# Patient Record
Sex: FEMALE | Race: BLACK OR AFRICAN AMERICAN | Employment: UNEMPLOYED | URBAN - METROPOLITAN AREA
[De-identification: names, ages, dates, MRNs, and addresses within clinical notes are randomized per-mention and may not be internally consistent; named-entity substitution may affect disease eponyms.]

---

## 2017-04-06 ENCOUNTER — OFFICE VISIT (OUTPATIENT)
Dept: BEHAVIORAL/MENTAL HEALTH CLINIC | Age: 32
End: 2017-04-06

## 2017-04-06 VITALS
BODY MASS INDEX: 35.36 KG/M2 | HEIGHT: 66 IN | SYSTOLIC BLOOD PRESSURE: 116 MMHG | WEIGHT: 220 LBS | HEART RATE: 73 BPM | DIASTOLIC BLOOD PRESSURE: 75 MMHG

## 2017-04-06 DIAGNOSIS — G47.9 SLEEP DISTURBANCE: ICD-10-CM

## 2017-04-06 DIAGNOSIS — F33.1 MDD (MAJOR DEPRESSIVE DISORDER), RECURRENT EPISODE, MODERATE (HCC): Primary | ICD-10-CM

## 2017-04-06 DIAGNOSIS — F41.9 ANXIETY: ICD-10-CM

## 2017-04-06 DIAGNOSIS — Z91.14 NON COMPLIANCE W MEDICATION REGIMEN: ICD-10-CM

## 2017-04-06 PROBLEM — Z91.148 NON COMPLIANCE W MEDICATION REGIMEN: Status: ACTIVE | Noted: 2017-04-06

## 2017-04-06 RX ORDER — BUPROPION HYDROCHLORIDE 150 MG/1
150 TABLET ORAL
Qty: 30 TAB | Refills: 2 | Status: SHIPPED | OUTPATIENT
Start: 2017-04-06 | End: 2017-05-03 | Stop reason: SDUPTHER

## 2017-04-06 RX ORDER — TRAZODONE HYDROCHLORIDE 50 MG/1
TABLET ORAL
Qty: 30 TAB | Refills: 1 | Status: SHIPPED | OUTPATIENT
Start: 2017-04-06 | End: 2017-05-03 | Stop reason: SDUPTHER

## 2017-04-06 NOTE — MR AVS SNAPSHOT
Visit Information Date & Time Provider Department Dept. Phone Encounter #  
 4/6/2017 10:30 AM Dudley Mckay, [facility] [phone] [id number] Follow-up Instructions Return in about 4 weeks (around 5/4/2017). Upcoming Health Maintenance Date Due  
 PAP AKA CERVICAL CYTOLOGY 5/1/2016 INFLUENZA AGE 9 TO ADULT 8/1/2016 DTaP/Tdap/Td series (2 - Td) 9/14/2022 Allergies as of 4/6/2017  Review Complete On: 4/6/2017 By: Dudley Mckay NP Severity Noted Reaction Type Reactions Labetalol High 04/11/2013   Topical Hives Ceclor [Cefaclor]  10/08/2010    Unknown (comments) Pcn [Penicillins]  10/08/2010    Hives Septra [Sulfamethoprim Ds]  10/08/2010    Unknown (comments) Current Immunizations  Reviewed on 4/23/2015 Name Date Influenza Vaccine PF 10/22/2013 11:20 AM  
 Pneumococcal Vaccine (Unspecified Type) 9/14/2012  3:57 PM  
 TDAP Vaccine 9/14/2012  4:01 PM  
  
 Not reviewed this visit You Were Diagnosed With   
  
 Codes Comments MDD (major depressive disorder), recurrent episode, moderate ([facility]    -  Primary ICD-10-CM: F33.1 ICD-9-CM: 296.32 Anxiety     ICD-10-CM: F41.9 ICD-9-CM: 300.00 Sleep disturbance     ICD-10-CM: G47.9 ICD-9-CM: 780.50 Vitals BP Pulse Height(growth percentile) Weight(growth percentile) LMP BMI  
 116/75 73 5' 6\" (1.676 m) 220 lb (99.8 kg) 03/28/2017 35.51 kg/m2 OB Status Smoking Status Having regular periods Never Smoker Vitals History BMI and BSA Data Body Mass Index Body Surface Area 35.51 kg/m 2 2.16 m 2 Preferred Pharmacy Pharmacy Name Phone [address], [address] [phone] Your Updated Medication List  
  
   
This list is accurate as of: 4/6/17 10:50 AM.  Always use your most recent med list.  
  
  
  
  
 albuterol 90 mcg/actuation inhaler Commonly known as:  PROVENTIL HFA, VENTOLIN HFA, PROAIR HFA Take 2 Puffs by inhalation every six (6) hours as needed for Wheezing. Needs office visit for further refills buPROPion  mg tablet Commonly known as:  Nadara Calle Take 1 Tab by mouth every morning. cetirizine 10 mg tablet Commonly known as:  ZYRTEC  
TAKE 1 TABLET BY MOUTH EVERY DAY  
  
 docusate sodium 50 mg capsule Commonly known as:  Borrero Gails Take 50 mg by mouth two (2) times a day. ferrous sulfate 325 mg (65 mg iron) tablet Commonly known as:  Iron Take 1 Tab by mouth daily. ibuprofen 800 mg tablet Commonly known as:  MOTRIN Take 1 Tab by mouth every eight (8) hours. PRENATAL 19 (WITH DOCUSATE) 29-1 mg Tab Generic drug:  prenatal vit-fe fum-fa-dss Take 1 Tab by mouth daily. Indications: PREGNANCY  
  
 traZODone 50 mg tablet Commonly known as:  Clari Gardner Take 1/2-1 tablet by mouth at bedtime for sleep as needed Prescriptions Sent to Pharmacy Refills buPROPion XL (WELLBUTRIN XL) 150 mg tablet 2 Sig: Take 1 Tab by mouth every morning. Class: Normal  
 Pharmacy: [address] Ph #: [phone] Route: Oral  
 traZODone (DESYREL) 50 mg tablet 1 Sig: Take 1/2-1 tablet by mouth at bedtime for sleep as needed Class: Normal  
 Pharmacy: [address] Ph #: [phone] Follow-up Instructions Return in about 4 weeks (around 5/4/2017). Introducing [facility]! Dear Salvador potts: 
Thank you for requesting a [employer] account. Our records indicate that you already have an active [employer] account. You can access your account anytime at [email or web address] Did you know that you can access your hospital and ER discharge instructions at any time in Wally? You can also review all of your test results from your hospital stay or ER visit. Additional Information If you have questions, please visit the Frequently Asked Questions section of the Wally website at [email or web address] Remember, [employer] is NOT to be used for urgent needs. For medical emergencies, dial 911. Now available from your iPhone and Android! Please provide this summary of care documentation to your next provider. Your primary care clinician is listed as Janiya Henson. If you have any questions after today's visit, please call [phone].
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
## 2017-05-03 ENCOUNTER — OFFICE VISIT (OUTPATIENT)
Dept: BEHAVIORAL/MENTAL HEALTH CLINIC | Age: 32
End: 2017-05-03

## 2017-05-03 VITALS
BODY MASS INDEX: 35.51 KG/M2 | WEIGHT: 220 LBS | DIASTOLIC BLOOD PRESSURE: 83 MMHG | HEART RATE: 80 BPM | SYSTOLIC BLOOD PRESSURE: 110 MMHG

## 2017-05-03 DIAGNOSIS — F33.1 MDD (MAJOR DEPRESSIVE DISORDER), RECURRENT EPISODE, MODERATE (HCC): Primary | ICD-10-CM

## 2017-05-03 DIAGNOSIS — G47.9 SLEEP DISTURBANCE: ICD-10-CM

## 2017-05-03 DIAGNOSIS — F41.9 ANXIETY: ICD-10-CM

## 2017-05-03 RX ORDER — BUPROPION HYDROCHLORIDE 300 MG/1
300 TABLET ORAL
Qty: 30 TAB | Refills: 1 | Status: SHIPPED | OUTPATIENT
Start: 2017-05-03 | End: 2017-05-31 | Stop reason: SDUPTHER

## 2017-05-03 RX ORDER — TRAZODONE HYDROCHLORIDE 100 MG/1
TABLET ORAL
Qty: 30 TAB | Refills: 1 | Status: SHIPPED | OUTPATIENT
Start: 2017-05-03 | End: 2017-05-31 | Stop reason: SDUPTHER

## 2017-05-03 NOTE — PROGRESS NOTES
Psychiatric Outpatient Progress Note    Date: 5/3/2017  Account Number:  [id number]  Name: Crescencio Valenzuela    Length of psychotherapy session: 20 minutes     SUBJECTIVE:   Crescencio Valenzuela  is a 33 y.o.  female  patient presents for a therapy/psychopharmacological management appointment. Pt reports mild improvement since starting Wellbutrin. Has considerable social stressors such as son having problems in school and b/f being emotionally absent and lacking support in the relationship. Better compliance with medication. Continues to struggle with depression, anhedonia, concentration, low mood and anxiety. No evidence of AH/VH or delusions. No SI.  No HI.     ROS:   Appetite:good , Sleep: varies  Response to Treatment:   Not consistent with treatment  Side Effects:   prozac-rash    zoloft-ineffective      Supportive/Cognitive/Reality-Oriented psychotherapy provided in regards to psychosocial stressors:   Current problems   Housing issues   Occupational issues   Academic issues   Medical issues   Interpersonal conflicts  Psychoeducation provided  Treatment plan reviewed with patient-including diagnosis and medications  Worked on issues of denial & effects of substance dependency/use: co-dependency      OBJECTIVE:                 Mental Status exam: WNL except for      Sensorium  oriented to time, place and person   Relations passive    Eye Contact    appropriate   Appearance:  casually dressed, obese   Motor Behavior:  within normal limits   Speech:  normal pitch and normal volume   Thought Process: goal directed   Thought Content free of delusions and free of hallucinations   Suicidal ideations none   Homicidal ideations none   Mood:  Anxious, depressed   Affect:  constricted   Memory recent  adequate   Memory remote:  adequate   Concentration:  adequate   Abstraction:  abstract   Insight:  fair   Reliability fair   Judgment:  limited       Allergies   Allergen Reactions    Labetalol Hives    Ceclor [Cefaclor] Unknown (comments)    Pcn [Penicillins] Hives    Septra [Sulfamethoprim Ds] Unknown (comments)        Current Outpatient Prescriptions   Medication Sig Dispense Refill    buPROPion XL (WELLBUTRIN XL) 300 mg XL tablet Take 1 Tab by mouth every morning. 30 Tab 1    traZODone (DESYREL) 100 mg tablet Take 1/2-1 tablet by mouth at bedtime for sleep as needed 30 Tab 1    ibuprofen (MOTRIN) 800 mg tablet Take 1 Tab by mouth every eight (8) hours. 30 Tab 2    docusate sodium (COLACE) 50 mg capsule Take 50 mg by mouth two (2) times a day.  albuterol (PROVENTIL HFA, VENTOLIN HFA) 90 mcg/actuation inhaler Take 2 Puffs by inhalation every six (6) hours as needed for Wheezing. Needs office visit for further refills 1 Inhaler 0    cetirizine (ZYRTEC) 10 mg tablet TAKE 1 TABLET BY MOUTH EVERY DAY 15 Tab 0    ferrous sulfate (IRON) 325 mg (65 mg iron) tablet Take 1 Tab by mouth daily. 30 Tab 0    prenatal vit-fe fum-fa-dss (PRENATAL 19) 29-1 mg Tab Take 1 Tab by mouth daily. Indications: PREGNANCY          MEDICAL DECISION MAKING  Visit Vitals    /83    Pulse 80    Wt 99.8 kg (220 lb)    LMP 04/22/2017    Breastfeeding Yes    BMI 35.51 kg/m2       Data: pertinent labs, imaging, medical records and diagnostic tests reviewed and incorporated in diagnosis and treatment plan    Diagnoses/ Problems:     ICD-10-CM ICD-9-CM    1. MDD (major depressive disorder), recurrent episode, moderate (HCC) F33.1 296.32 buPROPion XL (WELLBUTRIN XL) 300 mg XL tablet   2. Anxiety F41.9 300.00 buPROPion XL (WELLBUTRIN XL) 300 mg XL tablet   3. Sleep disturbance G47.9 780.50 traZODone (DESYREL) 100 mg tablet   Medical:  hypertension, anemia, asthma, sciatica. Assessment:  Wenceslao Tony  is a 33 y.o.  female patient presents with depression and anxiety. She remains in a relationship where she does not feel supported due to his alcoholism.   She has significant social stressor to include raising 6 children, lack of support and issues with bf. Medication helping minimally. Still with wakefulness and depression. Discussed sleep disturbance and recommendations. Home situation is very stressful. Denies SI, HI or SIB. Risk Scoring- chronic illnesses and prescription drug management    Partner -Esvin  Children -Christopher Albarran (school struggles), Suresh Maciel, Master Daleig    Treatment Plan:  1. Medications:   Increase Wellbutrin   Trazodone  mg mg qhs prn - increased      Orders Placed This Encounter    buPROPion XL (WELLBUTRIN XL) 300 mg XL tablet    traZODone (DESYREL) 100 mg tablet           The following regarding medications was addressed:    (The risks and benefits of the proposed medication; the potential medication side effects ie    dry mouth, weight gain, GI upset, headache; patient given opportunity to ask questions)       Medication Changes/Adjustments:   Medications Discontinued During This Encounter   Medication Reason    buPROPion XL (WELLBUTRIN XL) 150 mg tablet Reorder    traZODone (DESYREL) 50 mg tablet Reorder        2. Provided supportive psychotherapy: addressed safety risk, recent stressors, provided validation and assisted with coping skills and problem solving     3. Follow-up Disposition:  Return in about 4 weeks (around 5/31/2017). PSYCHOTHERAPY:  approx 10 minutes  Type:  Supportive/Cognitive Behavioral therapy provided  Focus:     Current problems - son struggling in school   Interpersonal conflicts -conflict with boyfriend  Psychoeducation provided  Treatment plan reviewed with patient-including diagnosis and medications    Jamia Area is not progressing.     Signed By: Zandra Nance NP     May 3, 2017

## 2017-05-03 NOTE — MR AVS SNAPSHOT
Visit Information Date & Time Provider Department Dept. Phone Encounter #  
 5/3/2017 10:30 AM Argelia Garrison, [address] [facility] [phone] [id number] Upcoming Health Maintenance Date Due  
 PAP AKA CERVICAL CYTOLOGY 5/1/2016 INFLUENZA AGE 9 TO ADULT 8/1/2017 DTaP/Tdap/Td series (2 - Td) 9/14/2022 Allergies as of 5/3/2017  Review Complete On: 5/3/2017 By: Argelia Garrison NP Severity Noted Reaction Type Reactions Labetalol High 04/11/2013   Topical Hives Ceclor [Cefaclor]  10/08/2010    Unknown (comments) Pcn [Penicillins]  10/08/2010    Hives Septra [Sulfamethoprim Ds]  10/08/2010    Unknown (comments) Current Immunizations  Reviewed on 4/23/2015 Name Date Influenza Vaccine PF 10/22/2013 11:20 AM  
 Pneumococcal Vaccine (Unspecified Type) 9/14/2012  3:57 PM  
 TDAP Vaccine 9/14/2012  4:01 PM  
  
 Not reviewed this visit You Were Diagnosed With   
  
 Codes Comments MDD (major depressive disorder), recurrent episode, [facility]    -  Primary ICD-10-CM: F33.1 ICD-9-CM: 296.32 Anxiety     ICD-10-CM: F41.9 ICD-9-CM: 300.00 Sleep disturbance     ICD-10-CM: G47.9 ICD-9-CM: 780.50 Vitals BP Pulse Weight(growth percentile) LMP Breastfeeding? BMI  
 110/83 80 220 lb (99.8 kg) 04/22/2017 Yes 35.51 kg/m2 OB Status Smoking Status Having regular periods Never Smoker Vitals History BMI and BSA Data Body Mass Index Body Surface Area 35.51 kg/m 2 2.16 m 2 Preferred Pharmacy Pharmacy Name Phone [address], [address] [phone] Your Updated Medication List  
  
   
This list is accurate as of: 5/3/17 10:48 AM.  Always use your most recent med list.  
  
  
  
  
 albuterol 90 mcg/actuation inhaler Commonly known as:  PROVENTIL HFA, VENTOLIN HFA, PROAIR HFA  
 Take 2 Puffs by inhalation every six (6) hours as needed for Wheezing. Needs office visit for further refills buPROPion  mg XL tablet Commonly known as:  Nestor Dross Take 1 Tab by mouth every morning. cetirizine 10 mg tablet Commonly known as:  ZYRTEC  
TAKE 1 TABLET BY MOUTH EVERY DAY  
  
 docusate sodium 50 mg capsule Commonly known as:  Columbia Angle Take 50 mg by mouth two (2) times a day. ferrous sulfate 325 mg (65 mg iron) tablet Commonly known as:  Iron Take 1 Tab by mouth daily. ibuprofen 800 mg tablet Commonly known as:  MOTRIN Take 1 Tab by mouth every eight (8) hours. PRENATAL 19 (WITH DOCUSATE) 29-1 mg Tab Generic drug:  prenatal vit-fe fum-fa-dss Take 1 Tab by mouth daily. Indications: PREGNANCY  
  
 traZODone 100 mg tablet Commonly known as:  Ismael Nicole Take 1/2-1 tablet by mouth at bedtime for sleep as needed Prescriptions Sent to Pharmacy Refills buPROPion XL (WELLBUTRIN XL) 300 mg XL tablet 1 Sig: Take 1 Tab by mouth every morning. Class: Normal  
 Pharmacy: [address] Ph #: [phone] Route: Oral  
 traZODone (DESYREL) 100 mg tablet 1 Sig: Take 1/2-1 tablet by mouth at bedtime for sleep as needed Class: Normal  
 Pharmacy: [address] Ph #: [phone] [facility]! Dear Yasmin Jonh: 
Thank you for requesting a [employer] account. Our records indicate that you already have an active [employer] account. You can access your account anytime at [email or web address] Did you know that you can access your hospital and ER discharge instructions at any time in [employer]? You can also review all of your test results from your hospital stay or ER visit. Additional Information If you have questions, please visit the Frequently Asked Questions section of the Meezhart website at [email or web address] Remember, [employer] is NOT to be used for urgent needs. For medical emergencies, dial 911. Now available from your iPhone and Android! Please provide this summary of care documentation to your next provider. Your primary care clinician is listed as Gomez Banda. If you have any questions after today's visit, please call [phone].

## 2017-05-31 ENCOUNTER — OFFICE VISIT (OUTPATIENT)
Dept: BEHAVIORAL/MENTAL HEALTH CLINIC | Age: 32
End: 2017-05-31

## 2017-05-31 VITALS
BODY MASS INDEX: 35.52 KG/M2 | DIASTOLIC BLOOD PRESSURE: 78 MMHG | WEIGHT: 221 LBS | SYSTOLIC BLOOD PRESSURE: 121 MMHG | HEIGHT: 66 IN | HEART RATE: 85 BPM

## 2017-05-31 DIAGNOSIS — G47.9 SLEEP DISTURBANCE: ICD-10-CM

## 2017-05-31 DIAGNOSIS — F33.1 MDD (MAJOR DEPRESSIVE DISORDER), RECURRENT EPISODE, MODERATE (HCC): Primary | ICD-10-CM

## 2017-05-31 DIAGNOSIS — F41.9 ANXIETY: ICD-10-CM

## 2017-05-31 RX ORDER — BUPROPION HYDROCHLORIDE 150 MG/1
150 TABLET ORAL 2 TIMES DAILY
Qty: 60 TAB | Refills: 1 | Status: SHIPPED | OUTPATIENT
Start: 2017-05-31 | End: 2017-07-12 | Stop reason: SDUPTHER

## 2017-05-31 RX ORDER — TRAZODONE HYDROCHLORIDE 150 MG/1
TABLET ORAL
Qty: 30 TAB | Refills: 1 | Status: SHIPPED | OUTPATIENT
Start: 2017-05-31 | End: 2017-07-12 | Stop reason: SDUPTHER
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
## 2017-07-12 ENCOUNTER — OFFICE VISIT (OUTPATIENT)
Dept: BEHAVIORAL/MENTAL HEALTH CLINIC | Age: 32
End: 2017-07-12

## 2017-07-12 VITALS
HEART RATE: 86 BPM | BODY MASS INDEX: 36.16 KG/M2 | SYSTOLIC BLOOD PRESSURE: 111 MMHG | HEIGHT: 66 IN | DIASTOLIC BLOOD PRESSURE: 73 MMHG | WEIGHT: 225 LBS

## 2017-07-12 DIAGNOSIS — F33.1 MDD (MAJOR DEPRESSIVE DISORDER), RECURRENT EPISODE, MODERATE (HCC): ICD-10-CM

## 2017-07-12 DIAGNOSIS — G47.9 SLEEP DISTURBANCE: ICD-10-CM

## 2017-07-12 DIAGNOSIS — F41.9 ANXIETY: ICD-10-CM

## 2017-07-12 RX ORDER — TRAZODONE HYDROCHLORIDE 150 MG/1
TABLET ORAL
Qty: 30 TAB | Refills: 1 | Status: SHIPPED | OUTPATIENT
Start: 2017-07-12 | End: 2018-03-03

## 2017-07-12 RX ORDER — BUPROPION HYDROCHLORIDE 150 MG/1
150 TABLET ORAL 2 TIMES DAILY
Qty: 60 TAB | Refills: 1 | Status: SHIPPED | OUTPATIENT
Start: 2017-07-12 | End: 2017-08-14 | Stop reason: SDUPTHER
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
## 2017-07-13 LAB — HCG UR QL: POSITIVE

## 2017-07-28 ENCOUNTER — TELEPHONE (OUTPATIENT)
Dept: FAMILY MEDICINE CLINIC | Age: 32
End: 2017-07-28

## 2017-07-28 ENCOUNTER — HOSPITAL ENCOUNTER (EMERGENCY)
Age: 32
Discharge: HOME | End: 2017-07-28
Attending: EMERGENCY MEDICINE

## 2017-07-28 ENCOUNTER — HOSPITAL ENCOUNTER (OUTPATIENT)
Dept: LAB | Age: 32
Discharge: HOME | End: 2017-07-28

## 2017-07-28 VITALS
OXYGEN SATURATION: 99 % | WEIGHT: 224 LBS | HEIGHT: 65 IN | TEMPERATURE: 97.9 F | HEART RATE: 67 BPM | DIASTOLIC BLOOD PRESSURE: 68 MMHG | SYSTOLIC BLOOD PRESSURE: 139 MMHG | RESPIRATION RATE: 19 BRPM | BODY MASS INDEX: 37.32 KG/M2

## 2017-07-28 DIAGNOSIS — Z34.90 EARLY STAGE OF PREGNANCY: Primary | ICD-10-CM

## 2017-07-28 LAB
BILIRUB UR QL: NEGATIVE
GLUCOSE UR QL STRIP.AUTO: NEGATIVE MG/DL
HCG UR QL: POSITIVE
KETONES UR-MCNC: NEGATIVE MG/DL
LEUKOCYTE ESTERASE UR QL STRIP: NEGATIVE
NITRITE UR QL: NEGATIVE
PH UR: 6 [PH] (ref 5–8)
PROT UR QL: NEGATIVE MG/DL
RBC # UR STRIP: NEGATIVE /UL
SP GR UR: 1.03 (ref 1–1.03)
UROBILINOGEN UR QL: 1 EU/DL (ref 0.2–1)

## 2017-07-28 PROCEDURE — 87086 URINE CULTURE/COLONY COUNT: CPT | Performed by: EMERGENCY MEDICINE

## 2017-07-28 NOTE — TELEPHONE ENCOUNTER
----- Message from [place] Poll sent at 7/28/2017  7:07 AM EDT -----  Regarding: NP Emely Baird Innocent  Contact: [phone]  Pt is requesting a call back t be seen today. 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 Pt has a poss UTI.  Pt's best contact number is [phone]

## 2017-07-28 NOTE — TELEPHONE ENCOUNTER
Called patient on phone number provided, advised patient since she has not been seen since 2014 she would need to come in as a new patient, advised patient I could get her in Monday at 8:30am with Dr. Adela Farmer, or we do have a [facility] clinic off of [address] that she can go to as a walk in, they are open from 8-8 today. Patient declined the appointment for Monday, patient states she will go to the [facility] clinic.

## 2017-07-28 NOTE — DISCHARGE INSTRUCTIONS
Learning About Pregnancy  Your Care Instructions  Your health in the early weeks of your pregnancy is particularly important for your babys health. Take good care of yourself. Anything you do that harms your body can also harm your baby. Make sure to go to all of your doctor appointments. Regular checkups will help keep you and your baby healthy. Follow-up care is a key part of your treatment and safety. Be sure to make and go to all appointments, and call your doctor if you are having problems. Its also a good idea to know your test results and keep a list of the medicines you take. How can you care for yourself at home? Diet  · Eat a balanced diet. Make sure your diet includes plenty of beans, peas, and leafy green vegetables. · Do not skip meals or go for many hours without eating. If you are nauseated, try to eat a small, healthy snack every 2 to 3 hours. · Do not eat fish that has a high level of mercury, such as shark, swordfish, or mackerel. Do not eat more than one can of tuna each week. · Drink plenty of fluids, enough so that your urine is light yellow or clear like water. If you have kidney, heart, or liver disease and have to limit fluids, talk with your doctor before you increase the amount of fluids you drink. · Cut down on caffeine, such as coffee, tea, and cola. · Do not drink alcohol, such as beer, wine, or hard liquor. · Take a multivitamin that contains at least 400 micrograms (mcg) of folic acid to help prevent birth defects. Fortified cereal and whole wheat bread are good additional sources of folic acid. · Increase the calcium in your diet. Try to drink a quart of skim milk each day. You may also take calcium supplements and choose foods such as cheese and yogurt. Lifestyle  · Make sure you go to your follow-up appointments. · Get plenty of rest. You may be unusually tired while you are pregnant. · Get at least 30 minutes of exercise on most days of the week.  Walking is a good choice. If you have not exercised in the past, start out slowly. Take several short walks each day. · Do not smoke. If you need help quitting, talk to your doctor about stop-smoking programs. These can increase your chances of quitting for good. · Do not touch cat feces or litter boxes. Also, wash your hands after you handle raw meat, and fully cook all meat before you eat it. Wear gloves when you work in the yard or garden, and wash your hands well when you are done. Cat feces, raw or undercooked meat, and contaminated dirt can cause an infection that may harm your baby or lead to a miscarriage. · Do not use saunas or hot tubs. Raising your body temperature may harm your baby. · Avoid chemical fumes, paint fumes, or poisons. · Do not use illegal drugs or alcohol. Medicines  · Review all of your medicines with your doctor. Some of your routine medicines may need to be changed to protect your baby. · Use acetaminophen (Tylenol) to relieve minor problems, such as a mild headache or backache or a mild fever with cold symptoms. Do not use nonsteroidal anti-inflammatory drugs (NSAIDs), such as ibuprofen (Advil, Motrin) or naproxen (Aleve), unless your doctor says it is okay. · Do not take two or more pain medicines at the same time unless the doctor told you to. Many pain medicines have acetaminophen, which is Tylenol. Too much acetaminophen (Tylenol) can be harmful. · Take your medicines exactly as prescribed. Call your doctor if you think you are having a problem with your medicine. To manage morning sickness  · If you feel sick when you first wake up, try eating a small snack (such as crackers) before you get out of bed. Allow some time to digest the snack, and then get out of bed slowly. · Do not skip meals or go for long periods without eating. An empty stomach can make nausea worse. · Eat small, frequent meals instead of three large meals each day. · Drink plenty of fluids.  Sports drinks, such as Gatorade or Powerade, are good choices. · Eat foods that are high in protein but low in fat. · If you are taking iron supplements, ask your doctor if they are necessary. Iron can make nausea worse. · Avoid any smells, such as coffee, that make you feel sick. · Get lots of rest. Morning sickness may be worse when you are tired. Where can you learn more? Go to http://annamaria-lexii.info/. Enter O845 in the search box to learn more about \"Learning About Pregnancy. \"  Current as of: March 16, 2017  Content Version: 11.3  © 2310-3750 [employer]. Care instructions adapted under license by [employer] (which disclaims liability or warranty for this information). If you have questions about a medical condition or this instruction, always ask your healthcare professional. [address] any warranty or liability for your use of this information. Belly Pain in Pregnancy: Care Instructions  Your Care Instructions  When you're pregnant, any belly pain can be a worry. You may not want to call your doctor about every pain you have. But you don't want to miss something that is dangerous for you or your baby. Even if it feels familiar, belly pain can mean something new when you're pregnant. It's important to know when to call your doctor. It will also help to know how to care for yourself at home when your pain is not caused by anything harmful. · When belly pain is more severe or constant, see a doctor right away. · If you're sure your belly pain is a sign of labor, call your doctor. · When belly pain is brief, it's usually a normal part of pregnancy. It might be related to changes in the growing uterus. Or it could be the stretching of ligaments called round ligaments. These ligaments help support the uterus. Round ligament pain can be on either side of your belly. It can also be felt in your hips or groin.   Follow-up care is a key part of your treatment and safety. Be sure to make and go to all appointments, and call your doctor if you are having problems. It's also a good idea to know your test results and keep a list of the medicines you take. How can you tell if belly pain is a sign of labor? When belly pain is caused by labor, it can feel like mild or menstrual-like cramps in your lower belly. These cramps are probably contractions. They can happen in your second or third trimester. You may also have:  · A steady, dull ache in your lower back, pelvis, or thighs. · A feeling of pressure in your pelvis or lower belly. · Changes in your vaginal discharge or a sudden release of fluid from the vagina. If you think you are in labor, call your doctor. How can you care for yourself at home? When belly pain is mild and is not a symptom of labor:  · Rest until you feel better. · Take a warm bath. · Think about what you drink and eat:  ¨ Drink plenty of fluids. Choose water and other caffeine-free clear liquids until you feel better. ¨ Try eating small, frequent meals. If your stomach is upset, try bland, low-fat foods like plain rice, broiled chicken, toast, and yogurt. · Think about how you move if you are having brief pains from stretching of the round ligaments. ¨ Try gentle stretching. ¨ Move a little more slowly when turning in bed or getting up from a chair, so those ligaments don't stretch quickly. ¨ Lean forward a bit if you think you are going to cough or sneeze. When should you call for help? Call 911 anytime you think you may need emergency care. For example, call if:  · You have sudden, severe pain in your belly. · You have severe vaginal bleeding. Call your doctor now or seek immediate medical care if:  · You have new or worse belly pain or cramping. · You have any vaginal bleeding. · You have a fever. · You have symptoms of preeclampsia, such as:  ¨ Sudden swelling of your face, hands, or feet.   ¨ New vision problems (such as dimness or blurring). ¨ A severe headache. · You think that you may be in labor. This means that you've had at least 8 contractions within 1 hour or at least 4 contractions within 20 minutes, even after you change your position and drink fluids. · You have symptoms of a urinary tract infection. These may include:  ¨ Pain or burning when you urinate. ¨ A frequent need to urinate without being able to pass much urine. ¨ Pain in the flank, which is just below the rib cage and above the waist on either side of the back. ¨ Blood in your urine. Watch closely for changes in your health, and be sure to contact your doctor if you are worried about your or your baby's health. Where can you learn more? Go to http://annamaria-lexii.info/. Enter 873 507 442 in the search box to learn more about \"Belly Pain in Pregnancy: Care Instructions. \"  Current as of: March 16, 2017  Content Version: 11.3  © 2590-2313 [employer]. Care instructions adapted under license by [employer] (which disclaims liability or warranty for this information). If you have questions about a medical condition or this instruction, always ask your healthcare professional. [place] [address] any warranty or liability for your use of this information.

## 2017-07-28 NOTE — UC PROVIDER NOTE
Patient is a 33 y.o. female presenting with urinary tract infection. The history is provided by the patient. Bladder Infection    This is a new problem. The current episode started more than 1 week ago. The problem occurs intermittently. The problem has not changed since onset. The quality of the pain is described as aching. The pain is moderate. There has been no fever. She is sexually active. There is no history of pyelonephritis. Associated symptoms include chills and possible pregnancy. Pertinent negatives include no sweats, no nausea, no discharge, no frequency, no hematuria, no hesitancy, no urgency and no flank pain. Associated symptoms comments: Lower back pain. She has tried nothing for the symptoms. Her past medical history does not include kidney stones, single kidney, urological procedure, recurrent UTIs, urinary stasis or catheterization.         Past Medical History:   Diagnosis Date    Anemia 10/8/2010    Anemia NEC     taking iron    Anxiety     Asthma     Asthma     on albuterol prn    Back pain, chronic     sciatica    HX OTHER MEDICAL      , , , ,     HX OTHER MEDICAL     severe hyperemesis    Hypertension     with G12 - none this pregnancy    MDD (major depressive disorder), single episode with postpartum onset 2013    treated with meds - 13    Pregnancy     36 weeks    Psychiatric disorder     depression        Past Surgical History:   Procedure Laterality Date    HX  SECTION  4/22/15    HX GYN      miscarriage x 6    HX GYN      removal of left fallopian tube    HX OTHER SURGICAL      cerlcage x4, ectopic x1  with removal of left fallopian tube    HX OTHER SURGICAL      cerclage w/ current pregnancy         Family History   Problem Relation Age of Onset   [facility] Arthritis-rheumatoid Mother     Asthma Mother     Alcohol abuse Neg Hx     Arthritis-osteo Neg Hx     Bleeding Prob Neg Hx     Cancer Neg Hx     Diabetes Neg Hx     Elevated Lipids Neg Hx     Headache Neg Hx     Heart Disease Neg Hx     Hypertension Neg Hx     Lung Disease Neg Hx     Migraines Neg Hx     Psychiatric Disorder Neg Hx     Stroke Neg Hx     Mental Retardation Neg Hx         Social History     Social History    Marital status: SINGLE     Spouse name: N/A    Number of children: N/A    Years of education: N/A     Occupational History    Not on file. Social History Main Topics    Smoking status: Never Smoker    Smokeless tobacco: Never Used    Alcohol use No    Drug use: No    Sexual activity: Yes     Partners: Male     Other Topics Concern    Not on file     Social History Narrative    Feliz Mina, 25, lives with [place] [address],  reportedly with alcohol dependence and has five children from this union living with her-ages 9, 7, 5, 3, and 1. She was raised by her mother. She has no siblings. She is supported financially by the childrens' father and public support. She has no hobbies outside of her children. She has a 12th grade education and no legal entanglements. ALLERGIES: Labetalol; Ceclor [cefaclor]; Pcn [penicillins]; and Septra [sulfamethoprim ds]    Review of Systems   Constitutional: Positive for chills. Gastrointestinal: Negative. Negative for nausea. Genitourinary: Positive for dysuria. Negative for flank pain, frequency, hematuria, hesitancy, pelvic pain, urgency, vaginal bleeding and vaginal discharge. Musculoskeletal: Positive for back pain. All other systems reviewed and are negative. Vitals:    07/28/17 1530 07/28/17 1532   BP:  139/68   Pulse:  67   Resp:  19   Temp:  97.9 °F (36.6 °C)   SpO2:  99%   Weight: 101.6 kg (224 lb)    Height: 5' 5\" (1.651 m)        Physical Exam   Constitutional: She is oriented to person, place, and time. She appears well-developed and well-nourished. HENT:   Head: Normocephalic and atraumatic.    Mouth/Throat: Oropharynx is clear and moist. No oropharyngeal exudate. Eyes: Conjunctivae and EOM are normal. Pupils are equal, round, and reactive to light. Right eye exhibits no discharge. Left eye exhibits no discharge. No scleral icterus. Neck: Normal range of motion. No tracheal deviation present. No thyromegaly present. Cardiovascular: Normal rate, regular rhythm and normal heart sounds. No murmur heard. Pulmonary/Chest: Effort normal and breath sounds normal. No respiratory distress. She has no wheezes. She has no rales. She exhibits no tenderness. Abdominal: Soft. Bowel sounds are normal. She exhibits no distension. There is no tenderness. There is no rebound and no guarding. Musculoskeletal: Normal range of motion. She exhibits no edema or tenderness. Lymphadenopathy:     She has no cervical adenopathy. Neurological: She is alert and oriented to person, place, and time. No cranial nerve deficit. Coordination normal.   Skin: Skin is warm. No erythema. Psychiatric: She has a normal mood and affect. Her behavior is normal. Judgment and thought content normal.   Nursing note and vitals reviewed.       MDM     Differential Diagnosis; Clinical Impression; Plan:     Urinary burning, back pain,  Possible uti- urine dip negative, urine pregnancy test positive, impression early pregnancy, doubt uti      Procedures

## 2017-07-30 LAB
BACTERIA SPEC CULT: NORMAL
CC UR VC: NORMAL
SERVICE CMNT-IMP: NORMAL

## 2017-08-08 LAB
ANTIBODY SCREEN, EXTERNAL: NEGATIVE
HBSAG, EXTERNAL: NEGATIVE
HBSAG, EXTERNAL: NEGATIVE
HIV, EXTERNAL: NEGATIVE
RPR, EXTERNAL: NON REACTIVE
RUBELLA, EXTERNAL: NORMAL
TYPE, ABO & RH, EXTERNAL: NORMAL

## 2017-08-14 ENCOUNTER — OFFICE VISIT (OUTPATIENT)
Dept: BEHAVIORAL/MENTAL HEALTH CLINIC | Age: 32
End: 2017-08-14

## 2017-08-14 VITALS
OXYGEN SATURATION: 99 % | SYSTOLIC BLOOD PRESSURE: 106 MMHG | WEIGHT: 224.8 LBS | HEIGHT: 65 IN | TEMPERATURE: 98.5 F | DIASTOLIC BLOOD PRESSURE: 82 MMHG | RESPIRATION RATE: 18 BRPM | BODY MASS INDEX: 37.45 KG/M2 | HEART RATE: 90 BPM

## 2017-08-14 DIAGNOSIS — F41.9 ANXIETY: ICD-10-CM

## 2017-08-14 DIAGNOSIS — F33.1 MDD (MAJOR DEPRESSIVE DISORDER), RECURRENT EPISODE, MODERATE (HCC): Primary | ICD-10-CM

## 2017-08-14 RX ORDER — ONDANSETRON 4 MG/1
TABLET, ORALLY DISINTEGRATING ORAL
Refills: 0 | COMMUNITY
Start: 2017-07-27 | End: 2018-03-03

## 2017-08-14 RX ORDER — FAMOTIDINE 20 MG/1
TABLET, FILM COATED ORAL
Refills: 3 | COMMUNITY
Start: 2017-08-08 | End: 2020-01-15

## 2017-08-14 RX ORDER — ONDANSETRON 8 MG/1
TABLET, ORALLY DISINTEGRATING ORAL
Refills: 4 | COMMUNITY
Start: 2017-08-08 | End: 2017-09-29

## 2017-08-14 RX ORDER — SWAB
SWAB, NON-MEDICATED MISCELLANEOUS
Refills: 11 | COMMUNITY
Start: 2017-08-08 | End: 2020-01-15

## 2017-08-14 RX ORDER — BUPROPION HYDROCHLORIDE 150 MG/1
150 TABLET ORAL 2 TIMES DAILY
Qty: 60 TAB | Refills: 1 | Status: SHIPPED | OUTPATIENT
Start: 2017-08-14 | End: 2018-01-12 | Stop reason: SDUPTHER

## 2017-08-14 NOTE — MR AVS SNAPSHOT
Visit Information Date & Time Provider Department Dept. Phone Encounter #  
 8/14/2017 10:00 AM Martha AvilesSandhya5 [facility] [phone] [id number] Follow-up Instructions Return in about 4 weeks (around 9/11/2017). Your Appointments 9/12/2017 10:00 AM  
ESTABLISHED PATIENT with Martha Aviles NP Olivia Avilayajaira 5 ([facility]) Appt Note: 2 mo fu  
 [address] [facility] [address] [place] [address]  
[phone] [address] [facility] Upcoming Health Maintenance Date Due  
 PAP AKA CERVICAL CYTOLOGY 5/1/2016 INFLUENZA AGE 9 TO ADULT 8/1/2017 DTaP/Tdap/Td series (2 - Td) 9/14/2022 Allergies as of 8/14/2017  Review Complete On: 8/14/2017 By: Martha Aviles NP Severity Noted Reaction Type Reactions Labetalol High 04/11/2013   Topical Hives Ceclor [Cefaclor]  10/08/2010    Unknown (comments) Pcn [Penicillins]  10/08/2010    Hives Septra [Sulfamethoprim Ds]  10/08/2010    Unknown (comments) Current Immunizations  Reviewed on 4/23/2015 Name Date Influenza Vaccine PF 10/22/2013 11:20 AM  
 TDAP Vaccine 9/14/2012  4:01 PM  
 ZZZ-RETIRED (DO NOT USE) Pneumococcal Vaccine (Unspecified Type) 9/14/2012  3:57 PM  
  
 Not reviewed this visit You Were Diagnosed With   
  
 Codes Comments MDD (major depressive disorder), recurrent episode, moderate ([facility]    -  Primary ICD-10-CM: F33.1 ICD-9-CM: 296.32 Anxiety     ICD-10-CM: F41.9 ICD-9-CM: 300.00 Vitals BP Pulse Temp Resp Height(growth percentile) Weight(growth percentile) 106/82 (BP 1 Location: Left arm, BP Patient Position: Sitting) 90 98.5 °F (36.9 °C) (Oral) 18 5' 5\" (1.651 m) 224 lb 12.8 oz (102 kg) SpO2 BMI OB Status Smoking Status 99% 37.41 kg/m2 Having regular periods Never Smoker Vitals History BMI and BSA Data Body Mass Index Body Surface Area 37.41 kg/m 2 2.16 m 2 Preferred Pharmacy Pharmacy Name Phone [address], [address]  [phone] Your Updated Medication List  
  
   
This list is accurate as of: 8/14/17 10:39 AM.  Always use your most recent med list.  
  
  
  
  
 albuterol 90 mcg/actuation inhaler Commonly known as:  PROVENTIL HFA, VENTOLIN HFA, PROAIR HFA Take 2 Puffs by inhalation every six (6) hours as needed for Wheezing. Needs office visit for further refills buPROPion  mg tablet Commonly known as:  Jamie Crick Take 1 Tab by mouth two (2) times a day. cetirizine 10 mg tablet Commonly known as:  ZYRTEC  
TAKE 1 TABLET BY MOUTH EVERY DAY  
  
 famotidine 20 mg tablet Commonly known as:  PEPCID  
  
 * ondansetron 4 mg disintegrating tablet Commonly known as:  ZOFRAN ODT  
DIS 1 T UNT Q 8 H PRF NAUSEA OR VOM * ondansetron 8 mg disintegrating tablet Commonly known as:  ZOFRAN ODT  
DIS 1 T ON THE TONGUE Q 8 H PRN  
  
 prenatal vit-iron fumarate-fa 28 mg iron- 800 mcg Tab Commonly known as:  PRENATAL PLUS with IRON TK 1 T PO QD  
  
 traZODone 150 mg tablet Commonly known as:  Osvaldo Pace Take 1/2-1 tablet by mouth at bedtime for sleep as needed * Notice: This list has 2 medication(s) that are the same as other medications prescribed for you. Read the directions carefully, and ask your doctor or other care provider to review them with you. Prescriptions Sent to Pharmacy Refills buPROPion XL (WELLBUTRIN XL) 150 mg tablet 1 Sig: Take 1 Tab by mouth two (2) times a day. Class: Normal  
 Pharmacy: [facility] [address] [place] [address] #: [phone] Route: Oral  
  
Follow-up Instructions Return in about 4 weeks (around 9/11/2017). Introducing [facility]! Dear Juan Jose Parra: Thank you for requesting a Crowdzu account. Our records indicate that you already have an active Crowdzu account. You can access your account anytime at [email or web address] Did you know that you can access your hospital and ER discharge instructions at any time in Crowdzu? You can also review all of your test results from your hospital stay or ER visit. Additional Information If you have questions, please visit the Frequently Asked Questions section of the Crowdzu website at [email or web address] Remember, Crowdzu is NOT to be used for urgent needs. For medical emergencies, dial 911. Now available from your iPhone and Android! Please provide this summary of care documentation to your next provider. Your primary care clinician is listed as Damir Godinez. If you have any questions after today's visit, please call [phone].
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
## 2017-08-14 NOTE — PROGRESS NOTES
Chief Complaint   Patient presents with    Anxiety     2 month f/u    Depression     \"     1. Have you been to the ER, urgent care clinic since your last visit? Hospitalized since your last visit? No    2. Have you seen or consulted any other health care providers outside of the [address] [place] [address] since your last visit? Include any pap smears or colon screening.  No

## 2017-09-22 ENCOUNTER — HOSPITAL ENCOUNTER (OUTPATIENT)
Dept: PERINATAL CARE | Age: 32
Discharge: HOME | End: 2017-09-22
Attending: OBSTETRICS & GYNECOLOGY
Payer: MEDICAID

## 2017-09-22 PROCEDURE — 76815 OB US LIMITED FETUS(S): CPT | Performed by: OBSTETRICS & GYNECOLOGY

## 2017-09-28 ENCOUNTER — ANESTHESIA EVENT (OUTPATIENT)
Dept: LABOR AND DELIVERY | Age: 32
End: 2017-09-28
Payer: MEDICAID

## 2017-09-29 ENCOUNTER — HOSPITAL ENCOUNTER (OUTPATIENT)
Age: 32
Discharge: HOME | End: 2017-09-29
Attending: OBSTETRICS & GYNECOLOGY | Admitting: OBSTETRICS & GYNECOLOGY
Payer: MEDICAID

## 2017-09-29 ENCOUNTER — ANESTHESIA (OUTPATIENT)
Dept: LABOR AND DELIVERY | Age: 32
End: 2017-09-29
Payer: MEDICAID

## 2017-09-29 VITALS
BODY MASS INDEX: 36.16 KG/M2 | HEIGHT: 66 IN | WEIGHT: 225 LBS | HEART RATE: 83 BPM | SYSTOLIC BLOOD PRESSURE: 125 MMHG | OXYGEN SATURATION: 100 % | TEMPERATURE: 98.8 F | DIASTOLIC BLOOD PRESSURE: 62 MMHG | RESPIRATION RATE: 18 BRPM

## 2017-09-29 PROBLEM — N88.3 INCOMPETENT CERVIX: Status: ACTIVE | Noted: 2017-09-29

## 2017-09-29 PROBLEM — N88.3 CERVICAL INCOMPETENCE: Status: ACTIVE | Noted: 2017-09-29

## 2017-09-29 LAB
ERYTHROCYTE [DISTWIDTH] IN BLOOD BY AUTOMATED COUNT: 17.2 % (ref 11.5–14.5)
HCT VFR BLD AUTO: 31.5 % (ref 35–47)
HGB BLD-MCNC: 10.3 G/DL (ref 11.5–16)
MCH RBC QN AUTO: 21.7 PG (ref 26–34)
MCHC RBC AUTO-ENTMCNC: 32.7 G/DL (ref 30–36.5)
MCV RBC AUTO: 66.3 FL (ref 80–99)
PLATELET # BLD AUTO: 288 K/UL (ref 150–400)
RBC # BLD AUTO: 4.75 M/UL (ref 3.8–5.2)
WBC # BLD AUTO: 13.7 K/UL (ref 3.6–11)

## 2017-09-29 PROCEDURE — 74011250636 HC RX REV CODE- 250/636: Performed by: OBSTETRICS & GYNECOLOGY

## 2017-09-29 PROCEDURE — 74011250637 HC RX REV CODE- 250/637: Performed by: OBSTETRICS & GYNECOLOGY

## 2017-09-29 PROCEDURE — 96374 THER/PROPH/DIAG INJ IV PUSH: CPT | Performed by: NURSE ANESTHETIST, CERTIFIED REGISTERED

## 2017-09-29 PROCEDURE — 96375 TX/PRO/DX INJ NEW DRUG ADDON: CPT | Performed by: NURSE ANESTHETIST, CERTIFIED REGISTERED

## 2017-09-29 PROCEDURE — 74011000250 HC RX REV CODE- 250

## 2017-09-29 PROCEDURE — 85027 COMPLETE CBC AUTOMATED: CPT | Performed by: OBSTETRICS & GYNECOLOGY

## 2017-09-29 PROCEDURE — 75410000003 HC RECOV DEL/VAG/CSECN EA 0.5 HR: Performed by: OBSTETRICS & GYNECOLOGY

## 2017-09-29 PROCEDURE — 74011250636 HC RX REV CODE- 250/636

## 2017-09-29 PROCEDURE — 76010000389 HC LDRP PROCEDURE 0.5 TO 1 HR: Performed by: OBSTETRICS & GYNECOLOGY

## 2017-09-29 PROCEDURE — 76060000078 HC EPIDURAL ANESTHESIA: Performed by: OBSTETRICS & GYNECOLOGY

## 2017-09-29 PROCEDURE — 36415 COLL VENOUS BLD VENIPUNCTURE: CPT | Performed by: OBSTETRICS & GYNECOLOGY

## 2017-09-29 PROCEDURE — 77030007866 HC KT SPN ANES BBMI -B: Performed by: ANESTHESIOLOGY

## 2017-09-29 RX ORDER — ONDANSETRON 2 MG/ML
INJECTION INTRAMUSCULAR; INTRAVENOUS
Status: COMPLETED
Start: 2017-09-29 | End: 2017-09-29

## 2017-09-29 RX ORDER — ACETAMINOPHEN 325 MG/1
650 TABLET ORAL
Status: DISCONTINUED | OUTPATIENT
Start: 2017-09-29 | End: 2017-09-29 | Stop reason: HOSPADM

## 2017-09-29 RX ORDER — BUPIVACAINE HYDROCHLORIDE 7.5 MG/ML
INJECTION, SOLUTION EPIDURAL; RETROBULBAR AS NEEDED
Status: DISCONTINUED | OUTPATIENT
Start: 2017-09-29 | End: 2017-09-29 | Stop reason: HOSPADM

## 2017-09-29 RX ORDER — ONDANSETRON 2 MG/ML
4 INJECTION INTRAMUSCULAR; INTRAVENOUS
Status: COMPLETED | OUTPATIENT
Start: 2017-09-29 | End: 2017-09-29

## 2017-09-29 RX ORDER — KETOROLAC TROMETHAMINE 30 MG/ML
30 INJECTION, SOLUTION INTRAMUSCULAR; INTRAVENOUS
Status: COMPLETED | OUTPATIENT
Start: 2017-09-29 | End: 2017-09-29

## 2017-09-29 RX ORDER — OXYCODONE AND ACETAMINOPHEN 5; 325 MG/1; MG/1
1 TABLET ORAL
Qty: 10 TAB | Refills: 0 | Status: SHIPPED | OUTPATIENT
Start: 2017-09-29 | End: 2018-03-03

## 2017-09-29 RX ORDER — SODIUM CHLORIDE, SODIUM LACTATE, POTASSIUM CHLORIDE, CALCIUM CHLORIDE 600; 310; 30; 20 MG/100ML; MG/100ML; MG/100ML; MG/100ML
125 INJECTION, SOLUTION INTRAVENOUS CONTINUOUS
Status: DISCONTINUED | OUTPATIENT
Start: 2017-09-29 | End: 2017-09-29 | Stop reason: SDUPTHER

## 2017-09-29 RX ORDER — SODIUM CHLORIDE, SODIUM LACTATE, POTASSIUM CHLORIDE, CALCIUM CHLORIDE 600; 310; 30; 20 MG/100ML; MG/100ML; MG/100ML; MG/100ML
125 INJECTION, SOLUTION INTRAVENOUS CONTINUOUS
Status: DISCONTINUED | OUTPATIENT
Start: 2017-09-29 | End: 2017-09-29 | Stop reason: HOSPADM

## 2017-09-29 RX ADMIN — SODIUM CHLORIDE, POTASSIUM CHLORIDE, SODIUM LACTATE AND CALCIUM CHLORIDE: 600; 310; 30; 20 INJECTION, SOLUTION INTRAVENOUS at 12:19

## 2017-09-29 RX ADMIN — BUPIVACAINE HYDROCHLORIDE 7.5 MG: 7.5 INJECTION, SOLUTION EPIDURAL; RETROBULBAR at 12:30

## 2017-09-29 RX ADMIN — KETOROLAC TROMETHAMINE 30 MG: 30 INJECTION, SOLUTION INTRAMUSCULAR at 15:15

## 2017-09-29 RX ADMIN — ONDANSETRON 4 MG: 2 INJECTION INTRAMUSCULAR; INTRAVENOUS at 13:48

## 2017-09-29 RX ADMIN — ACETAMINOPHEN 650 MG: 325 TABLET, FILM COATED ORAL at 13:45

## 2017-09-29 NOTE — BRIEF OP NOTE
BRIEF OPERATIVE NOTE    Date of Procedure: 9/29/2017   Preoperative Diagnosis: INCOMPETENT CERVIX  Postoperative Diagnosis: s/p cerclage    Procedure(s):  CERCLAGE  Surgeon(s) and Role:     * Cris Tuttle MD - Primary     * Neela Kovacs MD - Assisting         Assistant Staff:       Surgical Staff:  Circ-1: Moshe Pate RN  Scrub Tech-1: [facility]  Float Staff: Shanell Rick RN  No case tracking events are documented in the log. Anesthesia: Spinal   Estimated Blood Loss: 20 millilters  Specimens: * No specimens in log *   Findings: Cervix 1 cm anteriorly and is deficient and irregular posteriorly.    Complications: None  Implants: * No implants in log *

## 2017-09-29 NOTE — ANESTHESIA PREPROCEDURE EVALUATION
Anesthetic History     PONV          Review of Systems / Medical History  Patient summary reviewed, nursing notes reviewed and pertinent labs reviewed    Pulmonary            Asthma : well controlled       Neuro/Psych         Psychiatric history     Cardiovascular    Hypertension                   GI/Hepatic/Renal  Within defined limits              Endo/Other        Obesity     Other Findings              Physical Exam    Airway  Mallampati: II  TM Distance: > 6 cm  Neck ROM: normal range of motion   Mouth opening: Normal     Cardiovascular  Regular rate and rhythm,  S1 and S2 normal,  no murmur, click, rub, or gallop             Dental  No notable dental hx       Pulmonary  Breath sounds clear to auscultation               Abdominal  GI exam deferred       Other Findings            Anesthetic Plan    ASA: 2  Anesthesia type: spinal            Anesthetic plan and risks discussed with: Patient

## 2017-09-29 NOTE — PROGRESS NOTES
Patient seen s/p history indicated cerclage placement by Dr. Omar Guevara today. Patient had been c/o cramping since procedure which is now better since toradol x1. Has not spontaneously urinated yet. Bleeding scant per bedside nursing staff. +Doptones post procedure. Will rx percocet though counseled that she should not need to take it. If she finds her pain worsens or if there is an increase in bleeding, she is instructed to return for evaluation. Patient's next appointment with regular OB/Gyn is next Tuesday. All questions answered.     Signed By: Nikolay Garcia DO     September 29, 2017

## 2017-09-29 NOTE — H&P
Maternal-Fetal Medicine    Ms. Marlys Isaac P6 at 15w 6d gestation, is admitted for prophylactic cerclage procedure. She had ultrasound and MFM consultation last week. She had several pregnancy losses followed by term deliveries. Patient had cerclage placed in all her successful pregnancies. Obstetric history is signficant for pregnancy losses at 16,18 and 20-weeks gestations. Subsequently, in all her pregnancies, she had term vaginal deliveries except in the most-recent one in , when she had  delivery for breech presentation. She had cerclages in all her successful pregnancies. In her last pregnancy, the patient was also admitted with hyperemesis gravidarum and had PICC line inserted. PMH: Chronic hypertension. Patient is not on any antihypertensives now. She also has mild intermittent asthma. Patient has depression and is taking Wellbutrin 150 mg bid and reports good response to her medication. PSH: Cerclage,  section. Allergies: Ceclor, penicillin, labetalol, septra. P/E: Patient is comfortably lying in bed; not in distress. Vitals stable. Abd: Soft; no tenderness. I counseled the patient on the following:  History of pregnancy losses: I had counseled the patient on the procedure and alternative management of weekly cervical length measurements. Patient had opted for cerclage. I counseled the patient on cerclage procedure again and discussed possible complications including miscarriage, bleeding, infection, and injuries to bladder or bowel (rare). Patient opted to have cerclage. Informed consent was obtained.

## 2017-09-29 NOTE — IP AVS SNAPSHOT
[address] 
[phone] Patient: Su Egan MRN: [id number] :1985 Current Discharge Medication List  
  
START taking these medications Dose & Instructions Dispensing Information Comments Morning Noon Evening Bedtime  
 oxyCODONE-acetaminophen 5-325 mg per tablet Commonly known as:  PERCOCET Your last dose was: Your next dose is:    
   
   
 Dose:  1 Tab Take 1 Tab by mouth every six (6) hours as needed for Pain (PRN Severe pain). Max Daily Amount: 4 Tabs. Quantity:  10 Tab Refills:  0 CONTINUE these medications which have CHANGED Dose & Instructions Dispensing Information Comments Morning Noon Evening Bedtime  
 ondansetron 4 mg disintegrating tablet Commonly known as:  ZOFRAN ODT What changed:  Another medication with the same name was removed. Continue taking this medication, and follow the directions you see here. Your last dose was: Your next dose is: DIS 1 T UNT Q 8 H PRF NAUSEA OR VOM Refills:  0 CONTINUE these medications which have NOT CHANGED Dose & Instructions Dispensing Information Comments Morning Noon Evening Bedtime  
 albuterol 90 mcg/actuation inhaler Commonly known as:  PROVENTIL HFA, VENTOLIN HFA, PROAIR HFA Your last dose was: Your next dose is:    
   
   
 Dose:  2 Puff Take 2 Puffs by inhalation every six (6) hours as needed for Wheezing. Needs office visit for further refills Quantity:  1 Inhaler Refills:  0  
     
   
   
   
  
 buPROPion  mg tablet Commonly known as:  Oscar Manley Your last dose was: Your next dose is:    
   
   
 Dose:  150 mg Take 1 Tab by mouth two (2) times a day. Quantity:  60 Tab Refills:  1  
     
   
   
   
  
 cetirizine 10 mg tablet Commonly known as:  ZYRTEC  
   
 Your last dose was: Your next dose is: TAKE 1 TABLET BY MOUTH EVERY DAY Quantity:  15 Tab Refills:  0  
     
   
   
   
  
 famotidine 20 mg tablet Commonly known as:  PEPCID Your last dose was: Your next dose is:    
   
   
  Refills:  3  
     
   
   
   
  
 prenatal vit-iron fumarate-fa 28 mg iron- 800 mcg Tab Commonly known as:  PRENATAL PLUS with IRON Your last dose was: Your next dose is:    
   
   
 TK 1 T PO QD Refills:  11  
     
   
   
   
  
 traZODone 150 mg tablet Commonly known as:  Rudy Shipley Your last dose was: Your next dose is: Take 1/2-1 tablet by mouth at bedtime for sleep as needed Quantity:  30 Tab Refills:  1 Where to Get Your Medications Information on where to get these meds will be given to you by the nurse or doctor. ! Ask your nurse or doctor about these medications  
  oxyCODONE-acetaminophen 5-325 mg per tablet

## 2017-09-29 NOTE — PROGRESS NOTES
Notified Dr. Umang Nath of pt's report of nausea and pain, orders received for IV Zofran. Order already available for Tylenol.

## 2017-09-29 NOTE — PROGRESS NOTES
1450 pt is experiencing very painful cramping, she is requesting more pain medication. Call placed to [facility], [facility]. and message left for Dr Nanci Kelsey to call the unit when he's not with a patient. 80 Dr Nanci Kelsey at bedside discussing [address] with pt. He feels comfortable with her being discharged home after she is able to void. 1640 Dr Medina  at bedside assessing pt for discharge. States she will send pt home with a small amount of pain medication and gave instructions for followup. 1815 pt has voided, vital signs are stable, pain is under control and all discharge instructions and follow up appointments have been discussed. 1855 pt taken by wheelchair to pt discharge.

## 2017-09-29 NOTE — OP NOTES
OPERATIVE REPORT   PREOPERATIVE DIAGNOSIS: A 15-week pregnancy with cervical incompetence. POSTOPERATIVE DIAGNOSIS: A 15-week pregnancy with cervical incompetence. OPERATIVE PROCEDURE: Cervical cerclage. SURGEON: Glen Kumari MD   ASSISTANT: Bonita Talley MD  ANESTHESIA: Spinal.   FINDINGS: Cervix 1 cm long with external os closed. COMPLICATIONS: None. DESCRIPTION OF PROCEDURE: Before transferring to OR, I explained the   procedures and complications of cervical cerclage that includes hemorrhage,   infection, and injury to bladder or bowel. Informed consent was obtained,   and the patient was transferred to the operating room. Spinal anesthesia was administered by the anesthesiologist. The patient was   then placed in the dorsal lithotomy position and vulva and vagina were   prepped with Betadine and draped. A weighted speculum was inserted into the vagina, and the   anterior lip of the cervix was held by a pair of ring forceps. The cervix   was seen to about 1 cm long anteriorly and a small cystocele was seen. Posteriorly, the cervix was irregular with raised scar tissue and is deficient between 5 and 7 Oclock positions. With the help with Bovie, about 1-inch long incision was made anteriorly at the   cervical-vaginal junction and the bladder was pushed up. A 5 mm Mersilene   tape stitch was taken at the 11-o'clock position and the suture was placed   circumferentially to exit at the 1-o'clock position. Posteriorly, the stitch was passed higher and above the scar tissue. The needle was cut off   and about 5 knots were placed anteriorly. Good hemostasis was achieved. All the instruments and sponges were removed from the vagina and the count   was correct. Rectal examination did not reveal any inadvertent sutures. Bladder was catheterized to rule out hematuria. Clear urine (5 mL) was drained. The patient was then transferred to labor and delivery in   stable condition.    ESTIMATED BLOOD LOSS: 20 milliliters. ESTIMATED BLOOD LOSS: 25 mL. SPECIMENS: None.   Loren Lim MD  9/29/2017

## 2017-09-29 NOTE — DISCHARGE INSTRUCTIONS
Cervical Cerclage to Prevent  Delivery: What to Expect at Home  Your Recovery  Cervical cerclage (say \"SER-vuh-kul ser-KLAZH\") is a procedure that helps keep your cervix from opening too soon. Your doctor has sewn your cervix shut to help prevent premature labor. For the next few days, you may have:  · Cramping. · Spotting. · Pain when you urinate. Your doctor may give you instructions on when you can do your normal activities again, such as driving and going back to work. Your doctor will remove the stitches around your cervix at 37 weeks, or if you go into premature labor or show signs of infection. This care sheet gives you a general idea about how long it will take for you to recover. But each person recovers at a different pace. Follow the steps below to get better as quickly as possible. How can you care for yourself at home? Activity  · Rest when you feel tired. · Ask your doctor when it is okay for you to have sex. Medicines  · Be safe with medicines. Read and follow all instructions on the label. · If you are not taking a prescription pain medicine, ask your doctor if you can take an over-the-counter medicine. · Your doctor will tell you if and when you can restart your medicines. He or she will also give you instructions about taking any new medicines. Follow-up care is a key part of your treatment and safety. Be sure to make and go to all appointments, and call your doctor if you are having problems. It's also a good idea to know your test results and keep a list of the medicines you take. When should you call for help? Call 911 anytime you think you may need emergency care. For example, call if:  · You have severe trouble breathing. · You have sudden, severe pain in your belly. · You have severe vaginal bleeding. Call your doctor now or seek immediate medical care if:  · You have new pelvic pain, or the pain in your pelvis gets worse.   · You have a new discharge from your vagina. · You have a new or higher fever. Watch closely for changes in your health, and be sure to contact your doctor if you have any problems. Where can you learn more? Go to http://annamaria-lexii.info/. Enter Q429 in the search box to learn more about \"Cervical Cerclage to Prevent  Delivery: What to Expect at Home. \"  Current as of: 2017  Content Version: 11.3  © 4018-3133 [employer], [employer]. Care instructions adapted under license by [employer] (which disclaims liability or warranty for this information). If you have questions about a medical condition or this instruction, always ask your healthcare professional. [address] any warranty or liability for your use of this information.

## 2017-09-29 NOTE — ANESTHESIA POSTPROCEDURE EVALUATION
Post-Anesthesia Evaluation and Assessment    Patient: Albaro Cedeño MRN: [id number]  SSN: [id number]    YOB: 1985  Age: 27 y.o. Sex: female       Cardiovascular Function/Vital Signs  Visit Vitals    /59    Pulse 69    Temp 36.7 °C (98.1 °F)    Resp 24    Ht 5' 6\" (1.676 m)    Wt 102.1 kg (225 lb)    SpO2 100%    Breastfeeding No    BMI 36.32 kg/m2       Patient is status post spinal anesthesia for Procedure(s):  CERCLAGE. Nausea/Vomiting: None    Postoperative hydration reviewed and adequate. Pain:  Pain Scale 1: Numeric (0 - 10) (09/29/17 1344)  Pain Intensity 1: 5 (09/29/17 1344)   Managed    Neurological Status: At baseline    Mental Status and Level of Consciousness: Arousable    Pulmonary Status:   O2 Device: Room air (09/29/17 1344)   Adequate oxygenation and airway patent    Complications related to anesthesia: None    Post-anesthesia assessment completed.  No concerns    Signed By: Ankita Squires DO     September 29, 2017

## 2017-09-29 NOTE — IP AVS SNAPSHOT
[address] [facility] [address] [place] [address] 
[phone] Patient: Harry Rose MRN: [id number] :1985 You are allergic to the following Allergen Reactions Labetalol Hives Ceclor (Cefaclor) Unknown (comments) Pcn (Penicillins) Hives Septra (Sulfamethoprim Ds) Unknown (comments) Recent Documentation Height Weight Breastfeeding? BMI OB Status Smoking Status 1.676 m 102.1 kg No 36.32 kg/m2 Pregnant Never Smoker Unresulted Labs Order Current Status SAMPLE TO BLOOD BANK In process About your hospitalization You were admitted on:  2017 You last received care in the:  [facility] 3 LABOR & DELIVERY You were discharged on:  2017 Unit phone number:  [phone] Why you were hospitalized Your primary diagnosis was:  Not on File Your diagnoses also included:  Cervical Incompetence, Incompetent Cervix Providers Seen During Your Hospitalizations Provider Role Specialty Primary office phone Olivia De León DO Attending Provider Obstetrics & Gynecology [phone] Your Primary Care Physician (PCP) Primary Care Physician Office Phone Office Fax Dov Walton [phone] Follow-up Information Follow up With Details Comments Contact Info Olivia De León DO On 10/3/2017  [address] [facility] [address] 
[phone] Meghan Robledo MD On 10/6/2017  [address] 
[facility] [address] [place] [address] 
[phone] Current Discharge Medication List  
  
START taking these medications Dose & Instructions Dispensing Information Comments Morning Noon Evening Bedtime  
 oxyCODONE-acetaminophen 5-325 mg per tablet Commonly known as:  PERCOCET Your last dose was: Your next dose is:    
   
   
 Dose:  1 Tab Take 1 Tab by mouth every six (6) hours as needed for Pain (PRN Severe pain). Max Daily Amount: 4 Tabs. Quantity:  10 Tab Refills:  0 CONTINUE these medications which have CHANGED Dose & Instructions Dispensing Information Comments Morning Noon Evening Bedtime  
 ondansetron 4 mg disintegrating tablet Commonly known as:  ZOFRAN ODT What changed:  Another medication with the same name was removed. Continue taking this medication, and follow the directions you see here. Your last dose was: Your next dose is: DIS 1 T UNT Q 8 H PRF NAUSEA OR VOM Refills:  0 CONTINUE these medications which have NOT CHANGED Dose & Instructions Dispensing Information Comments Morning Noon Evening Bedtime  
 albuterol 90 mcg/actuation inhaler Commonly known as:  PROVENTIL HFA, VENTOLIN HFA, PROAIR HFA Your last dose was: Your next dose is:    
   
   
 Dose:  2 Puff Take 2 Puffs by inhalation every six (6) hours as needed for Wheezing. Needs office visit for further refills Quantity:  1 Inhaler Refills:  0  
     
   
   
   
  
 buPROPion  mg tablet Commonly known as:  Alba Fair Your last dose was: Your next dose is:    
   
   
 Dose:  150 mg Take 1 Tab by mouth two (2) times a day. Quantity:  60 Tab Refills:  1  
     
   
   
   
  
 cetirizine 10 mg tablet Commonly known as:  ZYRTEC Your last dose was: Your next dose is: TAKE 1 TABLET BY MOUTH EVERY DAY Quantity:  15 Tab Refills:  0  
     
   
   
   
  
 famotidine 20 mg tablet Commonly known as:  PEPCID Your last dose was: Your next dose is:    
   
   
  Refills:  3  
     
   
   
   
  
 prenatal vit-iron fumarate-fa 28 mg iron- 800 mcg Tab Commonly known as:  PRENATAL PLUS with IRON Your last dose was:     
   
Your next dose is:    
   
   
 TK 1 T PO QD  
 Refills:  11  
     
   
   
   
  
 traZODone 150 mg tablet Commonly known as:  Cem Corona Your last dose was: Your next dose is: Take 1/2-1 tablet by mouth at bedtime for sleep as needed Quantity:  30 Tab Refills:  1 Where to Get Your Medications Information on where to get these meds will be given to you by the nurse or doctor. ! Ask your nurse or doctor about these medications  
  oxyCODONE-acetaminophen 5-325 mg per tablet Discharge Instructions Cervical Cerclage to Prevent  Delivery: What to Expect at [facility] Your Recovery Cervical cerclage (say \"SER-vuh-kul ser-KLAZH\") is a procedure that helps keep your cervix from opening too soon. Your doctor has sewn your cervix shut to help prevent premature labor. For the next few days, you may have: · Cramping. · Spotting. · Pain when you urinate. Your doctor may give you instructions on when you can do your normal activities again, such as driving and going back to work. Your doctor will remove the stitches around your cervix at 37 weeks, or if you go into premature labor or show signs of infection. This care sheet gives you a general idea about how long it will take for you to recover. But each person recovers at a different pace. Follow the steps below to get better as quickly as possible. How can you care for yourself at home? Activity · Rest when you feel tired. · Ask your doctor when it is okay for you to have sex. Medicines · Be safe with medicines. Read and follow all instructions on the label. · If you are not taking a prescription pain medicine, ask your doctor if you can take an over-the-counter medicine. · Your doctor will tell you if and when you can restart your medicines. He or she will also give you instructions about taking any new medicines. Follow-up care is a key part of your treatment and safety.  Be sure to make and go to all appointments, and call your doctor if you are having problems. It's also a good idea to know your test results and keep a list of the medicines you take. When should you call for help? Call 911 anytime you think you may need emergency care. For example, call if: 
· You have severe trouble breathing. · You have sudden, severe pain in your belly. · You have severe vaginal bleeding. Call your doctor now or seek immediate medical care if: 
· You have new pelvic pain, or the pain in your pelvis gets worse. · You have a new discharge from your vagina. · You have a new or higher fever. Watch closely for changes in your health, and be sure to contact your doctor if you have any problems. Where can you learn more? Go to http://annamaria-lexii.info/. Enter R324 in the search box to learn more about \"Cervical Cerclage to Prevent  Delivery: What to Expect at Home. \" Current as of: 2017 Content Version: 11.3 © 5535-6627 [employer]. Care instructions adapted under license by [email or web address] (which disclaims liability or warranty for this information). If you have questions about a medical condition or this instruction, always ask your healthcare professional. [place] [address] any warranty or liability for your use of this information. Discharge Orders None Introducing [facility]! [facility]: 
Thank you for requesting a FlipKey account. Our records indicate that you already have an active FlipKey account. You can access your account anytime at [email or web address] Did you know that you can access your hospital and ER discharge instructions at any time in FlipKey? You can also review all of your test results from your hospital stay or ER visit. Additional Information If you have questions, please visit the Frequently Asked Questions section of the [facility] website at [email or web address]; to (do). [email or web address] Remember, MyChart is NOT to be used for urgent needs. For medical emergencies, dial 911. Now available from your iPhone and Android! General Information Please provide this summary of care documentation to your next provider. Patient Signature:  ____________________________________________________________ Date:  ____________________________________________________________  
  
Suzon Mems Provider Signature:  ____________________________________________________________ Date:  ____________________________________________________________

## 2017-10-06 ENCOUNTER — HOSPITAL ENCOUNTER (OUTPATIENT)
Dept: PERINATAL CARE | Age: 32
Discharge: HOME | End: 2017-10-06
Attending: OBSTETRICS & GYNECOLOGY
Payer: MEDICAID

## 2017-10-06 PROCEDURE — 76817 TRANSVAGINAL US OBSTETRIC: CPT | Performed by: OBSTETRICS & GYNECOLOGY

## 2017-11-03 ENCOUNTER — HOSPITAL ENCOUNTER (OUTPATIENT)
Dept: PERINATAL CARE | Age: 32
Discharge: HOME | End: 2017-11-03
Attending: OBSTETRICS & GYNECOLOGY
Payer: MEDICAID

## 2017-11-03 PROCEDURE — 76811 OB US DETAILED SNGL FETUS: CPT | Performed by: OBSTETRICS & GYNECOLOGY

## 2017-11-03 PROCEDURE — 76817 TRANSVAGINAL US OBSTETRIC: CPT | Performed by: OBSTETRICS & GYNECOLOGY

## 2017-12-01 ENCOUNTER — HOSPITAL ENCOUNTER (OUTPATIENT)
Dept: PERINATAL CARE | Age: 32
Discharge: HOME | End: 2017-12-01
Attending: OBSTETRICS & GYNECOLOGY
Payer: MEDICAID

## 2017-12-01 PROCEDURE — 76816 OB US FOLLOW-UP PER FETUS: CPT | Performed by: OBSTETRICS & GYNECOLOGY

## 2018-01-12 DIAGNOSIS — F33.1 MDD (MAJOR DEPRESSIVE DISORDER), RECURRENT EPISODE, MODERATE (HCC): ICD-10-CM

## 2018-01-12 DIAGNOSIS — F41.9 ANXIETY: ICD-10-CM

## 2018-01-12 RX ORDER — BUPROPION HYDROCHLORIDE 150 MG/1
150 TABLET ORAL 2 TIMES DAILY
Qty: 60 TAB | Refills: 0 | Status: SHIPPED | OUTPATIENT
Start: 2018-01-12 | End: 2020-02-18

## 2018-01-22 LAB — HBSAG, EXTERNAL: NEGATIVE

## 2018-01-25 ENCOUNTER — HOSPITAL ENCOUNTER (OUTPATIENT)
Dept: PERINATAL CARE | Age: 33
Discharge: HOME | End: 2018-01-25
Attending: OBSTETRICS & GYNECOLOGY
Payer: MEDICAID

## 2018-01-25 PROCEDURE — 76816 OB US FOLLOW-UP PER FETUS: CPT | Performed by: OBSTETRICS & GYNECOLOGY

## 2018-02-20 ENCOUNTER — HOSPITAL ENCOUNTER (EMERGENCY)
Age: 33
Discharge: HOME | End: 2018-02-20
Attending: OBSTETRICS & GYNECOLOGY | Admitting: OBSTETRICS & GYNECOLOGY
Payer: MEDICAID

## 2018-02-20 VITALS
SYSTOLIC BLOOD PRESSURE: 131 MMHG | HEIGHT: 66 IN | RESPIRATION RATE: 17 BRPM | HEART RATE: 77 BPM | WEIGHT: 225 LBS | DIASTOLIC BLOOD PRESSURE: 80 MMHG | TEMPERATURE: 98.2 F | BODY MASS INDEX: 36.16 KG/M2

## 2018-02-20 LAB
BASOPHILS # BLD: 0 K/UL (ref 0–0.1)
BASOPHILS NFR BLD: 0 % (ref 0–1)
DIFFERENTIAL METHOD BLD: ABNORMAL
EOSINOPHIL # BLD: 0.2 K/UL (ref 0–0.4)
EOSINOPHIL NFR BLD: 2 % (ref 0–7)
ERYTHROCYTE [DISTWIDTH] IN BLOOD BY AUTOMATED COUNT: 20.1 % (ref 11.5–14.5)
GRBS, EXTERNAL: NEGATIVE
HCT VFR BLD AUTO: 28.1 % (ref 35–47)
HGB BLD-MCNC: 8.8 G/DL (ref 11.5–16)
IMM GRANULOCYTES # BLD: 0 K/UL (ref 0–0.04)
IMM GRANULOCYTES NFR BLD AUTO: 0 % (ref 0–0.5)
LYMPHOCYTES # BLD: 1.2 K/UL (ref 0.8–3.5)
LYMPHOCYTES NFR BLD: 15 % (ref 12–49)
MCH RBC QN AUTO: 20 PG (ref 26–34)
MCHC RBC AUTO-ENTMCNC: 31.3 G/DL (ref 30–36.5)
MCV RBC AUTO: 63.7 FL (ref 80–99)
MONOCYTES # BLD: 1 K/UL (ref 0–1)
MONOCYTES NFR BLD: 13 % (ref 5–13)
NEUTS SEG # BLD: 5.6 K/UL (ref 1.8–8)
NEUTS SEG NFR BLD: 70 % (ref 32–75)
NRBC # BLD: 0 K/UL (ref 0–0.01)
NRBC BLD-RTO: 0 PER 100 WBC
PLATELET # BLD AUTO: 299 K/UL (ref 150–400)
RBC # BLD AUTO: 4.41 M/UL (ref 3.8–5.2)
RBC MORPH BLD: ABNORMAL
WBC # BLD AUTO: 8 K/UL (ref 3.6–11)

## 2018-02-20 PROCEDURE — 36415 COLL VENOUS BLD VENIPUNCTURE: CPT | Performed by: OBSTETRICS & GYNECOLOGY

## 2018-02-20 PROCEDURE — 85025 COMPLETE CBC W/AUTO DIFF WBC: CPT | Performed by: OBSTETRICS & GYNECOLOGY

## 2018-02-20 PROCEDURE — 99285 EMERGENCY DEPT VISIT HI MDM: CPT

## 2018-02-20 PROCEDURE — 74011250637 HC RX REV CODE- 250/637: Performed by: OBSTETRICS & GYNECOLOGY

## 2018-02-20 RX ORDER — GUAIFENESIN 100 MG/5ML
81 LIQUID (ML) ORAL DAILY
COMMUNITY
End: 2018-03-03

## 2018-02-20 RX ORDER — LANOLIN ALCOHOL/MO/W.PET/CERES
CREAM (GRAM) TOPICAL
COMMUNITY
End: 2020-01-15

## 2018-02-20 RX ORDER — ACETAMINOPHEN 325 MG/1
650 TABLET ORAL ONCE
Status: COMPLETED | OUTPATIENT
Start: 2018-02-20 | End: 2018-02-20

## 2018-02-20 RX ORDER — SODIUM CHLORIDE 0.9 % (FLUSH) 0.9 %
SYRINGE (ML) INJECTION
Status: DISCONTINUED
Start: 2018-02-20 | End: 2018-02-20 | Stop reason: HOSPADM

## 2018-02-20 RX ADMIN — ACETAMINOPHEN 650 MG: 325 TABLET, FILM COATED ORAL at 13:11

## 2018-02-20 NOTE — DISCHARGE INSTRUCTIONS
Learning About Pregnancy  Your Care Instructions    Your health in the early weeks of your pregnancy is particularly important for your baby's health. Take good care of yourself. Anything you do that harms your body can also harm your baby. Make sure to go to all of your doctor appointments. Regular checkups will help keep you and your baby healthy. How can you care for yourself at home? Diet  ? · Eat a balanced diet. Make sure your diet includes plenty of beans, peas, and leafy green vegetables. ? · Do not skip meals or go for many hours without eating. If you are nauseated, try to eat a small, healthy snack every 2 to 3 hours. ? · Do not eat fish that has a high level of mercury, such as shark, swordfish, or mackerel. Do not eat more than one can of tuna each week. ? · Drink plenty of fluids, enough so that your urine is light yellow or clear like water. If you have kidney, heart, or liver disease and have to limit fluids, talk with your doctor before you increase the amount of fluids you drink. ? · Cut down on caffeine, such as coffee, tea, and cola. ? · Do not drink alcohol, such as beer, wine, or hard liquor. ? · Take a multivitamin that contains at least 400 micrograms (mcg) of folic acid to help prevent birth defects. Fortified cereal and whole wheat bread are good additional sources of folic acid. ? · Increase the calcium in your diet. Try to drink a quart of skim milk each day. You may also take calcium supplements and choose foods such as cheese and yogurt. ? Lifestyle  ? · Make sure you go to your follow-up appointments. ? · Get plenty of rest. You may be unusually tired while you are pregnant. ? · Get at least 30 minutes of exercise on most days of the week. Walking is a good choice. If you have not exercised in the past, start out slowly. Take several short walks each day. ? · Do not smoke. If you need help quitting, talk to your doctor about stop-smoking programs.  These can increase your chances of quitting for good. ? · Do not touch cat feces or litter boxes. Also, wash your hands after you handle raw meat, and fully cook all meat before you eat it. Wear gloves when you work in the yard or garden, and wash your hands well when you are done. Cat feces, raw or undercooked meat, and contaminated dirt can cause an infection that may harm your baby or lead to a miscarriage. ? · Do not use saunas or hot tubs. Raising your body temperature may harm your baby. ? · Avoid chemical fumes, paint fumes, or poisons. ? · Do not use illegal drugs or alcohol. Medicines  ? · Review all of your medicines with your doctor. Some of your routine medicines may need to be changed to protect your baby. ? · Use acetaminophen (Tylenol) to relieve minor problems, such as a mild headache or backache or a mild fever with cold symptoms. Do not use nonsteroidal anti-inflammatory drugs (NSAIDs), such as ibuprofen (Advil, Motrin) or naproxen (Aleve), unless your doctor says it is okay. ? · Do not take two or more pain medicines at the same time unless the doctor told you to. Many pain medicines have acetaminophen, which is Tylenol. Too much acetaminophen (Tylenol) can be harmful. ? · Take your medicines exactly as prescribed. Call your doctor if you think you are having a problem with your medicine. ?To manage morning sickness  ? · If you feel sick when you first wake up, try eating a small snack (such as crackers) before you get out of bed. Allow some time to digest the snack, and then get out of bed slowly. ? · Do not skip meals or go for long periods without eating. An empty stomach can make nausea worse. ? · Eat small, frequent meals instead of three large meals each day. ? · Drink plenty of fluids. Sports drinks, such as Gatorade or Powerade, are good choices. ? · Eat foods that are high in protein but low in fat.    ? · If you are taking iron supplements, ask your doctor if they are necessary. Iron can make nausea worse. ? · Avoid any smells, such as coffee, that make you feel sick. ? · Get lots of rest. Morning sickness may be worse when you are tired. Follow-up care is a key part of your treatment and safety. Be sure to make and go to all appointments, and call your doctor if you are having problems. It's also a good idea to know your test results and keep a list of the medicines you take. Where can you learn more? Go to http://annamaria-lexii.info/. Enter Y184 in the search box to learn more about \"Learning About Pregnancy. \"  Current as of: March 16, 2017  Content Version: 11.4  © 9013-7495 [employer]. Care instructions adapted under license by [employer] (which disclaims liability or warranty for this information). If you have questions about a medical condition or this instruction, always ask your healthcare professional. [place] [address] any warranty or liability for your use of this information. Week 37 of Your Pregnancy: Care Instructions  Your Care Instructions    You are near the end of your pregnancy-and you're probably pretty uncomfortable. It may be harder to walk around. Lying down probably isn't comfortable either. You may have trouble getting to sleep or staying asleep. Most women deliver their babies between 40 and 41 weeks. This is a good time to think about packing a bag for the hospital with items you'll need. Then you'll be ready when labor starts. Follow-up care is a key part of your treatment and safety. Be sure to make and go to all appointments, and call your doctor if you are having problems. It's also a good idea to know your test results and keep a list of the medicines you take. How can you care for yourself at home? Learn about breastfeeding  · Breastfeeding is best for your baby and good for you. · Breast milk has antibodies to help your baby fight infections.   · Mothers who breastfeed often lose weight faster, because making milk burns calories. · Learning the best ways to hold your baby will make breastfeeding easier. · Let your partner bathe and diaper the baby to keep your partner from feeling left out. Snuggle together when you breastfeed. · You may want to learn how to use a breast pump and store your milk. · If you choose to bottle feed, make the feeding feel like breastfeeding so you can bond with your baby. Always hold your baby and the bottle. Do not prop bottles or let your baby fall asleep with a bottle. Learn about crying  · It is common for babies to cry for 1 to 3 hours a day. Some cry more, some cry less. · Babies don't cry to make you upset or because you are a bad parent. · Crying is how your baby communicates. Your baby may be hungry; have gas; need a diaper change; or feel cold, warm, tired, lonely, or tense. Sometimes babies cry for unknown reasons. · If you respond to your baby's needs, he or she will learn to trust you. · Try to stay calm when your baby cries. Your baby may get more upset if he or she senses that you are upset. Know how to care for your   · Your baby's umbilical cord stump will drop off on its own, usually between 1 and 2 weeks. To care for your baby's umbilical cord area:  ¨ Clean the area at the bottom of the cord 2 or 3 times a day. ¨ Pay special attention to the area where the cord attaches to the skin. ¨ Keep the diaper folded below the cord. ¨ Use a damp washcloth or cotton ball to sponge bathe your baby until the stump has come off. · Your baby's first dark stool is called meconium. After the meconium is passed, your baby will develop his or her own bowel pattern. ¨ Some babies, especially  babies, have several bowel movements a day. Others have one or two a day, or one every 2 to 3 days. ¨  babies often have loose, yellow stools. Formula-fed babies have more formed stools.   ¨ If your baby's stools look like little pellets, he or she is constipated. After 2 days of constipation, call your baby's doctor. · If your baby will be circumcised, you can care for him at home. ¨ Gently rinse his penis with warm water after every diaper change. Do not try to remove the film that forms on the penis. This film will go away on its own. Pat dry. ¨ Put petroleum ointment, such as Vaseline, on the area of the diaper that will touch your baby's penis. This will keep the diaper from sticking to your baby. ¨ Ask the doctor about giving your baby acetaminophen (Tylenol) for pain. Where can you learn more? Go to http://annamariaMytopialexii.info/. Enter 68 [id number] in the search box to learn more about \"Week 37 of Your Pregnancy: Care Instructions. \"  Current as of: 2017  Content Version: 11.4  © 7733-7814 [employer]. Care instructions adapted under license by [employer] (which disclaims liability or warranty for this information). If you have questions about a medical condition or this instruction, always ask your healthcare professional. [place] [address] any warranty or liability for your use of this information. Pregnancy Precautions: Care Instructions  Your Care Instructions    There is no sure way to prevent labor before your due date ( labor) or to prevent most other pregnancy problems. But there are things you can do to increase your chances of a healthy pregnancy. Go to your appointments, follow your doctor's advice, and take good care of yourself. Eat well, and exercise (if your doctor agrees). And make sure to drink plenty of water. Follow-up care is a key part of your treatment and safety. Be sure to make and go to all appointments, and call your doctor if you are having problems. It's also a good idea to know your test results and keep a list of the medicines you take. How can you care for yourself at home?   · Make sure you go to your prenatal appointments. At each visit, your doctor will check your blood pressure. Your doctor will also check to see if you have protein in your urine. High blood pressure and protein in urine are signs of preeclampsia. This condition can be dangerous for you and your baby. · Drink plenty of fluids, enough so that your urine is light yellow or clear like water. Dehydration can cause contractions. If you have kidney, heart, or liver disease and have to limit fluids, talk with your doctor before you increase the amount of fluids you drink. · Tell your doctor right away if you notice any symptoms of an infection, such as:  ¨ Burning when you urinate. ¨ A foul-smelling discharge from your vagina. ¨ Vaginal itching. ¨ Unexplained fever. ¨ Unusual pain or soreness in your uterus or lower belly. · Eat a balanced diet. Include plenty of foods that are high in calcium and iron. ¨ Foods high in calcium include milk, cheese, yogurt, almonds, and broccoli. ¨ Foods high in iron include red meat, shellfish, poultry, eggs, beans, raisins, whole-grain bread, and leafy green vegetables. · Do not smoke. If you need help quitting, talk to your doctor about stop-smoking programs and medicines. These can increase your chances of quitting for good. · Do not drink alcohol or use illegal drugs. · Follow your doctor's directions about activity. Your doctor will let you know how much, if any, exercise you can do. · Ask your doctor if you can have sex. If you are at risk for early labor, your doctor may ask you to not have sex. · Take care to prevent falls. During pregnancy, your joints are loose, and your balance is off. Sports such as bicycling, skiing, or in-line skating can increase your risk of falling. And don't ride horses or motorcycles, dive, water ski, scuba dive, or parachute jump while you are pregnant. · Avoid getting very hot. Do not use saunas or hot tubs. Avoid staying out in the sun in hot weather for long periods.  Take acetaminophen (Tylenol) to lower a high fever. · Do not take any over-the-counter or herbal medicines or supplements without talking to your doctor or pharmacist first.  When should you call for help? Call 911 anytime you think you may need emergency care. For example, call if:  ? · You passed out (lost consciousness). ? · You have severe vaginal bleeding. ? · You have severe pain in your belly or pelvis. ? · You have had fluid gushing or leaking from your vagina and you know or think the umbilical cord is bulging into your vagina. If this happens, immediately get down on your knees so your rear end (buttocks) is higher than your head. This will decrease the pressure on the cord until help arrives. ?Call your doctor now or seek immediate medical care if:  ? · You have signs of preeclampsia, such as:  ¨ Sudden swelling of your face, hands, or feet. ¨ New vision problems (such as dimness or blurring). ¨ A severe headache. ? · You have any vaginal bleeding. ? · You have belly pain or cramping. ? · You have a fever. ? · You have had regular contractions (with or without pain) for an hour. This means that you have 8 or more within 1 hour or 4 or more in 20 minutes after you change your position and drink fluids. ? · You have a sudden release of fluid from your vagina. ? · You have low back pain or pelvic pressure that does not go away. ? · You notice that your baby has stopped moving or is moving much less than normal.   ? Watch closely for changes in your health, and be sure to contact your doctor if you have any problems. Where can you learn more? Go to http://annamaria-lexii.info/. Enter [id number] in the search box to learn more about \"Pregnancy Precautions: Care Instructions. \"  Current as of: March 16, 2017  Content Version: 11.4  © 7324-6211 [employer].  Care instructions adapted under license by [employer] (which disclaims liability or warranty for this information). If you have questions about a medical condition or this instruction, always ask your healthcare professional. [place] [address] any warranty or liability for your use of this information.

## 2018-02-20 NOTE — H&P
History & Physical    Name: Ju Jordan MRN: [id number]  SSN: [id number]    YOB: 1985  Age: 27 y.o. Sex: female        Subjective:     Estimated Date of Delivery: 3/17/18  OB History    Para Term  AB Living   14 6 3 2 7 6   SAB TAB Ectopic Molar Multiple Live Births   5 1 1  0 10      # Outcome Date GA Lbr Izaiah/2nd Weight Sex Delivery Anes PTL Lv   14 Current            13 Term 04/22/15 39w0d  3.725 kg F  LO SPINAL AN N YAHAIRA   12 Term 13 39w4d  2.748 kg F VAGINAL DELI None  YAHAIRA   11 Para     M VAGINAL DELI   YAHAIRA   10 Ectopic 2009           9 Term 08/10/08 39w0d  2.722 kg M VAGINAL DELI EPI  YAHAIRA   8  06 34w0d   M VAGINAL DELI EPI Y YAHAIRA      Birth Comments: cerclage   7  05 36w0d   F VAGINAL DELI EPI Y YAHAIRA      Birth Comments: cerclage   6 2003 18w0d       DEC   5 SAB  18w0d       DEC   4 SAB  20w0d       DEC   3 SAB  16w0d       DEC   2 TAB            1 SAB                   Ms. Noemi Marte is admitted with pregnancy at 36w3d for removal of cerclage due to inability to tolerate removal in the office. . Prenatal course was complicated by grand multiparity, betal thalassemia minor, incompetent cervix s/p cerclage, h/o prior  for breech at last visit, CHTN on no meds, depression. Please see prenatal records for details.     Past Medical History:   Diagnosis Date    Anemia 10/8/2010    Anemia NEC     taking iron    Anxiety     Asthma     Asthma     on albuterol prn    Back pain, chronic 2010    sciatica    Gestational hypertension     HX OTHER MEDICAL      2005, 2006, 2008, ,     HX OTHER MEDICAL     severe hyperemesis    Hypertension     with G12 - none this pregnancy    MDD (major depressive disorder), single episode with postpartum onset 2013    treated with meds - 13    Pregnancy     36 weeks    Psychiatric disorder     depression     Past Surgical History:   Procedure Laterality Date   DELIVERY ONLY      HX  SECTION  4/22/15    HX GYN      miscarriage x 6    HX GYN      removal of left fallopian tube    HX OTHER SURGICAL      cerlcage x4, ectopic x1  with removal of left fallopian tube    HX OTHER SURGICAL      cerclage w/ current pregnancy     Social History     Occupational History    Not on file. Social History Main Topics    Smoking status: Never Smoker    Smokeless tobacco: Never Used    Alcohol use No    Drug use: No    Sexual activity: Yes     Partners: Male     Family History   Problem Relation Age of Onset   Yoel Evangelistasten Arthritis-rheumatoid Mother     Asthma Mother     Alcohol abuse Neg Hx     Arthritis-osteo Neg Hx     Bleeding Prob Neg Hx     Cancer Neg Hx     Diabetes Neg Hx     Elevated Lipids Neg Hx     Headache Neg Hx     Heart Disease Neg Hx     Hypertension Neg Hx     Lung Disease Neg Hx     Migraines Neg Hx     Psychiatric Disorder Neg Hx     Stroke Neg Hx     Mental Retardation Neg Hx        Allergies   Allergen Reactions    Labetalol Hives    Ceclor [Cefaclor] Unknown (comments)    Pcn [Penicillins] Hives    Septra [Sulfamethoprim Ds] Unknown (comments)     Prior to Admission medications    Medication Sig Start Date End Date Taking? Authorizing Provider   aspirin 81 mg chewable tablet Take 81 mg by mouth daily. Yes Historical Provider   ferrous sulfate (IRON) 325 mg (65 mg iron) tablet Take  by mouth Daily (before breakfast). Yes Historical Provider   buPROPion XL (WELLBUTRIN XL) 150 mg tablet Take 1 Tab by mouth two (2) times a day.  18  Yes Mine Garcia, MENA   famotidine (PEPCID) 20 mg tablet  17  Yes Historical Provider   ondansetron (ZOFRAN ODT) 4 mg disintegrating tablet DIS 1 T UNT Q 8 H PRF NAUSEA OR VOM 17  Yes Historical Provider   prenatal vit-iron fumarate-fa (PRENATAL PLUS WITH IRON) 28 mg iron- 800 mcg tab TK 1 T PO QD 17  Yes Historical Provider   albuterol (PROVENTIL HFA, VENTOLIN HFA) 90 mcg/actuation inhaler Take 2 Puffs by inhalation every six (6) hours as needed for Wheezing. Needs office visit for further refills 7/11/14  Yes Bryan Shoemaker MD   cetirizine (ZYRTEC) 10 mg tablet TAKE 1 TABLET BY MOUTH EVERY DAY 7/6/14  Yes Buffy Segal NP   oxyCODONE-acetaminophen (PERCOCET) 5-325 mg per tablet Take 1 Tab by mouth every six (6) hours as needed for Pain (PRN Severe pain). Max Daily Amount: 4 Tabs. 9/29/17   Alina Pulse, DO   traZODone (DESYREL) 150 mg tablet Take 1/2-1 tablet by mouth at bedtime for sleep as needed 7/12/17   Marni Bassett NP        Review of Systems: Pertinent items are noted in HPI. Objective:     Vitals:  Vitals:    02/20/18 1132 02/20/18 1134   BP:  131/80   Pulse:  77   Resp:  17   Temp:  98.2 °F (36.8 °C)   Weight: 102.1 kg (225 lb)    Height: 5' 6\" (1.676 m)         Physical Exam:  Cervical Exam: 1 cm dilated    40% effaced    -3 station    Presenting Part: cephalic  Membranes:  Intact  Fetal Heart Rate: Reactive  Baseline: 135s per minute  Variability: moderate  Accelerations: yes  Decelerations: none  Uterine contractions: irregular, every 10-11 minutes    Speculum exam-particulate off white d/c c/w yeast. Pt used nitrous during the procedure for pain management. mersilene tape noted at 6 o'clock-knot grasped with ring forcep-right side of loop had already been cut in the office. With careful traction, the suture was removed without difficulty and intact. No significant bleeding noted.      Prenatal Labs:   Lab Results   Component Value Date/Time    ABO/Rh(D) O POSITIVE 04/21/2015 09:40 AM    Rubella, External immune 08/08/2017    GrBStrep, External negative 03/27/2015    HBsAg, External negative 08/08/2017    HIV, External negative 08/08/2017    RPR, External non reactive 08/08/2017    Gonorrhea, External negative 08/03/2012    Chlamydia, External negative 08/03/2012    ABO,Rh O positive 09/19/2014         Assessment/Plan:     Active Problems:    * No active hospital problems. *       Plan: 28 y/o [id number] at 36 3/7 weeks with h/o incompetent cervix s/p cerclage. Successful removeal of cerclage. Category I tracing. Discharge home with follow up with MFM on Friday and with me in 1 week. Labor/ROM/bleeding/fm precautions.      Signed By:  Ledy Gold DO     February 20, 2018

## 2018-02-20 NOTE — IP AVS SNAPSHOT
2700 [facility] [address] [place] [address] 
[phone] Patient: Stephanie Rasmussen MRN: [id number] :1985 About your hospitalization You were admitted on:  N/A You last received care in the:  [facility] 3 LABOR & DELIVERY You were discharged on:  2018 Why you were hospitalized Your primary diagnosis was:  Not on File Follow-up Information Follow up With Details Comments Contact Info Ledy Gold DO   [address] [address] [place] [address] 
[phone] Your Scheduled Appointments 2018  9:30 AM EST FOLLOW-UP OB ULTRASOUND with ULTRASOUND 1 [facility] ([address]) [address]  
[phone] Discharge Orders None A check adán indicates which time of day the medication should be taken. My Medications ASK your doctor about these medications Instructions Each Dose to Equal  
 Morning Noon Evening Bedtime  
 albuterol 90 mcg/actuation inhaler Commonly known as:  PROVENTIL HFA, VENTOLIN HFA, PROAIR HFA Your last dose was: Your next dose is: Take 2 Puffs by inhalation every six (6) hours as needed for Wheezing. Needs office visit for further refills 2 Puff  
    
   
   
   
  
 aspirin 81 mg chewable tablet Your last dose was: Your next dose is: Take 81 mg by mouth daily. 81 mg  
    
   
   
   
  
 buPROPion  mg tablet Commonly known as:  Yelena Rick Your last dose was: Your next dose is: Take 1 Tab by mouth two (2) times a day. 150 mg  
    
   
   
   
  
 cetirizine 10 mg tablet Commonly known as:  ZYRTEC Your last dose was: Your next dose is: TAKE 1 TABLET BY MOUTH EVERY DAY  
     
   
   
   
  
 famotidine 20 mg tablet Commonly known as:  PEPCID  
   
 Your last dose was: Your next dose is:    
   
   
      
   
   
   
  
 Iron 325 mg (65 mg iron) tablet Generic drug:  ferrous sulfate Your last dose was: Your next dose is: Take  by mouth Daily (before breakfast). ondansetron 4 mg disintegrating tablet Commonly known as:  ZOFRAN ODT Your last dose was: Your next dose is: DIS 1 T UNT Q 8 H PRF NAUSEA OR VOM  
     
   
   
   
  
 oxyCODONE-acetaminophen 5-325 mg per tablet Commonly known as:  PERCOCET Your last dose was: Your next dose is: Take 1 Tab by mouth every six (6) hours as needed for Pain (PRN Severe pain). Max Daily Amount: 4 Tabs. 1 Tab  
    
   
   
   
  
 prenatal vit-iron fumarate-fa 28 mg iron- 800 mcg Tab Commonly known as:  PRENATAL PLUS with IRON Your last dose was: Your next dose is:    
   
   
 TK 1 T PO QD  
     
   
   
   
  
 traZODone 150 mg tablet Commonly known as:  Jhonatan Raleigh Your last dose was: Your next dose is: Take 1/2-1 tablet by mouth at bedtime for sleep as needed Discharge Instructions Learning About Pregnancy Your Care Instructions Your health in the early weeks of your pregnancy is particularly important for your baby's health. Take good care of yourself. Anything you do that harms your body can also harm your baby. Make sure to go to all of your doctor appointments. Regular checkups will help keep you and your baby healthy. How can you care for yourself at home? Diet ? · Eat a balanced diet. Make sure your diet includes plenty of beans, peas, and leafy green vegetables. ? · Do not skip meals or go for many hours without eating. If you are nauseated, try to eat a small, healthy snack every 2 to 3 hours.   
? · Do not eat fish that has a high level of mercury, such as shark, swordfish, or mackerel. Do not eat more than one can of tuna each week. ? · Drink plenty of fluids, enough so that your urine is light yellow or clear like water. If you have kidney, heart, or liver disease and have to limit fluids, talk with your doctor before you increase the amount of fluids you drink. ? · Cut down on caffeine, such as coffee, tea, and cola. ? · Do not drink alcohol, such as beer, wine, or hard liquor. ? · Take a multivitamin that contains at least 400 micrograms (mcg) of folic acid to help prevent birth defects. Fortified cereal and whole wheat bread are good additional sources of folic acid. ? · Increase the calcium in your diet. Try to drink a quart of skim milk each day. You may also take calcium supplements and choose foods such as cheese and yogurt. ? Lifestyle ? · Make sure you go to your follow-up appointments. ? · Get plenty of rest. You may be unusually tired while you are pregnant. ? · Get at least 30 minutes of exercise on most days of the week. Walking is a good choice. If you have not exercised in the past, start out slowly. Take several short walks each day. ? · Do not smoke. If you need help quitting, talk to your doctor about stop-smoking programs. These can increase your chances of quitting for good. ? · Do not touch cat feces or litter boxes. Also, wash your hands after you handle raw meat, and fully cook all meat before you eat it. Wear gloves when you work in the yard or garden, and wash your hands well when you are done. Cat feces, raw or undercooked meat, and contaminated dirt can cause an infection that may harm your baby or lead to a miscarriage. ? · Do not use saunas or hot tubs. Raising your body temperature may harm your baby. ? · Avoid chemical fumes, paint fumes, or poisons. ? · Do not use illegal drugs or alcohol. Medicines ? · Review all of your medicines with your doctor.  Some of your routine medicines may need to be changed to protect your baby. ? · Use acetaminophen (Tylenol) to relieve minor problems, such as a mild headache or backache or a mild fever with cold symptoms. Do not use nonsteroidal anti-inflammatory drugs (NSAIDs), such as ibuprofen (Advil, Motrin) or naproxen (Aleve), unless your doctor says it is okay. ? · Do not take two or more pain medicines at the same time unless the doctor told you to. Many pain medicines have acetaminophen, which is Tylenol. Too much acetaminophen (Tylenol) can be harmful. ? · Take your medicines exactly as prescribed. Call your doctor if you think you are having a problem with your medicine. ?To manage morning sickness ? · If you feel sick when you first wake up, try eating a small snack (such as crackers) before you get out of bed. Allow some time to digest the snack, and then get out of bed slowly. ? · Do not skip meals or go for long periods without eating. An empty stomach can make nausea worse. ? · Eat small, frequent meals instead of three large meals each day. ? · Drink plenty of fluids. Sports drinks, such as Gatorade or Powerade, are good choices. ? · Eat foods that are high in protein but low in fat. ? · If you are taking iron supplements, ask your doctor if they are necessary. Iron can make nausea worse. ? · Avoid any smells, such as coffee, that make you feel sick. ? · Get lots of rest. Morning sickness may be worse when you are tired. Follow-up care is a key part of your treatment and safety. Be sure to make and go to all appointments, and call your doctor if you are having problems. It's also a good idea to know your test results and keep a list of the medicines you take. Where can you learn more? Go to http://annamaria-lexii.info/. Enter F691 in the search box to learn more about \"Learning About Pregnancy. \" Current as of: March 16, 2017 Content Version: 11.4 © 0310-1925 Healthwise, Companion Canine. Care instructions adapted under license by [employer]here (which disclaims liability or warranty for this information). If you have questions about a medical condition or this instruction, always ask your healthcare professional. Maury Cabral any warranty or liability for your use of this information. Week 37 of Your Pregnancy: Care Instructions Your Care Instructions You are near the end of your pregnancy-and you're probably pretty uncomfortable. It may be harder to walk around. Lying down probably isn't comfortable either. You may have trouble getting to sleep or staying asleep. Most women deliver their babies between 40 and 41 weeks. This is a good time to think about packing a bag for the hospital with items you'll need. Then you'll be ready when labor starts. Follow-up care is a key part of your treatment and safety. Be sure to make and go to all appointments, and call your doctor if you are having problems. It's also a good idea to know your test results and keep a list of the medicines you take. How can you care for yourself at home? Learn about breastfeeding · Breastfeeding is best for your baby and good for you. · Breast milk has antibodies to help your baby fight infections. · Mothers who breastfeed often lose weight faster, because making milk burns calories. · Learning the best ways to hold your baby will make breastfeeding easier. · Let your partner bathe and diaper the baby to keep your partner from feeling left out. Snuggle together when you breastfeed. · You may want to learn how to use a breast pump and store your milk. · If you choose to bottle feed, make the feeding feel like breastfeeding so you can bond with your baby. Always hold your baby and the bottle. Do not prop bottles or let your baby fall asleep with a bottle. Learn about crying · It is common for babies to cry for 1 to 3 hours a day. Some cry more, some cry less. · Babies don't cry to make you upset or because you are a bad parent. · Crying is how your baby communicates. Your baby may be hungry; have gas; need a diaper change; or feel cold, warm, tired, lonely, or tense. Sometimes babies cry for unknown reasons. · If you respond to your baby's needs, he or she will learn to trust you. · Try to stay calm when your baby cries. Your baby may get more upset if he or she senses that you are upset. Know how to care for your  · Your baby's umbilical cord stump will drop off on its own, usually between 1 and 2 weeks. To care for your baby's umbilical cord area: ¨ Clean the area at the bottom of the cord 2 or 3 times a day. ¨ Pay special attention to the area where the cord attaches to the skin. ¨ Keep the diaper folded below the cord. ¨ Use a damp washcloth or cotton ball to sponge bathe your baby until the stump has come off. · Your baby's first dark stool is called meconium. After the meconium is passed, your baby will develop his or her own bowel pattern. ¨ Some babies, especially  babies, have several bowel movements a day. Others have one or two a day, or one every 2 to 3 days. ¨  babies often have loose, yellow stools. Formula-fed babies have more formed stools. ¨ If your baby's stools look like little pellets, he or she is constipated. After 2 days of constipation, call your baby's doctor. · If your baby will be circumcised, you can care for him at home. ¨ Gently rinse his penis with warm water after every diaper change. Do not try to remove the film that forms on the penis. This film will go away on its own. Pat dry. ¨ Put petroleum ointment, such as Vaseline, on the area of the diaper that will touch your baby's penis. This will keep the diaper from sticking to your baby. ¨ Ask the doctor about giving your baby acetaminophen (Tylenol) for pain. Where can you learn more? Go to http://annamaria-leixi.info/. Enter 68 21 [id number] in the search box to learn more about \"Week 37 of Your Pregnancy: Care Instructions. \" Current as of: 2017 Content Version: 11.4 © 7920-5530 Zipscene. Care instructions adapted under license by Meeting[employer]out (which disclaims liability or warranty for this information). If you have questions about a medical condition or this instruction, always ask your healthcare professional. [place] [address] any warranty or liability for your use of this information. Pregnancy Precautions: Care Instructions Your Care Instructions There is no sure way to prevent labor before your due date ( labor) or to prevent most other pregnancy problems. But there are things you can do to increase your chances of a healthy pregnancy. Go to your appointments, follow your doctor's advice, and take good care of yourself. Eat well, and exercise (if your doctor agrees). And make sure to drink plenty of water. Follow-up care is a key part of your treatment and safety. Be sure to make and go to all appointments, and call your doctor if you are having problems. It's also a good idea to know your test results and keep a list of the medicines you take. How can you care for yourself at home? · Make sure you go to your prenatal appointments. At each visit, your doctor will check your blood pressure. Your doctor will also check to see if you have protein in your urine. High blood pressure and protein in urine are signs of preeclampsia. This condition can be dangerous for you and your baby. · Drink plenty of fluids, enough so that your urine is light yellow or clear like water. Dehydration can cause contractions. If you have kidney, heart, or liver disease and have to limit fluids, talk with your doctor before you increase the amount of fluids you drink. · Tell your doctor right away if you notice any symptoms of an infection, such as: ¨ Burning when you urinate. ¨ A foul-smelling discharge from your vagina. ¨ Vaginal itching. ¨ Unexplained fever. ¨ Unusual pain or soreness in your uterus or lower belly. · Eat a balanced diet. Include plenty of foods that are high in calcium and iron. ¨ Foods high in calcium include milk, cheese, yogurt, almonds, and broccoli. ¨ Foods high in iron include red meat, shellfish, poultry, eggs, beans, raisins, whole-grain bread, and leafy green vegetables. · Do not smoke. If you need help quitting, talk to your doctor about stop-smoking programs and medicines. These can increase your chances of quitting for good. · Do not drink alcohol or use illegal drugs. · Follow your doctor's directions about activity. Your doctor will let you know how much, if any, exercise you can do. · Ask your doctor if you can have sex. If you are at risk for early labor, your doctor may ask you to not have sex. · Take care to prevent falls. During pregnancy, your joints are loose, and your balance is off. Sports such as bicycling, skiing, or in-line skating can increase your risk of falling. And don't ride horses or motorcycles, dive, water ski, scuba dive, or parachute jump while you are pregnant. · Avoid getting very hot. Do not use saunas or hot tubs. Avoid staying out in the sun in hot weather for long periods. Take acetaminophen (Tylenol) to lower a high fever. · Do not take any over-the-counter or herbal medicines or supplements without talking to your doctor or pharmacist first. 
When should you call for help? Call 911 anytime you think you may need emergency care. For example, call if: 
? · You passed out (lost consciousness). ? · You have severe vaginal bleeding. ? · You have severe pain in your belly or pelvis.   
? · You have had fluid gushing or leaking from your vagina and you know or think the umbilical cord is bulging into your vagina. If this happens, immediately get down on your knees so your rear end (buttocks) is higher than your head. This will decrease the pressure on the cord until help arrives. ?Call your doctor now or seek immediate medical care if: 
? · You have signs of preeclampsia, such as: 
¨ Sudden swelling of your face, hands, or feet. ¨ New vision problems (such as dimness or blurring). ¨ A severe headache. ? · You have any vaginal bleeding. ? · You have belly pain or cramping. ? · You have a fever. ? · You have had regular contractions (with or without pain) for an hour. This means that you have 8 or more within 1 hour or 4 or more in 20 minutes after you change your position and drink fluids. ? · You have a sudden release of fluid from your vagina. ? · You have low back pain or pelvic pressure that does not go away. ? · You notice that your baby has stopped moving or is moving much less than normal. ? Watch closely for changes in your health, and be sure to contact your doctor if you have any problems. Where can you learn more? Go to http://annamaria-lexii.info/. Enter [id number] in the search box to learn more about \"Pregnancy Precautions: Care Instructions. \" Current as of: March 16, 2017 Content Version: 11.4 © 9683-5948 Opsona. Care instructions adapted under license by [employer]iet[employer] (which disclaims liability or warranty for this information). If you have questions about a medical condition or this instruction, always ask your healthcare professional. [place] [address] any warranty or liability for your use of this information. Introducing hospitals & HEALTH SERVICES! Dear Emigdio Mckeon: 
Thank you for requesting a Vilynx account. Our records indicate that you already have an active Vilynx account. You can access your account anytime at [email or web address] Did you know that you can access your hospital and ER discharge instructions at any time in WeDeliver? You can also review all of your test results from your hospital stay or ER visit. Additional Information If you have questions, please visit the Frequently Asked Questions section of the WeDeliver website at [email or web address] Remember, MyChart is NOT to be used for urgent needs. For medical emergencies, dial 911. Now available from your iPhone and Android! Providers Seen During Your Hospitalization Provider Specialty Primary office phone Ricky Parham DO Obstetrics & Gynecology [phone] Your Primary Care Physician (PCP) Primary Care Physician Office Phone Office Fax Steffany Gallegos [phone] You are allergic to the following Allergen Reactions Labetalol Hives Ceclor (Cefaclor) Unknown (comments) Pcn (Penicillins) Hives Septra (Sulfamethoprim Ds) Unknown (comments) Recent Documentation Height Weight Breastfeeding? BMI OB Status Smoking Status 1.676 m 102.1 kg No 36.32 kg/m2 Pregnant Never Smoker Emergency Contacts Name Discharge Info Relation Home Work Mobile Alejandro Ventura [17] [phone] Patient Belongings The following personal items are in your possession at time of discharge: 
  Dental Appliances: None  Visual Aid: Glasses      Home Medications: None   Jewelry: None  Clothing: At bedside    Other Valuables: At bedside, Alex Stovall  Personal Items Sent to Safe: none Please provide this summary of care documentation to your next provider. Signatures-by signing, you are acknowledging that this After Visit Summary has been reviewed with you and you have received a copy. Patient Signature:  ____________________________________________________________ Date:  ____________________________________________________________  
  
Tasneem Thea Provider Signature:  ____________________________________________________________ Date:  ____________________________________________________________

## 2018-02-20 NOTE — PROGRESS NOTES
1125- Pt arrived ambulatory to L&D for cerclage removal. Patient states positive fetal movement and denies any leaking or bleeding. 65- Dr. Manjula Bledsoe at bedside, cerclage removed by dr. Manjula Bledsoe, [facility] 96 1/40/-3, Pt to be discharged home    [phone] Pt discharged as ordered. Pt states understanding and no questions or complaints at this time.

## 2018-02-23 ENCOUNTER — HOSPITAL ENCOUNTER (OUTPATIENT)
Dept: PERINATAL CARE | Age: 33
Discharge: HOME | End: 2018-02-23
Attending: OBSTETRICS & GYNECOLOGY
Payer: MEDICAID

## 2018-02-23 PROCEDURE — 76816 OB US FOLLOW-UP PER FETUS: CPT | Performed by: OBSTETRICS & GYNECOLOGY

## 2018-02-23 PROCEDURE — 76818 FETAL BIOPHYS PROFILE W/NST: CPT | Performed by: OBSTETRICS & GYNECOLOGY

## 2018-03-01 ENCOUNTER — ANESTHESIA (OUTPATIENT)
Dept: LABOR AND DELIVERY | Age: 33
DRG: 541 | End: 2018-03-01
Payer: MEDICAID

## 2018-03-01 ENCOUNTER — ANESTHESIA EVENT (OUTPATIENT)
Dept: LABOR AND DELIVERY | Age: 33
DRG: 541 | End: 2018-03-01
Payer: MEDICAID

## 2018-03-01 ENCOUNTER — HOSPITAL ENCOUNTER (INPATIENT)
Age: 33
LOS: 2 days | Discharge: HOME | DRG: 541 | End: 2018-03-03
Attending: OBSTETRICS & GYNECOLOGY | Admitting: OBSTETRICS & GYNECOLOGY
Payer: MEDICAID

## 2018-03-01 PROBLEM — Z34.90 PREGNANCY: Status: ACTIVE | Noted: 2018-03-01

## 2018-03-01 PROBLEM — Z34.90 PREGNANT: Status: ACTIVE | Noted: 2018-03-01

## 2018-03-01 LAB
ALBUMIN SERPL-MCNC: 2.4 G/DL (ref 3.5–5)
ALBUMIN/GLOB SERPL: 0.5 {RATIO} (ref 1.1–2.2)
ALP SERPL-CCNC: 191 U/L (ref 45–117)
ALT SERPL-CCNC: 9 U/L (ref 12–78)
ANION GAP SERPL CALC-SCNC: 11 MMOL/L (ref 5–15)
AST SERPL-CCNC: 14 U/L (ref 15–37)
BILIRUB SERPL-MCNC: 0.4 MG/DL (ref 0.2–1)
BUN SERPL-MCNC: 3 MG/DL (ref 6–20)
BUN/CREAT SERPL: 5 (ref 12–20)
CALCIUM SERPL-MCNC: 8.5 MG/DL (ref 8.5–10.1)
CHLORIDE SERPL-SCNC: 105 MMOL/L (ref 97–108)
CO2 SERPL-SCNC: 23 MMOL/L (ref 21–32)
CREAT SERPL-MCNC: 0.57 MG/DL (ref 0.55–1.02)
ERYTHROCYTE [DISTWIDTH] IN BLOOD BY AUTOMATED COUNT: 20.4 % (ref 11.5–14.5)
GLOBULIN SER CALC-MCNC: 4.5 G/DL (ref 2–4)
GLUCOSE SERPL-MCNC: 67 MG/DL (ref 65–100)
HCT VFR BLD AUTO: 30 % (ref 35–47)
HGB BLD-MCNC: 9.1 G/DL (ref 11.5–16)
LDH SERPL L TO P-CCNC: 189 U/L (ref 81–246)
MCH RBC QN AUTO: 19.6 PG (ref 26–34)
MCHC RBC AUTO-ENTMCNC: 30.3 G/DL (ref 30–36.5)
MCV RBC AUTO: 64.7 FL (ref 80–99)
NRBC # BLD: 0 K/UL (ref 0–0.01)
NRBC BLD-RTO: 0 PER 100 WBC
PLATELET # BLD AUTO: 304 K/UL (ref 150–400)
PMV BLD AUTO: ABNORMAL FL (ref 8.9–12.9)
POTASSIUM SERPL-SCNC: 4.1 MMOL/L (ref 3.5–5.1)
PROT SERPL-MCNC: 6.9 G/DL (ref 6.4–8.2)
RBC # BLD AUTO: 4.64 M/UL (ref 3.8–5.2)
SODIUM SERPL-SCNC: 139 MMOL/L (ref 136–145)
URATE SERPL-MCNC: 2.3 MG/DL (ref 2.6–6)
WBC # BLD AUTO: 14.1 K/UL (ref 3.6–11)

## 2018-03-01 PROCEDURE — 77030007880 HC KT SPN EPDRL BBMI -B

## 2018-03-01 PROCEDURE — 10D17Z9 MANUAL EXTRACTION OF PRODUCTS OF CONCEPTION, RETAINED, VIA NATURAL OR ARTIFICIAL OPENING: ICD-10-PCS | Performed by: OBSTETRICS & GYNECOLOGY

## 2018-03-01 PROCEDURE — 83615 LACTATE (LD) (LDH) ENZYME: CPT | Performed by: OBSTETRICS & GYNECOLOGY

## 2018-03-01 PROCEDURE — 74011250636 HC RX REV CODE- 250/636: Performed by: OBSTETRICS & GYNECOLOGY

## 2018-03-01 PROCEDURE — 74011250637 HC RX REV CODE- 250/637: Performed by: OBSTETRICS & GYNECOLOGY

## 2018-03-01 PROCEDURE — 74011250636 HC RX REV CODE- 250/636: Performed by: ANESTHESIOLOGY

## 2018-03-01 PROCEDURE — 74011000250 HC RX REV CODE- 250

## 2018-03-01 PROCEDURE — 76060000078 HC EPIDURAL ANESTHESIA

## 2018-03-01 PROCEDURE — 85027 COMPLETE CBC AUTOMATED: CPT | Performed by: OBSTETRICS & GYNECOLOGY

## 2018-03-01 PROCEDURE — 75410000002 HC LABOR FEE PER 1 HR

## 2018-03-01 PROCEDURE — 00HU33Z INSERTION OF INFUSION DEVICE INTO SPINAL CANAL, PERCUTANEOUS APPROACH: ICD-10-PCS | Performed by: ANESTHESIOLOGY

## 2018-03-01 PROCEDURE — 74011250636 HC RX REV CODE- 250/636

## 2018-03-01 PROCEDURE — 96360 HYDRATION IV INFUSION INIT: CPT

## 2018-03-01 PROCEDURE — 51701 INSERT BLADDER CATHETER: CPT

## 2018-03-01 PROCEDURE — 75410000000 HC DELIVERY VAGINAL/SINGLE

## 2018-03-01 PROCEDURE — 36415 COLL VENOUS BLD VENIPUNCTURE: CPT | Performed by: OBSTETRICS & GYNECOLOGY

## 2018-03-01 PROCEDURE — 75410000003 HC RECOV DEL/VAG/CSECN EA 0.5 HR

## 2018-03-01 PROCEDURE — 65410000002 HC RM PRIVATE OB

## 2018-03-01 PROCEDURE — 99285 EMERGENCY DEPT VISIT HI MDM: CPT

## 2018-03-01 PROCEDURE — 77030011943

## 2018-03-01 PROCEDURE — 3E0R3BZ INTRODUCTION OF ANESTHETIC AGENT INTO SPINAL CANAL, PERCUTANEOUS APPROACH: ICD-10-PCS | Performed by: ANESTHESIOLOGY

## 2018-03-01 PROCEDURE — 80053 COMPREHEN METABOLIC PANEL: CPT | Performed by: OBSTETRICS & GYNECOLOGY

## 2018-03-01 PROCEDURE — 84550 ASSAY OF BLOOD/URIC ACID: CPT | Performed by: OBSTETRICS & GYNECOLOGY

## 2018-03-01 RX ORDER — DOCUSATE SODIUM 100 MG/1
100 CAPSULE, LIQUID FILLED ORAL
Status: DISCONTINUED | OUTPATIENT
Start: 2018-03-01 | End: 2018-03-03 | Stop reason: HOSPADM

## 2018-03-01 RX ORDER — OXYTOCIN/RINGER'S LACTATE 20/1000 ML
125-1000 PLASTIC BAG, INJECTION (ML) INTRAVENOUS AS NEEDED
Status: DISCONTINUED | OUTPATIENT
Start: 2018-03-01 | End: 2018-03-03 | Stop reason: HOSPADM

## 2018-03-01 RX ORDER — FENTANYL/BUPIVACAINE/NS/PF 2-1250MCG
1-16 PREFILLED PUMP RESERVOIR EPIDURAL CONTINUOUS
Status: DISCONTINUED | OUTPATIENT
Start: 2018-03-01 | End: 2018-03-01

## 2018-03-01 RX ORDER — HYDROCORTISONE ACETATE PRAMOXINE HCL 2.5; 1 G/100G; G/100G
CREAM TOPICAL AS NEEDED
Status: DISCONTINUED | OUTPATIENT
Start: 2018-03-01 | End: 2018-03-03 | Stop reason: HOSPADM

## 2018-03-01 RX ORDER — SODIUM CHLORIDE 0.9 % (FLUSH) 0.9 %
SYRINGE (ML) INJECTION
Status: DISCONTINUED
Start: 2018-03-01 | End: 2018-03-01

## 2018-03-01 RX ORDER — OXYCODONE AND ACETAMINOPHEN 5; 325 MG/1; MG/1
2 TABLET ORAL
Status: DISCONTINUED | OUTPATIENT
Start: 2018-03-01 | End: 2018-03-03 | Stop reason: HOSPADM

## 2018-03-01 RX ORDER — FENTANYL CITRATE 50 UG/ML
INJECTION, SOLUTION INTRAMUSCULAR; INTRAVENOUS
Status: COMPLETED
Start: 2018-03-01 | End: 2018-03-01

## 2018-03-01 RX ORDER — EPHEDRINE SULFATE 50 MG/ML
12.5 INJECTION, SOLUTION INTRAVENOUS
Status: DISCONTINUED | OUTPATIENT
Start: 2018-03-01 | End: 2018-03-01 | Stop reason: HOSPADM

## 2018-03-01 RX ORDER — ACETAMINOPHEN 325 MG/1
650 TABLET ORAL
Status: DISCONTINUED | OUTPATIENT
Start: 2018-03-01 | End: 2018-03-03 | Stop reason: HOSPADM

## 2018-03-01 RX ORDER — SODIUM CHLORIDE 0.9 % (FLUSH) 0.9 %
5-10 SYRINGE (ML) INJECTION AS NEEDED
Status: DISCONTINUED | OUTPATIENT
Start: 2018-03-01 | End: 2018-03-01 | Stop reason: HOSPADM

## 2018-03-01 RX ORDER — IBUPROFEN 400 MG/1
800 TABLET ORAL EVERY 8 HOURS
Status: DISCONTINUED | OUTPATIENT
Start: 2018-03-01 | End: 2018-03-03 | Stop reason: HOSPADM

## 2018-03-01 RX ORDER — BUPIVACAINE HYDROCHLORIDE 5 MG/ML
INJECTION, SOLUTION EPIDURAL; INTRACAUDAL
Status: COMPLETED
Start: 2018-03-01 | End: 2018-03-01

## 2018-03-01 RX ORDER — SODIUM CHLORIDE 0.9 % (FLUSH) 0.9 %
5-10 SYRINGE (ML) INJECTION EVERY 8 HOURS
Status: DISCONTINUED | OUTPATIENT
Start: 2018-03-01 | End: 2018-03-01 | Stop reason: HOSPADM

## 2018-03-01 RX ORDER — EPHEDRINE SULFATE 50 MG/ML
INJECTION, SOLUTION INTRAVENOUS
Status: DISCONTINUED
Start: 2018-03-01 | End: 2018-03-01

## 2018-03-01 RX ORDER — FENTANYL CITRATE 50 UG/ML
INJECTION, SOLUTION INTRAMUSCULAR; INTRAVENOUS AS NEEDED
Status: DISCONTINUED | OUTPATIENT
Start: 2018-03-01 | End: 2018-03-02 | Stop reason: HOSPADM

## 2018-03-01 RX ORDER — FENTANYL CITRATE 50 UG/ML
100 INJECTION, SOLUTION INTRAMUSCULAR; INTRAVENOUS ONCE
Status: DISCONTINUED | OUTPATIENT
Start: 2018-03-01 | End: 2018-03-01 | Stop reason: HOSPADM

## 2018-03-01 RX ORDER — HYDROCORTISONE 1 %
CREAM (GRAM) TOPICAL AS NEEDED
Status: DISCONTINUED | OUTPATIENT
Start: 2018-03-01 | End: 2018-03-03 | Stop reason: HOSPADM

## 2018-03-01 RX ORDER — CLINDAMYCIN PHOSPHATE 600 MG/50ML
600 INJECTION INTRAVENOUS EVERY 8 HOURS
Status: COMPLETED | OUTPATIENT
Start: 2018-03-01 | End: 2018-03-02

## 2018-03-01 RX ORDER — SODIUM CHLORIDE 0.9 % (FLUSH) 0.9 %
5-10 SYRINGE (ML) INJECTION AS NEEDED
Status: DISCONTINUED | OUTPATIENT
Start: 2018-03-01 | End: 2018-03-03 | Stop reason: HOSPADM

## 2018-03-01 RX ORDER — OXYTOCIN IN 5 % DEXTROSE 30/500 ML
PLASTIC BAG, INJECTION (ML) INTRAVENOUS
Status: COMPLETED
Start: 2018-03-01 | End: 2018-03-01

## 2018-03-01 RX ORDER — AMMONIA 15 % (W/V)
1 AMPUL (EA) INHALATION AS NEEDED
Status: DISCONTINUED | OUTPATIENT
Start: 2018-03-01 | End: 2018-03-03 | Stop reason: HOSPADM

## 2018-03-01 RX ORDER — OXYCODONE AND ACETAMINOPHEN 5; 325 MG/1; MG/1
1 TABLET ORAL
Status: DISCONTINUED | OUTPATIENT
Start: 2018-03-01 | End: 2018-03-03 | Stop reason: HOSPADM

## 2018-03-01 RX ORDER — TERBUTALINE SULFATE 1 MG/ML
0.25 INJECTION SUBCUTANEOUS AS NEEDED
Status: DISCONTINUED | OUTPATIENT
Start: 2018-03-01 | End: 2018-03-01 | Stop reason: HOSPADM

## 2018-03-01 RX ORDER — SIMETHICONE 80 MG
80 TABLET,CHEWABLE ORAL
Status: DISCONTINUED | OUTPATIENT
Start: 2018-03-01 | End: 2018-03-03 | Stop reason: HOSPADM

## 2018-03-01 RX ORDER — SODIUM CHLORIDE 0.9 % (FLUSH) 0.9 %
5-10 SYRINGE (ML) INJECTION EVERY 8 HOURS
Status: DISCONTINUED | OUTPATIENT
Start: 2018-03-01 | End: 2018-03-03 | Stop reason: HOSPADM

## 2018-03-01 RX ORDER — SODIUM CHLORIDE, SODIUM LACTATE, POTASSIUM CHLORIDE, CALCIUM CHLORIDE 600; 310; 30; 20 MG/100ML; MG/100ML; MG/100ML; MG/100ML
125 INJECTION, SOLUTION INTRAVENOUS CONTINUOUS
Status: DISCONTINUED | OUTPATIENT
Start: 2018-03-01 | End: 2018-03-01

## 2018-03-01 RX ORDER — ONDANSETRON 4 MG/1
4 TABLET, ORALLY DISINTEGRATING ORAL
Status: DISCONTINUED | OUTPATIENT
Start: 2018-03-01 | End: 2018-03-03 | Stop reason: HOSPADM

## 2018-03-01 RX ORDER — SODIUM CHLORIDE, SODIUM LACTATE, POTASSIUM CHLORIDE, CALCIUM CHLORIDE 600; 310; 30; 20 MG/100ML; MG/100ML; MG/100ML; MG/100ML
125 INJECTION, SOLUTION INTRAVENOUS CONTINUOUS
Status: DISCONTINUED | OUTPATIENT
Start: 2018-03-01 | End: 2018-03-01 | Stop reason: HOSPADM

## 2018-03-01 RX ORDER — BUPIVACAINE HYDROCHLORIDE 5 MG/ML
30 INJECTION, SOLUTION EPIDURAL; INTRACAUDAL AS NEEDED
Status: DISCONTINUED | OUTPATIENT
Start: 2018-03-01 | End: 2018-03-01 | Stop reason: HOSPADM

## 2018-03-01 RX ORDER — BUPIVACAINE HYDROCHLORIDE 5 MG/ML
INJECTION, SOLUTION EPIDURAL; INTRACAUDAL AS NEEDED
Status: DISCONTINUED | OUTPATIENT
Start: 2018-03-01 | End: 2018-03-02 | Stop reason: HOSPADM

## 2018-03-01 RX ORDER — NALOXONE HYDROCHLORIDE 0.4 MG/ML
0.4 INJECTION, SOLUTION INTRAMUSCULAR; INTRAVENOUS; SUBCUTANEOUS AS NEEDED
Status: DISCONTINUED | OUTPATIENT
Start: 2018-03-01 | End: 2018-03-01 | Stop reason: HOSPADM

## 2018-03-01 RX ORDER — DIPHENHYDRAMINE HCL 25 MG
25 CAPSULE ORAL
Status: DISCONTINUED | OUTPATIENT
Start: 2018-03-01 | End: 2018-03-03 | Stop reason: HOSPADM

## 2018-03-01 RX ADMIN — Medication 10 ML/HR: at 13:33

## 2018-03-01 RX ADMIN — SODIUM CHLORIDE, SODIUM LACTATE, POTASSIUM CHLORIDE, AND CALCIUM CHLORIDE 125 ML/HR: 600; 310; 30; 20 INJECTION, SOLUTION INTRAVENOUS at 11:50

## 2018-03-01 RX ADMIN — IBUPROFEN 800 MG: 400 TABLET, FILM COATED ORAL at 20:21

## 2018-03-01 RX ADMIN — FENTANYL CITRATE 100 MCG: 50 INJECTION, SOLUTION INTRAMUSCULAR; INTRAVENOUS at 13:24

## 2018-03-01 RX ADMIN — SODIUM CHLORIDE, SODIUM LACTATE, POTASSIUM CHLORIDE, AND CALCIUM CHLORIDE 125 ML/HR: 600; 310; 30; 20 INJECTION, SOLUTION INTRAVENOUS at 13:35

## 2018-03-01 RX ADMIN — Medication 30000 MILLI-UNITS: at 16:47

## 2018-03-01 RX ADMIN — CLINDAMYCIN PHOSPHATE 600 MG: 12 INJECTION, SOLUTION INTRAMUSCULAR; INTRAVENOUS at 21:45

## 2018-03-01 RX ADMIN — Medication 10 ML: at 18:30

## 2018-03-01 RX ADMIN — BUPIVACAINE HYDROCHLORIDE 6 ML: 5 INJECTION, SOLUTION EPIDURAL; INTRACAUDAL at 13:24

## 2018-03-01 NOTE — PROGRESS NOTES
1220: Lunch coverage for [facility]. Dr. Ellis Claude in to see patient. SVE 4-5cm. Discussing ambulating to encourage contractions. Dr. Ellis Claude plans to re-check cervix in an hour.

## 2018-03-01 NOTE — ANESTHESIA PREPROCEDURE EVALUATION
Anesthetic History     PONV          Review of Systems / Medical History  Patient summary reviewed, nursing notes reviewed and pertinent labs reviewed    Pulmonary            Asthma : well controlled       Neuro/Psych         Psychiatric history     Cardiovascular    Hypertension                   GI/Hepatic/Renal  Within defined limits              Endo/Other        Obesity     Other Findings              Physical Exam    Airway  Mallampati: II  TM Distance: > 6 cm  Neck ROM: normal range of motion   Mouth opening: Normal     Cardiovascular  Regular rate and rhythm,  S1 and S2 normal,  no murmur, click, rub, or gallop             Dental  No notable dental hx       Pulmonary  Breath sounds clear to auscultation               Abdominal  GI exam deferred       Other Findings            Anesthetic Plan    ASA: 2  Anesthesia type: epidural            Anesthetic plan and risks discussed with: Patient

## 2018-03-01 NOTE — PROGRESS NOTES
Labor Progress Note  Patient seen, fetal heart rate and contraction pattern evaluated, patient examined. Pt comfortable with epidural.   No data found. Physical Exam:  Cervical Exam:  7 cm dilated    80% effaced    -1 station    Membranes:  Spontaneous Rupture of Membranes;  Amniotic Fluid: clear fluid  Uterine Activity: Frequency: Every 4-6 minutes  Fetal Heart Rate: Baseline: 130s per minute  Variability: moderate  Accelerations: yes  Decelerations: variable    Assessment/Plan:  Reassuring fetal status, Labor  Progressing normally, Continue plan for vaginal delivery

## 2018-03-01 NOTE — ANESTHESIA PROCEDURE NOTES
Epidural Block    Start time: 3/1/2018 1:07 PM  End time: 3/1/2018 1:27 PM  Performed by: Magui Mukherjee  Authorized by: Chris PHAM     Pre-Procedure  Indication: labor epidural    Preanesthetic Checklist: risks and benefits discussed and timeout performed    Timeout Time: 13:07        Epidural:   Patient position:  Left lateral decubitus  Prep region:  Lumbar  Prep: Chlorhexidine    Location:  L2-3    Needle and Epidural Catheter:   Needle Type:  Tuohy  Needle Gauge:  17 G  Injection Technique:  Loss of resistance using air  Attempts:  1  Catheter Size:  19 G  Events: no blood with aspiration, no cerebrospinal fluid with aspiration, no paresthesia and negative aspiration test    Test Dose:  Bupivacaine 0.25% and negative    Assessment:   Catheter Secured:  Tegaderm and tape  Insertion:  Uncomplicated  Patient tolerance:  Patient tolerated the procedure well with no immediate complications

## 2018-03-01 NOTE — IP AVS SNAPSHOT
[address] [place] [address] 
[phone] Patient: Jovanni Stein MRN: [id number] :1985 About your hospitalization You were admitted on:  2018 You last received care in the:  [facility] You were discharged on:  March 3, 2018 Why you were hospitalized Your primary diagnosis was:  Not on File Your diagnoses also included:  Pregnancy, Pregnant Follow-up Information Follow up With Details Comments Contact Info Janeth Artis MD   [address] [facility] [address] [place] [address] 
[phone] Dio Davenport,  In 6 weeks  [address] [facility] [address] [place] [address] 
[phone] [facility] Call As needed for breastfeeding questions or concerns. 217 [facility] [id number] [place] [address], [address] [place] [address]  
[phone] Discharge Orders None A check adán indicates which time of day the medication should be taken. My Medications START taking these medications Instructions Each Dose to Equal  
 Morning Noon Evening Bedtime  
 ibuprofen 800 mg tablet Commonly known as:  MOTRIN Your last dose was: Your next dose is: Take 1 Tab by mouth every eight (8) hours as needed for Pain. 800 mg CHANGE how you take these medications Instructions Each Dose to Equal  
 Morning Noon Evening Bedtime  
 oxyCODONE-acetaminophen 5-325 mg per tablet Commonly known as:  PERCOCET What changed:  reasons to take this Your last dose was: Your next dose is: Take 1 Tab by mouth every six (6) hours as needed. Max Daily Amount: 4 Tabs. 1 Tab CONTINUE taking these medications Instructions Each Dose to Equal  
 Morning Noon Evening Bedtime  
 albuterol 90 mcg/actuation inhaler Commonly known as:  PROVENTIL HFA, VENTOLIN HFA, PROAIR HFA Your last dose was: Your next dose is: Take 2 Puffs by inhalation every six (6) hours as needed for Wheezing. Needs office visit for further refills 2 Puff  
    
   
   
   
  
 buPROPion  mg tablet Commonly known as:  Olivia Kruse Your last dose was: Your next dose is: Take 1 Tab by mouth two (2) times a day. 150 mg  
    
   
   
   
  
 famotidine 20 mg tablet Commonly known as:  PEPCID Your last dose was: Your next dose is:    
   
   
      
   
   
   
  
 Iron 325 mg (65 mg iron) tablet Generic drug:  ferrous sulfate Your last dose was: Your next dose is: Take  by mouth Daily (before breakfast). prenatal vit-iron fumarate-fa 28 mg iron- 800 mcg Tab Commonly known as:  PRENATAL PLUS with IRON Your last dose was: Your next dose is:    
   
   
 TK 1 T PO QD  
     
   
   
   
  
  
STOP taking these medications   
 aspirin 81 mg chewable tablet  
   
  
 cetirizine 10 mg tablet Commonly known as:  ZYRTEC  
   
  
 ondansetron 4 mg disintegrating tablet Commonly known as:  ZOFRAN ODT  
   
  
 traZODone 150 mg tablet Commonly known as:  Hussein Oneal Where to Get Your Medications Information on where to get these meds will be given to you by the nurse or doctor. ! Ask your nurse or doctor about these medications  
  ibuprofen 800 mg tablet  
 oxyCODONE-acetaminophen 5-325 mg per tablet Discharge Instructions None [facility] Announcement We are excited to announce that we are making your provider's discharge notes available to you in [employer]. You will see these notes when they are completed and signed by the physician that discharged you from your recent hospital stay.   If you have any questions or concerns about any information you see in [employer], please call the Health Information Department where you were seen or reach out to your Primary Care Provider for more information about your plan of care. Introducing [facility] HEALTH SERVICES! Dear Bhavin Flor: 
Thank you for requesting a [employer] account. Our records indicate that you already have an active [employer] account. You can access your account anytime at [email or web address] Did you know that you can access your hospital and ER discharge instructions at any time in [employer]? You can also review all of your test results from your hospital stay or ER visit. Additional Information If you have questions, please visit the Frequently Asked Questions section of the [employer] website at [email or web address] Remember, [employer] is NOT to be used for urgent needs. For medical emergencies, dial 911. Now available from your iPhone and Android! Providers Seen During Your Hospitalization Provider Specialty Primary office phone DO [facility] [phone] Immunizations Administered for This Admission Name Date Tdap 3/2/2018 Your Primary Care Physician (PCP) Primary Care Physician Office Phone Office Fax Gisele Barthel [phone] You are allergic to the following Allergen Reactions Labetalol Hives Ceclor (Cefaclor) Unknown (comments) Pcn (Penicillins) Hives Septra (Sulfamethoprim Ds) Unknown (comments) Recent Documentation Weight Breastfeeding? BMI OB Status Smoking Status 99.7 kg Unknown 35.48 kg/m2 Recent pregnancy Never Smoker Emergency Contacts Name Discharge Info Relation Home Work Mobile Alejandro Ventura DISCHARGE CAREGIVER [3] Boyfriend [17] [phone] Patient Belongings  The following personal items are in your possession at time of discharge: 
  Dental Appliances: None  Visual Aid: Glasses      Home Medications: None Jewelry: Earrings, Necklace, Ring  Clothing: Pants, Jacket/Coat, Footwear, Undergarments, Shirt    Other Valuables: Wallet, Eyeglasses  Personal Items Sent to Safe: None Discharge Instructions Attachments/References POSTPARTUM (ENGLISH) Patient Handouts After Your Delivery (the Postpartum Period): Care Instructions Your Care Instructions Congratulations on the birth of your baby. Like pregnancy, the  period can be a time of excitement, jenae, and exhaustion. You may look at your wondrous little baby and feel happy. You may also be overwhelmed by your new sleep hours and new responsibilities. At first, babies often sleep during the days and are awake at night. They do not have a pattern or routine. They may make sudden gasps, jerk themselves awake, or look like they have crossed eyes. These are all normal, and they may even make you smile. In these first weeks after delivery, try to take good care of yourself. It may take 4 to 6 weeks to feel like yourself again, and possibly longer if you had a  birth. You will likely feel very tired for several weeks. Your days will be full of ups and downs, but lots of jenae as well. Follow-up care is a key part of your treatment and safety. Be sure to make and go to all appointments, and call your doctor if you are having problems. It's also a good idea to know your test results and keep a list of the medicines you take. How can you care for yourself at home? Take care of your body after delivery · Use pads instead of tampons for the bloody flow that may last as long as 2 weeks. · Ease cramps with ibuprofen (Advil, Motrin). · Ease soreness of hemorrhoids and the area between your vagina and rectum with ice compresses or witch hazel pads. · Ease constipation by drinking lots of fluid and eating high-fiber foods. Ask your doctor about over-the-counter stool softeners. · Cleanse yourself with a gentle squeeze of warm water from a bottle instead of wiping with toilet paper. · Take a sitz bath in warm water several times a day. · Wear a good nursing bra. Ease sore and swollen breasts with warm, wet washcloths. · If you are not breastfeeding, use ice rather than heat for breast soreness. · Your period may not start for several months if you are breastfeeding. You may bleed more, and longer at first, than you did before you got pregnant. · Wait until you are healed (about 4 to 6 weeks) before you have sexual intercourse. Your doctor will tell you when it is okay to have sex. · Try not to travel with your baby for 5 or 6 weeks. If you take a long car trip, make frequent stops to walk around and stretch. Avoid exhaustion · Rest every day. Try to nap when your baby naps. · Ask another adult to be with you for a few days after delivery. · Plan for  if you have other children. · Stay flexible so you can eat at odd hours and sleep when you need to. Both you and your baby are making new schedules. · Plan small trips to get out of the house. Change can make you feel less tired. · Ask for help with housework, cooking, and shopping. Remind yourself that your job is to care for your baby. Know about help for postpartum depression · \"Baby blues\" are common for the first 1 to 2 weeks after birth. You may cry or feel sad or irritable for no reason. · Rest whenever you can. Being tired makes it harder to handle your emotions. · Go for walks with your baby. · Talk to your partner, friends, and family about your feelings. · If your symptoms last for more than a few weeks, or if you feel very depressed, ask your doctor for help. · Postpartum depression can be treated. Support groups and counseling can help. Sometimes medicine can also help. Stay healthy · Eat healthy foods so you have more energy, make good breast milk, and lose extra baby pounds. · If you breastfeed, avoid alcohol and drugs. Stay smoke-free. If you quit during pregnancy, congratulations. · Start daily exercise after 4 to 6 weeks, but rest when you feel tired. · Learn exercises to tone your belly. Do Kegel exercises to regain strength in your pelvic muscles. You can do these exercises while you stand or sit. ¨ Squeeze the same muscles you would use to stop your urine. Your belly and thighs should not move. ¨ Hold the squeeze for 3 seconds, and then relax for 3 seconds. ¨ Start with 3 seconds. Then add 1 second each week until you are able to squeeze for 10 seconds. ¨ Repeat the exercise 10 to 15 times for each session. Do three or more sessions each day. · Find a class for new mothers and new babies that has an exercise time. · If you had a  birth, give yourself a bit more time before you exercise, and be careful. When should you call for help? Call 911 anytime you think you may need emergency care. For example, call if: 
? · You passed out (lost consciousness). ?Call your doctor now or seek immediate medical care if: 
? · You have severe vaginal bleeding. This means you are passing blood clots and soaking through a pad each hour for 2 or more hours. ? · You are dizzy or lightheaded, or you feel like you may faint. ? · You have a fever. ? · You have new belly pain, or your pain gets worse. ? Watch closely for changes in your health, and be sure to contact your doctor if: 
? · Your vaginal bleeding seems to be getting heavier. ? · You have new or worse vaginal discharge. ? · You feel sad, anxious, or hopeless for more than a few days. ? · You do not get better as expected. Where can you learn more? Go to http://annamaria-lexii.info/. Enter A461 in the search box to learn more about \"After Your Delivery (the Postpartum Period): Care Instructions. \" Current as of: 2017 Content Version: 11.4 © 0011-6059 [employer]. Care instructions adapted under license by [employer] (which disclaims liability or warranty for this information). If you have questions about a medical condition or this instruction, always ask your healthcare professional. [address] any warranty or liability for your use of this information. Please provide this summary of care documentation to your next provider. Signatures-by signing, you are acknowledging that this After Visit Summary has been reviewed with you and you have received a copy. Patient Signature:  ____________________________________________________________ Date:  ____________________________________________________________  
  
[facility] Provider Signature:  ____________________________________________________________ Date:  ____________________________________________________________

## 2018-03-01 NOTE — IP AVS SNAPSHOT
[address] 
[phone] Patient: Vick Cabrera MRN: [id number] :1985 A check adán indicates which time of day the medication should be taken. My Medications START taking these medications Instructions Each Dose to Equal  
 Morning Noon Evening Bedtime  
 ibuprofen 800 mg tablet Commonly known as:  MOTRIN Your last dose was: Your next dose is: Take 1 Tab by mouth every eight (8) hours as needed for Pain. 800 mg CHANGE how you take these medications Instructions Each Dose to Equal  
 Morning Noon Evening Bedtime  
 oxyCODONE-acetaminophen 5-325 mg per tablet Commonly known as:  PERCOCET What changed:  reasons to take this Your last dose was: Your next dose is: Take 1 Tab by mouth every six (6) hours as needed. Max Daily Amount: 4 Tabs. 1 Tab CONTINUE taking these medications Instructions Each Dose to Equal  
 Morning Noon Evening Bedtime  
 albuterol 90 mcg/actuation inhaler Commonly known as:  PROVENTIL HFA, VENTOLIN HFA, PROAIR HFA Your last dose was: Your next dose is: Take 2 Puffs by inhalation every six (6) hours as needed for Wheezing. Needs office visit for further refills 2 Puff  
    
   
   
   
  
 buPROPion  mg tablet Commonly known as:  Ghanshyam Lopes Your last dose was: Your next dose is: Take 1 Tab by mouth two (2) times a day. 150 mg  
    
   
   
   
  
 famotidine 20 mg tablet Commonly known as:  PEPCID Your last dose was: Your next dose is:    
   
   
      
   
   
   
  
 Iron 325 mg (65 mg iron) tablet Generic drug:  ferrous sulfate Your last dose was: Your next dose is: Take  by mouth Daily (before breakfast). prenatal vit-iron fumarate-fa 28 mg iron- 800 mcg Tab Commonly known as:  PRENATAL PLUS with IRON Your last dose was: Your next dose is:    
   
   
 TK 1 T PO QD  
     
   
   
   
  
  
STOP taking these medications   
 aspirin 81 mg chewable tablet  
   
  
 cetirizine 10 mg tablet Commonly known as:  ZYRTEC  
   
  
 ondansetron 4 mg disintegrating tablet Commonly known as:  ZOFRAN ODT  
   
  
 traZODone 150 mg tablet Commonly known as:  Kary Lie Where to Get Your Medications Information on where to get these meds will be given to you by the nurse or doctor. ! Ask your nurse or doctor about these medications  
  ibuprofen 800 mg tablet  
 oxyCODONE-acetaminophen 5-325 mg per tablet

## 2018-03-01 NOTE — PROGRESS NOTES
Labor Progress Note  Patient seen, fetal heart rate and contraction pattern evaluated, patient examined. Pt hurting with contractions. No data found. Physical Exam:  Cervical Exam:  5 cm dilated    80% effaced    -2 station    Membranes:  Intact  Uterine Activity: Frequency: Every 2-3 minutes  Fetal Heart Rate: Baseline: 130s per minute  Variability: moderate  Accelerations: yes  Decelerations: none    Assessment/Plan:  Reassuring fetal status, Labor  Progressing normally, Continue plan for vaginal delivery. Pt desires . We have reviewed risks including uterine rupture of <1%. Epidural now.

## 2018-03-01 NOTE — PROGRESS NOTES
[address] with Dr Tami Persaud regarding patient. Notified of blood pressures and that patient is getting more uncomfortable with contractions. Continue to monitor. May start IV and draw blood. [address] [facility] labs ordered. Dr Tami Persaud will be over around noon to recheck. 1150 IV started with much difficulty in left hand. Three nurses and five attempts to insert IV  1250 Pt states that since she has been walking she is hurting much more. Request pain medication  1252 Dr Tami Persaud notified of patients increased pain level and request for medication. Will be right over to evaluate  1300 Dr Tami Persaud in to evaluate patient. SVE by her 5-6 cm. Admit and may get epidural.  1318 Epidural by Dr Dale Atkins  1455 Straight cathed for 250 ml clear yellow urine. [address] Dr Tami Persaud notified of exam, variable decels and that patient is feeling rectal pressure. On her way  46 Dr Tami Persaud in to evaluate patient. SVE by her 10/100. Preparing patient for delivery  [phone] Pt up in [facility]. Baby . One small push and baby was delivered. 840 [facility] REPORT:    Verbal report given to JAMAL Valdivia RN(name) on [facility]  being transferred to MIU(unit) for routine progression of care       Report consisted of patients Situation, Background, Assessment and   Recommendations(SBAR). Information from the following report(s) SBAR, Procedure Summary, MAR and Recent Results was reviewed with the receiving nurse. Lines:   Peripheral IV 18 Left Hand (Active)   Site Assessment Clean, dry, & intact 3/1/2018  7:15 PM   Phlebitis Assessment 0 3/1/2018  7:15 PM   Infiltration Assessment 0 3/1/2018  7:15 PM   Dressing Status Clean, dry, & intact 3/1/2018  7:15 PM   Dressing Type Tape;Transparent 3/1/2018  7:15 PM        Opportunity for questions and clarification was provided.       Patient transported with:   Registered Nurse

## 2018-03-01 NOTE — H&P
History & Physical    Name: Shmuel Nova MRN: [id number]  SSN: [id number]    YOB: 1985  Age: 27 y.o. Sex: female        Subjective:     Estimated Date of Delivery: 3/17/18  OB History    Para Term  AB Living   14 6 3 2 7 6   SAB TAB Ectopic Molar Multiple Live Births   5 1 1  0 10      # Outcome Date GA Lbr Izaiah/2nd Weight Sex Delivery Anes PTL Lv   14 Current            13 Term 04/22/15 39w0d  3.725 kg F  LO SPINAL AN N YAHAIRA   12 Term 13 39w4d  2.748 kg F VAGINAL DELI None  YAHAIRA   11 Para     M VAGINAL DELI   YAHAIRA   10 Ectopic 2009           9 Term 08/10/08 39w0d  2.722 kg M VAGINAL DELI EPI  YAHAIRA   8  06 34w0d   M VAGINAL DELI EPI Y YAHAIRA      Birth Comments: cerclage   7  05 36w0d   F VAGINAL DELI EPI Y YAHAIRA      Birth Comments: cerclage   6 SAB  18w0d       DEC   5 SAB  18w0d       DEC   4 SAB  20w0d       DEC   3 SAB  16w0d       DEC   2 TAB            1 SAB                   Ms. Letitia Disla is admitted with pregnancy at 37w5d for contractions-rule out labor. Contractions are getting stronger and closer together. . Prenatal course was complicated by cervical incompetence and beta thal minor, h/o precipitous delivery. h/o prior  with last pregnancy d/t breech. . Please see prenatal records for details.     Past Medical History:   Diagnosis Date    Anemia 10/8/2010    Anemia NEC     taking iron    Anxiety     Asthma     Asthma     on albuterol prn    Back pain, chronic     sciatica    Gestational hypertension     Past pregnancy    HX OTHER MEDICAL      , 2006, 2008, ,     HX OTHER MEDICAL     severe hyperemesis    Hypertension     with G12 - none this pregnancy    MDD (major depressive disorder), single episode with postpartum onset 2013    treated with meds - 13    Pregnancy     36 weeks    Psychiatric disorder     depression     Past Surgical History:   Procedure Laterality Date   DELIVERY ONLY      HX  SECTION  4/22/15    HX GYN      miscarriage x 6    HX GYN      removal of left fallopian tube    HX OTHER SURGICAL      cerlcage x4, ectopic x1  with removal of left fallopian tube    HX OTHER SURGICAL      cerclage w/ current pregnancy. Removed 18     Social History     Occupational History    Not on file. Social History Main Topics    Smoking status: Never Smoker    Smokeless tobacco: Never Used    Alcohol use No    Drug use: No    Sexual activity: Yes     Partners: Male     Family History   Problem Relation Age of Onset   [employer] Arthritis-rheumatoid Mother     Asthma Mother     Alcohol abuse Neg Hx     Arthritis-osteo Neg Hx     Bleeding Prob Neg Hx     Cancer Neg Hx     Diabetes Neg Hx     Elevated Lipids Neg Hx     Headache Neg Hx     Heart Disease Neg Hx     Hypertension Neg Hx     Lung Disease Neg Hx     Migraines Neg Hx     Psychiatric Disorder Neg Hx     Stroke Neg Hx     Mental Retardation Neg Hx        Allergies   Allergen Reactions    Labetalol Hives    Ceclor [Cefaclor] Unknown (comments)    Pcn [Penicillins] Hives    Septra [Sulfamethoprim Ds] Unknown (comments)     Prior to Admission medications    Medication Sig Start Date End Date Taking? Authorizing Provider   aspirin 81 mg chewable tablet Take 81 mg by mouth daily. Yes Historical Provider   ferrous sulfate (IRON) 325 mg (65 mg iron) tablet Take  by mouth Daily (before breakfast). Yes Historical Provider   buPROPion XL (WELLBUTRIN XL) 150 mg tablet Take 1 Tab by mouth two (2) times a day. 18  Yes Mary Garcia NP   famotidine (PEPCID) 20 mg tablet  17  Yes Historical Provider   prenatal vit-iron fumarate-fa (PRENATAL PLUS WITH IRON) 28 mg iron- 800 mcg tab TK 1 T PO QD 17  Yes Historical Provider   albuterol (PROVENTIL HFA, VENTOLIN HFA) 90 mcg/actuation inhaler Take 2 Puffs by inhalation every six (6) hours as needed for Wheezing.  Needs office visit for further refills 7/11/14  Yes Shantel Jimenez MD   cetirizine (ZYRTEC) 10 mg tablet TAKE 1 TABLET BY MOUTH EVERY DAY 7/6/14  Yes Randolph Etienne NP   oxyCODONE-acetaminophen (PERCOCET) 5-325 mg per tablet Take 1 Tab by mouth every six (6) hours as needed for Pain (PRN Severe pain). Max Daily Amount: 4 Tabs. 9/29/17   Roderick Malcolm DO   ondansetron (ZOFRAN ODT) 4 mg disintegrating tablet DIS 1 T UNT Q 8 H PRF NAUSEA OR VOM 7/27/17   Historical Provider   traZODone (DESYREL) 150 mg tablet Take 1/2-1 tablet by mouth at bedtime for sleep as needed 7/12/17   Anton Askew NP        Review of Systems: Pertinent items are noted in HPI. Objective:     Vitals:  Vitals:    03/01/18 1031 03/01/18 1050 03/01/18 1052 03/01/18 1201   BP: 143/80 141/88 (!) 138/93 153/85   Pulse: 86 81 73 77   Resp:    20   Temp:    98.4 °F (36.9 °C)        Physical Exam:  Abdomen: soft, nontender, gravid  Cervical Exam: 4+ cm dilated    80% effaced    -2 station    Presenting Part: cephalic  Cervical Position: mid position  Consistency: Soft  Lower Extremities:  - Edema 1+  Membranes:  intact with BBOW  Fetal Heart Rate: Reactive    Prenatal Labs:   Lab Results   Component Value Date/Time    ABO/Rh(D) O POSITIVE 04/21/2015 09:40 AM    Rubella, External immune 08/08/2017    GrBStrep, External negative 03/27/2015    HBsAg, External negative 08/08/2017    HIV, External negative 08/08/2017    RPR, External non reactive 08/08/2017    Gonorrhea, External negative 08/03/2012    Chlamydia, External negative 08/03/2012    ABO,Rh O positive 09/19/2014         Assessment/Plan:     Active Problems:    Pregnancy (3/1/2018)         Plan: Admit for Category I tracing. CHTN with mildly elevated blood pressure-no meds. Suspect early labor. H/o precipitous birth. Will allow pt to ambulate and recheck in about an hour. .  Group B Strep was negative.     Signed By:  Celina Pardo DO     March 1, 2018

## 2018-03-02 PROCEDURE — 74011250637 HC RX REV CODE- 250/637: Performed by: OBSTETRICS & GYNECOLOGY

## 2018-03-02 PROCEDURE — 90715 TDAP VACCINE 7 YRS/> IM: CPT | Performed by: OBSTETRICS & GYNECOLOGY

## 2018-03-02 PROCEDURE — 74011250636 HC RX REV CODE- 250/636: Performed by: OBSTETRICS & GYNECOLOGY

## 2018-03-02 PROCEDURE — 65410000002 HC RM PRIVATE OB

## 2018-03-02 PROCEDURE — 74011000258 HC RX REV CODE- 258: Performed by: OBSTETRICS & GYNECOLOGY

## 2018-03-02 RX ORDER — BUPROPION HYDROCHLORIDE 150 MG/1
150 TABLET, EXTENDED RELEASE ORAL 2 TIMES DAILY
Status: DISCONTINUED | OUTPATIENT
Start: 2018-03-02 | End: 2018-03-03 | Stop reason: HOSPADM

## 2018-03-02 RX ADMIN — IBUPROFEN 800 MG: 400 TABLET, FILM COATED ORAL at 07:21

## 2018-03-02 RX ADMIN — CLINDAMYCIN PHOSPHATE 600 MG: 12 INJECTION, SOLUTION INTRAMUSCULAR; INTRAVENOUS at 05:25

## 2018-03-02 RX ADMIN — IBUPROFEN 800 MG: 400 TABLET, FILM COATED ORAL at 16:53

## 2018-03-02 RX ADMIN — GENTAMICIN SULFATE 377.6 MG: 40 INJECTION, SOLUTION INTRAMUSCULAR; INTRAVENOUS at 14:40

## 2018-03-02 RX ADMIN — CLINDAMYCIN PHOSPHATE 600 MG: 12 INJECTION, SOLUTION INTRAMUSCULAR; INTRAVENOUS at 13:59

## 2018-03-02 RX ADMIN — BUPROPION HYDROCHLORIDE 150 MG: 150 TABLET, EXTENDED RELEASE ORAL at 20:38

## 2018-03-02 RX ADMIN — Medication 10 ML: at 06:11

## 2018-03-02 RX ADMIN — TETANUS TOXOID, REDUCED DIPHTHERIA TOXOID AND ACELLULAR PERTUSSIS VACCINE, ADSORBED 0.5 ML: 5; 2.5; 8; 8; 2.5 SUSPENSION INTRAMUSCULAR at 19:39

## 2018-03-02 RX ADMIN — Medication 10 ML: at 05:25

## 2018-03-02 NOTE — PROCEDURES
Delivery Note    Obstetrician:  Russ Carlson DO    Assistant: none    Pre-Delivery Diagnosis: Term pregnancy, Spontaneous labor, Single fetus or Pregnancy complicated by: incompetent cervix s/p cerclage, grandmultiparity, beta thalassemia minor, CHTN, h/o prior      Post-Delivery Diagnosis: Living  infant(s), Male or successful     Intrapartum Event:  Avulsion of cord at placenta site when reducing nuchal cord-required manual extraction of placenta.  Intact uterus on exam     Procedure: Successful     Epidural: YES    Monitor:  Fetal Heart Tones - External and Uterine Contractions - External    Indications for instrumental delivery: none    Estimated Blood Loss: 200cc    Episiotomy: n/a    Laceration(s):  1st degree    Laceration(s) repair: NO    Presentation: Cephalic    Fetal Description: moraes    Fetal Position: Occiput Anterior      Birth Weight: pending    Birth Length: pending    Apgar - One Minute: 9    Apgar - Five Minutes: 9    Umbilical Cord: Nuchal Cord x  1 and 3 vessels present    Specimens: placenta (normal appearance and intact)-discarded           Complications:  none           Cord Blood Results:   Information for the patient's newbornBrian Webster [address] [place] [address] [[id number]]     Lab Results   Component Value Date/Time    ROCÍO IgG NEG 2018 07:39 PM    ABO/Rh(D) Cora Hurst POSITIVE 2018 07:39 PM     Prenatal Labs:     Lab Results   Component Value Date/Time    ABO/Rh(D) Cora Hurst POSITIVE 2015 09:40 AM    HBsAg, External Negative 2018    HIV, External negative 2017    Rubella, External immune 2017    RPR, External non reactive 2017    Gonorrhea, External negative 2012    Chlamydia, External negative 2012    GrBStrep, External Negative 2018        Attending Attestation: I performed the procedure    Signed By:  Russ Carlson DO     2018

## 2018-03-02 NOTE — LACTATION NOTE
This note was copied from a baby's chart. Made initial lactation visit to  mother whose infant was born yesterday afternoon. Mother said she has nursed her last 1 babies. This infant was nursing when I visited. Infant was nursing in football hold with good latch. I did suggest mother bring infant a little closer to her to avoid stress on the nipple. Infant was sucking rhythmically with swallowing heard. I did give mother lanolin for her nipples. Mother said infant has been cluster feeding and her nipples are a little sore.

## 2018-03-02 NOTE — ADT AUTH CERT NOTES
atient Name [address] Sex  Address Phone        Rey Dooley [phone] Female 1985 [address] [place] [address] Road [address] [phone] (Home)  [phone] (Mobile) *Preferred*           CSN:       [id number]           Admit Date: Admit Time Room Bed       Mar 1, 2018 10:09  [35308] 01 [07330]           Attending Providers        Provider Pager From To       Dilshad Campbell DO  18       DEL     Delivery Date: 3/1/2018   Delivery Time: 4:44 PM   Delivery Type:   Sex:  male    Gestational Age: 37w6d      Measurements:  Birth Weight: 2.71 kg    Birth Length: 19.5\"          Delivery Clinician:  Dilshad Campbell  Living?:   Delivery Location: L&D

## 2018-03-02 NOTE — PROGRESS NOTES
Post-Partum Day Number 1 Progress Note    Patient doing well post-partum without significant complaint. Voiding withour difficulty, normal lochia. Denies cp/sob/n/v. Ambulating without problem. Vitals:  Patient Vitals for the past 8 hrs:   BP Temp Pulse Resp   18 0230 153/83 98.6 °F (37 °C) 79 16     Temp (24hrs), Av.1 °F (36.7 °C), Min:97.7 °F (36.5 °C), Max:98.9 °F (37.2 °C)      Vital signs stable, afebrile. Exam:  Patient without distress. Heart regular rate and rhythm   Lungs CTA b/l               Abdomen soft, fundus firm at level below umbilicus, nontender               Lower extremities are negative for swelling, cords or tenderness. Lab/Data Review:  no new labs    Assessment and Plan:  Patient appears to be having uncomplicated post-partum course. Continue routine perineal care and maternal education. Plan discharge tomorrow if no problems occur. Boy-desires circ (consented). Benign labs. Manual extraction of placenta (clinda/gent x 24 h for prophylaxis as pt allergic to pcn and cephalosporins).

## 2018-03-02 NOTE — ROUTINE PROCESS
1915- TRANSFER - IN REPORT:    Verbal report received from [facility] RN(name) on Vick Cabrera  being received from L&D(unit) for routine progression of care      Report consisted of patients Situation, Background, Assessment and   Recommendations(SBAR). Information from the following report(s) SBAR was reviewed with the receiving nurse. Opportunity for questions and clarification was provided. Assessment completed upon patients arrival to unit and care assumed.

## 2018-03-02 NOTE — ROUTINE PROCESS
Bedside and Verbal shift change report given to ALBA Thomas RN (oncoming nurse) by Patricia Frederick RN (offgoing nurse). Report included the following information SBAR.

## 2018-03-03 VITALS
HEART RATE: 86 BPM | OXYGEN SATURATION: 99 % | TEMPERATURE: 98.7 F | DIASTOLIC BLOOD PRESSURE: 86 MMHG | WEIGHT: 219.8 LBS | RESPIRATION RATE: 16 BRPM | BODY MASS INDEX: 35.48 KG/M2 | SYSTOLIC BLOOD PRESSURE: 146 MMHG

## 2018-03-03 PROCEDURE — 74011250637 HC RX REV CODE- 250/637: Performed by: OBSTETRICS & GYNECOLOGY

## 2018-03-03 RX ORDER — OXYCODONE AND ACETAMINOPHEN 5; 325 MG/1; MG/1
1 TABLET ORAL
Qty: 10 TAB | Refills: 0 | Status: SHIPPED | OUTPATIENT
Start: 2018-03-03 | End: 2020-01-15

## 2018-03-03 RX ORDER — IBUPROFEN 800 MG/1
800 TABLET ORAL
Qty: 30 TAB | Refills: 1 | Status: SHIPPED | OUTPATIENT
Start: 2018-03-03 | End: 2020-01-15

## 2018-03-03 RX ADMIN — IBUPROFEN 800 MG: 400 TABLET, FILM COATED ORAL at 01:13

## 2018-03-03 RX ADMIN — IBUPROFEN 800 MG: 400 TABLET, FILM COATED ORAL at 10:26

## 2018-03-03 RX ADMIN — BUPROPION HYDROCHLORIDE 150 MG: 150 TABLET, EXTENDED RELEASE ORAL at 10:26

## 2018-03-03 NOTE — PROGRESS NOTES
Report received from SOPHY Be RN using SBAR. I have reviewed discharge instructions with the patient. The patient verbalized understanding.

## 2018-03-03 NOTE — ROUTINE PROCESS
Bedside shift change report given to SOPHY Rich (oncoming nurse) by Artemio Negro RN (offgoing nurse). Report included the following information SBAR, Kardex, Intake/Output, MAR, Accordion and Recent Results.

## 2018-03-03 NOTE — PROGRESS NOTES
Post-Partum Day Number 2 Progress Note    Patient doing well post-partum without significant complaints. Voiding without difficulty, normal lochia. Reports some difficulty with breastfeeding overnight. Denies ha/sob/cp/palpitations/ruqp/change in vision. Vitals:  Patient Vitals for the past 24 hrs:   BP Temp Pulse Resp   18 0119 127/79 97.6 °F (36.4 °C) 71 16   18 2044 129/71 98.6 °F (37 °C) 75 15   18 1737 140/74 98.6 °F (37 °C) 84 16   18 0919 141/75 98.6 °F (37 °C) 71 12     Temp (24hrs), Av.4 °F (36.9 °C), Min:97.6 °F (36.4 °C), Max:98.6 °F (37 °C)      Vital signs stable, afebrile. Exam:  Patient without distress. Abdomen soft, fundus firm, nontender               Lower extremities- nontender without edema; no cords      Assessment and Plan:  Patient appears to be having uncomplicated post-partum course. CHTN, no medications, no s/s superimposed preeclampsia. Male infant s/p circumcision. Benign labs. Discharge today-instructions reviewed.     Signed By: Salome Raymundo DO     March 3, 2018

## 2018-03-03 NOTE — LACTATION NOTE
This note was copied from a baby's chart. Mom and baby scheduled for discharge today. Mom states baby has been latching and nursing on the right breast but she has been having difficulty getting him latched on the left breast.     I went in to check on mom and she had the baby at the breast. He nursed for 1 hour on the right breast. He looked like he had a deep latch. He would suck about 10 times and then swallow. We would then stimulate him and he would start sucking again. I helped mom get the baby latched on the left breast in the football hold. He began sucking vigorously but was not swallowing very often. I encouraged mom to nurse for about 30 minutes and then give the baby a breast.     Mom has nursed other children and she always felt like she had plenty of milk. Mom will not be 48 hours post delivery until 1630 this afternoon. She does not feel like her breasts are getting frederick or firmer yet. I will help mom feed one more time this afternoon and then we will weigh the baby.

## 2018-03-03 NOTE — DISCHARGE SUMMARY
Obstetrical Discharge Summary     Name: Chapo Storey MRN: [id number]  SSN: [id number]    YOB: 1985  Age: 27 y.o. Sex: female      Allergies: Labetalol; Ceclor [cefaclor]; Pcn [penicillins]; and Septra [sulfamethoprim ds]    Admit Date: 3/1/2018    Discharge Date: 3/3/2018     Admitting Physician: Patricia Ron DO     Attending Physician:  Patricia Ron DO     * Admission Diagnoses: Pregnancy  Pregnant    * Discharge Diagnoses:   Information for the patient's :  Gif[facility] Blood [facility] [[id number]]   Delivery of a 2.71 kg male infant via  on 3/1/2018 at 4:44 PM  by . Apgars were 9 and 9.        Additional Diagnoses:   Hospital Problems as of 3/3/2018  Date Reviewed: 2017          Codes Class Noted - Resolved POA    Pregnancy ICD-10-CM: Z34.90  ICD-9-CM: V22.2  3/1/2018 - Present Unknown        Pregnant ICD-10-CM: Z34.90  ICD-9-CM: V22.2  3/1/2018 - Present Unknown             Lab Results   Component Value Date/Time    ABO/Rh(D) O POSITIVE 2015 09:40 AM    Rubella, External immune 2017    GrBStrep, External Negative 2018    ABO,Rh O Positive 2017      Immunization History   Administered Date(s) Administered    Influenza Vaccine PF 10/22/2013    TDAP Vaccine 2012    Tdap 2018    ZZZ-RETIRED (DO NOT USE) Pneumococcal Vaccine (Unspecified Type) 2012       * Procedures:     Jerry Anger  Depression Scale  I have been able to laugh and see the funny side of things: As much as I always could  I have looked forward with enjoyment to things: Rather less than I used to  I have blamed myself unnecessarily when things went wrong: Not very often  I have been anxious or worried for no good reason: Yes, sometimes  I have felt scared or panicky for no very good reason: Yes, sometimes  Things have been getting on top of me: Yes, sometimes I haven't been coping as well as usual  I have been so unhappy that I have had difficulty sleeping: Yes, sometimes  I have felt sad or miserable: Not very often  I have been so unhappy that I have been crying: Only occasionally  The thought of harming myself has occurred to me: Never  Total Score: 12    * Discharge Condition: good    [facility] Course: Normal hospital course following the delivery. * Disposition: Home    Discharge Medications:   Current Discharge Medication List      START taking these medications    Details   ibuprofen (MOTRIN) 800 mg tablet Take 1 Tab by mouth every eight (8) hours as needed for Pain. Qty: 30 Tab, Refills: 1    Associated Diagnoses: Postpartum care following vaginal delivery         CONTINUE these medications which have CHANGED    Details   oxyCODONE-acetaminophen (PERCOCET) 5-325 mg per tablet Take 1 Tab by mouth every six (6) hours as needed. Max Daily Amount: 4 Tabs. Qty: 10 Tab, Refills: 0    Associated Diagnoses: Postpartum care following vaginal delivery         CONTINUE these medications which have NOT CHANGED    Details   ferrous sulfate (IRON) 325 mg (65 mg iron) tablet Take  by mouth Daily (before breakfast). buPROPion XL (WELLBUTRIN XL) 150 mg tablet Take 1 Tab by mouth two (2) times a day. Qty: 60 Tab, Refills: 0    Comments: Last refill NP Carlton Alex is leaving the practice pt was made aware of this. Associated Diagnoses: Anxiety; MDD (major depressive disorder), recurrent episode, moderate (HCC)      famotidine (PEPCID) 20 mg tablet Refills: 3      prenatal vit-iron fumarate-fa (PRENATAL PLUS WITH IRON) 28 mg iron- 800 mcg tab TK 1 T PO QD  Refills: 11      albuterol (PROVENTIL HFA, VENTOLIN HFA) 90 mcg/actuation inhaler Take 2 Puffs by inhalation every six (6) hours as needed for Wheezing.  Needs office visit for further refills  Qty: 1 Inhaler, Refills: 0         STOP taking these medications       aspirin 81 mg chewable tablet Comments:   Reason for Stopping:         cetirizine (ZYRTEC) 10 mg tablet Comments:   Reason for Stopping:         ondansetron (ZOFRAN ODT) 4 mg disintegrating tablet Comments:   Reason for Stopping:         traZODone (DESYREL) 150 mg tablet Comments:   Reason for Stopping:               * Follow-up Care/Patient Instructions:   Activity: Activity as tolerated and No sex for 6 weeks  Diet: Regular Diet      Follow-up Information     Follow up With Details Comments Contact Info    Abraham Mckeon MD   [facility] [address] [facility]      Elizabeth Jimenez DO In 6 weeks  [address] [facility] [address]  [phone]             Signed By:  Alice Baker DO     March 3, 2018

## 2018-04-11 ENCOUNTER — HOSPITAL ENCOUNTER (OUTPATIENT)
Dept: VASCULAR SURGERY | Age: 33
Discharge: HOME | End: 2018-04-11
Attending: OBSTETRICS & GYNECOLOGY
Payer: MEDICAID

## 2018-04-11 DIAGNOSIS — M79.661 PAIN IN BOTH LOWER LEGS: ICD-10-CM

## 2018-04-11 DIAGNOSIS — R60.0 LOCAL EDEMA: ICD-10-CM

## 2018-04-11 DIAGNOSIS — M79.662 PAIN IN BOTH LOWER LEGS: ICD-10-CM

## 2018-04-11 PROCEDURE — 93970 EXTREMITY STUDY: CPT

## 2018-04-11 NOTE — PROCEDURES
[facility]  *** FINAL REPORT ***    Name: Rachael Glaser  MRN: [id number]    Outpatient  : 28 Aug 1985  HIS Order #: [id number]  [id number] [facility] Visit #: [id number]  Date: 2018    TYPE OF TEST: Peripheral Venous Testing    REASON FOR TEST  Pain in limb    Right Leg:-  Deep venous thrombosis:           No  Superficial venous thrombosis:    No  Deep venous insufficiency:        Not examined  Superficial venous insufficiency: Not examined    Left Leg:-  Deep venous thrombosis:           No  Superficial venous thrombosis:    No  Deep venous insufficiency:        Not examined  Superficial venous insufficiency: Not examined      INTERPRETATION/FINDINGS  PROCEDURE:  Color duplex ultrasound imaging of lower extremity veins. FINDINGS:       Right: The common femoral, deep femoral, femoral, popliteal,  posterior tibial, peroneal, and great saphenous are patent and without   evidence of thrombus;  each is fully compressible and there is no  narrowing of the flow channel on color Doppler imaging. Phasic flow  is observed in the common femoral vein. Left:   The common femoral, deep femoral, femoral, popliteal,  posterior tibial, peroneal, and great saphenous are patent and without   evidence of thrombus;  each is fully compressible and there is no  narrowing of the flow channel on color Doppler imaging. Phasic flow  is observed in the common femoral vein. IMPRESSION:  No evidence of right or left lower extremity vein  thrombosis. ADDITIONAL COMMENTS    I have personally reviewed the data relevant to the interpretation of  this  study. TECHNOLOGIST: Analilia Ca Aas  Signed: 2018 11:22 AM    PHYSICIAN: Randy Del Castillo MD  Signed: 2018 09:44 AM

## 2020-01-14 ENCOUNTER — ANESTHESIA EVENT (OUTPATIENT)
Dept: SURGERY | Age: 35
End: 2020-01-14
Payer: MEDICAID

## 2020-01-15 ENCOUNTER — OFFICE VISIT (OUTPATIENT)
Dept: FAMILY MEDICINE CLINIC | Age: 35
End: 2020-01-15

## 2020-01-15 VITALS
TEMPERATURE: 97.9 F | HEIGHT: 66 IN | SYSTOLIC BLOOD PRESSURE: 135 MMHG | WEIGHT: 225 LBS | HEART RATE: 69 BPM | OXYGEN SATURATION: 98 % | DIASTOLIC BLOOD PRESSURE: 73 MMHG | RESPIRATION RATE: 16 BRPM | BODY MASS INDEX: 36.16 KG/M2

## 2020-01-15 DIAGNOSIS — F33.9 RECURRENT MAJOR DEPRESSIVE DISORDER, REMISSION STATUS UNSPECIFIED (HCC): ICD-10-CM

## 2020-01-15 DIAGNOSIS — J45.20 MILD INTERMITTENT ASTHMA WITHOUT COMPLICATION: ICD-10-CM

## 2020-01-15 DIAGNOSIS — Z76.89 ENCOUNTER TO ESTABLISH CARE: Primary | ICD-10-CM

## 2020-01-15 DIAGNOSIS — O03.4 INCOMPLETE MISCARRIAGE: ICD-10-CM

## 2020-01-15 DIAGNOSIS — J30.89 NON-SEASONAL ALLERGIC RHINITIS, UNSPECIFIED TRIGGER: ICD-10-CM

## 2020-01-15 RX ORDER — ALBUTEROL SULFATE 90 UG/1
2 AEROSOL, METERED RESPIRATORY (INHALATION)
Qty: 1 INHALER | Refills: 0 | Status: ON HOLD | OUTPATIENT
Start: 2020-01-15 | End: 2020-05-05 | Stop reason: SDUPTHER

## 2020-01-15 RX ORDER — FLUTICASONE PROPIONATE 50 MCG
2 SPRAY, SUSPENSION (ML) NASAL DAILY
Qty: 1 BOTTLE | Refills: 2 | Status: SHIPPED | OUTPATIENT
Start: 2020-01-15 | End: 2020-04-22 | Stop reason: SDUPTHER

## 2020-01-15 RX ORDER — CETIRIZINE HCL 10 MG
10 TABLET ORAL DAILY
Qty: 90 TAB | Refills: 0 | Status: ON HOLD | OUTPATIENT
Start: 2020-01-15 | End: 2020-09-03

## 2020-01-15 RX ORDER — AZELASTINE 1 MG/ML
1 SPRAY, METERED NASAL 2 TIMES DAILY
Qty: 1 BOTTLE | Refills: 1 | Status: ON HOLD | OUTPATIENT
Start: 2020-01-15 | End: 2020-09-03

## 2020-01-15 NOTE — PROGRESS NOTES
[facility] Note  HPI:   Emerita Long is a 37 y.o. female who presents to establish care. Chief Complaint   Patient presents with    New Patient     previous PCP was Dr. Natalie Fortune Physical     pt is fasting     She complains of stuffiness and nasal drainage for the past 2-3 months. Occasional headache. No coughing wheezing or SOB. Used albuterol once in the past 5-6 months. Using Flonase daily with no relief. Diagnosed with incomplete miscarriage last week, reports about 8 weeks gestation. Managed by OBGYN, plan is for [facility]  1/17/19. Still having light vaginal bleeding. She has history of 6 second trimester miscarriages that were related to incompetent cervix. She then had 7 healthy pregnancies and live births. This is her first early trimester miscarriage. Very sad and tearful. This was a planned pregnancy. Current Outpatient Medications   Medication Sig Dispense Refill    albuterol (PROVENTIL HFA, VENTOLIN HFA, PROAIR HFA) 90 mcg/actuation inhaler Take 2 Puffs by inhalation every six (6) hours as needed for Wheezing. Needs office visit for further refills 1 Inhaler 0    cetirizine (ZYRTEC) 10 mg tablet Take 1 Tab by mouth daily. 90 Tab 0    azelastine (ASTELIN) 137 mcg (0.1 %) nasal spray 1 [place] by Both Nostrils route two (2) times a day. Use in each nostril as directed 1 Bottle 1    fluticasone propionate (FLONASE) 50 mcg/actuation nasal spray 2 Sprays by Both Nostrils route daily. 1 Bottle 2    buPROPion XL (WELLBUTRIN XL) 150 mg tablet Take 1 Tab by mouth two (2) times a day.  60 Tab 0      Allergies   Allergen Reactions    Labetalol Hives    Ceclor [Cefaclor] Unknown (comments)    Pcn [Penicillins] Hives    Septra [Sulfamethoprim Ds] Unknown (comments)      Patient Active Problem List   Diagnosis Code    Asthma J45.909    Anemia D64.9    Anxiety F41.9    Back pain, chronic M54.9, G89.29    Unspecified essential hypertension I10    MDD (major depressive disorder), recurrent episode, moderate (HCC) F33.1    Sleep disturbance G47.9    Non compliance w medication regimen Z91.14    Cervical incompetence N88.3    Incompetent cervix N88.3    Pregnancy Z34.90    Pregnant Z34.90     Past Medical History:   Diagnosis Date    Anemia 10/8/2010    Asthma     on albuterol prn. Triggers cold and allergies    Back pain, chronic     sciatica    Gestational hypertension     Past pregnancy    HX OTHER MEDICAL      , , , ,     Hyperemesis     severe hyperemesis    Hypertension     with G12 - none this pregnancy    MDD (major depressive disorder), single episode with postpartum onset 2013    treated with meds - 13    Pregnancy     36 weeks      Past Surgical History:   Procedure Laterality Date     DELIVERY ONLY      HX  SECTION  4/22/15    HX GYN      miscarriage x 6    HX GYN      removal of left fallopian tube    HX OTHER SURGICAL      cerlcage x4, ectopic x1  with removal of left fallopian tube    HX OTHER SURGICAL      cerclage w/ current pregnancy. Removed 18      No LMP recorded.    Family History   Problem Relation Age of Onset   Christiano Tracy Arthritis-rheumatoid Mother     Asthma Mother     No Known Problems Father     No Known Problems Maternal Grandmother     No Known Problems Maternal Grandfather     No Known Problems Paternal Grandmother     No Known Problems Paternal Grandfather     Asthma Son     Alcohol abuse Neg Hx     Arthritis-osteo Neg Hx     Bleeding Prob Neg Hx     Cancer Neg Hx     Diabetes Neg Hx     Elevated Lipids Neg Hx     Headache Neg Hx     Heart Disease Neg Hx     Hypertension Neg Hx     Lung Disease Neg Hx     Migraines Neg Hx     Psychiatric Disorder Neg Hx     Stroke Neg Hx     Mental Retardation Neg Hx       Social History     Socioeconomic History    Marital status: SINGLE     Spouse name: Not on file    Number of children: Not on file    Years of education: Not on file    Highest education level: Not on file   Occupational History    Not on file   Social Needs    Financial resource strain: Not on file    Food insecurity:     Worry: Not on file     Inability: Not on file    Transportation needs:     Medical: Not on file     Non-medical: Not on file   Tobacco Use    Smoking status: Never Smoker    Smokeless tobacco: Never Used   Substance and Sexual Activity    Alcohol use: No    Drug use: No    Sexual activity: Yes     Partners: Male   Lifestyle    Physical activity:     Days per week: Not on file     Minutes per session: Not on file    Stress: Not on file   Relationships    Social connections:     Talks on phone: Not on file     Gets together: Not on file     Attends Shinto service: Not on file     Active member of club or organization: Not on file     Attends meetings of clubs or organizations: Not on file     Relationship status: Not on file    Intimate partner violence:     Fear of current or ex partner: Not on file     Emotionally abused: Not on file     Physically abused: Not on file     Forced sexual activity: Not on file   Other Topics Concern    Not on file   Social History Narrative    [place] lives with boyfriend, Lorraine Balbuena,  reportedly with alcohol dependence. She was raised by her mother. She has no siblings. Not working. She is supported financially by the childrens' father and public support. She has no hobbies outside of her children. She has a 12th grade education and no legal entanglements. ROS:   Review of Systems   Constitutional: Negative for chills, diaphoresis, fever, malaise/fatigue and weight loss. HENT: Negative for congestion, ear pain, hearing loss, sinus pain, sore throat and tinnitus. Eyes: Negative for blurred vision. Respiratory: Negative for shortness of breath. Cardiovascular: Negative for chest pain, palpitations and leg swelling.    Gastrointestinal: Negative for abdominal pain, blood in stool, constipation, diarrhea, heartburn, melena, nausea and vomiting. Genitourinary: Negative for dysuria, frequency, hematuria and urgency. Musculoskeletal: Negative for joint pain and myalgias. Skin: Negative for itching and rash. Neurological: Negative for dizziness, tingling and headaches. Endo/Heme/Allergies: Negative for polydipsia. Psychiatric/Behavioral: Negative for hallucinations, memory loss, substance abuse and suicidal ideas. The patient is not nervous/anxious and does not have insomnia. See HPI       Physical Exam:     Visit Vitals  /73 (BP 1 Location: Left arm, BP Patient Position: Sitting)   Pulse 69   Temp 97.9 °F (36.6 °C) (Oral)   Resp 16   Ht 5' 6\" (1.676 m)   Wt 225 lb (102.1 kg)   SpO2 98%   Breastfeeding No   BMI 36.32 kg/m²        Vitals and Nurse Documentation reviewed. Physical Exam  Vitals signs reviewed. Constitutional:       General: She is not in acute distress. Appearance: She is obese. She is not ill-appearing, toxic-appearing or diaphoretic. HENT:      Head: Normocephalic and atraumatic. Cardiovascular:      Rate and Rhythm: Normal rate. Pulmonary:      Effort: Pulmonary effort is normal.   Skin:     Capillary Refill: Capillary refill takes less than 2 seconds. Neurological:      Mental Status: She is alert. Psychiatric:         Attention and Perception: Attention normal.         Mood and Affect: Mood is not anxious or depressed. Affect is tearful. Speech: Speech normal.         Behavior: Behavior normal. Behavior is cooperative. Thought Content: Thought content normal.         Cognition and Memory: Cognition normal.         Judgment: Judgment normal.       Assessment/ Plan:     Diagnoses and all orders for this visit:    1. Encounter to establish care: Return for CPE. Office policies were discussed including hours of operation, on-call providers, and MyChart.     2. Incomplete miscarriage: Managed by [facility]. Planned pregnancy. Diagnosed with incomplete miscarriage last week at 8 weeks gestation. Plans for D&C next week. 3. Mild intermittent asthma without complication: Asymptomatic, continue PRN albuterol - use is rare  -     albuterol (PROVENTIL HFA, VENTOLIN HFA, PROAIR HFA) 90 mcg/actuation inhaler; Take 2 Puffs by inhalation every six (6) hours as needed for Wheezing. Needs office visit for further refills    4. Non-seasonal allergic rhinitis, unspecified trigger: Continue Flonase, add zyrtec and intranasal antihistamine. 4 week follow-up. -     cetirizine (ZYRTEC) 10 mg tablet; Take 1 Tab by mouth daily. -     azelastine (ASTELIN) 137 mcg (0.1 %) nasal spray; 1 [place] by Both Nostrils route two (2) times a day. Use in each nostril as directed  -     fluticasone propionate (FLONASE) 50 mcg/actuation nasal spray; 2 Sprays by Both Nostrils route daily. 5. Recurrent major depressive disorder, [facility] status unspecified [facility] Previously controlled, now grieving current pregnancy loss. Advised CBT and she is open to this.  Follow-up with psychiatry.   -     REFERRAL TO BEHAVIORAL HEALTH      Follow-up and Dispositions    · Return in about 4 weeks (around 2/12/2020) for Routine Physical Exam.          Discussed expected course/resolution/complications of diagnosis in detail with patient.    Medication risks/benefits/costs/interactions/alternatives discussed with patient.    Pt was given an after visit summary which includes diagnoses, current medications & vitals.  Pt expressed understanding with the diagnosis and plan

## 2020-01-15 NOTE — PROGRESS NOTES
Chief Complaint   Patient presents with    New Patient     previous PCP was Dr. Duane Elam Physical     pt is fasting     \"REVIEWED RECORD IN PREPARATION FOR VISIT AND HAVE OBTAINED THE NECESSARY DOCUMENTATION\"  1. Have you been to the ER, urgent care clinic since your last visit? Hospitalized since your last visit? No    2. Have you seen or consulted any other health care providers outside of the [address] [place] [address] since your last visit? Include any pap smears or colon screening.  No      Pt reports she recently had a miscarriage. (2 days ago)

## 2020-01-16 RX ORDER — IBUPROFEN 800 MG/1
800 TABLET ORAL AS NEEDED
Status: ON HOLD | COMMUNITY
End: 2020-01-17 | Stop reason: SDUPTHER

## 2020-01-16 RX ORDER — ACETAMINOPHEN 500 MG
1000 TABLET ORAL
COMMUNITY
End: 2020-01-17

## 2020-01-17 ENCOUNTER — ANESTHESIA (OUTPATIENT)
Dept: SURGERY | Age: 35
End: 2020-01-17
Payer: MEDICAID

## 2020-01-17 ENCOUNTER — HOSPITAL ENCOUNTER (OUTPATIENT)
Age: 35
Setting detail: OUTPATIENT SURGERY
Discharge: HOME | End: 2020-01-17
Attending: OBSTETRICS & GYNECOLOGY | Admitting: OBSTETRICS & GYNECOLOGY
Payer: MEDICAID

## 2020-01-17 VITALS
HEIGHT: 65 IN | OXYGEN SATURATION: 99 % | TEMPERATURE: 97.8 F | HEART RATE: 61 BPM | RESPIRATION RATE: 18 BRPM | DIASTOLIC BLOOD PRESSURE: 67 MMHG | SYSTOLIC BLOOD PRESSURE: 128 MMHG | WEIGHT: 226 LBS | BODY MASS INDEX: 37.65 KG/M2

## 2020-01-17 DIAGNOSIS — G89.18 POSTOPERATIVE PAIN: Primary | ICD-10-CM

## 2020-01-17 LAB
ABO + RH BLD: NORMAL
BLOOD GROUP ANTIBODIES SERPL: NORMAL
HGB BLD-MCNC: 10.4 G/DL (ref 11.5–16)
SPECIMEN EXP DATE BLD: NORMAL

## 2020-01-17 PROCEDURE — 86900 BLOOD TYPING SEROLOGIC ABO: CPT

## 2020-01-17 PROCEDURE — 74011000250 HC RX REV CODE- 250: Performed by: NURSE ANESTHETIST, CERTIFIED REGISTERED

## 2020-01-17 PROCEDURE — 77030003666 HC NDL SPINAL BD -A: Performed by: OBSTETRICS & GYNECOLOGY

## 2020-01-17 PROCEDURE — 74011250636 HC RX REV CODE- 250/636: Performed by: ANESTHESIOLOGY

## 2020-01-17 PROCEDURE — 77030008684 HC TU ET CUF COVD -B: Performed by: NURSE ANESTHETIST, CERTIFIED REGISTERED

## 2020-01-17 PROCEDURE — 77030008578 HC TBNG UTER SUC BUSS -A: Performed by: OBSTETRICS & GYNECOLOGY

## 2020-01-17 PROCEDURE — 77030026438 HC STYL ET INTUB CARD -A: Performed by: NURSE ANESTHETIST, CERTIFIED REGISTERED

## 2020-01-17 PROCEDURE — 74011250637 HC RX REV CODE- 250/637: Performed by: OBSTETRICS & GYNECOLOGY

## 2020-01-17 PROCEDURE — P9045 ALBUMIN (HUMAN), 5%, 250 ML: HCPCS | Performed by: NURSE ANESTHETIST, CERTIFIED REGISTERED

## 2020-01-17 PROCEDURE — 85018 HEMOGLOBIN: CPT

## 2020-01-17 PROCEDURE — 76210000017 HC OR PH I REC 1.5 TO 2 HR: Performed by: OBSTETRICS & GYNECOLOGY

## 2020-01-17 PROCEDURE — 88305 TISSUE EXAM BY PATHOLOGIST: CPT

## 2020-01-17 PROCEDURE — 77030011210: Performed by: OBSTETRICS & GYNECOLOGY

## 2020-01-17 PROCEDURE — 36415 COLL VENOUS BLD VENIPUNCTURE: CPT

## 2020-01-17 PROCEDURE — 76060000032 HC ANESTHESIA 0.5 TO 1 HR: Performed by: OBSTETRICS & GYNECOLOGY

## 2020-01-17 PROCEDURE — 77030018836 HC SOL IRR NACL ICUM -A: Performed by: OBSTETRICS & GYNECOLOGY

## 2020-01-17 PROCEDURE — 77030019905 HC CATH URETH INTMIT MDII -A: Performed by: OBSTETRICS & GYNECOLOGY

## 2020-01-17 PROCEDURE — 77030040922 HC BLNKT HYPOTHRM STRY -A

## 2020-01-17 PROCEDURE — 77030013079 HC BLNKT BAIR HGGR 3M -A: Performed by: NURSE ANESTHETIST, CERTIFIED REGISTERED

## 2020-01-17 PROCEDURE — 76210000020 HC REC RM PH II FIRST 0.5 HR: Performed by: OBSTETRICS & GYNECOLOGY

## 2020-01-17 PROCEDURE — 76010000138 HC OR TIME 0.5 TO 1 HR: Performed by: OBSTETRICS & GYNECOLOGY

## 2020-01-17 PROCEDURE — 74011000250 HC RX REV CODE- 250: Performed by: OBSTETRICS & GYNECOLOGY

## 2020-01-17 PROCEDURE — 74011250636 HC RX REV CODE- 250/636: Performed by: NURSE ANESTHETIST, CERTIFIED REGISTERED

## 2020-01-17 RX ORDER — METHYLERGONOVINE MALEATE 0.2 MG/ML
INJECTION INTRAVENOUS AS NEEDED
Status: DISCONTINUED | OUTPATIENT
Start: 2020-01-17 | End: 2020-01-17 | Stop reason: HOSPADM

## 2020-01-17 RX ORDER — PROPOFOL 10 MG/ML
INJECTION, EMULSION INTRAVENOUS AS NEEDED
Status: DISCONTINUED | OUTPATIENT
Start: 2020-01-17 | End: 2020-01-17 | Stop reason: HOSPADM

## 2020-01-17 RX ORDER — DIPHENHYDRAMINE HYDROCHLORIDE 50 MG/ML
12.5 INJECTION, SOLUTION INTRAMUSCULAR; INTRAVENOUS AS NEEDED
Status: DISCONTINUED | OUTPATIENT
Start: 2020-01-17 | End: 2020-01-17 | Stop reason: HOSPADM

## 2020-01-17 RX ORDER — SODIUM CHLORIDE 0.9 % (FLUSH) 0.9 %
5-40 SYRINGE (ML) INJECTION EVERY 8 HOURS
Status: DISCONTINUED | OUTPATIENT
Start: 2020-01-17 | End: 2020-01-17 | Stop reason: HOSPADM

## 2020-01-17 RX ORDER — METHYLERGONOVINE MALEATE 0.2 MG/1
0.2 TABLET ORAL EVERY 8 HOURS
Qty: 6 TAB | Refills: 0 | Status: SHIPPED | OUTPATIENT
Start: 2020-01-17 | End: 2020-01-19

## 2020-01-17 RX ORDER — IBUPROFEN 800 MG/1
800 TABLET ORAL
Qty: 30 TAB | Refills: 0 | Status: ON HOLD | OUTPATIENT
Start: 2020-01-17 | End: 2020-09-03

## 2020-01-17 RX ORDER — SODIUM CHLORIDE 0.9 % (FLUSH) 0.9 %
5-40 SYRINGE (ML) INJECTION AS NEEDED
Status: DISCONTINUED | OUTPATIENT
Start: 2020-01-17 | End: 2020-01-17 | Stop reason: HOSPADM

## 2020-01-17 RX ORDER — SODIUM CHLORIDE, SODIUM LACTATE, POTASSIUM CHLORIDE, CALCIUM CHLORIDE 600; 310; 30; 20 MG/100ML; MG/100ML; MG/100ML; MG/100ML
25 INJECTION, SOLUTION INTRAVENOUS CONTINUOUS
Status: DISCONTINUED | OUTPATIENT
Start: 2020-01-17 | End: 2020-01-17 | Stop reason: HOSPADM

## 2020-01-17 RX ORDER — ONDANSETRON 2 MG/ML
INJECTION INTRAMUSCULAR; INTRAVENOUS AS NEEDED
Status: DISCONTINUED | OUTPATIENT
Start: 2020-01-17 | End: 2020-01-17 | Stop reason: HOSPADM

## 2020-01-17 RX ORDER — LIDOCAINE HYDROCHLORIDE 10 MG/ML
0.1 INJECTION, SOLUTION EPIDURAL; INFILTRATION; INTRACAUDAL; PERINEURAL AS NEEDED
Status: DISCONTINUED | OUTPATIENT
Start: 2020-01-17 | End: 2020-01-17 | Stop reason: HOSPADM

## 2020-01-17 RX ORDER — LIDOCAINE HYDROCHLORIDE 20 MG/ML
INJECTION, SOLUTION EPIDURAL; INFILTRATION; INTRACAUDAL; PERINEURAL AS NEEDED
Status: DISCONTINUED | OUTPATIENT
Start: 2020-01-17 | End: 2020-01-17 | Stop reason: HOSPADM

## 2020-01-17 RX ORDER — ALBUMIN HUMAN 50 G/1000ML
SOLUTION INTRAVENOUS AS NEEDED
Status: DISCONTINUED | OUTPATIENT
Start: 2020-01-17 | End: 2020-01-17 | Stop reason: HOSPADM

## 2020-01-17 RX ORDER — HYDROMORPHONE HYDROCHLORIDE 1 MG/ML
0.2 INJECTION, SOLUTION INTRAMUSCULAR; INTRAVENOUS; SUBCUTANEOUS
Status: DISCONTINUED | OUTPATIENT
Start: 2020-01-17 | End: 2020-01-17 | Stop reason: HOSPADM

## 2020-01-17 RX ORDER — ROCURONIUM BROMIDE 10 MG/ML
INJECTION, SOLUTION INTRAVENOUS AS NEEDED
Status: DISCONTINUED | OUTPATIENT
Start: 2020-01-17 | End: 2020-01-17 | Stop reason: HOSPADM

## 2020-01-17 RX ORDER — SUCCINYLCHOLINE CHLORIDE 20 MG/ML
INJECTION INTRAMUSCULAR; INTRAVENOUS AS NEEDED
Status: DISCONTINUED | OUTPATIENT
Start: 2020-01-17 | End: 2020-01-17 | Stop reason: HOSPADM

## 2020-01-17 RX ORDER — FENTANYL CITRATE 50 UG/ML
INJECTION, SOLUTION INTRAMUSCULAR; INTRAVENOUS AS NEEDED
Status: DISCONTINUED | OUTPATIENT
Start: 2020-01-17 | End: 2020-01-17 | Stop reason: HOSPADM

## 2020-01-17 RX ORDER — PHENYLEPHRINE HCL IN 0.9% NACL 0.4MG/10ML
SYRINGE (ML) INTRAVENOUS AS NEEDED
Status: DISCONTINUED | OUTPATIENT
Start: 2020-01-17 | End: 2020-01-17 | Stop reason: HOSPADM

## 2020-01-17 RX ORDER — ONDANSETRON 2 MG/ML
4 INJECTION INTRAMUSCULAR; INTRAVENOUS AS NEEDED
Status: DISCONTINUED | OUTPATIENT
Start: 2020-01-17 | End: 2020-01-17 | Stop reason: HOSPADM

## 2020-01-17 RX ORDER — BUPIVACAINE HYDROCHLORIDE 5 MG/ML
INJECTION, SOLUTION EPIDURAL; INTRACAUDAL AS NEEDED
Status: DISCONTINUED | OUTPATIENT
Start: 2020-01-17 | End: 2020-01-17 | Stop reason: HOSPADM

## 2020-01-17 RX ORDER — FENTANYL CITRATE 50 UG/ML
25 INJECTION, SOLUTION INTRAMUSCULAR; INTRAVENOUS
Status: DISCONTINUED | OUTPATIENT
Start: 2020-01-17 | End: 2020-01-17 | Stop reason: HOSPADM

## 2020-01-17 RX ORDER — SODIUM CHLORIDE 9 MG/ML
25 INJECTION, SOLUTION INTRAVENOUS CONTINUOUS
Status: DISCONTINUED | OUTPATIENT
Start: 2020-01-17 | End: 2020-01-17 | Stop reason: HOSPADM

## 2020-01-17 RX ORDER — MIDAZOLAM HYDROCHLORIDE 1 MG/ML
1 INJECTION, SOLUTION INTRAMUSCULAR; INTRAVENOUS AS NEEDED
Status: DISCONTINUED | OUTPATIENT
Start: 2020-01-17 | End: 2020-01-17 | Stop reason: HOSPADM

## 2020-01-17 RX ORDER — HYDROCODONE BITARTRATE AND ACETAMINOPHEN 5; 325 MG/1; MG/1
1 TABLET ORAL
Qty: 5 TAB | Refills: 0 | Status: SHIPPED | OUTPATIENT
Start: 2020-01-17 | End: 2020-01-20

## 2020-01-17 RX ORDER — DOXYCYCLINE HYCLATE 100 MG
100 TABLET ORAL ONCE
Status: COMPLETED | OUTPATIENT
Start: 2020-01-17 | End: 2020-01-17

## 2020-01-17 RX ORDER — DEXMEDETOMIDINE HYDROCHLORIDE 100 UG/ML
INJECTION, SOLUTION INTRAVENOUS AS NEEDED
Status: DISCONTINUED | OUTPATIENT
Start: 2020-01-17 | End: 2020-01-17 | Stop reason: HOSPADM

## 2020-01-17 RX ORDER — MIDAZOLAM HYDROCHLORIDE 1 MG/ML
INJECTION, SOLUTION INTRAMUSCULAR; INTRAVENOUS AS NEEDED
Status: DISCONTINUED | OUTPATIENT
Start: 2020-01-17 | End: 2020-01-17 | Stop reason: HOSPADM

## 2020-01-17 RX ORDER — ACETAMINOPHEN 325 MG/1
650 TABLET ORAL ONCE
Status: DISCONTINUED | OUTPATIENT
Start: 2020-01-17 | End: 2020-01-17 | Stop reason: HOSPADM

## 2020-01-17 RX ORDER — FENTANYL CITRATE 50 UG/ML
50 INJECTION, SOLUTION INTRAMUSCULAR; INTRAVENOUS AS NEEDED
Status: DISCONTINUED | OUTPATIENT
Start: 2020-01-17 | End: 2020-01-17 | Stop reason: HOSPADM

## 2020-01-17 RX ORDER — ROPIVACAINE HYDROCHLORIDE 5 MG/ML
30 INJECTION, SOLUTION EPIDURAL; INFILTRATION; PERINEURAL AS NEEDED
Status: DISCONTINUED | OUTPATIENT
Start: 2020-01-17 | End: 2020-01-17 | Stop reason: HOSPADM

## 2020-01-17 RX ORDER — SODIUM CHLORIDE 9 MG/ML
250 INJECTION, SOLUTION INTRAVENOUS AS NEEDED
Status: DISCONTINUED | OUTPATIENT
Start: 2020-01-17 | End: 2020-01-17 | Stop reason: HOSPADM

## 2020-01-17 RX ORDER — SODIUM CHLORIDE, SODIUM LACTATE, POTASSIUM CHLORIDE, CALCIUM CHLORIDE 600; 310; 30; 20 MG/100ML; MG/100ML; MG/100ML; MG/100ML
100 INJECTION, SOLUTION INTRAVENOUS CONTINUOUS
Status: DISCONTINUED | OUTPATIENT
Start: 2020-01-17 | End: 2020-01-17 | Stop reason: HOSPADM

## 2020-01-17 RX ORDER — OXYCODONE HYDROCHLORIDE 5 MG/1
5 TABLET ORAL AS NEEDED
Status: DISCONTINUED | OUTPATIENT
Start: 2020-01-17 | End: 2020-01-17 | Stop reason: HOSPADM

## 2020-01-17 RX ORDER — MIDAZOLAM HYDROCHLORIDE 1 MG/ML
0.5 INJECTION, SOLUTION INTRAMUSCULAR; INTRAVENOUS
Status: DISCONTINUED | OUTPATIENT
Start: 2020-01-17 | End: 2020-01-17 | Stop reason: HOSPADM

## 2020-01-17 RX ORDER — DOXYCYCLINE 100 MG/1
200 CAPSULE ORAL ONCE
Qty: 2 CAP | Refills: 0 | Status: SHIPPED | OUTPATIENT
Start: 2020-01-17 | End: 2020-01-17

## 2020-01-17 RX ORDER — MORPHINE SULFATE 10 MG/ML
2 INJECTION, SOLUTION INTRAMUSCULAR; INTRAVENOUS
Status: DISCONTINUED | OUTPATIENT
Start: 2020-01-17 | End: 2020-01-17 | Stop reason: HOSPADM

## 2020-01-17 RX ORDER — SODIUM CHLORIDE 9 MG/ML
INJECTION, SOLUTION INTRAVENOUS
Status: DISCONTINUED | OUTPATIENT
Start: 2020-01-17 | End: 2020-01-17 | Stop reason: HOSPADM

## 2020-01-17 RX ORDER — DEXAMETHASONE SODIUM PHOSPHATE 4 MG/ML
INJECTION, SOLUTION INTRA-ARTICULAR; INTRALESIONAL; INTRAMUSCULAR; INTRAVENOUS; SOFT TISSUE AS NEEDED
Status: DISCONTINUED | OUTPATIENT
Start: 2020-01-17 | End: 2020-01-17 | Stop reason: HOSPADM

## 2020-01-17 RX ADMIN — FENTANYL CITRATE 100 MCG: 50 INJECTION, SOLUTION INTRAMUSCULAR; INTRAVENOUS at 07:26

## 2020-01-17 RX ADMIN — PROPOFOL 200 MG: 10 INJECTION, EMULSION INTRAVENOUS at 07:27

## 2020-01-17 RX ADMIN — MIDAZOLAM HYDROCHLORIDE 2 MG: 1 INJECTION, SOLUTION INTRAMUSCULAR; INTRAVENOUS at 07:20

## 2020-01-17 RX ADMIN — LIDOCAINE HYDROCHLORIDE 80 MG: 20 INJECTION, SOLUTION EPIDURAL; INFILTRATION; INTRACAUDAL; PERINEURAL at 07:26

## 2020-01-17 RX ADMIN — ONDANSETRON 4 MG: 2 INJECTION INTRAMUSCULAR; INTRAVENOUS at 08:28

## 2020-01-17 RX ADMIN — MIDAZOLAM 0.5 MG: 1 INJECTION INTRAMUSCULAR; INTRAVENOUS at 08:40

## 2020-01-17 RX ADMIN — ROCURONIUM BROMIDE 5 MG: 10 SOLUTION INTRAVENOUS at 07:26

## 2020-01-17 RX ADMIN — DOXYCYCLINE HYCLATE 100 MG: 100 TABLET, COATED ORAL at 06:45

## 2020-01-17 RX ADMIN — DEXAMETHASONE SODIUM PHOSPHATE 4 MG: 4 INJECTION, SOLUTION INTRAMUSCULAR; INTRAVENOUS at 07:30

## 2020-01-17 RX ADMIN — SUCCINYLCHOLINE CHLORIDE 140 MG: 20 INJECTION, SOLUTION INTRAMUSCULAR; INTRAVENOUS at 07:27

## 2020-01-17 RX ADMIN — SODIUM CHLORIDE, POTASSIUM CHLORIDE, SODIUM LACTATE AND CALCIUM CHLORIDE: 600; 310; 30; 20 INJECTION, SOLUTION INTRAVENOUS at 07:00

## 2020-01-17 RX ADMIN — ALBUMIN (HUMAN) 250 ML: 12.5 INJECTION, SOLUTION INTRAVENOUS at 07:35

## 2020-01-17 RX ADMIN — DEXMEDETOMIDINE HYDROCHLORIDE 5 MCG: 100 INJECTION, SOLUTION, CONCENTRATE INTRAVENOUS at 07:47

## 2020-01-17 RX ADMIN — MIDAZOLAM 0.5 MG: 1 INJECTION INTRAMUSCULAR; INTRAVENOUS at 08:28

## 2020-01-17 RX ADMIN — DEXMEDETOMIDINE HYDROCHLORIDE 5 MCG: 100 INJECTION, SOLUTION, CONCENTRATE INTRAVENOUS at 07:45

## 2020-01-17 RX ADMIN — MIDAZOLAM HYDROCHLORIDE 1 MG: 1 INJECTION, SOLUTION INTRAMUSCULAR; INTRAVENOUS at 07:26

## 2020-01-17 RX ADMIN — MIDAZOLAM HYDROCHLORIDE 2 MG: 1 INJECTION, SOLUTION INTRAMUSCULAR; INTRAVENOUS at 07:24

## 2020-01-17 RX ADMIN — ONDANSETRON HYDROCHLORIDE 4 MG: 2 INJECTION, SOLUTION INTRAMUSCULAR; INTRAVENOUS at 07:47

## 2020-01-17 RX ADMIN — Medication 80 MCG: at 07:34

## 2020-01-17 RX ADMIN — Medication 80 MCG: at 07:53

## 2020-01-17 RX ADMIN — METHYLERGONOVINE MALEATE 0.2 MG: 0.2 INJECTION INTRAVENOUS at 07:44

## 2020-01-17 RX ADMIN — SODIUM CHLORIDE: 900 INJECTION, SOLUTION INTRAVENOUS at 07:49

## 2020-01-17 NOTE — PERIOP NOTES
Patient: Fab Henderson MRN: [id number]  SSN: [id number]   YOB: 1985  Age: 37 y.o. Sex: female     Patient is status post Procedure(s):  SUCTION DILATATION AND CURETTAGE.     Surgeon(s) and Role:     Jarocho Cho DO - Primary    Local/Dose/Irrigation:  See [facility]                  Peripheral IV 01/17/20 Right Hand (Active)       Peripheral IV 01/17/20 Left Hand (Active)                           Dressing/Packing:  Wound Vagina-Dressing Type: Cherry-pad (01/17/20 0745)    Splint/Cast:  ]    Other:  N/A

## 2020-01-17 NOTE — DISCHARGE INSTRUCTIONS
Patient Discharge Instructions    Jessica Lai / [id number] : 1985    Admitted 2020 Discharged: 2020       Personal Items    Please collect from your nurse all clothing which belongs to you. Please collect from the  any valuables you stored during your stay, and please remember all of your personal items, such as dentures, canes, and eyeglasses. Activity Instructions     You should avoid sexual activity, tub baths and douching, bath/pool x 2 weeks. You should not drive for 24 hours and until you are no longer taking norco. You may resume sexual activity and tub baths in 2 weeks. I understand that if any problems occur once I am at home I am to contact my physician. I understand and acknowledge receipt of the instructions indicated above. Patient Education        Vacuum Aspiration: Care Instructions  Your Care Instructions  Vacuum aspiration can be used to empty the uterus after an incomplete miscarriage or other fetal loss. It's also called dilation and curettage or dilation and evacuation. Many miscarriages pass on their own, but some do not. These are called incomplete miscarriages. This means that not all of the tissue is shed from the uterus. Before the procedure, a device may be placed in the cervix to slowly open (dilate) it. You may have had manual or machine vacuum aspiration. With manual vacuum, the doctor uses a specially designed syringe to apply suction. With machine vacuum, a thin tube is attached to a bottle and a pump. The tube is inserted into the uterus. The pump provides gentle suction to remove the tissue. After the procedure, you may have bleeding and spotting. You also may have cramps that feel like menstrual cramps. Guilt, anxiety, and sadness are common reactions after a miscarriage. It is also common to want to know why a miscarriage has happened.  Hormonal changes during pregnancy can make emotions stronger than usual. These feelings can last a while. Follow-up care is a key part of your treatment and safety. Be sure to make and go to all appointments, and call your doctor if you are having problems. It's also a good idea to know your test results and keep a list of the medicines you take. How can you care for yourself at home? · If your doctor prescribed antibiotics, take them as directed. Do not stop taking them just because you feel better. You need to take the full course of antibiotics. · Rest quietly for the day. You can do normal activities the next day, based on how you feel. · Ask your doctor if you can take an over-the-counter pain medicine, such as acetaminophen (Tylenol), ibuprofen (Advil, Motrin), or naproxen (Aleve). Be safe with medicines. Read and follow all instructions on the label. · Use pads instead of tampons. It is normal to have mild or moderate vaginal bleeding for 1 to 2 weeks. It may be similar to or slightly heavier than a normal period. The bleeding should get lighter after a week. · Ask your doctor when it is okay to have sex. If you don't want to get pregnant, ask your doctor about birth control. You can get pregnant again before your next period starts if you aren't using birth control. If you plan to get pregnant again, check with your doctor. · To help you and your family cope with your loss, consider meeting with a support group. You also may want to read about the experiences of other mothers. Or you can talk to friends, a counselor, or member of the clergy. If you feel very sad or depressed for longer than a couple of weeks, talk to a counselor or your doctor. When should you call for help? Call your doctor now or seek immediate medical care if:    · You have severe vaginal bleeding.  This means that you are soaking through your usual pads each hour for 2 or more hours.     · You are dizzy or lightheaded, or you feel like you may faint.     · You have new or increased pain in your belly or pelvis.     · You have a fever.     · You feel depressed or are not able to function.    Watch closely for changes in your health, and be sure to contact your doctor if:    · Your vaginal bleeding is getting worse.     · You have any new symptoms.     · You have vaginal discharge that smells bad.     · You do not get better as expected. Where can you learn more? Go to http://annamaria-lexii.info/. Dell Nicolas in the search box to learn more about \"Vacuum Aspiration: Care Instructions. \"  Current as of: May 29, 2019  Content Version: 12.2  © 7397-1723 [employer]. Care instructions adapted under license by [employer] (which disclaims liability or warranty for this information). If you have questions about a medical condition or this instruction, always ask your healthcare professional. [address] any warranty or liability for your use of this information. ______________________________________________________________________    Anesthesia Discharge Instructions    After general anesthesia or intervenous sedation, for 24 hours or while taking prescription Narcotics:  · Limit your activities  · Do not drive or operate hazardous machinery  · If you have not urinated within 8 hours after discharge, please contact your surgeon on call. · Do not make important personal or business decisions  · Do not drink alcoholic beverages    Report the following to your surgeon:  · Excessive pain, swelling, redness or odor of or around the surgical area  · Temperature over 100.5 degrees  · Nausea and vomiting lasting longer than 4 hours or if unable to take medication  · Any signs of decreased circulation or nerve impairment to extremity:  Change in color, persistent numbness, tingling, coldness or increased pain.   · Any questions

## 2020-01-17 NOTE — BRIEF OP NOTE
BRIEF OPERATIVE NOTE    Date of Procedure: 2020   Preoperative Diagnosis: MISSED   Postoperative Diagnosis: MISSED     Procedure(s):  SUCTION DILATATION AND CURETTAGE  Surgeon(s) and Role:     Courtney Velasquez DO - Primary         Surgical Assistant: None    Surgical Staff:  Circ-1: Joel De Leon, AKIL  Scrub Tech-1: Dania Haddad, [address] [place] [address] Staff: Kodi Fernandez RN  Event Time In Time Out   Incision Start 3329    Incision Close 6420      Anesthesia: General   Estimated Blood Loss: 75 cc   Specimens:   ID Type Source Tests Collected by Time Destination   1 : Products of conception Fresh Uterus  Sondra Guy DO 2020 0740 Pathology      Findings: EUA-large amount of blood and clot on perineum and underpad (ebl 750cc) prior to surgery. Intraop-cervix already dilated 1cm with clot at os. Uterus sounded to 8cm. Moderate tissue obtained. Minimal bleeding with procedure.    Complications: None  Implants: * No implants in log *

## 2020-01-17 NOTE — OP NOTES
[address]   OPERATIVE REPORT    Name:  Stpehanie Hussein  MR#:  [id number]  :  1985  ACCOUNT #:  [de-identified]  DATE OF SERVICE:  2020      PREOPERATIVE DIAGNOSIS:  6-week incomplete . POSTOPERATIVE DIAGNOSIS:  6-week incomplete . PROCEDURE PERFORMED:  Suction, dilation and curettage. SURGEON:  Aden Johnson DO    ASSISTANT:  None. ANESTHESIA:  General endotracheal with 0.5% Marcaine plain for paracervical block. COMPLICATIONS:  None. SPECIMENS REMOVED:  Products of conception. IMPLANTS:  None. ESTIMATED BLOOD LOSS:  75 mL for the procedure itself; however, the patient had at least 750 mL of blood and clot at the perineum prior to the procedure. FINDINGS:  Exam under anesthesia revealed several large clots and blood at the perineum, estimated to be approximately 750 mL. The uterus was approximately 8-week size and mobile and anteverted. The cervix was dilated 1 cm. Intraoperatively, the uterus sounded to 8 cm. There was a moderate amount of tissue obtained, but minimal bleeding during the procedure. PROCEDURE:  The patient was consented including risks and benefits of the procedure and agreed to proceed. She was taken to the operating room where her anesthesia was found to be adequate. She was placed in the dorsal lithotomy position and prepped and draped in the usual sterile fashion. A speculum was placed in the vagina to visualize the cervix and the patient was placed in slight Trendelenburg. The anterior lip of the cervix was grasped with a single-tooth tenaculum, and 10 mL of 0.5% Marcaine plain were injected in a paracervical block. The uterus was sounded to 8 cm. An 8-Pashto curved suction curette was passed through the cervical canal and advanced gently to the uterine fundus. Several passes of suction curettage were performed until no further tissue was obtained.   The suction curette was removed and a sharp curette was passed through the cervical canal and advanced gently to the uterine fundus. Sharp curettage was performed in a gentle fashion along all walls of the uterus and a gritty texture was noted throughout. The sharp curette was removed. The suction curette was again passed through the cervical canal and advanced gently into the uterine cavity. One further pass of suction curettage was performed with no further tissue obtained. The suction curette was removed as well as the single-tooth tenaculum. Hemostasis was provided with pressure at the tenaculum sites. The speculum was removed, and the patient was awakened from anesthesia and taken to the recovery room in stable condition. All sponge, lap and instrument counts were correct.         DO GLORY De La [id number]  D:  01/17/2020 8:48  T:  01/17/2020 16:22  JOB #:  [id number]

## 2020-01-17 NOTE — PERIOP NOTES
0450 called Dr. Quinteros Braggadocio of patient heavy bleeding, and lightheadedness soaked through underwear, bed.  On way to hospital. Placed patient cardiac monitor and layed head back.

## 2020-01-17 NOTE — ANESTHESIA PREPROCEDURE EVALUATION
Relevant Problems   No relevant active problems       Anesthetic History     PONV          Review of Systems / Medical History  Patient summary reviewed, nursing notes reviewed and pertinent labs reviewed    Pulmonary            Asthma        Neuro/Psych         Psychiatric history     Cardiovascular  Within defined limits                Exercise tolerance: >4 METS     GI/Hepatic/Renal  Within defined limits              Endo/Other        Obesity     Other Findings              Physical Exam    Airway  Mallampati: II  TM Distance: 4 - 6 cm  Neck ROM: normal range of motion   Mouth opening: Normal     Cardiovascular  Regular rate and rhythm,  S1 and S2 normal,  no murmur, click, rub, or gallop             Dental  No notable dental hx       Pulmonary  Breath sounds clear to auscultation               Abdominal  GI exam deferred       Other Findings            Anesthetic Plan    ASA: 2  Anesthesia type: general          Induction: Intravenous  Anesthetic plan and risks discussed with: Patient

## 2020-01-17 NOTE — DISCHARGE SUMMARY
Discharge Summary     Name: Aris Godfrey MRN: [id number]  SSN: [id number]    YOB: 1985  Age: 37 y.o. Sex: female      Allergies: Labetalol; Ceclor [cefaclor]; Pcn [penicillins]; and Septra [sulfamethoprim ds]    Admit Date: 2020    Discharge Date: 2020      Admitting Physician: Ab Freire DO     * Admission Diagnoses: Incomplete     * Discharge Diagnoses:   Hospital Problems as of 2020 Date Reviewed: 2020    None           * Procedures: suction dilation and curettage    * Discharge Condition: [facility] Hospital Course: Normal hospital course for this procedure. Significant Diagnostic Studies:   Recent Results (from the past 24 hour(s))   POC HEMOGLOBIN    Collection Time: 20  7:12 AM   Result Value Ref Range    Hemoglobin (POC) 10.4 (L) 11.5 - 16.0 g/dL       * Disposition: Home    Discharge Medications:   Current Discharge Medication List      START taking these medications    Details   HYDROcodone-acetaminophen (NORCO) 5-325 mg per tablet Take 1 Tab by mouth every six (6) hours as needed for Pain for up to 3 days. Max Daily Amount: 4 Tabs. Qty: 5 Tab, Refills: 0    Associated Diagnoses: Postoperative pain      methylergonovine (METHERGINE) 0.2 mg tablet Take 1 Tab by mouth every eight (8) hours for 2 days. Qty: 6 Tab, Refills: 0      doxycycline (MONODOX) 100 mg capsule Take 2 Caps by mouth once for 1 dose. Qty: 2 Cap, Refills: 0         CONTINUE these medications which have CHANGED    Details   ibuprofen (MOTRIN) 800 mg tablet Take 1 Tab by mouth every eight (8) hours as needed for Pain. Qty: 30 Tab, Refills: 0         CONTINUE these medications which have NOT CHANGED    Details   albuterol (PROVENTIL HFA, VENTOLIN HFA, PROAIR HFA) 90 mcg/actuation inhaler Take 2 Puffs by inhalation every six (6) hours as needed for Wheezing.  Needs office visit for further refills  Qty: 1 Inhaler, Refills: 0    Associated Diagnoses: Mild intermittent asthma without complication      cetirizine (ZYRTEC) 10 mg tablet Take 1 Tab by mouth daily. Qty: 90 Tab, Refills: 0    Associated Diagnoses: Non-seasonal allergic rhinitis, unspecified trigger      azelastine (ASTELIN) 137 mcg (0.1 %) nasal spray 1 Wallace by Both Nostrils route two (2) times a day. Use in each nostril as directed  Qty: 1 Bottle, Refills: 1    Associated Diagnoses: Non-seasonal allergic rhinitis, unspecified trigger      fluticasone propionate (FLONASE) 50 mcg/actuation nasal spray 2 Sprays by Both Nostrils route daily. Qty: 1 Bottle, Refills: 2    Associated Diagnoses: Non-seasonal allergic rhinitis, unspecified trigger      buPROPion XL (WELLBUTRIN XL) 150 mg tablet Take 1 Tab by mouth two (2) times a day. Qty: 60 Tab, Refills: 0    Comments: Last refill NP Chelsey Izaguirre is leaving the practice pt was made aware of this. Associated Diagnoses: Anxiety; MDD (major depressive disorder), recurrent episode, moderate (HCC)         STOP taking these medications       acetaminophen (TYLENOL EXTRA STRENGTH) 500 mg tablet Comments:   Reason for Stopping:                * Follow-up Care/Patient Instructions: Activity: No sex, douching, or tampons for 6 weeks or as directed by your physician. No heavy lifting for 6 weeks. No driving while taking pain medication.   Diet: Resume pre-hospital diet  Wound Care: None needed    Follow-up Information     Follow up With Specialties Details Why Contact Info    Luann Pickett MD [place] [id number] [phone]      Tyesha Manley, [address] [facility], Gynecology, Obstetrics In 2 weeks  [address]

## 2020-01-17 NOTE — ANESTHESIA POSTPROCEDURE EVALUATION
Procedure(s):  SUCTION DILATATION AND CURETTAGE. general    Anesthesia Post Evaluation        Patient location during evaluation: PACU  Patient participation: complete - patient participated  Level of consciousness: awake  Pain management: adequate  Airway patency: patent  Anesthetic complications: no  Cardiovascular status: hemodynamically stable  Respiratory status: acceptable  Hydration status: acceptable  Comments: I have seen and evaluated the patient. The patient is ready for PACU discharge. [address], DO                         Vitals Value Taken Time   /75 1/17/2020  8:15 AM   Temp 36.7 °C (98.1 °F) 1/17/2020  8:13 AM   Pulse 75 1/17/2020  8:21 AM   Resp 20 1/17/2020  8:21 AM   SpO2 99 % 1/17/2020  8:21 AM   Vitals shown include unvalidated device data.

## 2020-01-17 NOTE — ADDENDUM NOTE
Addendum  created 01/17/20 0927 by Carlie Manning CRNA    Intraprocedure Flowsheets edited, Intraprocedure LDAs edited, Intraprocedure Meds edited, LDA properties accepted

## 2020-02-02 NOTE — PROGRESS NOTES
.Bedside shift change report given to Aiden Soriano RN (oncoming nurse) by LILIA Faye RN (offgoing nurse). Report included the following information SBAR. [id number]

## 2020-02-18 ENCOUNTER — OFFICE VISIT (OUTPATIENT)
Dept: FAMILY MEDICINE CLINIC | Age: 35
End: 2020-02-18

## 2020-02-18 VITALS
OXYGEN SATURATION: 99 % | SYSTOLIC BLOOD PRESSURE: 120 MMHG | DIASTOLIC BLOOD PRESSURE: 82 MMHG | WEIGHT: 229 LBS | RESPIRATION RATE: 16 BRPM | HEART RATE: 64 BPM | TEMPERATURE: 98.3 F | HEIGHT: 65 IN | BODY MASS INDEX: 38.15 KG/M2

## 2020-02-18 DIAGNOSIS — E66.01 SEVERE OBESITY (HCC): ICD-10-CM

## 2020-02-18 DIAGNOSIS — Z00.00 ROUTINE ADULT HEALTH MAINTENANCE: Primary | ICD-10-CM

## 2020-02-18 DIAGNOSIS — F33.9 RECURRENT MAJOR DEPRESSIVE DISORDER, REMISSION STATUS UNSPECIFIED (HCC): ICD-10-CM

## 2020-02-18 DIAGNOSIS — J30.89 NON-SEASONAL ALLERGIC RHINITIS, UNSPECIFIED TRIGGER: ICD-10-CM

## 2020-02-18 DIAGNOSIS — Z13.220 ENCOUNTER FOR SCREENING FOR LIPID DISORDER: ICD-10-CM

## 2020-02-18 RX ORDER — SERTRALINE HYDROCHLORIDE 100 MG/1
150 TABLET, FILM COATED ORAL DAILY
Status: ON HOLD | COMMUNITY
End: 2020-05-05 | Stop reason: SDUPTHER

## 2020-02-18 RX ORDER — BISMUTH SUBSALICYLATE 262 MG
1 TABLET,CHEWABLE ORAL DAILY
COMMUNITY
End: 2020-07-15 | Stop reason: SDUPTHER

## 2020-02-18 NOTE — PROGRESS NOTES
Chief Complaint   Patient presents with    Physical     pt is fasting      \"REVIEWED RECORD IN PREPARATION FOR VISIT AND HAVE OBTAINED THE NECESSARY DOCUMENTATION\"  1. Have you been to the ER, urgent care clinic since your last visit? Hospitalized since your last visit? No    2. Have you seen or consulted any other health care providers outside of the [address] [place] [address] since your last visit? Include any pap smears or colon screening.  No

## 2020-02-18 NOTE — PROGRESS NOTES
[facility] Note  HPI:   Soraida Castelan is a 37 y.o. female who presents for annual exam.  Chief Complaint   Patient presents with    Physical     pt is fasting      She complains of continued stuffiness and nasal drainage for the past 3-4 months. Occasional headache. No coughing wheezing or SOB. Used albuterol once in the past 6 months. Using Flonase, Astelin nasal spray, zyretc daily with no relief.      Recent D&C 1/17/19 for incomplete miscarriage last week, reports about 8 weeks gestation. This loss has triggered daily sadness, hopelessness, tearfulness, anxiety. Associated racing thoughts at night, difficulty turning thoughts off at night, occasionally waking-up in panic. She denies current suicidal and homicidal ideation. Family history significant for nothing. Possible organic causes contributing are: none, reports normal TSH check with GYN. Risk factors: previous episode of depression, reports initial diagnosis in 2013 with 2-3 episodes since this time. Previous treatment includes Wellbutrin with little relief. Current treatment: Zoloft 50 mg daily was started about 4 weeks ago after D&C and was increased from 50 to 100 mg about 2 weeks ago by GYN. She notes mild improvement but still has continued symptoms. She complains of the following side effects from the treatment: none. No current CBT but is open to this. Exercise: not active  Diet: generally healthy. nonsmoker  Health Maintenance   Topic Date Due    Pneumococcal 0-64 years (1 of 1 - PPSV23) 11/09/2012    PAP AKA CERVICAL CYTOLOGY  05/01/2016    Influenza Age 9 to Adult  03/31/2020 (Originally 8/1/2019)    DTaP/Tdap/Td series (3 - Td) 03/02/2028     Health Maintenance reviewed  Pap Smear: Reports UTD with Dr. Jim Cervantes with [facility]. Current Outpatient Medications   Medication Sig Dispense Refill    sertraline (ZOLOFT) 100 mg tablet Take  by mouth daily.       multivitamin (ONE A DAY) tablet Take 1 Tab by mouth daily.  ibuprofen (MOTRIN) 800 mg tablet Take 1 Tab by mouth every eight (8) hours as needed for Pain. 30 Tab 0    albuterol (PROVENTIL HFA, VENTOLIN HFA, PROAIR HFA) 90 mcg/actuation inhaler Take 2 Puffs by inhalation every six (6) hours as needed for Wheezing. Needs office visit for further refills 1 Inhaler 0    cetirizine (ZYRTEC) 10 mg tablet Take 1 Tab by mouth daily. 90 Tab 0    azelastine (ASTELIN) 137 mcg (0.1 %) nasal spray 1 [place] by Both Nostrils route two (2) times a day. Use in each nostril as directed 1 Bottle 1    fluticasone propionate (FLONASE) 50 mcg/actuation nasal spray 2 Sprays by Both Nostrils route daily. 1 Bottle 2      Allergies   Allergen Reactions    Labetalol Hives    Ceclor [Cefaclor] Unknown (comments)    Pcn [Penicillins] Hives    Septra [Sulfamethoprim Ds] Unknown (comments)      Immunization History   Administered Date(s) Administered    (RETIRED) Pneumococcal Vaccine (Unspecified Type) 2012    Influenza Vaccine PF 10/22/2013    TDAP Vaccine 2012    Tdap 2018     Patient Active Problem List   Diagnosis Code    Asthma J45.909    Anemia D64.9    Anxiety F41.9    Back pain, chronic M54.9, G89.29    Unspecified essential hypertension I10    MDD (major depressive disorder), recurrent episode, moderate (HCC) F33.1    Sleep disturbance G47.9    Non compliance w medication regimen Z91.14    Cervical incompetence N88.3    Incompetent cervix N88.3    Pregnancy Z34.90    Pregnant Z34.90    Severe obesity (HCC) E66.01     Past Medical History:   Diagnosis Date    Asthma     on albuterol prn.  Triggers cold and allergies    Back pain, chronic 2010    sciatica    Gestational hypertension     Past pregnancy    HX OTHER MEDICAL      2005, 2006, , ,     Hyperemesis     severe hyperemesis    Hypertension     with G12 - none this pregnancy    Iron deficiency anemia 10/08/2010    MDD (major depressive disorder), single episode with postpartum onset 2013    treated with meds - 13    Plantar fasciitis     Pregnancy     36 weeks      Past Surgical History:   Procedure Laterality Date     DELIVERY ONLY      HX  SECTION  4/22/15    HX DILATION AND CURETTAGE      HX GYN      miscarriage x 6    HX GYN      removal of left fallopian tube    HX OTHER SURGICAL      cerlcage x4, ectopic x1 1999 with removal of left fallopian tube    HX OTHER SURGICAL      cerclage w/ current pregnancy. Removed 18      Patient's last menstrual period was 2020.    Family History   Problem Relation Age of Onset   [employer] Arthritis-rheumatoid Mother     Asthma Mother     No Known Problems Father     No Known Problems Maternal Grandmother     No Known Problems Maternal Grandfather     No Known Problems Paternal Grandmother     No Known Problems Paternal Grandfather     Asthma Son     Alcohol abuse Neg Hx     Arthritis-osteo Neg Hx     Bleeding Prob Neg Hx     Cancer Neg Hx     Diabetes Neg Hx     Elevated Lipids Neg Hx     Headache Neg Hx     Heart Disease Neg Hx     Hypertension Neg Hx     Lung Disease Neg Hx     Migraines Neg Hx     Psychiatric Disorder Neg Hx     Stroke Neg Hx     Mental Retardation Neg Hx     Anesth Problems Neg Hx       Social History     Socioeconomic History    Marital status: SINGLE     Spouse name: Not on file    Number of children: Not on file    Years of education: Not on file    Highest education level: Not on file   Occupational History    Not on file   Social Needs    Financial resource strain: Not on file    Food insecurity:     Worry: Not on file     Inability: Not on file    Transportation needs:     Medical: Not on file     Non-medical: Not on file   Tobacco Use    Smoking status: Never Smoker    Smokeless tobacco: Never Used   Substance and Sexual Activity    Alcohol use: Yes     Frequency: Monthly or less     Drinks per session: 1 or 2     Binge frequency: Never    Drug use: No    Sexual activity: Yes     Partners: Male   Lifestyle    Physical activity:     Days per week: Not on file     Minutes per session: Not on file    Stress: Not on file   Relationships    Social connections:     Talks on phone: Not on file     Gets together: Not on file     Attends Shinto service: Not on file     Active member of club or organization: Not on file     Attends meetings of clubs or organizations: Not on file     Relationship status: Not on file    Intimate partner violence:     Fear of current or ex partner: Not on file     Emotionally abused: Not on file     Physically abused: Not on file     Forced sexual activity: Not on file   Other Topics Concern    Not on file   Social History Narrative    Petros Beaulieu lives with boyfriend, Ezzie Face,  reportedly with alcohol dependence. She was raised by her mother. She has no siblings. Not working. She is supported financially by the childrens' father and public support. She has no hobbies outside of her children. She has a 12th grade education and no legal entanglements. ROS:     Review of Systems   Constitutional: Negative for chills, diaphoresis, fever, malaise/fatigue and weight loss. HENT: Positive for congestion. Negative for ear discharge, ear pain, hearing loss, nosebleeds, sinus pain, sore throat and tinnitus. Eyes: Negative for blurred vision and double vision. Respiratory: Negative for cough, shortness of breath, wheezing and stridor. Cardiovascular: Negative for chest pain, palpitations and leg swelling. Gastrointestinal: Negative for abdominal pain, blood in stool, constipation, diarrhea, heartburn, melena, nausea and vomiting. Genitourinary: Negative for dysuria, frequency, hematuria and urgency. Musculoskeletal: Negative for joint pain and myalgias. Skin: Negative for itching and rash.    Neurological: Negative for dizziness, tingling, weakness and headaches. Endo/Heme/Allergies: Positive for environmental allergies. Negative for polydipsia. Psychiatric/Behavioral: Positive for depression. Negative for hallucinations, memory loss, substance abuse and suicidal ideas. The patient is nervous/anxious and has insomnia. Physical Exam:     Visit Vitals  /82   Pulse 64   Temp 98.3 °F (36.8 °C) (Oral)   Resp 16   Ht 5' 5\" (1.651 m)   Wt 229 lb (103.9 kg)   LMP 2020   SpO2 99%   Breastfeeding No   BMI 38.11 kg/m²        Physical Exam  Vitals signs and nursing note reviewed. Constitutional:       General: She is not in acute distress. Appearance: Normal appearance. She is well-developed. She is obese. She is not ill-appearing, toxic-appearing or diaphoretic. HENT:      Head: Normocephalic and atraumatic. Right Ear: Hearing, tympanic membrane, ear canal and external ear normal. No middle ear effusion. Tympanic membrane is not injected or scarred. Left Ear: Hearing, tympanic membrane, ear canal and external ear normal.  No middle ear effusion. Tympanic membrane is not injected or scarred. Nose: Congestion present. No septal deviation, mucosal edema or rhinorrhea. Right Turbinates: Swollen and pale. Left Turbinates: Swollen and pale. Mouth/Throat:      Lips: Pink. No lesions. Mouth: Mucous membranes are not pale, dry and not cyanotic. Dentition: Normal dentition. Pharynx: Oropharynx is clear. Uvula midline. No pharyngeal swelling, oropharyngeal exudate, posterior oropharyngeal erythema or uvula swelling. Tonsils: Swellin on the right. 0 on the left. Eyes:      General: Lids are normal.      Conjunctiva/sclera: Conjunctivae normal.      Pupils: Pupils are equal, round, and reactive to light. Neck:      Musculoskeletal: Full passive range of motion without pain. Thyroid: No thyromegaly. Trachea: No tracheal deviation.    Cardiovascular:      Rate and Rhythm: Normal rate and regular rhythm. Pulses:           Radial pulses are 2+ on the right side and 2+ on the left side. Dorsalis pedis pulses are 2+ on the right side and 2+ on the left side. Heart sounds: Normal heart sounds, S1 normal and S2 normal. No murmur. No friction rub. No gallop. Pulmonary:      Effort: Pulmonary effort is normal. No respiratory distress. Breath sounds: Normal breath sounds. No decreased breath sounds, wheezing, rhonchi or rales. Chest:      Chest wall: No tenderness. Abdominal:      General: Bowel sounds are normal. There is no distension. Palpations: Abdomen is soft. There is no mass. Tenderness: There is no abdominal tenderness. There is no guarding or rebound. Musculoskeletal: Normal range of motion. General: No tenderness or deformity. Right lower leg: No edema. Left lower leg: No edema. Lymphadenopathy:      Cervical: No cervical adenopathy. Skin:     General: Skin is warm and dry. Nails: There is no clubbing. Neurological:      Mental Status: She is alert and oriented to person, place, and time. Cranial Nerves: No cranial nerve deficit. Sensory: No sensory deficit. Gait: Gait normal.      Deep Tendon Reflexes: Reflexes are normal and symmetric. Reflex Scores:       Brachioradialis reflexes are 2+ on the right side and 2+ on the left side. Patellar reflexes are 2+ on the right side and 2+ on the left side. Psychiatric:         Attention and Perception: Attention normal.         Mood and Affect: Mood is depressed. Mood is not anxious. Affect is tearful. Affect is not flat. Speech: Speech normal.         Behavior: Behavior normal. Behavior is cooperative. Cognition and Memory: Cognition normal.            Assessment/ Plan:   Full age appropriate History and Physical exam as well as health care maintenance  performed and discussed today.   Risk factor modification discussed today includes safe sex practices, healthy diet and exercise, and seat belt use. Continue current medications. Diagnoses and all orders for this visit:    1. Routine adult health maintenance: Will obtain  records. Routine labs today. Hgb 10.4 last month prior to [facility]  surgery. Advised Multivitamin with iron. Encouraged well balanced, iron rich diet. -     METABOLIC PANEL, COMPREHENSIVE  -     CBC WITH AUTOMATED DIFF  -     HEMOGLOBIN A1C WITH EAG    2. Encounter for screening for lipid disorder: Fasting labs today. -     LIPID PANEL    3. Severe obesity ([address]): I have reviewed/discussed the above normal BMI with the patient. I have recommended the following interventions: dietary management education, guidance, and counseling, encourage exercise, monitor weight and prescribed dietary intake. Given written instructions as well. 4. Recurrent major depressive disorder, remission status unspecified ([address]): Recurrent issue, triggered by recent pregnancy loss. Moderate to severe symptoms. No SI/HI. Zoloft started about 4 weeks ago, with recent dose increase from 50 to 100 mg 2 weeks ago. Continue for now. 8 week follow-up. Advised CBT - referral provided. -     REFERRAL TO BEHAVIORAL HEALTH    5. Non-seasonal allergic rhinitis, unspecified trigger: Persistent issue despite Flonase, Astelin, zyrtec. Advised nasal saline rinses. Follow-up with ENT. -     REFERRAL TO ENT-OTOLARYNGOLOGY      Follow-up and Dispositions    · Return in about 2 months (around 4/18/2020) for Follow-up, depression. Discussed expected course/resolution/complications of diagnosis in detail with patient.    Medication risks/benefits/costs/interactions/alternatives discussed with patient.    Pt was given an after visit summary which includes diagnoses, current medications & vitals.  Pt expressed understanding with the diagnosis and plan.

## 2020-02-18 NOTE — PATIENT INSTRUCTIONS
Allergies: Care Instructions  Your Care Instructions    Allergies occur when your body's defense system (immune system) overreacts to certain substances. The immune system treats a harmless substance as if it were a harmful germ or virus. Many things can cause this overreaction, including pollens, medicine, food, dust, animal dander, and mold. Allergies can be mild or severe. Mild allergies can be managed with home treatment. But medicine may be needed to prevent problems. Managing your allergies is an important part of staying healthy. Your doctor may suggest that you have allergy testing to help find out what is causing your allergies. When you know what things trigger your symptoms, you can avoid them. This can prevent allergy symptoms and other health problems. For severe allergies that cause reactions that affect your whole body (anaphylactic reactions), your doctor may prescribe a shot of epinephrine to carry with you in case you have a severe reaction. Learn how to give yourself the shot and keep it with you at all times. Make sure it is not . Follow-up care is a key part of your treatment and safety. Be sure to make and go to all appointments, and call your doctor if you are having problems. It's also a good idea to know your test results and keep a list of the medicines you take. How can you care for yourself at home? · If you have been told by your doctor that dust or dust mites are causing your allergy, decrease the dust around your bed:  ? Wash sheets, pillowcases, and other bedding in hot water every week. ? Use dust-proof covers for pillows, duvets, and mattresses. Avoid plastic covers because they tear easily and do not \"breathe. \" Wash as instructed on the label. ? Do not use any blankets and pillows that you do not need. ? Use blankets that you can wash in your washing machine. ? Consider removing drapes and carpets, which attract and hold dust, from your bedroom.   · If you are allergic to house dust and mites, do not use home humidifiers. Your doctor can suggest ways you can control dust and mites. · Look for signs of cockroaches. Cockroaches cause allergic reactions. Use cockroach baits to get rid of them. Then, clean your home well. Cockroaches like areas where grocery bags, newspapers, empty bottles, or cardboard boxes are stored. Do not keep these inside your home, and keep trash and food containers sealed. Seal off any spots where cockroaches might enter your home. · If you are allergic to mold, get rid of furniture, rugs, and drapes that smell musty. Check for mold in the bathroom. · If you are allergic to outdoor pollen or mold spores, use air-conditioning. Change or clean all filters every month. Keep windows closed. · If you are allergic to pollen, stay inside when pollen counts are high. Use a vacuum  with a HEPA filter or a double-thickness filter at least two times each week. · Stay inside when air pollution is bad. Avoid paint fumes, perfumes, and other strong odors. · Avoid conditions that make your allergies worse. Stay away from smoke. Do not smoke or let anyone else smoke in your house. Do not use fireplaces or wood-burning stoves. · If you are allergic to your pets, change the air filter in your furnace every month. Use high-efficiency filters. · If you are allergic to pet dander, keep pets outside or out of your bedroom. Old carpet and cloth furniture can hold a lot of animal dander. You may need to replace them. When should you call for help? Give an epinephrine shot if:    · You think you are having a severe allergic reaction.     · You have symptoms in more than one body area, such as mild nausea and an itchy mouth.    After giving an epinephrine shot call 911, even if you feel better.   Call 911 if:    · You have symptoms of a severe allergic reaction. These may include:  ? Sudden raised, red areas (hives) all over your body. ?  Swelling of the throat, mouth, lips, or tongue. ? Trouble breathing. ? Passing out (losing consciousness). Or you may feel very lightheaded or suddenly feel weak, confused, or restless.     · You have been given an epinephrine shot, even if you feel better.    Call your doctor now or seek immediate medical care if:    · You have symptoms of an allergic reaction, such as:  ? A rash or hives (raised, red areas on the skin). ? Itching. ? Swelling. ? Belly pain, nausea, or vomiting.    Watch closely for changes in your health, and be sure to contact your doctor if:    · You do not get better as expected. Where can you learn more? Go to http://annamaria-lexii.info/. Enter L895 in the search box to learn more about \"Allergies: Care Instructions. \"  Current as of: April 7, 2019  Content Version: 12.2  © 8299-2730 [employer]. Care instructions adapted under license by "[employer] (which disclaims liability or warranty for this information). If you have questions about a medical condition or this instruction, always ask your healthcare professional. [place] [address] any warranty or liability for your use of this information. Saline Nasal Washes: Care Instructions  Your Care Instructions  Saline nasal washes help keep the nasal passages open by washing out thick or dried mucus. This simple remedy can help relieve symptoms of allergies, sinusitis, and colds. It also can make the nose feel more comfortable by keeping the mucous membranes moist. You may notice a little burning sensation in your nose the first few times you use the solution, but this usually gets better in a few days. Follow-up care is a key part of your treatment and safety. Be sure to make and go to all appointments, and call your doctor if you are having problems. It's also a good idea to know your test results and keep a list of the medicines you take. How can you care for yourself at home?   · You can buy premixed saline solution in a squeeze bottle or other sinus rinse products at a drugstore. Read and follow the instructions on the label. · You also can make your own saline solution by adding 1 teaspoon of salt and 1 teaspoon of baking soda to 2 cups of distilled water. · If you use a homemade solution, pour a small amount into a clean bowl. Using a rubber bulb syringe, squeeze the syringe and place the tip in the salt water. Pull a small amount of the salt water into the syringe by relaxing your hand. · Sit down with your head tilted slightly back. Do not lie down. Put the tip of the bulb syringe or the squeeze bottle a little way into one of your nostrils. Gently drip or squirt a few drops into the nostril. Repeat with the other nostril. Some sneezing and gagging are normal at first.  · Gently blow your nose. · Wipe the syringe or bottle tip clean after each use. · Repeat this 2 or 3 times a day. · Use nasal washes gently if you have nosebleeds often. When should you call for help? Watch closely for changes in your health, and be sure to contact your doctor if:    · You often get nosebleeds.     · You have problems doing the nasal washes. Where can you learn more? Go to http://annamaria-lexii.info/. Enter [id number] [id number] [phone] in the search box to learn more about \"Saline Nasal Washes: Care Instructions. \"  Current as of: October 21, 2018  Content Version: 12.2  © 7386-6797 [employer]. Care instructions adapted under license by [employer] (which disclaims liability or warranty for this information). If you have questions about a medical condition or this instruction, always ask your healthcare professional. [place] [address] any warranty or liability for your use of this information.

## 2020-02-19 ENCOUNTER — DOCUMENTATION ONLY (OUTPATIENT)
Dept: FAMILY MEDICINE CLINIC | Age: 35
End: 2020-02-19

## 2020-02-19 LAB
ALBUMIN SERPL-MCNC: 4 G/DL (ref 3.8–4.8)
ALBUMIN/GLOB SERPL: 1.1 {RATIO} (ref 1.2–2.2)
ALP SERPL-CCNC: 67 IU/L (ref 39–117)
ALT SERPL-CCNC: 9 IU/L (ref 0–32)
AST SERPL-CCNC: 15 IU/L (ref 0–40)
BASOPHILS # BLD AUTO: 0.1 X10E3/UL (ref 0–0.2)
BASOPHILS NFR BLD AUTO: 1 %
BILIRUB SERPL-MCNC: 0.4 MG/DL (ref 0–1.2)
BUN SERPL-MCNC: 5 MG/DL (ref 6–20)
BUN/CREAT SERPL: 6 (ref 9–23)
CALCIUM SERPL-MCNC: 9.3 MG/DL (ref 8.7–10.2)
CHLORIDE SERPL-SCNC: 103 MMOL/L (ref 96–106)
CHOLEST SERPL-MCNC: 172 MG/DL (ref 100–199)
CO2 SERPL-SCNC: 23 MMOL/L (ref 20–29)
CREAT SERPL-MCNC: 0.77 MG/DL (ref 0.57–1)
EOSINOPHIL # BLD AUTO: 0.1 X10E3/UL (ref 0–0.4)
EOSINOPHIL NFR BLD AUTO: 2 %
ERYTHROCYTE [DISTWIDTH] IN BLOOD BY AUTOMATED COUNT: 16.6 % (ref 11.7–15.4)
EST. AVERAGE GLUCOSE BLD GHB EST-MCNC: 103 MG/DL
GLOBULIN SER CALC-MCNC: 3.5 G/DL (ref 1.5–4.5)
GLUCOSE SERPL-MCNC: 84 MG/DL (ref 65–99)
HBA1C MFR BLD: 5.2 % (ref 4.8–5.6)
HCT VFR BLD AUTO: 35 % (ref 34–46.6)
HDLC SERPL-MCNC: 52 MG/DL
HGB BLD-MCNC: 10.3 G/DL (ref 11.1–15.9)
IMM GRANULOCYTES # BLD AUTO: 0 X10E3/UL (ref 0–0.1)
IMM GRANULOCYTES NFR BLD AUTO: 0 %
INTERPRETATION, 910389: NORMAL
LDLC SERPL CALC-MCNC: 97 MG/DL (ref 0–99)
LYMPHOCYTES # BLD AUTO: 1.8 X10E3/UL (ref 0.7–3.1)
LYMPHOCYTES NFR BLD AUTO: 21 %
MCH RBC QN AUTO: 21.1 PG (ref 26.6–33)
MCHC RBC AUTO-ENTMCNC: 29.4 G/DL (ref 31.5–35.7)
MCV RBC AUTO: 72 FL (ref 79–97)
MONOCYTES # BLD AUTO: 0.5 X10E3/UL (ref 0.1–0.9)
MONOCYTES NFR BLD AUTO: 5 %
NEUTROPHILS # BLD AUTO: 6.4 X10E3/UL (ref 1.4–7)
NEUTROPHILS NFR BLD AUTO: 71 %
PLATELET # BLD AUTO: 400 X10E3/UL (ref 150–450)
POTASSIUM SERPL-SCNC: 4.4 MMOL/L (ref 3.5–5.2)
PROT SERPL-MCNC: 7.5 G/DL (ref 6–8.5)
RBC # BLD AUTO: 4.89 X10E6/UL (ref 3.77–5.28)
SODIUM SERPL-SCNC: 140 MMOL/L (ref 134–144)
TRIGL SERPL-MCNC: 113 MG/DL (ref 0–149)
VLDLC SERPL CALC-MCNC: 23 MG/DL (ref 5–40)
WBC # BLD AUTO: 8.9 X10E3/UL (ref 3.4–10.8)

## 2020-04-09 ENCOUNTER — HOSPITAL ENCOUNTER (EMERGENCY)
Age: 35
Discharge: HOME | End: 2020-04-09
Attending: EMERGENCY MEDICINE
Payer: MEDICAID

## 2020-04-09 VITALS
RESPIRATION RATE: 16 BRPM | HEART RATE: 97 BPM | OXYGEN SATURATION: 99 % | SYSTOLIC BLOOD PRESSURE: 149 MMHG | DIASTOLIC BLOOD PRESSURE: 104 MMHG | TEMPERATURE: 98.9 F

## 2020-04-09 DIAGNOSIS — F32.A DEPRESSION, UNSPECIFIED DEPRESSION TYPE: Primary | ICD-10-CM

## 2020-04-09 PROCEDURE — 99281 EMR DPT VST MAYX REQ PHY/QHP: CPT
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
## 2020-04-09 NOTE — BSMART NOTE
Comprehensive Assessment Form Part 1 Section I - Disposition Axis I - Major Depressive Disorder Axis II - deferred Axis III - Past Medical History:  
Diagnosis Date  Asthma   
 on albuterol prn. Triggers cold and allergies  Back pain, chronic 2010  
 sciatica  Gestational hypertension Past pregnancy [address]   
  2005, 2006, , ,   Hyperemesis   
 severe hyperemesis  Hypertension   
 with G12 - none this pregnancy  Iron deficiency anemia 10/08/2010  MDD (major depressive disorder), single episode with postpartum onset 2013  
 treated with meds - 11  Plantar fasciitis  Pregnancy 36 weeks Axis IV - custody issues, relational issues, financial, lack of support The Medical Doctor to Psychiatrist conference was not completed. The Medical Doctor is in agreement with Psychiatrist disposition because of patient not meeting admission criteria. The plan is discharge with [facility] crisis referral, psychiatry referrals x3, crisis contact for [facility]. The on-call Psychiatrist consulted was Azam Null NP. The admitting Psychiatrist will be Dr. Val Oakley. The admitting Diagnosis is N/A. The Payor source is [facility]. Section II - Integrated Summary Summary:  Patient presented to writer as sad and depressed stating her OB physician referred her to ER earlier today. Patient shared she is under a lot of stress trying to get custody of her x7 children. Patient denied current suicidal ideation but did share 2020 that she was experiencing suicidal ideation with thoughts of overdosing on medication and etoh. Patient shared on 2020 she again reached out to her OB physican who advised patient to come to ER.  Patient reported she has experienced depression for several years and has tried many medications that have not worked (Celexa, Klonopin, Wellbutrin, etc.) but she has no previous suicide attempts. Patient denied homicidal ideation and auditory/visual hallucinations. Patient reported she is now on Zoloft and it is not working. Patient started Zoloft 50 mg in 1/2020 and has increased to 150 mg which was just ordered recently. Patient reported having a miscarriage in 1/2020 which has exacerbated her depression. Patient shared her boyfriend is unaware of her miscarriage. Patient reported she has begun to receive counseling from her children's old therapist, Ann Fisher, [phone] and has seen her about x3 times with 2 telephone sessions. Patient revealed she has not told her therapist about her boyfriend moving out of the home and she requested to writer to not reveal this information. Patient reported boyfriend moved out because of his etoh consumption and anger. Patient reported she is not eating well and has lost 7 lbs nor is she sleeping well. Patient reported consuming etoh as a means of trying to control her anxiety and to sleep. Patient revealed she consumes liquor drinks daily at x4 drinks per day. Patient reported having no support system. Patient reported she called her OB Physician every day this week and she was advised to come to ER. Writer contacted patient's therapist who shared nothing of use regarding patient being in ER but she felt satisfied and felt safety plan of patient following up with [facility] Crisis Stabilization, [facility] crisis telephone number, her being available to patient and referral names for psychiatry services. Writer contacted Olivia Sultana who shared she felt safety plan for patient to follow-up with [facility] in home services, outpatient psychiatry referrals, crisis [facility] telephone number and continued counseling with established therapist was sufficient. Writer advised patient to follow-up to any ER or call [facility] [phone] if needed.  Psychiatry referrals:  [facility] [phone]; [facility] [place], [address] [facility] and [address] [phone]. It should be noted Bertin Carvajal/[facility] planned to call patient at discharge to set up time to implement crisis services for 4/10/2020. The patient has demonstrated mental capacity to provide informed consent. The information is given by the patient and therapist. 
The Chief Complaint is oer triage: \"She is here for depression and issues she is having trouble with. She is taking her Zoloft which was recently increased. She denies being suicidal but says sometimes she wishes she would go to sleep and not wake up. \" The Precipitant Factors are anxiety. Previous Hospitalizations: denied The patient has not previously been in restraints. Current Psychiatrist and/or  is [facility]. Lethality Assessment: 
 
The potential for suicide noted by the following: defined plan and ideation experienced on 4/6/2020 . The potential for homicide is not noted. The patient has not been a perpetrator of sexual or physical abuse. There are not pending charges. The patient is not felt to be at risk for self harm or harm to others. The attending nurse was advised that security has not been notified. Section III - Psychosocial 
The patient's overall mood and attitude is depressed. Feelings of helplessness and hopelessness are observed by verbal comments. Generalized anxiety is not observed. Panic is not observed. Phobias are not observed. Obsessive compulsive tendencies are not observed. Section IV - Mental Status Exam 
The patient's appearance shows no evidence of impairment. The patient's behavior shows no evidence of impairment. The patient is oriented to time, place, person and situation. The patient's speech is soft. The patient's mood is depressed and is sad. The range of affect shows no evidence of impairment.   The patient's thought content demonstrates no evidence of impairment. The thought process shows no evidence of impairment. The patient's perception shows no evidence of impairment. The patient's memory shows no evidence of impairment. The patient's appetite is decreased and shows signs of weight loss. The patient's sleep has evidence of insomnia. The patient's insight shows no evidence of impairment. The patient's judgement shows no evidence of impairment. Section V - Substance Abuse The patient is using substances. The patient is using alcohol for greater than 10 years with last use on 4/8/202. Patient reported drinking liquor 7 days a week at 4 drinks per day in order to decrease her anxiety and help with sleep. The patient has experienced the following withdrawal symptoms: N/A. Section VI - Living Arrangements The patient is single. The patient lives with her x7 children. The patient has x7 children ages: 13, 15, 6, 5, 9, 4 & 2). The patient does plan to return home upon discharge. The patient does not have legal issues pending. The patient's source of income comes from [employer] [employer], [employer] & [employer]. Sabianism and cultural practices have not been voiced at this time. The patient's greatest support comes from no one and this person will not be involved with the treatment. The patient has not been in an event described as horrible or outside the realm of ordinary life experience either currently or in the past. 
The patient has not been a victim of sexual/physical abuse. Section VII - Other Areas of Clinical Concern The highest grade achieved is 12th grade with the overall quality of school experience being described as N/A. The patient is currently unemployed and speaks [place] as a primary language. The patient has no communication impairments affecting communication. The patient's preference for learning can be described as: can read and write adequately.   The patient's hearing is normal.  The patient's vision is normal. 
 
 
 Ilsa Goldstein M.S., Resident In Counseling

## 2020-04-09 NOTE — DISCHARGE INSTRUCTIONS
Patient Education        Depression Treatment: Care Instructions  Your Care Instructions    Depression is a condition that affects the way you feel, think, and act. It causes symptoms such as low energy, loss of interest in daily activities, and sadness or grouchiness that goes on for a long time. Depression is very common and affects men and women of all ages. Depression is a medical illness caused by changes in the natural chemicals in your brain. It is not a character flaw, and it does not mean that you are a bad or weak person. It does not mean that you are going crazy. It is important to know that depression can be treated. Medicines, counseling, and self-care can all help. Many people do not get help because they are embarrassed or think that they will get over the depression on their own. But some people do not get better without treatment. Follow-up care is a key part of your treatment and safety. Be sure to make and go to all appointments, and call your doctor if you are having problems. It's also a good idea to know your test results and keep a list of the medicines you take. How can you care for yourself at home? Learn about antidepressant medicines  Antidepressant medicines can improve or end the symptoms of depression. You may need to take the medicine for at least 6 months, and often longer. Keep taking your medicine even if you feel better. If you stop taking it too soon, your symptoms may come back or get worse. You may start to feel better within 1 to 3 weeks of taking antidepressant medicine. But it can take as many as 6 to 8 weeks to see more improvement. Talk to your doctor if you have problems with your medicine or if you do not notice any improvement after 3 weeks. Antidepressants can make you feel tired, dizzy, or nervous. Some people have dry mouth, constipation, headaches, sexual problems, an upset stomach, or diarrhea.  Many of these side effects are mild and go away on their own after you take the medicine for a few weeks. Some may last longer. Talk to your doctor if side effects bother you too much. You might be able to try a different medicine. If you are pregnant or breastfeeding, talk to your doctor about what medicines you can take. Learn about counseling  In many cases, counseling can work as well as medicines to treat mild to moderate depression. Counseling is done by licensed mental health providers, such as psychologists, social workers, and some types of nurses. It can be done in one-on-one sessions or in a group setting. Many people find group sessions helpful. Cognitive-behavioral therapy is a type of counseling. In this treatment therapy, you learn how to see and change unhelpful thinking styles that may be adding to your depression. Counseling and medicines often work well when used together. To manage depression  · Be physically active. Getting 30 minutes of exercise each day is good for your body and your mind. Begin slowly if it is hard for you to get started. If you already exercise, keep it up. · Plan something pleasant for yourself every day. Include activities that you have enjoyed in the past.  · Get enough sleep. Talk to your doctor if you have problems sleeping. · Eat a balanced diet. If you do not feel hungry, eat small snacks rather than large meals. · Do not drink alcohol, use illegal drugs, or take medicines that your doctor has not prescribed for you. They may interfere with your treatment. · Spend time with family and friends. It may help to speak openly about your depression with people you trust.  · Take your medicines exactly as prescribed. Call your doctor if you think you are having a problem with your medicine. · Do not make major life decisions while you are depressed. Depression may change the way you think. You will be able to make better decisions after you feel better. · Think positively.  Challenge negative thoughts with statements such as \"I am hopeful\"; \"Things will get better\"; and \"I can ask for the help I need. \" Write down these statements and read them often, even if you don't believe them yet. · Be patient with yourself. It took time for your depression to develop, and it will take time for your symptoms to improve. Do not take on too much or be too hard on yourself. · Learn all you can about depression from written and online materials. · Check out behavioral health classes to learn more about dealing with depression. · Keep the numbers for these national suicide hotlines: 4-357-630-TALK (6-757.543.6352) and 1-343-TKOKFEJ (2-767.291.4025). If you or someone you know talks about suicide or feeling hopeless, get help right away. When should you call for help? Call 911 anytime you think you may need emergency care. For example, call if:    · You feel you cannot stop from hurting yourself or someone else.   [facility] your doctor now or seek immediate medical care if:    · You hear voices.     · You feel much more depressed.    Watch closely for changes in your health, and be sure to contact your doctor if:    · You are having problems with your depression medicine.     · You are not getting better as expected. Where can you learn more? Go to http://annamaria-lexii.info/  Enter G693 in the search box to learn more about \"Depression Treatment: Care Instructions. \"  Current as of: May 28, 2019Content Version: 12.4  © 0913-8446 Healthwise, Incorporated. Care instructions adapted under license by [employer] (which disclaims liability or warranty for this information). If you have questions about a medical condition or this instruction, always ask your healthcare professional. [address] any warranty or liability for your use of this information.          Patient Education        Recovering From Depression: Care Instructions  Your Care Instructions    Taking good care of yourself is important as you recover from depression. In time, your symptoms will fade as your treatment takes hold. Do not give up. Instead, focus your energy on getting better. Your mood will improve. It just takes some time. Focus on things that can help you feel better, such as being with friends and family, eating well, and getting enough rest. But take things slowly. Do not do too much too soon. You will begin to feel better gradually. Follow-up care is a key part of your treatment and safety. Be sure to make and go to all appointments, and call your doctor if you are having problems. It's also a good idea to know your test results and keep a list of the medicines you take. How can you care for yourself at home? Be realistic  · If you have a large task to do, break it up into smaller steps you can handle, and just do what you can. · You may want to put off important decisions until your depression has lifted. If you have plans that will have a major impact on your life, such as marriage, divorce, or a job change, try to wait a bit. Talk it over with friends and loved ones who can help you look at the overall picture first.  · Reaching out to people for help is important. Do not isolate yourself. Let your family and friends help you. Find someone you can trust and confide in, and talk to that person. · Be patient, and be kind to yourself. Remember that depression is not your fault and is not something you can overcome with willpower alone. Treatment is necessary for depression, just like for any other illness. Feeling better takes time, and your mood will improve little by little. Stay active  · Stay busy and get outside. Take a walk, or try some other light exercise. · Talk with your doctor about an exercise program. Exercise can help with mild depression. · Go to a movie or concert. Take part in a [employer] activity or other social gathering. Go to a ball game. · Ask a friend to have dinner with you.   Take care of yourself  · Eat a balanced diet with plenty of fresh fruits and vegetables, whole grains, and lean protein. If you have lost your appetite, eat small snacks rather than large meals. · Avoid drinking alcohol or using illegal drugs. Do not take medicines that have not been prescribed for you. They may interfere with medicines you may be taking for depression, or they may make your depression worse. · Take your medicines exactly as they are prescribed. You may start to feel better within 1 to 3 weeks of taking antidepressant medicine. But it can take as many as 6 to 8 weeks to see more improvement. If you have questions or concerns about your medicines, or if you do not notice any improvement by 3 weeks, talk to your doctor. · If you have any side effects from your medicine, tell your doctor. Antidepressants can make you feel tired, dizzy, or nervous. Some people have dry mouth, constipation, headaches, sexual problems, or diarrhea. Many of these side effects are mild and will go away on their own after you have been taking the medicine for a few weeks. Some may last longer. Talk to your doctor if side effects are bothering you too much. You might be able to try a different medicine. · Get enough sleep. If you have problems sleeping:  ? Go to bed at the same time every night, and get up at the same time every morning. ? Keep your bedroom dark and quiet. ? Do not exercise after 5:00 p.m.  ? Avoid drinks with caffeine after 5:00 p.m. · Avoid sleeping pills unless they are prescribed by the doctor treating your depression. Sleeping pills may make you groggy during the day, and they may interact with other medicine you are taking. · If you have any other illnesses, such as diabetes, heart disease, or high blood pressure, make sure to continue with your treatment. Tell your doctor about all of the medicines you take, including those with or without a prescription.   · Keep the numbers for these national suicide hotlines: 2-966-806-TALK (7-126.226.3539) and 1-128-NKMBAPL (7-696.754.6203). If you or someone you know talks about suicide or feeling hopeless, get help right away. When should you call for help? Call 911 anytime you think you may need emergency care. For example, call if:    · You feel like hurting yourself or someone else.     · Someone you know has depression and is about to attempt or is attempting suicide.   [facility] your doctor now or seek immediate medical care if:    · You hear voices.     · Someone you know has depression and:  ? Starts to give away his or her possessions. ? Uses illegal drugs or drinks alcohol heavily. ? Talks or writes about death, including writing suicide notes or talking about guns, knives, or pills. ? Starts to spend a lot of time alone. ? Acts very aggressively or suddenly appears calm.    Watch closely for changes in your health, and be sure to contact your doctor if:    · You do not get better as expected. Where can you learn more? Go to http://annamaria-lexii.info/  Enter N529 in the search box to learn more about \"Recovering From Depression: Care Instructions. \"  Current as of: May 28, 2019Content Version: 12.4  © 3528-3239 Healthwise, Incorporated. Care instructions adapted under license by [employer] (which disclaims liability or warranty for this information). If you have questions about a medical condition or this instruction, always ask your healthcare professional. [address] any warranty or liability for your use of this information.

## 2020-04-09 NOTE — ED TRIAGE NOTES
She is here for depression and issues she is having trouble with. She is taking her Zoloft which was recently increased. She denies being suicidal but says sometimes she wishes she would go to sleep and not wake up.

## 2020-04-10 ENCOUNTER — TELEPHONE (OUTPATIENT)
Dept: CASE MANAGEMENT | Age: 35
End: 2020-04-10

## 2020-04-10 NOTE — TELEPHONE ENCOUNTER
4/10/20 2:12 PM--attempted to reach patient at all available numbers but she is not answering and does not have a personal greeting in voicemail. Will send COVID-19 information and our resource phone #s to her via [address] Will attempt to reach again on Monday if she does not contact me today.

## 2020-04-13 ENCOUNTER — TELEPHONE (OUTPATIENT)
Dept: CASE MANAGEMENT | Age: 35
End: 2020-04-13

## 2020-04-13 NOTE — TELEPHONE ENCOUNTER
4/13/20 9:58 AM--attempted to reach patient once more but she is not answering and her VM is not set up with personal greeting. This will conclude this episode of care.

## 2020-04-14 ENCOUNTER — TELEPHONE (OUTPATIENT)
Dept: FAMILY MEDICINE CLINIC | Age: 35
End: 2020-04-14

## 2020-04-14 NOTE — TELEPHONE ENCOUNTER
Please set up virtual visit at her convenience. She is also due for depression follow-up, and I see recent ER visit for this issue as well.  Thank you

## 2020-04-14 NOTE — TELEPHONE ENCOUNTER
----- Message from Avril Davis sent at 4/14/2020 10:56 AM EDT -----  Regarding: MENA Oliver/Telephone  General Message/Vendor Calls    Caller's first and last name:      Reason for call:  Eczema cream     Callback required yes/no and why:  Yes, to let her know if she can have some called in.      Best contact number(s):  [phone]    Details to clarify the request:      Avril Davis

## 2020-04-15 ENCOUNTER — VIRTUAL VISIT (OUTPATIENT)
Dept: FAMILY MEDICINE CLINIC | Age: 35
End: 2020-04-15

## 2020-04-15 DIAGNOSIS — R21 RASH: ICD-10-CM

## 2020-04-15 DIAGNOSIS — F51.05 INSOMNIA DUE TO OTHER MENTAL DISORDER: ICD-10-CM

## 2020-04-15 DIAGNOSIS — F33.2 SEVERE EPISODE OF RECURRENT MAJOR DEPRESSIVE DISORDER, WITHOUT PSYCHOTIC FEATURES (HCC): Primary | ICD-10-CM

## 2020-04-15 DIAGNOSIS — R03.0 ELEVATED BLOOD PRESSURE READING: ICD-10-CM

## 2020-04-15 DIAGNOSIS — F99 INSOMNIA DUE TO OTHER MENTAL DISORDER: ICD-10-CM

## 2020-04-15 DIAGNOSIS — F41.9 ANXIETY: ICD-10-CM

## 2020-04-15 RX ORDER — BLOOD PRESSURE TEST KIT-MEDIUM
KIT MISCELLANEOUS
Qty: 1 KIT | Refills: 0 | Status: ON HOLD | OUTPATIENT
Start: 2020-04-15 | End: 2020-05-04

## 2020-04-15 RX ORDER — TRAZODONE HYDROCHLORIDE 50 MG/1
50 TABLET ORAL
Qty: 60 TAB | Refills: 0 | Status: SHIPPED | OUTPATIENT
Start: 2020-04-15 | End: 2020-07-15 | Stop reason: SDUPTHER

## 2020-04-15 RX ORDER — TRIAMCINOLONE ACETONIDE 1 MG/G
CREAM TOPICAL 2 TIMES DAILY
Qty: 30 G | Refills: 0 | Status: ON HOLD | OUTPATIENT
Start: 2020-04-15 | End: 2020-05-05 | Stop reason: SDUPTHER

## 2020-04-15 NOTE — PROGRESS NOTES
Chief Complaint   Patient presents with    Other     would like to be prescribed medication. a previous cream         1. Have you been to the ER, urgent care clinic since your last visit? Hospitalized since your last visit? Yes,  [facility] for depression on 4/9/2020    2. Have you seen or consulted any other health care providers outside of the [address] [place] [address] since your last visit? Include any pap smears or colon screening.  No

## 2020-04-15 NOTE — PROGRESS NOTES
[facility] Note  Subjective:      Elijah Grimaldo is a 37 y.o. female who presents for follow-up. Chief Complaint   Patient presents with    Other     would like to be prescribed a cream/ ointment that the patient has had before. I was in the office while conducting this encounter. Consent:  She and/or her healthcare decision maker is aware that this patient-initiated Telehealth encounter is a billable service, with coverage as determined by her insurance carrier. She is aware that she may receive a bill and has provided verbal consent to proceed: Yes    This virtual visit was conducted via Doxy. me and telephone as audio stopped working half-way through the visit. Pursuant to the emergency declaration under the [facility], 1135 waiver authority and the [employer] Act, this Virtual  Visit was conducted to reduce the patient's risk of exposure to COVID-19 and provide continuity of care for an established patient. Services were provided through a video synchronous discussion virtually to substitute for in-person clinic visit. Due to this being a TeleHealth evaluation, many elements of the physical examination are unable to be assessed. Total Time: minutes: 21-30 minutes. Hospital Follow Up  Elijah Grimaldo is seen for follow up from recent ED visit to [facility] on 4/9/2020. We reviewed the the notes, the records. She presented with depression and suicidal ideations. She was encouraged to seek ER treatment by her GYN after she mentioned at an office visit that she had thoughts of self-harm which included taking her zoloft with alcohol and wanted to sleep. She was told at ER that she did not meet criteria for admission as she did not attempt self-harm.  She was discharged home with no changes to therapy and plans to start therapy and contact info for psychiatry. Depression  She had a miscarriage at 8 weeks gestation in 3 months ago. This loss triggered daily sadness, hopelessness, tearfulness, anxiety. Associated racing thoughts at night, difficulty turning thoughts off at night, occasionally waking-up in panic. Since her ER visit, she reports stable symptoms. She continues to occasional have thoughts of what it would be like if she were not alive. She states motivation to not harm self includes her children. She does not actively have a plan to harm self today. Family history significant for nothing. Possible organic causes contributing are: none, reports normal TSH check with GYN. Risk factors: previous episode of depression, reports initial diagnosis in 2013 with 2-3 episodes since this time. Lots of psychosocial stressors. Current treatment: Zoloft 50 mg daily was started 4 weeks after D&C and has been increased by GYN periodically and has been taking Zoloft 150 mg daily for the about 6 weeks with only mild improvement in symptoms. Previous treatment includes: Wellbutrin with little relief. Celexa with little relief. Trazodone was helpful for sleep. She complains of the following side effects from the treatment: none. She has started CBT and has transitioned to virtual visits, see therapist weekly. She is scheduled to see psychiatrist at [address] 4 weeks. Rash   She complain of a rash. Onset was a few weeks ago with the change in weather. Rash is of the posterior upper arms that start at the bilateral elbow and extents up the back of arm to the back of shoulders. Rash is described as little tiny bumps. Associated redness. Rash is pruritic. If she scratches the rash, it will get worse. Warm weather seems to trigger rash. Recurrent issue. Current treatment: OTC hydrocortisone cream with little relief. Moisturizing lotion with little relief. BP was migdalia elevated at ER visit. We discussed home BP monitoring and follow-up in 3 weeks. Current Outpatient Medications   Medication Sig Dispense Refill    prenatal vit-iron fumarate-fa 27 mg iron- 0.8 mg tab tablet       traZODone (DESYREL) 50 mg tablet Take 1 Tab by mouth nightly. Indications: insomnia associated with depression 60 Tab 0    triamcinolone acetonide (KENALOG) 0.1 % topical cream Apply  to affected area two (2) times a day. use thin layer to affected area on arms 30 g 0    Blood Pressure Kit-Extra Large kit Check BP 3-4 times weekly. 1 Kit 0    sertraline (ZOLOFT) 100 mg tablet Take 150 mg by mouth daily.  multivitamin (ONE A DAY) tablet Take 1 Tab by mouth daily.  ibuprofen (MOTRIN) 800 mg tablet Take 1 Tab by mouth every eight (8) hours as needed for Pain. 30 Tab 0    albuterol (PROVENTIL HFA, VENTOLIN HFA, PROAIR HFA) 90 mcg/actuation inhaler Take 2 Puffs by inhalation every six (6) hours as needed for Wheezing. Needs office visit for further refills 1 Inhaler 0    cetirizine (ZYRTEC) 10 mg tablet Take 1 Tab by mouth daily. 90 Tab 0    azelastine (ASTELIN) 137 mcg (0.1 %) nasal spray 1 [place] by Both Nostrils route two (2) times a day. Use in each nostril as directed 1 Bottle 1    fluticasone propionate (FLONASE) 50 mcg/actuation nasal spray 2 Sprays by Both Nostrils route daily. 1 Bottle 2     Allergies   Allergen Reactions    Labetalol Hives    Ceclor [Cefaclor] Unknown (comments)    Pcn [Penicillins] Hives    Septra [Sulfamethoprim Ds] Unknown (comments)       ROS:   Complete review of systems was reviewed with pertinent information listed in HPI. Review of Systems   Constitutional: Negative for chills, diaphoresis, fever, malaise/fatigue and weight loss. HENT: Negative for congestion. Respiratory: Negative for cough and shortness of breath. Cardiovascular: Negative for chest pain. Gastrointestinal: Negative for abdominal pain, nausea and vomiting. Skin: Positive for itching and rash. Neurological: Negative for dizziness.         Occasional headaches   Psychiatric/Behavioral: Positive for depression. Negative for hallucinations, memory loss and substance abuse. The patient is nervous/anxious and has insomnia. Objective:     Visit Vitals  LMP 03/27/2020 (LMP Unknown)       Vitals and Nurse Documentation reviewed. Physical Exam  Constitutional:       General: She is awake. She is not in acute distress. Appearance: Normal appearance. She is well-developed, well-groomed and normal weight. She is not ill-appearing, toxic-appearing or diaphoretic. HENT:      Head: Normocephalic and atraumatic. Right Ear: Hearing normal.      Left Ear: Hearing normal.      Mouth/Throat:      Lips: Pink. No lesions. Mouth: Mucous membranes are moist.   Eyes:      General: Lids are normal. Vision grossly intact. Conjunctiva/sclera:      Right eye: Right conjunctiva is not injected. Left eye: Left conjunctiva is not injected. Neck:      Musculoskeletal: Normal range of motion. Pulmonary:      Effort: Pulmonary effort is normal.   Skin:     Comments: Unable to visual rash of arms as video capabilities stopped working half-way through visit. Neurological:      Mental Status: She is alert and oriented to person, place, and time. Psychiatric:         Attention and Perception: Attention normal.         Mood and Affect: Mood is depressed. Speech: Speech normal.         Behavior: Behavior normal. Behavior is cooperative. Thought Content: Thought content normal. Thought content is not paranoid. Thought content does not include homicidal ideation. Thought content does not include suicidal plan. Cognition and Memory: Cognition normal.         3 most recent PHQ Screens 4/15/2020   Little interest or pleasure in doing things Nearly every day   Feeling down, depressed, irritable, or hopeless Nearly every day   Total Score PHQ 2 6         Assessment/Plan:     Diagnoses and all orders for this visit:    1.  Severe episode of recurrent major depressive disorder, without psychotic features ([facility] [address]): Longstanding, recurrent issue, triggered by recent pregnancy loss. Moderate to severe symptoms. Recent thoughts of self-harm that prompted ER visit, however today she denies a plan to harm herself. Verbal safety contract today. [employer] provided. Continue Zoloft 150 mg daily. Restart Trazodone 50 mg daily to help with sleep. Close follow-up in 2 weeks. Continue CBT weekly. She has appointment scheduled with psychiatry ([facility]) in 4 weeks. -     traZODone (DESYREL) 50 mg tablet; Take 1 Tab by mouth nightly. Indications: insomnia associated with depression    2. Insomnia due to other mental disorder: see above  -     traZODone (DESYREL) 50 mg tablet; Take 1 Tab by mouth nightly. Indications: insomnia associated with depression    3. Anxiety: See above    4. Rash: Acute recurrent rash of bilaterally upper arms. Reported history of eczema. Exam was limited virtual. Advised to start topical steroid cream, not exceeding 2 weeks. Call if no improvement by 1 week or if symptoms acutely worsen. Encouraged daily emmollient therapy and sensitive skin care products. -     triamcinolone acetonide (KENALOG) 0.1 % topical cream; Apply  to affected area two (2) times a day. use thin layer to affected area on arms    5. Elevated blood pressure reading: Historically at goal BP readings with me in office, however ER visit BP was very elevated. Advise home BP monitoring and 2 week follow-up. -     Blood Pressure Kit-Extra Large kit; Check BP 3-4 times weekly. Follow-up and Dispositions    · Return in about 2 weeks (around 4/29/2020) for Follow-up, elevated BP, depression.          Discussed expected course/resolution/complications of diagnosis in detail with patient.    Medication risks/benefits/costs/interactions/alternatives discussed with patient.    Pt was given an after visit summary which includes diagnoses, current medications & vitals.  Pt expressed understanding with the diagnosis and plan

## 2020-04-22 DIAGNOSIS — J30.89 NON-SEASONAL ALLERGIC RHINITIS, UNSPECIFIED TRIGGER: ICD-10-CM

## 2020-04-24 RX ORDER — FLUTICASONE PROPIONATE 50 MCG
2 SPRAY, SUSPENSION (ML) NASAL DAILY
Qty: 1 BOTTLE | Refills: 2 | Status: ON HOLD | OUTPATIENT
Start: 2020-04-24 | End: 2020-09-03

## 2020-05-02 ENCOUNTER — HOSPITAL ENCOUNTER (EMERGENCY)
Age: 35
Discharge: ANOTHER HOSPITAL | End: 2020-05-02
Attending: EMERGENCY MEDICINE
Payer: MEDICAID

## 2020-05-02 ENCOUNTER — HOSPITAL ENCOUNTER (INPATIENT)
Age: 35
LOS: 3 days | Discharge: HOME | DRG: 751 | End: 2020-05-05
Attending: PSYCHIATRY & NEUROLOGY | Admitting: PSYCHIATRY & NEUROLOGY
Payer: MEDICAID

## 2020-05-02 VITALS
TEMPERATURE: 98 F | BODY MASS INDEX: 37.49 KG/M2 | HEIGHT: 65 IN | OXYGEN SATURATION: 99 % | HEART RATE: 67 BPM | SYSTOLIC BLOOD PRESSURE: 154 MMHG | DIASTOLIC BLOOD PRESSURE: 98 MMHG | WEIGHT: 225 LBS | RESPIRATION RATE: 16 BRPM

## 2020-05-02 DIAGNOSIS — R21 RASH: ICD-10-CM

## 2020-05-02 DIAGNOSIS — J45.20 MILD INTERMITTENT ASTHMA WITHOUT COMPLICATION: ICD-10-CM

## 2020-05-02 DIAGNOSIS — F33.9 EPISODE OF RECURRENT MAJOR DEPRESSIVE DISORDER, UNSPECIFIED DEPRESSION EPISODE SEVERITY (HCC): ICD-10-CM

## 2020-05-02 DIAGNOSIS — R45.851 SUICIDAL IDEATIONS: Primary | ICD-10-CM

## 2020-05-02 DIAGNOSIS — R03.0 ELEVATED BLOOD PRESSURE READING: ICD-10-CM

## 2020-05-02 PROBLEM — F32.9 MAJOR DEPRESSION: Status: ACTIVE | Noted: 2020-05-02

## 2020-05-02 LAB
ALBUMIN SERPL-MCNC: 3.8 G/DL (ref 3.5–5)
ALBUMIN/GLOB SERPL: 0.9 {RATIO} (ref 1.1–2.2)
ALP SERPL-CCNC: 59 U/L (ref 45–117)
ALT SERPL-CCNC: 18 U/L (ref 12–78)
AMPHET UR QL SCN: NEGATIVE
ANION GAP SERPL CALC-SCNC: 5 MMOL/L (ref 5–15)
APAP SERPL-MCNC: <2 UG/ML (ref 10–30)
APPEARANCE UR: ABNORMAL
AST SERPL-CCNC: 14 U/L (ref 15–37)
BACTERIA URNS QL MICRO: ABNORMAL /HPF
BARBITURATES UR QL SCN: NEGATIVE
BASOPHILS # BLD: 0.1 K/UL (ref 0–0.1)
BASOPHILS NFR BLD: 1 % (ref 0–1)
BENZODIAZ UR QL: NEGATIVE
BILIRUB SERPL-MCNC: 0.5 MG/DL (ref 0.2–1)
BILIRUB UR QL CFM: NEGATIVE
BUN SERPL-MCNC: 3 MG/DL (ref 6–20)
BUN/CREAT SERPL: 4 (ref 12–20)
CALCIUM SERPL-MCNC: 9.1 MG/DL (ref 8.5–10.1)
CANNABINOIDS UR QL SCN: NEGATIVE
CHLORIDE SERPL-SCNC: 108 MMOL/L (ref 97–108)
CO2 SERPL-SCNC: 25 MMOL/L (ref 21–32)
COCAINE UR QL SCN: NEGATIVE
COLOR UR: ABNORMAL
CREAT SERPL-MCNC: 0.81 MG/DL (ref 0.55–1.02)
DIFFERENTIAL METHOD BLD: ABNORMAL
DRUG SCRN COMMENT,DRGCM: NORMAL
EOSINOPHIL # BLD: 0.1 K/UL (ref 0–0.4)
EOSINOPHIL NFR BLD: 1 % (ref 0–7)
EPITH CASTS URNS QL MICRO: ABNORMAL /LPF
ERYTHROCYTE [DISTWIDTH] IN BLOOD BY AUTOMATED COUNT: 17.6 % (ref 11.5–14.5)
ETHANOL SERPL-MCNC: <10 MG/DL
GLOBULIN SER CALC-MCNC: 4.3 G/DL (ref 2–4)
GLUCOSE SERPL-MCNC: 94 MG/DL (ref 65–100)
GLUCOSE UR STRIP.AUTO-MCNC: NEGATIVE MG/DL
HCG UR QL: NEGATIVE
HCT VFR BLD AUTO: 34.1 % (ref 35–47)
HGB BLD-MCNC: 10.3 G/DL (ref 11.5–16)
HGB UR QL STRIP: NEGATIVE
IMM GRANULOCYTES # BLD AUTO: 0 K/UL (ref 0–0.04)
IMM GRANULOCYTES NFR BLD AUTO: 0 % (ref 0–0.5)
KETONES UR QL STRIP.AUTO: 80 MG/DL
LEUKOCYTE ESTERASE UR QL STRIP.AUTO: NEGATIVE
LYMPHOCYTES # BLD: 1.2 K/UL (ref 0.8–3.5)
LYMPHOCYTES NFR BLD: 15 % (ref 12–49)
MCH RBC QN AUTO: 20.6 PG (ref 26–34)
MCHC RBC AUTO-ENTMCNC: 30.2 G/DL (ref 30–36.5)
MCV RBC AUTO: 68.3 FL (ref 80–99)
METHADONE UR QL: NEGATIVE
MONOCYTES # BLD: 0.6 K/UL (ref 0–1)
MONOCYTES NFR BLD: 7 % (ref 5–13)
MUCOUS THREADS URNS QL MICRO: ABNORMAL /LPF
NEUTS SEG # BLD: 6.2 K/UL (ref 1.8–8)
NEUTS SEG NFR BLD: 76 % (ref 32–75)
NITRITE UR QL STRIP.AUTO: NEGATIVE
NRBC # BLD: 0 K/UL (ref 0–0.01)
NRBC BLD-RTO: 0 PER 100 WBC
OPIATES UR QL: NEGATIVE
PCP UR QL: NEGATIVE
PH UR STRIP: 5 [PH] (ref 5–8)
PLATELET # BLD AUTO: 327 K/UL (ref 150–400)
PLATELET COMMENTS,PCOM: ABNORMAL
PMV BLD AUTO: 11.4 FL (ref 8.9–12.9)
POTASSIUM SERPL-SCNC: 3.8 MMOL/L (ref 3.5–5.1)
PROT SERPL-MCNC: 8.1 G/DL (ref 6.4–8.2)
PROT UR STRIP-MCNC: 100 MG/DL
RBC # BLD AUTO: 4.99 M/UL (ref 3.8–5.2)
RBC #/AREA URNS HPF: ABNORMAL /HPF (ref 0–5)
RBC MORPH BLD: ABNORMAL
SALICYLATES SERPL-MCNC: <1.7 MG/DL (ref 2.8–20)
SODIUM SERPL-SCNC: 138 MMOL/L (ref 136–145)
SP GR UR REFRACTOMETRY: >1.03
UR CULT HOLD, URHOLD: NORMAL
UROBILINOGEN UR QL STRIP.AUTO: 1 EU/DL (ref 0.2–1)
WBC # BLD AUTO: 8.2 K/UL (ref 3.6–11)
WBC URNS QL MICRO: ABNORMAL /HPF (ref 0–4)

## 2020-05-02 PROCEDURE — 74011250637 HC RX REV CODE- 250/637: Performed by: PSYCHIATRY & NEUROLOGY

## 2020-05-02 PROCEDURE — 81025 URINE PREGNANCY TEST: CPT

## 2020-05-02 PROCEDURE — 65220000003 HC RM SEMIPRIVATE PSYCH

## 2020-05-02 PROCEDURE — 81001 URINALYSIS AUTO W/SCOPE: CPT

## 2020-05-02 PROCEDURE — 90791 PSYCH DIAGNOSTIC EVALUATION: CPT

## 2020-05-02 PROCEDURE — 80053 COMPREHEN METABOLIC PANEL: CPT

## 2020-05-02 PROCEDURE — 85025 COMPLETE CBC W/AUTO DIFF WBC: CPT

## 2020-05-02 PROCEDURE — 36415 COLL VENOUS BLD VENIPUNCTURE: CPT

## 2020-05-02 PROCEDURE — 80307 DRUG TEST PRSMV CHEM ANLYZR: CPT

## 2020-05-02 PROCEDURE — 99285 EMERGENCY DEPT VISIT HI MDM: CPT

## 2020-05-02 RX ORDER — HALOPERIDOL 5 MG/ML
5 INJECTION INTRAMUSCULAR
Status: DISCONTINUED | OUTPATIENT
Start: 2020-05-02 | End: 2020-05-05 | Stop reason: HOSPADM

## 2020-05-02 RX ORDER — ACETAMINOPHEN 325 MG/1
650 TABLET ORAL
Status: DISCONTINUED | OUTPATIENT
Start: 2020-05-02 | End: 2020-05-05 | Stop reason: HOSPADM

## 2020-05-02 RX ORDER — LORAZEPAM 2 MG/ML
1 INJECTION INTRAMUSCULAR
Status: DISCONTINUED | OUTPATIENT
Start: 2020-05-02 | End: 2020-05-05 | Stop reason: HOSPADM

## 2020-05-02 RX ORDER — BENZTROPINE MESYLATE 1 MG/1
1 TABLET ORAL
Status: DISCONTINUED | OUTPATIENT
Start: 2020-05-02 | End: 2020-05-05 | Stop reason: HOSPADM

## 2020-05-02 RX ORDER — TRAZODONE HYDROCHLORIDE 50 MG/1
50 TABLET ORAL
Status: DISCONTINUED | OUTPATIENT
Start: 2020-05-02 | End: 2020-05-05 | Stop reason: HOSPADM

## 2020-05-02 RX ORDER — HYDROXYZINE 25 MG/1
50 TABLET, FILM COATED ORAL
Status: DISCONTINUED | OUTPATIENT
Start: 2020-05-02 | End: 2020-05-05 | Stop reason: HOSPADM

## 2020-05-02 RX ORDER — ADHESIVE BANDAGE
30 BANDAGE TOPICAL DAILY PRN
Status: DISCONTINUED | OUTPATIENT
Start: 2020-05-02 | End: 2020-05-05 | Stop reason: HOSPADM

## 2020-05-02 RX ORDER — DIPHENHYDRAMINE HYDROCHLORIDE 50 MG/ML
50 INJECTION, SOLUTION INTRAMUSCULAR; INTRAVENOUS
Status: DISCONTINUED | OUTPATIENT
Start: 2020-05-02 | End: 2020-05-05 | Stop reason: HOSPADM

## 2020-05-02 RX ORDER — OLANZAPINE 5 MG/1
5 TABLET ORAL
Status: DISCONTINUED | OUTPATIENT
Start: 2020-05-02 | End: 2020-05-05 | Stop reason: HOSPADM

## 2020-05-02 RX ADMIN — HYDROXYZINE HYDROCHLORIDE 50 MG: 25 TABLET, FILM COATED ORAL at 22:24

## 2020-05-02 NOTE — ED NOTES
Pt talking on phone on stretcher. Denies needs at this time. Call bell in reach. Pt states she is able to contract for safety while in ED.

## 2020-05-02 NOTE — BH NOTES
Admission complete, resident is alert and oriented x4 there are no noted issues with pain or discomfort. Resident currently denies all SI/HI, AVH and anxiety. She does confirm feelings of depression. Resident states that she has been experiencing sleep disturbances. Waking and not being able to return to sleep because she keeps replaying memories in her mind. She was given a copy of the unit rules packet. Belongings and body search completed. Will continue to monitor for changes in condition.

## 2020-05-02 NOTE — ED PROVIDER NOTES
HPI patient is a 41-year-old black female who presents from home with history of severe depression and suicidal ideations. Her therapist called to have her brought in for evaluation. The patient states that the therapist was supposed to meet with her yesterday and canceled; she was supposed to be at her house again today and called her on the phone. On exam patient states that she is still having thoughts of hurting herself and is angry. Denies fever, cold symptoms,cough, headache,  neck pain, visual changes, focal weakness or rash. Denies any difficulty breathing, difficulty swallowing, SOB or chest pain. Denies any nausea, vomiting or diarrhea. Pt. Reports taking motrin and \"left over\" percocet last evening. Reports HX of  previous suicidal attempt. Past Medical History:   Diagnosis Date    Asthma     on albuterol prn. Triggers cold and allergies    Back pain, chronic 2010    sciatica    Gestational hypertension     Past pregnancy    HX OTHER MEDICAL      , , , ,     Hyperemesis     severe hyperemesis    Hypertension     with G12 - none this pregnancy    Iron deficiency anemia 10/08/2010    MDD (major depressive disorder), single episode with postpartum onset 2013    treated with meds - 11    Plantar fasciitis     Pregnancy     36 weeks       Past Surgical History:   Procedure Laterality Date     DELIVERY ONLY      HX  SECTION  4/22/15    HX DILATION AND CURETTAGE      HX GYN      miscarriage x 6    HX GYN      removal of left fallopian tube    HX OTHER SURGICAL      cerlcage x4, ectopic x1  with removal of left fallopian tube    HX OTHER SURGICAL      cerclage w/ current pregnancy.  Removed 18         Family History:   Problem Relation Age of Onset   Connye Sole Arthritis-rheumatoid Mother     Asthma Mother     No Known Problems Father     No Known Problems Maternal Grandmother     No Known Problems Maternal Grandfather     No Known Problems Paternal Grandmother     No Known Problems Paternal Grandfather     Asthma Son     Alcohol abuse Neg Hx     Arthritis-osteo Neg Hx     Bleeding Prob Neg Hx     Cancer Neg Hx     Diabetes Neg Hx     Elevated Lipids Neg Hx     Headache Neg Hx     Heart Disease Neg Hx     Hypertension Neg Hx     Lung Disease Neg Hx     Migraines Neg Hx     Psychiatric Disorder Neg Hx     Stroke Neg Hx     Mental Retardation Neg Hx     Anesth Problems Neg Hx        Social History     Socioeconomic History    Marital status: SINGLE     Spouse name: Not on file    Number of children: Not on file    Years of education: Not on file    Highest education level: Not on file   Occupational History    Not on file   Social Needs    Financial resource strain: Not on file    Food insecurity     Worry: Not on file     Inability: Not on file    Transportation needs     Medical: Not on file     Non-medical: Not on file   Tobacco Use    Smoking status: Never Smoker    Smokeless tobacco: Never Used   Substance and Sexual Activity    Alcohol use: Yes     Frequency: Monthly or less     Drinks per session: 1 or 2     Binge frequency: Never    Drug use: No    Sexual activity: Yes     Partners: Male   Lifestyle    Physical activity     Days per week: Not on file     Minutes per session: Not on file    Stress: Not on file   Relationships    Social connections     Talks on phone: Not on file     Gets together: Not on file     Attends Latter-day service: Not on file     Active member of club or organization: Not on file     Attends meetings of clubs or organizations: Not on file     Relationship status: Not on file    Intimate partner violence     Fear of current or ex partner: Not on file     Emotionally abused: Not on file     Physically abused: Not on file     Forced sexual activity: Not on file   Other Topics Concern    Not on file   Social History Narrative    Linda Dwyer lives with boyfriend, Farrukh Seats,  reportedly with alcohol dependence. She was raised by her mother. She has no siblings. Not working. She is supported financially by the childrens' father and public support. She has no hobbies outside of her children. She has a 12th grade education and no legal entanglements. ALLERGIES: Labetalol; Ceclor [cefaclor]; Pcn [penicillins]; and Septra [sulfamethoprim ds]    Review of Systems   Constitutional: Negative for activity change, appetite change, fever and unexpected weight change. HENT: Negative for congestion and trouble swallowing. Respiratory: Negative for cough and shortness of breath. Cardiovascular: Negative for chest pain, palpitations and leg swelling. Gastrointestinal: Negative for abdominal pain, diarrhea and nausea. Genitourinary: Negative for difficulty urinating and dysuria. Musculoskeletal: Negative for arthralgias and myalgias. Skin: Negative for rash. Psychiatric/Behavioral: Positive for suicidal ideas. The patient is nervous/anxious. All other systems reviewed and are negative. Vitals:    20 1113   BP: 148/88   Pulse: 89   Resp: 16   Temp: 98.7 °F (37.1 °C)   SpO2: 97%   Weight: 102.1 kg (225 lb)   Height: 5' 5\" (1.651 m)            Physical Exam  Vitals signs reviewed. Constitutional:       Appearance: She is obese. She is not ill-appearing or diaphoretic. Comments: Black female; non smoker; [id number]   HENT:      Head: Normocephalic. Right Ear: Tympanic membrane normal.      Left Ear: Tympanic membrane normal.      Mouth/Throat:      Mouth: Mucous membranes are moist.      Pharynx: No posterior oropharyngeal erythema. Neck:      Musculoskeletal: Normal range of motion and neck supple. Cardiovascular:      Rate and Rhythm: Normal rate and regular rhythm. Pulmonary:      Effort: Pulmonary effort is normal.      Breath sounds: Normal breath sounds.    Abdominal:      General: Bowel sounds are normal.      Palpations: Abdomen is soft.      Tenderness: There is no abdominal tenderness. There is no guarding or rebound. Musculoskeletal: Normal range of motion. Skin:     General: Skin is warm and dry. Findings: No rash. Neurological:      Mental Status: She is alert and oriented to person, place, and time. MDM       Procedures    Bsmart consult and evaluation. Dr. Michael Vigil NP-C will accept the patient for admission at [facility] psychiatric unit. 1:37 PM  Patient's results and plan of care have been reviewed with the patient. Patient has verbally conveyed her understanding and agreement of her signs, symptoms, diagnosis, treatment and prognosis and additionally agrees to be transferred to [facility] for admission to the psychiatric unit for inpatient treatment. Brady Rousseau NP   Discussed plan of care with Dr. Nelli Islas.  Brady Rousseau NP

## 2020-05-02 NOTE — ED NOTES
TRANSFER - OUT REPORT:    Verbal report given to Elroy De Paz RN (name) on Verona Brunner  being transferred to [facility](unit) for routine progression of care       Report consisted of patients Situation, Background, Assessment and   Recommendations(SBAR). Information from the following report(s) SBAR, ED Summary, Recent Results and Cardiac Rhythm NSR was reviewed with the receiving nurse. Lines:       Opportunity for questions and clarification was provided. Patient transported with:   [facility] transport           .

## 2020-05-02 NOTE — BSMART NOTE
Comprehensive Assessment Form Part 1 Section I - Disposition Axis I - Major Depressive Disorder Axis II - Deferred Axis III - Past Medical History:  
Diagnosis Date  Asthma   
 on albuterol prn. Triggers cold and allergies  Back pain, chronic 2010  
 sciatica  Gestational hypertension Past pregnancy [address]   
  2005, 2006, , ,   Hyperemesis   
 severe hyperemesis  Hypertension   
 with G12 - none this pregnancy  Iron deficiency anemia 10/08/2010  MDD (major depressive disorder), single episode with postpartum onset 2013  
 treated with meds - 11  Plantar fasciitis  Pregnancy 36 weeks Axis IV - Relationship issues (SO) [address] The Medical Doctor to Psychiatrist conference was not completed. The Medical Doctor is in agreement with Psychiatrist disposition because of (reason) admission. The plan is admission to [facility] Room 321-A. The on-call Psychiatrist consulted was MENA Millan/Dr. Lovette Schwab. The admitting Psychiatrist will be Dr. Lovette Schwab. The admitting Diagnosis is MDD, SI. The Payor source is Medicaid. Section II - Integrated Summary Summary:  Pt presents to ER after her counselor Antony Smith, [facility], directed her to the ER due to [facility] with a plan to overdose on medication. Pt states her depression has been getting worse since January when she had a miscarriage. Pt, significant other(SO), Tobias Balderas, does not know about the miscarriage. Pt has 7 children ranging in age 13, 15, 6, 6, 10, 11, and 2. Pt does not want SO to know why she is being admitted. PT is ok with him knowing she has anxiety but does not want him to know about depression and SI as she says he does not understand. Pt OBYGN wrote script for Zoloft but she does not think it is working. Pt was seen here at [address] [facility] for 5 years.   PT states she been seeing/talking with an old boyfriend, Yoel Faye, as he could understand how she feels because she had 6 miscarriages when she was with him about 18 years ago. Pt is open to voluntary admission. The patienthas demonstrated mental capacity to provide informed consent. The information is given by the patient. The Chief Complaint is SI. The Precipitant Factors are relationship issues and depression after miscarriage. Previous Hospitalizations: N/A The patient has not previously been in restraints. Current Psychiatrist and/or  is seeking a psychiatrist with [facility]. Lethality Assessment: 
 
The potential for suicide noted by the following: intent . The potential for homicide is not noted. The patient has not been a perpetrator of sexual or physical abuse. There are not pending charges. The patient is felt to be at risk for self harm. The attending nurse was advised to remove potentially harmful or dangerous items from the patient's room . Section III - Psychosocial 
The patient's overall mood and attitude is depressed. Feelings of helplessness and hopelessness are observed by conversations with [facility] and this writer. Generalized anxiety is not observed. Panic is not observed. Phobias are not observed. Obsessive compulsive tendencies are not observed. Section IV - Mental Status Exam 
The patient's appearance shows no evidence of impairment. The patient's behavior is guarded. The patient is oriented to time, place, person and situation. The patient's speech is soft. The patient's mood is depressed. The range of affect shows no evidence of impairment. The patient's thought content demonstrates no evidence of impairment. The thought process shows loose associations. The patient's perception shows no evidence of impairment. The patient's memory shows no evidence of impairment. The patient's appetite shows no evidence of impairment.   The patient's sleep shows no evidence of impairment. The patient's insight shows no evidence of impairment and The patient's insight is blaming. The patient's judgement shows no evidence of impairment. Section V - Substance Abuse The patient is not using substances. The patient is using alcohol for greater than 10 years with last use on unknown. The patient has experienced the following withdrawal symptoms: N/A. Section VI - Living Arrangements The patient has a significant other. This person's approximate age is 39 children ages 13,14,7,8,7,7,3. The patient does plan to return home upon discharge. The patient does not have legal issues pending. The patient's source of income comes from unknown - SO receives social security. Buddhism and cultural practices have not been voiced at this time. The patient's greatest support comes from unknown (hasn't spoken to mother since she moved out in [facility]) and this person will not be involved with the treatment. The patient has not been in an event described as horrible or outside the realm of ordinary life experience either currently or in the past. 
The patient has not been a victim of sexual/physical abuse. Section VII - Other Areas of Clinical Concern The highest grade achieved is high school and 1 year college with the overall quality of school experience being described as good. The patient is currently unemployed and speaks [place] as a primary language. The patient has no communication impairments affecting communication. The patient's preference for learning can be described as: can read and write adequately. The patient's hearing is normal.  The patient's vision is normal. 
 
 
DEANGELO Gregory Supervisee in Social Work

## 2020-05-02 NOTE — ED NOTES
Per BSMART pt to be transferred to [facility] room 321. Kelly Montalvo NP- Dr. Trang Tillman.

## 2020-05-02 NOTE — ED NOTES
Pt out of ED via stretcher with [facility] to [facility]. No s/sx of distress. EMTALA completed and given to KASSI PEARCE.

## 2020-05-02 NOTE — ED NOTES
Bedside shift change report given to Navjot Lambert RN (oncoming nurse) by AKIL Benson (offgoing nurse). Report included the following information SBAR and ED Summary.

## 2020-05-02 NOTE — ED TRIAGE NOTES
Triage note: Pt arrives via EMS from home cc SI since January. Pt called her therapist today and told her she was developing a plan to harm herself so therapist called 911. Pt admits to plan of medication or alcohol OD. Admits to past attempts to hurt herself. No prior psych admissions. Pt had miscarriage in January. Her OB put her on Zoloft but this is not helping.

## 2020-05-03 PROBLEM — F32.9 MAJOR DEPRESSION: Status: RESOLVED | Noted: 2020-05-02 | Resolved: 2020-05-03

## 2020-05-03 PROBLEM — F43.11 POST-TRAUMATIC STRESS DISORDER, ACUTE: Status: ACTIVE | Noted: 2020-05-03

## 2020-05-03 PROCEDURE — 74011250637 HC RX REV CODE- 250/637: Performed by: PSYCHIATRY & NEUROLOGY

## 2020-05-03 PROCEDURE — 65220000003 HC RM SEMIPRIVATE PSYCH

## 2020-05-03 RX ORDER — ALBUTEROL SULFATE 90 UG/1
2 AEROSOL, METERED RESPIRATORY (INHALATION)
Status: DISCONTINUED | OUTPATIENT
Start: 2020-05-03 | End: 2020-05-05 | Stop reason: HOSPADM

## 2020-05-03 RX ORDER — CETIRIZINE HCL 10 MG
10 TABLET ORAL DAILY
Status: DISCONTINUED | OUTPATIENT
Start: 2020-05-04 | End: 2020-05-03 | Stop reason: CLARIF

## 2020-05-03 RX ORDER — LORATADINE 10 MG/1
10 TABLET ORAL DAILY
Status: DISCONTINUED | OUTPATIENT
Start: 2020-05-04 | End: 2020-05-05 | Stop reason: HOSPADM

## 2020-05-03 RX ORDER — FLUTICASONE PROPIONATE 50 MCG
2 SPRAY, SUSPENSION (ML) NASAL DAILY
Status: DISCONTINUED | OUTPATIENT
Start: 2020-05-04 | End: 2020-05-05 | Stop reason: HOSPADM

## 2020-05-03 RX ORDER — QUETIAPINE FUMARATE 25 MG/1
50 TABLET, FILM COATED ORAL
Status: DISCONTINUED | OUTPATIENT
Start: 2020-05-03 | End: 2020-05-05 | Stop reason: HOSPADM

## 2020-05-03 RX ORDER — SERTRALINE HYDROCHLORIDE 50 MG/1
150 TABLET, FILM COATED ORAL DAILY
Status: DISCONTINUED | OUTPATIENT
Start: 2020-05-03 | End: 2020-05-05 | Stop reason: HOSPADM

## 2020-05-03 RX ORDER — THERA TABS 400 MCG
1 TAB ORAL DAILY
Status: DISCONTINUED | OUTPATIENT
Start: 2020-05-04 | End: 2020-05-05 | Stop reason: HOSPADM

## 2020-05-03 RX ADMIN — TRAZODONE HYDROCHLORIDE 50 MG: 50 TABLET ORAL at 21:20

## 2020-05-03 RX ADMIN — HYDROXYZINE HYDROCHLORIDE 50 MG: 25 TABLET, FILM COATED ORAL at 21:20

## 2020-05-03 RX ADMIN — HYDROXYZINE HYDROCHLORIDE 50 MG: 25 TABLET, FILM COATED ORAL at 14:58

## 2020-05-03 RX ADMIN — OLANZAPINE 5 MG: 5 TABLET, FILM COATED ORAL at 14:58

## 2020-05-03 RX ADMIN — SERTRALINE HYDROCHLORIDE 150 MG: 50 TABLET ORAL at 14:18

## 2020-05-03 RX ADMIN — QUETIAPINE FUMARATE 50 MG: 25 TABLET ORAL at 21:20
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
## 2020-05-03 NOTE — BH NOTES
Hourly rounds completed. Resident has been med and meal compliant. Seen on the milieu ambulating in the hallway. Resident confirmed that she has feelings of depression and anxiety. She has not had any feelings of SI/HI, AVH.

## 2020-05-03 NOTE — PROGRESS NOTES
Problem: Suicide  Goal: *STG: Remains safe in hospital  Outcome: Progressing Towards Goal     Problem: Depressed Mood (Adult/Pediatric)  Goal: *STG: Verbalizes anger, guilt, and other feelings in a constructive manor  Outcome: Progressing Towards Goal
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
## 2020-05-03 NOTE — H&P
INITIAL PSYCHIATRIC EVALUATION            IDENTIFICATION:    Patient Name  Ophelia Goyal   Date of Birth 1985   [facility] [id number]   Medical Record Number  [id number]      Age  37 y.o. PCP Trace Winchester NP   Admit date:  5/2/2020    Room Number  321/01  @ [facility]   Date of Service  5/3/2020            HISTORY         REASON FOR HOSPITALIZATION:  CC: \"suicidal ideation\". Pt admitted under a voluntary basis for suicidal ideations proving to be an imminent danger to self and an inability to care for self. HISTORY OF PRESENT ILLNESS:    The patient, Ophelia Goyal, is a 37 y.o. BLACK OR  female with a past psychiatric history significant for major depressive disorder, who presents at this time with complaints of (and/or evidence of) the following emotional symptoms: suicidal thoughts/threats and anxiety. Additional symptomatology include worsening self care, weight loss of 25 lbs < 4 months. The above symptoms have been present for 2+ weeks. These symptoms are of moderate to high severity. These symptoms are constant in nature. The patient's condition has been precipitated by psychosocial stressors. UDS: negative; BAL=0. The patient is a fair historian. The patient corroborates the above narrative. The patient contracts for safety on the unit and gives consent for the team to contact collateral. The patient is amenable to initiating treatment while on the unit. Following a discussion of risk and benefit, the patient is agreeable to starting an agent to augment her antidepressant. Seroquel discussed, patient will start tonight. She states that her emotional lability started when she suffered a miscarriage in January but she had been treated previously for depression. Patient's therapist has been involved in her care, and was concerned for the patient's safety due to suicidal statements made to her in the setting of relationship stressors.      ALLERGIES: Allergies   Allergen Reactions    Labetalol Hives    Ceclor [Cefaclor] Unknown (comments)    Pcn [Penicillins] Hives    Septra [Sulfamethoprim Ds] Unknown (comments)      MEDICATIONS PRIOR TO ADMISSION:   Medications Prior to Admission   Medication Sig    fluticasone propionate (FLONASE) 50 mcg/actuation nasal spray 2 Sprays by Both Nostrils route daily.  prenatal vit-iron fumarate-fa 27 mg iron- 0.8 mg tab tablet     traZODone (DESYREL) 50 mg tablet Take 1 Tab by mouth nightly. Indications: insomnia associated with depression    triamcinolone acetonide (KENALOG) 0.1 % topical cream Apply  to affected area two (2) times a day. use thin layer to affected area on arms    Blood Pressure Kit-Extra Large kit Check BP 3-4 times weekly.  sertraline (ZOLOFT) 100 mg tablet Take 150 mg by mouth daily.  multivitamin (ONE A DAY) tablet Take 1 Tab by mouth daily.  ibuprofen (MOTRIN) 800 mg tablet Take 1 Tab by mouth every eight (8) hours as needed for Pain.  albuterol (PROVENTIL HFA, VENTOLIN HFA, PROAIR HFA) 90 mcg/actuation inhaler Take 2 Puffs by inhalation every six (6) hours as needed for Wheezing. Needs office visit for further refills    cetirizine (ZYRTEC) 10 mg tablet Take 1 Tab by mouth daily.  azelastine (ASTELIN) 137 mcg (0.1 %) nasal spray 1 [place] by Both Nostrils route two (2) times a day. Use in each nostril as directed      PAST MEDICAL HISTORY:   Past Medical History:   Diagnosis Date    Asthma     on albuterol prn.  Triggers cold and allergies    Back pain, chronic     sciatica    Gestational hypertension     Past pregnancy    HX OTHER MEDICAL      , 2006, , ,     Hyperemesis     severe hyperemesis    Hypertension     with G12 - none this pregnancy    Iron deficiency anemia 10/08/2010    MDD (major depressive disorder), single episode with postpartum onset 2013    treated with meds - 13    Plantar fasciitis     Pregnancy     36 weeks     Past Surgical History:   Procedure Laterality Date     DELIVERY ONLY      HX  SECTION  4/22/15    HX DILATION AND CURETTAGE      HX GYN      miscarriage x 6    HX GYN      removal of left fallopian tube    HX OTHER SURGICAL      cerlcage x4, ectopic x1 1999 with removal of left fallopian tube    HX OTHER SURGICAL      cerclage w/ current pregnancy. Removed 18      SOCIAL HISTORY:   Social History     Socioeconomic History    Marital status: SINGLE     Spouse name: Not on file    Number of children: Not on file    Years of education: Not on file    Highest education level: Not on file   Occupational History    Not on file   Social Needs    Financial resource strain: Not on file    Food insecurity     Worry: Not on file     Inability: Not on file    Transportation needs     Medical: Not on file     Non-medical: Not on file   Tobacco Use    Smoking status: Never Smoker    Smokeless tobacco: Never Used   Substance and Sexual Activity    Alcohol use: Yes     Frequency: Monthly or less     Drinks per session: 1 or 2     Binge frequency: Never    Drug use: No    Sexual activity: Yes     Partners: Male   Lifestyle    Physical activity     Days per week: Not on file     Minutes per session: Not on file    Stress: Not on file   Relationships    Social connections     Talks on phone: Not on file     Gets together: Not on file     Attends Religion service: Not on file     Active member of club or organization: Not on file     Attends meetings of clubs or organizations: Not on file     Relationship status: Not on file    Intimate partner violence     Fear of current or ex partner: Not on file     Emotionally abused: Not on file     Physically abused: Not on file     Forced sexual activity: Not on file   Other Topics Concern    Not on file   Social History Narrative    Chris Burgos lives with boyfriend, Miguelina austin Robinster reportedly with alcohol dependence. She was raised by her mother.   She has no siblings. Not working. She is supported financially by the childrens' father and public support. She has no hobbies outside of her children. She has a 12th grade education and no legal entanglements. FAMILY HISTORY: History reviewed, pertinent family history as below:   Family History   Problem Relation Age of Onset   [facility] Arthritis-rheumatoid Mother     Asthma Mother     No Known Problems Father     No Known Problems Maternal Grandmother     No Known Problems Maternal Grandfather     No Known Problems Paternal Grandmother     No Known Problems Paternal Grandfather     Asthma Son     Alcohol abuse Neg Hx     Arthritis-osteo Neg Hx     Bleeding Prob Neg Hx     Cancer Neg Hx     Diabetes Neg Hx     Elevated Lipids Neg Hx     Headache Neg Hx     Heart Disease Neg Hx     Hypertension Neg Hx     Lung Disease Neg Hx     Migraines Neg Hx     Psychiatric Disorder Neg Hx     Stroke Neg Hx     Mental Retardation Neg Hx     Anesth Problems Neg Hx        REVIEW OF SYSTEMS:   Pertinent items are noted in the History of Present Illness. All other Systems reviewed and are considered negative. MENTAL STATUS EXAM & VITALS     MENTAL STATUS EXAM (MSE):    MSE FINDINGS ARE WITHIN NORMAL LIMITS (WNL) UNLESS OTHERWISE STATED BELOW. ( ALL OF THE BELOW CATEGORIES OF THE MSE HAVE BEEN REVIEWED (reviewed 5/3/2020) AND UPDATED AS DEEMED APPROPRIATE )  General Presentation age appropriate and overweight, cooperative   Orientation Alert and Oriented x 3   Vital Signs  See below (reviewed 5/3/2020); Vital Signs (BP, Pulse, & Temp) are within normal limits if not listed below.    Gait and Station Stable/steady, no ataxia   Musculoskeletal System No extrapyramidal symptoms (EPS); no abnormal muscular movements or Tardive Dyskinesia (TD); muscle strength and tone are within normal limits   Language No aphasia or dysarthria   Speech:  normal volume and non-pressured   Thought Processes logical; fast rate of thoughts; fair abstract reasoning/computation   Thought Associations tangential   Thought Content preoccupations   Suicidal Ideations contracts for safety   Homicidal Ideations none   Mood:  anxious  and depressed   Affect:  constricted and mood-congruent   Memory recent  intact   Memory remote:  intact   Concentration/Attention:  fair   Fund of Knowledge average   Insight:  limited   Reliability fair   Judgment:  poor          VITALS:     Patient Vitals for the past 24 hrs:   Temp Pulse Resp BP SpO2   05/03/20 0748 97.9 °F (36.6 °C) 76 18 126/75 99 %   05/02/20 1938 98 °F (36.7 °C) 77 18 (!) 148/93 100 %   05/02/20 1516 98.2 °F (36.8 °C) 84 18 (!) 145/97 100 %     Wt Readings from Last 3 Encounters:   05/02/20 102.1 kg (225 lb)   02/18/20 103.9 kg (229 lb)   01/17/20 102.5 kg (226 lb)     Temp Readings from Last 3 Encounters:   05/03/20 97.9 °F (36.6 °C)   05/02/20 98 °F (36.7 °C)   04/09/20 98.9 °F (37.2 °C)     BP Readings from Last 3 Encounters:   05/03/20 126/75   05/02/20 (!) 154/98   04/09/20 (!) 149/104     Pulse Readings from Last 3 Encounters:   05/03/20 76   05/02/20 67   04/09/20 97            DATA     LABORATORY DATA:  Labs Reviewed - No data to display  Admission on 05/02/2020, Discharged on 05/02/2020   Component Date Value Ref Range Status    WBC 05/02/2020 8.2  3.6 - 11.0 K/uL Final    RBC 05/02/2020 4.99  3.80 - 5.20 M/uL Final    HGB 05/02/2020 10.3* 11.5 - 16.0 g/dL Final    HCT 05/02/2020 34.1* 35.0 - 47.0 % Final    MCV 05/02/2020 68.3* 80.0 - 99.0 FL Final    MCH 05/02/2020 20.6* 26.0 - 34.0 PG Final    MCHC 05/02/2020 30.2  30.0 - 36.5 g/dL Final    RDW 05/02/2020 17.6* 11.5 - 14.5 % Final    PLATELET 15/25/3886 204  150 - 400 K/uL Final    MPV 05/02/2020 11.4  8.9 - 12.9 FL Final    NRBC 05/02/2020 0.0  0  WBC Final    ABSOLUTE NRBC 05/02/2020 0.00  0.00 - 0.01 K/uL Final    NEUTROPHILS 05/02/2020 76* 32 - 75 % Final    LYMPHOCYTES 05/02/2020 15  12 - 49 % Final  MONOCYTES 05/02/2020 7  5 - 13 % Final    EOSINOPHILS 05/02/2020 1  0 - 7 % Final    BASOPHILS 05/02/2020 1  0 - 1 % Final    IMMATURE GRANULOCYTES 05/02/2020 0  0.0 - 0.5 % Final    ABS. NEUTROPHILS 05/02/2020 6.2  1.8 - 8.0 K/UL Final    ABS. LYMPHOCYTES 05/02/2020 1.2  0.8 - 3.5 K/UL Final    ABS. MONOCYTES 05/02/2020 0.6  0.0 - 1.0 K/UL Final    ABS. EOSINOPHILS 05/02/2020 0.1  0.0 - 0.4 K/UL Final    ABS. BASOPHILS 05/02/2020 0.1  0.0 - 0.1 K/UL Final    ABS. IMM. GRANS. 05/02/2020 0.0  0.00 - 0.04 K/UL Final    DF 05/02/2020 SMEAR SCANNED    Final    PLATELET COMMENTS 83/90/9822 Large Platelets    Final    RBC COMMENTS 05/02/2020     Final                    Value:ANISOCYTOSIS  1+      RBC COMMENTS 05/02/2020     Final                    Value:MICROCYTOSIS  2+      RBC COMMENTS 05/02/2020     Final                    Value:HYPOCHROMIA  2+      RBC COMMENTS 05/02/2020     Final                    Value:OVALOCYTES  PRESENT      Sodium 05/02/2020 138  136 - 145 mmol/L Final    Potassium 05/02/2020 3.8  3.5 - 5.1 mmol/L Final    Chloride 05/02/2020 108  97 - 108 mmol/L Final    CO2 05/02/2020 25  21 - 32 mmol/L Final    Anion gap 05/02/2020 5  5 - 15 mmol/L Final    Glucose 05/02/2020 94  65 - 100 mg/dL Final    BUN 05/02/2020 3* 6 - 20 MG/DL Final    Creatinine 05/02/2020 0.81  0.55 - 1.02 MG/DL Final    BUN/Creatinine ratio 05/02/2020 4* 12 - 20   Final    GFR est AA 05/02/2020 >60  >60 ml/min/1.73m2 Final    GFR est non-AA 05/02/2020 >60  >60 ml/min/1.73m2 Final    Calcium 05/02/2020 9.1  8.5 - 10.1 MG/DL Final    Bilirubin, total 05/02/2020 0.5  0.2 - 1.0 MG/DL Final    ALT (SGPT) 05/02/2020 18  12 - 78 U/L Final    AST (SGOT) 05/02/2020 14* 15 - 37 U/L Final    Alk.  phosphatase 05/02/2020 59  45 - 117 U/L Final    Protein, total 05/02/2020 8.1  6.4 - 8.2 g/dL Final    Albumin 05/02/2020 3.8  3.5 - 5.0 g/dL Final    Globulin 05/02/2020 4.3* 2.0 - 4.0 g/dL Final    A-G Ratio 05/02/2020 0.9* 1.1 - 2.2   Final    Color 05/02/2020 DARK YELLOW    Final    Appearance 05/02/2020 CLOUDY* CLEAR   Final    Specific gravity 05/02/2020 >1.030   Final    pH (UA) 05/02/2020 5.0  5.0 - 8.0   Final    Protein 05/02/2020 100* NEG mg/dL Final    Glucose 05/02/2020 Negative  NEG mg/dL Final    Ketone 05/02/2020 80* NEG mg/dL Final    Blood 05/02/2020 Negative  NEG   Final    Urobilinogen 05/02/2020 1.0  0.2 - 1.0 EU/dL Final    Nitrites 05/02/2020 Negative  NEG   Final    Leukocyte Esterase 05/02/2020 Negative  NEG   Final    WBC 05/02/2020 0-4  0 - 4 /hpf Final    RBC 05/02/2020 0-5  0 - 5 /hpf Final    Epithelial cells 05/02/2020 MODERATE* FEW /lpf Final    Bacteria 05/02/2020 1+* NEG /hpf Final    Mucus 05/02/2020 TRACE* NEG /lpf Final    AMPHETAMINES 05/02/2020 Negative  NEG   Final    BARBITURATES 05/02/2020 Negative  NEG   Final    BENZODIAZEPINES 05/02/2020 Negative  NEG   Final    COCAINE 05/02/2020 Negative  NEG   Final    METHADONE 05/02/2020 Negative  NEG   Final    OPIATES 05/02/2020 Negative  NEG   Final    PCP(PHENCYCLIDINE) 05/02/2020 Negative  NEG   Final    THC (TH-CANNABINOL) 05/02/2020 Negative  NEG   Final    Drug screen comment 05/02/2020 (NOTE)   Final    ALCOHOL(ETHYL),SERUM 05/02/2020 <10  <10 MG/DL Final    Acetaminophen level 05/02/2020 <2* 10 - 30 ug/mL Final    Salicylate level 80/94/1843 <1.7* 2.8 - 20.0 MG/DL Final    Pregnancy test,urine (POC) 05/02/2020 Negative  NEG   Final    Urine culture hold 05/02/2020 Urine on hold in Microbiology dept for 2 days. If unpreserved urine is submitted, it cannot be used for addtional testing after 24 hours, recollection will be required. Final    Bilirubin UA, confirm 05/02/2020 Negative    Final        RADIOLOGY REPORTS:  Results from Hospital Encounter encounter on 03/05/14   XR FOOT LT MIN 3 V    Narrative **Final Report**       ICD Codes / Adm. Diagnosis: 729.5  708.9 / Pain in limb Examination:  CR FOOT MIN 3 VWS LT  - [id number] - Mar  5 2014 12:08PM  Accession No:  [id number]  Reason:  left heel pain for two months      REPORT:  EXAM:  CR FOOT MIN 3 VWS LT    INDICATION:   left heel pain for two months    COMPARISON:  None. FINDINGS:  Three views of the left foot demonstrate there is subtle spurring   on the lateral margin of the first MTP joint. Remaining joint spaces are   preserved. .  There are small plantar and retrocalcaneal spurs. No acute   fracture        IMPRESSION:  1. Small plantar and retrocalcaneal spurs  2. Subtle degeneration of the first MTP joint                    Signing/Reading Doctor: Leah Barrera ([id number])    Approved: Ilsa Gricel IVONE ([id number])  Mar  5 2014 12:19PM                               No results found. MEDICATIONS       ALL MEDICATIONS  Current Facility-Administered Medications   Medication Dose Route Frequency    QUEtiapine (SEROquel) tablet 50 mg  50 mg Oral QHS    albuterol (PROVENTIL HFA, VENTOLIN HFA, PROAIR HFA) inhaler 2 Puff  2 Puff Inhalation Q6H PRN    [START ON 5/4/2020] cetirizine (ZYRTEC) tablet 10 mg  10 mg Oral DAILY    . PHARMACY TO SUBSTITUTE PER PROTOCOL (Reordered from: azelastine (ASTELIN) 137 mcg (0.1 %) nasal spray)    Per Protocol    sertraline (ZOLOFT) tablet 150 mg  150 mg Oral DAILY    [START ON 5/4/2020] fluticasone propionate (FLONASE) 50 mcg/actuation nasal spray 2 Spray  2 Spray Both Nostrils DAILY    . PHARMACY TO SUBSTITUTE PER PROTOCOL (Reordered from: multivitamin (ONE A DAY) tablet)    Per Protocol    OLANZapine (ZyPREXA) tablet 5 mg  5 mg Oral Q6H PRN    haloperidol lactate (HALDOL) injection 5 mg  5 mg IntraMUSCular Q6H PRN    benztropine (COGENTIN) tablet 1 mg  1 mg Oral BID PRN    diphenhydrAMINE (BENADRYL) injection 50 mg  50 mg IntraMUSCular BID PRN    hydrOXYzine HCL (ATARAX) tablet 50 mg  50 mg Oral TID PRN    LORazepam (ATIVAN) injection 1 mg  1 mg IntraMUSCular Q4H PRN    traZODone (DESYREL) tablet 50 mg  50 mg Oral QHS PRN    acetaminophen (TYLENOL) tablet 650 mg  650 mg Oral Q4H PRN    magnesium hydroxide (MILK OF MAGNESIA) 400 mg/5 mL oral suspension 30 mL  30 mL Oral DAILY PRN      SCHEDULED MEDICATIONS  Current Facility-Administered Medications   Medication Dose Route Frequency    QUEtiapine (SEROquel) tablet 50 mg  50 mg Oral QHS    [START ON 5/4/2020] cetirizine (ZYRTEC) tablet 10 mg  10 mg Oral DAILY    sertraline (ZOLOFT) tablet 150 mg  150 mg Oral DAILY    [START ON 5/4/2020] fluticasone propionate (FLONASE) 50 mcg/actuation nasal spray 2 Spray  2 Spray Both Nostrils DAILY                ASSESSMENT & PLAN        The patient, Maureen Garces, is a 37 y.o.  female who presents at this time for treatment of the following diagnoses:  Patient Active Hospital Problem List:   Major depression (5/2/2020)    Assessment: patient with worsening dysphoria, mood lability, and now suicidal ideation in the setting of a recent trauma (miscarriage) followed by ongoing relationship stressors. Patient with fair insight into the issue, but no plan in place for secure her safety given tenuous home life, SI.    Plan:   - START Seroquel 50 mg QHS for depression adjunct  - RESTART Zoloft 150 mg QDAY for MDD  - IM recs re: HTN and microcytic anemia  - IGM therapy as tolerated  - Expand database / obtain collateral  - Dispo planning           A coordinated, multidisplinary treatment team (includes the nurse, unit pharmacist,  and writer) round was conducted for this initial evaluation with the patient present. The following regarding medications was addressed during rounds with patient: the risks and benefits of the proposed medication. The patient was given the opportunity to ask questions. Informed consent given to the use of the above medications.     I will continue to adjust psychiatric and non-psychiatric medications (see above \"medication\" section and orders section for details) as deemed appropriate & based upon diagnoses and response to treatment. I have reviewed admission (and previous/old) labs and medical tests in the EHR and or transferring hospital documents. I will continue to order blood tests/labs and diagnostic tests as deemed appropriate and review results as they become available (see orders for details). I have reviewed old psychiatric and medical records available in the EHR. I Will order additional psychiatric records from other institutions to further elucidate the nature of patient's psychopathology and review once available. I will gather additional collateral information from friends, family and o/p treatment team to further elucidate the nature of patient's psychopathology and baselline level of psychiatric functioning.       ESTIMATED LENGTH OF STAY:  3-5 days       STRENGTHS:  Exercising self-direction/Resourceful and Access to housing/residential stability                                        SIGNED:    Hershal Frankel, MD  5/3/2020

## 2020-05-03 NOTE — BH NOTES
Behavioral Health Interdisciplinary Rounds     Patient Name: Maureen Garces  Age: 37 y.o. Room/Bed:  321/01  Primary Diagnosis: <principal problem not specified>   Admission Status: Voluntary     Readmission within 30 days: No  Power of  in place: Unknown  Patient requires a blocked bed: Yes            Reason for blocked bed: COVID  Order for blocked bed obtained: n/a      Sleep hours: 5.75   Morning Labs completed per orders:  None ordered       Participation in Care/Groups:  No, just arrived. Medication Compliant?: No scheduled meds yet  PRNS (last 24 hours): Antianxiety    Restraints (last 24 hours):  No  Substance Abuse:  No    24 hour chart check complete:  Yes

## 2020-05-04 PROCEDURE — 74011250637 HC RX REV CODE- 250/637: Performed by: PSYCHIATRY & NEUROLOGY

## 2020-05-04 PROCEDURE — 65220000003 HC RM SEMIPRIVATE PSYCH

## 2020-05-04 RX ORDER — PRAZOSIN HYDROCHLORIDE 1 MG/1
1 CAPSULE ORAL
Status: DISCONTINUED | OUTPATIENT
Start: 2020-05-04 | End: 2020-05-05 | Stop reason: HOSPADM

## 2020-05-04 RX ORDER — DOCUSATE SODIUM 100 MG/1
100 CAPSULE, LIQUID FILLED ORAL DAILY
Status: DISCONTINUED | OUTPATIENT
Start: 2020-05-04 | End: 2020-05-05 | Stop reason: HOSPADM

## 2020-05-04 RX ADMIN — SERTRALINE HYDROCHLORIDE 150 MG: 50 TABLET ORAL at 08:49

## 2020-05-04 RX ADMIN — OLANZAPINE 5 MG: 5 TABLET, FILM COATED ORAL at 13:53

## 2020-05-04 RX ADMIN — PRAZOSIN HYDROCHLORIDE 1 MG: 1 CAPSULE ORAL at 21:30

## 2020-05-04 RX ADMIN — LORATADINE 10 MG: 10 TABLET ORAL at 08:49

## 2020-05-04 RX ADMIN — HYDROXYZINE HYDROCHLORIDE 50 MG: 25 TABLET, FILM COATED ORAL at 04:41

## 2020-05-04 RX ADMIN — THERA TABS 1 TABLET: TAB at 08:49

## 2020-05-04 RX ADMIN — HYDROXYZINE HYDROCHLORIDE 50 MG: 25 TABLET, FILM COATED ORAL at 11:05

## 2020-05-04 RX ADMIN — QUETIAPINE FUMARATE 50 MG: 25 TABLET ORAL at 21:30

## 2020-05-04 RX ADMIN — DOCUSATE SODIUM 100 MG: 100 CAPSULE ORAL at 13:36

## 2020-05-04 RX ADMIN — FLUTICASONE PROPIONATE 2 SPRAY: 50 SPRAY, METERED NASAL at 08:47

## 2020-05-04 NOTE — PROGRESS NOTES
Problem: Patient Education: Go to Patient Education Activity  Goal: Patient/Family Education  Outcome: Progressing Towards Goal     Problem: Suicide  Goal: *STG: Remains safe in hospital  Outcome: Progressing Towards Goal  Goal: *STG: Seeks staff when feelings of self harm or harm towards others arise  Outcome: Progressing Towards Goal  Goal: *STG:  Verbalizes alternative ways of dealing with maladaptive feelings/behaviors  Outcome: Progressing Towards Goal  Goal: *STG/LTG: Complies with medication therapy  Outcome: Progressing Towards Goal  Goal: *STG/LTG: No longer expresses self destructive or suicidal thoughts  Outcome: Progressing Towards Goal

## 2020-05-04 NOTE — PROGRESS NOTES
Laboratory monitoring for mood stabilizer and antipsychotics:    Recommended baseline monitoring has been completed based on this patient's current medication regimen. The patient is currently taking the following medication(s):   Current Facility-Administered Medications   Medication Dose Route Frequency    QUEtiapine (SEROquel) tablet 50 mg  50 mg Oral QHS    sertraline (ZOLOFT) tablet 150 mg  150 mg Oral DAILY    fluticasone propionate (FLONASE) 50 mcg/actuation nasal spray 2 Spray  2 Spray Both Nostrils DAILY    therapeutic multivitamin (THERAGRAN) tablet 1 Tab  1 Tab Oral DAILY    loratadine (CLARITIN) tablet 10 mg  10 mg Oral DAILY     Height, Weight, BMI Estimation  Estimated body mass index is 37.44 kg/m² as calculated from the following:    Height as of an earlier encounter on 5/2/20: 165.1 cm (65\"). Weight as of an earlier encounter on 5/2/20: 102.1 kg (225 lb). Renal Function, Hepatic Function and Chemistry  Estimated Creatinine Clearance: 113.7 mL/min (based on SCr of 0.81 mg/dL). Lab Results   Component Value Date/Time    Sodium 138 05/02/2020 11:31 AM    Potassium 3.8 05/02/2020 11:31 AM    Chloride 108 05/02/2020 11:31 AM    CO2 25 05/02/2020 11:31 AM    Anion gap 5 05/02/2020 11:31 AM    Glucose 94 05/02/2020 11:31 AM    BUN 3 (L) 05/02/2020 11:31 AM    Creatinine 0.81 05/02/2020 11:31 AM    BUN/Creatinine ratio 4 (L) 05/02/2020 11:31 AM    GFR est AA >60 05/02/2020 11:31 AM    GFR est non-AA >60 05/02/2020 11:31 AM    Calcium 9.1 05/02/2020 11:31 AM    ALT (SGPT) 18 05/02/2020 11:31 AM    AST (SGOT) 14 (L) 05/02/2020 11:31 AM    Alk.  phosphatase 59 05/02/2020 11:31 AM    Protein, total 8.1 05/02/2020 11:31 AM    Albumin 3.8 05/02/2020 11:31 AM    Globulin 4.3 (H) 05/02/2020 11:31 AM    A-G Ratio 0.9 (L) 05/02/2020 11:31 AM    Bilirubin, total 0.5 05/02/2020 11:31 AM       Lab Results   Component Value Date/Time    Glucose 94 05/02/2020 11:31 AM       Lab Results   Component Value Date/Time    Hemoglobin A1c 5.2 02/18/2020 11:08 AM       Hematology  Lab Results   Component Value Date/Time    WBC 8.2 05/02/2020 11:31 AM    Hemoglobin (POC) 10.4 (L) 01/17/2020 07:12 AM    HGB 10.3 (L) 05/02/2020 11:31 AM    HCT 34.1 (L) 05/02/2020 11:31 AM    PLATELET 758 55/61/9674 11:31 AM    MCV 68.3 (L) 05/02/2020 11:31 AM    Hgb, External 33.7 08/03/2012    Hct, External 69 08/03/2012    Platelet cnt., External 307 08/03/2012       Lipids  Lab Results   Component Value Date/Time    Cholesterol, total 172 02/18/2020 11:08 AM    HDL Cholesterol 52 02/18/2020 11:08 AM    LDL, calculated 97 02/18/2020 11:08 AM    Triglyceride 113 02/18/2020 11:08 AM       Thyroid Function    Lab Results   Component Value Date/Time    TSH 0.89 11/25/2014 05:05 AM    T4, Free 1.1 11/25/2014 05:05 AM     Vitals  Visit Vitals  /67 (BP Patient Position: Sitting)   Pulse 70   Temp 97.6 °F (36.4 °C)   Resp 16   SpO2 100%       Pregnancy Test  Lab Results   Component Value Date/Time    Pregnancy test,urine (POC) Negative 05/02/2020 11:32 AM       Yue Knott, PharmD, BCPS  [phone] (pharmacy)
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
## 2020-05-04 NOTE — PROGRESS NOTES
Problem: Discharge Planning  Goal: *Discharge to safe environment  Outcome: Progressing Towards Goal  Note: Patient identifies home as a safe environment. Patient will return home upon discharge. Goal: *Knowledge of medication management  Outcome: Progressing Towards Goal  Note: Patient verbalizes understanding of medication regimen. Patient agrees to take medications as prescribed. Goal: *Knowledge of discharge instructions  Outcome: Progressing Towards Goal  Note: Patient verbalizes understanding of goals for treatment and safe discharge.
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
## 2020-05-04 NOTE — BH NOTES
Assumed care of the patient. Patient was mostly isolative to her room during the shift. Patient was cooperative with the assessments. Patient was alert and oriented X 4. Patient stated that she was anxious and was having some chest pain and head ache because of that. Patient denied S.I/H. I/A/V/H and said that she was little sad and depressed. Patient was observed writing journals to manage her anxiety and depressed mood. Patient received PRN PO Atarax for anxiety and Trazodone for insomnia at 2120. Patient was medicine and meal compliant. DBP was elevated. Patient was restless and wide awake past mid night even after PRN medications. Will continue to monitor. Patient reported of feeling anxious early this morning, patient received PRN Atarax at 0441 for anxiety. Patient slept for about 5 hours.

## 2020-05-04 NOTE — PROGRESS NOTES
Problem: Suicide  Goal: *STG: Remains safe in hospital  Outcome: Progressing Towards Goal  Goal: *STG:  Verbalizes alternative ways of dealing with maladaptive feelings/behaviors  Outcome: Progressing Towards Goal  Goal: *STG/LTG: Complies with medication therapy  Outcome: Progressing Towards Goal

## 2020-05-04 NOTE — BH NOTES
0700 Received report from night shift nurse. [id number] Pt took morning meds. Pt denies SI/HI/AVH. Pt admits to feeling \"sad and anxious\". Pt ate breakfast in the day room. 3501-1375 Pt stays in her room most of the time. Pt reported feeling anxious and was given PRN Atarax. [phone] Pt ate lunch in the day room. 6430-1071 Pt was resting in her room. Joseph Salomon was talking to her. She informed this writer that pt was anxious and complaining of chest pain. Checked vitals and they were normal. No sweating or other signs of distress. Pt has already received Atarax so gave her PRN  Zyprexa. Will monitor for effectiveness. 26 Pt is using the phone in the day room. [id number] Pt was in the day room for group. She appeared calm and did not appear anxious at all. Pt ate dinner in the day room. No acute distress. Continue to monitor and provide support when needed.

## 2020-05-04 NOTE — INTERDISCIPLINARY ROUNDS
Behavioral Health Interdisciplinary Rounds  
  
Patient Name: Randee Branham  Age: 37 y.o. Room/Bed:  321/01 Primary Diagnosis: <principal problem not specified> Admission Status: Voluntary Readmission within 30 days: No 
Power of  in place: Unknown Patient requires a blocked bed: Yes Reason for blocked bed: As per COVID 19 safety guidelines Order for blocked bed obtained: No  
  
Sleep hours: About 5 hours. Morning Labs completed per orders:  None ordered Participation in Care/Groups:  
Medication Compliant?: Yes PRNS (last 24 hours): Atarax, Zyprexa, Trazodone Restraints (last 24 hours):  No 
Substance Abuse: No                         
24 hour chart check complete: Yes  
  
Patient goal(s) for today: Take medications as prescribed; meet with counselor Treatment team focus/goals: MD to adjust nighttime medications Progress note: Patient reports depression due to multiple difficult interpersonal relationships. Reports a difficult time standing up for herself against her partner regarding having more children. LOS:  2  Expected LOS: TBD Financial concerns/prescription coverage: [facility]  Medicaid Family contact: None Family requesting physician contact today: No 
Discharge plan: Return home Access to weapons: No 
Outpatient provider(s): Jacki Porter (therapist) Patient's preferred phone number for follow up call: [phone] Participating treatment team members: Tramaine Allen MSW; Dr. Claudette Rivas MD

## 2020-05-05 VITALS
DIASTOLIC BLOOD PRESSURE: 56 MMHG | HEART RATE: 67 BPM | SYSTOLIC BLOOD PRESSURE: 118 MMHG | OXYGEN SATURATION: 100 % | RESPIRATION RATE: 18 BRPM | TEMPERATURE: 98.2 F

## 2020-05-05 LAB
ALBUMIN SERPL-MCNC: 3.6 G/DL (ref 3.5–5)
ALBUMIN/GLOB SERPL: 0.9 {RATIO} (ref 1.1–2.2)
ALP SERPL-CCNC: 53 U/L (ref 45–117)
ALT SERPL-CCNC: 15 U/L (ref 12–78)
ANION GAP SERPL CALC-SCNC: 8 MMOL/L (ref 5–15)
AST SERPL-CCNC: 13 U/L (ref 15–37)
BASOPHILS # BLD: 0.1 K/UL (ref 0–0.1)
BASOPHILS NFR BLD: 1 % (ref 0–1)
BILIRUB SERPL-MCNC: 0.4 MG/DL (ref 0.2–1)
BUN SERPL-MCNC: 5 MG/DL (ref 6–20)
BUN/CREAT SERPL: 6 (ref 12–20)
CALCIUM SERPL-MCNC: 8.9 MG/DL (ref 8.5–10.1)
CHLORIDE SERPL-SCNC: 101 MMOL/L (ref 97–108)
CO2 SERPL-SCNC: 28 MMOL/L (ref 21–32)
CREAT SERPL-MCNC: 0.82 MG/DL (ref 0.55–1.02)
DIFFERENTIAL METHOD BLD: ABNORMAL
EOSINOPHIL # BLD: 0.3 K/UL (ref 0–0.4)
EOSINOPHIL NFR BLD: 3 % (ref 0–7)
ERYTHROCYTE [DISTWIDTH] IN BLOOD BY AUTOMATED COUNT: 17.4 % (ref 11.5–14.5)
GLOBULIN SER CALC-MCNC: 3.8 G/DL (ref 2–4)
GLUCOSE SERPL-MCNC: 78 MG/DL (ref 65–100)
HCT VFR BLD AUTO: 35.1 % (ref 35–47)
HGB BLD-MCNC: 10.7 G/DL (ref 11.5–16)
IMM GRANULOCYTES # BLD AUTO: 0 K/UL (ref 0–0.04)
IMM GRANULOCYTES NFR BLD AUTO: 0 % (ref 0–0.5)
LYMPHOCYTES # BLD: 2.7 K/UL (ref 0.8–3.5)
LYMPHOCYTES NFR BLD: 32 % (ref 12–49)
MCH RBC QN AUTO: 20.5 PG (ref 26–34)
MCHC RBC AUTO-ENTMCNC: 30.5 G/DL (ref 30–36.5)
MCV RBC AUTO: 67.4 FL (ref 80–99)
MONOCYTES # BLD: 0.7 K/UL (ref 0–1)
MONOCYTES NFR BLD: 8 % (ref 5–13)
NEUTS SEG # BLD: 4.5 K/UL (ref 1.8–8)
NEUTS SEG NFR BLD: 56 % (ref 32–75)
NRBC # BLD: 0 K/UL (ref 0–0.01)
NRBC BLD-RTO: 0 PER 100 WBC
PLATELET # BLD AUTO: 335 K/UL (ref 150–400)
POTASSIUM SERPL-SCNC: 3.8 MMOL/L (ref 3.5–5.1)
PROT SERPL-MCNC: 7.4 G/DL (ref 6.4–8.2)
RBC # BLD AUTO: 5.21 M/UL (ref 3.8–5.2)
RBC MORPH BLD: ABNORMAL
SODIUM SERPL-SCNC: 137 MMOL/L (ref 136–145)
WBC # BLD AUTO: 8.3 K/UL (ref 3.6–11)

## 2020-05-05 PROCEDURE — 80053 COMPREHEN METABOLIC PANEL: CPT

## 2020-05-05 PROCEDURE — 85025 COMPLETE CBC W/AUTO DIFF WBC: CPT

## 2020-05-05 PROCEDURE — 74011250637 HC RX REV CODE- 250/637: Performed by: PSYCHIATRY & NEUROLOGY

## 2020-05-05 PROCEDURE — 36415 COLL VENOUS BLD VENIPUNCTURE: CPT

## 2020-05-05 RX ORDER — BLOOD PRESSURE TEST KIT-MEDIUM
KIT MISCELLANEOUS
Qty: 1 KIT | Refills: 0 | Status: ON HOLD | OUTPATIENT
Start: 2020-05-05 | End: 2020-09-03

## 2020-05-05 RX ORDER — TRIAMCINOLONE ACETONIDE 1 MG/G
CREAM TOPICAL 2 TIMES DAILY
Qty: 30 G | Refills: 0 | Status: ON HOLD | OUTPATIENT
Start: 2020-05-05 | End: 2020-09-03

## 2020-05-05 RX ORDER — PRAZOSIN HYDROCHLORIDE 1 MG/1
1 CAPSULE ORAL
Qty: 30 CAP | Refills: 0 | Status: SHIPPED | OUTPATIENT
Start: 2020-05-05 | End: 2020-09-28

## 2020-05-05 RX ORDER — OLANZAPINE 5 MG/1
5 TABLET ORAL
Qty: 120 TAB | Refills: 0 | Status: SHIPPED | OUTPATIENT
Start: 2020-05-05 | End: 2020-09-28

## 2020-05-05 RX ORDER — QUETIAPINE FUMARATE 50 MG/1
50 TABLET, FILM COATED ORAL
Qty: 30 TAB | Refills: 0 | Status: SHIPPED | OUTPATIENT
Start: 2020-05-05 | End: 2020-07-15

## 2020-05-05 RX ORDER — SERTRALINE HYDROCHLORIDE 100 MG/1
150 TABLET, FILM COATED ORAL DAILY
Qty: 30 TAB | Refills: 0 | Status: ON HOLD | OUTPATIENT
Start: 2020-05-05 | End: 2020-09-03

## 2020-05-05 RX ORDER — ALBUTEROL SULFATE 90 UG/1
2 AEROSOL, METERED RESPIRATORY (INHALATION)
Qty: 1 INHALER | Refills: 0 | Status: ON HOLD | OUTPATIENT
Start: 2020-05-05 | End: 2020-09-03

## 2020-05-05 RX ADMIN — HYDROXYZINE HYDROCHLORIDE 50 MG: 25 TABLET, FILM COATED ORAL at 02:51

## 2020-05-05 RX ADMIN — LORATADINE 10 MG: 10 TABLET ORAL at 08:40

## 2020-05-05 RX ADMIN — SERTRALINE HYDROCHLORIDE 150 MG: 50 TABLET ORAL at 08:40

## 2020-05-05 RX ADMIN — THERA TABS 1 TABLET: TAB at 08:40

## 2020-05-05 RX ADMIN — DOCUSATE SODIUM 100 MG: 100 CAPSULE ORAL at 08:39

## 2020-05-05 RX ADMIN — FLUTICASONE PROPIONATE 2 SPRAY: 50 SPRAY, METERED NASAL at 08:41
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
## 2020-05-05 NOTE — DISCHARGE INSTRUCTIONS
Patient Education        Recovering From Depression: Care Instructions  Your Care Instructions    Taking good care of yourself is important as you recover from depression. In time, your symptoms will fade as your treatment takes hold. Do not give up. Instead, focus your energy on getting better. Your mood will improve. It just takes some time. Focus on things that can help you feel better, such as being with friends and family, eating well, and getting enough rest. But take things slowly. Do not do too much too soon. You will begin to feel better gradually. Follow-up care is a key part of your treatment and safety. Be sure to make and go to all appointments, and call your doctor if you are having problems. It's also a good idea to know your test results and keep a list of the medicines you take. How can you care for yourself at home? Be realistic  · If you have a large task to do, break it up into smaller steps you can handle, and just do what you can. · You may want to put off important decisions until your depression has lifted. If you have plans that will have a major impact on your life, such as marriage, divorce, or a job change, try to wait a bit. Talk it over with friends and loved ones who can help you look at the overall picture first.  · Reaching out to people for help is important. Do not isolate yourself. Let your family and friends help you. Find someone you can trust and confide in, and talk to that person. · Be patient, and be kind to yourself. Remember that depression is not your fault and is not something you can overcome with willpower alone. Treatment is necessary for depression, just like for any other illness. Feeling better takes time, and your mood will improve little by little. Stay active  · Stay busy and get outside. Take a walk, or try some other light exercise. · Talk with your doctor about an exercise program. Exercise can help with mild depression. · Go to a movie or concert. Take part in a Worship activity or other social gathering. Go to a [email or web address] game. · Ask a friend to have dinner with you. Take care of yourself  · Eat a balanced diet with plenty of fresh fruits and vegetables, whole grains, and lean protein. If you have lost your appetite, eat small snacks rather than large meals. · Avoid drinking alcohol or using illegal drugs. Do not take medicines that have not been prescribed for you. They may interfere with medicines you may be taking for depression, or they may make your depression worse. · Take your medicines exactly as they are prescribed. You may start to feel better within 1 to 3 weeks of taking antidepressant medicine. But it can take as many as 6 to 8 weeks to see more improvement. If you have questions or concerns about your medicines, or if you do not notice any improvement by 3 weeks, talk to your doctor. · If you have any side effects from your medicine, tell your doctor. Antidepressants can make you feel tired, dizzy, or nervous. Some people have dry mouth, constipation, headaches, sexual problems, or diarrhea. Many of these side effects are mild and will go away on their own after you have been taking the medicine for a few weeks. Some may last longer. Talk to your doctor if side effects are bothering you too much. You might be able to try a different medicine. · Get enough sleep. If you have problems sleeping:  ? Go to bed at the same time every night, and get up at the same time every morning. ? Keep your bedroom dark and quiet. ? Do not exercise after 5:00 p.m.  ? Avoid drinks with caffeine after 5:00 p.m. · Avoid sleeping pills unless they are prescribed by the doctor treating your depression. Sleeping pills may make you groggy during the day, and they may interact with other medicine you are taking. · If you have any other illnesses, such as diabetes, heart disease, or high blood pressure, make sure to continue with your treatment.  Tell your doctor about all of the medicines you take, including those with or without a prescription. · Keep the numbers for these national suicide hotlines: 9-898-399-TALK (4-409.423.7006) and 8-301-LLOGKLK (4-582.299.3388). If you or someone you know talks about suicide or feeling hopeless, get help right away. When should you call for help? Call 911 anytime you think you may need emergency care. For example, call if:    · You feel like hurting yourself or someone else.     · Someone you know has depression and is about to attempt or is attempting suicide.   [facility] your doctor now or seek immediate medical care if:    · You hear voices.     · Someone you know has depression and:  ? Starts to give away his or her possessions. ? Uses illegal drugs or drinks alcohol heavily. ? Talks or writes about death, including writing suicide notes or talking about guns, knives, or pills. ? Starts to spend a lot of time alone. ? Acts very aggressively or suddenly appears calm.    Watch closely for changes in your health, and be sure to contact your doctor if:    · You do not get better as expected. Where can you learn more? Go to http://annamariaScanSafelexii.info/  Enter N529 in the search box to learn more about \"Recovering From Depression: Care Instructions. \"  Current as of: May 28, 2019Content Version: 12.4  © 7926-1257 Healthwise, Incorporated. Care instructions adapted under license by [employer] (which disclaims liability or warranty for this information). If you have questions about a medical condition or this instruction, always ask your healthcare professional. [place] [address] any warranty or liability for your use of this information. Patient Education        Deciding About Stopping Your Antidepressant  How can you decide about stopping your antidepressant? How do antidepressants work? There are many different types of antidepressants that work in slightly different ways.  In general, antidepressants increase the level of certain chemicals in the brain. These chemicals are called neurotransmitters. This helps improve communication between brain cells over time, which can help you feel better. Most people need to take this medicine for 6 to 8 weeks to get the full benefit and feel much better. Taking your medicine for at least 6 months after you feel better can help keep you from getting depressed again. If this is not the first time you have been depressed, your doctor may want you to take it for an even longer time. If you have questions or concerns about your medicines, talk to your doctor. What are key points about this decision? · The best reason to stop taking your antidepressant is because you feel better and you and your doctor believe that you will stay well after you stop taking it. · An antidepressant needs time to work. You may need to take it for 1 to 3 weeks before you start to feel better. And it may take 6 to 8 weeks before you feel much better. · Most side effects are more bothersome than serious. They can often be managed. Or your doctor may be able to prescribe a different medicine. · Depression may return for some people. But if you keep taking your medicine for at least 6 months after you feel better, you may lower the chance of symptoms returning. · If you plan to stop taking your medicine, talk with your doctor first about how to do it safely. You may need to stop slowly over time. Suddenly stopping some medicines may cause side effects. These include flu-like symptoms and dizziness. · Seeing a counselor works well to help people with depression feel better. · Depression is nothing to be embarrassed about. It is a health problem, not a character flaw. Why might you choose to stop taking your medicine? · You are feeling better, and you and the doctor agree that it is time to stop.   · You have been taking the medicine for at least 6 months after you feel better. · You are having counseling to help you cope with problems and help change how you think and feel. · You are not worried about the depression coming back. Why might you choose not to stop your medicine? · You are not yet feeling better. · You have not taken the medicine for at least 6 months after you feel better. · You need to make a plan to stop taking the medicine when you and your doctor think you are ready. · You are worried that the depression may come back. Your decision  Thinking about the facts and your feelings can help you make a decision that is right for you. Be sure you understand the benefits and risks of your options, and think about what else you need to do before you make the decision. Where can you learn more? Go to http://annamaria-lexii.info/  Enter T544 in the search box to learn more about \"Deciding About Stopping Your Antidepressant. \"  Current as of: May 28, 2019Content Version: 12.4  © 6374-9273 Healthwise, Incorporated. Care instructions adapted under license by [employer] (which disclaims liability or warranty for this information). If you have questions about a medical condition or this instruction, always ask your healthcare professional. [address] any warranty or liability for your use of this information. DISCHARGE SUMMARY    NAME:Halina Stock  : 1985  MRN: [id number]    The patient Emy Morrow exhibits the ability to control behavior in a less restrictive environment. Patient's level of functioning is improving. No assaultive/destructive behavior has been observed for the past 24 hours. No suicidal/homicidal threat or behavior has been observed for the past 24 hours. There is no evidence of serious medication side effects. Patient has not been in physical or protective restraints for at least the past 24 hours. If weapons involved, how are they secured?   No weapons involved. Is patient aware of and in agreement with discharge plan? Yes    Arrangements for medication:  Prescriptions sent to [facility]. Copy of discharge instructions to provider?:  [facility]    Arrangements for transportation home:  [place]    Keep all follow up appointments as scheduled, continue to take prescribed medications per physician instructions. Mental health crisis number:  107 or your local mental health crisis line number at [phone].

## 2020-05-05 NOTE — PROGRESS NOTES
0800: Assumed care of patient after receiving shift report from outgoing nurse. 1200: Beryl Burrows has been out and visible on unit, interacts upon approach. She states she ate some pineapple for breakfast and some veggies and noodles for lunch. Pt makes good eye contact. A&O x 4. Affect is blunted, mood is generally pleasant. Hygiene is adequate, independent in ADLs. Gait is steady. Sleep and appetite patterns WNL. Attending some groups with participation. No evidence of anxiety. Denies SI/HI but does endorse depression that she is willing to accept follow . Denies hallucinations at present. Verbally contracts for safety with good eye contact. Pt encouraged to continue to participate in care. Will continue to monitor pt with q 15 min checks. 1420: Patient has discharge order. She denies SI and verbally contracts for safety. Reviewed all discharge instructions and follow up appointments with Beryl Burrows and she verbalizes understanding. Awaiting lyft ride. Will walk to transportation when they arrive.        Problem: Suicide  Goal: *STG/LTG: Complies with medication therapy  Outcome: Progressing Towards Goal     Problem: Suicide  Goal: *STG/LTG: No longer expresses self destructive or suicidal thoughts  Outcome: Progressing Towards Goal

## 2020-05-05 NOTE — PROGRESS NOTES
Problem: Depressed Mood (Adult/Pediatric)  Goal: *STG: Complies with medication therapy  Outcome: Progressing Towards Goal     Problem: Suicide  Goal: *STG: Remains safe in hospital  5/5/2020 0044 by Tc Greco  Outcome: Progressing Towards Goal  Goal: *STG: Seeks staff when feelings of self harm or harm towards others arise  Outcome: Progressing Towards Goal     Problem: Falls - Risk of  Goal: *Absence of Falls  Description: Document Michele Villarreal Fall Risk and appropriate interventions in the flowsheet. Outcome: Progressing Towards Goal  Note: Fall Risk Interventions: Pt reminded of side effects of medications that could make her lightheaded; importance of wearing non slip socks; keeping room free of clutter. Pt states understanding.    Medication Interventions: Teach patient to arise slowly

## 2020-05-05 NOTE — DISCHARGE SUMMARY
PSYCHIATRIC DISCHARGE SUMMARY         IDENTIFICATION:    Patient Name  Ayaka Conti   Date of Birth 1985   [facility] [id number]   Medical Record Number  [id number]      Age  37 y.o. PCP Jesse Rojas NP   Admit date:  5/2/2020    Discharge date: 5/5/2020   Room Number  65/5  @ [address]   Date of Service  5/5/2020            TYPE OF DISCHARGE: REGULAR               CONDITION AT DISCHARGE: improved       PROVISIONAL & DISCHARGE DIAGNOSES:    Problem List  Date Reviewed: 1/16/2020          Codes Class    Post-traumatic stress disorder, acute ICD-10-CM: F43.11  ICD-9-CM: 309.81         Severe obesity ([address] ICD-10-CM: E66.01  ICD-9-CM: 278.01         Pregnancy ICD-10-CM: Z34.90  ICD-9-CM: V22.2         Pregnant ICD-10-CM: Z34.90  ICD-9-CM: V22.2         Cervical incompetence ICD-10-CM: N88.3  ICD-9-CM: 622.5         Incompetent cervix ICD-10-CM: N88.3  ICD-9-CM: 622.5         Sleep disturbance ICD-10-CM: G47.9  ICD-9-CM: 780.50         Non compliance w medication regimen ICD-10-CM: Z91.14  ICD-9-CM: V15.81         * (Principal) MDD (major depressive disorder), recurrent episode, moderate (HCC) (Chronic) ICD-10-CM: F33.1  ICD-9-CM: 296.32         Unspecified essential hypertension ICD-10-CM: I10  ICD-9-CM: 401.9         Back pain, chronic ICD-10-CM: M54.9, G89.29  ICD-9-CM: 724.5, 338.29         Asthma ICD-10-CM: J45.909  ICD-9-CM: 493.90         Microcytic anemia ICD-10-CM: D50.9  ICD-9-CM: 280.9         Anxiety ICD-10-CM: F41.9  ICD-9-CM: 300.00               Active Hospital Problems    Post-traumatic stress disorder, acute      *MDD (major depressive disorder), recurrent episode, moderate (HCC)      Microcytic anemia        DISCHARGE DIAGNOSIS:   Axis I:  SEE ABOVE  Axis II: SEE ABOVE  Axis III: SEE ABOVE  Axis IV:  lack of structure  Axis V:  <50 on admission, 55+ on discharge     CC & HISTORY OF PRESENT ILLNESS:  37 y.o.   [address] female with a past psychiatric history significant for major depressive disorder, who presents at this time with complaints of (and/or evidence of) the following emotional symptoms: suicidal thoughts/threats and anxiety. Additional symptomatology include worsening self care, weight loss of 25 lbs < 4 months. The above symptoms have been present for 2+ weeks. These symptoms are of moderate to high severity. These symptoms are constant in nature. The patient's condition has been precipitated by psychosocial stressors.  UDS: negative; BAL=0.     SOCIAL HISTORY:    Social History     Socioeconomic History    Marital status: SINGLE     Spouse name: Not on file    Number of children: Not on file    Years of education: Not on file    Highest education level: Not on file   Occupational History    Not on file   Social Needs    Financial resource strain: Not on file    Food insecurity     Worry: Not on file     Inability: Not on file    Transportation needs     Medical: Not on file     Non-medical: Not on file   Tobacco Use    Smoking status: Never Smoker    Smokeless tobacco: Never Used   Substance and Sexual Activity    Alcohol use: Yes     Frequency: Monthly or less     Drinks per session: 1 or 2     Binge frequency: Never    Drug use: No    Sexual activity: Yes     Partners: Male   Lifestyle    Physical activity     Days per week: Not on file     Minutes per session: Not on file    Stress: Not on file   Relationships    Social connections     Talks on phone: Not on file     Gets together: Not on file     Attends Alevism service: Not on file     Active member of club or organization: Not on file     Attends meetings of clubs or organizations: Not on file     Relationship status: Not on file    Intimate partner violence     Fear of current or ex partner: Not on file     Emotionally abused: Not on file     Physically abused: Not on file     Forced sexual activity: Not on file   Other Topics Concern    Not on file   Social History Narrative    Sujata Weinberg lives with boyfriend, Francisco Crain,  reportedly with alcohol dependence. She was raised by her mother. She has no siblings. Not working. She is supported financially by the childrens' father and public support. She has no hobbies outside of her children. She has a 12th grade education and no legal entanglements. FAMILY HISTORY:   Family History   Problem Relation Age of Onset   [address] [facility] Arthritis-rheumatoid Mother     Asthma Mother     No Known Problems Father     No Known Problems Maternal Grandmother     No Known Problems Maternal Grandfather     No Known Problems Paternal Grandmother     No Known Problems Paternal Grandfather     Asthma Son     Alcohol abuse Neg Hx     Arthritis-osteo Neg Hx     Bleeding Prob Neg Hx     Cancer Neg Hx     Diabetes Neg Hx     Elevated Lipids Neg Hx     Headache Neg Hx     Heart Disease Neg Hx     Hypertension Neg Hx     Lung Disease Neg Hx     Migraines Neg Hx     Psychiatric Disorder Neg Hx     Stroke Neg Hx     Mental Retardation Neg Hx     Anesth Problems Neg Hx              HOSPITALIZATION COURSE:    Ivy Roth was admitted to the inpatient psychiatric unit [facility] for acute psychiatric stabilization in regards to symptomatology as described in the HPI above. The differential diagnosis at time of admission included: schizophrenia vs substance induced psychotic disorder schizoaffective vs bipolar MDD vs adjustment disorder. While on the unit Ivy Roth was involved in individual, group, occupational and milieu therapy. Psychiatric medications were adjusted during this hospitalization. Ivy Roth demonstrated a progressive improvement in overall condition. Much of patient's initial presentation appeared to be related to situational stressors, effects of medication non-compliance and psychological factors.   Please see individual progress notes for more specific details regarding patient's hospitalization course. Albertina Parsons' reports feeling well and moods are good. Denies SI/HI/AH/VH. No aggression or violence. Appropriately interactive and aware. Tolerating medications well. Eating and sleeping fairly. Patient with request for discharge today. There are no grounds to seek a TDO. At time of discharge, Albertina Parsons is without significant problems of depression, psychosis, or riley. Patient free of suicidal and homicidal ideations (appears to be at very low risk of suicide or homicide) and reports many positive predictive factors in terms of not attempting suicide or homicide. Overall presentation at time of discharge is most consistent with the diagnosis of Major Depressive Disorder Recurrent moderate, PTSD. Patient has maximized benefit to be derived from acute inpatient psychiatric treatment. All members of the treatment team concur with each other in regards to plans for discharge today. Patient and family are aware and in agreement with discharge and discharge plan.          LABS AND IMAGAING:    Labs Reviewed   METABOLIC PANEL, COMPREHENSIVE - Abnormal; Notable for the following components:       Result Value    BUN 5 (*)     BUN/Creatinine ratio 6 (*)     AST (SGOT) 13 (*)     A-G Ratio 0.9 (*)     All other components within normal limits   CBC WITH AUTOMATED DIFF - Abnormal; Notable for the following components:    RBC 5.21 (*)     HGB 10.7 (*)     MCV 67.4 (*)     MCH 20.5 (*)     RDW 17.4 (*)     All other components within normal limits     No results found for: DS35, PHEN, PHENO, PHENT, DILF, DS39, PHENY, PTN, VALF2, VALAC, VALP, VALPR, DS6, CRBAM, CRBAMP, CARB2, XCRBAM  Admission on 05/02/2020   Component Date Value Ref Range Status    Sodium 05/05/2020 137  136 - 145 mmol/L Final    Potassium 05/05/2020 3.8  3.5 - 5.1 mmol/L Final    Chloride 05/05/2020 101  97 - 108 mmol/L Final    CO2 05/05/2020 28  21 - 32 mmol/L Final    Anion gap 05/05/2020 8  5 - 15 mmol/L Final    Glucose 05/05/2020 78  65 - 100 mg/dL Final    BUN 05/05/2020 5* 6 - 20 MG/DL Final    Creatinine 05/05/2020 0.82  0.55 - 1.02 MG/DL Final    BUN/Creatinine ratio 05/05/2020 6* 12 - 20   Final    GFR est AA 05/05/2020 >60  >60 ml/min/1.73m2 Final    GFR est non-AA 05/05/2020 >60  >60 ml/min/1.73m2 Final    Calcium 05/05/2020 8.9  8.5 - 10.1 MG/DL Final    Bilirubin, total 05/05/2020 0.4  0.2 - 1.0 MG/DL Final    ALT (SGPT) 05/05/2020 15  12 - 78 U/L Final    AST (SGOT) 05/05/2020 13* 15 - 37 U/L Final    Alk. phosphatase 05/05/2020 53  45 - 117 U/L Final    Protein, total 05/05/2020 7.4  6.4 - 8.2 g/dL Final    Albumin 05/05/2020 3.6  3.5 - 5.0 g/dL Final    Globulin 05/05/2020 3.8  2.0 - 4.0 g/dL Final    A-G Ratio 05/05/2020 0.9* 1.1 - 2.2   Final    WBC 05/05/2020 8.3  3.6 - 11.0 K/uL Final    RBC 05/05/2020 5.21* 3.80 - 5.20 M/uL Final    HGB 05/05/2020 10.7* 11.5 - 16.0 g/dL Final    HCT 05/05/2020 35.1  35.0 - 47.0 % Final    MCV 05/05/2020 67.4* 80.0 - 99.0 FL Final    MCH 05/05/2020 20.5* 26.0 - 34.0 PG Final    MCHC 05/05/2020 30.5  30.0 - 36.5 g/dL Final    RDW 05/05/2020 17.4* 11.5 - 14.5 % Final    PLATELET 58/28/1695 442  150 - 400 K/uL Final    NRBC 05/05/2020 0.0  0  WBC Final    ABSOLUTE NRBC 05/05/2020 0.00  0.00 - 0.01 K/uL Final    NEUTROPHILS 05/05/2020 56  32 - 75 % Final    LYMPHOCYTES 05/05/2020 32  12 - 49 % Final    MONOCYTES 05/05/2020 8  5 - 13 % Final    EOSINOPHILS 05/05/2020 3  0 - 7 % Final    BASOPHILS 05/05/2020 1  0 - 1 % Final    IMMATURE GRANULOCYTES 05/05/2020 0  0.0 - 0.5 % Final    ABS. NEUTROPHILS 05/05/2020 4.5  1.8 - 8.0 K/UL Final    ABS. LYMPHOCYTES 05/05/2020 2.7  0.8 - 3.5 K/UL Final    ABS. MONOCYTES 05/05/2020 0.7  0.0 - 1.0 K/UL Final    ABS. EOSINOPHILS 05/05/2020 0.3  0.0 - 0.4 K/UL Final    ABS. BASOPHILS 05/05/2020 0.1  0.0 - 0.1 K/UL Final    ABS. IMM.  GRANS. 05/05/2020 0.0 0.00 - 0.04 K/UL Final    DF 05/05/2020 SMEAR SCANNED    Final    RBC COMMENTS 05/05/2020     Final                    Value:ANISOCYTOSIS  1+     Admission on 05/02/2020, Discharged on 05/02/2020   Component Date Value Ref Range Status    WBC 05/02/2020 8.2  3.6 - 11.0 K/uL Final    RBC 05/02/2020 4.99  3.80 - 5.20 M/uL Final    HGB 05/02/2020 10.3* 11.5 - 16.0 g/dL Final    HCT 05/02/2020 34.1* 35.0 - 47.0 % Final    MCV 05/02/2020 68.3* 80.0 - 99.0 FL Final    MCH 05/02/2020 20.6* 26.0 - 34.0 PG Final    MCHC 05/02/2020 30.2  30.0 - 36.5 g/dL Final    RDW 05/02/2020 17.6* 11.5 - 14.5 % Final    PLATELET 08/15/1442 419  150 - 400 K/uL Final    MPV 05/02/2020 11.4  8.9 - 12.9 FL Final    NRBC 05/02/2020 0.0  0  WBC Final    ABSOLUTE NRBC 05/02/2020 0.00  0.00 - 0.01 K/uL Final    NEUTROPHILS 05/02/2020 76* 32 - 75 % Final    LYMPHOCYTES 05/02/2020 15  12 - 49 % Final    MONOCYTES 05/02/2020 7  5 - 13 % Final    EOSINOPHILS 05/02/2020 1  0 - 7 % Final    BASOPHILS 05/02/2020 1  0 - 1 % Final    IMMATURE GRANULOCYTES 05/02/2020 0  0.0 - 0.5 % Final    ABS. NEUTROPHILS 05/02/2020 6.2  1.8 - 8.0 K/UL Final    ABS. LYMPHOCYTES 05/02/2020 1.2  0.8 - 3.5 K/UL Final    ABS. MONOCYTES 05/02/2020 0.6  0.0 - 1.0 K/UL Final    ABS. EOSINOPHILS 05/02/2020 0.1  0.0 - 0.4 K/UL Final    ABS. BASOPHILS 05/02/2020 0.1  0.0 - 0.1 K/UL Final    ABS. IMM.  GRANS. 05/02/2020 0.0  0.00 - 0.04 K/UL Final    DF 05/02/2020 SMEAR SCANNED    Final    PLATELET COMMENTS 55/61/6427 Large Platelets    Final    RBC COMMENTS 05/02/2020     Final                    Value:ANISOCYTOSIS  1+      RBC COMMENTS 05/02/2020     Final                    Value:MICROCYTOSIS  2+      RBC COMMENTS 05/02/2020     Final                    Value:HYPOCHROMIA  2+      RBC COMMENTS 05/02/2020     Final                    Value:OVALOCYTES  PRESENT      Sodium 05/02/2020 138  136 - 145 mmol/L Final    Potassium 05/02/2020 3.8  3.5 - 5.1 mmol/L Final    Chloride 05/02/2020 108  97 - 108 mmol/L Final    CO2 05/02/2020 25  21 - 32 mmol/L Final    Anion gap 05/02/2020 5  5 - 15 mmol/L Final    Glucose 05/02/2020 94  65 - 100 mg/dL Final    BUN 05/02/2020 3* 6 - 20 MG/DL Final    Creatinine 05/02/2020 0.81  0.55 - 1.02 MG/DL Final    BUN/Creatinine ratio 05/02/2020 4* 12 - 20   Final    GFR est AA 05/02/2020 >60  >60 ml/min/1.73m2 Final    GFR est non-AA 05/02/2020 >60  >60 ml/min/1.73m2 Final    Calcium 05/02/2020 9.1  8.5 - 10.1 MG/DL Final    Bilirubin, total 05/02/2020 0.5  0.2 - 1.0 MG/DL Final    ALT (SGPT) 05/02/2020 18  12 - 78 U/L Final    AST (SGOT) 05/02/2020 14* 15 - 37 U/L Final    Alk.  phosphatase 05/02/2020 59  45 - 117 U/L Final    Protein, total 05/02/2020 8.1  6.4 - 8.2 g/dL Final    Albumin 05/02/2020 3.8  3.5 - 5.0 g/dL Final    Globulin 05/02/2020 4.3* 2.0 - 4.0 g/dL Final    A-G Ratio 05/02/2020 0.9* 1.1 - 2.2   Final    Color 05/02/2020 DARK YELLOW    Final    Appearance 05/02/2020 CLOUDY* CLEAR   Final    Specific gravity 05/02/2020 >1.030   Final    pH (UA) 05/02/2020 5.0  5.0 - 8.0   Final    Protein 05/02/2020 100* NEG mg/dL Final    Glucose 05/02/2020 Negative  NEG mg/dL Final    Ketone 05/02/2020 80* NEG mg/dL Final    Blood 05/02/2020 Negative  NEG   Final    Urobilinogen 05/02/2020 1.0  0.2 - 1.0 EU/dL Final    Nitrites 05/02/2020 Negative  NEG   Final    Leukocyte Esterase 05/02/2020 Negative  NEG   Final    WBC 05/02/2020 0-4  0 - 4 /hpf Final    RBC 05/02/2020 0-5  0 - 5 /hpf Final    Epithelial cells 05/02/2020 MODERATE* FEW /lpf Final    Bacteria 05/02/2020 1+* NEG /hpf Final    Mucus 05/02/2020 TRACE* NEG /lpf Final    AMPHETAMINES 05/02/2020 Negative  NEG   Final    BARBITURATES 05/02/2020 Negative  NEG   Final    BENZODIAZEPINES 05/02/2020 Negative  NEG   Final    COCAINE 05/02/2020 Negative  NEG   Final    METHADONE 05/02/2020 Negative  NEG   Final    OPIATES 05/02/2020 Negative  NEG   Final    PCP(PHENCYCLIDINE) 05/02/2020 Negative  NEG   Final    THC (TH-CANNABINOL) 05/02/2020 Negative  NEG   Final    Drug screen comment 05/02/2020 (NOTE)   Final    ALCOHOL(ETHYL),SERUM 05/02/2020 <10  <10 MG/DL Final    Acetaminophen level 05/02/2020 <2* 10 - 30 ug/mL Final    Salicylate level 54/39/3796 <1.7* 2.8 - 20.0 MG/DL Final    Pregnancy test,urine (POC) 05/02/2020 Negative  NEG   Final    Urine culture hold 05/02/2020 Urine on hold in Microbiology dept for 2 days. If unpreserved urine is submitted, it cannot be used for addtional testing after 24 hours, recollection will be required. Final    Bilirubin UA, confirm 05/02/2020 Negative    Final     No results found. DISPOSITION:    Home. Patient to f/u with  psychiatric, and psychotherapy appointments. Patient is to f/u with internist as directed. FOLLOW-UP CARE:    Activity as tolerated  Regular diet  Wound Care: none needed. Follow-up Information     Follow up With Specialties Details Why [address], [address] Lives and [address] [facility] [address]  [place], [address] [place] [address]  [phone]    Janeth Bills NP Nurse Practitioner   [address]  [address]  [phone]                   PROGNOSIS:   Jenny Kim ---- based on nature of patient's pathology/ies and treatment compliance issues. Prognosis is greatly dependent upon patient's ability to remain sober and to follow up with scheduled appointments as well as to comply with psychiatric medications as prescribed. DISCHARGE MEDICATIONS:     Informed consent given for the use of following psychotropic medications:  Current Discharge Medication List      START taking these medications    Details   QUEtiapine (SEROquel) 50 mg tablet Take 1 Tab by mouth nightly.  Indications: additional medications to treat depression  Qty: 30 Tab, Refills: 0      prazosin (MINIPRESS) 1 mg capsule Take 1 Cap by mouth nightly. Indications: posttraumatic stress syndrome  Qty: 30 Cap, Refills: 0      OLANZapine (ZyPREXA) 5 mg tablet Take 1 Tab by mouth every six (6) hours as needed for Other (For agitation or psychosis). Indications: additional medications to treat depression  Qty: 120 Tab, Refills: 0         CONTINUE these medications which have CHANGED    Details   Blood Pressure Kit-Extra Large kit Check BP 3-4 times weekly. Indications: Blood pressure evaluation  Qty: 1 Kit, Refills: 0    Associated Diagnoses: Elevated blood pressure reading      triamcinolone acetonide (KENALOG) 0.1 % topical cream Apply  to affected area two (2) times a day. use thin layer to affected area on arms  Indications: a type of allergy that causes red and itchy skin called atopic dermatitis  Qty: 30 g, Refills: 0    Associated Diagnoses: Rash      sertraline (Zoloft) 100 mg tablet Take 1.5 Tabs by mouth daily. Indications: major depressive disorder  Qty: 30 Tab, Refills: 0      albuterol (PROVENTIL HFA, VENTOLIN HFA, PROAIR HFA) 90 mcg/actuation inhaler Take 2 Puffs by inhalation every six (6) hours as needed for Wheezing. Needs office visit for further refills  Indications: asthma attack  Qty: 1 Inhaler, Refills: 0    Associated Diagnoses: Mild intermittent asthma without complication         CONTINUE these medications which have NOT CHANGED    Details   fluticasone propionate (FLONASE) 50 mcg/actuation nasal spray 2 Sprays by Both Nostrils route daily. Qty: 1 Bottle, Refills: 2    Associated Diagnoses: Non-seasonal allergic rhinitis, unspecified trigger      prenatal vit-iron fumarate-fa 27 mg iron- 0.8 mg tab tablet       traZODone (DESYREL) 50 mg tablet Take 1 Tab by mouth nightly. Indications: insomnia associated with depression  Qty: 60 Tab, Refills: 0    Associated Diagnoses: Severe episode of recurrent major depressive disorder, without psychotic features ([address]  Insomnia due to other mental disorder      multivitamin (ONE A DAY) tablet Take 1 Tab by mouth daily. ibuprofen (MOTRIN) 800 mg tablet Take 1 Tab by mouth every eight (8) hours as needed for Pain. Qty: 30 Tab, Refills: 0      cetirizine (ZYRTEC) 10 mg tablet Take 1 Tab by mouth daily. Qty: 90 Tab, Refills: 0    Associated Diagnoses: Non-seasonal allergic rhinitis, unspecified trigger      azelastine (ASTELIN) 137 mcg (0.1 %) nasal spray 1 [place] by Both Nostrils route two (2) times a day. Use in each nostril as directed  Qty: 1 Bottle, Refills: 1    Associated Diagnoses: Non-seasonal allergic rhinitis, unspecified trigger                    A coordinated, multidisplinary treatment team round was conducted with Andrew Partidaet is done daily here at [facility]. This team consists of the nurse, psychiatric unit pharmacist,  and Jmamie Chaffee. I have spent greater than 35 minutes on discharge work.     Signed:  Lucho Perez MD  5/5/2020

## 2020-05-05 NOTE — BH NOTES
Behavioral Health Transition Record to Provider    Patient Name: Alix Ricci  YOB: 1985  Medical Record Number: [id number]  Date of Admission: 5/2/2020  Date of Discharge: 5/5/2020    Attending Provider: Lobo Palm MD  Discharging Provider: Lobo Palm MD  To contact this individual call [phone] and ask the  to page. If unavailable, ask to be transferred to [facility] Provider on call. [facility] Provider will be available on call 24/7 and during holidays. Primary Care Provider: Travis Ramirez NP    Allergies   Allergen Reactions    Labetalol Hives    Ceclor [Cefaclor] Unknown (comments)    Pcn [Penicillins] Hives    Septra [Sulfamethoprim Ds] Unknown (comments)       Reason for Admission: Pt admitted under a voluntary basis for suicidal ideations proving to be an imminent danger to self and an inability to care for self. Admission Diagnosis: Major depression [F32.9]    * No surgery found *    Results for orders placed or performed during the hospital encounter of 87/08/54   METABOLIC PANEL, COMPREHENSIVE   Result Value Ref Range    Sodium 137 136 - 145 mmol/L    Potassium 3.8 3.5 - 5.1 mmol/L    Chloride 101 97 - 108 mmol/L    CO2 28 21 - 32 mmol/L    Anion gap 8 5 - 15 mmol/L    Glucose 78 65 - 100 mg/dL    BUN 5 (L) 6 - 20 MG/DL    Creatinine 0.82 0.55 - 1.02 MG/DL    BUN/Creatinine ratio 6 (L) 12 - 20      GFR est AA >60 >60 ml/min/1.73m2    GFR est non-AA >60 >60 ml/min/1.73m2    Calcium 8.9 8.5 - 10.1 MG/DL    Bilirubin, total 0.4 0.2 - 1.0 MG/DL    ALT (SGPT) 15 12 - 78 U/L    AST (SGOT) 13 (L) 15 - 37 U/L    Alk.  phosphatase 53 45 - 117 U/L    Protein, total 7.4 6.4 - 8.2 g/dL    Albumin 3.6 3.5 - 5.0 g/dL    Globulin 3.8 2.0 - 4.0 g/dL    A-G Ratio 0.9 (L) 1.1 - 2.2     CBC WITH AUTOMATED DIFF   Result Value Ref Range    WBC 8.3 3.6 - 11.0 K/uL    RBC 5.21 (H) 3.80 - 5.20 M/uL    HGB 10.7 (L) 11.5 - 16.0 g/dL    HCT 35.1 35.0 - 47.0 %    MCV 67.4 (L) 80.0 - 99.0 FL    MCH 20.5 (L) 26.0 - 34.0 PG    MCHC 30.5 30.0 - 36.5 g/dL    RDW 17.4 (H) 11.5 - 14.5 %    PLATELET 699 436 - 131 K/uL    NRBC 0.0 0  WBC    ABSOLUTE NRBC 0.00 0.00 - 0.01 K/uL    NEUTROPHILS 56 32 - 75 %    LYMPHOCYTES 32 12 - 49 %    MONOCYTES 8 5 - 13 %    EOSINOPHILS 3 0 - 7 %    BASOPHILS 1 0 - 1 %    IMMATURE GRANULOCYTES 0 0.0 - 0.5 %    ABS. NEUTROPHILS 4.5 1.8 - 8.0 K/UL    ABS. LYMPHOCYTES 2.7 0.8 - 3.5 K/UL    ABS. MONOCYTES 0.7 0.0 - 1.0 K/UL    ABS. EOSINOPHILS 0.3 0.0 - 0.4 K/UL    ABS. BASOPHILS 0.1 0.0 - 0.1 K/UL    ABS. IMM. GRANS. 0.0 0.00 - 0.04 K/UL    DF SMEAR SCANNED      RBC COMMENTS ANISOCYTOSIS  1+           Immunizations administered during this encounter:   Immunization History   Administered Date(s) Administered    (RETIRED) Pneumococcal Vaccine (Unspecified Type) 09/14/2012    Influenza Vaccine PF 10/22/2013    TDAP Vaccine 09/14/2012    Tdap 03/02/2018       Screening for Metabolic Disorders for Patients on Antipsychotic Medications  (Data obtained from the EMR)    Estimated Body Mass Index  Estimated body mass index is 37.44 kg/m² as calculated from the following:    Height as of an earlier encounter on 5/2/20: 5' 5\" (1.651 m). Weight as of an earlier encounter on 5/2/20: 102.1 kg (225 lb).      Vital Signs/Blood Pressure  Visit Vitals  /56 (BP Patient Position: Sitting)   Pulse 67   Temp 98.2 °F (36.8 °C)   Resp 18   LMP 03/27/2020 (LMP Unknown)   SpO2 100%       Blood Glucose/Hemoglobin A1c  Lab Results   Component Value Date/Time    Glucose 78 05/05/2020 05:40 AM       Lab Results   Component Value Date/Time    Hemoglobin A1c 5.2 02/18/2020 11:08 AM        Lipid Panel  Lab Results   Component Value Date/Time    Cholesterol, total 172 02/18/2020 11:08 AM    HDL Cholesterol 52 02/18/2020 11:08 AM    LDL, calculated 97 02/18/2020 11:08 AM    Triglyceride 113 02/18/2020 11:08 AM        Discharge Diagnosis: Major depressive disorder, recurrent episode, moderate, chronic (ICD-10-CM: F33.1); PTSD (ICD-10-CM: F43.11)    Discharge Plan: Patient discharged home via [facility]. DISCHARGE SUMMARY    NAME:Halina Shrestha  : 1985  MRN: [id number]    The patient Simon Buddle exhibits the ability to control behavior in a less restrictive environment. Patient's level of functioning is improving. No assaultive/destructive behavior has been observed for the past 24 hours. No suicidal/homicidal threat or behavior has been observed for the past 24 hours. There is no evidence of serious medication side effects. Patient has not been in physical or protective restraints for at least the past 24 hours. If weapons involved, how are they secured? No weapons involved. Is patient aware of and in agreement with discharge plan? Yes    Arrangements for medication:  Prescriptions sent to [address] [place] [address]. Copy of discharge instructions to provider?:  [facility]    Arrangements for transportation home:  [place]    Keep all follow up appointments as scheduled, continue to take prescribed medications per physician instructions. Mental health crisis number:  076 or your local mental health crisis line number at [phone]. Discharge Medication List and Instructions:   Discharge Medication List as of 2020  2:06 PM      START taking these medications    Details   QUEtiapine (SEROquel) 50 mg tablet Take 1 Tab by mouth nightly. Indications: additional medications to treat depression, Normal, Disp-30 Tab, R-0      prazosin (MINIPRESS) 1 mg capsule Take 1 Cap by mouth nightly. Indications: posttraumatic stress syndrome, Normal, Disp-30 Cap, R-0      OLANZapine (ZyPREXA) 5 mg tablet Take 1 Tab by mouth every six (6) hours as needed for Other (For agitation or psychosis).  Indications: additional medications to treat depression, Normal, Disp-120 Tab, R-0         CONTINUE these medications which have CHANGED    Details Blood Pressure Kit-Extra Large kit Check BP 3-4 times weekly. Indications: Blood pressure evaluation, Normal, Disp-1 Kit, R-0      triamcinolone acetonide (KENALOG) 0.1 % topical cream Apply  to affected area two (2) times a day. use thin layer to affected area on arms  Indications: a type of allergy that causes red and itchy skin called atopic dermatitis, Normal, Disp-30 g, R-0      sertraline (Zoloft) 100 mg tablet Take 1.5 Tabs by mouth daily. Indications: major depressive disorder, Normal, Disp-30 Tab, R-0      albuterol (PROVENTIL HFA, VENTOLIN HFA, PROAIR HFA) 90 mcg/actuation inhaler Take 2 Puffs by inhalation every six (6) hours as needed for Wheezing. Needs office visit for further refills  Indications: asthma attack, Normal, Disp-1 Inhaler, R-0         CONTINUE these medications which have NOT CHANGED    Details   fluticasone propionate (FLONASE) 50 mcg/actuation nasal spray 2 Sprays by Both Nostrils route daily. , Normal, Disp-1 Bottle, R-2      prenatal vit-iron fumarate-fa 27 mg iron- 0.8 mg tab tablet Historical Med      traZODone (DESYREL) 50 mg tablet Take 1 Tab by mouth nightly. Indications: insomnia associated with depression, Normal, Disp-60 Tab, R-0      multivitamin (ONE A DAY) tablet Take 1 Tab by mouth daily. , Historical Med      ibuprofen (MOTRIN) 800 mg tablet Take 1 Tab by mouth every eight (8) hours as needed for Pain., Print, Disp-30 Tab, R-0      cetirizine (ZYRTEC) 10 mg tablet Take 1 Tab by mouth daily. , Normal, Disp-90 Tab, R-0      azelastine (ASTELIN) 137 mcg (0.1 %) nasal spray 1 [place] by Both Nostrils route two (2) times a day.  Use in each nostril as directed, Normal, Disp-1 Bottle, R-1             Unresulted Labs (24h ago, onward)    None        To obtain results of studies pending at discharge, please contact [phone]    Follow-up Information     Follow up With Specialties Details Why Contact Lori Purdy  On 5/7/2020 You have a 10:00am appointment for individual therapy. [facility], [address], [address]  [phone]    Rolando Bragg NP Nurse Practitioner   [address] [facility] [address]  [phone]      Faith Gay  On 5/12/2020 You have a 10:00am tele-psychiatry meeting for medication management. [facility]  [address], [address]  [phone]          Advanced Directive:   Does the patient have an appointed surrogate decision maker? No  Does the patient have a Medical Advance Directive? No  Does the patient have a Psychiatric Advance Directive? No  If the patient does not have a surrogate or Medical Advance Directive AND Psychiatric Advance Directive, the patient was offered information on these advance directives Yes and Patient declined to complete    Patient Instructions: Please continue all medications until otherwise directed by physician. Tobacco Cessation Discharge Plan:   Is the patient a smoker and needs referral for smoking cessation? No  Patient referred to the following for smoking cessation with an appointment? Not applicable     Patient was offered medication to assist with smoking cessation at discharge? Not applicable  Was education for smoking cessation added to the discharge instructions? Yes    Alcohol/Substance Abuse Discharge Plan:   Does the patient have a history of substance/alcohol abuse and requires a referral for treatment? No  Patient referred to the following for substance/alcohol abuse treatment with an appointment? Not applicable  Patient was offered medication to assist with alcohol cessation at discharge? Not applicable  Was education for substance/alcohol abuse added to discharge instructions? No    Patient discharged to Home; discussed with patient/caregiver and provided to the patient/caregiver either in hard copy or electronically.

## 2020-05-05 NOTE — PROGRESS NOTES
Received pt at 1 on 5/4/20. A&Ox4. VSS. Voices no medical c/o. Pt in room lying down on bed at time of assessment. Answered questions politey with brief answers. Affect very flat, mood depressed, thought process mostly clear and logical. Denies SI/HI or A/V Hallucinations or Delusions. Pt very withdrawn - stayed in room all evening. Had an extremely difficult time sleeping. States she only slept a couple of hours maybe early in the evening and then got up OOB around [phone] stating she's unable to sleep and hasn't really slept all night. Has some anxiety at present, although mild. Offered Atarax and was given 50mg PO at 0251. At 0400, pt still awake in room and states she just can't sleep. Reports overall anxiety has decreased some but is still significant enough to cause physical Sx such as heart palpitations, substernal chest pain, slight tremors and insomnia. Pt had minimal sleep last night and was often found sitting up in her bed during rounds. Educated patient on relaxation techniques she can use when feeling anxious. Stated understanding and said she's used almost all of them before and they don't seem to help. Pt understands she has lab draw of CBC and CMP this AM - labs drawn at 0545 - CMP gold top and CBC lavender top - both taken down to lab. Slept 5 hrs of broken sleep, although pt states a lot of the time she was lying down in bed and her eyes were closed, she was still awake.

## 2020-05-06 ENCOUNTER — PATIENT OUTREACH (OUTPATIENT)
Dept: FAMILY MEDICINE CLINIC | Age: 35
End: 2020-05-06

## 2020-05-06 NOTE — PROGRESS NOTES
Patient contacted regarding recent discharge and COVID-19 risk   Care Transition Nurse/ Ambulatory Care Manager contacted the patient by telephone to perform post discharge assessment. Verified name and  with patient as identifiers. Patient has following risk factors of: asthma. CTN/ACM reviewed discharge instructions, medical action plan and red flags related to discharge diagnosis. Reviewed and educated them on any new and changed medications related to discharge diagnosis. Advised obtaining a 90-day supply of all daily and as-needed medications. Education provided regarding infection prevention, and signs and symptoms of COVID-19 and when to seek medical attention with patient who verbalized understanding. Discussed exposure protocols and quarantine from [address] you at higher risk for severe illness  and given an opportunity for questions and concerns. The patient agrees to contact the COVID-19 hotline [phone] or PCP office for questions related to their healthcare. CTN/ACM provided contact information for future reference. From CDC: Are you at higher risk for severe illness?  Wash your hands often.  Avoid close contact (6 feet, which is about two arm lengths) with people who are sick.  Put distance between yourself and other people if COVID-19 is spreading in your community.  Clean and disinfect frequently touched surfaces.  Avoid all cruise travel and non-essential air travel.  Call your healthcare professional if you have concerns about COVID-19 and your underlying condition or if you are sick.     For more information on steps you can take to protect yourself, see CDC's How to Protect Yourself      Patient/family/caregiver given information for Ruth Ann Colon and agrees to enroll no  Patient's preferred e-mail:  n/a  Patient's preferred phone number: n/a  Based on Loop alert triggers, patient will be contacted by nurse care manager for worsening symptoms. Plan for follow-up call in 7-14 days based on severity of symptoms and risk factors. Patient reports therapy appointment on 5/7/2020 and [facility] on 5/12/2020. Denies SI/HI, SOB, fever, N/V/D.

## 2020-05-14 ENCOUNTER — PATIENT OUTREACH (OUTPATIENT)
Dept: FAMILY MEDICINE CLINIC | Age: 35
End: 2020-05-14

## 2020-05-21 ENCOUNTER — PATIENT OUTREACH (OUTPATIENT)
Dept: FAMILY MEDICINE CLINIC | Age: 35
End: 2020-05-21

## 2020-05-21 NOTE — PROGRESS NOTES
Patient resolved from Transition of Care episode on 5/21/2020. ACM/CTN was unsuccessful at contacting this patient today. Patient/family was provided the following resources and education related to COVID-19 during the initial call:                         Signs, symptoms and red flags related to COVID-19            CDC exposure and quarantine guidelines            [facility] exposure contact - [phone]            Contact for their local Department of Health                 Patient has not had any additional ED or hospital visits. No further outreach scheduled with this CTN/ACM. Episode of Care resolved. Patient has this CTN/ACM contact information if future needs arise.

## 2020-07-01 ENCOUNTER — TELEPHONE (OUTPATIENT)
Dept: FAMILY MEDICINE CLINIC | Age: 35
End: 2020-07-01

## 2020-07-01 DIAGNOSIS — E16.2 HYPOGLYCEMIA: Primary | ICD-10-CM

## 2020-07-01 RX ORDER — BLOOD-GLUCOSE METER
EACH MISCELLANEOUS
Qty: 1 EACH | Refills: 0 | Status: CANCELLED | OUTPATIENT
Start: 2020-07-01

## 2020-07-01 NOTE — TELEPHONE ENCOUNTER
----- Message from Vernell Alvarez sent at 7/1/2020  2:31 PM EDT -----  Regarding: Dr. Emma Sidhu / refill  Medication Refill    Caller (if not patient):       Relationship of caller (if not patient):   request for Glucose Meter      Best contact number(s):      [phone]        Name of medication and dosage if known:   request for Glucose Meter        Is patient out of this medication (yes/no):       Pharmacy name:    [address] [place] [address] listed in chart? (yes/no): unknown  Pharmacy phone number:      Details to clarify the request:  patient stated need to have Rx sent to insurance company to get meter        Brie Walker

## 2020-07-01 NOTE — TELEPHONE ENCOUNTER
NP Walter Dee,    Patient is calling for rx to be sent to insurance for glucose meter. (may really mean BP meter?)  See request below. No mention of glucose issues from [facility] on 4/15. Attached rx for the glucose meter if appropriate. Please advise. Thanks, Brynn Ledezma    Last Visit: [facility] 4/15/20 MENA Dee  Next Appointment: Not scheduled  Previous Refill Encounter(s): None    Requested Prescriptions     Pending Prescriptions Disp Refills    Blood-Glucose Meter misc 1 Each 0     Sig: Use daily to check glucose. Pharmacist may choose meter covered by insurance.

## 2020-07-02 RX ORDER — INSULIN PUMP SYRINGE, 3 ML
EACH MISCELLANEOUS
Qty: 1 KIT | Refills: 0 | Status: ON HOLD | OUTPATIENT
Start: 2020-07-02 | End: 2020-09-03

## 2020-07-02 RX ORDER — LANCETS
EACH MISCELLANEOUS
Qty: 1 EACH | Refills: 11 | Status: ON HOLD | OUTPATIENT
Start: 2020-07-02 | End: 2020-09-03

## 2020-07-02 NOTE — TELEPHONE ENCOUNTER
Called, spoke to pt. Two pt identifiers confirmed. Patient need blood glucose machine order to monitor Hypoglycemia. She was  at [facility] Yi Landon 6/25/2020-7/1/2020 DX: Low Blood Sugar. This was one of the order from Hospital Visit. APPT: with you is 7/15/2020. [facility]    PCP: Aline Grady NP    Last appt: 4/15/2020  Future Appointments   Date Time Provider [facility]   7/15/2020 11:45 AM MENA Hazel SCHED   12/8/2020 10:00 AM Hesham Ortiz  [facility]       Requested Prescriptions     Pending Prescriptions Disp Refills    Blood-Glucose Meter misc 1 Each 0     Sig: Use daily to check glucose. Pharmacist may choose meter covered by insurance.

## 2020-07-02 NOTE — TELEPHONE ENCOUNTER
Called, left  for pt  Left message that order for glucose machine was faxed to pharmacy. Viral Puente

## 2020-07-06 ENCOUNTER — TELEPHONE (OUTPATIENT)
Dept: FAMILY MEDICINE CLINIC | Age: 35
End: 2020-07-06

## 2020-07-06 NOTE — TELEPHONE ENCOUNTER
Prescriptions for One Touch Verio and Lancets was filled at [facility] on 07/02/2020. Have patient contact pharmacy. Thank you.

## 2020-07-06 NOTE — TELEPHONE ENCOUNTER
----- Message from [employer] sent at 7/6/2020  9:03 AM EDT -----  Regarding: NP [facility] AND [facility] / Joann Hernández Message/Vendor Calls    Pt is requesting an order for :     1.  ONE TOUCH VERIO testing trips     2. LANCETS     To be called into the [facility] on file.      Callback required   Best contact number(s):  [phone]          [employer]

## 2020-07-07 RX ORDER — LANCETS
EACH MISCELLANEOUS
Qty: 1 EACH | Refills: 11 | Status: ON HOLD | OUTPATIENT
Start: 2020-07-07 | End: 2020-09-03

## 2020-07-07 RX ORDER — BLOOD SUGAR DIAGNOSTIC
STRIP MISCELLANEOUS
Qty: 100 STRIP | Refills: 1 | Status: ON HOLD | OUTPATIENT
Start: 2020-07-07 | End: 2020-09-03

## 2020-07-07 NOTE — TELEPHONE ENCOUNTER
PCP: John Vigil NP    Last appt: 4/15/2020  Future Appointments   Date Time Provider Maria Elena Reed   7/15/2020 11:45 AM John Vigil NP [facility] BLANQUITA ZAVALA   12/8/2020 10:00 AM Marleny Ortiz  [facility]       Requested Prescriptions     Pending Prescriptions Disp Refills    glucose blood VI test strips (OneTouch Verio test strips) strip 100 Strip 1     Sig: Check BS daily and PRN    lancets misc 1 Each 11     Sig: Check BG daily and PRN         Need these for new BG machine.

## 2020-07-15 ENCOUNTER — OFFICE VISIT (OUTPATIENT)
Dept: FAMILY MEDICINE CLINIC | Age: 35
End: 2020-07-15

## 2020-07-15 VITALS
TEMPERATURE: 97.5 F | WEIGHT: 179.2 LBS | BODY MASS INDEX: 29.85 KG/M2 | HEIGHT: 65 IN | SYSTOLIC BLOOD PRESSURE: 118 MMHG | DIASTOLIC BLOOD PRESSURE: 69 MMHG | RESPIRATION RATE: 18 BRPM | HEART RATE: 72 BPM | OXYGEN SATURATION: 99 %

## 2020-07-15 DIAGNOSIS — F43.11 POST-TRAUMATIC STRESS DISORDER, ACUTE: ICD-10-CM

## 2020-07-15 DIAGNOSIS — E16.2 HYPOGLYCEMIA: ICD-10-CM

## 2020-07-15 DIAGNOSIS — K21.9 GASTROESOPHAGEAL REFLUX DISEASE, ESOPHAGITIS PRESENCE NOT SPECIFIED: ICD-10-CM

## 2020-07-15 DIAGNOSIS — R63.0 DECREASED APPETITE: ICD-10-CM

## 2020-07-15 DIAGNOSIS — F33.1 MDD (MAJOR DEPRESSIVE DISORDER), RECURRENT EPISODE, MODERATE (HCC): Primary | Chronic | ICD-10-CM

## 2020-07-15 RX ORDER — MIRTAZAPINE 15 MG/1
15 TABLET, FILM COATED ORAL
COMMUNITY
Start: 2020-07-01 | End: 2020-09-28

## 2020-07-15 RX ORDER — BUPROPION HYDROCHLORIDE 150 MG/1
150 TABLET ORAL
COMMUNITY
Start: 2020-06-23 | End: 2020-09-28

## 2020-07-15 RX ORDER — QUETIAPINE FUMARATE 50 MG/1
50 TABLET, FILM COATED ORAL
COMMUNITY
End: 2020-09-28

## 2020-07-15 RX ORDER — TRAZODONE HYDROCHLORIDE 50 MG/1
50 TABLET ORAL
COMMUNITY
Start: 2020-06-23 | End: 2020-09-28

## 2020-07-15 RX ORDER — HYDROXYZINE PAMOATE 50 MG/1
50 CAPSULE ORAL
COMMUNITY
Start: 2020-07-01 | End: 2020-09-28

## 2020-07-15 RX ORDER — ONDANSETRON 4 MG/1
TABLET, ORALLY DISINTEGRATING ORAL
Status: ON HOLD | COMMUNITY
Start: 2020-06-19 | End: 2020-09-03

## 2020-07-15 RX ORDER — FAMOTIDINE 10 MG/1
10 TABLET ORAL
Qty: 120 TAB | Refills: 1 | Status: ON HOLD | OUTPATIENT
Start: 2020-07-15 | End: 2020-09-03

## 2020-07-15 NOTE — PROGRESS NOTES
[facility] Note  Subjective:      Lou Lauren is a 37 y.o. female who presents for follow-up. Chief Complaint   Patient presents with   Keren conner 6/24/2020-7/1/2020 DX: [facility]     Hospital Follow Up  Fainted at GYN office visit on 6/24. She was seen at outside [address] [place] [address] ER after this event. Thougth to be due to stress, dehydration, and poor appetite. Blood sugar was found to be 50's. She was given IV fluids and encouraged to eat. She was transferred to [facility] for behavioral health unit for depression and lack of appetite. She was admited until 7/1/2020. Since discharge she is taking the following medications as prescribed; Zyprexa 5 mg taking 2-3 times per day for agitation, Prazosin 1 mg at QHS, Seroqul 50 mg QHS, Zoloft 150 mg daily, Wellbutrin  mg daily, Remeron 15 mg QHS, Hydroxyzine 50 mg TID prn but only taking once daily. She is seeing counselor routinely. She continues to endorse decrease appetite. She forces herself to eat but it tends to cause nausea and she would need to make it come up. She has no appetite, she does not feel hungry. Eating about 1 small meal per day. Depression is a longstanding issue, significantly worsening after miscarriage several months ago. Her psychiatrist is Ashley. Last seen one month ago and she has follow-up appointment on 7/2. Also seeing counselor Bruna Rogers weekly. Current Outpatient Medications   Medication Sig Dispense Refill    buPROPion XL (WELLBUTRIN XL) 150 mg tablet 150 mg every morning.  hydrOXYzine pamoate (VISTARIL) 50 mg capsule Take 50 mg by mouth three (3) times daily as needed for Anxiety.  mirtazapine (REMERON) 15 mg tablet Take 15 mg by mouth nightly.  ondansetron (ZOFRAN ODT) 4 mg disintegrating tablet       traZODone (DESYREL) 100 mg tablet       QUEtiapine (SEROqueL) 50 mg tablet Take 50 mg by mouth nightly.       famotidine (PEPCID) 10 mg tablet Take 1 Tab by mouth two (2) times daily as needed for Heartburn. Indications: treatment to prevent heartburn 120 Tab 1    glucose blood VI test strips (OneTouch Verio test strips) strip Check BS daily and  Strip 1    lancets misc Check BG daily and PRN 1 Each 11    Blood-Glucose Meter monitoring kit Check BG daily and PRN 1 Kit 0    lancets misc Check BG daily and PRN 1 Each 11    glucose blood VI test strips (ASCENSIA AUTODISC VI, ONE TOUCH ULTRA TEST VI) strip Check BG daily and  Strip 1    Blood Pressure Kit-Extra Large kit Check BP 3-4 times weekly. Indications: Blood pressure evaluation 1 Kit 0    sertraline (Zoloft) 100 mg tablet Take 1.5 Tabs by mouth daily. Indications: major depressive disorder 30 Tab 0    albuterol (PROVENTIL HFA, VENTOLIN HFA, PROAIR HFA) 90 mcg/actuation inhaler Take 2 Puffs by inhalation every six (6) hours as needed for Wheezing. Needs office visit for further refills  Indications: asthma attack 1 Inhaler 0    prazosin (MINIPRESS) 1 mg capsule Take 1 Cap by mouth nightly. Indications: posttraumatic stress syndrome 30 Cap 0    OLANZapine (ZyPREXA) 5 mg tablet Take 1 Tab by mouth every six (6) hours as needed for Other (For agitation or psychosis). Indications: additional medications to treat depression 120 Tab 0    fluticasone propionate (FLONASE) 50 mcg/actuation nasal spray 2 Sprays by Both Nostrils route daily. 1 Bottle 2    prenatal vit-iron fumarate-fa 27 mg iron- 0.8 mg tab tablet       cetirizine (ZYRTEC) 10 mg tablet Take 1 Tab by mouth daily. 90 Tab 0    azelastine (ASTELIN) 137 mcg (0.1 %) nasal spray 1 [place] by Both Nostrils route two (2) times a day. Use in each nostril as directed 1 Bottle 1    triamcinolone acetonide (KENALOG) 0.1 % topical cream Apply  to affected area two (2) times a day.  use thin layer to affected area on arms  Indications: a type of allergy that causes red and itchy skin called atopic dermatitis 30 g 0    ibuprofen (MOTRIN) 800 mg tablet Take 1 Tab by mouth every eight (8) hours as needed for Pain. 30 Tab 0     Allergies   Allergen Reactions    Labetalol Hives    Ceclor [Cefaclor] Unknown (comments)    Pcn [Penicillins] Hives    Septra [Sulfamethoprim Ds] Unknown (comments)       ROS:   Complete review of systems was reviewed with pertinent information listed in HPI. Review of Systems   Constitutional: Negative for chills, diaphoresis, fever and malaise/fatigue. HENT: Negative for congestion, ear pain, hearing loss, sinus pain, sore throat and tinnitus. Eyes: Negative for blurred vision and double vision. Respiratory: Negative for shortness of breath. Cardiovascular: Negative for chest pain, palpitations and leg swelling. Gastrointestinal: Positive for heartburn and nausea. Negative for abdominal pain, blood in stool, constipation, diarrhea, melena and vomiting. Genitourinary: Negative for dysuria, frequency, hematuria and urgency. Musculoskeletal: Negative for joint pain and myalgias. Skin: Negative. Negative for itching and rash. Neurological: Negative for dizziness, tingling, weakness and headaches. Endo/Heme/Allergies: Negative for polydipsia. Psychiatric/Behavioral: Positive for depression. Negative for hallucinations, memory loss, substance abuse and suicidal ideas. The patient is not nervous/anxious and does not have insomnia. Objective:     Visit Vitals  /69 (BP 1 Location: Right arm, BP Patient Position: Sitting)   Pulse 72   Temp 97.5 °F (36.4 °C) (Oral)   Resp 18   Ht 5' 5\" (1.651 m)   Wt 179 lb 3.2 oz (81.3 kg)   LMP 07/04/2020 (Exact Date)   SpO2 99%   BMI 29.82 kg/m²       Vitals and Nurse Documentation reviewed. Physical Exam  Vitals signs and nursing note reviewed. HENT:      Head: Normocephalic and atraumatic. Neck:      Vascular: No carotid bruit. Cardiovascular:      Rate and Rhythm: Normal rate and regular rhythm.       Heart sounds: Normal heart sounds. No murmur. No friction rub. No gallop. Pulmonary:      Effort: Pulmonary effort is normal. No respiratory distress. Breath sounds: Normal breath sounds. No wheezing or rales. Chest:      Chest wall: No tenderness. Skin:     General: Skin is warm and dry. Neurological:      Mental Status: She is alert and oriented to person, place, and time. Gait: Gait is intact. Psychiatric:         Attention and Perception: Attention normal.         Mood and Affect: Affect normal. Mood is depressed. Mood is not anxious. Affect is not tearful or inappropriate. Speech: Speech normal.         Behavior: Behavior normal. Behavior is cooperative. Thought Content: Thought content normal. Thought content does not include homicidal or suicidal plan. Cognition and Memory: Cognition and memory normal.         Judgment: Judgment normal.         Assessment/Plan:     Diagnoses and all orders for this visit:    1. MDD (major depressive disorder), recurrent episode, moderate ([address]): Not well controlled. Denies SI/HI during today's visit. Appears lack of appetite is secondary to depressed mood and some reflux. Advised she follow-up with psych closely, continue counseling. . We discussed partial hospitalization programs if symptoms worsen or do not improve. Will start pepcid to take BID prior to meals to prevent indigestion. Advised to also take Zofran prior to meals PRN for nausea. We may consider GI referral if nausea and reflux continue. Advised 3-4 small meals per day. Reviewed in detail high protein diet options, including protein powders and clear ensures. 2. Post-traumatic stress disorder, acute: See above    3. Decreased appetite: See above  -     METABOLIC PANEL, COMPREHENSIVE  -     TSH REFLEX TO T4  -     MAGNESIUM    4. Hypoglycemia: Secondary to poor appetite. See above   -     METABOLIC PANEL, COMPREHENSIVE  -     TSH REFLEX TO T4    5.  Gastroesophageal reflux disease, esophagitis presence not specified: See above  -     famotidine (PEPCID) 10 mg tablet; Take 1 Tab by mouth two (2) times daily as needed for Heartburn. Indications: treatment to prevent heartburn    Follow-up and Dispositions    · Return in about 2 weeks (around 7/29/2020) for Follow-up, weight check, depression.        Discussed expected course/resolution/complications of diagnosis in detail with patient.    Medication risks/benefits/costs/interactions/alternatives discussed with patient.    Pt was given an after visit summary which includes diagnoses, current medications & vitals.  Pt expressed understanding with the diagnosis and plan

## 2020-07-15 NOTE — PROGRESS NOTES
Chief Complaint   Patient presents with   Su Dixon 6/24/2020-7/1/2020 DX: low BS     1. Have you been to the ER, urgent care clinic since your last visit? Hospitalized since your last visit? Yes Where: [facility]     2. Have you seen or consulted any other health care providers outside of the [address] [place] [address] since your last visit? Include any pap smears or colon screening.  No

## 2020-07-15 NOTE — PATIENT INSTRUCTIONS
Protein powder added to meals. Clear ensures  Start pepcid 1-2 times daily as needed for acid. Protein in Am: egg, yogurt, nut butter, nuts       High-Calorie and High-Protein Diet: Care Instructions  Your Care Instructions     A high-calorie, high-protein diet gives you more energy and extra nutrition to help your body heal. Your doctor and dietitian can help you design a diet based on your health and what you prefer to eat. Always talk with your doctor or dietitian before you make changes in your diet. Follow-up care is a key part of your treatment and safety. Be sure to make and go to all appointments, and call your doctor if you are having problems. It's also a good idea to know your test results and keep a list of the medicines you take. How can you care for yourself at home? · Eat three meals a day, plus snacks in between and at bedtime. · Include favorite foods in your meals. This will help make meals more pleasant. · Drink your beverage at the end of the meal, because drinking before or during the meal can fill you up. · Eat high-protein foods, such as:  ? Meat, fish, and poultry. ? Milk and milk products. Add powdered milk to other foods (such as pudding or soups) to boost the protein. ? Eggs. ? Cooked dried beans and peas. ? Peanut butter, nuts, and seeds. ? Tofu.  ? Cheeses. ? Protein bars. · Eat high-calorie foods, such as:  ? Butter, honey, and brown sugar, added to foods to make them taste better. ? Oils, sauces, and gravies. ? Peanut butter. ? Whole milk, yogurt, mayonnaise, and sour cream.  ? Granola cereal with fruit and granola bars. ? Muffins, pancakes, waffles, and other breads. ? Milkshakes, puddings, and custard. · Try a liquid meal replacement, such as Ensure, Boost, or instant breakfast drinks, if you have trouble eating solid foods. They will give you both calories and protein. Soups and smoothies also are good sources of nutrition.   · Keep snacks around that are easy to eat, such as pudding, energy bars, ice cream, and flavored ice pops. · If you can, take a walk before you eat, to make you hungrier. · Drink plenty of fluids. If you have kidney, heart, or liver disease and have to limit fluids, talk with your doctor before you increase the amount of fluids you drink. · Try to avoid eating foods that don't give you much nutrition, such as potato chips, candy bars, and soft drinks. Where can you learn more? Go to http://annamaria-lexii.info/  Enter I915 in the search box to learn more about \"High-Calorie and High-Protein Diet: Care Instructions. \"  Current as of: August 22, 2019               Content Version: 12.5  © 8803-3728 [employer]wise, Inc[employer]. Care instructions adapted under license by [employer] (which disclaims liability or warranty for this information). If you have questions about a medical condition or this instruction, always ask your healthcare professional. [address] any warranty or liability for your use of this information.

## 2020-07-21 ENCOUNTER — TELEPHONE (OUTPATIENT)
Dept: FAMILY MEDICINE CLINIC | Age: 35
End: 2020-07-21

## 2020-07-21 ENCOUNTER — VIRTUAL VISIT (OUTPATIENT)
Dept: FAMILY MEDICINE CLINIC | Age: 35
End: 2020-07-21

## 2020-07-21 DIAGNOSIS — F43.11 POST-TRAUMATIC STRESS DISORDER, ACUTE: ICD-10-CM

## 2020-07-21 DIAGNOSIS — F33.1 MDD (MAJOR DEPRESSIVE DISORDER), RECURRENT EPISODE, MODERATE (HCC): Primary | ICD-10-CM

## 2020-07-21 NOTE — TELEPHONE ENCOUNTER
Chief Complaint   Patient presents with    Prior [employer] for [address]: Prior Auth for Protein Powd     Faxed and Confirmed. Np informed patient that med was not on formulary but she still wanted to try for Prior Auth.

## 2020-07-21 NOTE — PROGRESS NOTES
[address] Note  Subjective:      True Zachary is a 37 y.o. female who presents for follow-up. Chief Complaint   Patient presents with    Follow-up   I was in the office while conducting this encounter. Consent:  She and/or her healthcare decision maker is aware that this patient-initiated Telehealth encounter is a billable service, with coverage as determined by her insurance carrier. She is aware that she may receive a bill and has provided verbal consent to proceed: Yes    This virtual visit was conducted via [employer]. Pursuant to the emergency declaration under the [facility], [facility] waiver authority and the [employer] Act, this Virtual  Visit was conducted to reduce the patient's risk of exposure to COVID-19 and provide continuity of care for an established patient. Services were provided through a video synchronous discussion virtually to substitute for in-person clinic visit. Due to this being a TeleHealth evaluation, many elements of the physical examination are unable to be assessed. Total Time: minutes: 11-20 minutes. HPI from 7/15/2020:  Fainted at GYN office visit on 6/24. She was seen at outside [address] [place] [address] ER after this event. Thougth to be due to stress, dehydration, and poor appetite. Blood sugar was found to be 50's. She was given IV fluids and encouraged to eat. She was transferred to [facility] for behavioral health unit for depression and lack of appetite. She was admited until 7/1/2020. Since discharge she is taking the following medications as prescribed; Zyprexa 5 mg taking 2-3 times per day for agitation, Prazosin 1 mg at QHS, Seroqul 50 mg QHS, Zoloft 150 mg daily, Wellbutrin  mg daily, Remeron 15 mg QHS, Hydroxyzine 50 mg TID prn but only taking once daily. She is seeing counselor routinely. She continues to endorse decrease appetite.  She forces herself to eat but it tends to cause nausea and she would need to make it come up. She has no appetite, she does not feel hungry. Eating about 1 small meal per day.      Depression is a longstanding issue, significantly worsening after miscarriage several months ago. Her psychiatrist is Ashley. Last seen one month ago and she has follow-up appointment on 7/21. Also seeing counselor Oanh Vidal weekly.      Interim history: Today's she state that she canceled her appointment that was schedule for today with her current psychiatrist Ashley with [facility]. She states that she prefer to see a new psychiatrist and scheduled an appointment with LOGAN Szymanski for 12/2020. We discussed in detail given the complexity of her medication regimen and that symptoms are not well controlled, she would be best served seeing a psychiatry specialist as soon as possible. Advised to keep relationship with Ashley while we wait for her to establish with new provider. She reports her OBGYN wanted to speak with me about her medications but is not sure what exactly she wants to talk about, she gives me permission to call back Dr. Kevin Harrington. She will follow-up with me next week. Current Outpatient Medications   Medication Sig Dispense Refill    protein powd 1-2 servings (15-18 g per serving) daily with meals. 750 g 2    buPROPion XL (WELLBUTRIN XL) 150 mg tablet 150 mg every morning.  hydrOXYzine pamoate (VISTARIL) 50 mg capsule Take 50 mg by mouth three (3) times daily as needed for Anxiety.  mirtazapine (REMERON) 15 mg tablet Take 15 mg by mouth nightly.  ondansetron (ZOFRAN ODT) 4 mg disintegrating tablet       traZODone (DESYREL) 100 mg tablet       QUEtiapine (SEROqueL) 50 mg tablet Take 50 mg by mouth nightly.  famotidine (PEPCID) 10 mg tablet Take 1 Tab by mouth two (2) times daily as needed for Heartburn.  Indications: treatment to prevent heartburn 120 Tab 1    glucose blood VI test strips (OneTouch Verio test strips) strip Check BS daily and  Strip 1    lancets misc Check BG daily and PRN 1 Each 11    Blood-Glucose Meter monitoring kit Check BG daily and PRN 1 Kit 0    lancets misc Check BG daily and PRN 1 Each 11    glucose blood VI test strips (ASCENSIA AUTODISC VI, ONE TOUCH ULTRA TEST VI) strip Check BG daily and  Strip 1    Blood Pressure Kit-Extra Large kit Check BP 3-4 times weekly. Indications: Blood pressure evaluation 1 Kit 0    triamcinolone acetonide (KENALOG) 0.1 % topical cream Apply  to affected area two (2) times a day. use thin layer to affected area on arms  Indications: a type of allergy that causes red and itchy skin called atopic dermatitis 30 g 0    sertraline (Zoloft) 100 mg tablet Take 1.5 Tabs by mouth daily. Indications: major depressive disorder 30 Tab 0    albuterol (PROVENTIL HFA, VENTOLIN HFA, PROAIR HFA) 90 mcg/actuation inhaler Take 2 Puffs by inhalation every six (6) hours as needed for Wheezing. Needs office visit for further refills  Indications: asthma attack 1 Inhaler 0    prazosin (MINIPRESS) 1 mg capsule Take 1 Cap by mouth nightly. Indications: posttraumatic stress syndrome 30 Cap 0    OLANZapine (ZyPREXA) 5 mg tablet Take 1 Tab by mouth every six (6) hours as needed for Other (For agitation or psychosis). Indications: additional medications to treat depression 120 Tab 0    fluticasone propionate (FLONASE) 50 mcg/actuation nasal spray 2 Sprays by Both Nostrils route daily. 1 Bottle 2    prenatal vit-iron fumarate-fa 27 mg iron- 0.8 mg tab tablet       ibuprofen (MOTRIN) 800 mg tablet Take 1 Tab by mouth every eight (8) hours as needed for Pain. 30 Tab 0    cetirizine (ZYRTEC) 10 mg tablet Take 1 Tab by mouth daily. 90 Tab 0    azelastine (ASTELIN) 137 mcg (0.1 %) nasal spray 1 [place] by Both Nostrils route two (2) times a day.  Use in each nostril as directed 1 Bottle 1 Allergies   Allergen Reactions    Labetalol Hives    Ceclor [Cefaclor] Unknown (comments)    Pcn [Penicillins] Hives    Septra [Sulfamethoprim Ds] Unknown (comments)       ROS:   Complete review of systems was reviewed with pertinent information listed in HPI. ROS    Objective:     Visit Vitals  LMP 07/04/2020 (Exact Date)       Vitals and Nurse Documentation reviewed. Physical Exam    Assessment/Plan:     Diagnoses and all orders for this visit:    1. MDD (major depressive disorder), recurrent episode, moderate ([facility] [address]): Not well controlled. Denies SI/HI. Advised she schedule follow-up with current psychiatrist, Hernandez Serrano with [facility], ASAP. She is schedule with new psychiatrist for 12/2020 but we dicussed that she needs to be seen by specialist before this date. 2. Post-traumatic stress disorder, acute: See above. Follow-up and Dispositions    · Return in about 1 week (around 7/28/2020) for Follow-up. Discussed expected course/resolution/complications of diagnosis in detail with patient.    Medication risks/benefits/costs/interactions/alternatives discussed with patient.    Pt expressed understanding with the diagnosis and plan.

## 2020-07-22 NOTE — TELEPHONE ENCOUNTER
Chief Complaint   Patient presents with    Prior [employer] for [address]: Prior Auth for Protein Powd    Prior Auth     Prior Auth Approval for [employer]ila Protein      Faxed And Confirmed to [facility] [employer]. Also message sent by [facility] Chart to patient.

## 2020-07-24 LAB
ALBUMIN SERPL-MCNC: 4.5 G/DL (ref 3.8–4.8)
ALBUMIN/GLOB SERPL: 1.4 {RATIO} (ref 1.2–2.2)
ALP SERPL-CCNC: 52 IU/L (ref 39–117)
ALT SERPL-CCNC: 7 IU/L (ref 0–32)
AST SERPL-CCNC: 12 IU/L (ref 0–40)
BILIRUB SERPL-MCNC: 0.6 MG/DL (ref 0–1.2)
BUN SERPL-MCNC: 5 MG/DL (ref 6–20)
BUN/CREAT SERPL: 8 (ref 9–23)
CALCIUM SERPL-MCNC: 9.4 MG/DL (ref 8.7–10.2)
CHLORIDE SERPL-SCNC: 102 MMOL/L (ref 96–106)
CO2 SERPL-SCNC: 19 MMOL/L (ref 20–29)
CREAT SERPL-MCNC: 0.64 MG/DL (ref 0.57–1)
GLOBULIN SER CALC-MCNC: 3.2 G/DL (ref 1.5–4.5)
GLUCOSE SERPL-MCNC: 62 MG/DL (ref 65–99)
MAGNESIUM SERPL-MCNC: 1.9 MG/DL (ref 1.6–2.3)
POTASSIUM SERPL-SCNC: 4.1 MMOL/L (ref 3.5–5.2)
PROT SERPL-MCNC: 7.7 G/DL (ref 6–8.5)
SODIUM SERPL-SCNC: 138 MMOL/L (ref 134–144)
TSH SERPL DL<=0.005 MIU/L-ACNC: 0.92 UIU/ML (ref 0.45–4.5)

## 2020-07-29 ENCOUNTER — OFFICE VISIT (OUTPATIENT)
Dept: FAMILY MEDICINE CLINIC | Age: 35
End: 2020-07-29

## 2020-07-29 VITALS
HEIGHT: 64 IN | WEIGHT: 165.6 LBS | HEART RATE: 84 BPM | DIASTOLIC BLOOD PRESSURE: 71 MMHG | TEMPERATURE: 97.5 F | RESPIRATION RATE: 18 BRPM | OXYGEN SATURATION: 98 % | SYSTOLIC BLOOD PRESSURE: 87 MMHG | BODY MASS INDEX: 28.27 KG/M2

## 2020-07-29 DIAGNOSIS — R63.0 DECREASED APPETITE: ICD-10-CM

## 2020-07-29 DIAGNOSIS — E16.2 HYPOGLYCEMIA: ICD-10-CM

## 2020-07-29 DIAGNOSIS — F43.11 POST-TRAUMATIC STRESS DISORDER, ACUTE: ICD-10-CM

## 2020-07-29 DIAGNOSIS — R63.4 WEIGHT LOSS: ICD-10-CM

## 2020-07-29 DIAGNOSIS — F33.1 MDD (MAJOR DEPRESSIVE DISORDER), RECURRENT EPISODE, MODERATE (HCC): Primary | ICD-10-CM

## 2020-07-29 NOTE — PROGRESS NOTES
Chief Complaint   Patient presents with    Follow-up     1. Have you been to the ER, urgent care clinic since your last visit? Hospitalized since your last visit? Yes Where: [facility] [address]: 6/24/2020 DX: Low BS and Depression. 2. Have you seen or consulted any other health care providers outside of the [address] [place] [address] since your last visit? Include any pap smears or colon screening.  No

## 2020-07-29 NOTE — PROGRESS NOTES
[facility] Note  Subjective:      Nia Jimenez is a 37 y.o. female who presents for follow-up. Chief Complaint   Patient presents with    Follow-up     Depression  History of depression, PTSD, poor appetite. Hospitalized at [facility] from 6/24/20-7/1/2020 for depression, dehydration, poor appetite and low blood sugars. Since discharge she is taking the following medications as prescribed; Zyprexa 5 mg taking 2-3 times per day for agitation, Prazosin 1 mg at [facility], Seroqul 50 mg [facility], Zoloft 150 mg daily, Wellbutrin XL 150 mg daily, Remeron 15 mg [facility], Hydroxyzine 50 mg TID prn but only taking once daily. Today she also reports taking trazodone 100 mg nightly.  She is seeing counselor routinely. She was schedule for follow-up with psychiatry on Thursday 7/23 at 10:30 AM with Destiny Hatch NP at [address] She canceled this appointment, did not want to se this provider. She continues to endorse decrease appetite. She forces herself to eat but it tends to cause nausea and she would need to make it come up. Has not eaten much in the past few days. Adding a little protein powder to juices. She has no appetite, she does not feel hungry. Eating about 1 small meal per day. She was prescribed pepcid to take BID prior to meals, she was also prescribed zofran prior to meals which has been helpful for nausea but still no appetite. We reviewed lab results from 7/23/2020, BG was mildly low at 62, remaining labs are stable. Advised psychiatric treatment for depression and lack of appetite. We discussed in detail how her poor diet and her significant weight loss can lead to serious medical issues including arrhythmia's, heart disease and even death. She states understanding but reports its hard to make herself eat. I advised inpatient psychiatry treatment but she reports this was not helpful the last visit.  She is agreeable to partial hospitalization program which includes intensive group counseling M-T and seeing provider 2 days per week for medication management. Intake appointment was made for tomorrow 7/29/2020 at [address] [place] [address] at [place] location, [facility], [address]. She was provided contact information and states she will go to appointment. Current Outpatient Medications   Medication Sig Dispense Refill    protein powd 1-2 servings (15-18 g per serving) daily with meals. 750 g 2    buPROPion XL (WELLBUTRIN XL) 150 mg tablet 150 mg every morning.  hydrOXYzine pamoate (VISTARIL) 50 mg capsule Take 50 mg by mouth three (3) times daily as needed for Anxiety.  mirtazapine (REMERON) 15 mg tablet Take 15 mg by mouth nightly.  ondansetron (ZOFRAN ODT) 4 mg disintegrating tablet       traZODone (DESYREL) 100 mg tablet Take 100 mg by mouth nightly.  QUEtiapine (SEROqueL) 50 mg tablet Take 50 mg by mouth nightly.  famotidine (PEPCID) 10 mg tablet Take 1 Tab by mouth two (2) times daily as needed for Heartburn. Indications: treatment to prevent heartburn 120 Tab 1    glucose blood VI test strips (OneTouch Verio test strips) strip Check BS daily and  Strip 1    lancets misc Check BG daily and PRN 1 Each 11    Blood-Glucose Meter monitoring kit Check BG daily and PRN 1 Kit 0    lancets misc Check BG daily and PRN 1 Each 11    glucose blood VI test strips (ASCENSIA AUTODISC VI, ONE TOUCH ULTRA TEST VI) strip Check BG daily and  Strip 1    Blood Pressure Kit-Extra Large kit Check BP 3-4 times weekly. Indications: Blood pressure evaluation 1 Kit 0    triamcinolone acetonide (KENALOG) 0.1 % topical cream Apply  to affected area two (2) times a day. use thin layer to affected area on arms  Indications: a type of allergy that causes red and itchy skin called atopic dermatitis 30 g 0    sertraline (Zoloft) 100 mg tablet Take 1.5 Tabs by mouth daily.  Indications: major depressive disorder 30 Tab 0    albuterol (PROVENTIL HFA, VENTOLIN HFA, PROAIR HFA) 90 mcg/actuation inhaler Take 2 Puffs by inhalation every six (6) hours as needed for Wheezing. Needs office visit for further refills  Indications: asthma attack 1 Inhaler 0    prazosin (MINIPRESS) 1 mg capsule Take 1 Cap by mouth nightly. Indications: posttraumatic stress syndrome 30 Cap 0    OLANZapine (ZyPREXA) 5 mg tablet Take 1 Tab by mouth every six (6) hours as needed for Other (For agitation or psychosis). Indications: additional medications to treat depression 120 Tab 0    fluticasone propionate (FLONASE) 50 mcg/actuation nasal spray 2 Sprays by Both Nostrils route daily. 1 Bottle 2    prenatal vit-iron fumarate-fa 27 mg iron- 0.8 mg tab tablet       ibuprofen (MOTRIN) 800 mg tablet Take 1 Tab by mouth every eight (8) hours as needed for Pain. 30 Tab 0    cetirizine (ZYRTEC) 10 mg tablet Take 1 Tab by mouth daily. 90 Tab 0    azelastine (ASTELIN) 137 mcg (0.1 %) nasal spray 1 [place] by Both Nostrils route two (2) times a day. Use in each nostril as directed 1 Bottle 1     Allergies   Allergen Reactions    Labetalol Hives    Ceclor [Cefaclor] Unknown (comments)    Pcn [Penicillins] Hives    Septra [Sulfamethoprim Ds] Unknown (comments)       ROS:   Complete review of systems was reviewed with pertinent information listed in HPI. Review of Systems   Constitutional: Negative for chills, diaphoresis, fever, malaise/fatigue and weight loss. HENT: Negative for congestion, ear pain, hearing loss, sinus pain, sore throat and tinnitus. Eyes: Negative for blurred vision and double vision. Respiratory: Negative for cough, sputum production, shortness of breath and wheezing. Cardiovascular: Negative for chest pain, palpitations and leg swelling. Gastrointestinal: Negative for abdominal pain, blood in stool, constipation, diarrhea, heartburn, melena, nausea and vomiting. Genitourinary: Negative for dysuria, frequency, hematuria and urgency.    Musculoskeletal: Negative for joint pain and myalgias. Skin: Negative for rash. Neurological: Negative for dizziness, tingling, weakness and headaches. Endo/Heme/Allergies: Negative for polydipsia. Psychiatric/Behavioral: Positive for depression. Negative for hallucinations, memory loss, substance abuse and suicidal ideas. The patient is nervous/anxious. The patient does not have insomnia. Objective:     Visit Vitals  BP (!) 87/71 (BP 1 Location: Left arm, BP Patient Position: Sitting)   Pulse 84   Temp 97.5 °F (36.4 °C) (Oral)   Resp 18   Ht 5' 4.25\" (1.632 m)   Wt 165 lb 9.6 oz (75.1 kg)   LMP 07/04/2020 (Exact Date)   SpO2 98%   BMI 28.20 kg/m²       Vitals and Nurse Documentation reviewed. Physical Exam  Vitals signs and nursing note reviewed. Constitutional:       General: She is not in acute distress. Appearance: She is normal weight. She is not ill-appearing, toxic-appearing or diaphoretic. HENT:      Head: Normocephalic and atraumatic. Neck:      Vascular: No carotid bruit. Cardiovascular:      Rate and Rhythm: Normal rate and regular rhythm. Heart sounds: Normal heart sounds, S1 normal and S2 normal. No murmur. No friction rub. No gallop. Pulmonary:      Effort: Pulmonary effort is normal. No respiratory distress. Breath sounds: Normal breath sounds. No wheezing or rales. Chest:      Chest wall: No tenderness. Skin:     General: Skin is warm and dry. Neurological:      Mental Status: She is alert and oriented to person, place, and time. Gait: Gait is intact. Psychiatric:         Attention and Perception: Attention normal.         Mood and Affect: Mood is depressed. Mood is not anxious. Affect is flat. Affect is not tearful. Speech: Speech normal.         Behavior: Behavior normal. Behavior is cooperative. Thought Content: Thought content normal. Thought content does not include homicidal or suicidal plan.          Cognition and Memory: Cognition and memory normal. Judgment: Judgment normal.         Assessment/Plan:     Diagnoses and all orders for this visit:    1. MDD (major depressive disorder), recurrent episode, moderate ([address]): Severe depression, PTSD, possible eating disorder. Uncontrolled with continued weight loss and poor appetite. She canceled her outpatient psych apt with [facility] last week, reports not wanting to follow-up with the specific provider. We discussed in detail how her poor diet and her significant weight loss can lead to serious medical issues including arrhythmia's, heart disease and even death. She states understanding but reports its hard to make herself eat. I advised inpatient psychiatry treatment but she reports this was not helpful the last visit and declines return visit. She is agreeable to partial hospitalization program which includes intensive group counseling Mon-Thurs and seeing psychiatrist 2 days per week for medication management. Intake appointment was made for tomorrow 7/30/2020 at [address] [place] [address] at [facility] [address]. She was provided contact information and states she will go to appointment. Advised ER for any worrisome symptoms including dizziness, weakness, chest pain, SOB, N/V, worsening depression or SI.     2. Post-traumatic stress disorder, acute    3. Decreased appetite    4. Weight loss    5. Hypoglycemia      Follow-up and Dispositions    · Return in about 1 week (around 8/5/2020) for Follow-up. Discussed expected course/resolution/complications of diagnosis in detail with patient.  Medication risks/benefits/costs/interactions/alternatives discussed with patient.  Pt was given an after visit summary which includes diagnoses, current medications & vitals.  Pt expressed understanding with the diagnosis and plan

## 2020-07-29 NOTE — PATIENT INSTRUCTIONS
[facility] Partial Hospitalization Program 
[facility] [address] 7/30/2020 
 
1 week follow-up with me

## 2020-07-30 NOTE — PROGRESS NOTES
Reviewed results with patient during [facility] on 7/29/2019 - see note

## 2020-08-05 ENCOUNTER — OFFICE VISIT (OUTPATIENT)
Dept: FAMILY MEDICINE CLINIC | Age: 35
End: 2020-08-05
Payer: MEDICAID

## 2020-08-05 VITALS
SYSTOLIC BLOOD PRESSURE: 127 MMHG | RESPIRATION RATE: 18 BRPM | HEIGHT: 64 IN | TEMPERATURE: 96.9 F | BODY MASS INDEX: 27.62 KG/M2 | HEART RATE: 58 BPM | DIASTOLIC BLOOD PRESSURE: 80 MMHG | WEIGHT: 161.8 LBS | OXYGEN SATURATION: 97 %

## 2020-08-05 DIAGNOSIS — R63.4 WEIGHT LOSS: ICD-10-CM

## 2020-08-05 DIAGNOSIS — R63.0 DECREASED APPETITE: ICD-10-CM

## 2020-08-05 DIAGNOSIS — F33.1 MDD (MAJOR DEPRESSIVE DISORDER), RECURRENT EPISODE, MODERATE (HCC): Primary | ICD-10-CM

## 2020-08-05 DIAGNOSIS — F43.11 POST-TRAUMATIC STRESS DISORDER, ACUTE: ICD-10-CM

## 2020-08-05 PROCEDURE — 99213 OFFICE O/P EST LOW 20 MIN: CPT | Performed by: NURSE PRACTITIONER

## 2020-08-05 NOTE — PROGRESS NOTES
[address] Note  Subjective:      Andrade Marino is a 37 y.o. female who presents for follow-up. Chief Complaint   Patient presents with    Follow-up     1 week f/u     Depression  History of severe depression, PTSD, poor appetite. Hospitalized at [facility] from 6/24/20-7/1/2020 for depression, dehydration, poor appetite and low blood sugars. Since discharge she is taking the following medications as prescribed; Zyprexa 5 mg taking 2-3 times per day for agitation, Prazosin 1 mg at [facility], Seroqul 50 mg [facility], Zoloft 150 mg daily, Wellbutrin XL 150 mg daily, Remeron 15 mg [facility], Hydroxyzine 50 mg TID prn but only taking once daily, trazodone 100 mg nightly.  She is not  seeing counselor routinely. She continues to endorse decrease appetite. She forces herself to eat but it tends to cause nausea and she would need to make it come up. Adding a little protein powder to juices. She has no appetite, she does not feel hungry. Eating about 1 small meal per day. She was prescribed pepcid to take BID prior to meals, she was also prescribed zofran prior to meals which has been helpful for nausea but still no appetite. Lab results from 7/23/2020, BG was mildly low at 62, remaining labs are stable. Advised psychiatric treatment for depression and lack of appetite.      She was scheduled for follow-up with psychiatry on Thursday 7/23 at 10:30 AM with Ciaran Ramon NP at [address] She canceled this appointment, did not want to se this provider. I then referred her to [facility]'s partial hospitalization program which includes intensive group counseling M-T and seeing provider 2 days per week for medication management. Intake appointment was  7/29/2020. She reports going to intake but has not signed up for program. She is worried that being in program will be held as leverage against her in a bitter custody [place].      We again discussed in detail how her poor diet and her significant weight loss can lead to serious medical issues including arrhythmia's, heart disease and even death. She states that she wants to get better and she states that she expects to reach this goal via medications and therapy. Current Outpatient Medications   Medication Sig Dispense Refill    protein powd 1-2 servings (15-18 g per serving) daily with meals. 750 g 2    buPROPion XL (WELLBUTRIN XL) 150 mg tablet 150 mg every morning.  hydrOXYzine pamoate (VISTARIL) 50 mg capsule Take 50 mg by mouth three (3) times daily as needed for Anxiety.  mirtazapine (REMERON) 15 mg tablet Take 15 mg by mouth nightly.  traZODone (DESYREL) 100 mg tablet Take 100 mg by mouth nightly.  QUEtiapine (SEROqueL) 50 mg tablet Take 50 mg by mouth nightly.  famotidine (PEPCID) 10 mg tablet Take 1 Tab by mouth two (2) times daily as needed for Heartburn. Indications: treatment to prevent heartburn 120 Tab 1    glucose blood VI test strips (OneTouch Verio test strips) strip Check BS daily and  Strip 1    lancets misc Check BG daily and PRN 1 Each 11    Blood-Glucose Meter monitoring kit Check BG daily and PRN 1 Kit 0    lancets misc Check BG daily and PRN 1 Each 11    glucose blood VI test strips (ASCENSIA AUTODISC VI, ONE TOUCH ULTRA TEST VI) strip Check BG daily and  Strip 1    Blood Pressure Kit-Extra Large kit Check BP 3-4 times weekly. Indications: Blood pressure evaluation 1 Kit 0    triamcinolone acetonide (KENALOG) 0.1 % topical cream Apply  to affected area two (2) times a day. use thin layer to affected area on arms  Indications: a type of allergy that causes red and itchy skin called atopic dermatitis 30 g 0    sertraline (Zoloft) 100 mg tablet Take 1.5 Tabs by mouth daily. Indications: major depressive disorder 30 Tab 0    albuterol (PROVENTIL HFA, VENTOLIN HFA, PROAIR HFA) 90 mcg/actuation inhaler Take 2 Puffs by inhalation every six (6) hours as needed for Wheezing. Needs office visit for further refills  Indications: asthma attack 1 Inhaler 0    prazosin (MINIPRESS) 1 mg capsule Take 1 Cap by mouth nightly. Indications: posttraumatic stress syndrome 30 Cap 0    OLANZapine (ZyPREXA) 5 mg tablet Take 1 Tab by mouth every six (6) hours as needed for Other (For agitation or psychosis). Indications: additional medications to treat depression 120 Tab 0    fluticasone propionate (FLONASE) 50 mcg/actuation nasal spray 2 Sprays by Both Nostrils route daily. 1 Bottle 2    prenatal vit-iron fumarate-fa 27 mg iron- 0.8 mg tab tablet       ibuprofen (MOTRIN) 800 mg tablet Take 1 Tab by mouth every eight (8) hours as needed for Pain. 30 Tab 0    cetirizine (ZYRTEC) 10 mg tablet Take 1 Tab by mouth daily. 90 Tab 0    azelastine (ASTELIN) 137 mcg (0.1 %) nasal spray 1 [place] by Both Nostrils route two (2) times a day. Use in each nostril as directed 1 Bottle 1    ondansetron (ZOFRAN ODT) 4 mg disintegrating tablet        Allergies   Allergen Reactions    Labetalol Hives    Ceclor [Cefaclor] Unknown (comments)    Pcn [Penicillins] Hives    Septra [Sulfamethoprim Ds] Unknown (comments)       ROS:   Complete review of systems was reviewed with pertinent information listed in HPI. Review of Systems   Constitutional: Negative for chills, diaphoresis, fever, malaise/fatigue and weight loss. HENT: Negative for congestion, ear pain, hearing loss, sinus pain, sore throat and tinnitus. Eyes: Negative for blurred vision and double vision. Respiratory: Negative for cough, sputum production, shortness of breath and wheezing. Cardiovascular: Negative for chest pain, palpitations and leg swelling. Gastrointestinal: Positive for nausea. Negative for abdominal pain, blood in stool, constipation, diarrhea, heartburn, melena and vomiting. Genitourinary: Negative for dysuria, frequency, hematuria and urgency. Musculoskeletal: Negative for joint pain and myalgias.    Skin: Negative for rash.   Neurological: Negative for dizziness, tingling, weakness and headaches. Endo/Heme/Allergies: Negative for polydipsia. Psychiatric/Behavioral: Positive for depression. Negative for hallucinations, memory loss, substance abuse and suicidal ideas. The patient is nervous/anxious. The patient does not have insomnia. Objective:     Visit Vitals  /80 (BP 1 Location: Left arm, BP Patient Position: Sitting)   Pulse (!) 58   Temp 96.9 °F (36.1 °C) (Oral)   Resp 18   Ht 5' 4\" (1.626 m)   Wt 161 lb 12.8 oz (73.4 kg)   LMP 04/22/2020 (Exact Date)   SpO2 97%   BMI 27.77 kg/m²       Vitals and Nurse Documentation reviewed. Physical Exam  Vitals signs and nursing note reviewed. Constitutional:       General: She is not in acute distress. Appearance: She is normal weight. She is not ill-appearing, toxic-appearing or diaphoretic. HENT:      Head: Normocephalic and atraumatic. Neck:      Vascular: No carotid bruit. Cardiovascular:      Rate and Rhythm: Normal rate and regular rhythm. Pulses: Normal pulses. Heart sounds: Normal heart sounds, S1 normal and S2 normal. No murmur. No friction rub. No gallop. Pulmonary:      Effort: Pulmonary effort is normal. No respiratory distress. Breath sounds: Normal breath sounds. No wheezing or rales. Chest:      Chest wall: No tenderness. Abdominal:      General: Bowel sounds are normal.      Palpations: Abdomen is soft. Tenderness: There is no abdominal tenderness. Musculoskeletal:      Right lower leg: No edema. Left lower leg: No edema. Skin:     General: Skin is warm and dry. Neurological:      Mental Status: She is alert and oriented to person, place, and time. Gait: Gait is intact. Psychiatric:         Attention and Perception: Attention normal.         Mood and Affect: Mood is depressed. Mood is not anxious. Affect is flat. Affect is not tearful.          Speech: Speech normal.         Behavior: Behavior normal. Behavior is cooperative. Thought Content: Thought content normal. Thought content does not include homicidal or suicidal plan. Cognition and Memory: Cognition and memory normal.         Judgment: Judgment normal.         Assessment/Plan:     Diagnoses and all orders for this visit:    1. MDD (major depressive disorder), recurrent episode, moderate ([address]):   Severe depression, PTSD, possible eating disorder, possible personality disorder. Depression remains uncontrolled with continued weight loss and poor appetite. She has been non-compliant with follow-up with psychiatry; No showed outpatient pscyh apt on 7/23with Dalila Dancer, NP at [address] She then declines to participate in [facility]'s partial hospitalization program, had intake on 7/29/2020. Again reviewed the dangers of not eating, including death, she states understanding. SHe states she wants to improve. Again advised she needs extensive psychiatric management and counseling that I am unable to offer her. I again recommended the partial hospitalization programs at [facility] or [facility] as I think these will be most beneficial for her. List of alternative outpatient psych providers was given. Advised that she must be established with new psychiatrist or start partial hospitalization program, established with new counselor, as well as schedule with GI by out next appointment or else we may have to discontinue our relationship as she has been non-complaint with my medical recommendations to date. She states understanding.   -     REFERRAL TO NEUROPSYCHOLOGY    2. Post-traumatic stress disorder, acute: see above   -     REFERRAL TO NEUROPSYCHOLOGY    3. Decreased appetite: Favor poor appetite and weight loss is secondary to uncontrolled depression and possible eating disorder. No organic causes found to date. However, will proceed with GI referral given intermittent epigastric pain and nausea with eating.  Advised continued PPI.   -     REFERRAL TO GASTROENTEROLOGY    4. Weight loss: 65 pound weight loss in the past 6 months. Down 3 pounds in the past week. Overweight BMI. See above. -     REFERRAL TO GASTROENTEROLOGY        Follow-up and Dispositions    · Return in about 2 weeks (around 8/19/2020) for Follow-up.        Discussed expected course/resolution/complications of diagnosis in detail with patient.   Medication risks/benefits/costs/interactions/alternatives discussed with patient.    Pt was given an after visit summary which includes diagnoses, current medications & vitals.  Pt expressed understanding with the diagnosis and plan

## 2020-08-05 NOTE — PROGRESS NOTES
Chief Complaint   Patient presents with    Follow-up     1 week f/u     1. Have you been to the ER, urgent care clinic since your last visit? Hospitalized since your last visit? No    2. Have you seen or consulted any other health care providers outside of the [address] [place] [address] since your last visit? Include any pap smears or colon screening.  No

## 2020-08-05 NOTE — PATIENT INSTRUCTIONS
I will see you back in 2 weeks. At this point you need to have had established with a new psychiatry at [facility] or with partial inpatient program. Schedule with therapist. And you also need to schedule with GI doctor. This needs to be completed for us to continue our relationship.

## 2020-08-18 ENCOUNTER — TELEPHONE (OUTPATIENT)
Dept: NEUROLOGY | Age: 35
End: 2020-08-18

## 2020-08-18 NOTE — TELEPHONE ENCOUNTER
----- Message from Roberto Ochoa sent at 8/18/2020  9:05 AM EDT -----  Env Patient return call    Caller's first and last name and relationship (if not the patient):      Best contact number(s): [phone]      Whose call is being returned: Returning a missed call from practice.       Details to clarify the request:      Roberto Ochoa

## 2020-09-02 ENCOUNTER — HOSPITAL ENCOUNTER (INPATIENT)
Age: 35
LOS: 26 days | Discharge: HOME | DRG: 740 | End: 2020-09-28
Attending: EMERGENCY MEDICINE | Admitting: PSYCHIATRY & NEUROLOGY
Payer: MEDICAID

## 2020-09-02 DIAGNOSIS — F32.A DEPRESSION WITH SUICIDAL IDEATION: Primary | ICD-10-CM

## 2020-09-02 DIAGNOSIS — O03.9 MISCARRIAGE: ICD-10-CM

## 2020-09-02 DIAGNOSIS — R45.851 DEPRESSION WITH SUICIDAL IDEATION: Primary | ICD-10-CM

## 2020-09-02 PROBLEM — F32.9 MDD (MAJOR DEPRESSIVE DISORDER): Status: ACTIVE | Noted: 2020-09-02

## 2020-09-02 LAB
ALBUMIN SERPL-MCNC: 3.9 G/DL (ref 3.5–5)
ALBUMIN/GLOB SERPL: 0.8 {RATIO} (ref 1.1–2.2)
ALP SERPL-CCNC: 43 U/L (ref 45–117)
ALT SERPL-CCNC: 14 U/L (ref 12–78)
AMPHET UR QL SCN: NEGATIVE
ANION GAP SERPL CALC-SCNC: 7 MMOL/L (ref 5–15)
APAP SERPL-MCNC: <2 UG/ML (ref 10–30)
APPEARANCE UR: ABNORMAL
AST SERPL-CCNC: 14 U/L (ref 15–37)
BACTERIA URNS QL MICRO: NEGATIVE /HPF
BARBITURATES UR QL SCN: NEGATIVE
BASOPHILS # BLD: 0 K/UL (ref 0–0.1)
BASOPHILS NFR BLD: 0 % (ref 0–1)
BENZODIAZ UR QL: NEGATIVE
BILIRUB SERPL-MCNC: 0.5 MG/DL (ref 0.2–1)
BILIRUB UR QL: NEGATIVE
BUN SERPL-MCNC: 4 MG/DL (ref 6–20)
BUN/CREAT SERPL: 7 (ref 12–20)
CALCIUM SERPL-MCNC: 9.8 MG/DL (ref 8.5–10.1)
CANNABINOIDS UR QL SCN: NEGATIVE
CHLORIDE SERPL-SCNC: 106 MMOL/L (ref 97–108)
CO2 SERPL-SCNC: 23 MMOL/L (ref 21–32)
COCAINE UR QL SCN: NEGATIVE
COLOR UR: ABNORMAL
COMMENT, HOLDF: NORMAL
COVID-19 RAPID TEST, COVR: NOT DETECTED
CREAT SERPL-MCNC: 0.59 MG/DL (ref 0.55–1.02)
DIFFERENTIAL METHOD BLD: ABNORMAL
DRUG SCRN COMMENT,DRGCM: NORMAL
EOSINOPHIL # BLD: 0.1 K/UL (ref 0–0.4)
EOSINOPHIL NFR BLD: 1 % (ref 0–7)
EPITH CASTS URNS QL MICRO: ABNORMAL /LPF
ERYTHROCYTE [DISTWIDTH] IN BLOOD BY AUTOMATED COUNT: 17.9 % (ref 11.5–14.5)
ETHANOL SERPL-MCNC: 10 MG/DL
GLOBULIN SER CALC-MCNC: 4.8 G/DL (ref 2–4)
GLUCOSE SERPL-MCNC: 79 MG/DL (ref 65–100)
GLUCOSE UR STRIP.AUTO-MCNC: NEGATIVE MG/DL
HCT VFR BLD AUTO: 36 % (ref 35–47)
HEALTH STATUS, XMCV2T: NORMAL
HGB BLD-MCNC: 11.3 G/DL (ref 11.5–16)
HGB UR QL STRIP: NEGATIVE
IMM GRANULOCYTES # BLD AUTO: 0 K/UL (ref 0–0.04)
IMM GRANULOCYTES NFR BLD AUTO: 0 % (ref 0–0.5)
KETONES UR QL STRIP.AUTO: >80 MG/DL
LEUKOCYTE ESTERASE UR QL STRIP.AUTO: NEGATIVE
LYMPHOCYTES # BLD: 1.7 K/UL (ref 0.8–3.5)
LYMPHOCYTES NFR BLD: 21 % (ref 12–49)
MCH RBC QN AUTO: 22.4 PG (ref 26–34)
MCHC RBC AUTO-ENTMCNC: 31.4 G/DL (ref 30–36.5)
MCV RBC AUTO: 71.4 FL (ref 80–99)
METHADONE UR QL: NEGATIVE
MONOCYTES # BLD: 0.4 K/UL (ref 0–1)
MONOCYTES NFR BLD: 5 % (ref 5–13)
MUCOUS THREADS URNS QL MICRO: ABNORMAL /LPF
NEUTS SEG # BLD: 5.8 K/UL (ref 1.8–8)
NEUTS SEG NFR BLD: 73 % (ref 32–75)
NITRITE UR QL STRIP.AUTO: NEGATIVE
NRBC # BLD: 0 K/UL (ref 0–0.01)
NRBC BLD-RTO: 0 PER 100 WBC
OPIATES UR QL: NEGATIVE
PCP UR QL: NEGATIVE
PH UR STRIP: 5.5 [PH] (ref 5–8)
PLATELET # BLD AUTO: 353 K/UL (ref 150–400)
POTASSIUM SERPL-SCNC: 3.9 MMOL/L (ref 3.5–5.1)
PROT SERPL-MCNC: 8.7 G/DL (ref 6.4–8.2)
PROT UR STRIP-MCNC: ABNORMAL MG/DL
RBC # BLD AUTO: 5.04 M/UL (ref 3.8–5.2)
RBC #/AREA URNS HPF: ABNORMAL /HPF (ref 0–5)
RBC MORPH BLD: ABNORMAL
SALICYLATES SERPL-MCNC: <1.7 MG/DL (ref 2.8–20)
SAMPLES BEING HELD,HOLD: NORMAL
SODIUM SERPL-SCNC: 136 MMOL/L (ref 136–145)
SOURCE, COVRS: NORMAL
SP GR UR REFRACTOMETRY: 1.03 (ref 1–1.03)
SPECIMEN SOURCE, FCOV2M: NORMAL
SPECIMEN TYPE, XMCV1T: NORMAL
TSH SERPL DL<=0.05 MIU/L-ACNC: 0.66 UIU/ML (ref 0.36–3.74)
UR CULT HOLD, URHOLD: NORMAL
UROBILINOGEN UR QL STRIP.AUTO: 1 EU/DL (ref 0.2–1)
WBC # BLD AUTO: 8 K/UL (ref 3.6–11)
WBC URNS QL MICRO: ABNORMAL /HPF (ref 0–4)

## 2020-09-02 PROCEDURE — 36415 COLL VENOUS BLD VENIPUNCTURE: CPT

## 2020-09-02 PROCEDURE — 80053 COMPREHEN METABOLIC PANEL: CPT

## 2020-09-02 PROCEDURE — 87635 SARS-COV-2 COVID-19 AMP PRB: CPT

## 2020-09-02 PROCEDURE — 74011250637 HC RX REV CODE- 250/637: Performed by: EMERGENCY MEDICINE

## 2020-09-02 PROCEDURE — 99284 EMERGENCY DEPT VISIT MOD MDM: CPT

## 2020-09-02 PROCEDURE — 84443 ASSAY THYROID STIM HORMONE: CPT

## 2020-09-02 PROCEDURE — 65220000003 HC RM SEMIPRIVATE PSYCH

## 2020-09-02 PROCEDURE — 90791 PSYCH DIAGNOSTIC EVALUATION: CPT

## 2020-09-02 PROCEDURE — 80307 DRUG TEST PRSMV CHEM ANLYZR: CPT

## 2020-09-02 PROCEDURE — 81001 URINALYSIS AUTO W/SCOPE: CPT

## 2020-09-02 PROCEDURE — 85025 COMPLETE CBC W/AUTO DIFF WBC: CPT

## 2020-09-02 RX ORDER — ONDANSETRON 4 MG/1
4 TABLET, ORALLY DISINTEGRATING ORAL
Status: COMPLETED | OUTPATIENT
Start: 2020-09-02 | End: 2020-09-02

## 2020-09-02 RX ORDER — ONDANSETRON 2 MG/ML
4 INJECTION INTRAMUSCULAR; INTRAVENOUS
Status: DISCONTINUED | OUTPATIENT
Start: 2020-09-02 | End: 2020-09-02

## 2020-09-02 RX ADMIN — ONDANSETRON 4 MG: 4 TABLET, ORALLY DISINTEGRATING ORAL at 23:01

## 2020-09-02 NOTE — ED TRIAGE NOTES
Pt reports she was at her OBGYN today and found out that she is having a miscarriage and stated to her OBGYN that she could not go home. Pt's plan includes overdosing in alcohol or drugs. Pt reports she has been out of her depression medications for a couple weeks. 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 Pt's OBGYN told her she will need a D&C and to come here.
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
## 2020-09-03 LAB
COVID-19, XGCOVT: NOT DETECTED
HEALTH STATUS, XMCV2T: NORMAL
SPECIMEN TYPE, XMCV1T: NORMAL

## 2020-09-03 PROCEDURE — 74011250637 HC RX REV CODE- 250/637: Performed by: PSYCHIATRY & NEUROLOGY

## 2020-09-03 PROCEDURE — 74011250637 HC RX REV CODE- 250/637: Performed by: NURSE PRACTITIONER

## 2020-09-03 PROCEDURE — 65220000003 HC RM SEMIPRIVATE PSYCH

## 2020-09-03 RX ORDER — HALOPERIDOL 5 MG/ML
5 INJECTION INTRAMUSCULAR
Status: DISCONTINUED | OUTPATIENT
Start: 2020-09-03 | End: 2020-09-28 | Stop reason: HOSPADM

## 2020-09-03 RX ORDER — HYDROXYZINE 50 MG/1
50 TABLET, FILM COATED ORAL
Status: DISCONTINUED | OUTPATIENT
Start: 2020-09-03 | End: 2020-09-28 | Stop reason: HOSPADM

## 2020-09-03 RX ORDER — QUETIAPINE FUMARATE 100 MG/1
100 TABLET, FILM COATED ORAL
Status: DISCONTINUED | OUTPATIENT
Start: 2020-09-03 | End: 2020-09-04

## 2020-09-03 RX ORDER — BENZTROPINE MESYLATE 1 MG/1
1 TABLET ORAL
Status: DISCONTINUED | OUTPATIENT
Start: 2020-09-03 | End: 2020-09-28 | Stop reason: HOSPADM

## 2020-09-03 RX ORDER — ADHESIVE BANDAGE
30 BANDAGE TOPICAL DAILY PRN
Status: DISCONTINUED | OUTPATIENT
Start: 2020-09-03 | End: 2020-09-28 | Stop reason: HOSPADM

## 2020-09-03 RX ORDER — TRAZODONE HYDROCHLORIDE 50 MG/1
50 TABLET ORAL
Status: DISCONTINUED | OUTPATIENT
Start: 2020-09-03 | End: 2020-09-08

## 2020-09-03 RX ORDER — ONDANSETRON 4 MG/1
4 TABLET, ORALLY DISINTEGRATING ORAL
Status: DISCONTINUED | OUTPATIENT
Start: 2020-09-03 | End: 2020-09-28 | Stop reason: HOSPADM

## 2020-09-03 RX ORDER — SERTRALINE HYDROCHLORIDE 100 MG/1
200 TABLET, FILM COATED ORAL DAILY
COMMUNITY
End: 2020-09-28

## 2020-09-03 RX ORDER — ACETAMINOPHEN 325 MG/1
650 TABLET ORAL
Status: DISCONTINUED | OUTPATIENT
Start: 2020-09-03 | End: 2020-09-28 | Stop reason: HOSPADM

## 2020-09-03 RX ORDER — OLANZAPINE 5 MG/1
5 TABLET ORAL
Status: DISCONTINUED | OUTPATIENT
Start: 2020-09-03 | End: 2020-09-28 | Stop reason: HOSPADM

## 2020-09-03 RX ORDER — DIPHENHYDRAMINE HYDROCHLORIDE 50 MG/ML
50 INJECTION, SOLUTION INTRAMUSCULAR; INTRAVENOUS
Status: DISCONTINUED | OUTPATIENT
Start: 2020-09-03 | End: 2020-09-28 | Stop reason: HOSPADM

## 2020-09-03 RX ORDER — DULOXETIN HYDROCHLORIDE 60 MG/1
60 CAPSULE, DELAYED RELEASE ORAL DAILY
Status: DISCONTINUED | OUTPATIENT
Start: 2020-09-03 | End: 2020-09-07

## 2020-09-03 RX ORDER — LORAZEPAM 2 MG/ML
1 INJECTION INTRAMUSCULAR
Status: DISCONTINUED | OUTPATIENT
Start: 2020-09-03 | End: 2020-09-28 | Stop reason: HOSPADM

## 2020-09-03 RX ORDER — PRAZOSIN HYDROCHLORIDE 1 MG/1
1 CAPSULE ORAL
Status: DISCONTINUED | OUTPATIENT
Start: 2020-09-03 | End: 2020-09-07

## 2020-09-03 RX ADMIN — PRAZOSIN HYDROCHLORIDE 1 MG: 1 CAPSULE ORAL at 21:45

## 2020-09-03 RX ADMIN — QUETIAPINE FUMARATE 100 MG: 100 TABLET ORAL at 21:45

## 2020-09-03 RX ADMIN — DULOXETINE HYDROCHLORIDE 60 MG: 60 CAPSULE, DELAYED RELEASE ORAL at 12:03

## 2020-09-03 RX ADMIN — ONDANSETRON 4 MG: 4 TABLET, ORALLY DISINTEGRATING ORAL at 18:13

## 2020-09-03 NOTE — BH NOTES
Admission note    Pt. Received from [facility] ED  Bal and Uds Negative  Calm and Cooperative  Denies SI/HI AH/VH     Currently going through miscarriage, D&C scheduled for Friday, patient currently not bleeding. Long med hx which includes lupus, sickle cell, HIV, and several STDs. Patient has 7 living children and a approximately 3-4 miscarriages. NP notified and agreed to patient placed here due to [place] 1.      Belongings secured    Primary Nurse Jaun Holloway and Beverly Novak performed a dual skin assessment on this patient No impairment noted  Tor score is 23
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
## 2020-09-03 NOTE — PROGRESS NOTES
Gynecology Progress Note    Stopped by to check on patient after admission to psych unit. Pt was sent by me from the office yesterday by EMS for concern for severe depression with suicidal ideation after diagnosis of recurrent 6 week missed . Pt states she is not doing well from a mental/emotional standpoint. States she does not plan to return to her boyfriend's home where she and her children having been living because they are better off without her there, but not sure where she will go (doesn't feel safe at her house). Patient denies any bleeding or cramping. Desires to proceed with suction D&C when able to schedule. Per nursing, pt was unable to keep down her fluoxetine that was just given. Vitals:  Blood pressure 112/75, pulse 63, temperature 98.2 °F (36.8 °C), resp. rate 16, height 5' 6\" (1.676 m), weight 70.5 kg (155 lb 6.4 oz), SpO2 100 %, not currently breastfeeding. Temp (24hrs), Av.3 °F (36.8 °C), Min:98 °F (36.7 °C), Max:98.5 °F (36.9 °C)        Exam:  Patient without distress. Resting in bed with flat affect as has been usual for her for several months. Assessment and Plan:    39 y/o  with 6 week missed . Currently asymptomatic. Desires suction D&C. This is scheduled for  at noon. Severe depression with suicidal ideation-appreciate psych management  Patient has been noncompliant with outpatient psych appts and meds -any help with arranging sooner follow up than December (which is current appt) would be greatly appreciated on discharge. Anorexia-large weight loss; patient admits to not eating and drinking for long period of time (and now has emesis when she tries to eat/drink per her report)  Alcohol abuse-pt reports not using alcohol during pregnancy, but was using this to self medicate her depression prior to positive UPT.

## 2020-09-03 NOTE — INTERDISCIPLINARY ROUNDS
Behavioral Health Interdisciplinary Rounds Patient Name: Graeme Gill  Age: 38 y.o. Room/Bed:  727/ Primary Diagnosis: <principal problem not specified> Admission Status: Voluntary Readmission within 30 days: no 
Power of  in place: no 
Patient requires a blocked bed: no          Reason for blocked bed: VTE Prophylaxis: No 
 
Mobility needs/Fall risk: no 
Flu Vaccine : no  
Nutritional Plan: no 
Consults:         
Labs/Testing due today?: no 
 
Sleep hours: 3.5 Participation in Care/Groups:   
Medication Compliant?: new admit PRNS (last 24 hours): None Restraints (last 24 hours):  no 
  
CIWA (range last 24 hours): COWS (range last 24 hours): Alcohol screening (AUDIT) completed - If applicable, date SBIRT discussed in treatment team AND documented:  
AUDIT Screen Score: Document Brief Intervention (corresponds directly with the 5 A's, Ask, Advise, Assess, Assist, and Arrange): At- Risk Patients (Score 7-15 for women; 8-15 for men) Discuss concern patient is drinking at unhealthy levels known to increase risk of alcohol-related health problems. Is Patient ready to commit to change? If No: 
? Encourage reflection ? Discuss short term and long term health risks of consuming alcohol ? Barriers to change ? Reaffirm willingness to help / Educational materials provided If Yes: 
? Set goal 
? Plan 
? Educational materials provided Harmful use or Dependence (Score 16 or greater) ? Discuss short term and long term health risks of consuming alcohol ? Recommendations ? Negotiate drinking goal 
? Recommend addiction specialist/center ? Arrange follow-up appointments. Tobacco - patient is a smoker: Have You Used Tobacco in the Past 30 Days: No 
Illegal Drugs use: Have You Used Any Illegal Substances Over the Past 12 Months: No 
 
24 hour chart check complete: yes Patient goal(s) for today: meet treatment team, acclimate to unit Treatment team focus/goals: assess needs for treatment and safe discharge Progress note: LOS:  1  Expected LOS: TBD Financial concerns/prescription coverage:  [id number] [employer] [place] Family contact: significant other Family requesting physician contact today:  no 
Discharge plan: return home Access to weapons :  NA Outpatient provider(s): Kay May NP new patient appointment Patient's preferred phone number for follow up call : [phone] Participating treatment team members: Keyshawn Shah, * (assigned SW), Dr. Bhavani Young; Mariel Clemons RN; Di Dhillon, PharmD; ALFREDO Baldwin

## 2020-09-03 NOTE — ED PROVIDER NOTES
Ms. Valeri Choudhary is a 47-year-old female who presents to the ER with complaints of depression. She was seen by her OB/GYN today. She is found to have a miscarriage. No signs of ectopic. Her OB states that she plans on doing a D&C sometime over the next handful of days. However, the patient was endorsing suicidal ideation. Therefore, she do not think she was safe to go home. Patient reports that she is thought about killing herself. She said about overdosing on medications, pills, alcohol, or drugs. She reports previous thoughts of suicidal ideation. She says that she has not actually done anything this time to harm her self. She states that she is lost about 60 pounds in last 5 to 6 months. Has not been sleeping well. She has been cutting herself on her wrist this week. Most recently was 2 days ago. She said that the cutting was very superficial.  She denies any other complaints. The patient's OB/GYN doctor called the ER to let us know Mrs. Valeri Choudhary was coming to the ER. She states that they will need to follow her but she does not need to be admitted for her miscarriage. If she did not have any psychiatric issues, they will do the D&C within several days as an outpatient. She states that she needs to be admitted to psychiatry as that is her acute problem. They will follow along and likely do a D&C after she is admitted at some point. Past Medical History:   Diagnosis Date    Anxiety     Asthma     on albuterol prn.  Triggers cold and allergies    Back pain, chronic     sciatica    Gestational hypertension     Past pregnancy    HX OTHER MEDICAL      , , , ,     Hyperemesis     severe hyperemesis    Hypertension     with G12 - none this pregnancy    Iron deficiency anemia 10/08/2010    MDD (major depressive disorder), single episode with postpartum onset 2013    treated with meds - 13    Plantar fasciitis     Pregnancy     36 weeks    PTSD (post-traumatic stress disorder)        Past Surgical History:   Procedure Laterality Date     DELIVERY ONLY      HX  SECTION  4/22/15    HX DILATION AND CURETTAGE      HX GYN      miscarriage x 6    HX GYN      removal of left fallopian tube    HX OTHER SURGICAL      cerlcage x4, ectopic x1 1999 with removal of left fallopian tube    HX OTHER SURGICAL      cerclage w/ current pregnancy.  Removed 18         Family History:   Problem Relation Age of Onset   [employer] Arthritis-rheumatoid Mother     Asthma Mother     No Known Problems Father     No Known Problems Maternal Grandmother     No Known Problems Maternal Grandfather     No Known Problems Paternal Grandmother     No Known Problems Paternal Grandfather     Asthma Son     Alcohol abuse Neg Hx     Arthritis-osteo Neg Hx     Bleeding Prob Neg Hx     Cancer Neg Hx     Diabetes Neg Hx     Elevated Lipids Neg Hx     Headache Neg Hx     Heart Disease Neg Hx     Hypertension Neg Hx     Lung Disease Neg Hx     Migraines Neg Hx     Psychiatric Disorder Neg Hx     Stroke Neg Hx     Mental Retardation Neg Hx     Anesth Problems Neg Hx        Social History     Socioeconomic History    Marital status: SINGLE     Spouse name: Not on file    Number of children: Not on file    Years of education: Not on file    Highest education level: Not on file   Occupational History    Not on file   Social Needs    Financial resource strain: Not on file    Food insecurity     Worry: Not on file     Inability: Not on file   [employer] needs     Medical: Not on file     Non-medical: Not on file   Tobacco Use    Smoking status: Never Smoker    Smokeless tobacco: Never Used   Substance and Sexual Activity    Alcohol use: Yes     Frequency: Monthly or less     Drinks per session: 1 or 2     Binge frequency: Never    Drug use: No    Sexual activity: Yes     Partners: Male   Lifestyle    Physical activity     Days per week: Not on file Minutes per session: Not on file    Stress: Not on file   Relationships    Social connections     Talks on phone: Not on file     Gets together: Not on file     Attends Spiritism service: Not on file     Active member of club or organization: Not on file     Attends meetings of clubs or organizations: Not on file     Relationship status: Not on file    Intimate partner violence     Fear of current or ex partner: Not on file     Emotionally abused: Not on file     Physically abused: Not on file     Forced sexual activity: Not on file   Other Topics Concern    Not on file   Social History Narrative    Julianna Gonzalez lives with boyfriend, Mindy Monroy,  reportedly with alcohol dependence. She was raised by her mother. She has no siblings. Not working. She is supported financially by the childrens' father and public support. She has no hobbies outside of her children. She has a 12th grade education and no legal entanglements. ALLERGIES: Labetalol; Ceclor [cefaclor]; Pcn [penicillins]; and Septra [sulfamethoprim ds]    Review of Systems   Constitutional: Negative for chills and fever. HENT: Negative for rhinorrhea and sore throat. Respiratory: Negative for cough and shortness of breath. Cardiovascular: Negative for chest pain. Gastrointestinal: Negative for abdominal pain, diarrhea, nausea and vomiting. Genitourinary: Negative for dysuria and urgency. Musculoskeletal: Negative for arthralgias and back pain. Skin: Negative for rash. Neurological: Negative for dizziness, weakness and light-headedness. Psychiatric/Behavioral: Positive for sleep disturbance and suicidal ideas. Vitals:    09/02/20 1735   BP: 129/79   Pulse: 76   Resp: 18   Temp: 98 °F (36.7 °C)   SpO2: 97%   Height: 5' 6\" (1.676 m)            Physical Exam     Vital signs reviewed. Nursing notes reviewed.   Const:  No acute distress, well developed, well nourished  Head:  Atraumatic, normocephalic  Eyes: PERRL, conjunctiva normal, no scleral icterus  Neck:  Supple, trachea midline  Cardiovascular:  Regular rate  Resp:  No resp distress, no increased work of breathing  Abd:  non-distended  :  Deferred  MSK:  No pedal edema, normal ROM  Neuro:  Alert and oriented x3, no cranial nerve defect  Skin:  Warm, dry, intact  Psych: Depressed mood, suicidal ideation            MDM  Number of Diagnoses or Management Options     Amount and/or Complexity of Data Reviewed  Clinical lab tests: ordered and reviewed             Ms. Renetta Joya is a 44yo female who presents to the ER with complaints of SI.  Pt. Has also had a miscarriage. Pt. To be admitted to inpatient psychiatry. She will likely need to be followed by GYN, and if she is in the hospital for several days, she may need a D&C. Pt. Is medically cleared.     Procedures

## 2020-09-03 NOTE — PROGRESS NOTES
Problem: Depressed Mood (Adult/Pediatric)  Goal: *STG: Verbalizes anger, guilt, and other feelings in a constructive manor  Outcome: Progressing Towards Goal  Note: Verbalizes feelings constructively. Interacts with staff and peers. Looking forward to discharge tomorrow. Med and meal compliant. Continue to monitor. Goal: *STG: Attends activities and groups  Outcome: Progressing Towards Goal  Goal: Interventions  Outcome: Progressing Towards Goal     Problem: Falls - Risk of  Goal: *Absence of Falls  Description: Document Kaelyn Dear Fall Risk and appropriate interventions in the flowsheet.   Outcome: Progressing Towards Goal  Note: Fall Risk Interventions:            Medication Interventions: Teach patient to arise slowly
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
## 2020-09-03 NOTE — BSMART NOTE
Patient accepted for admission by Osorio Maldonado NP pending negative COVID. She was made aware by Access of need for D and C. Patient will go to Room 727-01 when cleared. Report can be called to .

## 2020-09-03 NOTE — PROGRESS NOTES
Problem: Falls - Risk of  Goal: *Absence of Falls  Description: Document Ty Russ Fall Risk and appropriate interventions in the flowsheet. Outcome: Progressing Towards Goal  Note: Fall Risk Intervention               Pt. Awake in bed, NAD, respirations even and unlabored. Will continue q15 safety checks.

## 2020-09-03 NOTE — PROGRESS NOTES
[facility] 60  Master Treatment Plan for Ellen Nguyen    Date Treatment Plan Initiated: 9/3/20    Treatment Plan Modalities:  Type of Modality Amount  (x minutes) Frequency (x/week) Duration (x days) Name of Responsible Staff   [address] [facility] meetings to encourage peer interactions 15 7 1 Edna ODONNELL     Group psychotherapy to assist in building coping skills and internal controls 60 7 1 Momo Massey   Therapeutic activity groups to build coping skills 60 7 1 Momo Massey   Psychoeducation in group setting to address:   Medication education   15 7 Chuckie Martell. skills         Relaxation techniques         Symptom management         Discharge planning   60 2 [facility]    60 2 1 Chaplain NEWTON   61 1 1 volunteer   Recovery/AA/NA      volunteer   Physician medication management   13 7 1 Dr. Terra Groves meeting/discharge planning   15 2 [address] [facility] and Philomena Reyes                                    Problem: Depressed Mood (Adult/Pediatric)  Goal: *STG: Participates in treatment plan  Outcome: Progressing Towards Goal  Note: Resting in bed. Quiet and compliant. Depressed mood and sad affect. Reports feeling hopeless and overwhelmed with social stressors and grief related to recent miscarriage. Discussed medications and adjustments to address chronic depression. Denies SI and states yesterday just hearing about miscarriage she made hopeless statements. Daily goal is to discuss length of stay.  Staff focus is on coordination with OBGYN for procedure appointment and medication education  Goal: *STG: Verbalizes anger, guilt, and other feelings in a constructive manor  Outcome: Progressing Towards Goal  Goal: *STG: Attends activities and groups  Outcome: Progressing Towards Goal  Goal: *STG: Demonstrates reduction in symptoms and increase in insight into coping skills/future focused  Outcome: Progressing Towards Goal  Goal: Interventions  Outcome: Progressing Towards Goal     1215: message left with Dr. Ranjit Carias office.  Awaiting call back

## 2020-09-03 NOTE — BH NOTES
TRANSFER - IN REPORT:    Verbal report received from Kyrie Godinez, [address] on [place]  being received from [facility] ER for routine progression of care      Report consisted of patients Situation, Background, Assessment and   Recommendations(SBAR). Information from the following report(s) SBAR was reviewed with the receiving nurse. Opportunity for questions and clarification was provided. Assessment completed upon patients arrival to unit and care assumed. Currently going through miscarriage, D&C scheduled for Friday, patient currently not bleeding. Long med hx which includes lupus, sickle cell, HIV, and several STDs. Patient has 7 living children and a approximately 3-4 miscarriages.   NP notified and agreed to patient placed here due to [place] [address].

## 2020-09-03 NOTE — PROGRESS NOTES
Laboratory Monitoring for Antipsychotics: This patient is currently prescribed the following medication(s):   Current Facility-Administered Medications   Medication Dose Route Frequency    DULoxetine (CYMBALTA) capsule 60 mg  60 mg Oral DAILY    prazosin (MINIPRESS) capsule 1 mg  1 mg Oral QHS    QUEtiapine (SEROquel) tablet 100 mg  100 mg Oral QHS     The following labs have been completed for monitoring of antipsychotics and/or mood stabilizers:    Height, Weight, BMI Estimation  Estimated body mass index is 25.08 kg/m² as calculated from the following:    Height as of this encounter: 167.6 cm (66\"). Weight as of this encounter: 70.5 kg (155 lb 6.4 oz). Vital Signs/Blood Pressure  Visit Vitals  /74   Pulse (!) 59   Temp 98.3 °F (36.8 °C)   Resp 14   Ht 167.6 cm (66\")   Wt 70.5 kg (155 lb 6.4 oz)   SpO2 99%   BMI 25.08 kg/m²     Renal Function, Hepatic Function and Chemistry  Estimated Creatinine Clearance: 103 mL/min (by C-G formula based on SCr of 0.59 mg/dL). Lab Results   Component Value Date/Time    Sodium 136 09/02/2020 05:49 PM    Potassium 3.9 09/02/2020 05:49 PM    Chloride 106 09/02/2020 05:49 PM    CO2 23 09/02/2020 05:49 PM    Anion gap 7 09/02/2020 05:49 PM    BUN 4 (L) 09/02/2020 05:49 PM    Creatinine 0.59 09/02/2020 05:49 PM    BUN/Creatinine ratio 7 (L) 09/02/2020 05:49 PM    Bilirubin, total 0.5 09/02/2020 05:49 PM    Protein, total 8.7 (H) 09/02/2020 05:49 PM    Albumin 3.9 09/02/2020 05:49 PM    Globulin 4.8 (H) 09/02/2020 05:49 PM    A-G Ratio 0.8 (L) 09/02/2020 05:49 PM    ALT (SGPT) 14 09/02/2020 05:49 PM    AST (SGOT) 14 (L) 09/02/2020 05:49 PM    Alk.  phosphatase 43 (L) 09/02/2020 05:49 PM     Lab Results   Component Value Date/Time    Glucose 79 09/02/2020 05:49 PM     Lab Results   Component Value Date/Time    Hemoglobin A1c 5.2 02/18/2020 11:08 AM     Hematology  Lab Results   Component Value Date/Time    WBC 8.0 09/02/2020 05:49 PM    RBC 5.04 09/02/2020 05:49 PM HGB 11.3 (L) 09/02/2020 05:49 PM    HCT 36.0 09/02/2020 05:49 PM    MCV 71.4 (L) 09/02/2020 05:49 PM    MCH 22.4 (L) 09/02/2020 05:49 PM    MCHC 31.4 09/02/2020 05:49 PM    RDW 17.9 (H) 09/02/2020 05:49 PM    PLATELET 873 51/86/9506 05:49 PM    Hgb, External 33.7 08/03/2012    Hct, External 69 08/03/2012    Platelet cnt., External 307 08/03/2012     Lipids  Lab Results   Component Value Date/Time    Cholesterol, total 172 02/18/2020 11:08 AM    HDL Cholesterol 52 02/18/2020 11:08 AM    LDL, calculated 97 02/18/2020 11:08 AM    Triglyceride 113 02/18/2020 11:08 AM     Thyroid Function  Lab Results   Component Value Date/Time    TSH 0.66 09/02/2020 05:49 PM    T4, Free 1.1 11/25/2014 05:05 AM     Pregnancy Status  Lab Results   Component Value Date/Time    Pregnancy test,urine (POC) Negative 05/02/2020 11:32 AM     Assessment/Plan:  Recommended baseline laboratory monitoring has been completed based on this patient's current medication regimen. No further interventions are needed at this time. Follow-up metabolic monitoring labs should be completed in 3 months (quarterly for the first year of antipsychotic therapy).      uAre Webster, PHARMD

## 2020-09-04 PROCEDURE — 65220000003 HC RM SEMIPRIVATE PSYCH

## 2020-09-04 PROCEDURE — 74011250637 HC RX REV CODE- 250/637: Performed by: PSYCHIATRY & NEUROLOGY

## 2020-09-04 PROCEDURE — 74011250637 HC RX REV CODE- 250/637: Performed by: NURSE PRACTITIONER

## 2020-09-04 RX ORDER — IBUPROFEN 600 MG/1
600 TABLET ORAL
Status: DISCONTINUED | OUTPATIENT
Start: 2020-09-04 | End: 2020-09-28 | Stop reason: HOSPADM

## 2020-09-04 RX ORDER — QUETIAPINE FUMARATE 100 MG/1
200 TABLET, FILM COATED ORAL
Status: DISCONTINUED | OUTPATIENT
Start: 2020-09-04 | End: 2020-09-08

## 2020-09-04 RX ORDER — IBUPROFEN 600 MG/1
600 TABLET ORAL
Status: DISCONTINUED | OUTPATIENT
Start: 2020-09-04 | End: 2020-09-04

## 2020-09-04 RX ADMIN — ONDANSETRON 4 MG: 4 TABLET, ORALLY DISINTEGRATING ORAL at 15:42

## 2020-09-04 RX ADMIN — QUETIAPINE FUMARATE 200 MG: 100 TABLET ORAL at 22:15

## 2020-09-04 RX ADMIN — DULOXETINE HYDROCHLORIDE 60 MG: 60 CAPSULE, DELAYED RELEASE ORAL at 10:47

## 2020-09-04 RX ADMIN — ACETAMINOPHEN 650 MG: 325 TABLET ORAL at 21:23

## 2020-09-04 RX ADMIN — IBUPROFEN 600 MG: 600 TABLET, FILM COATED ORAL at 16:17

## 2020-09-04 RX ADMIN — ONDANSETRON 4 MG: 4 TABLET, ORALLY DISINTEGRATING ORAL at 21:37

## 2020-09-04 RX ADMIN — ONDANSETRON 4 MG: 4 TABLET, ORALLY DISINTEGRATING ORAL at 09:13

## 2020-09-04 NOTE — PROGRESS NOTES
Nutrition Assessment     Type and Reason for Visit: Initial, MST score 5    Nutrition Recommendations/Plan:   1.  RD adjust ONS: Trial Magic Cup and Boost pudding van. Change Ensure Enlive to [employer]; Staff offer snacks  2. MD consider adding MV with Fe++ and try appetite stimulant again    Nutrition Assessment:  Admit depression, suicidal ideation, miscarriage. Although BMI 25 overweight, pt had significant and unplanned wt loss ~74# (32%) x past ~6 months comparing wt 229# (2/18/2020) to wt today ~155#. Claims wt on Wed was 157 and 155# on Thurs. C/O no appetite, and has been given an \"appetite stimulant in past\", ? Remeron, but \"didn't continue taking it and didn't work\". C/O no BM since not eating. Not sure last BM, but believes Sunday. Usual BM pattern is every other day. C/O nausea and said had such bad N/V recently her throat \"was raw\" and also couldn't eat then. Recently pregnant and ? Morning sickness however, s/p miscarriage now. Claims +nausea today and was given Zofran at 9:15 am this morning. MD ordered Ensure Enlive TID, but not able to \"get it down with nausea\". Willing to try 4 Oz. Ensure Compact vs. 8 Oz. Larger volume Ensure Enlive. Also willing to try Magic Cup and vanilla Boost pudding. Vague on any food preferences; Encouraged snacks, bites/sips between meals. No hx DM, BG 79. Last A1C 5.2 (2/18/2020) prior to significant wt loss. ? Vit D status, appetite poor and does not usually consume dairy foods. HGB 11.3. Consider adding TheraM-Plus therapeutic MV with minerals. Malnutrition Assessment:  Malnutrition Status: Moderate malnutrition     Estimated Daily Nutrient Needs:  Energy (kcal):  1825  Protein (g):  70 gm (~1 gm/kg)       Fluid (ml/day):  1900 mL/day (~1 mL/kcal)    Nutrition Related Findings:  Poor appetite, c/o constipation and nausea; significant and unplanned wt loss; couple upper front teeth missing.       Current Nutrition Therapies:  DIET REGULAR  DIET NUTRITIONAL SUPPLEMENTS All Meals;  Ensure Enlive    Anthropometric Measures:  · Height:  5' 6\" (167.6 cm)  · Current Body Wt:  70.3 kg (154 lb 15.7 oz)  · BMI: 25    Nutrition Diagnosis:   · Unintended weight loss related to psychological cause or life stress as evidenced by weight loss greater than or equal to 10% in 6 months, intake 0-25%      Nutrition Intervention:  Food and/or Nutrient Delivery: Continue current diet, Continue oral nutrition supplement  Nutrition Education and Counseling: Education initiated    Goals:  Consume 100% ONS BID and minimum 50% all meals starting in next 24 hr. .       Nutrition Monitoring and Evaluation:   Behavioral-Environmental Outcomes:    Food/Nutrient Intake Outcomes:  ONS intake, Meal intake  Physical Signs/Symptoms Outcomes: Constipation, Nausea/vomiting, Meal time behavior    Discharge Planning:    Continue oral nutrition supplement     Electronically signed by Bjorn Rodgers RD on 9/4/2020 at 11:48 AM    Contact Number: [facility]

## 2020-09-04 NOTE — BH NOTES
0920 Zofran given for nausea. Pt refusing scheduled Cymbalta at this time because she feels she will throw the medication up. Will reassess. 1045 Pt took Cymbalta, nausea improved.
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
## 2020-09-04 NOTE — INTERDISCIPLINARY ROUNDS
Behavioral Health Interdisciplinary Rounds Patient Name: Alfredito Shepherd  Age: 38 y.o. Room/Bed:  727/ Primary Diagnosis: <principal problem not specified> Admission Status: Voluntary Readmission within 30 days: no 
Power of  in place: no 
Patient requires a blocked bed: no          Reason for blocked bed: VTE Prophylaxis: No 
 
Mobility needs/Fall risk: no 
Flu Vaccine :  
Nutritional Plan: no 
Consults:         
Labs/Testing due today?: no 
 
Sleep hours:  7.5 Participation in Care/Groups:  yes Medication Compliant?: Yes PRNS (last 24 hours): None Restraints (last 24 hours):  no 
  
CIWA (range last 24 hours): COWS (range last 24 hours): Alcohol screening (AUDIT) completed - If applicable, date SBIRT discussed in treatment team AND documented:  
AUDIT Screen Score: Tobacco - patient is a smoker: Have You Used Tobacco in the Past 30 Days: No 
Illegal Drugs use: Have You Used Any Illegal Substances Over the Past 12 Months: No 
 
24 hour chart check complete: yes Patient goal(s) for today: attend groups, take medications Treatment team focus/goals: titrate medication, coordinate follow up. Progress note: Pt is alert, oriented, depressed and expressed SI to her OBGYN yesterday. She describes feeling overwhelmed by the idea of not being able to care for her children and is worried if she was discharged and \"something happened like hemoraging\" I would let myself die. She has been nauseous and unable to keep down her Cymbalta. She has a D&C scheduled for Tuesday. LOS:  2  Expected LOS: TBD 
  
Financial concerns/prescription coverage:  [id number] [employer] [place] Family contact: significant other Family requesting physician contact today:  no 
Discharge plan: return home with fiance and children Access to weapons :  NA Outpatient provider(s): Kelin Pulido NP new patient appointment Patient's preferred phone number for follow up call : [phone]  
  
Participating treatment team members: Dr. Verito Mccord; Devon Hanson RN; Kei Watts, PharmD; Zohreh Arnold MSW

## 2020-09-04 NOTE — BH NOTES
Chief Complaint:  \"I don't feel so good\". Length of Stay: 2 Days    Interval History:  Jessica Vanegas continues to express significant anxiety and depression. Says she does not \"feel so good\". Her OBGyn visited twice since yesterday and she told Dr. Jean Carlos Lockhart that she still wanted to \"give up\". Reports having made legal arrangements if she was to \"die for any reason\". Remains depressed and passive in the milieu. However has been compliant with taking her medications. Slept poorly last night due to her nausea and thinks she vomited any medications that she took at bedtime. Feels safe in the hospital. Isolative to her room and was encouraged to participate in the milieu. Past Medical History:  Past Medical History:   Diagnosis Date    Anxiety     Asthma     on albuterol prn.  Triggers cold and allergies    Back pain, chronic 2010    sciatica    Gestational hypertension     Past pregnancy    HX OTHER MEDICAL      , , , ,     Hyperemesis     severe hyperemesis    Hypertension     with G12 - none this pregnancy    Iron deficiency anemia 10/08/2010    MDD (major depressive disorder), single episode with postpartum onset 2013    treated with meds - 13    Plantar fasciitis     Pregnancy     36 weeks    PTSD (post-traumatic stress disorder)            Labs:  Lab Results   Component Value Date/Time    WBC 8.0 2020 05:49 PM    WBC 12.2 (H) 2012 04:27 PM    Hemoglobin (POC) 10.4 (L) 2020 07:12 AM    HGB 11.3 (L) 2020 05:49 PM    HCT 36.0 2020 05:49 PM    PLATELET 288  05:49 PM    MCV 71.4 (L) 2020 05:49 PM    Hgb, External 33.7 2012    Hct, External 69 2012    Platelet cnt., External 307 2012      Lab Results   Component Value Date/Time    Sodium 136 2020 05:49 PM    Potassium 3.9 2020 05:49 PM    Chloride 106 2020 05:49 PM    CO2 23 2020 05:49 PM    Anion gap 7 2020 05:49 PM    Glucose 79 09/02/2020 05:49 PM    BUN 4 (L) 09/02/2020 05:49 PM    Creatinine 0.59 09/02/2020 05:49 PM    BUN/Creatinine ratio 7 (L) 09/02/2020 05:49 PM    GFR est AA >60 09/02/2020 05:49 PM    GFR est non-AA >60 09/02/2020 05:49 PM    Calcium 9.8 09/02/2020 05:49 PM    Bilirubin, total 0.5 09/02/2020 05:49 PM    Alk.  phosphatase 43 (L) 09/02/2020 05:49 PM    Protein, total 8.7 (H) 09/02/2020 05:49 PM    Albumin 3.9 09/02/2020 05:49 PM    Globulin 4.8 (H) 09/02/2020 05:49 PM    A-G Ratio 0.8 (L) 09/02/2020 05:49 PM    ALT (SGPT) 14 09/02/2020 05:49 PM      Vitals:    09/03/20 1953 09/03/20 2145 09/04/20 0812 09/04/20 0858   BP: 98/72 117/74 107/71    Pulse: 71 72 67    Resp: 14  16    Temp: 98.5 °F (36.9 °C)  98.6 °F (37 °C)    SpO2:   99%    Weight:    70.3 kg (154 lb 14.4 oz)   Height:             Current Facility-Administered Medications   Medication Dose Route Frequency Provider Last Rate Last Dose    OLANZapine (ZyPREXA) tablet 5 mg  5 mg Oral Q6H PRN Jung Sloan NP        haloperidol lactate (HALDOL) injection 5 mg  5 mg IntraMUSCular Q6H PRN Halina Sloan NP        benztropine (COGENTIN) tablet 1 mg  1 mg Oral BID PRN Jung Sloan NP        diphenhydrAMINE (BENADRYL) injection 50 mg  50 mg IntraMUSCular BID PRN Jung Sloan NP        hydrOXYzine HCL (ATARAX) tablet 50 mg  50 mg Oral TID PRN Jung Sloan NP        LORazepam (ATIVAN) injection 1 mg  1 mg IntraMUSCular Q4H PRN Jung Sloan NP        traZODone (DESYREL) tablet 50 mg  50 mg Oral QHS PRN Jung Sloan NP        acetaminophen (TYLENOL) tablet 650 mg  650 mg Oral Q4H PRN Jung Sloan NP        magnesium hydroxide (MILK OF MAGNESIA) 400 mg/5 mL oral suspension 30 mL  30 mL Oral DAILY PRN Halina Sloan NP        ondansetron (ZOFRAN ODT) tablet 4 mg  4 mg Oral Q6H PRN Nathalia, Cristhian Bergman, MENA   4 mg at 09/04/20 0913    DULoxetine (CYMBALTA) capsule 60 mg  60 mg Oral DAILY Melissa Lu MD   60 mg at 09/04/20 1047    prazosin (MINIPRESS) capsule 1 mg  1 mg Oral QHS Melissa Lu MD   1 mg at 09/03/20 2145    QUEtiapine (SEROquel) tablet 100 mg  100 mg Oral QHS Melissa Lu MD   100 mg at 09/03/20 2145         Mental Status Exam:  Eye contact: limited  Grooming: fair  Psychomotor activity: decreased. Speech is spontaneous, soft  Mood is \"hopeless\"  Affect: depressed. Perception: Denies any AH or VH  Suicidal ideation: SI +   Cognition is grossly intact       Physical Exam:  Body habitus: Body mass index is 25 kg/m². Musculoskeletal system: normal gait  Tremor - neg  Cog wheeling - neg      Assessment and Plan:  Thanh Lo meets criteria for a diagnosis of Recurrent major depression, severe without psychotic symptoms; posttraumatic stress disorder, chronic. Increased Seroquel to 200mg at bedtime. Continue the medication regimen as prescribed  Disposition planning to continue. I certify that this patients inpatient psychiatric hospital services furnished since the previous certification were, and continue to be, required for treatment that could reasonably be expected to improve the patient's condition, or for diagnostic study, and that the patient continues to need, on a daily basis, active treatment furnished directly by or requiring the supervision of inpatient psychiatric facility personnel. In addition, the hospital records show that services furnished were intensive treatment services, admission or related services, or equivalent services.

## 2020-09-04 NOTE — PROGRESS NOTES
Problem: Depressed Mood (Adult/Pediatric)  Goal: *STG: Participates in treatment plan  Outcome: Progressing Towards Goal  Note: Out on unit passively engaged. Increase time out of room. Affect and mood depressed and hopeless. Reports passive thoughts of suicide such as if she hemorrhaged at home she would not call 911. States her children would be better off without her. Denies SI now and states feels safe here. Deflective when discussing feelings of grief and anxiety related to being with children. Daily goal is to attend a group. Staff focus is on offering support and coping skills education  Goal: *STG: Verbalizes anger, guilt, and other feelings in a constructive manor  Outcome: Progressing Towards Goal  Goal: *STG: Attends activities and groups  Outcome: Progressing Towards Goal  Goal: *STG: Demonstrates reduction in symptoms and increase in insight into coping skills/future focused  Outcome: Progressing Towards Goal  Goal: Interventions  Outcome: Progressing Towards Goal     Problem: Depressed Mood (Adult/Pediatric)  Goal: *STG: Participates in treatment plan  Outcome: Progressing Towards Goal  Note: Out on unit passively engaged. Increase time out of room. Affect and mood depressed and hopeless. Reports passive thoughts of suicide such as if she hemorrhaged at home she would not call 911. States her children would be better off without her. Denies SI now and states feels safe here. Deflective when discussing feelings of grief and anxiety related to being with children. Daily goal is to attend a group.  Staff focus is on offering support and coping skills education  Goal: *STG: Verbalizes anger, guilt, and other feelings in a constructive manor  Outcome: Progressing Towards Goal  Goal: *STG: Attends activities and groups  Outcome: Progressing Towards Goal  Goal: *STG: Demonstrates reduction in symptoms and increase in insight into coping skills/future focused  Outcome: Progressing Towards Goal  Goal: Interventions  Outcome: Progressing Towards Goal

## 2020-09-04 NOTE — PROGRESS NOTES
Problem: Falls - Risk of  Goal: *Absence of Falls  Description: Document Mercy Smith Fall Risk and appropriate interventions in the flowsheet. Outcome: Progressing Towards Goal  Note: Fall Risk Interventions:            Medication Interventions: Teach patient to arise slowly            Resting in bed with eyes closed, no complaints, no distress noted. Safety measures in place, will continue to monitor.

## 2020-09-05 ENCOUNTER — ANESTHESIA EVENT (OUTPATIENT)
Dept: SURGERY | Age: 35
DRG: 740 | End: 2020-09-05
Payer: MEDICAID

## 2020-09-05 ENCOUNTER — ANESTHESIA (OUTPATIENT)
Dept: SURGERY | Age: 35
DRG: 740 | End: 2020-09-05
Payer: MEDICAID

## 2020-09-05 PROCEDURE — 74011000250 HC RX REV CODE- 250: Performed by: OBSTETRICS & GYNECOLOGY

## 2020-09-05 PROCEDURE — 77030019905 HC CATH URETH INTMIT MDII -A: Performed by: OBSTETRICS & GYNECOLOGY

## 2020-09-05 PROCEDURE — 74011250637 HC RX REV CODE- 250/637: Performed by: NURSE PRACTITIONER

## 2020-09-05 PROCEDURE — 76060000032 HC ANESTHESIA 0.5 TO 1 HR: Performed by: OBSTETRICS & GYNECOLOGY

## 2020-09-05 PROCEDURE — 36415 COLL VENOUS BLD VENIPUNCTURE: CPT

## 2020-09-05 PROCEDURE — 77030003666 HC NDL SPINAL BD -A: Performed by: OBSTETRICS & GYNECOLOGY

## 2020-09-05 PROCEDURE — 77030008578 HC TBNG UTER SUC BUSS -A: Performed by: OBSTETRICS & GYNECOLOGY

## 2020-09-05 PROCEDURE — 74011250637 HC RX REV CODE- 250/637

## 2020-09-05 PROCEDURE — 74011250637 HC RX REV CODE- 250/637: Performed by: PSYCHIATRY & NEUROLOGY

## 2020-09-05 PROCEDURE — 74011250636 HC RX REV CODE- 250/636: Performed by: NURSE ANESTHETIST, CERTIFIED REGISTERED

## 2020-09-05 PROCEDURE — 88305 TISSUE EXAM BY PATHOLOGIST: CPT

## 2020-09-05 PROCEDURE — 74011250636 HC RX REV CODE- 250/636: Performed by: NURSE PRACTITIONER

## 2020-09-05 PROCEDURE — 74011250637 HC RX REV CODE- 250/637: Performed by: OBSTETRICS & GYNECOLOGY

## 2020-09-05 PROCEDURE — 76210000000 HC OR PH I REC 2 TO 2.5 HR: Performed by: OBSTETRICS & GYNECOLOGY

## 2020-09-05 PROCEDURE — 77030013079 HC BLNKT BAIR HGGR 3M -A: Performed by: ANESTHESIOLOGY

## 2020-09-05 PROCEDURE — 74011000258 HC RX REV CODE- 258: Performed by: OBSTETRICS & GYNECOLOGY

## 2020-09-05 PROCEDURE — 74011000250 HC RX REV CODE- 250: Performed by: NURSE ANESTHETIST, CERTIFIED REGISTERED

## 2020-09-05 PROCEDURE — 74011000250 HC RX REV CODE- 250: Performed by: ANESTHESIOLOGY

## 2020-09-05 PROCEDURE — 10D17ZZ EXTRACTION OF PRODUCTS OF CONCEPTION, RETAINED, VIA NATURAL OR ARTIFICIAL OPENING: ICD-10-PCS | Performed by: OBSTETRICS & GYNECOLOGY

## 2020-09-05 PROCEDURE — 65220000003 HC RM SEMIPRIVATE PSYCH

## 2020-09-05 PROCEDURE — 76010000138 HC OR TIME 0.5 TO 1 HR: Performed by: OBSTETRICS & GYNECOLOGY

## 2020-09-05 PROCEDURE — 74011250636 HC RX REV CODE- 250/636: Performed by: OBSTETRICS & GYNECOLOGY

## 2020-09-05 PROCEDURE — 74011250636 HC RX REV CODE- 250/636: Performed by: ANESTHESIOLOGY

## 2020-09-05 PROCEDURE — 77030010509 HC AIRWY LMA MSK TELE -A: Performed by: ANESTHESIOLOGY

## 2020-09-05 RX ORDER — MIDAZOLAM HYDROCHLORIDE 1 MG/ML
1 INJECTION, SOLUTION INTRAMUSCULAR; INTRAVENOUS AS NEEDED
Status: DISCONTINUED | OUTPATIENT
Start: 2020-09-05 | End: 2020-09-05 | Stop reason: HOSPADM

## 2020-09-05 RX ORDER — DOXYCYCLINE HYCLATE 100 MG
200 TABLET ORAL ONCE
Status: COMPLETED | OUTPATIENT
Start: 2020-09-05 | End: 2020-09-05

## 2020-09-05 RX ORDER — ONDANSETRON 2 MG/ML
4 INJECTION INTRAMUSCULAR; INTRAVENOUS AS NEEDED
Status: DISCONTINUED | OUTPATIENT
Start: 2020-09-05 | End: 2020-09-05

## 2020-09-05 RX ORDER — DEXAMETHASONE SODIUM PHOSPHATE 4 MG/ML
INJECTION, SOLUTION INTRA-ARTICULAR; INTRALESIONAL; INTRAMUSCULAR; INTRAVENOUS; SOFT TISSUE AS NEEDED
Status: DISCONTINUED | OUTPATIENT
Start: 2020-09-05 | End: 2020-09-05 | Stop reason: HOSPADM

## 2020-09-05 RX ORDER — MIDAZOLAM HYDROCHLORIDE 1 MG/ML
1 INJECTION, SOLUTION INTRAMUSCULAR; INTRAVENOUS
Status: DISCONTINUED | OUTPATIENT
Start: 2020-09-05 | End: 2020-09-05

## 2020-09-05 RX ORDER — ONDANSETRON 2 MG/ML
INJECTION INTRAMUSCULAR; INTRAVENOUS AS NEEDED
Status: DISCONTINUED | OUTPATIENT
Start: 2020-09-05 | End: 2020-09-05 | Stop reason: HOSPADM

## 2020-09-05 RX ORDER — OXYCODONE HYDROCHLORIDE 5 MG/1
5 TABLET ORAL AS NEEDED
Status: DISCONTINUED | OUTPATIENT
Start: 2020-09-05 | End: 2020-09-05

## 2020-09-05 RX ORDER — FENTANYL CITRATE 50 UG/ML
INJECTION, SOLUTION INTRAMUSCULAR; INTRAVENOUS
Status: COMPLETED
Start: 2020-09-05 | End: 2020-09-05

## 2020-09-05 RX ORDER — SODIUM CHLORIDE 0.9 % (FLUSH) 0.9 %
5-40 SYRINGE (ML) INJECTION AS NEEDED
Status: DISCONTINUED | OUTPATIENT
Start: 2020-09-05 | End: 2020-09-05 | Stop reason: HOSPADM

## 2020-09-05 RX ORDER — SCOLOPAMINE TRANSDERMAL SYSTEM 1 MG/1
PATCH, EXTENDED RELEASE TRANSDERMAL
Status: COMPLETED
Start: 2020-09-05 | End: 2020-09-05

## 2020-09-05 RX ORDER — BUPIVACAINE HYDROCHLORIDE 5 MG/ML
INJECTION, SOLUTION EPIDURAL; INTRACAUDAL AS NEEDED
Status: DISCONTINUED | OUTPATIENT
Start: 2020-09-05 | End: 2020-09-05 | Stop reason: HOSPADM

## 2020-09-05 RX ORDER — HYDROCODONE BITARTRATE AND ACETAMINOPHEN 5; 325 MG/1; MG/1
1 TABLET ORAL
Status: DISCONTINUED | OUTPATIENT
Start: 2020-09-05 | End: 2020-09-28 | Stop reason: HOSPADM

## 2020-09-05 RX ORDER — LIDOCAINE HYDROCHLORIDE 20 MG/ML
INJECTION, SOLUTION EPIDURAL; INFILTRATION; INTRACAUDAL; PERINEURAL AS NEEDED
Status: DISCONTINUED | OUTPATIENT
Start: 2020-09-05 | End: 2020-09-05 | Stop reason: HOSPADM

## 2020-09-05 RX ORDER — PROPOFOL 10 MG/ML
INJECTION, EMULSION INTRAVENOUS AS NEEDED
Status: DISCONTINUED | OUTPATIENT
Start: 2020-09-05 | End: 2020-09-05 | Stop reason: HOSPADM

## 2020-09-05 RX ORDER — MIDAZOLAM HYDROCHLORIDE 1 MG/ML
INJECTION, SOLUTION INTRAMUSCULAR; INTRAVENOUS
Status: COMPLETED
Start: 2020-09-05 | End: 2020-09-05

## 2020-09-05 RX ORDER — GLYCOPYRROLATE 0.2 MG/ML
0.2 INJECTION INTRAMUSCULAR; INTRAVENOUS ONCE
Status: COMPLETED | OUTPATIENT
Start: 2020-09-05 | End: 2020-09-05

## 2020-09-05 RX ORDER — SODIUM CHLORIDE 0.9 % (FLUSH) 0.9 %
5-40 SYRINGE (ML) INJECTION EVERY 8 HOURS
Status: DISCONTINUED | OUTPATIENT
Start: 2020-09-05 | End: 2020-09-05 | Stop reason: HOSPADM

## 2020-09-05 RX ORDER — FENTANYL CITRATE 50 UG/ML
25 INJECTION, SOLUTION INTRAMUSCULAR; INTRAVENOUS
Status: DISCONTINUED | OUTPATIENT
Start: 2020-09-05 | End: 2020-09-05

## 2020-09-05 RX ORDER — KETOROLAC TROMETHAMINE 30 MG/ML
INJECTION, SOLUTION INTRAMUSCULAR; INTRAVENOUS AS NEEDED
Status: DISCONTINUED | OUTPATIENT
Start: 2020-09-05 | End: 2020-09-05 | Stop reason: HOSPADM

## 2020-09-05 RX ORDER — SODIUM CHLORIDE 0.9 % (FLUSH) 0.9 %
5-40 SYRINGE (ML) INJECTION EVERY 8 HOURS
Status: DISCONTINUED | OUTPATIENT
Start: 2020-09-05 | End: 2020-09-05

## 2020-09-05 RX ORDER — DIPHENHYDRAMINE HYDROCHLORIDE 50 MG/ML
12.5 INJECTION, SOLUTION INTRAMUSCULAR; INTRAVENOUS AS NEEDED
Status: DISCONTINUED | OUTPATIENT
Start: 2020-09-05 | End: 2020-09-05

## 2020-09-05 RX ORDER — FENTANYL CITRATE 50 UG/ML
INJECTION, SOLUTION INTRAMUSCULAR; INTRAVENOUS AS NEEDED
Status: DISCONTINUED | OUTPATIENT
Start: 2020-09-05 | End: 2020-09-05 | Stop reason: HOSPADM

## 2020-09-05 RX ORDER — SODIUM CHLORIDE, SODIUM LACTATE, POTASSIUM CHLORIDE, CALCIUM CHLORIDE 600; 310; 30; 20 MG/100ML; MG/100ML; MG/100ML; MG/100ML
INJECTION, SOLUTION INTRAVENOUS
Status: DISCONTINUED | OUTPATIENT
Start: 2020-09-05 | End: 2020-09-05 | Stop reason: HOSPADM

## 2020-09-05 RX ORDER — SODIUM CHLORIDE, SODIUM LACTATE, POTASSIUM CHLORIDE, CALCIUM CHLORIDE 600; 310; 30; 20 MG/100ML; MG/100ML; MG/100ML; MG/100ML
125 INJECTION, SOLUTION INTRAVENOUS CONTINUOUS
Status: DISCONTINUED | OUTPATIENT
Start: 2020-09-05 | End: 2020-09-05

## 2020-09-05 RX ORDER — GLYCOPYRROLATE 0.2 MG/ML
INJECTION INTRAMUSCULAR; INTRAVENOUS
Status: DISPENSED
Start: 2020-09-05 | End: 2020-09-05

## 2020-09-05 RX ORDER — LIDOCAINE HYDROCHLORIDE 10 MG/ML
0.5 INJECTION, SOLUTION EPIDURAL; INFILTRATION; INTRACAUDAL; PERINEURAL AS NEEDED
Status: DISCONTINUED | OUTPATIENT
Start: 2020-09-05 | End: 2020-09-05 | Stop reason: HOSPADM

## 2020-09-05 RX ORDER — ACETAMINOPHEN 325 MG/1
650 TABLET ORAL ONCE
Status: DISCONTINUED | OUTPATIENT
Start: 2020-09-05 | End: 2020-09-05 | Stop reason: HOSPADM

## 2020-09-05 RX ORDER — MIDAZOLAM HYDROCHLORIDE 1 MG/ML
INJECTION, SOLUTION INTRAMUSCULAR; INTRAVENOUS AS NEEDED
Status: DISCONTINUED | OUTPATIENT
Start: 2020-09-05 | End: 2020-09-05 | Stop reason: HOSPADM

## 2020-09-05 RX ORDER — METHYLERGONOVINE MALEATE 0.2 MG/1
200 TABLET ORAL EVERY 8 HOURS
Status: COMPLETED | OUTPATIENT
Start: 2020-09-05 | End: 2020-09-07

## 2020-09-05 RX ORDER — SODIUM CHLORIDE, SODIUM LACTATE, POTASSIUM CHLORIDE, CALCIUM CHLORIDE 600; 310; 30; 20 MG/100ML; MG/100ML; MG/100ML; MG/100ML
125 INJECTION, SOLUTION INTRAVENOUS CONTINUOUS
Status: DISCONTINUED | OUTPATIENT
Start: 2020-09-05 | End: 2020-09-05 | Stop reason: HOSPADM

## 2020-09-05 RX ORDER — SODIUM CHLORIDE 0.9 % (FLUSH) 0.9 %
5-40 SYRINGE (ML) INJECTION AS NEEDED
Status: DISCONTINUED | OUTPATIENT
Start: 2020-09-05 | End: 2020-09-05

## 2020-09-05 RX ORDER — SCOLOPAMINE TRANSDERMAL SYSTEM 1 MG/1
1 PATCH, EXTENDED RELEASE TRANSDERMAL ONCE
Status: COMPLETED | OUTPATIENT
Start: 2020-09-05 | End: 2020-09-05

## 2020-09-05 RX ORDER — DOXYCYCLINE HYCLATE 100 MG
200 TABLET ORAL ONCE
Status: DISCONTINUED | OUTPATIENT
Start: 2020-09-05 | End: 2020-09-05

## 2020-09-05 RX ORDER — FENTANYL CITRATE 50 UG/ML
50 INJECTION, SOLUTION INTRAMUSCULAR; INTRAVENOUS AS NEEDED
Status: DISCONTINUED | OUTPATIENT
Start: 2020-09-05 | End: 2020-09-05 | Stop reason: HOSPADM

## 2020-09-05 RX ORDER — MORPHINE SULFATE 10 MG/ML
2 INJECTION, SOLUTION INTRAMUSCULAR; INTRAVENOUS
Status: DISCONTINUED | OUTPATIENT
Start: 2020-09-05 | End: 2020-09-05

## 2020-09-05 RX ORDER — DEXTROSE, SODIUM CHLORIDE, SODIUM LACTATE, POTASSIUM CHLORIDE, AND CALCIUM CHLORIDE 5; .6; .31; .03; .02 G/100ML; G/100ML; G/100ML; G/100ML; G/100ML
125 INJECTION, SOLUTION INTRAVENOUS CONTINUOUS
Status: DISCONTINUED | OUTPATIENT
Start: 2020-09-05 | End: 2020-09-05 | Stop reason: HOSPADM

## 2020-09-05 RX ORDER — PROMETHAZINE HYDROCHLORIDE 25 MG/ML
25 INJECTION, SOLUTION INTRAMUSCULAR; INTRAVENOUS
Status: DISCONTINUED | OUTPATIENT
Start: 2020-09-05 | End: 2020-09-06

## 2020-09-05 RX ADMIN — METHYLERGONOVINE 200 MCG: 0.2 TABLET ORAL at 22:06

## 2020-09-05 RX ADMIN — DOXYCYCLINE HYCLATE 200 MG: 100 TABLET, COATED ORAL at 22:02

## 2020-09-05 RX ADMIN — LIDOCAINE HYDROCHLORIDE 100 MG: 20 INJECTION, SOLUTION EPIDURAL; INFILTRATION; INTRACAUDAL; PERINEURAL at 09:24

## 2020-09-05 RX ADMIN — DEXAMETHASONE SODIUM PHOSPHATE 4 MG: 4 INJECTION, SOLUTION INTRAMUSCULAR; INTRAVENOUS at 09:42

## 2020-09-05 RX ADMIN — ONDANSETRON HYDROCHLORIDE 4 MG: 2 INJECTION, SOLUTION INTRAMUSCULAR; INTRAVENOUS at 09:42

## 2020-09-05 RX ADMIN — PROMETHAZINE HYDROCHLORIDE 25 MG: 25 INJECTION INTRAMUSCULAR; INTRAVENOUS at 22:03

## 2020-09-05 RX ADMIN — FENTANYL CITRATE 25 MCG: 50 INJECTION, SOLUTION INTRAMUSCULAR; INTRAVENOUS at 10:34

## 2020-09-05 RX ADMIN — GLYCOPYRROLATE 0.2 MG: 0.2 INJECTION INTRAMUSCULAR; INTRAVENOUS at 11:12

## 2020-09-05 RX ADMIN — FENTANYL CITRATE 25 MCG: 50 INJECTION, SOLUTION INTRAMUSCULAR; INTRAVENOUS at 10:25

## 2020-09-05 RX ADMIN — FENTANYL CITRATE 25 MCG: 50 INJECTION, SOLUTION INTRAMUSCULAR; INTRAVENOUS at 11:40

## 2020-09-05 RX ADMIN — MIDAZOLAM HYDROCHLORIDE 5 MG: 1 INJECTION, SOLUTION INTRAMUSCULAR; INTRAVENOUS at 09:24

## 2020-09-05 RX ADMIN — PROPOFOL 170 MG: 10 INJECTION, EMULSION INTRAVENOUS at 09:24

## 2020-09-05 RX ADMIN — FENTANYL CITRATE 25 MCG: 50 INJECTION, SOLUTION INTRAMUSCULAR; INTRAVENOUS at 09:24

## 2020-09-05 RX ADMIN — DULOXETINE HYDROCHLORIDE 60 MG: 60 CAPSULE, DELAYED RELEASE ORAL at 14:12

## 2020-09-05 RX ADMIN — HYDROCODONE BITARTRATE AND ACETAMINOPHEN 1 TABLET: 5; 325 TABLET ORAL at 14:13

## 2020-09-05 RX ADMIN — FENTANYL CITRATE 25 MCG: 50 INJECTION, SOLUTION INTRAMUSCULAR; INTRAVENOUS at 10:54

## 2020-09-05 RX ADMIN — KETOROLAC TROMETHAMINE 30 MG: 30 INJECTION, SOLUTION INTRAMUSCULAR; INTRAVENOUS at 09:54

## 2020-09-05 RX ADMIN — FENTANYL CITRATE 50 MCG: 50 INJECTION, SOLUTION INTRAMUSCULAR; INTRAVENOUS at 09:45

## 2020-09-05 RX ADMIN — DOXYCYCLINE 100 MG: 100 INJECTION, POWDER, LYOPHILIZED, FOR SOLUTION INTRAVENOUS at 09:28

## 2020-09-05 RX ADMIN — METHYLERGONOVINE 200 MCG: 0.2 TABLET ORAL at 14:12

## 2020-09-05 RX ADMIN — QUETIAPINE FUMARATE 200 MG: 100 TABLET ORAL at 22:01

## 2020-09-05 RX ADMIN — SODIUM CHLORIDE, POTASSIUM CHLORIDE, SODIUM LACTATE AND CALCIUM CHLORIDE: 600; 310; 30; 20 INJECTION, SOLUTION INTRAVENOUS at 09:24

## 2020-09-05 RX ADMIN — ONDANSETRON 4 MG: 4 TABLET, ORALLY DISINTEGRATING ORAL at 14:13

## 2020-09-05 RX ADMIN — PRAZOSIN HYDROCHLORIDE 1 MG: 1 CAPSULE ORAL at 22:02

## 2020-09-05 NOTE — PROGRESS NOTES
Called to see patient because she is having vaginal bleeding. She has a known 6 week missed  and is scheduled for D&C on Tuesday. She only noticed the bleeding when she went to the bathroom and wiped. She's concerned that the bleeding is going to progress and get heavy. She had a miscarriage at 6 weeks in 2020 and on the way to the hospital for [facility]  the bleeding got very heavy and she had to be rushed back fairly quickly for [facility] . She really wants to speak with Dr. Jean Carlos Lockhart ([facility] staff is actually concerned that  has an unhealthy attachment to her). I was able to get in contact with Dr. Jean Carlos Lockhart and we established a plan of Dr. Jean Carlos Lockhart doing D&C in the morning. Posted for 8 AM. Patient needs to be NPO after midnight. If bleeding picks up to dangerous level during the night I will do it sooner. Ok to continue ibuprofen for cramping. Updated nurse and patient on plan. Patient is relieved by this; she's very anxious.

## 2020-09-05 NOTE — PROGRESS NOTES
Problem: Depressed Mood (Adult/Pediatric)  Goal: *STG: Participates in treatment plan  Outcome: Progressing Towards Goal  Note: Pt participated in treatment. Pt out on the unit and interacting with peers and staff. Plan for pt to go to OR. Continues to be depressed and anxious. Encouraged pt to attend groups and express feelings and concerns. Goal: Interventions  Outcome: Progressing Towards Goal     Problem: Falls - Risk of  Goal: *Absence of Falls  Description: Document Tiffanie Fall Risk and appropriate interventions in the flowsheet.   Outcome: Progressing Towards Goal  Note: Fall Risk Interventions:            Medication Interventions: Teach patient to arise slowly

## 2020-09-05 NOTE — BH NOTES
1450 Pt vomited in basin - small amount of clear/yellowish fluid with capsul (cymbalta) and partially dissolved white oval pill in emesis.

## 2020-09-05 NOTE — ANESTHESIA POSTPROCEDURE EVALUATION
Post-Anesthesia Evaluation and Assessment    Patient: Toby Stevenson MRN: [id number]  SSN: [id number]    YOB: 1985  Age: 38 y.o. Sex: female      I have evaluated the patient and they are stable and ready for discharge from the PACU. Cardiovascular Function/Vital Signs  Visit Vitals  /66   Pulse (!) 46   Temp 36.9 °C (98.4 °F)   Resp 14   Ht 5' 6\" (1.676 m)   Wt 70.3 kg (154 lb 14.4 oz)   SpO2 100%   Breastfeeding No   BMI 25.00 kg/m²       Patient is status post General anesthesia for Procedure(s):  DILATATION AND CURETTAGE WITH SUCTION. Nausea/Vomiting: None    Postoperative hydration reviewed and adequate. Pain:  Pain Scale 1: Numeric (0 - 10) (09/05/20 1009)  Pain Intensity 1: 6 (09/05/20 1009)   Managed    Neurological Status:   Neuro (WDL): Exceptions to WDL (09/05/20 1009)  Neuro  Neurologic State: Alert;Drowsy; Anesthetized (09/05/20 1009)  Orientation Level: Oriented X4 (09/05/20 1009)  Cognition: Follows commands (09/05/20 1009)  Speech: Clear (09/05/20 1009)  LUE Motor Response: Purposeful (09/05/20 1009)  LLE Motor Response: Purposeful (09/05/20 1009)  RUE Motor Response: Purposeful (09/05/20 1009)  RLE Motor Response: Purposeful (09/05/20 1009)   At baseline    Mental Status, Level of Consciousness: Alert and  oriented to person, place, and time    Pulmonary Status:   O2 Device: Room air (09/05/20 1043)   Adequate oxygenation and airway patent    Complications related to anesthesia: None    Post-anesthesia assessment completed. No concerns    Signed By: Nilam Crespo MD     September 5, 2020              Procedure(s):  DILATATION AND CURETTAGE WITH SUCTION. general    <BSHSIANPOST>    INITIAL Post-op Vital signs:   Vitals Value Taken Time   /69 9/5/2020 11:00 AM   Temp 36.9 °C (98.4 °F) 9/5/2020 10:09 AM   Pulse 49 9/5/2020 11:01 AM   Resp 11 9/5/2020 11:01 AM   SpO2 99 % 9/5/2020 11:01 AM   Vitals shown include unvalidated device data.

## 2020-09-05 NOTE — BH NOTES
TRANSFER - IN REPORT:    Verbal report received from Leticia on Mabel Virgil  being received from [facility]) for routine progression of care      Report consisted of patients Situation, Background, Assessment and   Recommendations(SBAR). Information from the following report(s) SBAR was reviewed with the receiving nurse. Opportunity for questions and clarification was provided. Assessment completed upon patients arrival to unit and care assumed.

## 2020-09-05 NOTE — PROGRESS NOTES
Problem: Falls - Risk of  Goal: *Absence of Falls  Description: Document Mark Raymon Fall Risk and appropriate interventions in the flowsheet. Outcome: Progressing Towards Goal  Note: Fall Risk Interventions:     Medication Interventions: Teach patient to arise slowly    Received pt resting quietly in bed. Greets oncoming staff. No complaints of any current pain, nausea/vomiting presently. Staff will continue to monitor and make more frequent checks.

## 2020-09-05 NOTE — PERIOP NOTES
TRANSFER - OUT REPORT:    Verbal report given to Linda BARNARD(name) on [facility] [employer]  being transferred to 727(unit) for routine post - op       Report consisted of patients Situation, Background, Assessment and   Recommendations(SBAR). Time Pre op antibiotic given:0928  Anesthesia Stop time: 3015  Ramirez Present on Transfer to floor:no  Order for Ramirez on Chart:no  Discharge Prescriptions with Chart:no    Information from the following report(s) SBAR, OR Summary, Procedure Summary, Intake/Output, MAR, Recent Results, Med Rec Status and Cardiac Rhythm nsr was reviewed with the receiving nurse. Opportunity for questions and clarification was provided. Is the patient on 02? NO       L/Min        Other ra    Is the patient on a monitor? NO    Is the nurse transporting with the patient? YES    Surgical Waiting Area notified of patient's transfer from PACU? YES      The following personal items collected during your admission accompanied patient upon transfer:   Dental Appliance: Dental Appliances: None  Vision: Visual Aid: None  Hearing Aid:    Jewelry: Jewelry: Necklace, Earrings, Ring(with security)  Clothing: Clothing: Shirt, Undergarments, Pants(in PACU)  Other Valuables:  Other Valuables: Contact Lenses(in PACU)  Valuables sent to safe: Personal Items Sent to Safe: ($121; idx1; cardx2)

## 2020-09-05 NOTE — BRIEF OP NOTE
Brief Postoperative Note    Patient: Antonio Lynch  YOB: 1985  MRN: [id number]    Date of Procedure: 9/5/2020     Pre-Op Diagnosis: 6 week MISSED AB    Post-Op Diagnosis: Same as preoperative diagnosis. Procedure(s):  DILATATION AND CURETTAGE WITH SUCTION    Surgeon(s):  Mo Camilo DO    Surgical Assistant: None    Anesthesia: General     Estimated Blood Loss (mL): Minimal    Complications: None    Specimens:   ID Type Source Tests Collected by Time Destination   1 : [facility] [place] 9/5/2020 5998 Pathology        Implants: * No implants in log *    Drains: * No LDAs found *    Findings: EUA-8-10 week size anteverted mobile uterus with no adnexal masses. No bleeding. Intraop-uterus sounded to 8cm. Moderate tissue obtained.     Electronically Signed by Janett Warren DO on 9/5/2020 at 9:57 AM

## 2020-09-05 NOTE — OP NOTES
[address] [place]   OPERATIVE REPORT    Name:  Tayler Kovacs  MR#:  [id number]  :  1985  ACCOUNT #:  [de-identified]  DATE OF SERVICE:  2020      PREOPERATIVE DIAGNOSIS:  6-week missed . POSTOPERATIVE DIAGNOSIS:  6-week missed . PROCEDURE PERFORMED:  Dilatation and curettage with suction. SURGEON:  Jasvir Esquivel DO    ASSISTANT:  None. ANESTHESIA:  General with 0.5% Marcaine plain paracervical block. COMPLICATIONS:  None. SPECIMENS REMOVED:  Products of conception    IMPLANTS:  None. ESTIMATED BLOOD LOSS:  Less than 10 mL. URINE OUTPUT:  Minimal prior to procedure. FINDINGS:  Exam under anesthesia revealed an 8-10-week size mobile anteverted uterus with no adnexal masses and no active bleeding noted. The cervix was thick and closed. Intraoperatively, the uterus sounded to 8 cm. There was moderate tissue obtained. PROCEDURE:  The patient was consented including risks and benefits of the procedure and agreed to proceed. She was taken to the operating room where her anesthesia was found to be adequate. She was placed in the dorsal lithotomy position and prepped and draped in the usual sterile fashion. A speculum was placed in the vagina to visualize the cervix and the patient was placed in slight Trendelenburg. A single-tooth tenaculum was used to grasp the anterior lip of the cervix and 10 mL of 0.5% Marcaine plain was injected in a paracervical block. The uterus was then sounded to 8 cm. The cervical canal was gently and serially dilated to a size 24 Conrad. The 8-Palauan curved suction curette was then passed through the cervical canal and advanced gently to the uterine fundus. Suction curettage was performed with several passes until no further tissue was obtained. The suction curette was removed and a sharp curette was passed through the cervical canal and advanced gently to the uterine fundus.   Sharp curettage was performed along all walls of the uterus in a gentle fashion and a gritty texture was noted. The sharp curette was removed and the suction curette was again passed through the cervical canal and advanced gently to the uterine fundus. Suction curettage was again performed and no further tissue was obtained. The suction curette was removed as well as the single-tooth tenaculum. Hemostasis was confirmed and the speculum was removed. The patient was awakened from anesthesia and taken to the recovery room in stable condition. All sponge, lap, needle, and instrument counts were correct.         Deysi Parra DO      [id number]  D:  09/05/2020 10:05  T:  09/05/2020 12:39  JOB #:  [id number]

## 2020-09-05 NOTE — PROGRESS NOTES
Gynecology Progress Note    Patient reports that the bleeding has stopped but still having a lot of cramping. Reports some chest pain last night but better now. Denies sob. Patient scheduled for D&C today for 6 week missed  diagnosed in my office last week by ultrasound and dropping hcg. Vitals:  Blood pressure 107/70, pulse 63, temperature 98.3 °F (36.8 °C), resp. rate 16, height 5' 6\" (1.676 m), weight 70.3 kg (154 lb 14.4 oz), SpO2 99 %, not currently breastfeeding. Temp (24hrs), Av.5 °F (36.9 °C), Min:98.3 °F (36.8 °C), Max:98.7 °F (37.1 °C)        Exam:  Patient without distress   Heart regular rate and rhythm   Lungs CTA b/l               Abdomen soft,  Tender to palpation of lower abdomen, positive bowel sounds                 Lower extremities are negative for swelling, cords, or tenderness. Lab/Data Review:  CBC: No results found for: WBC, HGB, HGBEXT, HCT, HCTEXT, PLT, PLTEXT, HGBEXT, HCTEXT, PLTEXT    Assessment and Plan:    37 y/o  with 6 week missed . Proceed with D&C per pt request. Discussed risk of procedure including anesthesia risk, bleeding, infection, uterine perforation, injury to internal organs which may require additional surgery for repair as well as scarring of the uterus which could affect future fertility and pt verbalized understanding and desires to proceed. Postop expectations were also discussed and pt verbalized understanding. Will plan to transfer pt back to 7th floor psych unit after procedure for continued inpatient treatment of severe depression. Doxycycline iv preop as pt with a lot of nausea/vomiting.

## 2020-09-05 NOTE — BSMART NOTE
TRANSFER - OUT REPORT: 
 
Verbal report given to Ayo Garza RN on Marilee Handler  being transferred to OR(unit) for routine progression of care Report consisted of patients Situation, Background, Assessment and  
Recommendations(SBAR). Information from the following report(s) SBAR was reviewed with the receiving nurse. Lines:    
 
Opportunity for questions and clarification was provided. Patient transported with: 
 Registered Nurse

## 2020-09-05 NOTE — BH NOTES
Patient offered tylenol for discomfort. Patient stated \"I can't keep it down, can I have something else? \"  Patient educated on taking tylenol first and that she has a patch on which helps with N/V. Patient declined.

## 2020-09-06 PROCEDURE — 74011250637 HC RX REV CODE- 250/637: Performed by: OBSTETRICS & GYNECOLOGY

## 2020-09-06 PROCEDURE — 74011250637 HC RX REV CODE- 250/637: Performed by: PSYCHIATRY & NEUROLOGY

## 2020-09-06 PROCEDURE — 74011250637 HC RX REV CODE- 250/637: Performed by: NURSE PRACTITIONER

## 2020-09-06 PROCEDURE — 51798 US URINE CAPACITY MEASURE: CPT

## 2020-09-06 PROCEDURE — 74011250636 HC RX REV CODE- 250/636: Performed by: OBSTETRICS & GYNECOLOGY

## 2020-09-06 PROCEDURE — 65220000003 HC RM SEMIPRIVATE PSYCH

## 2020-09-06 RX ORDER — PROMETHAZINE HYDROCHLORIDE 12.5 MG/1
25 SUPPOSITORY RECTAL
Status: DISCONTINUED | OUTPATIENT
Start: 2020-09-06 | End: 2020-09-28 | Stop reason: HOSPADM

## 2020-09-06 RX ORDER — SODIUM CHLORIDE, SODIUM LACTATE, POTASSIUM CHLORIDE, CALCIUM CHLORIDE 600; 310; 30; 20 MG/100ML; MG/100ML; MG/100ML; MG/100ML
125 INJECTION, SOLUTION INTRAVENOUS CONTINUOUS
Status: DISCONTINUED | OUTPATIENT
Start: 2020-09-06 | End: 2020-09-09

## 2020-09-06 RX ORDER — SODIUM CHLORIDE, SODIUM LACTATE, POTASSIUM CHLORIDE, CALCIUM CHLORIDE 600; 310; 30; 20 MG/100ML; MG/100ML; MG/100ML; MG/100ML
1000 INJECTION, SOLUTION INTRAVENOUS ONCE
Status: COMPLETED | OUTPATIENT
Start: 2020-09-06 | End: 2020-09-06

## 2020-09-06 RX ADMIN — PROMETHAZINE HYDROCHLORIDE 25 MG: 12.5 SUPPOSITORY RECTAL at 11:11

## 2020-09-06 RX ADMIN — METHYLERGONOVINE 200 MCG: 0.2 TABLET ORAL at 06:54

## 2020-09-06 RX ADMIN — SODIUM CHLORIDE, SODIUM LACTATE, POTASSIUM CHLORIDE, AND CALCIUM CHLORIDE 125 ML/HR: 600; 310; 30; 20 INJECTION, SOLUTION INTRAVENOUS at 14:07

## 2020-09-06 RX ADMIN — METHYLERGONOVINE 200 MCG: 0.2 TABLET ORAL at 21:54

## 2020-09-06 RX ADMIN — ONDANSETRON 4 MG: 4 TABLET, ORALLY DISINTEGRATING ORAL at 18:27

## 2020-09-06 RX ADMIN — DULOXETINE HYDROCHLORIDE 60 MG: 60 CAPSULE, DELAYED RELEASE ORAL at 10:11

## 2020-09-06 RX ADMIN — IBUPROFEN 600 MG: 600 TABLET, FILM COATED ORAL at 10:11

## 2020-09-06 RX ADMIN — SODIUM CHLORIDE, SODIUM LACTATE, POTASSIUM CHLORIDE, AND CALCIUM CHLORIDE 1000 ML: 600; 310; 30; 20 INJECTION, SOLUTION INTRAVENOUS at 14:05

## 2020-09-06 RX ADMIN — METHYLERGONOVINE 200 MCG: 0.2 TABLET ORAL at 14:14

## 2020-09-06 RX ADMIN — HYDROCODONE BITARTRATE AND ACETAMINOPHEN 1 TABLET: 5; 325 TABLET ORAL at 14:15

## 2020-09-06 RX ADMIN — QUETIAPINE FUMARATE 200 MG: 100 TABLET ORAL at 21:54

## 2020-09-06 NOTE — PROGRESS NOTES
Problem: Depressed Mood (Adult/Pediatric)  Goal: *STG: Participates in treatment plan  Outcome: Progressing Towards Goal  Note: Pt participated in treatment team. Encouraged pt to drink and eat breakfast. Ate small amount of crackers and drank ginger ale. Continues to complain of \"vomiting\" when she takes her medication. Did not attend groups. Resting in her room currently. Encouraged pt to participate on the unit. Goal: Interventions  Outcome: Progressing Towards Goal     Problem: Falls - Risk of  Goal: *Absence of Falls  Description: Document Tiffanie Fall Risk and appropriate interventions in the flowsheet.   Outcome: Progressing Towards Goal  Note: Fall Risk Interventions:            Medication Interventions: Teach patient to arise slowly

## 2020-09-06 NOTE — PROGRESS NOTES
Patient educated on fall risk due to sedative medications and tethered IV line. Patient educated on nursing to obtain new IV site due to kinked IV line. Fluids stopped at 1600 due to IV issues. Call placed to IV team due to patient being hard stick. Will continue to educate and monitor. Problem: Falls - Risk of  Goal: *Absence of Falls  Description: Document Ty Russ Fall Risk and appropriate interventions in the flowsheet.   Outcome: Progressing Towards Goal  Note: Fall Risk Interventions:            Medication Interventions: Teach patient to arise slowly, Evaluate medications/consider consulting pharmacy

## 2020-09-06 NOTE — BH NOTES
PRN Medication Documentation    Specific patient behavior that led to need for PRN medication: Pt vomited  Staff interventions attempted prior to PRN being given: resting in bed, pt offered choice of phenergan sup or PO zofran  PRN medication given: zofran PO  Patient response/effectiveness of PRN medication: will monitor

## 2020-09-06 NOTE — BH NOTES
Pt has not voided during the shift. Poor intake of fluids. [facility],  at [phone]. Stated she will order a bolus of fluids.

## 2020-09-06 NOTE — BH NOTES
Chief Complaint:  \"I don't feel so good\". Length of Stay: 4 Days    Interval History:  Murphy Madison endorses she has not been feeling that well today and has vomited a few times last night and this morning. Phenergan suppositories ordered for nausea. Patient endorses she has not had an appetite and is a little down regarding her D&C. No aggression noted at this time. Denies SI/HI/AVH at this time and denies any adverse reactions to her medications at this time. Past Medical History:  Past Medical History:   Diagnosis Date    Anxiety     Asthma     on albuterol prn.  Triggers cold and allergies    Back pain, chronic     sciatica    Gestational hypertension     Past pregnancy    HX OTHER MEDICAL      , , , ,     Hyperemesis     severe hyperemesis    Hypertension     with G12 - none this pregnancy    Iron deficiency anemia 10/08/2010    MDD (major depressive disorder), single episode with postpartum onset 2013    treated with meds - 13    Plantar fasciitis     Pregnancy     36 weeks    PTSD (post-traumatic stress disorder)            Labs:  Lab Results   Component Value Date/Time    WBC 8.0 2020 05:49 PM    WBC 12.2 (H) 2012 04:27 PM    Hemoglobin (POC) 10.4 (L) 2020 07:12 AM    HGB 11.3 (L) 2020 05:49 PM    HCT 36.0 2020 05:49 PM    PLATELET 180  05:49 PM    MCV 71.4 (L) 2020 05:49 PM    Hgb, External 33.7 2012    Hct, External 69 2012    Platelet cnt., External 307 2012      Lab Results   Component Value Date/Time    Sodium 136 2020 05:49 PM    Potassium 3.9 2020 05:49 PM    Chloride 106 2020 05:49 PM    CO2 23 2020 05:49 PM    Anion gap 7 2020 05:49 PM    Glucose 79 2020 05:49 PM    BUN 4 (L) 2020 05:49 PM    Creatinine 0.59 2020 05:49 PM    BUN/Creatinine ratio 7 (L) 2020 05:49 PM    GFR est AA >60 2020 05:49 PM    GFR est non-AA >60 2020 05:49 PM    Calcium 9.8 09/02/2020 05:49 PM    Bilirubin, total 0.5 09/02/2020 05:49 PM    Alk.  phosphatase 43 (L) 09/02/2020 05:49 PM    Protein, total 8.7 (H) 09/02/2020 05:49 PM    Albumin 3.9 09/02/2020 05:49 PM    Globulin 4.8 (H) 09/02/2020 05:49 PM    A-G Ratio 0.8 (L) 09/02/2020 05:49 PM    ALT (SGPT) 14 09/02/2020 05:49 PM      Vitals:    09/05/20 1240 09/05/20 1544 09/05/20 1941 09/06/20 0846   BP: 122/77 114/76 113/74 98/67   Pulse: 72 79 (!) 57 70   Resp: 18 15 15 16   Temp: 98.2 °F (36.8 °C) 98.7 °F (37.1 °C) 98.6 °F (37 °C) 99.2 °F (37.3 °C)   SpO2: 98% 100% 99% 100%   Weight:       Height:             Current Facility-Administered Medications   Medication Dose Route Frequency Provider Last Rate Last Dose    promethazine (PHENERGAN) suppository 25 mg  25 mg Rectal Q6H PRN Raul Medina NP        methylergonovine (METHERGINE) tablet 200 mcg  200 mcg Oral Q8H Arturo Mejía DO   200 mcg at 09/06/20 0654    HYDROcodone-acetaminophen (NORCO) 5-325 mg per tablet 1 Tab  1 Tab Oral Q6H PRN Nader Whatley DO   1 Tab at 09/05/20 1413    QUEtiapine (SEROquel) tablet 200 mg  200 mg Oral QHS Lissett Carlin MD   200 mg at 09/05/20 2201    ibuprofen (MOTRIN) tablet 600 mg  600 mg Oral Q6H PRN Lissett Carlin MD   600 mg at 09/06/20 1011    OLANZapine (ZyPREXA) tablet 5 mg  5 mg Oral Q6H PRN Marion Sloan, MENA        haloperidol lactate (HALDOL) injection 5 mg  5 mg IntraMUSCular Q6H PRN Marion Sloan NP        benztropine (COGENTIN) tablet 1 mg  1 mg Oral BID PRN Marion Sloan NP        diphenhydrAMINE (BENADRYL) injection 50 mg  50 mg IntraMUSCular BID PRN Marion Sloan NP        hydrOXYzine HCL (ATARAX) tablet 50 mg  50 mg Oral TID PRN Marion Sloan NP        LORazepam (ATIVAN) injection 1 mg  1 mg IntraMUSCular Q4H PRN Halina Sloan NP        traZODone (DESYREL) tablet 50 mg  50 mg Oral QHS PRN Michael Vines, MENA        acetaminophen (TYLENOL) tablet 650 mg  650 mg Oral Q4H PRN Halina Sloan NP   650 mg at 09/04/20 2123    magnesium hydroxide (MILK OF MAGNESIA) 400 mg/5 mL oral suspension 30 mL  30 mL Oral DAILY PRN Halina Sloan NP        ondansetron (ZOFRAN ODT) tablet 4 mg  4 mg Oral Q6H PRN Halina Sloan NP   4 mg at 09/05/20 1413    DULoxetine (CYMBALTA) capsule 60 mg  60 mg Oral DAILY Shorty Fierro MD   60 mg at 09/06/20 1011    prazosin (MINIPRESS) capsule 1 mg  1 mg Oral QHS Shorty Fierro MD   1 mg at 09/05/20 2202         Mental Status Exam:  Eye contact: limited  Grooming: fair  Psychomotor activity: decreased. Speech is spontaneous, soft  Mood is \"hopeless\"  Affect: depressed. Perception: Denies any AH or VH  Suicidal ideation: denies si or plan  Cognition is grossly intact       Physical Exam:  Body habitus: Body mass index is 25 kg/m². Musculoskeletal system: normal gait  Tremor - neg  Cog wheeling - neg      Assessment and Plan:  Darwin Alan meets criteria for a diagnosis of Recurrent major depression, severe without psychotic symptoms; posttraumatic stress disorder, chronic. Continue the medication regimen as prescribed  Disposition planning to continue. I certify that this patients inpatient psychiatric hospital services furnished since the previous certification were, and continue to be, required for treatment that could reasonably be expected to improve the patient's condition, or for diagnostic study, and that the patient continues to need, on a daily basis, active treatment furnished directly by or requiring the supervision of inpatient psychiatric facility personnel. In addition, the hospital records show that services furnished were intensive treatment services, admission or related services, or equivalent services.

## 2020-09-06 NOTE — BH NOTES
1011 Pt given motrin and scheduled cymbalta. Medications crushed and put in jello to help with nausea. (will give phenergan suppository when available.)   1040 Pt vomited moderate amount of clear/yellowish liquid. 1100 Pt bladder scan is 0 ml (several scans performed). Encouraging pt to drink and/or eat ice chips. 1111 Phenergan suppository given, will monitor. 1200 Pt eating lunch. 1330 Pt vomited moderate amount of fluid. 1340 Peripheral IV started by CVICU RN in right wrist. Flushes without difficulty, site intact. Infusing without difficulty.

## 2020-09-06 NOTE — BH NOTES
At 1600:  Patient's IV occluded and IV equipment not working. Stopped IV fluids to have new IV placed and new IV pole replaced. Called trimedics and IV team. Will reconnect when patient's IV is unoccluded and new IV pole in place.

## 2020-09-07 PROCEDURE — 74011250637 HC RX REV CODE- 250/637: Performed by: PSYCHIATRY & NEUROLOGY

## 2020-09-07 PROCEDURE — 74011250636 HC RX REV CODE- 250/636: Performed by: OBSTETRICS & GYNECOLOGY

## 2020-09-07 PROCEDURE — 74011250637 HC RX REV CODE- 250/637: Performed by: NURSE PRACTITIONER

## 2020-09-07 PROCEDURE — 65220000003 HC RM SEMIPRIVATE PSYCH

## 2020-09-07 PROCEDURE — 74011250637 HC RX REV CODE- 250/637: Performed by: OBSTETRICS & GYNECOLOGY

## 2020-09-07 RX ORDER — PRAZOSIN HYDROCHLORIDE 1 MG/1
2 CAPSULE ORAL
Status: DISCONTINUED | OUTPATIENT
Start: 2020-09-07 | End: 2020-09-28 | Stop reason: HOSPADM

## 2020-09-07 RX ORDER — LANOLIN ALCOHOL/MO/W.PET/CERES
400 CREAM (GRAM) TOPICAL DAILY
Status: DISCONTINUED | OUTPATIENT
Start: 2020-09-07 | End: 2020-09-28 | Stop reason: HOSPADM

## 2020-09-07 RX ADMIN — ONDANSETRON 4 MG: 4 TABLET, ORALLY DISINTEGRATING ORAL at 09:14

## 2020-09-07 RX ADMIN — DULOXETINE HYDROCHLORIDE 60 MG: 60 CAPSULE, DELAYED RELEASE ORAL at 10:13

## 2020-09-07 RX ADMIN — SODIUM CHLORIDE, SODIUM LACTATE, POTASSIUM CHLORIDE, AND CALCIUM CHLORIDE 125 ML/HR: 600; 310; 30; 20 INJECTION, SOLUTION INTRAVENOUS at 00:30

## 2020-09-07 RX ADMIN — QUETIAPINE FUMARATE 200 MG: 100 TABLET ORAL at 21:27

## 2020-09-07 RX ADMIN — METHYLERGONOVINE 200 MCG: 0.2 TABLET ORAL at 06:17

## 2020-09-07 RX ADMIN — ONDANSETRON 4 MG: 4 TABLET, ORALLY DISINTEGRATING ORAL at 21:28

## 2020-09-07 RX ADMIN — MAGNESIUM GLUCONATE 500 MG ORAL TABLET 400 MG: 500 TABLET ORAL at 13:37

## 2020-09-07 RX ADMIN — PRAZOSIN HYDROCHLORIDE 2 MG: 1 CAPSULE ORAL at 21:27

## 2020-09-07 NOTE — INTERDISCIPLINARY ROUNDS
Behavioral Health Interdisciplinary Rounds Patient Name: Antonio Lynch  Age: 38 y.o. Room/Bed:  727/ Primary Diagnosis: <principal problem not specified> Admission Status: Voluntary Readmission within 30 days: no 
Power of  in place: no 
Patient requires a blocked bed: no          Reason for blocked bed: VTE Prophylaxis: No 
 
Mobility needs/Fall risk: no 
Flu Vaccine :   
Nutritional Plan:  
Consults:         
Labs/Testing due today?: no 
 
Sleep hours: 6 Participation in Care/Groups:  yes Medication Compliant?: Yes PRNS (last 24 hours): Pain Restraints (last 24 hours):  no 
  
CIWA (range last 24 hours): COWS (range last 24 hours): Alcohol screening (AUDIT) completed -   AUDIT Score: 0 If applicable, date SBIRT discussed in treatment team AND documented:  
AUDIT Screen Score: AUDIT Score: 0 Tobacco - patient is a smoker: Have You Used Tobacco in the Past 30 Days: No 
Illegal Drugs use: Have You Used Any Illegal Substances Over the Past 12 Months: No 
 
24 hour chart check complete: yes Patient goal(s) for today: attend groups, take medications Treatment team focus/goals: titrate medication, increase Cymbalta, add prazosin, coordinate follow up Progress note: Pt is lethargic, flat affect and very depressed. She endorses SI. She reports nausea and vomiting interfere with keeping medication in her system. LOS:  5  Expected LOS: TBD 
  
Financial concerns/prescription coverage:  [employer] MEDICA[employer] Family contact: significant other Family requesting physician contact today:  no 
Discharge plan: return home with fiance and children Access to weapons :Joel Oas provider(s): Loren Ortiz NP new patient appointment - would benefit from DBT therapy groups Patient's preferred phone number for follow up call : [phone]  
 
 Participating treatment team members: Raul River NP; Mary Fuentes RN; Donnella Osler, MSW

## 2020-09-07 NOTE — BH NOTES
Chief Complaint:  Not too good. Length of Stay: 5 Days    Interval History:  Navid Schaffer states she is not feeling well, very depressed , rates it as a 9/10. Affect is blunt, hopeless and helpless themes. States she does not have thoughts of suicide in the hospital, \"but I will hurt myself if I leave, nobody will stop me\". Says she is having a hard time swallowing meds, recommended to take it with apple sauce/ yogurt. Eating poorly, she is asking for appetite enhancer medication, says remeron \"makes me sick in my stomach\". Persistent nightmares, flashbacks. She agreed to increase cymbalta and prazosin. Past Medical History:  Past Medical History:   Diagnosis Date    Anxiety     Asthma     on albuterol prn.  Triggers cold and allergies    Back pain, chronic 2010    sciatica    Gestational hypertension     Past pregnancy    HX OTHER MEDICAL      , , , ,     Hyperemesis     severe hyperemesis    Hypertension     with G12 - none this pregnancy    Iron deficiency anemia 10/08/2010    MDD (major depressive disorder), single episode with postpartum onset 2013    treated with meds - 13    Plantar fasciitis     Pregnancy     36 weeks    PTSD (post-traumatic stress disorder)            Labs:  Lab Results   Component Value Date/Time    WBC 8.0 2020 05:49 PM    WBC 12.2 (H) 2012 04:27 PM    Hemoglobin (POC) 10.4 (L) 2020 07:12 AM    HGB 11.3 (L) 2020 05:49 PM    HCT 36.0 2020 05:49 PM    PLATELET 215  05:49 PM    MCV 71.4 (L) 2020 05:49 PM    Hgb, External 33.7 2012    Hct, External 69 2012    Platelet cnt., External 307 2012      Lab Results   Component Value Date/Time    Sodium 136 2020 05:49 PM    Potassium 3.9 2020 05:49 PM    Chloride 106 2020 05:49 PM    CO2 23 2020 05:49 PM    Anion gap 7 2020 05:49 PM    Glucose 79 2020 05:49 PM    BUN 4 (L) 2020 05:49 PM Creatinine 0.59 09/02/2020 05:49 PM    BUN/Creatinine ratio 7 (L) 09/02/2020 05:49 PM    GFR est AA >60 09/02/2020 05:49 PM    GFR est non-AA >60 09/02/2020 05:49 PM    Calcium 9.8 09/02/2020 05:49 PM    Bilirubin, total 0.5 09/02/2020 05:49 PM    Alk.  phosphatase 43 (L) 09/02/2020 05:49 PM    Protein, total 8.7 (H) 09/02/2020 05:49 PM    Albumin 3.9 09/02/2020 05:49 PM    Globulin 4.8 (H) 09/02/2020 05:49 PM    A-G Ratio 0.8 (L) 09/02/2020 05:49 PM    ALT (SGPT) 14 09/02/2020 05:49 PM      Vitals:    09/06/20 1127 09/06/20 1159 09/06/20 1521 09/07/20 0758   BP:  105/69 102/69 133/87   Pulse:  61 (!) 59 61   Resp:  16 16 18   Temp:  97.4 °F (36.3 °C) 98.3 °F (36.8 °C) 98.5 °F (36.9 °C)   SpO2:  99% 98% 100%   Weight: 70.9 kg (156 lb 6.4 oz)      Height:             Current Facility-Administered Medications   Medication Dose Route Frequency Provider Last Rate Last Dose    promethazine (PHENERGAN) suppository 25 mg  25 mg Rectal Q6H PRN Yan Haysura, NP   25 mg at 09/06/20 1111    lactated Ringers infusion  125 mL/hr IntraVENous CONTINUOUS Tony Morris,  mL/hr at 09/07/20 0030 125 mL/hr at 09/07/20 0030    HYDROcodone-acetaminophen (NORCO) 5-325 mg per tablet 1 Tab  1 Tab Oral Q6H PRN Tony Morris DO   1 Tab at 09/06/20 1415    QUEtiapine (SEROquel) tablet 200 mg  200 mg Oral QHS Keily Locke MD   200 mg at 09/06/20 2154    ibuprofen (MOTRIN) tablet 600 mg  600 mg Oral Q6H PRN Keily Locke MD   600 mg at 09/06/20 1011    OLANZapine (ZyPREXA) tablet 5 mg  5 mg Oral Q6H PRN [facility], NP        haloperidol lactate (HALDOL) injection 5 mg  5 mg IntraMUSCular Q6H PRN [facility], NP        benztropine (COGENTIN) tablet 1 mg  1 mg Oral BID PRN [facility], NP        diphenhydrAMINE (BENADRYL) injection 50 mg  50 mg IntraMUSCular BID PRN [facility], NP        hydrOXYzine HCL (ATARAX) tablet 50 mg  50 mg Oral TID PRN Nas PHAM NP        LORazepam (ATIVAN) injection 1 mg  1 mg IntraMUSCular Q4H PRN Jerrod Sloan NP        traZODone (DESYREL) tablet 50 mg  50 mg Oral QHS PRN Jerrod Sloan NP        acetaminophen (TYLENOL) tablet 650 mg  650 mg Oral Q4H PRN Halina Sloan NP   650 mg at 09/04/20 2123    magnesium hydroxide (MILK OF MAGNESIA) 400 mg/5 mL oral suspension 30 mL  30 mL Oral DAILY PRN Halina Sloan NP        ondansetron (ZOFRAN ODT) tablet 4 mg  4 mg Oral Q6H PRN Halina Sloan NP   4 mg at 09/07/20 0914    DULoxetine (CYMBALTA) capsule 60 mg  60 mg Oral DAILY Vaishnavi Calix MD   60 mg at 09/07/20 1013    prazosin (MINIPRESS) capsule 1 mg  1 mg Oral QHS Vaishnavi Calix MD   Stopped at 09/06/20 2200         Mental Status Exam:  Eye contact: limited  Grooming: fair  Psychomotor activity: decreased. Speech is spontaneous, soft  Mood is \"hopeless\"  Affect: depressed. Perception: Denies any AH or VH  Suicidal ideation: denies si or plan  Cognition is grossly intact       Physical Exam:  Body habitus: Body mass index is 25.24 kg/m². Musculoskeletal system: normal gait  Tremor - neg  Cog wheeling - neg      Assessment and Plan:  Everett Geller meets criteria for a diagnosis of Recurrent major depression, severe without psychotic symptoms; posttraumatic stress disorder, chronic. Increase cymbalta to 90mg. Increase prazosin to 2mg. Mag-ox 400mg daily. Continue the medication regimen as prescribed  Disposition planning to continue.      I certify that this patients inpatient psychiatric hospital services furnished since the previous certification were, and continue to be, required for treatment that could reasonably be expected to improve the patient's condition, or for diagnostic study, and that the patient continues to need, on a daily basis, active treatment furnished directly by or requiring the supervision of inpatient psychiatric facility personnel. In addition, the hospital records show that services furnished were intensive treatment services, admission or related services, or equivalent services.

## 2020-09-07 NOTE — BH NOTES
PRN Medication Documentation    Specific patient behavior that led to need for PRN medication: nausea  Staff interventions attempted prior to PRN being given: ginger ale  PRN medication given: Zofran 4 mg po prn  Patient response/effectiveness of PRN medication:

## 2020-09-07 NOTE — PROGRESS NOTES
Problem: Depressed Mood (Adult/Pediatric)  Goal: *STG: Participates in treatment plan  Outcome: Progressing Towards Goal  Note: Out on unit passively engaged. Mood and affect depressed describes fleeting SI, hopelessness and constant recall of past traumas and rumination. SI consist of being alone at a home and overdosing. Reports not wanting to get better, eat or take medications and struggles to be compliant with her tx plan while here. States insight that therapy would help but has trust issues that if she engaged with a therapist they would leave her before she was ready. States her biggest fear was to be discharged home and nobody would stop her from committing suicide because they didn't want to help her. Daily goal is to stay out of bed and talk with staff.  Staff focus is on offering support and reassurance  Goal: *STG: Verbalizes anger, guilt, and other feelings in a constructive manor  Outcome: Progressing Towards Goal  Goal: *STG: Attends activities and groups  Outcome: Progressing Towards Goal  Goal: *STG: Demonstrates reduction in symptoms and increase in insight into coping skills/future focused  Outcome: Progressing Towards Goal  Goal: Interventions  Outcome: Progressing Towards Goal

## 2020-09-07 NOTE — PROGRESS NOTES
2300: New IV in pt right hand 22 gauge started, 1 attempt patent with blood return and saline lock. New dressing applied clean dry and intact. LR 125ml/hr connected to IV and restarted. 0030: New 1000 mL LR startedat 125 mL/hr    0615: Gave AM med.    0700: Per pt AM pill not digested, found in emesis basin. Pt receiving continuous q15m safety checks, pt currently asleep resting comfortably in bed. No distress noted, respiratory WNL. Supplemental lighting utilized. Problem: Depressed Mood (Adult/Pediatric)  Goal: Interventions  Outcome: Progressing Towards Goal     Problem: Falls - Risk of  Goal: *Absence of Falls  Description: Document Brit Ricci Fall Risk and appropriate interventions in the flowsheet.   Outcome: Progressing Towards Goal  Note: Fall Risk Interventions:            Medication Interventions: Teach patient to arise slowly

## 2020-09-07 NOTE — PROGRESS NOTES
Problem: Falls - Risk of  Goal: *Absence of Falls  Description: Document Howard Cole Fall Risk and appropriate interventions in the flowsheet. Outcome: Progressing Towards Goal  Note: Fall Risk Interventions:       Medication Interventions: Teach patient to arise slowly    Received pt resting in bed quietly. Later out in milieu using phone. Refuses to eat dinner at this time. Staff will continue to monitor pt q 15 min checks.

## 2020-09-08 PROCEDURE — 74011250637 HC RX REV CODE- 250/637: Performed by: NURSE PRACTITIONER

## 2020-09-08 PROCEDURE — 74011250637 HC RX REV CODE- 250/637: Performed by: PSYCHIATRY & NEUROLOGY

## 2020-09-08 PROCEDURE — 65220000003 HC RM SEMIPRIVATE PSYCH

## 2020-09-08 RX ORDER — TRAZODONE HYDROCHLORIDE 50 MG/1
50 TABLET ORAL
Status: DISCONTINUED | OUTPATIENT
Start: 2020-09-08 | End: 2020-09-24

## 2020-09-08 RX ORDER — QUETIAPINE FUMARATE 100 MG/1
300 TABLET, FILM COATED ORAL
Status: DISCONTINUED | OUTPATIENT
Start: 2020-09-08 | End: 2020-09-28 | Stop reason: HOSPADM

## 2020-09-08 RX ADMIN — QUETIAPINE FUMARATE 300 MG: 100 TABLET ORAL at 21:19

## 2020-09-08 RX ADMIN — PRAZOSIN HYDROCHLORIDE 2 MG: 1 CAPSULE ORAL at 21:19

## 2020-09-08 RX ADMIN — ONDANSETRON 4 MG: 4 TABLET, ORALLY DISINTEGRATING ORAL at 08:42

## 2020-09-08 RX ADMIN — TRAZODONE HYDROCHLORIDE 50 MG: 50 TABLET ORAL at 21:19

## 2020-09-08 RX ADMIN — ONDANSETRON 4 MG: 4 TABLET, ORALLY DISINTEGRATING ORAL at 21:18

## 2020-09-08 RX ADMIN — MAGNESIUM GLUCONATE 500 MG ORAL TABLET 400 MG: 500 TABLET ORAL at 08:42

## 2020-09-08 RX ADMIN — DULOXETINE HYDROCHLORIDE 90 MG: 60 CAPSULE, DELAYED RELEASE ORAL at 08:42

## 2020-09-08 NOTE — BH NOTES
PRN Medication Documentation    Specific patient behavior that led to need for PRN medication: pt reporting nausea  Staff interventions attempted prior to PRN being given: reassurance  PRN medication given: zofran PO  Patient response/effectiveness of PRN medication: will monitor

## 2020-09-08 NOTE — PROGRESS NOTES
Problem: Depressed Mood (Adult/Pediatric)  Goal: *STG: Participates in treatment plan  Outcome: Progressing Towards Goal  Note: Out on unit passively engaged. Appears sad and hopeless. Helpless and hopeless. Reports majority of thoughts is rumination and flashbacks. Appears on unit to be relaxed and calm while on dining room and living room w peers. Denies SI with plan describes fleeting suicidal thoughts about starving self to death. Daily goal is to arrange for BF to  her foodstamp card.  Staff focus is on offering support   Goal: *STG: Verbalizes anger, guilt, and other feelings in a constructive manor  Outcome: Progressing Towards Goal  Goal: *STG: Attends activities and groups  Outcome: Progressing Towards Goal  Goal: *STG: Demonstrates reduction in symptoms and increase in insight into coping skills/future focused  Outcome: Progressing Towards Goal  Goal: Interventions  Outcome: Progressing Towards Goal

## 2020-09-08 NOTE — INTERDISCIPLINARY ROUNDS
Behavioral Health Interdisciplinary Rounds Patient Name: Ana Junior  Age: 38 y.o. Room/Bed:  727/02 Primary Diagnosis: <principal problem not specified> Admission Status: Voluntary Readmission within 30 days: no 
Power of  in place: no 
Patient requires a blocked bed:           Reason for blocked bed: VTE Prophylaxis:  
 
Mobility needs/Fall risk:  
Flu Vaccine :   
Nutritional Plan:  
Consults:         
Labs/Testing due today?: no 
 
Sleep hours:  7 Participation in Care/Groups:  yes Medication Compliant?: Yes PRNS (last 24 hours): None Restraints (last 24 hours):  no 
  
CIWA (range last 24 hours): COWS (range last 24 hours): Alcohol screening (AUDIT) completed -   AUDIT Score: 0 If applicable, date SBIRT discussed in treatment team AND documented:  
AUDIT Screen Score: AUDIT Score: 0 Tobacco - patient is a smoker: Have You Used Tobacco in the Past 30 Days: No 
Illegal Drugs use: Have You Used Any Illegal Substances Over the Past 12 Months: No 
 
24 hour chart check complete: yes Patient goal(s) for today: attend groups, take medications Treatment team focus/goals: titrate medication, coordinate follow up Progress note : Pt is alert, oriented, flat affect, depressed. She reported feeling only 5% better and said the only improved symptoms are nightmares decreasing. She endorses SI with plan to OD on pills and EtOH or cut herself. LOS:  6  Expected LOS: TBD 
  
Financial concerns/prescription coverage:  [employer] Family contact: significant other,  
Family requesting physician contact today:  no 
Discharge plan: return home with fiance and children Access to weapons :Deng Adams provider(s): Loren Ortiz NP new patient appointment - would benefit from DBT therapy groups Patient's preferred phone number for follow up call : [phone]  
 
 Participating treatment team members: Dr. Jonas Guo; Chloé Mccoy, RN; Sandra Alcala, PharmD; ALFREDO Ramon

## 2020-09-08 NOTE — BH NOTES
Chief Complaint:  I slept poorly    Length of Stay: 6 Days    Interval History:  Khang Howell reports feeling \"5 %\" better today. Says she slept poorly last night and estimates that she slept only 2 hours last night and woke up multiple times. Her nightmares have decreased in frequency but still dreams \"about things in the past\". Passive SI remains and thinks about taking an OD or starving herself to death. She continues to eat poorly and was noted over the weekend to be inducing vomiting. Has not done it since yesterday. Feels depressed and hopeless. Her pelvic cramping has improved and is in less pain now. Denies any adverse events from her medications. Past Medical History:  Past Medical History:   Diagnosis Date    Anxiety     Asthma     on albuterol prn.  Triggers cold and allergies    Back pain, chronic 2010    sciatica    Gestational hypertension     Past pregnancy    HX OTHER MEDICAL      , , , ,     Hyperemesis     severe hyperemesis    Hypertension     with G12 - none this pregnancy    Iron deficiency anemia 10/08/2010    MDD (major depressive disorder), single episode with postpartum onset 2013    treated with meds - 13    Plantar fasciitis     Pregnancy     36 weeks    PTSD (post-traumatic stress disorder)            Labs:  Lab Results   Component Value Date/Time    WBC 8.0 2020 05:49 PM    WBC 12.2 (H) 2012 04:27 PM    Hemoglobin (POC) 10.4 (L) 2020 07:12 AM    HGB 11.3 (L) 2020 05:49 PM    HCT 36.0 2020 05:49 PM    PLATELET 157  05:49 PM    MCV 71.4 (L) 2020 05:49 PM    Hgb, External 33.7 2012    Hct, External 69 2012    Platelet cnt., External 307 2012      Lab Results   Component Value Date/Time    Sodium 136 2020 05:49 PM    Potassium 3.9 2020 05:49 PM    Chloride 106 2020 05:49 PM    CO2 23 2020 05:49 PM    Anion gap 7 2020 05:49 PM    Glucose 79 2020 05:49 PM    BUN 4 (L) 09/02/2020 05:49 PM    Creatinine 0.59 09/02/2020 05:49 PM    BUN/Creatinine ratio 7 (L) 09/02/2020 05:49 PM    GFR est AA >60 09/02/2020 05:49 PM    GFR est non-AA >60 09/02/2020 05:49 PM    Calcium 9.8 09/02/2020 05:49 PM    Bilirubin, total 0.5 09/02/2020 05:49 PM    Alk.  phosphatase 43 (L) 09/02/2020 05:49 PM    Protein, total 8.7 (H) 09/02/2020 05:49 PM    Albumin 3.9 09/02/2020 05:49 PM    Globulin 4.8 (H) 09/02/2020 05:49 PM    A-G Ratio 0.8 (L) 09/02/2020 05:49 PM    ALT (SGPT) 14 09/02/2020 05:49 PM      Vitals:    09/07/20 1600 09/07/20 1658 09/07/20 2050 09/08/20 0748   BP: 110/69 110/69 116/79 142/81   Pulse: 61 61 (!) 55 64   Resp: 16 16 16 16   Temp: 98.2 °F (36.8 °C) 98.2 °F (36.8 °C) 98.4 °F (36.9 °C) 98.5 °F (36.9 °C)   SpO2: 99% 99% 98% 97%   Weight:       Height:             Current Facility-Administered Medications   Medication Dose Route Frequency Provider Last Rate Last Dose    DULoxetine (CYMBALTA) capsule 90 mg  90 mg Oral DAILY Rosy Gutierrez NP   90 mg at 09/08/20 0842    prazosin (MINIPRESS) capsule 2 mg  2 mg Oral QHS Rosy Gutierrez NP   2 mg at 09/07/20 2127    magnesium oxide (MAG-OX) tablet 400 mg  400 mg Oral DAILY Rosy Gutierrez NP   400 mg at 09/08/20 0842    promethazine (PHENERGAN) suppository 25 mg  25 mg Rectal Q6H PRN Warnell Osler, NP   25 mg at 09/06/20 1111    lactated Ringers infusion  125 mL/hr IntraVENous CONTINUOUS Gissel Daniels DO   Stopped at 09/07/20 0801    HYDROcodone-acetaminophen (NORCO) 5-325 mg per tablet 1 Tab  1 Tab Oral Q6H PRN Cookie Feather, DO   1 Tab at 09/06/20 1415    QUEtiapine (SEROquel) tablet 200 mg  200 mg Oral QHS Suzanna Butts, MD   200 mg at 09/07/20 2127    ibuprofen (MOTRIN) tablet 600 mg  600 mg Oral Q6H PRN Suzanna Butts, MD   600 mg at 09/06/20 1011    OLANZapine (ZyPREXA) tablet 5 mg  5 mg Oral Q6H PRN Brianna Sloan, MENA        haloperidol lactate (HALDOL) injection 5 mg  5 mg IntraMUSCular Q6H PRN Jung Sloan, MENA        benztropine (COGENTIN) tablet 1 mg  1 mg Oral BID PRN Jung Sloan NP        diphenhydrAMINE (BENADRYL) injection 50 mg  50 mg IntraMUSCular BID PRN Jung Sloan NP        hydrOXYzine HCL (ATARAX) tablet 50 mg  50 mg Oral TID PRN Jung Sloan, NP        LORazepam (ATIVAN) injection 1 mg  1 mg IntraMUSCular Q4H PRN Jung Sloan NP        traZODone (DESYREL) tablet 50 mg  50 mg Oral QHS PRN Jung Sloan NP        acetaminophen (TYLENOL) tablet 650 mg  650 mg Oral Q4H PRN Halina Sloan NP   650 mg at 09/04/20 2123    magnesium hydroxide (MILK OF MAGNESIA) 400 mg/5 mL oral suspension 30 mL  30 mL Oral DAILY PRN Halina Sloan NP        ondansetron (ZOFRAN ODT) tablet 4 mg  4 mg Oral Q6H PRN Halina Sloan NP   4 mg at 09/08/20 0842         Mental Status Exam:  Eye contact: limited  Grooming: fair  Psychomotor activity: decreased. Speech is spontaneous, soft  Mood is \"hopeless\"  Affect: depressed. Perception: Denies any AH or VH  Suicidal ideation: denies si or plan  Cognition is grossly intact       Physical Exam:  Body habitus: Body mass index is 25.24 kg/m². Musculoskeletal system: normal gait  Tremor - neg  Cog wheeling - neg      Assessment and Plan:  Chilo Morales meets criteria for a diagnosis of Recurrent major depression, severe without psychotic symptoms; posttraumatic stress disorder, chronic. Increased Seroquel to 300mg at bedtime. Trazodone 50mg at bedtime. Continue the medication regimen as prescribed  Disposition planning to continue.      I certify that this patients inpatient psychiatric hospital services furnished since the previous certification were, and continue to be, required for treatment that could reasonably be expected to improve the patient's condition, or for diagnostic study, and that the patient continues to need, on a daily basis, active treatment furnished directly by or requiring the supervision of inpatient psychiatric facility personnel. In addition, the hospital records show that services furnished were intensive treatment services, admission or related services, or equivalent services.

## 2020-09-08 NOTE — PROGRESS NOTES
Pt receiving continuous q15m safety checks, pt currently asleep resting comfortably in bed. No distress noted, respiratory WNL. Supplemental lighting utilized. Problem: Depressed Mood (Adult/Pediatric)  Goal: Interventions  Outcome: Progressing Towards Goal     Problem: Falls - Risk of  Goal: *Absence of Falls  Description: Document Argentina Young Fall Risk and appropriate interventions in the flowsheet.   Outcome: Progressing Towards Goal  Note: Fall Risk Interventions:            Medication Interventions: Teach patient to arise slowly

## 2020-09-08 NOTE — PROGRESS NOTES
Gynecology Progress Note    In to see patient. Discussed surgical findings and when to expect results. Pt reports minimal bleeding. Having some pelvic cramping mostly in her hips. Still with n/v.    Vitals:  Blood pressure 142/81, pulse 64, temperature 98.5 °F (36.9 °C), resp. rate 16, height 5' 6\" (1.676 m), weight 70.9 kg (156 lb 6.4 oz), SpO2 97 %, not currently breastfeeding. Temp (24hrs), Av.3 °F (36.8 °C), Min:98.2 °F (36.8 °C), Max:98.5 °F (36.9 °C)        Exam:  Patient without distress. Sitting in day room                   Assessment and Plan:    POD#3 s/p suction D&C for missed sab  No postop concerns at this time  Continued n/v that I suspect is due to underlying anorexia type disorder  Depression with suicidal ideation-appreciate psychiatry care of this patient. I have encouraged her to continue to work on compliance with her care and to be open with her thoughts.

## 2020-09-08 NOTE — BH NOTES
PRN Medication Documentation    Specific patient behavior that led to need for PRN medication: nausea  Staff interventions attempted prior to PRN being given: offered gingerale but refused  PRN medication given: Zofran 4mg PO given @ 8599  Patient response/effectiveness of PRN medication: 0930 medication not effective, observed moderate amount of brownish  liquid along with some medications. 1030: patient resting quietly in bed, still complains of feeling nauseous but no further vomiting at this time. Offered gingerale and crackers but she refused, states if she vomits the gingerale burns. Will continue to monitor and offer support as needed.

## 2020-09-08 NOTE — PROGRESS NOTES
Problem: Falls - Risk of  Goal: *Absence of Falls  Description: Document Zachary Melendrez Fall Risk and appropriate interventions in the flowsheet. Outcome: Progressing Towards Goal  Note: Fall Risk Interventions:       Medication Interventions: Teach patient to arise slowly    Received pt sitting out in dayroom interacting with select peers. Consumes small amounts of dinner tray. Staff will continue to monitor for any signs of nausea/vomiting.

## 2020-09-09 PROCEDURE — 74011250637 HC RX REV CODE- 250/637: Performed by: NURSE PRACTITIONER

## 2020-09-09 PROCEDURE — 74011250637 HC RX REV CODE- 250/637: Performed by: PSYCHIATRY & NEUROLOGY

## 2020-09-09 PROCEDURE — 65220000003 HC RM SEMIPRIVATE PSYCH

## 2020-09-09 RX ADMIN — DULOXETINE HYDROCHLORIDE 90 MG: 60 CAPSULE, DELAYED RELEASE ORAL at 09:02

## 2020-09-09 RX ADMIN — ONDANSETRON 4 MG: 4 TABLET, ORALLY DISINTEGRATING ORAL at 10:21

## 2020-09-09 RX ADMIN — QUETIAPINE FUMARATE 300 MG: 100 TABLET ORAL at 21:11

## 2020-09-09 RX ADMIN — PRAZOSIN HYDROCHLORIDE 2 MG: 1 CAPSULE ORAL at 21:11

## 2020-09-09 RX ADMIN — MAGNESIUM GLUCONATE 500 MG ORAL TABLET 400 MG: 500 TABLET ORAL at 09:02

## 2020-09-09 RX ADMIN — TRAZODONE HYDROCHLORIDE 50 MG: 50 TABLET ORAL at 21:12

## 2020-09-09 NOTE — PROGRESS NOTES
Problem: Depressed Mood (Adult/Pediatric)  Goal: *STG: Verbalizes anger, guilt, and other feelings in a constructive manor  Outcome: Progressing Towards Goal  Note: Verbalizes feelings constructively. Encouraged to stay out on the unit during and 2 hours after mealtimes to discourage her bulimic behaviors, however, around dinnertime she refused to leave her room and eat. She was directed to come out of her room and eat 25% of her tray. She complied but through observation, she did not touch her tray and then told Ori Lord RN that she is not hungry and that it makes her sick. I provided education on her eating disorder and that her feelings of nausea are more related to bulimia than the actual meal. Through much education on her compliance with treatment here, she refused to eat any of her food and stated, \"Ya'll can't make me eat\". This issue needs to be further addressed in treatment team tomorrow. Besides her noncompliance with meals, patient is pleasant and interacts appropriately with staff and peers.   Goal: *STG: Attends activities and groups  Outcome: Progressing Towards Goal  Goal: Interventions  Outcome: Progressing Towards Goal

## 2020-09-09 NOTE — PROGRESS NOTES
Problem: Depressed Mood (Adult/Pediatric)  Goal: *STG: Participates in treatment plan  Outcome: Progressing Towards Goal  Note: Out on unit passively engaged. Mood deena affect depressed. Isolating. Insight into her self inducing her vomiting after meal and medication times. Able to recognize her depression and fear of d/c is primary stressor. Hopeless and describes fleeing SI. Minimizes and deflects when discussing her d/c plan. Continues to refuse to sign release of information. Goal: *STG: Verbalizes anger, guilt, and other feelings in a constructive manor  Outcome: Progressing Towards Goal  Goal: *STG: Attends activities and groups  Outcome: Progressing Towards Goal  Goal: *STG: Demonstrates reduction in symptoms and increase in insight into coping skills/future focused  Outcome: Progressing Towards Goal  Goal: Interventions  Outcome: Progressing Towards Goal     1350: review d/c plan and obtained consent to call her BF. Discussed barriers such as stressors in relationship with BF and ongoing struggle with past traumas. Coping skills discussed and homework assigned. Pt verbalized understanding.

## 2020-09-09 NOTE — INTERDISCIPLINARY ROUNDS
Behavioral Health Interdisciplinary Rounds Patient Name: Everett Geller  Age: 38 y.o. Room/Bed:  727/02 Primary Diagnosis: <principal problem not specified> Admission Status: Voluntary Readmission within 30 days: no 
Power of  in place: no 
Patient requires a blocked bed: no          Reason for blocked bed: VTE Prophylaxis:  
 
Mobility needs/Fall risk:  
Flu Vaccine :   
Nutritional Plan:  
Consults:         
Labs/Testing due today?: no 
 
Sleep hours:  7.5 Participation in Care/Groups:  yes Medication Compliant?: Yes PRNS (last 24 hours):    
Restraints (last 24 hours): CIWA (range last 24 hours): COWS (range last 24 hours): Alcohol screening (AUDIT) completed -   AUDIT Score: 0 If applicable, date SBIRT discussed in treatment team AND documented:  
AUDIT Screen Score: AUDIT Score: 0 Tobacco - patient is a smoker: Have You Used Tobacco in the Past 30 Days: No 
Illegal Drugs use: Have You Used Any Illegal Substances Over the Past 12 Months: No 
 
24 hour chart check complete: yes Patient goal(s) for today: attend groups, take medications Treatment team focus/goals: titrate medication, coordinate follow up, have patient sit in mileu after medications to monitor and insure she is absorbing medications Progress note : Pt is alert, oriented, flat affect, depressed. She reports not feeling well and vomiting after she took her medications. Continues to endorse SI, presents has helpless and hopeless. She is talking to her fiance on the phone.  
  
LOS:  7  Expected LOS: TBD 
  
Financial concerns/prescription coverage:  [place] [employer] Family contact: significant other, Belinda Shahid ([phone]) Family requesting physician contact today:  no 
Discharge plan: return home to live alone and  from fiance.   
Access to weapons :Ann Marie Burns 
 Outpatient provider(s): Loren Ortiz NP new patient appointment - would benefit from DBT therapy groups and [facility] eating disorder treatment programs Patient's preferred phone number for follow up call : [phone]  
  
Participating treatment team members: Dr. Alvin Dunn; Randi George, RN; Hunter Lam, PharmD; Jessica García MSW

## 2020-09-09 NOTE — BH NOTES
Chief Complaint:  I don't feel so good. Length of Stay: 7 Days    Interval History:  Ladkaylee Spivey reports feeling little change. Says her mood remains depressed and hopeless. Nursing staff report that she vomited right after taking her medications. Her passive SI remains and says she occasionally has \"wishes to take an OD and end it all\". Slept \"somewhat better\" last night although nursing staff note that she slept 7 hours last night. She remains isolative to her room and is minimally interactive with her peers in the milieu. Talked briefly about difficulties with her managing her children and wants her fiancee to manage them entirely. Currently she does not have an income which has also been stresfull. Past Medical History:  Past Medical History:   Diagnosis Date    Anxiety     Asthma     on albuterol prn.  Triggers cold and allergies    Back pain, chronic 2010    sciatica    Gestational hypertension     Past pregnancy    HX OTHER MEDICAL      , , , ,     Hyperemesis     severe hyperemesis    Hypertension     with G12 - none this pregnancy    Iron deficiency anemia 10/08/2010    MDD (major depressive disorder), single episode with postpartum onset 2013    treated with meds - 13    Plantar fasciitis     Pregnancy     36 weeks    PTSD (post-traumatic stress disorder)            Labs:  Lab Results   Component Value Date/Time    WBC 8.0 2020 05:49 PM    WBC 12.2 (H) 2012 04:27 PM    Hemoglobin (POC) 10.4 (L) 2020 07:12 AM    HGB 11.3 (L) 2020 05:49 PM    HCT 36.0 2020 05:49 PM    PLATELET 994  05:49 PM    MCV 71.4 (L) 2020 05:49 PM    Hgb, External 33.7 2012    Hct, External 69 2012    Platelet cnt., External 307 2012      Lab Results   Component Value Date/Time    Sodium 136 2020 05:49 PM    Potassium 3.9 2020 05:49 PM    Chloride 106 2020 05:49 PM    CO2 23 2020 05:49 PM    Anion gap 7 09/02/2020 05:49 PM    Glucose 79 09/02/2020 05:49 PM    BUN 4 (L) 09/02/2020 05:49 PM    Creatinine 0.59 09/02/2020 05:49 PM    BUN/Creatinine ratio 7 (L) 09/02/2020 05:49 PM    GFR est AA >60 09/02/2020 05:49 PM    GFR est non-AA >60 09/02/2020 05:49 PM    Calcium 9.8 09/02/2020 05:49 PM    Bilirubin, total 0.5 09/02/2020 05:49 PM    Alk.  phosphatase 43 (L) 09/02/2020 05:49 PM    Protein, total 8.7 (H) 09/02/2020 05:49 PM    Albumin 3.9 09/02/2020 05:49 PM    Globulin 4.8 (H) 09/02/2020 05:49 PM    A-G Ratio 0.8 (L) 09/02/2020 05:49 PM    ALT (SGPT) 14 09/02/2020 05:49 PM      Vitals:    09/07/20 2050 09/08/20 0748 09/08/20 1702 09/09/20 0856   BP: 116/79 142/81 110/79 106/77   Pulse: (!) 55 64 64 67   Resp: 16 16 16 16   Temp: 98.4 °F (36.9 °C) 98.5 °F (36.9 °C) 98.2 °F (36.8 °C) 98.2 °F (36.8 °C)   SpO2: 98% 97% 98% 99%   Weight:       Height:             Current Facility-Administered Medications   Medication Dose Route Frequency Provider Last Rate Last Dose    traZODone (DESYREL) tablet 50 mg  50 mg Oral QHS Britt Barraza MD   50 mg at 09/08/20 2119    QUEtiapine (SEROquel) tablet 300 mg  300 mg Oral QHS Britt Barraza MD   300 mg at 09/08/20 2119    DULoxetine (CYMBALTA) capsule 90 mg  90 mg Oral DAILY Rosy Gutierrez NP   90 mg at 09/09/20 0902    prazosin (MINIPRESS) capsule 2 mg  2 mg Oral QHS Rosy Gutierrez NP   2 mg at 09/08/20 2119    magnesium oxide (MAG-OX) tablet 400 mg  400 mg Oral DAILY Rosy Gutierrez NP   400 mg at 09/09/20 0902    promethazine (PHENERGAN) suppository 25 mg  25 mg Rectal Q6H PRN Claude Montgomery NP   25 mg at 09/06/20 1111    lactated Ringers infusion  125 mL/hr IntraVENous CONTINUOUS Sourav Ra, DO   Stopped at 09/07/20 0801    HYDROcodone-acetaminophen (NORCO) 5-325 mg per tablet 1 Tab  1 Tab Oral Q6H PRN Sourav Ra, DO   1 Tab at 09/06/20 1415    ibuprofen (MOTRIN) tablet 600 mg  600 mg Oral Q6H PRN Britt Barraza MD   600 mg at 09/06/20 1011    OLANZapine (ZyPREXA) tablet 5 mg  5 mg Oral Q6H PRN Carlie Sloan NP        haloperidol lactate (HALDOL) injection 5 mg  5 mg IntraMUSCular Q6H PRN Halina Sloan NP        benztropine (COGENTIN) tablet 1 mg  1 mg Oral BID PRN Carlie Sloan NP        diphenhydrAMINE (BENADRYL) injection 50 mg  50 mg IntraMUSCular BID PRN Carlie Sloan NP        hydrOXYzine HCL (ATARAX) tablet 50 mg  50 mg Oral TID PRN Carlie Sloan NP        LORazepam (ATIVAN) injection 1 mg  1 mg IntraMUSCular Q4H PRN Carlie Sloan NP        acetaminophen (TYLENOL) tablet 650 mg  650 mg Oral Q4H PRN Halina Sloan NP   650 mg at 09/04/20 2123    magnesium hydroxide (MILK OF MAGNESIA) 400 mg/5 mL oral suspension 30 mL  30 mL Oral DAILY PRN Halina Sloan NP        ondansetron (ZOFRAN ODT) tablet 4 mg  4 mg Oral Q6H PRN Halina Sloan NP   4 mg at 09/08/20 2118         Mental Status Exam:  Eye contact: limited  Grooming: fair  Psychomotor activity: decreased. Speech is spontaneous, soft  Mood is \"hopeless\"  Affect: depressed. Perception: Denies any AH or VH  Suicidal ideation: Passive SI +  Cognition is grossly intact       Physical Exam:  Body habitus: Body mass index is 25.24 kg/m². Musculoskeletal system: normal gait  Tremor - neg  Cog wheeling - neg      Assessment and Plan:  Maksim Mitchell meets criteria for a diagnosis of Recurrent major depression, severe without psychotic symptoms; posttraumatic stress disorder, chronic. Continue the medication regimen as prescribed  Disposition planning to continue.      I certify that this patients inpatient psychiatric hospital services furnished since the previous certification were, and continue to be, required for treatment that could reasonably be expected to improve the patient's condition, or for diagnostic study, and that the patient continues to need, on a daily basis, active treatment furnished directly by or requiring the supervision of inpatient psychiatric facility personnel. In addition, the hospital records show that services furnished were intensive treatment services, admission or related services, or equivalent services.

## 2020-09-09 NOTE — GROUP NOTE
SAND[facility]  GROUP DOCUMENTATION INDIVIDUAL Group Therapy Note Date: 9/8/2020 Group Start Time: 2015 Group End Time: 2045 Group Topic: Reflection/Relaxation [address] [place] LYNDON ARTHUR  GROUP DOCUMENTATION GROUP Group Therapy Note Attendees:  
 
  
 
Attendance: Attended Patient's Goal:  Unit orientation and daily progress Interventions/techniques: Supported Follows Directions: Followed directions Interactions: Interacted appropriately Mental Status: Calm and Depressed Behavior/appearance: Attentive and Cooperative Goals Achieved: Able to listen to others Additional Notes:  Quiet in group. Earlier had told staff that she did not eat breakfast and lunch. Encouraged to eat and dinner time and did eat some and also at snack time. Appears depressed with sad affect. [facility] [address]

## 2020-09-09 NOTE — PROGRESS NOTES
Pt receiving continuous q15m safety checks, pt currently asleep resting comfortably in bed. No distress noted, respiratory WNL. Supplemental lighting utilized. Problem: Depressed Mood (Adult/Pediatric)  Goal: Interventions  Outcome: Progressing Towards Goal     Problem: Falls - Risk of  Goal: *Absence of Falls  Description: Document Jack Adrian Fall Risk and appropriate interventions in the flowsheet.   Outcome: Progressing Towards Goal  Note: Fall Risk Interventions:            Medication Interventions: Teach patient to arise slowly

## 2020-09-09 NOTE — BH NOTES
PRN Medication Documentation    Specific patient behavior that led to need for PRN medication: nausea/vomiting   Staff interventions attempted prior to PRN being given: offered ginger ale and crackers   PRN medication given: Zofran 4mg PO given @ 1021  Patient response/effectiveness of PRN medication: no further complaints, will monitor and provide support as needed

## 2020-09-10 PROCEDURE — 74011250637 HC RX REV CODE- 250/637: Performed by: NURSE PRACTITIONER

## 2020-09-10 PROCEDURE — 74011250637 HC RX REV CODE- 250/637: Performed by: PSYCHIATRY & NEUROLOGY

## 2020-09-10 PROCEDURE — 65220000003 HC RM SEMIPRIVATE PSYCH

## 2020-09-10 RX ORDER — PANTOPRAZOLE SODIUM 40 MG/1
40 TABLET, DELAYED RELEASE ORAL
Status: DISCONTINUED | OUTPATIENT
Start: 2020-09-10 | End: 2020-09-28 | Stop reason: HOSPADM

## 2020-09-10 RX ADMIN — PRAZOSIN HYDROCHLORIDE 2 MG: 1 CAPSULE ORAL at 21:08

## 2020-09-10 RX ADMIN — PANTOPRAZOLE SODIUM 40 MG: 40 TABLET, DELAYED RELEASE ORAL at 11:59

## 2020-09-10 RX ADMIN — QUETIAPINE FUMARATE 300 MG: 100 TABLET ORAL at 21:08

## 2020-09-10 RX ADMIN — TRAZODONE HYDROCHLORIDE 50 MG: 50 TABLET ORAL at 21:45

## 2020-09-10 RX ADMIN — ONDANSETRON 4 MG: 4 TABLET, ORALLY DISINTEGRATING ORAL at 16:06

## 2020-09-10 RX ADMIN — ONDANSETRON 4 MG: 4 TABLET, ORALLY DISINTEGRATING ORAL at 21:45

## 2020-09-10 RX ADMIN — ONDANSETRON 4 MG: 4 TABLET, ORALLY DISINTEGRATING ORAL at 09:46

## 2020-09-10 RX ADMIN — MAGNESIUM GLUCONATE 500 MG ORAL TABLET 400 MG: 500 TABLET ORAL at 08:25

## 2020-09-10 RX ADMIN — DULOXETINE HYDROCHLORIDE 90 MG: 60 CAPSULE, DELAYED RELEASE ORAL at 08:25

## 2020-09-10 NOTE — INTERDISCIPLINARY ROUNDS
Behavioral Health Interdisciplinary Rounds Patient Name: Nia Jimenez  Age: 38 y.o. Room/Bed:  [facility]/ Primary Diagnosis: <principal problem not specified> Admission Status: Voluntary Readmission within 30 days: no 
Power of  in place: no 
Patient requires a blocked bed: no          Reason for blocked bed:  
Sleep hours: 8 Participation in Care/Groups:  yes Medication Compliant?: Yes PRNS (last 24 hours): antinausea Restraints (last 24 hours):  no 
  
Alcohol screening (AUDIT) completed -  AUDIT Score: 0  If applicable, date SBIRT discussed in treatment team AND documented:   
Tobacco - patient is a smoker: Have You Used Tobacco in the Past 30 Days: No 
Illegal Drugs use: Have You Used Any Illegal Substances Over the Past 12 Months: No 
 
24 hour chart check complete: yes Patient goal(s) for today:  
Treatment team focus/goals:  
Progress note Family Contact information:  
Patient's preferred phone number for follow up call :  
 
LOS:  8  Expected LOS:  
 
Participating treatment team members: Nia Jimenez, * (assigned SW),

## 2020-09-10 NOTE — PROGRESS NOTES
Problem: Depressed Mood (Adult/Pediatric)  Goal: *STG: Participates in treatment plan  Outcome: Progressing Towards Goal  Patient seen in treatment team, unsure of wants regarding next step in treatment. Patient not interested outpatient treatment, eating disorder. Goal: *STG: Attends activities and groups  Outcome: Progressing Towards Goal  Patient isolative to room, came out on occasion to watch TV in dayroom. Goal: *STG: Demonstrates reduction in symptoms and increase in insight into coping skills/future focused   Outcome: Progressing Towards Goal   Patient does not display insight regarding diagnosis or coping skills.      Problem: Nutrition Deficit  Goal: *Optimize nutritional status  Outcome: Progressing Towards Goal    [facility]  Master Treatment Plan for Mabel Young Treatment Plan Initiated: 09/10/2020    Treatment Plan Modalities:  Type of Modality Amount  (x minutes) Frequency (x/week) Duration (x days) Name of Responsible Staff   Community & wrap-up meetings to encourage peer interactions [phone] [facility] psychotherapy to assist in building coping skills and internal controls 60 7 1 Momo Massey   Therapeutic activity groups to build coping skills 60 7 1 Momo Massey   Psychoeducation in group setting to address:   Medication education   15 [phone] Garcia Mendoza RN   Coping skills         Relaxation techniques         Symptom management         Discharge planning   [address]    60 [phone] 1 [phone] [facility]   60 1 1 volunteer   Recovery/AA/NA      volunteer   Physician medication management   13 7 1 Dr. Merary Ledezma   15 [phone] [facility] and [employer]

## 2020-09-10 NOTE — PROGRESS NOTES
Problem: Depressed Mood (Adult/Pediatric)  Goal: *STG: Participates in treatment plan  Outcome: Progressing Towards Goal  Note: Out on unit quietly present and observing peers conversation. Noted increase in eye contact and occasionally social w peers. Minimal work on her coping skills work assignment. Denies SI with plan describes hopeless thoughts such as \"I wish it would all just go away\". Poor insight into her resistance to practice healthy coping skills. Much praise offered to her for sharing her thoughts and emotions and also for increasing her time out of room and w peers. Evening goal is to complete her coping skills assignement prior to bedtime.    Goal: *STG: Verbalizes anger, guilt, and other feelings in a constructive manor  Outcome: Progressing Towards Goal  Goal: *STG: Attends activities and groups  Outcome: Progressing Towards Goal  Goal: *STG: Demonstrates reduction in symptoms and increase in insight into coping skills/future focused  Outcome: Progressing Towards Goal  Goal: Interventions  Outcome: Progressing Towards Goal

## 2020-09-10 NOTE — PROGRESS NOTES
Problem: Falls - Risk of  Goal: *Absence of Falls  Description: Document Hunter Pederson Fall Risk and appropriate interventions in the flowsheet.   Outcome: Progressing Towards Goal  Note: Fall Risk Interventions:            Medication Interventions: Teach patient to arise slowly

## 2020-09-10 NOTE — BH NOTES
Chief Complaint:  I am no different. Length of Stay: 8 Days    Interval History:  Ruthine Severe reports feeling the same. She did not eat dinner or breakfast and says she \"just don't feel like it\". Her nausea persists and she vomited after taking her morning medications. She says she vomited about an hour after taking her evening medications last night. Says she feels depressed and hopeless and continues to have SI which can be intrusive at times. Discussed going to an eating disorder program but she refuses to consider this. Minimally interactive during the interview and spoke very little. Past Medical History:  Past Medical History:   Diagnosis Date    Anxiety     Asthma     on albuterol prn.  Triggers cold and allergies    Back pain, chronic 2010    sciatica    Gestational hypertension     Past pregnancy    HX OTHER MEDICAL      , , , ,     Hyperemesis     severe hyperemesis    Hypertension     with G12 - none this pregnancy    Iron deficiency anemia 10/08/2010    MDD (major depressive disorder), single episode with postpartum onset 2013    treated with meds - 13    Plantar fasciitis     Pregnancy     36 weeks    PTSD (post-traumatic stress disorder)            Labs:  Lab Results   Component Value Date/Time    WBC 8.0 2020 05:49 PM    WBC 12.2 (H) 2012 04:27 PM    Hemoglobin (POC) 10.4 (L) 2020 07:12 AM    HGB 11.3 (L) 2020 05:49 PM    HCT 36.0 2020 05:49 PM    PLATELET 475  05:49 PM    MCV 71.4 (L) 2020 05:49 PM    Hgb, External 33.7 2012    Hct, External 69 2012    Platelet cnt., External 307 2012      Lab Results   Component Value Date/Time    Sodium 136 2020 05:49 PM    Potassium 3.9 2020 05:49 PM    Chloride 106 2020 05:49 PM    CO2 23 2020 05:49 PM    Anion gap 7 2020 05:49 PM    Glucose 79 2020 05:49 PM    BUN 4 (L) 2020 05:49 PM    Creatinine 0.59 09/02/2020 05:49 PM    BUN/Creatinine ratio 7 (L) 09/02/2020 05:49 PM    GFR est AA >60 09/02/2020 05:49 PM    GFR est non-AA >60 09/02/2020 05:49 PM    Calcium 9.8 09/02/2020 05:49 PM    Bilirubin, total 0.5 09/02/2020 05:49 PM    Alk.  phosphatase 43 (L) 09/02/2020 05:49 PM    Protein, total 8.7 (H) 09/02/2020 05:49 PM    Albumin 3.9 09/02/2020 05:49 PM    Globulin 4.8 (H) 09/02/2020 05:49 PM    A-G Ratio 0.8 (L) 09/02/2020 05:49 PM    ALT (SGPT) 14 09/02/2020 05:49 PM      Vitals:    09/09/20 0856 09/09/20 1635 09/09/20 1957 09/10/20 0805   BP: 106/77 125/84 112/77 126/81   Pulse: 67 68 66 80   Resp: 16 16 16 16   Temp: 98.2 °F (36.8 °C) 98.3 °F (36.8 °C) 98.7 °F (37.1 °C) 98.1 °F (36.7 °C)   SpO2: 99% 99% 99% 99%   Weight:       Height:             Current Facility-Administered Medications   Medication Dose Route Frequency Provider Last Rate Last Dose    [START ON 9/11/2020] DULoxetine (CYMBALTA) capsule 90 mg  90 mg Oral QHS Tiffany Trevino MD        traZODone (DESYREL) tablet 50 mg  50 mg Oral QHS Tiffany Trevino MD   50 mg at 09/09/20 2112    QUEtiapine (SEROquel) tablet 300 mg  300 mg Oral QHS Tiffany Trevino MD   300 mg at 09/09/20 2111    prazosin (MINIPRESS) capsule 2 mg  2 mg Oral QHS Rosy Gutierrez NP   2 mg at 09/09/20 2111    magnesium oxide (MAG-OX) tablet 400 mg  400 mg Oral DAILY Rosy Gutierrez NP   400 mg at 09/10/20 0825    promethazine (PHENERGAN) suppository 25 mg  25 mg Rectal Q6H PRN Carlos Willingham, NP   25 mg at 09/06/20 1111    HYDROcodone-acetaminophen (NORCO) 5-325 mg per tablet 1 Tab  1 Tab Oral Q6H PRN Stephany Conway DO   1 Tab at 09/06/20 1415    ibuprofen (MOTRIN) tablet 600 mg  600 mg Oral Q6H PRN Tiffany Trevino MD   600 mg at 09/06/20 1011    OLANZapine (ZyPREXA) tablet 5 mg  5 mg Oral Q6H PRN Halina Sloan NP        haloperidol lactate (HALDOL) injection 5 mg  5 mg IntraMUSCular Q6H PRN Carlita Sloan NP        benztropine (COGENTIN) tablet 1 mg  1 mg Oral BID PRN Cuca Sloan NP        diphenhydrAMINE (BENADRYL) injection 50 mg  50 mg IntraMUSCular BID PRN Cuca Sloan NP        hydrOXYzine HCL (ATARAX) tablet 50 mg  50 mg Oral TID PRN Cuca Sloan NP        LORazepam (ATIVAN) injection 1 mg  1 mg IntraMUSCular Q4H PRN Cuca Sloan NP        acetaminophen (TYLENOL) tablet 650 mg  650 mg Oral Q4H PRN Halina Sloan NP   650 mg at 09/04/20 2123    magnesium hydroxide (MILK OF MAGNESIA) 400 mg/5 mL oral suspension 30 mL  30 mL Oral DAILY PRN Halina Sloan NP        ondansetron (ZOFRAN ODT) tablet 4 mg  4 mg Oral Q6H PRN Halina Sloan NP   4 mg at 09/10/20 0946         Mental Status Exam:  Eye contact: limited  Grooming: fair  Psychomotor activity: decreased. Speech is spontaneous, soft  Mood is \"hopeless\"  Affect: depressed. Perception: Denies any AH or VH  Suicidal ideation: Passive SI +  Cognition is grossly intact       Physical Exam:  Body habitus: Body mass index is 25.24 kg/m². Musculoskeletal system: normal gait  Tremor - neg  Cog wheeling - neg      Assessment and Plan:  Antonio Lynch meets criteria for a diagnosis of Recurrent major depression, severe without psychotic symptoms; posttraumatic stress disorder, chronic. Switch Cymbalta to bedtime dosing tomorrow. Protonix started for GERD. Continue the medication regimen as prescribed  Disposition planning to continue.      I certify that this patients inpatient psychiatric hospital services furnished since the previous certification were, and continue to be, required for treatment that could reasonably be expected to improve the patient's condition, or for diagnostic study, and that the patient continues to need, on a daily basis, active treatment furnished directly by or requiring the supervision of inpatient psychiatric facility personnel. In addition, the hospital records show that services furnished were intensive treatment services, admission or related services, or equivalent services.

## 2020-09-11 PROCEDURE — 74011250637 HC RX REV CODE- 250/637: Performed by: PSYCHIATRY & NEUROLOGY

## 2020-09-11 PROCEDURE — 65220000003 HC RM SEMIPRIVATE PSYCH

## 2020-09-11 PROCEDURE — 74011250637 HC RX REV CODE- 250/637: Performed by: NURSE PRACTITIONER

## 2020-09-11 RX ADMIN — PRAZOSIN HYDROCHLORIDE 2 MG: 1 CAPSULE ORAL at 21:13

## 2020-09-11 RX ADMIN — DULOXETINE 90 MG: 60 CAPSULE, DELAYED RELEASE ORAL at 21:16

## 2020-09-11 RX ADMIN — ONDANSETRON 4 MG: 4 TABLET, ORALLY DISINTEGRATING ORAL at 09:04

## 2020-09-11 RX ADMIN — PANTOPRAZOLE SODIUM 40 MG: 40 TABLET, DELAYED RELEASE ORAL at 07:27

## 2020-09-11 RX ADMIN — ONDANSETRON 4 MG: 4 TABLET, ORALLY DISINTEGRATING ORAL at 21:16

## 2020-09-11 RX ADMIN — QUETIAPINE FUMARATE 300 MG: 100 TABLET ORAL at 21:13

## 2020-09-11 RX ADMIN — TRAZODONE HYDROCHLORIDE 50 MG: 50 TABLET ORAL at 21:13

## 2020-09-11 RX ADMIN — ONDANSETRON 4 MG: 4 TABLET, ORALLY DISINTEGRATING ORAL at 15:28

## 2020-09-11 RX ADMIN — MAGNESIUM GLUCONATE 500 MG ORAL TABLET 400 MG: 500 TABLET ORAL at 09:05

## 2020-09-11 NOTE — BH NOTES
PRN Medication Documentation    Specific patient behavior that led to need for PRN medication: patient c/o nausea  Staff interventions attempted prior to PRN being given: encouraged patient to eat bland food  PRN medication given: Zofran  Patient response/effectiveness of PRN medication: will continue to monitor

## 2020-09-11 NOTE — INTERDISCIPLINARY ROUNDS
Behavioral Health Interdisciplinary Rounds Patient Name: Eli Gracia  Age: 38 y.o. Room/Bed:  727/02 Primary Diagnosis: <principal problem not specified> Admission Status: Voluntary Readmission within 30 days: no 
Power of  in place: no 
Patient requires a blocked bed: no          Reason for blocked bed:  
Sleep hours:  6 Participation in Care/Groups:  yes Medication Compliant?: Yes PRNS (last 24 hours): antinausea Restraints (last 24 hours):  no 
  
Alcohol screening (AUDIT) completed -  AUDIT Score: 0  If applicable, date SBIRT discussed in treatment team AND documented:   
Tobacco - patient is a smoker: Have You Used Tobacco in the Past 30 Days: No 
Illegal Drugs use: Have You Used Any Illegal Substances Over the Past 12 Months: No 
 
24 hour chart check complete:  
 
Patient goal(s) for today:  
Treatment team focus/goals:  
Progress note Family Contact information:  
Patient's preferred phone number for follow up call :  
 
LOS:  9  Expected LOS:  
 
Participating treatment team members: Eli Gracia, * (assigned SW),

## 2020-09-11 NOTE — PROGRESS NOTES
Problem: Depressed Mood (Adult/Pediatric)  Goal: *STG: Participates in treatment plan  Outcome: Progressing Towards Goal  Note: Out on unit passively engaged. Mood and affect depressed/sad. Poor po intake. States no appetite and chronic nausea. Reports emesis x 2 this shift however no evidence of emesis. Pt has been out on unit appears more relaxed and sitting on couch w peers. Sleep improved with no reports of flashbacks/nightmares. Denies SI hopeless thinking continues.    Goal: *STG: Verbalizes anger, guilt, and other feelings in a constructive manor  Outcome: Progressing Towards Goal  Goal: *STG: Attends activities and groups  Outcome: Progressing Towards Goal  Goal: *STG: Demonstrates reduction in symptoms and increase in insight into coping skills/future focused  Outcome: Progressing Towards Goal  Goal: Interventions  Outcome: Progressing Towards Goal     1437: attempt to call BF no answer mailbox full will attempt later today

## 2020-09-11 NOTE — BH NOTES
Chief Complaint:  I am the same. Length of Stay: 9 Days    Interval History:  Vinny Camara is slightly better as per the report of the nursing staff. She has been more visible in the milieu, appears less anxious and took all of her medications. She reported to nursing staff that she vomited again last evening but nobody actually saw it. Says she vomited twice since yesterday. Says she slept slightly better last night. Has not eaten anything in the last 24 hours but drank some water yesterday. Says she feels depressed and hopeless with passive thoughts of wanting to \"go to sleep and not wake up or just starve to death\". Remains minimally communicative. Past Medical History:  Past Medical History:   Diagnosis Date    Anxiety     Asthma     on albuterol prn.  Triggers cold and allergies    Back pain, chronic 2010    sciatica    Gestational hypertension     Past pregnancy    HX OTHER MEDICAL      , , , ,     Hyperemesis     severe hyperemesis    Hypertension     with G12 - none this pregnancy    Iron deficiency anemia 10/08/2010    MDD (major depressive disorder), single episode with postpartum onset 2013    treated with meds - 13    Plantar fasciitis     Pregnancy     36 weeks    PTSD (post-traumatic stress disorder)            Labs:  Lab Results   Component Value Date/Time    WBC 8.0 2020 05:49 PM    WBC 12.2 (H) 2012 04:27 PM    Hemoglobin (POC) 10.4 (L) 2020 07:12 AM    HGB 11.3 (L) 2020 05:49 PM    HCT 36.0 2020 05:49 PM    PLATELET 317  05:49 PM    MCV 71.4 (L) 2020 05:49 PM    Hgb, External 33.7 2012    Hct, External 69 2012    Platelet cnt., External 307 2012      Lab Results   Component Value Date/Time    Sodium 136 2020 05:49 PM    Potassium 3.9 2020 05:49 PM    Chloride 106 2020 05:49 PM    CO2 23 2020 05:49 PM    Anion gap 7 2020 05:49 PM    Glucose 79 2020 05:49 PM    BUN 4 (L) 09/02/2020 05:49 PM    Creatinine 0.59 09/02/2020 05:49 PM    BUN/Creatinine ratio 7 (L) 09/02/2020 05:49 PM    GFR est AA >60 09/02/2020 05:49 PM    GFR est non-AA >60 09/02/2020 05:49 PM    Calcium 9.8 09/02/2020 05:49 PM    Bilirubin, total 0.5 09/02/2020 05:49 PM    Alk.  phosphatase 43 (L) 09/02/2020 05:49 PM    Protein, total 8.7 (H) 09/02/2020 05:49 PM    Albumin 3.9 09/02/2020 05:49 PM    Globulin 4.8 (H) 09/02/2020 05:49 PM    A-G Ratio 0.8 (L) 09/02/2020 05:49 PM    ALT (SGPT) 14 09/02/2020 05:49 PM      Vitals:    09/09/20 1957 09/10/20 0805 09/10/20 1702 09/11/20 0815   BP: 112/77 126/81 113/78 105/72   Pulse: 66 80 72 78   Resp: 16 16 16 16   Temp: 98.7 °F (37.1 °C) 98.1 °F (36.7 °C) 98.3 °F (36.8 °C) 97.7 °F (36.5 °C)   SpO2: 99% 99%  98%   Weight:       Height:             Current Facility-Administered Medications   Medication Dose Route Frequency Provider Last Rate Last Dose    DULoxetine (CYMBALTA) capsule 90 mg  90 mg Oral QHS Ioana Mcguire MD        pantoprazole (PROTONIX) tablet 40 mg  40 mg Oral ACB Ioana Mcguire MD   40 mg at 09/11/20 0727    traZODone (DESYREL) tablet 50 mg  50 mg Oral QHS Ioana Mcguire MD   50 mg at 09/10/20 2145    QUEtiapine (SEROquel) tablet 300 mg  300 mg Oral QHS Ioana Mcguire MD   300 mg at 09/10/20 2108    prazosin (MINIPRESS) capsule 2 mg  2 mg Oral QHS Rosy Gutierrez NP   2 mg at 09/10/20 2108    magnesium oxide (MAG-OX) tablet 400 mg  400 mg Oral DAILY Rosy Gutierrez NP   400 mg at 09/11/20 0905    promethazine (PHENERGAN) suppository 25 mg  25 mg Rectal Q6H PRN Christiano Mathur NP   25 mg at 09/06/20 1111    HYDROcodone-acetaminophen (NORCO) 5-325 mg per tablet 1 Tab  1 Tab Oral Q6H PRN Anay Rad, DO   1 Tab at 09/06/20 1415    ibuprofen (MOTRIN) tablet 600 mg  600 mg Oral Q6H PRN Ioana Mcguire MD   600 mg at 09/06/20 1011    OLANZapine (ZyPREXA) tablet 5 mg  5 mg Oral Q6H PRN Michael Sloan, NP  haloperidol lactate (HALDOL) injection 5 mg  5 mg IntraMUSCular Q6H PRN Halina Sloan NP        benztropine (COGENTIN) tablet 1 mg  1 mg Oral BID PRN Dahiana Sloan NP        diphenhydrAMINE (BENADRYL) injection 50 mg  50 mg IntraMUSCular BID PRN Dahiana Sloan NP        hydrOXYzine HCL (ATARAX) tablet 50 mg  50 mg Oral TID PRN Dahiana Sloan NP        LORazepam (ATIVAN) injection 1 mg  1 mg IntraMUSCular Q4H PRN Dahiana Sloan NP        acetaminophen (TYLENOL) tablet 650 mg  650 mg Oral Q4H PRN Halina Sloan NP   650 mg at 09/04/20 2123    magnesium hydroxide (MILK OF MAGNESIA) 400 mg/5 mL oral suspension 30 mL  30 mL Oral DAILY PRN Halina Sloan NP        ondansetron (ZOFRAN ODT) tablet 4 mg  4 mg Oral Q6H PRN Halina Sloan, NP   4 mg at 09/11/20 0904         Mental Status Exam:  Eye contact: limited  Grooming: fair  Psychomotor activity: decreased. Speech is spontaneous, soft  Mood is \"hopeless\"  Affect: depressed. Perception: Denies any AH or VH  Suicidal ideation: Passive SI +  Cognition is grossly intact       Physical Exam:  Body habitus: Body mass index is 25.24 kg/m². Musculoskeletal system: normal gait  Tremor - neg  Cog wheeling - neg      Assessment and Plan:  Jessica Lopez meets criteria for a diagnosis of Recurrent major depression, severe without psychotic symptoms; posttraumatic stress disorder, chronic. Check CMP. Continue the medication regimen as prescribed  Disposition planning to continue.      I certify that this patients inpatient psychiatric hospital services furnished since the previous certification were, and continue to be, required for treatment that could reasonably be expected to improve the patient's condition, or for diagnostic study, and that the patient continues to need, on a daily basis, active treatment furnished directly by or requiring the supervision of inpatient psychiatric facility personnel. In addition, the hospital records show that services furnished were intensive treatment services, admission or related services, or equivalent services.
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
## 2020-09-11 NOTE — PROGRESS NOTES
Pt received resting quietly in bed with eyes closed and no acute distress noted. Respirations equal and unlabored. Will continue to monitor q15 minute rounds. .    Problem: Falls - Risk of  Goal: *Absence of Falls  Description: Document Rebbecca Persons Fall Risk and appropriate interventions in the flowsheet.   Outcome: Progressing Towards Goal  Note: Fall Risk Interventions:            Medication Interventions: Teach patient to arise slowly

## 2020-09-12 LAB
ALBUMIN SERPL-MCNC: 3.3 G/DL (ref 3.5–5)
ALBUMIN/GLOB SERPL: 0.8 {RATIO} (ref 1.1–2.2)
ALP SERPL-CCNC: 37 U/L (ref 45–117)
ALT SERPL-CCNC: 14 U/L (ref 12–78)
ANION GAP SERPL CALC-SCNC: 10 MMOL/L (ref 5–15)
AST SERPL-CCNC: 10 U/L (ref 15–37)
BILIRUB SERPL-MCNC: 0.6 MG/DL (ref 0.2–1)
BUN SERPL-MCNC: 9 MG/DL (ref 6–20)
BUN/CREAT SERPL: 13 (ref 12–20)
CALCIUM SERPL-MCNC: 8.8 MG/DL (ref 8.5–10.1)
CHLORIDE SERPL-SCNC: 109 MMOL/L (ref 97–108)
CO2 SERPL-SCNC: 22 MMOL/L (ref 21–32)
CREAT SERPL-MCNC: 0.7 MG/DL (ref 0.55–1.02)
GLOBULIN SER CALC-MCNC: 3.9 G/DL (ref 2–4)
GLUCOSE BLD STRIP.AUTO-MCNC: 53 MG/DL (ref 65–100)
GLUCOSE BLD STRIP.AUTO-MCNC: 61 MG/DL (ref 65–100)
GLUCOSE BLD STRIP.AUTO-MCNC: 63 MG/DL (ref 65–100)
GLUCOSE BLD STRIP.AUTO-MCNC: 92 MG/DL (ref 65–100)
GLUCOSE SERPL-MCNC: 55 MG/DL (ref 65–100)
MAGNESIUM SERPL-MCNC: 2.2 MG/DL (ref 1.6–2.4)
PHOSPHATE SERPL-MCNC: 3.1 MG/DL (ref 2.6–4.7)
POTASSIUM SERPL-SCNC: 3.7 MMOL/L (ref 3.5–5.1)
PROT SERPL-MCNC: 7.2 G/DL (ref 6.4–8.2)
SERVICE CMNT-IMP: ABNORMAL
SERVICE CMNT-IMP: NORMAL
SODIUM SERPL-SCNC: 141 MMOL/L (ref 136–145)

## 2020-09-12 PROCEDURE — 80053 COMPREHEN METABOLIC PANEL: CPT

## 2020-09-12 PROCEDURE — 74011250637 HC RX REV CODE- 250/637: Performed by: NURSE PRACTITIONER

## 2020-09-12 PROCEDURE — 65220000003 HC RM SEMIPRIVATE PSYCH

## 2020-09-12 PROCEDURE — 36415 COLL VENOUS BLD VENIPUNCTURE: CPT

## 2020-09-12 PROCEDURE — 74011250637 HC RX REV CODE- 250/637: Performed by: PSYCHIATRY & NEUROLOGY

## 2020-09-12 PROCEDURE — 83735 ASSAY OF MAGNESIUM: CPT

## 2020-09-12 PROCEDURE — 82962 GLUCOSE BLOOD TEST: CPT

## 2020-09-12 PROCEDURE — 84100 ASSAY OF PHOSPHORUS: CPT

## 2020-09-12 RX ORDER — MAGNESIUM SULFATE 100 %
4 CRYSTALS MISCELLANEOUS AS NEEDED
Status: DISCONTINUED | OUTPATIENT
Start: 2020-09-12 | End: 2020-09-28 | Stop reason: HOSPADM

## 2020-09-12 RX ADMIN — Medication 4 TABLET: at 17:45

## 2020-09-12 RX ADMIN — ONDANSETRON 4 MG: 4 TABLET, ORALLY DISINTEGRATING ORAL at 20:05

## 2020-09-12 RX ADMIN — ONDANSETRON 4 MG: 4 TABLET, ORALLY DISINTEGRATING ORAL at 10:22

## 2020-09-12 RX ADMIN — TRAZODONE HYDROCHLORIDE 50 MG: 50 TABLET ORAL at 23:00

## 2020-09-12 RX ADMIN — MAGNESIUM GLUCONATE 500 MG ORAL TABLET 400 MG: 500 TABLET ORAL at 09:17

## 2020-09-12 RX ADMIN — DULOXETINE 90 MG: 60 CAPSULE, DELAYED RELEASE ORAL at 21:00

## 2020-09-12 RX ADMIN — Medication 4 TABLET: at 16:47

## 2020-09-12 RX ADMIN — PRAZOSIN HYDROCHLORIDE 2 MG: 1 CAPSULE ORAL at 21:00

## 2020-09-12 RX ADMIN — QUETIAPINE FUMARATE 300 MG: 100 TABLET ORAL at 21:45

## 2020-09-12 RX ADMIN — PANTOPRAZOLE SODIUM 40 MG: 40 TABLET, DELAYED RELEASE ORAL at 06:41

## 2020-09-12 NOTE — PROGRESS NOTES
Problem: Depressed Mood (Adult/Pediatric)  Goal: *STG: Participates in treatment plan  Outcome: Progressing Towards Goal  Note: Pt participated in treatment team. Out on the unit for small periods of time with little interaction with peers. Refusing to drink fluids or eat breakfast. Staff encouraged pt to eat and drink fluids. Pt tearful and depressed. Encouraged pt to express feelings and concerns. Goal: Interventions  Outcome: Progressing Towards Goal     Problem: Falls - Risk of  Goal: *Absence of Falls  Description: Document Tiffanie Fall Risk and appropriate interventions in the flowsheet.   Outcome: Progressing Towards Goal  Note: Fall Risk Interventions:            Medication Interventions: Teach patient to arise slowly                   Problem: Patient Education: Go to Patient Education Activity  Goal: Patient/Family Education  Outcome: Progressing Towards Goal
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
## 2020-09-12 NOTE — BH NOTES
1631- Pt glucose is 53. Pt refusing to eat or drink. Education provided on effects of low blood sugar. Pt agreeable to chewble glucose tablets. Pt ate them very slowly, took pt about 20 minutes to eat the 4 tabs. Pt is alert and oriented. 1655- Blood sugar recheck - 61. Pt encouraged to eat/drink. Dinner tray provided. Pt observed to be picking at pasta with fork. 1712- Blood sugar recheck - 61. Pt just finished glucose tabs about 10 minutes ago, will recheck in 15 minutes. 1730- Blood sugar recheck 61. Will offer an additional dose of glucose tabs, paged on call provider. [facility] with Kwame Cullen, explained situation. Per Reagan Maya re-educate pt on low glucose and possible consequences, offer additional dose of glucose tablets or IM Glucagon if patient will accept. Recheck blood sugar after. If recheck is not above 70 consult hospitalist.     [phone]- Educated patient again on low blood sugar and that per NP if we can not maintain her blood sugar she could possibly have to move to a medical unit. pt still refuses any food or drink. Refuses IM glucagon. Agreeable to another dose of glucose tabs, eating them slowly over about 10 minutes. Pt remains asymptomatic. 1818- Blood sugar 91. Pt out in day room talking on phone. Will continue to encourage pt to eat and drink. 2000- Pt eating saltine crackers. Observed eating 1 cracker. 2120 - Pt watching TV in dayroom. Refusing food/drink offers.

## 2020-09-12 NOTE — PROGRESS NOTES
Pt appears to be sleeping. No distress noted. Respirations WNL. Will continue to monitor q 15 for safety. Problem: Falls - Risk of  Goal: *Absence of Falls  Description: Document Alyssia [place] Fall Risk and appropriate interventions in the flowsheet.   Outcome: Progressing Towards Goal  Note: Fall Risk Interventions:            Medication Interventions: Teach patient to arise slowly

## 2020-09-12 NOTE — PROGRESS NOTES
Pt isolative to room. Refuses to eat or drink. Depressed mood. Problem: Depressed Mood (Adult/Pediatric)  Goal: *STG: Participates in treatment plan  Outcome: Progressing Towards Goal     Problem: Falls - Risk of  Goal: *Absence of Falls  Description: Document Tiffanie Fall Risk and appropriate interventions in the flowsheet.   Outcome: Progressing Towards Goal  Note: Fall Risk Interventions:            Medication Interventions: Teach patient to arise slowly

## 2020-09-12 NOTE — PROGRESS NOTES
Problem: Depressed Mood (Adult/Pediatric)  Goal: *STG: Participates in treatment plan  Outcome: Progressing Towards Goal  Variance Patient slowly responding  Affect is flat. She is observed talking on the phone and watching TV, but otherwise offered no self disclosures. Requested and received Zofran x2.

## 2020-09-12 NOTE — BH NOTES
Chief Complaint:  I am the same. Length of Stay: 10 Days    Interval History:  Radha Philippe is slightly better as per the report of the nursing staff. She has been more visible in the milieu, appears less anxious and took all of her medications. She reported to nursing staff that she vomited again last evening but nobody actually saw it. Says she vomited twice since yesterday. Says she slept slightly better last night. Has not eaten anything in the last 24 hours but drank some water yesterday. Says she feels depressed and hopeless with passive thoughts of wanting to \"go to sleep and not wake up or just starve to death\". Remains minimally communicative.  - Toby Stevenson reports not feeling well and moods are sad and depressed. Denies SI/HI/AH/VH. No aggression or violence. Appropriately interactive and aware. Tolerating medications well. Eating is poor due to feeling sick when she does. Gets nausea and vomits with drinking and eating. Doesn't want to get sick anymore. Sleeping fairly. Discussed the loss of another child with her fiance as her distress. Has 7 children with 2 recent miscarriages. Past Medical History:  Past Medical History:   Diagnosis Date    Anxiety     Asthma     on albuterol prn.  Triggers cold and allergies    Back pain, chronic 2010    sciatica    Gestational hypertension     Past pregnancy    HX OTHER MEDICAL      , , , ,     Hyperemesis     severe hyperemesis    Hypertension     with G12 - none this pregnancy    Iron deficiency anemia 10/08/2010    MDD (major depressive disorder), single episode with postpartum onset 2013    treated with meds - 13    Plantar fasciitis     Pregnancy     36 weeks    PTSD (post-traumatic stress disorder)            Labs:  Lab Results   Component Value Date/Time    WBC 8.0 2020 05:49 PM    WBC 12.2 (H) 2012 04:27 PM    Hemoglobin (POC) 10.4 (L) 2020 07:12 AM    HGB 11.3 (L) 09/02/2020 05:49 PM    HCT 36.0 09/02/2020 05:49 PM    PLATELET 167 18/52/4595 05:49 PM    MCV 71.4 (L) 09/02/2020 05:49 PM    Hgb, External 33.7 08/03/2012    Hct, External 69 08/03/2012    Platelet cnt., External 307 08/03/2012      Lab Results   Component Value Date/Time    Sodium 141 09/12/2020 04:42 AM    Potassium 3.7 09/12/2020 04:42 AM    Chloride 109 (H) 09/12/2020 04:42 AM    CO2 22 09/12/2020 04:42 AM    Anion gap 10 09/12/2020 04:42 AM    Glucose 55 (L) 09/12/2020 04:42 AM    BUN 9 09/12/2020 04:42 AM    Creatinine 0.70 09/12/2020 04:42 AM    BUN/Creatinine ratio 13 09/12/2020 04:42 AM    GFR est AA >60 09/12/2020 04:42 AM    GFR est non-AA >60 09/12/2020 04:42 AM    Calcium 8.8 09/12/2020 04:42 AM    Bilirubin, total 0.6 09/12/2020 04:42 AM    Alk.  phosphatase 37 (L) 09/12/2020 04:42 AM    Protein, total 7.2 09/12/2020 04:42 AM    Albumin 3.3 (L) 09/12/2020 04:42 AM    Globulin 3.9 09/12/2020 04:42 AM    A-G Ratio 0.8 (L) 09/12/2020 04:42 AM    ALT (SGPT) 14 09/12/2020 04:42 AM      Vitals:    09/11/20 0815 09/11/20 1644 09/11/20 1933 09/12/20 0854   BP: 105/72 105/68 106/69 130/84   Pulse: 78 71 68 83   Resp: 16 16 16 16   Temp: 97.7 °F (36.5 °C) 98.2 °F (36.8 °C) 98.3 °F (36.8 °C) 98.7 °F (37.1 °C)   SpO2: 98% 98% 98% 98%   Weight:       Height:             Current Facility-Administered Medications   Medication Dose Route Frequency Provider Last Rate Last Dose    DULoxetine (CYMBALTA) capsule 90 mg  90 mg Oral QHS Ioana Mcguire MD   90 mg at 09/11/20 2116    pantoprazole (PROTONIX) tablet 40 mg  40 mg Oral [facility] Ioana Mcguire MD   40 mg at 09/12/20 0641    traZODone (DESYREL) tablet 50 mg  50 mg Oral Q Ioana Mcguire MD   50 mg at 09/11/20 2113    QUEtiapine (SEROquel) tablet 300 mg  300 mg Oral Q Ioana Mcguire MD   300 mg at 09/11/20 2113    prazosin (MINIPRESS) capsule 2 mg  2 mg Oral QHS Rosy Gutierrez NP   2 mg at 09/11/20 2113    magnesium oxide (MAG-OX) tablet 400 mg  400 mg Oral DAILY Nida ODONNELL NP   400 mg at 09/12/20 0917    promethazine (PHENERGAN) suppository 25 mg  25 mg Rectal Q6H PRN Pamela Saravia NP   25 mg at 09/06/20 1111    HYDROcodone-acetaminophen (NORCO) 5-325 mg per tablet 1 Tab  1 Tab Oral Q6H PRN Jeffrey Rash, DO   1 Tab at 09/06/20 1415    ibuprofen (MOTRIN) tablet 600 mg  600 mg Oral Q6H PRN Yojana Mccord MD   600 mg at 09/06/20 1011    OLANZapine (ZyPREXA) tablet 5 mg  5 mg Oral Q6H PRN Jian Sloan NP        haloperidol lactate (HALDOL) injection 5 mg  5 mg IntraMUSCular Q6H PRN Jian Sloan NP        benztropine (COGENTIN) tablet 1 mg  1 mg Oral BID PRN Jian Sloan NP        diphenhydrAMINE (BENADRYL) injection 50 mg  50 mg IntraMUSCular BID PRN Jian Sloan NP        hydrOXYzine HCL (ATARAX) tablet 50 mg  50 mg Oral TID PRN Jian Sloan NP        LORazepam (ATIVAN) injection 1 mg  1 mg IntraMUSCular Q4H PRN Jian Sloan NP        acetaminophen (TYLENOL) tablet 650 mg  650 mg Oral Q4H PRN Halina Sloan NP   650 mg at 09/04/20 2123    magnesium hydroxide (MILK OF MAGNESIA) 400 mg/5 mL oral suspension 30 mL  30 mL Oral DAILY PRN Halina Sloan NP        ondansetron (ZOFRAN ODT) tablet 4 mg  4 mg Oral Q6H PRN Halina Sloan NP   4 mg at 09/12/20 1022         Mental Status Exam:  Eye contact: limited  Grooming: fair  Psychomotor activity: decreased. Speech is spontaneous, soft  Mood is \"hopeless\"  Affect: depressed.    Perception: Denies any AH or VH  Suicidal ideation: Passive SI +  Cognition is grossly intact     Lab:  Recent Results (from the past 24 hour(s))   METABOLIC PANEL, COMPREHENSIVE    Collection Time: 09/12/20  4:42 AM   Result Value Ref Range    Sodium 141 136 - 145 mmol/L    Potassium 3.7 3.5 - 5.1 mmol/L    Chloride 109 (H) 97 - 108 mmol/L    CO2 22 21 - 32 mmol/L    Anion gap 10 5 - 15 mmol/L    Glucose 55 (L) 65 - 100 mg/dL    BUN 9 6 - 20 MG/DL    Creatinine 0.70 0.55 - 1.02 MG/DL    BUN/Creatinine ratio 13 12 - 20      GFR est AA >60 >60 ml/min/1.73m2    GFR est non-AA >60 >60 ml/min/1.73m2    Calcium 8.8 8.5 - 10.1 MG/DL    Bilirubin, total 0.6 0.2 - 1.0 MG/DL    ALT (SGPT) 14 12 - 78 U/L    AST (SGOT) 10 (L) 15 - 37 U/L    Alk. phosphatase 37 (L) 45 - 117 U/L    Protein, total 7.2 6.4 - 8.2 g/dL    Albumin 3.3 (L) 3.5 - 5.0 g/dL    Globulin 3.9 2.0 - 4.0 g/dL    A-G Ratio 0.8 (L) 1.1 - 2.2     MAGNESIUM    Collection Time: 09/12/20  4:42 AM   Result Value Ref Range    Magnesium 2.2 1.6 - 2.4 mg/dL   PHOSPHORUS    Collection Time: 09/12/20  4:42 AM   Result Value Ref Range    Phosphorus 3.1 2.6 - 4.7 MG/DL         Physical Exam:  Body habitus: Body mass index is 25.24 kg/m². Musculoskeletal system: normal gait  Tremor - neg  Cog wheeling - neg      Assessment and Plan:  Thanh Lo meets criteria for a diagnosis of Recurrent major depression, severe without psychotic symptoms; posttraumatic stress disorder, chronic. Continue the medication regimen as prescribed  Fingersticks bid  Disposition planning to continue. I certify that this patients inpatient psychiatric hospital services furnished since the previous certification were, and continue to be, required for treatment that could reasonably be expected to improve the patient's condition, or for diagnostic study, and that the patient continues to need, on a daily basis, active treatment furnished directly by or requiring the supervision of inpatient psychiatric facility personnel. In addition, the hospital records show that services furnished were intensive treatment services, admission or related services, or equivalent services.

## 2020-09-13 LAB
GLUCOSE BLD STRIP.AUTO-MCNC: 65 MG/DL (ref 65–100)
GLUCOSE BLD STRIP.AUTO-MCNC: 73 MG/DL (ref 65–100)
SERVICE CMNT-IMP: NORMAL
SERVICE CMNT-IMP: NORMAL

## 2020-09-13 PROCEDURE — 82962 GLUCOSE BLOOD TEST: CPT

## 2020-09-13 PROCEDURE — 74011250637 HC RX REV CODE- 250/637: Performed by: PSYCHIATRY & NEUROLOGY

## 2020-09-13 PROCEDURE — 74011250637 HC RX REV CODE- 250/637: Performed by: NURSE PRACTITIONER

## 2020-09-13 PROCEDURE — 65220000003 HC RM SEMIPRIVATE PSYCH

## 2020-09-13 RX ADMIN — ONDANSETRON 4 MG: 4 TABLET, ORALLY DISINTEGRATING ORAL at 08:14

## 2020-09-13 RX ADMIN — PANTOPRAZOLE SODIUM 40 MG: 40 TABLET, DELAYED RELEASE ORAL at 06:34

## 2020-09-13 RX ADMIN — MAGNESIUM GLUCONATE 500 MG ORAL TABLET 400 MG: 500 TABLET ORAL at 08:14

## 2020-09-13 RX ADMIN — ONDANSETRON 4 MG: 4 TABLET, ORALLY DISINTEGRATING ORAL at 22:51

## 2020-09-13 RX ADMIN — ONDANSETRON 4 MG: 4 TABLET, ORALLY DISINTEGRATING ORAL at 14:03

## 2020-09-13 RX ADMIN — DULOXETINE 90 MG: 60 CAPSULE, DELAYED RELEASE ORAL at 21:38

## 2020-09-13 RX ADMIN — PRAZOSIN HYDROCHLORIDE 2 MG: 1 CAPSULE ORAL at 21:39

## 2020-09-13 NOTE — PROGRESS NOTES
Problem: Falls - Risk of  Goal: *Absence of Falls  Description: Document Corey Bender Fall Risk and appropriate interventions in the flowsheet. Outcome: Progressing Towards Goal  Note: Fall Risk Interventions:     Medication Interventions: Teach patient to arise slowly    1705 pt glucose is 65. Staff offers dinner tray and /or blood sugar pills. Pt is refusing to eat or drink anything. Pt refusing sugar pills along with IM glucagen. Education is provided to pt multiple times about low blood sugar. Pt continues to refuse to  Eat, drink  or take any medication to treat low blood sugar at this time. [id number] staff attempts again to offer medications to help with low BS. Pt continues to refuse. Writer consulted MD on call about pt status. 1745 received telephone c/b from [facility]. Advised to continue to monitor pt closely and to continue to encourage pt to eat/drink at this point pt remains self admission and is not requesting to leave. If pt condition seems to get worse to contact medical hospitalist.    2000 staff attempts to offer snacks and beverages pt continues to refuse. BP is slightly low to give some scheduled night medications. Staff educates importance of eating/drinking again to pt. Pt just stares away from staff. Writer informs pt that certain medications would have to be withheld if BP doesn't increase more. 2130 staff rechecks BP and is slightly higher but staff advises pt to eat/drink more so she can get additional scheduled QHS meds. 2139 pt is only administered cymbalta and minipress at this time. Staff will continue to monitor q 15 min checks.
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
## 2020-09-13 NOTE — PROGRESS NOTES
11:16 am-Dr. Aayush Longoria discussed with pt ECT. Pt stated \"I want to go to sleep and not wake up\". Problem: Depressed Mood (Adult/Pediatric)  Goal: *STG: Participates in treatment plan  Outcome: Progressing Towards Goal  Note: Pt participated in treatment team. Continues to have a poor appetite. Refusing to eat and drink fluids. Sipping on a small amount of ginger ale. Continues to be hopeless and helpless. Encouraged pt to express feelings and participate on the unit. Goal: Interventions  Outcome: Progressing Towards Goal     Problem: Falls - Risk of  Goal: *Absence of Falls  Description: Document Tiffanie Fall Risk and appropriate interventions in the flowsheet.   Outcome: Progressing Towards Goal  Note: Fall Risk Interventions:            Medication Interventions: Teach patient to arise slowly                   Problem: Patient Education: Go to Patient Education Activity  Goal: Patient/Family Education  Outcome: Progressing Towards Goal

## 2020-09-14 LAB
GLUCOSE BLD STRIP.AUTO-MCNC: 59 MG/DL (ref 65–100)
GLUCOSE BLD STRIP.AUTO-MCNC: 65 MG/DL (ref 65–100)
GLUCOSE BLD STRIP.AUTO-MCNC: 74 MG/DL (ref 65–100)
GLUCOSE BLD STRIP.AUTO-MCNC: 74 MG/DL (ref 65–100)
SERVICE CMNT-IMP: ABNORMAL
SERVICE CMNT-IMP: NORMAL

## 2020-09-14 PROCEDURE — 74011250637 HC RX REV CODE- 250/637: Performed by: NURSE PRACTITIONER

## 2020-09-14 PROCEDURE — 65220000003 HC RM SEMIPRIVATE PSYCH

## 2020-09-14 PROCEDURE — 82962 GLUCOSE BLOOD TEST: CPT

## 2020-09-14 PROCEDURE — 74011250637 HC RX REV CODE- 250/637: Performed by: PSYCHIATRY & NEUROLOGY

## 2020-09-14 RX ADMIN — MAGNESIUM GLUCONATE 500 MG ORAL TABLET 400 MG: 500 TABLET ORAL at 09:44

## 2020-09-14 RX ADMIN — PRAZOSIN HYDROCHLORIDE 2 MG: 1 CAPSULE ORAL at 21:44

## 2020-09-14 RX ADMIN — DULOXETINE 90 MG: 60 CAPSULE, DELAYED RELEASE ORAL at 21:43

## 2020-09-14 RX ADMIN — TRAZODONE HYDROCHLORIDE 50 MG: 50 TABLET ORAL at 21:44

## 2020-09-14 RX ADMIN — ONDANSETRON 4 MG: 4 TABLET, ORALLY DISINTEGRATING ORAL at 05:19

## 2020-09-14 RX ADMIN — PANTOPRAZOLE SODIUM 40 MG: 40 TABLET, DELAYED RELEASE ORAL at 09:46

## 2020-09-14 RX ADMIN — QUETIAPINE FUMARATE 300 MG: 100 TABLET ORAL at 21:44

## 2020-09-14 RX ADMIN — ONDANSETRON 4 MG: 4 TABLET, ORALLY DISINTEGRATING ORAL at 18:15

## 2020-09-14 NOTE — PROGRESS NOTES
HYPOGLYCEMIC EPISODE DOCUMENTATION    Patient with hypoglycemic episode(s) at 0745(time) on 9/14/20(date). BG value(s) pre-treatment 72    Was patient symptomatic? [] yes, [x] no  Patient was treated with the following rescue medications/treatments: [] D50                [] Glucose tablets                [] Glucagon                [x] 4oz juice                [] 6oz reg soda                [] 8oz low fat milk    Pt refused to drink juice. Only took two sips of juice    BG value post-treatment: 74 at 0807.  Pt initially refused to be retested  Once BG treated and value greater than 80mg/dl, pt was provided with the following:  [] snack  [x] meal  Pt refused meal  Name of MD notified:  The following orders were received: none

## 2020-09-14 NOTE — BH NOTES
Chief Complaint:  I am the same. Length of Stay: 12 Days    Interval History:  Jess Seay reports feeling the \"same\". She continues to refuse food and says she just wants to \"go to sleep\" and \"not wake up\". Expresses thoughts of \"end my life like this\" to nursing staff. Her blood sugars have dropped to 65 and she refuses to eat or drink even after this. Refuses to attend groups or participate in other forms of therapy. She vomits up her medications within a few minutes after taking them. Minimally responsive during the interview. Escorted out by nursing staff due to her being unsteady in her ambulation. Past Medical History:  Past Medical History:   Diagnosis Date    Anxiety     Asthma     on albuterol prn.  Triggers cold and allergies    Back pain, chronic 2010    sciatica    Gestational hypertension     Past pregnancy    HX OTHER MEDICAL      , , , ,     Hyperemesis     severe hyperemesis    Hypertension     with G12 - none this pregnancy    Iron deficiency anemia 10/08/2010    MDD (major depressive disorder), single episode with postpartum onset 2013    treated with meds - 13    Plantar fasciitis     Pregnancy     36 weeks    PTSD (post-traumatic stress disorder)            Labs:  Lab Results   Component Value Date/Time    WBC 8.0 2020 05:49 PM    WBC 12.2 (H) 2012 04:27 PM    Hemoglobin (POC) 10.4 (L) 2020 07:12 AM    HGB 11.3 (L) 2020 05:49 PM    HCT 36.0 2020 05:49 PM    PLATELET 683  05:49 PM    MCV 71.4 (L) 2020 05:49 PM    Hgb, External 33.7 2012    Hct, External 69 2012    Platelet cnt., External 307 2012      Lab Results   Component Value Date/Time    Sodium 141 2020 04:42 AM    Potassium 3.7 2020 04:42 AM    Chloride 109 (H) 2020 04:42 AM    CO2 22 2020 04:42 AM    Anion gap 10 2020 04:42 AM    Glucose 55 (L) 2020 04:42 AM    BUN 9 2020 04:42 AM Creatinine 0.70 09/12/2020 04:42 AM    BUN/Creatinine ratio 13 09/12/2020 04:42 AM    GFR est AA >60 09/12/2020 04:42 AM    GFR est non-AA >60 09/12/2020 04:42 AM    Calcium 8.8 09/12/2020 04:42 AM    Bilirubin, total 0.6 09/12/2020 04:42 AM    Alk.  phosphatase 37 (L) 09/12/2020 04:42 AM    Protein, total 7.2 09/12/2020 04:42 AM    Albumin 3.3 (L) 09/12/2020 04:42 AM    Globulin 3.9 09/12/2020 04:42 AM    A-G Ratio 0.8 (L) 09/12/2020 04:42 AM    ALT (SGPT) 14 09/12/2020 04:42 AM      Vitals:    09/13/20 1722 09/13/20 2021 09/13/20 2131 09/14/20 0840   BP: 111/80 104/77 115/77 131/84   Pulse: 84 75 69 94   Resp: 16 16  16   Temp: 97.8 °F (36.6 °C) 97.8 °F (36.6 °C)  98 °F (36.7 °C)   SpO2: 99%   99%   Weight:       Height:             Current Facility-Administered Medications   Medication Dose Route Frequency Provider Last Rate Last Dose    glucose chewable tablet 16 g  4 Tab Oral PRN Michael Jean MD   4 Tab at 09/12/20 1745    glucagon (GLUCAGEN) injection 1 mg  1 mg IntraMUSCular PRN Michael Jean MD        DULoxetine (CYMBALTA) capsule 90 mg  90 mg Oral QHS Keily Locke MD   90 mg at 09/13/20 2138    pantoprazole (PROTONIX) tablet 40 mg  40 mg Oral ACB Keily Locke MD   40 mg at 09/14/20 0946    traZODone (DESYREL) tablet 50 mg  50 mg Oral QHS Keily Locke MD   Stopped at 09/13/20 2200    QUEtiapine (SEROquel) tablet 300 mg  300 mg Oral QHS Keily Locke MD   Stopped at 09/13/20 2200    prazosin (MINIPRESS) capsule 2 mg  2 mg Oral QHS Rosy Gutierrez NP   2 mg at 09/13/20 2139    magnesium oxide (MAG-OX) tablet 400 mg  400 mg Oral DAILY Rosy Gutierrez NP   400 mg at 09/14/20 0944    promethazine (PHENERGAN) suppository 25 mg  25 mg Rectal Q6H PRN Yan Che NP   25 mg at 09/06/20 1111    HYDROcodone-acetaminophen (NORCO) 5-325 mg per tablet 1 Tab  1 Tab Oral Q6H PRN Tony Morris DO   1 Tab at 09/06/20 1415    ibuprofen (MOTRIN) tablet 600 mg  600 mg Oral Q6H PRN Suzanna Walker MD   600 mg at 09/06/20 1011    OLANZapine (ZyPREXA) tablet 5 mg  5 mg Oral Q6H PRN Brianna Sloan NP        haloperidol lactate (HALDOL) injection 5 mg  5 mg IntraMUSCular Q6H PRN Brianna Sloan, NP        benztropine (COGENTIN) tablet 1 mg  1 mg Oral BID PRN Brianna Sloan NP        diphenhydrAMINE (BENADRYL) injection 50 mg  50 mg IntraMUSCular BID PRN Brianna Sloan, MENA        hydrOXYzine HCL (ATARAX) tablet 50 mg  50 mg Oral TID PRN Brianna Sloan, NP        LORazepam (ATIVAN) injection 1 mg  1 mg IntraMUSCular Q4H PRN Brianna Sloan, MENA        acetaminophen (TYLENOL) tablet 650 mg  650 mg Oral Q4H PRN Halina Sloan NP   650 mg at 09/04/20 2123    magnesium hydroxide (MILK OF MAGNESIA) 400 mg/5 mL oral suspension 30 mL  30 mL Oral DAILY PRN Halina Sloan NP        ondansetron (ZOFRAN ODT) tablet 4 mg  4 mg Oral Q6H PRN Halina Sloan NP   4 mg at 09/14/20 0519         Mental Status Exam:  Eye contact: limited  Grooming: fair  Psychomotor activity: decreased. Speech is spontaneous, soft  Mood is \"hopeless\"  Affect: depressed. Perception: Denies any AH or VH  Suicidal ideation: Passive SI +  Cognition is grossly intact     Lab:  Recent Results (from the past 24 hour(s))   GLUCOSE, POC    Collection Time: 09/13/20  5:04 PM   Result Value Ref Range    Glucose (POC) 65 65 - 100 mg/dL    Performed by Kim 74, POC    Collection Time: 09/14/20  7:43 AM   Result Value Ref Range    Glucose (POC) 65 65 - 100 mg/dL    Performed by Leonardo Jack    GLUCOSE, POC    Collection Time: 09/14/20  8:06 AM   Result Value Ref Range    Glucose (POC) 74 65 - 100 mg/dL    Performed by Leonardo Jack          Physical Exam:  Body habitus: Body mass index is 23.94 kg/m².   Musculoskeletal system: normal gait  Tremor - neg  Cog wheeling - neg      Assessment and Plan:  Myra Tao meets criteria for a diagnosis of Recurrent major depression, severe without psychotic symptoms; posttraumatic stress disorder, chronic. Request [facility] to evaluate her for a TDO and then court ordered medications, labs, and tube feedings. Continue the medication regimen as prescribed  Requires significant therapy and possibly ECT/TMS. Life interventions. Disposition planning to continue. I certify that this patients inpatient psychiatric hospital services furnished since the previous certification were, and continue to be, required for treatment that could reasonably be expected to improve the patient's condition, or for diagnostic study, and that the patient continues to need, on a daily basis, active treatment furnished directly by or requiring the supervision of inpatient psychiatric facility personnel. In addition, the hospital records show that services furnished were intensive treatment services, admission or related services, or equivalent services.

## 2020-09-14 NOTE — PROGRESS NOTES
Pt received resting quietly in bed. Respirations even and unlabored, NAD noted. Staff to continue q15 minute checks for safety. This RN was notified by medication nurse from previous shift that pt was seen sitting on her bed with emesis basin. Upon assessment, there was no vomit in the basin, but some spit and the casing from the Cymbalta capsule given this evening. Pt had c/o nausea, and medication nurse gave pt ordered Zofran. Will pass information on to day shift. Problem: Falls - Risk of  Goal: *Absence of Falls  Description: [facility] and appropriate interventions in the flowsheet.   Outcome: Progressing Towards Goal  Note: Fall Risk Interventions:      Medication Interventions: Teach patient to arise slowly

## 2020-09-14 NOTE — BH NOTES
Note pt sitting up in bed with basin in front of her,  C/o nausea and vomiting. Tl aware. Pt medicated with zofran as ordered.

## 2020-09-14 NOTE — INTERDISCIPLINARY ROUNDS
Behavioral Health Interdisciplinary Rounds Patient Name: Alfredito Shepherd  Age: 38 y.o. Room/Bed:  727/02 Primary Diagnosis: <principal problem not specified> Admission Status: Voluntary Readmission within 30 days: no 
Power of  in place: no 
Patient requires a blocked bed: no          Reason for blocked bed:  
Sleep hours:  4 Participation in Care/Groups:  yes Medication Compliant?: Yes PRNS (last 24 hours): Zofran Restraints (last 24 hours):  no 
  
Alcohol screening (AUDIT) completed -  AUDIT Score: 0  If applicable, date SBIRT discussed in treatment team AND documented:   
Tobacco - patient is a smoker: Have You Used Tobacco in the Past 30 Days: No 
Illegal Drugs use: Have You Used Any Illegal Substances Over the Past 12 Months: No 
 
24 hour chart check complete: yes Patient goal(s) for today:  attend groups, take medications Treatment team focus/goals: titrate medication, coordinate follow up Progress note  : Pt is alert, oriented, a bit lethargic, communicating 90% nonverbally with headshakes and shruggs. She whispers softly and his hard to hear. She endoreses SI to fall alseep and never waker up and is restricting food, water and medications. She is not attending groups and is minimally engaged on the unit. Plan to consult Malka Forte for TDO and FTO for Pt. Family contact: significant other, Alejandro ([phone]) MSW Patient's preferred phone number for follow up call : [phone]  
 
LOS:  12  Expected LOS: TBD Participating treatment team members: Dr. Aleksandra Herring; Mike Nath RN; ALFREDO Toney

## 2020-09-14 NOTE — BH NOTES
GROUP THERAPY PROGRESS NOTE    Patient did not participate in Coping Skills group. Although patient did not participate in group, MSW and pt had a one-on-one discussion with her about goal setting and appetite. Patient expressed concern about her food stamps and WIC to feed her children, and that she has no one in her family to contact other than her fiance. She said she is not hungry and has no appetite here.  Garret Cardenas set a goal to eat her meal.     ALFREDO Arreola

## 2020-09-14 NOTE — BH NOTES
PRN Medication Documentation    Specific patient behavior that led to need for PRN medication: C/o of nausea    Staff interventions attempted prior to PRN being given: Ginger ale     PRN medication given: Zofran 4mg PO     Patient response/effectiveness of PRN medication: Will continue to monitor.

## 2020-09-14 NOTE — PROGRESS NOTES
Problem: Depressed Mood (Adult/Pediatric)  Goal: *STG: Participates in treatment plan  Outcome: Progressing Towards Goal  Note: Out on unit passively engaged. Mood and affect depressed. Hopeless and reports passive SI of starving self to death and not getting better by refusing accepting medications or spitting them out. Discussed TDO assessment and process. Pt verbalized understand   Goal: *STG: Verbalizes anger, guilt, and other feelings in a constructive manor  Outcome: Progressing Towards Goal  Goal: Interventions  Outcome: Progressing Towards Goal       0900:  only PO intake 50ml of orange juice      1158: [facility] made aware of TDO assessment order Valentina Monsalve, crisis worker, states she will have to speak with her staff and call unit back with update. 1215: spoke with Taina Romero from [facility] they will assess her. Documents requested printed and will be emailed to them. Pt made aware    1230: pt up to staff states she does not want BF to know her tx plan. Reassured pt that information would not be shared with him. [phone]: BF called voice frustration that he is not aware of her tx plan. Education provided BF verbalized understanding.  Pt made aware

## 2020-09-14 NOTE — BH NOTES
GROUP THERAPY PROGRESS NOTE    Patient did not participate in Discharge/Goals Group.      ALFREDO Wilson

## 2020-09-15 LAB
GLUCOSE BLD STRIP.AUTO-MCNC: 120 MG/DL (ref 65–100)
GLUCOSE BLD STRIP.AUTO-MCNC: 61 MG/DL (ref 65–100)
GLUCOSE BLD STRIP.AUTO-MCNC: 75 MG/DL (ref 65–100)
SERVICE CMNT-IMP: ABNORMAL
SERVICE CMNT-IMP: ABNORMAL
SERVICE CMNT-IMP: NORMAL

## 2020-09-15 PROCEDURE — 74011250637 HC RX REV CODE- 250/637: Performed by: NURSE PRACTITIONER

## 2020-09-15 PROCEDURE — 74011250637 HC RX REV CODE- 250/637: Performed by: PSYCHIATRY & NEUROLOGY

## 2020-09-15 PROCEDURE — 82962 GLUCOSE BLOOD TEST: CPT

## 2020-09-15 PROCEDURE — 65220000003 HC RM SEMIPRIVATE PSYCH

## 2020-09-15 RX ADMIN — ONDANSETRON 4 MG: 4 TABLET, ORALLY DISINTEGRATING ORAL at 04:46

## 2020-09-15 RX ADMIN — ONDANSETRON 4 MG: 4 TABLET, ORALLY DISINTEGRATING ORAL at 09:44

## 2020-09-15 RX ADMIN — TRAZODONE HYDROCHLORIDE 50 MG: 50 TABLET ORAL at 23:00

## 2020-09-15 RX ADMIN — DULOXETINE 90 MG: 60 CAPSULE, DELAYED RELEASE ORAL at 21:01

## 2020-09-15 RX ADMIN — PRAZOSIN HYDROCHLORIDE 2 MG: 1 CAPSULE ORAL at 21:00

## 2020-09-15 RX ADMIN — QUETIAPINE FUMARATE 300 MG: 100 TABLET ORAL at 22:00

## 2020-09-15 RX ADMIN — PANTOPRAZOLE SODIUM 40 MG: 40 TABLET, DELAYED RELEASE ORAL at 06:30

## 2020-09-15 NOTE — PROGRESS NOTES
Pt received resting quietly in bed. Respirations even and unlabored, NAD noted. Staff to continue q15 minute checks for safety. Problem: Falls - Risk of  Goal: *Absence of Falls  Description: Document Donata Carrel Fall Risk and appropriate interventions in the flowsheet.   Outcome: Progressing Towards Goal  Note: Fall Risk Interventions:            Medication Interventions: Teach patient to arise slowly

## 2020-09-15 NOTE — INTERDISCIPLINARY ROUNDS
Behavioral Health Interdisciplinary Rounds Patient Name: Nadine Nageotte  Age: 38 y.o. Room/Bed:  727/02 Primary Diagnosis: <principal problem not specified> Admission Status: Voluntary Readmission within 30 days: no 
Power of  in place: no 
Patient requires a blocked bed: no          Reason for blocked bed:  
Sleep hours:  5 Participation in Care/Groups:  no 
Medication Compliant?: no- regurgitates meds after taking them PRNS (last 24 hours): Zofran Restraints (last 24 hours):  no 
  
Alcohol screening (AUDIT) completed -  AUDIT Score: 0  If applicable, date SBIRT discussed in treatment team AND documented:   
Tobacco - patient is a smoker: Have You Used Tobacco in the Past 30 Days: No 
Illegal Drugs use: Have You Used Any Illegal Substances Over the Past 12 Months: No 
 
24 hour chart check complete: yes Patient goal(s) for today:  Attend groups, take medication, eat small amounts Treatment team focus/goals: facilitate Crisis Assessment, titrate medication, encouraged eating and drinking, coordinate follow up. Progress note  : Pt is alert, oriented, depressed, withdrawn with flat affect, eye contact is poor, speech is quiet and difficult to hear. Pt was accessed by [facility], placed under TDO and will having a hearing tomorrow 9/16/2020. Plan to seek FTO if Pt is involuntarily committed at [facility] [place]. She has eaten some food today and is complaining of stomach discomfort and nausea. Family contact: significant other, Allen ([phone]) MSW Patient's preferred phone number for follow up call [phone] 
 
LOS:  13  Expected LOS: TBD Participating treatment team members: Nadine Nageotte, Dr. Marykay Merritts; Mariluz Matamoros RN; Jazmin Landon, MSW

## 2020-09-15 NOTE — PROGRESS NOTES
Patient attended spirituality group that focused on the topic of \"Hope\" and actively participated. : Rev. Rose Etienne.  Milagros Marte; [facility], to contact [phone] [address] call: [phone]

## 2020-09-15 NOTE — BH NOTES
GROUP THERAPY PROGRESS NOTE    Patient is participating in Substance Abuse group. Group time: 50 minutes    Personal goal for participation: To identify addiction stereotypes and understand the danger of them. Goal orientation: Personal    Group therapy participation: minimally    Therapeutic interventions reviewed and discussed: Group discussion of substance use, abuse, and addiction stereotype. Patients were able to explore different labels people have called them and understand the reality most addicts do not fit under the category of alcoholic or drug addict stereotypes. Group discussed how they feel and can disguise their problems from family members because they are high functioning addicts.  Patients explored what high functioning addicts look like and the potential dangers of these stereotypes. Group discussed ways to educate family, friends, and loved ones about the truth of addiction. Impression of participation: Salvador tabor participated in group. This was the first group she participated in. She appeared to be doing better today, and was engaged. She remains quiet and to herself.      ALFREDO Arreola

## 2020-09-15 NOTE — PROGRESS NOTES
Problem: Depressed Mood (Adult/Pediatric)  Goal: *STG: Participates in treatment plan  Outcome: Progressing Towards Goal  Note: Out on unit appears flat and depressed. Continues to reports fleeting thoughts of \"I would rather not wake up\". Hopeless and helpless. Increase po intakes to   480ml water, 1 ensure, 1 banana and 1 orange juice. Did not vomit. hyperfocus on \"I'm feeling nauseous now that I ate\". She continues to demonstrate manipulative behaviors. Daily goal is to coordinate with  to arrange for bills to be paid.  Staff focus is on limit setting, offering support and reassurance   Goal: *STG: Verbalizes anger, guilt, and other feelings in a constructive manor  Outcome: Progressing Towards Goal  Goal: *STG: Attends activities and groups  Outcome: Progressing Towards Goal  Goal: *STG: Demonstrates reduction in symptoms and increase in insight into coping skills/future focused  Outcome: Progressing Towards Goal  Goal: Interventions  Outcome: Progressing Towards Goal

## 2020-09-15 NOTE — BH NOTES
PRN Medication Documentation    Specific patient behavior that led to need for PRN medication: c/o of nausea  Staff interventions attempted prior to PRN being given: n/a  PRN medication given: Zofran  Patient response/effectiveness of PRN medication: Will continue to monitor

## 2020-09-15 NOTE — BH NOTES
PRN Medication Documentation    Specific patient behavior that led to need for PRN medication: c/o nausea  Staff interventions attempted prior to PRN being given: medication education  PRN medication given: PO Zofran 4mg  Patient response/effectiveness of PRN medication: Will monitor
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
## 2020-09-15 NOTE — BH NOTES
Chief Complaint:  I don't fell so good. Length of Stay: 13 Days    Interval History:  Stevie Valdez reports feeling nauseated and says her stomach hurts. Her blood sugars were 61 today and she did agree to drink orange juice. She was observed to eat to energy bars and drank an ensure. Says eating makes her feel \"sick\" and even worse than usual. She ate some of her dinner last evening also and drank some water and orange juice. Reports feeling depressed and hopeless with thoughts of suicide on a persistent basis. Past Medical History:  Past Medical History:   Diagnosis Date    Anxiety     Asthma     on albuterol prn.  Triggers cold and allergies    Back pain, chronic     sciatica    Gestational hypertension     Past pregnancy    HX OTHER MEDICAL      , , , ,     Hyperemesis     severe hyperemesis    Hypertension     with G12 - none this pregnancy    Iron deficiency anemia 10/08/2010    MDD (major depressive disorder), single episode with postpartum onset 2013    treated with meds - 13    Plantar fasciitis     Pregnancy     36 weeks    PTSD (post-traumatic stress disorder)            Labs:  Lab Results   Component Value Date/Time    WBC 8.0 2020 05:49 PM    WBC 12.2 (H) 2012 04:27 PM    Hemoglobin (POC) 10.4 (L) 2020 07:12 AM    HGB 11.3 (L) 2020 05:49 PM    HCT 36.0 2020 05:49 PM    PLATELET 690  05:49 PM    MCV 71.4 (L) 2020 05:49 PM    Hgb, External 33.7 2012    Hct, External 69 2012    Platelet cnt., External 307 2012      Lab Results   Component Value Date/Time    Sodium 141 2020 04:42 AM    Potassium 3.7 2020 04:42 AM    Chloride 109 (H) 2020 04:42 AM    CO2 22 2020 04:42 AM    Anion gap 10 2020 04:42 AM    Glucose 55 (L) 2020 04:42 AM    BUN 9 2020 04:42 AM    Creatinine 0.70 2020 04:42 AM    BUN/Creatinine ratio 13 2020 04:42 AM    GFR est AA >60 09/12/2020 04:42 AM    GFR est non-AA >60 09/12/2020 04:42 AM    Calcium 8.8 09/12/2020 04:42 AM    Bilirubin, total 0.6 09/12/2020 04:42 AM    Alk.  phosphatase 37 (L) 09/12/2020 04:42 AM    Protein, total 7.2 09/12/2020 04:42 AM    Albumin 3.3 (L) 09/12/2020 04:42 AM    Globulin 3.9 09/12/2020 04:42 AM    A-G Ratio 0.8 (L) 09/12/2020 04:42 AM    ALT (SGPT) 14 09/12/2020 04:42 AM      Vitals:    09/14/20 0840 09/14/20 1536 09/14/20 2000 09/15/20 0812   BP: 131/84 113/71 101/72 103/65   Pulse: 94 73 70 68   Resp: 16 16 16 16   Temp: 98 °F (36.7 °C) 98.6 °F (37 °C) 98 °F (36.7 °C) 98.5 °F (36.9 °C)   SpO2: 99% 99% 98% 100%   Weight:       Height:             Current Facility-Administered Medications   Medication Dose Route Frequency Provider Last Rate Last Dose    glucose chewable tablet 16 g  4 Tab Oral PRN Stacey Mejia MD   4 Tab at 09/12/20 1745    glucagon (GLUCAGEN) injection 1 mg  1 mg IntraMUSCular PRN Stacey Mejia MD        DULoxetine (CYMBALTA) capsule 90 mg  90 mg Oral QHS Ludivina Robertson MD   90 mg at 09/14/20 2143    pantoprazole (PROTONIX) tablet 40 mg  40 mg Oral ACB Ludivina Robertson MD   40 mg at 09/15/20 0630    traZODone (DESYREL) tablet 50 mg  50 mg Oral QHS Ludivina Robertson MD   50 mg at 09/14/20 2144    QUEtiapine (SEROquel) tablet 300 mg  300 mg Oral QHS Ludivina Robertson MD   300 mg at 09/14/20 2144    prazosin (MINIPRESS) capsule 2 mg  2 mg Oral QHS Rosy Gutierrez NP   2 mg at 09/14/20 2144    magnesium oxide (MAG-OX) tablet 400 mg  400 mg Oral DAILY Rosy Gutierrez NP   400 mg at 09/14/20 0944    promethazine (PHENERGAN) suppository 25 mg  25 mg Rectal Q6H PRN Ewelina Robertson NP   25 mg at 09/06/20 1111    HYDROcodone-acetaminophen (NORCO) 5-325 mg per tablet 1 Tab  1 Tab Oral Q6H PRN Larisa Wilkinson DO   1 Tab at 09/06/20 1415    ibuprofen (MOTRIN) tablet 600 mg  600 mg Oral Q6H PRN Ludivina Robertson MD   600 mg at 09/06/20 1011    OLANZapine (ZyPREXA) tablet 5 mg  5 mg Oral Q6H PRN Bertha Sloan, MENA        haloperidol lactate (HALDOL) injection 5 mg  5 mg IntraMUSCular Q6H PRN Jessica Sloan, MENA        benztropine (COGENTIN) tablet 1 mg  1 mg Oral BID PRN Bertha Sloan, MENA        diphenhydrAMINE (BENADRYL) injection 50 mg  50 mg IntraMUSCular BID PRN Bertha Sloan, NP        hydrOXYzine HCL (ATARAX) tablet 50 mg  50 mg Oral TID PRN Bertha Sloan, NP        LORazepam (ATIVAN) injection 1 mg  1 mg IntraMUSCular Q4H PRN Bertha Sloan, MENA        acetaminophen (TYLENOL) tablet 650 mg  650 mg Oral Q4H PRN Halina Sloan, NP   650 mg at 09/04/20 2123    magnesium hydroxide (MILK OF MAGNESIA) 400 mg/5 mL oral suspension 30 mL  30 mL Oral DAILY PRN Halina Sloan, NP        ondansetron (ZOFRAN ODT) tablet 4 mg  4 mg Oral Q6H PRN Halina Sloan, NP   4 mg at 09/15/20 0944         Mental Status Exam:  Eye contact: limited  Grooming: fair  Psychomotor activity: decreased. Speech is spontaneous, soft  Mood is \"hopeless\"  Affect: depressed.    Perception: Denies any AH or VH  Suicidal ideation: Passive SI +  Cognition is grossly intact     Lab:  Recent Results (from the past 24 hour(s))   GLUCOSE, POC    Collection Time: 09/14/20  5:12 PM   Result Value Ref Range    Glucose (POC) 59 (L) 65 - 100 mg/dL    Performed by Taina Nelson, POC    Collection Time: 09/14/20  6:09 PM   Result Value Ref Range    Glucose (POC) 74 65 - 100 mg/dL    Performed by Mai, POC    Collection Time: 09/15/20  7:44 AM   Result Value Ref Range    Glucose (POC) 61 (L) 65 - 100 mg/dL    Performed by Roddy LUDWIG (CON)    GLUCOSE, POC    Collection Time: 09/15/20  8:25 AM   Result Value Ref Range    Glucose (POC) 75 65 - 100 mg/dL    Performed by Christiano Weber (GLADIS)          Physical Exam:  Body habitus: Body mass index is 23.94 kg/m². Musculoskeletal system: normal gait  Tremor - neg  Cog wheeling - neg      Assessment and Plan:  Jorge Zamarripa meets criteria for a diagnosis of Recurrent major depression, severe without psychotic symptoms; posttraumatic stress disorder, chronic. Her TDO hearing is scheduled for tomorrow. Continue the medication regimen as prescribed  Requires significant therapy and possibly ECT/TMS. Life interventions. Disposition planning to continue. I certify that this patients inpatient psychiatric hospital services furnished since the previous certification were, and continue to be, required for treatment that could reasonably be expected to improve the patient's condition, or for diagnostic study, and that the patient continues to need, on a daily basis, active treatment furnished directly by or requiring the supervision of inpatient psychiatric facility personnel. In addition, the hospital records show that services furnished were intensive treatment services, admission or related services, or equivalent services.

## 2020-09-15 NOTE — BH NOTES
HYPOGLYCEMIC EPISODE DOCUMENTATION    Patient with hypoglycemic episode(s) at 1712(time) on 9/14/20(date). BG value(s) pre-treatment 61  Was patient symptomatic?  [] yes, [x] no  Patient was treated with the following rescue medications/treatments: [] D50                [] Glucose tablets                [] Glucagon                [] 4oz juice                [] 6oz reg soda                [] 8oz low fat milk  BG value post-treatment: 74  Once BG treated and value greater than 80mg/dl, pt was provided with the following:  [] snack pt refused  [] meal pt later took several small bites of dinner tray  Name of MD notified:n/a  The following orders were received:

## 2020-09-15 NOTE — PROGRESS NOTES
Patient only ate 15% of dinner tray and 180 ml of fluid. Will continue to educate, encourage, and monitor. Patient educated on fall precautions while taking sedative medications. Patient educated on nutrition and eating all meals. Will continue to educate and monitor. Problem: Falls - Risk of  Goal: *Absence of Falls  Description: Document Brit Ricci Fall Risk and appropriate interventions in the flowsheet.   Outcome: Progressing Towards Goal  Note: Fall Risk Interventions:            Medication Interventions: Teach patient to arise slowly, Evaluate medications/consider consulting pharmacy

## 2020-09-15 NOTE — BH NOTES
HYPOGLYCEMIC EPISODE DOCUMENTATION    Patient with hypoglycemic episode(s) at 1704(time) on 9/13/20(date). BG value(s) pre-treatment refused   Was patient symptomatic?  [] yes, [x] no  Patient was treated with the following rescue medications/treatments: refused treatment [] D50                [] Glucose tablets                [] Glucagon                [] 4oz juice                [] 6oz reg soda                [] 8oz low fat milk  BG value post-treatment:Pt refused  Once BG treated and value greater than 80mg/dl, pt was provided with the following:  [] snack refused  [] meal refused  Name of MD notified:MENA Solis  The following orders were received: n/a

## 2020-09-16 LAB
GLUCOSE BLD STRIP.AUTO-MCNC: 104 MG/DL (ref 65–100)
GLUCOSE BLD STRIP.AUTO-MCNC: 88 MG/DL (ref 65–100)
SERVICE CMNT-IMP: ABNORMAL
SERVICE CMNT-IMP: NORMAL

## 2020-09-16 PROCEDURE — 65220000003 HC RM SEMIPRIVATE PSYCH

## 2020-09-16 PROCEDURE — 74011250636 HC RX REV CODE- 250/636: Performed by: PSYCHIATRY & NEUROLOGY

## 2020-09-16 PROCEDURE — 82962 GLUCOSE BLOOD TEST: CPT

## 2020-09-16 PROCEDURE — 74011250637 HC RX REV CODE- 250/637: Performed by: PSYCHIATRY & NEUROLOGY

## 2020-09-16 PROCEDURE — 90471 IMMUNIZATION ADMIN: CPT

## 2020-09-16 PROCEDURE — 74011250637 HC RX REV CODE- 250/637: Performed by: NURSE PRACTITIONER

## 2020-09-16 PROCEDURE — 90686 IIV4 VACC NO PRSV 0.5 ML IM: CPT | Performed by: PSYCHIATRY & NEUROLOGY

## 2020-09-16 RX ORDER — DULOXETIN HYDROCHLORIDE 60 MG/1
120 CAPSULE, DELAYED RELEASE ORAL
Status: DISCONTINUED | OUTPATIENT
Start: 2020-09-16 | End: 2020-09-28 | Stop reason: HOSPADM

## 2020-09-16 RX ADMIN — TRAZODONE HYDROCHLORIDE 50 MG: 50 TABLET ORAL at 21:39

## 2020-09-16 RX ADMIN — INFLUENZA VIRUS VACCINE 0.5 ML: 15; 15; 15; 15 SUSPENSION INTRAMUSCULAR at 12:01

## 2020-09-16 RX ADMIN — PANTOPRAZOLE SODIUM 40 MG: 40 TABLET, DELAYED RELEASE ORAL at 06:17

## 2020-09-16 RX ADMIN — DULOXETINE 120 MG: 60 CAPSULE, DELAYED RELEASE ORAL at 21:38

## 2020-09-16 RX ADMIN — PRAZOSIN HYDROCHLORIDE 2 MG: 1 CAPSULE ORAL at 21:39

## 2020-09-16 RX ADMIN — MAGNESIUM GLUCONATE 500 MG ORAL TABLET 400 MG: 500 TABLET ORAL at 08:48

## 2020-09-16 RX ADMIN — QUETIAPINE FUMARATE 300 MG: 100 TABLET ORAL at 21:39

## 2020-09-16 NOTE — INTERDISCIPLINARY ROUNDS
Behavioral Health Interdisciplinary Rounds Patient Name: Chilo Morales  Age: 38 y.o. Room/Bed:  727/02 Primary Diagnosis: <principal problem not specified> Admission Status: Voluntary Readmission within 30 days: no 
Power of  in place: no 
Patient requires a blocked bed: no          Reason for blocked bed:  
Sleep hours: 6 Participation in Care/Groups:  yes Medication Compliant?: Yes PRNS (last 24 hours): None Restraints (last 24 hours):  no 
  
Alcohol screening (AUDIT) completed -  AUDIT Score: 0  If applicable, date SBIRT discussed in treatment team AND documented:   
Tobacco - patient is a smoker: Have You Used Tobacco in the Past 30 Days: No 
Illegal Drugs use: Have You Used Any Illegal Substances Over the Past 12 Months: No 
 
24 hour chart check complete: yes Patient goal(s) for today: attend TDO hearing, attend groups, take medications, eat 25% of meal 
Treatment team focus/goals: facilitate TDO hearing, titrate medication, increase cymbalta, encourage Pt to eat and drink, coordinate follow up. Progress note:  Pt is alert, oriented and irritable. She reports feeling \"all over the place\" but appears calm. She is eating minimally, reports not feeling hungry. She is minimally engaged with treatment team, avoids eye contact, is apathetic, reports feeling helpless and hopeless. She feels others are trying to control her. She opened up more about emotions and showed a wider range of emotions today. She open sup about loosing 2 pregnancy in 6 mnnths and her grief Family contact: significant other, Alejandro ([phone]) Patient's preferred phone number for follow up call [phone] 
 
LOS:  14  Expected LOS: TBD Participating treatment team members: Dr. Fredo Mclean; Micah Islas RN; Gonzales Ashraf MSW

## 2020-09-16 NOTE — PROGRESS NOTES
Patient consumed 120 mL of juice for breakfast. Did not consume food or Ensure. Patient consumed 240 mL of juice, 118 mL of Ensure compact, small  salad and peaches. Problem: Depressed Mood (Adult/Pediatric)  Goal: *STG: Participates in treatment plan  Outcome: Progressing Towards Goal  Goal: *STG: Verbalizes anger, guilt, and other feelings in a constructive manor  Outcome: Progressing Towards Goal  Goal: *STG: Demonstrates reduction in symptoms and increase in insight into coping skills/future focused  Outcome: Progressing Towards Goal    Patient attends treatment team. Expresses thoughts of grief due to her miscarriages. Not eating is a form of her needed control over her situation. She will work toward returning to counseling after discharge. Denies si/hi, denies a/v hallucination.

## 2020-09-16 NOTE — BH NOTES
GROUP THERAPY PROGRESS NOTE    Patient is participating in Process Group. Group time: 55 minutes    Personal goal for participation: Increase self-awareness of internal and external triggers. Goal orientation: Personal    Group therapy participation: minimally     Therapeutic interventions reviewed and discussed: Group discussion was focused on internal and external triggers, and the feelings/behaviors elicited as a result. Group members analyzed their emotional triggers and how it affects their anxiety. Each person was asked to color in or write where they felt the most anxiety on the stick figure given. Then, the group discussed ways to de-escalate situational anxiety, and use the Emotional Freedom Technique, known as the tapping technique. Impression of participation: Nikos Granados minimally participated in group. She was present and completed the worksheets, but refused to speak or share insight. Appeared to be slowly trying to interact with others.      ALFREDO Arreola

## 2020-09-16 NOTE — GROUP NOTE
[facility] GROUP DOCUMENTATION INDIVIDUAL Group Therapy Note Date: 9/15/2020 Group Start Time: 2015 Group End Time: 2045 Group Topic: Reflection/Relaxation [address] [facility] Lisbet Will 
 
[facility] GROUP DOCUMENTATION GROUP Group Therapy Note Attendees: 7/7 Attendance: Attended Patient's Goal: Interventions/techniques: Supported Follows Directions: Followed directions Interactions: Unable to interact Mental Status: Calm Behavior/appearance: Poor eye contact and Withdrawn/quiet Goals Achieved: Able to listen to others Additional Notes:  Patient did not engage in group interactions. Throughout shift, when in staff presence she presents w/poor eye contact depressed affect- hopeless/helpless. In turn, when she is unaware that staff is around pt is smiling, laughing, interacting with peers and enthusiastic. Rosemarie Mina

## 2020-09-16 NOTE — PROGRESS NOTES
Problem: Falls - Risk of  Goal: *Absence of Falls  Description: Document [facility] Fall Risk and appropriate interventions in the flowsheet. Outcome: Progressing Towards Goal  Note: Fall Risk Interventions:            Medication Interventions: Teach patient to arise slowly              Pt. Received appears to be sleeping, NAD, respirations even and unlabored. Will continue q15 safety checks.

## 2020-09-16 NOTE — BH NOTES
Chief Complaint:  \"I am all over\". Length of Stay: 14 Days    Interval History:  Salvador potts is little changed. She only drank 4 oz of juice today and has not had any other food today. Her TDO hearing was completed today and the outcome is still pending. Remains isolative, minmally interactive with peers, and says she feels more disorganized and less focussed today. She ate some veggies, apple sauce and ensure for dinner last evening. She was much more verbal during the interview today and expressed feelings of \"losing control\" and still being in grief about the loss of her two children. She talked more than she has at any of our prior meetings. Has been more compliant with taking medications. Her SI persists and stated that the thoughts get very intense at times. Past Medical History:  Past Medical History:   Diagnosis Date    Anxiety     Asthma     on albuterol prn.  Triggers cold and allergies    Back pain, chronic     sciatica    Gestational hypertension     Past pregnancy    HX OTHER MEDICAL      , , , ,     Hyperemesis     severe hyperemesis    Hypertension     with G12 - none this pregnancy    Iron deficiency anemia 10/08/2010    MDD (major depressive disorder), single episode with postpartum onset 2013    treated with meds - 13    Plantar fasciitis     Pregnancy     36 weeks    PTSD (post-traumatic stress disorder)            Labs:  Lab Results   Component Value Date/Time    WBC 8.0 2020 05:49 PM    WBC 12.2 (H) 2012 04:27 PM    Hemoglobin (POC) 10.4 (L) 2020 07:12 AM    HGB 11.3 (L) 2020 05:49 PM    HCT 36.0 2020 05:49 PM    PLATELET 484  05:49 PM    MCV 71.4 (L) 2020 05:49 PM    Hgb, External 33.7 2012    Hct, External 69 2012    Platelet cnt., External 307 2012      Lab Results   Component Value Date/Time    Sodium 141 2020 04:42 AM    Potassium 3.7 2020 04:42 AM    Chloride 109 (H) 09/12/2020 04:42 AM    CO2 22 09/12/2020 04:42 AM    Anion gap 10 09/12/2020 04:42 AM    Glucose 55 (L) 09/12/2020 04:42 AM    BUN 9 09/12/2020 04:42 AM    Creatinine 0.70 09/12/2020 04:42 AM    BUN/Creatinine ratio 13 09/12/2020 04:42 AM    GFR est AA >60 09/12/2020 04:42 AM    GFR est non-AA >60 09/12/2020 04:42 AM    Calcium 8.8 09/12/2020 04:42 AM    Bilirubin, total 0.6 09/12/2020 04:42 AM    Alk.  phosphatase 37 (L) 09/12/2020 04:42 AM    Protein, total 7.2 09/12/2020 04:42 AM    Albumin 3.3 (L) 09/12/2020 04:42 AM    Globulin 3.9 09/12/2020 04:42 AM    A-G Ratio 0.8 (L) 09/12/2020 04:42 AM    ALT (SGPT) 14 09/12/2020 04:42 AM      Vitals:    09/15/20 1541 09/15/20 1941 09/15/20 2032 09/16/20 0758   BP: 101/70 100/69 119/83 105/79   Pulse: 90 87 87 91   Resp: 16 16 16 14   Temp: 98.2 °F (36.8 °C) 98.4 °F (36.9 °C) 98.4 °F (36.9 °C) 97.7 °F (36.5 °C)   SpO2: 97% 95% 95% 100%   Weight:       Height:             Current Facility-Administered Medications   Medication Dose Route Frequency Provider Last Rate Last Dose    influenza vaccine 2020-21 (6 mos+)(PF) (FLUARIX/FLULAVAL/FLUZONE QUAD) injection 0.5 mL  0.5 mL IntraMUSCular PRIOR TO DISCHARGE Vaishnavi Calix MD        glucose chewable tablet 16 g  4 Tab Oral PRN Anusha Bellamy MD   4 Tab at 09/12/20 1745    glucagon (GLUCAGEN) injection 1 mg  1 mg IntraMUSCular PRN Anusha Bellamy MD        DULoxetine (CYMBALTA) capsule 90 mg  90 mg Oral QHS Vaishnavi Calix MD   90 mg at 09/15/20 2101    pantoprazole (PROTONIX) tablet 40 mg  40 mg Oral ACB Vaishnavi Calix MD   40 mg at 09/16/20 0617    traZODone (DESYREL) tablet 50 mg  50 mg Oral QHS Vaishnavi Calix MD   50 mg at 09/15/20 2300    QUEtiapine (SEROquel) tablet 300 mg  300 mg Oral QHS Vaishnavi Calix MD   300 mg at 09/15/20 2200    prazosin (MINIPRESS) capsule 2 mg  2 mg Oral QHS Rosy Gutierrez NP   2 mg at 09/15/20 2100    magnesium oxide (MAG-OX) tablet 400 mg  400 mg Oral DAILY Brenda Rosy ODONNELL NP   400 mg at 09/16/20 0848    promethazine (PHENERGAN) suppository 25 mg  25 mg Rectal Q6H PRN Patti Bautista NP   25 mg at 09/06/20 1111    HYDROcodone-acetaminophen (NORCO) 5-325 mg per tablet 1 Tab  1 Tab Oral Q6H PRN Ana Booth DO   1 Tab at 09/06/20 1415    ibuprofen (MOTRIN) tablet 600 mg  600 mg Oral Q6H PRN Vaishnavi Calix MD   600 mg at 09/06/20 1011    OLANZapine (ZyPREXA) tablet 5 mg  5 mg Oral Q6H PRN Jerrod Sloan Reaper, NP        haloperidol lactate (HALDOL) injection 5 mg  5 mg IntraMUSCular Q6H PRN Mileedi, Jerrod Reaper, NP        benztropine (COGENTIN) tablet 1 mg  1 mg Oral BID PRN Praneethi, Jerrod Charleser, NP        diphenhydrAMINE (BENADRYL) injection 50 mg  50 mg IntraMUSCular BID PRN Mileedi, Timoteone Reaper, NP        hydrOXYzine HCL (ATARAX) tablet 50 mg  50 mg Oral TID PRN Praneethi, Jerrod Reaper, NP        LORazepam (ATIVAN) injection 1 mg  1 mg IntraMUSCular Q4H PRN Shamar-Summer, Jerrod Reaper, NP        acetaminophen (TYLENOL) tablet 650 mg  650 mg Oral Q4H PRN Halina Sloan NP   650 mg at 09/04/20 2123    magnesium hydroxide (MILK OF MAGNESIA) 400 mg/5 mL oral suspension 30 mL  30 mL Oral DAILY PRN Halina Sloan, NP        ondansetron (ZOFRAN ODT) tablet 4 mg  4 mg Oral Q6H PRN Halina Sloan, NP   4 mg at 09/15/20 0944         Mental Status Exam:  Eye contact: limited  Grooming: fair  Psychomotor activity: decreased. Speech is spontaneous, soft  Mood is \"hopeless\"  Affect: depressed.    Perception: Denies any AH or VH  Suicidal ideation: Passive SI +  Cognition is grossly intact     Lab:  Recent Results (from the past 24 hour(s))   GLUCOSE, POC    Collection Time: 09/15/20  4:32 PM   Result Value Ref Range    Glucose (POC) 120 (H) 65 - 100 mg/dL    Performed by Gracie ARMSTRONG)    GLUCOSE, POC    Collection Time: 09/16/20  7:50 AM   Result Value Ref Range Glucose (POC) 88 65 - 100 mg/dL    Performed by Ger Coon          Physical Exam:  Body habitus: Body mass index is 23.94 kg/m². Musculoskeletal system: normal gait  Tremor - neg  Cog wheeling - neg      Assessment and Plan:  Ana Junior meets criteria for a diagnosis of Recurrent major depression, severe without psychotic symptoms; posttraumatic stress disorder, chronic. Increase Cymbalta to 120mg daily. Continue the medication regimen as prescribed  Consider getting a court order for medications/feedings/labs as needed. Disposition planning to continue. I certify that this patients inpatient psychiatric hospital services furnished since the previous certification were, and continue to be, required for treatment that could reasonably be expected to improve the patient's condition, or for diagnostic study, and that the patient continues to need, on a daily basis, active treatment furnished directly by or requiring the supervision of inpatient psychiatric facility personnel. In addition, the hospital records show that services furnished were intensive treatment services, admission or related services, or equivalent services.

## 2020-09-16 NOTE — PROGRESS NOTES
Goals will be met by 09/23/20  Problem: Falls - Risk of  Goal: *Absence of Falls  Description: Document Ty Russ Fall Risk and appropriate interventions in the flowsheet. Outcome: Progressing Towards Goal  Note: Fall Risk Interventions:   Patient is absent of falls. Medication Interventions: Teach patient to arise slowly      Problem: Depressed Mood (Adult/Pediatric)  Goal: *STG: Participates in treatment plan  Outcome: Progressing Towards Goal  Note: Patient participates in treatment plan. Goal: *STG: Attends activities and groups  Outcome: Progressing Towards Goal  Note: Patient attends activities and groups.    [address] [facility] 60  Master Treatment Plan for Minor Longest    Date Treatment Plan Initiated: 09/16/20    Treatment Plan Modalities:  Type of Modality Amount  (x minutes) Frequency (x/week) Duration (x days) Name of Responsible Staff   710 [facility] meetings to encourage peer interactions 15 7 1 GUI Santacruz     Group psychotherapy to assist in building coping skills and internal controls 60 7 1 Momo Massey   Therapeutic activity groups to build coping skills 60 7 1 Momo Massey   Psychoeducation in group setting to address:   Medication education   15 7 1 Dartha Bamberger, RN   Coping skills         Relaxation techniques         Symptom management         Discharge planning   60 2 17[facility]   60 1 1 volunteer   Recovery/AA/NA   61 3 1 volunteer   Physician medication management   15 7 1 Dr. Finch Dillan   15 2 1400 [facility] [employer]

## 2020-09-16 NOTE — BH NOTES
2000 Pt sitting in day room. Staff alerted pt of reflection group and snacks are soon. Pt said \"I don't want any that goes against me right? \" Staff informed pt that since she is minimally consuming any liquids or foods, as she already knows, we are charting intake. Pt is still focused on \"it goes against me, why do I even try. It goes against me I know. \" Staff informed pt that unit does not facilitate manipulative behaviors and when she feels appropriate unit she can join us for group. Shortly after pt came to group. During group pt had poor eye contact and refused snack/ensure. After staff left group, pt was observed laughing, and talking to peers appropriately. Throughout shift, when in staff presence she presents w/poor eye contact depressed affect- hopeless/helpless. In turn, when she is unaware that staff is around pt is smiling, laughing, interacting with peers and enthusiastic.

## 2020-09-16 NOTE — BH NOTES
GROUP THERAPY PROGRESS NOTE    Patient is participating in Substance Abuse group. Group time: 50 minutes    Personal goal for participation: To identify and understand relapse triggers. Goal orientation: Personal    Group therapy participation: passive    Therapeutic interventions reviewed and discussed: Group discussion on ways relapse triggers can affect individuals in recovery that encounter situations that they associate with past drug abuse. Each patient identified their own triggers, and the group discussed most common ones. Group members read aloud mistaken beliefs and myths about relapse and were given the truths behind it. Patients were encouraged to keep a checklist of behaviors and attitudes that are identified as indicators of an approaching relapse in order to interrupt the relapse dynamic and avoid it. Impression of participation: Unique Carvalho was passively engaged in this group as she was coloring the whole time. She had some conversation with another group member, and was smiling. Appears to be in good mood this afternoon.      ALFREDO Arreola

## 2020-09-17 LAB
BASOPHILS # BLD: 0 K/UL (ref 0–0.1)
BASOPHILS NFR BLD: 1 % (ref 0–1)
DIFFERENTIAL METHOD BLD: ABNORMAL
EOSINOPHIL # BLD: 0.2 K/UL (ref 0–0.4)
EOSINOPHIL NFR BLD: 4 % (ref 0–7)
ERYTHROCYTE [DISTWIDTH] IN BLOOD BY AUTOMATED COUNT: 17.1 % (ref 11.5–14.5)
GLUCOSE BLD STRIP.AUTO-MCNC: 92 MG/DL (ref 65–100)
GLUCOSE BLD STRIP.AUTO-MCNC: 93 MG/DL (ref 65–100)
HCT VFR BLD AUTO: 32.2 % (ref 35–47)
HGB BLD-MCNC: 10.2 G/DL (ref 11.5–16)
IMM GRANULOCYTES # BLD AUTO: 0 K/UL (ref 0–0.04)
IMM GRANULOCYTES NFR BLD AUTO: 0 % (ref 0–0.5)
LYMPHOCYTES # BLD: 1.4 K/UL (ref 0.8–3.5)
LYMPHOCYTES NFR BLD: 29 % (ref 12–49)
MCH RBC QN AUTO: 23 PG (ref 26–34)
MCHC RBC AUTO-ENTMCNC: 31.7 G/DL (ref 30–36.5)
MCV RBC AUTO: 72.5 FL (ref 80–99)
MONOCYTES # BLD: 0.5 K/UL (ref 0–1)
MONOCYTES NFR BLD: 9 % (ref 5–13)
NEUTS SEG # BLD: 2.8 K/UL (ref 1.8–8)
NEUTS SEG NFR BLD: 57 % (ref 32–75)
NRBC # BLD: 0 K/UL (ref 0–0.01)
NRBC BLD-RTO: 0 PER 100 WBC
PLATELET # BLD AUTO: 198 K/UL (ref 150–400)
RBC # BLD AUTO: 4.44 M/UL (ref 3.8–5.2)
SERVICE CMNT-IMP: NORMAL
SERVICE CMNT-IMP: NORMAL
WBC # BLD AUTO: 4.9 K/UL (ref 3.6–11)

## 2020-09-17 PROCEDURE — 74011250637 HC RX REV CODE- 250/637: Performed by: NURSE PRACTITIONER

## 2020-09-17 PROCEDURE — 36415 COLL VENOUS BLD VENIPUNCTURE: CPT

## 2020-09-17 PROCEDURE — 65220000003 HC RM SEMIPRIVATE PSYCH

## 2020-09-17 PROCEDURE — 85025 COMPLETE CBC W/AUTO DIFF WBC: CPT

## 2020-09-17 PROCEDURE — 74011250637 HC RX REV CODE- 250/637: Performed by: PSYCHIATRY & NEUROLOGY

## 2020-09-17 PROCEDURE — 82962 GLUCOSE BLOOD TEST: CPT

## 2020-09-17 RX ADMIN — QUETIAPINE FUMARATE 300 MG: 100 TABLET ORAL at 21:24

## 2020-09-17 RX ADMIN — TRAZODONE HYDROCHLORIDE 50 MG: 50 TABLET ORAL at 21:24

## 2020-09-17 RX ADMIN — PANTOPRAZOLE SODIUM 40 MG: 40 TABLET, DELAYED RELEASE ORAL at 06:32

## 2020-09-17 RX ADMIN — PRAZOSIN HYDROCHLORIDE 2 MG: 1 CAPSULE ORAL at 21:24

## 2020-09-17 RX ADMIN — DULOXETINE 120 MG: 60 CAPSULE, DELAYED RELEASE ORAL at 21:24

## 2020-09-17 RX ADMIN — MAGNESIUM GLUCONATE 500 MG ORAL TABLET 400 MG: 500 TABLET ORAL at 09:19

## 2020-09-17 NOTE — GROUP NOTE
SANDHYA  GROUP DOCUMENTATION INDIVIDUAL Group Therapy Note Date: 9/16/2020 Group Start Time: 2030 Group End Time: 2105 Group Topic: Reflection/Relaxation [address] [place] Isma ARTHUR  GROUP DOCUMENTATION GROUP Group Therapy Note Attendees:  
 
  
 
Attendance: Attended Patient's Goal:  Unit orientation and daily progress Interventions/techniques: Supported Follows Directions: Followed directions Interactions: Interacted appropriately Mental Status: Calm Behavior/appearance: Attentive and Cooperative Goals Achieved: Able to engage in interactions and Able to listen to others Additional Notes:  Seems in fair spirits. Voiced no complaints. Has been on unit much of shift, socializing with peers. Ate approximately 25% of dinner and some snacks at snack time. [facility] [id number]

## 2020-09-17 NOTE — BH NOTES
Chief Complaint:      Length of Stay: 15 Days    Interval History:  Aimee Ritchie was slightly better yesterday and has been today. She fainted and very slowly fell to the ground earlier today and her vital signs were normal with a blood sugar of 93. She participated in groups and ate 80% of her lunch yesterday. Nursing staff report that she has been smiling at peers and slightly more interactive. She has not had any nightmares in the past few days. Frequency of SI has decreased slightly but they can still be intrusive at times. Has not needed any pain medications at all in the last 10 days. Past Medical History:  Past Medical History:   Diagnosis Date    Anxiety     Asthma     on albuterol prn.  Triggers cold and allergies    Back pain, chronic 2010    sciatica    Gestational hypertension     Past pregnancy    HX OTHER MEDICAL      , , , ,     Hyperemesis     severe hyperemesis    Hypertension     with G12 - none this pregnancy    Iron deficiency anemia 10/08/2010    MDD (major depressive disorder), single episode with postpartum onset 2013    treated with meds - 13    Plantar fasciitis     Pregnancy     36 weeks    PTSD (post-traumatic stress disorder)            Labs:  Lab Results   Component Value Date/Time    WBC 8.0 2020 05:49 PM    WBC 12.2 (H) 2012 04:27 PM    Hemoglobin (POC) 10.4 (L) 2020 07:12 AM    HGB 11.3 (L) 2020 05:49 PM    HCT 36.0 2020 05:49 PM    PLATELET 636  05:49 PM    MCV 71.4 (L) 2020 05:49 PM    Hgb, External 33.7 2012    Hct, External 69 2012    Platelet cnt., External 307 2012      Lab Results   Component Value Date/Time    Sodium 141 2020 04:42 AM    Potassium 3.7 2020 04:42 AM    Chloride 109 (H) 2020 04:42 AM    CO2 22 2020 04:42 AM    Anion gap 10 2020 04:42 AM    Glucose 55 (L) 2020 04:42 AM    BUN 9 2020 04:42 AM    Creatinine 0.70 09/12/2020 04:42 AM    BUN/Creatinine ratio 13 09/12/2020 04:42 AM    GFR est AA >60 09/12/2020 04:42 AM    GFR est non-AA >60 09/12/2020 04:42 AM    Calcium 8.8 09/12/2020 04:42 AM    Bilirubin, total 0.6 09/12/2020 04:42 AM    Alk.  phosphatase 37 (L) 09/12/2020 04:42 AM    Protein, total 7.2 09/12/2020 04:42 AM    Albumin 3.3 (L) 09/12/2020 04:42 AM    Globulin 3.9 09/12/2020 04:42 AM    A-G Ratio 0.8 (L) 09/12/2020 04:42 AM    ALT (SGPT) 14 09/12/2020 04:42 AM      Vitals:    09/16/20 1600 09/16/20 1938 09/17/20 0821 09/17/20 1115   BP: 101/76 106/72 (!) 148/113 99/73   Pulse: 89 84 (!) 135 83   Resp: 16 18 18 14   Temp: 98.3 °F (36.8 °C) 98.6 °F (37 °C) 97.4 °F (36.3 °C) 97.7 °F (36.5 °C)   SpO2: 99% 99% 95% 97%   Weight:       Height:             Current Facility-Administered Medications   Medication Dose Route Frequency Provider Last Rate Last Dose    DULoxetine (CYMBALTA) capsule 120 mg  120 mg Oral QHS Marla Sen MD   120 mg at 09/16/20 2138    glucose chewable tablet 16 g  4 Tab Oral PRN Lane Castellanos MD   4 Tab at 09/12/20 1745    glucagon (GLUCAGEN) injection 1 mg  1 mg IntraMUSCular PRN Lane Castellanos MD        pantoprazole (PROTONIX) tablet 40 mg  40 mg Oral ACB Marla Sen MD   40 mg at 09/17/20 9672    traZODone (DESYREL) tablet 50 mg  50 mg Oral QHS Marla Sen MD   50 mg at 09/16/20 2139    QUEtiapine (SEROquel) tablet 300 mg  300 mg Oral QHS Marla Sen MD   300 mg at 09/16/20 2139    prazosin (MINIPRESS) capsule 2 mg  2 mg Oral QHS Rosy Gutierrez NP   2 mg at 09/16/20 2139    magnesium oxide (MAG-OX) tablet 400 mg  400 mg Oral DAILY Rosy Gutierrez NP   400 mg at 09/17/20 0919    promethazine (PHENERGAN) suppository 25 mg  25 mg Rectal Q6H PRN Greg Palmer NP   25 mg at 09/06/20 1111    HYDROcodone-acetaminophen (NORCO) 5-325 mg per tablet 1 Tab  1 Tab Oral Q6H PRN Pauly Hodges, DO   1 Tab at 09/06/20 1415    ibuprofen (MOTRIN) tablet 600 mg 600 mg Oral Q6H PRN Aure Rivera MD   600 mg at 09/06/20 1011    OLANZapine (ZyPREXA) tablet 5 mg  5 mg Oral Q6H PRN Natacha Sloan NP        haloperidol lactate (HALDOL) injection 5 mg  5 mg IntraMUSCular Q6H PRN Natacha Sloan, MENA        benztropine (COGENTIN) tablet 1 mg  1 mg Oral BID PRN Natacha Sloan NP        diphenhydrAMINE (BENADRYL) injection 50 mg  50 mg IntraMUSCular BID PRN Natacha Sloan NP        hydrOXYzine HCL (ATARAX) tablet 50 mg  50 mg Oral TID PRN Natacha Sloan NP        LORazepam (ATIVAN) injection 1 mg  1 mg IntraMUSCular Q4H PRN Natacha Sloan NP        acetaminophen (TYLENOL) tablet 650 mg  650 mg Oral Q4H PRN Halina Sloan NP   650 mg at 09/04/20 2123    magnesium hydroxide (MILK OF MAGNESIA) 400 mg/5 mL oral suspension 30 mL  30 mL Oral DAILY PRN Halina Sloan NP        ondansetron (ZOFRAN ODT) tablet 4 mg  4 mg Oral Q6H PRN Halina Sloan NP   4 mg at 09/15/20 0944         Mental Status Exam:  Eye contact: limited  Grooming: fair  Psychomotor activity: decreased. Speech is spontaneous, soft  Mood is \"okay\"  Affect: depressed. Perception: Denies any AH or VH  Suicidal ideation: Passive SI +  Cognition is grossly intact     Lab:  Recent Results (from the past 24 hour(s))   GLUCOSE, POC    Collection Time: 09/16/20  4:23 PM   Result Value Ref Range    Glucose (POC) 104 (H) 65 - 100 mg/dL    Performed by Mai, POC    Collection Time: 09/17/20  8:20 AM   Result Value Ref Range    Glucose (POC) 93 65 - 100 mg/dL    Performed by [place]          Physical Exam:  Body habitus: Body mass index is 23.94 kg/m².   Musculoskeletal system: normal gait  Tremor - neg  Cog wheeling - neg      Assessment and Plan:  Azalia Anand meets criteria for a diagnosis of Recurrent major depression, severe without psychotic symptoms; posttraumatic stress disorder, chronic. Continue the medication regimen as prescribed  Consider getting a court order for medications/feedings/labs as needed. Disposition planning to continue. I certify that this patients inpatient psychiatric hospital services furnished since the previous certification were, and continue to be, required for treatment that could reasonably be expected to improve the patient's condition, or for diagnostic study, and that the patient continues to need, on a daily basis, active treatment furnished directly by or requiring the supervision of inpatient psychiatric facility personnel. In addition, the hospital records show that services furnished were intensive treatment services, admission or related services, or equivalent services.

## 2020-09-17 NOTE — PROGRESS NOTES
3130 Patient waiting in hallway for vital signs. Observed knee buckle, caught self. Knees buckled again, patient lowered self to the floor. Witnessed by two nursing students. Patient was helped up to chair. Vital signs taken  Bp/148/103  Hr 134  O2 95 %  T 97.4  BS 93    Patient has refused drink or food this morning. Patient assisted to room after fall, which was not an actual fall but putting herself on the floor when knees feel week. Lying in bed. Fluids provided bedside. @ 9385 Fluids encouraged. Patient refuses breakfast at this time. @1000 Patient indicated she feels weak. Fluids given. @ 1115 vital signs retaken; 99/73; T 97.7; HR 83; RR 14; O2% 97    @ 1233 patient ambulated to treatment team with standby assist. Mood is depressed, affect flat. Patient feels dizzy. Encourage fluids and meal. Patient consumed 240 ml gingerale. Refused lunch. @ 1430 patient resting in bed. Stated she feels depressed and weak. Educated patient on need to eat and drink. States she does not have appetite but would drink ginger ale. Patient consumed 80 mL of ginger ale.

## 2020-09-17 NOTE — PROGRESS NOTES
Problem: Falls - Risk of  Goal: *Absence of Falls  Description: Document Yenny Callahan Fall Risk and appropriate interventions in the flowsheet. Outcome: Progressing Towards Goal  Note: Fall Risk Interventions:            Medication Interventions: Teach patient to arise slowly                 Pt. Received resting in bed, NAD, respirations even and unlabored. Will continue q15 safety checks.

## 2020-09-17 NOTE — INTERDISCIPLINARY ROUNDS
Behavioral Health Interdisciplinary Rounds Patient Name: Juan Meza  Age: 38 y.o. Room/Bed:  727/02 Primary Diagnosis: <principal problem not specified> Admission Status: Voluntary Readmission within 30 days: no 
Power of  in place: no 
Patient requires a blocked bed: no          Reason for blocked bed:  
Sleep hours:  6 Participation in Care/Groups:  yes Medication Compliant?: Yes PRNS (last 24 hours): None Restraints (last 24 hours):  no 
  
Alcohol screening (AUDIT) completed -  AUDIT Score: 0  If applicable, date SBIRT discussed in treatment team AND documented:   
Tobacco - patient is a smoker: Have You Used Tobacco in the Past 30 Days: No 
Illegal Drugs use: Have You Used Any Illegal Substances Over the Past 12 Months: No 
 
24 hour chart check complete: yes Patient goal(s) for today: attend groups, take medications, eat 25-50% of meals Treatment team focus/goals: titrate medication, coordinate follow up, encourage patient to eat and drink 50% of meal. 
Progress note: Pt is oriented, lethargic and reported feeling dizzy and faint today. She reports physical weakness but mood feels \"okay. \" She has been compliant with medications and denies any nightmares last night and denies SI. Possible discharged early next week. Family contact: significant other, Alejandro ([phone]) Patient's preferred phone number for follow up call : [phone]  
 
LOS:  15  Expected LOS: TBD 
  
Participating treatment team members: Dr. Kali Arreguin; Cindy Cabrera RN; Noman Bunn MSW

## 2020-09-17 NOTE — PROGRESS NOTES
Problem: Falls - Risk of  Goal: *Absence of Falls  Description: Document Devere Brainard Fall Risk and appropriate interventions in the flowsheet. Outcome: Progressing Towards Goal  Note: Fall Risk Interventions:     Medication Interventions: Teach patient to arise slowly    Pt affect flat, sad and depressed this evening. Pt isolative to room. Eating and drinking minimal. Staff will continue to monitor q 15 min checks.

## 2020-09-17 NOTE — BH NOTES
GROUP THERAPY PROGRESS NOTE    Patient did not participate in Substance Abuse/ Process Group.      ALFREDO Arreola

## 2020-09-17 NOTE — PROGRESS NOTES
Problem: Discharge Planning  Goal: *Discharge to safe environment  Outcome: Progressing Towards Goal  Note: Patient identifies home as a safe environment and plans to return upon discharge. Patient is connected to outpatient services through Anni Hickman NP. Patient has supportive partner. Goal: *Knowledge of medication management  Outcome: Progressing Towards Goal  Note: Patient agrees to take medications as prescribed. Goal: *Knowledge of discharge instructions  Outcome: Progressing Towards Goal  Note: Patient verbalizes understanding of goals for treatment and safe discharge.

## 2020-09-18 LAB
GLUCOSE BLD STRIP.AUTO-MCNC: 81 MG/DL (ref 65–100)
GLUCOSE BLD STRIP.AUTO-MCNC: 84 MG/DL (ref 65–100)
GLUCOSE BLD STRIP.AUTO-MCNC: 92 MG/DL (ref 65–100)
SERVICE CMNT-IMP: NORMAL

## 2020-09-18 PROCEDURE — 74011250637 HC RX REV CODE- 250/637: Performed by: NURSE PRACTITIONER

## 2020-09-18 PROCEDURE — 82962 GLUCOSE BLOOD TEST: CPT

## 2020-09-18 PROCEDURE — 74011250637 HC RX REV CODE- 250/637: Performed by: PSYCHIATRY & NEUROLOGY

## 2020-09-18 PROCEDURE — 65220000003 HC RM SEMIPRIVATE PSYCH

## 2020-09-18 RX ADMIN — TRAZODONE HYDROCHLORIDE 50 MG: 50 TABLET ORAL at 21:12

## 2020-09-18 RX ADMIN — QUETIAPINE FUMARATE 300 MG: 100 TABLET ORAL at 21:12

## 2020-09-18 RX ADMIN — ONDANSETRON 4 MG: 4 TABLET, ORALLY DISINTEGRATING ORAL at 20:00

## 2020-09-18 RX ADMIN — PRAZOSIN HYDROCHLORIDE 2 MG: 1 CAPSULE ORAL at 21:12

## 2020-09-18 RX ADMIN — PANTOPRAZOLE SODIUM 40 MG: 40 TABLET, DELAYED RELEASE ORAL at 06:43

## 2020-09-18 RX ADMIN — DULOXETINE 120 MG: 60 CAPSULE, DELAYED RELEASE ORAL at 21:12

## 2020-09-18 RX ADMIN — MAGNESIUM GLUCONATE 500 MG ORAL TABLET 400 MG: 500 TABLET ORAL at 08:52

## 2020-09-18 NOTE — BH NOTES
Chief Complaint:  I am not so good. Length of Stay: 16 Days    Interval History:  Geno Daugherty reports feeling \"okay\". She was again found in her room on the floor and told staff that she had fainted. No sequelae of falling were noted. Nursing staff feel that these episodes might be conversion symptoms rather than syncope. Geno Daugherty walked in to the interview saying she felt \"very dizzy\". She slept better during the night. Remains socially isolated to her room and sometimes attends groups. Says she felt dizzy last evening also. Describes her mood as \"down\" and passive SI is still present. She did not eat dinner or breakfast and has not eaten lunch today. When asked for a reason she would only say \"I am not sure\". Past Medical History:  Past Medical History:   Diagnosis Date    Anxiety     Asthma     on albuterol prn.  Triggers cold and allergies    Back pain, chronic     sciatica    Gestational hypertension     Past pregnancy    HX OTHER MEDICAL      , , , ,     Hyperemesis     severe hyperemesis    Hypertension     with G12 - none this pregnancy    Iron deficiency anemia 10/08/2010    MDD (major depressive disorder), single episode with postpartum onset 2013    treated with meds - 13    Plantar fasciitis     Pregnancy     36 weeks    PTSD (post-traumatic stress disorder)            Labs:  Lab Results   Component Value Date/Time    WBC 4.9 2020 05:00 PM    WBC 12.2 (H) 2012 04:27 PM    Hemoglobin (POC) 10.4 (L) 2020 07:12 AM    HGB 10.2 (L) 2020 05:00 PM    HCT 32.2 (L) 2020 05:00 PM    PLATELET 743  05:00 PM    MCV 72.5 (L) 2020 05:00 PM    Hgb, External 33.7 2012    Hct, External 69 2012    Platelet cnt., External 307 2012      Lab Results   Component Value Date/Time    Sodium 141 2020 04:42 AM    Potassium 3.7 2020 04:42 AM    Chloride 109 (H) 2020 04:42 AM    CO2 22 2020 04:42 AM    Anion gap 10 09/12/2020 04:42 AM    Glucose 55 (L) 09/12/2020 04:42 AM    BUN 9 09/12/2020 04:42 AM    Creatinine 0.70 09/12/2020 04:42 AM    BUN/Creatinine ratio 13 09/12/2020 04:42 AM    GFR est AA >60 09/12/2020 04:42 AM    GFR est non-AA >60 09/12/2020 04:42 AM    Calcium 8.8 09/12/2020 04:42 AM    Bilirubin, total 0.6 09/12/2020 04:42 AM    Alk.  phosphatase 37 (L) 09/12/2020 04:42 AM    Protein, total 7.2 09/12/2020 04:42 AM    Albumin 3.3 (L) 09/12/2020 04:42 AM    Globulin 3.9 09/12/2020 04:42 AM    A-G Ratio 0.8 (L) 09/12/2020 04:42 AM    ALT (SGPT) 14 09/12/2020 04:42 AM      Vitals:    09/17/20 1616 09/17/20 2118 09/18/20 0912 09/18/20 1035   BP: 95/66 100/69 115/75 109/71   Pulse: 99 87 85 (!) 110   Resp: 16 16 16 16   Temp: 97.9 °F (36.6 °C) 97.8 °F (36.6 °C) 97.7 °F (36.5 °C) 98.4 °F (36.9 °C)   SpO2: 99% 98% 96% 100%   Weight:       Height:             Current Facility-Administered Medications   Medication Dose Route Frequency Provider Last Rate Last Dose    DULoxetine (CYMBALTA) capsule 120 mg  120 mg Oral QHS Lissett Carlin MD   120 mg at 09/17/20 2124    glucose chewable tablet 16 g  4 Tab Oral PRN Radha Serrano MD   4 Tab at 09/12/20 1745    glucagon (GLUCAGEN) injection 1 mg  1 mg IntraMUSCular PRN Radha Serrano MD        pantoprazole (PROTONIX) tablet 40 mg  40 mg Oral ACB Lissett Carlin MD   40 mg at 09/18/20 6079    traZODone (DESYREL) tablet 50 mg  50 mg Oral QHS Lissett Carlin MD   50 mg at 09/17/20 2124    QUEtiapine (SEROquel) tablet 300 mg  300 mg Oral QHS Lissett Carlin MD   300 mg at 09/17/20 2124    prazosin (MINIPRESS) capsule 2 mg  2 mg Oral QHS Rosy Gutierrez NP   2 mg at 09/17/20 2124    magnesium oxide (MAG-OX) tablet 400 mg  400 mg Oral DAILY Rosy Gutierrez NP   400 mg at 09/18/20 0852    promethazine (PHENERGAN) suppository 25 mg  25 mg Rectal Q6H PRN Raul Medina NP   25 mg at 09/06/20 1111    HYDROcodone-acetaminophen (Carmie Karine) 5325 mg per tablet 1 Tab  1 Tab Oral Q6H PRN Juanita Holloway    1 Tab at 09/06/20 1415    ibuprofen (MOTRIN) tablet 600 mg  600 mg Oral Q6H PRN Charlie Bales MD   600 mg at 09/06/20 1011    OLANZapine (ZyPREXA) tablet 5 mg  5 mg Oral Q6H PRN Mansi Sloan, MENA        haloperidol lactate (HALDOL) injection 5 mg  5 mg IntraMUSCular Q6H PRN Mansi Sloan, MENA        benztropine (COGENTIN) tablet 1 mg  1 mg Oral BID PRN Mansi Sloan, MENA        diphenhydrAMINE (BENADRYL) injection 50 mg  50 mg IntraMUSCular BID PRN Mansi Sloan, MENA        hydrOXYzine HCL (ATARAX) tablet 50 mg  50 mg Oral TID PRN Mansi Sloan, NP        LORazepam (ATIVAN) injection 1 mg  1 mg IntraMUSCular Q4H PRN Mansi Sloan, MENA        acetaminophen (TYLENOL) tablet 650 mg  650 mg Oral Q4H PRN Halina Sloan NP   650 mg at 09/04/20 2123    magnesium hydroxide (MILK OF MAGNESIA) 400 mg/5 mL oral suspension 30 mL  30 mL Oral DAILY PRN Halina Sloan NP        ondansetron (ZOFRAN ODT) tablet 4 mg  4 mg Oral Q6H PRN Halina Sloan NP   4 mg at 09/15/20 0944         Mental Status Exam:  Eye contact: limited  Grooming: fair  Psychomotor activity: decreased. Speech is spontaneous, soft  Mood is \"okay\"  Affect: depressed.    Perception: Denies any AH or VH  Suicidal ideation: Passive SI +  Cognition is grossly intact     Lab:  Recent Results (from the past 24 hour(s))   GLUCOSE, POC    Collection Time: 09/17/20  4:26 PM   Result Value Ref Range    Glucose (POC) 92 65 - 100 mg/dL    Performed by Odessa Mullins    CBC WITH AUTOMATED DIFF    Collection Time: 09/17/20  5:00 PM   Result Value Ref Range    WBC 4.9 3.6 - 11.0 K/uL    RBC 4.44 3.80 - 5.20 M/uL    HGB 10.2 (L) 11.5 - 16.0 g/dL    HCT 32.2 (L) 35.0 - 47.0 %    MCV 72.5 (L) 80.0 - 99.0 FL    MCH 23.0 (L) 26.0 - 34.0 PG    MCHC 31.7 30.0 - 36.5 g/dL    RDW 17.1 (H) 11.5 - 14.5 %    PLATELET 237 784 - 606 K/uL    NRBC 0.0 0  WBC    ABSOLUTE NRBC 0.00 0.00 - 0.01 K/uL    NEUTROPHILS 57 32 - 75 %    LYMPHOCYTES 29 12 - 49 %    MONOCYTES 9 5 - 13 %    EOSINOPHILS 4 0 - 7 %    BASOPHILS 1 0 - 1 %    IMMATURE GRANULOCYTES 0 0.0 - 0.5 %    ABS. NEUTROPHILS 2.8 1.8 - 8.0 K/UL    ABS. LYMPHOCYTES 1.4 0.8 - 3.5 K/UL    ABS. MONOCYTES 0.5 0.0 - 1.0 K/UL    ABS. EOSINOPHILS 0.2 0.0 - 0.4 K/UL    ABS. BASOPHILS 0.0 0.0 - 0.1 K/UL    ABS. IMM. GRANS. 0.0 0.00 - 0.04 K/UL    DF AUTOMATED     GLUCOSE, POC    Collection Time: 09/18/20  9:19 AM   Result Value Ref Range    Glucose (POC) 84 65 - 100 mg/dL    Performed by Rody Lewis    GLUCOSE, POC    Collection Time: 09/18/20 10:33 AM   Result Value Ref Range    Glucose (POC) 92 65 - 100 mg/dL    Performed by Ana María Pastrana          Physical Exam:  Body habitus: Body mass index is 23.94 kg/m². Musculoskeletal system: normal gait  Tremor - neg  Cog wheeling - neg      Assessment and Plan:  Tiara Thornton meets criteria for a diagnosis of Recurrent major depression, severe without psychotic symptoms; posttraumatic stress disorder, chronic. Continue the medication regimen as prescribed  Consider getting a court order for medications/feedings/labs as needed. Disposition planning to continue. I certify that this patients inpatient psychiatric hospital services furnished since the previous certification were, and continue to be, required for treatment that could reasonably be expected to improve the patient's condition, or for diagnostic study, and that the patient continues to need, on a daily basis, active treatment furnished directly by or requiring the supervision of inpatient psychiatric facility personnel. In addition, the hospital records show that services furnished were intensive treatment services, admission or related services, or equivalent services.

## 2020-09-18 NOTE — PROGRESS NOTES
Problem: Falls - Risk of  Goal: *Absence of Falls  Description: Document Ty Russ Fall Risk and appropriate interventions in the flowsheet. Outcome: Progressing Towards Goal  Note: Fall Risk Interventions:            Medication Interventions: Teach patient to arise slowly          Pt. Received resting in bed, NAD, respirations even and unlabored. Will continue q15 safety rounds.

## 2020-09-18 NOTE — INTERDISCIPLINARY ROUNDS
Behavioral Health Interdisciplinary Rounds Patient Name: Tiara Thornton  Age: 38 y.o. Room/Bed:  727/02 Primary Diagnosis: <principal problem not specified> Admission Status: Involuntary Commitment Readmission within 30 days: no 
Power of  in place: no 
Patient requires a blocked bed: no          Reason for blocked bed:  
Sleep hours:  6.5 Participation in Care/Groups:  yes Medication Compliant?: Yes PRNS (last 24 hours): None Restraints (last 24 hours):  no 
  
Alcohol screening (AUDIT) completed -  AUDIT Score: 0  If applicable, date SBIRT discussed in treatment team AND documented:   
Tobacco - patient is a smoker: Have You Used Tobacco in the Past 30 Days: No 
Illegal Drugs use: Have You Used Any Illegal Substances Over the Past 12 Months: No 
 
24 hour chart check complete: yes Patient goal(s) for today: attend groups, take medications, eat 25-50% of meals Treatment team focus/goals: titrate medication, coordinate follow up, encourage patient to eat and drink 50% of meal. 
Progress note: Pt is lethargic, dizzy and shaky. She was found on the floor and cannot recall how she got down there if she fell or chose to lower herself to the ground. She denies any pain and is free of any bruising. Pt reported sleeping better. Pt has not eaten anything since Wednesday at dinner and says she is not hungry or is nauseous at meal time. Family Contact information: significant other, Alejandro ([phone]) Patient's preferred phone number for follow up call : [phone]  
 
LOS:  16  Expected LOS: TBD 
  
Participating treatment team members: Dr. Lara Frank; NADIR Mendoza RN; Sarita Aragon MSW

## 2020-09-18 NOTE — PROGRESS NOTES
Problem: Depressed Mood (Adult/Pediatric)  Goal: *STG: Demonstrates reduction in symptoms and increase in insight into coping skills/future focused  Outcome: Not Progressing Towards Goal     Problem: Depressed Mood (Adult/Pediatric)  Goal: *STG: Verbalizes anger, guilt, and other feelings in a constructive manor  Outcome: Progressing Towards Goal     Problem: Depressed Mood (Adult/Pediatric)  Goal: *STG: Participates in treatment plan  Outcome: Progressing Towards Goal  Note: 0800 Pt resting quietly in bed. No distress noted. Will monitor with Q 15 safety checks. Pt refused breakfast. Drank one sip of orange juice. Med compliant. 1030 Pt lowered self to floor, dizzy. HR elevated at 110, vitals stable. BS 93. Pt assisted back to bed and encouraged to drink fluids.

## 2020-09-18 NOTE — BH NOTES
GROUP THERAPY PROGRESS NOTE    Patient did not participate in Relaxation Group.      ALFREDO Arreola

## 2020-09-18 NOTE — PROGRESS NOTES
Please note that patient has a telehealth visit with for her initial evaluation/consult with Dr Julee Camilo (gastroenterology) on 9/22/2020 at 10am. If possible, it would be very helpful for her to be able to follow through with this appt while she is inpatient since it is telehealth, as she will then have followup with GI after discharge.     I will try to reschedule her inperson appt with Dr Lissette Vaughan (neuropsych) which she currently has scheduled for 9/24/2020 at 3pm.

## 2020-09-19 LAB
GLUCOSE BLD STRIP.AUTO-MCNC: 80 MG/DL (ref 65–100)
GLUCOSE BLD STRIP.AUTO-MCNC: 82 MG/DL (ref 65–100)
SERVICE CMNT-IMP: NORMAL
SERVICE CMNT-IMP: NORMAL

## 2020-09-19 PROCEDURE — 74011250637 HC RX REV CODE- 250/637: Performed by: PSYCHIATRY & NEUROLOGY

## 2020-09-19 PROCEDURE — 74011250637 HC RX REV CODE- 250/637: Performed by: NURSE PRACTITIONER

## 2020-09-19 PROCEDURE — 65220000003 HC RM SEMIPRIVATE PSYCH

## 2020-09-19 PROCEDURE — 82962 GLUCOSE BLOOD TEST: CPT

## 2020-09-19 RX ADMIN — DULOXETINE 120 MG: 60 CAPSULE, DELAYED RELEASE ORAL at 21:14

## 2020-09-19 RX ADMIN — TRAZODONE HYDROCHLORIDE 50 MG: 50 TABLET ORAL at 21:15

## 2020-09-19 RX ADMIN — MAGNESIUM GLUCONATE 500 MG ORAL TABLET 400 MG: 500 TABLET ORAL at 09:53

## 2020-09-19 RX ADMIN — ONDANSETRON 4 MG: 4 TABLET, ORALLY DISINTEGRATING ORAL at 21:16

## 2020-09-19 RX ADMIN — PRAZOSIN HYDROCHLORIDE 2 MG: 1 CAPSULE ORAL at 21:14

## 2020-09-19 RX ADMIN — ONDANSETRON 4 MG: 4 TABLET, ORALLY DISINTEGRATING ORAL at 16:45

## 2020-09-19 RX ADMIN — QUETIAPINE FUMARATE 300 MG: 100 TABLET ORAL at 21:14

## 2020-09-19 RX ADMIN — ONDANSETRON 4 MG: 4 TABLET, ORALLY DISINTEGRATING ORAL at 09:52

## 2020-09-19 RX ADMIN — PANTOPRAZOLE SODIUM 40 MG: 40 TABLET, DELAYED RELEASE ORAL at 06:33

## 2020-09-19 NOTE — PROGRESS NOTES
Problem: Falls - Risk of  Goal: *Absence of Falls  Description: Document Nicole Soria Fall Risk and appropriate interventions in the flowsheet. Outcome: Progressing Towards Goal  Note: Fall Risk Interventions:            Medication Interventions: Teach patient to arise slowly          Pt. Received resting in bed, NAD, respirations even and unlabored. Will continue q15 safety checks.

## 2020-09-19 NOTE — BH NOTES
Chief Complaint:  I am not so good. Length of Stay: 17 Days    Interval History:  Tiffanie Padgett endorses she is not feeling well today and states, \"I just feel a little down and sad and don't want to get up today. \" Patient denies any current SI/HI/AVH at this time and denies any adverse reactions to her medications at this time. Patient encouraged to eat and drink today and she states, \"I will, so I can feel better. \" Patient in bed during treatment team. No signs of falling out or behaviors noted at this time. No aggression noted at this time. Past Medical History:  Past Medical History:   Diagnosis Date    Anxiety     Asthma     on albuterol prn.  Triggers cold and allergies    Back pain, chronic     sciatica    Gestational hypertension     Past pregnancy    HX OTHER MEDICAL      , , , ,     Hyperemesis     severe hyperemesis    Hypertension     with G12 - none this pregnancy    Iron deficiency anemia 10/08/2010    MDD (major depressive disorder), single episode with postpartum onset 2013    treated with meds - 13    Plantar fasciitis     Pregnancy     36 weeks    PTSD (post-traumatic stress disorder)            Labs:  Lab Results   Component Value Date/Time    WBC 4.9 2020 05:00 PM    WBC 12.2 (H) 2012 04:27 PM    Hemoglobin (POC) 10.4 (L) 2020 07:12 AM    HGB 10.2 (L) 2020 05:00 PM    HCT 32.2 (L) 2020 05:00 PM    PLATELET 680  05:00 PM    MCV 72.5 (L) 2020 05:00 PM    Hgb, External 33.7 2012    Hct, External 69 2012    Platelet cnt., External 307 2012      Lab Results   Component Value Date/Time    Sodium 141 2020 04:42 AM    Potassium 3.7 2020 04:42 AM    Chloride 109 (H) 2020 04:42 AM    CO2 22 2020 04:42 AM    Anion gap 10 2020 04:42 AM    Glucose 55 (L) 2020 04:42 AM    BUN 9 2020 04:42 AM    Creatinine 0.70 2020 04:42 AM    BUN/Creatinine ratio 13 09/12/2020 04:42 AM    GFR est AA >60 09/12/2020 04:42 AM    GFR est non-AA >60 09/12/2020 04:42 AM    Calcium 8.8 09/12/2020 04:42 AM    Bilirubin, total 0.6 09/12/2020 04:42 AM    Alk.  phosphatase 37 (L) 09/12/2020 04:42 AM    Protein, total 7.2 09/12/2020 04:42 AM    Albumin 3.3 (L) 09/12/2020 04:42 AM    Globulin 3.9 09/12/2020 04:42 AM    A-G Ratio 0.8 (L) 09/12/2020 04:42 AM    ALT (SGPT) 14 09/12/2020 04:42 AM      Vitals:    09/18/20 1035 09/18/20 1526 09/18/20 1951 09/19/20 0838   BP: 109/71 108/74 103/73 102/72   Pulse: (!) 110 88 (!) 102 83   Resp: 16 16 16 16   Temp: 98.4 °F (36.9 °C) 97.9 °F (36.6 °C) 98.4 °F (36.9 °C) 98 °F (36.7 °C)   SpO2: 100% 99% 98% 98%   Weight:       Height:             Current Facility-Administered Medications   Medication Dose Route Frequency Provider Last Rate Last Dose    DULoxetine (CYMBALTA) capsule 120 mg  120 mg Oral QHS Lissett Carlin MD   120 mg at 09/18/20 2112    glucose chewable tablet 16 g  4 Tab Oral PRN Radha Serrano MD   4 Tab at 09/12/20 1745    glucagon (GLUCAGEN) injection 1 mg  1 mg IntraMUSCular PRN Radha Serrano MD        pantoprazole (PROTONIX) tablet 40 mg  40 mg Oral ACB Lissett Carlin MD   40 mg at 09/19/20 5220    traZODone (DESYREL) tablet 50 mg  50 mg Oral QHS Lissett Carlin MD   50 mg at 09/18/20 2112    QUEtiapine (SEROquel) tablet 300 mg  300 mg Oral QHS Lissett Carlin MD   300 mg at 09/18/20 2112    prazosin (MINIPRESS) capsule 2 mg  2 mg Oral QHS Rosy Gutierrez NP   2 mg at 09/18/20 2112    magnesium oxide (MAG-OX) tablet 400 mg  400 mg Oral DAILY Rosy Gutierrez NP   400 mg at 09/19/20 0953    promethazine (PHENERGAN) suppository 25 mg  25 mg Rectal Q6H PRN Raul Medina NP   25 mg at 09/06/20 1111    HYDROcodone-acetaminophen (NORCO) 5-325 mg per tablet 1 Tab  1 Tab Oral Q6H PRN Nader Whatley DO   1 Tab at 09/06/20 1415    ibuprofen (MOTRIN) tablet 600 mg  600 mg Oral Q6H PRN Lissett Carlin MD   600 mg at 09/06/20 1011    OLANZapine (ZyPREXA) tablet 5 mg  5 mg Oral Q6H PRN Jasmin Sloan, MENA        haloperidol lactate (HALDOL) injection 5 mg  5 mg IntraMUSCular Q6H PRN Stevie Sloan, NP        benztropine (COGENTIN) tablet 1 mg  1 mg Oral BID PRN Jasmin Sloan, NP        diphenhydrAMINE (BENADRYL) injection 50 mg  50 mg IntraMUSCular BID PRN Jasmin Sloan, NP        hydrOXYzine HCL (ATARAX) tablet 50 mg  50 mg Oral TID PRN Jasmin Sloan, NP        LORazepam (ATIVAN) injection 1 mg  1 mg IntraMUSCular Q4H PRN Jasmin Sloan, NP        acetaminophen (TYLENOL) tablet 650 mg  650 mg Oral Q4H PRN Halina Sloan, NP   650 mg at 09/04/20 2123    magnesium hydroxide (MILK OF MAGNESIA) 400 mg/5 mL oral suspension 30 mL  30 mL Oral DAILY PRN Halina Sloan, NP        ondansetron (ZOFRAN ODT) tablet 4 mg  4 mg Oral Q6H PRN Halina Sloan, NP   4 mg at 09/19/20 3341         Mental Status Exam:  Eye contact: limited  Grooming: fair  Psychomotor activity: decreased. Speech is spontaneous, soft  Mood is \"okay\"  Affect: depressed. Perception: Denies any AH or VH  Suicidal ideation: Denies SI or plan  Cognition is grossly intact     Lab:  Recent Results (from the past 24 hour(s))   GLUCOSE, POC    Collection Time: 09/18/20 10:33 AM   Result Value Ref Range    Glucose (POC) 92 65 - 100 mg/dL    Performed by [address] [place] [address] [address], POC    Collection Time: 09/18/20  5:18 PM   Result Value Ref Range    Glucose (POC) 81 65 - 100 mg/dL    Performed by Walter Mahajan    GLUCOSE, POC    Collection Time: 09/19/20  7:19 AM   Result Value Ref Range    Glucose (POC) 82 65 - 100 mg/dL    Performed by [employer]          Physical Exam:  Body habitus: Body mass index is 23.94 kg/m².   Musculoskeletal system: normal gait  Tremor - neg  Cog wheeling - neg      Assessment and Plan:  Thanh Lo meets criteria for a diagnosis of Recurrent major depression, severe without psychotic symptoms; posttraumatic stress disorder, chronic. Continue the medication regimen as prescribed  Consider getting a court order for medications/feedings/labs as needed. Disposition planning to continue. I certify that this patients inpatient psychiatric hospital services furnished since the previous certification were, and continue to be, required for treatment that could reasonably be expected to improve the patient's condition, or for diagnostic study, and that the patient continues to need, on a daily basis, active treatment furnished directly by or requiring the supervision of inpatient psychiatric facility personnel. In addition, the hospital records show that services furnished were intensive treatment services, admission or related services, or equivalent services.

## 2020-09-19 NOTE — INTERDISCIPLINARY ROUNDS
Behavioral Health Interdisciplinary Rounds Patient Name: Chilo Morales  Age: 38 y.o. Room/Bed:  727/ Primary Diagnosis: <principal problem not specified> Admission Status: Involuntary Commitment Readmission within 30 days: no 
Power of  in place: no 
Patient requires a blocked bed: no          Reason for blocked bed:  
Sleep hours: 5.5 Participation in Care/Groups:  no 
Medication Compliant?: Yes PRNS (last 24 hours): None Restraints (last 24 hours):  no 
  
Alcohol screening (AUDIT) completed -  AUDIT Score: 0  If applicable, date SBIRT discussed in treatment team AND documented:   
Tobacco - patient is a smoker: Have You Used Tobacco in the Past 30 Days: No 
Illegal Drugs use: Have You Used Any Illegal Substances Over the Past 12 Months: No 
 
24 hour chart check complete: yes Patient goal(s) for today:  
Treatment team focus/goals:  
Progress note Family Contact information:  
Patient's preferred phone number for follow up call :  
 
LOS:  17  Expected LOS:  
 
Participating treatment team members: Chilo Morales, * (assigned SW),

## 2020-09-19 NOTE — PROGRESS NOTES
Problem: Falls - Risk of  Goal: *Absence of Falls  Description: Document Cliff Moreno Fall Risk and appropriate interventions in the flowsheet.   Outcome: Progressing Towards Goal  Note: Fall Risk Interventions:            Medication Interventions: Teach patient to arise slowly                   Problem: Depressed Mood (Adult/Pediatric)  Goal: *STG: Verbalizes anger, guilt, and other feelings in a constructive manor  Outcome: Not Progressing Towards Goal  Goal: *STG: Attends activities and groups  Outcome: Not Progressing Towards Goal  Goal: *STG: Demonstrates reduction in symptoms and increase in insight into coping skills/future focused  Outcome: Not Progressing Towards Goal     Problem: Nutrition Deficit  Goal: *Optimize nutritional status  Outcome: Not Progressing Towards Goal

## 2020-09-19 NOTE — PROGRESS NOTES
Problem: Depressed Mood (Adult/Pediatric)  Goal: *STG: Participates in treatment plan  Outcome: Not Progressing Towards Goal     Problem: Depressed Mood (Adult/Pediatric)  Goal: *STG: Attends activities and groups  Outcome: Not Progressing Towards Goal     Problem: Depressed Mood (Adult/Pediatric)  Goal: *STG: Demonstrates reduction in symptoms and increase in insight into coping skills/future focused  Outcome: Not Progressing Towards Goal     Problem: Depressed Mood (Adult/Pediatric)  Goal: Interventions  Outcome: Progressing Towards Goal  Note: Pt is alert and oriented. Calm. Depressed. VS stable. Zofran given for nausea; good results. Poor oral intake. 5 sips of water, one ensure. Improved hygiene. Uses phone during morning. Sitting in dayroom for 2 hrs. Returns to her room to rest. Education provided. Coping skills encouraged. Pt refused breakfast and lunch. Pt is tolerating oral fluids: ice water, ensure. 1400- pt using the phone after receiving care personal items and mail delivered by .

## 2020-09-20 LAB
GLUCOSE BLD STRIP.AUTO-MCNC: 73 MG/DL (ref 65–100)
GLUCOSE BLD STRIP.AUTO-MCNC: 86 MG/DL (ref 65–100)
SERVICE CMNT-IMP: NORMAL
SERVICE CMNT-IMP: NORMAL

## 2020-09-20 PROCEDURE — 74011250637 HC RX REV CODE- 250/637: Performed by: NURSE PRACTITIONER

## 2020-09-20 PROCEDURE — 82962 GLUCOSE BLOOD TEST: CPT

## 2020-09-20 PROCEDURE — 74011250637 HC RX REV CODE- 250/637: Performed by: PSYCHIATRY & NEUROLOGY

## 2020-09-20 PROCEDURE — 65220000003 HC RM SEMIPRIVATE PSYCH

## 2020-09-20 RX ADMIN — QUETIAPINE FUMARATE 300 MG: 100 TABLET ORAL at 21:14

## 2020-09-20 RX ADMIN — ONDANSETRON 4 MG: 4 TABLET, ORALLY DISINTEGRATING ORAL at 08:58

## 2020-09-20 RX ADMIN — DULOXETINE 120 MG: 60 CAPSULE, DELAYED RELEASE ORAL at 21:14

## 2020-09-20 RX ADMIN — TRAZODONE HYDROCHLORIDE 50 MG: 50 TABLET ORAL at 21:15

## 2020-09-20 RX ADMIN — MAGNESIUM GLUCONATE 500 MG ORAL TABLET 400 MG: 500 TABLET ORAL at 08:58

## 2020-09-20 RX ADMIN — ONDANSETRON 4 MG: 4 TABLET, ORALLY DISINTEGRATING ORAL at 17:12

## 2020-09-20 RX ADMIN — PRAZOSIN HYDROCHLORIDE 2 MG: 1 CAPSULE ORAL at 21:15

## 2020-09-20 RX ADMIN — ONDANSETRON 4 MG: 4 TABLET, ORALLY DISINTEGRATING ORAL at 22:54

## 2020-09-20 RX ADMIN — PANTOPRAZOLE SODIUM 40 MG: 40 TABLET, DELAYED RELEASE ORAL at 06:32

## 2020-09-20 NOTE — INTERDISCIPLINARY ROUNDS
Behavioral Health Interdisciplinary Rounds Patient Name: Thanh Lo  Age: 38 y.o. Room/Bed:  727/02 Primary Diagnosis: <principal problem not specified> Admission Status: Involuntary Commitment Readmission within 30 days: no 
Power of  in place: no 
Patient requires a blocked bed: no          Reason for blocked bed:  
 
Sleep hours: 7 Participation in Care/Groups:  yes Medication Compliant?: Yes PRNS (last 24 hours): None Restraints (last 24 hours):  no 
  
Alcohol screening (AUDIT) completed -  AUDIT Score: 0  If applicable, date SBIRT discussed in treatment team AND documented:   
Tobacco - patient is a smoker: Have You Used Tobacco in the Past 30 Days: No 
Illegal Drugs use: Have You Used Any Illegal Substances Over the Past 12 Months: No 
 
24 hour chart check complete: yes Patient goal(s) for today:  
Treatment team focus/goals:  
Progress note Family Contact information:  
Patient's preferred phone number for follow up call :  
 
LOS:  17  Expected LOS:  
 
Participating treatment team members: Thanh Lo, * (assigned SW),

## 2020-09-20 NOTE — BH NOTES
Chief Complaint:  I am not so good. Length of Stay: 18 Days    Interval History:  Yessenia Sharma endorses she is not feeling well today and states,\"I feel about the same as I did yesterday. \" Denies SI/HI/AVH at this time and denies any adverse reactions to her medications. Patient endorses she has been eating a little better yesterday and will try to eat more today. Slept 7 hours last night. Past Medical History:   Diagnosis Date    Anxiety     Asthma     on albuterol prn.  Triggers cold and allergies    Back pain, chronic 2010    sciatica    Gestational hypertension     Past pregnancy    HX OTHER MEDICAL      , , , ,     Hyperemesis     severe hyperemesis    Hypertension     with G12 - none this pregnancy    Iron deficiency anemia 10/08/2010    MDD (major depressive disorder), single episode with postpartum onset 2013    treated with meds - 13    Plantar fasciitis     Pregnancy     36 weeks    PTSD (post-traumatic stress disorder)            Labs:  Lab Results   Component Value Date/Time    WBC 4.9 2020 05:00 PM    WBC 12.2 (H) 2012 04:27 PM    Hemoglobin (POC) 10.4 (L) 2020 07:12 AM    HGB 10.2 (L) 2020 05:00 PM    HCT 32.2 (L) 2020 05:00 PM    PLATELET 421  05:00 PM    MCV 72.5 (L) 2020 05:00 PM    Hgb, External 33.7 2012    Hct, External 69 2012    Platelet cnt., External 307 2012      Lab Results   Component Value Date/Time    Sodium 141 2020 04:42 AM    Potassium 3.7 2020 04:42 AM    Chloride 109 (H) 2020 04:42 AM    CO2 22 2020 04:42 AM    Anion gap 10 2020 04:42 AM    Glucose 55 (L) 2020 04:42 AM    BUN 9 2020 04:42 AM    Creatinine 0.70 2020 04:42 AM    BUN/Creatinine ratio 13 2020 04:42 AM    GFR est AA >60 2020 04:42 AM    GFR est non-AA >60 2020 04:42 AM    Calcium 8.8 2020 04:42 AM    Bilirubin, total 0.6 2020 04:42 AM Alk. phosphatase 37 (L) 09/12/2020 04:42 AM    Protein, total 7.2 09/12/2020 04:42 AM    Albumin 3.3 (L) 09/12/2020 04:42 AM    Globulin 3.9 09/12/2020 04:42 AM    A-G Ratio 0.8 (L) 09/12/2020 04:42 AM    ALT (SGPT) 14 09/12/2020 04:42 AM      Vitals:    09/19/20 1131 09/19/20 1555 09/19/20 1950 09/20/20 0821   BP: 97/69 110/74 103/72 110/74   Pulse: 78 73 82 82   Resp: 16 16 16 16   Temp: 98.2 °F (36.8 °C) 97.8 °F (36.6 °C) 97.9 °F (36.6 °C) 98.3 °F (36.8 °C)   SpO2: 96% 97% 100% 98%   Weight:       Height:             Current Facility-Administered Medications   Medication Dose Route Frequency Provider Last Rate Last Dose    DULoxetine (CYMBALTA) capsule 120 mg  120 mg Oral QHS Lyric Lin MD   120 mg at 09/19/20 2114    glucose chewable tablet 16 g  4 Tab Oral PRN Rigo Grider MD   4 Tab at 09/12/20 1745    glucagon (GLUCAGEN) injection 1 mg  1 mg IntraMUSCular PRN Rigo Grider MD        pantoprazole (PROTONIX) tablet 40 mg  40 mg Oral ACB Lyric Lin MD   40 mg at 09/20/20 6506    traZODone (DESYREL) tablet 50 mg  50 mg Oral QHS Lyric Lin MD   50 mg at 09/19/20 2115    QUEtiapine (SEROquel) tablet 300 mg  300 mg Oral QHS Lyric Lin MD   300 mg at 09/19/20 2114    prazosin (MINIPRESS) capsule 2 mg  2 mg Oral QHS Rosy Gutierrez NP   2 mg at 09/19/20 2114    magnesium oxide (MAG-OX) tablet 400 mg  400 mg Oral DAILY Rosy Gutierrez NP   400 mg at 09/19/20 0953    promethazine (PHENERGAN) suppository 25 mg  25 mg Rectal Q6H PRN Wanda Aparicio NP   25 mg at 09/06/20 1111    HYDROcodone-acetaminophen (NORCO) 5-325 mg per tablet 1 Tab  1 Tab Oral Q6H PRN Ebb Cheri, DO   1 Tab at 09/06/20 1415    ibuprofen (MOTRIN) tablet 600 mg  600 mg Oral Q6H PRN Lyric Lin MD   600 mg at 09/06/20 1011    OLANZapine (ZyPREXA) tablet 5 mg  5 mg Oral Q6H PRN Halina Sloan, NP        haloperidol lactate (HALDOL) injection 5 mg  5 mg IntraMUSCular Q6H PRN Bowen Reese, MENA        benztropine (COGENTIN) tablet 1 mg  1 mg Oral BID PRN Maureen Sloan NP        diphenhydrAMINE (BENADRYL) injection 50 mg  50 mg IntraMUSCular BID PRN Maureen Sloan, MENA        hydrOXYzine HCL (ATARAX) tablet 50 mg  50 mg Oral TID PRN Maureen Sloan NP        LORazepam (ATIVAN) injection 1 mg  1 mg IntraMUSCular Q4H PRN Maureen Sloan NP        acetaminophen (TYLENOL) tablet 650 mg  650 mg Oral Q4H PRN Halina Sloan NP   650 mg at 09/04/20 2123    magnesium hydroxide (MILK OF MAGNESIA) 400 mg/5 mL oral suspension 30 mL  30 mL Oral DAILY PRN Halina Sloan NP        ondansetron (ZOFRAN ODT) tablet 4 mg  4 mg Oral Q6H PRN Halina Sloan NP   4 mg at 09/19/20 2116         Mental Status Exam:  Eye contact: limited  Grooming: fair  Psychomotor activity: decreased. Speech is spontaneous, soft  Mood is \"okay\"  Affect: depressed. Perception: Denies any AH or VH  Suicidal ideation: Denies SI or plan  Cognition is grossly intact     Lab:  Recent Results (from the past 24 hour(s))   GLUCOSE, POC    Collection Time: 09/19/20  4:23 PM   Result Value Ref Range    Glucose (POC) 80 65 - 100 mg/dL    Performed by Lesia Montiel-A [id number], POC    Collection Time: 09/20/20  8:17 AM   Result Value Ref Range    Glucose (POC) 86 65 - 100 mg/dL    Performed by Beena Lord          Physical Exam:  Body habitus: Body mass index is 23.94 kg/m². Musculoskeletal system: normal gait  Tremor - neg  Cog wheeling - neg      Assessment and Plan:  Jorge Zamarripa meets criteria for a diagnosis of Recurrent major depression, severe without psychotic symptoms; posttraumatic stress disorder, chronic. Continue the medication regimen as prescribed  Consider getting a court order for medications/feedings/labs as needed. Disposition planning to continue.      I certify that this patients inpatient psychiatric hospital services furnished since the previous certification were, and continue to be, required for treatment that could reasonably be expected to improve the patient's condition, or for diagnostic study, and that the patient continues to need, on a daily basis, active treatment furnished directly by or requiring the supervision of inpatient psychiatric facility personnel. In addition, the hospital records show that services furnished were intensive treatment services, admission or related services, or equivalent services.

## 2020-09-20 NOTE — PROGRESS NOTES
2300: Resting quietly in bed with eyes closed. No apparent distress. Respirations are even and unlabored. Staff will continue to monitor q15 throughout the shift. Problem: Falls - Risk of  Goal: *Absence of Falls  Description: Document Bernadine Brewster Fall Risk and appropriate interventions in the flowsheet.   Outcome: Progressing Towards Goal  Note: Fall Risk Interventions:            Medication Interventions: Teach patient to arise slowly

## 2020-09-20 NOTE — GROUP NOTE
[facility] GROUP DOCUMENTATION INDIVIDUAL Group Therapy Note Date: 9/19/2020 Group Start Time: 2015 Group End Time: 2100 Group Topic: Reflection/Relaxation [address] [place] Yumiko Gilliam [facility] GROUP DOCUMENTATION GROUP Group Therapy Note Attendees: 10/11 Attendance: Attended Patient's Goal: Discuss tomorrow's schedule, discuss roles of staff, reflect on goal for treatment Interventions/techniques: Informed, Provide feedback and Supported Follows Directions: Followed directions Interactions: Interacted appropriately Mental Status: Calm Behavior/appearance: Attentive and Cooperative Goals Achieved: Able to engage in interactions and Able to listen to others Additional Notes: Staff met with group to discuss treatment team. Staff encouraged honesty and transparency with doctors and nurses in order to get an individualized treatment plan that will be appropriate for them once discharged. Staff discussed importance of medication management once discharged. Lastly, staff reviewed unit rules. Staff asked group to dispose of food in their rooms and asked if anyone would like fresh linen to keep their room clean. Theresa Taveras

## 2020-09-20 NOTE — PROGRESS NOTES
Patient continues to refuse to eat meals. Drinks very little fluids. She ate 1/2 [employer] bar and drank 120 ml juice. Patient requested a visit from [facility] and wants grief counseling. Problem: Falls - Risk of  Goal: *Absence of Falls  Description: Document Sandie Mcfadden Fall Risk and appropriate interventions in the flowsheet.   Outcome: Progressing Towards Goal  Note: Fall Risk Interventions:            Medication Interventions: Teach patient to arise slowly                   Problem: Depressed Mood (Adult/Pediatric)  Goal: *STG: Verbalizes anger, guilt, and other feelings in a constructive manor  Outcome: Not Progressing Towards Goal  Goal: *STG: Attends activities and groups  Outcome: Not Progressing Towards Goal  Goal: *STG: Demonstrates reduction in symptoms and increase in insight into coping skills/future focused  Outcome: Not Progressing Towards Goal     Problem: Nutrition Deficit  Goal: *Optimize nutritional status  Outcome: Not Progressing Towards Goal

## 2020-09-21 LAB
ALBUMIN SERPL-MCNC: 3.7 G/DL (ref 3.5–5)
ALBUMIN/GLOB SERPL: 0.8 {RATIO} (ref 1.1–2.2)
ALP SERPL-CCNC: 56 U/L (ref 45–117)
ALT SERPL-CCNC: 13 U/L (ref 12–78)
ANION GAP SERPL CALC-SCNC: 9 MMOL/L (ref 5–15)
APPEARANCE UR: CLEAR
AST SERPL-CCNC: 12 U/L (ref 15–37)
BACTERIA URNS QL MICRO: ABNORMAL /HPF
BILIRUB SERPL-MCNC: 0.6 MG/DL (ref 0.2–1)
BILIRUB UR QL: NEGATIVE
BUN SERPL-MCNC: 12 MG/DL (ref 6–20)
BUN/CREAT SERPL: 16 (ref 12–20)
CALCIUM SERPL-MCNC: 9.1 MG/DL (ref 8.5–10.1)
CHLORIDE SERPL-SCNC: 106 MMOL/L (ref 97–108)
CO2 SERPL-SCNC: 23 MMOL/L (ref 21–32)
COLOR UR: ABNORMAL
CREAT SERPL-MCNC: 0.76 MG/DL (ref 0.55–1.02)
EPITH CASTS URNS QL MICRO: ABNORMAL /LPF
GLOBULIN SER CALC-MCNC: 4.4 G/DL (ref 2–4)
GLUCOSE BLD STRIP.AUTO-MCNC: 63 MG/DL (ref 65–100)
GLUCOSE BLD STRIP.AUTO-MCNC: 71 MG/DL (ref 65–100)
GLUCOSE SERPL-MCNC: 65 MG/DL (ref 65–100)
GLUCOSE UR STRIP.AUTO-MCNC: NEGATIVE MG/DL
HGB UR QL STRIP: NEGATIVE
KETONES UR QL STRIP.AUTO: >80 MG/DL
LEUKOCYTE ESTERASE UR QL STRIP.AUTO: ABNORMAL
MUCOUS THREADS URNS QL MICRO: ABNORMAL /LPF
NITRITE UR QL STRIP.AUTO: NEGATIVE
PH UR STRIP: 5.5 [PH] (ref 5–8)
POTASSIUM SERPL-SCNC: 3.6 MMOL/L (ref 3.5–5.1)
PROT SERPL-MCNC: 8.1 G/DL (ref 6.4–8.2)
PROT UR STRIP-MCNC: 30 MG/DL
RBC #/AREA URNS HPF: ABNORMAL /HPF (ref 0–5)
SERVICE CMNT-IMP: ABNORMAL
SERVICE CMNT-IMP: NORMAL
SODIUM SERPL-SCNC: 138 MMOL/L (ref 136–145)
SP GR UR REFRACTOMETRY: >1.03
UR CULT HOLD, URHOLD: NORMAL
UROBILINOGEN UR QL STRIP.AUTO: 1 EU/DL (ref 0.2–1)
WBC URNS QL MICRO: ABNORMAL /HPF (ref 0–4)

## 2020-09-21 PROCEDURE — 74011250637 HC RX REV CODE- 250/637: Performed by: NURSE PRACTITIONER

## 2020-09-21 PROCEDURE — 80053 COMPREHEN METABOLIC PANEL: CPT

## 2020-09-21 PROCEDURE — 81001 URINALYSIS AUTO W/SCOPE: CPT

## 2020-09-21 PROCEDURE — 36415 COLL VENOUS BLD VENIPUNCTURE: CPT

## 2020-09-21 PROCEDURE — 65220000003 HC RM SEMIPRIVATE PSYCH

## 2020-09-21 PROCEDURE — 74011250637 HC RX REV CODE- 250/637: Performed by: PSYCHIATRY & NEUROLOGY

## 2020-09-21 PROCEDURE — 82962 GLUCOSE BLOOD TEST: CPT

## 2020-09-21 RX ADMIN — ONDANSETRON 4 MG: 4 TABLET, ORALLY DISINTEGRATING ORAL at 17:05

## 2020-09-21 RX ADMIN — DULOXETINE 120 MG: 60 CAPSULE, DELAYED RELEASE ORAL at 21:23

## 2020-09-21 RX ADMIN — MAGNESIUM HYDROXIDE 30 ML: 400 SUSPENSION ORAL at 21:24

## 2020-09-21 RX ADMIN — MAGNESIUM GLUCONATE 500 MG ORAL TABLET 400 MG: 500 TABLET ORAL at 08:43

## 2020-09-21 RX ADMIN — QUETIAPINE FUMARATE 300 MG: 100 TABLET ORAL at 21:23

## 2020-09-21 RX ADMIN — TRAZODONE HYDROCHLORIDE 50 MG: 50 TABLET ORAL at 21:23

## 2020-09-21 RX ADMIN — ONDANSETRON 4 MG: 4 TABLET, ORALLY DISINTEGRATING ORAL at 06:59

## 2020-09-21 RX ADMIN — PRAZOSIN HYDROCHLORIDE 2 MG: 1 CAPSULE ORAL at 21:23

## 2020-09-21 RX ADMIN — PANTOPRAZOLE SODIUM 40 MG: 40 TABLET, DELAYED RELEASE ORAL at 06:40

## 2020-09-21 NOTE — INTERDISCIPLINARY ROUNDS
Behavioral Health Interdisciplinary Rounds Patient Name: Marlon Almodovar  Age: 38 y.o. Room/Bed:  727/02 Primary Diagnosis: <principal problem not specified> Admission Status: Involuntary Commitment Readmission within 30 days: no 
Power of  in place: no 
Patient requires a blocked bed:           Reason for blocked bed:  
Sleep hours:6 Participation in Care/Groups:  yes Medication Compliant?: Yes PRNS (last 24 hours):    
Restraints (last 24 hours): Alcohol screening (AUDIT) completed -  AUDIT Score: 0  If applicable, date SBIRT discussed in treatment team AND documented:   
Tobacco - patient is a smoker: Have You Used Tobacco in the Past 30 Days: No 
Illegal Drugs use: Have You Used Any Illegal Substances Over the Past 12 Months: No 
 
24 hour chart check complete: yes Patient goal(s) for today: attend groups, take medications, eat 25-50% of meals Treatment team focus/goals: titrate medication, coordinate follow up, encourage patient to eat and drink 50% of meal, educate on ECT. Progress note: Pt is alert, oriented and guarded. She does not feel comfortable being transparent with treatment team regarding SI and emotional state. She described not trusting team and when ECT was brought up she immediately declined but then agreed to read more information about it. Family Contact information: significant other, Allen ([phone]) Patient's preferred phone number for follow up call : [phone]  
 
LOS:  19  Expected LOS:  
 
Participating treatment team members: Marlon Nishi, ALFREDO Watkins; Dr Devante Bunn;  Jonny Souza RN
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
## 2020-09-21 NOTE — BH NOTES
Chief Complaint:  I don't feel so good. Length of Stay: 19 Days    Interval History:  Shira Mckinney reports persistent nausea and inability to eat. She continues to report vomiting and says she vomited twice yesterday but not today. Says she still feels depressed and hopeless and has passive thougths of suicide all the time. Discussed ECT and she initially declined to consider it but agrees to read about it and be provided with more information. Still eating poorly and says she did not have breakfast at all and has not had a meal in several days. Minimally responsive during the interview. Past Medical History:  Past Medical History:   Diagnosis Date    Anxiety     Asthma     on albuterol prn.  Triggers cold and allergies    Back pain, chronic     sciatica    Gestational hypertension     Past pregnancy    HX OTHER MEDICAL      , , , ,     Hyperemesis     severe hyperemesis    Hypertension     with G12 - none this pregnancy    Iron deficiency anemia 10/08/2010    MDD (major depressive disorder), single episode with postpartum onset 2013    treated with meds - 13    Plantar fasciitis     Pregnancy     36 weeks    PTSD (post-traumatic stress disorder)            Labs:  Lab Results   Component Value Date/Time    WBC 4.9 2020 05:00 PM    WBC 12.2 (H) 2012 04:27 PM    Hemoglobin (POC) 10.4 (L) 2020 07:12 AM    HGB 10.2 (L) 2020 05:00 PM    HCT 32.2 (L) 2020 05:00 PM    PLATELET 849  05:00 PM    MCV 72.5 (L) 2020 05:00 PM    Hgb, External 33.7 2012    Hct, External 69 2012    Platelet cnt., External 307 2012      Lab Results   Component Value Date/Time    Sodium 141 2020 04:42 AM    Potassium 3.7 2020 04:42 AM    Chloride 109 (H) 2020 04:42 AM    CO2 22 2020 04:42 AM    Anion gap 10 2020 04:42 AM    Glucose 55 (L) 2020 04:42 AM    BUN 9 2020 04:42 AM    Creatinine 0.70 09/12/2020 04:42 AM    BUN/Creatinine ratio 13 09/12/2020 04:42 AM    GFR est AA >60 09/12/2020 04:42 AM    GFR est non-AA >60 09/12/2020 04:42 AM    Calcium 8.8 09/12/2020 04:42 AM    Bilirubin, total 0.6 09/12/2020 04:42 AM    Alk.  phosphatase 37 (L) 09/12/2020 04:42 AM    Protein, total 7.2 09/12/2020 04:42 AM    Albumin 3.3 (L) 09/12/2020 04:42 AM    Globulin 3.9 09/12/2020 04:42 AM    A-G Ratio 0.8 (L) 09/12/2020 04:42 AM    ALT (SGPT) 14 09/12/2020 04:42 AM      Vitals:    09/20/20 1608 09/20/20 1620 09/20/20 2135 09/21/20 0814   BP: 101/64 100/64 116/77 105/73   Pulse: 63 83 72 92   Resp: 16 16 16 16   Temp: 97.4 °F (36.3 °C) 98.3 °F (36.8 °C) 98.2 °F (36.8 °C) 97.9 °F (36.6 °C)   SpO2: 94% 100% 100% 98%   Weight:       Height:             Current Facility-Administered Medications   Medication Dose Route Frequency Provider Last Rate Last Dose    DULoxetine (CYMBALTA) capsule 120 mg  120 mg Oral QHS Keily Locke MD   120 mg at 09/20/20 2114    glucose chewable tablet 16 g  4 Tab Oral PRN Michael Jean MD   4 Tab at 09/12/20 1745    glucagon (GLUCAGEN) injection 1 mg  1 mg IntraMUSCular PRN Michael Jean MD        pantoprazole (PROTONIX) tablet 40 mg  40 mg Oral ACB Keily Locke MD   40 mg at 09/21/20 0640    traZODone (DESYREL) tablet 50 mg  50 mg Oral QHS Keily Locke MD   50 mg at 09/20/20 2115    QUEtiapine (SEROquel) tablet 300 mg  300 mg Oral QHS Keily Locke MD   300 mg at 09/20/20 2114    prazosin (MINIPRESS) capsule 2 mg  2 mg Oral QHS Rosy Gutierrez NP   2 mg at 09/20/20 2115    magnesium oxide (MAG-OX) tablet 400 mg  400 mg Oral DAILY Rosy Gutierrez NP   400 mg at 09/21/20 0843    promethazine (PHENERGAN) suppository 25 mg  25 mg Rectal Q6H PRN Yan Che NP   25 mg at 09/06/20 1111    HYDROcodone-acetaminophen (NORCO) 5-325 mg per tablet 1 Tab  1 Tab Oral Q6H PRN Tony Morris DO   1 Tab at 09/06/20 1415    ibuprofen (MOTRIN) tablet 600 mg  600 mg Oral Q6H PRN Stepan Holt MD   600 mg at 09/06/20 1011    OLANZapine (ZyPREXA) tablet 5 mg  5 mg Oral Q6H PRN Venice Sloan NP        haloperidol lactate (HALDOL) injection 5 mg  5 mg IntraMUSCular Q6H PRN Venice Sloan NP        benztropine (COGENTIN) tablet 1 mg  1 mg Oral BID PRN Vencie Sloan NP        diphenhydrAMINE (BENADRYL) injection 50 mg  50 mg IntraMUSCular BID PRN Venice Sloan NP        hydrOXYzine HCL (ATARAX) tablet 50 mg  50 mg Oral TID PRN Venice Sloan NP        LORazepam (ATIVAN) injection 1 mg  1 mg IntraMUSCular Q4H PRN Venice Sloan NP        acetaminophen (TYLENOL) tablet 650 mg  650 mg Oral Q4H PRN Halina Sloan NP   650 mg at 09/04/20 2123    magnesium hydroxide (MILK OF MAGNESIA) 400 mg/5 mL oral suspension 30 mL  30 mL Oral DAILY PRN Halina Sloan NP        ondansetron (ZOFRAN ODT) tablet 4 mg  4 mg Oral Q6H PRN Halina Sloan NP   4 mg at 09/21/20 0659         Mental Status Exam:  Eye contact: limited  Grooming: fair  Psychomotor activity: decreased. Speech is spontaneous, soft  Mood is \"okay\"  Affect: depressed. Perception: Denies any AH or VH  Suicidal ideation: Denies SI or plan  Cognition is grossly intact     Lab:  Recent Results (from the past 24 hour(s))   GLUCOSE, POC    Collection Time: 09/20/20  5:09 PM   Result Value Ref Range    Glucose (POC) 73 65 - 100 mg/dL    Performed by Karolyn Barton    GLUCOSE, POC    Collection Time: 09/21/20  8:04 AM   Result Value Ref Range    Glucose (POC) 71 65 - 100 mg/dL    Performed by Ahmet Grissom (GLADIS)          Physical Exam:  Body habitus: Body mass index is 23.89 kg/m².   Musculoskeletal system: normal gait  Tremor - neg  Cog wheeling - neg      Assessment and Plan:  Tabitha Lorenz meets criteria for a diagnosis of Recurrent major depression, severe without psychotic symptoms; posttraumatic stress disorder, chronic. Continue the medication regimen as prescribed  Consider getting a court order for medications/feedings/labs as needed. Disposition planning to continue. I certify that this patients inpatient psychiatric hospital services furnished since the previous certification were, and continue to be, required for treatment that could reasonably be expected to improve the patient's condition, or for diagnostic study, and that the patient continues to need, on a daily basis, active treatment furnished directly by or requiring the supervision of inpatient psychiatric facility personnel. In addition, the hospital records show that services furnished were intensive treatment services, admission or related services, or equivalent services.

## 2020-09-21 NOTE — PROGRESS NOTES
Pt receiving continuous q15m safety checks, pt currently asleep resting comfortably in bed. No distress noted, respiratory WNL. Supplemental lighting utilized. Problem: Falls - Risk of  Goal: *Absence of Falls  Description: Document Cliff Moreno Fall Risk and appropriate interventions in the flowsheet.   Outcome: Progressing Towards Goal  Note: Fall Risk Interventions:            Medication Interventions: Teach patient to arise slowly

## 2020-09-21 NOTE — PROGRESS NOTES
Patient has telehealth appointment with gastroenterology tomorrow at 56. Due to inpatient admittance request inpatient consult. Notified Ezio Goyal, [facility] office at [phone] of this information. Order placed for inpatient consult, spoke with Stepan Munson in the office, she will inform.     Problem: Depressed Mood (Adult/Pediatric)  Goal: *STG: Participates in treatment plan  Outcome: Progressing Towards Goal     Problem: Depressed Mood (Adult/Pediatric)  Goal: *STG: Verbalizes anger, guilt, and other feelings in a constructive manor  Outcome: Not Progressing Towards Goal  Goal: *STG: Demonstrates reduction in symptoms and increase in insight into coping skills/future focused  Outcome: Not Progressing Towards Goal     Problem: Nutrition Deficit  Goal: *Optimize nutritional status  Outcome: Not Progressing Towards Goal

## 2020-09-21 NOTE — GROUP NOTE
SANDHYA  GROUP DOCUMENTATION INDIVIDUAL Group Therapy Note Date: 9/21/2020 Group Start Time: 0900 Group End Time: 5627 Group Topic: Recreational/Music Therapy [address] [facility] Pat Valdez 
 
[facility] GROUP DOCUMENTATION GROUP Group Therapy Note Attendees: 7/12 Attendance: Attended Patient's Goal:  Relaxation Interventions/techniques: Challenged and Supported Follows Directions: Followed directions Interactions: Interacted appropriately Mental Status: Calm and Flat Behavior/appearance: Attentive Goals Achieved: Able to engage in interactions, Able to listen to others and Able to reflect/comment on own behavior Additional Notes:  Writer facilitated group stretching and relaxation music to meditate at the end. Patient stood but did not complete any stretches. Silver Burdock

## 2020-09-22 LAB
GLUCOSE BLD STRIP.AUTO-MCNC: 135 MG/DL (ref 65–100)
GLUCOSE BLD STRIP.AUTO-MCNC: 79 MG/DL (ref 65–100)
SERVICE CMNT-IMP: ABNORMAL
SERVICE CMNT-IMP: NORMAL

## 2020-09-22 PROCEDURE — 65220000003 HC RM SEMIPRIVATE PSYCH

## 2020-09-22 PROCEDURE — 74011250637 HC RX REV CODE- 250/637: Performed by: NURSE PRACTITIONER

## 2020-09-22 PROCEDURE — 82962 GLUCOSE BLOOD TEST: CPT

## 2020-09-22 PROCEDURE — 74011250637 HC RX REV CODE- 250/637: Performed by: PSYCHIATRY & NEUROLOGY

## 2020-09-22 RX ADMIN — MAGNESIUM GLUCONATE 500 MG ORAL TABLET 400 MG: 500 TABLET ORAL at 10:00

## 2020-09-22 RX ADMIN — ONDANSETRON 4 MG: 4 TABLET, ORALLY DISINTEGRATING ORAL at 10:00

## 2020-09-22 RX ADMIN — PANTOPRAZOLE SODIUM 40 MG: 40 TABLET, DELAYED RELEASE ORAL at 06:35

## 2020-09-22 RX ADMIN — DULOXETINE 120 MG: 60 CAPSULE, DELAYED RELEASE ORAL at 21:55

## 2020-09-22 RX ADMIN — TRAZODONE HYDROCHLORIDE 50 MG: 50 TABLET ORAL at 21:55

## 2020-09-22 RX ADMIN — PRAZOSIN HYDROCHLORIDE 2 MG: 1 CAPSULE ORAL at 21:54

## 2020-09-22 NOTE — PROGRESS NOTES
Problem: Depressed Mood (Adult/Pediatric)  Goal: *STG: Participates in treatment plan  Outcome: Progressing Towards Goal  Note: Up in room talking with Dr. Adeline Felder this am and lowered herself to floor during this time. No pain or discomfort, vitals stable, POC glucose 79. When asked why she lowered herself to floor she said \" I know you think I'm faking it but I just can't do it\". Declined to further explain when asked. Isolating to self and room declines to engage in groups. Deflective and guarded when discussing her behaviors. Declined to share any daily goals. Staff focus is on safety  Goal: *STG: Verbalizes anger, guilt, and other feelings in a constructive manor  Outcome: Progressing Towards Goal  Goal: *STG: Demonstrates reduction in symptoms and increase in insight into coping skills/future focused  Outcome: Progressing Towards Goal  Goal: Interventions  Outcome: Progressing Towards Goal     1146: during tx team informed pt that tx team would be calling BF to discuss safe d/c plans. Encouraged pt to consent to ECT pt declined. Education provided that tomorrow tx team will seek forced meds/tx order from court.

## 2020-09-22 NOTE — BH NOTES
Chief Complaint:  I am feeling really weak and dizzy    Length of Stay: 20 Days    Interval History:  Navid Schaffer reports feeling \"weak and dizzy\" today. Says she feels really \"tired\". For diner she had a \"few bites of squash\". Her CMP was WNL yesterday and her vital signs remain stable. She had to be walked in to the room with the help of two people. She continues to refuse the treatment team to make contact with her fiance who wants to have a family meeting. This might be of great therapeutic importance in devising a safety and post discharge follow up plan. Remains depressed and hopeless. Remains ambivlaent about starting ECT. I will consider requesting a court order for this. Past Medical History:  Past Medical History:   Diagnosis Date    Anxiety     Asthma     on albuterol prn.  Triggers cold and allergies    Back pain, chronic 2010    sciatica    Gestational hypertension     Past pregnancy    HX OTHER MEDICAL      , , , ,     Hyperemesis     severe hyperemesis    Hypertension     with G12 - none this pregnancy    Iron deficiency anemia 10/08/2010    MDD (major depressive disorder), single episode with postpartum onset 2013    treated with meds - 13    Plantar fasciitis     Pregnancy     36 weeks    PTSD (post-traumatic stress disorder)            Labs:  Lab Results   Component Value Date/Time    WBC 4.9 2020 05:00 PM    WBC 12.2 (H) 2012 04:27 PM    Hemoglobin (POC) 10.4 (L) 2020 07:12 AM    HGB 10.2 (L) 2020 05:00 PM    HCT 32.2 (L) 2020 05:00 PM    PLATELET 834  05:00 PM    MCV 72.5 (L) 2020 05:00 PM    Hgb, External 33.7 2012    Hct, External 69 2012    Platelet cnt., External 307 2012      Lab Results   Component Value Date/Time    Sodium 138 2020 01:21 PM    Potassium 3.6 2020 01:21 PM    Chloride 106 2020 01:21 PM    CO2 23 2020 01:21 PM    Anion gap 9 2020 01:21 PM Glucose 65 09/21/2020 01:21 PM    BUN 12 09/21/2020 01:21 PM    Creatinine 0.76 09/21/2020 01:21 PM    BUN/Creatinine ratio 16 09/21/2020 01:21 PM    GFR est AA >60 09/21/2020 01:21 PM    GFR est non-AA >60 09/21/2020 01:21 PM    Calcium 9.1 09/21/2020 01:21 PM    Bilirubin, total 0.6 09/21/2020 01:21 PM    Alk.  phosphatase 56 09/21/2020 01:21 PM    Protein, total 8.1 09/21/2020 01:21 PM    Albumin 3.7 09/21/2020 01:21 PM    Globulin 4.4 (H) 09/21/2020 01:21 PM    A-G Ratio 0.8 (L) 09/21/2020 01:21 PM    ALT (SGPT) 13 09/21/2020 01:21 PM      Vitals:    09/21/20 0814 09/21/20 1618 09/21/20 1942 09/22/20 0845   BP: 105/73 101/70 102/70 123/83   Pulse: 92 77 74 99   Resp: 16 14 14 16   Temp: 97.9 °F (36.6 °C) 97.9 °F (36.6 °C) 97.7 °F (36.5 °C) 98.8 °F (37.1 °C)   SpO2: 98% 99% 100% 96%   Weight:       Height:             Current Facility-Administered Medications   Medication Dose Route Frequency Provider Last Rate Last Dose    DULoxetine (CYMBALTA) capsule 120 mg  120 mg Oral QHS Brittny Barajas MD   120 mg at 09/21/20 2123    glucose chewable tablet 16 g  4 Tab Oral PRN Leonor Shukla MD   4 Tab at 09/12/20 1745    glucagon (GLUCAGEN) injection 1 mg  1 mg IntraMUSCular PRN Leonor Shukla MD        pantoprazole (PROTONIX) tablet 40 mg  40 mg Oral ACB Brittny Barajas MD   40 mg at 09/22/20 3950    traZODone (DESYREL) tablet 50 mg  50 mg Oral QHS Brittny Barajas MD   50 mg at 09/21/20 2123    QUEtiapine (SEROquel) tablet 300 mg  300 mg Oral QHS Brittny Barajas MD   300 mg at 09/21/20 2123    prazosin (MINIPRESS) capsule 2 mg  2 mg Oral QHS Rosy Gutierrez NP   2 mg at 09/21/20 2123    magnesium oxide (MAG-OX) tablet 400 mg  400 mg Oral DAILY Rosy Gutierrez NP   400 mg at 09/22/20 1000    promethazine (PHENERGAN) suppository 25 mg  25 mg Rectal Q6H PRN Dennie Gift, NP   25 mg at 09/06/20 1111    HYDROcodone-acetaminophen (NORCO) 5-325 mg per tablet 1 Tab  1 Tab Oral Q6H PRN Chris Barton, [place], DO   1 Tab at 09/06/20 1415    ibuprofen (MOTRIN) tablet 600 mg  600 mg Oral Q6H PRN Salas Yanes MD   600 mg at 09/06/20 1011    OLANZapine (ZyPREXA) tablet 5 mg  5 mg Oral Q6H PRN Jordan Sloan, NP        haloperidol lactate (HALDOL) injection 5 mg  5 mg IntraMUSCular Q6H PRN Ibanez-Summer, Jordan Hetawny, NP        benztropine (COGENTIN) tablet 1 mg  1 mg Oral BID PRN Jordan Sloan, NP        diphenhydrAMINE (BENADRYL) injection 50 mg  50 mg IntraMUSCular BID PRN PraneethiJordan, NP        hydrOXYzine HCL (ATARAX) tablet 50 mg  50 mg Oral TID PRN Nathalia, Jordan Augustine, NP        LORazepam (ATIVAN) injection 1 mg  1 mg IntraMUSCular Q4H PRN Jordan Sloan, NP        acetaminophen (TYLENOL) tablet 650 mg  650 mg Oral Q4H PRN Halina Sloan NP   650 mg at 09/04/20 2123    magnesium hydroxide (MILK OF MAGNESIA) 400 mg/5 mL oral suspension 30 mL  30 mL Oral DAILY PRN Halina Sloan, NP   30 mL at 09/21/20 2124    ondansetron (ZOFRAN ODT) tablet 4 mg  4 mg Oral Q6H PRN Halina Sloan, NP   4 mg at 09/22/20 1000         Mental Status Exam:  Eye contact: limited  Grooming: fair  Psychomotor activity: decreased. Speech is spontaneous, soft  Mood is \"okay\"  Affect: depressed. Perception: Denies any AH or VH  Suicidal ideation: Passive death wishes are present.    Cognition is grossly intact     Lab:  Recent Results (from the past 24 hour(s))   METABOLIC PANEL, COMPREHENSIVE    Collection Time: 09/21/20  1:21 PM   Result Value Ref Range    Sodium 138 136 - 145 mmol/L    Potassium 3.6 3.5 - 5.1 mmol/L    Chloride 106 97 - 108 mmol/L    CO2 23 21 - 32 mmol/L    Anion gap 9 5 - 15 mmol/L    Glucose 65 65 - 100 mg/dL    BUN 12 6 - 20 MG/DL    Creatinine 0.76 0.55 - 1.02 MG/DL    BUN/Creatinine ratio 16 12 - 20      GFR est AA >60 >60 ml/min/1.73m2    GFR est non-AA >60 >60 ml/min/1.73m2    Calcium 9.1 8.5 - 10.1 MG/DL    Bilirubin, total 0.6 0.2 - 1.0 MG/DL    ALT (SGPT) 13 12 - 78 U/L    AST (SGOT) 12 (L) 15 - 37 U/L    Alk. phosphatase 56 45 - 117 U/L    Protein, total 8.1 6.4 - 8.2 g/dL    Albumin 3.7 3.5 - 5.0 g/dL    Globulin 4.4 (H) 2.0 - 4.0 g/dL    A-G Ratio 0.8 (L) 1.1 - 2.2     URINALYSIS W/MICROSCOPIC    Collection Time: 09/21/20  1:29 PM   Result Value Ref Range    Color DARK YELLOW      Appearance CLEAR CLEAR      Specific gravity >1.030     pH (UA) 5.5 5.0 - 8.0      Protein 30 (A) NEG mg/dL    Glucose Negative NEG mg/dL    Ketone >80 (A) NEG mg/dL    Bilirubin Negative NEG      Blood Negative NEG      Urobilinogen 1.0 0.2 - 1.0 EU/dL    Nitrites Negative NEG      Leukocyte Esterase TRACE (A) NEG      WBC 0-4 0 - 4 /hpf    RBC 0-5 0 - 5 /hpf    Epithelial cells MODERATE (A) FEW /lpf    Bacteria 1+ (A) NEG /hpf    Mucus 1+ (A) NEG /lpf   URINE CULTURE HOLD SAMPLE    Collection Time: 09/21/20  1:29 PM    Specimen: Serum   Result Value Ref Range    Urine culture hold        Urine on hold in Microbiology dept for 2 days. If unpreserved urine is submitted, it cannot be used for addtional testing after 24 hours, recollection will be required. GLUCOSE, POC    Collection Time: 09/21/20  5:03 PM   Result Value Ref Range    Glucose (POC) 63 (L) 65 - 100 mg/dL    Performed by [address], POC    Collection Time: 09/22/20  8:14 AM   Result Value Ref Range    Glucose (POC) 79 65 - 100 mg/dL    Performed by Tyler Mayers (GLADIS)          Physical Exam:  Body habitus: Body mass index is 23.89 kg/m². Musculoskeletal system: normal gait  Tremor - neg  Cog wheeling - neg      Assessment and Plan:  Adiel Sams meets criteria for a diagnosis of Recurrent major depression, severe without psychotic symptoms; posttraumatic stress disorder, chronic.      Continue the medication regimen as prescribed  Consider getting a court order for medications/feedings/labs as needed. Disposition planning to continue. I certify that this patients inpatient psychiatric hospital services furnished since the previous certification were, and continue to be, required for treatment that could reasonably be expected to improve the patient's condition, or for diagnostic study, and that the patient continues to need, on a daily basis, active treatment furnished directly by or requiring the supervision of inpatient psychiatric facility personnel. In addition, the hospital records show that services furnished were intensive treatment services, admission or related services, or equivalent services.

## 2020-09-22 NOTE — PROGRESS NOTES
PRN Medication Documentation  @1000  Specific patient behavior that led to need for PRN medication: Patient is staring at food for extended period of time, declines to eat or drink. When questioned, states that she needs zofran for nausea.   Staff interventions attempted prior to PRN being given: fluid, communication  PRN medication given: Zofran  odt 4mg  Patient response/effectiveness of PRN medication: pending

## 2020-09-22 NOTE — INTERDISCIPLINARY ROUNDS
Behavioral Health Interdisciplinary Rounds Patient Name: Myra Tao  Age: 38 y.o. Room/Bed:  727/02 Primary Diagnosis: <principal problem not specified> Admission Status: Involuntary Commitment Readmission within 30 days: no 
Power of  in place: no 
Patient requires a blocked bed: no          Reason for blocked bed:  
Sleep hours: 7.5 Participation in Care/Groups:  yes Medication Compliant?: Yes PRNS (last 24 hours): zofran Restraints (last 24 hours): Alcohol screening (AUDIT) completed -  AUDIT Score: 0  If applicable, date SBIRT discussed in treatment team AND documented:   
Tobacco - patient is a smoker: Have You Used Tobacco in the Past 30 Days: No 
Illegal Drugs use: Have You Used Any Illegal Substances Over the Past 12 Months: No 
 
24 hour chart check complete: yes Patient goal(s) for today:  attend groups, take medications Treatment team focus/goals: titrate medication, coordinate follow up Progress note: Pt requesting 1 more day to consider ECT option and if she declines treatment team will seek FTO. Family Contact information: significant other, Allen ([phone]) Patient's preferred phone number for follow up call : [phone]  
 
LOS:  20  Expected LOS: TBD Participating treatment team members: Myra Tao, ALFREDO Armstrong; Dr Nehemias Ramsey; Josette Urbina RN

## 2020-09-22 NOTE — PROGRESS NOTES
Pt received resting quietly in bed with eyes closed. Respirations even and unlabored, NAD noted. Staff to continue q15 minute checks for safety. Problem: Falls - Risk of  Goal: *Absence of Falls  Description: Document Yenny Callhaan Fall Risk and appropriate interventions in the flowsheet.   Outcome: Progressing Towards Goal  Note: Fall Risk Interventions:            Medication Interventions: Teach patient to arise slowly

## 2020-09-22 NOTE — BH NOTES
GROUP THERAPY PROGRESS NOTE    Patient did not participate in Substance Abuse group.      Steve Dennison, Supervisee in Social Work

## 2020-09-22 NOTE — BH NOTES
HYPOGLYCEMIC EPISODE DOCUMENTATION    Patient with hypoglycemic episode(s) at 1703 on 9/21/20(date). BG value(s) pre-treatment 61  Was patient symptomatic?  [] yes, [x] no  Patient was treated with the following rescue medications/treatments: pt refused any [] D50                [] Glucose tablets                [] Glucagon                [] 4oz juice                [] 6oz reg soda                [] 8oz low fat milk  BG value post-treatment: pt refused Once BG treated and value greater than 80mg/dl, pt was provided with the following:  [] snack pt refused  [] meal pt refused  Name of MD notified:n/a  The following orders were received:

## 2020-09-22 NOTE — BH NOTES
GROUP THERAPY PROGRESS NOTE    Patient did not participate in Coping-Skills Group.      Steve Dennison, Supervisee in Social Work

## 2020-09-23 LAB
GLUCOSE BLD STRIP.AUTO-MCNC: 109 MG/DL (ref 65–100)
GLUCOSE BLD STRIP.AUTO-MCNC: 83 MG/DL (ref 65–100)
SERVICE CMNT-IMP: ABNORMAL
SERVICE CMNT-IMP: NORMAL

## 2020-09-23 PROCEDURE — 74011250637 HC RX REV CODE- 250/637: Performed by: PSYCHIATRY & NEUROLOGY

## 2020-09-23 PROCEDURE — 74011250637 HC RX REV CODE- 250/637: Performed by: NURSE PRACTITIONER

## 2020-09-23 PROCEDURE — 65220000003 HC RM SEMIPRIVATE PSYCH

## 2020-09-23 PROCEDURE — 82962 GLUCOSE BLOOD TEST: CPT

## 2020-09-23 RX ADMIN — PANTOPRAZOLE SODIUM 40 MG: 40 TABLET, DELAYED RELEASE ORAL at 06:31

## 2020-09-23 RX ADMIN — DULOXETINE 120 MG: 60 CAPSULE, DELAYED RELEASE ORAL at 21:58

## 2020-09-23 RX ADMIN — MAGNESIUM GLUCONATE 500 MG ORAL TABLET 400 MG: 500 TABLET ORAL at 09:02

## 2020-09-23 RX ADMIN — MAGNESIUM HYDROXIDE 30 ML: 400 SUSPENSION ORAL at 22:35

## 2020-09-23 RX ADMIN — ONDANSETRON 4 MG: 4 TABLET, ORALLY DISINTEGRATING ORAL at 09:03

## 2020-09-23 RX ADMIN — PRAZOSIN HYDROCHLORIDE 2 MG: 1 CAPSULE ORAL at 21:58

## 2020-09-23 RX ADMIN — ONDANSETRON 4 MG: 4 TABLET, ORALLY DISINTEGRATING ORAL at 18:17

## 2020-09-23 RX ADMIN — QUETIAPINE FUMARATE 300 MG: 100 TABLET ORAL at 21:57

## 2020-09-23 RX ADMIN — TRAZODONE HYDROCHLORIDE 50 MG: 50 TABLET ORAL at 21:58

## 2020-09-23 NOTE — PROGRESS NOTES
MUSIC THERAPY GROUP PROGRESS NOTE        9/23/2020    The patient Ori ruiz 38 y.o. female participated in [address] [place] [address] group in the [address] [place] [address] 264 unit dayroom. Group time: 11:20-12:00    Personal goal for participation: Pt will actively participate in two-thirds of the group session time. Goal orientation: Positive social interaction    Group therapy participation: Active    Therapeutic interventions: Sing along, instrument playing, musical game. Observation of participation: The patient (pt) participated throughout the Music Therapy Group, and for at least two-thirds of the group session time. Pt's affect was flat. She consistently responded when this music therapist (MT) directly addressed her. Pt also sang along at times and played an egg shaker to the beat of a song. She shared that one of her favorite genres of music is R&B and one of her favorite R&B singers is Vermillion.     LISA VillalpandoBC (Music Therapist-Board Certified)  Spiritual Care Department  Referral-based service

## 2020-09-23 NOTE — BH NOTES
GROUP THERAPY PROGRESS NOTE    Patient did not participate in Process Group     Steve Dennison, Supervisee in Social Work

## 2020-09-23 NOTE — PROGRESS NOTES
Problem: Falls - Risk of  Goal: *Absence of Falls  Description: Document Gricel Friedman Fall Risk and appropriate interventions in the flowsheet. Outcome: Progressing Towards Goal  Note: Fall Risk Interventions:            Medication Interventions: Teach patient to arise slowly     Pt. Received resting in bed, NAD, respirations even and unlabored. Will continue q15 safety checks.

## 2020-09-23 NOTE — BH NOTES
GROUP THERAPY PROGRESS NOTE    Patient did not participate in Coping/Process group. But, Stevie Valdez participated in [place] group-Muscle Relaxation exercises.      Steve Dennison, Supervisee in Social Work

## 2020-09-23 NOTE — PROGRESS NOTES
Problem: Depressed Mood (Adult/Pediatric)  Goal: *STG: Participates in treatment plan  9/23/2020 1840 by Renetta Colon RN  Outcome: Progressing Towards Goal  Variance Patient slowly responding  [phone] Pt states that she does not want to be legally forced to comply with any treatments. With encouragement, she drank 16 oz Ensure, 16 oz soda, and 95% of her dinner tray. Pt reports feeling nauseas after consuming her meal.  Zofran 4 mg ODT administered. 2054  Pt ate a container of yogurt an a Nutrigrain bar.

## 2020-09-23 NOTE — BH NOTES
Chief Complaint:  I am a little bit better. Length of Stay: 21 Days    Interval History:  Ladkaylee Spivey reports feeling \"a little better\" today. Says she ate small amounts for dinner and had several snacks including peaches and a yogurt as well as a fruit bar. She was able to walk in to the interview without help and smiled briefly with eye contact. Refuses to consider ECT and stated \"I will stick to medicines\" although she has been vomiting right after taking them and probably is not absorbing them at all. Her refusal is without any clear reason and says \"I just don't want it\". Her weight has dropped 8 lbs and she remains at risk for serious harm if she persists in refusing treatment. She was informed that the treatment team will apply for a treatment order over her objections. Past Medical History:  Past Medical History:   Diagnosis Date    Anxiety     Asthma     on albuterol prn.  Triggers cold and allergies    Back pain, chronic     sciatica    Gestational hypertension     Past pregnancy    HX OTHER MEDICAL      , , , ,     Hyperemesis     severe hyperemesis    Hypertension     with G12 - none this pregnancy    Iron deficiency anemia 10/08/2010    MDD (major depressive disorder), single episode with postpartum onset 2013    treated with meds - 13    Plantar fasciitis     Pregnancy     36 weeks    PTSD (post-traumatic stress disorder)            Labs:  Lab Results   Component Value Date/Time    WBC 4.9 2020 05:00 PM    WBC 12.2 (H) 2012 04:27 PM    Hemoglobin (POC) 10.4 (L) 2020 07:12 AM    HGB 10.2 (L) 2020 05:00 PM    HCT 32.2 (L) 2020 05:00 PM    PLATELET 911  05:00 PM    MCV 72.5 (L) 2020 05:00 PM    Hgb, External 33.7 2012    Hct, External 69 2012    Platelet cnt., External 307 2012      Lab Results   Component Value Date/Time    Sodium 138 2020 01:21 PM    Potassium 3.6 2020 01:21 PM Chloride 106 09/21/2020 01:21 PM    CO2 23 09/21/2020 01:21 PM    Anion gap 9 09/21/2020 01:21 PM    Glucose 65 09/21/2020 01:21 PM    BUN 12 09/21/2020 01:21 PM    Creatinine 0.76 09/21/2020 01:21 PM    BUN/Creatinine ratio 16 09/21/2020 01:21 PM    GFR est AA >60 09/21/2020 01:21 PM    GFR est non-AA >60 09/21/2020 01:21 PM    Calcium 9.1 09/21/2020 01:21 PM    Bilirubin, total 0.6 09/21/2020 01:21 PM    Alk.  phosphatase 56 09/21/2020 01:21 PM    Protein, total 8.1 09/21/2020 01:21 PM    Albumin 3.7 09/21/2020 01:21 PM    Globulin 4.4 (H) 09/21/2020 01:21 PM    A-G Ratio 0.8 (L) 09/21/2020 01:21 PM    ALT (SGPT) 13 09/21/2020 01:21 PM      Vitals:    09/22/20 1627 09/22/20 1942 09/22/20 2155 09/23/20 0836   BP: (!) 89/61 95/62 97/72 99/67   Pulse: 63 81 97 84   Resp: 16 16  16   Temp: 98.4 °F (36.9 °C) 98.1 °F (36.7 °C)  98.5 °F (36.9 °C)   SpO2: 100% 98%  98%   Weight:       Height:             Current Facility-Administered Medications   Medication Dose Route Frequency Provider Last Rate Last Dose    DULoxetine (CYMBALTA) capsule 120 mg  120 mg Oral QHS Britt Barraza MD   120 mg at 09/22/20 2155    glucose chewable tablet 16 g  4 Tab Oral PRN Nithya Kirby MD   4 Tab at 09/12/20 1745    glucagon (GLUCAGEN) injection 1 mg  1 mg IntraMUSCular PRN Nithya Kirby MD        pantoprazole (PROTONIX) tablet 40 mg  40 mg Oral ACB Britt Barraza MD   40 mg at 09/23/20 0631    traZODone (DESYREL) tablet 50 mg  50 mg Oral QHS Britt Barraza MD   50 mg at 09/22/20 2155    QUEtiapine (SEROquel) tablet 300 mg  300 mg Oral QHS Britt Barraza MD   Stopped at 09/22/20 2155    prazosin (MINIPRESS) capsule 2 mg  2 mg Oral QHS Rosy Gutierrez NP   2 mg at 09/22/20 2154    magnesium oxide (MAG-OX) tablet 400 mg  400 mg Oral DAILY Rosy Gutierrez NP   400 mg at 09/23/20 0902    promethazine (PHENERGAN) suppository 25 mg  25 mg Rectal Q6H PRN Claude Montgomery NP   25 mg at 09/06/20 1111    HYDROcodone-acetaminophen (NORCO) 5-325 mg per tablet 1 Tab  1 Tab Oral Q6H PRN Faizan Burgos, DO   1 Tab at 09/06/20 1415    ibuprofen (MOTRIN) tablet 600 mg  600 mg Oral Q6H PRN Lizet Stubbs MD   600 mg at 09/06/20 1011    OLANZapine (ZyPREXA) tablet 5 mg  5 mg Oral Q6H PRN Tad Sloan NP        haloperidol lactate (HALDOL) injection 5 mg  5 mg IntraMUSCular Q6H PRN Tad Sloan NP        benztropine (COGENTIN) tablet 1 mg  1 mg Oral BID PRN Tad Sloan NP        diphenhydrAMINE (BENADRYL) injection 50 mg  50 mg IntraMUSCular BID PRN Tad Sloan NP        hydrOXYzine HCL (ATARAX) tablet 50 mg  50 mg Oral TID PRN Tad Sloan NP        LORazepam (ATIVAN) injection 1 mg  1 mg IntraMUSCular Q4H PRN Halina Sloan NP        acetaminophen (TYLENOL) tablet 650 mg  650 mg Oral Q4H PRN Halina Sloan NP   650 mg at 09/04/20 2123    magnesium hydroxide (MILK OF MAGNESIA) 400 mg/5 mL oral suspension 30 mL  30 mL Oral DAILY PRN Halina Sloan NP   30 mL at 09/21/20 2124    ondansetron (ZOFRAN ODT) tablet 4 mg  4 mg Oral Q6H PRN Halina Sloan NP   4 mg at 09/23/20 7380         Mental Status Exam:  Eye contact: limited  Grooming: fair  Psychomotor activity: decreased. Speech is spontaneous, soft  Mood is \"okay\"  Affect: depressed. Perception: Denies any AH or VH  Suicidal ideation: Passive death wishes are present.    Cognition is grossly intact     Lab:  Recent Results (from the past 24 hour(s))   GLUCOSE, POC    Collection Time: 09/22/20  4:40 PM   Result Value Ref Range    Glucose (POC) 135 (H) 65 - 100 mg/dL    Performed by Shelby ARMSTRONG)    GLUCOSE, POC    Collection Time: 09/23/20  8:14 AM   Result Value Ref Range    Glucose (POC) 83 65 - 100 mg/dL    Performed by Mikey Emmanuel          Physical Exam:  Body habitus: Body mass index is 23.89 kg/m². Musculoskeletal system: normal gait  Tremor - neg  Cog wheeling - neg      Assessment and Plan:  Primitivo Blandon meets criteria for a diagnosis of Recurrent major depression, severe without psychotic symptoms; posttraumatic stress disorder, chronic. Continue the medication regimen as prescribed  Consider getting a court order for medications/feedings/labs as needed. Disposition planning to continue. I certify that this patients inpatient psychiatric hospital services furnished since the previous certification were, and continue to be, required for treatment that could reasonably be expected to improve the patient's condition, or for diagnostic study, and that the patient continues to need, on a daily basis, active treatment furnished directly by or requiring the supervision of inpatient psychiatric facility personnel. In addition, the hospital records show that services furnished were intensive treatment services, admission or related services, or equivalent services.

## 2020-09-23 NOTE — PROGRESS NOTES
Problem: Depressed Mood (Adult/Pediatric)  Goal: *STG: Participates in treatment plan  Outcome: Progressing Towards Goal  Note: Out on unit quiet and declined to attend groups. Continues to report poor appetite and does not eat adequate food or fluids. Tx team discuss ECT. Pt deflects and vague when asked if she will consent to ECT. Denies SI, reports hopeless and remains helpless. Does not offer sanders personal goal. Staff focus is on offering support and reassurance.    Goal: *STG: Verbalizes anger, guilt, and other feelings in a constructive manor  Outcome: Progressing Towards Goal  Goal: *STG: Attends activities and groups  Outcome: Progressing Towards Goal  Goal: *STG: Demonstrates reduction in symptoms and increase in insight into coping skills/future focused  Outcome: Progressing Towards Goal  Goal: Interventions  Outcome: Progressing Towards Goal

## 2020-09-23 NOTE — INTERDISCIPLINARY ROUNDS
Behavioral Health Interdisciplinary Rounds Patient Name: Darwin Alan  Age: 38 y.o. Room/Bed:  727/02 Primary Diagnosis: <principal problem not specified> Admission Status: Involuntary Commitment Readmission within 30 days: no 
Power of  in place: no 
Patient requires a blocked bed: no          Reason for blocked bed:  
Sleep hours:  6 Participation in Care/Groups:  no 
Medication Compliant?: Yes PRNS (last 24 hours): None Restraints (last 24 hours):  no 
  
Alcohol screening (AUDIT) completed -  AUDIT Score: 0  If applicable, date SBIRT discussed in treatment team AND documented:   
Tobacco - patient is a smoker: Have You Used Tobacco in the Past 30 Days: No 
Illegal Drugs use: Have You Used Any Illegal Substances Over the Past 12 Months: No 
 
24 hour chart check complete: yes Patient goal(s) for today: attend groups, take medications Treatment team focus/goals: seek FTO for ECT. Progress note: Pt is alert, oriented, and calm. She reported feeling a little better today than yesterday and said she ate a small amount yesterday. She is declining voluntary ECT and treatment team will seek FTO for ECT. Family Contact information: significant other, Alejandro ([phone]) Patient's preferred phone number for follow up call : [phone]  
 
LOS:  21  Expected LOS: TBD 
  
Participating treatment team members: Ingrid Gillette MSW; Dr Bernard Angry; Kingston Terrell, AKIL

## 2020-09-24 LAB
GLUCOSE BLD STRIP.AUTO-MCNC: 101 MG/DL (ref 65–100)
GLUCOSE BLD STRIP.AUTO-MCNC: 107 MG/DL (ref 65–100)
SERVICE CMNT-IMP: ABNORMAL
SERVICE CMNT-IMP: ABNORMAL

## 2020-09-24 PROCEDURE — 65220000003 HC RM SEMIPRIVATE PSYCH

## 2020-09-24 PROCEDURE — 74011250637 HC RX REV CODE- 250/637: Performed by: NURSE PRACTITIONER

## 2020-09-24 PROCEDURE — 74011250637 HC RX REV CODE- 250/637: Performed by: PSYCHIATRY & NEUROLOGY

## 2020-09-24 PROCEDURE — 82962 GLUCOSE BLOOD TEST: CPT

## 2020-09-24 RX ORDER — TRAZODONE HYDROCHLORIDE 100 MG/1
100 TABLET ORAL
Status: DISCONTINUED | OUTPATIENT
Start: 2020-09-24 | End: 2020-09-25

## 2020-09-24 RX ADMIN — TRAZODONE HYDROCHLORIDE 100 MG: 100 TABLET ORAL at 21:06

## 2020-09-24 RX ADMIN — DULOXETINE 120 MG: 60 CAPSULE, DELAYED RELEASE ORAL at 21:05

## 2020-09-24 RX ADMIN — PANTOPRAZOLE SODIUM 40 MG: 40 TABLET, DELAYED RELEASE ORAL at 06:06

## 2020-09-24 RX ADMIN — QUETIAPINE FUMARATE 300 MG: 100 TABLET ORAL at 21:06

## 2020-09-24 RX ADMIN — PRAZOSIN HYDROCHLORIDE 2 MG: 1 CAPSULE ORAL at 21:05

## 2020-09-24 NOTE — BH NOTES
Chief Complaint:  I am a little bit better. Length of Stay: 22 Days    Interval History:  Nikos Granados is better today. Says she ate 100% of her dinner and 80% of her breakfast. Says she felt sick after that but did not vomit. Says she feels \"stronger\" today and has not had any SI or plan. She was able to walk in the interview without assistance and made better eye contact. Expresses hope about the future. Calm and pleasant during the interview. Past Medical History:  Past Medical History:   Diagnosis Date    Anxiety     Asthma     on albuterol prn.  Triggers cold and allergies    Back pain, chronic     sciatica    Gestational hypertension     Past pregnancy    HX OTHER MEDICAL      , , , ,     Hyperemesis     severe hyperemesis    Hypertension     with G12 - none this pregnancy    Iron deficiency anemia 10/08/2010    MDD (major depressive disorder), single episode with postpartum onset 2013    treated with meds - 13    Plantar fasciitis     Pregnancy     36 weeks    PTSD (post-traumatic stress disorder)            Labs:  Lab Results   Component Value Date/Time    WBC 4.9 2020 05:00 PM    WBC 12.2 (H) 2012 04:27 PM    Hemoglobin (POC) 10.4 (L) 2020 07:12 AM    HGB 10.2 (L) 2020 05:00 PM    HCT 32.2 (L) 2020 05:00 PM    PLATELET 635  05:00 PM    MCV 72.5 (L) 2020 05:00 PM    Hgb, External 33.7 2012    Hct, External 69 2012    Platelet cnt., External 307 2012      Lab Results   Component Value Date/Time    Sodium 138 2020 01:21 PM    Potassium 3.6 2020 01:21 PM    Chloride 106 2020 01:21 PM    CO2 23 2020 01:21 PM    Anion gap 9 2020 01:21 PM    Glucose 65 2020 01:21 PM    BUN 12 2020 01:21 PM    Creatinine 0.76 2020 01:21 PM    BUN/Creatinine ratio 16 2020 01:21 PM    GFR est AA >60 2020 01:21 PM    GFR est non-AA >60 2020 01:21 PM Calcium 9.1 09/21/2020 01:21 PM    Bilirubin, total 0.6 09/21/2020 01:21 PM    Alk.  phosphatase 56 09/21/2020 01:21 PM    Protein, total 8.1 09/21/2020 01:21 PM    Albumin 3.7 09/21/2020 01:21 PM    Globulin 4.4 (H) 09/21/2020 01:21 PM    A-G Ratio 0.8 (L) 09/21/2020 01:21 PM    ALT (SGPT) 13 09/21/2020 01:21 PM      Vitals:    09/23/20 0836 09/23/20 1530 09/23/20 2000 09/24/20 0913   BP: 99/67 95/67 95/66 111/75   Pulse: 84 81 83 98   Resp: 16 16 16 18   Temp: 98.5 °F (36.9 °C) 98.4 °F (36.9 °C) 98.2 °F (36.8 °C) 98.6 °F (37 °C)   SpO2: 98% 100% 98% 98%   Weight:       Height:             Current Facility-Administered Medications   Medication Dose Route Frequency Provider Last Rate Last Dose    DULoxetine (CYMBALTA) capsule 120 mg  120 mg Oral QHS Son Vines MD   120 mg at 09/23/20 2158    glucose chewable tablet 16 g  4 Tab Oral PRN Del Meier MD   4 Tab at 09/12/20 1745    glucagon (GLUCAGEN) injection 1 mg  1 mg IntraMUSCular PRN Del Meier MD        pantoprazole (PROTONIX) tablet 40 mg  40 mg Oral ACB Son Viens MD   40 mg at 09/24/20 0606    traZODone (DESYREL) tablet 50 mg  50 mg Oral QHS Son Vines MD   50 mg at 09/23/20 2158    QUEtiapine (SEROquel) tablet 300 mg  300 mg Oral QHS Son Vines MD   300 mg at 09/23/20 2157    prazosin (MINIPRESS) capsule 2 mg  2 mg Oral QHS Rosy Gutierrez NP   2 mg at 09/23/20 2158    magnesium oxide (MAG-OX) tablet 400 mg  400 mg Oral DAILY Rosy Gutierrez NP   Stopped at 09/24/20 0900    promethazine (PHENERGAN) suppository 25 mg  25 mg Rectal Q6H PRN Ladonna Jones NP   25 mg at 09/06/20 1111    HYDROcodone-acetaminophen (NORCO) 5-325 mg per tablet 1 Tab  1 Tab Oral Q6H PRN Andrew Wise DO   1 Tab at 09/06/20 1415    ibuprofen (MOTRIN) tablet 600 mg  600 mg Oral Q6H PRN Son Vines MD   600 mg at 09/06/20 1011    OLANZapine (ZyPREXA) tablet 5 mg  5 mg Oral Q6H PRN Halina Sloan NP   5 mg at 09/24/20 0816    haloperidol lactate (HALDOL) injection 5 mg  5 mg IntraMUSCular Q6H PRN Ibanez-Summer, [facility], NP        benztropine (COGENTIN) tablet 1 mg  1 mg Oral BID PRN Ibanez-Summer, [facility], NP        diphenhydrAMINE (BENADRYL) injection 50 mg  50 mg IntraMUSCular BID PRN Ibanez-Summer, [facility] Alice, NP        hydrOXYzine HCL (ATARAX) tablet 50 mg  50 mg Oral TID PRN Ibanez-Summer, [facility] Premier, NP        LORazepam (ATIVAN) injection 1 mg  1 mg IntraMUSCular Q4H PRN [facility], [facility] Alice, NP        acetaminophen (TYLENOL) tablet 650 mg  650 mg Oral Q4H PRN ShamarHalina Wheeler, NP   650 mg at 09/04/20 2123    magnesium hydroxide (MILK OF MAGNESIA) 400 mg/5 mL oral suspension 30 mL  30 mL Oral DAILY PRN Halina Sloan, NP   30 mL at 09/23/20 2235    ondansetron (ZOFRAN ODT) tablet 4 mg  4 mg Oral Q6H PRN Halina Sloan, NP   4 mg at 09/23/20 1817         Mental Status Exam:  Eye contact: limited  Grooming: fair  Psychomotor activity: decreased. Speech is spontaneous, soft  Mood is \"okay\"  Affect: depressed. Perception: Denies any AH or VH  Suicidal ideation: Passive death wishes are present. Cognition is grossly intact     Lab:  Recent Results (from the past 24 hour(s))   GLUCOSE, POC    Collection Time: 09/23/20  4:47 PM   Result Value Ref Range    Glucose (POC) 109 (H) 65 - 100 mg/dL    Performed by Yuan Joe (RN)    GLUCOSE, POC    Collection Time: 09/24/20  7:30 AM   Result Value Ref Range    Glucose (POC) 101 (H) 65 - 100 mg/dL    Performed by Lin Desai          Physical Exam:  Body habitus: Body mass index is 23.89 kg/m². Musculoskeletal system: normal gait  Tremor - neg  Cog wheeling - neg      Assessment and Plan:  Johnny Fenton meets criteria for a diagnosis of Recurrent major depression, severe without psychotic symptoms; posttraumatic stress disorder, chronic.    Increased Trazodone to 100mg at bedtime. Continue the medication regimen as prescribed  Consider getting a court order for medications/feedings/labs as needed. Disposition planning to continue. I certify that this patients inpatient psychiatric hospital services furnished since the previous certification were, and continue to be, required for treatment that could reasonably be expected to improve the patient's condition, or for diagnostic study, and that the patient continues to need, on a daily basis, active treatment furnished directly by or requiring the supervision of inpatient psychiatric facility personnel. In addition, the hospital records show that services furnished were intensive treatment services, admission or related services, or equivalent services.

## 2020-09-24 NOTE — PROGRESS NOTES
Problem: Falls - Risk of  Goal: *Absence of Falls  Description: Document Dexter Isidro Fall Risk and appropriate interventions in the flowsheet. Outcome: Progressing Towards Goal  Note: Fall Risk Interventions:            Medication Interventions: Teach patient to arise slowly            Pt. Received resting in bed, NAD, respirations even and unlabored. Will continue q15 safety rounds.

## 2020-09-24 NOTE — BH NOTES
Behavioral Health Interdisciplinary Rounds     Patient Name: Adiel Sams  Age: 38 y.o. Room/Bed:  727/02  Primary Diagnosis: <principal problem not specified>   Admission Status:      Readmission within 30 days:   Power of  in place:   Patient requires a blocked bed:           Reason for blocked bed:     VTE Prophylaxis:     Mobility needs/Fall risk:   Flu Vaccine :    Nutritional Plan:   Consults:          Labs/Testing due today?:     Sleep hours:        Participation in Care/Groups:    Medication Compliant?:   PRNS (last 24 hours):     Restraints (last 24 hours):       CIWA (range last 24 hours):     COWS (range last 24 hours):      Alcohol screening (AUDIT) completed -   AUDIT Score: 0     If applicable, date SBIRT discussed in treatment team AND documented:   AUDIT Screen Score: AUDIT Score: 0      Document Brief Intervention (corresponds directly with the 5 A's, Ask, Advise, Assess, Assist, and Arrange): At- Risk Patients (Score 7-15 for women; 8-15 for men)  Discuss concern patient is drinking at unhealthy levels known to increase risk of alcohol-related health problems. Is Patient ready to commit to change? If No:   Encourage reflection   Discuss short term and long term health risks of consuming alcohol   Barriers to change   Reaffirm willingness to help / Educational materials provided  If Yes:   Set goal  [employer] provided    Harmful use or Dependence (Score 16 or greater)   Discuss short term and long term health risks of consuming alcohol   Recommendations   Negotiate drinking goal   Recommend addiction specialist/center   Arrange follow-up appointments.     Tobacco - patient is a smoker: Have You Used Tobacco in the Past 30 Days: No  Illegal Drugs use: Have You Used Any Illegal Substances Over the Past 12 Months: No    24 hour chart check complete:      Patient goal(s) for today:   Treatment team focus/goals:   Progress note: She ate 100% of her dinner yesterday, but felt horrible after. Informed if she keeps eating like this, there will not be a forced order. LOS:  22  Expected LOS:     Patient goal(s) for today: attend groups, take medications  Treatment team focus/goals: seek FTO for ECT; Dr Charly Solis to call Carito Emerald and update on treatment plan and care options. Progress note:  Family Contact information: significant other, Alejandro ([phone])   Patient's preferred phone number for follow up call : [phone]        Participating treatment team members: Mabel Herman, Angella Ryan, MSW; Dr. Charly Solis; Viet Berg, AKIL; Racheal Chisholm, SabiD.

## 2020-09-24 NOTE — BH NOTES
GROUP THERAPY PROGRESS NOTE    Dominique Bledsoe is participating in [employer] / Wellness Group    Group time: 1 hour    Personal goal for participation: Prep for Today / Set Goals     Goal orientation: [address] [facility]     Group therapy participation: passive    Therapeutic interventions reviewed and discussed: Yes     Impression of participation: Pt sat quietly throughout the entire group. Pt's only comment was to expressed being in a  depressed mood and not feeling well. This was this writer's first time hearing pt declare publicly how she was feeling emotionally  and physically.  She was applauded by US for sharing in group

## 2020-09-24 NOTE — PROGRESS NOTES
Problem: Discharge Planning  Goal: *Discharge to safe environment  Outcome: Progressing Towards Goal  Note: Patient identifies home as a safe environment and plans to return upon discharge. Patient is connected to outpatient services. Patient has supportive family. Goal: *Knowledge of discharge instructions  Outcome: Progressing Towards Goal  Note: Patient verbalizes understanding of goals for treatment and safe discharge. Problem: Discharge Planning  Goal: *Discharge to safe environment  Outcome: Progressing Towards Goal  Note: Patient identifies home as a safe environment and plans to return upon discharge. Patient is connected to outpatient services. Patient has supportive family. Goal: *Knowledge of discharge instructions  Outcome: Progressing Towards Goal  Note: Patient verbalizes understanding of goals for treatment and safe discharge. Problem: Discharge Planning  Goal: *Knowledge of medication management  Outcome: Not Progressing Towards Goal  Note: Patient selectively taking medications and not eating well. Treatment team seeking FTO for ECT.

## 2020-09-24 NOTE — PROGRESS NOTES
Problem: Depressed Mood (Adult/Pediatric)  Goal: *STG: Participates in treatment plan  Outcome: Progressing Towards Goal  Note: Patient participates in treatment plan. Patient has flat affect, guarded, denies si. Medications and discharge discussed. Patient's thoughts are organized. Patient attended group, stays out on the unit.

## 2020-09-24 NOTE — INTERDISCIPLINARY ROUNDS
Behavioral Health Interdisciplinary Rounds Patient Name: Ellen Nguyen  Age: 38 y.o. Room/Bed:  727/02 Primary Diagnosis: <principal problem not specified> Admission Status: Involuntary Commitment Readmission within 30 days: no 
Power of  in place: no 
Patient requires a blocked bed: no          Reason for blocked bed:  
Sleep hours:4 Participation in Care/Groups:  no 
Medication Compliant?: Yes PRNS (last 24 hours) Zofran Restraints (last 24 hours):  no 
  
Alcohol screening (AUDIT) completed -  AUDIT Score: 0  If applicable, date SBIRT discussed in treatment team AND documented:   
Tobacco - patient is a smoker: Have You Used Tobacco in the Past 30 Days: No 
Illegal Drugs use: Have You Used Any Illegal Substances Over the Past 12 Months: No 
 
24 hour chart check complete: yes Patient goal(s) for today: attend groups, take medications Treatment team focus/goals: seek FTO for ECT; Dr Ori Lopes to call Erum Young and update on treatment plan and care options. Progress note: 
Family Contact information: significant other, Allen ([phone]) Patient's preferred phone number for follow up call : [phone]  
 
LOS:  21  Expected LOS:  TBD 
  
Participating treatment team members: Halina Ortiz, Gypsy Brand, MSW; Dr Yael Quinteros, RN

## 2020-09-25 LAB
GLUCOSE BLD STRIP.AUTO-MCNC: 97 MG/DL (ref 65–100)
GLUCOSE BLD STRIP.AUTO-MCNC: 97 MG/DL (ref 65–100)
SERVICE CMNT-IMP: NORMAL
SERVICE CMNT-IMP: NORMAL

## 2020-09-25 PROCEDURE — 82962 GLUCOSE BLOOD TEST: CPT

## 2020-09-25 PROCEDURE — 65220000003 HC RM SEMIPRIVATE PSYCH

## 2020-09-25 PROCEDURE — 74011250637 HC RX REV CODE- 250/637: Performed by: NURSE PRACTITIONER

## 2020-09-25 PROCEDURE — 74011250637 HC RX REV CODE- 250/637: Performed by: PSYCHIATRY & NEUROLOGY

## 2020-09-25 PROCEDURE — 93005 ELECTROCARDIOGRAM TRACING: CPT

## 2020-09-25 RX ADMIN — QUETIAPINE FUMARATE 300 MG: 100 TABLET ORAL at 21:44

## 2020-09-25 RX ADMIN — DULOXETINE 120 MG: 60 CAPSULE, DELAYED RELEASE ORAL at 21:01

## 2020-09-25 RX ADMIN — TRAZODONE HYDROCHLORIDE 150 MG: 100 TABLET ORAL at 23:00

## 2020-09-25 RX ADMIN — PANTOPRAZOLE SODIUM 40 MG: 40 TABLET, DELAYED RELEASE ORAL at 06:37

## 2020-09-25 RX ADMIN — PRAZOSIN HYDROCHLORIDE 2 MG: 1 CAPSULE ORAL at 21:00

## 2020-09-25 RX ADMIN — ONDANSETRON 4 MG: 4 TABLET, ORALLY DISINTEGRATING ORAL at 08:34

## 2020-09-25 RX ADMIN — ONDANSETRON 4 MG: 4 TABLET, ORALLY DISINTEGRATING ORAL at 14:07

## 2020-09-25 RX ADMIN — MAGNESIUM GLUCONATE 500 MG ORAL TABLET 400 MG: 500 TABLET ORAL at 08:31

## 2020-09-25 NOTE — BH NOTES
Chief Complaint:  I am feeling better. Length of Stay: 23 Days    Interval History:  Navid Schaffer is significantly better today. She ate 85% of her dinner yesterday and most of her breakfast. She smiled with eye contact when she came in to the room. Says her mood is better and denies any SI or plan. Stated that she felt \"a little sick\" after breakfast but did not throw up try to induce vomiting. Pleasant and calm during the interview. She woke up several times at night and her sleep remained disturbed. Past Medical History:  Past Medical History:   Diagnosis Date    Anxiety     Asthma     on albuterol prn.  Triggers cold and allergies    Back pain, chronic 2010    sciatica    Gestational hypertension     Past pregnancy    HX OTHER MEDICAL      , , , ,     Hyperemesis     severe hyperemesis    Hypertension     with G12 - none this pregnancy    Iron deficiency anemia 10/08/2010    MDD (major depressive disorder), single episode with postpartum onset 2013    treated with meds - 13    Plantar fasciitis     Pregnancy     36 weeks    PTSD (post-traumatic stress disorder)            Labs:  Lab Results   Component Value Date/Time    WBC 4.9 2020 05:00 PM    WBC 12.2 (H) 2012 04:27 PM    Hemoglobin (POC) 10.4 (L) 2020 07:12 AM    HGB 10.2 (L) 2020 05:00 PM    HCT 32.2 (L) 2020 05:00 PM    PLATELET 443  05:00 PM    MCV 72.5 (L) 2020 05:00 PM    Hgb, External 33.7 2012    Hct, External 69 2012    Platelet cnt., External 307 2012      Lab Results   Component Value Date/Time    Sodium 138 2020 01:21 PM    Potassium 3.6 2020 01:21 PM    Chloride 106 2020 01:21 PM    CO2 23 2020 01:21 PM    Anion gap 9 2020 01:21 PM    Glucose 65 2020 01:21 PM    BUN 12 2020 01:21 PM    Creatinine 0.76 2020 01:21 PM    BUN/Creatinine ratio 16 2020 01:21 PM    GFR est AA >60 2020 01:21 PM    GFR est non-AA >60 09/21/2020 01:21 PM    Calcium 9.1 09/21/2020 01:21 PM    Bilirubin, total 0.6 09/21/2020 01:21 PM    Alk.  phosphatase 56 09/21/2020 01:21 PM    Protein, total 8.1 09/21/2020 01:21 PM    Albumin 3.7 09/21/2020 01:21 PM    Globulin 4.4 (H) 09/21/2020 01:21 PM    A-G Ratio 0.8 (L) 09/21/2020 01:21 PM    ALT (SGPT) 13 09/21/2020 01:21 PM      Vitals:    09/24/20 0913 09/24/20 1614 09/24/20 2100 09/25/20 0829   BP: 111/75 109/73 103/79 101/70   Pulse: 98 97 86 91   Resp: 18 16 16 12   Temp: 98.6 °F (37 °C) 98.4 °F (36.9 °C) 98.3 °F (36.8 °C) 98.2 °F (36.8 °C)   SpO2: 98% 100% 98% 98%   Weight:       Height:             Current Facility-Administered Medications   Medication Dose Route Frequency Provider Last Rate Last Dose    traZODone (DESYREL) tablet 100 mg  100 mg Oral QHS Lorraine Silva MD   100 mg at 09/24/20 2106    DULoxetine (CYMBALTA) capsule 120 mg  120 mg Oral QHS Lorraine Silva MD   120 mg at 09/24/20 2105    glucose chewable tablet 16 g  4 Tab Oral PRN Conner Fischer MD   4 Tab at 09/12/20 1745    glucagon (GLUCAGEN) injection 1 mg  1 mg IntraMUSCular PRN Conner Fischer MD        pantoprazole (PROTONIX) tablet 40 mg  40 mg Oral ACB Lorraine Silva MD   40 mg at 09/25/20 0875    QUEtiapine (SEROquel) tablet 300 mg  300 mg Oral QHS Lorraine Silva MD   300 mg at 09/24/20 2106    prazosin (MINIPRESS) capsule 2 mg  2 mg Oral QHS Rosy Gutierrez, NP   2 mg at 09/24/20 2105    magnesium oxide (MAG-OX) tablet 400 mg  400 mg Oral DAILY Rosy Gutierrez, NP   400 mg at 09/25/20 0831    promethazine (PHENERGAN) suppository 25 mg  25 mg Rectal Q6H PRN Zachery Faye NP   25 mg at 09/06/20 1111    HYDROcodone-acetaminophen (NORCO) 5-325 mg per tablet 1 Tab  1 Tab Oral Q6H PRN Margarita Christie DO   1 Tab at 09/06/20 1415    ibuprofen (MOTRIN) tablet 600 mg  600 mg Oral Q6H PRN Lorraine Silva MD   600 mg at 09/06/20 1011    OLANZapine (ZyPREXA) tablet 5 mg  5 mg Oral Q6H PRN Kris Sloan, NP        haloperidol lactate (HALDOL) injection 5 mg  5 mg IntraMUSCular Q6H PRN Mae Sloan, NP        benztropine (COGENTIN) tablet 1 mg  1 mg Oral BID PRN Kris Sloan, NP        diphenhydrAMINE (BENADRYL) injection 50 mg  50 mg IntraMUSCular BID PRN Kris Sloan, NP        hydrOXYzine HCL (ATARAX) tablet 50 mg  50 mg Oral TID PRN Kris Sloan, NP        LORazepam (ATIVAN) injection 1 mg  1 mg IntraMUSCular Q4H PRN Kris Sloan, NP        acetaminophen (TYLENOL) tablet 650 mg  650 mg Oral Q4H PRN Halina Sloan, NP   650 mg at 09/04/20 2123    magnesium hydroxide (MILK OF MAGNESIA) 400 mg/5 mL oral suspension 30 mL  30 mL Oral DAILY PRN Halina Sloan, NP   30 mL at 09/23/20 2235    ondansetron (ZOFRAN ODT) tablet 4 mg  4 mg Oral Q6H PRN Halina Sloan, NP   4 mg at 09/25/20 0834         Mental Status Exam:  Eye contact: limited  Grooming: fair  Psychomotor activity: decreased. Speech is spontaneous, soft  Mood is \"okay\"  Affect: depressed. Perception: Denies any AH or VH  Suicidal ideation: Passive death wishes are present.    Cognition is grossly intact     Lab:  Recent Results (from the past 24 hour(s))   GLUCOSE, POC    Collection Time: 09/24/20  4:16 PM   Result Value Ref Range    Glucose (POC) 107 (H) 65 - 100 mg/dL    Performed by [facility], POC    Collection Time: 09/25/20  8:16 AM   Result Value Ref Range    Glucose (POC) 97 65 - 100 mg/dL    Performed by Jian Martin    EKG, 12 LEAD, INITIAL    Collection Time: 09/25/20  8:50 AM   Result Value Ref Range    Ventricular Rate 70 BPM    Atrial Rate 70 BPM    P-R Interval 146 ms    QRS Duration 90 ms    Q-T Interval 396 ms    QTC Calculation (Bezet) 427 ms    Calculated P Axis 39 degrees    Calculated R Axis 17 degrees    Calculated T Axis 12 degrees    Diagnosis       Normal sinus rhythm  When compared with ECG of 24-NOV-2014 15:21,  No significant change was found           Physical Exam:  Body habitus: Body mass index is 23.89 kg/m². Musculoskeletal system: normal gait  Tremor - neg  Cog wheeling - neg      Assessment and Plan:  True Ort meets criteria for a diagnosis of Recurrent major depression, severe without psychotic symptoms; posttraumatic stress disorder, chronic. Increased Trazodone to 150mg at bedtime. Continue the medication regimen as prescribed  Consider getting a court order for medications/feedings/labs as needed. Disposition planning to continue. I certify that this patients inpatient psychiatric hospital services furnished since the previous certification were, and continue to be, required for treatment that could reasonably be expected to improve the patient's condition, or for diagnostic study, and that the patient continues to need, on a daily basis, active treatment furnished directly by or requiring the supervision of inpatient psychiatric facility personnel. In addition, the hospital records show that services furnished were intensive treatment services, admission or related services, or equivalent services.

## 2020-09-25 NOTE — PROGRESS NOTES
Problem: Depressed Mood (Adult/Pediatric)  Goal: *STG: Participates in treatment plan  Outcome: Progressing Towards Goal  Goal: *STG: Attends activities and groups  Outcome: Progressing Towards Goal     Problem: Depressed Mood (Adult/Pediatric)  Goal: *STG: Verbalizes anger, guilt, and other feelings in a constructive manor  Outcome: Not Progressing Towards Goal  Goal: *STG: Demonstrates reduction in symptoms and increase in insight into coping skills/future focused  Outcome: Not Progressing Towards Goal       Passively engaged. Sitting on the geriatric unit. Eating more and smiling today. Will continue to monitor.

## 2020-09-25 NOTE — BH NOTES
GROUP THERAPY PROGRESS NOTE    Nia Jimenez is participation : Community/ Wellness Group    Group time: 1 hour    Personal goal for participation: Prep for Today , Review Unit Rules and Expectations     Goal orientation: Eval. Your Wellness , Coping On The Unti and Setting (s)     Group therapy participation: minimal    Therapeutic interventions reviewed and discussed: Yes: Making The Most Of Your Tx Team Meeting     Impression of participation: Pt was very attentive and listening to everyone sharing their stories. Pt told Team that she was feeling somewhat better in  terms of her moods. Pt sat quietly throughout the entire duration of group. Pt later told US ( F-F on the unit) that she feels trapped and unable to change her perspective about her current life.

## 2020-09-25 NOTE — INTERDISCIPLINARY ROUNDS
Behavioral Health Interdisciplinary Rounds Patient Name: Lou Lauren  Age: 38 y.o. Room/Bed:  727/02 Primary Diagnosis: <principal problem not specified> Admission Status: Involuntary Commitment Readmission within 30 days: no 
Power of  in place: no 
Patient requires a blocked bed: no          Reason for blocked bed:    
Sleep hours:  2 Participation in Care/Groups:  yes Medication Compliant?: Yes PRNS (last 24 hours): None Restraints (last 24 hours):  no 
  
Alcohol screening (AUDIT) completed -  AUDIT Score: 0  If applicable, date SBIRT discussed in treatment team AND documented:   
Tobacco - patient is a smoker: Have You Used Tobacco in the Past 30 Days: No 
Illegal Drugs use: Have You Used Any Illegal Substances Over the Past 12 Months: No 
 
24 hour chart check complete: yes Patient goal(s) for today: attend groups, take medications, eat 25% of meal  
Treatment team focus/goals: titrate medication, coordinate follow up, talk to  for FTO Progress note: Pt reported she woke up nauseous and ate a fruit cup and some yogurt. She is brighter and more engaged today. Plan for discharge Tuesday Family Contact information: significant other, Alejandro ([phone]) Patient's preferred phone number for follow up call : [phone]  
 
LOS:  23  Expected LOS: 27 
 
Participating treatment team members: Milton Browne MSW; Dr Dmitry Zhang

## 2020-09-25 NOTE — PROGRESS NOTES
Problem: Falls - Risk of  Goal: *Absence of Falls  Description: Document Vitor Hua Fall Risk and appropriate interventions in the flowsheet. Outcome: Progressing Towards Goal  Note: Fall Risk Interventions:            Medication Interventions: Teach patient to arise slowly              Pt. In bed resting, NAD, respirations even and unlabored. Will continue q15 safety rounds.

## 2020-09-25 NOTE — PROGRESS NOTES
"      Internal Medicine Admitting History and Physical    Name William Vidal     1939   Age/Sex 78 y.o. female   MRN [id number]   Code Status Partial Code     After 5PM or if no immediate response to page, please call for cross-coverage  Attending/Team: Abiola Gurrola MD See Patient List for primary contact information  Call [phone] to page    1st Call - Day Intern (R1):   Gio Stevenson MD 2nd Call - Day Sr. Resident (R2/R3):   Gaurang Borja MD       Chief Complaint:  Chest Pain  Diarrhea    HPI:  The patient is a 78 year old Female with a past medical history of Retention, GERD, autoimmune hepatitis, recurrent UTIs presented to the emergency department with chest pain and diarrhea.  She complains of chest pain started at around 5 pm in the evening, sudden onset of central chest pain , Pressure like, radiating to her upper back, lasted about 1 hour and is not associated with shortness of breath, diaphoresis, abdominal pain, nausea, vomiting. She went to the urgent care and was given an aspirin which resolved her chest pain an hor after onset. Currently, she feels fine but describes a tightness to her chest which is minimal pain when compared to her chest pain before taking aspirin. She also had three episodes of loose watery stools earlier the same day before the onset of chest pain. Her first episode was \"explosive diarrhea\" but denies blood in stools . She said she was in stress due to her husbands recent MI three weeks ago. She has a history of hypertension but denies heart diesease or diabetes. No past history of similar chest pain.she has been feeling very fatigued over the last few weeks. She has history of GERD and so has lost weight intentionly of about 20 lbs to help improve the reflux.  She has a past history of autoimmune hepatitis diagnosed 5 years ago after having elevated liver enzymes and jaundice.       Review of Systems   Constitutional: Positive for malaise/fatigue and weight " Problem: Depressed Mood (Adult/Pediatric)  Goal: *STG: Participates in treatment plan  Outcome: Progressing Towards Goal  Note: Out on unit passively engaged. Good eye contact and louder voice when talking with peers. Denies SI w plan at this time. Fleeing SI yesterday and decreased negative thoughts. States she is able to tolerate food and fluids and has not vomited in 48 hours. Daily goal is to attend a group.  Staff focus is on offering support and coping skills educaiton  Goal: *STG: Verbalizes anger, guilt, and other feelings in a constructive manor  Outcome: Progressing Towards Goal  Goal: *STG: Attends activities and groups  Outcome: Progressing Towards Goal  Goal: *STG: Demonstrates reduction in symptoms and increase in insight into coping skills/future focused  Outcome: Progressing Towards Goal  Goal: Interventions  Outcome: Progressing Towards Goal loss. Negative for chills, diaphoresis and fever.   HENT: Negative for nosebleeds and sore throat.    Eyes: Negative for blurred vision.   Respiratory: Negative for cough, shortness of breath and wheezing.    Cardiovascular: Positive for chest pain. Negative for palpitations, orthopnea, leg swelling and PND.   Gastrointestinal: Negative for abdominal pain, blood in stool, constipation, diarrhea, heartburn, nausea and vomiting.   Genitourinary: Negative for dysuria, frequency and urgency.   Musculoskeletal: Positive for back pain and neck pain.   Neurological: Negative for dizziness, tingling, tremors, focal weakness, weakness and headaches.   Endo/Heme/Allergies: Does not bruise/bleed easily.   Psychiatric/Behavioral: The patient is not nervous/anxious.              Past Medical History:   Past Medical History:   Diagnosis Date   • Arrhythmia     occ skipped beat   • Autoimmune hepatitis (CMS-HCC)     Luis Miguel   • CATARACT     surgical IOL   • Chronic UTI (urinary tract infection)     Raciel on abx suppression   • DJD (degenerative joint disease), lumbosacral     Hood   • Gallbladder problem    • GERD (gastroesophageal reflux disease)    • GOUT    • Heart valve disease    • Hypertension    • Incontinence of urine     [place]   • Irritable bowel    • Other specified disorder of intestines     diarrhea   • Psychiatric problem     anxiety       Past Surgical History:  Past Surgical History:   Procedure Laterality Date   • DILATION AND CURETTAGE  5/7/2013    Performed by Chris Schrader M.D. at SURGERY SAME DAY [facility]   • BLADDER SUSPENSION  2008    x 3 june, august, december   • ABDOMINAL HYSTERECTOMY TOTAL  7/81    partial   • APPENDECTOMY  1942   • CHOLECYSTECTOMY     • FINGER AMPUTATION      Finger tip   • SHOULDER DECOMPRESSION ARTHROSCOPIC      left       Current Outpatient Medications:  Home Medications     Reviewed by Sabra Nathan (Pharmacy Tech) on 08/30/17 at 2336  Med List Status:  "Complete   Medication Last Dose Status   atenolol (TENORMIN) 50 MG Tab 8/29/2017 Active   cephALEXin (KEFLEX) 500 MG Cap 8/30/2017 Active   CycloSPORINE (RESTASIS OP) 8/30/2017 Active   esomeprazole magnesium (NEXIUM) 40 MG PACK 8/29/2017 Active   multivitamin (THERAGRAN) Tab 8/30/2017 Active   Probiotic Product (PROBIOTIC ADVANCED PO) 8/30/2017 Active                Medication Allergy/Sensitivities:  Allergies   Allergen Reactions   • Augmentin    • Cipro Xr    • Imuran [Azathioprine]    • Macrobid [Nitrofurantoin Monohydrate Macrocrystals]    • Pyridium Plus [Phenazopyridine-Butabarb-Hyosc]    • Sulfa Drugs    • Trimethoprim    • Zetia [Ezetimibe]          Family History:  Family History   Problem Relation Age of Onset   • Heart Attack     • Stroke     • Asthma     • Clotting Disorder     • Cancer         Social History:  Social History     Social History   • Marital status:      Spouse name: N/A   • Number of children: N/A   • Years of education: N/A     Occupational History   • Not on file.     Social History Main Topics   • Smoking status: Former Smoker     Quit date: 3/14/1983   • Smokeless tobacco: Never Used   • Alcohol use 0.0 oz/week      Comment: 1 or 2 a month   • Drug use: No   • Sexual activity: Not on file     Other Topics Concern   • Not on file     Social History Narrative   • No narrative on file     Living situation: Lives with Her   PCP : Kb Patrick MD    Physical Exam     Vitals:    08/30/17 2059 08/30/17 2100 08/30/17 2130 08/30/17 2200   BP:       Pulse: 61 68 (!) 59 (!) 57   Resp: (!) 23 (!) 25 (!) 29 (!) 24   Temp:       SpO2: 98% 98% 98% 97%   Weight:       Height:         Body mass index is 24.98 kg/m².  /68   Pulse (!) 57   Temp 36.4 °C (97.5 °F)   Resp (!) 24   Ht 1.626 m (5' 4\")   Wt 66 kg (145 lb 8.1 oz)   LMP 10/10/1995   SpO2 97%   BMI 24.98 kg/m²   O2 therapy: Pulse Oximetry: 97 %    Physical Exam   Constitutional: She is oriented to person, place, " and time and well-developed, well-nourished, and in no distress.   HENT:   Head: Normocephalic and atraumatic.   Mouth/Throat: Oropharynx is clear and moist.   Eyes: Conjunctivae and EOM are normal. Pupils are equal, round, and reactive to light. No scleral icterus.   Neck: Neck supple. No JVD present. Tracheal deviation present. No thyromegaly present.   Cardiovascular: Normal rate, regular rhythm and normal heart sounds.  Exam reveals no gallop and no friction rub.    No murmur heard.  Pulmonary/Chest: Breath sounds normal. No respiratory distress. She has no wheezes. She exhibits no tenderness.   Abdominal: Soft. Bowel sounds are normal. She exhibits no distension.   Musculoskeletal: She exhibits no edema.   Lymphadenopathy:     She has no cervical adenopathy.   Neurological: She is alert and oriented to person, place, and time. No cranial nerve deficit. GCS score is 15.     Data Review       Old Records Request:   Completed  Current Records review and summary: Completed    Lab Data Review:  Recent Results (from the past 24 hour(s))   EKG (ER)    Collection Time: 17  6:15 PM   Result Value Ref Range    Report       [facility] Emergency Dept.    Test Date:  2017  Pt Name:    IAV MCCORMICK                Department: ER  MRN:        [id number]                      Room:  Gender:     F                            Technician: [id number]  :        [id number]                   Requested By:ER TRIAGE PROTOCOL  Order #:    [id number]                    Reading MD:    Measurements  Intervals                                Axis  Rate:       61                           P:          50  KS:         204                          QRS:        -46  QRSD:       100                          T:          75  QT:         428  QTc:        431    Interpretive Statements  SINUS RHYTHM  LEFT ANTERIOR FASCICULAR BLOCK  No previous ECG available for comparison     Troponin    Collection Time: 17  8:05 PM    Result Value Ref Range    Troponin I <0.01 0.00 - 0.04 ng/mL   Btype Natriuretic Peptide    Collection Time: 08/30/17  8:05 PM   Result Value Ref Range    B Natriuretic Peptide 101 (H) 0 - 100 pg/mL   CBC with Differential    Collection Time: 08/30/17  8:05 PM   Result Value Ref Range    WBC 11.2 (H) 4.8 - 10.8 K/uL    RBC 5.07 4.20 - 5.40 M/uL    Hemoglobin 15.8 12.0 - 16.0 g/dL    Hematocrit 46.3 37.0 - 47.0 %    MCV 91.3 81.4 - 97.8 fL    MCH 31.2 27.0 - 33.0 pg    MCHC 34.1 33.6 - 35.0 g/dL    RDW 43.7 35.9 - 50.0 fL    Platelet Count 196 164 - 446 K/uL    MPV 9.2 9.0 - 12.9 fL    Neutrophils-Polys 63.30 44.00 - 72.00 %    Lymphocytes 25.80 22.00 - 41.00 %    Monocytes 8.10 0.00 - 13.40 %    Eosinophils 2.00 0.00 - 6.90 %    Basophils 0.40 0.00 - 1.80 %    Immature Granulocytes 0.40 0.00 - 0.90 %    Nucleated RBC 0.00 /100 WBC    Neutrophils (Absolute) 7.08 2.00 - 7.15 K/uL    Lymphs (Absolute) 2.89 1.00 - 4.80 K/uL    Monos (Absolute) 0.90 (H) 0.00 - 0.85 K/uL    Eos (Absolute) 0.22 0.00 - 0.51 K/uL    Baso (Absolute) 0.05 0.00 - 0.12 K/uL    Immature Granulocytes (abs) 0.04 0.00 - 0.11 K/uL    NRBC (Absolute) 0.00 K/uL   Complete Metabolic Panel (CMP)    Collection Time: 08/30/17  8:05 PM   Result Value Ref Range    Sodium 142 135 - 145 mmol/L    Potassium 4.1 3.6 - 5.5 mmol/L    Chloride 109 96 - 112 mmol/L    Co2 24 20 - 33 mmol/L    Anion Gap 9.0 0.0 - 11.9    Glucose 104 (H) 65 - 99 mg/dL    Bun 20 8 - 22 mg/dL    Creatinine 0.95 0.50 - 1.40 mg/dL    Calcium 9.6 8.5 - 10.5 mg/dL    AST(SGOT) 33 12 - 45 U/L    ALT(SGPT) 17 2 - 50 U/L    Alkaline Phosphatase 64 30 - 99 U/L    Total Bilirubin 0.4 0.1 - 1.5 mg/dL    Albumin 4.3 3.2 - 4.9 g/dL    Total Protein 7.2 6.0 - 8.2 g/dL    Globulin 2.9 1.9 - 3.5 g/dL    A-G Ratio 1.5 g/dL   Prothrombin Time    Collection Time: 08/30/17  8:05 PM   Result Value Ref Range    PT 13.5 12.0 - 14.6 sec    INR 1.00 0.87 - 1.13   APTT    Collection Time: 08/30/17  8:05 PM    Result Value Ref Range    APTT 30.5 24.7 - 36.0 sec   Lipase    Collection Time: 08/30/17  8:05 PM   Result Value Ref Range    Lipase 365 (H) 11 - 82 U/L   ESTIMATED GFR    Collection Time: 08/30/17  8:05 PM   Result Value Ref Range    GFR If African American >60 >60 mL/min/1.73 m 2    GFR If Non  57 (A) >60 mL/min/1.73 m 2       Imaging/Procedures Review:    ndependant Imaging Review: Completed  DX-CHEST-LIMITED (1 VIEW)   Final Result      No acute cardiopulmonary disease.      NM-CARDIAC STRESS TEST    (Results Pending)     EKG:   EKG Independant Review: Completed  QTc:431, HR: 61, Normal Sinus Rhythm, no ST/T changes. 1st degree AV block with VA interval of 204.  Records reviewed and summarized in current documentation       Assessment/Plan     1)Chest pain:  Patient had central Non reproduciblechest pain lasted one hour and radiated to her upper back.   Heart score is 3.  Low suspicion of PE.  Risk factors for Cardiac chest Pain, Post menopausal, Hypertensive,Hyperlipidemia.  On admission Normal troponin 0.01  EKG shows non specific ST T wave changes.  Plan:  Observation admit to telemetry.  NPO at Midnight.  Trend troponins and EKG  TSH level ordered  Lipid panel and hemoglobin A1c, TSH ordered.  Aspirin 81mg  Continue Atenolol 10mg   Cardiac Stress test ordered.  Tylenol as needed.  Primary team to consider echo and d-dimer are V/Q scan if needed.    2)Diarrhea:  Three episodes of loose watery stools.  Likely secondary to chronic antibiotic use.  Magnesium Level Ordered.  NS fluids for maintenance.    3) GERD:  Resume home on omeprazole 40 mg.    4) chronic kidney disease:  On admission GFR is 57. BUN/creatinine is 20/0.95.  IV fluid therapy to improve kidney function.  Lipase is elevated 365.  Repeat lipase.        Anticipated Hospital stay: Observation admit    Quality Measures    Reviewed items::  EKG reviewed, Radiology images reviewed, Labs reviewed and Medications reviewed  Philomena  catheter::  No Quach  DVT prophylaxis pharmacological::  Heparin

## 2020-09-25 NOTE — BH NOTES
PRN Medication Documentation    Specific patient behavior that led to need for PRN medication: Patient c/o nausea  Staff interventions attempted prior to PRN being given: ginger ale and crackers  PRN medication given: Zofran 4mg  Patient response/effectiveness of PRN medication: Will monitor for effectiveness    PRN Medication Documentation    Specific patient behavior that led to need for PRN medication: Patient c/o nausea  Staff interventions attempted prior to PRN being given: crackers  PRN medication given: Zofran 4mg  Patient response/effectiveness of PRN medication: will monitor for effectiveness

## 2020-09-26 LAB
ATRIAL RATE: 70 BPM
CALCULATED P AXIS, ECG09: 39 DEGREES
CALCULATED R AXIS, ECG10: 17 DEGREES
CALCULATED T AXIS, ECG11: 12 DEGREES
DIAGNOSIS, 93000: NORMAL
GLUCOSE BLD STRIP.AUTO-MCNC: 102 MG/DL (ref 65–100)
GLUCOSE BLD STRIP.AUTO-MCNC: 125 MG/DL (ref 65–100)
P-R INTERVAL, ECG05: 146 MS
Q-T INTERVAL, ECG07: 396 MS
QRS DURATION, ECG06: 90 MS
QTC CALCULATION (BEZET), ECG08: 427 MS
SERVICE CMNT-IMP: ABNORMAL
SERVICE CMNT-IMP: ABNORMAL
VENTRICULAR RATE, ECG03: 70 BPM

## 2020-09-26 PROCEDURE — 74011250637 HC RX REV CODE- 250/637: Performed by: PSYCHIATRY & NEUROLOGY

## 2020-09-26 PROCEDURE — 82962 GLUCOSE BLOOD TEST: CPT

## 2020-09-26 PROCEDURE — 65220000003 HC RM SEMIPRIVATE PSYCH

## 2020-09-26 PROCEDURE — 74011250637 HC RX REV CODE- 250/637: Performed by: NURSE PRACTITIONER

## 2020-09-26 RX ADMIN — MAGNESIUM GLUCONATE 500 MG ORAL TABLET 400 MG: 500 TABLET ORAL at 09:30

## 2020-09-26 RX ADMIN — TRAZODONE HYDROCHLORIDE 150 MG: 100 TABLET ORAL at 23:00

## 2020-09-26 RX ADMIN — PANTOPRAZOLE SODIUM 40 MG: 40 TABLET, DELAYED RELEASE ORAL at 07:03

## 2020-09-26 RX ADMIN — PRAZOSIN HYDROCHLORIDE 2 MG: 1 CAPSULE ORAL at 20:53

## 2020-09-26 RX ADMIN — QUETIAPINE FUMARATE 300 MG: 100 TABLET ORAL at 22:00

## 2020-09-26 RX ADMIN — DULOXETINE 120 MG: 60 CAPSULE, DELAYED RELEASE ORAL at 20:51

## 2020-09-26 NOTE — PROGRESS NOTES
Problem: Depressed Mood (Adult/Pediatric)  Goal: *STG: Participates in treatment plan  Outcome: Progressing Towards Goal  Note: Pt participated in treatment team. Sitting on the Geriatric Unit and ate breakfast. Did not vomit after eating her meal. Taking her medications. Less depressed at this time. Complained of feeling dizzy when she first got up this morning. Goal: Interventions  Outcome: Progressing Towards Goal     Problem: Falls - Risk of  Goal: *Absence of Falls  Description: Document Tiffaine Fall Risk and appropriate interventions in the flowsheet.   Outcome: Progressing Towards Goal  Note: Fall Risk Interventions:            Medication Interventions: Teach patient to arise slowly                   Problem: Patient Education: Go to Patient Education Activity  Goal: Patient/Family Education  Outcome: Progressing Towards Goal

## 2020-09-26 NOTE — PROGRESS NOTES
Problem: Falls - Risk of  Goal: *Absence of Falls  Description: Document Mariah Gómez Fall Risk and appropriate interventions in the flowsheet. Outcome: Progressing Towards Goal  Note: Fall Risk Interventions:       Medication Interventions: Teach patient to arise slowly    Pt out in milieu on ashlyn majority  of shift. Affect brighter. Pt has consumed 100 % of dinner tray. No vomiting or complaints of dizziness. Staff will continue to monitor q 15 min checks.

## 2020-09-26 NOTE — INTERDISCIPLINARY ROUNDS
Behavioral Health Interdisciplinary Rounds Patient Name: Mabel Herman  Age: 38 y.o. Room/Bed:  727/ Primary Diagnosis: <principal problem not specified> Admission Status: Involuntary Readmission within 30 days: no 
Power of  in place: no 
Patient requires a blocked bed: no          Reason for blocked bed:  
Sleep hours: 5 Participation in Care/Groups:  yes Medication Compliant?: Yes PRNS (last 24 hours): Zofran Restraints (last 24 hours):  no 
  
Alcohol screening (AUDIT) completed -  AUDIT Score: 0  If applicable, date SBIRT discussed in treatment team AND documented:   
Tobacco - patient is a smoker: Have You Used Tobacco in the Past 30 Days: No 
Illegal Drugs use: Have You Used Any Illegal Substances Over the Past 12 Months: No 
 
24 hour chart check complete: yes Patient goal(s) for today:  
Treatment team focus/goals:  
Progress note Family Contact information:  
Patient's preferred phone number for follow up call :  
 
LOS:  24  Expected LOS:  
 
Participating treatment team members: Mabel Herman, * (assigned SW),

## 2020-09-27 LAB
GLUCOSE BLD STRIP.AUTO-MCNC: 90 MG/DL (ref 65–100)
GLUCOSE BLD STRIP.AUTO-MCNC: 94 MG/DL (ref 65–100)
SERVICE CMNT-IMP: NORMAL
SERVICE CMNT-IMP: NORMAL

## 2020-09-27 PROCEDURE — 74011250637 HC RX REV CODE- 250/637: Performed by: PSYCHIATRY & NEUROLOGY

## 2020-09-27 PROCEDURE — 74011250637 HC RX REV CODE- 250/637: Performed by: NURSE PRACTITIONER

## 2020-09-27 PROCEDURE — 65220000003 HC RM SEMIPRIVATE PSYCH

## 2020-09-27 PROCEDURE — 82962 GLUCOSE BLOOD TEST: CPT

## 2020-09-27 RX ADMIN — DULOXETINE 120 MG: 60 CAPSULE, DELAYED RELEASE ORAL at 21:20

## 2020-09-27 RX ADMIN — MAGNESIUM HYDROXIDE 30 ML: 400 SUSPENSION ORAL at 21:22

## 2020-09-27 RX ADMIN — QUETIAPINE FUMARATE 300 MG: 100 TABLET ORAL at 21:20

## 2020-09-27 RX ADMIN — PRAZOSIN HYDROCHLORIDE 2 MG: 1 CAPSULE ORAL at 21:19

## 2020-09-27 RX ADMIN — TRAZODONE HYDROCHLORIDE 150 MG: 100 TABLET ORAL at 23:00

## 2020-09-27 RX ADMIN — ONDANSETRON 4 MG: 4 TABLET, ORALLY DISINTEGRATING ORAL at 17:05

## 2020-09-27 RX ADMIN — PANTOPRAZOLE SODIUM 40 MG: 40 TABLET, DELAYED RELEASE ORAL at 06:40

## 2020-09-27 RX ADMIN — MAGNESIUM GLUCONATE 500 MG ORAL TABLET 400 MG: 500 TABLET ORAL at 09:37

## 2020-09-27 NOTE — PROGRESS NOTES
Problem: Falls - Risk of  Goal: *Absence of Falls  Description: Document Zachary Melendrez Fall Risk and appropriate interventions in the flowsheet. Outcome: Progressing Towards Goal  Note: Fall Risk Interventions:       Medication Interventions: Teach patient to arise slowly    Out in milieu with peers on older adult unit. Cooperative. Appetite continues to be good. Meal and medication staff will continue to monitor q 15 min checks.

## 2020-09-27 NOTE — PROGRESS NOTES
Problem: Depressed Mood (Adult/Pediatric)  Goal: *STG: Participates in treatment plan  Outcome: Progressing Towards Goal  Note: Pt participated in treatment team. Sitting on the Geriatric Unit during the shift. Ate 95% of her meal. Less depressed and anxious at this time. Encouraged pt to attend groups and participate. Goal: Interventions  Outcome: Progressing Towards Goal     Problem: Falls - Risk of  Goal: *Absence of Falls  Description: Document Tiffanie Fall Risk and appropriate interventions in the flowsheet.   Outcome: Progressing Towards Goal  Note: Fall Risk Interventions:            Medication Interventions: Teach patient to arise slowly                   Problem: Patient Education: Go to Patient Education Activity  Goal: Patient/Family Education  Outcome: Progressing Towards Goal

## 2020-09-27 NOTE — BH NOTES
Chief Complaint:  I feel ok today. Length of Stay: 25 Days    Interval History:  Casey Choi reports feeling ok overall, other than feeling achy this morning. She is visible in the unit, calm and pleasant. She ate 95% of her breakfast, no vomiting, and stayed in milieu the whole time. She denies si hi or avh. She remains compliant with her medications and denies adverse effects. Nursing staff report she is better. Past Medical History:  Past Medical History:   Diagnosis Date    Anxiety     Asthma     on albuterol prn.  Triggers cold and allergies    Back pain, chronic 2010    sciatica    Gestational hypertension     Past pregnancy    HX OTHER MEDICAL      , , , ,     Hyperemesis     severe hyperemesis    Hypertension     with G12 - none this pregnancy    Iron deficiency anemia 10/08/2010    MDD (major depressive disorder), single episode with postpartum onset 2013    treated with meds - 13    Plantar fasciitis     Pregnancy     36 weeks    PTSD (post-traumatic stress disorder)            Labs:  Lab Results   Component Value Date/Time    WBC 4.9 2020 05:00 PM    WBC 12.2 (H) 2012 04:27 PM    Hemoglobin (POC) 10.4 (L) 2020 07:12 AM    HGB 10.2 (L) 2020 05:00 PM    HCT 32.2 (L) 2020 05:00 PM    PLATELET 345  05:00 PM    MCV 72.5 (L) 2020 05:00 PM    Hgb, External 33.7 2012    Hct, External 69 2012    Platelet cnt., External 307 2012      Lab Results   Component Value Date/Time    Sodium 138 2020 01:21 PM    Potassium 3.6 2020 01:21 PM    Chloride 106 2020 01:21 PM    CO2 23 2020 01:21 PM    Anion gap 9 2020 01:21 PM    Glucose 65 2020 01:21 PM    BUN 12 2020 01:21 PM    Creatinine 0.76 2020 01:21 PM    BUN/Creatinine ratio 16 2020 01:21 PM    GFR est AA >60 2020 01:21 PM    GFR est non-AA >60 2020 01:21 PM    Calcium 9.1 2020 01:21 PM Bilirubin, total 0.6 09/21/2020 01:21 PM    Alk.  phosphatase 56 09/21/2020 01:21 PM    Protein, total 8.1 09/21/2020 01:21 PM    Albumin 3.7 09/21/2020 01:21 PM    Globulin 4.4 (H) 09/21/2020 01:21 PM    A-G Ratio 0.8 (L) 09/21/2020 01:21 PM    ALT (SGPT) 13 09/21/2020 01:21 PM      Vitals:    09/26/20 1910 09/26/20 2051 09/27/20 0822 09/27/20 1302   BP: 91/60 116/88 92/65 103/67   Pulse: 86 90 92 87   Resp: 16  16 16   Temp: 98.7 °F (37.1 °C)  98.5 °F (36.9 °C)    SpO2: 99%      Weight:   73.9 kg (163 lb)    Height:             Current Facility-Administered Medications   Medication Dose Route Frequency Provider Last Rate Last Dose    traZODone (DESYREL) tablet 150 mg  150 mg Oral QHS Vaishnavi Calix MD   150 mg at 09/26/20 2300    DULoxetine (CYMBALTA) capsule 120 mg  120 mg Oral QHS Vaishnavi Calix MD   120 mg at 09/26/20 2051    glucose chewable tablet 16 g  4 Tab Oral PRN Anusha Bellamy MD   4 Tab at 09/12/20 1745    glucagon (GLUCAGEN) injection 1 mg  1 mg IntraMUSCular PRN Anusha Bellamy MD        pantoprazole (PROTONIX) tablet 40 mg  40 mg Oral ACB Vaishnavi Calix MD   40 mg at 09/27/20 0640    QUEtiapine (SEROquel) tablet 300 mg  300 mg Oral QHS Vaishnavi Calix MD   300 mg at 09/26/20 2200    prazosin (MINIPRESS) capsule 2 mg  2 mg Oral QHS Rosy Gutierrez, NP   2 mg at 09/26/20 2053    magnesium oxide (MAG-OX) tablet 400 mg  400 mg Oral DAILY Rosy Gutierrez NP   400 mg at 09/27/20 0937    promethazine (PHENERGAN) suppository 25 mg  25 mg Rectal Q6H PRN Burnie Blocker, NP   25 mg at 09/06/20 1111    HYDROcodone-acetaminophen (NORCO) 5-325 mg per tablet 1 Tab  1 Tab Oral Q6H PRN Ana Booth DO   1 Tab at 09/06/20 1415    ibuprofen (MOTRIN) tablet 600 mg  600 mg Oral Q6H PRN Vaishnavi Calix MD   600 mg at 09/06/20 1011    OLANZapine (ZyPREXA) tablet 5 mg  5 mg Oral Q6H PRN Halina Sloan, NP        haloperidol lactate (HALDOL) injection 5 mg  5 mg IntraMUSCular Q6H PRN Padmini Sloan NP        benztropine (COGENTIN) tablet 1 mg  1 mg Oral BID PRN Padmini Sloan NP        diphenhydrAMINE (BENADRYL) injection 50 mg  50 mg IntraMUSCular BID PRN Padmini Sloan NP        hydrOXYzine HCL (ATARAX) tablet 50 mg  50 mg Oral TID PRN Padmini Sloan NP        LORazepam (ATIVAN) injection 1 mg  1 mg IntraMUSCular Q4H PRN Padmini Sloan NP        acetaminophen (TYLENOL) tablet 650 mg  650 mg Oral Q4H PRN Halina Sloan NP   650 mg at 09/04/20 2123    magnesium hydroxide (MILK OF MAGNESIA) 400 mg/5 mL oral suspension 30 mL  30 mL Oral DAILY PRN Halina Sloan, NP   30 mL at 09/23/20 2235    ondansetron (ZOFRAN ODT) tablet 4 mg  4 mg Oral Q6H PRN Halina Sloan, NP   4 mg at 09/25/20 1407         Mental Status Exam:  Eye contact: limited  Grooming: fair  Psychomotor activity: decreased. Speech is spontaneous, soft  Mood is \"okay\"  Affect: blunt  Perception: Denies any AH or VH  Suicidal ideation: denies si  Cognition is grossly intact     Lab:  Recent Results (from the past 24 hour(s))   GLUCOSE, POC    Collection Time: 09/26/20  4:07 PM   Result Value Ref Range    Glucose (POC) 125 (H) 65 - 100 mg/dL    Performed by Elli Ricci, POC    Collection Time: 09/27/20  7:31 AM   Result Value Ref Range    Glucose (POC) 90 65 - 100 mg/dL    Performed by Mario Chacko          Physical Exam:  Body habitus: Body mass index is 26.31 kg/m². Musculoskeletal system: normal gait  Tremor - neg  Cog wheeling - neg      Assessment and Plan:  Juan Meza meets criteria for a diagnosis of Recurrent major depression, severe without psychotic symptoms; posttraumatic stress disorder, chronic. Continue the medication regimen as prescribed  Consider getting a court order for medications/feedings/labs as needed.    Disposition planning to continue. I certify that this patients inpatient psychiatric hospital services furnished since the previous certification were, and continue to be, required for treatment that could reasonably be expected to improve the patient's condition, or for diagnostic study, and that the patient continues to need, on a daily basis, active treatment furnished directly by or requiring the supervision of inpatient psychiatric facility personnel. In addition, the hospital records show that services furnished were intensive treatment services, admission or related services, or equivalent services.

## 2020-09-27 NOTE — INTERDISCIPLINARY ROUNDS
Behavioral Health Interdisciplinary Rounds Patient Name: Dandre Munson  Age: 38 y.o. Room/Bed:  727/02 Primary Diagnosis: <principal problem not specified> Admission Status: Involuntary Commitment Readmission within 30 days: no 
Power of  in place: no 
Patient requires a blocked bed: no          Reason for blocked bed:  
Sleep hours: 4 Participation in Care/Groups:  yes Medication Compliant?: Yes PRNS (last 24 hours): None Restraints (last 24 hours):  no 
  
Alcohol screening (AUDIT) completed -  AUDIT Score: 0  If applicable, date SBIRT discussed in treatment team AND documented:   
Tobacco - patient is a smoker: Have You Used Tobacco in the Past 30 Days: No 
Illegal Drugs use: Have You Used Any Illegal Substances Over the Past 12 Months: No 
 
24 hour chart check complete: yes Patient goal(s) for today:  
Treatment team focus/goals:  
Progress note Family Contact information:  
Patient's preferred phone number for follow up call :  
 
LOS:  25  Expected LOS:  
 
Participating treatment team members: Dandre Munson, * (assigned SW),

## 2020-09-27 NOTE — PROGRESS NOTES
Pt lying in bed, quiet, appears to be asleep. No distress noted. Respirations even and unlabored. Will continue to monitor q15 for safety. Problem: Falls - Risk of  Goal: *Absence of Falls  Description: Document Nicole Soria Fall Risk and appropriate interventions in the flowsheet.   Outcome: Progressing Towards Goal  Note: Fall Risk Interventions:            Medication Interventions: Teach patient to arise slowly

## 2020-09-28 VITALS
HEART RATE: 86 BPM | TEMPERATURE: 97.6 F | OXYGEN SATURATION: 100 % | SYSTOLIC BLOOD PRESSURE: 93 MMHG | WEIGHT: 163 LBS | RESPIRATION RATE: 14 BRPM | BODY MASS INDEX: 26.2 KG/M2 | DIASTOLIC BLOOD PRESSURE: 51 MMHG | HEIGHT: 66 IN

## 2020-09-28 LAB
GLUCOSE BLD STRIP.AUTO-MCNC: 93 MG/DL (ref 65–100)
SERVICE CMNT-IMP: NORMAL

## 2020-09-28 PROCEDURE — 74011250637 HC RX REV CODE- 250/637: Performed by: NURSE PRACTITIONER

## 2020-09-28 PROCEDURE — 74011250637 HC RX REV CODE- 250/637: Performed by: PSYCHIATRY & NEUROLOGY

## 2020-09-28 PROCEDURE — 82962 GLUCOSE BLOOD TEST: CPT

## 2020-09-28 RX ORDER — DULOXETIN HYDROCHLORIDE 60 MG/1
120 CAPSULE, DELAYED RELEASE ORAL
Qty: 60 CAP | Refills: 0 | Status: SHIPPED | OUTPATIENT
Start: 2020-09-28 | End: 2020-11-18

## 2020-09-28 RX ORDER — QUETIAPINE FUMARATE 300 MG/1
300 TABLET, FILM COATED ORAL
Qty: 30 TAB | Refills: 0 | Status: SHIPPED | OUTPATIENT
Start: 2020-09-28 | End: 2020-11-18

## 2020-09-28 RX ORDER — HYDROXYZINE 50 MG/1
50 TABLET, FILM COATED ORAL
Qty: 90 TAB | Refills: 0 | Status: SHIPPED | OUTPATIENT
Start: 2020-09-28 | End: 2020-10-08

## 2020-09-28 RX ORDER — PANTOPRAZOLE SODIUM 40 MG/1
40 TABLET, DELAYED RELEASE ORAL
Qty: 30 TAB | Refills: 0 | Status: ON HOLD | OUTPATIENT
Start: 2020-09-29 | End: 2021-01-15 | Stop reason: SDUPTHER

## 2020-09-28 RX ORDER — TRAZODONE HYDROCHLORIDE 150 MG/1
150 TABLET ORAL
Qty: 30 TAB | Refills: 0 | Status: SHIPPED | OUTPATIENT
Start: 2020-09-28 | End: 2020-11-18

## 2020-09-28 RX ORDER — ONDANSETRON 4 MG/1
4 TABLET, ORALLY DISINTEGRATING ORAL
Qty: 30 TAB | Refills: 0 | Status: SHIPPED | OUTPATIENT
Start: 2020-09-28 | End: 2020-11-18

## 2020-09-28 RX ORDER — PRAZOSIN HYDROCHLORIDE 2 MG/1
2 CAPSULE ORAL
Qty: 30 CAP | Refills: 0 | Status: SHIPPED | OUTPATIENT
Start: 2020-09-28 | End: 2021-03-01 | Stop reason: SDUPTHER

## 2020-09-28 RX ADMIN — MAGNESIUM GLUCONATE 500 MG ORAL TABLET 400 MG: 500 TABLET ORAL at 09:24

## 2020-09-28 RX ADMIN — ONDANSETRON 4 MG: 4 TABLET, ORALLY DISINTEGRATING ORAL at 09:24

## 2020-09-28 RX ADMIN — PANTOPRAZOLE SODIUM 40 MG: 40 TABLET, DELAYED RELEASE ORAL at 06:34

## 2020-09-28 RX ADMIN — MAGNESIUM HYDROXIDE 30 ML: 400 SUSPENSION ORAL at 13:01

## 2020-09-28 NOTE — SUICIDE SAFETY PLAN
SAFETY PLAN    A suicide Safety Plan is a document that supports someone when they are having thoughts of suicide. Warning Signs that indicate a suicidal crisis may be developing: What (situations, thoughts, feelings, body sensations, behaviors, etc.) do you experience that lets you know you are beginning to think about suicide? 1. Isolating to self and room  2. Decrease fluid and food intake that leads to weight loss  3. Suicidal thoughts    Internal Coping Strategies:  What things can I do (relaxation techniques, hobbies, physical activities, etc.) to take my mind off my problems without contacting another person? 1. Talking to your therapist  2. Completing daily activities around the house that can be completed in 10-15 minutes  3. Writing in your journal a gratitude list    People and social settings that provide distraction: Who can I call or where can I go to distract me? 1. Name: Ta Perez - significant other Phone: programmed into phone  2. Name: Mary Llamas - family friend  Phone: programmed into phone  3 Place: [facility]  4. 201 [facility] whom I can ask for help: Who can I call when I need help - for example, friends, family, clergy, someone else? 1. Name: Ta Perez - significant other Phone: programmed into phone  2. Name: Mary Llamas - family friend  Phone: programmed into phone    Professionals or [address] [place] [address] agencies I can contact during a crisis: Who can I call for help - for example, my doctor, my psychiatrist, my psychologist, a mental health provider, a suicide hotline? 1. Clinician Name: Yazan Nath NP   Phone: [phone]        2. Clinician Name: Cathy Nicolas [facility]   Phone: [phone]        3. Suicide Prevention Lifeline: 0-971-997-TALK (7819)    4.  [address] [place] [address] Emergency Services -  for example, 174 [facility] suicide hotline,       Emergency Services Address: [address], [place], [address] [place] [address]        Emergency Services Phone: [phone]  Making the environment safe: How can I make my environment (house/apartment/living space) safer? For example, can I remove guns, medications, and other items? 1. Prescriptions and over the counter medications locked in locked box. 2. Suicide hotline and local crisis number programmed into your phone and your support systems phone.

## 2020-09-28 NOTE — INTERDISCIPLINARY ROUNDS
Behavioral Health Interdisciplinary Rounds Patient Name: Andrade Marino  Age: 38 y.o. Room/Bed:  727/02 Primary Diagnosis: <principal problem not specified> Admission Status: Involuntary Commitment Readmission within 30 days: no 
Power of  in place: no 
Patient requires a blocked bed: no          Reason for blocked bed:  
Sleep hours:  6 Participation in Care/Groups:  yes Medication Compliant?: Yes PRNS (last 24 hours): Zofran Restraints (last 24 hours):  no 
  
Alcohol screening (AUDIT) completed -  AUDIT Score: 0  If applicable, date SBIRT discussed in treatment team AND documented:   
Tobacco - patient is a smoker: Have You Used Tobacco in the Past 30 Days: No 
Illegal Drugs use: Have You Used Any Illegal Substances Over the Past 12 Months: No 
 
24 hour chart check complete: yes Patient goal(s) for today: prepare for discharge Treatment team focus/goals: reconcile medications and facilitate discharge Progress note: Pt is alert, oriented, clam, cooperative and psychiatrically stable for discharge. Family Contact information: significant other, Allen ([phone]) Patient's preferred phone number for follow up call : [phone]  
 
LOS:  26  Expected LOS: 26 
 
Participating treatment team members: Dr. Shayna Briceño; Brian Michaud, AKIL; Godwin Geiger, PharmD;  Mary Asif MSW 
 149.9

## 2020-09-28 NOTE — DISCHARGE INSTRUCTIONS
DISCHARGE SUMMARY    NAME:Halina Perry Deal  : 1985  MRN: [id number]    The patient Dominique Bledsoe exhibits the ability to control behavior in a less restrictive environment. Patient's level of functioning is improving. No assaultive/destructive behavior has been observed for the past 24 hours. No suicidal/homicidal threat or behavior has been observed for the past 24 hours. There is no evidence of serious medication side effects. Patient has not been in physical or protective restraints for at least the past 24 hours. If weapons involved, how are they secured? NA    Is patient aware of and in agreement with discharge plan? Yes    Arrangements for medication:  Prescriptions given to patient, sent to [facility]. Copy of discharge instructions to provider?:  Mitul Pillai NP ([phone]) & Nory Durbin ([phone])    Arrangements for transportation home:  [facility]    Keep all follow up appointments as scheduled, continue to take prescribed medications per physician instructions. Mental health crisis number:  819 or your local mental health crisis line number at [facility]) [phone]. SAFETY PLAN    A suicide Safety Plan is a document that supports someone when they are having thoughts of suicide. Warning Signs that indicate a suicidal crisis may be developing: What (situations, thoughts, feelings, body sensations, behaviors, etc.) do you experience that lets you know you are beginning to think about suicide? 1. Isolating to self and room  2. Decrease fluid and food intake that leads to weight loss  3. Suicidal thoughts    Internal Coping Strategies:  What things can I do (relaxation techniques, hobbies, physical activities, etc.) to take my mind off my problems without contacting another person? 1. Talking to your therapist  2. Completing daily activities around the house that can be completed in 10-15 minutes  3.  Writing in your journal a gratitude list    People and social settings that provide distraction: Who can I call or where can I go to distract me? 1. Name: Osiel Liang - significant other Phone: programmed into phone  2. Name: Aden Mercado - family friend  Phone: programmed into phone  3 Place: [facility]  4. 201 [facility] whom I can ask for help: Who can I call when I need help - for example, friends, family, clergy, someone else? 1. Name: Osiel Liang - significant other Phone: programmed into phone  2. Name: Aden Mercado - family friend  Phone: programmed into phone    Professionals or [facility] agencies I can contact during a crisis: Who can I call for help - for example, my doctor, my psychiatrist, my psychologist, a mental health provider, a suicide hotline? 1. Clinician Name: Norm Rivers NP   Phone: [phone]        2. Clinician Name: Angela Dubose [facility]   Phone: [phone]        3. Suicide Prevention Lifeline: 5-053-717-TALK (1264)    4. [address] [place] [address] Emergency Services -  for example, 174 [facility] suicide hotline,       Emergency Services Address: [address], [address]        Emergency Services Phone: [phone]  Making the environment safe: How can I make my environment (house/apartment/living space) safer? For example, can I remove guns, medications, and other items? 1. Prescriptions and over the counter medications locked in locked box. 2. Suicide hotline and local crisis number programmed into your phone and your support systems phone. DISCHARGE SUMMARY from Nurse    *  Please give a list of your current medications to your Primary Care Provider. *  Please update this list whenever your medications are discontinued, doses are      changed, or new medications (including over-the-counter products) are added. *  Please carry medication information at all times in case of emergency situations.     These are general instructions for a healthy lifestyle:    No smoking/ No tobacco products/ Avoid exposure to second hand smoke  Surgeon General's Warning:  Quitting smoking now greatly reduces serious risk to your health. Obesity, smoking, and sedentary lifestyle greatly increases your risk for illness    A healthy diet, regular physical exercise & weight monitoring are important for maintaining a healthy lifestyle    You may be retaining fluid if you have a history of heart failure or if you experience any of the following symptoms:  Weight gain of 3 pounds or more overnight or 5 pounds in a week, increased swelling in our hands or feet or shortness of breath while lying flat in bed. Please call your doctor as soon as you notice any of these symptoms; do not wait until your next office visit. The discharge information has been reviewed with the patient. The patient verbalized understanding. Discharge medications reviewed with the patient and appropriate educational materials and side effects teaching were provided.   ___________________________________________________________________________________________________________________________________

## 2020-09-28 NOTE — BH NOTES
The documentation for this period is being entered following the guidelines as defined in the [facility] policy by [facility].

## 2020-09-28 NOTE — PROGRESS NOTES
Problem: Depressed Mood (Adult/Pediatric)  Goal: *STG: Participates in treatment plan  Outcome: Progressing Towards Goal  Goal: *STG: Verbalizes anger, guilt, and other feelings in a constructive manor  Outcome: Progressing Towards Goal  Goal: *STG: Demonstrates reduction in symptoms and increase in insight into coping skills/future focused  Outcome: Progressing Towards Goal     Problem: Falls - Risk of  Goal: *Absence of Falls  Description: Document Tiffanie Fall Risk and appropriate interventions in the flowsheet. Outcome: Progressing Towards Goal  Note: Fall Risk Interventions:    Medication Interventions: Teach patient to arise slowly    Patient participates treatment team. Expresses feeling \"a bit better, okay\". Denies si/hi, denies a/v hallucinations. Patient is consuming approximately 90% of meals. Asks for snacks as desired. Patient will discharge today.
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
## 2020-09-28 NOTE — BH NOTES
GROUP THERAPY PROGRESS NOTE    Alfredito Shepherd is participating in [employer] [facility]    Group time: 1 hour    Personal goal for participation:  Prep for Today and Setting Goal (s)     Goal orientation: Eval Your Wellness nd Coping In The Commuinty    Group therapy participation: Passive     Therapeutic interventions reviewed and discussed: Yes     Impression of participation: Minimal participation - flat and admitted having depressed mood but acknowledged she is getting better
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
## 2020-09-28 NOTE — SUICIDE SAFETY PLAN
SAFETY PLAN    A suicide Safety Plan is a document that supports someone when they are having thoughts of suicide. Warning Signs that indicate a suicidal crisis may be developing: What (situations, thoughts, feelings, body sensations, behaviors, etc.) do you experience that lets you know you are beginning to think about suicide? 1. Isolating to self and room  2. Decrease fluid and food intake that leads to weight loss  3. Suicidal thoughts    Internal Coping Strategies:  What things can I do (relaxation techniques, hobbies, physical activities, etc.) to take my mind off my problems without contacting another person? 1. Talking to your therapist  2. Completing daily activities around the house that can be completed in 10-15 minutes  3. Writing in your journal a gratitude list    People and social settings that provide distraction: Who can I call or where can I go to distract me? 1. Name: Christiano Fuchs - significant other Phone: programmed into phone  2. Name: Vinod Watters - family friend  Phone: programmed into phone  3 Place: [facility]  4. [facility] whom I can ask for help: Who can I call when I need help - for example, friends, family, clergy, someone else? 1. Name: Christiano Fuchs - significant other Phone: programmed into phone  2. Name: Vinod Watters - family friend  Phone: programmed into phone    Professionals or [facility] agencies I can contact during a crisis: Who can I call for help - for example, my doctor, my psychiatrist, my psychologist, a mental health provider, a suicide hotline? 1. Clinician Name: Sunny Mcneal NP   Phone: [phone]        2. Clinician Name: Basia Valdes [facility]   Phone: [phone]        3. Suicide Prevention Lifeline: 4-905-106-TALK (8514)    4.  [address] [place] [address] Emergency Services -  for example, [facility] suicide hotline,       Emergency Services Address: [address], [address]        Emergency Services Phone: [phone]  Making the environment safe: How can I make my environment (house/apartment/living space) safer? For example, can I remove guns, medications, and other items? 1. Prescriptions and over the counter medications locked in locked box. 2. Suicide hotline and local crisis number programmed into your phone and your support systems phone.

## 2020-09-28 NOTE — BH NOTES
PRN Medication Documentation    Specific patient behavior that led to need for PRN medication: C/o of nausea     Staff interventions attempted prior to PRN being given: Ginger ale and crackers     PRN medication given: Zofran 4mg Po      Patient response/effectiveness of PRN medication: Will continue to monitor.

## 2020-09-28 NOTE — PROGRESS NOTES
Received pt lying awake in bed. No distress noted. Respirations WNL. Will continue to monitor q15 for safety. Problem: Falls - Risk of  Goal: *Absence of Falls  Description: Document Corey Bender Fall Risk and appropriate interventions in the flowsheet.   Outcome: Progressing Towards Goal  Note: Fall Risk Interventions:            Medication Interventions: Teach patient to arise slowly

## 2020-09-29 ENCOUNTER — TELEPHONE (OUTPATIENT)
Dept: NEUROLOGY | Age: 35
End: 2020-09-29

## 2020-09-29 NOTE — TELEPHONE ENCOUNTER
----- Message from Geraldo Silva sent at 9/29/2020  9:43 AM EDT -----  Regarding: Dr. Teena Ramirez  Pt would like a call back to schedule an appt.  Contact is [phone]

## 2020-09-30 NOTE — PROGRESS NOTES
Reached out to patient at [phone] for follow up. Patient is aware of her follow up recommendations.

## 2020-10-01 ENCOUNTER — TELEPHONE (OUTPATIENT)
Dept: NEUROLOGY | Age: 35
End: 2020-10-01

## 2020-10-01 NOTE — TELEPHONE ENCOUNTER
----- Message from [place] sent at 10/1/2020  9:38 AM EDT -----  Regarding: Dr. Thea Howard first and last name:Halina Person      Reason for call: r/s new pt appt      Callback required yes/no and why:yes      Best contact number(s):[phone]      Details to clarify the request:Pt stated she had to cancel her appt for 09/24/20 due to being in the hosp, and left a message on Tuesday for a call back to r/s, but have not received a call.       Tin Loera

## 2020-10-05 ENCOUNTER — TELEPHONE (OUTPATIENT)
Dept: NEUROLOGY | Age: 35
End: 2020-10-05

## 2020-10-05 NOTE — TELEPHONE ENCOUNTER
----- Message from Sengkhanh Adhikariton sent at 10/5/2020  1:39 PM EDT -----  Regarding: Dr. General Ramirez  Pt would like a call back to schedule a NP appt.  Contact is [phone]

## 2020-10-14 ENCOUNTER — OFFICE VISIT (OUTPATIENT)
Dept: FAMILY MEDICINE CLINIC | Age: 35
End: 2020-10-14
Payer: MEDICAID

## 2020-10-14 VITALS
HEART RATE: 80 BPM | WEIGHT: 151.2 LBS | TEMPERATURE: 98 F | SYSTOLIC BLOOD PRESSURE: 125 MMHG | HEIGHT: 64 IN | DIASTOLIC BLOOD PRESSURE: 87 MMHG | BODY MASS INDEX: 25.81 KG/M2 | RESPIRATION RATE: 18 BRPM | OXYGEN SATURATION: 99 %

## 2020-10-14 DIAGNOSIS — F33.2 SEVERE EPISODE OF RECURRENT MAJOR DEPRESSIVE DISORDER, WITHOUT PSYCHOTIC FEATURES (HCC): Primary | ICD-10-CM

## 2020-10-14 DIAGNOSIS — F43.10 PTSD (POST-TRAUMATIC STRESS DISORDER): ICD-10-CM

## 2020-10-14 DIAGNOSIS — F50.9 EATING DISORDER, UNSPECIFIED TYPE: ICD-10-CM

## 2020-10-14 PROCEDURE — 99214 OFFICE O/P EST MOD 30 MIN: CPT | Performed by: NURSE PRACTITIONER

## 2020-10-14 RX ORDER — GASTROSTOMY TUBE 20 FR
EACH MISCELLANEOUS
Qty: 30 CUP | Refills: 2 | Status: SHIPPED | OUTPATIENT
Start: 2020-10-14 | End: 2020-10-20 | Stop reason: SDUPTHER

## 2020-10-14 RX ORDER — SWAB
SWAB, NON-MEDICATED MISCELLANEOUS
COMMUNITY
Start: 2020-09-28 | End: 2022-06-09

## 2020-10-14 RX ORDER — FEEDING PUMP
1 EACH MISCELLANEOUS 3 TIMES DAILY
Qty: 90 CAN | Refills: 2 | Status: SHIPPED | OUTPATIENT
Start: 2020-10-14 | End: 2022-03-22 | Stop reason: SDUPTHER

## 2020-10-14 RX ORDER — PROMETHAZINE HYDROCHLORIDE 12.5 MG/1
SUPPOSITORY RECTAL
COMMUNITY
Start: 2020-08-21 | End: 2020-11-18

## 2020-10-14 RX ORDER — HYDROXYZINE 50 MG/1
50 TABLET, FILM COATED ORAL
COMMUNITY
End: 2020-11-09 | Stop reason: SDUPTHER
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
## 2020-10-14 NOTE — PROGRESS NOTES
Chief Complaint   Patient presents with   [facility] Follow Up     Depression/Eating Disorder      1. Have you been to the ER, urgent care clinic since your last visit? Hospitalized since your last visit? [facility] Sept 2-28th. 2. Have you seen or consulted any other health care providers outside of the [address] [place] [address] since your last visit? Include any pap smears or colon screening.  NO

## 2020-10-16 ENCOUNTER — TRANSCRIBE ORDER (OUTPATIENT)
Dept: SCHEDULING | Age: 35
End: 2020-10-16

## 2020-10-16 DIAGNOSIS — K21.9 GERD (GASTROESOPHAGEAL REFLUX DISEASE): ICD-10-CM

## 2020-10-16 DIAGNOSIS — R11.2 NAUSEA AND VOMITING: Primary | ICD-10-CM

## 2020-10-19 ENCOUNTER — TELEPHONE (OUTPATIENT)
Dept: FAMILY MEDICINE CLINIC | Age: 35
End: 2020-10-19

## 2020-10-19 DIAGNOSIS — F50.9 EATING DISORDER, UNSPECIFIED TYPE: ICD-10-CM

## 2020-10-19 NOTE — TELEPHONE ENCOUNTER
----- Message from [place] sent at 10/19/2020  8:21 AM EDT -----  Regarding: MENA Oliver/Telephone  General Message/Vendor Calls    Caller's first and last name: Shelly Granados      Reason for call: Provider called in Ensure and Ensure pudding to [address] does not supply these items. Pt's insurance company provided new supply company contact info. Please refax order to [facility] @ [phone].        Callback required yes/no and why: yes      Best contact number(s):[phone]      Details to clarify the request: [address]

## 2020-10-20 NOTE — TELEPHONE ENCOUNTER
Pt's insurance company provided new supply company contact info. Please refax order to [facility] @ [phone].

## 2020-10-27 ENCOUNTER — HOSPITAL ENCOUNTER (OUTPATIENT)
Dept: GENERAL RADIOLOGY | Age: 35
Discharge: HOME | End: 2020-10-27
Attending: INTERNAL MEDICINE
Payer: MEDICAID

## 2020-10-27 DIAGNOSIS — R11.2 NAUSEA AND VOMITING: ICD-10-CM

## 2020-10-27 DIAGNOSIS — K21.9 GERD (GASTROESOPHAGEAL REFLUX DISEASE): ICD-10-CM

## 2020-10-27 PROCEDURE — 74246 X-RAY XM UPR GI TRC 2CNTRST: CPT

## 2020-11-03 ENCOUNTER — TELEPHONE (OUTPATIENT)
Dept: FAMILY MEDICINE CLINIC | Age: 35
End: 2020-11-03

## 2020-11-03 DIAGNOSIS — R63.4 WEIGHT LOSS: ICD-10-CM

## 2020-11-03 DIAGNOSIS — F50.9 EATING DISORDER, UNSPECIFIED TYPE: Primary | ICD-10-CM

## 2020-11-03 NOTE — TELEPHONE ENCOUNTER
Assumed care of pt, waiting results of PO challenge will cont monitoring Writer spoke to TERRI HERNANDEZ from [employer] concerning [employer]. Dietician is requesting Ensure Enlive instead of Ensure High protein . Patient may continue to get Ensure pudding.

## 2020-11-03 NOTE — TELEPHONE ENCOUNTER
Nikki Maria Teresa from [employer] needs a nurse to call her back with regards to pt's High Protein Rx. Pt's insurance does not cover that and needs to discuss options.     BC[facility]: [phone]

## 2020-11-04 ENCOUNTER — HOSPITAL ENCOUNTER (OUTPATIENT)
Dept: NUTRITION | Age: 35
Discharge: HOME | End: 2020-11-04
Payer: MEDICAID

## 2020-11-04 ENCOUNTER — DOCUMENTATION ONLY (OUTPATIENT)
Dept: BEHAVIORAL/MENTAL HEALTH CLINIC | Age: 35
End: 2020-11-04

## 2020-11-04 DIAGNOSIS — F50.9 EATING DISORDER, UNSPECIFIED TYPE: ICD-10-CM

## 2020-11-04 PROCEDURE — 97802 MEDICAL NUTRITION INDIV IN: CPT | Performed by: DIETITIAN, REGISTERED

## 2020-11-04 NOTE — TELEPHONE ENCOUNTER
Chief Complaint   Patient presents with    Other     Josefina/Dietician RX for [employer] faxed and confirmed

## 2020-11-04 NOTE — PROGRESS NOTES
[id number] [facility]     Nutrition Assessment  Medical Nutrition Therapy   Outpatient Initial Evaluation         Patient Name: Perez Soria : 1985   Treatment Diagnosis: Eating Disorder unspecified   Referral Source: Celestine Lu NP Start of Care [facility]): 2020     In time:   11:00am             Out time:   12:00pm   Total Treatment Time (min):   60     Gender: female Age: 38 y.o. Ht: 64 in Wt: 145. 4  lb  kg   BMI: 25 AF 1.73 BMR Female 1330     Past Medical History:  Patient Active Problem List   Diagnosis Code    Asthma J45.909    Microcytic anemia D50.9    Anxiety F41.9    Back pain, chronic M54.9, G89.29    Unspecified essential hypertension I10    MDD (major depressive disorder), recurrent episode, moderate (HCC) F33.1    Sleep disturbance G47.9    Non compliance w medication regimen Z91.14    Cervical incompetence N88.3    Incompetent cervix N88.3    Pregnancy Z34.90    Pregnant Z34.90    Severe obesity (HCC) E66.01    Post-traumatic stress disorder, acute F43.11    MDD (major depressive disorder) F32.9        Pertinent Medications:     Current Outpatient Medications:     OTHER, 1 Ensure Enlive food supplement drink PO TID, Disp: 270 Each, Rfl: 2    calcium carbonate (Tums) 200 mg calcium (500 mg) chew, Take 1 Tab by mouth two (2) times daily as needed for Other (reflux). , Disp: 60 Tab, Rfl: 1    polyethylene glycol (MIRALAX) 17 gram packet, Take 1 Packet by mouth daily as needed for Constipation. , Disp: 90 Each, Rfl: 1    OTHER, 1 ensure pudding PO daily for lunch., Disp: 90 Each, Rfl: 3    prenatal vit-iron fumarate-fa (PRENATAL PLUS with IRON) 28 mg iron- 800 mcg tab, , Disp: , Rfl:     Promethegan 12.5 mg suppository, , Disp: , Rfl:     hydrOXYzine HCL (ATARAX) 50 mg tablet, Take 50 mg by mouth three (3) times daily as needed. , Disp: , Rfl:     food supplemt, lactose-reduced (Ensure High Protein) liqd, Take 237 mL by mouth three (3) times daily. , Disp: 90 Can, Rfl: 2    prazosin (MINIPRESS) 2 mg capsule, Take 1 Cap by mouth nightly. Indications: posttraumatic stress syndrome, Disp: 30 Cap, Rfl: 0    traZODone (DESYREL) 150 mg tablet, Take 1 Tab by mouth nightly. Indications: insomnia associated with depression, Disp: 30 Tab, Rfl: 0    QUEtiapine (SEROquel) 300 mg tablet, Take 1 Tab by mouth nightly. Indications: additional medications to treat depression, Disp: 30 Tab, Rfl: 0    DULoxetine (CYMBALTA) 60 mg capsule, Take 2 Caps by mouth nightly. Indications: major depressive disorder, Disp: 60 Cap, Rfl: 0    pantoprazole (PROTONIX) 40 mg tablet, Take 1 Tab by mouth Daily (before breakfast). Indications: gastroesophageal reflux disease, Disp: 30 Tab, Rfl: 0    ondansetron (ZOFRAN ODT) 4 mg disintegrating tablet, Take 1 Tab by mouth every six (6) hours as needed for Nausea or Nausea or Vomiting. Indications: nausea, Disp: 30 Tab, Rfl: 0     Biochemical Data:   Lab Results   Component Value Date/Time    Hemoglobin A1c 5.2 02/18/2020 11:08 AM     Lab Results   Component Value Date/Time    Sodium 138 09/21/2020 01:21 PM    Potassium 3.6 09/21/2020 01:21 PM    Chloride 106 09/21/2020 01:21 PM    CO2 23 09/21/2020 01:21 PM    Anion gap 9 09/21/2020 01:21 PM    Glucose 65 09/21/2020 01:21 PM    BUN 12 09/21/2020 01:21 PM    Creatinine 0.76 09/21/2020 01:21 PM    BUN/Creatinine ratio 16 09/21/2020 01:21 PM    GFR est AA >60 09/21/2020 01:21 PM    GFR est non-AA >60 09/21/2020 01:21 PM    Calcium 9.1 09/21/2020 01:21 PM    Bilirubin, total 0.6 09/21/2020 01:21 PM    Alk.  phosphatase 56 09/21/2020 01:21 PM    Protein, total 8.1 09/21/2020 01:21 PM    Albumin 3.7 09/21/2020 01:21 PM    Globulin 4.4 (H) 09/21/2020 01:21 PM    A-G Ratio 0.8 (L) 09/21/2020 01:21 PM    ALT (SGPT) 13 09/21/2020 01:21 PM    AST (SGOT) 12 (L) 09/21/2020 01:21 PM     Lab Results   Component Value Date/Time    Cholesterol, total 172 02/18/2020 11:08 AM    HDL Cholesterol 52 02/18/2020 11:08 AM    LDL, calculated 97 02/18/2020 11:08 AM    VLDL, calculated 23 02/18/2020 11:08 AM    Triglyceride 113 02/18/2020 11:08 AM   No results found for: B12LT, FOL, RBCF  Lab Results   Component Value Date/Time    Iron 16 (L) 04/03/2015 11:59 AM    TIBC 524 (H) 04/03/2015 11:59 AM    Iron % saturation 3 (LL) 04/03/2015 11:59 AM    Ferritin 39 05/28/2015 01:59 PM     No results found for: FOL, RBCF  BP Readings from Last 3 Encounters:   10/14/20 125/87   09/28/20 (!) 93/51   08/05/20 127/80     Pulse Readings from Last 3 Encounters:   10/14/20 80   09/28/20 86   08/05/20 (!) 58        Assessment:   Pt is a 42yo female here today recently released from hospitalization for ED unspecified. Pt notes she started loosing weight through excessive exercise and restrictive eating after a miscarriage in January. She has struggled with depression prior to miscarriage. Pt is a mother of 7 children at home. She lives with her  who is not aware of the miscariage or her ED diagnosis. Pt is the only  at home. After hospitalization pt was recommended to ED treatment facility but local center was not able to take her insurance [facility]). Pt has been set up with a therapist and psychiatrist.     Prior to hospitalization was using alcohol for self-medicating 3-4 drinks per day. UBW prior to January = 220#   Pt notes Weight loss prior to last hostitalization. Related to depression. 70-80# lost. 31% weight loss in about 6 months. Recent further weight loss of 5# in 3 weeks  Wt Readings from Last 10 Encounters:   10/14/20 68.6 kg (151 lb 3.2 oz)   08/05/20 73.4 kg (161 lb 12.8 oz)   07/29/20 75.1 kg (165 lb 9.6 oz)   07/15/20 81.3 kg (179 lb 3.2 oz)   05/02/20 102.1 kg (225 lb)   02/18/20 103.9 kg (229 lb)   01/17/20 102.5 kg (226 lb)   01/15/20 102.1 kg (225 lb)   03/02/18 99.7 kg (219 lb 12.8 oz)   02/20/18 102.1 kg (225 lb)     Activity: running 3 times per day. Unsure how far OR going to gym (bike, treadmill, stair climber)  Using for stress relief and purging. Notes it helps quiet her mind. Level of Care:  · Pt notes suicidal thoughts. No current specific plans. · >85% of healthy body weight; continuous weight loss of 5# in 3 weeks since hospitalization. Moderate malnutrition - noted by RD while hospitalized  · Motivation to recovery: (4/5 out of 10) partial motivation. Patient preoccupied with intrusive, repetitive thoughts >3 hrs per day. · Co-occurring disorders: severe depression  · Structure needed for eating/gaining weight: needs some structure to gain weight or supervision with meals due to restricting. · Ability to control compulsive exercising: currently exercising 3 times daily. · Purging behavior: inducing vomiting 1-2 times per week. [facility] & [facility]: [facility] to consuming <10% of usual intake. 1 meal per day  · Paying attention to calories, fat, and sugar on labels - related to desire for control  · Oral supplements were ordered but have not been received due to insurance issues as noted by pt. · Avoids being with others when they are eating  · Notes restricting was triggered by need for control after miscarriage. Now further ED behaviors have developed. · Overwhelmed by large portions  · ritualistic eating. Cutting up small bites and pushing to side  · Avoiding foods she does not know how they are made  · Guilt after eating and Avoiding foods deemed \"bad\" (high fat, calorie, sugar)  · Notes foods >100 kcal are high in calories  · Feeling nauseated and bloating after eating. History of severe nausea with pregnancy. Lack of appetite.   · Scared to try new foods     Estimate Needs:    Equation( [x] MSJ ; []  HBE; [] [place]; [] other)  * Activity Factor (1.73)    Calories:  2300 Protein: 86 Carbs: 316 Fat: 77   Kcal/day  g/day  g/day  g/day        percent: 15  55  30               Nutrition Diagnosis Disordered eating patterns R/T recent diagnosis of ED unspecified AEB pt report of restricting food for control, exercising to excess for stress and purging, purging after eating secondary to nausea/bloating, weight loss of 30% in 5 months, and ritualistic eating      Nutrition Intervention &  Education: Identified current eating patterns and restrictive behaviors. Encouraged pt to discuss her health and needs with her  for support at home. Used balanced plate to set goals for eating every 2-3 hours (small bites) and increase towards multiple food groups at a sitting. recommended removing labels from house to discourage limiting calories by counting  Recommended 3 meals with snacks between towards 2300kcal goal. Pt did not agree. Pt willing to try small bites of food multiple times per day to start. Discussed need for higher level of care and support at home.     Handouts Provided: []  Carbohydrates  []  Protein  []  Non-starchy Vegatbles  []  Food Label  []  Meal and Snack Ideas  []  Food Journals []  Diabetes  []  Cholesterol  []  Sodium  []  Gen Nutr Guidelines  []  SBGM Guidelines  [x]  Others: anorexia   Information Reviewed with: pt   Readiness to Change Stage: []  Pre-contemplative    [x]  Contemplative  []  Preparation               []  Action                  []  Maintenance   Potential Barriers to Learning: []  Decline in memory    []  Language barrier   []  Other:  [x]  Emotional                  []  Limited mobility  []  Lack of motivation     [] Vision, hearing or cognitive impairment   Expected Compliance: Poor due to low support at home     Nutritional Goal - To promote lifestyle changes to result in:    []  Weight loss  []  Improved diabetic control  []  Decreased cholesterol levels  []  Decreased blood pressure  [x]  Weight maintenance []  Preventing any interactions associated with food allergies  []  Adequate weight gain toward goal weight  []  Other:        Patient Goals:   -have a bite of food every 2-3 hours  -remove labels from house  -increase to having 1 of each food group at 3 meals per day.     -pt needs to d/c intense exercise. Dietitian Signature: Ada Kirby MS, RD, CSSD Date: 11/4/2020   Follow-up: 1 week  Time: 11:08 AM     This note will not be viewable in [employer] for the following reason(s).  Likely risk of substantial harm from self harm (Eating Disorder related)

## 2020-11-05 ENCOUNTER — VIRTUAL VISIT (OUTPATIENT)
Dept: BEHAVIORAL/MENTAL HEALTH CLINIC | Age: 35
End: 2020-11-05
Payer: MEDICAID

## 2020-11-05 DIAGNOSIS — F32.A DEPRESSION WITH SUICIDAL IDEATION: Primary | ICD-10-CM

## 2020-11-05 DIAGNOSIS — R45.851 DEPRESSION WITH SUICIDAL IDEATION: Primary | ICD-10-CM

## 2020-11-05 DIAGNOSIS — F41.1 GAD (GENERALIZED ANXIETY DISORDER): ICD-10-CM

## 2020-11-05 DIAGNOSIS — F43.11 POST-TRAUMATIC STRESS DISORDER, ACUTE: ICD-10-CM

## 2020-11-05 PROCEDURE — 90792 PSYCH DIAG EVAL W/MED SRVCS: CPT | Performed by: NURSE PRACTITIONER

## 2020-11-05 RX ORDER — FLUOXETINE HYDROCHLORIDE 20 MG/1
20 CAPSULE ORAL DAILY
Qty: 30 CAP | Refills: 2 | Status: SHIPPED | OUTPATIENT
Start: 2020-11-05 | End: 2020-11-09 | Stop reason: SDUPTHER
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
## 2020-11-09 ENCOUNTER — VIRTUAL VISIT (OUTPATIENT)
Dept: BEHAVIORAL/MENTAL HEALTH CLINIC | Age: 35
End: 2020-11-09
Payer: MEDICAID

## 2020-11-09 DIAGNOSIS — F43.11 POST-TRAUMATIC STRESS DISORDER, ACUTE: ICD-10-CM

## 2020-11-09 DIAGNOSIS — F41.1 GAD (GENERALIZED ANXIETY DISORDER): ICD-10-CM

## 2020-11-09 DIAGNOSIS — F32.2 CURRENT SEVERE EPISODE OF MAJOR DEPRESSIVE DISORDER WITHOUT PSYCHOTIC FEATURES WITHOUT PRIOR EPISODE (HCC): Primary | ICD-10-CM

## 2020-11-09 PROCEDURE — 99214 OFFICE O/P EST MOD 30 MIN: CPT | Performed by: NURSE PRACTITIONER

## 2020-11-09 RX ORDER — HYDROXYZINE 50 MG/1
50 TABLET, FILM COATED ORAL 4 TIMES DAILY
Qty: 90 TAB | Refills: 0 | Status: SHIPPED | OUTPATIENT
Start: 2020-11-09 | End: 2022-01-27 | Stop reason: SDUPTHER

## 2020-11-09 RX ORDER — FLUOXETINE HYDROCHLORIDE 40 MG/1
40 CAPSULE ORAL DAILY
Qty: 30 CAP | Refills: 2 | Status: SHIPPED | OUTPATIENT
Start: 2020-11-09 | End: 2021-01-26 | Stop reason: SDUPTHER

## 2020-11-09 NOTE — PROGRESS NOTES
CHIEF COMPLAINT:  Frankey Reedy is a 38 y.o. female and was seen today for follow-up of psychiatric condition and psychotropic medication management. HPI:    Nubia Hassan reports the following psychiatric symptoms:  depression and anxiety. The symptoms have been present for months and are of moderate-high to high severity. The symptoms occur daily and pt reports she has been experiencing severe anxiety r/t fears of losing this pregnancy. Met with pt who reports she has started prozac and is tolerating at this time. She reports experiencing panic attacks. Met with pt via video telehealth for appt today. FAMILY/SOCIAL HX: no changes to current stressors    REVIEW OF SYSTEMS:  Psychiatric:  depression, anxiety  Appetite:decreased, difficulty eating   Sleep: fitful   Neuro: no updates    There were no vitals taken for this visit. Side Effects:  none    MENTAL STATUS EXAM:   Sensorium  oriented to time, place and person   Relations cooperative   Appearance:  age appropriate and casually dressed   Motor Behavior:  gait stable and within normal limits   Speech:  monotone and soft   Thought Process: goal directed   Thought Content free of delusions and free of hallucinations   Suicidal ideations no intention   Homicidal ideations no intention   Mood:  anxious and depressed   Affect:  anxious and depressed   Memory recent  adequate   Memory remote:  adequate   Concentration:  adequate   Abstraction:  abstract   Insight:  fair and good   Reliability Fair and good   Judgment:  fair and good     MEDICAL DECISION MAKING:  Problems addressed today:    ICD-10-CM ICD-9-CM    1. Current severe episode of major depressive disorder without psychotic features without [facility] episode [facility]  F32.2 296.23    2. Post-traumatic stress disorder, acute  F43.11 309.81    3. DESTINI (generalized anxiety disorder)  F41.1 300.02        Assessment:   Nubia Hassan is not responding to treatment at this time.  Too early for medications to provide relief. Reviewed current symptoms and med options. Will increase prozac and hold abilify. Will consider use of olanzapine prn for anxiety if needed. Reviewed options and will continue with hydroxyzine. Discussed healthy cognitions r/t safety and self care. Discussed importance of deep slow breathing. Pt has been working with her therapist on these. Will f/u next week and consider add olanzapine. Plan:   1. Current Outpatient Medications   Medication Sig Dispense Refill    FLUoxetine (PROzac) 20 mg capsule Take 1 Cap by mouth daily. 30 Cap 2    doxylamine succinate (UNISOM) 25 mg tablet Take 1 Tab by mouth nightly as needed for Insomnia. 30 Tab 2    OTHER 1 Ensure Enlive food supplement drink PO  Each 2    calcium carbonate (Tums) 200 mg calcium (500 mg) chew Take 1 Tab by mouth two (2) times daily as needed for Other (reflux). 60 Tab 1    polyethylene glycol (MIRALAX) 17 gram packet Take 1 Packet by mouth daily as needed for Constipation. 90 Each 1    OTHER 1 ensure pudding PO daily for lunch. 90 Each 3    prenatal vit-iron fumarate-fa (PRENATAL PLUS with IRON) 28 mg iron- 800 mcg tab       Promethegan 12.5 mg suppository       hydrOXYzine HCL (ATARAX) 50 mg tablet Take 50 mg by mouth three (3) times daily as needed.  food supplemt, lactose-reduced (Ensure High Protein) liqd Take 237 mL by mouth three (3) times daily. 90 Can 2    prazosin (MINIPRESS) 2 mg capsule Take 1 Cap by mouth nightly. Indications: posttraumatic stress syndrome 30 Cap 0    traZODone (DESYREL) 150 mg tablet Take 1 Tab by mouth nightly. Indications: insomnia associated with depression 30 Tab 0    QUEtiapine (SEROquel) 300 mg tablet Take 1 Tab by mouth nightly. Indications: additional medications to treat depression 30 Tab 0    DULoxetine (CYMBALTA) 60 mg capsule Take 2 Caps by mouth nightly.  Indications: major depressive disorder 60 Cap 0    pantoprazole (PROTONIX) 40 mg tablet Take 1 Tab by mouth Daily (before breakfast). Indications: gastroesophageal reflux disease 30 Tab 0    ondansetron (ZOFRAN ODT) 4 mg disintegrating tablet Take 1 Tab by mouth every six (6) hours as needed for Nausea or Nausea or Vomiting. Indications: nausea 30 Tab 0          medication changes made today: inc prozac 40 mg, cont hydroxyzine 50 mg QID, consider olanzapine    2. Counseling and coordination of care including instructions for treatment, risks/benefits, risk factor reduction and patient/family education. She agrees with the plan. Patient instructed to call with any side effects, questions or issues. 3.    Follow-up and Dispositions    · Return in about 3 months (around 2/9/2021). Maylin Walter, who was evaluated through a synchronous (real-time) audio-video encounter, and/or her healthcare decision maker, is aware that it is a billable service, with coverage as determined by her insurance carrier. She provided verbal consent to proceed: No - not billable, and patient identification was verified. It was conducted pursuant to the emergency declaration under the [address] [place] [address], [address] [place] [address] authority and the [employer] Act. A caregiver was present when appropriate. Ability to conduct physical exam was limited. I was at home. The patient was at home.             11/9/2020  Uriel Castellanos NP

## 2020-11-12 ENCOUNTER — HOSPITAL ENCOUNTER (OUTPATIENT)
Dept: NUTRITION | Age: 35
Discharge: HOME | End: 2020-11-12
Payer: MEDICAID

## 2020-11-12 PROCEDURE — 97803 MED NUTRITION INDIV SUBSEQ: CPT | Performed by: DIETITIAN, REGISTERED

## 2020-11-12 NOTE — PROGRESS NOTES
This note will not be viewable in [employer] for the following reason(s). Likely risk of substantial harm from self harm - eating disorder    NUTRITION  FOLLOW-UP TREATMENT NOTE  Patient Name: Milagro Woodward         Date: 2020  : 1985    YES Patient  Verified  Diagnosis: Eating Disorder unspecified   In time:   2:30pm           Out time:   3:15pm   Total Treatment Time (min):  45     SUBJECTIVE/ASSESSMENT  Current Wt: 143.2 Previous Wt: 145. 4 Wt Change: -2.2     Initial Wt: 145.4 Total Wt change: -2.2 Height: 64     Changes in medication or medical history? Any new allergies, surgeries or procedures? YES/    If yes, update Summary List   Medications being adjusted by psychiatric NP  Switching prenatal vitamins. Was taking one previously inconsistent. Nutrition Diagnosis        Diagnosis Status:   Disordered eating patterns R/T recent diagnosis of ED unspecified AEB pt report of restricting food for control, exercising to excess for stress and purging, purging after eating secondary to nausea/bloating, weight loss of 30% in 5 months, and ritualistic eating   []  Improved [x]  No Change    []  Declined   []  Discontinued        Nutrition Monitoring and Evaluation: 2# weight loss in 1 week. Pt seeing psychiatric NP who is working on medication changes. No progress thus far. Notes feeling knee and hip pain when laying on side due to decrease in fat pads. She also notes more bone and joint pain when getting up in the mornings. No vitamin D levels available in EMR. Pt notes trying to eat a few crackers and broth. Not able to keep anything down since Saturday when having soup and crackers and ensure. Notes nausea constantly. Triggered by smell and thought of food. Feeling overwhelmed with larger portions (small estimated by others). Family at home has not been informed of pt's ED diagnosis or pregnancy. Pt' states her OB Dr. Neel Stein suggested TPN. Pt is amenable to this option. Continues seeing OB weekly and having vitals checked. Activity Level: Running 20min 2 times per day instead of 3. In addition using treadmill and bike 25-30min at night. Slow paced walk on treadmill. Estimates this is 5%-10% less than previous. Pt sent info to [facility] to check insurance coverage. Nutrition Prescription and  Intervention Pt requires higher level of care    Education for decreasing smell of food to improve nausea. Reviewed need for eating consistently over the day and pushing through nausea. Small amounts of food every 1-2 hours. RMR (MSJ) = 1319 * 1.6 = 2111   stabilize weight for first trimester of pregnancy. Patient Education:  [x]  Review current plan with patient   []  Other:    Handouts/  Information Provided: []  Carbohydrates  []  Protein  []  Fiber  []  Serving Sizes  []  Fluids  []  General guidelines []  Diabetes  []  Cholesterol  []  Sodium  []  SBGM  []  Food Journals  []  Others:      Patient Goals -Decrease smell of foods by eating them cold instead of hot, closing nose or have other appeasing smell near when eating.   - keep food down as long as possible to increase absorption  -continue trying for small bites continuously over the day  continue reducing exercise to walking slowly     PLAN  [x]  Continue on current plan []  Follow-up PRN   []  Discharge due to :  Working to find pt higher level of care   [x]  Next appt: 1 week     Dietitian: Juan Mcconnell MS, RD, CSSD    Date: 11/12/2020 Time: 2:29 PM

## 2020-11-18 ENCOUNTER — OFFICE VISIT (OUTPATIENT)
Dept: FAMILY MEDICINE CLINIC | Age: 35
End: 2020-11-18
Payer: MEDICAID

## 2020-11-18 VITALS
OXYGEN SATURATION: 99 % | HEIGHT: 64 IN | TEMPERATURE: 97.3 F | SYSTOLIC BLOOD PRESSURE: 100 MMHG | BODY MASS INDEX: 24.45 KG/M2 | HEART RATE: 65 BPM | DIASTOLIC BLOOD PRESSURE: 60 MMHG | WEIGHT: 143.2 LBS | RESPIRATION RATE: 18 BRPM

## 2020-11-18 DIAGNOSIS — F43.10 PTSD (POST-TRAUMATIC STRESS DISORDER): ICD-10-CM

## 2020-11-18 DIAGNOSIS — K21.9 GASTROESOPHAGEAL REFLUX DISEASE, UNSPECIFIED WHETHER ESOPHAGITIS PRESENT: ICD-10-CM

## 2020-11-18 DIAGNOSIS — K59.00 CONSTIPATION, UNSPECIFIED CONSTIPATION TYPE: ICD-10-CM

## 2020-11-18 DIAGNOSIS — F33.2 SEVERE EPISODE OF RECURRENT MAJOR DEPRESSIVE DISORDER, WITHOUT PSYCHOTIC FEATURES (HCC): Primary | ICD-10-CM

## 2020-11-18 DIAGNOSIS — Z3A.01 LESS THAN 8 WEEKS GESTATION OF PREGNANCY: ICD-10-CM

## 2020-11-18 DIAGNOSIS — F50.9 EATING DISORDER, UNSPECIFIED TYPE: ICD-10-CM

## 2020-11-18 PROCEDURE — 99214 OFFICE O/P EST MOD 30 MIN: CPT | Performed by: NURSE PRACTITIONER

## 2020-11-18 RX ORDER — GLUCOSAMINE SULFATE 1500 MG
POWDER IN PACKET (EA) ORAL DAILY
COMMUNITY
End: 2021-08-17

## 2020-11-18 RX ORDER — PYRIDOXINE HCL (VITAMIN B6) 100 MG
100 TABLET ORAL DAILY
COMMUNITY
End: 2021-06-21

## 2020-11-18 RX ORDER — PROGESTERONE 200 MG/1
200 CAPSULE ORAL DAILY
COMMUNITY
End: 2021-01-15

## 2020-11-18 NOTE — PROGRESS NOTES
Identified pt with two pt identifiers(name and ). Reviewed record in preparation for visit and have obtained necessary documentation. Chief Complaint   Patient presents with    Follow-up        Vitals:    20 1100   Weight: 143 lb 3.2 oz (65 kg)   Height: 5' 4\" (1.626 m)   PainSc:   5   PainLoc: Generalized       Health Maintenance Due   Topic    Pneumococcal 0-64 years (1 of 1 - PPSV23)    PAP AKA CERVICAL CYTOLOGY        Coordination of Care Questionnaire:  :   1) Have you been to an emergency room, urgent care, or hospitalized since your last visit? If yes, where when, and reason for visit? no       2. Have seen or consulted any other health care provider since your last visit? If yes, where when, and reason for visit? NO      Patient is accompanied by self I have received verbal consent from Jamir Parker to discuss any/all medical information while they are present in the room.

## 2020-11-18 NOTE — PROGRESS NOTES
[facility] Note  Subjective:      Jerald Armenta is a 38 y.o. female who presents for follow-up. Chief Complaint   Patient presents with    Follow-up     History of severe depression, DESTINI, history of trauma, PTSD, unspecified eating disorder. Symptoms have been uncontrolled for several months, recent hospitalizations in 9/2020 for these conditions. She is now following with outpatient psychiatry, NP Natividad Cramer, every 2 weeks. She is in individual DBT with counselor Nita Ceron, seeing her weekly. Christiano Donahue had a prolonged hospital stay due to her persistent refusal to eat regularly requiring a court order for forced feedings. Weight at discharge was 155 lbs, weight today was 143 lbs. She had endorsed nausea with eating. She was evaluated by GI, Dr. Samira Lyons and had Vladimir Balderas on 10/27/2020 - recommendations for GERD therapy, now on PPI therapy and tums PRN. She has been seeing Claude Dixon RD closely since discharge. Patient complained to [facility] of bone pain, provider recommended evaluation of vitamin D, potassium, calcium, phosphate, magnesium. We will obtain these today. At our last visit we discussed importance of formal eating disorder programs and resources were provided. She contacted [facility] and they only offered her the self pay option which she felt was expensive. She is waiting to hear back from [facility]. Intake in the past 24 hours:   Bite of a cracker this morning, a little ginger ale. Last even: nothing. Yesterday Afternoon: nothing. Yesterday morning: apple sauce, gaterade. Supplementations:   She is taking prenatal vitamin, Vit D3, vit B supplement however, she finds it difficult to keep these down at times. Not drinking ensures, reports this has not been received from her medical supply company. She reports she is recently pregnant. Had testing with GYN in the past week.  Reports she is 5 weeks 5 days gestation based on ultrasound at GYN office this week. She has follow-up visit with GYN next week, GYN is consulting with high risk OB and discussing TPN therapy for nutrition. Current Outpatient Medications   Medication Sig Dispense Refill    progesterone (PROMETRIUM) 200 mg capsule Take 200 mg by mouth daily.  cholecalciferol (Vitamin D3) 25 mcg (1,000 unit) cap Take  by mouth daily.  pyridoxine, vitamin B6, (Vitamin B-6) 100 mg tablet Take 100 mg by mouth daily.  hydrOXYzine HCL (ATARAX) 50 mg tablet Take 1 Tab by mouth four (4) times daily. 90 Tab 0    FLUoxetine (PROzac) 40 mg capsule Take 1 Cap by mouth daily. 30 Cap 2    doxylamine succinate (UNISOM) 25 mg tablet Take 1 Tab by mouth nightly as needed for Insomnia. 30 Tab 2    calcium carbonate (Tums) 200 mg calcium (500 mg) chew Take 1 Tab by mouth two (2) times daily as needed for Other (reflux). 60 Tab 1    polyethylene glycol (MIRALAX) 17 gram packet Take 1 Packet by mouth daily as needed for Constipation. 90 Each 1    prenatal vit-iron fumarate-fa (PRENATAL PLUS with IRON) 28 mg iron- 800 mcg tab       prazosin (MINIPRESS) 2 mg capsule Take 1 Cap by mouth nightly. Indications: posttraumatic stress syndrome 30 Cap 0    pantoprazole (PROTONIX) 40 mg tablet Take 1 Tab by mouth Daily (before breakfast). Indications: gastroesophageal reflux disease 30 Tab 0    OTHER 1 Ensure Enlive food supplement drink PO  Each 2    OTHER 1 ensure pudding PO daily for lunch. 90 Each 3    food supplemt, lactose-reduced (Ensure High Protein) liqd Take 237 mL by mouth three (3) times daily. 90 Can 2     Allergies   Allergen Reactions    Labetalol Hives    Ceclor [Cefaclor] Unknown (comments)    Pcn [Penicillins] Hives    Septra [Sulfamethoprim Ds] Unknown (comments)       ROS:   Complete review of systems was reviewed with pertinent information listed in HPI.   Review of Systems   Constitutional: Negative for chills, diaphoresis, fever, malaise/fatigue and weight loss. HENT: Negative for congestion, ear pain, hearing loss, sinus pain, sore throat and tinnitus. Eyes: Negative for blurred vision and double vision. Respiratory: Negative for shortness of breath. Cardiovascular: Negative for chest pain, palpitations and leg swelling. Gastrointestinal: Positive for nausea. Negative for abdominal pain, blood in stool, constipation, diarrhea, heartburn, melena and vomiting. Genitourinary: Negative for dysuria, frequency, hematuria and urgency. Musculoskeletal: Negative for joint pain and myalgias. Skin: Negative for itching and rash. Neurological: Negative for dizziness, tingling, weakness and headaches. Endo/Heme/Allergies: Negative for polydipsia. Psychiatric/Behavioral: Positive for depression. Negative for hallucinations, memory loss, substance abuse and suicidal ideas. The patient is nervous/anxious. The patient does not have insomnia. Objective:     Visit Vitals  /60 (BP 1 Location: Left arm, BP Patient Position: Sitting)   Pulse 65   Temp 97.3 °F (36.3 °C) (Temporal)   Resp 18   Ht 5' 4\" (1.626 m)   Wt 143 lb 3.2 oz (65 kg)   SpO2 99%   BMI 24.58 kg/m²       Vitals and Nurse Documentation reviewed. Physical Exam  Vitals signs and nursing note reviewed. Constitutional:       General: She is not in acute distress. Appearance: She is normal weight. She is not ill-appearing, toxic-appearing or diaphoretic. HENT:      Head: Normocephalic and atraumatic. Cardiovascular:      Rate and Rhythm: Normal rate and regular rhythm. Pulses: Normal pulses. Heart sounds: Normal heart sounds, S1 normal and S2 normal. No murmur. No friction rub. No gallop. Pulmonary:      Effort: Pulmonary effort is normal. No respiratory distress. Breath sounds: Normal breath sounds. No decreased breath sounds, wheezing, rhonchi or rales. Chest:      Chest wall: No tenderness.    Abdominal:      General: Bowel sounds are normal. Palpations: Abdomen is soft. Tenderness: There is no abdominal tenderness. Musculoskeletal:      Right lower leg: No edema. Left lower leg: No edema. Skin:     General: Skin is warm and dry. Neurological:      Mental Status: She is alert and oriented to person, place, and time. Gait: Gait is intact. Psychiatric:         Attention and Perception: Attention normal.         Mood and Affect: Mood is not anxious or depressed. Affect is flat. Affect is not tearful. Speech: Speech normal.         Behavior: Behavior normal. Behavior is cooperative. Thought Content: Thought content normal. Thought content does not include homicidal or suicidal plan. Cognition and Memory: Cognition and memory normal.         Judgment: Judgment normal.         Assessment/Plan:     Diagnoses and all orders for this visit:    1. Severe episode of recurrent major depressive disorder, without psychotic features ([facility]):     Longstanding issue of depression, anxiety, PTSD. Not well controlled. Now under the close evaluation of psych NP Isma Jenkins who is seeing her every 2 weeks. She is also compliant with weekly individual DBT with  TRISTEN Silva. Eating disorder is not well controlled, weight is down 8 lbs in 5 weeks, down 20 lbs in 3 months. Recent GI evaluation for chronic nausea with eating, S/p EGD  which revealed GERD, now on PPI. She has been referred to eating disorder treatment center, she is waiting to hear back from [facility] in [address] [place] [address] if they accept her insurance. She is also seeing registered dietician closely until higher level of care can be started. Meal replacements have been approved by insurance. Situation is now complicated by recent pregnancy, reports being about 5 weeks, 5 days gestation. Under the close evaluation with her GYN Dr. Laurel Stevens.  Reports Dr. Jacinda Olson is consulting with high risk maternal-fetal medicine specialist and they are considering TPN parental nutrition. Will obtain labs today. Advised patient to follow-up with [facility] [place]. Advised patient to discuss with her GYN how they can coordinate routine prenatal care with eating disorder treatment center verse inpatient hospitalization- she has an appointment in a few days. Advised patient trial meal replacement shakes TID now that they are apporved. Follow-up here in 3 weeks or as needed. 2. Eating disorder, unspecified type  -     METABOLIC PANEL, COMPREHENSIVE; Future  -     VITAMIN D, 25 HYDROXY; Future  -     PHOSPHORUS; Future  -     MAGNESIUM; Future    3. PTSD (post-traumatic stress disorder)    4. Gastroesophageal reflux disease, unspecified whether esophagitis present    5. Constipation, unspecified constipation type    6. Less than 8 weeks gestation of pregnancy        Follow-up and Dispositions    · Return in about 3 weeks (around 12/9/2020).      Discussed expected course/resolution/complications of diagnosis in detail with patient.    Medication risks/benefits/costs/interactions/alternatives discussed with patient.    Pt was given an after visit summary which includes diagnoses, current medications & vitals.    Pt expressed understanding with the diagnosis and plan

## 2020-11-18 NOTE — PATIENT INSTRUCTIONS
-Follow-up with [facility] eating disorder treatment center to see if they accept your insurance. - Discuss with Dr. Irene Bowman about how you can attend eating disorder treatment center and have good routine prenatal care. Do we need to consider inpatient psychiatry treatment locally for eating disorder with pregnacy?     - Follow-up with your medical supply company regarding ensures as we were notified that these supplements were approved!

## 2020-11-19 ENCOUNTER — VIRTUAL VISIT (OUTPATIENT)
Dept: BEHAVIORAL/MENTAL HEALTH CLINIC | Age: 35
End: 2020-11-19
Payer: MEDICAID

## 2020-11-19 DIAGNOSIS — F32.2 CURRENT SEVERE EPISODE OF MAJOR DEPRESSIVE DISORDER WITHOUT PSYCHOTIC FEATURES WITHOUT PRIOR EPISODE (HCC): Primary | ICD-10-CM

## 2020-11-19 DIAGNOSIS — F43.11 POST-TRAUMATIC STRESS DISORDER, ACUTE: ICD-10-CM

## 2020-11-19 DIAGNOSIS — F41.1 GAD (GENERALIZED ANXIETY DISORDER): ICD-10-CM

## 2020-11-19 PROCEDURE — 99214 OFFICE O/P EST MOD 30 MIN: CPT | Performed by: NURSE PRACTITIONER

## 2020-11-19 RX ORDER — OLANZAPINE 5 MG/1
5 TABLET ORAL
Qty: 30 TAB | Refills: 1 | Status: SHIPPED | OUTPATIENT
Start: 2020-11-19 | End: 2021-01-26 | Stop reason: SDUPTHER

## 2020-11-19 NOTE — PROGRESS NOTES
CHIEF COMPLAINT:  Odessa Serrano is a 38 y.o. female and was seen today for follow-up of psychiatric condition and psychotropic medication management. HPI:    Kumar Mancini reports the following psychiatric symptoms by hx:  depression and anxiety. Overall symptoms have been present for months. Currently depression is of moderate severity and anxiety is of moderate/high severity. The symptoms occur daily. Pt continues with compulsions to exercise and she has continued to lose weight during her pregnancy. Pt reports medications are of limited benefit at this time. Met with pt via video telehelath for appt today. FAMILY/SOCIAL HX: parenting stressors, psychosocial stressors    REVIEW OF SYSTEMS:  Psychiatric:  depression, anxiety  Appetite:decreased   Sleep: poor with DIMS (difficulty initiating & maintaining sleep), limited benefit from use of unisom  Neuro: no changes      Side Effects:  none    MENTAL STATUS EXAM:   Sensorium  oriented to time, place and person   Relations cooperative   Appearance:  age appropriate and casually dressed   Motor Behavior:  within normal limits, excessive exercise due to anxiety   Speech:  normal volume   Thought Process: goal directed   Thought Content free of delusions and free of hallucinations   Suicidal ideations no intention   Homicidal ideations no intention   Mood:  anxious and depressed   Affect:  anxious and depressed   Memory recent  adequate   Memory remote:  adequate   Concentration:  adequate   Abstraction:  abstract   Insight:  fair   Reliability fair   Judgment:  fair     MEDICAL DECISION MAKING:  Problems addressed today:    ICD-10-CM ICD-9-CM    1. Current severe episode of major depressive disorder without psychotic features without [facility]  F32.2 296.23    2. Post-traumatic stress disorder, acute  F43.11 309.81    3. DESTINI (generalized anxiety disorder)  F41.1 300.02        Assessment:   Kumar Mancini is not responding to treatment.  Symptoms are exacerbated and moderate/high severity. Reviewed ongoing anxiety symptoms and OCD quality to exercise despite being concerned about her pregnancy and safety for the baby's nutrition. [address] Coordinator was able to review tx goals with Dr Gemma Reyes and she is aware we plan to add olanzapine to prozac today. Will continue to work with pt's PCP, nutritionist and OB/GYN to maximize treatment plan. Discussed current medications and dosages with pt. Reviewed treatment goals and target symptoms to monitor for. Plan:   1. Current Outpatient Medications   Medication Sig Dispense Refill    progesterone (PROMETRIUM) 200 mg capsule Take 200 mg by mouth daily.  cholecalciferol (Vitamin D3) 25 mcg (1,000 unit) cap Take  by mouth daily.  pyridoxine, vitamin B6, (Vitamin B-6) 100 mg tablet Take 100 mg by mouth daily.  hydrOXYzine HCL (ATARAX) 50 mg tablet Take 1 Tab by mouth four (4) times daily. 90 Tab 0    FLUoxetine (PROzac) 40 mg capsule Take 1 Cap by mouth daily. 30 Cap 2    doxylamine succinate (UNISOM) 25 mg tablet Take 1 Tab by mouth nightly as needed for Insomnia. 30 Tab 2    OTHER 1 Ensure Enlive food supplement drink PO  Each 2    calcium carbonate (Tums) 200 mg calcium (500 mg) chew Take 1 Tab by mouth two (2) times daily as needed for Other (reflux). 60 Tab 1    polyethylene glycol (MIRALAX) 17 gram packet Take 1 Packet by mouth daily as needed for Constipation. 90 Each 1    OTHER 1 ensure pudding PO daily for lunch. 90 Each 3    prenatal vit-iron fumarate-fa (PRENATAL PLUS with IRON) 28 mg iron- 800 mcg tab       food supplemt, lactose-reduced (Ensure High Protein) liqd Take 237 mL by mouth three (3) times daily. 90 Can 2    prazosin (MINIPRESS) 2 mg capsule Take 1 Cap by mouth nightly. Indications: posttraumatic stress syndrome 30 Cap 0    pantoprazole (PROTONIX) 40 mg tablet Take 1 Tab by mouth Daily (before breakfast).  Indications: gastroesophageal reflux disease 30 Tab 0          medication changes made today: cont prozac, unisom and add olanzapine    2. Counseling and coordination of care including instructions for treatment, risks/benefits, risk factor reduction and patient/family education. She agrees with the plan. Patient instructed to call with any side effects, questions or issues. 3.    Follow-up and Dispositions    · Return in about 2 weeks (around 12/3/2020). Dyana Kamara, who was evaluated through a synchronous (real-time) audio-video encounter, and/or her healthcare decision maker, is aware that it is a billable service, with coverage as determined by her insurance carrier. She provided verbal consent to proceed: Yes, and patient identification was verified. It was conducted pursuant to the emergency declaration under the [address] [place] [address], [address] [place] [address] authority and the [employer] Act. A caregiver was present when appropriate. Ability to conduct physical exam was limited. I was at home. The patient was at home.       11/19/2020  Renato Mejia NP

## 2020-11-20 ENCOUNTER — TELEPHONE (OUTPATIENT)
Dept: BEHAVIORAL/MENTAL HEALTH CLINIC | Age: 35
End: 2020-11-20

## 2020-11-20 NOTE — TELEPHONE ENCOUNTER
Patient had an appointment yesterday (12/19) and was prescribed zyprexa for bedtime. Patient is asking whether there is something else she can take in-between time. She asks whether she should take hydroxyzine during the day time or something else. Please advise.

## 2020-11-23 NOTE — TELEPHONE ENCOUNTER
Initiated call. No answer, left VM. Left VM she can use hydroxyzine prn. Asked pt to call clinic back to review olanzapine dose.

## 2020-11-24 LAB
CHLAMYDIA, EXTERNAL: NEGATIVE
HBSAG, EXTERNAL: NEGATIVE
HIV, EXTERNAL: NEGATIVE
N. GONORRHEA, EXTERNAL: NEGATIVE
RPR, EXTERNAL: NON REACTIVE
RUBELLA, EXTERNAL: NORMAL

## 2020-11-25 LAB
ANTIBODY SCREEN, EXTERNAL: NEGATIVE
TYPE, ABO & RH, EXTERNAL: NORMAL

## 2020-11-27 ENCOUNTER — LAB ONLY (OUTPATIENT)
Dept: FAMILY MEDICINE CLINIC | Age: 35
End: 2020-11-27

## 2020-11-27 DIAGNOSIS — F50.9 EATING DISORDER, UNSPECIFIED TYPE: ICD-10-CM

## 2020-11-27 LAB
25(OH)D3 SERPL-MCNC: 21.6 NG/ML (ref 30–100)
ALBUMIN SERPL-MCNC: 3.4 G/DL (ref 3.5–5)
ALBUMIN/GLOB SERPL: 0.9 {RATIO} (ref 1.1–2.2)
ALP SERPL-CCNC: 46 U/L (ref 45–117)
ALT SERPL-CCNC: 15 U/L (ref 12–78)
ANION GAP SERPL CALC-SCNC: 7 MMOL/L (ref 5–15)
AST SERPL-CCNC: ABNORMAL U/L (ref 15–37)
BILIRUB SERPL-MCNC: 0.4 MG/DL (ref 0.2–1)
BUN SERPL-MCNC: 9 MG/DL (ref 6–20)
BUN/CREAT SERPL: 13 (ref 12–20)
CALCIUM SERPL-MCNC: 8.6 MG/DL (ref 8.5–10.1)
CHLORIDE SERPL-SCNC: 106 MMOL/L (ref 97–108)
CO2 SERPL-SCNC: 22 MMOL/L (ref 21–32)
CREAT SERPL-MCNC: 0.67 MG/DL (ref 0.55–1.02)
GLOBULIN SER CALC-MCNC: 3.7 G/DL (ref 2–4)
GLUCOSE SERPL-MCNC: 75 MG/DL (ref 65–100)
MAGNESIUM SERPL-MCNC: NORMAL MG/DL (ref 1.6–2.4)
PHOSPHATE SERPL-MCNC: 5.1 MG/DL (ref 2.6–4.7)
POTASSIUM SERPL-SCNC: ABNORMAL MMOL/L (ref 3.5–5.1)
PROT SERPL-MCNC: 7.1 G/DL (ref 6.4–8.2)
SODIUM SERPL-SCNC: 135 MMOL/L (ref 136–145)

## 2020-12-01 NOTE — PROGRESS NOTES
Halina,     Overall, your labs look okay. A blood sample clotted and we were unable to get a few results because this happened. Your kidney function looks good. Your liver function is stable. Your electrolytes and blood sugar are stable. Your vitamin D level is mildly low. If you are taking Vitamin D3 1,000 units daily, then you can take 2,000 units daily or on most days of the week. I will see you in a few days and we can address any additional questions you may have.      Jace Laws NP

## 2020-12-02 ENCOUNTER — HOSPITAL ENCOUNTER (INPATIENT)
Age: 35
LOS: 15 days | Discharge: HOME HEALTH | DRG: 566 | End: 2020-12-17
Attending: EMERGENCY MEDICINE | Admitting: OBSTETRICS & GYNECOLOGY
Payer: MEDICAID

## 2020-12-02 DIAGNOSIS — F41.9 ANXIETY: ICD-10-CM

## 2020-12-02 DIAGNOSIS — K80.20 CALCULUS OF GALLBLADDER WITHOUT CHOLECYSTITIS WITHOUT OBSTRUCTION: ICD-10-CM

## 2020-12-02 DIAGNOSIS — F32.A DEPRESSION AFFECTING PREGNANCY: ICD-10-CM

## 2020-12-02 DIAGNOSIS — O99.340 DEPRESSION AFFECTING PREGNANCY: ICD-10-CM

## 2020-12-02 DIAGNOSIS — O21.9 NAUSEA AND VOMITING IN PREGNANCY: Primary | ICD-10-CM

## 2020-12-02 DIAGNOSIS — D50.9 MICROCYTIC ANEMIA: ICD-10-CM

## 2020-12-02 DIAGNOSIS — E86.0 DEHYDRATION: ICD-10-CM

## 2020-12-02 PROBLEM — F50.9 EATING DISORDER AFFECTING PREGNANCY, ANTEPARTUM: Status: ACTIVE | Noted: 2020-12-02

## 2020-12-02 LAB
ALBUMIN SERPL-MCNC: 3.8 G/DL (ref 3.5–5)
ALBUMIN/GLOB SERPL: 0.9 {RATIO} (ref 1.1–2.2)
ALP SERPL-CCNC: 47 U/L (ref 45–117)
ALT SERPL-CCNC: 15 U/L (ref 12–78)
ANION GAP SERPL CALC-SCNC: 9 MMOL/L (ref 5–15)
APPEARANCE UR: CLEAR
AST SERPL-CCNC: 17 U/L (ref 15–37)
BACTERIA URNS QL MICRO: NEGATIVE /HPF
BASOPHILS # BLD: 0 K/UL (ref 0–0.1)
BASOPHILS NFR BLD: 0 % (ref 0–1)
BILIRUB SERPL-MCNC: 0.6 MG/DL (ref 0.2–1)
BILIRUB UR QL: NEGATIVE
BUN SERPL-MCNC: 11 MG/DL (ref 6–20)
BUN/CREAT SERPL: 15 (ref 12–20)
CALCIUM SERPL-MCNC: 9.4 MG/DL (ref 8.5–10.1)
CHLORIDE SERPL-SCNC: 108 MMOL/L (ref 97–108)
CO2 SERPL-SCNC: 22 MMOL/L (ref 21–32)
COLOR UR: ABNORMAL
CREAT SERPL-MCNC: 0.72 MG/DL (ref 0.55–1.02)
DIFFERENTIAL METHOD BLD: ABNORMAL
EOSINOPHIL # BLD: 0 K/UL (ref 0–0.4)
EOSINOPHIL NFR BLD: 0 % (ref 0–7)
EPITH CASTS URNS QL MICRO: ABNORMAL /LPF
ERYTHROCYTE [DISTWIDTH] IN BLOOD BY AUTOMATED COUNT: 15.3 % (ref 11.5–14.5)
GLOBULIN SER CALC-MCNC: 4.4 G/DL (ref 2–4)
GLUCOSE SERPL-MCNC: 68 MG/DL (ref 65–100)
GLUCOSE UR STRIP.AUTO-MCNC: NEGATIVE MG/DL
HCT VFR BLD AUTO: 33.1 % (ref 35–47)
HGB BLD-MCNC: 10.6 G/DL (ref 11.5–16)
HGB UR QL STRIP: NEGATIVE
IMM GRANULOCYTES # BLD AUTO: 0 K/UL (ref 0–0.04)
IMM GRANULOCYTES NFR BLD AUTO: 0 % (ref 0–0.5)
KETONES UR QL STRIP.AUTO: >80 MG/DL
LEUKOCYTE ESTERASE UR QL STRIP.AUTO: NEGATIVE
LYMPHOCYTES # BLD: 2 K/UL (ref 0.8–3.5)
LYMPHOCYTES NFR BLD: 21 % (ref 12–49)
MAGNESIUM SERPL-MCNC: 2.2 MG/DL (ref 1.6–2.4)
MCH RBC QN AUTO: 23.1 PG (ref 26–34)
MCHC RBC AUTO-ENTMCNC: 32 G/DL (ref 30–36.5)
MCV RBC AUTO: 72.3 FL (ref 80–99)
MONOCYTES # BLD: 0.6 K/UL (ref 0–1)
MONOCYTES NFR BLD: 6 % (ref 5–13)
MUCOUS THREADS URNS QL MICRO: ABNORMAL /LPF
NEUTS SEG # BLD: 6.9 K/UL (ref 1.8–8)
NEUTS SEG NFR BLD: 73 % (ref 32–75)
NITRITE UR QL STRIP.AUTO: NEGATIVE
NRBC # BLD: 0 K/UL (ref 0–0.01)
NRBC BLD-RTO: 0 PER 100 WBC
PH UR STRIP: 8 [PH] (ref 5–8)
PHOSPHATE SERPL-MCNC: 3.6 MG/DL (ref 2.6–4.7)
PLATELET # BLD AUTO: 348 K/UL (ref 150–400)
PMV BLD AUTO: 12.4 FL (ref 8.9–12.9)
POTASSIUM SERPL-SCNC: 4.1 MMOL/L (ref 3.5–5.1)
PROT SERPL-MCNC: 8.2 G/DL (ref 6.4–8.2)
PROT UR STRIP-MCNC: 30 MG/DL
RBC # BLD AUTO: 4.58 M/UL (ref 3.8–5.2)
RBC #/AREA URNS HPF: ABNORMAL /HPF (ref 0–5)
SODIUM SERPL-SCNC: 139 MMOL/L (ref 136–145)
SP GR UR REFRACTOMETRY: 1.02 (ref 1–1.03)
UR CULT HOLD, URHOLD: NORMAL
UROBILINOGEN UR QL STRIP.AUTO: 0.2 EU/DL (ref 0.2–1)
WBC # BLD AUTO: 9.6 K/UL (ref 3.6–11)
WBC URNS QL MICRO: ABNORMAL /HPF (ref 0–4)

## 2020-12-02 PROCEDURE — 74011000250 HC RX REV CODE- 250: Performed by: OBSTETRICS & GYNECOLOGY

## 2020-12-02 PROCEDURE — 74011250636 HC RX REV CODE- 250/636: Performed by: OBSTETRICS & GYNECOLOGY

## 2020-12-02 PROCEDURE — 93005 ELECTROCARDIOGRAM TRACING: CPT

## 2020-12-02 PROCEDURE — 83735 ASSAY OF MAGNESIUM: CPT

## 2020-12-02 PROCEDURE — 85025 COMPLETE CBC W/AUTO DIFF WBC: CPT

## 2020-12-02 PROCEDURE — 84100 ASSAY OF PHOSPHORUS: CPT

## 2020-12-02 PROCEDURE — 80053 COMPREHEN METABOLIC PANEL: CPT

## 2020-12-02 PROCEDURE — 81001 URINALYSIS AUTO W/SCOPE: CPT

## 2020-12-02 PROCEDURE — 65270000029 HC RM PRIVATE

## 2020-12-02 PROCEDURE — 74011250637 HC RX REV CODE- 250/637: Performed by: OBSTETRICS & GYNECOLOGY

## 2020-12-02 PROCEDURE — 74011250636 HC RX REV CODE- 250/636: Performed by: EMERGENCY MEDICINE

## 2020-12-02 PROCEDURE — 96360 HYDRATION IV INFUSION INIT: CPT

## 2020-12-02 PROCEDURE — 99284 EMERGENCY DEPT VISIT MOD MDM: CPT

## 2020-12-02 RX ORDER — ALBUTEROL SULFATE 90 UG/1
2 AEROSOL, METERED RESPIRATORY (INHALATION)
Status: DISCONTINUED | OUTPATIENT
Start: 2020-12-02 | End: 2020-12-02 | Stop reason: CLARIF

## 2020-12-02 RX ORDER — FLUOXETINE HYDROCHLORIDE 20 MG/1
40 CAPSULE ORAL DAILY
Status: DISCONTINUED | OUTPATIENT
Start: 2020-12-03 | End: 2020-12-17 | Stop reason: HOSPADM

## 2020-12-02 RX ORDER — DIPHENHYDRAMINE HYDROCHLORIDE 50 MG/ML
12.5 INJECTION, SOLUTION INTRAMUSCULAR; INTRAVENOUS
Status: DISCONTINUED | OUTPATIENT
Start: 2020-12-02 | End: 2020-12-17 | Stop reason: HOSPADM

## 2020-12-02 RX ORDER — ALBUTEROL SULFATE 0.83 MG/ML
2.5 SOLUTION RESPIRATORY (INHALATION)
Status: DISCONTINUED | OUTPATIENT
Start: 2020-12-02 | End: 2020-12-17 | Stop reason: HOSPADM

## 2020-12-02 RX ORDER — PROCHLORPERAZINE EDISYLATE 5 MG/ML
10 INJECTION INTRAMUSCULAR; INTRAVENOUS
Status: DISCONTINUED | OUTPATIENT
Start: 2020-12-02 | End: 2020-12-15

## 2020-12-02 RX ORDER — SODIUM CHLORIDE, SODIUM LACTATE, POTASSIUM CHLORIDE, CALCIUM CHLORIDE 600; 310; 30; 20 MG/100ML; MG/100ML; MG/100ML; MG/100ML
125 INJECTION, SOLUTION INTRAVENOUS CONTINUOUS
Status: DISCONTINUED | OUTPATIENT
Start: 2020-12-02 | End: 2020-12-04

## 2020-12-02 RX ORDER — PROGESTERONE 100 MG/1
200 CAPSULE ORAL
Status: DISCONTINUED | OUTPATIENT
Start: 2020-12-02 | End: 2020-12-17 | Stop reason: HOSPADM

## 2020-12-02 RX ORDER — PRAZOSIN HYDROCHLORIDE 1 MG/1
1 CAPSULE ORAL
Status: DISCONTINUED | OUTPATIENT
Start: 2020-12-02 | End: 2020-12-03

## 2020-12-02 RX ORDER — PANTOPRAZOLE SODIUM 40 MG/1
40 TABLET, DELAYED RELEASE ORAL
Status: DISCONTINUED | OUTPATIENT
Start: 2020-12-03 | End: 2020-12-17 | Stop reason: HOSPADM

## 2020-12-02 RX ORDER — OLANZAPINE 5 MG/1
5 TABLET ORAL
Status: DISCONTINUED | OUTPATIENT
Start: 2020-12-02 | End: 2020-12-17 | Stop reason: HOSPADM

## 2020-12-02 RX ORDER — FLUOXETINE HYDROCHLORIDE 20 MG/1
20 CAPSULE ORAL DAILY
Status: DISCONTINUED | OUTPATIENT
Start: 2020-12-03 | End: 2020-12-02

## 2020-12-02 RX ORDER — ACETAMINOPHEN 650 MG/1
650 SUPPOSITORY RECTAL
Status: DISCONTINUED | OUTPATIENT
Start: 2020-12-02 | End: 2020-12-17 | Stop reason: HOSPADM

## 2020-12-02 RX ORDER — ONDANSETRON 2 MG/ML
4 INJECTION INTRAMUSCULAR; INTRAVENOUS EVERY 6 HOURS
Status: DISCONTINUED | OUTPATIENT
Start: 2020-12-02 | End: 2020-12-17 | Stop reason: HOSPADM

## 2020-12-02 RX ADMIN — ONDANSETRON HYDROCHLORIDE 4 MG: 2 INJECTION, SOLUTION INTRAMUSCULAR; INTRAVENOUS at 23:06

## 2020-12-02 RX ADMIN — FOLIC ACID: 5 INJECTION, SOLUTION INTRAMUSCULAR; INTRAVENOUS; SUBCUTANEOUS at 23:07

## 2020-12-02 RX ADMIN — ACETAMINOPHEN 650 MG: 650 SUPPOSITORY RECTAL at 23:14

## 2020-12-02 RX ADMIN — PROGESTERONE 200 MG: 100 CAPSULE, LIQUID FILLED ORAL at 23:10

## 2020-12-02 RX ADMIN — SODIUM CHLORIDE 1000 ML: 9 INJECTION, SOLUTION INTRAVENOUS at 18:24

## 2020-12-02 NOTE — ED TRIAGE NOTES
Pt to ED via EMF from [facility] a syncopal episode. Pt has had LLQ abdominal pain and dizziness since episode. Pt is 9 weeks pregnant, PCP sent her to ED for likely dehydration. Denies vaginal bleeding.

## 2020-12-02 NOTE — ED PROVIDER NOTES
HPI     Please note that this dictation was completed with [employer], the computer voice recognition software. Quite often unanticipated grammatical, syntax, homophones, and other interpretive errors are inadvertently transcribed by the computer software. Please disregard these errors. Please excuse any errors that have escaped final proofreading. 52-year-old female with a history of anorexia, anxiety, asthma, hyperemesis, hypertension, G 14 P7 a 6 now 9 weeks pregnant referred from [facility] office via EMS for syncope. Patient states that she has had a lot of vomiting with this pregnancy and has not been able to tolerate much food or drinking in the last 1 and half weeks. She saw her OB last week and the patient reports that she was tried to be convinced to be admitted. Patient did not want to be admitted at that time according to the patient. Patient states that she has continued not feeling well over the last week since that appointment. She states that she is occasionally induced her own vomiting. Today while at the [facility] office, patient stood up and was over the trash can then felt lightheaded and passed out. She did have some dysuria and reports urine being obtained at the [facility] office. Positive decreased urination. Denies fevers, cough, congestion or other complaints. Pt states she had US last week which showed IUP. OB:  Dr. Rahel Harden, [facility]. Social history: Non-smoker, no alcohol currently. Past Medical History:   Diagnosis Date    Anorexia     Anxiety     Asthma     on albuterol prn.  Triggers cold and allergies    Avoidant-restrictive food intake disorder (ARFID)     Back pain, chronic     sciatica    Gestational hypertension     Past pregnancy    HX OTHER MEDICAL      , , , ,     Hyperemesis     severe hyperemesis    Hypertension     with G12 - none this pregnancy    Iron deficiency anemia 10/08/2010    MDD (major depressive disorder), single episode with postpartum onset 2013    treated with meds - 13    Orthostatic hypotension 2020    Plantar fasciitis     Pregnancy     36 weeks    PTSD (post-traumatic stress disorder)        Past Surgical History:   Procedure Laterality Date     DELIVERY ONLY      HX  SECTION  4/22/15    HX DILATION AND CURETTAGE      HX DILATION AND CURETTAGE  2020    HX GYN      miscarriage x 6    HX GYN      removal of left fallopian tube    HX OTHER SURGICAL      cerlcage x4, ectopic x1 1999 with removal of left fallopian tube    HX OTHER SURGICAL      cerclage w/ current pregnancy.  Removed 18         Family History:   Problem Relation Age of Onset   [facility] Arthritis-rheumatoid Mother     Asthma Mother     No Known Problems Father     No Known Problems Maternal Grandmother     No Known Problems Maternal Grandfather     No Known Problems Paternal Grandmother     No Known Problems Paternal Grandfather     Asthma Son     Alcohol abuse Neg Hx     Arthritis-osteo Neg Hx     Bleeding Prob Neg Hx     Cancer Neg Hx     Diabetes Neg Hx     Elevated Lipids Neg Hx     Headache Neg Hx     Heart Disease Neg Hx     Hypertension Neg Hx     Lung Disease Neg Hx     Migraines Neg Hx     Psychiatric Disorder Neg Hx     Stroke Neg Hx     Mental Retardation Neg Hx     Anesth Problems Neg Hx        Social History     Socioeconomic History    Marital status: SINGLE     Spouse name: Not on file    Number of children: 7    Years of education: Not on file    Highest education level: 12th grade   Occupational History    Not on file   Social Needs    Financial resource strain: Not very hard    Food insecurity     Worry: Never true     Inability: Never true    Transportation needs     Medical: No     Non-medical: No   Tobacco Use    Smoking status: Never Smoker    Smokeless tobacco: Never Used   Substance and Sexual Activity    Alcohol use: Not Currently     Frequency: Monthly or less Drinks per session: 1 or 2     Binge frequency: Never    Drug use: No    Sexual activity: Yes     Partners: Male   Lifestyle    Physical activity     Days per week: Not on file     Minutes per session: Not on file    Stress: Not on file   Relationships    Social connections     Talks on phone: Not on file     Gets together: Not on file     Attends Denominational service: Not on file     Active member of club or organization: Not on file     Attends meetings of clubs or organizations: Not on file     Relationship status: Not on file    Intimate partner violence     Fear of current or ex partner: Not on file     Emotionally abused: Not on file     Physically abused: Not on file     Forced sexual activity: Not on file   Other Topics Concern    Not on file   Social History Narrative    Elina Larsen lives with boyfriend, Jerrod Barone,  reportedly with alcohol dependence. She was raised by her mother. She has no siblings. Not working. She is supported financially by the childrens' father and public support. She has no hobbies outside of her children. She has a 12th grade education and no legal entanglements. ALLERGIES: Labetalol; Ceclor [cefaclor]; Pcn [penicillins]; and Septra [sulfamethoprim ds]    Review of Systems   Gastrointestinal: Positive for abdominal pain (had rlq after fall but ok now.  ), nausea and vomiting. Neurological: Positive for light-headedness. All other systems reviewed and are negative. Vitals:    12/02/20 1735 12/02/20 1742   BP: 118/75    Pulse: 68    Resp: 18    Temp: 98.1 °F (36.7 °C)    SpO2: 99% 99%            Physical Exam  Vitals signs and nursing note reviewed. Constitutional:       General: She is not in acute distress. Appearance: She is well-developed. She is not diaphoretic. Comments: Thin appearing   HENT:      Head: Normocephalic and atraumatic. Nose: Nose normal. No congestion or rhinorrhea.       Mouth/Throat:      Mouth: Mucous membranes are dry. Pharynx: No oropharyngeal exudate. Comments: Dry MM  Eyes:      General: No scleral icterus. Right eye: No discharge. Left eye: No discharge. Conjunctiva/sclera: Conjunctivae normal.   Neck:      Musculoskeletal: Normal range of motion and neck supple. Cardiovascular:      Rate and Rhythm: Normal rate and regular rhythm. Heart sounds: Normal heart sounds. No murmur. No friction rub. No gallop. Pulmonary:      Effort: Pulmonary effort is normal. No respiratory distress. Breath sounds: Normal breath sounds. No wheezing or rales. Abdominal:      General: There is no distension. Palpations: Abdomen is soft. Tenderness: There is no abdominal tenderness. There is no guarding. Musculoskeletal: Normal range of motion. General: No tenderness. Lymphadenopathy:      Cervical: No cervical adenopathy. Skin:     General: Skin is warm and dry. Coloration: Skin is not pale. Findings: No rash. Neurological:      Mental Status: She is alert and oriented to person, place, and time. Cranial Nerves: No cranial nerve deficit. Coordination: Coordination normal.              [facility]  ED Course as of Dec 02 1908   Wed Dec 02, 2020   1908 D/w Dr. Frank Valencia, [facility] hospitalist.  She will contact Dr. Irma Smith to admit. Yousif Tan MD      [RG]      ED Course User Index  [RG] Yandy aCtes MD     58-year-old female with a history of anorexia,  15, para 7, a 6 at 9 weeks pregnant here with syncope and dehydration in setting of hyperemesis. Will check labs, give IV fluids, check EKG. OB office had called the emergency room requesting that patient be admitted. Procedures             ED EKG interpretation:  Rhythm: normal sinus rhythm; and regular . Rate (approx.): 64; Axis: normal; P wave: normal; QRS interval: normal ; ST/T wave: normal;;  This EKG was interpreted by Yousif Tan MD,ED Provider.           7:09 PM  Patient is being admitted to the hospital.  The results of their tests and reasons for their admission have been discussed with them and/or available family. They convey agreement and understanding for the need to be admitted and for their admission diagnosis. Consultation will be made now with the inpatient physician for hospitalization. Recent Results (from the past 24 hour(s))   CBC WITH AUTOMATED DIFF    Collection Time: 12/02/20  5:47 PM   Result Value Ref Range    WBC 9.6 3.6 - 11.0 K/uL    RBC 4.58 3.80 - 5.20 M/uL    HGB 10.6 (L) 11.5 - 16.0 g/dL    HCT 33.1 (L) 35.0 - 47.0 %    MCV 72.3 (L) 80.0 - 99.0 FL    MCH 23.1 (L) 26.0 - 34.0 PG    MCHC 32.0 30.0 - 36.5 g/dL    RDW 15.3 (H) 11.5 - 14.5 %    PLATELET 761 974 - 859 K/uL    MPV 12.4 8.9 - 12.9 FL    NRBC 0.0 0  WBC    ABSOLUTE NRBC 0.00 0.00 - 0.01 K/uL    NEUTROPHILS 73 32 - 75 %    LYMPHOCYTES 21 12 - 49 %    MONOCYTES 6 5 - 13 %    EOSINOPHILS 0 0 - 7 %    BASOPHILS 0 0 - 1 %    IMMATURE GRANULOCYTES 0 0.0 - 0.5 %    ABS. NEUTROPHILS 6.9 1.8 - 8.0 K/UL    ABS. LYMPHOCYTES 2.0 0.8 - 3.5 K/UL    ABS. MONOCYTES 0.6 0.0 - 1.0 K/UL    ABS. EOSINOPHILS 0.0 0.0 - 0.4 K/UL    ABS. BASOPHILS 0.0 0.0 - 0.1 K/UL    ABS. IMM. GRANS. 0.0 0.00 - 0.04 K/UL    DF AUTOMATED     METABOLIC PANEL, COMPREHENSIVE    Collection Time: 12/02/20  5:47 PM   Result Value Ref Range    Sodium 139 136 - 145 mmol/L    Potassium 4.1 3.5 - 5.1 mmol/L    Chloride 108 97 - 108 mmol/L    CO2 22 21 - 32 mmol/L    Anion gap 9 5 - 15 mmol/L    Glucose 68 65 - 100 mg/dL    BUN 11 6 - 20 MG/DL    Creatinine 0.72 0.55 - 1.02 MG/DL    BUN/Creatinine ratio 15 12 - 20      GFR est AA >60 >60 ml/min/1.73m2    GFR est non-AA >60 >60 ml/min/1.73m2    Calcium 9.4 8.5 - 10.1 MG/DL    Bilirubin, total 0.6 0.2 - 1.0 MG/DL    ALT (SGPT) 15 12 - 78 U/L    AST (SGOT) 17 15 - 37 U/L    Alk.  phosphatase 47 45 - 117 U/L    Protein, total 8.2 6.4 - 8.2 g/dL    Albumin 3.8 3.5 - 5.0 g/dL Globulin 4.4 (H) 2.0 - 4.0 g/dL    A-G Ratio 0.9 (L) 1.1 - 2.2     EKG, 12 LEAD, INITIAL    Collection Time: 12/02/20  5:50 PM   Result Value Ref Range    Ventricular Rate 64 BPM    Atrial Rate 64 BPM    P-R Interval 140 ms    QRS Duration 88 ms    Q-T Interval 406 ms    QTC Calculation (Bezet) 418 ms    Calculated P Axis 63 degrees    Calculated R Axis 28 degrees    Calculated T Axis 41 degrees    Diagnosis       Normal sinus rhythm  When compared with ECG of 25-SEP-2020 08:50,  No significant change was found         No results found.

## 2020-12-03 ENCOUNTER — HOSPITAL ENCOUNTER (OUTPATIENT)
Dept: NUTRITION | Age: 35
End: 2020-12-03
Payer: MEDICAID

## 2020-12-03 LAB
ATRIAL RATE: 64 BPM
CALCULATED P AXIS, ECG09: 63 DEGREES
CALCULATED R AXIS, ECG10: 28 DEGREES
CALCULATED T AXIS, ECG11: 41 DEGREES
DIAGNOSIS, 93000: NORMAL
P-R INTERVAL, ECG05: 140 MS
Q-T INTERVAL, ECG07: 406 MS
QRS DURATION, ECG06: 88 MS
QTC CALCULATION (BEZET), ECG08: 418 MS
VENTRICULAR RATE, ECG03: 64 BPM

## 2020-12-03 PROCEDURE — 74011000250 HC RX REV CODE- 250: Performed by: OBSTETRICS & GYNECOLOGY

## 2020-12-03 PROCEDURE — 65410000002 HC RM PRIVATE OB

## 2020-12-03 PROCEDURE — 76801 OB US < 14 WKS SINGLE FETUS: CPT | Performed by: OBSTETRICS & GYNECOLOGY

## 2020-12-03 PROCEDURE — 74011250636 HC RX REV CODE- 250/636: Performed by: OBSTETRICS & GYNECOLOGY

## 2020-12-03 PROCEDURE — 74011250637 HC RX REV CODE- 250/637: Performed by: OBSTETRICS & GYNECOLOGY

## 2020-12-03 RX ORDER — PRAZOSIN HYDROCHLORIDE 1 MG/1
2 CAPSULE ORAL
Status: DISCONTINUED | OUTPATIENT
Start: 2020-12-03 | End: 2020-12-17 | Stop reason: HOSPADM

## 2020-12-03 RX ORDER — HYDROXYZINE HYDROCHLORIDE 10 MG/1
10 TABLET, FILM COATED ORAL
Status: DISCONTINUED | OUTPATIENT
Start: 2020-12-03 | End: 2020-12-17 | Stop reason: HOSPADM

## 2020-12-03 RX ADMIN — ONDANSETRON HYDROCHLORIDE 4 MG: 2 INJECTION, SOLUTION INTRAMUSCULAR; INTRAVENOUS at 13:26

## 2020-12-03 RX ADMIN — SODIUM CHLORIDE, SODIUM LACTATE, POTASSIUM CHLORIDE, AND CALCIUM CHLORIDE 125 ML/HR: 600; 310; 30; 20 INJECTION, SOLUTION INTRAVENOUS at 10:31

## 2020-12-03 RX ADMIN — PROGESTERONE 200 MG: 100 CAPSULE, LIQUID FILLED ORAL at 22:21

## 2020-12-03 RX ADMIN — ONDANSETRON HYDROCHLORIDE 4 MG: 2 INJECTION, SOLUTION INTRAMUSCULAR; INTRAVENOUS at 07:27

## 2020-12-03 RX ADMIN — OLANZAPINE 5 MG: 5 TABLET, FILM COATED ORAL at 22:20

## 2020-12-03 RX ADMIN — PROCHLORPERAZINE EDISYLATE 10 MG: 5 INJECTION INTRAMUSCULAR; INTRAVENOUS at 05:40

## 2020-12-03 RX ADMIN — FOLIC ACID: 5 INJECTION, SOLUTION INTRAMUSCULAR; INTRAVENOUS; SUBCUTANEOUS at 20:54

## 2020-12-03 RX ADMIN — FLUOXETINE 40 MG: 20 CAPSULE ORAL at 10:31

## 2020-12-03 RX ADMIN — PROCHLORPERAZINE EDISYLATE 10 MG: 5 INJECTION INTRAMUSCULAR; INTRAVENOUS at 15:59

## 2020-12-03 RX ADMIN — PANTOPRAZOLE SODIUM 40 MG: 40 TABLET, DELAYED RELEASE ORAL at 07:27

## 2020-12-03 RX ADMIN — ONDANSETRON HYDROCHLORIDE 4 MG: 2 INJECTION, SOLUTION INTRAMUSCULAR; INTRAVENOUS at 20:53

## 2020-12-03 NOTE — ED NOTES
Zofran administered, started banana bag as ordered. Pt administered progesterone suppository. Pt c/o pain, administered rectal tylenol. Pt refused zyprexa at this time.

## 2020-12-03 NOTE — PROGRESS NOTES
T.O.C:   Pt expected to d/c to home   Family to provide transport at d/c   Emergency Contact: NONE, pt refused      CM met with pt at bedside; verified demographics, insurance, and PCP. Pt has no emergency contact listed, refused to enter one. Pt lives with her fimagdaleno and her 7 children. Pt has hx of depression/anxiety, alcohol abuse and a current eating disorder. Prior to admission pt ambulates independently; has questionable adequate support. Pt expected to d/c to home with family to provide transport. Pt does not have AMD, does not want to complete today. No other CM issues at present. CM will continue to follow. Kirsten Kuhn RN       Care Management Interventions  PCP Verified by CM: Yes  Last Visit to PCP: 11/18/20  Palliative Care Criteria Met (RRAT>21 & CHF Dx)?: No  MyChart Signup: Yes  Discharge Durable Medical Equipment: No  Physical Therapy Consult: No  Occupational Therapy Consult: No  Speech Therapy Consult: No  Current Support Network:  Other(lives with xenia and 7 children)  Confirm Follow Up Transport: Family  The Plan for Transition of Care is Related to the Following Treatment Goals : medically stable  The Patient and/or Patient Representative was Provided with a Choice of Provider and Agrees with the Discharge Plan?: (n/a)  Freedom of Choice List was Provided with Basic Dialogue that Supports the Patient's Individualized Plan of Care/Goals, Treatment Preferences and Shares the Quality Data Associated with the Providers?: (n/a)  Discharge Location  Discharge Placement: Unable to determine at this time    Kirsten Kuhn RN

## 2020-12-03 NOTE — CONSULTS
[address] [place] [id number]        GASTROENTEROLOGY CONSULTATION NOTE  Will Riccardo Knox  [phone] office  [phone] NP/PA in-hospital cell phone M-F until 4:30PM  After 5PM or on weekends, please call  for physician on call        NAME:  García Santamaria   :   1985   MRN:   [id number]       Referring Physician: Dr. Chris Hussein Date: 12/3/2020 11:15 AM    Chief Complaint: nausea and vomiting     History of Present Illness:  Patient is a 38 y.o. who is seen in consultation at the request of Dr. Keo Russell for eating disorder and pregnancy. Patient has a history of hypertension, asthma, anxiety, anorexia, and  now 9 weeks pregnant. She was referred to the ED from her OB office for syncope. Patient was admitted to the hospital on 20. Patient complains of nausea and vomiting for the last approximately 10-12 months. She reports no change in her symptoms. No hematemesis. She reports vomiting with most oral intake. No particular food triggers. She reports intermittent reflux. No dysphagia or odynophagia. There is some associated intermittent abdominal discomfort with eating. No change in bowel habits, melena, or hematochezia. No fevers. Patient reports having seen a dietician as an outpatient. She has been taking pantoprazole 40 mg daily, Zofran as needed, and using Phenergan suppositories. No NSAID use. No anticoagulation. No alcohol use since 10/2020 (previously, daily). No tobacco or drug use. No history of EGD. Patient was previously seen in our office in 10/2020 for nausea/vomiting. Patient reported that she had a miscarriage prior to that appointment. UGI series was completed. Patient reports finding out that she was pregnant at the beginning of 2020.     I have reviewed the emergency room note, hospital admission note, notes by all other clinicians who have seen the patient during this hospitalization to date. I have reviewed the problem list and the reason for this hospitalization. I have reviewed the allergies and the medications the patient was taking at home prior to this hospitalization. PMH:  Past Medical History:   Diagnosis Date    Anorexia     Anxiety     Asthma     on albuterol prn. Triggers cold and allergies    Avoidant-restrictive food intake disorder (ARFID)     Back pain, chronic     sciatica    Gestational hypertension     Past pregnancy    HX OTHER MEDICAL      , , , ,     Hyperemesis     severe hyperemesis    Hypertension     with G12 - none this pregnancy    Iron deficiency anemia 10/08/2010    MDD (major depressive disorder), single episode with postpartum onset 2013    treated with meds - 13    Orthostatic hypotension 2020    Plantar fasciitis     Pregnancy     36 weeks    PTSD (post-traumatic stress disorder)        PSH:  Past Surgical History:   Procedure Laterality Date     DELIVERY ONLY      HX  SECTION  4/22/15    HX DILATION AND CURETTAGE      HX DILATION AND CURETTAGE  2020    HX GYN      miscarriage x 6    HX GYN      removal of left fallopian tube    HX OTHER SURGICAL      cerlcage x4, ectopic x1 1999 with removal of left fallopian tube    HX OTHER SURGICAL      cerclage w/ current pregnancy. Removed 18       Allergies: Allergies   Allergen Reactions    Labetalol Hives    Ceclor [Cefaclor] Unknown (comments)    Pcn [Penicillins] Hives    Septra [Sulfamethoprim Ds] Unknown (comments)       Home Medications:  Prior to Admission Medications   Prescriptions Last Dose Informant Patient Reported? Taking? FLUoxetine (PROzac) 40 mg capsule   No No   Sig: Take 1 Cap by mouth daily. OLANZapine (ZyPREXA) 5 mg tablet   No No   Sig: Take 1 Tab by mouth nightly. OTHER   No No   Si ensure pudding PO daily for lunch.    OTHER   No No   Si Ensure Enlive food supplement drink PO TID   calcium carbonate (Tums) 200 mg calcium (500 mg) chew   No No   Sig: Take 1 Tab by mouth two (2) times daily as needed for Other (reflux). cholecalciferol (Vitamin D3) 25 mcg (1,000 unit) cap   Yes No   Sig: Take  by mouth daily. doxylamine succinate (UNISOM) 25 mg tablet   No No   Sig: Take 1 Tab by mouth nightly as needed for Insomnia. food supplemt, lactose-reduced (Ensure High Protein) liqd   No No   Sig: Take 237 mL by mouth three (3) times daily. hydrOXYzine HCL (ATARAX) 50 mg tablet   No No   Sig: Take 1 Tab by mouth four (4) times daily. pantoprazole (PROTONIX) 40 mg tablet   No No   Sig: Take 1 Tab by mouth Daily (before breakfast). Indications: gastroesophageal reflux disease   polyethylene glycol (MIRALAX) 17 gram packet   No No   Sig: Take 1 Packet by mouth daily as needed for Constipation. prazosin (MINIPRESS) 2 mg capsule   No No   Sig: Take 1 Cap by mouth nightly. Indications: posttraumatic stress syndrome   prenatal vit-iron fumarate-fa (PRENATAL PLUS with IRON) 28 mg iron- 800 mcg tab   Yes No   progesterone (PROMETRIUM) 200 mg capsule   Yes No   Sig: Take 200 mg by mouth daily. pyridoxine, vitamin B6, (Vitamin B-6) 100 mg tablet   Yes No   Sig: Take 100 mg by mouth daily. Facility-Administered Medications: None       Hospital Medications:  Current Facility-Administered Medications   Medication Dose Route Frequency    prazosin (MINIPRESS) capsule 2 mg  2 mg Oral QHS PRN    hydrOXYzine HCL (ATARAX) tablet 10 mg  10 mg Oral TID PRN    lactated Ringers infusion  125 mL/hr IntraVENous CONTINUOUS    ondansetron (ZOFRAN) injection 4 mg  4 mg IntraVENous Q6H    pantoprazole (PROTONIX) tablet 40 mg  40 mg Oral ACB    acetaminophen (TYLENOL) suppository 650 mg  650 mg Rectal Q6H PRN    diphenhydrAMINE (BENADRYL) injection 12.5 mg  12.5 mg IntraVENous Q6H PRN    0.9% sodium chloride 1,000 mL with mvi, adult no. 4 with vit K 10 mL, thiamine 736 mg, folic acid 1 mg infusion   IntraVENous Q24H    progesterone (PROMETRIUM) capsule 200 mg  200 mg Vaginal QHS    FLUoxetine (PROzac) capsule 40 mg  40 mg Oral DAILY    prochlorperazine (COMPAZINE) injection 10 mg  10 mg IntraVENous Q6H PRN    OLANZapine (ZyPREXA) tablet 5 mg  5 mg Oral QHS    albuterol (PROVENTIL VENTOLIN) nebulizer solution 2.5 mg  2.5 mg Nebulization Q4H PRN       Social History:  Social History     Tobacco Use    Smoking status: Never Smoker    Smokeless tobacco: Never Used   Substance Use Topics    Alcohol use: Not Currently     Frequency: Monthly or less     Drinks per session: 1 or 2     Binge frequency: Never       Family History:  Family History   Problem Relation Age of Onset   Mike Saldana Arthritis-rheumatoid Mother     Asthma Mother     No Known Problems Father     No Known Problems Maternal Grandmother     No Known Problems Maternal Grandfather     No Known Problems Paternal Grandmother     No Known Problems Paternal Grandfather     Asthma Son     Alcohol abuse Neg Hx     Arthritis-osteo Neg Hx     Bleeding Prob Neg Hx     Cancer Neg Hx     Diabetes Neg Hx     Elevated Lipids Neg Hx     Headache Neg Hx     Heart Disease Neg Hx     Hypertension Neg Hx     Lung Disease Neg Hx     Migraines Neg Hx     Psychiatric Disorder Neg Hx     Stroke Neg Hx     Mental Retardation Neg Hx     Anesth Problems Neg Hx        Review of Systems:  Constitutional: negative fever, negative chills, negative weight loss  Eyes:   negative visual changes  ENT:   negative sore throat, tongue or lip swelling  Respiratory:  negative cough, negative dyspnea  Cards:  negative for chest pain, palpitations, lower extremity edema  GI:   See HPI  :  negative for frequency, dysuria  Integument:  negative for rash and pruritus  Heme:  negative for easy bruising and gum/nose bleeding  Musculoskeletal:negative for myalgias, back pain and muscle weakness  Neuro:    negative for headaches, dizziness  Psych: + for feelings of anxiety, depression     Objective:     Patient Vitals for the past 8 hrs:   BP Temp Pulse Resp SpO2 Weight   12/03/20 0812 (!) 99/56 98.2 °F (36.8 °C) (!) 58 16 99 %    12/03/20 0724      62.7 kg (138 lb 3.2 oz)     12/03 0701 - 12/03 1900  In: -   Out: 175 [Urine:175]  12/01 1901 - 12/03 0700  In: 1000 [I.V.:1000]  Out: -     EXAM:     CONST:  Pleasant female lying in bed, no acute distress   NEURO:  Alert and oriented x 3   HEENT: EOMI, no scleral icterus   LUNGS: No respiratory distress   CARD:  S1 S2   ABD:  Soft, no tenderness, no rebound, no guarding. + Bowel sounds. EXT:  Warm   PSYCH: Not anxious or agitated     Data Review     Recent Labs     12/02/20  1747   WBC 9.6   HGB 10.6*   HCT 33.1*        Recent Labs     12/02/20  1747      K 4.1      CO2 22   BUN 11   CREA 0.72   GLU 68   PHOS 3.6   CA 9.4     Recent Labs     12/02/20  1747   AP 47   TP 8.2   ALB 3.8   GLOB 4.4*     No results for input(s): INR, PTP, APTT, INREXT in the last 72 hours. UGI series (10/27/20): mild spontaneous intermittent gastroesophageal reflux to the lower thoracic esophagus; no other acute abnormality. Assessment:   · Nausea/vomiting: WBC 9.6, Hgb 10.6, normal LFTs.    · Pregnancy  · Depression  · PTSD     Patient Active Problem List   Diagnosis Code    Asthma J45.909    Microcytic anemia D50.9    Anxiety F41.9    Back pain, chronic M54.9, G89.29    Unspecified essential hypertension I10    MDD (major depressive disorder), recurrent episode, moderate (HCC) F33.1    Sleep disturbance G47.9    Non compliance w medication regimen Z91.14    Cervical incompetence N88.3    Incompetent cervix N88.3    Pregnancy Z34.90    Pregnant Z34.90    Severe obesity (HCC) E66.01    Post-traumatic stress disorder, acute F43.11    MDD (major depressive disorder) F32.9    Nausea and vomiting in pregnancy O21.9    Eating disorder affecting pregnancy, antepartum O99.340, F50.9    Depression affecting pregnancy O99.340, F32.9     Plan:     · IVF  · Continue supportive measures: antiemetics PRN  · Consult dietician  · Patient was discussed with and will be seen by Dr. Elvin Mcdaniels  · Thank you for allowing me to participate in care of Ezequiel Lugo     Signed By: Demetrio Roberson     12/3/2020  11:15 AM       Gastroenterology Attending Physician attestation statement and comments. This patient was seen and examined by me in a face-to-face visit today. I reviewed the medical record including lab work, imaging and other provider notes. I confirmed the history as described above. I spoke to the patient, reviewed the medical record including lab work, imaging and other provider notes. I discussed this case in detail with Natasha Mcardle, [place]. I formulated an  assessment of this patient and developed a treatment plan. I agree with the above consultation note. I agree with the history, exam and assessment and plan as outlined in the note. I would like to add the following: Patient seen and examined. Recent UGI series (prior to current pregnancy) revealed acid reflux. We discussed acid reflux precautions, role of acid suppression, and lifestyle modifications. Given her first trimester pregnancy, we would defer endoscopic evaluation at this time and focus on symptom management. Agree with nutrition consultation. Her symptoms of nausea and vomiting during pregnancy do not appear to be at the hyperemesis end of the spectrum at this time. Normal liver enzymes, BUN, electrolytes. She did present with syncopal episode and this could be due to hypovolemia. TSH in 7/20 and 9/20 was 0.916 and 0.66, respectively. We will repeat TSH at this time. US in 2014 showed gallbladder sludge, no cholecystitis. She has a history of hyperemesis in the past.    Her current presentation is also confounded by a history of an eating disorder.  Would encourage maintenance therapy with Psychiatry and her psychologist.    Yohana Armijo with supportive measures, monitoring of LFT's, BUN, electrolytes. Consider abdominal US next. Would advise attempt at oral intake every 1-2 hours. Would advise anti-emetics prior to larger meals. Continue PPI. Please call with any questions. Terrance Baird MD

## 2020-12-03 NOTE — ED NOTES
18G PIV started via US by this nurse. No blood return but it was flushed approximately 50 cc NS by this nurse and checked by charge RN and Dr. Hunter Rasheed for placement. No signs of infiltration at this time. Will start fluids and continue to watch.

## 2020-12-03 NOTE — PROGRESS NOTES
High Risk Obstetrics Progress Note    Name: Natasha Burgos MRN: [id number]  SSN: [id number]    YOB: 1985  Age: 38 y.o. Sex: female      Subjective:      LOS: 1 day    Estimated Date of Delivery: None noted. Gestational Age Today: Unknown     Patient admitted for nausea, vomiting, dehydration in light of severe unstable eating disorder in early pregnancy. States she does have rlq pain, cramping, nausea and does not have chest pain, shortness of breath and vaginal bleeding . Objective:     Vitals:  Blood pressure 117/79, pulse (!) 58, temperature 97.7 °F (36.5 °C), resp. rate 16, weight 62.7 kg (138 lb 3.2 oz), SpO2 100 %, not currently breastfeeding. Temp (24hrs), Av.9 °F (36.6 °C), Min:97.7 °F (36.5 °C), Max:98.1 °F (84.8 °C)    Systolic (84PSK), RJV:610 , Min:95 , YFT:169      Diastolic (10QVI), MNB:76, Min:59, Max:79       Intake and Output:     Date 20 0700 - 20 0659   Shift 1008-6418 3677-5929 0658-3345 24 Hour Total   INTAKE   Shift Total(mL/kg)       OUTPUT   Urine(mL/kg/hr) 175   175   Shift Total(mL/kg) 175(2.8)   175(2.8)   Weight (kg) 62.7 62.7 62.7 62.7       Physical Exam:  Patient without distress. Heart: Regular rate and rhythm  Lung: clear to auscultation throughout lung fields, no wheezes, no rales, no rhonchi and normal respiratory effort  Abdomen: soft, mildly tender over rlq, gravid.  No significant right hip pain to palpation  Lower Extremities:  - Edema No       Membranes:  Intact        Labs:   Recent Results (from the past 36 hour(s))   CBC WITH AUTOMATED DIFF    Collection Time: 20  5:47 PM   Result Value Ref Range    WBC 9.6 3.6 - 11.0 K/uL    RBC 4.58 3.80 - 5.20 M/uL    HGB 10.6 (L) 11.5 - 16.0 g/dL    HCT 33.1 (L) 35.0 - 47.0 %    MCV 72.3 (L) 80.0 - 99.0 FL    MCH 23.1 (L) 26.0 - 34.0 PG    MCHC 32.0 30.0 - 36.5 g/dL    RDW 15.3 (H) 11.5 - 14.5 %    PLATELET 992 749 - 558 K/uL    MPV 12.4 8.9 - 12.9 FL    NRBC 0.0 0  WBC ABSOLUTE NRBC 0.00 0.00 - 0.01 K/uL    NEUTROPHILS 73 32 - 75 %    LYMPHOCYTES 21 12 - 49 %    MONOCYTES 6 5 - 13 %    EOSINOPHILS 0 0 - 7 %    BASOPHILS 0 0 - 1 %    IMMATURE GRANULOCYTES 0 0.0 - 0.5 %    ABS. NEUTROPHILS 6.9 1.8 - 8.0 K/UL    ABS. LYMPHOCYTES 2.0 0.8 - 3.5 K/UL    ABS. MONOCYTES 0.6 0.0 - 1.0 K/UL    ABS. EOSINOPHILS 0.0 0.0 - 0.4 K/UL    ABS. BASOPHILS 0.0 0.0 - 0.1 K/UL    ABS. IMM. GRANS. 0.0 0.00 - 0.04 K/UL    DF AUTOMATED     METABOLIC PANEL, COMPREHENSIVE    Collection Time: 12/02/20  5:47 PM   Result Value Ref Range    Sodium 139 136 - 145 mmol/L    Potassium 4.1 3.5 - 5.1 mmol/L    Chloride 108 97 - 108 mmol/L    CO2 22 21 - 32 mmol/L    Anion gap 9 5 - 15 mmol/L    Glucose 68 65 - 100 mg/dL    BUN 11 6 - 20 MG/DL    Creatinine 0.72 0.55 - 1.02 MG/DL    BUN/Creatinine ratio 15 12 - 20      GFR est AA >60 >60 ml/min/1.73m2    GFR est non-AA >60 >60 ml/min/1.73m2    Calcium 9.4 8.5 - 10.1 MG/DL    Bilirubin, total 0.6 0.2 - 1.0 MG/DL    ALT (SGPT) 15 12 - 78 U/L    AST (SGOT) 17 15 - 37 U/L    Alk.  phosphatase 47 45 - 117 U/L    Protein, total 8.2 6.4 - 8.2 g/dL    Albumin 3.8 3.5 - 5.0 g/dL    Globulin 4.4 (H) 2.0 - 4.0 g/dL    A-G Ratio 0.9 (L) 1.1 - 2.2     MAGNESIUM    Collection Time: 12/02/20  5:47 PM   Result Value Ref Range    Magnesium 2.2 1.6 - 2.4 mg/dL   PHOSPHORUS    Collection Time: 12/02/20  5:47 PM   Result Value Ref Range    Phosphorus 3.6 2.6 - 4.7 MG/DL   URINALYSIS W/MICROSCOPIC    Collection Time: 12/02/20  5:48 PM   Result Value Ref Range    Color YELLOW/STRAW      Appearance CLEAR CLEAR      Specific gravity 1.020 1.003 - 1.030      pH (UA) 8.0 5.0 - 8.0      Protein 30 (A) NEG mg/dL    Glucose Negative NEG mg/dL    Ketone >80 (A) NEG mg/dL    Bilirubin Negative NEG      Blood Negative NEG      Urobilinogen 0.2 0.2 - 1.0 EU/dL    Nitrites Negative NEG      Leukocyte Esterase Negative NEG      WBC 0-4 0 - 4 /hpf    RBC 0-5 0 - 5 /hpf    Epithelial cells FEW FEW /lpf Bacteria Negative NEG /hpf    Mucus TRACE (A) NEG /lpf   URINE CULTURE HOLD SAMPLE    Collection Time: 12/02/20  5:48 PM    Specimen: Serum; Urine   Result Value Ref Range    Urine culture hold        Urine on hold in Microbiology dept for 2 days. If unpreserved urine is submitted, it cannot be used for addtional testing after 24 hours, recollection will be required. EKG, 12 LEAD, INITIAL    Collection Time: 12/02/20  5:50 PM   Result Value Ref Range    Ventricular Rate 64 BPM    Atrial Rate 64 BPM    P-R Interval 140 ms    QRS Duration 88 ms    Q-T Interval 406 ms    QTC Calculation (Bezet) 418 ms    Calculated P Axis 63 degrees    Calculated R Axis 28 degrees    Calculated T Axis 41 degrees    Diagnosis       Normal sinus rhythm  When compared with ECG of 25-SEP-2020 08:50,  No significant change was found         Assessment and Plan: Active Problems:    Nausea and vomiting in pregnancy (12/2/2020)      Eating disorder affecting pregnancy, antepartum (12/2/2020)      Depression affecting pregnancy (12/2/2020)       39 y/o [id number] at 9 4/7 weeks admitted for nausea/vomiting/dehydration and eating disorder in pregnancy s/p syncopal episode. IVF's and daily goodie bag  Antiemetics  MFM and GI consult for help with nutrition in light of severe uncontrolled eating disorder  Depression/anxiety with recent admission for suicidal ideation  -continue zyprexa, prozac, prazosin  -pt has been counseled that she must comply with psych meds  -hopefully pt will be able to continue with weekly telehealth visits with her psychiatrist Torrey Kumar) and counselor Andrae Barber)  CHTN-stable with no meds  H/o alcohol abuse  H/o incompetent cervix-will need cerclage around 12-14 weeks.

## 2020-12-03 NOTE — ROUTINE PROCESS
TRANSFER - OUT REPORT:    Verbal report given to Soraida RN (name) on Italia Cruz  being transferred to [facility] (unit) for routine progression of care       Report consisted of patients Situation, Background, Assessment and   Recommendations(SBAR). Information from the following report(s) SBAR, Kardex, ED Summary, [facility] and Recent Results was reviewed with the receiving nurse. Lines:   Peripheral IV 12/02/20 Left Hand (Active)        Opportunity for questions and clarification was provided.       Patient transported with:   [employer]

## 2020-12-03 NOTE — ED NOTES
Spoke with Dr. Master Osman, on call for Dr. Hugo Martinez, to clarify fluid orders. She states to start the banana bag first, and once that is completed to started the LR.

## 2020-12-03 NOTE — H&P
High Risk Obstetrics Admission Note    Name: Mariam Fair MRN: [id number]  SSN: [id number]    YOB: 1985  Age: 38 y.o. Sex: female      Subjective:      LOS: 0 days    Estimated Date of Delivery: 2021  Gestational Age Today: 9 3/7 weeks    Patient admitted for nausea, vomiting and eating disorder in pregnancy. States she does have severe nausea/vomiting and dizziness and does not have fever and shortness of breath or vaginal bleeding. Objective:     Vitals:  Blood pressure 118/75, pulse 68, temperature 98.1 °F (36.7 °C), resp. rate 18, SpO2 99 %, not currently breastfeeding. Temp (24hrs), Av.1 °F (36.7 °C), Min:98.1 °F (36.7 °C), Max:98.1 °F (48.9 °C)    Systolic (64MJE), ZPA:632 , Min:118 , RJQ:937      Diastolic (27FFR), NZU:51, Min:75, Max:75       Intake and Output:     Date 20 0700 - 20 0659   Shift 9658-4405 1516-0774 1534-2411 24 Hour Total   INTAKE   I.V.  1000  1000   Shift Total  1000  1000   OUTPUT   Shift Total       Weight (kg)           Physical Exam:  Patient s/p syncopal episode in my office today  Abdomen: soft, tender rlq, gravid  Lower Extremities:  - Edema No       Membranes:  Intact        Labs:   Recent Results (from the past 36 hour(s))   CBC WITH AUTOMATED DIFF    Collection Time: 20  5:47 PM   Result Value Ref Range    WBC 9.6 3.6 - 11.0 K/uL    RBC 4.58 3.80 - 5.20 M/uL    HGB 10.6 (L) 11.5 - 16.0 g/dL    HCT 33.1 (L) 35.0 - 47.0 %    MCV 72.3 (L) 80.0 - 99.0 FL    MCH 23.1 (L) 26.0 - 34.0 PG    MCHC 32.0 30.0 - 36.5 g/dL    RDW 15.3 (H) 11.5 - 14.5 %    PLATELET 888 859 - 775 K/uL    MPV 12.4 8.9 - 12.9 FL    NRBC 0.0 0  WBC    ABSOLUTE NRBC 0.00 0.00 - 0.01 K/uL    NEUTROPHILS 73 32 - 75 %    LYMPHOCYTES 21 12 - 49 %    MONOCYTES 6 5 - 13 %    EOSINOPHILS 0 0 - 7 %    BASOPHILS 0 0 - 1 %    IMMATURE GRANULOCYTES 0 0.0 - 0.5 %    ABS. NEUTROPHILS 6.9 1.8 - 8.0 K/UL    ABS. LYMPHOCYTES 2.0 0.8 - 3.5 K/UL    ABS.  MONOCYTES 0.6 0.0 - 1.0 K/UL    ABS. EOSINOPHILS 0.0 0.0 - 0.4 K/UL    ABS. BASOPHILS 0.0 0.0 - 0.1 K/UL    ABS. IMM. GRANS. 0.0 0.00 - 0.04 K/UL    DF AUTOMATED     METABOLIC PANEL, COMPREHENSIVE    Collection Time: 12/02/20  5:47 PM   Result Value Ref Range    Sodium 139 136 - 145 mmol/L    Potassium 4.1 3.5 - 5.1 mmol/L    Chloride 108 97 - 108 mmol/L    CO2 22 21 - 32 mmol/L    Anion gap 9 5 - 15 mmol/L    Glucose 68 65 - 100 mg/dL    BUN 11 6 - 20 MG/DL    Creatinine 0.72 0.55 - 1.02 MG/DL    BUN/Creatinine ratio 15 12 - 20      GFR est AA >60 >60 ml/min/1.73m2    GFR est non-AA >60 >60 ml/min/1.73m2    Calcium 9.4 8.5 - 10.1 MG/DL    Bilirubin, total 0.6 0.2 - 1.0 MG/DL    ALT (SGPT) 15 12 - 78 U/L    AST (SGOT) 17 15 - 37 U/L    Alk. phosphatase 47 45 - 117 U/L    Protein, total 8.2 6.4 - 8.2 g/dL    Albumin 3.8 3.5 - 5.0 g/dL    Globulin 4.4 (H) 2.0 - 4.0 g/dL    A-G Ratio 0.9 (L) 1.1 - 2.2     MAGNESIUM    Collection Time: 12/02/20  5:47 PM   Result Value Ref Range    Magnesium 2.2 1.6 - 2.4 mg/dL   PHOSPHORUS    Collection Time: 12/02/20  5:47 PM   Result Value Ref Range    Phosphorus 3.6 2.6 - 4.7 MG/DL   URINE CULTURE HOLD SAMPLE    Collection Time: 12/02/20  5:48 PM    Specimen: Serum; Urine   Result Value Ref Range    Urine culture hold        Urine on hold in Microbiology dept for 2 days. If unpreserved urine is submitted, it cannot be used for addtional testing after 24 hours, recollection will be required. EKG, 12 LEAD, INITIAL    Collection Time: 12/02/20  5:50 PM   Result Value Ref Range    Ventricular Rate 64 BPM    Atrial Rate 64 BPM    P-R Interval 140 ms    QRS Duration 88 ms    Q-T Interval 406 ms    QTC Calculation (Bezet) 418 ms    Calculated P Axis 63 degrees    Calculated R Axis 28 degrees    Calculated T Axis 41 degrees    Diagnosis       Normal sinus rhythm  When compared with ECG of 25-SEP-2020 08:50,  No significant change was found         Assessment and Plan:       Active Problems:    Nausea and vomiting in pregnancy (12/2/2020)      Eating disorder affecting pregnancy, antepartum (12/2/2020)      Depression affecting pregnancy (12/2/2020)       37 y/o [id number] at 9 3/7 weeks admitted for nausea/vomiting and eating disorder in pregnancy s/p syncopal episode. IVF's and daily goodie bag  Antiemetics  MFM and GI consult for help with nutrition in light of severe uncontrolled eating disorder  Depression/anxiety with recent admission for suicidal ideation  -continue zyprexa, prozac, prazosin  -hopefully pt will be able to continue with weekly telehealth visits with her psychiatrist Jemima Chan) and counselor Sandrine Hodges)  CHTN-stable with no meds  H/o alcohol abuse  H/o incompetent cervix-will need cerclage around 12-14 weeks.

## 2020-12-04 ENCOUNTER — TELEPHONE (OUTPATIENT)
Dept: BEHAVIORAL/MENTAL HEALTH CLINIC | Age: 35
End: 2020-12-04

## 2020-12-04 ENCOUNTER — APPOINTMENT (OUTPATIENT)
Dept: ULTRASOUND IMAGING | Age: 35
DRG: 566 | End: 2020-12-04
Attending: NURSE PRACTITIONER
Payer: MEDICAID

## 2020-12-04 LAB
ALBUMIN SERPL-MCNC: 2.5 G/DL (ref 3.5–5)
ALBUMIN/GLOB SERPL: 0.8 {RATIO} (ref 1.1–2.2)
ALP SERPL-CCNC: 38 U/L (ref 45–117)
ALT SERPL-CCNC: 13 U/L (ref 12–78)
AMYLASE SERPL-CCNC: 44 U/L (ref 25–115)
ANION GAP SERPL CALC-SCNC: 8 MMOL/L (ref 5–15)
AST SERPL-CCNC: 12 U/L (ref 15–37)
ATRIAL RATE: 59 BPM
BILIRUB SERPL-MCNC: 0.4 MG/DL (ref 0.2–1)
BUN SERPL-MCNC: 2 MG/DL (ref 6–20)
BUN/CREAT SERPL: 4 (ref 12–20)
CALCIUM SERPL-MCNC: 8 MG/DL (ref 8.5–10.1)
CALCULATED P AXIS, ECG09: 27 DEGREES
CALCULATED R AXIS, ECG10: 18 DEGREES
CALCULATED T AXIS, ECG11: 26 DEGREES
CHLORIDE SERPL-SCNC: 110 MMOL/L (ref 97–108)
CO2 SERPL-SCNC: 22 MMOL/L (ref 21–32)
CREAT SERPL-MCNC: 0.48 MG/DL (ref 0.55–1.02)
DIAGNOSIS, 93000: NORMAL
ERYTHROCYTE [DISTWIDTH] IN BLOOD BY AUTOMATED COUNT: 15 % (ref 11.5–14.5)
GLOBULIN SER CALC-MCNC: 3.2 G/DL (ref 2–4)
GLUCOSE SERPL-MCNC: 79 MG/DL (ref 65–100)
HCT VFR BLD AUTO: 26.6 % (ref 35–47)
HGB BLD-MCNC: 8.4 G/DL (ref 11.5–16)
IRON SATN MFR SERPL: 16 % (ref 20–50)
IRON SERPL-MCNC: 40 UG/DL (ref 35–150)
LIPASE SERPL-CCNC: 54 U/L (ref 73–393)
MCH RBC QN AUTO: 22.9 PG (ref 26–34)
MCHC RBC AUTO-ENTMCNC: 31.6 G/DL (ref 30–36.5)
MCV RBC AUTO: 72.5 FL (ref 80–99)
NRBC # BLD: 0 K/UL (ref 0–0.01)
NRBC BLD-RTO: 0 PER 100 WBC
P-R INTERVAL, ECG05: 142 MS
PLATELET # BLD AUTO: 212 K/UL (ref 150–400)
PMV BLD AUTO: 12.4 FL (ref 8.9–12.9)
POTASSIUM SERPL-SCNC: 3.4 MMOL/L (ref 3.5–5.1)
PROT SERPL-MCNC: 5.7 G/DL (ref 6.4–8.2)
Q-T INTERVAL, ECG07: 420 MS
QRS DURATION, ECG06: 88 MS
QTC CALCULATION (BEZET), ECG08: 415 MS
RBC # BLD AUTO: 3.67 M/UL (ref 3.8–5.2)
SODIUM SERPL-SCNC: 140 MMOL/L (ref 136–145)
TIBC SERPL-MCNC: 252 UG/DL (ref 250–450)
TSH SERPL DL<=0.05 MIU/L-ACNC: 0.25 UIU/ML (ref 0.36–3.74)
VENTRICULAR RATE, ECG03: 59 BPM
WBC # BLD AUTO: 6.7 K/UL (ref 3.6–11)

## 2020-12-04 PROCEDURE — 76700 US EXAM ABDOM COMPLETE: CPT

## 2020-12-04 PROCEDURE — 82150 ASSAY OF AMYLASE: CPT

## 2020-12-04 PROCEDURE — 84443 ASSAY THYROID STIM HORMONE: CPT

## 2020-12-04 PROCEDURE — 83540 ASSAY OF IRON: CPT

## 2020-12-04 PROCEDURE — 74011000250 HC RX REV CODE- 250: Performed by: OBSTETRICS & GYNECOLOGY

## 2020-12-04 PROCEDURE — 85027 COMPLETE CBC AUTOMATED: CPT

## 2020-12-04 PROCEDURE — 36415 COLL VENOUS BLD VENIPUNCTURE: CPT

## 2020-12-04 PROCEDURE — 74011250636 HC RX REV CODE- 250/636: Performed by: OBSTETRICS & GYNECOLOGY

## 2020-12-04 PROCEDURE — 74011250637 HC RX REV CODE- 250/637: Performed by: OBSTETRICS & GYNECOLOGY

## 2020-12-04 PROCEDURE — 93005 ELECTROCARDIOGRAM TRACING: CPT

## 2020-12-04 PROCEDURE — 65410000002 HC RM PRIVATE OB

## 2020-12-04 PROCEDURE — 83690 ASSAY OF LIPASE: CPT

## 2020-12-04 PROCEDURE — 80053 COMPREHEN METABOLIC PANEL: CPT

## 2020-12-04 RX ORDER — DEXTROSE MONOHYDRATE, SODIUM CHLORIDE, SODIUM LACTATE, POTASSIUM CHLORIDE, CALCIUM CHLORIDE 5; 600; 310; 179; 20 G/100ML; MG/100ML; MG/100ML; MG/100ML; MG/100ML
INJECTION, SOLUTION INTRAVENOUS CONTINUOUS
Status: DISCONTINUED | OUTPATIENT
Start: 2020-12-04 | End: 2020-12-14

## 2020-12-04 RX ADMIN — PROCHLORPERAZINE EDISYLATE 10 MG: 5 INJECTION INTRAMUSCULAR; INTRAVENOUS at 13:32

## 2020-12-04 RX ADMIN — ONDANSETRON HYDROCHLORIDE 4 MG: 2 INJECTION, SOLUTION INTRAMUSCULAR; INTRAVENOUS at 18:26

## 2020-12-04 RX ADMIN — PROGESTERONE 200 MG: 100 CAPSULE, LIQUID FILLED ORAL at 21:33

## 2020-12-04 RX ADMIN — FLUOXETINE 40 MG: 20 CAPSULE ORAL at 10:20

## 2020-12-04 RX ADMIN — SODIUM CHLORIDE, SODIUM LACTATE, POTASSIUM CHLORIDE, AND CALCIUM CHLORIDE 125 ML/HR: 600; 310; 30; 20 INJECTION, SOLUTION INTRAVENOUS at 06:53

## 2020-12-04 RX ADMIN — ONDANSETRON HYDROCHLORIDE 4 MG: 2 INJECTION, SOLUTION INTRAMUSCULAR; INTRAVENOUS at 03:37

## 2020-12-04 RX ADMIN — FOLIC ACID: 5 INJECTION, SOLUTION INTRAMUSCULAR; INTRAVENOUS; SUBCUTANEOUS at 21:29

## 2020-12-04 RX ADMIN — PANTOPRAZOLE SODIUM 40 MG: 40 TABLET, DELAYED RELEASE ORAL at 06:53

## 2020-12-04 RX ADMIN — OLANZAPINE 5 MG: 5 TABLET, FILM COATED ORAL at 21:29

## 2020-12-04 RX ADMIN — DEXTROSE MONOHYDRATE, SODIUM CHLORIDE, SODIUM LACTATE, POTASSIUM CHLORIDE, CALCIUM CHLORIDE: 5; 600; 310; 179; 20 INJECTION, SOLUTION INTRAVENOUS at 11:02

## 2020-12-04 RX ADMIN — ONDANSETRON HYDROCHLORIDE 4 MG: 2 INJECTION, SOLUTION INTRAMUSCULAR; INTRAVENOUS at 08:28

## 2020-12-04 NOTE — PROGRESS NOTES
..Bedside and Verbal shift change report given to Alex Grayson RN (oncoming nurse) by Cindy Herbert (offgoing nurse). Report included the following information SBAR, Kardex, ED Summary, Intake/Output, MAR, Accordion, Recent Results and Med Rec Status.

## 2020-12-04 NOTE — PROGRESS NOTES
Comprehensive Nutrition Assessment    Type and Reason for Visit: Consult    Nutrition Recommendations/Plan:   1. Ensure Clear TID while on Cl Liq diet. (no flavor pref); start at dinner  2. Consider Ensure Enlive: 1 Oz. Med cup every 30 min (4 hr to complete 8 Oz.)       Work up to 8 Oz. BID (8 hr.). 3. Trial Peptamen 1.5 PO (peptide-based, high MCT) if doesn't tolerate Ensure Enlive  4. Continue Goody bag to include B6 for \"?hyperemesis\"   5. Consider re-evaluate for outpt eating disorder care: [facility]  6. Continue antiemetic to assure coverage ac meals  7. If increase D5 to D10 and add 4.25% AA or consider TPN, pt at high risk re-feeding. Check Mg2+, PO4+ and continue monitor K+    Nutrition Assessment: Admit syncopal episode; dehydration, N/V. IUP 9 wks 5/7. Hx PTSD, depression, ETOH abuse (?status); Rx Prozac. Hx anorexia eating disorder. Suggested wt gain 2# at ~10 wks IUP. Pt with loss 6# since 10/14/2020. Wt gain goal 15-25# gain considering current BMI. Significant and unplanned net loss 81# (36%) x past 11 months comparing wt 226# (1/17/2020) to admit 145# (65.8 kg). Claims #. Attributes wt loss to \"loss of appetite, uncomfortable feeling with solid foods and N/V\". Claims, \"N/V with all other pregnancies, but not this bad\", and if she has to vomit, \"and not coming up fast enough\", will purge. Rx Zofran & Protonix; PRN Compazine. Hx \"severe unstable eating disorder\". Claims attempted to get help at \"[facility]\", but wouldn't accept her insurance. She did see out pt RD at [facility] (twice in11/2020). RD commented, \"smell/thought of food triggers N/V\". ? Other disciplines for comprehensive eating disorder tx besides RD. Male partner and his mom, \"grocery shop/prepare meals, they try to get me to eat. \"   Drinks diet soda, \"don't like all the sugar in regular soda\".   Used to take milk with cereal, but don't eat it anymore so no milk, dislikes yogurt. Not meeting Vit D/Ca++ needs with pregnancy among all other nutrients. HGB 8.4. Unable to take pre- MV/minerals with N/V. Unsure last BM, believes Saturday (?). Previously told RD usual BM pattern is every other day. GI noted with plan to check gallbladder with remote hx sludge , otherwise manage symptoms. No food triggers, intolerances or allergies. Dental, few missing teeth. No difficulty chew/swallow. No dysphagia however, last admit 2020 she told this RD, \"throat was raw from N/V\" with similar symptoms. Pt asking for TPN. High risk re-feeding if considered. Currently receiving IV KCL with N/V for repletion. D5 current rate provides 408 kcal/0 Pro. Noted B1 & folic acid in IV + Could add 4.25% AA, increase to D10 until able to control emesis and increase PO. Not a long term solution to eating disorder. Hx appetite stimulant, unknown brand. Claimed \"didn't work\", ? If worth a try once emesis controlled. Pt reports \"loss of appetite\". Malnutrition Assessment:  Malnutrition Status:  Severe malnutrition    Context:  Chronic illness     Findings of the 6 clinical characteristics of malnutrition:   Energy Intake:  7 - 75% or less est energy requirements for 1 month or longer  Weight Loss:  1.000 - 20% over 1 year     Body Fat Loss:  7 - Severe body fat loss,     Muscle Mass Loss:  7 - Severe muscle mass loss,    Fluid Accumulation:   ,     Strength:  Not performed     Estimated Daily Nutrient Needs:  Energy (kcal): 2100(First Trimester weeek 9 ); Weight Used for Energy Requirements: Current  Protein (g): 85 gm(74 kg x 0.8 + 25 gm); Weight Used for Protein Requirements: Current(Pregnancy)  Fluid (ml/day): 2800 mL (5553-8050 mL ~35-40 mL/kg);  Method Used for Fluid Requirements: (Pregnancy 35-40 mL/kg current wt)      Nutrition Related Findings:  +N/V, poor appetite; significant and unplanned wt loss 81# (36%) x past 11 months      Wounds:    None Current Nutrition Therapies:  DIET CLEAR LIQUID  DIET NUTRITIONAL SUPPLEMENTS All Meals; Ensure Clear    Anthropometric Measures:  · Height:  5' 4\" (162.6 cm)(Pt claims ht 64-65\". One prior admit ht listed 66\")  · Current Body Wt:  65.8 kg (145 lb 1 oz)   · Admission Body Wt:  145 lb 1 oz    · Usual Body Wt:  103.4 kg (228 lb)(>/= year ago)     · Ideal Body Wt:  120 lbs:  120.9 %   · Adjusted Body Weight: No adjustment   · BMI Category:  Normal weight (BMI 18.5-24. 9)       Nutrition Diagnosis:   · Inadequate oral intake, Increased nutrient needs, Altered GI function, Unintended weight loss related to psychological cause or life stress, early satiety as evidenced by NPO or clear liquid status due to medical condition, intake 0-25%, poor intake prior to admission, weight loss greater than or equal to 20% in 1 year, lab values, nausea, vomiting, constipation      Nutrition Interventions:   Food and/or Nutrient Delivery: Continue current diet, Mineral supplement, Vitamin supplement, Start oral nutrition supplement, IV fluid delivery  Nutrition Education and Counseling: Education initiated, Counseling needed(Eating Disorder)  Coordination of Nutrition Care: Coordination of community care(? [facility] or other Eating Disorder's specialty)    Goals:  tolerate Cl Liq diet + minimum 100% one ONS without N/V next 24 hr.        Nutrition Monitoring and Evaluation:   Behavioral-Environmental Outcomes: Readiness for change  Food/Nutrient Intake Outcomes: Diet advancement/tolerance, Food and nutrient intake, Supplement intake, IVF intake  Physical Signs/Symptoms Outcomes: Biochemical data, Constipation, Nausea/vomiting, Meal time behavior, Weight    Discharge Planning:    Continue oral nutrition supplement, Recommend pursue outpatient nutrition counseling     Electronically signed by Olimpia Hand RD on 12/4/2020 at 3:33 PM    Contact: Alexy

## 2020-12-04 NOTE — PROGRESS NOTES
High Risk Obstetrics Progress Note    Name: Adriana Estevez MRN: [id number]  SSN: [id number]    YOB: 1985  Age: 38 y.o. Sex: female      Subjective:      LOS: 2 days    Estimated Date of Delivery: None noted. Gestational Age Today: Unknown     Patient admitted for nausea, vomiting and dehydration in light of eating disorder. States she does have cramping and tenderness in right side/hip region, nausea and some vomiting and does not have chest pain, shortness of breath and vaginal bleeding . Pt did try to eat some broth and jello for dinner. Did have emesis. Objective:     Vitals:  Blood pressure 99/64, pulse (!) 58, temperature 98.4 °F (36.9 °C), resp. rate 16, weight 62.7 kg (138 lb 3.2 oz), SpO2 99 %, not currently breastfeeding. Temp (24hrs), Av.1 °F (36.7 °C), Min:97.8 °F (36.6 °C), Max:98.4 °F (72.4 °C)    Systolic (29EDB), CBE:458 , Min:99 , STN:992      Diastolic (64NXS), MKU:78, Min:63, Max:64       Intake and Output:     Date 20 0700 - 20 0659   Shift 0603-7725 8614-5353 3780-5844 24 Hour Total   INTAKE   Shift Total(mL/kg)       OUTPUT   Urine(mL/kg/hr) 700   700   Shift Total(mL/kg) 700(11.2)   700(11.2)   Weight (kg) 62.7 62.7 62.7 62.7       Physical Exam:  Patient without distress.   Heart: Regular rate and rhythm  Lung: clear to auscultation throughout lung fields, no wheezes, no rales, no rhonchi and normal respiratory effort  Abdomen: soft, mildly tender to palpation of rlq, gravid  Lower Extremities:  - Edema No       Membranes:  Intact        Labs:   Recent Results (from the past 36 hour(s))   TSH 3RD GENERATION    Collection Time: 20  3:37 AM   Result Value Ref Range    TSH 0.25 (L) 0.36 - 8.50 uIU/mL   METABOLIC PANEL, COMPREHENSIVE    Collection Time: 20  3:37 AM   Result Value Ref Range    Sodium 140 136 - 145 mmol/L    Potassium 3.4 (L) 3.5 - 5.1 mmol/L    Chloride 110 (H) 97 - 108 mmol/L    CO2 22 21 - 32 mmol/L    Anion gap 8 5 - 15 mmol/L    Glucose 79 65 - 100 mg/dL    BUN 2 (L) 6 - 20 MG/DL    Creatinine 0.48 (L) 0.55 - 1.02 MG/DL    BUN/Creatinine ratio 4 (L) 12 - 20      GFR est AA >60 >60 ml/min/1.73m2    GFR est non-AA >60 >60 ml/min/1.73m2    Calcium 8.0 (L) 8.5 - 10.1 MG/DL    Bilirubin, total 0.4 0.2 - 1.0 MG/DL    ALT (SGPT) 13 12 - 78 U/L    AST (SGOT) 12 (L) 15 - 37 U/L    Alk. phosphatase 38 (L) 45 - 117 U/L    Protein, total 5.7 (L) 6.4 - 8.2 g/dL    Albumin 2.5 (L) 3.5 - 5.0 g/dL    Globulin 3.2 2.0 - 4.0 g/dL    A-G Ratio 0.8 (L) 1.1 - 2.2     AMYLASE    Collection Time: 12/04/20  3:37 AM   Result Value Ref Range    Amylase 44 25 - 115 U/L   LIPASE    Collection Time: 12/04/20  3:37 AM   Result Value Ref Range    Lipase 54 (L) 73 - 393 U/L   CBC W/O DIFF    Collection Time: 12/04/20  3:37 AM   Result Value Ref Range    WBC 6.7 3.6 - 11.0 K/uL    RBC 3.67 (L) 3.80 - 5.20 M/uL    HGB 8.4 (L) 11.5 - 16.0 g/dL    HCT 26.6 (L) 35.0 - 47.0 %    MCV 72.5 (L) 80.0 - 99.0 FL    MCH 22.9 (L) 26.0 - 34.0 PG    MCHC 31.6 30.0 - 36.5 g/dL    RDW 15.0 (H) 11.5 - 14.5 %    PLATELET 166 710 - 997 K/uL    MPV 12.4 8.9 - 12.9 FL    NRBC 0.0 0  WBC    ABSOLUTE NRBC 0.00 0.00 - 0.01 K/uL   EKG, 12 LEAD, INITIAL    Collection Time: 12/04/20  8:47 AM   Result Value Ref Range    Ventricular Rate 59 BPM    Atrial Rate 59 BPM    P-R Interval 142 ms    QRS Duration 88 ms    Q-T Interval 420 ms    QTC Calculation (Bezet) 415 ms    Calculated P Axis 27 degrees    Calculated R Axis 18 degrees    Calculated T Axis 26 degrees    Diagnosis       Sinus bradycardia  Otherwise normal ECG  When compared with ECG of 02-DEC-2020 17:50,  No significant change was found       ekg-sinus bradycardia    Assessment and Plan:       Active Problems:    Nausea and vomiting in pregnancy (12/2/2020)      Eating disorder affecting pregnancy, antepartum (12/2/2020)      Depression affecting pregnancy (12/2/2020)       39 y/o [id number] at 9 5/7 weeks admitted for nausea/vomiting/dehydration and eating disorder in pregnancy s/p syncopal episode.    IVF's and daily goodie bag-switch ivf to d5lr with 20 kcl for hypokalemia  Antiemetics  S/p MFM and GI consult for help with nutrition in light of severe uncontrolled eating disorder-?do we need enteral feeds?  -continue to encourage small frequent meals  Depression/anxiety with recent admission for suicidal ideation  -continue zyprexa, prozac, prazosin  -pt has been counseled that she must comply with psych meds  -hopefully pt will be able to continue with weekly telehealth visits with her psychiatrist Winter Cooper) and counselor Kari Lobo) as well as nutritionist (Galdino Leach)  CHTN-stable with no meds  H/o alcohol abuse  H/o incompetent cervix-will need cerclage around 14-16 weeks.

## 2020-12-04 NOTE — PROGRESS NOTES
Janki Farmer [address]  [address] [facility], [address]       GI PROGRESS NOTE  Carlo Lam, [facility] office  [phone] NP in-hospital cell phone M-F until 4:30  After 5pm or on weekends, please call  for physician on call      NAME: Willian Fothergill   :  1985   MRN:  [id number]       Subjective:   She is doing about the same, tolerated some clear liquids but also had some vomiting. Objective:     VITALS:   Last 24hrs VS reviewed since prior progress note. Most recent are:  Visit Vitals  BP (!) 95/58 (BP 1 Location: Left arm, BP Patient Position: At rest)   Pulse 60   Temp 99 °F (37.2 °C)   Resp 16   Wt 65.8 kg (145 lb)   SpO2 99%   BMI 24.89 kg/m²       PHYSICAL EXAM:  General: Cooperative, no acute distress    Neurologic:  Alert and oriented X 3. HEENT: EOMI, no scleral icterus   Lungs:  CTA bilaterally. No wheezing  Heart:  S1 S2   Abdomen: Soft, non-distended, no tenderness. +Bowel sounds  Extremities: No edema  Psych:   Good insight. Not anxious or agitated. Lab Data Reviewed:     Recent Results (from the past 24 hour(s))   TSH 3RD GENERATION    Collection Time: 20  3:37 AM   Result Value Ref Range    TSH 0.25 (L) 0.36 - 9.89 uIU/mL   METABOLIC PANEL, COMPREHENSIVE    Collection Time: 20  3:37 AM   Result Value Ref Range    Sodium 140 136 - 145 mmol/L    Potassium 3.4 (L) 3.5 - 5.1 mmol/L    Chloride 110 (H) 97 - 108 mmol/L    CO2 22 21 - 32 mmol/L    Anion gap 8 5 - 15 mmol/L    Glucose 79 65 - 100 mg/dL    BUN 2 (L) 6 - 20 MG/DL    Creatinine 0.48 (L) 0.55 - 1.02 MG/DL    BUN/Creatinine ratio 4 (L) 12 - 20      GFR est AA >60 >60 ml/min/1.73m2    GFR est non-AA >60 >60 ml/min/1.73m2    Calcium 8.0 (L) 8.5 - 10.1 MG/DL    Bilirubin, total 0.4 0.2 - 1.0 MG/DL    ALT (SGPT) 13 12 - 78 U/L    AST (SGOT) 12 (L) 15 - 37 U/L    Alk.  phosphatase 38 (L) 45 - 117 U/L    Protein, total 5.7 (L) 6.4 - 8.2 g/dL    Albumin 2.5 (L) 3.5 - 5.0 g/dL Globulin 3.2 2.0 - 4.0 g/dL    A-G Ratio 0.8 (L) 1.1 - 2.2     AMYLASE    Collection Time: 12/04/20  3:37 AM   Result Value Ref Range    Amylase 44 25 - 115 U/L   LIPASE    Collection Time: 12/04/20  3:37 AM   Result Value Ref Range    Lipase 54 (L) 73 - 393 U/L   CBC W/O DIFF    Collection Time: 12/04/20  3:37 AM   Result Value Ref Range    WBC 6.7 3.6 - 11.0 K/uL    RBC 3.67 (L) 3.80 - 5.20 M/uL    HGB 8.4 (L) 11.5 - 16.0 g/dL    HCT 26.6 (L) 35.0 - 47.0 %    MCV 72.5 (L) 80.0 - 99.0 FL    MCH 22.9 (L) 26.0 - 34.0 PG    MCHC 31.6 30.0 - 36.5 g/dL    RDW 15.0 (H) 11.5 - 14.5 %    PLATELET 387 583 - 972 K/uL    MPV 12.4 8.9 - 12.9 FL    NRBC 0.0 0  WBC    ABSOLUTE NRBC 0.00 0.00 - 0.01 K/uL   EKG, 12 LEAD, INITIAL    Collection Time: 12/04/20  8:47 AM   Result Value Ref Range    Ventricular Rate 59 BPM    Atrial Rate 59 BPM    P-R Interval 142 ms    QRS Duration 88 ms    Q-T Interval 420 ms    QTC Calculation (Bezet) 415 ms    Calculated P Axis 27 degrees    Calculated R Axis 18 degrees    Calculated T Axis 26 degrees    Diagnosis       Sinus bradycardia  Otherwise normal ECG  When compared with ECG of 02-DEC-2020 17:50,  No significant change was found              Assessment:   · Chronic nausea/vomiting    · Anemia: chronic, dilutional   · Pregnancy  · Depression  · PTSD     Patient Active Problem List   Diagnosis Code    Asthma J45.909    Microcytic anemia D50.9    Anxiety F41.9    Back pain, chronic M54.9, G89.29    Unspecified essential hypertension I10    MDD (major depressive disorder), recurrent episode, moderate (HCC) F33.1    Sleep disturbance G47.9    Non compliance w medication regimen Z91.14    Cervical incompetence N88.3    Incompetent cervix N88.3    Pregnancy Z34.90    Pregnant Z34.90    Severe obesity (HCC) E66.01    Post-traumatic stress disorder, acute F43.11    MDD (major depressive disorder) F32.9    Nausea and vomiting in pregnancy O21.9    Eating disorder affecting pregnancy, antepartum O99.340, F50.9    Depression affecting pregnancy O99.340, F32.9     Plan:   · Iron studies pending, consider IV replacement while inpatient   · Check abdominal ultrasound - gallbladder sludge in 2014  · I do not think enteral nutrition is a good solution for this chronic issue, will add Ensure clears, can consider RD consult as well  · Symptomatic management  · Will be available to see again on request this weekend     Signed By: Adriane Zavala NP     12/4/2020  10:03 AM       GI Attending: Agree with above plan. Her TSH is also low, which may be a contributing factor. We would advise contacting her primary care doctor for management of this. Abdominal US has been completed with read pending. We will also await recommendations from nutrition. Weekend team to see on request. Please call with any questions. Terrance Rogers MD

## 2020-12-04 NOTE — TELEPHONE ENCOUNTER
Patient called to schedule follow up appointment. Two identifiers confirmed. Patient called to schedule a follow up appointment and is returning Franc Capps RN call. Patient follow up scheduled for  1/12/21. Informed patient Bushra Mukherjee is out of the office until Monday 12.7.20, will send staff message. Patient states she is in-patient due to pregnancy issues. Will advise RN. Patient has no further questions.

## 2020-12-04 NOTE — PROGRESS NOTES
T.O.C:              Pt expected to d/c to home              Family to provide transport at d/c              Emergency Contact: NONE, pt refused     CM received voice mail message from Madhuri Ying dietician regarding pt. Korina Mccoy stated she will call back later. Korina Mccoy referenced  [facility] for eating disorder.     Faiza Colon RN

## 2020-12-05 LAB
ALBUMIN SERPL-MCNC: 2.9 G/DL (ref 3.5–5)
ALBUMIN SERPL-MCNC: 3 G/DL (ref 3.5–5)
ALBUMIN/GLOB SERPL: 0.7 {RATIO} (ref 1.1–2.2)
ALBUMIN/GLOB SERPL: 0.9 {RATIO} (ref 1.1–2.2)
ALP SERPL-CCNC: 43 U/L (ref 45–117)
ALP SERPL-CCNC: 48 U/L (ref 45–117)
ALT SERPL-CCNC: 20 U/L (ref 12–78)
ALT SERPL-CCNC: 25 U/L (ref 12–78)
ANION GAP SERPL CALC-SCNC: 7 MMOL/L (ref 5–15)
ANION GAP SERPL CALC-SCNC: 8 MMOL/L (ref 5–15)
AST SERPL-CCNC: 12 U/L (ref 15–37)
AST SERPL-CCNC: 32 U/L (ref 15–37)
BILIRUB SERPL-MCNC: 0.4 MG/DL (ref 0.2–1)
BILIRUB SERPL-MCNC: 0.4 MG/DL (ref 0.2–1)
BUN SERPL-MCNC: 3 MG/DL (ref 6–20)
BUN SERPL-MCNC: 3 MG/DL (ref 6–20)
BUN/CREAT SERPL: 5 (ref 12–20)
BUN/CREAT SERPL: 6 (ref 12–20)
CALCIUM SERPL-MCNC: 8.1 MG/DL (ref 8.5–10.1)
CALCIUM SERPL-MCNC: 8.3 MG/DL (ref 8.5–10.1)
CHLORIDE SERPL-SCNC: 111 MMOL/L (ref 97–108)
CHLORIDE SERPL-SCNC: 111 MMOL/L (ref 97–108)
CO2 SERPL-SCNC: 20 MMOL/L (ref 21–32)
CO2 SERPL-SCNC: 21 MMOL/L (ref 21–32)
CREAT SERPL-MCNC: 0.52 MG/DL (ref 0.55–1.02)
CREAT SERPL-MCNC: 0.62 MG/DL (ref 0.55–1.02)
ERYTHROCYTE [DISTWIDTH] IN BLOOD BY AUTOMATED COUNT: 15.1 % (ref 11.5–14.5)
ERYTHROCYTE [DISTWIDTH] IN BLOOD BY AUTOMATED COUNT: 15.1 % (ref 11.5–14.5)
GLOBULIN SER CALC-MCNC: 3.4 G/DL (ref 2–4)
GLOBULIN SER CALC-MCNC: 4.2 G/DL (ref 2–4)
GLUCOSE SERPL-MCNC: 61 MG/DL (ref 65–100)
GLUCOSE SERPL-MCNC: 89 MG/DL (ref 65–100)
HCT VFR BLD AUTO: 28.9 % (ref 35–47)
HCT VFR BLD AUTO: 31.5 % (ref 35–47)
HGB BLD-MCNC: 10 G/DL (ref 11.5–16)
HGB BLD-MCNC: 9.4 G/DL (ref 11.5–16)
MCH RBC QN AUTO: 23.1 PG (ref 26–34)
MCH RBC QN AUTO: 23.5 PG (ref 26–34)
MCHC RBC AUTO-ENTMCNC: 31.7 G/DL (ref 30–36.5)
MCHC RBC AUTO-ENTMCNC: 32.5 G/DL (ref 30–36.5)
MCV RBC AUTO: 72.3 FL (ref 80–99)
MCV RBC AUTO: 72.9 FL (ref 80–99)
NRBC # BLD: 0 K/UL (ref 0–0.01)
NRBC # BLD: 0 K/UL (ref 0–0.01)
NRBC BLD-RTO: 0 PER 100 WBC
NRBC BLD-RTO: 0 PER 100 WBC
PLATELET # BLD AUTO: 210 K/UL (ref 150–400)
PLATELET # BLD AUTO: 239 K/UL (ref 150–400)
PMV BLD AUTO: 11.9 FL (ref 8.9–12.9)
PMV BLD AUTO: 13 FL (ref 8.9–12.9)
POTASSIUM SERPL-SCNC: 3.3 MMOL/L (ref 3.5–5.1)
POTASSIUM SERPL-SCNC: 3.7 MMOL/L (ref 3.5–5.1)
PROT SERPL-MCNC: 6.3 G/DL (ref 6.4–8.2)
PROT SERPL-MCNC: 7.2 G/DL (ref 6.4–8.2)
RBC # BLD AUTO: 4 M/UL (ref 3.8–5.2)
RBC # BLD AUTO: 4.32 M/UL (ref 3.8–5.2)
SODIUM SERPL-SCNC: 139 MMOL/L (ref 136–145)
SODIUM SERPL-SCNC: 139 MMOL/L (ref 136–145)
WBC # BLD AUTO: 7 K/UL (ref 3.6–11)
WBC # BLD AUTO: 7.5 K/UL (ref 3.6–11)

## 2020-12-05 PROCEDURE — 80053 COMPREHEN METABOLIC PANEL: CPT

## 2020-12-05 PROCEDURE — 74011250637 HC RX REV CODE- 250/637: Performed by: OBSTETRICS & GYNECOLOGY

## 2020-12-05 PROCEDURE — 85027 COMPLETE CBC AUTOMATED: CPT

## 2020-12-05 PROCEDURE — 36415 COLL VENOUS BLD VENIPUNCTURE: CPT

## 2020-12-05 PROCEDURE — 74011000250 HC RX REV CODE- 250: Performed by: OBSTETRICS & GYNECOLOGY

## 2020-12-05 PROCEDURE — 74011250636 HC RX REV CODE- 250/636: Performed by: OBSTETRICS & GYNECOLOGY

## 2020-12-05 PROCEDURE — 65410000002 HC RM PRIVATE OB

## 2020-12-05 RX ADMIN — DEXTROSE MONOHYDRATE, SODIUM CHLORIDE, SODIUM LACTATE, POTASSIUM CHLORIDE, CALCIUM CHLORIDE: 5; 600; 310; 179; 20 INJECTION, SOLUTION INTRAVENOUS at 17:29

## 2020-12-05 RX ADMIN — OLANZAPINE 5 MG: 5 TABLET, FILM COATED ORAL at 22:12

## 2020-12-05 RX ADMIN — ONDANSETRON HYDROCHLORIDE 4 MG: 2 INJECTION, SOLUTION INTRAMUSCULAR; INTRAVENOUS at 07:07

## 2020-12-05 RX ADMIN — DEXTROSE MONOHYDRATE, SODIUM CHLORIDE, SODIUM LACTATE, POTASSIUM CHLORIDE, CALCIUM CHLORIDE: 5; 600; 310; 179; 20 INJECTION, SOLUTION INTRAVENOUS at 07:05

## 2020-12-05 RX ADMIN — PROGESTERONE 200 MG: 100 CAPSULE, LIQUID FILLED ORAL at 22:12

## 2020-12-05 RX ADMIN — ONDANSETRON HYDROCHLORIDE 4 MG: 2 INJECTION, SOLUTION INTRAMUSCULAR; INTRAVENOUS at 00:36

## 2020-12-05 RX ADMIN — FLUOXETINE 40 MG: 20 CAPSULE ORAL at 08:59

## 2020-12-05 RX ADMIN — PROCHLORPERAZINE EDISYLATE 10 MG: 5 INJECTION INTRAMUSCULAR; INTRAVENOUS at 08:57

## 2020-12-05 RX ADMIN — FOLIC ACID: 5 INJECTION, SOLUTION INTRAMUSCULAR; INTRAVENOUS; SUBCUTANEOUS at 22:12

## 2020-12-05 RX ADMIN — ONDANSETRON HYDROCHLORIDE 4 MG: 2 INJECTION, SOLUTION INTRAMUSCULAR; INTRAVENOUS at 13:18

## 2020-12-05 RX ADMIN — PANTOPRAZOLE SODIUM 40 MG: 40 TABLET, DELAYED RELEASE ORAL at 07:07

## 2020-12-05 RX ADMIN — ONDANSETRON HYDROCHLORIDE 4 MG: 2 INJECTION, SOLUTION INTRAMUSCULAR; INTRAVENOUS at 20:32

## 2020-12-05 RX ADMIN — PROCHLORPERAZINE EDISYLATE 10 MG: 5 INJECTION INTRAMUSCULAR; INTRAVENOUS at 22:18

## 2020-12-05 NOTE — PROGRESS NOTES
[facility]tePart[facility] Progress Note    Dyana Kamara  Unknown    Patient admitted for N/V and dehydration in light of an eating disorder in early pregnancy Unknown Estimated Date of Delivery: None noted. states she does have persistent N/V. Pt reports she has tried some jello and broth again and has continued to have some emesis immediately after. Notes she has been taking her psych meds and has a f/u with her counselor via telehealth on Monday. Vitals:    Patient Vitals for the past 24 hrs:   BP Temp Pulse Resp SpO2 Height Weight   20 0859       66.2 kg (146 lb)   20 0805 97/63 97.8 °F (36.6 °C) 63 16 100 %     20 0004 (!) 89/53 98.1 °F (36.7 °C) 72 16 98 %     20 1943 (!) 89/65 98.6 °F (37 °C) 60 16 100 %     20 1659 101/68 98.9 °F (37.2 °C) 61 16 99 %     20 1517      5' 4\" (1.626 m)      Temp (24hrs), Av.4 °F (36.9 °C), Min:97.8 °F (36.6 °C), Max:98.9 °F (37.2 °C)    I&O:   No intake/output data recorded.               1901 -  0700  In: 120 [P.O.:120]  Out: 2200 [Urine:2200]     Exam:   General: pt sitting up at side of bed in no distress  Abd: Soft, nontender  LE: no edema                        Labs:   Recent Results (from the past 24 hour(s))   IRON PROFILE    Collection Time: 20 10:45 AM   Result Value Ref Range    Iron 40 35 - 150 ug/dL    TIBC 252 250 - 450 ug/dL    Iron % saturation 16 (L) 20 - 50 %       Assessment and Plan:   IUP @ Unknown   Patient Active Problem List    Diagnosis Date Noted    Nausea and vomiting in pregnancy 2020    Eating disorder affecting pregnancy, antepartum 2020    Depression affecting pregnancy 2020    MDD (major depressive disorder) 2020    Post-traumatic stress disorder, acute 2020    Severe obesity ([address] 2020    Pregnancy 2018    Pregnant 2018    Cervical incompetence 2017    Incompetent cervix 2017    Sleep disturbance 04/06/2017    Non compliance w medication regimen 04/06/2017    MDD (major depressive disorder), recurrent episode, moderate ([facility] [address] 09/09/2013    Unspecified essential hypertension 05/09/2013    Back pain, chronic     Asthma 10/08/2010    Microcytic anemia 10/08/2010    Anxiety 10/08/2010       37 y/o [id number] at 9 6/7 weeks admitted for nausea/vomiting/dehydration and eating disorder in pregnancy s/p syncopal episode   IVF's and daily goodie bag-recently switched ivf to d5lr with 20 kcl for hypokalemia  Antiemetics  S/p MFM and GI consult for help with nutrition in light of severe uncontrolled eating disorder- pt continues to inquire about enteral feeds and long term plan. Had PICC line with her pregnancy 5 yrs ago.   Abdominal ultrasound did show gallstones, but no surrounding wall thickening or inflammation  -continue to encourage small frequent meals  Depression/anxiety with recent admission for suicidal ideation  -continue zyprexa, prozac, prazosin  -pt has been counseled that she must comply with psych meds  -hopefully pt will be able to continue with weekly telehealth visits with her psychiatrist Torrey Kumar) and counselor Andrae Barber) as well as nutritionist (Lisa Dave)  CHTN-stable with no meds  H/o alcohol abuse  H/o incompetent cervix-will need cerclage around 14-16 weeks.

## 2020-12-05 NOTE — PROGRESS NOTES
8331 Bedside and Verbal shift change report given to Aliyah Kendall RN (oncoming nurse) by Johnny Vazquez RN (offgoing nurse). Report included the following information SBAR, Kardex, Intake/Output, MAR, Accordion, Recent Results and Med Rec Status.

## 2020-12-05 NOTE — PROGRESS NOTES
1736 Pt reports IV is swollen and painful. IV site is edematous. Continuous fluids stopped. Will evaluate for new IV site. 1744 Reached out to on call MD with GI to ensure abdominal US results have been received by consulting group. 2633 Dr. Allie Neal, with [facility] returned call. He will notify am rounding MD of the patient's updated US results to address. 1850 IV attempts x2, both unsuccessful. Assistance requested.

## 2020-12-06 ENCOUNTER — APPOINTMENT (OUTPATIENT)
Dept: MRI IMAGING | Age: 35
DRG: 566 | End: 2020-12-06
Attending: INTERNAL MEDICINE
Payer: MEDICAID

## 2020-12-06 PROCEDURE — 97161 PT EVAL LOW COMPLEX 20 MIN: CPT

## 2020-12-06 PROCEDURE — 65410000002 HC RM PRIVATE OB

## 2020-12-06 PROCEDURE — 99221 1ST HOSP IP/OBS SF/LOW 40: CPT | Performed by: SURGERY

## 2020-12-06 PROCEDURE — 74011250636 HC RX REV CODE- 250/636: Performed by: OBSTETRICS & GYNECOLOGY

## 2020-12-06 PROCEDURE — 74011000250 HC RX REV CODE- 250: Performed by: OBSTETRICS & GYNECOLOGY

## 2020-12-06 PROCEDURE — 74181 MRI ABDOMEN W/O CONTRAST: CPT

## 2020-12-06 PROCEDURE — 74011250637 HC RX REV CODE- 250/637: Performed by: OBSTETRICS & GYNECOLOGY

## 2020-12-06 RX ADMIN — PROCHLORPERAZINE EDISYLATE 10 MG: 5 INJECTION INTRAMUSCULAR; INTRAVENOUS at 18:11

## 2020-12-06 RX ADMIN — ONDANSETRON HYDROCHLORIDE 4 MG: 2 INJECTION, SOLUTION INTRAMUSCULAR; INTRAVENOUS at 20:45

## 2020-12-06 RX ADMIN — PANTOPRAZOLE SODIUM 40 MG: 40 TABLET, DELAYED RELEASE ORAL at 08:14

## 2020-12-06 RX ADMIN — ONDANSETRON HYDROCHLORIDE 4 MG: 2 INJECTION, SOLUTION INTRAMUSCULAR; INTRAVENOUS at 08:14

## 2020-12-06 RX ADMIN — DEXTROSE MONOHYDRATE, SODIUM CHLORIDE, SODIUM LACTATE, POTASSIUM CHLORIDE, CALCIUM CHLORIDE: 5; 600; 310; 179; 20 INJECTION, SOLUTION INTRAVENOUS at 15:34

## 2020-12-06 RX ADMIN — PROCHLORPERAZINE EDISYLATE 10 MG: 5 INJECTION INTRAMUSCULAR; INTRAVENOUS at 10:41

## 2020-12-06 RX ADMIN — ONDANSETRON HYDROCHLORIDE 4 MG: 2 INJECTION, SOLUTION INTRAMUSCULAR; INTRAVENOUS at 14:53

## 2020-12-06 RX ADMIN — FOLIC ACID: 5 INJECTION, SOLUTION INTRAMUSCULAR; INTRAVENOUS; SUBCUTANEOUS at 20:53

## 2020-12-06 RX ADMIN — ONDANSETRON HYDROCHLORIDE 4 MG: 2 INJECTION, SOLUTION INTRAMUSCULAR; INTRAVENOUS at 02:12

## 2020-12-06 RX ADMIN — FLUOXETINE 40 MG: 20 CAPSULE ORAL at 08:14

## 2020-12-06 RX ADMIN — PROGESTERONE 200 MG: 100 CAPSULE, LIQUID FILLED ORAL at 22:14

## 2020-12-06 RX ADMIN — OLANZAPINE 5 MG: 5 TABLET, FILM COATED ORAL at 22:14

## 2020-12-06 NOTE — PROGRESS NOTES
[facility] Progress Note    Milagro Rojoanalilia  Unknown    Patient admitted for N/V, dehydration in the setting of early pregnancy and h/o eating disorder. Pt states she does have persistent nausea and vomitting. .Pt reports that today is \"not a good day. \" Didn't rest well last night and hasn't really tried to eat anything this morning. Was seen by GI this morning with plan for an MRCP later today so is NPO as of 9:30am. Pt does note some hip pain from falling the other day with syncopal episode. Vitals:    Patient Vitals for the past 24 hrs:   BP Temp Pulse Resp SpO2 Weight   20 0830      67.9 kg (149 lb 9.6 oz)   20 0811 (!) 100/59 98 °F (36.7 °C) 65 16 100 %    20 0218 101/64 98.1 °F (36.7 °C) 75 16 100 %    20 2018 104/66 98.3 °F (36.8 °C) 71 16 100 %    20 1453 107/68 98.6 °F (37 °C) 62 16 100 %      Temp (24hrs), Av.3 °F (36.8 °C), Min:98 °F (36.7 °C), Max:98.6 °F (37 °C)    I&O:   701 - 1900  In: -   Out: 50              1901 - 700  In: -   Out: 700 [Urine:700]      Exam:  Patient without distress.                Abdomen soft, non-tender               Fundus soft and non tender               Lower extremities edema No                                           Labs:   Recent Results (from the past 24 hour(s))   CBC W/O DIFF    Collection Time: 20  7:07 PM   Result Value Ref Range    WBC 7.5 3.6 - 11.0 K/uL    RBC 4.32 3.80 - 5.20 M/uL    HGB 10.0 (L) 11.5 - 16.0 g/dL    HCT 31.5 (L) 35.0 - 47.0 %    MCV 72.9 (L) 80.0 - 99.0 FL    MCH 23.1 (L) 26.0 - 34.0 PG    MCHC 31.7 30.0 - 36.5 g/dL    RDW 15.1 (H) 11.5 - 14.5 %    PLATELET 660 325 - 701 K/uL    MPV 13.0 (H) 8.9 - 12.9 FL    NRBC 0.0 0  WBC    ABSOLUTE NRBC 0.00 0.00 - 8.63 K/uL   METABOLIC PANEL, COMPREHENSIVE    Collection Time: 20  7:07 PM   Result Value Ref Range    Sodium 139 136 - 145 mmol/L    Potassium 3.7 3.5 - 5.1 mmol/L    Chloride 111 (H) 97 - 108 mmol/L    CO2 20 (L) 21 - 32 mmol/L    Anion gap 8 5 - 15 mmol/L    Glucose 61 (L) 65 - 100 mg/dL    BUN 3 (L) 6 - 20 MG/DL    Creatinine 0.62 0.55 - 1.02 MG/DL    BUN/Creatinine ratio 5 (L) 12 - 20      GFR est AA >60 >60 ml/min/1.73m2    GFR est non-AA >60 >60 ml/min/1.73m2    Calcium 8.3 (L) 8.5 - 10.1 MG/DL    Bilirubin, total 0.4 0.2 - 1.0 MG/DL    ALT (SGPT) 25 12 - 78 U/L    AST (SGOT) 32 15 - 37 U/L    Alk. phosphatase 48 45 - 117 U/L    Protein, total 7.2 6.4 - 8.2 g/dL    Albumin 3.0 (L) 3.5 - 5.0 g/dL    Globulin 4.2 (H) 2.0 - 4.0 g/dL    A-G Ratio 0.7 (L) 1.1 - 2.2     CBC W/O DIFF    Collection Time: 12/05/20  8:30 PM   Result Value Ref Range    WBC 7.0 3.6 - 11.0 K/uL    RBC 4.00 3.80 - 5.20 M/uL    HGB 9.4 (L) 11.5 - 16.0 g/dL    HCT 28.9 (L) 35.0 - 47.0 %    MCV 72.3 (L) 80.0 - 99.0 FL    MCH 23.5 (L) 26.0 - 34.0 PG    MCHC 32.5 30.0 - 36.5 g/dL    RDW 15.1 (H) 11.5 - 14.5 %    PLATELET 978 892 - 896 K/uL    MPV 11.9 8.9 - 12.9 FL    NRBC 0.0 0  WBC    ABSOLUTE NRBC 0.00 0.00 - 0.41 K/uL   METABOLIC PANEL, COMPREHENSIVE    Collection Time: 12/05/20  8:30 PM   Result Value Ref Range    Sodium 139 136 - 145 mmol/L    Potassium 3.3 (L) 3.5 - 5.1 mmol/L    Chloride 111 (H) 97 - 108 mmol/L    CO2 21 21 - 32 mmol/L    Anion gap 7 5 - 15 mmol/L    Glucose 89 65 - 100 mg/dL    BUN 3 (L) 6 - 20 MG/DL    Creatinine 0.52 (L) 0.55 - 1.02 MG/DL    BUN/Creatinine ratio 6 (L) 12 - 20      GFR est AA >60 >60 ml/min/1.73m2    GFR est non-AA >60 >60 ml/min/1.73m2    Calcium 8.1 (L) 8.5 - 10.1 MG/DL    Bilirubin, total 0.4 0.2 - 1.0 MG/DL    ALT (SGPT) 20 12 - 78 U/L    AST (SGOT) 12 (L) 15 - 37 U/L    Alk.  phosphatase 43 (L) 45 - 117 U/L    Protein, total 6.3 (L) 6.4 - 8.2 g/dL    Albumin 2.9 (L) 3.5 - 5.0 g/dL    Globulin 3.4 2.0 - 4.0 g/dL    A-G Ratio 0.9 (L) 1.1 - 2.2         Assessment and Plan:   IUP @ Unknown   Patient Active Problem List    Diagnosis Date Noted    Nausea and vomiting in pregnancy 12/02/2020    Eating disorder affecting pregnancy, antepartum 12/02/2020    Depression affecting pregnancy 12/02/2020    MDD (major depressive disorder) 09/02/2020    Post-traumatic stress disorder, acute 05/03/2020    Severe obesity ([facility] [address] 02/18/2020    Pregnancy 03/01/2018    Pregnant 03/01/2018    Cervical incompetence 09/29/2017    Incompetent cervix 09/29/2017    Sleep disturbance 04/06/2017    Non compliance w medication regimen 04/06/2017    MDD (major depressive disorder), recurrent episode, moderate ([address] 09/09/2013    Unspecified essential hypertension 05/09/2013    Back pain, chronic     Asthma 10/08/2010    Microcytic anemia 10/08/2010    Anxiety 10/08/2010     39 y/o [id number] at 10 weeks admitted for nausea/vomiting/dehydration and eating disorder in pregnancy s/p syncopal episode   IVF's and daily goodie bag-recently switched ivf to d5lr with 20 kcl for hypokalemia  Antiemetics  S/p MFM and GI consult for help with nutrition in light of severe uncontrolled eating disorder- pt continues to inquire about enteral feeds and long term plan. Had PICC line with her pregnancy 5 yrs ago. Possible plan for zofran pump to be initiated for outpatient mgmt. - Abdominal ultrasound did show gallstones, but no surrounding wall thickening or inflammation.  GI saw pt this morning and plans to proceed with an MRCP today.  -continue to encourage small frequent meals  Depression/anxiety with recent admission for suicidal ideation  -continue zyprexa, prozac, prazosin  -pt has been complying with her psych meds  -hopefully pt will be able to continue with weekly telehealth visits with her psychiatrist Edgardo Soares) and counselor (Erasmo Forrester well as nutritionist (Star Bundy)  CHTN-stable with no meds  H/o alcohol abuse  H/o incompetent cervix-will need cerclage around 14-16 weeks  PT consult for mobility and hip pain

## 2020-12-06 NOTE — PROGRESS NOTES
Spiritual Care Assessment/Progress Note  [facility]      NAME: Km Lechuga      MRN: [id number]  AGE: 38 y.o.  SEX: female  Amish Affiliation: No Sikh   Language: English     12/6/2020     Total Time (in minutes): 20     Spiritual Assessment begun in [address] through conversation with:         [x]Patient        [] Family    [] Friend(s)        Reason for Consult: Initial/Spiritual assessment, patient floor, Advance medical directive consult     Spiritual beliefs: (Please include comment if needed)     [] Identifies with a rosy tradition:         [] Supported by a rosy community:            [x] Claims no spiritual orientation:   No Preference        [] Seeking spiritual identity:                [] Adheres to an individual form of spirituality:           [] Not able to assess:                           Identified resources for coping:      [] Prayer                               [] Music                  [] Guided Imagery     [x] Family/friends                 [] Pet visits     [] Devotional reading                         [] Unknown     [] Other:                                              Interventions offered during this visit: (See comments for more details)    Patient Interventions: Advance medical directive consult, Affirmation of emotions/emotional suffering, Catharsis/review of pertinent events in supportive environment, Coping skills reviewed/reinforced, Iconic (affirming the presence of God/Higher Power)           Plan of Care:     [] Support spiritual and/or cultural needs    [x] Support AMD and/or advance care planning process      [] Support grieving process   [] Coordinate Rites and/or Rituals    [] Coordination with community clergy   [] No spiritual needs identified at this time   [] Detailed Plan of Care below (See Comments)  [] Make referral to Music Therapy  [] Make referral to Pet Therapy     [] Make referral to Addiction services  [] Make referral to [facility]  [] Make referral to Spiritual Care Partner  [] No future visits requested        [x] Follow up upon further referrals     Comments:  visit for initial spiritual assessment and Advance Directive (AMD) consult. Patient sitting up in bed, good eye contact. Says she is feeling about the same. Provided spiritual presence and listening as she spoke of her present thoughts, feelings, and concerns.  requested for Advance Directive (AMD) consult. Patient states she would like information concerning AMD.  Provided a blank copy of AMD, a copy of the booklet, \"Your Right to Decide,\" and information form describing availability of . Answered patient's questions concerning AMD.  Patient states she would like to review this material prior to making a decision. Please contact the  for further referral or consult.     Visited by Rev. Aleida Banks MDiv, [facility], [facility]   paging service: [phone])

## 2020-12-06 NOTE — PROGRESS NOTES
Bedside and Verbal shift change report given to ALBA Aden (oncoming nurse) by Cyndi Nance RN (offgoing nurse). Report included the following information SBAR, Kardex, ED Summary, Intake/Output, MAR, Accordion and Recent Results.

## 2020-12-06 NOTE — PROGRESS NOTES
9782 Bedside and Verbal shift change report given to Mayte Quintana RN (oncoming nurse) by Romero Bernard RN (offgoing nurse). Report included the following information SBAR.     2208 Reviewed pt's home meds. Pt takes Miralax daily PRN for constipation and would like it reordered. Will notify the MD during rounds. 0910 Pt ate small amount of breakfast, but reports some vomiting. 50mls emesis noted, red in color from the Jello.

## 2020-12-06 NOTE — ACP (ADVANCE CARE PLANNING)
requested for Advance Directive (AMD) consult. Patient states she would like information concerning AMD.  Provided a blank copy of AMD, a copy of the booklet, \"Your Right to Decide,\" and information form describing availability of . Answered patient's questions concerning AMD.  Patient states she would like to review this material prior to making a decision. Please contact the  for further referral or consult. Visited by Rev. Can Adams MDiv, [facility], [facility]   paging service: [phone])

## 2020-12-06 NOTE — PROGRESS NOTES
Problem: Mobility Impaired (Adult and Pediatric)  Goal: *Acute Goals and Plan of Care (Insert Text)  Description: FUNCTIONAL STATUS PRIOR TO ADMISSION: Patient was I with ADLs, no AD to ambulate, was still working. HOME SUPPORT PRIOR TO ADMISSION: The patient lived with someone but did not require assist.    Physical Therapy Goals  Initiated 12/6/2020  1. Patient will move from supine to sit and sit to supine  in bed with modified independence within 7 day(s). 2.  Patient will transfer from bed to chair and chair to bed with modified independence using the least restrictive device within 7 day(s). 3.  Patient will perform sit to stand with modified independence within 7 day(s). 4.  Patient will ambulate with modified independence for 300 feet with the least restrictive device within 7 day(s). 5.  Patient will ascend/descend 8 stairs with B handrail(s) with modified independence within 7 day(s). Outcome: Progressing Towards Goal     PHYSICAL THERAPY EVALUATION  Patient: Mariam Fair (89 y.o. female)  Date: 12/6/2020  Primary Diagnosis: Nausea and vomiting in pregnancy [O21.9]  Eating disorder affecting pregnancy, antepartum [O99.340, F50.9]  Depression affecting pregnancy [O99.340, F32.9]        Precautions: syncope episodes,  Fall    ASSESSMENT  Based on the objective data described below, the patient presents with general weakness, decreased activity tolerance, impaired standing balance, and gait deficits secondary to syncopal episode with fall causing R sided hip pain and nausea/vomiting. Patient reports she has an old injury to her R knee, unsure how she injured it, and never got formal treatment for it. Patient reports \"popping\" sensation in R hip since fall, however therapist could not reproduce today. Patient reported soreness to the touch when PT palpated along R groin and hip over greater trochanter.  Patient also reported soreness at end range when PT passively moved the hip into flexion and IR/ER. AROM was WNL, strength in decreased, however no pain when PT provided manual resistance for MMT. Patient reports mild pain/discomfort in R hip during ambulation, however still was able to walk 75'. Patient did hold onto the IV pole and rail on the wall during walking due to her feeling unsteady, therefore she may require AD on discharge depending on her progress. No dizziness reported during ambulation. PT spoke with nurse regarding trial of ice over hip to help decrease pain/soreness. Patient may benefit from OP PT services upon discharge depending on her progression. Current Level of Function Impacting Discharge (mobility/balance): CGA for ambulation with AD or HH support    Functional Outcome Measure: The patient scored 19 on the Tinetti outcome measure which is indicative of high fall risk. Other factors to consider for discharge: none at this time     Patient will benefit from skilled therapy intervention to address the above noted impairments. PLAN :  Recommendations and Planned Interventions: bed mobility training, transfer training, gait training, therapeutic exercises, neuromuscular re-education, patient and family training/education, and therapeutic activities      Frequency/Duration: Patient will be followed by physical therapy:  3 times a week to address goals. Recommendation for discharge: (in order for the patient to meet his/her long term goals)  Outpatient physical therapy follow up recommended for R hip    This discharge recommendation:  Has not yet been discussed the attending provider and/or case management    IF patient discharges home will need the following DME: to be determined (TBD)         SUBJECTIVE:   Patient stated  Sometimes it pops when I move it.     OBJECTIVE DATA SUMMARY:   HISTORY:    Past Medical History:   Diagnosis Date    Anorexia     Anxiety     Asthma     on albuterol prn.  Triggers cold and allergies    Avoidant-restrictive food intake disorder (ARFID)     Back pain, chronic     sciatica    Gestational hypertension     Past pregnancy    HX OTHER MEDICAL      , , , ,     Hyperemesis     severe hyperemesis    Hypertension     with G12 - none this pregnancy    Iron deficiency anemia 10/08/2010    MDD (major depressive disorder), single episode with postpartum onset 2013    treated with meds - 13    Orthostatic hypotension 2020    Plantar fasciitis     Pregnancy     36 weeks    PTSD (post-traumatic stress disorder)      Past Surgical History:   Procedure Laterality Date     DELIVERY ONLY      HX  SECTION  4/22/15    HX DILATION AND CURETTAGE      HX DILATION AND CURETTAGE  2020    HX GYN      miscarriage x 6    HX GYN      removal of left fallopian tube    HX OTHER SURGICAL      cerlcage x4, ectopic x1 1999 with removal of left fallopian tube    HX OTHER SURGICAL      cerclage w/ current pregnancy. Removed 18       Personal factors and/or comorbidities impacting plan of care: none         EXAMINATION/PRESENTATION/DECISION MAKING:   Critical Behavior:  Neurologic State: Alert  Orientation Level: Oriented X4  Cognition: Appropriate decision making     Hearing:   Auditory  Auditory Impairment: None  Skin:  intact  Edema: none  Range Of Motion:  AROM: Within functional limits           PROM: Within functional limits           Strength:    Strength: Generally decreased, functional                    Tone & Sensation:   Tone: Normal              Sensation: Intact               Coordination:  Coordination: Within functional limits  Vision:      Functional Mobility:  Bed Mobility:     Supine to Sit: Supervision  Sit to Supine: Supervision     Transfers:  Sit to Stand: Supervision  Stand to Sit: Supervision                       Balance:   Sitting: Intact  Standing: Impaired  Standing - Static: Fair;Constant support  Standing - Dynamic : Fair;Constant support  Ambulation/Gait Training: Stairs: Therapeutic Exercises:   none    Functional Measure:  Tinetti        Physical Therapy Evaluation Charge Determination   History Examination Presentation Decision-Making   MEDIUM  Complexity : 1-2 comorbidities / personal factors will impact the outcome/ POC  LOW Complexity : 1-2 Standardized tests and measures addressing body structure, function, activity limitation and / or participation in recreation  LOW Complexity : Stable, uncomplicated  LOW Complexity : FOTO score of       Based on the above components, the patient evaluation is determined to be of the following complexity level: LOW     Pain Ratin-2/10    Activity Tolerance:   Fair    After treatment patient left in no apparent distress:   Supine in bed and Call bell within reach    COMMUNICATION/EDUCATION:   The patients plan of care was discussed with: Registered nurse. Fall prevention education was provided and the patient/caregiver indicated understanding., Patient/family have participated as able in goal setting and plan of care. , and Patient/family agree to work toward stated goals and plan of care.     Thank you for this referral.  Yolanda Seth, PT   Time Calculation: 20 mins

## 2020-12-06 NOTE — PROGRESS NOTES
0 Pt called out stating IV is bothering her. She has complained intermittently over the last 24 hours of pain and discomfort at various IV sites. Lowering fluid rates has helped. Continuous fluid rate changed from 125mls/hr to 100mls/hr. 1540 Consult attempted to Gen. Surgery. No response at office. Will call back. 1815 100cc emesis noted.

## 2020-12-06 NOTE — PROGRESS NOTES
[address] [facility] [address]  [address] [facility], [address]       GI PROGRESS NOTE        NAME: Madelin Lesches   :  1985   MRN:  [id number]       Subjective:   She is doing about the same, tolerated some clear liquids but also had some vomiting. Objective:     VITALS:   Last 24hrs VS reviewed since prior progress note. Most recent are:  Visit Vitals  BP (!) 100/59 (BP 1 Location: Right arm, BP Patient Position: At rest;Supine)   Pulse 65   Temp 98 °F (36.7 °C)   Resp 16   Ht 5' 4\" (1.626 m)   Wt 67.9 kg (149 lb 9.6 oz)   SpO2 100%   BMI 25.68 kg/m²       PHYSICAL EXAM:  General: Cooperative, no acute distress    Neurologic:  Alert and oriented X 3. HEENT: EOMI, no scleral icterus   Lungs:  CTA bilaterally. No wheezing  Heart:  S1 S2   Abdomen: Soft, non-distended, upper abdominal tenderness. +Bowel sounds  Extremities: No edema  Psych:   Good insight. Not anxious or agitated.     Lab Data Reviewed:     Recent Results (from the past 24 hour(s))   CBC W/O DIFF    Collection Time: 20  7:07 PM   Result Value Ref Range    WBC 7.5 3.6 - 11.0 K/uL    RBC 4.32 3.80 - 5.20 M/uL    HGB 10.0 (L) 11.5 - 16.0 g/dL    HCT 31.5 (L) 35.0 - 47.0 %    MCV 72.9 (L) 80.0 - 99.0 FL    MCH 23.1 (L) 26.0 - 34.0 PG    MCHC 31.7 30.0 - 36.5 g/dL    RDW 15.1 (H) 11.5 - 14.5 %    PLATELET 577 156 - 115 K/uL    MPV 13.0 (H) 8.9 - 12.9 FL    NRBC 0.0 0  WBC    ABSOLUTE NRBC 0.00 0.00 - 3.82 K/uL   METABOLIC PANEL, COMPREHENSIVE    Collection Time: 20  7:07 PM   Result Value Ref Range    Sodium 139 136 - 145 mmol/L    Potassium 3.7 3.5 - 5.1 mmol/L    Chloride 111 (H) 97 - 108 mmol/L    CO2 20 (L) 21 - 32 mmol/L    Anion gap 8 5 - 15 mmol/L    Glucose 61 (L) 65 - 100 mg/dL    BUN 3 (L) 6 - 20 MG/DL    Creatinine 0.62 0.55 - 1.02 MG/DL    BUN/Creatinine ratio 5 (L) 12 - 20      GFR est AA >60 >60 ml/min/1.73m2    GFR est non-AA >60 >60 ml/min/1.73m2    Calcium 8.3 (L) 8.5 - 10.1 MG/DL    Bilirubin, total 0.4 0.2 - 1.0 MG/DL    ALT (SGPT) 25 12 - 78 U/L    AST (SGOT) 32 15 - 37 U/L    Alk. phosphatase 48 45 - 117 U/L    Protein, total 7.2 6.4 - 8.2 g/dL    Albumin 3.0 (L) 3.5 - 5.0 g/dL    Globulin 4.2 (H) 2.0 - 4.0 g/dL    A-G Ratio 0.7 (L) 1.1 - 2.2     CBC W/O DIFF    Collection Time: 12/05/20  8:30 PM   Result Value Ref Range    WBC 7.0 3.6 - 11.0 K/uL    RBC 4.00 3.80 - 5.20 M/uL    HGB 9.4 (L) 11.5 - 16.0 g/dL    HCT 28.9 (L) 35.0 - 47.0 %    MCV 72.3 (L) 80.0 - 99.0 FL    MCH 23.5 (L) 26.0 - 34.0 PG    MCHC 32.5 30.0 - 36.5 g/dL    RDW 15.1 (H) 11.5 - 14.5 %    PLATELET 750 395 - 102 K/uL    MPV 11.9 8.9 - 12.9 FL    NRBC 0.0 0  WBC    ABSOLUTE NRBC 0.00 0.00 - 4.01 K/uL   METABOLIC PANEL, COMPREHENSIVE    Collection Time: 12/05/20  8:30 PM   Result Value Ref Range    Sodium 139 136 - 145 mmol/L    Potassium 3.3 (L) 3.5 - 5.1 mmol/L    Chloride 111 (H) 97 - 108 mmol/L    CO2 21 21 - 32 mmol/L    Anion gap 7 5 - 15 mmol/L    Glucose 89 65 - 100 mg/dL    BUN 3 (L) 6 - 20 MG/DL    Creatinine 0.52 (L) 0.55 - 1.02 MG/DL    BUN/Creatinine ratio 6 (L) 12 - 20      GFR est AA >60 >60 ml/min/1.73m2    GFR est non-AA >60 >60 ml/min/1.73m2    Calcium 8.1 (L) 8.5 - 10.1 MG/DL    Bilirubin, total 0.4 0.2 - 1.0 MG/DL    ALT (SGPT) 20 12 - 78 U/L    AST (SGOT) 12 (L) 15 - 37 U/L    Alk. phosphatase 43 (L) 45 - 117 U/L    Protein, total 6.3 (L) 6.4 - 8.2 g/dL    Albumin 2.9 (L) 3.5 - 5.0 g/dL    Globulin 3.4 2.0 - 4.0 g/dL    A-G Ratio 0.9 (L) 1.1 - 2.2       U/S: IMPRESSION:   Comment bile duct dilation (1.0 cm). Multiple gallstones. Assessment:   · Chronic nausea/vomiting. Ultrasound as above. Normal liver tests.   · Anemia: chronic, dilutional   · Pregnancy  · Depression  · PTSD     Patient Active Problem List   Diagnosis Code    Asthma J45.909    Microcytic anemia D50.9    Anxiety F41.9    Back pain, chronic M54.9, G89.29    Unspecified essential hypertension I10    MDD (major depressive disorder), recurrent episode, moderate (HCC) F33.1    Sleep disturbance G47.9    Non compliance w medication regimen Z91.14    Cervical incompetence N88.3    Incompetent cervix N88.3    Pregnancy Z34.90    Pregnant Z34.90    Severe obesity (HCC) E66.01    Post-traumatic stress disorder, acute F43.11    MDD (major depressive disorder) F32.9    Nausea and vomiting in pregnancy O21.9    Eating disorder affecting pregnancy, antepartum O99.340, F50.9    Depression affecting pregnancy O99.340, F32.9     Plan:   · MRCP to evaluate for choledocholithiasis.      Signed By: Naheed Lord MD     12/6/2020  10:03 AM

## 2020-12-07 PROBLEM — K80.20 CALCULUS OF GALLBLADDER WITHOUT CHOLECYSTITIS: Status: ACTIVE | Noted: 2020-12-07

## 2020-12-07 LAB
ALBUMIN SERPL-MCNC: 2.6 G/DL (ref 3.5–5)
ALBUMIN/GLOB SERPL: 0.8 {RATIO} (ref 1.1–2.2)
ALP SERPL-CCNC: 46 U/L (ref 45–117)
ALT SERPL-CCNC: 17 U/L (ref 12–78)
ANION GAP SERPL CALC-SCNC: 6 MMOL/L (ref 5–15)
AST SERPL-CCNC: 10 U/L (ref 15–37)
BILIRUB SERPL-MCNC: 0.3 MG/DL (ref 0.2–1)
BUN SERPL-MCNC: 2 MG/DL (ref 6–20)
BUN/CREAT SERPL: 4 (ref 12–20)
CALCIUM SERPL-MCNC: 8.1 MG/DL (ref 8.5–10.1)
CHLORIDE SERPL-SCNC: 110 MMOL/L (ref 97–108)
CO2 SERPL-SCNC: 22 MMOL/L (ref 21–32)
CREAT SERPL-MCNC: 0.53 MG/DL (ref 0.55–1.02)
ERYTHROCYTE [DISTWIDTH] IN BLOOD BY AUTOMATED COUNT: 15.1 % (ref 11.5–14.5)
GLOBULIN SER CALC-MCNC: 3.2 G/DL (ref 2–4)
GLUCOSE SERPL-MCNC: 79 MG/DL (ref 65–100)
HCT VFR BLD AUTO: 28.3 % (ref 35–47)
HGB BLD-MCNC: 9 G/DL (ref 11.5–16)
MCH RBC QN AUTO: 23.3 PG (ref 26–34)
MCHC RBC AUTO-ENTMCNC: 31.8 G/DL (ref 30–36.5)
MCV RBC AUTO: 73.3 FL (ref 80–99)
NRBC # BLD: 0 K/UL (ref 0–0.01)
NRBC BLD-RTO: 0 PER 100 WBC
PLATELET # BLD AUTO: 233 K/UL (ref 150–400)
PMV BLD AUTO: 12.7 FL (ref 8.9–12.9)
POTASSIUM SERPL-SCNC: 3.8 MMOL/L (ref 3.5–5.1)
PROT SERPL-MCNC: 5.8 G/DL (ref 6.4–8.2)
RBC # BLD AUTO: 3.86 M/UL (ref 3.8–5.2)
SODIUM SERPL-SCNC: 138 MMOL/L (ref 136–145)
WBC # BLD AUTO: 6.2 K/UL (ref 3.6–11)

## 2020-12-07 PROCEDURE — 85027 COMPLETE CBC AUTOMATED: CPT

## 2020-12-07 PROCEDURE — 65410000002 HC RM PRIVATE OB

## 2020-12-07 PROCEDURE — 80053 COMPREHEN METABOLIC PANEL: CPT

## 2020-12-07 PROCEDURE — 36415 COLL VENOUS BLD VENIPUNCTURE: CPT

## 2020-12-07 PROCEDURE — 74011250636 HC RX REV CODE- 250/636: Performed by: OBSTETRICS & GYNECOLOGY

## 2020-12-07 PROCEDURE — 74011250637 HC RX REV CODE- 250/637: Performed by: OBSTETRICS & GYNECOLOGY

## 2020-12-07 PROCEDURE — 97116 GAIT TRAINING THERAPY: CPT

## 2020-12-07 PROCEDURE — 74011000250 HC RX REV CODE- 250: Performed by: OBSTETRICS & GYNECOLOGY

## 2020-12-07 PROCEDURE — 99232 SBSQ HOSP IP/OBS MODERATE 35: CPT | Performed by: SURGERY

## 2020-12-07 RX ORDER — ACETAMINOPHEN AND CODEINE PHOSPHATE 300; 30 MG/1; MG/1
2 TABLET ORAL ONCE
Status: COMPLETED | OUTPATIENT
Start: 2020-12-07 | End: 2020-12-07

## 2020-12-07 RX ADMIN — PROCHLORPERAZINE EDISYLATE 10 MG: 5 INJECTION INTRAMUSCULAR; INTRAVENOUS at 10:24

## 2020-12-07 RX ADMIN — ONDANSETRON HYDROCHLORIDE 4 MG: 2 INJECTION, SOLUTION INTRAMUSCULAR; INTRAVENOUS at 14:06

## 2020-12-07 RX ADMIN — PANTOPRAZOLE SODIUM 40 MG: 40 TABLET, DELAYED RELEASE ORAL at 08:20

## 2020-12-07 RX ADMIN — PROGESTERONE 200 MG: 100 CAPSULE, LIQUID FILLED ORAL at 21:58

## 2020-12-07 RX ADMIN — ONDANSETRON HYDROCHLORIDE 4 MG: 2 INJECTION, SOLUTION INTRAMUSCULAR; INTRAVENOUS at 08:20

## 2020-12-07 RX ADMIN — ONDANSETRON HYDROCHLORIDE 4 MG: 2 INJECTION, SOLUTION INTRAMUSCULAR; INTRAVENOUS at 02:33

## 2020-12-07 RX ADMIN — OLANZAPINE 5 MG: 5 TABLET, FILM COATED ORAL at 21:24

## 2020-12-07 RX ADMIN — ACETAMINOPHEN AND CODEINE PHOSPHATE 2 TABLET: 300; 30 TABLET ORAL at 18:43

## 2020-12-07 RX ADMIN — ONDANSETRON HYDROCHLORIDE 4 MG: 2 INJECTION, SOLUTION INTRAMUSCULAR; INTRAVENOUS at 20:26

## 2020-12-07 RX ADMIN — FLUOXETINE 40 MG: 20 CAPSULE ORAL at 08:20

## 2020-12-07 RX ADMIN — FOLIC ACID: 5 INJECTION, SOLUTION INTRAMUSCULAR; INTRAVENOUS; SUBCUTANEOUS at 21:24

## 2020-12-07 NOTE — PROGRESS NOTES
T.O.C:              Pt expected to d/c to home              Family to provide transport at d/c              Emergency Contact: NONE, pt refused      CM received consult for [facility] for Zofran pump. Referral sent to [address] [place] [address] / [facility]. CM awaiting response. 0945[facility]; checking insurance coverage. 1245: CM received request from Dr Gildardo Foster to attempt getting pt into [facility] eating disorder program. CM sent referral to Rachael Hush (F# [phone])    [phone]: CM received update from [facility], unable to take pt due to insurance. CM referred to [facility], neither can accept pt due to insurance. CM contacted [facility] and [facility] Eating disorder program, neither accept pt due to insurance. Vasile Foster through [address] [place] [address] to update, awaiting response.     Heather Sullivan, RN

## 2020-12-07 NOTE — PROGRESS NOTES
Problem: Mobility Impaired (Adult and Pediatric)  Goal: *Acute Goals and Plan of Care (Insert Text)  Description: FUNCTIONAL STATUS PRIOR TO ADMISSION: Patient was I with ADLs, no AD to ambulate, was still working. HOME SUPPORT PRIOR TO ADMISSION: The patient lived with her fiance and children, but did not require any assist.    Physical Therapy Goals  Initiated 12/6/2020  1. Patient will move from supine to sit and sit to supine  in bed with modified independence within 7 day(s). 2.  Patient will transfer from bed to chair and chair to bed with modified independence using the least restrictive device within 7 day(s). 3.  Patient will perform sit to stand with modified independence within 7 day(s). 4.  Patient will ambulate with modified independence for 300 feet with the least restrictive device within 7 day(s). 5.  Patient will ascend/descend 15 stairs with 1 handrail(s) with modified independence within 7 day(s). Outcome: Progressing Towards Goal   PHYSICAL THERAPY TREATMENT/DISCHARGE  Patient: Frederic Bullion (3 y.o. female)  Date: 12/7/2020  Diagnosis: Nausea and vomiting in pregnancy [O21.9]  Eating disorder affecting pregnancy, antepartum [O99.340, F50.9]  Depression affecting pregnancy [O99.340, F32.9]   <principal problem not specified>       Precautions: Fall  Chart, physical therapy assessment, plan of care and goals were reviewed. ASSESSMENT  Patient continues with skilled PT services and has progressed towards goals. Pt was received up amb in the hallway, pushing the iv pole. Had her amb a short distance without it for support and gait stable. Took her to the stairs and she negotiated 4 (limited by the length of the iv line). I offered that she could do more (has a flight worth at home) but she politely declined. She was stable on the stairs using one rail and step over step method.  We discussed how she could use step to step method if her right hip was bothering her on the stairs. At this point can clear her for discharge to home when deemed medically stable to do so. Other factors to consider for discharge: first trimester pregnant          PLAN :  Patient will be discharged from acute skilled physical therapy at this time. Rationale for discharge: Other: no assist required and no equipment needs    Recommendation for discharge: (in order for the patient to meet his/her long term goals)  No skilled physical therapy/ follow up rehabilitation needs identified at this time. This discharge recommendation:  A follow-up discussion with the attending provider and/or case management is planned    IF patient discharges home will need the following DME: none       SUBJECTIVE:   Patient stated that she had been dizzy but was feeling better. OBJECTIVE DATA SUMMARY:   Chart checked, pt cleared by nursing  Critical Behavior:  Neurologic State: Alert  Orientation Level: Oriented X4  Cognition: Appropriate decision making     Functional Mobility Training:  Bed Mobility:         Not assessed           Transfers:     Stand to Sit: Independent                             Balance:  Sitting: Intact; Without support  Standing: Intact  Ambulation/Gait Training:  Distance (ft): 100 Feet (ft)  Assistive Device: Gait belt(both with and without iv pole support)  Ambulation - Level of Assistance: Independent; Modified independent        Gait Abnormalities: Antalgic        Base of Support: Narrowed     Speed/Deepti: Slow                       Stairs:  Number of Stairs Trained: 4  Stairs - Level of Assistance: Modified independent   Rail Use: Left     Therapeutic Exercises:     Pain Rating:  Right hip 5/10, discussed with her nurse    Activity Tolerance:   Good    After treatment patient left in no apparent distress:   Sitting at the EOB call bell in reach    COMMUNICATION/COLLABORATION:   The patients plan of care was discussed with: Registered nurse.      Tiffani Moreno   Time Calculation: 12 mins

## 2020-12-07 NOTE — PROGRESS NOTES
Bedside and Verbal shift change report given to Holden Arboleda (oncoming nurse) by Noah Montoya RN (offgoing nurse). Report included the following information SBAR, Kardex, Intake/Output, MAR, Accordion and Recent Results.

## 2020-12-07 NOTE — CONSULTS
Surgery Consult    Subjective:      Colby Stallings is a 38 y.o. female who we are being asked to see for gallstones. The patient is currently 10 weeks pregnant and complaining of chronic nausea and vomiting. She does not related to any particular foods. The patient states that she notes when eating fried or fatty foods it hurts her in a particular way and has bloating. She therefore avoids those foods this is different than that. The patient states that she has pain in the bilateral upper quadrants. This radiates down her right side down into her right lower quadrant and left lower quadrant. Patient has a history of eating disorder with anorexia and sometimes she makes herself vomit. She states that this happens even when she is not pregnant. He has been seen by GI for this. She does complain of some reflux and underwent an upper GI did show a small amount of reflux. Did have an ultrasound about 6 years ago that showed gallbladder sludge. Patient Active Problem List    Diagnosis Date Noted    Nausea and vomiting in pregnancy 12/02/2020    Eating disorder affecting pregnancy, antepartum 12/02/2020    Depression affecting pregnancy 12/02/2020    MDD (major depressive disorder) 09/02/2020    Post-traumatic stress disorder, acute 05/03/2020    Severe obesity ([address] 02/18/2020    Pregnancy 03/01/2018    Pregnant 03/01/2018    Cervical incompetence 09/29/2017    Incompetent cervix 09/29/2017    Sleep disturbance 04/06/2017    Non compliance w medication regimen 04/06/2017    MDD (major depressive disorder), recurrent episode, moderate ([address] 09/09/2013    Unspecified essential hypertension 05/09/2013    Back pain, chronic     Asthma 10/08/2010    Microcytic anemia 10/08/2010    Anxiety 10/08/2010     Past Medical History:   Diagnosis Date    Anorexia     Anxiety     Asthma     on albuterol prn.  Triggers cold and allergies    Avoidant-restrictive food intake disorder (ARFID)     Back pain, chronic 2010    sciatica    Gestational hypertension     Past pregnancy    HX OTHER MEDICAL      , 2006, , ,     Hyperemesis     severe hyperemesis    Hypertension     with G12 - none this pregnancy    Iron deficiency anemia 10/08/2010    MDD (major depressive disorder), single episode with postpartum onset 2013    treated with meds - 13    Orthostatic hypotension 2020    Plantar fasciitis     Pregnancy     36 weeks    PTSD (post-traumatic stress disorder)       Past Surgical History:   Procedure Laterality Date     DELIVERY ONLY      HX  SECTION  4/22/15    HX DILATION AND CURETTAGE      HX DILATION AND CURETTAGE  2020    HX GYN      miscarriage x 6    HX GYN      removal of left fallopian tube    HX OTHER SURGICAL      cerlcage x4, ectopic x1  with removal of left fallopian tube    HX OTHER SURGICAL      cerclage w/ current pregnancy.  Removed 18      Social History     Tobacco Use    Smoking status: Never Smoker    Smokeless tobacco: Never Used   Substance Use Topics    Alcohol use: Not Currently     Frequency: Monthly or less     Drinks per session: 1 or 2     Binge frequency: Never      Family History   Problem Relation Age of Onset   [place] Arthritis-rheumatoid Mother     Asthma Mother     No Known Problems Father     No Known Problems Maternal Grandmother     No Known Problems Maternal Grandfather     No Known Problems Paternal Grandmother     No Known Problems Paternal Grandfather     Asthma Son     Alcohol abuse Neg Hx     Arthritis-osteo Neg Hx     Bleeding Prob Neg Hx     Cancer Neg Hx     Diabetes Neg Hx     Elevated Lipids Neg Hx     Headache Neg Hx     Heart Disease Neg Hx     Hypertension Neg Hx     Lung Disease Neg Hx     Migraines Neg Hx     Psychiatric Disorder Neg Hx     Stroke Neg Hx     Mental Retardation Neg Hx     Anesth Problems Neg Hx       Current Facility-Administered Medications Medication Dose Route Frequency    dextrose 5% - LR with KCl 20 mEq/L infusion   IntraVENous CONTINUOUS    prazosin (MINIPRESS) capsule 2 mg  2 mg Oral QHS PRN    hydrOXYzine HCL (ATARAX) tablet 10 mg  10 mg Oral TID PRN    ondansetron (ZOFRAN) injection 4 mg  4 mg IntraVENous Q6H    pantoprazole (PROTONIX) tablet 40 mg  40 mg Oral ACB    acetaminophen (TYLENOL) suppository 650 mg  650 mg Rectal Q6H PRN    diphenhydrAMINE (BENADRYL) injection 12.5 mg  12.5 mg IntraVENous Q6H PRN    0.9% sodium chloride 1,000 mL with mvi, adult no. 4 with vit K 10 mL, thiamine 073 mg, folic acid 1 mg infusion   IntraVENous Q24H    progesterone (PROMETRIUM) capsule 200 mg  200 mg Vaginal QHS    FLUoxetine (PROzac) capsule 40 mg  40 mg Oral DAILY    prochlorperazine (COMPAZINE) injection 10 mg  10 mg IntraVENous Q6H PRN    OLANZapine (ZyPREXA) tablet 5 mg  5 mg Oral QHS    albuterol (PROVENTIL VENTOLIN) nebulizer solution 2.5 mg  2.5 mg Nebulization Q4H PRN      Allergies   Allergen Reactions    Labetalol Hives    Ceclor [Cefaclor] Unknown (comments)    Pcn [Penicillins] Hives    Septra [Sulfamethoprim Ds] Unknown (comments)       Review of Systems:    Pertinent items are noted in the History of Present Illness. Objective:        Visit Vitals  /64 (BP 1 Location: Right arm, BP Patient Position: At rest;Supine)   Pulse 61   Temp 98.1 °F (36.7 °C)   Resp 14   Ht 5' 4\" (1.626 m) Comment: Pt claims ht 64-65\".   One prior admit ht listed 66\"   Wt 67.9 kg (149 lb 9.6 oz)   LMP  (LMP Unknown)   SpO2 100%   BMI 25.68 kg/m²       Physical Exam:  GENERAL: alert, cooperative, no distress, appears stated age, EYE: negative, THROAT & NECK: normal, LUNG: clear to auscultation bilaterally, HEART: regular rate and rhythm, ABDOMEN: Soft with mild tenderness in the right upper quadrant right lower quadrant left lower quadrant no rebound or guarding, EXTREMITIES:  no edema, SKIN: Normal., NEUROLOGIC: negative, PSYCH: non focal    Imaging:  images and reports reviewed  US-Comment bile duct dilation (1.0 cm). Multiple gallstones  MRCP- Partially imaged uterine or bladder abnormality; see description above. Transabdominal and transvaginal vaginal pelvic ultrasound is recommended for  further evaluation. CT abdomen pelvis with IV contrast may ultimately be  necessary. 2. Trace pericholecystic fluid and gallstones. No gallbladder wall thickening or  luminal dilation. No obstructing cystic duct calculus. 3. No biliary duct dilation or choledocholithiasis. 4. Trace bilateral pleural effusions. Lab/Data Review: All lab results for the last 24 hours reviewed. Recent Results (from the past 24 hour(s))   CBC W/O DIFF    Collection Time: 12/05/20  8:30 PM   Result Value Ref Range    WBC 7.0 3.6 - 11.0 K/uL    RBC 4.00 3.80 - 5.20 M/uL    HGB 9.4 (L) 11.5 - 16.0 g/dL    HCT 28.9 (L) 35.0 - 47.0 %    MCV 72.3 (L) 80.0 - 99.0 FL    MCH 23.5 (L) 26.0 - 34.0 PG    MCHC 32.5 30.0 - 36.5 g/dL    RDW 15.1 (H) 11.5 - 14.5 %    PLATELET 817 460 - 675 K/uL    MPV 11.9 8.9 - 12.9 FL    NRBC 0.0 0  WBC    ABSOLUTE NRBC 0.00 0.00 - 9.58 K/uL   METABOLIC PANEL, COMPREHENSIVE    Collection Time: 12/05/20  8:30 PM   Result Value Ref Range    Sodium 139 136 - 145 mmol/L    Potassium 3.3 (L) 3.5 - 5.1 mmol/L    Chloride 111 (H) 97 - 108 mmol/L    CO2 21 21 - 32 mmol/L    Anion gap 7 5 - 15 mmol/L    Glucose 89 65 - 100 mg/dL    BUN 3 (L) 6 - 20 MG/DL    Creatinine 0.52 (L) 0.55 - 1.02 MG/DL    BUN/Creatinine ratio 6 (L) 12 - 20      GFR est AA >60 >60 ml/min/1.73m2    GFR est non-AA >60 >60 ml/min/1.73m2    Calcium 8.1 (L) 8.5 - 10.1 MG/DL    Bilirubin, total 0.4 0.2 - 1.0 MG/DL    ALT (SGPT) 20 12 - 78 U/L    AST (SGOT) 12 (L) 15 - 37 U/L    Alk.  phosphatase 43 (L) 45 - 117 U/L    Protein, total 6.3 (L) 6.4 - 8.2 g/dL    Albumin 2.9 (L) 3.5 - 5.0 g/dL    Globulin 3.4 2.0 - 4.0 g/dL    A-G Ratio 0.9 (L) 1.1 - 2.2         Assessment:Plan Gallstones  I do not believe she has acute cholecystitis  I do not believe that these are the source of her chronic nausea and vomiting. I do that her gallbladder is somewhat symptomatic given her fatty food intolerance but I do not believe it is the overall underlying source of her problems. Unfortunately much of the diagnostics that she may need for chronic nausea and vomiting are unavailable due to her pregnancy. At this point would not recommend laparoscopic cholecystectomy. If she should need this would ideally wait at least until the second trimester.    We will continue to have her avoid fatty foods

## 2020-12-07 NOTE — PROGRESS NOTES
[address]  [address] [facility], [address]       GI PROGRESS NOTE  Sharita Rojas, [facility] office  [phone] NP in-hospital cell phone M-F until 4:30  After 5pm or on weekends, please call  for physician on call      NAME: Maylin Walter   :  1985   MRN:  [id number]       Subjective:    She's doing about the same, still with nausea/vomiting. Objective:     VITALS:   Last 24hrs VS reviewed since prior progress note. Most recent are:  Visit Vitals  /66 (BP 1 Location: Right arm, BP Patient Position: At rest)   Pulse 60   Temp 98.5 °F (36.9 °C)   Resp 16   Ht 5' 4\" (1.626 m)   Wt 68 kg (150 lb)   SpO2 100%   BMI 25.75 kg/m²       PHYSICAL EXAM:  General: Cooperative, no acute distress    Neurologic:  Alert and oriented X 3. HEENT: EOMI, no scleral icterus   Lungs:  CTA bilaterally. No wheezing  Heart:  S1 S2   Abdomen: Soft, non-distended, no tenderness. +Bowel sounds  Extremities: No edema  Psych:   Good insight. Not anxious or agitated. Lab Data Reviewed:     Recent Results (from the past 24 hour(s))   METABOLIC PANEL, COMPREHENSIVE    Collection Time: 20  8:29 AM   Result Value Ref Range    Sodium 138 136 - 145 mmol/L    Potassium 3.8 3.5 - 5.1 mmol/L    Chloride 110 (H) 97 - 108 mmol/L    CO2 22 21 - 32 mmol/L    Anion gap 6 5 - 15 mmol/L    Glucose 79 65 - 100 mg/dL    BUN 2 (L) 6 - 20 MG/DL    Creatinine 0.53 (L) 0.55 - 1.02 MG/DL    BUN/Creatinine ratio 4 (L) 12 - 20      GFR est AA >60 >60 ml/min/1.73m2    GFR est non-AA >60 >60 ml/min/1.73m2    Calcium 8.1 (L) 8.5 - 10.1 MG/DL    Bilirubin, total 0.3 0.2 - 1.0 MG/DL    ALT (SGPT) 17 12 - 78 U/L    AST (SGOT) 10 (L) 15 - 37 U/L    Alk.  phosphatase 46 45 - 117 U/L    Protein, total 5.8 (L) 6.4 - 8.2 g/dL    Albumin 2.6 (L) 3.5 - 5.0 g/dL    Globulin 3.2 2.0 - 4.0 g/dL    A-G Ratio 0.8 (L) 1.1 - 2.2     CBC W/O DIFF    Collection Time: 20 10:34 AM   Result Value Ref Range WBC 6.2 3.6 - 11.0 K/uL    RBC 3.86 3.80 - 5.20 M/uL    HGB 9.0 (L) 11.5 - 16.0 g/dL    HCT 28.3 (L) 35.0 - 47.0 %    MCV 73.3 (L) 80.0 - 99.0 FL    MCH 23.3 (L) 26.0 - 34.0 PG    MCHC 31.8 30.0 - 36.5 g/dL    RDW 15.1 (H) 11.5 - 14.5 %    PLATELET 584 557 - 301 K/uL    MPV 12.7 8.9 - 12.9 FL    NRBC 0.0 0  WBC    ABSOLUTE NRBC 0.00 0.00 - 0.01 K/uL       IMPRESSION:  1. Partially imaged uterine or bladder abnormality; see description above. Transabdominal and transvaginal vaginal pelvic ultrasound is recommended for  further evaluation. CT abdomen pelvis with IV contrast may ultimately be  necessary. 2. Trace pericholecystic fluid and gallstones. No gallbladder wall thickening or  luminal dilation. No obstructing cystic duct calculus. 3. No biliary duct dilation or choledocholithiasis. 4. Trace bilateral pleural effusions.      Assessment:   · Chronic nausea/vomiting: US with multiple gallstones, MRCP negative for CBD stones   · Anemia: chronic, dilutional   · Pregnancy  · Depression  · PTSD     Patient Active Problem List   Diagnosis Code    Asthma J45.909    Microcytic anemia D50.9    Anxiety F41.9    Back pain, chronic M54.9, G89.29    Unspecified essential hypertension I10    MDD (major depressive disorder), recurrent episode, moderate (HCC) F33.1    Sleep disturbance G47.9    Non compliance w medication regimen Z91.14    Cervical incompetence N88.3    Incompetent cervix N88.3    Pregnancy Z34.90    Pregnant Z34.90    Severe obesity (HCC) E66.01    Post-traumatic stress disorder, acute F43.11    MDD (major depressive disorder) F32.9    Nausea and vomiting in pregnancy O21.9    Eating disorder affecting pregnancy, antepartum O99.340, F50.9    Depression affecting pregnancy O99.340, F32.9    Calculus of gallbladder without cholecystitis K80.20     Plan:   · Appreciate surgery consult, plans for outpatient follow-up  · Symptomatic management  · Further plans per Dr. Geni Joseph By: Jessica Ryan NP     12/7/2020  10:03 AM

## 2020-12-07 NOTE — PROGRESS NOTES
Bedside and Verbal shift change report given to ALBA Watters (oncoming nurse) by Miranda Jimenez RN (offgoing nurse). Report included the following information SBAR, Kardex, Procedure Summary, Intake/Output, MAR, Accordion and Recent Results.

## 2020-12-07 NOTE — PROGRESS NOTES
High Risk Obstetrics Progress Note    Name: Nate Mejias MRN: [id number]  SSN: [id number]    YOB: 1985  Age: 38 y.o. Sex: female      Subjective:      LOS: 5 days    Estimated Date of Delivery: 21   Gestational Age Today: 10w3d     Patient admitted for nausea, vomiting, dehydration . States she does have mild abdominal pain and moderate nausea/vomiting and does not have vaginal bleeding  and vaginal leaking of fluid . Objective:     Vitals:  Blood pressure 104/66, pulse 60, temperature 98.5 °F (36.9 °C), resp. rate 16, height 5' 4\" (1.626 m), weight 67.9 kg (149 lb 9.6 oz), SpO2 100 %, not currently breastfeeding. Temp (24hrs), Av.4 °F (36.9 °C), Min:98.1 °F (36.7 °C), Max:98.5 °F (40.1 °C)    Systolic (10BNK), CUP:229 , Min:93 , JUV:941      Diastolic (35FCA), ADO:62, Min:54, Max:66       Intake and Output:         Physical Exam:  Patient without distress.  Appears sad and flat affect today  Heart: Regular rate and rhythm  Lung: clear to auscultation throughout lung fields, no wheezes, no rales, no rhonchi and normal respiratory effort  Abdomen: soft, nontender to palpation, gravid  Lower Extremities:  - Edema No       Membranes:  Intact        Labs:   Recent Results (from the past 36 hour(s))   CBC W/O DIFF    Collection Time: 20  8:30 PM   Result Value Ref Range    WBC 7.0 3.6 - 11.0 K/uL    RBC 4.00 3.80 - 5.20 M/uL    HGB 9.4 (L) 11.5 - 16.0 g/dL    HCT 28.9 (L) 35.0 - 47.0 %    MCV 72.3 (L) 80.0 - 99.0 FL    MCH 23.5 (L) 26.0 - 34.0 PG    MCHC 32.5 30.0 - 36.5 g/dL    RDW 15.1 (H) 11.5 - 14.5 %    PLATELET 933 373 - 365 K/uL    MPV 11.9 8.9 - 12.9 FL    NRBC 0.0 0  WBC    ABSOLUTE NRBC 0.00 0.00 - 4.21 K/uL   METABOLIC PANEL, COMPREHENSIVE    Collection Time: 20  8:30 PM   Result Value Ref Range    Sodium 139 136 - 145 mmol/L    Potassium 3.3 (L) 3.5 - 5.1 mmol/L    Chloride 111 (H) 97 - 108 mmol/L    CO2 21 21 - 32 mmol/L    Anion gap 7 5 - 15 mmol/L Glucose 89 65 - 100 mg/dL    BUN 3 (L) 6 - 20 MG/DL    Creatinine 0.52 (L) 0.55 - 1.02 MG/DL    BUN/Creatinine ratio 6 (L) 12 - 20      GFR est AA >60 >60 ml/min/1.73m2    GFR est non-AA >60 >60 ml/min/1.73m2    Calcium 8.1 (L) 8.5 - 10.1 MG/DL    Bilirubin, total 0.4 0.2 - 1.0 MG/DL    ALT (SGPT) 20 12 - 78 U/L    AST (SGOT) 12 (L) 15 - 37 U/L    Alk. phosphatase 43 (L) 45 - 117 U/L    Protein, total 6.3 (L) 6.4 - 8.2 g/dL    Albumin 2.9 (L) 3.5 - 5.0 g/dL    Globulin 3.4 2.0 - 4.0 g/dL    A-G Ratio 0.9 (L) 1.1 - 2.2         Assessment and Plan: Active Problems:    Nausea and vomiting in pregnancy (12/2/2020)      Eating disorder affecting pregnancy, antepartum (12/2/2020)      Depression affecting pregnancy (12/2/2020)       37 y/o [id number] at 10 1/7 weeks admitted for nausea/vomiting/dehydration and eating disorder in pregnancy s/p syncopal episode   IVF's and daily goodie bag-recently switched ivf to d5lr with 20 kcl for hypokalemia-recheck labs today and supplement as needed  Antiemetics-will consult case management for help with possible zofran pump   S/p MFM and GI consult for help with nutrition in light of severe uncontrolled eating disorder- pt continues to inquire about enteral feeds and long term plan. Had PICC line with her pregnancy 5 yrs ago. Possible plan for zofran pump to be initiated for outpatient mgmt.   - s/p MRCP and surgery consult-no need for surgery at this time for gallstones  -continue to encourage small frequent meals  Depression/anxiety with recent admission for suicidal ideation  -continue zyprexa, prozac, prazosin  -pt has been complying with her psych meds  -hopefully pt will be able to continue with weekly telehealth visits with her psychiatrist Carina Donnelly) and counselor (Yasemin Garza well as nutritionist (Zbigniew Broussard)  CHTN-stable with no meds  H/o alcohol abuse  H/o incompetent cervix-will need cerclage around 14-16 weeks  PT for mobility and hip pain-appreciate help  Chronic anemia-asymptomatic.  Known beta thal minor

## 2020-12-07 NOTE — PROGRESS NOTES
Admit Date: 2020    POD * No surgery found *    Procedure:  * No surgery found *    HOSPITAL DAY:     ANTIBIOTICS:     Subjective:     Patient still with some abdominal pain right upper abdomen nausea and vomiting is somewhat improved. I reviewed the consult note from Dr. Abby Mendez, and I concur that we cannot entirely rule out the gallstones as possible chronic biliary colic but these symptoms have preexisted her pregnancy. Do not know whether the gallstones are symptomatic or not, could have other GI motility issues. I reviewed with the patient that at some point if her GI symptoms persist that she should probably have a cholecystectomy, certainly would be better if this could wait until after her pregnancy is delivered or terminated, but if she needs to consider cholecystectomy during pregnancy would be best to wait till the second trimester after organogenesis is been completed in a couple of weeks, although recent studies would suggest that cholecystectomy at any point is acceptably safe if patient understands there is still some risk of organogenesis and  labor and loss of the fetus. Patient concurred and preferred to wait for now and hold off on cholecystectomy she can follow-up with surgery office in the next few weeks if she wants cholecystectomy during second trimester. Benefits risks alternatives reviewed she understands this may not resolve her nausea and vomiting symptoms. Certainly nothing acutely surgical          Review of Systems   Respiratory: Negative. Gastrointestinal:        Abdominal pain nausea and vomiting much improved although still mildly present   Genitourinary: Negative. Neurological: Negative. All other systems reviewed and are negative. Objective:     Blood pressure 104/66, pulse 60, temperature 98.5 °F (36.9 °C), resp. rate 16, height 5' 4\" (1.626 m), weight 149 lb 9.6 oz (67.9 kg), SpO2 100 %, not currently breastfeeding.     Temp (24hrs), Av.4 °F (36.9 °C), Min:98.1 °F (36.7 °C), Max:98.5 °F (36.9 °C)          Physical Exam  Vitals signs and nursing note reviewed. Constitutional:       General: She is not in acute distress. Appearance: Normal appearance. She is not ill-appearing. Cardiovascular:      Rate and Rhythm: Normal rate and regular rhythm. Pulmonary:      Effort: Pulmonary effort is normal.      Breath sounds: Normal breath sounds. Abdominal:      General: Abdomen is flat. Palpations: Abdomen is soft. Comments: Minimally tender right upper abdomen no peritoneal signs or guarding   Musculoskeletal: Normal range of motion. Skin:     General: Skin is warm and dry. Coloration: Skin is not jaundiced. Neurological:      Mental Status: She is alert. Psychiatric:         Mood and Affect: Mood normal.         Thought Content: Thought content normal.         Judgment: Judgment normal.            Labs:   Recent Results (from the past 24 hour(s))   METABOLIC PANEL, COMPREHENSIVE    Collection Time: 12/07/20  8:29 AM   Result Value Ref Range    Sodium 138 136 - 145 mmol/L    Potassium 3.8 3.5 - 5.1 mmol/L    Chloride 110 (H) 97 - 108 mmol/L    CO2 22 21 - 32 mmol/L    Anion gap 6 5 - 15 mmol/L    Glucose 79 65 - 100 mg/dL    BUN 2 (L) 6 - 20 MG/DL    Creatinine 0.53 (L) 0.55 - 1.02 MG/DL    BUN/Creatinine ratio 4 (L) 12 - 20      GFR est AA >60 >60 ml/min/1.73m2    GFR est non-AA >60 >60 ml/min/1.73m2    Calcium 8.1 (L) 8.5 - 10.1 MG/DL    Bilirubin, total 0.3 0.2 - 1.0 MG/DL    ALT (SGPT) 17 12 - 78 U/L    AST (SGOT) 10 (L) 15 - 37 U/L    Alk.  phosphatase 46 45 - 117 U/L    Protein, total 5.8 (L) 6.4 - 8.2 g/dL    Albumin 2.6 (L) 3.5 - 5.0 g/dL    Globulin 3.2 2.0 - 4.0 g/dL    A-G Ratio 0.8 (L) 1.1 - 2.2         Data Review images and reports reviewed    Assessment:     Active Problems:    Nausea and vomiting in pregnancy (12/2/2020)      Eating disorder affecting pregnancy, antepartum (12/2/2020)      Depression affecting pregnancy (2020)      Calculus of gallbladder without cholecystitis (2020)        Plan/Recommendations/Medical Decision Making:     Continue present treatment   Patient still with some abdominal pain right upper abdomen nausea and vomiting is somewhat improved. I reviewed the consult note from Dr. Ekta Mejia, and I concur that we cannot entirely rule out the gallstones as possible chronic biliary colic but these symptoms have preexisted her pregnancy. Do not know whether the gallstones are symptomatic or not, could have other GI motility issues. I reviewed with the patient that at some point if her GI symptoms persist that she should probably have a cholecystectomy, certainly would be better if this could wait until after her pregnancy is delivered or terminated, but if she needs to consider cholecystectomy during pregnancy would be best to wait till the second trimester after organogenesis is been completed in a couple of weeks, although recent studies would suggest that cholecystectomy at any point is acceptably safe if patient understands there is still some risk of organogenesis and  labor and loss of the fetus. Patient concurred and preferred to wait for now and hold off on cholecystectomy she can follow-up with surgery office in the next few weeks if she wants cholecystectomy during second trimester. Benefits risks alternatives reviewed she understands this may not resolve her nausea and vomiting symptoms.   Certainly nothing acutely surgical    Benefits risks alternatives morbidity mortality risk of injury to biliary structures reoperation and open surgery with gallbladder surgery have been reviewed with patient    We will sign off and be available as needed, it appears that primary physician is working towards Zofran pump at home for nausea  Kristin Jain MD , [facility]

## 2020-12-08 LAB
CORTIS SERPL-MCNC: 4.7 UG/DL
T4 FREE SERPL-MCNC: 0.9 NG/DL (ref 0.8–1.5)

## 2020-12-08 PROCEDURE — 82175 ASSAY OF ARSENIC: CPT

## 2020-12-08 PROCEDURE — 74011250636 HC RX REV CODE- 250/636: Performed by: OBSTETRICS & GYNECOLOGY

## 2020-12-08 PROCEDURE — 74011250637 HC RX REV CODE- 250/637: Performed by: OBSTETRICS & GYNECOLOGY

## 2020-12-08 PROCEDURE — 82533 TOTAL CORTISOL: CPT

## 2020-12-08 PROCEDURE — 65410000002 HC RM PRIVATE OB

## 2020-12-08 PROCEDURE — 36415 COLL VENOUS BLD VENIPUNCTURE: CPT

## 2020-12-08 PROCEDURE — 74011000250 HC RX REV CODE- 250: Performed by: OBSTETRICS & GYNECOLOGY

## 2020-12-08 PROCEDURE — 84439 ASSAY OF FREE THYROXINE: CPT

## 2020-12-08 RX ADMIN — OLANZAPINE 5 MG: 5 TABLET, FILM COATED ORAL at 21:03

## 2020-12-08 RX ADMIN — FLUOXETINE 40 MG: 20 CAPSULE ORAL at 13:28

## 2020-12-08 RX ADMIN — PROCHLORPERAZINE EDISYLATE 10 MG: 5 INJECTION INTRAMUSCULAR; INTRAVENOUS at 09:06

## 2020-12-08 RX ADMIN — DEXTROSE MONOHYDRATE, SODIUM CHLORIDE, SODIUM LACTATE, POTASSIUM CHLORIDE, CALCIUM CHLORIDE: 5; 600; 310; 179; 20 INJECTION, SOLUTION INTRAVENOUS at 06:11

## 2020-12-08 RX ADMIN — ONDANSETRON HYDROCHLORIDE 4 MG: 2 INJECTION, SOLUTION INTRAMUSCULAR; INTRAVENOUS at 13:28

## 2020-12-08 RX ADMIN — ONDANSETRON HYDROCHLORIDE 4 MG: 2 INJECTION, SOLUTION INTRAMUSCULAR; INTRAVENOUS at 08:04

## 2020-12-08 RX ADMIN — DEXTROSE MONOHYDRATE, SODIUM CHLORIDE, SODIUM LACTATE, POTASSIUM CHLORIDE, CALCIUM CHLORIDE: 5; 600; 310; 179; 20 INJECTION, SOLUTION INTRAVENOUS at 17:48

## 2020-12-08 RX ADMIN — ONDANSETRON HYDROCHLORIDE 4 MG: 2 INJECTION, SOLUTION INTRAMUSCULAR; INTRAVENOUS at 01:56

## 2020-12-08 RX ADMIN — PROCHLORPERAZINE EDISYLATE 10 MG: 5 INJECTION INTRAMUSCULAR; INTRAVENOUS at 17:48

## 2020-12-08 RX ADMIN — PANTOPRAZOLE SODIUM 40 MG: 40 TABLET, DELAYED RELEASE ORAL at 08:04

## 2020-12-08 RX ADMIN — FOLIC ACID: 5 INJECTION, SOLUTION INTRAMUSCULAR; INTRAVENOUS; SUBCUTANEOUS at 20:49

## 2020-12-08 RX ADMIN — PROGESTERONE 200 MG: 100 CAPSULE, LIQUID FILLED ORAL at 22:32

## 2020-12-08 RX ADMIN — ONDANSETRON HYDROCHLORIDE 4 MG: 2 INJECTION, SOLUTION INTRAMUSCULAR; INTRAVENOUS at 20:49

## 2020-12-08 NOTE — ROUTINE PROCESS
Bedside shift change report given to [address] [place] [address] (oncoming nurse) by LILIA Meza RN (offgoing nurse). Report included the following information SBAR.

## 2020-12-08 NOTE — PROGRESS NOTES
Bedside and Verbal shift change report given to LILIA Weinberg (oncoming nurse) by LILIA Scott RN (offgoing nurse). Report included the following information SBAR and MAR.

## 2020-12-08 NOTE — PROGRESS NOTES
High Risk Obstetrics Progress Note    Name: Krunal Haley MRN: [id number]  SSN: [id number]    YOB: 1985  Age: 38 y.o. Sex: female      Subjective:      LOS: 6 days    Estimated Date of Delivery: 21   Gestational Age Today: 10w1d     Patient admitted for nausea/vomiting/dehydration. States she does have cramping on sides, nausea and vomiting and does not have chest pain, shortness of breath and vaginal bleeding . Objective:     Vitals:  Blood pressure 95/60, pulse 79, temperature 98.4 °F (36.9 °C), resp. rate 14, height 5' 4\" (1.626 m), weight 68.5 kg (151 lb), SpO2 100 %, not currently breastfeeding. Temp (24hrs), Av.3 °F (36.8 °C), Min:98.1 °F (36.7 °C), Max:98.5 °F (97.2 °C)    Systolic (87JTO), SLT:850 , Min:95 , SBN:980      Diastolic (55KNU), AY, Min:60, Max:66       Intake and Output:         Physical Exam:  Patient without distress. Heart: Regular rate and rhythm  Lung: clear to auscultation throughout lung fields, no wheezes, no rales, no rhonchi and normal respiratory effort  Abdomen: soft, nontender, gravid  Lower Extremities:  - Edema No       Membranes:  Intact        Labs:   Recent Results (from the past 36 hour(s))   METABOLIC PANEL, COMPREHENSIVE    Collection Time: 20  8:29 AM   Result Value Ref Range    Sodium 138 136 - 145 mmol/L    Potassium 3.8 3.5 - 5.1 mmol/L    Chloride 110 (H) 97 - 108 mmol/L    CO2 22 21 - 32 mmol/L    Anion gap 6 5 - 15 mmol/L    Glucose 79 65 - 100 mg/dL    BUN 2 (L) 6 - 20 MG/DL    Creatinine 0.53 (L) 0.55 - 1.02 MG/DL    BUN/Creatinine ratio 4 (L) 12 - 20      GFR est AA >60 >60 ml/min/1.73m2    GFR est non-AA >60 >60 ml/min/1.73m2    Calcium 8.1 (L) 8.5 - 10.1 MG/DL    Bilirubin, total 0.3 0.2 - 1.0 MG/DL    ALT (SGPT) 17 12 - 78 U/L    AST (SGOT) 10 (L) 15 - 37 U/L    Alk.  phosphatase 46 45 - 117 U/L    Protein, total 5.8 (L) 6.4 - 8.2 g/dL    Albumin 2.6 (L) 3.5 - 5.0 g/dL    Globulin 3.2 2.0 - 4.0 g/dL    A-G Ratio 0.8 (L) 1.1 - 2.2     CBC W/O DIFF    Collection Time: 12/07/20 10:34 AM   Result Value Ref Range    WBC 6.2 3.6 - 11.0 K/uL    RBC 3.86 3.80 - 5.20 M/uL    HGB 9.0 (L) 11.5 - 16.0 g/dL    HCT 28.3 (L) 35.0 - 47.0 %    MCV 73.3 (L) 80.0 - 99.0 FL    MCH 23.3 (L) 26.0 - 34.0 PG    MCHC 31.8 30.0 - 36.5 g/dL    RDW 15.1 (H) 11.5 - 14.5 %    PLATELET 909 788 - 983 K/uL    MPV 12.7 8.9 - 12.9 FL    NRBC 0.0 0  WBC    ABSOLUTE NRBC 0.00 0.00 - 0.01 K/uL   T4, FREE    Collection Time: 12/08/20  4:56 AM   Result Value Ref Range    T4, Free 0.9 0.8 - 1.5 NG/DL       Assessment and Plan: Active Problems:    Nausea and vomiting in pregnancy (12/2/2020)      Eating disorder affecting pregnancy, antepartum (12/2/2020)      Depression affecting pregnancy (12/2/2020)      Calculus of gallbladder without cholecystitis (12/7/2020)       39 y/o [id number] at 10 2/7 weeks admitted for nausea/vomiting/dehydration and eating disorder in pregnancy s/p syncopal episode   IVF's and daily goodie bag-recently switched ivf to d5lr with 20 kcl for hypokalemia  Antiemetics-insurance will not cover zofran pump or veritas (out pt eating disorder clinic)  S/p MFM and GI consult for help with nutrition in light of severe uncontrolled eating disorder- pt continues to inquire about enteral feeds and long term plan. Had PICC line with her pregnancy 5 yrs ago.  MFM does not recommend TPN but enteral feeds may be an option if this is recommended/needed  - s/p MRCP and surgery consult-no need for surgery at this time for gallstones  -continue to encourage small frequent meals  Depression/anxiety with recent admission for suicidal ideation  -continue zyprexa, prozac, prazosin  -pt has been complying with her psych meds  -hopefully pt will be able to continue with weekly telehealth visits with her psychiatrist Shaista Rasheed) and counselor (Ernest Bumpers well as nutritionist (Lenn[facility] Ehrlich visit 12/29)  CHTN-stable with no meds  H/o alcohol abuse  H/o incompetent cervix-will need cerclage around 14-16 weeks  PT for mobility and hip pain-appreciate help  Chronic anemia-asymptomatic.  Known beta thal minor

## 2020-12-09 LAB
ARSENIC BLD-MCNC: 7 UG/L (ref 2–23)
HISPANIC, LDP2T: NO
LEAD BLD-MCNC: <1 UG/DL (ref 0–4)
MERCURY BLD-MCNC: 1.2 UG/L (ref 0–14.9)
RACE, 017371: NORMAL
SPECIMEN SOURCE: NORMAL
TEST PURPOSE, LDP4T: NORMAL

## 2020-12-09 PROCEDURE — 77030020365 HC SOL INJ SOD CL 0.9% 50ML

## 2020-12-09 PROCEDURE — C1751 CATH, INF, PER/CENT/MIDLINE: HCPCS

## 2020-12-09 PROCEDURE — 74011250637 HC RX REV CODE- 250/637: Performed by: OBSTETRICS & GYNECOLOGY

## 2020-12-09 PROCEDURE — 02HV33Z INSERTION OF INFUSION DEVICE INTO SUPERIOR VENA CAVA, PERCUTANEOUS APPROACH: ICD-10-PCS | Performed by: OBSTETRICS & GYNECOLOGY

## 2020-12-09 PROCEDURE — 74011250636 HC RX REV CODE- 250/636: Performed by: OBSTETRICS & GYNECOLOGY

## 2020-12-09 PROCEDURE — 76937 US GUIDE VASCULAR ACCESS: CPT

## 2020-12-09 PROCEDURE — 74011000250 HC RX REV CODE- 250: Performed by: OBSTETRICS & GYNECOLOGY

## 2020-12-09 PROCEDURE — 36573 INSJ PICC RS&I 5 YR+: CPT | Performed by: OBSTETRICS & GYNECOLOGY

## 2020-12-09 PROCEDURE — 65410000002 HC RM PRIVATE OB

## 2020-12-09 RX ORDER — POLYETHYLENE GLYCOL 3350 17 G/17G
17 POWDER, FOR SOLUTION ORAL DAILY
Status: DISCONTINUED | OUTPATIENT
Start: 2020-12-10 | End: 2020-12-17 | Stop reason: HOSPADM

## 2020-12-09 RX ORDER — ONDANSETRON 4 MG/1
4 TABLET, ORALLY DISINTEGRATING ORAL
Status: DISCONTINUED | OUTPATIENT
Start: 2020-12-09 | End: 2020-12-17 | Stop reason: HOSPADM

## 2020-12-09 RX ADMIN — PROCHLORPERAZINE EDISYLATE 10 MG: 5 INJECTION INTRAMUSCULAR; INTRAVENOUS at 06:11

## 2020-12-09 RX ADMIN — ONDANSETRON 4 MG: 4 TABLET, ORALLY DISINTEGRATING ORAL at 14:12

## 2020-12-09 RX ADMIN — FOLIC ACID: 5 INJECTION, SOLUTION INTRAMUSCULAR; INTRAVENOUS; SUBCUTANEOUS at 21:42

## 2020-12-09 RX ADMIN — ONDANSETRON 4 MG: 4 TABLET, ORALLY DISINTEGRATING ORAL at 08:27

## 2020-12-09 RX ADMIN — PROGESTERONE 200 MG: 100 CAPSULE, LIQUID FILLED ORAL at 21:37

## 2020-12-09 RX ADMIN — OLANZAPINE 5 MG: 5 TABLET, FILM COATED ORAL at 21:37

## 2020-12-09 RX ADMIN — ONDANSETRON HYDROCHLORIDE 4 MG: 2 INJECTION, SOLUTION INTRAMUSCULAR; INTRAVENOUS at 20:03

## 2020-12-09 RX ADMIN — PANTOPRAZOLE SODIUM 40 MG: 40 TABLET, DELAYED RELEASE ORAL at 08:27

## 2020-12-09 RX ADMIN — DEXTROSE MONOHYDRATE, SODIUM CHLORIDE, SODIUM LACTATE, POTASSIUM CHLORIDE, CALCIUM CHLORIDE: 5; 600; 310; 179; 20 INJECTION, SOLUTION INTRAVENOUS at 18:31

## 2020-12-09 RX ADMIN — ONDANSETRON HYDROCHLORIDE 4 MG: 2 INJECTION, SOLUTION INTRAMUSCULAR; INTRAVENOUS at 02:02

## 2020-12-09 RX ADMIN — FLUOXETINE 40 MG: 20 CAPSULE ORAL at 08:27

## 2020-12-09 NOTE — PROGRESS NOTES
Janki Farmer [address]  [address] [facility], [address]       GI PROGRESS NOTE  Christie Ring, [facility] office  [phone] NP in-hospital cell phone M-F until 4:30  After 5pm or on weekends, please call  for physician on call      NAME: Odessa Serrano   :  1985   MRN:  [id number]       Subjective:    Feeling more nausea, with vomiting ~15 minutes ago. Objective:     VITALS:   Last 24hrs VS reviewed since prior progress note. Most recent are:  Visit Vitals  BP 98/64 (BP 1 Location: Left arm, BP Patient Position: At rest;Sitting)   Pulse 75   Temp 97.7 °F (36.5 °C)   Resp 16   Ht 5' 4\" (1.626 m)   Wt 68.5 kg (151 lb)   SpO2 99%   BMI 25.92 kg/m²       PHYSICAL EXAM:  General: Cooperative, no acute distress    Neurologic:  Alert and oriented X 3. HEENT: EOMI, no scleral icterus   Lungs:  CTA bilaterally. No wheezing  Heart:  S1 S2   Abdomen: Soft, non-distended, no tenderness. +Bowel sounds  Extremities: No edema  Psych:   Good insight. Not anxious or agitated. Lab Data Reviewed:     No results found for this or any previous visit (from the past 24 hour(s)).           Assessment:   · Chronic nausea/vomiting: US with multiple gallstones, MRCP negative for CBD stones   · Anemia: chronic, dilutional   · Pregnancy  · Depression  · PTSD     Patient Active Problem List   Diagnosis Code    Asthma J45.909    Microcytic anemia D50.9    Anxiety F41.9    Back pain, chronic M54.9, G89.29    Unspecified essential hypertension I10    MDD (major depressive disorder), recurrent episode, moderate (HCC) F33.1    Sleep disturbance G47.9    Non compliance w medication regimen Z91.14    Cervical incompetence N88.3    Incompetent cervix N88.3    Pregnancy Z34.90    Pregnant Z34.90    Severe obesity (HCC) E66.01    Post-traumatic stress disorder, acute F43.11    MDD (major depressive disorder) F32.9    Nausea and vomiting in pregnancy O21.9    Eating disorder affecting pregnancy, antepartum O99.340, F50.9    Depression affecting pregnancy O99.340, F32.9    Calculus of gallbladder without cholecystitis K80.20     Plan:   · Trial enteral feeding, will need to consider TPN if not tolerated  · Symptomatic management   · GI follow-up after delivery, will be available to see again on request     Signed By: Pelon Oviedo NP     12/9/2020  10:03 AM

## 2020-12-09 NOTE — PROGRESS NOTES
High Risk Obstetrics Progress Note    Name: Frederic Salazar MRN: [id number]  SSN: [id number]    YOB: 1985  Age: 38 y.o. Sex: female      Subjective:      LOS: 7 days    Estimated Date of Delivery: 21   Gestational Age Today: 7w10d     Patient admitted for nausea and vomiting and dehydration and s/p syncopal episode in light of early pregnancy and chronic severe eating disorder. States she does have nausea, but was able to keep things down overnight and does not have chest pain, headache , shortness of breath and vaginal bleeding . Objective:     Vitals:  Blood pressure 99/61, pulse 74, temperature 97.5 °F (36.4 °C), resp. rate 16, height 5' 4\" (1.626 m), weight 68.5 kg (151 lb), SpO2 99 %, not currently breastfeeding. Temp (24hrs), Av.3 °F (36.8 °C), Min:97.5 °F (36.4 °C), Max:99.1 °F (29.0 °C)    Systolic (13RPR), DKU:20 , Min:91 , QPF:877      Diastolic (85KCK), VIH:61, Min:58, Max:61       Intake and Output:         Physical Exam:  Patient without distress. Heart: Regular rate and rhythm  Lung: clear to auscultation throughout lung fields, no wheezes, no rales, no rhonchi and normal respiratory effort  Abdomen: soft, nontender, gravid  Lower Extremities:  - Edema No       Membranes:  Intact      Labs:   Recent Results (from the past 36 hour(s))   CORTISOL    Collection Time: 20  4:56 AM   Result Value Ref Range    Cortisol, random 4.7 ug/dL   T4, FREE    Collection Time: 20  4:56 AM   Result Value Ref Range    T4, Free 0.9 0.8 - 1.5 NG/DL       Assessment and Plan:       Active Problems:    Nausea and vomiting in pregnancy (2020)      Eating disorder affecting pregnancy, antepartum (2020)      Depression affecting pregnancy (2020)      Calculus of gallbladder without cholecystitis (2020)       37 y/o [id number] at 10 3/7 weeks admitted for nausea/vomiting/dehydration and eating disorder in pregnancy s/p syncopal episode   IVF's and daily goodie bag-on d5lr with 20 kcl for hypokalemia  Antiemetics-insurance will not cover zofran pump or veritas (out pt eating disorder clinic)  S/p MFM and GI consult for help with nutrition in light of severe uncontrolled eating disorder- pt continues to inquire about enteral feeds and long term plan. Had PICC line with her pregnancy 5 yrs ago.  MFM does not recommend TPN but enteral feeds may be an option if this is recommended/needed-will re-discuss with MFM today.  - s/p MRCP and surgery consult-no need for surgery at this time for gallstones  -continue to encourage small frequent meals-pt had some improvement with this yesterday! Depression/anxiety with recent admission for suicidal ideation  -continue zyprexa, prozac, prazosin  -I spoke with Gabriela Koehler ([address] assistant) yesterday-he is trying to get pt a sooner follow up appt than currently scheduled 1/12.  -pt has been complying with her psych meds  -hopefully pt will be able to continue with weekly telehealth visits with her counselor (Che Armendariz well as nutritionist ([facility] visit 12/29)  CHTN-stable with no meds-  -BP now actually low (suspect due to pregnancy and significant weight loss from eating disorder) but pt also with dizziness-will consult cards to r/o other cardiac etiology. H/o alcohol abuse  H/o incompetent cervix-will need cerclage around 14-16 weeks  PT for mobility and hip pain-appreciate help  Chronic anemia-asymptomatic. Known beta thal minor with normal iron panel.

## 2020-12-09 NOTE — CONSULTS
S Cardiology Consult Note    Date of consult:  20  Date of admission: 2020  Primary Cardiologist: none  Physician Requesting consult: Dr Dana Romero / Reason for consult: Dizziness, low blood pressure    History of the presenting illness:   García Santamaria is a 38 y.o. F who is 10 weeks pregnant, admitted with nausea and vomiting. She has been having dizziness as well. Found to be dehydrated ans has been receiving IV fluids. She has a h/o asthma and an eating disorder. Denies chest pain or shortness of breath at this time. Past Medical History:   Diagnosis Date    Anorexia     Anxiety     Asthma     on albuterol prn. Triggers cold and allergies    Avoidant-restrictive food intake disorder (ARFID)     Back pain, chronic     sciatica    Gestational hypertension     Past pregnancy    HX OTHER MEDICAL      , , , ,     Hyperemesis     severe hyperemesis    Hypertension     with G12 - none this pregnancy    Iron deficiency anemia 10/08/2010    MDD (major depressive disorder), single episode with postpartum onset 2013    treated with meds - 13    Orthostatic hypotension 2020    Plantar fasciitis     Pregnancy     36 weeks    PTSD (post-traumatic stress disorder)        Prior to Admission medications    Medication Sig Start Date End Date Taking? Authorizing Provider   progesterone (PROMETRIUM) 200 mg capsule Take 200 mg by mouth daily. Yes Provider, Historical   cholecalciferol (Vitamin D3) 25 mcg (1,000 unit) cap Take  by mouth daily. Yes Provider, Historical   pyridoxine, vitamin B6, (Vitamin B-6) 100 mg tablet Take 100 mg by mouth daily. Yes Provider, Historical   hydrOXYzine HCL (ATARAX) 50 mg tablet Take 1 Tab by mouth four (4) times daily. 20  Yes Janessa Ortiz NP   FLUoxetine (PROzac) 40 mg capsule Take 1 Cap by mouth daily.  20  Yes Janessa Ortiz NP   doxylamine succinate (UNISOM) 25 mg tablet Take 1 Tab by mouth nightly as needed for Insomnia. 11/5/20  Yes Trevor Ortiz NP   calcium carbonate (Tums) 200 mg calcium (500 mg) chew Take 1 Tab by mouth two (2) times daily as needed for Other (reflux). 11/4/20  Yes Yohana Arrington NP   polyethylene glycol (MIRALAX) 17 gram packet Take 1 Packet by mouth daily as needed for Constipation. 11/4/20  Yes Yohana Arrington NP   prenatal vit-iron fumarate-fa (PRENATAL PLUS with IRON) 28 mg iron- 800 mcg tab  9/28/20  Yes Provider, Brandee   prazosin (MINIPRESS) 2 mg capsule Take 1 Cap by mouth nightly. Indications: posttraumatic stress syndrome 9/28/20  Yes Irving Rabago MD   pantoprazole (PROTONIX) 40 mg tablet Take 1 Tab by mouth Daily (before breakfast). Indications: gastroesophageal reflux disease 9/29/20  Yes Irving Rabago MD   OLANZapine (ZyPREXA) 5 mg tablet Take 1 Tab by mouth nightly. 11/19/20   Trevor Ortiz NP   OTHER 1 Ensure Enlive food supplement drink PO TID 11/4/20   Yohana Arrington NP   OTHER 1 ensure pudding PO daily for lunch. 10/20/20   Yohana Arrington NP   food supplemt, lactose-reduced (Ensure High Protein) liqd Take 237 mL by mouth three (3) times daily.  10/14/20   Yohana Arrington NP       Allergies   Allergen Reactions    Labetalol Hives    Ceclor [Cefaclor] Unknown (comments)    Pcn [Penicillins] Hives    Septra [Sulfamethoprim Ds] Unknown (comments)        Family History   Problem Relation Age of Onset   24 [facility] Arthritis-rheumatoid Mother     Asthma Mother     No Known Problems Father     No Known Problems Maternal Grandmother     No Known Problems Maternal Grandfather     No Known Problems Paternal Grandmother     No Known Problems Paternal Grandfather     Asthma Son     Alcohol abuse Neg Hx     Arthritis-osteo Neg Hx     Bleeding Prob Neg Hx     Cancer Neg Hx     Diabetes Neg Hx     Elevated Lipids Neg Hx     Headache Neg Hx     Heart Disease Neg Hx     Hypertension Neg Hx     Lung Disease Neg Hx     Migraines Neg Hx     Psychiatric Disorder Neg Hx     Stroke Neg Hx     Mental Retardation Neg Hx     Anesth Problems Neg Hx        Social History     Socioeconomic History    Marital status: SINGLE     Spouse name: Not on file    Number of children: 9    Years of education: Not on file    Highest education level: 12th grade   Occupational History    Not on file   Social Needs    Financial resource strain: Not very hard    Food insecurity     Worry: Never true     Inability: Never true    Transportation needs     Medical: No     Non-medical: No   Tobacco Use    Smoking status: Never Smoker    Smokeless tobacco: Never Used   Substance and Sexual Activity    Alcohol use: Not Currently     Frequency: Monthly or less     Drinks per session: 1 or 2     Binge frequency: Never    Drug use: No    Sexual activity: Yes     Partners: Male   Lifestyle    Physical activity     Days per week: Not on file     Minutes per session: Not on file    Stress: Not on file   Relationships    Social connections     Talks on phone: Not on file     Gets together: Not on file     Attends [employer] service: Not on file     Active member of club or organization: Not on file     Attends meetings of clubs or organizations: Not on file     Relationship status: Not on file    Intimate partner violence     Fear of current or ex partner: Not on file     Emotionally abused: Not on file     Physically abused: Not on file     Forced sexual activity: Not on file   Other Topics Concern    Not on file   Social History Narrative    Nubia Hassan lives with boyfriend, Jeane Jacobs,  reportedly with alcohol dependence. She was raised by her mother. She has no siblings. Not working. She is supported financially by the childrens' father and public support. She has no hobbies outside of her children. She has a 12th grade education and no legal entanglements. Review of Systems   Constitutional: Negative for chills and fever.    HENT: Negative for hearing loss and nosebleeds. Eyes: Negative for blurred vision and double vision. Respiratory: Negative for cough and shortness of breath. Cardiovascular: Negative for chest pain. Gastrointestinal: Positive for nausea and vomiting. Negative for abdominal pain. Genitourinary: Negative for frequency and urgency. Musculoskeletal: Negative for back pain and joint pain. Skin: Negative for itching and rash. Neurological: Positive for dizziness. Negative for headaches. Endo/Heme/Allergies: Negative for environmental allergies. Does not bruise/bleed easily. Visit Vitals  BP 98/64 (BP 1 Location: Left arm, BP Patient Position: At rest;Sitting)   Pulse 75   Temp 97.7 °F (36.5 °C)   Resp 16   Ht 5' 4\" (1.626 m) Comment: Pt claims ht 64-65\". One prior admit ht listed 66\"   Wt 68.8 kg (151 lb 9.6 oz)   LMP  (LMP Unknown)   SpO2 99%   BMI 26.02 kg/m²     Physical Exam  HENT:      Head: Normocephalic and atraumatic. Eyes:      General: No scleral icterus. Conjunctiva/sclera: Conjunctivae normal.   Neck:      Vascular: No JVD. Cardiovascular:      Rate and Rhythm: Normal rate and regular rhythm. Heart sounds: Normal heart sounds. No murmur. No gallop. Pulmonary:      Effort: Pulmonary effort is normal. No respiratory distress. Breath sounds: Normal breath sounds. No stridor. No wheezing. Abdominal:      General: There is no distension. Palpations: Abdomen is soft. Musculoskeletal: Normal range of motion. General: No deformity. Skin:     General: Skin is warm and dry. Neurological:      Mental Status: She is alert and oriented to person, place, and time.    Psychiatric:         Mood and Affect: Mood and affect normal.         Lab review:  BMP:   No results found for: NA, K, CL, CO2, AGAP, GLU, BUN, CREA, GFRAA, GFRNA     CBC:  Lab Results   Component Value Date/Time    WBC 6.2 12/07/2020 10:34 AM    WBC 12.2 (H) 05/09/2012 04:27 PM    Hemoglobin (POC) 10.4 (L) 01/17/2020 07:12 AM    HGB 9.0 (L) 12/07/2020 10:34 AM    HCT 28.3 (L) 12/07/2020 10:34 AM    PLATELET 664 41/77/1988 10:34 AM    MCV 73.3 (L) 12/07/2020 10:34 AM    Hgb, External 33.7 08/03/2012    Hct, External 69 08/03/2012    Platelet cnt., External 307 08/03/2012       All Cardiac Markers in the last 24 hours:  No results found for: CPK, CK, CKMMB, CKMB, RCK3, CKMBT, CKMBPOC, CKNDX, CKND1, ANDERSON, TROPT, TROIQ, RUBIA, TROPT, TNIPOC, BNP, BNPP, BNPNT    Lab Results   Component Value Date/Time    Cholesterol, total 172 02/18/2020 11:08 AM    HDL Cholesterol 52 02/18/2020 11:08 AM    LDL, calculated 97 02/18/2020 11:08 AM    VLDL, calculated 23 02/18/2020 11:08 AM    Triglyceride 113 02/18/2020 11:08 AM        Data review:  EKG tracing personally reviewed: normal    Assessment:    1. Dizziness  2. Hypotension: likely from volume depletion. MAP ok. No clinical exam findings or EKG changes to suggest underlying cardiac pathology. 3. Nausea and vomiting  4. Pregnancy    Recommendations / Plan:    No need for further cardiac work-up. Supportive cares as per primary team.    Signed:  Yamileth ARMSTRONG  [facility]  Interventional Cardiology  12/09/20

## 2020-12-09 NOTE — PROGRESS NOTES
Bedside and Verbal shift change report given to LILIA Agosto RN (oncoming nurse) by [facility]. Roby Melendrez RN (offgoing nurse). Report included the following information SBAR, Kardex, Intake/Output, MAR and Accordion. 9021- Pt complaining of pain in R forearm above IV site. No redness, or swelling. RN placed ice pack over arm. Pt's L arm is still red and tender from previous IV infiltration.

## 2020-12-09 NOTE — PROGRESS NOTES
Spoke with pt today (by phone) after speaking with Dr Momo Pope ([facility]) re: TPN/PPN vs enteral feeds. We discussed that nutrition for the pregnancy is not as much of a concern until 2nd trimester, but very important at that time. We discussed risk of enteral feeds (nausea, vomiting, re-feeding syndrome) vs PPN/TPN-infection/sepsis. We also discussed the importance of continuing to try to eat/hydrate despite other nutritional management for longterm improvement of her eating disorder. Pt unsure how she would like to proceed at this time. Pt's 2nd iv infiltrated this morning and she has had n/v today. Will have picc line to help with lab draws as well as hydration and re-address nutrition management options in the morning.

## 2020-12-09 NOTE — PROGRESS NOTES
Bedside and Verbal shift change report given to [employer] (oncoming nurse) by Zeeshan Ozuna RN (offgoing nurse). Report included the following information SBAR, Kardex, Intake/Output, MAR and Accordion. [id number] Cardiology consult called. [id number] [facility] calls unit and states pt okay for PICC line. Order placed. Okay to draw labs once PICC line placed.

## 2020-12-09 NOTE — PROCEDURES
Order for PICC received and verified. Consent obtained from pt after risks, benefits, procedure explained and questions answered. PICC Placement Note    PRE-PROCEDURE VERIFICATION  Correct Procedure: yes  Correct Site:  yes  Temperature: Temp: 97.7 °F (36.5 °C), Temperature Source: Temp Source: Oral  Recent Labs     12/07/20  1034 12/07/20  0829   BUN  --  2*   CREA  --  0.53*     --    WBC 6.2  --      Allergies: Labetalol; Ceclor [cefaclor]; Pcn [penicillins]; and Septra [sulfamethoprim ds]  Education materials, including PICC Booklet, for PICC Care given to patient: yes. See Patient Education activity for further details. PROCEDURE DETAIL  A double lumen PICC line was started for vascular access. The following documentation is in addition to the PICC properties in the lines/airways flowsheet :  Lot #: [id number]  Was xylocaine 1% used intradermally:  yes  Catheter Length: 42 (cm)  Vein Selection for PICC:left basilic  Central Line Bundle followed yes  Complication Related to Insertion: none    The placement was verified by ECG/Sapiens technology: The  tip location is in the superior vena cava. See ECG results for PICC tip placement. Report given to nurse Sole Pierce is okay to use.     Tsering Giron RN

## 2020-12-10 LAB
ALBUMIN SERPL-MCNC: 2.6 G/DL (ref 3.5–5)
ALBUMIN/GLOB SERPL: 0.8 {RATIO} (ref 1.1–2.2)
ALP SERPL-CCNC: 51 U/L (ref 45–117)
ALT SERPL-CCNC: 10 U/L (ref 12–78)
ANION GAP SERPL CALC-SCNC: 6 MMOL/L (ref 5–15)
AST SERPL-CCNC: 6 U/L (ref 15–37)
BASOPHILS # BLD: 0 K/UL (ref 0–0.1)
BASOPHILS NFR BLD: 0 % (ref 0–1)
BILIRUB DIRECT SERPL-MCNC: <0.1 MG/DL (ref 0–0.2)
BILIRUB SERPL-MCNC: 0.2 MG/DL (ref 0.2–1)
BUN SERPL-MCNC: 5 MG/DL (ref 6–20)
BUN/CREAT SERPL: 9 (ref 12–20)
CALCIUM SERPL-MCNC: 8.1 MG/DL (ref 8.5–10.1)
CHLORIDE SERPL-SCNC: 107 MMOL/L (ref 97–108)
CO2 SERPL-SCNC: 25 MMOL/L (ref 21–32)
CREAT SERPL-MCNC: 0.53 MG/DL (ref 0.55–1.02)
DAT POLY-SP REAG RBC QL: NORMAL
DIFFERENTIAL METHOD BLD: ABNORMAL
EOSINOPHIL # BLD: 0.1 K/UL (ref 0–0.4)
EOSINOPHIL NFR BLD: 2 % (ref 0–7)
ERYTHROCYTE [DISTWIDTH] IN BLOOD BY AUTOMATED COUNT: 14.8 % (ref 11.5–14.5)
ERYTHROCYTE [DISTWIDTH] IN BLOOD BY AUTOMATED COUNT: 14.8 % (ref 11.5–14.5)
FERRITIN SERPL-MCNC: 11 NG/ML (ref 26–388)
GLOBULIN SER CALC-MCNC: 3.1 G/DL (ref 2–4)
GLUCOSE SERPL-MCNC: 76 MG/DL (ref 65–100)
HAPTOGLOB SERPL-MCNC: 74 MG/DL (ref 30–200)
HCT VFR BLD AUTO: 18.1 % (ref 35–47)
HCT VFR BLD AUTO: 24.8 % (ref 35–47)
HGB BLD-MCNC: 5.9 G/DL (ref 11.5–16)
HGB BLD-MCNC: 8 G/DL (ref 11.5–16)
IMM GRANULOCYTES # BLD AUTO: 0 K/UL (ref 0–0.04)
IMM GRANULOCYTES NFR BLD AUTO: 0 % (ref 0–0.5)
IRON SATN MFR SERPL: 6 % (ref 20–50)
IRON SERPL-MCNC: 15 UG/DL (ref 35–150)
LDH SERPL L TO P-CCNC: 140 U/L (ref 81–246)
LYMPHOCYTES # BLD: 1.6 K/UL (ref 0.8–3.5)
LYMPHOCYTES NFR BLD: 22 % (ref 12–49)
MAGNESIUM SERPL-MCNC: 1.7 MG/DL (ref 1.6–2.4)
MCH RBC QN AUTO: 23.5 PG (ref 26–34)
MCH RBC QN AUTO: 23.9 PG (ref 26–34)
MCHC RBC AUTO-ENTMCNC: 32.3 G/DL (ref 30–36.5)
MCHC RBC AUTO-ENTMCNC: 32.6 G/DL (ref 30–36.5)
MCV RBC AUTO: 72.9 FL (ref 80–99)
MCV RBC AUTO: 73.3 FL (ref 80–99)
MONOCYTES # BLD: 0.5 K/UL (ref 0–1)
MONOCYTES NFR BLD: 7 % (ref 5–13)
NEUTS SEG # BLD: 5 K/UL (ref 1.8–8)
NEUTS SEG NFR BLD: 69 % (ref 32–75)
NRBC # BLD: 0 K/UL (ref 0–0.01)
NRBC # BLD: 0 K/UL (ref 0–0.01)
NRBC BLD-RTO: 0 PER 100 WBC
NRBC BLD-RTO: 0 PER 100 WBC
PHOSPHATE SERPL-MCNC: 4.1 MG/DL (ref 2.6–4.7)
PLATELET # BLD AUTO: 145 K/UL (ref 150–400)
PLATELET # BLD AUTO: 177 K/UL (ref 150–400)
POTASSIUM SERPL-SCNC: 3.7 MMOL/L (ref 3.5–5.1)
PROT SERPL-MCNC: 5.7 G/DL (ref 6.4–8.2)
RBC # BLD AUTO: 2.47 M/UL (ref 3.8–5.2)
RBC # BLD AUTO: 3.4 M/UL (ref 3.8–5.2)
RETICS # AUTO: 0.03 M/UL (ref 0.02–0.08)
RETICS/RBC NFR AUTO: 1 % (ref 0.7–2.1)
SODIUM SERPL-SCNC: 138 MMOL/L (ref 136–145)
TIBC SERPL-MCNC: 239 UG/DL (ref 250–450)
VIT B12 SERPL-MCNC: 306 PG/ML (ref 193–986)
WBC # BLD AUTO: 5.3 K/UL (ref 3.6–11)
WBC # BLD AUTO: 7.3 K/UL (ref 3.6–11)

## 2020-12-10 PROCEDURE — 83615 LACTATE (LD) (LDH) ENZYME: CPT

## 2020-12-10 PROCEDURE — 74011250636 HC RX REV CODE- 250/636: Performed by: OBSTETRICS & GYNECOLOGY

## 2020-12-10 PROCEDURE — 74011000250 HC RX REV CODE- 250: Performed by: OBSTETRICS & GYNECOLOGY

## 2020-12-10 PROCEDURE — 82607 VITAMIN B-12: CPT

## 2020-12-10 PROCEDURE — 83010 ASSAY OF HAPTOGLOBIN QUANT: CPT

## 2020-12-10 PROCEDURE — 85027 COMPLETE CBC AUTOMATED: CPT

## 2020-12-10 PROCEDURE — 83540 ASSAY OF IRON: CPT

## 2020-12-10 PROCEDURE — 85025 COMPLETE CBC W/AUTO DIFF WBC: CPT

## 2020-12-10 PROCEDURE — 82728 ASSAY OF FERRITIN: CPT

## 2020-12-10 PROCEDURE — 74011250637 HC RX REV CODE- 250/637: Performed by: OBSTETRICS & GYNECOLOGY

## 2020-12-10 PROCEDURE — 80048 BASIC METABOLIC PNL TOTAL CA: CPT

## 2020-12-10 PROCEDURE — 74011250636 HC RX REV CODE- 250/636: Performed by: INTERNAL MEDICINE

## 2020-12-10 PROCEDURE — 84100 ASSAY OF PHOSPHORUS: CPT

## 2020-12-10 PROCEDURE — 86880 COOMBS TEST DIRECT: CPT

## 2020-12-10 PROCEDURE — 74011000258 HC RX REV CODE- 258: Performed by: INTERNAL MEDICINE

## 2020-12-10 PROCEDURE — 80076 HEPATIC FUNCTION PANEL: CPT

## 2020-12-10 PROCEDURE — 36415 COLL VENOUS BLD VENIPUNCTURE: CPT

## 2020-12-10 PROCEDURE — 85045 AUTOMATED RETICULOCYTE COUNT: CPT

## 2020-12-10 PROCEDURE — 99222 1ST HOSP IP/OBS MODERATE 55: CPT | Performed by: INTERNAL MEDICINE

## 2020-12-10 PROCEDURE — 65410000002 HC RM PRIVATE OB

## 2020-12-10 PROCEDURE — 83735 ASSAY OF MAGNESIUM: CPT

## 2020-12-10 RX ADMIN — FOLIC ACID: 5 INJECTION, SOLUTION INTRAMUSCULAR; INTRAVENOUS; SUBCUTANEOUS at 22:00

## 2020-12-10 RX ADMIN — FLUOXETINE 40 MG: 20 CAPSULE ORAL at 09:00

## 2020-12-10 RX ADMIN — POLYETHYLENE GLYCOL 3350 17 G: 17 POWDER, FOR SOLUTION ORAL at 09:01

## 2020-12-10 RX ADMIN — ONDANSETRON HYDROCHLORIDE 4 MG: 2 INJECTION, SOLUTION INTRAMUSCULAR; INTRAVENOUS at 19:34

## 2020-12-10 RX ADMIN — PROCHLORPERAZINE EDISYLATE 10 MG: 5 INJECTION INTRAMUSCULAR; INTRAVENOUS at 12:08

## 2020-12-10 RX ADMIN — IRON SUCROSE 200 MG: 20 INJECTION, SOLUTION INTRAVENOUS at 18:41

## 2020-12-10 RX ADMIN — PROCHLORPERAZINE EDISYLATE 10 MG: 5 INJECTION INTRAMUSCULAR; INTRAVENOUS at 20:48

## 2020-12-10 RX ADMIN — ONDANSETRON HYDROCHLORIDE 4 MG: 2 INJECTION, SOLUTION INTRAMUSCULAR; INTRAVENOUS at 15:32

## 2020-12-10 RX ADMIN — ONDANSETRON HYDROCHLORIDE 4 MG: 2 INJECTION, SOLUTION INTRAMUSCULAR; INTRAVENOUS at 08:56

## 2020-12-10 RX ADMIN — PROGESTERONE 200 MG: 100 CAPSULE, LIQUID FILLED ORAL at 21:59

## 2020-12-10 RX ADMIN — ONDANSETRON HYDROCHLORIDE 4 MG: 2 INJECTION, SOLUTION INTRAMUSCULAR; INTRAVENOUS at 02:03

## 2020-12-10 RX ADMIN — OLANZAPINE 5 MG: 5 TABLET, FILM COATED ORAL at 21:59

## 2020-12-10 RX ADMIN — PANTOPRAZOLE SODIUM 40 MG: 40 TABLET, DELAYED RELEASE ORAL at 08:59

## 2020-12-10 NOTE — PROGRESS NOTES
High Risk Obstetrics Progress Note    Name: Julius Huerta MRN: [id number]  SSN: [id number]    YOB: 1985  Age: 38 y.o. Sex: female      Subjective:      LOS: 8 days    Estimated Date of Delivery: 21   Gestational Age Today: 6w6d     Patient admitted for nausea and vomiting and dehydration in early pregnancy with chronic eating disorder. States she does have nausea but kept down some turkey and mashed potatoes last night. Also having cramping in lower abdomen but no vaginal bleeding. and does not have chest pain, shortness of breath and vaginal bleeding . Objective:     Vitals:  Blood pressure (!) 103/58, pulse 71, temperature 97.5 °F (36.4 °C), resp. rate 16, height 5' 4\" (1.626 m), weight 68.8 kg (151 lb 9.6 oz), SpO2 100 %, not currently breastfeeding. Temp (24hrs), Av.7 °F (36.5 °C), Min:97.5 °F (36.4 °C), Max:98 °F (32.3 °C)    Systolic (43FGD), HFZ:845 , Min:98 , PHZ:786      Diastolic (37ILU), BTU:01, Min:58, Max:67       Intake and Output:         Physical Exam:  Patient without distress.   Heart: Regular rate and rhythm  Lung: clear to auscultation throughout lung fields, no wheezes, no rales, no rhonchi and normal respiratory effort  Abdomen: soft, nontender, gravid  Lower Extremities:  - Edema No       Membranes:  Intact        Labs:   Recent Results (from the past 36 hour(s))   METABOLIC PANEL, BASIC    Collection Time: 12/10/20  4:05 AM   Result Value Ref Range    Sodium 138 136 - 145 mmol/L    Potassium 3.7 3.5 - 5.1 mmol/L    Chloride 107 97 - 108 mmol/L    CO2 25 21 - 32 mmol/L    Anion gap 6 5 - 15 mmol/L    Glucose 76 65 - 100 mg/dL    BUN 5 (L) 6 - 20 MG/DL    Creatinine 0.53 (L) 0.55 - 1.02 MG/DL    BUN/Creatinine ratio 9 (L) 12 - 20      GFR est AA >60 >60 ml/min/1.73m2    GFR est non-AA >60 >60 ml/min/1.73m2    Calcium 8.1 (L) 8.5 - 10.1 MG/DL   CBC W/O DIFF    Collection Time: 12/10/20  4:05 AM   Result Value Ref Range    WBC 5.3 3.6 - 11.0 K/uL    RBC 2.47 (L) 3.80 - 5.20 M/uL    HGB 5.9 (LL) 11.5 - 16.0 g/dL    HCT 18.1 (L) 35.0 - 47.0 %    MCV 73.3 (L) 80.0 - 99.0 FL    MCH 23.9 (L) 26.0 - 34.0 PG    MCHC 32.6 30.0 - 36.5 g/dL    RDW 14.8 (H) 11.5 - 14.5 %    PLATELET 995 (L) 950 - 400 K/uL    NRBC 0.0 0  WBC    ABSOLUTE NRBC 0.00 0.00 - 0.01 K/uL   MAGNESIUM    Collection Time: 12/10/20  4:05 AM   Result Value Ref Range    Magnesium 1.7 1.6 - 2.4 mg/dL   PHOSPHORUS    Collection Time: 12/10/20  4:05 AM   Result Value Ref Range    Phosphorus 4.1 2.6 - 4.7 MG/DL   CBC WITH AUTOMATED DIFF    Collection Time: 12/10/20  6:15 AM   Result Value Ref Range    WBC 7.3 3.6 - 11.0 K/uL    RBC 3.40 (L) 3.80 - 5.20 M/uL    HGB 8.0 (L) 11.5 - 16.0 g/dL    HCT 24.8 (L) 35.0 - 47.0 %    MCV 72.9 (L) 80.0 - 99.0 FL    MCH 23.5 (L) 26.0 - 34.0 PG    MCHC 32.3 30.0 - 36.5 g/dL    RDW 14.8 (H) 11.5 - 14.5 %    PLATELET 419 981 - 557 K/uL    NRBC 0.0 0  WBC    ABSOLUTE NRBC 0.00 0.00 - 0.01 K/uL    NEUTROPHILS 69 32 - 75 %    LYMPHOCYTES 22 12 - 49 %    MONOCYTES 7 5 - 13 %    EOSINOPHILS 2 0 - 7 %    BASOPHILS 0 0 - 1 %    IMMATURE GRANULOCYTES 0 0.0 - 0.5 %    ABS. NEUTROPHILS 5.0 1.8 - 8.0 K/UL    ABS. LYMPHOCYTES 1.6 0.8 - 3.5 K/UL    ABS. MONOCYTES 0.5 0.0 - 1.0 K/UL    ABS. EOSINOPHILS 0.1 0.0 - 0.4 K/UL    ABS. BASOPHILS 0.0 0.0 - 0.1 K/UL    ABS. IMM. GRANS. 0.0 0.00 - 0.04 K/UL    DF AUTOMATED         Assessment and Plan:       Active Problems:    Nausea and vomiting in pregnancy (12/2/2020)      Eating disorder affecting pregnancy, antepartum (12/2/2020)      Depression affecting pregnancy (12/2/2020)      Calculus of gallbladder without cholecystitis (12/7/2020)       37 y/o [id number] at 10 4/7 weeks admitted for nausea/vomiting/dehydration and eating disorder in pregnancy s/p syncopal episode   IVF's and daily goodie bag-on d5lr with 20 kcl for hypokalemia  Antiemetics-insurance will not cover zofran pump or [facility] (out pt eating disorder clinic)  S/p MFM and GI consult for help with nutrition in light of severe uncontrolled eating disorder  -s/p PICC placement yesterday for improved iv access  -pt has starting eating a small amount and is keeping some of this down with a 1.5 pound increase in weight over the last 4 days -will d/w GI today re: enteral vs TPN-my concern is hurting the progress she is making with eating on her own. - s/p MRCP and surgery consult-no need for surgery at this time for gallstones  Depression/anxiety with recent admission for suicidal ideation  -continue zyprexa, prozac, prazosin  -I spoke with Joey Butler ([address] assistant) 12/8-he is trying to get pt a sooner follow up appt than currently scheduled 1/12.  -pt has been complying with her psych meds  -hopefully pt will be able to continue with weekly telehealth visits with her counselor (Jose Sic well as nutritionist ([facility] Marquis visit 12/29)  CHTN-stable with no meds-  -BP now actually low (suspect due to pregnancy and significant weight loss from eating disorder) but pt also with dizziness-s/p cardiology consult. H/o alcohol abuse  H/o incompetent cervix-will need cerclage around 14-16 weeks  PT for mobility and hip pain-appreciate help  Chronic anemia-asymptomatic. Known beta thal minor with normal iron panel.  Consult hematology to optimize hb level.

## 2020-12-10 NOTE — PROGRESS NOTES
Re-Assessment Nutrition Assessment    Type and Reason for Visit: Re-assess    Nutrition Recommendations/Plan:   1. RD change Ensure Clear to Peptamen 1.5 BID (750 kcal, 34 gm pro); increase to TID as accepted/tolerated  2. Consider ONS in 1 Oz. Med cup every 30 min (4 hr to complete 8 Oz.)   3. Continue Goody bag; Consider trial B6 for \"?hyperemesis\"   4. Noted insurance will not cover [employer]; Continue Outpt nutrition counseling [facility]  5. Continue Zofran per IV Rx    Nutrition Assessment: Admit syncopal episode; dehydration, N/V. IUP 10 wks 4/7. Hx PTSD, depression, ETOH abuse (?status); Rx Prozac. Hx anorexia eating disorder. Suggested wt gain remains 2# at ~10 wks IUP. Pt initially lost 6# since 10/14/2020-admit 12/2/20. Last wt standing scale 12/9/20 ~152# (68.765 kg ), prior wt ~145# also standing scale (12/4/20). Net increase 13.4# since 12/4/20 which includes fluid repletion with initial dehydration on admit. Significant and unplanned net loss 81# (36%) x past 11 months comparing wt 226# (1/17/2020) to admit 145# (65.8 kg). Claims #. Attributes wt loss to \"loss of appetite, uncomfortable feeling with solid foods and N/V\". Claims, \"N/V with all other pregnancies, but not this bad\", and if she has to vomit, \"and not coming up fast enough\", will purge. Rx IV Zofran every 6 hr. & Protonix ac breakfast; PRN Compazine given today ~noon. Hx \"severe unstable eating disorder\". Claims attempted to get help at \"[facility]\", but wouldn't accept her insurance. MD tried again and insurance will not cover [employer]. She will continue to see out pt RD at [facility] which she has seen twice in Nov 2020). Appears also is getting Telehealth counseling. Rx Prozac and Zyprexa. Today claims, \"eating a little better\". Attempted to find foods she would tolerate and prefer.   Asked for PB crackers, yogurt (van, peach or strawberry), and willing to try a Peptamen 1.5 (peptide-based, 70% MCT flavored ONS). Claims previously had Boost pudding and Ensure last admit. Pt appeared hungry and opened the yogurt and started drinking the Peptamen while RD was in room. Pt used straw with big sips, ? Thirsty or hungry . May do better with smaller more frequent servings using medicine cup 1 Oz. With nausea. RD trial Peptamen since peptide-based/high MCT with continued GI. Noted surgeons have not R/O gallstones for chronic biliary colic, and had discussion w/pt for consideration cholecystectomy, f/u at discharge. Peptamen is 1.5 kcal/mL and higher protein if tolerated. Lives with male partner and his mom who, \"grocery shop/prepare meals, they try to get me to eat. \"   Used to take milk with cereal, but don't eat it anymore so no milk. Although initially told RD dislikes yogurt, today asked for yogurt. Need to consider Vit D/Ca++ needs with pregnancy among all other nutrients. Noted HGB dropped to 5.9 , now 8. Unable to take pre- MV/minerals with N/V. MV via IV now. Reports No BM today or yesterday, believed last BM Saturday (?) when admitted. No BM documented since admit. Previously told RD usual BM pattern is every other day. Rx Miralax. Dental, few missing teeth. No difficulty chew/swallow. No dysphagia however, last admit 2020 she told this RD, \"throat was raw from N/V\" with similar symptoms. No problems now. Currently receiving D5 LR w/KCL. D5 provides 408 kcal/0 Pro. Noted MVI-4, Vit K, B1 & folic acid in IV. Suggest continue with PO and ONS however, if PO poor and no wt gain or loss, could add 4.25% AA, increase to D10 however TPN/PPN, not a long term solution to eating disorder. Also noted no insurance approval for Zofran via pump at home. Hx appetite stimulant, unknown brand. Claimed \"didn't work\", ? If worth a try once emesis controlled. Pt reports \"loss of appetite\".     Malnutrition Assessment:  Malnutrition Status:  Severe malnutrition    Context:  Chronic illness     Findings of the 6 clinical characteristics of malnutrition:   Energy Intake:  7 - 75% or less est energy requirements for 1 month or longer  Weight Loss:  1.000 - 20% over 1 year     Body Fat Loss:  7 - Severe body fat loss,     Muscle Mass Loss:  7 - Severe muscle mass loss,    Fluid Accumulation: No edema   Strength:  Not performed     Estimated Daily Nutrient Needs:  Energy (kcal): 2100(First Trimester weeek 10 4/7); Weight Used for Energy Requirements: Current  Protein (g): 85 gm(74 kg x 0.8 + 25 gm); Weight Used for Protein Requirements: Current(Pregnancy)  Fluid (ml/day): 2800 mL (2690-8451 mL ~35-40 mL/kg); Method Used for Fluid Requirements: (Pregnancy 35-40 mL/kg current wt)      Nutrition Related Findings:  +N/V, poor appetite; significant and unplanned wt loss 81# (36%) x past 11 months      Wounds:    None       Current Nutrition Therapies:  DIET NUTRITIONAL SUPPLEMENTS: RD change to Peptamen 1.5  DIET GI LITE (POST SURGICAL)    Anthropometric Measures:  · Height:  5' 4\" (162.6 cm)(Pt claims ht 64-65\". One prior admit ht listed 66\")  · Current Body Wt:  65.8 kg (145 lb 1 oz)   · Admission Body Wt:  145 lb 1 oz    · Usual Body Wt:  103.4 kg (228 lb)(>/= year ago)     · Ideal Body Wt:  120 lbs:  120.9 %   · Adjusted Body Weight: No adjustment   · BMI Category:  Normal weight (BMI 18.5-24. 9)       Nutrition Diagnosis:   · Inadequate oral intake, Increased nutrient needs, Altered GI function, Unintended weight loss related to psychological cause or life stress, early satiety as evidenced by NPO or clear liquid status due to medical condition, intake 0-25%, poor intake prior to admission, weight loss greater than or equal to 20% in 1 year, lab values, nausea, vomiting, constipation      Nutrition Interventions:   Food and/or Nutrient Delivery: Continue current diet, Mineral supplement, Vitamin supplement, Start oral nutrition supplement, IV fluid delivery  Nutrition Education and Counseling: Education initiated, Counseling needed(Eating Disorder)  Coordination of Nutrition Care: Coordination of community care(? [facility] or other Eating Disorder's specialty)    Goals:  tolerate Cl Liq diet + minimum 100% one ONS without N/V next 24 hr.        Nutrition Monitoring and Evaluation:   Behavioral-Environmental Outcomes: Readiness for change  Food/Nutrient Intake Outcomes: Diet advancement/tolerance, Food and nutrient intake, Supplement intake, IVF intake  Physical Signs/Symptoms Outcomes: Biochemical data, Constipation, Nausea/vomiting, Meal time behavior, Weight    Discharge Planning:    Continue oral nutrition supplement, Recommend pursue outpatient nutrition counseling     Electronically signed by Rolan Goodman RD on 12/10/2020 at 3:33 PM    Contact: Alexy

## 2020-12-10 NOTE — CONSULTS
[facility] [place] [address] [address], [address]: [phone]  F: [phone]    Reason for consult   Italia Cruz is a 38 y.o. female who is seen in consultation at the request of Dr. Melanie Pires for evaluation of anemia in pregnancy    History of Present Illness:   Patient is a 38 y.o. female in her first trimester of pregnancy admitted with N/V and dehydration  We are consulted for anemia. She has known beta thal minor. At baseline her Hb is around 10 g/dl but dropped to 5.9 g/dl on 12/10/2020. However improved to 8 g/dl without transfusion    She states that she has had 14 pregnancies and 7 miscarriages  With her last pregnancy she needed Iron infusions  She has anorexia and bulemia  Has lost 80 lb in 1 year  Denies any bleeding, very tired, still nauseous and craves ice. Is occasionally SOB with activity, has no leg swelling, has no rashes, no sensory changes      Past Medical History:   Diagnosis Date    Anorexia     Anxiety     Asthma     on albuterol prn.  Triggers cold and allergies    Avoidant-restrictive food intake disorder (ARFID)     Back pain, chronic     sciatica    Gestational hypertension     Past pregnancy    HX OTHER MEDICAL      , 2006, , ,     Hyperemesis     severe hyperemesis    Hypertension     with G12 - none this pregnancy    Iron deficiency anemia 10/08/2010    MDD (major depressive disorder), single episode with postpartum onset 2013    treated with meds - 13    Orthostatic hypotension 2020    Plantar fasciitis     Pregnancy     36 weeks    PTSD (post-traumatic stress disorder)       Past Surgical History:   Procedure Laterality Date     DELIVERY ONLY      HX  SECTION  4/22/15    HX DILATION AND CURETTAGE      HX DILATION AND CURETTAGE  2020    HX GYN      miscarriage x 6    HX GYN      removal of left fallopian tube    HX OTHER SURGICAL      cerlcage x4, ectopic x1 1999 with removal of left fallopian tube    HX OTHER SURGICAL      cerclage w/ current pregnancy. Removed 02/20/18      Social History     Tobacco Use    Smoking status: Never Smoker    Smokeless tobacco: Never Used   Substance Use Topics    Alcohol use: Not Currently     Frequency: Monthly or less     Drinks per session: 1 or 2     Binge frequency: Never      Family History   Problem Relation Age of Onset   Kimani Heart Arthritis-rheumatoid Mother     Asthma Mother     No Known Problems Father     No Known Problems Maternal Grandmother     No Known Problems Maternal Grandfather     No Known Problems Paternal Grandmother     No Known Problems Paternal Grandfather     Asthma Son     Alcohol abuse Neg Hx     Arthritis-osteo Neg Hx     Bleeding Prob Neg Hx     Cancer Neg Hx     Diabetes Neg Hx     Elevated Lipids Neg Hx     Headache Neg Hx     Heart Disease Neg Hx     Hypertension Neg Hx     Lung Disease Neg Hx     Migraines Neg Hx     Psychiatric Disorder Neg Hx     Stroke Neg Hx     Mental Retardation Neg Hx     Anesth Problems Neg Hx      Current Facility-Administered Medications   Medication Dose Route Frequency    ondansetron (ZOFRAN ODT) tablet 4 mg  4 mg Oral Q6H PRN    polyethylene glycol (MIRALAX) packet 17 g  17 g Oral DAILY    dextrose 5% - LR with KCl 20 mEq/L infusion   IntraVENous CONTINUOUS    prazosin (MINIPRESS) capsule 2 mg  2 mg Oral QHS PRN    hydrOXYzine HCL (ATARAX) tablet 10 mg  10 mg Oral TID PRN    ondansetron (ZOFRAN) injection 4 mg  4 mg IntraVENous Q6H    pantoprazole (PROTONIX) tablet 40 mg  40 mg Oral ACB    acetaminophen (TYLENOL) suppository 650 mg  650 mg Rectal Q6H PRN    diphenhydrAMINE (BENADRYL) injection 12.5 mg  12.5 mg IntraVENous Q6H PRN    0.9% sodium chloride 1,000 mL with mvi, adult no. 4 with vit K 10 mL, thiamine 164 mg, folic acid 1 mg infusion   IntraVENous Q24H    progesterone (PROMETRIUM) capsule 200 mg  200 mg Vaginal QHS    FLUoxetine (PROzac) capsule 40 mg  40 mg Oral DAILY    prochlorperazine (COMPAZINE) injection 10 mg  10 mg IntraVENous Q6H PRN    OLANZapine (ZyPREXA) tablet 5 mg  5 mg Oral QHS    albuterol (PROVENTIL VENTOLIN) nebulizer solution 2.5 mg  2.5 mg Nebulization Q4H PRN      Allergies   Allergen Reactions    Labetalol Hives    Ceclor [Cefaclor] Unknown (comments)    Pcn [Penicillins] Hives    Septra [Sulfamethoprim Ds] Unknown (comments)        Review of Systems: A complete review of systems was obtained, negative except as described above. Physical Exam:     Visit Vitals  BP (!) 93/52 (BP 1 Location: Right arm, BP Patient Position: At rest)   Pulse 75   Temp 98 °F (36.7 °C)   Resp 16   Ht 5' 4\" (1.626 m) Comment: Pt claims ht 64-65\". One prior admit ht listed 66\"   Wt 151 lb 9.6 oz (68.8 kg)   LMP  (LMP Unknown)   SpO2 99%   BMI 26.02 kg/m²     ECOG PS: 1  General: No distress  Eyes: PERRLA, anicteric sclerae, pallor present  HENT: Atraumatic, OP clear  Neck: Supple  Lymphatic: No cervical, supraclavicular, or inguinal adenopathy  Respiratory:normal respiratory effort  CV: Normal rate, rno peripheral edema  GI: Soft, nontender  MS:  Digits without clubbing or cyanosis. Skin: No rashes, ecchymoses, or petechiae. Normal temperature, turgor, and texture. Psych: Alert, oriented, appropriate affect, normal judgment/insight    Results:     Lab Results   Component Value Date/Time    WBC 7.3 12/10/2020 06:15 AM    HGB 8.0 (L) 12/10/2020 06:15 AM    HCT 24.8 (L) 12/10/2020 06:15 AM    PLATELET 291 80/34/6756 06:15 AM    MCV 72.9 (L) 12/10/2020 06:15 AM    ABS.  NEUTROPHILS 5.0 12/10/2020 06:15 AM    Hemoglobin (POC) 10.4 (L) 01/17/2020 07:12 AM    Hgb, External 33.7 08/03/2012    Hct, External 69 08/03/2012    Platelet cnt., External 307 08/03/2012     Lab Results   Component Value Date/Time    Sodium 138 12/10/2020 04:05 AM    Potassium 3.7 12/10/2020 04:05 AM    Chloride 107 12/10/2020 04:05 AM    CO2 25 12/10/2020 04:05 AM    Glucose 76 12/10/2020 04:05 AM    BUN 5 (L) 12/10/2020 04:05 AM    Creatinine 0.53 (L) 12/10/2020 04:05 AM    GFR est AA >60 12/10/2020 04:05 AM    GFR est non-AA >60 12/10/2020 04:05 AM    Calcium 8.1 (L) 12/10/2020 04:05 AM    Glucose (POC) 93 09/28/2020 08:17 AM     Lab Results   Component Value Date/Time    Bilirubin, total 0.3 12/07/2020 08:29 AM    ALT (SGPT) 17 12/07/2020 08:29 AM    Alk. phosphatase 46 12/07/2020 08:29 AM    Protein, total 5.8 (L) 12/07/2020 08:29 AM    Albumin 2.6 (L) 12/07/2020 08:29 AM    Globulin 3.2 12/07/2020 08:29 AM     Hemoglobin pattern and concentrations are consistent with beta-   Thalassemia minor. Suggest hematologic and clinical correlation  LABS ORDERED AND REVIEWED    Lab Results   Component Value Date/Time    Reticulocyte count 1.0 12/10/2020 06:15 AM    Iron % saturation 6 (L) 12/10/2020 12:48 PM    TIBC 239 (L) 12/10/2020 12:48 PM    Ferritin 11 (L) 12/10/2020 12:48 PM    Vitamin B12 306 12/10/2020 12:48 PM    Haptoglobin 74 12/10/2020 12:48 PM    ROCÍO Poly NEG 12/10/2020 12:48 PM     12/10/2020 12:48 PM    TSH 0.25 (L) 12/04/2020 03:37 AM    Lipase 54 (L) 12/04/2020 03:37 AM     No results found for: INR, APTT, DDIMSQ, DDIME, 942014, Stacy Kelvin, FDPLT, Kamran Primes, [id number], [id number], Taylor Mura, [address] [facility] [address], [address], [id number], [id number], [id number], [id number], [id number], [id number], INREXT    Records reviewed and summarized above. Pathology report(s) reviewed above. Radiology report(s) reviewed above.       Assessment:   1) Acute on Chronic anemia    Known Beta thal minor with a Hb of 10 g/dl  Now in her third trimester of pregnancy with a 2 g drop in Hb  Work up ordered and reviewed  There is no hemolysis, but she has severe iron deficiency and needs replacement  R/O other concurrent causes   The hemoglobin level should be maintained at or near 10 g/dL , If needed with transfusions as the effects of iron replenishment may take several weeks    2) Anorexia    3) Pregnancy    4) Nausea and vomiting    Plan:     · Venofer 200 mg daily for 5 days  · B12 1000 mcg po daily  · Folic acid 1 mg po daily  · Transfuse to keep Hemoglobin close to 10 g/dl in the interim    I appreciate the opportunity to participate in Ms. Michael [address] [place] [address].     Signed By: Julius Lowery MD

## 2020-12-10 NOTE — PROGRESS NOTES
.. {[facility] BEDSIDE_VERBAL_RECORDED_WRITTEN:[id number]::\"Bedside\"} shift change report given to *** (oncoming nurse) by *** (offgoing nurse). Report included the following information {SBAR REPORTS HQYU:[id number]}.

## 2020-12-11 ENCOUNTER — DOCUMENTATION ONLY (OUTPATIENT)
Dept: BEHAVIORAL/MENTAL HEALTH CLINIC | Age: 35
End: 2020-12-11

## 2020-12-11 LAB
ALBUMIN SERPL-MCNC: 2.5 G/DL (ref 3.5–5)
ALBUMIN/GLOB SERPL: 0.7 {RATIO} (ref 1.1–2.2)
ALP SERPL-CCNC: 54 U/L (ref 45–117)
ALT SERPL-CCNC: 9 U/L (ref 12–78)
ANION GAP SERPL CALC-SCNC: 7 MMOL/L (ref 5–15)
AST SERPL-CCNC: 7 U/L (ref 15–37)
BILIRUB SERPL-MCNC: 0.1 MG/DL (ref 0.2–1)
BUN SERPL-MCNC: 6 MG/DL (ref 6–20)
BUN/CREAT SERPL: 11 (ref 12–20)
CALCIUM SERPL-MCNC: 8.3 MG/DL (ref 8.5–10.1)
CHLORIDE SERPL-SCNC: 108 MMOL/L (ref 97–108)
CO2 SERPL-SCNC: 24 MMOL/L (ref 21–32)
CREAT SERPL-MCNC: 0.55 MG/DL (ref 0.55–1.02)
GLOBULIN SER CALC-MCNC: 3.8 G/DL (ref 2–4)
GLUCOSE SERPL-MCNC: 99 MG/DL (ref 65–100)
MAGNESIUM SERPL-MCNC: 1.7 MG/DL (ref 1.6–2.4)
PHOSPHATE SERPL-MCNC: 3 MG/DL (ref 2.6–4.7)
POTASSIUM SERPL-SCNC: 3.8 MMOL/L (ref 3.5–5.1)
PROT SERPL-MCNC: 6.3 G/DL (ref 6.4–8.2)
SODIUM SERPL-SCNC: 139 MMOL/L (ref 136–145)

## 2020-12-11 PROCEDURE — 74011250636 HC RX REV CODE- 250/636: Performed by: INTERNAL MEDICINE

## 2020-12-11 PROCEDURE — 83735 ASSAY OF MAGNESIUM: CPT

## 2020-12-11 PROCEDURE — 65410000002 HC RM PRIVATE OB

## 2020-12-11 PROCEDURE — 80053 COMPREHEN METABOLIC PANEL: CPT

## 2020-12-11 PROCEDURE — 74011250637 HC RX REV CODE- 250/637: Performed by: OBSTETRICS & GYNECOLOGY

## 2020-12-11 PROCEDURE — 74011000250 HC RX REV CODE- 250: Performed by: OBSTETRICS & GYNECOLOGY

## 2020-12-11 PROCEDURE — 74011000258 HC RX REV CODE- 258: Performed by: INTERNAL MEDICINE

## 2020-12-11 PROCEDURE — 36415 COLL VENOUS BLD VENIPUNCTURE: CPT

## 2020-12-11 PROCEDURE — 84100 ASSAY OF PHOSPHORUS: CPT

## 2020-12-11 PROCEDURE — 74011250636 HC RX REV CODE- 250/636: Performed by: OBSTETRICS & GYNECOLOGY

## 2020-12-11 RX ORDER — SODIUM CHLORIDE 9 MG/ML
INJECTION INTRAMUSCULAR; INTRAVENOUS; SUBCUTANEOUS
Status: COMPLETED
Start: 2020-12-11 | End: 2020-12-11

## 2020-12-11 RX ADMIN — PROCHLORPERAZINE EDISYLATE 10 MG: 5 INJECTION INTRAMUSCULAR; INTRAVENOUS at 18:41

## 2020-12-11 RX ADMIN — PROGESTERONE 200 MG: 100 CAPSULE, LIQUID FILLED ORAL at 21:52

## 2020-12-11 RX ADMIN — DEXTROSE MONOHYDRATE, SODIUM CHLORIDE, SODIUM LACTATE, POTASSIUM CHLORIDE, CALCIUM CHLORIDE: 5; 600; 310; 179; 20 INJECTION, SOLUTION INTRAVENOUS at 14:40

## 2020-12-11 RX ADMIN — OLANZAPINE 5 MG: 5 TABLET, FILM COATED ORAL at 21:52

## 2020-12-11 RX ADMIN — ONDANSETRON HYDROCHLORIDE 4 MG: 2 INJECTION, SOLUTION INTRAMUSCULAR; INTRAVENOUS at 20:24

## 2020-12-11 RX ADMIN — PROCHLORPERAZINE EDISYLATE 10 MG: 5 INJECTION INTRAMUSCULAR; INTRAVENOUS at 11:18

## 2020-12-11 RX ADMIN — FLUOXETINE 40 MG: 20 CAPSULE ORAL at 11:25

## 2020-12-11 RX ADMIN — ONDANSETRON HYDROCHLORIDE 4 MG: 2 INJECTION, SOLUTION INTRAMUSCULAR; INTRAVENOUS at 02:21

## 2020-12-11 RX ADMIN — FOLIC ACID: 5 INJECTION, SOLUTION INTRAMUSCULAR; INTRAVENOUS; SUBCUTANEOUS at 21:52

## 2020-12-11 RX ADMIN — POLYETHYLENE GLYCOL 3350 17 G: 17 POWDER, FOR SOLUTION ORAL at 11:25

## 2020-12-11 RX ADMIN — PANTOPRAZOLE SODIUM 40 MG: 40 TABLET, DELAYED RELEASE ORAL at 08:36

## 2020-12-11 RX ADMIN — ONDANSETRON HYDROCHLORIDE 4 MG: 2 INJECTION, SOLUTION INTRAMUSCULAR; INTRAVENOUS at 15:13

## 2020-12-11 RX ADMIN — ONDANSETRON HYDROCHLORIDE 4 MG: 2 INJECTION, SOLUTION INTRAMUSCULAR; INTRAVENOUS at 08:37

## 2020-12-11 RX ADMIN — IRON SUCROSE 200 MG: 20 INJECTION, SOLUTION INTRAVENOUS at 15:19

## 2020-12-11 RX ADMIN — SODIUM CHLORIDE 10 ML: 9 INJECTION INTRAMUSCULAR; INTRAVENOUS; SUBCUTANEOUS at 20:24

## 2020-12-11 NOTE — PROGRESS NOTES
High Risk Obstetrics Progress Note    Name: Milagro Woodward MRN: [id number]  SSN: [id number]    YOB: 1985  Age: 38 y.o. Sex: female      Subjective:      LOS: 9 days    Estimated Date of Delivery: 21   Gestational Age Today: 11w0d     Patient admitted for nausea, vomiting and dehydration in early pregnancy with chronic eating disorder. States she does have nausea and off and on cramping and does not have chest pain, shortness of breath and vaginal bleeding . Tolerated a small amount of chicken and sweet potato as well as apple sauce yesterday. Emesis after breakfast but kept above down later in the day. Objective:     Vitals:  Blood pressure 107/62, pulse 67, temperature 98.1 °F (36.7 °C), resp. rate 16, height 5' 4\" (1.626 m), weight 69.9 kg (154 lb), SpO2 98 %, not currently breastfeeding. Temp (24hrs), Av.1 °F (36.7 °C), Min:97.4 °F (36.3 °C), Max:98.7 °F (68.0 °C)    Systolic (04HWO), TTW:359 , Min:93 , YGF:808      Diastolic (89GPT), XAT:72, Min:52, Max:67       Intake and Output:         Physical Exam:  Patient without distress.   Heart: Regular rate and rhythm  Lung: clear to auscultation throughout lung fields, no wheezes, no rales, no rhonchi and normal respiratory effort  Abdomen: soft, nontender, gravid  Lower Extremities:  - Edema No       Membranes:  Intact    Labs:   Recent Results (from the past 36 hour(s))   METABOLIC PANEL, BASIC    Collection Time: 12/10/20  4:05 AM   Result Value Ref Range    Sodium 138 136 - 145 mmol/L    Potassium 3.7 3.5 - 5.1 mmol/L    Chloride 107 97 - 108 mmol/L    CO2 25 21 - 32 mmol/L    Anion gap 6 5 - 15 mmol/L    Glucose 76 65 - 100 mg/dL    BUN 5 (L) 6 - 20 MG/DL    Creatinine 0.53 (L) 0.55 - 1.02 MG/DL    BUN/Creatinine ratio 9 (L) 12 - 20      GFR est AA >60 >60 ml/min/1.73m2    GFR est non-AA >60 >60 ml/min/1.73m2    Calcium 8.1 (L) 8.5 - 10.1 MG/DL   CBC W/O DIFF    Collection Time: 12/10/20  4:05 AM   Result Value Ref Range    WBC 5.3 3.6 - 11.0 K/uL    RBC 2.47 (L) 3.80 - 5.20 M/uL    HGB 5.9 (LL) 11.5 - 16.0 g/dL    HCT 18.1 (L) 35.0 - 47.0 %    MCV 73.3 (L) 80.0 - 99.0 FL    MCH 23.9 (L) 26.0 - 34.0 PG    MCHC 32.6 30.0 - 36.5 g/dL    RDW 14.8 (H) 11.5 - 14.5 %    PLATELET 446 (L) 316 - 400 K/uL    NRBC 0.0 0  WBC    ABSOLUTE NRBC 0.00 0.00 - 0.01 K/uL   MAGNESIUM    Collection Time: 12/10/20  4:05 AM   Result Value Ref Range    Magnesium 1.7 1.6 - 2.4 mg/dL   PHOSPHORUS    Collection Time: 12/10/20  4:05 AM   Result Value Ref Range    Phosphorus 4.1 2.6 - 4.7 MG/DL   CBC WITH AUTOMATED DIFF    Collection Time: 12/10/20  6:15 AM   Result Value Ref Range    WBC 7.3 3.6 - 11.0 K/uL    RBC 3.40 (L) 3.80 - 5.20 M/uL    HGB 8.0 (L) 11.5 - 16.0 g/dL    HCT 24.8 (L) 35.0 - 47.0 %    MCV 72.9 (L) 80.0 - 99.0 FL    MCH 23.5 (L) 26.0 - 34.0 PG    MCHC 32.3 30.0 - 36.5 g/dL    RDW 14.8 (H) 11.5 - 14.5 %    PLATELET 584 687 - 685 K/uL    NRBC 0.0 0  WBC    ABSOLUTE NRBC 0.00 0.00 - 0.01 K/uL    NEUTROPHILS 69 32 - 75 %    LYMPHOCYTES 22 12 - 49 %    MONOCYTES 7 5 - 13 %    EOSINOPHILS 2 0 - 7 %    BASOPHILS 0 0 - 1 %    IMMATURE GRANULOCYTES 0 0.0 - 0.5 %    ABS. NEUTROPHILS 5.0 1.8 - 8.0 K/UL    ABS. LYMPHOCYTES 1.6 0.8 - 3.5 K/UL    ABS. MONOCYTES 0.5 0.0 - 1.0 K/UL    ABS. EOSINOPHILS 0.1 0.0 - 0.4 K/UL    ABS. BASOPHILS 0.0 0.0 - 0.1 K/UL    ABS. IMM.  GRANS. 0.0 0.00 - 0.04 K/UL    DF AUTOMATED     RETICULOCYTE COUNT    Collection Time: 12/10/20  6:15 AM   Result Value Ref Range    Reticulocyte count 1.0 0.7 - 2.1 %    Absolute Retic Cnt. 0.0339 0.0164 - 0.0776 M/ul   IRON PROFILE    Collection Time: 12/10/20 12:48 PM   Result Value Ref Range    Iron 15 (L) 35 - 150 ug/dL    TIBC 239 (L) 250 - 450 ug/dL    Iron % saturation 6 (L) 20 - 50 %   FERRITIN    Collection Time: 12/10/20 12:48 PM   Result Value Ref Range    Ferritin 11 (L) 26 - 388 NG/ML   VITAMIN B12    Collection Time: 12/10/20 12:48 PM   Result Value Ref Range    Vitamin B12 306 193 - 986 pg/mL   HAPTOGLOBIN    Collection Time: 12/10/20 12:48 PM   Result Value Ref Range    Haptoglobin 74 30 - 200 mg/dL   LD    Collection Time: 12/10/20 12:48 PM   Result Value Ref Range     81 - 246 U/L   HEPATIC FUNCTION PANEL    Collection Time: 12/10/20 12:48 PM   Result Value Ref Range    Protein, total 5.7 (L) 6.4 - 8.2 g/dL    Albumin 2.6 (L) 3.5 - 5.0 g/dL    Globulin 3.1 2.0 - 4.0 g/dL    A-G Ratio 0.8 (L) 1.1 - 2.2      Bilirubin, total 0.2 0.2 - 1.0 MG/DL    Bilirubin, direct <0.1 0.0 - 0.2 MG/DL    Alk. phosphatase 51 45 - 117 U/L    AST (SGOT) 6 (L) 15 - 37 U/L    ALT (SGPT) 10 (L) 12 - 78 U/L   DIRECT ANA    Collection Time: 12/10/20 12:48 PM   Result Value Ref Range    ROCÍO Poly NEG        Assessment and Plan:       Active Problems:    Nausea and vomiting in pregnancy (12/2/2020)      Eating disorder affecting pregnancy, antepartum (12/2/2020)      Depression affecting pregnancy (12/2/2020)      Calculus of gallbladder without cholecystitis (12/7/2020)       39 y/o [id number] at 10 5/7 weeks admitted for nausea/vomiting/dehydration and eating disorder in pregnancy s/p syncopal episode   IVF's and daily goodie bag-on d5lr with 20 kcl for hypokalemia  Antiemetics-insurance will not cover zofran pump or veritas (out pt eating disorder clinic)  S/p MFM and GI consult for help with nutrition in light of severe uncontrolled eating disorder  -s/p PICC placement yesterday for improved iv access  -pt has starting eating a small amount and is keeping some of this down with a 1.5 pound increase in weight over the last 4 days -  -plan for feeding tube to be placed by radiology today (Jania)  -Nutrition to provide management of enteral feeds  - s/p MRCP and surgery consult-no need for surgery at this time for gallstones  Depression/anxiety with recent admission for suicidal ideation  -continue zyprexa, prozac, prazosin  -I spoke with [facility] assistant) 12/8-he is trying to get pt a sooner follow up appt than currently scheduled 1/12.  -pt has been complying with her psych meds  -hopefully pt will be able to continue with weekly telehealth visits with her counselor (Annie Reeves well as nutritionist (Miryam Padilla visit 12/29)  CHTN-stable with no meds-  -BP now actually low (suspect due to pregnancy and significant weight loss from eating disorder) but pt also with dizziness-s/p cardiology consult. H/o alcohol abuse  H/o incompetent cervix-will need cerclage around 14-16 weeks  PT for mobility and hip pain-appreciate help  Chronic anemia-asymptomatic.  Known beta thal minor with normal iron panel. -iv iron per hematology recs

## 2020-12-11 NOTE — PROGRESS NOTES
Discussed via phone call with Dr. Jared Rizo today, the results of conversation with pt's psychiatric provider, Dr. Gaviota Puente. Pt continues to remain inpatient at this time as a medical admission. Dr. Esvin Hamlin has recommended that changes to her current medications could continue to be beneficial and from her perspective are okay to adjust at this time, providing there are no objections from inpatient treatment team providers. Dr. Esvin Hamlin has suggested an increase of fluoxetine to 60mg daily, and an increase in olanzapine to 10mg nightly. Discussed with Dr. Jared Rizo that inpatient treatment team can make the changes to her [facility] during admission, and then Dr. Esvin Hamlin will continue the adjusted doses once pt is discharged. Plan will also include placing pt on cancellation list once pt is discharged, so that a follow-up can be scheduled as soon as possible with Dr. Esvin Hamlin.

## 2020-12-12 LAB — PERIPHERAL SMEAR,PSM: NORMAL

## 2020-12-12 PROCEDURE — 74011000250 HC RX REV CODE- 250: Performed by: OBSTETRICS & GYNECOLOGY

## 2020-12-12 PROCEDURE — 65410000002 HC RM PRIVATE OB

## 2020-12-12 PROCEDURE — 74011250636 HC RX REV CODE- 250/636: Performed by: OBSTETRICS & GYNECOLOGY

## 2020-12-12 PROCEDURE — 74011250637 HC RX REV CODE- 250/637: Performed by: OBSTETRICS & GYNECOLOGY

## 2020-12-12 PROCEDURE — 74011000250 HC RX REV CODE- 250

## 2020-12-12 PROCEDURE — 74011000258 HC RX REV CODE- 258: Performed by: INTERNAL MEDICINE

## 2020-12-12 PROCEDURE — 74011250636 HC RX REV CODE- 250/636: Performed by: INTERNAL MEDICINE

## 2020-12-12 RX ORDER — ACETAMINOPHEN 325 MG/1
650 TABLET ORAL
Status: DISCONTINUED | OUTPATIENT
Start: 2020-12-12 | End: 2020-12-17 | Stop reason: HOSPADM

## 2020-12-12 RX ORDER — SODIUM CHLORIDE 9 MG/ML
INJECTION INTRAMUSCULAR; INTRAVENOUS; SUBCUTANEOUS
Status: COMPLETED
Start: 2020-12-12 | End: 2020-12-12

## 2020-12-12 RX ADMIN — ONDANSETRON 4 MG: 4 TABLET, ORALLY DISINTEGRATING ORAL at 17:04

## 2020-12-12 RX ADMIN — ACETAMINOPHEN 650 MG: 325 TABLET ORAL at 20:12

## 2020-12-12 RX ADMIN — IRON SUCROSE 200 MG: 20 INJECTION, SOLUTION INTRAVENOUS at 09:16

## 2020-12-12 RX ADMIN — OLANZAPINE 5 MG: 5 TABLET, FILM COATED ORAL at 21:36

## 2020-12-12 RX ADMIN — ONDANSETRON HYDROCHLORIDE 4 MG: 2 INJECTION, SOLUTION INTRAMUSCULAR; INTRAVENOUS at 20:07

## 2020-12-12 RX ADMIN — PROCHLORPERAZINE EDISYLATE 10 MG: 5 INJECTION INTRAMUSCULAR; INTRAVENOUS at 12:43

## 2020-12-12 RX ADMIN — DEXTROSE MONOHYDRATE, SODIUM CHLORIDE, SODIUM LACTATE, POTASSIUM CHLORIDE, CALCIUM CHLORIDE: 5; 600; 310; 179; 20 INJECTION, SOLUTION INTRAVENOUS at 16:56

## 2020-12-12 RX ADMIN — FOLIC ACID: 5 INJECTION, SOLUTION INTRAMUSCULAR; INTRAVENOUS; SUBCUTANEOUS at 21:36

## 2020-12-12 RX ADMIN — ONDANSETRON HYDROCHLORIDE 4 MG: 2 INJECTION, SOLUTION INTRAMUSCULAR; INTRAVENOUS at 03:04

## 2020-12-12 RX ADMIN — PROGESTERONE 200 MG: 100 CAPSULE, LIQUID FILLED ORAL at 21:37

## 2020-12-12 RX ADMIN — POLYETHYLENE GLYCOL 3350 17 G: 17 POWDER, FOR SOLUTION ORAL at 09:07

## 2020-12-12 RX ADMIN — MULTIPLE VITAMINS W/ MINERALS TAB 1 TABLET: TAB at 21:36

## 2020-12-12 RX ADMIN — SODIUM CHLORIDE 10 ML: 9 INJECTION, SOLUTION INTRAMUSCULAR; INTRAVENOUS; SUBCUTANEOUS at 12:43

## 2020-12-12 RX ADMIN — ONDANSETRON HYDROCHLORIDE 4 MG: 2 INJECTION, SOLUTION INTRAMUSCULAR; INTRAVENOUS at 09:07

## 2020-12-12 RX ADMIN — FLUOXETINE 40 MG: 20 CAPSULE ORAL at 09:07

## 2020-12-12 RX ADMIN — PANTOPRAZOLE SODIUM 40 MG: 40 TABLET, DELAYED RELEASE ORAL at 09:07

## 2020-12-12 NOTE — PROGRESS NOTES
Dr. Laurel Stevens called to say she spoke with rad and they would hold off for today and that there maybe another way putting down the tube by ETN Doctor. She wanted to talk with pt transfer call into pt room. Pt talking with Dr. Laurel Stevens about options .

## 2020-12-12 NOTE — PROGRESS NOTES
Pt has orders a weighted feeding tube to be placed today. Per rad we needed to make an attempt to insert tube if unsuccessful they would take her to rad and insert tube. Called and talked with Dr. Zachary Alonzo she was ok with plan. Tried and was unsuccessful tried different positions and kept hitting a blockage in the nasal area. Called rad and Zachary Ortega.

## 2020-12-12 NOTE — PROGRESS NOTES
High Risk Obstetrics Progress Note    Name: Maria Eugenia Bond MRN: [id number]  SSN: [id number]    YOB: 1985  Age: 38 y.o. Sex: female      Subjective:      LOS: 10 days    Estimated Date of Delivery: 21   Gestational Age Today: 6w6d     Patient admitted for nausea, vomiting and dehydration in early pregnancy with chronic eating disorder. Pt states she has been eating small amounts of food, although she has been \"getting sick\" after breakfast the last couple of days. She was able to eat this morning but reports some nausea and 1 episode of emesis. She ordered lunch and plans to try to eat again. She admits to mild pelvic cramping that comes and goes, she also reports constipation. She denies HA, CP, SOB, or leg pain bilaterally, and reports that her dizziness is \"about the same\". She denies vaginal bleeding. No new issues or concerns overnight or this morning. Objective:     Vitals:  Blood pressure 103/62, pulse 75, temperature 98.4 °F (36.9 °C), resp. rate 16, height 5' 4\" (1.626 m), weight 71.1 kg (156 lb 12.8 oz), SpO2 98 %, not currently breastfeeding. Temp (24hrs), Av.4 °F (36.9 °C), Min:98.3 °F (36.8 °C), Max:98.5 °F (51.5 °C)    Systolic (93PLN), UJW:716 , Min:103 , LSH:459      Diastolic (74ORB), FJF:35, Min:61, Max:67       Intake and Output:         Physical Exam:  Patient without distress.   Heart: Regular rate and rhythm  Lung: clear to auscultation throughout lung fields, no wheezes, no rales, no rhonchi and normal respiratory effort  Abdomen: soft, nontender  Lower Extremities: no edema bilaterally       Membranes:  Intact       Labs:   Recent Results (from the past 36 hour(s))   METABOLIC PANEL, COMPREHENSIVE    Collection Time: 20  1:23 PM   Result Value Ref Range    Sodium 139 136 - 145 mmol/L    Potassium 3.8 3.5 - 5.1 mmol/L    Chloride 108 97 - 108 mmol/L    CO2 24 21 - 32 mmol/L    Anion gap 7 5 - 15 mmol/L    Glucose 99 65 - 100 mg/dL    BUN 6 6 - 20 MG/DL Creatinine 0.55 0.55 - 1.02 MG/DL    BUN/Creatinine ratio 11 (L) 12 - 20      GFR est AA >60 >60 ml/min/1.73m2    GFR est non-AA >60 >60 ml/min/1.73m2    Calcium 8.3 (L) 8.5 - 10.1 MG/DL    Bilirubin, total 0.1 (L) 0.2 - 1.0 MG/DL    ALT (SGPT) 9 (L) 12 - 78 U/L    AST (SGOT) 7 (L) 15 - 37 U/L    Alk. phosphatase 54 45 - 117 U/L    Protein, total 6.3 (L) 6.4 - 8.2 g/dL    Albumin 2.5 (L) 3.5 - 5.0 g/dL    Globulin 3.8 2.0 - 4.0 g/dL    A-G Ratio 0.7 (L) 1.1 - 2.2     MAGNESIUM    Collection Time: 12/11/20  1:23 PM   Result Value Ref Range    Magnesium 1.7 1.6 - 2.4 mg/dL   PHOSPHORUS    Collection Time: 12/11/20  1:23 PM   Result Value Ref Range    Phosphorus 3.0 2.6 - 4.7 MG/DL       Assessment and Plan: Active Problems:    Nausea and vomiting in pregnancy (12/2/2020)      Eating disorder affecting pregnancy, antepartum (12/2/2020)      Depression affecting pregnancy (12/2/2020)      Calculus of gallbladder without cholecystitis (12/7/2020)       37 y/o [id number] at 10 6/7 weeks admitted for nausea/vomiting/dehydration and eating disorder in pregnancy s/p syncopal episode     1. Eating disorder, N/V:  -Pt still getting IVF's  -Antiemetics-insurance will not cover zofran pump or veritas (out pt eating disorder clinic)  -S/p MFM and GI consult for help with nutrition in light of severe uncontrolled eating disorder   --PICC in place for improved iv access   --pt continues to eat a small amount multiple times a day and has had a small weight increase (1.5 pound increaseover a 4 day timeframe)   --will consider a feeding tube if needed - ENT will need to place    2. Gall stones:  - s/p MRCP and surgery consult-no need for surgery at this time for gallstones    3.  Depression/anxiety with recent admission for suicidal ideation  -continue zyprexa, prozac, prazosin  -Dr. Keo Russell spoke with [facility]'s assistant) 12/8-he is trying to get pt a sooner follow up appt than currently scheduled 1/12.  -pt has been complying with her psych meds  -hopefully pt will be able to continue with weekly telehealth visits with her counselor (Gloria Barry well as nutritionist (Ahmet Martins visit 12/29)    4. CHTN  -stable with no meds-  -BP now actually low (suspect due to pregnancy and significant weight loss from eating disorder)     5. Dizziness  -s/p cardiology consult. 6. H/o alcohol abuse  -stable    7. H/o incompetent cervix  -will need cerclage around 14-16 weeks    8.   Beta thal minor with chronic anemia  -asymptomatic with normal iron panel  -IV iron per hematology recs    Himanshu Lam, DO  12/12/20

## 2020-12-13 LAB
ALBUMIN SERPL-MCNC: 2.2 G/DL (ref 3.5–5)
ALBUMIN/GLOB SERPL: 0.6 {RATIO} (ref 1.1–2.2)
ALP SERPL-CCNC: 44 U/L (ref 45–117)
ALT SERPL-CCNC: 8 U/L (ref 12–78)
ANION GAP SERPL CALC-SCNC: 7 MMOL/L (ref 5–15)
AST SERPL-CCNC: 5 U/L (ref 15–37)
BILIRUB SERPL-MCNC: 0.2 MG/DL (ref 0.2–1)
BUN SERPL-MCNC: 5 MG/DL (ref 6–20)
BUN/CREAT SERPL: 12 (ref 12–20)
CALCIUM SERPL-MCNC: 8.1 MG/DL (ref 8.5–10.1)
CHLORIDE SERPL-SCNC: 108 MMOL/L (ref 97–108)
CO2 SERPL-SCNC: 25 MMOL/L (ref 21–32)
CREAT SERPL-MCNC: 0.42 MG/DL (ref 0.55–1.02)
GLOBULIN SER CALC-MCNC: 3.5 G/DL (ref 2–4)
GLUCOSE SERPL-MCNC: 78 MG/DL (ref 65–100)
MAGNESIUM SERPL-MCNC: 1.7 MG/DL (ref 1.6–2.4)
PHOSPHATE SERPL-MCNC: 4.1 MG/DL (ref 2.6–4.7)
POTASSIUM SERPL-SCNC: 3.7 MMOL/L (ref 3.5–5.1)
PROT SERPL-MCNC: 5.7 G/DL (ref 6.4–8.2)
SODIUM SERPL-SCNC: 140 MMOL/L (ref 136–145)

## 2020-12-13 PROCEDURE — 74011000250 HC RX REV CODE- 250: Performed by: OBSTETRICS & GYNECOLOGY

## 2020-12-13 PROCEDURE — 74011250636 HC RX REV CODE- 250/636: Performed by: INTERNAL MEDICINE

## 2020-12-13 PROCEDURE — 74011250637 HC RX REV CODE- 250/637: Performed by: OBSTETRICS & GYNECOLOGY

## 2020-12-13 PROCEDURE — 74011000250 HC RX REV CODE- 250

## 2020-12-13 PROCEDURE — 65410000002 HC RM PRIVATE OB

## 2020-12-13 PROCEDURE — 83735 ASSAY OF MAGNESIUM: CPT

## 2020-12-13 PROCEDURE — 74011250636 HC RX REV CODE- 250/636: Performed by: OBSTETRICS & GYNECOLOGY

## 2020-12-13 PROCEDURE — 84100 ASSAY OF PHOSPHORUS: CPT

## 2020-12-13 PROCEDURE — 74011000258 HC RX REV CODE- 258: Performed by: INTERNAL MEDICINE

## 2020-12-13 PROCEDURE — 80053 COMPREHEN METABOLIC PANEL: CPT

## 2020-12-13 PROCEDURE — 36415 COLL VENOUS BLD VENIPUNCTURE: CPT

## 2020-12-13 RX ORDER — SODIUM CHLORIDE 9 MG/ML
INJECTION INTRAMUSCULAR; INTRAVENOUS; SUBCUTANEOUS
Status: COMPLETED
Start: 2020-12-13 | End: 2020-12-13

## 2020-12-13 RX ADMIN — SODIUM CHLORIDE 10 ML: 9 INJECTION, SOLUTION INTRAMUSCULAR; INTRAVENOUS; SUBCUTANEOUS at 12:35

## 2020-12-13 RX ADMIN — POLYETHYLENE GLYCOL 3350 17 G: 17 POWDER, FOR SOLUTION ORAL at 09:12

## 2020-12-13 RX ADMIN — PANTOPRAZOLE SODIUM 40 MG: 40 TABLET, DELAYED RELEASE ORAL at 09:13

## 2020-12-13 RX ADMIN — ONDANSETRON HYDROCHLORIDE 4 MG: 2 INJECTION, SOLUTION INTRAMUSCULAR; INTRAVENOUS at 18:17

## 2020-12-13 RX ADMIN — OLANZAPINE 5 MG: 5 TABLET, FILM COATED ORAL at 21:33

## 2020-12-13 RX ADMIN — ACETAMINOPHEN 650 MG: 325 TABLET ORAL at 21:41

## 2020-12-13 RX ADMIN — ONDANSETRON HYDROCHLORIDE 4 MG: 2 INJECTION, SOLUTION INTRAMUSCULAR; INTRAVENOUS at 21:33

## 2020-12-13 RX ADMIN — ONDANSETRON HYDROCHLORIDE 4 MG: 2 INJECTION, SOLUTION INTRAMUSCULAR; INTRAVENOUS at 02:02

## 2020-12-13 RX ADMIN — PROCHLORPERAZINE EDISYLATE 10 MG: 5 INJECTION INTRAMUSCULAR; INTRAVENOUS at 12:34

## 2020-12-13 RX ADMIN — PROGESTERONE 200 MG: 100 CAPSULE, LIQUID FILLED ORAL at 21:33

## 2020-12-13 RX ADMIN — FLUOXETINE 40 MG: 20 CAPSULE ORAL at 09:13

## 2020-12-13 RX ADMIN — ONDANSETRON HYDROCHLORIDE 4 MG: 2 INJECTION, SOLUTION INTRAMUSCULAR; INTRAVENOUS at 09:13

## 2020-12-13 RX ADMIN — FOLIC ACID: 5 INJECTION, SOLUTION INTRAMUSCULAR; INTRAVENOUS; SUBCUTANEOUS at 21:34

## 2020-12-13 RX ADMIN — MULTIPLE VITAMINS W/ MINERALS TAB 1 TABLET: TAB at 21:33

## 2020-12-13 RX ADMIN — IRON SUCROSE 200 MG: 20 INJECTION, SOLUTION INTRAVENOUS at 09:13

## 2020-12-13 RX ADMIN — DEXTROSE MONOHYDRATE, SODIUM CHLORIDE, SODIUM LACTATE, POTASSIUM CHLORIDE, CALCIUM CHLORIDE: 5; 600; 310; 179; 20 INJECTION, SOLUTION INTRAVENOUS at 12:34

## 2020-12-13 NOTE — PROGRESS NOTES
Bedside and Verbal shift change report given to Mark Belcher RN (oncoming nurse) by [place]. Hanh Hathaway RN (offgoing nurse). Report included the following information SBAR, Kardex, Intake/Output, MAR and Recent Results.

## 2020-12-13 NOTE — PROGRESS NOTES
Bedside and Verbal shift change report given to ALBA Zazueta (oncoming nurse) by Donovan Owusu RN (offgoing nurse). Report included the following information SBAR, Kardex, ED Summary, Intake/Output, MAR, Accordion and Recent Results.

## 2020-12-13 NOTE — PROGRESS NOTES
High Risk Obstetrics Progress Note    Name: Mariam Fair MRN: [id number]  SSN: [id number]    YOB: 1985  Age: 38 y.o. Sex: female      Subjective:      LOS: 11 days    Estimated Date of Delivery: 21   Gestational Age Today: 11w2d     Patient admitted for nausea, vomiting and dehydration in early pregnancy with chronic eating disorder. Pt reports she ate pancakes, peaches and yogurt this morning for breakfast and didn't get sick. She does report nausea, but states that it is improved with zofran and compazine. Pt admits to a headache last night for which she received Tylenol. She also still has cramping in her pelvis. +Voiding, +Flatus, +small BM 2 nights ago. No new issues or complaints this morning. Objective:     Vitals:  Blood pressure 107/69, pulse 79, temperature 98.7 °F (37.1 °C), resp. rate 16, height 5' 4\" (1.626 m), weight 71.7 kg (158 lb), SpO2 100 %, not currently breastfeeding. Temp (24hrs), Av.2 °F (36.8 °C), Min:97.4 °F (36.3 °C), Max:98.7 °F (46.4 °C)    Systolic (80VUB), KXW:366 , Min:100 , PZS:157      Diastolic (36MVK), CIR:92, Min:62, Max:69       Intake and Output:     Date 20 0700 - 20 0659   Shift 9028-4509 5662-2148 2281-9339 24 Hour Total   INTAKE   P.O. 500   500   Shift Total(mL/kg) 500(7)   500(7)   OUTPUT   Shift Total(mL/kg)       Weight (kg) 71.7 71.7 71.7 71.7       Physical Exam:  Patient without distress.   Heart: Regular rate and rhythm  Lung: clear to auscultation throughout lung fields, no wheezes, no rales, no rhonchi and normal respiratory effort  Abdomen: soft, nontender  Lower Extremities: no edema bilaterally          Membranes:  Intact    Labs:   Recent Results (from the past 36 hour(s))   METABOLIC PANEL, COMPREHENSIVE    Collection Time: 20  6:40 AM   Result Value Ref Range    Sodium 140 136 - 145 mmol/L    Potassium 3.7 3.5 - 5.1 mmol/L    Chloride 108 97 - 108 mmol/L    CO2 25 21 - 32 mmol/L    Anion gap 7 5 - 15 mmol/L    Glucose 78 65 - 100 mg/dL    BUN 5 (L) 6 - 20 MG/DL    Creatinine 0.42 (L) 0.55 - 1.02 MG/DL    BUN/Creatinine ratio 12 12 - 20      GFR est AA >60 >60 ml/min/1.73m2    GFR est non-AA >60 >60 ml/min/1.73m2    Calcium 8.1 (L) 8.5 - 10.1 MG/DL    Bilirubin, total 0.2 0.2 - 1.0 MG/DL    ALT (SGPT) 8 (L) 12 - 78 U/L    AST (SGOT) 5 (L) 15 - 37 U/L    Alk. phosphatase 44 (L) 45 - 117 U/L    Protein, total 5.7 (L) 6.4 - 8.2 g/dL    Albumin 2.2 (L) 3.5 - 5.0 g/dL    Globulin 3.5 2.0 - 4.0 g/dL    A-G Ratio 0.6 (L) 1.1 - 2.2     MAGNESIUM    Collection Time: 12/13/20  6:40 AM   Result Value Ref Range    Magnesium 1.7 1.6 - 2.4 mg/dL   PHOSPHORUS    Collection Time: 12/13/20  6:40 AM   Result Value Ref Range    Phosphorus 4.1 2.6 - 4.7 MG/DL       Assessment and Plan: Active Problems:    Nausea and vomiting in pregnancy (12/2/2020)      Eating disorder affecting pregnancy, antepartum (12/2/2020)      Depression affecting pregnancy (12/2/2020)      Calculus of gallbladder without cholecystitis (12/7/2020)       37 y/o [id number] at 11 0/7 weeks admitted for nausea/vomiting/dehydration and eating disorder in pregnancy s/p syncopal episode      1. Eating disorder, N/V:  -Pt still getting IVF's  -Antiemetics-insurance will not cover zofran pump or veritas (out pt eating disorder clinic)  -S/p MFM and GI consult for help with nutrition in light of severe uncontrolled eating disorder             --PICC in place for improved iv access             --pt continues to eat a small amount multiple times a day and has had a small weight increase (1.5 pound increaseover a 4 day timeframe)             --will consider a feeding tube if needed - ENT will need to place     2. Gall stones:  - s/p MRCP and surgery consult-no need for surgery at this time for gallstones     3.  Depression/anxiety with recent admission for suicidal ideation  -continue zyprexa, prozac, prazosin  -Dr. Johnny Weller spoke with [facility]'s assistant) 12/8-he is trying to get pt a sooner follow up appt than currently scheduled 1/12.  -pt has been complying with her psych meds  -hopefully pt will be able to continue with weekly telehealth visits with her counselor (Isabela Negro well as nutritionist (Vik Benavides visit 12/29)     4. CHTN  -stable with no meds-  -BP now actually low (suspect due to pregnancy and significant weight loss from eating disorder)      5. Dizziness  -s/p cardiology consult.     6. H/o alcohol abuse  -stable     7. H/o incompetent cervix  -will need cerclage around 14-16 weeks     8.   Beta thal minor with chronic anemia  -asymptomatic with normal iron panel  -IV iron per hematology recs     Kerline Lopez DO  12/13/20

## 2020-12-14 LAB
MAGNESIUM SERPL-MCNC: 1.6 MG/DL (ref 1.6–2.4)
PHOSPHATE SERPL-MCNC: 3.6 MG/DL (ref 2.6–4.7)

## 2020-12-14 PROCEDURE — 83735 ASSAY OF MAGNESIUM: CPT

## 2020-12-14 PROCEDURE — 74011000250 HC RX REV CODE- 250: Performed by: OBSTETRICS & GYNECOLOGY

## 2020-12-14 PROCEDURE — 74011250636 HC RX REV CODE- 250/636: Performed by: INTERNAL MEDICINE

## 2020-12-14 PROCEDURE — 74011250637 HC RX REV CODE- 250/637: Performed by: OBSTETRICS & GYNECOLOGY

## 2020-12-14 PROCEDURE — 74011000258 HC RX REV CODE- 258: Performed by: INTERNAL MEDICINE

## 2020-12-14 PROCEDURE — 74011250636 HC RX REV CODE- 250/636: Performed by: OBSTETRICS & GYNECOLOGY

## 2020-12-14 PROCEDURE — 65410000002 HC RM PRIVATE OB

## 2020-12-14 PROCEDURE — 84100 ASSAY OF PHOSPHORUS: CPT

## 2020-12-14 PROCEDURE — 36415 COLL VENOUS BLD VENIPUNCTURE: CPT

## 2020-12-14 RX ORDER — SODIUM CHLORIDE 9 MG/ML
INJECTION INTRAMUSCULAR; INTRAVENOUS; SUBCUTANEOUS
Status: DISPENSED
Start: 2020-12-14 | End: 2020-12-15

## 2020-12-14 RX ORDER — SODIUM CHLORIDE 0.9 % (FLUSH) 0.9 %
5-40 SYRINGE (ML) INJECTION EVERY 8 HOURS
Status: DISCONTINUED | OUTPATIENT
Start: 2020-12-14 | End: 2020-12-17 | Stop reason: HOSPADM

## 2020-12-14 RX ORDER — SODIUM CHLORIDE 0.9 % (FLUSH) 0.9 %
5-40 SYRINGE (ML) INJECTION AS NEEDED
Status: DISCONTINUED | OUTPATIENT
Start: 2020-12-14 | End: 2020-12-17 | Stop reason: HOSPADM

## 2020-12-14 RX ADMIN — Medication 10 ML: at 16:16

## 2020-12-14 RX ADMIN — MULTIPLE VITAMINS W/ MINERALS TAB 1 TABLET: TAB at 21:48

## 2020-12-14 RX ADMIN — ONDANSETRON HYDROCHLORIDE 4 MG: 2 INJECTION, SOLUTION INTRAMUSCULAR; INTRAVENOUS at 21:47

## 2020-12-14 RX ADMIN — DEXTROSE MONOHYDRATE, SODIUM CHLORIDE, SODIUM LACTATE, POTASSIUM CHLORIDE, CALCIUM CHLORIDE: 5; 600; 310; 179; 20 INJECTION, SOLUTION INTRAVENOUS at 06:00

## 2020-12-14 RX ADMIN — Medication 10 ML: at 09:00

## 2020-12-14 RX ADMIN — ONDANSETRON 4 MG: 4 TABLET, ORALLY DISINTEGRATING ORAL at 06:00

## 2020-12-14 RX ADMIN — PROGESTERONE 200 MG: 100 CAPSULE, LIQUID FILLED ORAL at 21:47

## 2020-12-14 RX ADMIN — ONDANSETRON HYDROCHLORIDE 4 MG: 2 INJECTION, SOLUTION INTRAMUSCULAR; INTRAVENOUS at 16:15

## 2020-12-14 RX ADMIN — PANTOPRAZOLE SODIUM 40 MG: 40 TABLET, DELAYED RELEASE ORAL at 08:29

## 2020-12-14 RX ADMIN — PROCHLORPERAZINE EDISYLATE 10 MG: 5 INJECTION INTRAMUSCULAR; INTRAVENOUS at 19:25

## 2020-12-14 RX ADMIN — FLUOXETINE 40 MG: 20 CAPSULE ORAL at 08:29

## 2020-12-14 RX ADMIN — ONDANSETRON HYDROCHLORIDE 4 MG: 2 INJECTION, SOLUTION INTRAMUSCULAR; INTRAVENOUS at 08:25

## 2020-12-14 RX ADMIN — ACETAMINOPHEN 650 MG: 325 TABLET ORAL at 07:22

## 2020-12-14 RX ADMIN — ONDANSETRON HYDROCHLORIDE 4 MG: 2 INJECTION, SOLUTION INTRAMUSCULAR; INTRAVENOUS at 02:50

## 2020-12-14 RX ADMIN — PROCHLORPERAZINE EDISYLATE 10 MG: 5 INJECTION INTRAMUSCULAR; INTRAVENOUS at 11:53

## 2020-12-14 RX ADMIN — IRON SUCROSE 200 MG: 20 INJECTION, SOLUTION INTRAVENOUS at 10:18

## 2020-12-14 RX ADMIN — POLYETHYLENE GLYCOL 3350 17 G: 17 POWDER, FOR SOLUTION ORAL at 08:30

## 2020-12-14 RX ADMIN — OLANZAPINE 5 MG: 5 TABLET, FILM COATED ORAL at 21:47

## 2020-12-14 RX ADMIN — FOLIC ACID: 5 INJECTION, SOLUTION INTRAMUSCULAR; INTRAVENOUS; SUBCUTANEOUS at 21:46

## 2020-12-14 RX ADMIN — Medication 10 ML: at 21:47

## 2020-12-14 NOTE — PROGRESS NOTES
Bedside and Verbal shift change report given to ALBA Khan (oncoming nurse) by Wesly Leigh RN (offgoing nurse). Report included the following information SBAR, Kardex, Procedure Summary, Intake/Output, MAR, Accordion and Recent Results.

## 2020-12-14 NOTE — PROGRESS NOTES
for routine follow up support. Pt was sitting in chair and having lunch. Let her know of  support and availability. Please contact [phone] [facility] for further support.      [address] ALBA PartidaDiv, MACE   [phone])

## 2020-12-14 NOTE — PROGRESS NOTES
High Risk Obstetrics Progress Note    Name: Marc Cyr MRN: [id number]  SSN: [id number]    YOB: 1985  Age: 38 y.o. Sex: female      Subjective:      LOS: 12 days    Estimated Date of Delivery: 21   Gestational Age Today: 11w3d     Patient admitted for nausea, vomiting, dehydration in light of severe eating disorder. States she does have cramping, nausea and some emesis. and does not have chest pain, shortness of breath and vaginal bleeding . Objective:     Vitals:  Blood pressure (!) 97/55, pulse 90, temperature 97.9 °F (36.6 °C), resp. rate 16, height 5' 4\" (1.626 m), weight 71.7 kg (158 lb), SpO2 100 %, not currently breastfeeding. Temp (24hrs), Av °F (36.7 °C), Min:97.7 °F (36.5 °C), Max:98.3 °F (00.0 °C)    Systolic (47WUF), KT , Min:97 , ZQF:086      Diastolic (64DGA), KZA:60, Min:55, Max:61       Intake and Output:         Physical Exam:  Patient without distress. Heart: Regular rate and rhythm  Lung: clear to auscultation throughout lung fields, no wheezes, no rales, no rhonchi and normal respiratory effort  Abdomen: soft, nontender, gravid  Lower Extremities:  - Edema No       Membranes:  Intact        Labs:   Recent Results (from the past 36 hour(s))   METABOLIC PANEL, COMPREHENSIVE    Collection Time: 20  6:40 AM   Result Value Ref Range    Sodium 140 136 - 145 mmol/L    Potassium 3.7 3.5 - 5.1 mmol/L    Chloride 108 97 - 108 mmol/L    CO2 25 21 - 32 mmol/L    Anion gap 7 5 - 15 mmol/L    Glucose 78 65 - 100 mg/dL    BUN 5 (L) 6 - 20 MG/DL    Creatinine 0.42 (L) 0.55 - 1.02 MG/DL    BUN/Creatinine ratio 12       GFR est AA >60 >60 ml/min/1.73m2    GFR est non-AA >60 >60 ml/min/1.73m2    Calcium 8.1 (L) 8.5 - 10.1 MG/DL    Bilirubin, total 0.2 0.2 - 1.0 MG/DL    ALT (SGPT) 8 (L) 12 - 78 U/L    AST (SGOT) 5 (L) 15 - 37 U/L    Alk.  phosphatase 44 (L) 45 - 117 U/L    Protein, total 5.7 (L) 6.4 - 8.2 g/dL    Albumin 2.2 (L) 3.5 - 5.0 g/dL    Globulin 3.5 2.0 - 4.0 g/dL    A-G Ratio 0.6 (L) 1.1 - 2.2     MAGNESIUM    Collection Time: 12/13/20  6:40 AM   Result Value Ref Range    Magnesium 1.7 1.6 - 2.4 mg/dL   PHOSPHORUS    Collection Time: 12/13/20  6:40 AM   Result Value Ref Range    Phosphorus 4.1 2.6 - 4.7 MG/DL   MAGNESIUM    Collection Time: 12/14/20  6:07 AM   Result Value Ref Range    Magnesium 1.6 1.6 - 2.4 mg/dL   PHOSPHORUS    Collection Time: 12/14/20  6:07 AM   Result Value Ref Range    Phosphorus 3.6 2.6 - 4.7 MG/DL       Assessment and Plan: Active Problems:    Nausea and vomiting in pregnancy (12/2/2020)      Eating disorder affecting pregnancy, antepartum (12/2/2020)      Depression affecting pregnancy (12/2/2020)      Calculus of gallbladder without cholecystitis (12/7/2020)       37 y/o [id number] at 11 1/7 weeks admitted for nausea/vomiting/dehydration and eating disorder in pregnancy s/p syncopal episode      1. Eating disorder, N/V:  -Pt still getting IVF's-with saline lock today  -Antiemetics-insurance will not cover zofran pump or veritas (out pt eating disorder clinic)  -S/p MFM and GI consult for help with nutrition in light of severe uncontrolled eating disorder             --PICC in place for improved iv access             --pt continues to eat a small amount multiple times a day and has had a small weight increase (1.5 pound increase over a 4 day timeframe)             --will consider a feeding tube if needed - ENT will need to place         --consult nutrition today to see if enteral feeds still suggested given what pt is eating now.    --consult case management to check on coverage for home health with enteral feeds if needed vs picc line management with goodie bag daily/ivf prn and or tpn     2. Gall stones:  - s/p MRCP and surgery consult-no need for surgery at this time for gallstones     3. Depression/anxiety with recent admission for suicidal ideation  -continue zyprexa, prozac, prazosin-spoke with Jordy Tesfaye 12/11 who recommended increasing prozac to 60mg daily and zyprexa to 10mg daily but pt declines currently and desires to discuss with Silvia Conner at next visit.   -Dr. Yady Bragg with [facility]'s assistant) 12/8-he is trying to get pt a sooner follow up appt than currently scheduled 1/12.  -pt has been complying with her psych meds  -hopefully pt will be able to continue with weekly telehealth visits with her counselor ([employer] well as nutritionist (Gopal Archuleta visit 12/29)     4. CHTN  -stable with no meds-  -BP now actually low (suspect due to pregnancy and significant weight loss from eating disorder)      5.  Dizziness  -s/p cardiology consult.     6. H/o alcohol abuse  -stable     7. H/o incompetent cervix  -will need cerclage around 14-16 weeks     8.  Beta thal minor with chronic anemia  -asymptomatic with normal iron panel  -IV iron per hematology recs

## 2020-12-14 NOTE — PROGRESS NOTES
Comprehensive Nutrition Assessment    Type and Reason for Visit: Consult    Nutrition Recommendations/Plan:     Continue with current diet and adjusted oral supplements. Follow with calorie counts and will continue to evaluate intake x3 days      Nutrition Assessment:      GA: 11w3d  Pt tolerated breakfast (consumed ~ 40%) and all of lunch today (sandwich and yogurt) without emesis. Does have early satiety and seems to tolerate meals better with breaks in between. Pt agreeable to alter oral supplements to Ensure Enlive BID and Boost Pudding once daily. Pt would like to avoid NGT placement if able. Will begin Calorie Count to evaluate all oral intake. Overall:   PICC in place for improved iv access  Gall stones:  s/p MRCP and surgery consult-no need for surgery at this time for gallstones  Depression/anxiety with recent admission for suicidal ideation  H/o alcohol abuse stable  Eating disorder affecting pregnancy  H/o incompetent cervix - will need cerclage around 14-16 weeks     Malnutrition Assessment:  Malnutrition Status:  Severe malnutrition    Context:  Chronic illness     Findings of the 6 clinical characteristics of malnutrition:   Energy Intake:  7 - 75% or less est energy requirements for 1 month or longer  Weight Loss:  1.000 - 20% over 1 year     Body Fat Loss:  7 - Severe body fat loss,     Muscle Mass Loss:  7 - Severe muscle mass loss,  ,     Strength:  Not performed       Estimated Daily Nutrient Needs:  Energy (kcal): 2100(First Trimester) Weight Used for Energy Requirements: Current  Protein (g): 85 gm(74 kg x 0.8 + 25 gm); Weight Used for Protein Requirements: Current(Pregnancy)  Fluid (ml/day): 2800 mL (8751-4123 mL ~35-40 mL/kg);  Method Used for Fluid Requirements: (Pregnancy 35-40 mL/kg current wt)      Nutrition Related Findings:  +N/V, poor appetite; significant and unplanned wt loss 81# (36%) x past 11 months      Wounds:    None       Current Nutrition Therapies:  DIET GI LITE (POST SURGICAL)  DIET NUTRITIONAL SUPPLEMENTS Breakfast, Dinner; Ensure [employer]  DIET NUTRITIONAL SUPPLEMENTS Lunch; Pudding, Fortified  DIET SNACKS HS Snack    Anthropometric Measures:  · Height:  5' 4\" (162.6 cm)(Pt claims ht 64-65\". One prior admit ht listed 66\")  · Current Body Wt:  65.8 kg (145 lb 1 oz)   · Admission Body Wt:  145 lb 1 oz    · Usual Body Wt:  103.4 kg (228 lb)(>/= year ago)     · Ideal Body Wt:  120 lbs:  120.9 %   · Adjusted Body Weight:   ; Weight Adjustment for: No adjustment   · BMI Category:  Normal weight (BMI 18.5-24. 9)       Nutrition Diagnosis:   · Inadequate oral intake, Increased nutrient needs, Altered GI function, Unintended weight loss related to psychological cause or life stress, early satiety as evidenced by NPO or clear liquid status due to medical condition, intake 0-25%, poor intake prior to admission, weight loss greater than or equal to 20% in 1 year, lab values, nausea, vomiting, constipation      Nutrition Interventions:   Food and/or Nutrient Delivery: Continue current diet, Mineral supplement, Vitamin supplement, Start oral nutrition supplement, IV fluid delivery  Nutrition Education and Counseling: Education initiated, Counseling needed(Eating Disorder)  Coordination of Nutrition Care: Coordination of community care(? [facility] or other Eating Disorder's specialty)    Goals: Tolerate 50% meals and 1 supplement daily x 5-7 days.        Nutrition Monitoring and Evaluation:   Behavioral-Environmental Outcomes: Readiness for change  Food/Nutrient Intake Outcomes: Diet advancement/tolerance, Food and nutrient intake, Supplement intake, IVF intake  Physical Signs/Symptoms Outcomes: Biochemical data, Constipation, Nausea/vomiting, Meal time behavior, Weight    Discharge Planning:    Continue oral nutrition supplement, Recommend pursue outpatient nutrition counseling     Electronically signed by Birgit Wells RD on 12/14/2020 at 3:52 PM    Contact:Alexy

## 2020-12-15 LAB
MAGNESIUM SERPL-MCNC: 1.8 MG/DL (ref 1.6–2.4)
PHOSPHATE SERPL-MCNC: 4.6 MG/DL (ref 2.6–4.7)

## 2020-12-15 PROCEDURE — 83735 ASSAY OF MAGNESIUM: CPT

## 2020-12-15 PROCEDURE — 74011000250 HC RX REV CODE- 250: Performed by: OBSTETRICS & GYNECOLOGY

## 2020-12-15 PROCEDURE — 74011250637 HC RX REV CODE- 250/637: Performed by: OBSTETRICS & GYNECOLOGY

## 2020-12-15 PROCEDURE — 74011250636 HC RX REV CODE- 250/636: Performed by: OBSTETRICS & GYNECOLOGY

## 2020-12-15 PROCEDURE — 36415 COLL VENOUS BLD VENIPUNCTURE: CPT

## 2020-12-15 PROCEDURE — 84100 ASSAY OF PHOSPHORUS: CPT

## 2020-12-15 PROCEDURE — 65410000002 HC RM PRIVATE OB

## 2020-12-15 RX ORDER — PROCHLORPERAZINE MALEATE 5 MG
10 TABLET ORAL
Status: DISCONTINUED | OUTPATIENT
Start: 2020-12-15 | End: 2020-12-17 | Stop reason: HOSPADM

## 2020-12-15 RX ADMIN — Medication 10 ML: at 22:00

## 2020-12-15 RX ADMIN — ONDANSETRON HYDROCHLORIDE 4 MG: 2 INJECTION, SOLUTION INTRAMUSCULAR; INTRAVENOUS at 04:44

## 2020-12-15 RX ADMIN — Medication 10 ML: at 06:00

## 2020-12-15 RX ADMIN — Medication 10 ML: at 14:40

## 2020-12-15 RX ADMIN — POLYETHYLENE GLYCOL 3350 17 G: 17 POWDER, FOR SOLUTION ORAL at 08:14

## 2020-12-15 RX ADMIN — ONDANSETRON HYDROCHLORIDE 4 MG: 2 INJECTION, SOLUTION INTRAMUSCULAR; INTRAVENOUS at 10:10

## 2020-12-15 RX ADMIN — FOLIC ACID: 5 INJECTION, SOLUTION INTRAMUSCULAR; INTRAVENOUS; SUBCUTANEOUS at 21:40

## 2020-12-15 RX ADMIN — MULTIPLE VITAMINS W/ MINERALS TAB 1 TABLET: TAB at 21:03

## 2020-12-15 RX ADMIN — PANTOPRAZOLE SODIUM 40 MG: 40 TABLET, DELAYED RELEASE ORAL at 08:14

## 2020-12-15 RX ADMIN — PROCHLORPERAZINE EDISYLATE 10 MG: 5 INJECTION INTRAMUSCULAR; INTRAVENOUS at 08:14

## 2020-12-15 RX ADMIN — FLUOXETINE 40 MG: 20 CAPSULE ORAL at 08:14

## 2020-12-15 RX ADMIN — PROCHLORPERAZINE MALEATE 10 MG: 5 TABLET ORAL at 21:03

## 2020-12-15 RX ADMIN — OLANZAPINE 5 MG: 5 TABLET, FILM COATED ORAL at 21:03

## 2020-12-15 RX ADMIN — ONDANSETRON HYDROCHLORIDE 4 MG: 2 INJECTION, SOLUTION INTRAMUSCULAR; INTRAVENOUS at 15:49

## 2020-12-15 RX ADMIN — PROCHLORPERAZINE EDISYLATE 10 MG: 5 INJECTION INTRAMUSCULAR; INTRAVENOUS at 02:00

## 2020-12-15 RX ADMIN — ACETAMINOPHEN 650 MG: 325 TABLET ORAL at 05:01

## 2020-12-15 RX ADMIN — ACETAMINOPHEN 650 MG: 325 TABLET ORAL at 19:12

## 2020-12-15 RX ADMIN — ONDANSETRON HYDROCHLORIDE 4 MG: 2 INJECTION, SOLUTION INTRAMUSCULAR; INTRAVENOUS at 21:41

## 2020-12-15 RX ADMIN — PROCHLORPERAZINE MALEATE 10 MG: 5 TABLET ORAL at 14:40

## 2020-12-15 RX ADMIN — PROGESTERONE 200 MG: 100 CAPSULE, LIQUID FILLED ORAL at 21:03

## 2020-12-15 NOTE — PROGRESS NOTES
T.O.C:              Pt expected to d/c to home              Family to provide transport at d/c              Emergency Contact: NONE, pt refused    CM received consult for pt for [facility] for enteral feeds or TPN, PICC line management / care. CM contacted [employer], [facility] and [facility] / infusion comapnies. None are in network with pt's [place] plan. CM called [place] [employer] for assistance; left message with CM handling [facility] ([phone]) for assistance. CM awaiting return call. 1610: Referral placed in [employer] with [facility] in [place] ([phone]) Information also faxed ([phone]). CM awaiting response. Bedside RN and pt updated on plan.       Arelis Adams RN

## 2020-12-15 NOTE — PROGRESS NOTES
Bedside and Verbal shift change report given to [employer] (oncoming nurse) by Sindy Serrano (offgoing nurse). Report included the following information SBAR, Kardex, Intake/Output, MAR and Accordion.

## 2020-12-15 NOTE — PROGRESS NOTES
High Risk Obstetrics Progress Note    Name: Haylee Hidalgo MRN: [id number]  SSN: [id number]    YOB: 1985  Age: 38 y.o. Sex: female      Subjective:      LOS: 13 days    Estimated Date of Delivery: 21   Gestational Age Today: 11w4d     Patient admitted for nausea, vomiting, dehydration and s/p syncopal episode with eating disorder. States she does have mild cramping, nausea and vomiting. States yesterday had trouble with more emesis. Only nausea this morning. and does not have chest pain, shortness of breath and vaginal bleeding . Objective:     Vitals:  Blood pressure 109/69, pulse 87, temperature 98.1 °F (36.7 °C), resp. rate 16, height 5' 4\" (1.626 m), weight 74.1 kg (163 lb 6.4 oz), SpO2 98 %, not currently breastfeeding. Temp (24hrs), Av.9 °F (36.6 °C), Min:97.6 °F (36.4 °C), Max:98.1 °F (29.7 °C)    Systolic (52DPV), WRC:154 , Min:109 , MWE:843      Diastolic (67AJV), EXB:32, Min:67, Max:69       Intake and Output:         Physical Exam:  Patient without distress. Heart: Regular rate and rhythm  Lung: clear to auscultation throughout lung fields, no wheezes, no rales, no rhonchi and normal respiratory effort  Abdomen: soft, nontender, gravid  Lower Extremities:  - Edema No       Membranes:  Intact        Labs:   Recent Results (from the past 36 hour(s))   MAGNESIUM    Collection Time: 20  6:07 AM   Result Value Ref Range    Magnesium 1.6 1.6 - 2.4 mg/dL   PHOSPHORUS    Collection Time: 20  6:07 AM   Result Value Ref Range    Phosphorus 3.6 2.6 - 4.7 MG/DL   MAGNESIUM    Collection Time: 12/15/20  4:44 AM   Result Value Ref Range    Magnesium 1.8 1.6 - 2.4 mg/dL   PHOSPHORUS    Collection Time: 12/15/20  4:44 AM   Result Value Ref Range    Phosphorus 4.6 2.6 - 4.7 MG/DL       Assessment and Plan:       Active Problems:    Nausea and vomiting in pregnancy (2020)      Eating disorder affecting pregnancy, antepartum (2020)      Depression affecting pregnancy (12/2/2020)      Calculus of gallbladder without cholecystitis (12/7/2020)       37 y/o [id number] at 11 2/7 weeks admitted for nausea/vomiting/dehydration and eating disorder in pregnancy s/p syncopal episode      1. Eating disorder, N/V:  -continue saline lock   -Antiemetics-insurance will not cover zofran pump or veritas (out pt eating disorder clinic   -will try to change to po compazine today and odt zofran tomorrow if today goes well. -S/p MFM and GI consult for help with nutrition in light of severe uncontrolled eating disorder             --PICC in place for improved iv access             --pt continues to eat a small amount multiple times a day and has had 9 pound weight gain in 4 days.            --will consider a feeding tube if needed - ENT will need to place- but hoping to hold off on this if pt able to get enough nutrition in orally. --appreciate nutrition help!             --consult case management to check on coverage for home health with enteral feeds if needed vs picc line management with goodie bag daily/ivf prn and or tpn     2. Gall stones:  - s/p MRCP and surgery consult-no need for surgery at this time for gallstones     3. Depression/anxiety with recent admission for suicidal ideation  -continue zyprexa, prozac, prazosin-spoke with Mariam Mccann 12/11 who recommended increasing prozac to 60mg daily and zyprexa to 10mg daily but pt declines currently and desires to discuss with Cameron Zhang at next visit.   -Dr. Gold Cazares with [facility]am Mccann [facility]'s assistant) 12/8-he is trying to get pt a sooner follow up appt than currently scheduled 1/12.  -pt has been complying with her psych meds  -hopefully pt will be able to continue with weekly telehealth visits with her counselor (Ralene Essex well as nutritionist (Luis March visit 12/29)     4. CHTN  -stable with no meds-  -BP now actually low (suspect due to pregnancy and significant weight loss from eating disorder)      5. Dizziness  -s/p cardiology consult.     6. H/o alcohol abuse  -stable     7. H/o incompetent cervix  -will need cerclage around 14-16 weeks     8.  Beta thal minor with chronic anemia  -asymptomatic with normal iron panel  -IV iron per hematology recs

## 2020-12-15 NOTE — PROGRESS NOTES
Bedside and Verbal shift change report given to LILIA Dent RN (oncoming nurse) by Tod Carrel. Linda Hooker RN (offgoing nurse). Report included the following information SBAR, Kardex, Intake/Output, MAR, Accordion, Recent Results and Med Rec Status.

## 2020-12-16 LAB
ALBUMIN SERPL-MCNC: 2.5 G/DL (ref 3.5–5)
ALBUMIN/GLOB SERPL: 0.7 {RATIO} (ref 1.1–2.2)
ALP SERPL-CCNC: 44 U/L (ref 45–117)
ALT SERPL-CCNC: 11 U/L (ref 12–78)
ANION GAP SERPL CALC-SCNC: 5 MMOL/L (ref 5–15)
AST SERPL-CCNC: 10 U/L (ref 15–37)
BASOPHILS # BLD: 0 K/UL (ref 0–0.1)
BASOPHILS NFR BLD: 0 % (ref 0–1)
BILIRUB SERPL-MCNC: 0.2 MG/DL (ref 0.2–1)
BUN SERPL-MCNC: 8 MG/DL (ref 6–20)
BUN/CREAT SERPL: 19 (ref 12–20)
CALCIUM SERPL-MCNC: 8.4 MG/DL (ref 8.5–10.1)
CHLORIDE SERPL-SCNC: 104 MMOL/L (ref 97–108)
CO2 SERPL-SCNC: 27 MMOL/L (ref 21–32)
CREAT SERPL-MCNC: 0.43 MG/DL (ref 0.55–1.02)
DIFFERENTIAL METHOD BLD: ABNORMAL
EOSINOPHIL # BLD: 0.1 K/UL (ref 0–0.4)
EOSINOPHIL NFR BLD: 1 % (ref 0–7)
ERYTHROCYTE [DISTWIDTH] IN BLOOD BY AUTOMATED COUNT: 17.3 % (ref 11.5–14.5)
GLOBULIN SER CALC-MCNC: 3.5 G/DL (ref 2–4)
GLUCOSE SERPL-MCNC: 70 MG/DL (ref 65–100)
HCT VFR BLD AUTO: 28.8 % (ref 35–47)
HGB BLD-MCNC: 9 G/DL (ref 11.5–16)
IMM GRANULOCYTES # BLD AUTO: 0.1 K/UL (ref 0–0.04)
IMM GRANULOCYTES NFR BLD AUTO: 1 % (ref 0–0.5)
LYMPHOCYTES # BLD: 1.7 K/UL (ref 0.8–3.5)
LYMPHOCYTES NFR BLD: 16 % (ref 12–49)
MAGNESIUM SERPL-MCNC: 1.9 MG/DL (ref 1.6–2.4)
MCH RBC QN AUTO: 23.3 PG (ref 26–34)
MCHC RBC AUTO-ENTMCNC: 31.3 G/DL (ref 30–36.5)
MCV RBC AUTO: 74.4 FL (ref 80–99)
MONOCYTES # BLD: 0.7 K/UL (ref 0–1)
MONOCYTES NFR BLD: 7 % (ref 5–13)
NEUTS SEG # BLD: 7.9 K/UL (ref 1.8–8)
NEUTS SEG NFR BLD: 75 % (ref 32–75)
NRBC # BLD: 0 K/UL (ref 0–0.01)
NRBC BLD-RTO: 0 PER 100 WBC
PHOSPHATE SERPL-MCNC: 4.4 MG/DL (ref 2.6–4.7)
PLATELET # BLD AUTO: ABNORMAL K/UL (ref 150–400)
POTASSIUM SERPL-SCNC: 3.9 MMOL/L (ref 3.5–5.1)
PROT SERPL-MCNC: 6 G/DL (ref 6.4–8.2)
RBC # BLD AUTO: 3.87 M/UL (ref 3.8–5.2)
RBC MORPH BLD: ABNORMAL
RBC MORPH BLD: ABNORMAL
SODIUM SERPL-SCNC: 136 MMOL/L (ref 136–145)
WBC # BLD AUTO: 10.5 K/UL (ref 3.6–11)

## 2020-12-16 PROCEDURE — 36415 COLL VENOUS BLD VENIPUNCTURE: CPT

## 2020-12-16 PROCEDURE — 84100 ASSAY OF PHOSPHORUS: CPT

## 2020-12-16 PROCEDURE — 74011250637 HC RX REV CODE- 250/637: Performed by: OBSTETRICS & GYNECOLOGY

## 2020-12-16 PROCEDURE — 83735 ASSAY OF MAGNESIUM: CPT

## 2020-12-16 PROCEDURE — 74011250636 HC RX REV CODE- 250/636: Performed by: OBSTETRICS & GYNECOLOGY

## 2020-12-16 PROCEDURE — 80053 COMPREHEN METABOLIC PANEL: CPT

## 2020-12-16 PROCEDURE — 65410000002 HC RM PRIVATE OB

## 2020-12-16 PROCEDURE — 85025 COMPLETE CBC W/AUTO DIFF WBC: CPT

## 2020-12-16 PROCEDURE — 74011000250 HC RX REV CODE- 250: Performed by: OBSTETRICS & GYNECOLOGY

## 2020-12-16 RX ORDER — PROCHLORPERAZINE MALEATE 10 MG
10 TABLET ORAL
Qty: 30 TAB | Refills: 2 | Status: SHIPPED | OUTPATIENT
Start: 2020-12-16 | End: 2020-12-23

## 2020-12-16 RX ORDER — ONDANSETRON 4 MG/1
4 TABLET, ORALLY DISINTEGRATING ORAL
Qty: 30 TAB | Refills: 3 | Status: SHIPPED | OUTPATIENT
Start: 2020-12-16 | End: 2021-08-26

## 2020-12-16 RX ADMIN — ALTEPLASE 2 MG: 2.2 INJECTION, POWDER, LYOPHILIZED, FOR SOLUTION INTRAVENOUS at 12:39

## 2020-12-16 RX ADMIN — ONDANSETRON HYDROCHLORIDE 4 MG: 2 INJECTION, SOLUTION INTRAMUSCULAR; INTRAVENOUS at 10:32

## 2020-12-16 RX ADMIN — PROCHLORPERAZINE MALEATE 10 MG: 5 TABLET ORAL at 18:43

## 2020-12-16 RX ADMIN — ACETAMINOPHEN 650 MG: 325 TABLET ORAL at 20:23

## 2020-12-16 RX ADMIN — FOLIC ACID: 5 INJECTION, SOLUTION INTRAMUSCULAR; INTRAVENOUS; SUBCUTANEOUS at 21:25

## 2020-12-16 RX ADMIN — PROGESTERONE 200 MG: 100 CAPSULE, LIQUID FILLED ORAL at 21:25

## 2020-12-16 RX ADMIN — MULTIPLE VITAMINS W/ MINERALS TAB 1 TABLET: TAB at 20:20

## 2020-12-16 RX ADMIN — Medication 10 ML: at 10:30

## 2020-12-16 RX ADMIN — ONDANSETRON HYDROCHLORIDE 4 MG: 2 INJECTION, SOLUTION INTRAMUSCULAR; INTRAVENOUS at 21:25

## 2020-12-16 RX ADMIN — ONDANSETRON 4 MG: 4 TABLET, ORALLY DISINTEGRATING ORAL at 16:05

## 2020-12-16 RX ADMIN — FLUOXETINE 40 MG: 20 CAPSULE ORAL at 10:31

## 2020-12-16 RX ADMIN — Medication 10 ML: at 21:31

## 2020-12-16 RX ADMIN — POLYETHYLENE GLYCOL 3350 17 G: 17 POWDER, FOR SOLUTION ORAL at 10:32

## 2020-12-16 RX ADMIN — PANTOPRAZOLE SODIUM 40 MG: 40 TABLET, DELAYED RELEASE ORAL at 10:32

## 2020-12-16 RX ADMIN — OLANZAPINE 5 MG: 5 TABLET, FILM COATED ORAL at 21:25

## 2020-12-16 RX ADMIN — PROCHLORPERAZINE MALEATE 10 MG: 5 TABLET ORAL at 12:46

## 2020-12-16 RX ADMIN — ONDANSETRON HYDROCHLORIDE 4 MG: 2 INJECTION, SOLUTION INTRAMUSCULAR; INTRAVENOUS at 04:20

## 2020-12-16 NOTE — PROGRESS NOTES
3:09 PM:  Bedside shift change report given to [address] (oncoming nurse) by Andrey Higuera RN (offgoing nurse). Report included SBAR, Kardex, Intake/Output, MAR, Recent Results and Progress of Care. I accept this patient to my care at this time. Any subsequent documentation in this Progress Note is intended to be information adjunct to other components of the patient's electronic medical record. Refer to accompanying timestamp(s) for chronology.

## 2020-12-16 NOTE — PROGRESS NOTES
T.O.C:              Pt expected to d/c to home              Family to provide transport at d/c              Emergency Contact: NONE, pt refused    CM spoke with Magaly Jin of [facility], several [facility] agency numbers provided. Pt will need insurance authorization to receive [facility] care or outpatient infusion care. Auth paperwork faxed to [facility]. CM received call from PORFIRIO Ng of [facility]. They are interested in pt, needed additional information. Care management consult for [facility] for PT to right hip, nursing for PICC line care, weekly labs, LR fluid bolus as needed, daily goodie bag and nutrition of Ensure Enlive twice daily and Boost pudding once per day faxed to [address] [place] [address] awaiting insurance authorizations. and HH acceptance. 1530: Allscripts Alert: Referral Received response from [facility] re: Referral [id number] for patient in [id number]: Referral Received   In order for the insurance to cover we would need to have documentation that the TFs are meeting the majority of her needs and that any PO intake is for pleasure. It would need to be clear that the TFs are required to maintain her weight/strength and written out that she cannot due this by PO intake alone.       Heather Sullivan RN

## 2020-12-16 NOTE — PROGRESS NOTES
High Risk Obstetrics Progress Note    Name: Jamir Parker MRN: [id number]  SSN: [id number]    YOB: 1985  Age: 38 y.o. Sex: female      Subjective:      LOS: 14 days    Estimated Date of Delivery: 21   Gestational Age Today: 11w5d     Patient admitted for nausea, vomiting, dehydration . States she does have mild cramping and mild right hip pain as well as nausea. Had no emesis yesterday. Able to tolerate sandwich and some sweet potatoes yesterday. and does not have chest pain, shortness of breath and vaginal bleeding . Objective:     Vitals:  Blood pressure (!) 98/58, pulse 79, temperature 98.3 °F (36.8 °C), resp. rate 16, height 5' 4\" (1.626 m), weight 73.3 kg (161 lb 9.6 oz), SpO2 99 %, not currently breastfeeding. Temp (24hrs), Av °F (36.7 °C), Min:97.3 °F (36.3 °C), Max:98.3 °F (70.9 °C)    Systolic (20FBW), FRO:404 , Min:98 , RXL:149      Diastolic (20REM), EWB:48, Min:58, Max:69       Intake and Output:         Physical Exam:  Patient without distress. Heart: Regular rate and rhythm  Lung: clear to auscultation throughout lung fields, no wheezes, no rales, no rhonchi and normal respiratory effort  Abdomen: soft, nontender, gravid  Lower Extremities:  - Edema No       Membranes:  Intact        Labs:   Recent Results (from the past 36 hour(s))   MAGNESIUM    Collection Time: 12/15/20  4:44 AM   Result Value Ref Range    Magnesium 1.8 1.6 - 2.4 mg/dL   PHOSPHORUS    Collection Time: 12/15/20  4:44 AM   Result Value Ref Range    Phosphorus 4.6 2.6 - 4.7 MG/DL       Assessment and Plan: Active Problems:    Nausea and vomiting in pregnancy (2020)      Eating disorder affecting pregnancy, antepartum (2020)      Depression affecting pregnancy (2020)      Calculus of gallbladder without cholecystitis (2020)       39 y/o [id number] at 11 3/7 weeks admitted for nausea/vomiting/dehydration and eating disorder in pregnancy s/p syncopal episode      1.  Eating disorder, N/V:  -continue saline lock   -Antiemetics-insurance will not cover zofran pump or veritas (out pt eating disorder clinic             -tolerating po compazine today. Switch to odt zofran today.   -continue daily goodie bag   -S/p MFM and GI consult for help with nutrition in light of severe uncontrolled eating disorder             --PICC in place for improved iv access             --pt continues to eat a small amount multiple times a day and is gaining weight.              --no need for feeding tube now             --appreciate nutrition help!   --next outpatient nutrition appt with Ivonne is 12/29.      2. Gall stones:  - s/p MRCP and surgery consult-no need for surgery at this time for gallstones     3. Depression/anxiety with recent admission for suicidal ideation  -continue zyprexa, prozac, prazosin-spoke with Luz Elena Mi 12/11 who recommended increasing prozac to 60mg daily and zyprexa to 10mg daily but pt declines currently and desires to discuss with Carlie Rausch at next visit.   -Dr. Jaime Hunt with [facility]'s assistant) 12/8-he is trying to get pt a sooner follow up appt than currently scheduled 1/12.  -pt has been complying with her psych meds     4. CHTN  -stable with no meds-  -BP now actually low (suspect due to pregnancy and significant weight loss from eating disorder)      5. Dizziness  -s/p cardiology consult.     6. H/o alcohol abuse  -stable     7. H/o incompetent cervix  -will need cerclage around 14-16 weeks     8.  Beta thal minor with chronic anemia  -asymptomatic with normal iron panel  -IV iron per hematology recs    9.  Plan for discharge either today or tomorrow pending home health set up for picc line care, daily goodie bag, ivf bolus prn and weekly labs (cmp, mag, phos, cbc) and physical therapy for right hip.

## 2020-12-16 NOTE — ROUTINE PROCESS
0800:Bedside and Verbal shift change report given to SOPHY Higuera (oncoming nurse) by LILIA Cannon (offgoing nurse). Report included the following information SBAR. 1430: Unable to do lunch calorie count r/t tray being taken away before count could be done. 1523: Lab unable to report accurate platelet count due to clotting. Notified Dr. Hamilton Found, per Dr. Hamilton Found, do not need to redo platelet count at this time.

## 2020-12-16 NOTE — PROGRESS NOTES
Bedside and Verbal shift change report given to LILIA Naranjo RN (oncoming nurse) by LILIA Phillip RN (offgoing nurse). Report included the following information SBAR, Kardex, Intake/Output, MAR, Accordion and Recent Results.

## 2020-12-17 VITALS
HEIGHT: 64 IN | SYSTOLIC BLOOD PRESSURE: 101 MMHG | OXYGEN SATURATION: 100 % | WEIGHT: 160.8 LBS | TEMPERATURE: 97.7 F | HEART RATE: 77 BPM | BODY MASS INDEX: 27.45 KG/M2 | RESPIRATION RATE: 16 BRPM | DIASTOLIC BLOOD PRESSURE: 56 MMHG

## 2020-12-17 LAB
MAGNESIUM SERPL-MCNC: 2 MG/DL (ref 1.6–2.4)
PHOSPHATE SERPL-MCNC: 4.7 MG/DL (ref 2.6–4.7)

## 2020-12-17 PROCEDURE — 83735 ASSAY OF MAGNESIUM: CPT

## 2020-12-17 PROCEDURE — 36415 COLL VENOUS BLD VENIPUNCTURE: CPT

## 2020-12-17 PROCEDURE — 84100 ASSAY OF PHOSPHORUS: CPT

## 2020-12-17 PROCEDURE — 74011250637 HC RX REV CODE- 250/637: Performed by: OBSTETRICS & GYNECOLOGY

## 2020-12-17 PROCEDURE — 74011250636 HC RX REV CODE- 250/636: Performed by: OBSTETRICS & GYNECOLOGY

## 2020-12-17 RX ADMIN — ONDANSETRON HYDROCHLORIDE 4 MG: 2 INJECTION, SOLUTION INTRAMUSCULAR; INTRAVENOUS at 10:25

## 2020-12-17 RX ADMIN — PROCHLORPERAZINE MALEATE 10 MG: 5 TABLET ORAL at 14:03

## 2020-12-17 RX ADMIN — POLYETHYLENE GLYCOL 3350 17 G: 17 POWDER, FOR SOLUTION ORAL at 08:15

## 2020-12-17 RX ADMIN — PANTOPRAZOLE SODIUM 40 MG: 40 TABLET, DELAYED RELEASE ORAL at 08:14

## 2020-12-17 RX ADMIN — FLUOXETINE 40 MG: 20 CAPSULE ORAL at 08:15

## 2020-12-17 RX ADMIN — Medication 10 ML: at 05:00

## 2020-12-17 RX ADMIN — ONDANSETRON HYDROCHLORIDE 4 MG: 2 INJECTION, SOLUTION INTRAMUSCULAR; INTRAVENOUS at 05:00

## 2020-12-17 RX ADMIN — PROCHLORPERAZINE MALEATE 10 MG: 5 TABLET ORAL at 08:14

## 2020-12-17 NOTE — PROGRESS NOTES
Bedside and Verbal shift change report given to LILIA Mares RN (oncoming nurse) by NADIR Rashid RN (offgoing nurse). Report included the following information SBAR, Kardex, Intake/Output, MAR, Accordion and Recent Results.

## 2020-12-17 NOTE — PROGRESS NOTES
High Risk Obstetrics Progress Note    Name: Odessa Serrano MRN: [id number]  SSN: [id number]    YOB: 1985  Age: 38 y.o. Sex: female      Subjective:      LOS: 15 days    Estimated Date of Delivery: 21   Gestational Age Today: 11w6d     Patient admitted for nausea, vomiting and dehydration. States she does have cramping but no vaginal bleeding. Pt states she had about an hour of chest discomfort/pounding last night but it has resolved and she is feeling fine again. Does have nausea but no emesis x 2 days. and does not have vaginal bleeding or shortness of breath. Objective:     Vitals:  Blood pressure (!) 101/56, pulse 77, temperature 97.7 °F (36.5 °C), resp. rate 16, height 5' 4\" (1.626 m), weight 72.9 kg (160 lb 12.8 oz), SpO2 100 %, not currently breastfeeding. Temp (24hrs), Av °F (36.7 °C), Min:97.4 °F (36.3 °C), Max:98.6 °F (37 °C)    Systolic (53BDU), OXA:694 , Min:100 , DUU:594      Diastolic (88SUL), PVJ:97, Min:56, Max:71       Intake and Output:         Physical Exam:  Patient without distress.   Heart: Regular rate and rhythm  Lung: clear to auscultation throughout lung fields, no wheezes, no rales, no rhonchi and normal respiratory effort  Abdomen: soft, nontender, gravid  Lower Extremities:  - Edema No       Membranes:  Intact        Labs:   Recent Results (from the past 36 hour(s))   MAGNESIUM    Collection Time: 20  6:54 AM   Result Value Ref Range    Magnesium 1.9 1.6 - 2.4 mg/dL   PHOSPHORUS    Collection Time: 20  6:54 AM   Result Value Ref Range    Phosphorus 4.4 2.6 - 4.7 MG/DL   METABOLIC PANEL, COMPREHENSIVE    Collection Time: 20  6:54 AM   Result Value Ref Range    Sodium 136 136 - 145 mmol/L    Potassium 3.9 3.5 - 5.1 mmol/L    Chloride 104 97 - 108 mmol/L    CO2 27 21 - 32 mmol/L    Anion gap 5 5 - 15 mmol/L    Glucose 70 65 - 100 mg/dL    BUN 8 6 - 20 MG/DL    Creatinine 0.43 (L) 0.55 - 1.02 MG/DL    BUN/Creatinine ratio 19 12 - 20      GFR est AA >60 >60 ml/min/1.73m2    GFR est non-AA >60 >60 ml/min/1.73m2    Calcium 8.4 (L) 8.5 - 10.1 MG/DL    Bilirubin, total 0.2 0.2 - 1.0 MG/DL    ALT (SGPT) 11 (L) 12 - 78 U/L    AST (SGOT) 10 (L) 15 - 37 U/L    Alk. phosphatase 44 (L) 45 - 117 U/L    Protein, total 6.0 (L) 6.4 - 8.2 g/dL    Albumin 2.5 (L) 3.5 - 5.0 g/dL    Globulin 3.5 2.0 - 4.0 g/dL    A-G Ratio 0.7 (L) 1.1 - 2.2     CBC WITH AUTOMATED DIFF    Collection Time: 12/16/20 11:32 AM   Result Value Ref Range    WBC 10.5 3.6 - 11.0 K/uL    RBC 3.87 3.80 - 5.20 M/uL    HGB 9.0 (L) 11.5 - 16.0 g/dL    HCT 28.8 (L) 35.0 - 47.0 %    MCV 74.4 (L) 80.0 - 99.0 FL    MCH 23.3 (L) 26.0 - 34.0 PG    MCHC 31.3 30.0 - 36.5 g/dL    RDW 17.3 (H) 11.5 - 14.5 %    PLATELET  704 - 435 K/uL     UNABLE TO REPORT ACCURATE COUNT DUE TO PLATELET AGGREGATION, HOWEVER, PLATELETS APPEAR NORMAL IN NUMBER ON SMEAR. PLEASE RESUBMIT SODIUM CITRATE (BLUE) AND EDTA (LAVENDER) TUBES FOR HEMATOLOGICAL TESTING. NRBC 0.0 0  WBC    ABSOLUTE NRBC 0.00 0.00 - 0.01 K/uL    NEUTROPHILS 75 32 - 75 %    LYMPHOCYTES 16 12 - 49 %    MONOCYTES 7 5 - 13 %    EOSINOPHILS 1 0 - 7 %    BASOPHILS 0 0 - 1 %    IMMATURE GRANULOCYTES 1 (H) 0.0 - 0.5 %    ABS. NEUTROPHILS 7.9 1.8 - 8.0 K/UL    ABS. LYMPHOCYTES 1.7 0.8 - 3.5 K/UL    ABS. MONOCYTES 0.7 0.0 - 1.0 K/UL    ABS. EOSINOPHILS 0.1 0.0 - 0.4 K/UL    ABS. BASOPHILS 0.0 0.0 - 0.1 K/UL    ABS. IMM. GRANS. 0.1 (H) 0.00 - 0.04 K/UL    DF SMEAR SCANNED      RBC COMMENTS ANISOCYTOSIS  2+        RBC COMMENTS OVALOCYTES  1+       MAGNESIUM    Collection Time: 12/17/20  5:32 AM   Result Value Ref Range    Magnesium 2.0 1.6 - 2.4 mg/dL   PHOSPHORUS    Collection Time: 12/17/20  5:32 AM   Result Value Ref Range    Phosphorus 4.7 2.6 - 4.7 MG/DL       Assessment and Plan:       Active Problems:    Nausea and vomiting in pregnancy (12/2/2020)      Eating disorder affecting pregnancy, antepartum (12/2/2020)      Depression affecting pregnancy (12/2/2020)      Calculus of gallbladder without cholecystitis (12/7/2020)       37 y/o [id number] at 11 3/7 weeks admitted for nausea/vomiting/dehydration and eating disorder in pregnancy s/p syncopal episode      1. Eating disorder, N/V:  -continue saline lock   -Antiemetics-insurance will not cover zofran pump or veritas (out pt eating disorder clinic             -tolerating po compazine today. Switch to odt zofran today.             -continue daily goodie bag   -S/p MFM and GI consult for help with nutrition in light of severe uncontrolled eating disorder             --PICC in place for improved iv access as pt has very poor iv access otherwise and will require intermittent ivf boluses for dehydration             --pt continues to eat a small amount multiple times a day and is gaining weight, however she still requires at least daily goodie bag to support her nutrition in this pregnancy until her oral intake is adequate             --no need for feeding tube currently, but if pt regresses, may need to readdress             --appreciate nutrition help!             --next outpatient nutrition appt with Ivonne is 12/29.      2. Gall stones:  - s/p MRCP and surgery consult-no need for surgery at this time for gallstones     3. Depression/anxiety with recent admission for suicidal ideation  -continue zyprexa, prozac, prazosin-spoke with [facility] 12/11 who recommended increasing prozac to 60mg daily and zyprexa to 10mg daily but pt declines currently and desires to discuss with Kortney Parker at next visit.   -Dr. Leticia Warren with [facility] assistant) 12/8-he is trying to get pt a sooner follow up appt than currently scheduled 1/12.  -pt has been complying with her psych meds     4. CHTN  -stable with no meds-  -BP now actually low (suspect due to pregnancy and significant weight loss from eating disorder)      5.  Dizziness  -s/p cardiology consult.     6. H/o alcohol abuse  -stable     7. H/o incompetent cervix  -will need cerclage around 14-16 weeks     8.  Beta thal minor with chronic anemia  -asymptomatic with normal iron panel  -IV iron per hematology recs     9. Plan for discharge either today pending home health set up for picc line care, daily goodie bag, ivf bolus prn and weekly labs (cmp, mag, phos, cbc)  and physical therapy for right hip.      10. Pt has follow up with me in the office on Monday, 12/21.

## 2020-12-17 NOTE — PROGRESS NOTES
T.O.C:              Pt expected to d/c to home              Family to provide transport at d/c              Emergency Contact: NONE, pt refused    CM spoke with Tk Mena of [facility] regarding [facility]. Request for Bryon Nolan is pending, they will call CM back later today. Brendon Martinez that pt expected to d/c today. 1330:CM spoke with Venice Katz of [facility] [place] will provide 1L fluid bag; [facility] to provide skilled nursing.  Update given to bedside RN      Brea Lassiter, RN

## 2020-12-17 NOTE — PROGRESS NOTES
Bedside and Verbal shift change report given to [address] (oncoming nurse) by Vicente Disla (offgoing nurse). Report included the following information SBAR, Kardex, OR Summary, Procedure Summary, Intake/Output and MAR. I have reviewed discharge instructions with the patient. The patient verbalized understanding. Discharge medications reviewed with patient and appropriate educational materials and side effects teaching were provided.

## 2020-12-21 NOTE — DISCHARGE SUMMARY
Obstetrical Discharge Summary     Name: Valentina Charles MRN: [id number]  SSN: [id number]    YOB: 1985  Age: 38 y.o. Sex: female      Allergies: Labetalol, Ceclor [cefaclor], Pcn [penicillins], and Septra [sulfamethoprim ds]    Admit Date: 12/2/2020    Discharge Date: 12/17/2020    Admitting Physician: iGno Mcdowell DO     Attending Physician:  No att. providers found     * Admission Diagnoses: Nausea and vomiting in pregnancy [O21.9]  Eating disorder affecting pregnancy, antepartum [O99.340, F50.9]  Depression affecting pregnancy [O99.340, F32.9]    * Discharge Diagnoses: This patient has no babies on file. Additional Diagnoses:   Hospital Problems as of 12/17/2020 Date Reviewed: 11/19/2020          Codes Class Noted - Resolved POA    Calculus of gallbladder without cholecystitis ICD-10-CM: K80.20  ICD-9-CM: 574.20  12/7/2020 - Present Unknown        Nausea and vomiting in pregnancy ICD-10-CM: O21.9  ICD-9-CM: 643.90  12/2/2020 - Present Unknown        Eating disorder affecting pregnancy, antepartum ICD-10-CM: O99.340, F50.9  ICD-9-CM: 646.83, 307.50  12/2/2020 - Present Unknown        Depression affecting pregnancy ICD-10-CM: O99.340, F32.9  ICD-9-CM: 648.40, 311  12/2/2020 - Present Unknown             Lab Results   Component Value Date/Time    ABO/Rh(D) O POSITIVE 01/17/2020 07:09 AM    Rubella, External immune 08/08/2017    GrBStrep, External Negative 02/20/2018    ABO,Rh O Positive 08/08/2017      Immunization History   Administered Date(s) Administered    (RETIRED) Pneumococcal Vaccine (Unspecified Type) 09/14/2012    Influenza Vaccine (Quad) PF (>6 Mo Flulaval, Fluarix, and >3 Yrs Afluria, Fluzone 97072) 09/16/2020    Influenza Vaccine PF 10/22/2013    TDAP Vaccine 09/14/2012    Tdap 03/02/2018       * Procedures: PICC placement  * No surgery found *           * Discharge Condition: improved    * Hospital Course: Patient receive PICC line, ivf's and antiemetics.  Nutrition, cardiology, GI, MFM consults. Unsuccesful attempt at placing dobhoff. Pt's intake improved some, therefore discharged home with ivf's prn and picc. If intake/nutrtion worsens, will need placement of dobhoff (bridle) by ENT. * Disposition: Home    Discharge Medications:   Discharge Medication List as of 12/17/2020  1:43 PM      START taking these medications    Details   ondansetron (ZOFRAN ODT) 4 mg disintegrating tablet Take 1 Tab by mouth every six (6) hours as needed for Nausea or Vomiting., Normal, Disp-30 Tab, R-3      prochlorperazine (COMPAZINE) 10 mg tablet Take 1 Tab by mouth every six (6) hours as needed for Nausea or Vomiting for up to 7 days. , Normal, Disp-30 Tab, R-2      0.9% sodium chloride solp 1,000 mL with thiamine 100 mg/mL soln 092 mg, folic acid 5 mg/mL soln 1 mg 1 Bag by IntraVENous route every twenty-four (24) hours. , Print, Disp-30 Bag, R-2         CONTINUE these medications which have NOT CHANGED    Details   OLANZapine (ZyPREXA) 5 mg tablet Take 1 Tab by mouth nightly., Normal, Disp-30 Tab,R-1      progesterone (PROMETRIUM) 200 mg capsule Take 200 mg by mouth daily. , Historical Med      cholecalciferol (Vitamin D3) 25 mcg (1,000 unit) cap Take  by mouth daily. , Historical Med      pyridoxine, vitamin B6, (Vitamin B-6) 100 mg tablet Take 100 mg by mouth daily. , Historical Med      hydrOXYzine HCL (ATARAX) 50 mg tablet Take 1 Tab by mouth four (4) times daily. , Normal, Disp-90 Tab,R-0      FLUoxetine (PROzac) 40 mg capsule Take 1 Cap by mouth daily. , Normal, Disp-30 Cap,R-2      doxylamine succinate (UNISOM) 25 mg tablet Take 1 Tab by mouth nightly as needed for Insomnia., Normal, Disp-30 Tab,R-2      !! OTHER 1 Ensure Enlive food supplement drink PO TID, Print, Disp-270 Each,R-2      calcium carbonate (Tums) 200 mg calcium (500 mg) chew Take 1 Tab by mouth two (2) times daily as needed for Other (reflux). , Normal, Disp-60 Tab,R-1      polyethylene glycol (MIRALAX) 17 gram packet Take 1 Packet by mouth daily as needed for Constipation. , Normal, Disp-90 Each,R-1      !! OTHER 1 ensure pudding PO daily for lunch., Print, Disp-90 Each,R-3      prenatal vit-iron fumarate-fa (PRENATAL PLUS with IRON) 28 mg iron- 800 mcg tab Historical Med      food supplemt, lactose-reduced (Ensure High Protein) liqd Take 237 mL by mouth three (3) times daily. , Normal, Disp-90 Can,R-2      prazosin (MINIPRESS) 2 mg capsule Take 1 Cap by mouth nightly. Indications: posttraumatic stress syndrome, Normal, Disp-30 Cap,R-0      pantoprazole (PROTONIX) 40 mg tablet Take 1 Tab by mouth Daily (before breakfast). Indications: gastroesophageal reflux disease, Normal, Disp-30 Tab,R-0       !! - Potential duplicate medications found. Please discuss with provider. * Follow-up Care/Patient Instructions: Activity: Activity as tolerated  Diet: Regular Diet  Wound Care: not applicable    Follow-up Information     Follow up With Specialties Details Why Contact Info    Mihir Clark NP Nurse Practitioner   [address] [place] [address] [address] [address] [phone]      [address] [place] [address]  [phone]    [facility]    [phone]  IV Fluid supplier    Irwin Desir, DO Obstetrics & Gynecology, Gynecology, Obstetrics Go on 12/21/2020  [address] [place] [address] [address] [phone]                        .

## 2020-12-28 ENCOUNTER — VIRTUAL VISIT (OUTPATIENT)
Dept: BEHAVIORAL/MENTAL HEALTH CLINIC | Age: 35
End: 2020-12-28
Payer: MEDICAID

## 2020-12-28 ENCOUNTER — OFFICE VISIT (OUTPATIENT)
Dept: FAMILY MEDICINE CLINIC | Age: 35
End: 2020-12-28
Payer: MEDICAID

## 2020-12-28 VITALS
TEMPERATURE: 98.2 F | HEIGHT: 64 IN | OXYGEN SATURATION: 100 % | HEART RATE: 68 BPM | DIASTOLIC BLOOD PRESSURE: 60 MMHG | SYSTOLIC BLOOD PRESSURE: 115 MMHG | BODY MASS INDEX: 26.43 KG/M2 | RESPIRATION RATE: 16 BRPM | WEIGHT: 154.8 LBS

## 2020-12-28 DIAGNOSIS — F41.1 GAD (GENERALIZED ANXIETY DISORDER): ICD-10-CM

## 2020-12-28 DIAGNOSIS — Z02.89 ENCOUNTER FOR COMPLETION OF FORM WITH PATIENT: ICD-10-CM

## 2020-12-28 DIAGNOSIS — O21.9 NAUSEA AND VOMITING IN PREGNANCY: ICD-10-CM

## 2020-12-28 DIAGNOSIS — M54.41 CHRONIC BILATERAL LOW BACK PAIN WITH RIGHT-SIDED SCIATICA: ICD-10-CM

## 2020-12-28 DIAGNOSIS — O99.281: ICD-10-CM

## 2020-12-28 DIAGNOSIS — O99.340 DEPRESSION AFFECTING PREGNANCY: ICD-10-CM

## 2020-12-28 DIAGNOSIS — F32.2 CURRENT SEVERE EPISODE OF MAJOR DEPRESSIVE DISORDER WITHOUT PSYCHOTIC FEATURES, UNSPECIFIED WHETHER RECURRENT (HCC): ICD-10-CM

## 2020-12-28 DIAGNOSIS — G89.29 CHRONIC BILATERAL LOW BACK PAIN WITH RIGHT-SIDED SCIATICA: ICD-10-CM

## 2020-12-28 DIAGNOSIS — F50.9 EATING DISORDER AFFECTING PREGNANCY, ANTEPARTUM: ICD-10-CM

## 2020-12-28 DIAGNOSIS — E63.9: ICD-10-CM

## 2020-12-28 DIAGNOSIS — F32.A DEPRESSION AFFECTING PREGNANCY: ICD-10-CM

## 2020-12-28 DIAGNOSIS — Z09 HOSPITAL DISCHARGE FOLLOW-UP: Primary | ICD-10-CM

## 2020-12-28 DIAGNOSIS — K21.9 GASTROESOPHAGEAL REFLUX DISEASE, UNSPECIFIED WHETHER ESOPHAGITIS PRESENT: ICD-10-CM

## 2020-12-28 DIAGNOSIS — O99.340 EATING DISORDER AFFECTING PREGNANCY, ANTEPARTUM: ICD-10-CM

## 2020-12-28 DIAGNOSIS — F43.11 POST-TRAUMATIC STRESS DISORDER, ACUTE: ICD-10-CM

## 2020-12-28 DIAGNOSIS — K80.20 GALLSTONES: ICD-10-CM

## 2020-12-28 DIAGNOSIS — F32.2 CURRENT SEVERE EPISODE OF MAJOR DEPRESSIVE DISORDER WITHOUT PSYCHOTIC FEATURES WITHOUT PRIOR EPISODE (HCC): Primary | ICD-10-CM

## 2020-12-28 PROCEDURE — 99214 OFFICE O/P EST MOD 30 MIN: CPT | Performed by: NURSE PRACTITIONER

## 2020-12-28 PROCEDURE — 99215 OFFICE O/P EST HI 40 MIN: CPT | Performed by: FAMILY MEDICINE

## 2020-12-28 RX ORDER — ZINC OXIDE 13 %
1 CREAM (GRAM) TOPICAL DAILY
Qty: 30 CAP | Refills: 1 | Status: SHIPPED | OUTPATIENT
Start: 2020-12-28 | End: 2021-08-12

## 2020-12-28 RX ORDER — PROMETHAZINE HYDROCHLORIDE 12.5 MG/1
SUPPOSITORY RECTAL
COMMUNITY
End: 2021-06-21

## 2020-12-28 RX ORDER — FLUOXETINE HYDROCHLORIDE 20 MG/1
20 CAPSULE ORAL DAILY
Qty: 30 CAP | Refills: 2 | Status: SHIPPED | OUTPATIENT
Start: 2020-12-28 | End: 2021-03-01 | Stop reason: SDUPTHER

## 2020-12-28 RX ORDER — PROCHLORPERAZINE MALEATE 10 MG
TABLET ORAL
COMMUNITY
Start: 2020-12-27 | End: 2021-06-21

## 2020-12-28 NOTE — PROGRESS NOTES
Chief Complaint   Patient presents with   [facility] Follow Up    Form Completion       HISTORY OF PRESENT ILLNESS   HPI  My first time seeing this usual patient of Yareli Coleyestephanie, being seen by me today in absence of her PCP for hospital follow up from [facility] 12/2-12/17 for Hyperemesis & Dehydration affecting pregnancy. She is a 39 y/o G14,P7,A6 at ~13 weeks gestation who was admitted to [facility] 12/2 from her OB office for recurrent, episodic N/V (related to pregnancy as well as self induced due to h/o eating disorder) with the most recent occurrence leading to severe dehydration. She had been having progressively worsening N/V and decreased po intake for weeks which lead to dehydration and a syncopal episode in her OB office on the day of admission. She was transported via EMS to [facility]. During hospital course she was followed by her OB DrMc Shahid as well as M, Nutrition in light malnutrition due to severe uncontrolled eating disorder. She was administered IVF's, IV antiemetics, and daily goodie bag. In light of her h/o severe depression, eating disorder, and h/o PTSD, she was continued on her usual medication regimen per her psychiatrist. She has been maintained on zyprexa, prozac, prazosin, and atarax per her psychiatrist Janet Sands) and continues under the care of her counselor Alberta Sandhu)    She states that her eating disorder began around the spring of this year. Her weight was up around 229 lbs  She had been restrictive eating as well as purging. She reports an approximately 80 lb weight loss over the course. But her wt went up during hospital course and she is up from 143 lbs to 154 lbs over the past 6 weeks. She has had 2 miscarriages this year, the most recent of which was in September. Then she got pregnant again shortly thereafter. Her N/V worsened since the pregnancy. Miky Haney had referred her to GI. She underwent an UGI 10-27-20 c/w GERD.   The plan was for her to have an upper endoscopy but this was deferred because of her pregancy. During her recent hospitalization she had an ABD [address] 12/4 which showed gallstones and possible 1 cm common bile duct dilation. She has an ABD MRI and MRCP done 12/6 w/o findings of biliary duct dilation or choledocholithiasis. General Surgery was consulted. Because the RUQ pain decreased, the plan is for this to be followed expectantly for now as she decides on pursuing this later in her 2nd trimester. She states that her RUQ is mild and stable at present. She has a degree of constipation but a/t her poor intake. She has no hematemesis, bloody stool or melena. GI currently has her on Protonix. She is taking Zofran, Compazine and using Phenergan Supp prn but even w/ all this her nausea is not always well controlled. She had a PICC line placed a few weeks ago. Home Health visits weekly for picc line care  OB has placed orders for pt to get prn IVF's which she doses at home a few x a week but not every day  She consumes Ensure orally, sometimes keeps it down, other times not  OB placed an order earlier today for pt to receive IV antiemetics through [facility]    OB has also placed an order for PT for her chronic low back pain and episodic R sciatica and right hip pain. She had fallen a few weeks ago and landed on her right side. An xray was deferred because of her pregnancy. She is able to ambulate and weight bear unassisted. She had the initial PT in home assessment last week and they will be starting therapy in the upcoming week. The chronic low back pain and right sciatica have been episodic over the years. 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 She lives at home w/ her fiance and children  She was functioning pretty independently but now because she has been so nauseated, weak, and in pain, she relies on her family more now.   She takes Tylenol when needed for pain but has not been taking that much lately  She is requesting a handicap placard for their car and brought in a form for this today          REVIEW OF SYMPTOMS   Review of Systems   Constitutional: Negative for chills and fever. Respiratory: Negative. Cardiovascular: Negative. Gastrointestinal: Negative for blood in stool and melena. Genitourinary: Negative. Endo/Heme/Allergies: Does not bruise/bleed easily.            PROBLEM LIST/MEDICAL HISTORY     Problem List  Date Reviewed: 12/28/2020          Codes Class Noted    Chronic bilateral low back pain with right-sided sciatica ICD-10-CM: M54.41, G89.29  ICD-9-CM: 724.2, 724.3, 338.29  12/28/2020    Overview Signed 12/28/2020  2:34 PM by Manny Melendrez MD     ~2010             Calculus of gallbladder without cholecystitis ICD-10-CM: K80.20  ICD-9-CM: 574.20  12/7/2020        Nausea and vomiting in pregnancy ICD-10-CM: O21.9  ICD-9-CM: 643.90  12/2/2020        Eating disorder affecting pregnancy, antepartum ICD-10-CM: O99.340, F50.9  ICD-9-CM: 646.83, 307.50  12/2/2020        Depression affecting pregnancy ICD-10-CM: O99.340, F32.9  ICD-9-CM: 648.40, 311  12/2/2020        MDD (major depressive disorder) ICD-10-CM: F32.9  ICD-9-CM: 296.20  9/2/2020        Post-traumatic stress disorder, acute ICD-10-CM: F43.11  ICD-9-CM: 309.81  5/3/2020        Severe obesity ([address] ICD-10-CM: E66.01  ICD-9-CM: 278.01  2/18/2020        Pregnancy ICD-10-CM: Z34.90  ICD-9-CM: V22.2  3/1/2018        Pregnant ICD-10-CM: Z34.90  ICD-9-CM: V22.2  3/1/2018        Cervical incompetence ICD-10-CM: N88.3  ICD-9-CM: 622.5  9/29/2017        Incompetent cervix ICD-10-CM: N88.3  ICD-9-CM: 622.5  9/29/2017        Sleep disturbance ICD-10-CM: G47.9  ICD-9-CM: 780.50  4/6/2017        Non compliance w medication regimen ICD-10-CM: Z91.14  ICD-9-CM: V15.81  4/6/2017        MDD (major depressive disorder), recurrent episode, moderate ([facility] [address] (Chronic) ICD-10-CM: F33.1  ICD-9-CM: 296.32  9/9/2013 Unspecified essential hypertension ICD-10-CM: I10  ICD-9-CM: 401.9  2013        Back pain, chronic ICD-10-CM: M54.9, G89.29  ICD-9-CM: 724.5, 338.29  Unknown        Asthma ICD-10-CM: J45.909  ICD-9-CM: 493.90  10/8/2010        Microcytic anemia ICD-10-CM: D50.9  ICD-9-CM: 280.9  10/8/2010        Anxiety ICD-10-CM: F41.9  ICD-9-CM: 300.00  10/8/2010                  PAST SURGICAL HISTORY     Past Surgical History:   Procedure Laterality Date     DELIVERY ONLY      HX  SECTION  4/22/15    HX DILATION AND CURETTAGE      HX DILATION AND CURETTAGE  2020    HX GYN      miscarriage x 6    HX GYN      removal of left fallopian tube    HX OTHER SURGICAL      cerlcage x4, ectopic x1 1999 with removal of left fallopian tube    HX OTHER SURGICAL      cerclage w/ current pregnancy. Removed 18         MEDICATIONS     Current Outpatient Medications   Medication Sig    Lacto. acidophilus-Bif. animalis (Probiotic) 5 billion cell cpSP Take 1 Cap by mouth daily.  FLUoxetine (PROzac) 20 mg capsule Take 1 Cap by mouth daily. Take with 40 mg cap for total daily dose of 60 mg.  prochlorperazine (COMPAZINE) 10 mg tablet     promethazine (Phenergan) 12.5 mg suppository     ondansetron (ZOFRAN ODT) 4 mg disintegrating tablet Take 1 Tab by mouth every six (6) hours as needed for Nausea or Vomiting.  OLANZapine (ZyPREXA) 5 mg tablet Take 1 Tab by mouth nightly.  progesterone (PROMETRIUM) 200 mg capsule Take 200 mg by mouth daily.  cholecalciferol (Vitamin D3) 25 mcg (1,000 unit) cap Take  by mouth daily.  pyridoxine, vitamin B6, (Vitamin B-6) 100 mg tablet Take 100 mg by mouth daily.  hydrOXYzine HCL (ATARAX) 50 mg tablet Take 1 Tab by mouth four (4) times daily.  FLUoxetine (PROzac) 40 mg capsule Take 1 Cap by mouth daily.  doxylamine succinate (UNISOM) 25 mg tablet Take 1 Tab by mouth nightly as needed for Insomnia.     OTHER 1 Ensure Enlive food supplement drink PO TID    calcium carbonate (Tums) 200 mg calcium (500 mg) chew Take 1 Tab by mouth two (2) times daily as needed for Other (reflux).  polyethylene glycol (MIRALAX) 17 gram packet Take 1 Packet by mouth daily as needed for Constipation.  OTHER 1 ensure pudding PO daily for lunch.  prenatal vit-iron fumarate-fa (PRENATAL PLUS with IRON) 28 mg iron- 800 mcg tab     food supplemt, lactose-reduced (Ensure High Protein) liqd Take 237 mL by mouth three (3) times daily.  prazosin (MINIPRESS) 2 mg capsule Take 1 Cap by mouth nightly. Indications: posttraumatic stress syndrome    pantoprazole (PROTONIX) 40 mg tablet Take 1 Tab by mouth Daily (before breakfast). Indications: gastroesophageal reflux disease    0.9% sodium chloride solp 1,000 mL with thiamine 100 mg/mL soln 406 mg, folic acid 5 mg/mL soln 1 mg 1 Bag by IntraVENous route every twenty-four (24) hours. No current facility-administered medications for this visit.            ALLERGIES     Allergies   Allergen Reactions    Labetalol Hives    Ceclor [Cefaclor] Unknown (comments)    Pcn [Penicillins] Hives    Septra [Sulfamethoprim Ds] Unknown (comments)          SOCIAL HISTORY     Social History     Socioeconomic History    Marital status: SINGLE     Spouse name: Not on file    Number of children: 9    Years of education: Not on file    Highest education level: 12th grade   Social Needs    Financial resource strain: Not very hard    Food insecurity     Worry: Never true     Inability: Never true    Transportation needs     Medical: No     Non-medical: No   Tobacco Use    Smoking status: Never Smoker    Smokeless tobacco: Never Used   Substance and Sexual Activity    Alcohol use: Not Currently     Frequency: Monthly or less     Drinks per session: 1 or 2     Binge frequency: Never    Drug use: No    Sexual activity: Yes     Partners: Male   Social History Narrative    Carolyn Zuniga lives with boyfriend (fiance), Kortney Party,  reportedly with alcohol dependence. She was raised by her mother. She has no siblings. Not working. She is supported financially by the childrens' father and public support. She has no hobbies outside of her children. She has a 12th grade education and no legal entanglements. 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 IMMUNIZATIONS  Immunization History   Administered Date(s) Administered    (RETIRED) Pneumococcal Vaccine (Unspecified Type) 09/14/2012    Influenza Vaccine (Quad) PF (>6 Mo Flulaval, Fluarix, and >3 Yrs Afluria, Fluzone 33534) 09/16/2020    Influenza Vaccine PF 10/22/2013    TDAP Vaccine 09/14/2012    Tdap 03/02/2018         FAMILY HISTORY     Family History   Problem Relation Age of Onset   [facility] Arthritis-rheumatoid Mother     Asthma Mother     No Known Problems Father     No Known Problems Maternal Grandmother     No Known Problems Maternal Grandfather     No Known Problems Paternal Grandmother     No Known Problems Paternal Grandfather     Asthma Son     Alcohol abuse Neg Hx     Arthritis-osteo Neg Hx     Bleeding Prob Neg Hx     Cancer Neg Hx     Diabetes Neg Hx     Elevated Lipids Neg Hx     Headache Neg Hx     Heart Disease Neg Hx     Hypertension Neg Hx     Lung Disease Neg Hx     Migraines Neg Hx     Psychiatric Disorder Neg Hx     Stroke Neg Hx     Mental Retardation Neg Hx     Anesth Problems Neg Hx          VITALS     Visit Vitals  /60 (BP 1 Location: Right arm, BP Patient Position: Sitting)   Pulse 68   Temp 98.2 °F (36.8 °C) (Temporal)   Resp 16   Ht 5' 4\" (1.626 m)   Wt 154 lb 12.8 oz (70.2 kg)   LMP  (LMP Unknown)   SpO2 100%   BMI 26.57 kg/m²          PHYSICAL EXAMINATION   Physical Exam  Vitals signs reviewed. Constitutional:       General: She is not in acute distress. Appearance: She is not ill-appearing.    Cardiovascular:      Rate and Rhythm: Normal rate and regular rhythm. Heart sounds: Normal heart sounds. Pulmonary:      Effort: Pulmonary effort is normal.      Breath sounds: Normal breath sounds. Abdominal:      Tenderness: There is no abdominal tenderness. Skin:     General: Skin is warm and dry. Neurological:      General: No focal deficit present. Mental Status: She is alert. Motor: Motor function is intact. Coordination: Coordination is intact. Gait: Gait is intact. Psychiatric:         Attention and Perception: Attention normal.         Mood and Affect: Affect is flat. Behavior: Behavior normal. Behavior is cooperative. DIAGNOSTIC DATA         LABORATORY DATA     Results for orders placed or performed during the hospital encounter of 12/02/20   URINE CULTURE HOLD SAMPLE    Specimen: Serum; Urine   Result Value Ref Range    Urine culture hold        Urine on hold in Microbiology dept for 2 days. If unpreserved urine is submitted, it cannot be used for addtional testing after 24 hours, recollection will be required. CBC WITH AUTOMATED DIFF   Result Value Ref Range    WBC 9.6 3.6 - 11.0 K/uL    RBC 4.58 3.80 - 5.20 M/uL    HGB 10.6 (L) 11.5 - 16.0 g/dL    HCT 33.1 (L) 35.0 - 47.0 %    MCV 72.3 (L) 80.0 - 99.0 FL    MCH 23.1 (L) 26.0 - 34.0 PG    MCHC 32.0 30.0 - 36.5 g/dL    RDW 15.3 (H) 11.5 - 14.5 %    PLATELET 753 560 - 864 K/uL    MPV 12.4 8.9 - 12.9 FL    NRBC 0.0 0  WBC    ABSOLUTE NRBC 0.00 0.00 - 0.01 K/uL    NEUTROPHILS 73 32 - 75 %    LYMPHOCYTES 21 12 - 49 %    MONOCYTES 6 5 - 13 %    EOSINOPHILS 0 0 - 7 %    BASOPHILS 0 0 - 1 %    IMMATURE GRANULOCYTES 0 0.0 - 0.5 %    ABS. NEUTROPHILS 6.9 1.8 - 8.0 K/UL    ABS. LYMPHOCYTES 2.0 0.8 - 3.5 K/UL    ABS. MONOCYTES 0.6 0.0 - 1.0 K/UL    ABS. EOSINOPHILS 0.0 0.0 - 0.4 K/UL    ABS. BASOPHILS 0.0 0.0 - 0.1 K/UL    ABS. IMM.  GRANS. 0.0 0.00 - 0.04 K/UL    DF AUTOMATED     METABOLIC PANEL, COMPREHENSIVE   Result Value Ref Range    Sodium 139 136 - 145 mmol/L    Potassium 4.1 3.5 - 5.1 mmol/L    Chloride 108 97 - 108 mmol/L    CO2 22 21 - 32 mmol/L    Anion gap 9 5 - 15 mmol/L    Glucose 68 65 - 100 mg/dL    BUN 11 6 - 20 MG/DL    Creatinine 0.72 0.55 - 1.02 MG/DL    BUN/Creatinine ratio 15 12 - 20      GFR est AA >60 >60 ml/min/1.73m2    GFR est non-AA >60 >60 ml/min/1.73m2    Calcium 9.4 8.5 - 10.1 MG/DL    Bilirubin, total 0.6 0.2 - 1.0 MG/DL    ALT (SGPT) 15 12 - 78 U/L    AST (SGOT) 17 15 - 37 U/L    Alk.  phosphatase 47 45 - 117 U/L    Protein, total 8.2 6.4 - 8.2 g/dL    Albumin 3.8 3.5 - 5.0 g/dL    Globulin 4.4 (H) 2.0 - 4.0 g/dL    A-G Ratio 0.9 (L) 1.1 - 2.2     URINALYSIS W/MICROSCOPIC   Result Value Ref Range    Color YELLOW/STRAW      Appearance CLEAR CLEAR      Specific gravity 1.020 1.003 - 1.030      pH (UA) 8.0 5.0 - 8.0      Protein 30 (A) NEG mg/dL    Glucose Negative NEG mg/dL    Ketone >80 (A) NEG mg/dL    Bilirubin Negative NEG      Blood Negative NEG      Urobilinogen 0.2 0.2 - 1.0 EU/dL    Nitrites Negative NEG      Leukocyte Esterase Negative NEG      WBC 0-4 0 - 4 /hpf    RBC 0-5 0 - 5 /hpf    Epithelial cells FEW FEW /lpf    Bacteria Negative NEG /hpf    Mucus TRACE (A) NEG /lpf   MAGNESIUM   Result Value Ref Range    Magnesium 2.2 1.6 - 2.4 mg/dL   PHOSPHORUS   Result Value Ref Range    Phosphorus 3.6 2.6 - 4.7 MG/DL   TSH 3RD GENERATION   Result Value Ref Range    TSH 0.25 (L) 0.36 - 5.57 uIU/mL   METABOLIC PANEL, COMPREHENSIVE   Result Value Ref Range    Sodium 140 136 - 145 mmol/L    Potassium 3.4 (L) 3.5 - 5.1 mmol/L    Chloride 110 (H) 97 - 108 mmol/L    CO2 22 21 - 32 mmol/L    Anion gap 8 5 - 15 mmol/L    Glucose 79 65 - 100 mg/dL    BUN 2 (L) 6 - 20 MG/DL    Creatinine 0.48 (L) 0.55 - 1.02 MG/DL    BUN/Creatinine ratio 4 (L) 12 - 20      GFR est AA >60 >60 ml/min/1.73m2    GFR est non-AA >60 >60 ml/min/1.73m2    Calcium 8.0 (L) 8.5 - 10.1 MG/DL    Bilirubin, total 0.4 0.2 - 1.0 MG/DL    ALT (SGPT) 13 12 - 78 U/L    AST (SGOT) 12 (L) 15 - 37 U/L    Alk. phosphatase 38 (L) 45 - 117 U/L    Protein, total 5.7 (L) 6.4 - 8.2 g/dL    Albumin 2.5 (L) 3.5 - 5.0 g/dL    Globulin 3.2 2.0 - 4.0 g/dL    A-G Ratio 0.8 (L) 1.1 - 2.2     AMYLASE   Result Value Ref Range    Amylase 44 25 - 115 U/L   LIPASE   Result Value Ref Range    Lipase 54 (L) 73 - 393 U/L   CBC W/O DIFF   Result Value Ref Range    WBC 6.7 3.6 - 11.0 K/uL    RBC 3.67 (L) 3.80 - 5.20 M/uL    HGB 8.4 (L) 11.5 - 16.0 g/dL    HCT 26.6 (L) 35.0 - 47.0 %    MCV 72.5 (L) 80.0 - 99.0 FL    MCH 22.9 (L) 26.0 - 34.0 PG    MCHC 31.6 30.0 - 36.5 g/dL    RDW 15.0 (H) 11.5 - 14.5 %    PLATELET 946 944 - 103 K/uL    MPV 12.4 8.9 - 12.9 FL    NRBC 0.0 0  WBC    ABSOLUTE NRBC 0.00 0.00 - 0.01 K/uL   IRON PROFILE   Result Value Ref Range    Iron 40 35 - 150 ug/dL    TIBC 252 250 - 450 ug/dL    Iron % saturation 16 (L) 20 - 50 %   CBC W/O DIFF   Result Value Ref Range    WBC 7.5 3.6 - 11.0 K/uL    RBC 4.32 3.80 - 5.20 M/uL    HGB 10.0 (L) 11.5 - 16.0 g/dL    HCT 31.5 (L) 35.0 - 47.0 %    MCV 72.9 (L) 80.0 - 99.0 FL    MCH 23.1 (L) 26.0 - 34.0 PG    MCHC 31.7 30.0 - 36.5 g/dL    RDW 15.1 (H) 11.5 - 14.5 %    PLATELET 626 503 - 354 K/uL    MPV 13.0 (H) 8.9 - 12.9 FL    NRBC 0.0 0  WBC    ABSOLUTE NRBC 0.00 0.00 - 6.97 K/uL   METABOLIC PANEL, COMPREHENSIVE   Result Value Ref Range    Sodium 139 136 - 145 mmol/L    Potassium 3.7 3.5 - 5.1 mmol/L    Chloride 111 (H) 97 - 108 mmol/L    CO2 20 (L) 21 - 32 mmol/L    Anion gap 8 5 - 15 mmol/L    Glucose 61 (L) 65 - 100 mg/dL    BUN 3 (L) 6 - 20 MG/DL    Creatinine 0.62 0.55 - 1.02 MG/DL    BUN/Creatinine ratio 5 (L) 12 - 20      GFR est AA >60 >60 ml/min/1.73m2    GFR est non-AA >60 >60 ml/min/1.73m2    Calcium 8.3 (L) 8.5 - 10.1 MG/DL    Bilirubin, total 0.4 0.2 - 1.0 MG/DL    ALT (SGPT) 25 12 - 78 U/L    AST (SGOT) 32 15 - 37 U/L    Alk.  phosphatase 48 45 - 117 U/L    Protein, total 7.2 6.4 - 8.2 g/dL Albumin 3.0 (L) 3.5 - 5.0 g/dL    Globulin 4.2 (H) 2.0 - 4.0 g/dL    A-G Ratio 0.7 (L) 1.1 - 2.2     CBC W/O DIFF   Result Value Ref Range    WBC 7.0 3.6 - 11.0 K/uL    RBC 4.00 3.80 - 5.20 M/uL    HGB 9.4 (L) 11.5 - 16.0 g/dL    HCT 28.9 (L) 35.0 - 47.0 %    MCV 72.3 (L) 80.0 - 99.0 FL    MCH 23.5 (L) 26.0 - 34.0 PG    MCHC 32.5 30.0 - 36.5 g/dL    RDW 15.1 (H) 11.5 - 14.5 %    PLATELET 962 260 - 387 K/uL    MPV 11.9 8.9 - 12.9 FL    NRBC 0.0 0  WBC    ABSOLUTE NRBC 0.00 0.00 - 8.93 K/uL   METABOLIC PANEL, COMPREHENSIVE   Result Value Ref Range    Sodium 139 136 - 145 mmol/L    Potassium 3.3 (L) 3.5 - 5.1 mmol/L    Chloride 111 (H) 97 - 108 mmol/L    CO2 21 21 - 32 mmol/L    Anion gap 7 5 - 15 mmol/L    Glucose 89 65 - 100 mg/dL    BUN 3 (L) 6 - 20 MG/DL    Creatinine 0.52 (L) 0.55 - 1.02 MG/DL    BUN/Creatinine ratio 6 (L) 12 - 20      GFR est AA >60 >60 ml/min/1.73m2    GFR est non-AA >60 >60 ml/min/1.73m2    Calcium 8.1 (L) 8.5 - 10.1 MG/DL    Bilirubin, total 0.4 0.2 - 1.0 MG/DL    ALT (SGPT) 20 12 - 78 U/L    AST (SGOT) 12 (L) 15 - 37 U/L    Alk. phosphatase 43 (L) 45 - 117 U/L    Protein, total 6.3 (L) 6.4 - 8.2 g/dL    Albumin 2.9 (L) 3.5 - 5.0 g/dL    Globulin 3.4 2.0 - 4.0 g/dL    A-G Ratio 0.9 (L) 1.1 - 2.2     METABOLIC PANEL, COMPREHENSIVE   Result Value Ref Range    Sodium 138 136 - 145 mmol/L    Potassium 3.8 3.5 - 5.1 mmol/L    Chloride 110 (H) 97 - 108 mmol/L    CO2 22 21 - 32 mmol/L    Anion gap 6 5 - 15 mmol/L    Glucose 79 65 - 100 mg/dL    BUN 2 (L) 6 - 20 MG/DL    Creatinine 0.53 (L) 0.55 - 1.02 MG/DL    BUN/Creatinine ratio 4 (L) 12 - 20      GFR est AA >60 >60 ml/min/1.73m2    GFR est non-AA >60 >60 ml/min/1.73m2    Calcium 8.1 (L) 8.5 - 10.1 MG/DL    Bilirubin, total 0.3 0.2 - 1.0 MG/DL    ALT (SGPT) 17 12 - 78 U/L    AST (SGOT) 10 (L) 15 - 37 U/L    Alk.  phosphatase 46 45 - 117 U/L    Protein, total 5.8 (L) 6.4 - 8.2 g/dL    Albumin 2.6 (L) 3.5 - 5.0 g/dL    Globulin 3.2 2.0 - 4.0 g/dL    A-G Ratio 0.8 (L) 1.1 - 2.2     CBC W/O DIFF   Result Value Ref Range    WBC 6.2 3.6 - 11.0 K/uL    RBC 3.86 3.80 - 5.20 M/uL    HGB 9.0 (L) 11.5 - 16.0 g/dL    HCT 28.3 (L) 35.0 - 47.0 %    MCV 73.3 (L) 80.0 - 99.0 FL    MCH 23.3 (L) 26.0 - 34.0 PG    MCHC 31.8 30.0 - 36.5 g/dL    RDW 15.1 (H) 11.5 - 14.5 %    PLATELET 324 346 - 351 K/uL    MPV 12.7 8.9 - 12.9 FL    NRBC 0.0 0  WBC    ABSOLUTE NRBC 0.00 0.00 - 0.01 K/uL   CORTISOL   Result Value Ref Range    Cortisol, random 4.7 ug/dL   T4, FREE   Result Value Ref Range    T4, Free 0.9 0.8 - 1.5 NG/DL   HEAVY METALS PROFILE I, BLOOD   Result Value Ref Range    Race BLACK      NO      Sample source BLOOD      Test purpose INITIAL     Lead, blood <1 0 - 4 ug/dL    Arsenic 7 2 - 23 ug/L    Mercury, blood 1.2 0.0 - 82.8 ug/L   METABOLIC PANEL, BASIC   Result Value Ref Range    Sodium 138 136 - 145 mmol/L    Potassium 3.7 3.5 - 5.1 mmol/L    Chloride 107 97 - 108 mmol/L    CO2 25 21 - 32 mmol/L    Anion gap 6 5 - 15 mmol/L    Glucose 76 65 - 100 mg/dL    BUN 5 (L) 6 - 20 MG/DL    Creatinine 0.53 (L) 0.55 - 1.02 MG/DL    BUN/Creatinine ratio 9 (L) 12 - 20      GFR est AA >60 >60 ml/min/1.73m2    GFR est non-AA >60 >60 ml/min/1.73m2    Calcium 8.1 (L) 8.5 - 10.1 MG/DL   CBC W/O DIFF   Result Value Ref Range    WBC 5.3 3.6 - 11.0 K/uL    RBC 2.47 (L) 3.80 - 5.20 M/uL    HGB 5.9 (LL) 11.5 - 16.0 g/dL    HCT 18.1 (L) 35.0 - 47.0 %    MCV 73.3 (L) 80.0 - 99.0 FL    MCH 23.9 (L) 26.0 - 34.0 PG    MCHC 32.6 30.0 - 36.5 g/dL    RDW 14.8 (H) 11.5 - 14.5 %    PLATELET 023 (L) 543 - 400 K/uL    NRBC 0.0 0  WBC    ABSOLUTE NRBC 0.00 0.00 - 0.01 K/uL   MAGNESIUM   Result Value Ref Range    Magnesium 1.7 1.6 - 2.4 mg/dL   PHOSPHORUS   Result Value Ref Range    Phosphorus 4.1 2.6 - 4.7 MG/DL   CBC WITH AUTOMATED DIFF   Result Value Ref Range    WBC 7.3 3.6 - 11.0 K/uL    RBC 3.40 (L) 3.80 - 5.20 M/uL    HGB 8.0 (L) 11.5 - 16.0 g/dL    HCT 24.8 (L) 35.0 - 47.0 %    MCV 72.9 (L) 80.0 - 99.0 FL    MCH 23.5 (L) 26.0 - 34.0 PG    MCHC 32.3 30.0 - 36.5 g/dL    RDW 14.8 (H) 11.5 - 14.5 %    PLATELET 015 209 - 513 K/uL    NRBC 0.0 0  WBC    ABSOLUTE NRBC 0.00 0.00 - 0.01 K/uL    NEUTROPHILS 69 32 - 75 %    LYMPHOCYTES 22 12 - 49 %    MONOCYTES 7 5 - 13 %    EOSINOPHILS 2 0 - 7 %    BASOPHILS 0 0 - 1 %    IMMATURE GRANULOCYTES 0 0.0 - 0.5 %    ABS. NEUTROPHILS 5.0 1.8 - 8.0 K/UL    ABS. LYMPHOCYTES 1.6 0.8 - 3.5 K/UL    ABS. MONOCYTES 0.5 0.0 - 1.0 K/UL    ABS. EOSINOPHILS 0.1 0.0 - 0.4 K/UL    ABS. BASOPHILS 0.0 0.0 - 0.1 K/UL    ABS. IMM. GRANS. 0.0 0.00 - 0.04 K/UL    DF AUTOMATED     IRON PROFILE   Result Value Ref Range    Iron 15 (L) 35 - 150 ug/dL    TIBC 239 (L) 250 - 450 ug/dL    Iron % saturation 6 (L) 20 - 50 %   FERRITIN   Result Value Ref Range    Ferritin 11 (L) 26 - 388 NG/ML   VITAMIN B12   Result Value Ref Range    Vitamin B12 306 193 - 986 pg/mL   HAPTOGLOBIN   Result Value Ref Range    Haptoglobin 74 30 - 200 mg/dL   LD   Result Value Ref Range     81 - 246 U/L   HEPATIC FUNCTION PANEL   Result Value Ref Range    Protein, total 5.7 (L) 6.4 - 8.2 g/dL    Albumin 2.6 (L) 3.5 - 5.0 g/dL    Globulin 3.1 2.0 - 4.0 g/dL    A-G Ratio 0.8 (L) 1.1 - 2.2      Bilirubin, total 0.2 0.2 - 1.0 MG/DL    Bilirubin, direct <0.1 0.0 - 0.2 MG/DL    Alk.  phosphatase 51 45 - 117 U/L    AST (SGOT) 6 (L) 15 - 37 U/L    ALT (SGPT) 10 (L) 12 - 78 U/L   RETICULOCYTE COUNT   Result Value Ref Range    Reticulocyte count 1.0 0.7 - 2.1 %    Absolute Retic Cnt. 0.0339 0.0164 - 0.0776 M/ul   PERIPHERAL SMEAR   Result Value Ref Range    PERIPHERAL SMEAR (NOTE)    METABOLIC PANEL, COMPREHENSIVE   Result Value Ref Range    Sodium 139 136 - 145 mmol/L    Potassium 3.8 3.5 - 5.1 mmol/L    Chloride 108 97 - 108 mmol/L    CO2 24 21 - 32 mmol/L    Anion gap 7 5 - 15 mmol/L    Glucose 99 65 - 100 mg/dL    BUN 6 6 - 20 MG/DL    Creatinine 0.55 0.55 - 1.02 MG/DL    BUN/Creatinine ratio 11 (L) 12 - 20      GFR est AA >60 >60 ml/min/1.73m2    GFR est non-AA >60 >60 ml/min/1.73m2    Calcium 8.3 (L) 8.5 - 10.1 MG/DL    Bilirubin, total 0.1 (L) 0.2 - 1.0 MG/DL    ALT (SGPT) 9 (L) 12 - 78 U/L    AST (SGOT) 7 (L) 15 - 37 U/L    Alk. phosphatase 54 45 - 117 U/L    Protein, total 6.3 (L) 6.4 - 8.2 g/dL    Albumin 2.5 (L) 3.5 - 5.0 g/dL    Globulin 3.8 2.0 - 4.0 g/dL    A-G Ratio 0.7 (L) 1.1 - 2.2     MAGNESIUM   Result Value Ref Range    Magnesium 1.7 1.6 - 2.4 mg/dL   PHOSPHORUS   Result Value Ref Range    Phosphorus 3.0 2.6 - 4.7 MG/DL   METABOLIC PANEL, COMPREHENSIVE   Result Value Ref Range    Sodium 140 136 - 145 mmol/L    Potassium 3.7 3.5 - 5.1 mmol/L    Chloride 108 97 - 108 mmol/L    CO2 25 21 - 32 mmol/L    Anion gap 7 5 - 15 mmol/L    Glucose 78 65 - 100 mg/dL    BUN 5 (L) 6 - 20 MG/DL    Creatinine 0.42 (L) 0.55 - 1.02 MG/DL    BUN/Creatinine ratio 12 12 - 20      GFR est AA >60 >60 ml/min/1.73m2    GFR est non-AA >60 >60 ml/min/1.73m2    Calcium 8.1 (L) 8.5 - 10.1 MG/DL    Bilirubin, total 0.2 0.2 - 1.0 MG/DL    ALT (SGPT) 8 (L) 12 - 78 U/L    AST (SGOT) 5 (L) 15 - 37 U/L    Alk.  phosphatase 44 (L) 45 - 117 U/L    Protein, total 5.7 (L) 6.4 - 8.2 g/dL    Albumin 2.2 (L) 3.5 - 5.0 g/dL    Globulin 3.5 2.0 - 4.0 g/dL    A-G Ratio 0.6 (L) 1.1 - 2.2     MAGNESIUM   Result Value Ref Range    Magnesium 1.7 1.6 - 2.4 mg/dL   PHOSPHORUS   Result Value Ref Range    Phosphorus 4.1 2.6 - 4.7 MG/DL   MAGNESIUM   Result Value Ref Range    Magnesium 1.6 1.6 - 2.4 mg/dL   PHOSPHORUS   Result Value Ref Range    Phosphorus 3.6 2.6 - 4.7 MG/DL   MAGNESIUM   Result Value Ref Range    Magnesium 1.8 1.6 - 2.4 mg/dL   PHOSPHORUS   Result Value Ref Range    Phosphorus 4.6 2.6 - 4.7 MG/DL   MAGNESIUM   Result Value Ref Range    Magnesium 1.9 1.6 - 2.4 mg/dL   PHOSPHORUS   Result Value Ref Range    Phosphorus 4.4 2.6 - 4.7 MG/DL   METABOLIC PANEL, COMPREHENSIVE   Result Value Ref Range    Sodium 136 136 - 145 mmol/L Potassium 3.9 3.5 - 5.1 mmol/L    Chloride 104 97 - 108 mmol/L    CO2 27 21 - 32 mmol/L    Anion gap 5 5 - 15 mmol/L    Glucose 70 65 - 100 mg/dL    BUN 8 6 - 20 MG/DL    Creatinine 0.43 (L) 0.55 - 1.02 MG/DL    BUN/Creatinine ratio 19 12 - 20      GFR est AA >60 >60 ml/min/1.73m2    GFR est non-AA >60 >60 ml/min/1.73m2    Calcium 8.4 (L) 8.5 - 10.1 MG/DL    Bilirubin, total 0.2 0.2 - 1.0 MG/DL    ALT (SGPT) 11 (L) 12 - 78 U/L    AST (SGOT) 10 (L) 15 - 37 U/L    Alk. phosphatase 44 (L) 45 - 117 U/L    Protein, total 6.0 (L) 6.4 - 8.2 g/dL    Albumin 2.5 (L) 3.5 - 5.0 g/dL    Globulin 3.5 2.0 - 4.0 g/dL    A-G Ratio 0.7 (L) 1.1 - 2.2     CBC WITH AUTOMATED DIFF   Result Value Ref Range    WBC 10.5 3.6 - 11.0 K/uL    RBC 3.87 3.80 - 5.20 M/uL    HGB 9.0 (L) 11.5 - 16.0 g/dL    HCT 28.8 (L) 35.0 - 47.0 %    MCV 74.4 (L) 80.0 - 99.0 FL    MCH 23.3 (L) 26.0 - 34.0 PG    MCHC 31.3 30.0 - 36.5 g/dL    RDW 17.3 (H) 11.5 - 14.5 %    PLATELET  897 - 096 K/uL     UNABLE TO REPORT ACCURATE COUNT DUE TO PLATELET AGGREGATION, HOWEVER, PLATELETS APPEAR NORMAL IN NUMBER ON SMEAR. PLEASE RESUBMIT SODIUM CITRATE (BLUE) AND EDTA (LAVENDER) TUBES FOR HEMATOLOGICAL TESTING. NRBC 0.0 0  WBC    ABSOLUTE NRBC 0.00 0.00 - 0.01 K/uL    NEUTROPHILS 75 32 - 75 %    LYMPHOCYTES 16 12 - 49 %    MONOCYTES 7 5 - 13 %    EOSINOPHILS 1 0 - 7 %    BASOPHILS 0 0 - 1 %    IMMATURE GRANULOCYTES 1 (H) 0.0 - 0.5 %    ABS. NEUTROPHILS 7.9 1.8 - 8.0 K/UL    ABS. LYMPHOCYTES 1.7 0.8 - 3.5 K/UL    ABS. MONOCYTES 0.7 0.0 - 1.0 K/UL    ABS. EOSINOPHILS 0.1 0.0 - 0.4 K/UL    ABS. BASOPHILS 0.0 0.0 - 0.1 K/UL    ABS. IMM.  GRANS. 0.1 (H) 0.00 - 0.04 K/UL    DF SMEAR SCANNED      RBC COMMENTS ANISOCYTOSIS  2+        RBC COMMENTS OVALOCYTES  1+       MAGNESIUM   Result Value Ref Range    Magnesium 2.0 1.6 - 2.4 mg/dL   PHOSPHORUS   Result Value Ref Range    Phosphorus 4.7 2.6 - 4.7 MG/DL   EKG, 12 LEAD, INITIAL   Result Value Ref Range    Ventricular Rate 64 BPM    Atrial Rate 64 BPM    P-R Interval 140 ms    QRS Duration 88 ms    Q-T Interval 406 ms    QTC Calculation (Bezet) 418 ms    Calculated P Axis 63 degrees    Calculated R Axis 28 degrees    Calculated T Axis 41 degrees    Diagnosis       Normal sinus rhythm  When compared with ECG of 25-SEP-2020 08:50,  No significant change was found  Confirmed by Catrachita Adorno ([id number]) on 12/3/2020 12:41:39 PM     EKG, 12 LEAD, INITIAL   Result Value Ref Range    Ventricular Rate 59 BPM    Atrial Rate 59 BPM    P-R Interval 142 ms    QRS Duration 88 ms    Q-T Interval 420 ms    QTC Calculation (Bezet) 415 ms    Calculated P Axis 27 degrees    Calculated R Axis 18 degrees    Calculated T Axis 26 degrees    Diagnosis       Sinus bradycardia  Otherwise normal ECG  When compared with ECG of 02-DEC-2020 17:50,  No significant change was found  Confirmed by Shi Rogel ([id number]) on 12/4/2020 12:09:59 PM     DIRECT ANA   Result Value Ref Range    ROCÍO Poly NEG           ASSESSMENT & PLAN       ICD-10-CM ICD-9-CM    1. Hospital discharge follow-up [address] [place] [address] 12/2-12/17/20 Z09 V67.59    2. Nausea and vomiting in pregnancy  O21.9 643.90    3. Poor nutritional intake affecting pregnancy in first trimester  O99.281 648.93     E63.9 269.9    4. Eating disorder affecting pregnancy, antepartum  O99.340 646.83     F50.9 307.50    5. Current severe episode of major depressive disorder without psychotic features, unspecified whether recurrent ([address]  F32.2 296.23    6. Depression affecting pregnancy  O99.340 648.40     F32.9 311    7. Gastroesophageal reflux disease, unspecified whether esophagitis present  K21.9 530.81    8. Gallstones  K80.20 574.20    9. Chronic bilateral low back pain with right-sided sciatica  M54.41 724.2     G89.29 724.3      338.29    10.  Encounter for completion of form with patient  Z02.Lexus V68.89        Currently on regimen of oral Protonix, oral Zofran, oral Compazine and rectal Phenergan Supp prn   She had a PICC line placed a few weeks ago and getting Home Health visits weekly for picc line care  OB has placed orders for patient to get prn IVF's which she doses at home a few x a week but not every day  She consumes Ensure and a bland diet orally but small amounts and w/ variable tolerability  OB placed an order earlier today for patient to receive IV antiemetics [facility]  Continue input from Nutrition. She is also being followed by GI, Dr. Kehinde Wagoner al  She underwent an UGI 10-27-20 c/w GERD and is being prescribed Protonix per GI. The plan was for her to have an upper endoscopy but this was deferred because of her pregancy. During her recent hospitalization she had an ABD [address] 12/4 which showed gallstones and possible 1 cm common bile duct dilation. She had an ABD MRI and MRCP done 12/6 w/o findings of biliary duct dilation or choledocholithiasis. General Surgery was consulted. Because the RUQ pain decreased, the plan is for this to be followed expectantly for now as she decides on pursuing cholecystectomy later in her 2nd trimester. She states that her RUQ is mild and stable at present. She is maintained on a regimen of Zyprexa, Prozac, Prazosin, and Atarax per her psychiatrist Darline Fisher) and continues under the care of her counselor Martinez Rutherford) whom she will be following w/ closely. OB has also placed an order for PT for her chronic low back pain and episodic R sciatica and right hip pain. She had the initial PT in home assessment last week and they will be starting therapy in the upcoming week. She can take plain Tylenol bid-tid prn pain. Avoid nsaids. I have completed the forms for DMV for a handicap placard    Follow up in 3 months, sooner prn.  Will follow along w/ OB, GI, Psychiatry, Nutrition, Surgery Team.

## 2020-12-28 NOTE — PROGRESS NOTES
CHIEF COMPLAINT:  Km Lechuga is a 38 y.o. female and was seen today for follow-up of psychiatric condition and psychotropic medication management. HPI:    Melyssa Moeller reports the following psychiatric symptoms by hx:  depression and anxiety. Overall symptoms have been present for months. Currently symptoms are of moderate/high severity. The symptoms occur daily. Pt reports medications are somewhat beneficial but she has not noticed significant benefit as of yet. Pt continues with severe N/V. Met with pt via video telehelath for appt today. FAMILY/SOCIAL HX: psychosocial stressors    REVIEW OF SYSTEMS:  Psychiatric:  depression, anxiety  Appetite:decreased, impacted by severe N/V, treated for dehydration while hospitalized  Sleep: fitful   Neuro: no changes reported    Visit Vitals  LMP  (LMP Unknown)       Side Effects:  none    MENTAL STATUS EXAM:   Sensorium  oriented to time, place and person   Relations cooperative   Appearance:  age appropriate and casually dressed   Motor Behavior:  hypoactive and within normal limits   Speech:  monotone and soft   Thought Process: goal directed   Thought Content free of delusions and free of hallucinations   Suicidal ideations no intention   Homicidal ideations no intention   Mood:  anxious and depressed   Affect:  anxious and depressed   Memory recent  adequate   Memory remote:  adequate   Concentration:  adequate   Abstraction:  abstract   Insight:  fair and good   Reliability good and fair   Judgment:  fair and good     MEDICAL DECISION MAKING:  Problems addressed today:    ICD-10-CM ICD-9-CM    1. Current severe episode of major depressive disorder without psychotic features without prior episode [facility]  F32.2 296.23    2. Post-traumatic stress disorder, acute  F43.11 309.81    3.  DESTINI (generalized anxiety disorder)  F41.1 300.02        Assessment:   Melyssa Moeller is tolerating current medications fairly except when nausea is more severe and she can't keep meds down. responding to treatment. Symptoms are still severe at this time. Discussed current medications and dosages. Still too early for response to meds but will increase prozac to 60 mg today due to severity of symptoms. Reviewed treatment goals and target symptoms to monitor for. Discussed possible benefit of a probiotic for gut health (brain/gut axis). Reviewed importance of self care. Pt to call with any updates after visit with Dr Heather Tamayo. Plan:   1. Current Outpatient Medications   Medication Sig Dispense Refill    Lacto. acidophilus-Bif. animalis (Probiotic) 5 billion cell cpSP Take 1 Cap by mouth daily. 30 Cap 1    FLUoxetine (PROzac) 20 mg capsule Take 1 Cap by mouth daily. Take with 40 mg cap for total daily dose of 60 mg. 30 Cap 2    ondansetron (ZOFRAN ODT) 4 mg disintegrating tablet Take 1 Tab by mouth every six (6) hours as needed for Nausea or Vomiting. 30 Tab 3    0.9% sodium chloride solp 1,000 mL with thiamine 100 mg/mL soln 767 mg, folic acid 5 mg/mL soln 1 mg 1 Bag by IntraVENous route every twenty-four (24) hours. 30 Bag 2    OLANZapine (ZyPREXA) 5 mg tablet Take 1 Tab by mouth nightly. 30 Tab 1    progesterone (PROMETRIUM) 200 mg capsule Take 200 mg by mouth daily.  cholecalciferol (Vitamin D3) 25 mcg (1,000 unit) cap Take  by mouth daily.  pyridoxine, vitamin B6, (Vitamin B-6) 100 mg tablet Take 100 mg by mouth daily.  hydrOXYzine HCL (ATARAX) 50 mg tablet Take 1 Tab by mouth four (4) times daily. 90 Tab 0    FLUoxetine (PROzac) 40 mg capsule Take 1 Cap by mouth daily. 30 Cap 2    doxylamine succinate (UNISOM) 25 mg tablet Take 1 Tab by mouth nightly as needed for Insomnia. 30 Tab 2    OTHER 1 Ensure Enlive food supplement drink PO  Each 2    calcium carbonate (Tums) 200 mg calcium (500 mg) chew Take 1 Tab by mouth two (2) times daily as needed for Other (reflux).  60 Tab 1    polyethylene glycol (MIRALAX) 17 gram packet Take 1 Packet by mouth daily as needed for Constipation. 90 Each 1    OTHER 1 ensure pudding PO daily for lunch. 90 Each 3    prenatal vit-iron fumarate-fa (PRENATAL PLUS with IRON) 28 mg iron- 800 mcg tab       food supplemt, lactose-reduced (Ensure High Protein) liqd Take 237 mL by mouth three (3) times daily. 90 Can 2    prazosin (MINIPRESS) 2 mg capsule Take 1 Cap by mouth nightly. Indications: posttraumatic stress syndrome 30 Cap 0    pantoprazole (PROTONIX) 40 mg tablet Take 1 Tab by mouth Daily (before breakfast). Indications: gastroesophageal reflux disease 30 Tab 0          medication changes made today: cont olanzapine, inc prozac 60 mg, cont hydroxyzine, add probiotic    2. Counseling and coordination of care including instructions for treatment, risks/benefits, risk factor reduction and patient/family education. She agrees with the plan. Patient instructed to call with any side effects, questions or issues. 3.    Follow-up and Dispositions    · Return in about 2 weeks (around 1/11/2021). Milagro Woodward, who was evaluated through a synchronous (real-time) audio-video encounter, and/or her healthcare decision maker, is aware that it is a billable service, with coverage as determined by her insurance carrier. She provided verbal consent to proceed: Yes, and patient identification was verified. It was conducted pursuant to the emergency declaration under the [address] [place] [address], [address] [place] [address] authority and the [employer] Act. A caregiver was present when appropriate. Ability to conduct physical exam was limited. I was in the office. The patient was at home.         12/28/2020  Dwight Enrique NP

## 2020-12-28 NOTE — PROGRESS NOTES
Chief Complaint   Patient presents with   [facility] Follow Up     1. Have you been to the ER, urgent care clinic since your last visit? Hospitalized since your last visit? Yes Where: [address] [place] [address] 12/2020    2. Have you seen or consulted any other health care providers outside of the [address] [place] [address] since your last visit? Include any pap smears or colon screening.  No

## 2020-12-29 ENCOUNTER — HOSPITAL ENCOUNTER (OUTPATIENT)
Dept: NUTRITION | Age: 35
Discharge: HOME | End: 2020-12-29
Payer: MEDICAID

## 2020-12-29 DIAGNOSIS — O99.340 EATING DISORDER AFFECTING PREGNANCY, ANTEPARTUM: ICD-10-CM

## 2020-12-29 DIAGNOSIS — F50.9 EATING DISORDER AFFECTING PREGNANCY, ANTEPARTUM: ICD-10-CM

## 2020-12-29 PROCEDURE — 97803 MED NUTRITION INDIV SUBSEQ: CPT | Performed by: DIETITIAN, REGISTERED

## 2020-12-29 NOTE — PROGRESS NOTES
This note will not be viewable in [employer] for the following reason(s). Likely risk of substantial harm from self harm - eating disorder    NUTRITION  FOLLOW-UP TREATMENT NOTE  Patient Name: Ashish Seals         Date: 2020  : 1985    YES Patient  Verified  Diagnosis: Eating Disorder unspecified   In time:   9:00am          Out time:  9:30am   Total Treatment Time (min):  30     SUBJECTIVE/ASSESSMENT  Current Wt: 152.0 Previous Wt: 143. 2 Wt Change: +8.8     Initial Wt: 145.4 Total Wt change: +6.6 Height: 64     Changes in medication or medical history? Any new allergies, surgeries or procedures? YES/    If yes, update Summary List   Pt admitted to ED 20 after passing out at [facility] office. Positive for dehydration, abdominal pains, nausea and vomiting. Pt seen by Nutrition in hospital. Provided IV fluids, antiemetics, ensure supplements and daily goodie bag. Weight gain while in hospital from 143# to 154#. Increased oral intake noted by RDs. Noted at 40% of breakfast, 100% of lunch. During hospitalization, findings of gallstones. No surgery planned at this time. May pursue treatment later in 2nd trimester. Home health continues to visit weekly for picc line care. Started PT for lower back pain. Nutrition Diagnosis        Diagnosis Status: New: inadequate oral intake R/T early satiety, Nausea, vomiting secondary to diagnosis of  hyperemesis gravitas and eating disorder AEB intake of 0-10%, weight loss of 2# since hospital discharge, and pt report of nausea and vomiting.      New: inadequate fluid intake R/T fear of nausea and vomiting AEB  Pt report of <16oz water daily, history of hopstialization for dehydration, pt report of n/v and avoidance of fluids    Disordered eating patterns R/T recent diagnosis of ED unspecified AEB pt report of restricting food for control, exercising to excess for stress and purging, purging after eating secondary to nausea/bloating, weight loss of 30% in 5 months, and ritualistic eating   []  Improved [x]  No Change    []  Declined   []  Discontinued        Nutrition Monitoring and Evaluation: Wt Readings from Last 5 Encounters:   12/28/20 70.2 kg (154 lb 12.8 oz)   12/16/20 72.9 kg (160 lb 12.8 oz)   12/06/20 67.6 kg (149 lb)   11/18/20 65 kg (143 lb 3.2 oz)   10/14/20 68.6 kg (151 lb 3.2 oz)   {  Normal BMI  UBW = 228#    Pt is 13 weeks pregnant. Pt has not told  about the pregnancy yet. Pt has not told  about eating disorder yet. Pt continues to see counselor virtual weekly. Pt notes in hospital a feeding tube was attempted but without success 3 times pt deferred to trying to eat orally. Able to progress from liquid diet to low GI to solid foods with supplements. Nausea was less during hospitalization and able to keep more foods down. Provided 3 meals per day with supplements and goody bag between. Pt notes since leaving hospital nausea and vomiting has returned. Yesterday drank 1 ensure. Notes she was unable to keep it down. Weight loss of 2# since released from hospital.     Pt denies inducing vomiting since coming home. Pt is monitored by home health. Notes new order placed for IV Zofran. Pt notes in hospital this worked better for nausea than pill form. Liquids do better than solids. Notes solids do not stay down well. Not able to keep all liquids down either. Low intake of water during the day. Continues fear of throwing up. Activity Level: did a little running once home. Low activity over past few days. Feeling too weak to exercise. No further findings of eating disorder clinic able to take pt's insurance yet. Nutrition Prescription and  Intervention Continue to recommend higher level of care for pt due to lack of structure and support at home. Reviewed increased energy needs with 2nd trimester.  Created favored/preffered foods list based on pt preferences and perceived ability to tolerate these foods.   Small amounts of food every 1-2 hours. (5-6 small feedings. startign with 3 feedings + 2 ensure between)   Fluid needs = 35mL/kg pregnancy current wt = 2450mL per day  Calorie needs = 2100 + (additional 340 kcal for Second Trimester) = 2440 kcal  Protein needs = 71g minimum (pregnancy)  Activity restriction: limit physical activity to PT exercises, <2 miles of light walking daily, stretching/yoga type low intensity exercise. Patient Education:  [x]  Review current plan with patient   []  Other:    Handouts/  Information Provided: []  Carbohydrates  []  Protein  []  Fiber  []  Serving Sizes  []  Fluids  []  General guidelines []  Diabetes  []  Cholesterol  []  Sodium  []  SBGM  []  Food Journals  []  Others:      Patient Goals -try kid's sour candy to see if aids with nausea  - limit activity to PT exercises, stretching, or <2 miles of light walking daily  -3 feedings (use preferred foods list created - based on pt preferences) + 2 ensure per day  -sip water throughout the day aiming towards 83oz recommendation     PLAN  [x]  Continue on current plan []  Follow-up PRN   []  Discharge due to :  Working to find pt higher level of care   [x]  Next appt: 2 week     Dietitian: Sky Toledo MS, RD, CSSD    Date: 12/29/2020 Time: 2:29 PM

## 2020-12-30 ENCOUNTER — HOSPITAL ENCOUNTER (INPATIENT)
Age: 35
LOS: 13 days | Discharge: HOME | DRG: 546 | End: 2021-01-15
Attending: OBSTETRICS & GYNECOLOGY | Admitting: OBSTETRICS & GYNECOLOGY
Payer: MEDICAID

## 2020-12-30 PROCEDURE — 96374 THER/PROPH/DIAG INJ IV PUSH: CPT

## 2020-12-30 PROCEDURE — 96376 TX/PRO/DX INJ SAME DRUG ADON: CPT

## 2020-12-30 PROCEDURE — 74011250636 HC RX REV CODE- 250/636: Performed by: OBSTETRICS & GYNECOLOGY

## 2020-12-30 PROCEDURE — 99218 HC RM OBSERVATION: CPT

## 2020-12-30 PROCEDURE — 74011000250 HC RX REV CODE- 250: Performed by: OBSTETRICS & GYNECOLOGY

## 2020-12-30 PROCEDURE — 96375 TX/PRO/DX INJ NEW DRUG ADDON: CPT

## 2020-12-30 PROCEDURE — 74011250637 HC RX REV CODE- 250/637: Performed by: OBSTETRICS & GYNECOLOGY

## 2020-12-30 RX ORDER — DOCUSATE SODIUM 100 MG/1
100 CAPSULE, LIQUID FILLED ORAL DAILY
Status: DISCONTINUED | OUTPATIENT
Start: 2020-12-31 | End: 2021-01-15 | Stop reason: HOSPADM

## 2020-12-30 RX ORDER — SODIUM CHLORIDE 0.9 % (FLUSH) 0.9 %
5-40 SYRINGE (ML) INJECTION EVERY 8 HOURS
Status: DISCONTINUED | OUTPATIENT
Start: 2020-12-30 | End: 2021-01-15 | Stop reason: HOSPADM

## 2020-12-30 RX ORDER — HYDROXYZINE HYDROCHLORIDE 10 MG/1
10 TABLET, FILM COATED ORAL
Status: DISCONTINUED | OUTPATIENT
Start: 2020-12-30 | End: 2021-01-15 | Stop reason: HOSPADM

## 2020-12-30 RX ORDER — SODIUM CHLORIDE 0.9 % (FLUSH) 0.9 %
5-40 SYRINGE (ML) INJECTION AS NEEDED
Status: DISCONTINUED | OUTPATIENT
Start: 2020-12-30 | End: 2021-01-15 | Stop reason: HOSPADM

## 2020-12-30 RX ORDER — PROMETHAZINE HYDROCHLORIDE 25 MG/1
25 SUPPOSITORY RECTAL
Status: DISCONTINUED | OUTPATIENT
Start: 2020-12-30 | End: 2021-01-15 | Stop reason: HOSPADM

## 2020-12-30 RX ORDER — ONDANSETRON 2 MG/ML
4 INJECTION INTRAMUSCULAR; INTRAVENOUS
Status: DISCONTINUED | OUTPATIENT
Start: 2020-12-30 | End: 2020-12-31

## 2020-12-30 RX ORDER — SODIUM CHLORIDE, SODIUM LACTATE, POTASSIUM CHLORIDE, CALCIUM CHLORIDE 600; 310; 30; 20 MG/100ML; MG/100ML; MG/100ML; MG/100ML
75 INJECTION, SOLUTION INTRAVENOUS CONTINUOUS
Status: DISCONTINUED | OUTPATIENT
Start: 2020-12-30 | End: 2021-01-15 | Stop reason: HOSPADM

## 2020-12-30 RX ORDER — OLANZAPINE 5 MG/1
5 TABLET ORAL
Status: DISCONTINUED | OUTPATIENT
Start: 2020-12-30 | End: 2021-01-15 | Stop reason: HOSPADM

## 2020-12-30 RX ORDER — LANOLIN ALCOHOL/MO/W.PET/CERES
1 CREAM (GRAM) TOPICAL 2 TIMES DAILY WITH MEALS
Status: DISCONTINUED | OUTPATIENT
Start: 2020-12-30 | End: 2021-01-15 | Stop reason: HOSPADM

## 2020-12-30 RX ORDER — ONDANSETRON 2 MG/ML
4 INJECTION INTRAMUSCULAR; INTRAVENOUS ONCE
Status: COMPLETED | OUTPATIENT
Start: 2020-12-30 | End: 2020-12-30

## 2020-12-30 RX ORDER — FLUOXETINE HYDROCHLORIDE 20 MG/1
60 CAPSULE ORAL DAILY
Status: DISCONTINUED | OUTPATIENT
Start: 2020-12-31 | End: 2021-01-15 | Stop reason: HOSPADM

## 2020-12-30 RX ORDER — SODIUM CHLORIDE, SODIUM LACTATE, POTASSIUM CHLORIDE, CALCIUM CHLORIDE 600; 310; 30; 20 MG/100ML; MG/100ML; MG/100ML; MG/100ML
999 INJECTION, SOLUTION INTRAVENOUS ONCE
Status: COMPLETED | OUTPATIENT
Start: 2020-12-30 | End: 2020-12-30

## 2020-12-30 RX ADMIN — OLANZAPINE 5 MG: 5 TABLET, FILM COATED ORAL at 22:08

## 2020-12-30 RX ADMIN — PROMETHAZINE HYDROCHLORIDE 25 MG: 25 SUPPOSITORY RECTAL at 19:31

## 2020-12-30 RX ADMIN — FOLIC ACID: 5 INJECTION, SOLUTION INTRAMUSCULAR; INTRAVENOUS; SUBCUTANEOUS at 18:45

## 2020-12-30 RX ADMIN — FERROUS SULFATE TAB 325 MG (65 MG ELEMENTAL FE) 325 MG: 325 (65 FE) TAB at 18:45

## 2020-12-30 RX ADMIN — SODIUM CHLORIDE, POTASSIUM CHLORIDE, SODIUM LACTATE AND CALCIUM CHLORIDE 999 ML: 600; 310; 30; 20 INJECTION, SOLUTION INTRAVENOUS at 15:57

## 2020-12-30 RX ADMIN — SODIUM CHLORIDE, POTASSIUM CHLORIDE, SODIUM LACTATE AND CALCIUM CHLORIDE 150 ML/HR: 600; 310; 30; 20 INJECTION, SOLUTION INTRAVENOUS at 17:15

## 2020-12-30 RX ADMIN — ONDANSETRON 4 MG: 2 INJECTION INTRAMUSCULAR; INTRAVENOUS at 15:53

## 2020-12-30 RX ADMIN — ONDANSETRON 4 MG: 2 INJECTION INTRAMUSCULAR; INTRAVENOUS at 22:09

## 2020-12-30 NOTE — PROGRESS NOTES
1534 Pt arrives to unit. Minh Dominguez MD notified. 4 mg IV Zofran given via PICC line. 1 L bolus LR started.

## 2020-12-30 NOTE — H&P
History & Physical    Name: Emy Morrow MRN: [id number]  SSN: [id number]    YOB: 1985  Age: 38 y.o. Sex: female      Subjective:     Reason for Admission:  Pregnancy and intractable nausea/vomiting and weight loss    History of Present Illness: Ms. Ellyn Knapp is a 38 y.o.  female with an estimated gestational age of 12w10d with Estimated Date of Delivery: 21. Patient complains of severe nausea/vomiting for 6 days. Pregnancy has been complicated by previous admission for nausea/vomiting/dehydration, chronic eating disorder, severe depression/anxiety with recent admission for suicidal ideation in the fall, h/o incompetent cervix requiring cerclage for all successful pregnancies, h/o  for breech presentation x 1 with succesful  since, h/o alcohol abuse, anemia with known beta thalassemia-s/p iv iron infusions with last admission a few weeks ago, h/o recurrent pregnancy loss. and CHTN with normal bp's off meds since weight loss. Patient denies chest pain, shortness of breath and vaginal bleeding .     OB History    Para Term  AB Living   16 7 4 2 7 7   SAB TAB Ectopic Molar Multiple Live Births   5 1 1   0 7      # Outcome Date GA Lbr Izaiah/2nd Weight Sex Delivery Anes PTL Lv   16 Current            15 Term 18 37w5d / 10:03 2.71 kg M  EPIDURAL AN N YAHAIRA   14 Term 04/22/15 39w0d  3.725 kg F  LO SPINAL AN N YAHAIRA   13 Term 13 39w4d  2.748 kg F VAGINAL DELI None  YAHAIRA   12 Para     M VAGINAL DELI   YAHAIRA   11 Ectopic 2009           10 Term 08/10/08 39w0d  2.722 kg M VAGINAL DELI EPI  YAHAIRA   9  06 34w0d   M VAGINAL DELI EPI Y YAHAIRA      Birth Comments: cerclage   8  05 36w0d   F VAGINAL DELI EPI Y YAHAIRA      Birth Comments: cerclage   7 2003 18w0d       DEC   6 2002 18w0d       DEC   5 SAB  20w0d       DEC   4 2001 16w0d       DEC   3             2 TAB            1 SAB               Obstetric Comments   Gyn Dr. Angel Santana     Past Medical History:   Diagnosis Date    Anorexia     Anxiety     Asthma     on albuterol prn. Triggers cold and allergies    Avoidant-restrictive food intake disorder (ARFID)     Back pain, chronic     sciatica    Chronic bilateral low back pain with right-sided sciatica 2020    ~    GERD (gastroesophageal reflux disease) 2020    [facility] , GI Dr. Rico Left Gestational hypertension     Past pregnancy    HX OTHER MEDICAL      , , , ,     Hyperemesis     severe hyperemesis    Hypertension     with G12 - none this pregnancy    Iron deficiency anemia 10/08/2010    MDD (major depressive disorder), single episode with postpartum onset 2013    treated with meds - 13    Orthostatic hypotension 2020    Plantar fasciitis     Pregnancy     36 weeks    PTSD (post-traumatic stress disorder)      Past Surgical History:   Procedure Laterality Date     DELIVERY ONLY      HX  SECTION  4/22/15    HX DILATION AND CURETTAGE      HX DILATION AND CURETTAGE  2020    HX GYN      miscarriage x 6    HX GYN      removal of left fallopian tube    HX OTHER SURGICAL      cerlcage x4, ectopic x1 1999 with removal of left fallopian tube    HX OTHER SURGICAL      cerclage w/ current pregnancy.  Removed 18     Social History     Occupational History    Not on file   Tobacco Use    Smoking status: Never Smoker    Smokeless tobacco: Never Used   Substance and Sexual Activity    Alcohol use: Not Currently     Frequency: Monthly or less     Drinks per session: 1 or 2     Binge frequency: Never    Drug use: No    Sexual activity: Yes     Partners: Male      Family History   Problem Relation Age of Onset   22 [facility] Arthritis-rheumatoid Mother     Asthma Mother     No Known Problems Father     No Known Problems Maternal Grandmother     No Known Problems Maternal Grandfather     No Known Problems Paternal Grandmother     No Known Problems Paternal Grandfather     Asthma Son     Alcohol abuse Neg Hx     Arthritis-osteo Neg Hx     Bleeding Prob Neg Hx     Cancer Neg Hx     Diabetes Neg Hx     Elevated Lipids Neg Hx     Headache Neg Hx     Heart Disease Neg Hx     Hypertension Neg Hx     Lung Disease Neg Hx     Migraines Neg Hx     Psychiatric Disorder Neg Hx     Stroke Neg Hx     Mental Retardation Neg Hx     Anesth Problems Neg Hx        Allergies   Allergen Reactions    Labetalol Hives    Ceclor [Cefaclor] Unknown (comments)    Pcn [Penicillins] Hives    Septra [Sulfamethoprim Ds] Unknown (comments)     Prior to Admission medications    Medication Sig Start Date End Date Taking? Authorizing Provider   Lacto. acidophilus-Bif. animalis (Probiotic) 5 billion cell cpSP Take 1 Cap by mouth daily. 12/28/20   Christen Ortiz NP   FLUoxetine (PROzac) 20 mg capsule Take 1 Cap by mouth daily. Take with 40 mg cap for total daily dose of 60 mg. 12/28/20   Christen Ortiz NP   prochlorperazine (COMPAZINE) 10 mg tablet  12/27/20   Provider, Historical   promethazine (Phenergan) 12.5 mg suppository     Provider, Historical   ondansetron (ZOFRAN ODT) 4 mg disintegrating tablet Take 1 Tab by mouth every six (6) hours as needed for Nausea or Vomiting. 12/16/20   Brittni Pride, DO   0.9% sodium chloride solp 1,000 mL with thiamine 100 mg/mL soln 260 mg, folic acid 5 mg/mL soln 1 mg 1 Bag by IntraVENous route every twenty-four (24) hours. 12/16/20   Brittni Pride, DO   OLANZapine (ZyPREXA) 5 mg tablet Take 1 Tab by mouth nightly. 11/19/20   Christen Ortiz NP   progesterone (PROMETRIUM) 200 mg capsule Take 200 mg by mouth daily. Provider, Historical   cholecalciferol (Vitamin D3) 25 mcg (1,000 unit) cap Take  by mouth daily. Provider, Historical   pyridoxine, vitamin B6, (Vitamin B-6) 100 mg tablet Take 100 mg by mouth daily.     Provider, Historical   hydrOXYzine HCL (ATARAX) 50 mg tablet Take 1 Tab by mouth four (4) times daily. 20   Zenia Ortiz NP   FLUoxetine (PROzac) 40 mg capsule Take 1 Cap by mouth daily. 20   Zenia Ortzi NP   doxylamine succinate (UNISOM) 25 mg tablet Take 1 Tab by mouth nightly as needed for Insomnia. 20   Zenia Ortiz, NP   OTHER 1 Ensure Enlive food supplement drink PO TID 20   Shandra Naik NP   calcium carbonate (Tums) 200 mg calcium (500 mg) chew Take 1 Tab by mouth two (2) times daily as needed for Other (reflux). 20   Shandra Naik NP   polyethylene glycol (MIRALAX) 17 gram packet Take 1 Packet by mouth daily as needed for Constipation. 20   Shandra Naik NP   OTHER 1 ensure pudding PO daily for lunch. 10/20/20   Shandra Naik NP   prenatal vit-iron fumarate-fa (PRENATAL PLUS with IRON) 28 mg iron- 800 mcg tab  20   Provider, Historical   food supplemt, lactose-reduced (Ensure High Protein) liqd Take 237 mL by mouth three (3) times daily. 10/14/20   Shandra Naik NP   prazosin (MINIPRESS) 2 mg capsule Take 1 Cap by mouth nightly. Indications: posttraumatic stress syndrome 20   Kiera Iverson MD   pantoprazole (PROTONIX) 40 mg tablet Take 1 Tab by mouth Daily (before breakfast). Indications: gastroesophageal reflux disease 20   Kiera Iverson MD        Review of Systems:  Pertinent items are noted in the History of Present Illness.      Objective:     Vitals:    Vitals:    20 1534   BP: 115/74   Pulse: 70   Resp: 16   Temp: 98.1 °F (36.7 °C)   SpO2: 97%      Temp (24hrs), Av.1 °F (36.7 °C), Min:98.1 °F (36.7 °C), Max:98.1 °F (36.7 °C)    BP  Min: 115/74  Max: 115/74     Physical Exam:  From office visit 2020  General appearance: flat affect and thin appearing  Heart: regular rate and rhythm  Lungs: CTA b/l  Abdomen: soft gravid  Extremities: no edema or calf tenderness  Positive FHTs      Lab/Data Review:  No results found for this or any previous visit (from the past 24 hour(s)). Assessment and Plan: Active Problems:    Nausea and vomiting in pregnancy (12/2/2020)       [employer]iss [employer] y/o [id number] at 13 3/7 weeks by [facility] 7/4/2021 (by LMP c/w first trimester scan) re-admitted for intractable nausea/vomiting in pregnancy. 1.  N/V/Dehydration/weight loss since discharge from hospital   -Iv antiemetics and phenergan suppositories prn   -ivf's   -Goodie bag daily   -Daily cmp, cbc, mg, phos   -Nutrition consult (pt saw her nutritionist Trey Méndez on  12/29) for supplement orders and consider TPN if needed. (will need to see if this will be covered with home health)   -Pt has PICC line in place   -pt s/p GI consult during last admission    -pt s/p sugery consult last admission (known gallstones but  no surgery necessary at that time)    2. Eating Disorder and Depression/Anxiety   -recent h/o admission for suicidal ideation-stable currently   -Continue Prozac 60mg daily and Zyprexa 5mg daily   -Pt followed closely by psychiatrist Steffanie Petit (last appt  12/28)    3. CHTN-normal to low bp's off meds since weight loss    4. Anemia-know beta thalassemia minor and iron deficiency   anemia-s/p iv iron infusions during last admission   -oral iron bid    5. Hip pain/pelvic girdle weakness   -consult PT   -pelvic support belt    6. H/o incompetent cervix   -has MFM appt at 8am tomorrow morning (12/31)-will need  cerclage placement at 14-16 weeks    7. DVT prophylaxis-MEGHANA hose and ambulation with assistance (pt  is fall risk)    8. Daily FHTs.

## 2020-12-31 LAB
ALBUMIN SERPL-MCNC: 2.6 G/DL (ref 3.5–5)
ALBUMIN/GLOB SERPL: 0.7 {RATIO} (ref 1.1–2.2)
ALP SERPL-CCNC: 46 U/L (ref 45–117)
ALT SERPL-CCNC: 8 U/L (ref 12–78)
ANION GAP SERPL CALC-SCNC: 4 MMOL/L (ref 5–15)
AST SERPL-CCNC: 7 U/L (ref 15–37)
BILIRUB SERPL-MCNC: 0.4 MG/DL (ref 0.2–1)
BUN SERPL-MCNC: 7 MG/DL (ref 6–20)
BUN/CREAT SERPL: 16 (ref 12–20)
CALCIUM SERPL-MCNC: 8 MG/DL (ref 8.5–10.1)
CHLORIDE SERPL-SCNC: 109 MMOL/L (ref 97–108)
CO2 SERPL-SCNC: 23 MMOL/L (ref 21–32)
CREAT SERPL-MCNC: 0.45 MG/DL (ref 0.55–1.02)
ERYTHROCYTE [DISTWIDTH] IN BLOOD BY AUTOMATED COUNT: 17.9 % (ref 11.5–14.5)
GLOBULIN SER CALC-MCNC: 3.5 G/DL (ref 2–4)
GLUCOSE SERPL-MCNC: 63 MG/DL (ref 65–100)
HCT VFR BLD AUTO: 30.6 % (ref 35–47)
HGB BLD-MCNC: 9.5 G/DL (ref 11.5–16)
MAGNESIUM SERPL-MCNC: 2 MG/DL (ref 1.6–2.4)
MCH RBC QN AUTO: 24.3 PG (ref 26–34)
MCHC RBC AUTO-ENTMCNC: 31 G/DL (ref 30–36.5)
MCV RBC AUTO: 78.3 FL (ref 80–99)
NRBC # BLD: 0 K/UL (ref 0–0.01)
NRBC BLD-RTO: 0 PER 100 WBC
PHOSPHATE SERPL-MCNC: 3.8 MG/DL (ref 2.6–4.7)
PLATELET # BLD AUTO: 87 K/UL (ref 150–400)
POTASSIUM SERPL-SCNC: 4 MMOL/L (ref 3.5–5.1)
PROT SERPL-MCNC: 6.1 G/DL (ref 6.4–8.2)
RBC # BLD AUTO: 3.91 M/UL (ref 3.8–5.2)
SODIUM SERPL-SCNC: 136 MMOL/L (ref 136–145)
WBC # BLD AUTO: 5.8 K/UL (ref 3.6–11)

## 2020-12-31 PROCEDURE — 36415 COLL VENOUS BLD VENIPUNCTURE: CPT

## 2020-12-31 PROCEDURE — 74011250636 HC RX REV CODE- 250/636: Performed by: OBSTETRICS & GYNECOLOGY

## 2020-12-31 PROCEDURE — 74011250637 HC RX REV CODE- 250/637: Performed by: OBSTETRICS & GYNECOLOGY

## 2020-12-31 PROCEDURE — 85027 COMPLETE CBC AUTOMATED: CPT

## 2020-12-31 PROCEDURE — 4A1HXCZ MONITORING OF PRODUCTS OF CONCEPTION, CARDIAC RATE, EXTERNAL APPROACH: ICD-10-PCS | Performed by: OBSTETRICS & GYNECOLOGY

## 2020-12-31 PROCEDURE — 74011000250 HC RX REV CODE- 250: Performed by: OBSTETRICS & GYNECOLOGY

## 2020-12-31 PROCEDURE — 99218 HC RM OBSERVATION: CPT

## 2020-12-31 PROCEDURE — 84100 ASSAY OF PHOSPHORUS: CPT

## 2020-12-31 PROCEDURE — 96376 TX/PRO/DX INJ SAME DRUG ADON: CPT

## 2020-12-31 PROCEDURE — 83735 ASSAY OF MAGNESIUM: CPT

## 2020-12-31 PROCEDURE — 76813 OB US NUCHAL MEAS 1 GEST: CPT | Performed by: OBSTETRICS & GYNECOLOGY

## 2020-12-31 PROCEDURE — 96375 TX/PRO/DX INJ NEW DRUG ADDON: CPT

## 2020-12-31 PROCEDURE — 80053 COMPREHEN METABOLIC PANEL: CPT

## 2020-12-31 RX ORDER — ONDANSETRON 2 MG/ML
4 INJECTION INTRAMUSCULAR; INTRAVENOUS EVERY 6 HOURS
Status: DISCONTINUED | OUTPATIENT
Start: 2020-12-31 | End: 2021-01-02

## 2020-12-31 RX ORDER — ACETAMINOPHEN 325 MG/1
650 TABLET ORAL
Status: DISCONTINUED | OUTPATIENT
Start: 2020-12-31 | End: 2021-01-15 | Stop reason: HOSPADM

## 2020-12-31 RX ORDER — PRAZOSIN HYDROCHLORIDE 1 MG/1
2 CAPSULE ORAL
Status: DISCONTINUED | OUTPATIENT
Start: 2020-12-31 | End: 2021-01-15 | Stop reason: HOSPADM

## 2020-12-31 RX ORDER — PANTOPRAZOLE SODIUM 40 MG/1
40 TABLET, DELAYED RELEASE ORAL
Status: DISCONTINUED | OUTPATIENT
Start: 2020-12-31 | End: 2021-01-03

## 2020-12-31 RX ADMIN — SODIUM CHLORIDE 20 MG: 9 INJECTION INTRAMUSCULAR; INTRAVENOUS; SUBCUTANEOUS at 18:23

## 2020-12-31 RX ADMIN — PROMETHAZINE HYDROCHLORIDE 25 MG: 25 SUPPOSITORY RECTAL at 10:36

## 2020-12-31 RX ADMIN — SODIUM CHLORIDE, POTASSIUM CHLORIDE, SODIUM LACTATE AND CALCIUM CHLORIDE 150 ML/HR: 600; 310; 30; 20 INJECTION, SOLUTION INTRAVENOUS at 02:28

## 2020-12-31 RX ADMIN — OLANZAPINE 5 MG: 5 TABLET, FILM COATED ORAL at 21:57

## 2020-12-31 RX ADMIN — ONDANSETRON 4 MG: 2 INJECTION INTRAMUSCULAR; INTRAVENOUS at 18:06

## 2020-12-31 RX ADMIN — ONDANSETRON 4 MG: 2 INJECTION INTRAMUSCULAR; INTRAVENOUS at 12:29

## 2020-12-31 RX ADMIN — Medication 10 ML: at 09:36

## 2020-12-31 RX ADMIN — FERROUS SULFATE TAB 325 MG (65 MG ELEMENTAL FE) 325 MG: 325 (65 FE) TAB at 09:35

## 2020-12-31 RX ADMIN — PROMETHAZINE HYDROCHLORIDE 25 MG: 25 SUPPOSITORY RECTAL at 17:04

## 2020-12-31 RX ADMIN — FOLIC ACID: 5 INJECTION, SOLUTION INTRAMUSCULAR; INTRAVENOUS; SUBCUTANEOUS at 18:05

## 2020-12-31 RX ADMIN — SODIUM CHLORIDE, POTASSIUM CHLORIDE, SODIUM LACTATE AND CALCIUM CHLORIDE 150 ML/HR: 600; 310; 30; 20 INJECTION, SOLUTION INTRAVENOUS at 14:37

## 2020-12-31 RX ADMIN — Medication 10 ML: at 12:31

## 2020-12-31 RX ADMIN — FERROUS SULFATE TAB 325 MG (65 MG ELEMENTAL FE) 325 MG: 325 (65 FE) TAB at 17:04

## 2020-12-31 RX ADMIN — DOCUSATE SODIUM 100 MG: 100 CAPSULE ORAL at 09:35

## 2020-12-31 RX ADMIN — FLUOXETINE 60 MG: 20 CAPSULE ORAL at 09:35

## 2020-12-31 RX ADMIN — ALUMINUM HYDROXIDE AND MAGNESIUM HYDROXIDE 30 ML: 200; 200 SUSPENSION ORAL at 11:32

## 2020-12-31 RX ADMIN — PANTOPRAZOLE SODIUM 40 MG: 40 TABLET, DELAYED RELEASE ORAL at 10:11

## 2020-12-31 RX ADMIN — ONDANSETRON 4 MG: 2 INJECTION INTRAMUSCULAR; INTRAVENOUS at 07:07

## 2020-12-31 RX ADMIN — ACETAMINOPHEN 650 MG: 325 TABLET ORAL at 10:11

## 2020-12-31 NOTE — PROGRESS NOTES
T.O.C:   Pt expected to d/c to home   Family to provide transport at d/c   Emergency Contact: None, pt refuses    Reason for Admission:   N&V in pregnancy, wt loss                   RUR Score:    15%                 Plan for utilizing home health:    TBD      PCP: First and Last name:     Name of Practice:    Are you a current patient: Yes/No:    Approximate date of last visit:    Can you participate in a virtual visit with your PCP:                     Current Advanced Directive/Advance Care Plan: None                         Transition of Care Plan:    TBD      CM reviewed pt chart and previous admission. Pt has an eating disorder, is 13 3/7 wks pregnant with N&V and wt loss. Pt was d/c'd with H/H: [facility] for nursing and [facility] for IV fluids. Pt's insurance would not cover/ authorize TPN at last d/c. Pt is currently resting with room dark; CM will attempt to see pt later.     Israel Palomino RN

## 2020-12-31 NOTE — PROGRESS NOTES
High Risk Obstetrics Progress Note    Name: Jade Vogel MRN: [id number]  SSN: [id number]    YOB: 1985  Age: 38 y.o. Sex: female      Subjective:      LOS: 1 day    Estimated Date of Delivery: 21   Gestational Age Today: 14w11d     Patient admitted for nausea/vomiting/weight loss in pregnancy in light of chronic eating disorder. States she does have some lower abdominal cramping (unchanged), nausea and vomiting and does not have chest pain, shortness of breath and vaginal bleeding . Objective:     Vitals:  Blood pressure (!) 104/58, pulse 67, temperature 98.1 °F (36.7 °C), resp. rate 16, SpO2 99 %, not currently breastfeeding. Temp (24hrs), Av.9 °F (36.6 °C), Min:97.6 °F (36.4 °C), Max:98.1 °F (29.4 °C)    Systolic (57RMP), WTD:572 , Min:104 , FJE:133      Diastolic (31YUX), LIP:30, Min:58, Max:74       Intake and Output:         Physical Exam:  Patient without distress. Resting comfortably  Heart: Regular rate and rhythm  Lung: clear to auscultation throughout lung fields, no wheezes, no rales, no rhonchi and normal respiratory effort  Abdomen: soft, nontender, gravid  Lower Extremities:  - Edema No       Membranes:  Intact        Labs:   Recent Results (from the past 36 hour(s))   METABOLIC PANEL, COMPREHENSIVE    Collection Time: 20  5:12 AM   Result Value Ref Range    Sodium 136 136 - 145 mmol/L    Potassium 4.0 3.5 - 5.1 mmol/L    Chloride 109 (H) 97 - 108 mmol/L    CO2 23 21 - 32 mmol/L    Anion gap 4 (L) 5 - 15 mmol/L    Glucose 63 (L) 65 - 100 mg/dL    BUN 7 6 - 20 MG/DL    Creatinine 0.45 (L) 0.55 - 1.02 MG/DL    BUN/Creatinine ratio 16 12 - 20      GFR est AA >60 >60 ml/min/1.73m2    GFR est non-AA >60 >60 ml/min/1.73m2    Calcium 8.0 (L) 8.5 - 10.1 MG/DL    Bilirubin, total 0.4 0.2 - 1.0 MG/DL    ALT (SGPT) 8 (L) 12 - 78 U/L    AST (SGOT) 7 (L) 15 - 37 U/L    Alk.  phosphatase 46 45 - 117 U/L    Protein, total 6.1 (L) 6.4 - 8.2 g/dL    Albumin 2.6 (L) 3.5 - 5.0 g/dL Globulin 3.5 2.0 - 4.0 g/dL    A-G Ratio 0.7 (L) 1.1 - 2.2     CBC W/O DIFF    Collection Time: 12/31/20  5:12 AM   Result Value Ref Range    WBC 5.8 3.6 - 11.0 K/uL    RBC 3.91 3.80 - 5.20 M/uL    HGB 9.5 (L) 11.5 - 16.0 g/dL    HCT 30.6 (L) 35.0 - 47.0 %    MCV 78.3 (L) 80.0 - 99.0 FL    MCH 24.3 (L) 26.0 - 34.0 PG    MCHC 31.0 30.0 - 36.5 g/dL    RDW 17.9 (H) 11.5 - 14.5 %    PLATELET 87 (L) 565 - 400 K/uL    NRBC 0.0 0  WBC    ABSOLUTE NRBC 0.00 0.00 - 0.01 K/uL   MAGNESIUM    Collection Time: 12/31/20  5:12 AM   Result Value Ref Range    Magnesium 2.0 1.6 - 2.4 mg/dL   PHOSPHORUS    Collection Time: 12/31/20  5:12 AM   Result Value Ref Range    Phosphorus 3.8 2.6 - 4.7 MG/DL       Assessment and Plan: Active Problems:    Nausea and vomiting in pregnancy (12/2/2020)       39 y/o [id number] at 13 3/7 weeks by [facility] 7/4/2021 (by LMP c/w first trimester scan) re-admitted for intractable nausea/vomiting in pregnancy with chronic eating disorder.     1.  N/V/Dehydration/weight loss since discharge from hospital             -Iv antiemetics and phenergan suppositories prn             -ivf's             -Goodie bag daily             -Daily cmp, cbc, mg, phos             -Nutrition consult (pt saw her nutritionist Leonor Gaston on      12/29) for supplement orders and consider ENT to place  dobhoff vs TPN if needed.  (will need to see if this will be  covered with home health)             -Pt has PICC line in place             -pt s/p GI consult during last admission              -pt s/p sugery consult last admission (known gallstones but     no surgery necessary at that time)     2. Eating Disorder and Depression/Anxiety   -thrombocytopenia and low lfts-?liver dysfunction-consider  GI consult             -recent h/o admission for suicidal ideation-stable currently             -Continue Prozac 60mg daily and Zyprexa 5mg daily   -Continue prn hydoxyzine and prazosin             -Pt followed closely by psychiatrist Alen Spann (last appt       12/28)     3. CHTN-normal to low bp's off meds since weight loss     4. Anemia-know beta thalassemia minor and iron deficiency             anemia-s/p iv iron infusions during last admission             -oral iron bid     5. Hip pain/pelvic girdle weakness             -consult PT             -pelvic support belt     6.  H/o incompetent cervix             -has MFM appt at 8am today (12/31)-will need  cerclage placement at 14-16 weeks     7. DVT prophylaxis-MEGHANA hose and ambulation with assistance (pt     is fall risk)     8. Daily FHTs.

## 2020-12-31 NOTE — PROGRESS NOTES
Comprehensive Nutrition Assessment    Type and Reason for Visit: Consult    Nutrition Recommendations/Plan:   1. MD consider supplemental B6 (?hyperemesis)  2. Please encourage PO fluids via 1 Oz. Med cup (1 Oz. Every 15 min)  3. Added Ensure Enlive (BID) + Boost pudding x 1 day = 930 kcal, 47 gm pro  4. Include yogurt (peach or strawberry); cottage cheese & fruit; applesauce, PB crackers  5. If Dobbhoff EN considered could do a nocturnal cycle Osmolite 1.5; Initial goal 1 liter/day however, unsure insurance coverage for supplemental feeds. 6. RD to provide one case Ensure Plus (strawberry) to take home     Nutrition Assessment:   Admit IUP 13 wks 6 days with intractable N/V, wt loss and dehydration. IV  mL/hr + IV goody bag, Zofran every 6 hr, PPI and PRN Maalox and Phenregan suppository. Hx depression and eating disorder. Last visit outpatient RD 12/29/20 and receives [facility] counseling. Rx Zyprexa; Peviously tx denied twice at outpatient [facility] for eating disorders. Admit wt 157# 12/31/20. Pre-gravid wt 68.7 kg (151#) 10/14/20. Although wt gain/loss fluctuations, net gain 6#. Suggested wt gain first trimester ~4#. Wt did drop to 145# (12/4), with progressive gain 152# (12/9) and 160.8# (12/16) and today 157. Wt today also reflects ~4# (2%) loss x past two weeks. Appetite remains poor and complains of \"nausea all the time\". Has Ensure at home and claims, Yaz Solis keep that down\", and drinks gingerale regular and diet. She does some cooking and boyfriend and other family members assist. Claims, \"doesn't always eat what rest of family has, like fried chicken and chicken nuggets\". Pt claims since last outpt visit has been, \"trying to eat: applesauce, yogurt, pudding and Ensure\". Visited at lunch today ~1pm and lunch tray untouched. Morning Boost pudding snack also unopened at bedside. Offered to get PB crackers and Ensure. Discussed using 1 Oz.  Med cups with RN to encourage sips fluids (1 Oz every 15 min). Pt estimates she drinks about four 8 Oz. Cups fluid a day (~1 liter). RD reviewed deficit and pregnancy fluid needs. IV fluids now. Fe+ deficiency anemia, Rx FeSO4+; HGB 9.5 (12/31/20). Pt claims, \"used to chew ice all day and chipped teeth\"; Pagophagia. Denies chewing ice now. K+, PO4+ and Mg2+ all WNL however, noted low BG 63 (12/31/20). No UA for ketone check. Encouraged sipping Ensure. If Dobbhoff EN considered, Question if goal supplemental vs. Total feeds. CM could check for coverage. Previous admit, pt didn't want tube feed. If EN still considered could try nocturnal cycle goal 1 liter/day = 1500 kcal (68% kcal needs), 63 gm pro (79% pro needs), 204 gm CHO, 18 mg Fe+. This would allow pt to eat during day. Malnutrition Assessment:  Malnutrition Status: No malnutrition however, remains at risk with poor PO, eating disorder and weight fluctuations related to appetite/intake; chronic N/V. Estimated Daily Nutrient Needs:  Energy (kcal):  2200 (1323-7032); Current Weight Used for Energy Requirements:    Protein (g): 80 gm(1.1 gm/kg (78 gm) vs. 0.8 gm/kg pre-grav 68.7 + 25 = 80 gm); Weight Used for Protein Requirements: Current(and pre-gravid (68.7 kg 10/14/20))  Fluid (ml/day): 2500 mL(~35 mL/kg); Method Used for Fluid Requirements: ml/kg    Nutrition Related Findings:  Intractable N/V; wt loss, dehydration; poor appetite      Wounds:    None       Current Nutrition Therapies:  DIET REGULAR  DIET NUTRITIONAL SUPPLEMENTS Breakfast, Dinner; Ensure [employer] + Boost pudding Lunch. Anthropometric Measures:  · Height:  5' 4\" (162.6 cm)  · Current Body Wt:  71.2 kg (157 lb)(IUP 13 3/7wks)   · Admission Body Wt:  157 lb(IUP 13 3/7 wks)    · Usual Body Wt:  103.4 kg (228 lb)(>/= yr ago: 225# (5/2/20) & 229# (2/18/20))     · Ideal Body Wt:  120 lbs:  130.8 %   · Adjusted Body Weight:  N/A  · BMI Category: Pre-gravid BMI 26 (10/4/20) overweight.  BMI now N/A w/pregnancy. Nutrition Diagnosis:   · Inadequate oral intake, Altered GI function(Fe++ anemia) related to altered GI function, psychological cause or life stress, increased demand for energy/nutrients, early satiety as evidenced by intake 0-25%, weight loss, nausea, vomiting(Rx anti-emetics; PPI; Rx FeS04+; HGB 9.5)    Nutrition Interventions:   Food and/or Nutrient Delivery: Continue current diet, Snacks (specify), Mineral supplement, Vitamin supplement, Start oral nutrition supplement, IV fluid delivery  Nutrition Education and Counseling: Education completed  Coordination of Nutrition Care: Continue to monitor while inpatient, Coordination of community care    Goals:  Consume 50% all meals + min. 30 gm supplemental pro/daily next 24-48 hr.        Nutrition Monitoring and Evaluation:   Behavioral-Environmental Outcomes: Beliefs and attitudes, Readiness for change  Food/Nutrient Intake Outcomes: Food and nutrient intake, Supplement intake, Vitamin/mineral intake, IVF intake  Physical Signs/Symptoms Outcomes: Biochemical data, GI status, Nausea/vomiting, Fluid status or edema, Meal time behavior, Weight    Discharge Planning:    Continue oral nutrition supplement; Continue outpatient nutrition counseling.      Electronically signed by Enoch Callahan RD on 12/31/2020 at 1:17 PM    Contact: Alexy

## 2020-12-31 NOTE — ACP (ADVANCE CARE PLANNING)
visit for Advance Medical Directive (AMD) consult. Pt was in bed and room was dark. Pt was not interested in completing AMD at this time. Please contact [phone] [facility] for further support.      [address] [place], [facility]   [phone]

## 2020-12-31 NOTE — PROGRESS NOTES
Bedside and Verbal shift change report given to Linette Ewing RN (oncoming nurse) by Rebecca Diop (offgoing nurse). Report included the following information SBAR, Kardex, Intake/Output, MAR and Accordion. 0800: pt to [facility]    1818: Pt having burning in stomach and sharp pain to sides. MD notified. Orders to give famotidine and page OB hospitalist if pain does not subside.

## 2020-12-31 NOTE — PROGRESS NOTES
Spiritual Care Assessment/Progress Note  [facility]      NAME: Shannen Hernandez      MRN: [id number]  AGE: 38 y.o. SEX: female  Taoism Affiliation: No Latter day   Language: English     12/31/2020     Total Time (in minutes): 15     Spiritual Assessment begun in [address] through conversation with:         [x]Patient        [] Family    [] Friend(s)        Reason for Consult: Advance medical directive consult     Spiritual beliefs: (Please include comment if needed)     [] Identifies with a rosy tradition:         [] Supported by a rosy community:            [] Claims no spiritual orientation:           [] Seeking spiritual identity:                [] Adheres to an individual form of spirituality:           [x] Not able to assess:                           Identified resources for coping:      [] Prayer                               [] Music                  [] Guided Imagery     [] Family/friends                 [] Pet visits     [] Devotional reading                         [x] Unknown     [] Other:                                               Interventions offered during this visit: (See comments for more details)    Patient Interventions: Advance medical directive consult, Affirmation of emotions/emotional suffering           Plan of Care:     [] Support spiritual and/or cultural needs    [] Support AMD and/or advance care planning process      [] Support grieving process   [] Coordinate Rites and/or Rituals    [] Coordination with community clergy   [] No spiritual needs identified at this time   [] Detailed Plan of Care below (See Comments)  [] Make referral to Music Therapy  [] Make referral to Pet Therapy     [] Make referral to Addiction services  [] Make referral to [facility]  [] Make referral to Spiritual Care Partner  [] No future visits requested        [x] Follow up upon further referrals     Comments:  visit for Advance Medical Directive (AMD) consult. Pt was in bed and room was dark. Pt was not interested in completing AMD at this time. Please contact [phone] [facility] for further support.      [address] [place], [facility]   [phone]

## 2020-12-31 NOTE — ROUTINE PROCESS
Bedside shift change report given to Tim Longoria RN (oncoming nurse) by Lionel Gill RN (offgoing nurse). Report included the following information SBAR.

## 2021-01-01 LAB
ALBUMIN SERPL-MCNC: 2.5 G/DL (ref 3.5–5)
ALBUMIN/GLOB SERPL: 0.7 {RATIO} (ref 1.1–2.2)
ALP SERPL-CCNC: 43 U/L (ref 45–117)
ALT SERPL-CCNC: 10 U/L (ref 12–78)
ANION GAP SERPL CALC-SCNC: 5 MMOL/L (ref 5–15)
AST SERPL-CCNC: 7 U/L (ref 15–37)
BILIRUB SERPL-MCNC: 0.3 MG/DL (ref 0.2–1)
BUN SERPL-MCNC: 5 MG/DL (ref 6–20)
BUN/CREAT SERPL: 11 (ref 12–20)
CALCIUM SERPL-MCNC: 8.1 MG/DL (ref 8.5–10.1)
CHLORIDE SERPL-SCNC: 110 MMOL/L (ref 97–108)
CO2 SERPL-SCNC: 23 MMOL/L (ref 21–32)
CREAT SERPL-MCNC: 0.47 MG/DL (ref 0.55–1.02)
ERYTHROCYTE [DISTWIDTH] IN BLOOD BY AUTOMATED COUNT: 17.6 % (ref 11.5–14.5)
GLOBULIN SER CALC-MCNC: 3.6 G/DL (ref 2–4)
GLUCOSE SERPL-MCNC: 71 MG/DL (ref 65–100)
HCT VFR BLD AUTO: 28.6 % (ref 35–47)
HGB BLD-MCNC: 9.1 G/DL (ref 11.5–16)
MAGNESIUM SERPL-MCNC: 1.8 MG/DL (ref 1.6–2.4)
MCH RBC QN AUTO: 24.1 PG (ref 26–34)
MCHC RBC AUTO-ENTMCNC: 31.8 G/DL (ref 30–36.5)
MCV RBC AUTO: 75.7 FL (ref 80–99)
NRBC # BLD: 0 K/UL (ref 0–0.01)
NRBC BLD-RTO: 0 PER 100 WBC
PHOSPHATE SERPL-MCNC: 3.5 MG/DL (ref 2.6–4.7)
PLATELET # BLD AUTO: 92 K/UL (ref 150–400)
POTASSIUM SERPL-SCNC: 3.7 MMOL/L (ref 3.5–5.1)
PROT SERPL-MCNC: 6.1 G/DL (ref 6.4–8.2)
RBC # BLD AUTO: 3.78 M/UL (ref 3.8–5.2)
SODIUM SERPL-SCNC: 138 MMOL/L (ref 136–145)
WBC # BLD AUTO: 5.6 K/UL (ref 3.6–11)

## 2021-01-01 PROCEDURE — 96376 TX/PRO/DX INJ SAME DRUG ADON: CPT

## 2021-01-01 PROCEDURE — 80053 COMPREHEN METABOLIC PANEL: CPT

## 2021-01-01 PROCEDURE — 74011250636 HC RX REV CODE- 250/636: Performed by: OBSTETRICS & GYNECOLOGY

## 2021-01-01 PROCEDURE — 99218 HC RM OBSERVATION: CPT

## 2021-01-01 PROCEDURE — 84100 ASSAY OF PHOSPHORUS: CPT

## 2021-01-01 PROCEDURE — 74011250637 HC RX REV CODE- 250/637: Performed by: OBSTETRICS & GYNECOLOGY

## 2021-01-01 PROCEDURE — 74011000250 HC RX REV CODE- 250: Performed by: OBSTETRICS & GYNECOLOGY

## 2021-01-01 PROCEDURE — 83735 ASSAY OF MAGNESIUM: CPT

## 2021-01-01 PROCEDURE — 85027 COMPLETE CBC AUTOMATED: CPT

## 2021-01-01 PROCEDURE — 36415 COLL VENOUS BLD VENIPUNCTURE: CPT

## 2021-01-01 RX ADMIN — PROMETHAZINE HYDROCHLORIDE 25 MG: 25 SUPPOSITORY RECTAL at 09:15

## 2021-01-01 RX ADMIN — FOLIC ACID: 5 INJECTION, SOLUTION INTRAMUSCULAR; INTRAVENOUS; SUBCUTANEOUS at 18:00

## 2021-01-01 RX ADMIN — PROMETHAZINE HYDROCHLORIDE 25 MG: 25 SUPPOSITORY RECTAL at 16:37

## 2021-01-01 RX ADMIN — ONDANSETRON 4 MG: 2 INJECTION INTRAMUSCULAR; INTRAVENOUS at 13:30

## 2021-01-01 RX ADMIN — ALUMINUM HYDROXIDE AND MAGNESIUM HYDROXIDE 30 ML: 200; 200 SUSPENSION ORAL at 13:29

## 2021-01-01 RX ADMIN — ONDANSETRON 4 MG: 2 INJECTION INTRAMUSCULAR; INTRAVENOUS at 18:29

## 2021-01-01 RX ADMIN — OLANZAPINE 5 MG: 5 TABLET, FILM COATED ORAL at 22:12

## 2021-01-01 RX ADMIN — DOCUSATE SODIUM 100 MG: 100 CAPSULE ORAL at 09:14

## 2021-01-01 RX ADMIN — SODIUM CHLORIDE 20 MG: 9 INJECTION INTRAMUSCULAR; INTRAVENOUS; SUBCUTANEOUS at 22:12

## 2021-01-01 RX ADMIN — FLUOXETINE 60 MG: 20 CAPSULE ORAL at 09:15

## 2021-01-01 RX ADMIN — SODIUM CHLORIDE, POTASSIUM CHLORIDE, SODIUM LACTATE AND CALCIUM CHLORIDE 150 ML/HR: 600; 310; 30; 20 INJECTION, SOLUTION INTRAVENOUS at 22:29

## 2021-01-01 RX ADMIN — SODIUM CHLORIDE 20 MG: 9 INJECTION INTRAMUSCULAR; INTRAVENOUS; SUBCUTANEOUS at 09:07

## 2021-01-01 RX ADMIN — ONDANSETRON 4 MG: 2 INJECTION INTRAMUSCULAR; INTRAVENOUS at 00:11

## 2021-01-01 RX ADMIN — ONDANSETRON 4 MG: 2 INJECTION INTRAMUSCULAR; INTRAVENOUS at 07:11

## 2021-01-01 RX ADMIN — SODIUM CHLORIDE, POTASSIUM CHLORIDE, SODIUM LACTATE AND CALCIUM CHLORIDE 150 ML/HR: 600; 310; 30; 20 INJECTION, SOLUTION INTRAVENOUS at 14:30

## 2021-01-01 RX ADMIN — FERROUS SULFATE TAB 325 MG (65 MG ELEMENTAL FE) 325 MG: 325 (65 FE) TAB at 16:37

## 2021-01-01 RX ADMIN — ALUMINUM HYDROXIDE AND MAGNESIUM HYDROXIDE 30 ML: 200; 200 SUSPENSION ORAL at 18:29

## 2021-01-01 RX ADMIN — FERROUS SULFATE TAB 325 MG (65 MG ELEMENTAL FE) 325 MG: 325 (65 FE) TAB at 09:14

## 2021-01-01 RX ADMIN — PANTOPRAZOLE SODIUM 40 MG: 40 TABLET, DELAYED RELEASE ORAL at 07:05

## 2021-01-01 NOTE — PROGRESS NOTES
4448 Verbal shift change report given to Susanna Rivas RN (oncoming nurse) by Aashish Mena RN (offgoing nurse). Report included the following information SBAR, Kardex, Intake/Output, MAR, Accordion, Recent Results and Med Rec Status. 6084 In pt's room for assessment. Pt notes pain (6/10) on right side of abdomen with burning in throat.

## 2021-01-01 NOTE — PROGRESS NOTES
High Risk Obstetrics Progress Note    Name: Verona Brunner MRN: [id number]  SSN: [id number]    YOB: 1985  Age: 38 y.o. Sex: female      Subjective:      LOS: 1 day    Estimated Date of Delivery: 21   Gestational Age Today: 14w0d     Patient admitted for hyperemesis gravidarum and eating disorder. States she continues  to have cramping. Denies VB. Tong Morris Objective:     Vitals:  Blood pressure 111/72, pulse 60, temperature 98.3 °F (36.8 °C), resp. rate 16, height 5' 4\" (1.626 m), weight 71.2 kg (157 lb), SpO2 100 %, not currently breastfeeding. Temp (24hrs), Av.2 °F (36.8 °C), Min:97.8 °F (36.6 °C), Max:98.4 °F (89.6 °C)    Systolic (27CCP), LVJ:997 , Min:95 , TJK:782      Diastolic (50HND), VWE:10, Min:60, Max:72       Intake and Output:         Physical Exam:  Abdomen: soft, nontender, nondistended, nontender  Lower Extremities:  - Edema No       Membranes:  Intact        Fetal Heart Rate:  pod        Labs:   Recent Results (from the past 36 hour(s))   METABOLIC PANEL, COMPREHENSIVE    Collection Time: 20  5:12 AM   Result Value Ref Range    Sodium 136 136 - 145 mmol/L    Potassium 4.0 3.5 - 5.1 mmol/L    Chloride 109 (H) 97 - 108 mmol/L    CO2 23 21 - 32 mmol/L    Anion gap 4 (L) 5 - 15 mmol/L    Glucose 63 (L) 65 - 100 mg/dL    BUN 7 6 - 20 MG/DL    Creatinine 0.45 (L) 0.55 - 1.02 MG/DL    BUN/Creatinine ratio 16 12 - 20      GFR est AA >60 >60 ml/min/1.73m2    GFR est non-AA >60 >60 ml/min/1.73m2    Calcium 8.0 (L) 8.5 - 10.1 MG/DL    Bilirubin, total 0.4 0.2 - 1.0 MG/DL    ALT (SGPT) 8 (L) 12 - 78 U/L    AST (SGOT) 7 (L) 15 - 37 U/L    Alk.  phosphatase 46 45 - 117 U/L    Protein, total 6.1 (L) 6.4 - 8.2 g/dL    Albumin 2.6 (L) 3.5 - 5.0 g/dL    Globulin 3.5 2.0 - 4.0 g/dL    A-G Ratio 0.7 (L) 1.1 - 2.2     CBC W/O DIFF    Collection Time: 20  5:12 AM   Result Value Ref Range    WBC 5.8 3.6 - 11.0 K/uL    RBC 3.91 3.80 - 5.20 M/uL    HGB 9.5 (L) 11.5 - 16.0 g/dL    HCT 30.6 (L) 35.0 - 47.0 %    MCV 78.3 (L) 80.0 - 99.0 FL    MCH 24.3 (L) 26.0 - 34.0 PG    MCHC 31.0 30.0 - 36.5 g/dL    RDW 17.9 (H) 11.5 - 14.5 %    PLATELET 87 (L) 975 - 400 K/uL    NRBC 0.0 0  WBC    ABSOLUTE NRBC 0.00 0.00 - 0.01 K/uL   MAGNESIUM    Collection Time: 12/31/20  5:12 AM   Result Value Ref Range    Magnesium 2.0 1.6 - 2.4 mg/dL   PHOSPHORUS    Collection Time: 12/31/20  5:12 AM   Result Value Ref Range    Phosphorus 3.8 2.6 - 4.7 MG/DL   METABOLIC PANEL, COMPREHENSIVE    Collection Time: 01/01/21  7:08 AM   Result Value Ref Range    Sodium 138 136 - 145 mmol/L    Potassium 3.7 3.5 - 5.1 mmol/L    Chloride 110 (H) 97 - 108 mmol/L    CO2 23 21 - 32 mmol/L    Anion gap 5 5 - 15 mmol/L    Glucose 71 65 - 100 mg/dL    BUN 5 (L) 6 - 20 MG/DL    Creatinine 0.47 (L) 0.55 - 1.02 MG/DL    BUN/Creatinine ratio 11 (L) 12 - 20      GFR est AA >60 >60 ml/min/1.73m2    GFR est non-AA >60 >60 ml/min/1.73m2    Calcium 8.1 (L) 8.5 - 10.1 MG/DL    Bilirubin, total 0.3 0.2 - 1.0 MG/DL    ALT (SGPT) 10 (L) 12 - 78 U/L    AST (SGOT) 7 (L) 15 - 37 U/L    Alk. phosphatase 43 (L) 45 - 117 U/L    Protein, total 6.1 (L) 6.4 - 8.2 g/dL    Albumin 2.5 (L) 3.5 - 5.0 g/dL    Globulin 3.6 2.0 - 4.0 g/dL    A-G Ratio 0.7 (L) 1.1 - 2.2     CBC W/O DIFF    Collection Time: 01/01/21  7:08 AM   Result Value Ref Range    WBC 5.6 3.6 - 11.0 K/uL    RBC 3.78 (L) 3.80 - 5.20 M/uL    HGB 9.1 (L) 11.5 - 16.0 g/dL    HCT 28.6 (L) 35.0 - 47.0 %    MCV 75.7 (L) 80.0 - 99.0 FL    MCH 24.1 (L) 26.0 - 34.0 PG    MCHC 31.8 30.0 - 36.5 g/dL    RDW 17.6 (H) 11.5 - 14.5 %    PLATELET 92 (L) 794 - 400 K/uL    NRBC 0.0 0  WBC    ABSOLUTE NRBC 0.00 0.00 - 0.01 K/uL   MAGNESIUM    Collection Time: 01/01/21  7:08 AM   Result Value Ref Range    Magnesium 1.8 1.6 - 2.4 mg/dL   PHOSPHORUS    Collection Time: 01/01/21  7:08 AM   Result Value Ref Range    Phosphorus 3.5 2.6 - 4.7 MG/DL       Assessment and Plan:       Active Problems:    Nausea and vomiting in pregnancy (12/2/2020)       IUP at 14 weeks  nausea/vomiting in pregnancy with chronic eating disorder.     1.  N/V/Dehydration/weight loss since discharge from hospital             -Iv antiemetics and phenergan suppositories prn             -ivf's             -Goodie bag daily             -Daily cmp, cbc, mg, phos             -Nutrition consult (pt saw her nutritionist Jerry Last on         12/29) for supplement orders and consider ENT to place  dobhoff vs TPN if needed. (will need to see if this will be   covered with home health)             -Pt has PICC line in place             -pt s/p GI consult during last admission              -pt s/p sugery consult last admission (known gallstones but       no surgery necessary at that time)     2. Eating Disorder and Depression/Anxiety             -thrombocytopenia and low lfts-?liver dysfunction-consider. Slightly better this AM     GI consult             -recent h/o admission for suicidal ideation-stable currently             -Continue Prozac 60mg daily and Zyprexa 5mg daily             -Continue prn hydoxyzine and prazosin             -Pt followed closely by psychiatrist Alen Spann (last appt           12/28)     3. CHTN-normal to low bp's off meds since weight loss     4. Anemia-know beta thalassemia minor and iron deficiency             anemia-s/p iv iron infusions during last admission             -oral iron bid     5. Hip pain/pelvic girdle weakness             -consult PT             -pelvic support belt     6.  H/o incompetent cervix             -has MFM appt at 8am today (12/31)-will need  cerclage placement at 14-16 weeks     7. DVT prophylaxis-MEGHANA hose and ambulation with assistance (pt     is fall risk)     8. Daily FHTs.

## 2021-01-01 NOTE — PROGRESS NOTES
Bedside and Verbal shift change report given to LILIA Cormier RN (oncoming nurse) by aFby Chau RN (offgoing nurse). Report included the following information SBAR, Kardex, Intake/Output, MAR, Accordion and Recent Results.

## 2021-01-02 PROBLEM — O21.9 NAUSEA/VOMITING IN PREGNANCY: Status: ACTIVE | Noted: 2021-01-02

## 2021-01-02 LAB
ALBUMIN SERPL-MCNC: 2.6 G/DL (ref 3.5–5)
ALBUMIN/GLOB SERPL: 0.7 {RATIO} (ref 1.1–2.2)
ALP SERPL-CCNC: 44 U/L (ref 45–117)
ALT SERPL-CCNC: 9 U/L (ref 12–78)
ANION GAP SERPL CALC-SCNC: 4 MMOL/L (ref 5–15)
AST SERPL-CCNC: 9 U/L (ref 15–37)
BILIRUB SERPL-MCNC: 0.2 MG/DL (ref 0.2–1)
BUN SERPL-MCNC: 4 MG/DL (ref 6–20)
BUN/CREAT SERPL: 9 (ref 12–20)
CALCIUM SERPL-MCNC: 8.3 MG/DL (ref 8.5–10.1)
CHLORIDE SERPL-SCNC: 109 MMOL/L (ref 97–108)
CO2 SERPL-SCNC: 25 MMOL/L (ref 21–32)
CREAT SERPL-MCNC: 0.47 MG/DL (ref 0.55–1.02)
ERYTHROCYTE [DISTWIDTH] IN BLOOD BY AUTOMATED COUNT: 17.3 % (ref 11.5–14.5)
GLOBULIN SER CALC-MCNC: 3.5 G/DL (ref 2–4)
GLUCOSE SERPL-MCNC: 68 MG/DL (ref 65–100)
HCT VFR BLD AUTO: 28.8 % (ref 35–47)
HGB BLD-MCNC: 9.2 G/DL (ref 11.5–16)
MAGNESIUM SERPL-MCNC: 1.9 MG/DL (ref 1.6–2.4)
MCH RBC QN AUTO: 24.2 PG (ref 26–34)
MCHC RBC AUTO-ENTMCNC: 31.9 G/DL (ref 30–36.5)
MCV RBC AUTO: 75.8 FL (ref 80–99)
NRBC # BLD: 0 K/UL (ref 0–0.01)
NRBC BLD-RTO: 0 PER 100 WBC
PHOSPHATE SERPL-MCNC: 3.8 MG/DL (ref 2.6–4.7)
PLATELET # BLD AUTO: 184 K/UL (ref 150–400)
PMV BLD AUTO: 11.7 FL (ref 8.9–12.9)
POTASSIUM SERPL-SCNC: 3.7 MMOL/L (ref 3.5–5.1)
PROT SERPL-MCNC: 6.1 G/DL (ref 6.4–8.2)
RBC # BLD AUTO: 3.8 M/UL (ref 3.8–5.2)
SODIUM SERPL-SCNC: 138 MMOL/L (ref 136–145)
WBC # BLD AUTO: 6.5 K/UL (ref 3.6–11)

## 2021-01-02 PROCEDURE — 80053 COMPREHEN METABOLIC PANEL: CPT

## 2021-01-02 PROCEDURE — 97116 GAIT TRAINING THERAPY: CPT

## 2021-01-02 PROCEDURE — 74011250637 HC RX REV CODE- 250/637: Performed by: OBSTETRICS & GYNECOLOGY

## 2021-01-02 PROCEDURE — 99218 HC RM OBSERVATION: CPT

## 2021-01-02 PROCEDURE — 74011250636 HC RX REV CODE- 250/636: Performed by: OBSTETRICS & GYNECOLOGY

## 2021-01-02 PROCEDURE — 65410000002 HC RM PRIVATE OB

## 2021-01-02 PROCEDURE — 97161 PT EVAL LOW COMPLEX 20 MIN: CPT

## 2021-01-02 PROCEDURE — 83735 ASSAY OF MAGNESIUM: CPT

## 2021-01-02 PROCEDURE — 74011000250 HC RX REV CODE- 250: Performed by: OBSTETRICS & GYNECOLOGY

## 2021-01-02 PROCEDURE — 96376 TX/PRO/DX INJ SAME DRUG ADON: CPT

## 2021-01-02 PROCEDURE — 36593 DECLOT VASCULAR DEVICE: CPT

## 2021-01-02 PROCEDURE — 36415 COLL VENOUS BLD VENIPUNCTURE: CPT

## 2021-01-02 PROCEDURE — 85027 COMPLETE CBC AUTOMATED: CPT

## 2021-01-02 PROCEDURE — 84100 ASSAY OF PHOSPHORUS: CPT

## 2021-01-02 RX ORDER — ONDANSETRON 2 MG/ML
8 INJECTION INTRAMUSCULAR; INTRAVENOUS EVERY 8 HOURS
Status: DISCONTINUED | OUTPATIENT
Start: 2021-01-02 | End: 2021-01-15 | Stop reason: HOSPADM

## 2021-01-02 RX ORDER — SUCRALFATE 1 G/1
1 TABLET ORAL
Status: DISCONTINUED | OUTPATIENT
Start: 2021-01-02 | End: 2021-01-03

## 2021-01-02 RX ADMIN — DOCUSATE SODIUM 100 MG: 100 CAPSULE ORAL at 08:44

## 2021-01-02 RX ADMIN — Medication 10 ML: at 00:18

## 2021-01-02 RX ADMIN — OLANZAPINE 5 MG: 5 TABLET, FILM COATED ORAL at 21:59

## 2021-01-02 RX ADMIN — SUCRALFATE 1 G: 1 TABLET ORAL at 21:59

## 2021-01-02 RX ADMIN — WATER 1 MG: 1 INJECTION INTRAMUSCULAR; INTRAVENOUS; SUBCUTANEOUS at 14:18

## 2021-01-02 RX ADMIN — ONDANSETRON HYDROCHLORIDE 8 MG: 2 INJECTION, SOLUTION INTRAMUSCULAR; INTRAVENOUS at 21:59

## 2021-01-02 RX ADMIN — Medication 30 ML: at 14:00

## 2021-01-02 RX ADMIN — FERROUS SULFATE TAB 325 MG (65 MG ELEMENTAL FE) 325 MG: 325 (65 FE) TAB at 08:44

## 2021-01-02 RX ADMIN — FOLIC ACID: 5 INJECTION, SOLUTION INTRAMUSCULAR; INTRAVENOUS; SUBCUTANEOUS at 17:17

## 2021-01-02 RX ADMIN — ONDANSETRON 4 MG: 2 INJECTION INTRAMUSCULAR; INTRAVENOUS at 00:17

## 2021-01-02 RX ADMIN — SODIUM CHLORIDE, POTASSIUM CHLORIDE, SODIUM LACTATE AND CALCIUM CHLORIDE 150 ML/HR: 600; 310; 30; 20 INJECTION, SOLUTION INTRAVENOUS at 07:12

## 2021-01-02 RX ADMIN — PROMETHAZINE HYDROCHLORIDE 25 MG: 25 SUPPOSITORY RECTAL at 09:55

## 2021-01-02 RX ADMIN — PANTOPRAZOLE SODIUM 40 MG: 40 TABLET, DELAYED RELEASE ORAL at 07:09

## 2021-01-02 RX ADMIN — SODIUM CHLORIDE 20 MG: 9 INJECTION INTRAMUSCULAR; INTRAVENOUS; SUBCUTANEOUS at 21:59

## 2021-01-02 RX ADMIN — Medication 10 ML: at 22:00

## 2021-01-02 RX ADMIN — ONDANSETRON 4 MG: 2 INJECTION INTRAMUSCULAR; INTRAVENOUS at 07:09

## 2021-01-02 RX ADMIN — SUCRALFATE 1 G: 1 TABLET ORAL at 17:04

## 2021-01-02 RX ADMIN — ONDANSETRON HYDROCHLORIDE 8 MG: 2 INJECTION, SOLUTION INTRAMUSCULAR; INTRAVENOUS at 14:23

## 2021-01-02 RX ADMIN — SODIUM CHLORIDE 20 MG: 9 INJECTION INTRAMUSCULAR; INTRAVENOUS; SUBCUTANEOUS at 08:43

## 2021-01-02 RX ADMIN — FLUOXETINE 60 MG: 20 CAPSULE ORAL at 08:44

## 2021-01-02 RX ADMIN — SODIUM CHLORIDE, POTASSIUM CHLORIDE, SODIUM LACTATE AND CALCIUM CHLORIDE 150 ML/HR: 600; 310; 30; 20 INJECTION, SOLUTION INTRAVENOUS at 13:38

## 2021-01-02 RX ADMIN — FERROUS SULFATE TAB 325 MG (65 MG ELEMENTAL FE) 325 MG: 325 (65 FE) TAB at 17:04

## 2021-01-02 RX ADMIN — Medication 10 ML: at 07:09

## 2021-01-02 RX ADMIN — SUCRALFATE 1 G: 1 TABLET ORAL at 11:34

## 2021-01-02 NOTE — PROGRESS NOTES
Bedside shift change report given to [address] (oncoming nurse) by Kathleen Grant RN (offgoing nurse). Report included SBAR, Kardex, Intake/Output, MAR, Recent Results and Progress of Care. I accept this patient to my care at this time. Any subsequent documentation in this Progress Note is intended to be information adjunct to other components of the patient's electronic medical record. Refer to accompanying timestamp(s) for chronology. 3:09 PM:  Cath marry treatment successful.

## 2021-01-02 NOTE — PROGRESS NOTES
..Bedside and Verbal shift change report given to Sendy Pierce RN (oncoming nurse) by Burnard Schwab, RN (offgoing nurse). Report included the following information SBAR, Kardex, Intake/Output, MAR, Accordion and Recent Results.

## 2021-01-02 NOTE — PROGRESS NOTES
Problem: Mobility Impaired (Adult and Pediatric)  Goal: *Acute Goals and Plan of Care (Insert Text)  Description: FUNCTIONAL STATUS PRIOR TO ADMISSION: Patient was independent with ambulation. Was receiving HHPT after last admission for syncope and fall    HOME SUPPORT PRIOR TO ADMISSION: The patient lived with significant other. Physical Therapy Goals  Initiated 1/2/2021  1. Patient will move from supine to sit and sit to supine  in bed with independence within 7 day(s). 2.  Patient will transfer from bed to chair and chair to bed with independence using the least restrictive device within 7 day(s). 3.  Patient will perform sit to stand with independence within 7 day(s). 4.  Patient will ambulate with modified independence for 200 feet with the least restrictive device within 7 day(s). 5.  Patient will ascend/descend 10 stairs with 1 handrail(s) with modified independence within 7 day(s). Outcome: Progressing Towards Goal     PHYSICAL THERAPY EVALUATION  Patient: Elizabeth Sharif (46 y.o. female)  Date: 1/2/2021  Primary Diagnosis: Nausea and vomiting in pregnancy [O21.9]  Nausea/vomiting in pregnancy [O21.9]  Eating disorder affecting pregnancy, antepartum [O99.340, F50.9]        Precautions: Fall       ASSESSMENT  Based on the objective data described below, the patient presents with R hip weakness, impaired gait and balance, tissue tenderness over the R hip greater trochanter and overall decreased functional mobility. Patient reporting residual R hip pain from her syncope and fall last month. She is now receiving HHPT and reports her HHPT is looking into ordering her an SI belt. Ambulated with CGA and pushing IV pole. Gait noted to be mildly unsteady and antalgic but no LOB. Will continue to progress to assure safe d/c home and resuming HHPT. Current Level of Function Impacting Discharge (mobility/balance): mod I to CGA without device    Functional Outcome Measure:   The patient scored 19/58 on the tinetti outcome measure which is indicative of moderate fall risk. Other factors to consider for discharge: medical stability     Patient will benefit from skilled therapy intervention to address the above noted impairments. PLAN :  Recommendations and Planned Interventions: gait training, therapeutic exercises, neuromuscular re-education, and therapeutic activities      Frequency/Duration: Patient will be followed by physical therapy:  3 times a week to address goals. Recommendation for discharge: (in order for the patient to meet his/her long term goals)  Physical therapy at least 2 days/week in the home     This discharge recommendation:  Has not yet been discussed the attending provider and/or case management    IF patient discharges home will need the following DME: to be determined (TBD)         SUBJECTIVE:   Patient stated The therapist was having me do exercises.     OBJECTIVE DATA SUMMARY:   HISTORY:    Past Medical History:   Diagnosis Date    Anorexia     Anxiety     Asthma     on albuterol prn.  Triggers cold and allergies    Avoidant-restrictive food intake disorder (ARFID)     Back pain, chronic     sciatica    Chronic bilateral low back pain with right-sided sciatica 2020    ~    GERD (gastroesophageal reflux disease) 2020    [facility] , GI Dr. Elvera Buerger    Gestational hypertension     Past pregnancy    HX OTHER MEDICAL      , , , ,     Hyperemesis     severe hyperemesis    Hypertension     with G12 - none this pregnancy    Iron deficiency anemia 10/08/2010    MDD (major depressive disorder), single episode with postpartum onset 2013    treated with meds - 13    Orthostatic hypotension 2020    Plantar fasciitis     Pregnancy     36 weeks    PTSD (post-traumatic stress disorder)      Past Surgical History:   Procedure Laterality Date    HX  SECTION  4/22/15    HX DILATION AND CURETTAGE      HX DILATION AND CURETTAGE  2020    HX GYN      miscarriage x 6    HX GYN      removal of left fallopian tube    HX OTHER SURGICAL      cerlcage x4, ectopic x1 1999 with removal of left fallopian tube    HX OTHER SURGICAL      cerclage w/ current pregnancy. Removed 18    CT  DELIVERY ONLY         Personal factors and/or comorbidities impacting plan of care: anorexia, N/V, psych hx    Home Situation  Home Environment: Private residence  # Steps to Enter: 10  Rails to Enter: Yes  Hand Rails : Right  Wheelchair Ramp: No  One/Two Story Residence: Two story  Support Systems: Spouse/Significant Other/Partner  Patient Expects to be Discharged to[de-identified] Private residence  Current DME Used/Available at Home: None    EXAMINATION/PRESENTATION/DECISION MAKING:   Critical Behavior:  Neurologic State: Alert  Orientation Level: Oriented X4  Cognition: Follows commands     Hearing: Auditory  Auditory Impairment: None  Skin:  NT  Edema: NT  Range Of Motion:  AROM: Within functional limits           PROM: Within functional limits           Strength:    Strength: Generally decreased, functional                    Tone & Sensation:   Tone: Normal              Sensation: Intact               Coordination:  Coordination: Within functional limits  Vision:      Functional Mobility:  Bed Mobility:     Supine to Sit: Modified independent  Sit to Supine: Modified independent     Transfers:  Sit to Stand: Supervision  Stand to Sit: Modified independent                       Balance:   Sitting: Intact  Standing: Impaired  Standing - Static: Good;Constant support  Standing - Dynamic : Fair;Constant support  Ambulation/Gait Training:  Distance (ft): 120 Feet (ft)  Assistive Device: (pushing IV pole)  Ambulation - Level of Assistance: Contact guard assistance        Gait Abnormalities: Antalgic;Decreased step clearance; Path deviations        Base of Support: Narrowed     Speed/Deepti: Slow                  Functional Measure:  Tinetti test:    Sitting Balance: 1  Arises: 1  Attempts to Rise: 2  Immediate Standing Balance: 2  Standing Balance: 2  Nudged: 1  Eyes Closed: 1  Turn 360 Degrees - Continuous/Discontinuous: 0  Turn 360 Degrees - Steady/Unsteady: 1  Sitting Down: 2  Balance Score: 13 Balance total score  Indication of Gait: 1  R Step Length/Height: 1  L Step Length/Height: 1  R Foot Clearance: 1  L Foot Clearance: 0  Step Symmetry: 0  Step Continuity: 0  Path: 1  Trunk: 0  Walking Time: 1  Gait Score: 6 Gait total score  Total Score: 19/28 Overall total score         Tinetti Tool Score Risk of Falls  <19 = High Fall Risk  19-24 = Moderate Fall Risk  25-28 = Low Fall Risk  Tinetti ME. Performance-Oriented Assessment of Mobility Problems in Elderly Patients. [facility] 66; [id number].  (Scoring Description: PT Bulletin Feb. 10, 1993)    Older adults: Ta Spears et al, 2009; n = 1000 [facility] elderly evaluated with ABC, SHERIF, ADL, and IADL)  · Mean SHERIF score for males aged 69-68 years = 26.21(3.40)  · Mean SHERIF score for females age 69-68 years = 25.16(4.30)  · Mean SHERIF score for males over 80 years = 23.29(6.02)  · Mean SHERIF score for females over 80 years = 17.20(8.32)            Physical Therapy Evaluation Charge Determination   History Examination Presentation Decision-Making   HIGH Complexity :3+ comorbidities / personal factors will impact the outcome/ POC  LOW Complexity : 1-2 Standardized tests and measures addressing body structure, function, activity limitation and / or participation in recreation  LOW Complexity : Stable, uncomplicated  MEDIUM Complexity : FOTO score of 26-74      Based on the above components, the patient evaluation is determined to be of the following complexity level: LOW     Pain Rating:  3/10 hip post activity    Activity Tolerance:   Good    After treatment patient left in no apparent distress:   Supine in bed and Call bell within reach    COMMUNICATION/EDUCATION:   The patients plan of care was discussed with: Registered nurse. Fall prevention education was provided and the patient/caregiver indicated understanding., Patient/family have participated as able in goal setting and plan of care. , and Patient/family agree to work toward stated goals and plan of care.     Thank you for this referral.  Sendy Rangel, PT   Time Calculation: 14 mins

## 2021-01-02 NOTE — PROGRESS NOTES
High Risk Obstetrics Progress Note    Name: Ayaka Conti MRN: [id number]  SSN: [id number]    YOB: 1985  Age: 38 y.o. Sex: female      Subjective:      LOS: 1 day    Estimated Date of Delivery: 21   Gestational Age Today: 14w1d     Patient admitted for nausea, vomiting and chronic eating disorder. States she does have nausea, vomiting (emesis after breakfast this am), upper abdominal pain, leg soreness and does not have chest pain, shortness of breath and vaginal bleeding . Objective:     Vitals:  Blood pressure (!) 94/56, pulse (!) 56, temperature 98.2 °F (36.8 °C), resp. rate 16, height 5' 4\" (1.626 m), weight 71.8 kg (158 lb 3.2 oz), SpO2 99 %, not currently breastfeeding. Temp (24hrs), Av.3 °F (36.8 °C), Min:98.1 °F (36.7 °C), Max:98.5 °F (50.3 °C)    Systolic (26ZXQ), HGS:31 , Min:93 , WHM:16      Diastolic (46OTM), WCK:99, Min:56, Max:66       Intake and Output:         Physical Exam:  Patient without distress. Heart: Regular rate and rhythm  Lung: clear to auscultation throughout lung fields, no wheezes, no rales, no rhonchi and normal respiratory effort  Abdomen: soft, nontender, gravid  Lower Extremities:  - Edema No       Membranes:  Intact        Labs:   Recent Results (from the past 36 hour(s))   METABOLIC PANEL, COMPREHENSIVE    Collection Time: 21  7:08 AM   Result Value Ref Range    Sodium 138 136 - 145 mmol/L    Potassium 3.7 3.5 - 5.1 mmol/L    Chloride 110 (H) 97 - 108 mmol/L    CO2 23 21 - 32 mmol/L    Anion gap 5 5 - 15 mmol/L    Glucose 71 65 - 100 mg/dL    BUN 5 (L) 6 - 20 MG/DL    Creatinine 0.47 (L) 0.55 - 1.02 MG/DL    BUN/Creatinine ratio 11 (L) 12 - 20      GFR est AA >60 >60 ml/min/1.73m2    GFR est non-AA >60 >60 ml/min/1.73m2    Calcium 8.1 (L) 8.5 - 10.1 MG/DL    Bilirubin, total 0.3 0.2 - 1.0 MG/DL    ALT (SGPT) 10 (L) 12 - 78 U/L    AST (SGOT) 7 (L) 15 - 37 U/L    Alk.  phosphatase 43 (L) 45 - 117 U/L    Protein, total 6.1 (L) 6.4 - 8.2 g/dL Albumin 2.5 (L) 3.5 - 5.0 g/dL    Globulin 3.6 2.0 - 4.0 g/dL    A-G Ratio 0.7 (L) 1.1 - 2.2     CBC W/O DIFF    Collection Time: 01/01/21  7:08 AM   Result Value Ref Range    WBC 5.6 3.6 - 11.0 K/uL    RBC 3.78 (L) 3.80 - 5.20 M/uL    HGB 9.1 (L) 11.5 - 16.0 g/dL    HCT 28.6 (L) 35.0 - 47.0 %    MCV 75.7 (L) 80.0 - 99.0 FL    MCH 24.1 (L) 26.0 - 34.0 PG    MCHC 31.8 30.0 - 36.5 g/dL    RDW 17.6 (H) 11.5 - 14.5 %    PLATELET 92 (L) 052 - 400 K/uL    NRBC 0.0 0  WBC    ABSOLUTE NRBC 0.00 0.00 - 0.01 K/uL   MAGNESIUM    Collection Time: 01/01/21  7:08 AM   Result Value Ref Range    Magnesium 1.8 1.6 - 2.4 mg/dL   PHOSPHORUS    Collection Time: 01/01/21  7:08 AM   Result Value Ref Range    Phosphorus 3.5 2.6 - 4.7 MG/DL   METABOLIC PANEL, COMPREHENSIVE    Collection Time: 01/02/21  4:24 AM   Result Value Ref Range    Sodium 138 136 - 145 mmol/L    Potassium 3.7 3.5 - 5.1 mmol/L    Chloride 109 (H) 97 - 108 mmol/L    CO2 25 21 - 32 mmol/L    Anion gap 4 (L) 5 - 15 mmol/L    Glucose 68 65 - 100 mg/dL    BUN 4 (L) 6 - 20 MG/DL    Creatinine 0.47 (L) 0.55 - 1.02 MG/DL    BUN/Creatinine ratio 9 (L) 12 - 20      GFR est AA >60 >60 ml/min/1.73m2    GFR est non-AA >60 >60 ml/min/1.73m2    Calcium 8.3 (L) 8.5 - 10.1 MG/DL    Bilirubin, total 0.2 0.2 - 1.0 MG/DL    ALT (SGPT) 9 (L) 12 - 78 U/L    AST (SGOT) 9 (L) 15 - 37 U/L    Alk.  phosphatase 44 (L) 45 - 117 U/L    Protein, total 6.1 (L) 6.4 - 8.2 g/dL    Albumin 2.6 (L) 3.5 - 5.0 g/dL    Globulin 3.5 2.0 - 4.0 g/dL    A-G Ratio 0.7 (L) 1.1 - 2.2     CBC W/O DIFF    Collection Time: 01/02/21  4:24 AM   Result Value Ref Range    WBC 6.5 3.6 - 11.0 K/uL    RBC 3.80 3.80 - 5.20 M/uL    HGB 9.2 (L) 11.5 - 16.0 g/dL    HCT 28.8 (L) 35.0 - 47.0 %    MCV 75.8 (L) 80.0 - 99.0 FL    MCH 24.2 (L) 26.0 - 34.0 PG    MCHC 31.9 30.0 - 36.5 g/dL    RDW 17.3 (H) 11.5 - 14.5 %    PLATELET 990 030 - 104 K/uL    MPV 11.7 8.9 - 12.9 FL    NRBC 0.0 0  WBC    ABSOLUTE NRBC 0.00 0.00 - 0.01 K/uL   MAGNESIUM    Collection Time: 01/02/21  4:24 AM   Result Value Ref Range    Magnesium 1.9 1.6 - 2.4 mg/dL   PHOSPHORUS    Collection Time: 01/02/21  4:24 AM   Result Value Ref Range    Phosphorus 3.8 2.6 - 4.7 MG/DL       Assessment and Plan: Active Problems:    Nausea and vomiting in pregnancy (12/2/2020)      Eating disorder affecting pregnancy, antepartum (12/2/2020)      Nausea/vomiting in pregnancy (1/2/2021)     37 y/o [id number] at 13 6/7 weeks by [facility] 7/4/2021 readmitted for intractable nausea/vomiting in pregnancy in light of chronic eating disorder    1.  N/V/Dehydration/weight loss since discharge from hospital             -Iv antiemetics and phenergan suppositories prn             -ivf's             -Goodie bag daily             -Daily cmp, cbc, mg, phos             -s/p Nutrition consult (pt saw her nutritionist Abby Massey on 12/29) for supplement orders and consider ENT to place  dobhoff vs TPN if needed. (will need to see if this will be   covered with home health)             -Pt has PICC line in place             -pt s/p GI consult during last admission              -pt s/p sugery consult last admission (known gallstones but no surgery necessary at that time)-however, may need to address, as pt having worsening upper abdominal pain now.-will try Carafate     2. Eating Disorder and Depression/Anxiety             -Thrombocytopenia resolved on today's labs             -recent h/o admission for suicidal ideation-stable currently             -Continue Prozac 60mg daily and Zyprexa 5mg daily             -Continue prn hydoxyzine and prazosin             -Pt followed closely by psychiatrist Ranulfo Ochoa (last appt           33/33)     3. CHTN-normal to low bp's off meds since weight loss     4. Anemia-know beta thalassemia minor and iron deficiency             anemia-s/p iv iron infusions during last admission             -oral iron bid     5.  Hip pain/pelvic girdle weakness             -consult PT             -pelvic support belt     6.  H/o incompetent cervix             -s/p MFM appt at 8am today (12/31)-will need cerclage placement at 14-16 weeks     7. DVT prophylaxis-MEGHANA hose and ambulation with assistance (pt is fall risk)     8. Daily FHTs.

## 2021-01-03 LAB
ALBUMIN SERPL-MCNC: 2.6 G/DL (ref 3.5–5)
ALBUMIN/GLOB SERPL: 0.8 {RATIO} (ref 1.1–2.2)
ALP SERPL-CCNC: 42 U/L (ref 45–117)
ALT SERPL-CCNC: 8 U/L (ref 12–78)
ANION GAP SERPL CALC-SCNC: 5 MMOL/L (ref 5–15)
AST SERPL-CCNC: 8 U/L (ref 15–37)
BILIRUB SERPL-MCNC: 0.2 MG/DL (ref 0.2–1)
BUN SERPL-MCNC: 5 MG/DL (ref 6–20)
BUN/CREAT SERPL: 10 (ref 12–20)
CALCIUM SERPL-MCNC: 8.4 MG/DL (ref 8.5–10.1)
CHLORIDE SERPL-SCNC: 108 MMOL/L (ref 97–108)
CO2 SERPL-SCNC: 24 MMOL/L (ref 21–32)
CREAT SERPL-MCNC: 0.52 MG/DL (ref 0.55–1.02)
ERYTHROCYTE [DISTWIDTH] IN BLOOD BY AUTOMATED COUNT: 17.3 % (ref 11.5–14.5)
GLOBULIN SER CALC-MCNC: 3.4 G/DL (ref 2–4)
GLUCOSE SERPL-MCNC: 65 MG/DL (ref 65–100)
HCT VFR BLD AUTO: 23.7 % (ref 35–47)
HGB BLD-MCNC: 7.6 G/DL (ref 11.5–16)
MAGNESIUM SERPL-MCNC: 1.9 MG/DL (ref 1.6–2.4)
MCH RBC QN AUTO: 24.2 PG (ref 26–34)
MCHC RBC AUTO-ENTMCNC: 32.1 G/DL (ref 30–36.5)
MCV RBC AUTO: 75.5 FL (ref 80–99)
NRBC # BLD: 0 K/UL (ref 0–0.01)
NRBC BLD-RTO: 0 PER 100 WBC
PHOSPHATE SERPL-MCNC: 4.4 MG/DL (ref 2.6–4.7)
PLATELET # BLD AUTO: 159 K/UL (ref 150–400)
PMV BLD AUTO: 11.6 FL (ref 8.9–12.9)
POTASSIUM SERPL-SCNC: 3.8 MMOL/L (ref 3.5–5.1)
PROT SERPL-MCNC: 6 G/DL (ref 6.4–8.2)
RBC # BLD AUTO: 3.14 M/UL (ref 3.8–5.2)
SODIUM SERPL-SCNC: 137 MMOL/L (ref 136–145)
WBC # BLD AUTO: 5.3 K/UL (ref 3.6–11)

## 2021-01-03 PROCEDURE — 84100 ASSAY OF PHOSPHORUS: CPT

## 2021-01-03 PROCEDURE — 74011250637 HC RX REV CODE- 250/637: Performed by: OBSTETRICS & GYNECOLOGY

## 2021-01-03 PROCEDURE — 74011250636 HC RX REV CODE- 250/636: Performed by: OBSTETRICS & GYNECOLOGY

## 2021-01-03 PROCEDURE — 83735 ASSAY OF MAGNESIUM: CPT

## 2021-01-03 PROCEDURE — 80053 COMPREHEN METABOLIC PANEL: CPT

## 2021-01-03 PROCEDURE — 85027 COMPLETE CBC AUTOMATED: CPT

## 2021-01-03 PROCEDURE — 59025 FETAL NON-STRESS TEST: CPT

## 2021-01-03 PROCEDURE — 65410000002 HC RM PRIVATE OB

## 2021-01-03 PROCEDURE — 74011000250 HC RX REV CODE- 250: Performed by: OBSTETRICS & GYNECOLOGY

## 2021-01-03 PROCEDURE — 36415 COLL VENOUS BLD VENIPUNCTURE: CPT

## 2021-01-03 RX ORDER — SUCRALFATE 1 G/1
1 TABLET ORAL
Status: DISCONTINUED | OUTPATIENT
Start: 2021-01-03 | End: 2021-01-15 | Stop reason: HOSPADM

## 2021-01-03 RX ADMIN — Medication 10 ML: at 22:36

## 2021-01-03 RX ADMIN — PROMETHAZINE HYDROCHLORIDE 25 MG: 25 SUPPOSITORY RECTAL at 20:39

## 2021-01-03 RX ADMIN — Medication 10 ML: at 06:00

## 2021-01-03 RX ADMIN — PROMETHAZINE HYDROCHLORIDE 25 MG: 25 SUPPOSITORY RECTAL at 04:02

## 2021-01-03 RX ADMIN — SODIUM CHLORIDE, POTASSIUM CHLORIDE, SODIUM LACTATE AND CALCIUM CHLORIDE 150 ML/HR: 600; 310; 30; 20 INJECTION, SOLUTION INTRAVENOUS at 10:00

## 2021-01-03 RX ADMIN — SUCRALFATE 1 G: 1 TABLET ORAL at 16:05

## 2021-01-03 RX ADMIN — ONDANSETRON HYDROCHLORIDE 8 MG: 2 INJECTION, SOLUTION INTRAMUSCULAR; INTRAVENOUS at 22:36

## 2021-01-03 RX ADMIN — ONDANSETRON HYDROCHLORIDE 8 MG: 2 INJECTION, SOLUTION INTRAMUSCULAR; INTRAVENOUS at 13:30

## 2021-01-03 RX ADMIN — SUCRALFATE 1 G: 1 TABLET ORAL at 13:18

## 2021-01-03 RX ADMIN — FLUOXETINE 60 MG: 20 CAPSULE ORAL at 09:31

## 2021-01-03 RX ADMIN — DOCUSATE SODIUM 100 MG: 100 CAPSULE ORAL at 09:31

## 2021-01-03 RX ADMIN — FOLIC ACID: 5 INJECTION, SOLUTION INTRAMUSCULAR; INTRAVENOUS; SUBCUTANEOUS at 17:04

## 2021-01-03 RX ADMIN — OLANZAPINE 5 MG: 5 TABLET, FILM COATED ORAL at 22:36

## 2021-01-03 RX ADMIN — SUCRALFATE 1 G: 1 TABLET ORAL at 07:00

## 2021-01-03 RX ADMIN — SODIUM CHLORIDE 20 MG: 9 INJECTION INTRAMUSCULAR; INTRAVENOUS; SUBCUTANEOUS at 20:21

## 2021-01-03 RX ADMIN — SUCRALFATE 1 G: 1 TABLET ORAL at 22:36

## 2021-01-03 RX ADMIN — Medication 10 ML: at 13:35

## 2021-01-03 RX ADMIN — FERROUS SULFATE TAB 325 MG (65 MG ELEMENTAL FE) 325 MG: 325 (65 FE) TAB at 09:31

## 2021-01-03 RX ADMIN — FERROUS SULFATE TAB 325 MG (65 MG ELEMENTAL FE) 325 MG: 325 (65 FE) TAB at 16:04

## 2021-01-03 RX ADMIN — ONDANSETRON HYDROCHLORIDE 8 MG: 2 INJECTION, SOLUTION INTRAMUSCULAR; INTRAVENOUS at 05:57

## 2021-01-03 RX ADMIN — SODIUM CHLORIDE 20 MG: 9 INJECTION INTRAMUSCULAR; INTRAVENOUS; SUBCUTANEOUS at 09:31

## 2021-01-03 RX ADMIN — PANTOPRAZOLE SODIUM 40 MG: 40 TABLET, DELAYED RELEASE ORAL at 07:01

## 2021-01-03 NOTE — PROGRESS NOTES
High Risk Obstetrics Progress Note    Name: Alfredito Simmons MRN: [id number]  SSN: [id number]    YOB: 1985  Age: 38 y.o. Sex: female      Subjective:      LOS: 2 days    Estimated Date of Delivery: 21   Gestational Age Today: 14w0d     Patient admitted for intractable nausea/vomiting in light of chronic eating disorder in pregnancy. States she does have nausea and 2 episodes of vomiting. States nothing is staying down. Having a hard time getting the carafate pill down as well. Having upper abdominal pain. and does not have chest pain, shortness of breath and vaginal bleeding . Objective:     Vitals:  Blood pressure (!) 103/51, pulse 65, temperature 97.5 °F (36.4 °C), resp. rate 16, height 5' 4\" (1.626 m), weight 72.5 kg (159 lb 14.8 oz), last menstrual period 2020, SpO2 100 %, not currently breastfeeding. Temp (24hrs), Av.8 °F (36.6 °C), Min:97.5 °F (36.4 °C), Max:98.2 °F (40.9 °C)    Systolic (53YDI), ISE:70 , Min:93 , SAT:852      Diastolic (13KSO), OXI:79, Min:51, Max:65       Intake and Output:         Physical Exam:  Patient without distress.   Heart: Regular rate and rhythm  Lung: clear to auscultation throughout lung fields, no wheezes, no rales, no rhonchi and normal respiratory effort  Abdomen: soft, nontender, gravid  Lower Extremities:  - Edema No       Membranes:  Intact        Labs:   Recent Results (from the past 36 hour(s))   METABOLIC PANEL, COMPREHENSIVE    Collection Time: 21  4:24 AM   Result Value Ref Range    Sodium 138 136 - 145 mmol/L    Potassium 3.7 3.5 - 5.1 mmol/L    Chloride 109 (H) 97 - 108 mmol/L    CO2 25 21 - 32 mmol/L    Anion gap 4 (L) 5 - 15 mmol/L    Glucose 68 65 - 100 mg/dL    BUN 4 (L) 6 - 20 MG/DL    Creatinine 0.47 (L) 0.55 - 1.02 MG/DL    BUN/Creatinine ratio 9 (L) 12 - 20      GFR est AA >60 >60 ml/min/1.73m2    GFR est non-AA >60 >60 ml/min/1.73m2    Calcium 8.3 (L) 8.5 - 10.1 MG/DL    Bilirubin, total 0.2 0.2 - 1.0 MG/DL    ALT (SGPT) 9 (L) 12 - 78 U/L    AST (SGOT) 9 (L) 15 - 37 U/L    Alk. phosphatase 44 (L) 45 - 117 U/L    Protein, total 6.1 (L) 6.4 - 8.2 g/dL    Albumin 2.6 (L) 3.5 - 5.0 g/dL    Globulin 3.5 2.0 - 4.0 g/dL    A-G Ratio 0.7 (L) 1.1 - 2.2     CBC W/O DIFF    Collection Time: 01/02/21  4:24 AM   Result Value Ref Range    WBC 6.5 3.6 - 11.0 K/uL    RBC 3.80 3.80 - 5.20 M/uL    HGB 9.2 (L) 11.5 - 16.0 g/dL    HCT 28.8 (L) 35.0 - 47.0 %    MCV 75.8 (L) 80.0 - 99.0 FL    MCH 24.2 (L) 26.0 - 34.0 PG    MCHC 31.9 30.0 - 36.5 g/dL    RDW 17.3 (H) 11.5 - 14.5 %    PLATELET 447 119 - 635 K/uL    MPV 11.7 8.9 - 12.9 FL    NRBC 0.0 0  WBC    ABSOLUTE NRBC 0.00 0.00 - 0.01 K/uL   MAGNESIUM    Collection Time: 01/02/21  4:24 AM   Result Value Ref Range    Magnesium 1.9 1.6 - 2.4 mg/dL   PHOSPHORUS    Collection Time: 01/02/21  4:24 AM   Result Value Ref Range    Phosphorus 3.8 2.6 - 4.7 MG/DL   MAGNESIUM    Collection Time: 01/03/21  5:55 AM   Result Value Ref Range    Magnesium 1.9 1.6 - 2.4 mg/dL   PHOSPHORUS    Collection Time: 01/03/21  5:55 AM   Result Value Ref Range    Phosphorus 4.4 2.6 - 4.7 MG/DL       Assessment and Plan: Active Problems:    Nausea and vomiting in pregnancy (12/2/2020)      Eating disorder affecting pregnancy, antepartum (12/2/2020)      Nausea/vomiting in pregnancy (1/2/2021)       39 y/o [id number] at 13 6/7 weeks by [facility] 7/4/2021 readmitted for intractable nausea/vomiting in pregnancy in light of chronic eating disorder     1.  N/V/Dehydration/weight loss since discharge from hospital             -Iv antiemetics and phenergan suppositories prn             -ivf's             -Goodie bag daily             -Daily cmp, cbc, mg, phos             -s/p Nutrition consult (pt saw her nutritionist Eliceo Munoz on 12/29) for supplement orders and consider ENT to place  dobhoff vs TPN if needed-however, will not be covered by home health if she is taking anything orally.  Pt checking on when her open enrollment is for insurance and I will speak with them myself this week.             -Pt has PICC line in place             -pt s/p GI consult during last admission              -pt s/p sugery consult last admission (known gallstones but no surgery necessary at that time)-however, may need to address, as pt having worsening upper abdominal pain now.-will try Carafate-change to slurry     2. Eating Disorder and Depression/Anxiety             -Thrombocytopenia resolved on 1/2-will repeat today             -recent h/o admission for suicidal ideation-stable currently             -Continue Prozac 60mg daily and Zyprexa 5mg daily             -Continue prn hydoxyzine and prazosin             -Pt followed closely by psychiatrist Silke Eason (last appt           65/92)     3. CHTN-normal to low bp's off meds since weight loss     4. Anemia-know beta thalassemia minor and iron deficiency             anemia-s/p iv iron infusions during last admission             -oral iron bid     5. Hip pain/pelvic girdle weakness             -consult PT             -pelvic support belt     6.  H/o incompetent cervix             -s/p MFM appt at 8am (12/31)-will need cerclage placement at 14-16 weeks     7. DVT prophylaxis-MEGHANA hose and ambulation with assistance (pt is fall risk)     8. Daily FHTs.

## 2021-01-04 LAB — PHOSPHATE SERPL-MCNC: 3.9 MG/DL (ref 2.6–4.7)

## 2021-01-04 PROCEDURE — 97116 GAIT TRAINING THERAPY: CPT

## 2021-01-04 PROCEDURE — 74011000250 HC RX REV CODE- 250: Performed by: OBSTETRICS & GYNECOLOGY

## 2021-01-04 PROCEDURE — 36415 COLL VENOUS BLD VENIPUNCTURE: CPT

## 2021-01-04 PROCEDURE — 74011250637 HC RX REV CODE- 250/637: Performed by: OBSTETRICS & GYNECOLOGY

## 2021-01-04 PROCEDURE — 65410000002 HC RM PRIVATE OB

## 2021-01-04 PROCEDURE — 84100 ASSAY OF PHOSPHORUS: CPT

## 2021-01-04 PROCEDURE — 74011250636 HC RX REV CODE- 250/636: Performed by: OBSTETRICS & GYNECOLOGY

## 2021-01-04 RX ADMIN — ONDANSETRON HYDROCHLORIDE 8 MG: 2 INJECTION, SOLUTION INTRAMUSCULAR; INTRAVENOUS at 14:28

## 2021-01-04 RX ADMIN — SUCRALFATE 1 G: 1 TABLET ORAL at 06:10

## 2021-01-04 RX ADMIN — SODIUM CHLORIDE 20 MG: 9 INJECTION INTRAMUSCULAR; INTRAVENOUS; SUBCUTANEOUS at 21:23

## 2021-01-04 RX ADMIN — SODIUM CHLORIDE 20 MG: 9 INJECTION INTRAMUSCULAR; INTRAVENOUS; SUBCUTANEOUS at 08:28

## 2021-01-04 RX ADMIN — FERROUS SULFATE TAB 325 MG (65 MG ELEMENTAL FE) 325 MG: 325 (65 FE) TAB at 17:00

## 2021-01-04 RX ADMIN — SODIUM CHLORIDE, POTASSIUM CHLORIDE, SODIUM LACTATE AND CALCIUM CHLORIDE 150 ML/HR: 600; 310; 30; 20 INJECTION, SOLUTION INTRAVENOUS at 16:35

## 2021-01-04 RX ADMIN — SUCRALFATE 1 G: 1 TABLET ORAL at 21:23

## 2021-01-04 RX ADMIN — SODIUM CHLORIDE, POTASSIUM CHLORIDE, SODIUM LACTATE AND CALCIUM CHLORIDE 150 ML/HR: 600; 310; 30; 20 INJECTION, SOLUTION INTRAVENOUS at 09:29

## 2021-01-04 RX ADMIN — SUCRALFATE 1 G: 1 TABLET ORAL at 11:54

## 2021-01-04 RX ADMIN — DOCUSATE SODIUM 100 MG: 100 CAPSULE ORAL at 08:28

## 2021-01-04 RX ADMIN — Medication 10 ML: at 06:09

## 2021-01-04 RX ADMIN — PROMETHAZINE HYDROCHLORIDE 25 MG: 25 SUPPOSITORY RECTAL at 09:29

## 2021-01-04 RX ADMIN — FERROUS SULFATE TAB 325 MG (65 MG ELEMENTAL FE) 325 MG: 325 (65 FE) TAB at 08:28

## 2021-01-04 RX ADMIN — Medication 10 ML: at 22:00

## 2021-01-04 RX ADMIN — PROMETHAZINE HYDROCHLORIDE 25 MG: 25 SUPPOSITORY RECTAL at 17:51

## 2021-01-04 RX ADMIN — OLANZAPINE 5 MG: 5 TABLET, FILM COATED ORAL at 21:23

## 2021-01-04 RX ADMIN — FOLIC ACID: 5 INJECTION, SOLUTION INTRAMUSCULAR; INTRAVENOUS; SUBCUTANEOUS at 21:23

## 2021-01-04 RX ADMIN — ONDANSETRON HYDROCHLORIDE 8 MG: 2 INJECTION, SOLUTION INTRAMUSCULAR; INTRAVENOUS at 21:23

## 2021-01-04 RX ADMIN — ONDANSETRON HYDROCHLORIDE 8 MG: 2 INJECTION, SOLUTION INTRAMUSCULAR; INTRAVENOUS at 06:09

## 2021-01-04 RX ADMIN — FLUOXETINE 60 MG: 20 CAPSULE ORAL at 08:28

## 2021-01-04 RX ADMIN — SUCRALFATE 1 G: 1 TABLET ORAL at 16:30

## 2021-01-04 RX ADMIN — Medication 10 ML: at 14:00

## 2021-01-04 NOTE — PROGRESS NOTES
Bedside and Verbal shift change report given to Trevor Landry (oncoming nurse) by Darrick Davis RN (offgoing nurse). Report included the following information SBAR, Kardex, Intake/Output, MAR and Recent Results.

## 2021-01-04 NOTE — PROGRESS NOTES
Problem: Mobility Impaired (Adult and Pediatric)  Goal: *Acute Goals and Plan of Care (Insert Text)  Description: FUNCTIONAL STATUS PRIOR TO ADMISSION: Patient was independent with ambulation. Was receiving HHPT after last admission for syncope and fall    HOME SUPPORT PRIOR TO ADMISSION: The patient lived with significant other. Physical Therapy Goals  Initiated 1/2/2021  1. Patient will move from supine to sit and sit to supine  in bed with independence within 7 day(s). 2.  Patient will transfer from bed to chair and chair to bed with independence using the least restrictive device within 7 day(s). 3.  Patient will perform sit to stand with independence within 7 day(s). 4.  Patient will ambulate with modified independence for 200 feet with the least restrictive device within 7 day(s). 5.  Patient will ascend/descend 10 stairs with 1 handrail(s) with modified independence within 7 day(s). Outcome: Progressing Towards Goal   PHYSICAL THERAPY TREATMENT  Patient: Chato Chavarria (53 y.o. female)  Date: 1/4/2021  Diagnosis: Nausea and vomiting in pregnancy [O21.9]  Nausea/vomiting in pregnancy [O21.9]  Eating disorder affecting pregnancy, antepartum [O99.340, F50.9] <principal problem not specified>       Precautions:  history of syncope recently   Chart, physical therapy assessment, plan of care and goals were reviewed. ASSESSMENT  Patient continues with skilled PT services and is progressing towards goals. Pt received up amb in the room, pushing the iv pole, gait stable. She amb in the hallway without the iv pole for support. Without that support her gait was slow and tentative but stable. She reported ongoing dizziness (was admitted last month after syncope at home). I took a post amb BP and it was stable, see below. I do recommend a full set of orthostatics.      Current Level of Function Impacting Discharge (mobility/balance): contact guard provided but no physical assist.    Other factors to consider for discharge: 14 weeks pregnant             Visit Vitals  /67 (BP 1 Location: Right arm, BP Patient Position: Sitting;Post activity)   Pulse 76                                 PLAN :  Patient continues to benefit from skilled intervention to address the above impairments. Continue treatment per established plan of care. to address goals. Recommendation for discharge: (in order for the patient to meet his/her long term goals)  Resumption of physical therapy at least 2 days/week in the home vs none (most likely none)    This discharge recommendation:  A follow-up discussion with the attending provider and/or case management is planned    IF patient discharges home will need the following DME: none       SUBJECTIVE:   Patient stated that she gets dizzy when she gets up. OBJECTIVE DATA SUMMARY:   Chart checked, pt cleared by nursing  Critical Behavior:  Neurologic State: Alert  Orientation Level: Oriented X4  Cognition: Follows commands     Functional Mobility Training:  Bed Mobility:                    Transfers:     Stand to Sit: Independent                             Balance:  Sitting: Intact  Standing: Impaired; Without support  Standing - Static: Good  Standing - Dynamic : Good  Ambulation/Gait Training:  Distance (ft): 150 Feet (ft)  Assistive Device: Gait belt  Ambulation - Level of Assistance: Contact guard assistance        Gait Abnormalities: Decreased step clearance(tentative)        Base of Support: Narrowed     Speed/Deepti: Slow                       Stairs: Therapeutic Exercises:     Pain Rating:  None rated, reported a headacte    Activity Tolerance:   Good    After treatment patient left in no apparent distress:   Sitting in chair and Call bell within reach    COMMUNICATION/COLLABORATION:   The patients plan of care was discussed with: Registered nurse. Mendoza Shadow   Time Calculation: 16 mins

## 2021-01-04 NOTE — PROGRESS NOTES
High Risk Obstetrics Progress Note    Name: Elizabeth Sharif MRN: [id number]  SSN: [id number]    YOB: 1985  Age: 38 y.o. Sex: female      Subjective:      LOS: 3 days    Estimated Date of Delivery: 21   Gestational Age Today: 14w1d     Patient admitted for intractable nausea, vomiting and chronic eating disorder. States she does have nausea and vomiting. Reports emesis last night after trying mashed potatoes, crackers, apple sauce and ensure. Not hungry this morning. Also has a headache and lower abdominal cramping. Reports that the upper abdominal pain is a little better. and does not have chest pain, shortness of breath and vaginal bleeding . Objective:     Vitals:  Blood pressure (!) 91/53, pulse (!) 58, temperature 97.9 °F (36.6 °C), resp. rate 16, height 5' 4\" (1.626 m), weight 72.3 kg (159 lb 6.4 oz), last menstrual period 2020, SpO2 99 %, not currently breastfeeding. Temp (24hrs), Av.2 °F (36.8 °C), Min:97.9 °F (36.6 °C), Max:98.6 °F (37 °C)    Systolic (47XIM), TID:314 , Min:91 , NGF:586      Diastolic (37IMA), MZX:01, Min:53, Max:71       Intake and Output:         Physical Exam:  Patient without distress.   Heart: Regular rate and rhythm  Lung: clear to auscultation throughout lung fields, no wheezes, no rales, no rhonchi and normal respiratory effort  Abdomen: soft, nontender, gravid  Lower Extremities:  - Edema No       Membranes:  Intact        Labs:   Recent Results (from the past 36 hour(s))   MAGNESIUM    Collection Time: 21  5:55 AM   Result Value Ref Range    Magnesium 1.9 1.6 - 2.4 mg/dL   PHOSPHORUS    Collection Time: 21  5:55 AM   Result Value Ref Range    Phosphorus 4.4 2.6 - 4.7 MG/DL   METABOLIC PANEL, COMPREHENSIVE    Collection Time: 21  5:55 AM   Result Value Ref Range    Sodium 137 136 - 145 mmol/L    Potassium 3.8 3.5 - 5.1 mmol/L    Chloride 108 97 - 108 mmol/L    CO2 24 21 - 32 mmol/L    Anion gap 5 5 - 15 mmol/L    Glucose 65 65 - 100 mg/dL    BUN 5 (L) 6 - 20 MG/DL    Creatinine 0.52 (L) 0.55 - 1.02 MG/DL    BUN/Creatinine ratio 10 (L) 12 - 20      GFR est AA >60 >60 ml/min/1.73m2    GFR est non-AA >60 >60 ml/min/1.73m2    Calcium 8.4 (L) 8.5 - 10.1 MG/DL    Bilirubin, total 0.2 0.2 - 1.0 MG/DL    ALT (SGPT) 8 (L) 12 - 78 U/L    AST (SGOT) 8 (L) 15 - 37 U/L    Alk. phosphatase 42 (L) 45 - 117 U/L    Protein, total 6.0 (L) 6.4 - 8.2 g/dL    Albumin 2.6 (L) 3.5 - 5.0 g/dL    Globulin 3.4 2.0 - 4.0 g/dL    A-G Ratio 0.8 (L) 1.1 - 2.2     CBC W/O DIFF    Collection Time: 01/03/21 10:35 AM   Result Value Ref Range    WBC 5.3 3.6 - 11.0 K/uL    RBC 3.14 (L) 3.80 - 5.20 M/uL    HGB 7.6 (L) 11.5 - 16.0 g/dL    HCT 23.7 (L) 35.0 - 47.0 %    MCV 75.5 (L) 80.0 - 99.0 FL    MCH 24.2 (L) 26.0 - 34.0 PG    MCHC 32.1 30.0 - 36.5 g/dL    RDW 17.3 (H) 11.5 - 14.5 %    PLATELET 574 685 - 605 K/uL    MPV 11.6 8.9 - 12.9 FL    NRBC 0.0 0  WBC    ABSOLUTE NRBC 0.00 0.00 - 0.01 K/uL   PHOSPHORUS    Collection Time: 01/04/21  5:16 AM   Result Value Ref Range    Phosphorus 3.9 2.6 - 4.7 MG/DL       Assessment and Plan: Active Problems:    Nausea and vomiting in pregnancy (12/2/2020)      Eating disorder affecting pregnancy, antepartum (12/2/2020)      Nausea/vomiting in pregnancy (1/2/2021)       37 y/o [id number] at 14 1/7 weeks by [facility] 7/4/2021 readmitted for intractable nausea/vomiting in pregnancy in light of chronic eating disorder     1.  N/V/Dehydration/weight loss since discharge from hospital             -Iv antiemetics and phenergan suppositories prn             -ivf's             -Goodie bag daily             -Daily cmp, cbc, mg, phos             -s/p Nutrition consult (pt saw her nutritionist Merary Pierce on 12/29) for supplement orders and consider ENT to place  dobhoff vs TPN if needed-however, will not be covered by home health if she is taking anything orally.  Pt checking on when her open enrollment is for insurance and I will speak with them myself this week.             -Pt has PICC line in place             -pt s/p GI consult during last admission              -pt s/p sugery consult last admission (known gallstones but no surgery necessary at that time)-however, may need to address, as pt having worsening upper abdominal pain now.-will try Carafate-change to slurry     2. Eating Disorder and Depression/Anxiety             -Thrombocytopenia resolved on 1/2 and stable 1/3             -recent h/o admission for suicidal ideation-stable currently             -Continue Prozac 60mg daily and Zyprexa 5mg daily             -Continue prn hydoxyzine and prazosin             -Pt followed closely by psychiatrist Aisha Bedoya (last appt           19/85)   -has appt with psychologist by telehealth at 3pm today Ruthanna Night)     3. CHTN-normal to low bp's off meds since weight loss     4. Anemia-know beta thalassemia minor and iron deficiency             anemia-s/p iv iron infusions during last admission             -oral iron bid     5. Hip pain/pelvic girdle weakness             -consult PT             -pelvic support belt     6.  H/o incompetent cervix             -s/p MFM appt at 8am (12/31)-will need cerclage placement at 14-16 weeks     7. DVT prophylaxis-MEGHANA hose and ambulation with assistance (pt is fall risk)     8. Daily FHTs.

## 2021-01-05 ENCOUNTER — APPOINTMENT (OUTPATIENT)
Dept: MRI IMAGING | Age: 36
DRG: 546 | End: 2021-01-05
Attending: PHYSICIAN ASSISTANT
Payer: MEDICAID

## 2021-01-05 LAB — PHOSPHATE SERPL-MCNC: 3.9 MG/DL (ref 2.6–4.7)

## 2021-01-05 PROCEDURE — 74011000250 HC RX REV CODE- 250: Performed by: OBSTETRICS & GYNECOLOGY

## 2021-01-05 PROCEDURE — 74011250637 HC RX REV CODE- 250/637: Performed by: OBSTETRICS & GYNECOLOGY

## 2021-01-05 PROCEDURE — 84100 ASSAY OF PHOSPHORUS: CPT

## 2021-01-05 PROCEDURE — 74011250636 HC RX REV CODE- 250/636: Performed by: OBSTETRICS & GYNECOLOGY

## 2021-01-05 PROCEDURE — 36415 COLL VENOUS BLD VENIPUNCTURE: CPT

## 2021-01-05 PROCEDURE — 70551 MRI BRAIN STEM W/O DYE: CPT

## 2021-01-05 PROCEDURE — 99222 1ST HOSP IP/OBS MODERATE 55: CPT | Performed by: NURSE PRACTITIONER

## 2021-01-05 PROCEDURE — 65410000002 HC RM PRIVATE OB

## 2021-01-05 RX ADMIN — ONDANSETRON HYDROCHLORIDE 8 MG: 2 INJECTION, SOLUTION INTRAMUSCULAR; INTRAVENOUS at 21:47

## 2021-01-05 RX ADMIN — PROMETHAZINE HYDROCHLORIDE 25 MG: 25 SUPPOSITORY RECTAL at 16:43

## 2021-01-05 RX ADMIN — FERROUS SULFATE TAB 325 MG (65 MG ELEMENTAL FE) 325 MG: 325 (65 FE) TAB at 09:37

## 2021-01-05 RX ADMIN — Medication 10 ML: at 06:31

## 2021-01-05 RX ADMIN — ALUMINUM HYDROXIDE AND MAGNESIUM HYDROXIDE 30 ML: 200; 200 SUSPENSION ORAL at 09:37

## 2021-01-05 RX ADMIN — SUCRALFATE 1 G: 1 TABLET ORAL at 11:30

## 2021-01-05 RX ADMIN — DOCUSATE SODIUM 100 MG: 100 CAPSULE ORAL at 09:37

## 2021-01-05 RX ADMIN — FLUOXETINE 60 MG: 20 CAPSULE ORAL at 09:37

## 2021-01-05 RX ADMIN — ONDANSETRON HYDROCHLORIDE 8 MG: 2 INJECTION, SOLUTION INTRAMUSCULAR; INTRAVENOUS at 06:30

## 2021-01-05 RX ADMIN — ONDANSETRON HYDROCHLORIDE 8 MG: 2 INJECTION, SOLUTION INTRAMUSCULAR; INTRAVENOUS at 13:48

## 2021-01-05 RX ADMIN — OLANZAPINE 5 MG: 5 TABLET, FILM COATED ORAL at 21:47

## 2021-01-05 RX ADMIN — Medication 10 ML: at 22:00

## 2021-01-05 RX ADMIN — FOLIC ACID: 5 INJECTION, SOLUTION INTRAMUSCULAR; INTRAVENOUS; SUBCUTANEOUS at 18:45

## 2021-01-05 RX ADMIN — FERROUS SULFATE TAB 325 MG (65 MG ELEMENTAL FE) 325 MG: 325 (65 FE) TAB at 18:45

## 2021-01-05 RX ADMIN — SUCRALFATE 1 G: 1 TABLET ORAL at 16:30

## 2021-01-05 RX ADMIN — SUCRALFATE 1 G: 1 TABLET ORAL at 06:31

## 2021-01-05 RX ADMIN — SODIUM CHLORIDE 20 MG: 9 INJECTION INTRAMUSCULAR; INTRAVENOUS; SUBCUTANEOUS at 09:37

## 2021-01-05 RX ADMIN — SUCRALFATE 1 G: 1 TABLET ORAL at 21:47

## 2021-01-05 NOTE — PROGRESS NOTES
Bedside shift change report given to Adi RN (oncoming nurse) by Francisco BARNARD (offgoing nurse). Report included the following information SBAR, Kardex, ED Summary, Procedure Summary, Intake/Output and MAR.     1200- GI consult

## 2021-01-05 NOTE — CONSULTS
[address]    [address] NOTE  Will Teri Pratergraciela  [phone] office  [phone] NP/PA in-hospital cell phone M-F until 4:30PM  After 5PM or on weekends, please call  for physician on call        NAME:  Albertina Parsons   :   1985   MRN:   [id number]       Referring Physician: Louann Haines NP    Consult Date: 2021 12:37 PM    Chief Complaint: nausea and vomiting     History of Present Illness:  Patient is a 38 y.o. who is seen in consultation at the request of Louann Haines NP, for intractable nausea/vomiting, 14 weeks pregnant, known cholelithiasis. Patient has a past medical history as below. She was admitted to the hospital on 20 for intractable nausea/vomiting, weight loss, and pregnancy. Patient was seen during her previous hospital admission in early 2020. Prior to that admission, UGI series was completed. During the admission, RUQ ultrasound and MRI/MRCP were done. Patient complains of persistent nausea and vomiting. She reports inability to tolerate liquids or solids. Vomiting occurs with any oral intake. No hematemesis. There is some associated reflux with the vomiting. No dysphagia or odynophagia. She reports upper abdominal discomfort that occurs after vomiting. She reports a history of constipation for which she was taking MiraLax at home. No hematochezia or melena. No NSAID use. No anticoagulation. No current alcohol use (none since 10/2020, daily use prior to that time). No history of EGD. I have reviewed the emergency room note, hospital admission note, notes by all other clinicians who have seen the patient during this hospitalization to date. I have reviewed the problem list and the reason for this hospitalization. I have reviewed the allergies and the medications the patient was taking at home prior to this hospitalization.       PMH:  Past Medical History:   Diagnosis Date    Anorexia     Anxiety     Asthma     on albuterol prn. Triggers cold and allergies    Avoidant-restrictive food intake disorder (ARFID)     Back pain, chronic     sciatica    Chronic bilateral low back pain with right-sided sciatica 2020    ~    GERD (gastroesophageal reflux disease) 2020    [facility] , GI Dr. Ric Kimble Gestational hypertension     Past pregnancy    HX OTHER MEDICAL      , , , ,     Hyperemesis     severe hyperemesis    Hypertension     with G12 - none this pregnancy    Iron deficiency anemia 10/08/2010    MDD (major depressive disorder), single episode with postpartum onset 2013    treated with meds - 13    Orthostatic hypotension 2020    Plantar fasciitis     Pregnancy     36 weeks    PTSD (post-traumatic stress disorder)        PSH:  Past Surgical History:   Procedure Laterality Date    HX  SECTION  4/22/15    HX DILATION AND CURETTAGE      HX DILATION AND CURETTAGE  2020    HX GYN      miscarriage x 6    HX GYN      removal of left fallopian tube    HX OTHER SURGICAL      cerlcage x4, ectopic x1 1999 with removal of left fallopian tube    HX OTHER SURGICAL      cerclage w/ current pregnancy. Removed 18    [place]  DELIVERY ONLY         Allergies: Allergies   Allergen Reactions    Labetalol Hives    Ceclor [Cefaclor] Unknown (comments)    Pcn [Penicillins] Hives    Septra [Sulfamethoprim Ds] Unknown (comments)       Home Medications:  Prior to Admission Medications   Prescriptions Last Dose Informant Patient Reported? Taking?   0.9% sodium chloride solp 1,000 mL with thiamine 100 mg/mL soln 958 mg, folic acid 5 mg/mL soln 1 mg   No No   Si Bag by IntraVENous route every twenty-four (24) hours. FLUoxetine (PROzac) 20 mg capsule   No No   Sig: Take 1 Cap by mouth daily. Take with 40 mg cap for total daily dose of 60 mg.    FLUoxetine (PROzac) 40 mg capsule No No   Sig: Take 1 Cap by mouth daily. Lacto. acidophilus-Bif. animalis (Probiotic) 5 billion cell cpSP   No No   Sig: Take 1 Cap by mouth daily. OLANZapine (ZyPREXA) 5 mg tablet   No No   Sig: Take 1 Tab by mouth nightly. OTHER   No No   Si ensure pudding PO daily for lunch. OTHER   No No   Si Ensure Enlive food supplement drink PO TID   calcium carbonate (Tums) 200 mg calcium (500 mg) chew   No No   Sig: Take 1 Tab by mouth two (2) times daily as needed for Other (reflux). cholecalciferol (Vitamin D3) 25 mcg (1,000 unit) cap   Yes No   Sig: Take  by mouth daily. doxylamine succinate (UNISOM) 25 mg tablet   No No   Sig: Take 1 Tab by mouth nightly as needed for Insomnia. food supplemt, lactose-reduced (Ensure High Protein) liqd   No No   Sig: Take 237 mL by mouth three (3) times daily. hydrOXYzine HCL (ATARAX) 50 mg tablet   No No   Sig: Take 1 Tab by mouth four (4) times daily. ondansetron (ZOFRAN ODT) 4 mg disintegrating tablet   No No   Sig: Take 1 Tab by mouth every six (6) hours as needed for Nausea or Vomiting. pantoprazole (PROTONIX) 40 mg tablet   No No   Sig: Take 1 Tab by mouth Daily (before breakfast). Indications: gastroesophageal reflux disease   polyethylene glycol (MIRALAX) 17 gram packet   No No   Sig: Take 1 Packet by mouth daily as needed for Constipation. prazosin (MINIPRESS) 2 mg capsule   No No   Sig: Take 1 Cap by mouth nightly. Indications: posttraumatic stress syndrome   prenatal vit-iron fumarate-fa (PRENATAL PLUS with IRON) 28 mg iron- 800 mcg tab   Yes No   prochlorperazine (COMPAZINE) 10 mg tablet   Yes No   progesterone (PROMETRIUM) 200 mg capsule   Yes No   Sig: Take 200 mg by mouth daily. promethazine (Phenergan) 12.5 mg suppository   Yes No   pyridoxine, vitamin B6, (Vitamin B-6) 100 mg tablet   Yes No   Sig: Take 100 mg by mouth daily.       Facility-Administered Medications: None       Hospital Medications:  Current Facility-Administered Medications Medication Dose Route Frequency    sucralfate (CARAFATE) tablet 1 g  1 g Oral AC&HS    ondansetron (ZOFRAN) injection 8 mg  8 mg IntraVENous Q8H    alteplase (CATHFLO) 1 mg in sterile water (preservative free) 1 mL injection  1 mg InterCATHeter PRN    prazosin (MINIPRESS) capsule 2 mg  2 mg Oral QHS PRN    acetaminophen (TYLENOL) tablet 650 mg  650 mg Oral Q6H PRN    aluminum-magnesium hydroxide (MAALOX) oral suspension 30 mL  30 mL Oral QID PRN    famotidine (PF) (PEPCID) 20 mg in 0.9% sodium chloride 10 mL injection  20 mg IntraVENous Q12H    lactated Ringers infusion  150 mL/hr IntraVENous CONTINUOUS    0.9% sodium chloride 1,000 mL with mvi, adult no. 4 with vit K 10 mL, thiamine 271 mg, folic acid 1 mg infusion   IntraVENous Q24H    OLANZapine (ZyPREXA) tablet 5 mg  5 mg Oral QHS    FLUoxetine (PROzac) capsule 60 mg  60 mg Oral DAILY    hydrOXYzine HCL (ATARAX) tablet 10 mg  10 mg Oral TID PRN    ferrous sulfate tablet 325 mg  1 Tab Oral BID WITH MEALS    promethazine (PHENERGAN) suppository 25 mg  25 mg Rectal Q6H PRN    docusate sodium (COLACE) capsule 100 mg  100 mg Oral DAILY    sodium chloride (NS) flush 5-40 mL  5-40 mL IntraVENous Q8H    sodium chloride (NS) flush 5-40 mL  5-40 mL IntraVENous PRN       Social History:  Social History     Tobacco Use    Smoking status: Never Smoker    Smokeless tobacco: Never Used   Substance Use Topics    Alcohol use: Not Currently     Frequency: Monthly or less     Drinks per session: 1 or 2     Binge frequency: Never       Family History:  Family History   Problem Relation Age of Onset   [employer] Arthritis-rheumatoid Mother     Asthma Mother     No Known Problems Father     No Known Problems Maternal Grandmother     No Known Problems Maternal Grandfather     No Known Problems Paternal Grandmother     No Known Problems Paternal Grandfather     Asthma Son     Alcohol abuse Neg Hx     Arthritis-osteo Neg Hx     Bleeding Prob Neg Hx     Cancer Neg Hx     Diabetes Neg Hx     Elevated Lipids Neg Hx     Headache Neg Hx     Heart Disease Neg Hx     Hypertension Neg Hx     Lung Disease Neg Hx     Migraines Neg Hx     Psychiatric Disorder Neg Hx     Stroke Neg Hx     Mental Retardation Neg Hx     Anesth Problems Neg Hx        Review of Systems:  Constitutional: negative fever, negative chills, negative weight loss  Eyes:   negative visual changes  ENT:   negative sore throat, tongue or lip swelling  Respiratory:  negative cough, negative dyspnea  Cards:  negative for chest pain, palpitations, lower extremity edema  GI:   See HPI  :  negative for frequency, dysuria  Integument:  negative for rash and pruritus  Heme:  negative for easy bruising and gum/nose bleeding  Musculoskeletal:negative for myalgias, back pain and muscle weakness  Neuro:    negative for headaches, dizziness  Psych: negative for feelings of anxiety, depression       Objective:     Patient Vitals for the past 8 hrs:   BP Temp Pulse Resp SpO2 Weight   01/05/21 0826 110/61 99.4 °F (37.4 °C) 71 16 100 %    01/05/21 0655      73.7 kg (162 lb 6.4 oz)     01/05 0701 - 01/05 1900  In: 200 [P.O.:200]  Out: -   01/03 1901 - 01/05 0700  In: 3132.5 [P.O.:450; I.V.:2682.5]  Out: 2350     EXAM:     CONST:  Pleasant female sitting on the edge of the bed, no acute distress   NEURO:  Alert and oriented x 3   HEENT: EOMI, no scleral icterus   LUNGS: No respiratory distress   CARD:  S1 S2   ABD:  Soft, non distended, mild upper abdominal tenderness, no rebound, no guarding. + Bowel sounds.     EXT:  Warm   PSYCH: Not anxious or agitated     Data Review     Recent Labs     01/03/21  1035   WBC 5.3   HGB 7.6*   HCT 23.7*        Recent Labs     01/05/21  0636 01/04/21  0516 01/03/21  0555   NA  --   --  137   K  --   --  3.8   CL  --   --  108   CO2  --   --  24   BUN  --   --  5*   CREA  --   --  0.52*   GLU  --   --  65   PHOS 3.9 3.9 4.4   CA  --   --  8.4*     Recent Labs 01/03/21  0555   AP 42*   TP 6.0*   ALB 2.6*   GLOB 3.4     No results for input(s): INR, PTP, APTT, INREXT in the last 72 hours. Assessment:   · Intractable nausea/vomiting, likely secondary to hyperemesis gravidarum: WBC 5.3, Hgb 7.6, normal LFTs, TSH low at 0.25 (12/4/20). UGI series (10/27/20): mild spontaneous intermittent gastroesophageal reflux to the lower thoracic esophagus; no other acute abnormality. Abdominal ultrasound (12/4/20): common bile duct dilation (1.0 cm), multiple gallstones. MRI/MRCP (12/6/20): trace pericholecystic fluid and gallstones, no gallbladder wall thickening or luminal dilation, no obstructing cystic duct calculcus; no biliary duct dilation or choledocholithiasis. · Pregnancy: 14 weeks pregnant  · Eating disorder and anxiety/depression  · Hypertension     Patient Active Problem List   Diagnosis Code    Asthma J45.909    Microcytic anemia D50.9    Anxiety F41.9    Back pain, chronic M54.9, G89.29    Unspecified essential hypertension I10    MDD (major depressive disorder), recurrent episode, moderate (HCC) F33.1    Sleep disturbance G47.9    Non compliance w medication regimen Z91.14    Cervical incompetence N88.3    Incompetent cervix N88.3    Pregnancy Z34.90    Pregnant Z34.90    Severe obesity (HCC) E66.01    Post-traumatic stress disorder, acute F43.11    MDD (major depressive disorder) F32.9    Nausea and vomiting in pregnancy O21.9    Eating disorder affecting pregnancy, antepartum O99.340, F50.9    Depression affecting pregnancy O99.340, F32.9    Calculus of gallbladder without cholecystitis K80.20    Chronic bilateral low back pain with right-sided sciatica M54.41, G89.29    GERD (gastroesophageal reflux disease) K21.9    Nausea/vomiting in pregnancy O21.9     Plan:     · PPI  · On Carafate  · Supportive measures: antiemetics PRN  · Discussed with Dr. Unique Freeman. Will obtain MRI brain. Next consider EGD.    · Patient was discussed with and will be seen by Dr. Elan Avila  · Thank you for allowing [facility]     Signed By: Shirley Pinzon, [address]     1/5/2021  12:37 PM

## 2021-01-05 NOTE — CONSULTS
[facility]  Inpatient Consultation        Admit Date: 12/30/2020  Reason for Consultation: cholelithiasis    HPI:  Josselyn Barr is a 38 y.o. female w/ hx as below and 14 weeks pregnant (8th child) whom we are asked to see in consultation by Dr. Randi Calderon for the above complaint. Pt was admitted to the hospital for intractable n/v. She has had this since early Dec and had w/u including U/s and MRCP which documents cholelithiasis but no choledocholithiasis. We were consulted at the time and felt that the gallstones weren't the cause of her n/v. She cont to vomit mostly after meals but also during the night maybe once. She has pain in RUQ most of the time. She is able to eat but has limited appetite. Her LFTs are WNL. No leukocystosis      Patient Active Problem List    Diagnosis Date Noted    Nausea/vomiting in pregnancy 01/02/2021    Chronic bilateral low back pain with right-sided sciatica 12/28/2020    GERD (gastroesophageal reflux disease) 12/28/2020    Calculus of gallbladder without cholecystitis 12/07/2020    Nausea and vomiting in pregnancy 12/02/2020    Eating disorder affecting pregnancy, antepartum 12/02/2020    Depression affecting pregnancy 12/02/2020    MDD (major depressive disorder) 09/02/2020    Post-traumatic stress disorder, acute 05/03/2020    Severe obesity ([address] 02/18/2020    Pregnancy 03/01/2018    Pregnant 03/01/2018    Cervical incompetence 09/29/2017    Incompetent cervix 09/29/2017    Sleep disturbance 04/06/2017    Non compliance w medication regimen 04/06/2017    MDD (major depressive disorder), recurrent episode, moderate ([address] 09/09/2013    Unspecified essential hypertension 05/09/2013    Back pain, chronic     Asthma 10/08/2010    Microcytic anemia 10/08/2010    Anxiety 10/08/2010     Past Medical History:   Diagnosis Date    Anorexia     Anxiety     Asthma     on albuterol prn.  Triggers cold and allergies    Avoidant-restrictive food intake disorder (ARFID)     Back pain, chronic     sciatica    Chronic bilateral low back pain with right-sided sciatica 2020    ~    GERD (gastroesophageal reflux disease) 2020    [facility] , GI Dr. Criselda Ahumada Gestational hypertension     Past pregnancy    HX OTHER MEDICAL      , , , ,     Hyperemesis     severe hyperemesis    Hypertension     with G12 - none this pregnancy    Iron deficiency anemia 10/08/2010    MDD (major depressive disorder), single episode with postpartum onset 2013    treated with meds - 13    Orthostatic hypotension 2020    Plantar fasciitis     Pregnancy     36 weeks    PTSD (post-traumatic stress disorder)       Past Surgical History:   Procedure Laterality Date    HX  SECTION  4/22/15    HX DILATION AND CURETTAGE      HX DILATION AND CURETTAGE  2020    HX GYN      miscarriage x 6    HX GYN      removal of left fallopian tube    HX OTHER SURGICAL      cerlcage x4, ectopic x1 1999 with removal of left fallopian tube    HX OTHER SURGICAL      cerclage w/ current pregnancy.  Removed 18    [place]  DELIVERY ONLY        Social History     Tobacco Use    Smoking status: Never Smoker    Smokeless tobacco: Never Used   Substance Use Topics    Alcohol use: Not Currently     Frequency: Monthly or less     Drinks per session: 1 or 2     Binge frequency: Never      Family History   Problem Relation Age of Onset   Eusebio Villarreal Arthritis-rheumatoid Mother     Asthma Mother     No Known Problems Father     No Known Problems Maternal Grandmother     No Known Problems Maternal Grandfather     No Known Problems Paternal Grandmother     No Known Problems Paternal Grandfather     Asthma Son     Alcohol abuse Neg Hx     Arthritis-osteo Neg Hx     Bleeding Prob Neg Hx     Cancer Neg Hx     Diabetes Neg Hx     Elevated Lipids Neg Hx     Headache Neg Hx     Heart Disease Neg Hx     Hypertension Neg Hx     Lung Disease Neg Hx     Migraines Neg Hx     Psychiatric Disorder Neg Hx     Stroke Neg Hx     Mental Retardation Neg Hx     Anesth Problems Neg Hx       Prior to Admission medications    Medication Sig Start Date End Date Taking? Authorizing Provider   Lacto. acidophilus-Bif. animalis (Probiotic) 5 billion cell cpSP Take 1 Cap by mouth daily. 12/28/20   Allocca, Diana Necessary, NP   FLUoxetine (PROzac) 20 mg capsule Take 1 Cap by mouth daily. Take with 40 mg cap for total daily dose of 60 mg. 12/28/20   Allocca, Idana Necessary, NP   prochlorperazine (COMPAZINE) 10 mg tablet  12/27/20   Provider, Historical   promethazine (Phenergan) 12.5 mg suppository     Provider, Historical   ondansetron (ZOFRAN ODT) 4 mg disintegrating tablet Take 1 Tab by mouth every six (6) hours as needed for Nausea or Vomiting. 12/16/20   Edu Emmanuel, DO   0.9% sodium chloride solp 1,000 mL with thiamine 100 mg/mL soln 947 mg, folic acid 5 mg/mL soln 1 mg 1 Bag by IntraVENous route every twenty-four (24) hours. 12/16/20   Edu Emmanuel, DO   OLANZapine (ZyPREXA) 5 mg tablet Take 1 Tab by mouth nightly. 11/19/20   Allocca, Diana Necessary, NP   progesterone (PROMETRIUM) 200 mg capsule Take 200 mg by mouth daily. Provider, Historical   cholecalciferol (Vitamin D3) 25 mcg (1,000 unit) cap Take  by mouth daily. Provider, Historical   pyridoxine, vitamin B6, (Vitamin B-6) 100 mg tablet Take 100 mg by mouth daily. Provider, Historical   hydrOXYzine HCL (ATARAX) 50 mg tablet Take 1 Tab by mouth four (4) times daily. 11/9/20   Allocca, Diana Necessary, NP   FLUoxetine (PROzac) 40 mg capsule Take 1 Cap by mouth daily. 11/9/20   Allocca, Diana Necessary, NP   doxylamine succinate (UNISOM) 25 mg tablet Take 1 Tab by mouth nightly as needed for Insomnia.  11/5/20   Allocca, Diana Necessary, NP   OTHER 1 Ensure Enlive food supplement drink PO TID 11/4/20   Kellie Rivera NP   calcium carbonate (Tums) 200 mg calcium (500 mg) chew Take 1 Tab by mouth two (2) times daily as needed for Other (reflux). 11/4/20   Quintana Severe, NP   polyethylene glycol (MIRALAX) 17 gram packet Take 1 Packet by mouth daily as needed for Constipation. 11/4/20   Quintana Severe, NP   OTHER 1 ensure pudding PO daily for lunch. 10/20/20   Quintana Severe, NP   prenatal vit-iron fumarate-fa (PRENATAL PLUS with IRON) 28 mg iron- 800 mcg tab  9/28/20   Provider, Historical   food supplemt, lactose-reduced (Ensure High Protein) liqd Take 237 mL by mouth three (3) times daily. 10/14/20   Quintana Severe, NP   prazosin (MINIPRESS) 2 mg capsule Take 1 Cap by mouth nightly. Indications: posttraumatic stress syndrome 9/28/20   Juan M Holt MD   pantoprazole (PROTONIX) 40 mg tablet Take 1 Tab by mouth Daily (before breakfast). Indications: gastroesophageal reflux disease 9/29/20   Juan M Holt MD     Current Facility-Administered Medications   Medication Dose Route Frequency    sucralfate (CARAFATE) tablet 1 g  1 g Oral AC&HS    ondansetron (ZOFRAN) injection 8 mg  8 mg IntraVENous Q8H    alteplase (CATHFLO) 1 mg in sterile water (preservative free) 1 mL injection  1 mg InterCATHeter PRN    prazosin (MINIPRESS) capsule 2 mg  2 mg Oral QHS PRN    acetaminophen (TYLENOL) tablet 650 mg  650 mg Oral Q6H PRN    aluminum-magnesium hydroxide (MAALOX) oral suspension 30 mL  30 mL Oral QID PRN    famotidine (PF) (PEPCID) 20 mg in 0.9% sodium chloride 10 mL injection  20 mg IntraVENous Q12H    lactated Ringers infusion  150 mL/hr IntraVENous CONTINUOUS    0.9% sodium chloride 1,000 mL with mvi, adult no. 4 with vit K 10 mL, thiamine 006 mg, folic acid 1 mg infusion   IntraVENous Q24H    OLANZapine (ZyPREXA) tablet 5 mg  5 mg Oral QHS    FLUoxetine (PROzac) capsule 60 mg  60 mg Oral DAILY    hydrOXYzine HCL (ATARAX) tablet 10 mg  10 mg Oral TID PRN    ferrous sulfate tablet 325 mg  1 Tab Oral BID WITH MEALS    promethazine (PHENERGAN) suppository 25 mg  25 mg Rectal Q6H PRN    docusate sodium (COLACE) capsule 100 mg  100 mg Oral DAILY    sodium chloride (NS) flush 5-40 mL  5-40 mL IntraVENous Q8H    sodium chloride (NS) flush 5-40 mL  5-40 mL IntraVENous PRN     Allergies   Allergen Reactions    Labetalol Hives    Ceclor [Cefaclor] Unknown (comments)    Pcn [Penicillins] Hives    Septra [Sulfamethoprim Ds] Unknown (comments)          Subjective:     Review of Systems:    A comprehensive review of systems was negative except for that written in the History of Present Illness. Objective:     Blood pressure 110/61, pulse 71, temperature 99.4 °F (37.4 °C), resp. rate 16, height 5' 4\" (1.626 m), weight 73.7 kg (162 lb 6.4 oz), last menstrual period 2020, SpO2 100 %, not currently breastfeeding. Temp (24hrs), Av.2 °F (36.8 °C), Min:97.5 °F (36.4 °C), Max:99.4 °F (37.4 °C)      Recent Labs     21  1035   WBC 5.3   HGB 7.6*   HCT 23.7*        Recent Labs     21  0636 21  0516 21  0555   NA  --   --  137   K  --   --  3.8   CL  --   --  108   CO2  --   --  24   GLU  --   --  65   BUN  --   --  5*   CREA  --   --  0.52*   CA  --   --  8.4*   MG  --   --  1.9   PHOS 3.9 3.9 4.4   ALB  --   --  2.6*   TBILI  --   --  0.2   ALT  --   --  8*     No results for input(s): AML, LPSE in the last 72 hours. Intake/Output Summary (Last 24 hours) at 2021 1024  Last data filed at 2021 2402  Gross per 24 hour   Intake 3332.5 ml   Output 1700 ml   Net 1632.5 ml     Date 21 0700 - 21 0659   Shift 6963-2787 8862-2706 8900-0659 24 Hour Total   INTAKE   P.O. 200   200   Shift Total(mL/kg) 200(2.7)   200(2.7)   OUTPUT   Shift Total(mL/kg)       Weight (kg) 73.7 73.7 73.7 73.7     _____________________  Physical Exam:     General:  Alert, cooperative, no distress, appears stated age. Eyes:   Sclera clear. Throat: Lips, mucosa, and tongue normal.   Neck: Supple, symmetrical, trachea midline. Lungs:   Clear to auscultation bilaterally. Heart:  Regular rate and rhythm. Abdomen:   Normal BS, flat, Soft, mildly TTP RUQ. No masses,  No organomegaly. Extremities: Extremities normal, atraumatic, no cyanosis or edema. Skin: Skin color, texture, turgor normal. No rashes or lesions. Assessment:   Active Problems:    Nausea and vomiting in pregnancy (12/2/2020)      Eating disorder affecting pregnancy, antepartum (12/2/2020)      Nausea/vomiting in pregnancy (1/2/2021)            Plan:     HIDA scan would be the best test but not able to be done d/t pregnancy  Would cont to treat sx and maintain a low fat diet  Will d/w Dr Meryle Otter about doing a lap eric to r/o the gallbladder as the problem  Thank you for allowing us to participate in the care of this patient. Total time spent with patient: 30 minutes. Signed By: Tad Walsh NP     January 5, 2021      Pt seen and examined  Continues to have nausea and vomiting. This has been ongoing with possibly increased Right sided abdominal pain that she associates with vomiting . No particular foods make it worse. Gen- Alert in NAD  Lungs- CTa  h- RRR  Abd- soft with mild tenderness epigastric and RUQ. Hyperemesis Gravidum,  Still not sure that the gallbladder is the source of her nausea and vomiting. When she was seen in Dec. GI did not do a full workup do to pregnancy. I think that they need to get back on board and do whatever less invasive work up may be needed. If there is still no diagnosis after that then Lap Eric can be contemplated though this may not resolve her symptoms.

## 2021-01-05 NOTE — PROGRESS NOTES
Bedside shift change report given to Vic Morales RN (oncoming nurse) by Mari Zamora RN (offgoing nurse). Report included the following information SBAR, Kardex, Intake/Output and MAR.

## 2021-01-05 NOTE — PROGRESS NOTES
High Risk Obstetrics Progress Note    Name: Elizabeth Sharif MRN: [id number]  SSN: [id number]    YOB: 1985  Age: 38 y.o. Sex: female      Subjective:      LOS: 4 days    Estimated Date of Delivery: 21   Gestational Age Today: 16w1d     Patient admitted for intractable nausea and vomiting in light of chronic eating disorder. States she does have nausea, vomiting-reports 3 times yesterday after trying apple sauce and crackers and continues to have throat pain and upper abdominal soreness  and does not have chest pain, shortness of breath and vaginal bleeding . Objective:     Vitals:  Blood pressure 101/61, pulse 62, temperature 98.3 °F (36.8 °C), resp. rate 16, height 5' 4\" (1.626 m), weight 73.7 kg (162 lb 6.4 oz), last menstrual period 2020, SpO2 100 %, not currently breastfeeding. Temp (24hrs), Av.8 °F (36.6 °C), Min:97.5 °F (36.4 °C), Max:98.3 °F (63.8 °C)    Systolic (84JYV), AMK:440 , Min:101 , DYU:895      Diastolic (40BGM), IKN:51, Min:61, Max:69       Intake and Output:         Physical Exam:  Patient without distress. Heart: Regular rate and rhythm  Lung: clear to auscultation throughout lung fields, no wheezes, no rales, no rhonchi and normal respiratory effort  Abdomen: soft, nontender, gravid  Lower Extremities:  - Edema No       Membranes:  Intact        Labs:   Recent Results (from the past 36 hour(s))   PHOSPHORUS    Collection Time: 21  5:16 AM   Result Value Ref Range    Phosphorus 3.9 2.6 - 4.7 MG/DL   PHOSPHORUS    Collection Time: 21  6:36 AM   Result Value Ref Range    Phosphorus 3.9 2.6 - 4.7 MG/DL       Assessment and Plan:       Active Problems:    Nausea and vomiting in pregnancy (2020)      Eating disorder affecting pregnancy, antepartum (2020)      Nausea/vomiting in pregnancy (2021)       39 y/o [id number] at 14 2/7 weeks by [facility] 2021 readmitted for intractable nausea/vomiting in pregnancy in light of chronic eating disorder     1.  N/V/Dehydration/weight loss since discharge from hospital             -Iv antiemetics and phenergan suppositories prn             -ivf's             -Goodie bag daily             -s/p Nutrition consult (pt saw her nutritionist Tess Morel on 12/29) for supplement orders and consider ENT to place  dobhoff vs TPN if needed-however, will not be covered by home health if she is taking anything orally. Pt 's open enrollment was 12/31 but she has put in a request to switch and will find out in about 14 days. Pt to work on looking into other insurance options that may provide better coverage and I will speak with current insurance myself this week.             -Pt has PICC line in place             -pt s/p GI consult during last admission              -pt s/p sugery consult last admission (known gallstones but no surgery necessary at that time)-however, may need to address, as pt having worsening upper abdominal pain now.-will try Carafate-change to slurry-will have surgery re-eval today to see if surgery needed or not     2. Eating Disorder and Depression/Anxiety             -Thrombocytopenia resolved on 1/2 and stable 1/3             -recent h/o admission for suicidal ideation-stable currently             -Continue Prozac 60mg daily and Zyprexa 5mg daily             -Continue prn hydoxyzine and prazosin             -Pt followed closely by psychiatrist Sarahi Kaufman (last appt           66/53)             -had appt with psychologist by telehealth at 1/4/2021 Tad Hernandez)   -nursing to work on only small amounts of food at a time during the day     3. CHTN-normal to low bp's off meds since weight loss     4. Anemia-know beta thalassemia minor and iron deficiency             anemia-s/p iv iron infusions during last admission             -oral iron bid     5.  Hip pain/pelvic girdle weakness             -consult PT             -pelvic support belt     6.  H/o incompetent cervix             -s/p MFM appt 12/31- plan for cerclage on Thursday at 4pm (1/7/2021)     7. DVT prophylaxis-MEGHANA hose and ambulation with assistance (pt is fall risk)     8. Daily FHTs.

## 2021-01-06 LAB
COVID-19 RAPID TEST, COVR: NOT DETECTED
PHOSPHATE SERPL-MCNC: 3.3 MG/DL (ref 2.6–4.7)
SOURCE, COVRS: NORMAL
SPECIMEN SOURCE, FCOV2M: NORMAL
SPECIMEN TYPE, XMCV1T: NORMAL

## 2021-01-06 PROCEDURE — 97116 GAIT TRAINING THERAPY: CPT

## 2021-01-06 PROCEDURE — 74011250636 HC RX REV CODE- 250/636: Performed by: OBSTETRICS & GYNECOLOGY

## 2021-01-06 PROCEDURE — C9113 INJ PANTOPRAZOLE SODIUM, VIA: HCPCS | Performed by: PHYSICIAN ASSISTANT

## 2021-01-06 PROCEDURE — 84100 ASSAY OF PHOSPHORUS: CPT

## 2021-01-06 PROCEDURE — 74011000250 HC RX REV CODE- 250: Performed by: PHYSICIAN ASSISTANT

## 2021-01-06 PROCEDURE — 36415 COLL VENOUS BLD VENIPUNCTURE: CPT

## 2021-01-06 PROCEDURE — 87635 SARS-COV-2 COVID-19 AMP PRB: CPT

## 2021-01-06 PROCEDURE — 99231 SBSQ HOSP IP/OBS SF/LOW 25: CPT | Performed by: SURGERY

## 2021-01-06 PROCEDURE — 65410000002 HC RM PRIVATE OB

## 2021-01-06 PROCEDURE — 74011250636 HC RX REV CODE- 250/636: Performed by: PHYSICIAN ASSISTANT

## 2021-01-06 PROCEDURE — 74011000250 HC RX REV CODE- 250: Performed by: OBSTETRICS & GYNECOLOGY

## 2021-01-06 PROCEDURE — 74011250637 HC RX REV CODE- 250/637: Performed by: OBSTETRICS & GYNECOLOGY

## 2021-01-06 RX ORDER — ACETAMINOPHEN 650 MG/1
650 SUPPOSITORY RECTAL
Status: DISCONTINUED | OUTPATIENT
Start: 2021-01-06 | End: 2021-01-15 | Stop reason: HOSPADM

## 2021-01-06 RX ORDER — FLUTICASONE PROPIONATE 50 MCG
2 SPRAY, SUSPENSION (ML) NASAL DAILY
Status: DISCONTINUED | OUTPATIENT
Start: 2021-01-06 | End: 2021-01-15 | Stop reason: HOSPADM

## 2021-01-06 RX ORDER — FLUTICASONE PROPIONATE 50 MCG
2 SPRAY, SUSPENSION (ML) NASAL DAILY
Status: DISCONTINUED | OUTPATIENT
Start: 2021-01-07 | End: 2021-01-06

## 2021-01-06 RX ADMIN — FERROUS SULFATE TAB 325 MG (65 MG ELEMENTAL FE) 325 MG: 325 (65 FE) TAB at 17:38

## 2021-01-06 RX ADMIN — Medication 10 ML: at 22:00

## 2021-01-06 RX ADMIN — Medication 10 ML: at 14:53

## 2021-01-06 RX ADMIN — ONDANSETRON HYDROCHLORIDE 8 MG: 2 INJECTION, SOLUTION INTRAMUSCULAR; INTRAVENOUS at 14:50

## 2021-01-06 RX ADMIN — ONDANSETRON HYDROCHLORIDE 8 MG: 2 INJECTION, SOLUTION INTRAMUSCULAR; INTRAVENOUS at 21:30

## 2021-01-06 RX ADMIN — ALUMINUM HYDROXIDE AND MAGNESIUM HYDROXIDE 30 ML: 200; 200 SUSPENSION ORAL at 10:43

## 2021-01-06 RX ADMIN — SUCRALFATE 1 G: 1 TABLET ORAL at 16:30

## 2021-01-06 RX ADMIN — FLUTICASONE PROPIONATE 2 SPRAY: 50 SPRAY, METERED NASAL at 14:51

## 2021-01-06 RX ADMIN — ACETAMINOPHEN 650 MG: 325 TABLET ORAL at 14:51

## 2021-01-06 RX ADMIN — SODIUM CHLORIDE 40 MG: 9 INJECTION INTRAMUSCULAR; INTRAVENOUS; SUBCUTANEOUS at 10:44

## 2021-01-06 RX ADMIN — PROMETHAZINE HYDROCHLORIDE 25 MG: 25 SUPPOSITORY RECTAL at 12:42

## 2021-01-06 RX ADMIN — SUCRALFATE 1 G: 1 TABLET ORAL at 11:30

## 2021-01-06 RX ADMIN — FOLIC ACID: 5 INJECTION, SOLUTION INTRAMUSCULAR; INTRAVENOUS; SUBCUTANEOUS at 17:39

## 2021-01-06 RX ADMIN — SUCRALFATE 1 G: 1 TABLET ORAL at 21:30

## 2021-01-06 RX ADMIN — SUCRALFATE 1 G: 1 TABLET ORAL at 06:30

## 2021-01-06 RX ADMIN — DOCUSATE SODIUM 100 MG: 100 CAPSULE ORAL at 10:43

## 2021-01-06 RX ADMIN — ONDANSETRON HYDROCHLORIDE 8 MG: 2 INJECTION, SOLUTION INTRAMUSCULAR; INTRAVENOUS at 06:30

## 2021-01-06 RX ADMIN — OLANZAPINE 5 MG: 5 TABLET, FILM COATED ORAL at 21:29

## 2021-01-06 RX ADMIN — FLUOXETINE 60 MG: 20 CAPSULE ORAL at 10:43

## 2021-01-06 RX ADMIN — FERROUS SULFATE TAB 325 MG (65 MG ELEMENTAL FE) 325 MG: 325 (65 FE) TAB at 08:00

## 2021-01-06 RX ADMIN — Medication 10 ML: at 06:30

## 2021-01-06 NOTE — PROGRESS NOTES
Problem: Mobility Impaired (Adult and Pediatric)  Goal: *Acute Goals and Plan of Care (Insert Text)  Description: FUNCTIONAL STATUS PRIOR TO ADMISSION: Patient was independent with ambulation. Was receiving HHPT after last admission for syncope and fall    HOME SUPPORT PRIOR TO ADMISSION: The patient lived with significant other. Physical Therapy Goals  Initiated 1/2/2021  1. Patient will move from supine to sit and sit to supine  in bed with independence within 7 day(s). 2.  Patient will transfer from bed to chair and chair to bed with independence using the least restrictive device within 7 day(s). 3.  Patient will perform sit to stand with independence within 7 day(s). 4.  Patient will ambulate with modified independence for 200 feet with the least restrictive device within 7 day(s). 5.  Patient will ascend/descend 10 stairs with 1 handrail(s) with modified independence within 7 day(s). Outcome: Resolved/Met   PHYSICAL THERAPY TREATMENT/DISCHARGE  Patient: Shannen Hernandez (14 y.o. female)  Date: 1/6/2021  Diagnosis: Nausea and vomiting in pregnancy [O21.9]  Nausea/vomiting in pregnancy [O21.9]  Eating disorder affecting pregnancy, antepartum [O99.340, F50.9] <principal problem not specified>  Procedure(s) (LRB):  ESOPHAGOGASTRODUODENOSCOPY (EGD) (N/A)    Precautions:    Chart, physical therapy assessment, plan of care and goals were reviewed. ASSESSMENT  Patient continues with skilled PT services and has progressed towards goals. Patient's RN notes patient has been up ad verito ambulating frequently in hallways with steady gait and no concerns. Patient agreeable to another gait assessment today and demonstrated improved gait mechanics with no LOB, path deviations or reliance on IV pole for balance or support. Patient with c/o nausea so stair climbing aborted, however this PT would infer ability based on current abilities, age, and PLOF.   Patient's mobility is still limited from baseline by hip pain brought on by pregnancy and would benefit from a consult with an OP PT who specializes in pelvic/women's health.  In the acute setting, that form of assessment and equipment is unavailable.  Patient educated regarding all of this and in agreement with wanting to follow up with a therapist in the OP setting.  At this time, patient has met therapy goals and no longer has skilled therapy needs in the acute setting.  Will discharge and complete order but be happy to see again shoulder her condition change.    Other factors to consider for discharge: goals met, up safely ad verito, needs OP PT women's health consult         PLAN :  Patient will be discharged from acute skilled physical therapy at this time.    Rationale for discharge:  Goals achieved    Recommendation for discharge: (in order for the patient to meet his/her long term goals)  Outpatient physical therapy follow up recommended for women's health/pelvic therapy     This discharge recommendation:  Has been made in collaboration with the attending provider and/or case management    IF patient discharges home will need the following DME: none       SUBJECTIVE:   Patient stated “I'm walking around fine now, the hip just still hurts.”    OBJECTIVE DATA SUMMARY:   Critical Behavior:  Neurologic State: Alert  Orientation Level: Oriented X4  Cognition: Follows commands     Functional Mobility Training:  Bed Mobility:  Rolling: Independent  Supine to Sit: Independent  Sit to Supine: Independent  Scooting: Independent        Transfers:  Sit to Stand: Independent  Stand to Sit: Independent        Bed to Chair: Independent                    Balance:  Sitting: Intact  Standing: Intact  Ambulation/Gait Training:  Distance (ft): 200 Feet (ft)  Assistive Device: Gait belt  Ambulation - Level of Assistance: Independent        Gait Abnormalities: Decreased step clearance                                  Pain Ratin/10 in hip with walking    Activity Tolerance:  Good and limited by nausea and hip pain    After treatment patient left in no apparent distress:   Supine in bed and Call bell within reach    COMMUNICATION/COLLABORATION:   The patients plan of care was discussed with: Registered nurse.      Sary Oliveira PT   Time Calculation: 16 mins

## 2021-01-06 NOTE — PROGRESS NOTES
Bedside shift change report given to Kendra Weinberg RN (oncoming nurse) by Molly Rushing RN (offgoing nurse). Report included the following information SBAR, Kardex, Intake/Output and MAR.

## 2021-01-06 NOTE — PROGRESS NOTES
[facility]  [facility], [address]       GI PROGRESS NOTE  Will Sara Garciar  [phone] office  [phone] NP/PA in-hospital cell phone M-F until 4:30PM  After 5PM or on weekends, please call  for physician on call      NAME: Kenny Dacosta   :  1985   MRN:  [id number]       Subjective:   Patient reports persistent nausea with 2 episodes of vomiting today. She reports some upper abdominal pain. Patient reports having a bowel movement today. She reports a history of intermittent headaches and some nasal congestion (no postnasal drip). RN is at the bedside. Objective:     VITALS:   Last 24hrs VS reviewed since prior progress note. Most recent are:  Visit Vitals  /70 (BP 1 Location: Right arm, BP Patient Position: At rest;Sitting)   Pulse 66   Temp 97.8 °F (36.6 °C)   Resp 16   Ht 5' 4\" (1.626 m)   Wt 72.8 kg (160 lb 9.6 oz)   SpO2 100%   BMI 27.57 kg/m²       PHYSICAL EXAM:  General: Cooperative, no acute distress    Neurologic:  Alert and oriented   HEENT: EOMI, no scleral icterus   Lungs:  No respiratory distress  Abdomen: Soft, mild generalized tenderness, no guarding, no rebound. Extremities: Warm  Psych:   Not anxious or agitated      Lab Data Reviewed:     Recent Results (from the past 24 hour(s))   PHOSPHORUS    Collection Time: 21  6:25 AM   Result Value Ref Range    Phosphorus 3.3 2.6 - 4.7 MG/DL       Assessment:   · Intractable nausea/vomiting, likely secondary to hyperemesis gravidarum: UGI series (10/27/20): mild spontaneous intermittent gastroesophageal reflux to the lower thoracic esophagus; no other acute abnormality. Abdominal ultrasound (20): common bile duct dilation (1.0 cm), multiple gallstones. MRI/MRCP (20): trace pericholecystic fluid and gallstones, no gallbladder wall thickening or luminal dilation, no obstructing cystic duct calculcus; no biliary duct dilation or choledocholithiasis.  MRI brain without contrast (1/5/21): no acute intracranial abnormality; findings suggestive of chronic sinusitis with possible fungal sinus disease given signal characteristics. · Pregnancy: 14 weeks pregnant  · Eating disorder and anxiety/depression  · Hypertension     Patient Active Problem List   Diagnosis Code    Asthma J45.909    Microcytic anemia D50.9    Anxiety F41.9    Back pain, chronic M54.9, G89.29    Unspecified essential hypertension I10    MDD (major depressive disorder), recurrent episode, moderate (HCC) F33.1    Sleep disturbance G47.9    Non compliance w medication regimen Z91.14    Cervical incompetence N88.3    Incompetent cervix N88.3    Pregnancy Z34.90    Pregnant Z34.90    Severe obesity (HCC) E66.01    Post-traumatic stress disorder, acute F43.11    MDD (major depressive disorder) F32.9    Nausea and vomiting in pregnancy O21.9    Eating disorder affecting pregnancy, antepartum O99.340, F50.9    Depression affecting pregnancy O99.340, F32.9    Calculus of gallbladder without cholecystitis K80.20    Chronic bilateral low back pain with right-sided sciatica M54.41, G89.29    GERD (gastroesophageal reflux disease) K21.9    Nausea/vomiting in pregnancy O21.9     Plan:   · NPO after midnight  · PPI  · On Carafate  · Supportive measures: antiemetics PRN  · MRI brain reviewed. Consider ENT evaluation. · Discussed with Dr. Jens Damon. Plan for EGD tomorrow at 11AM with Dr. Jens Damon. Details and risks of the procedure to include (but not limited to) anesthesia, bleeding, infection, and perforation were discussed. Patient understands and is agreeable to proceed.       Signed By: Leon Fisher, [address]     1/6/2021  1:56 PM

## 2021-01-06 NOTE — PROGRESS NOTES
High Risk Obstetrics Progress Note    Name: Emy Morrow MRN: [id number]  SSN: [id number]    YOB: 1985  Age: 38 y.o. Sex: female      Subjective:      LOS: 5 days    Estimated Date of Delivery: 21   Gestational Age Today: 14w3d     Patient admitted for irretractable nausea/vomiting of pregnancy. States she is no better today. Per nurse - pt threw up prozac this morning. Yesterday afternoon \"ate a large amount of food\" and then 1000cc emesis. Pt reports usual cramping but no worsening sx. Does have some nasal congestion that usually uses flonase. Headache today. Objective:     Vitals:  Blood pressure 112/70, pulse 66, temperature 97.8 °F (36.6 °C), resp. rate 16, height 5' 4\" (1.626 m), weight 72.8 kg (160 lb 9.6 oz), last menstrual period 2020, SpO2 100 %, not currently breastfeeding. Temp (24hrs), Av °F (36.7 °C), Min:97.5 °F (36.4 °C), Max:98.4 °F (46.0 °C)    Systolic (25VQA), VRA:553 , Min:96 , KZ      Diastolic (96SQV), HHE:95, Min:56, Max:73       Intake and Output:     Date 21 0700 - 21 0659   Shift 0556-8259 3641-1230 0639-7659 24 Hour Total   INTAKE   P.O. 200   200   Shift Total(mL/kg) 200(2.7)   200(2.7)   OUTPUT   Emesis/NG output 300   300   Shift Total(mL/kg) 300(4.1)   300(4.1)   Weight (kg) 72.8 72.8 72.8 72.8       Physical Exam:  Patient without distress. Lower Extremities:  - Edema No       Membranes:  Intact    Uterine Activity:  None    Fetal Heart Rate:  +FHT on doppler        Labs:   Recent Results (from the past 36 hour(s))   PHOSPHORUS    Collection Time: 21  6:36 AM   Result Value Ref Range    Phosphorus 3.9 2.6 - 4.7 MG/DL   PHOSPHORUS    Collection Time: 21  6:25 AM   Result Value Ref Range    Phosphorus 3.3 2.6 - 4.7 MG/DL       Assessment and Plan:       Active Problems:    Nausea and vomiting in pregnancy (2020)      Eating disorder affecting pregnancy, antepartum (2020)      Nausea/vomiting in pregnancy (1/2/2021)       37 y/o [id number] at 14 3/7 weeks by [facility] 7/4/2021 readmitted for intractable nausea/vomiting in pregnancy in light of chronic eating disorder     1.  N/V/Dehydration/weight loss since discharge from hospital             -Iv antiemetics and phenergan suppositories prn             -ivf's             -Goodie bag daily             -s/p Nutrition consult (pt saw her nutritionist Judge Leong on 12/29) for supplement orders and consider ENT to place  dobhoff vs TPN if needed-however, will not be covered by home health if she is taking anything orally. Pt 's open enrollment was 12/31 but she has put in a request to switch and will find out in about 14 days. Pt to work on looking into other insurance options that may provide better coverage and I will speak with current insurance myself this week.             -Pt has PICC line in place             -pt s/p GI consult during last admission - reconsulted; now s/p benign (sinus infection) MRI so next step considering EGD             -pt s/p sugery consult last admission (known gallstones but no surgery necessary at that time)-however, may need to address, as pt having worsening upper abdominal pain now.-will try Carafate-change to slurry-will have surgery re-eval today to see if surgery needed or not     2. Eating Disorder and Depression/Anxiety             -Thrombocytopenia resolved on 1/2 and stable 1/3             -recent h/o admission for suicidal ideation-stable currently             -Continue Prozac 60mg daily and Zyprexa 5mg daily             -Continue prn hydoxyzine and prazosin             -Pt followed closely by psychiatrist Jose Garcia (last appt  appt with psychologist by telehealth at 1/4/2021 Alfonso Willard)             -nursing to work on only small amounts of food at a time during the day     3. CHTN-normal to low bp's off meds since weight loss     4.  Anemia-know beta thalassemia minor and iron deficiency             anemia-s/p iv iron infusions during last admission             -oral iron bid     5. Hip pain/pelvic girdle weakness             -consult PT             -pelvic support belt     6.  H/o incompetent cervix             -s/p MFM appt 12/31- plan for cerclage on Thursday at 4pm (1/7/2021)     7. DVT prophylaxis-MEGHANA hose and ambulation with assistance (pt is fall risk)     8. Daily FHTs.     9. Headache: offered tylenol [place] prn    10. Brain MRI: chronic sinusitis, patient with minimal sx. Flonase.

## 2021-01-06 NOTE — PROGRESS NOTES
Progress Note    Patient: Ayaka Conti MRN: [id number]  SSN: [id number]    YOB: 1985  Age: 38 y.o. Sex: female      Admit Date: 2020    * No surgery date entered *    Procedure:  Procedure(s):  ESOPHAGOGASTRODUODENOSCOPY (EGD)    Subjective:     Patient has no new complaints . She still is having vomiting. Objective:     Visit Vitals  /60 (BP 1 Location: Right arm, BP Patient Position: At rest;Sitting)   Pulse 68   Temp 97.9 °F (36.6 °C)   Resp 16   Ht 5' 4\" (1.626 m)   Wt 160 lb 9.6 oz (72.8 kg)   SpO2 98%   BMI 27.57 kg/m²       Temp (24hrs), Av.9 °F (36.6 °C), Min:97.5 °F (36.4 °C), Max:98.4 °F (36.9 °C)      Physical Exam:    Gen- Alert in NAD  Lungs- CTA  H-RRR   AbdS/Nt /nd    Data Review: images and reports reviewed    Lab Review: All lab results for the last 24 hours reviewed. Recent Results (from the past 24 hour(s))   PHOSPHORUS    Collection Time: 21  6:25 AM   Result Value Ref Range    Phosphorus 3.3 2.6 - 4.7 MG/DL       Assessment:     Hospital Problems  Date Reviewed: 2020          Codes Class Noted POA    Nausea/vomiting in pregnancy ICD-10-CM: O21.9  ICD-9-CM: 643.90  2021 Unknown        Nausea and vomiting in pregnancy ICD-10-CM: O21.9  ICD-9-CM: 643.90  2020 Unknown        Eating disorder affecting pregnancy, antepartum ICD-10-CM: O99.340, F50.9  ICD-9-CM: 646.83, 307.50  2020 Unknown              Plan/Recommendations/Medical Decision Making:   Currently awaiting full GI workup before any plans for Lap Liat  She is supposed to get EGD tomorrow.    Following

## 2021-01-06 NOTE — PROGRESS NOTES
Bedside and Verbal shift change report given to [address] (oncoming nurse) by Artem Mejia RN (offgoing nurse). Report included the following information SBAR, Kardex, OR Summary, Procedure Summary, Intake/Output and MAR.

## 2021-01-07 ENCOUNTER — ANESTHESIA EVENT (OUTPATIENT)
Dept: ENDOSCOPY | Age: 36
DRG: 546 | End: 2021-01-07
Payer: MEDICAID

## 2021-01-07 ENCOUNTER — ANESTHESIA EVENT (OUTPATIENT)
Dept: LABOR AND DELIVERY | Age: 36
DRG: 546 | End: 2021-01-07
Payer: MEDICAID

## 2021-01-07 ENCOUNTER — ANESTHESIA (OUTPATIENT)
Dept: ENDOSCOPY | Age: 36
DRG: 546 | End: 2021-01-07
Payer: MEDICAID

## 2021-01-07 ENCOUNTER — ANESTHESIA (OUTPATIENT)
Dept: LABOR AND DELIVERY | Age: 36
DRG: 546 | End: 2021-01-07
Payer: MEDICAID

## 2021-01-07 LAB
ALBUMIN SERPL-MCNC: 2.1 G/DL (ref 3.5–5)
ALBUMIN/GLOB SERPL: 0.7 {RATIO} (ref 1.1–2.2)
ALP SERPL-CCNC: 37 U/L (ref 45–117)
ALT SERPL-CCNC: 9 U/L (ref 12–78)
ANION GAP SERPL CALC-SCNC: 8 MMOL/L (ref 5–15)
AST SERPL-CCNC: 8 U/L (ref 15–37)
BILIRUB SERPL-MCNC: 0.1 MG/DL (ref 0.2–1)
BUN SERPL-MCNC: 5 MG/DL (ref 6–20)
BUN/CREAT SERPL: 14 (ref 12–20)
CALCIUM SERPL-MCNC: 7.8 MG/DL (ref 8.5–10.1)
CHLORIDE SERPL-SCNC: 110 MMOL/L (ref 97–108)
CO2 SERPL-SCNC: 21 MMOL/L (ref 21–32)
CREAT SERPL-MCNC: 0.37 MG/DL (ref 0.55–1.02)
ERYTHROCYTE [DISTWIDTH] IN BLOOD BY AUTOMATED COUNT: 17.1 % (ref 11.5–14.5)
GLOBULIN SER CALC-MCNC: 3 G/DL (ref 2–4)
GLUCOSE SERPL-MCNC: 60 MG/DL (ref 65–100)
HCT VFR BLD AUTO: 26.2 % (ref 35–47)
HGB BLD-MCNC: 8.2 G/DL (ref 11.5–16)
MCH RBC QN AUTO: 24.1 PG (ref 26–34)
MCHC RBC AUTO-ENTMCNC: 31.3 G/DL (ref 30–36.5)
MCV RBC AUTO: 77.1 FL (ref 80–99)
NRBC # BLD: 0 K/UL (ref 0–0.01)
NRBC BLD-RTO: 0 PER 100 WBC
PHOSPHATE SERPL-MCNC: 3.2 MG/DL (ref 2.6–4.7)
PLATELET # BLD AUTO: 130 K/UL (ref 150–400)
PMV BLD AUTO: 11.6 FL (ref 8.9–12.9)
POTASSIUM SERPL-SCNC: 3.7 MMOL/L (ref 3.5–5.1)
PROT SERPL-MCNC: 5.1 G/DL (ref 6.4–8.2)
RBC # BLD AUTO: 3.4 M/UL (ref 3.8–5.2)
SODIUM SERPL-SCNC: 139 MMOL/L (ref 136–145)
WBC # BLD AUTO: 6 K/UL (ref 3.6–11)

## 2021-01-07 PROCEDURE — 99231 SBSQ HOSP IP/OBS SF/LOW 25: CPT | Performed by: SURGERY

## 2021-01-07 PROCEDURE — 84100 ASSAY OF PHOSPHORUS: CPT

## 2021-01-07 PROCEDURE — 2709999900 HC NON-CHARGEABLE SUPPLY: Performed by: INTERNAL MEDICINE

## 2021-01-07 PROCEDURE — 76040000019: Performed by: INTERNAL MEDICINE

## 2021-01-07 PROCEDURE — 74011250636 HC RX REV CODE- 250/636: Performed by: OBSTETRICS & GYNECOLOGY

## 2021-01-07 PROCEDURE — C9113 INJ PANTOPRAZOLE SODIUM, VIA: HCPCS | Performed by: PHYSICIAN ASSISTANT

## 2021-01-07 PROCEDURE — 85027 COMPLETE CBC AUTOMATED: CPT

## 2021-01-07 PROCEDURE — 80053 COMPREHEN METABOLIC PANEL: CPT

## 2021-01-07 PROCEDURE — 74011000250 HC RX REV CODE- 250: Performed by: OBSTETRICS & GYNECOLOGY

## 2021-01-07 PROCEDURE — 76060000031 HC ANESTHESIA FIRST 0.5 HR: Performed by: INTERNAL MEDICINE

## 2021-01-07 PROCEDURE — 75410000003 HC RECOV DEL/VAG/CSECN EA 0.5 HR: Performed by: OBSTETRICS & GYNECOLOGY

## 2021-01-07 PROCEDURE — 77030002986 HC SUT PROL J&J -A

## 2021-01-07 PROCEDURE — 76010000389 HC LDRP PROCEDURE 0.5 TO 1 HR: Performed by: OBSTETRICS & GYNECOLOGY

## 2021-01-07 PROCEDURE — 74011250637 HC RX REV CODE- 250/637: Performed by: OBSTETRICS & GYNECOLOGY

## 2021-01-07 PROCEDURE — 0DJ08ZZ INSPECTION OF UPPER INTESTINAL TRACT, VIA NATURAL OR ARTIFICIAL OPENING ENDOSCOPIC: ICD-10-PCS | Performed by: INTERNAL MEDICINE

## 2021-01-07 PROCEDURE — 36415 COLL VENOUS BLD VENIPUNCTURE: CPT

## 2021-01-07 PROCEDURE — 65410000002 HC RM PRIVATE OB

## 2021-01-07 PROCEDURE — 77030012890

## 2021-01-07 PROCEDURE — 76060000078 HC EPIDURAL ANESTHESIA: Performed by: OBSTETRICS & GYNECOLOGY

## 2021-01-07 PROCEDURE — 74011250636 HC RX REV CODE- 250/636: Performed by: NURSE ANESTHETIST, CERTIFIED REGISTERED

## 2021-01-07 PROCEDURE — 74011000250 HC RX REV CODE- 250: Performed by: PHYSICIAN ASSISTANT

## 2021-01-07 PROCEDURE — 09JK8ZZ INSPECTION OF NASAL MUCOSA AND SOFT TISSUE, VIA NATURAL OR ARTIFICIAL OPENING ENDOSCOPIC: ICD-10-PCS | Performed by: OTOLARYNGOLOGY

## 2021-01-07 PROCEDURE — 74011000250 HC RX REV CODE- 250: Performed by: NURSE ANESTHETIST, CERTIFIED REGISTERED

## 2021-01-07 PROCEDURE — 77030007866 HC KT SPN ANES BBMI -B: Performed by: ANESTHESIOLOGY

## 2021-01-07 PROCEDURE — 74011250636 HC RX REV CODE- 250/636: Performed by: PHYSICIAN ASSISTANT

## 2021-01-07 PROCEDURE — 0UVC7ZZ RESTRICTION OF CERVIX, VIA NATURAL OR ARTIFICIAL OPENING: ICD-10-PCS | Performed by: OBSTETRICS & GYNECOLOGY

## 2021-01-07 PROCEDURE — 2709999900 HC NON-CHARGEABLE SUPPLY

## 2021-01-07 RX ORDER — KETOROLAC TROMETHAMINE 30 MG/ML
INJECTION, SOLUTION INTRAMUSCULAR; INTRAVENOUS AS NEEDED
Status: DISCONTINUED | OUTPATIENT
Start: 2021-01-07 | End: 2021-01-07 | Stop reason: HOSPADM

## 2021-01-07 RX ORDER — SODIUM CHLORIDE, SODIUM LACTATE, POTASSIUM CHLORIDE, CALCIUM CHLORIDE 600; 310; 30; 20 MG/100ML; MG/100ML; MG/100ML; MG/100ML
INJECTION, SOLUTION INTRAVENOUS
Status: DISCONTINUED | OUTPATIENT
Start: 2021-01-07 | End: 2021-01-07 | Stop reason: HOSPADM

## 2021-01-07 RX ORDER — FENTANYL CITRATE 50 UG/ML
INJECTION, SOLUTION INTRAMUSCULAR; INTRAVENOUS AS NEEDED
Status: DISCONTINUED | OUTPATIENT
Start: 2021-01-07 | End: 2021-01-07 | Stop reason: HOSPADM

## 2021-01-07 RX ORDER — PROPOFOL 10 MG/ML
INJECTION, EMULSION INTRAVENOUS AS NEEDED
Status: DISCONTINUED | OUTPATIENT
Start: 2021-01-07 | End: 2021-01-07 | Stop reason: HOSPADM

## 2021-01-07 RX ORDER — DIPHENHYDRAMINE HYDROCHLORIDE 50 MG/ML
25 INJECTION, SOLUTION INTRAMUSCULAR; INTRAVENOUS ONCE
Status: COMPLETED | OUTPATIENT
Start: 2021-01-07 | End: 2021-01-07

## 2021-01-07 RX ORDER — BUPIVACAINE HYDROCHLORIDE 7.5 MG/ML
INJECTION, SOLUTION EPIDURAL; RETROBULBAR AS NEEDED
Status: DISCONTINUED | OUTPATIENT
Start: 2021-01-07 | End: 2021-01-07 | Stop reason: HOSPADM

## 2021-01-07 RX ORDER — DIPHENHYDRAMINE HCL 25 MG
25 CAPSULE ORAL ONCE
Status: DISCONTINUED | OUTPATIENT
Start: 2021-01-07 | End: 2021-01-07

## 2021-01-07 RX ADMIN — SODIUM CHLORIDE, POTASSIUM CHLORIDE, SODIUM LACTATE AND CALCIUM CHLORIDE 150 ML/HR: 600; 310; 30; 20 INJECTION, SOLUTION INTRAVENOUS at 16:26

## 2021-01-07 RX ADMIN — PROPOFOL 100 MG: 10 INJECTION, EMULSION INTRAVENOUS at 10:26

## 2021-01-07 RX ADMIN — ONDANSETRON HYDROCHLORIDE 8 MG: 2 INJECTION, SOLUTION INTRAMUSCULAR; INTRAVENOUS at 22:18

## 2021-01-07 RX ADMIN — ACETAMINOPHEN 650 MG: 650 SUPPOSITORY RECTAL at 19:24

## 2021-01-07 RX ADMIN — PROPOFOL 30 MG: 10 INJECTION, EMULSION INTRAVENOUS at 10:28

## 2021-01-07 RX ADMIN — PROPOFOL 25 MG: 10 INJECTION, EMULSION INTRAVENOUS at 18:09

## 2021-01-07 RX ADMIN — FOLIC ACID: 5 INJECTION, SOLUTION INTRAMUSCULAR; INTRAVENOUS; SUBCUTANEOUS at 19:20

## 2021-01-07 RX ADMIN — SODIUM CHLORIDE 40 MG: 9 INJECTION INTRAMUSCULAR; INTRAVENOUS; SUBCUTANEOUS at 08:21

## 2021-01-07 RX ADMIN — PROPOFOL 20 MG: 10 INJECTION, EMULSION INTRAVENOUS at 10:29

## 2021-01-07 RX ADMIN — ONDANSETRON HYDROCHLORIDE 8 MG: 2 INJECTION, SOLUTION INTRAMUSCULAR; INTRAVENOUS at 06:33

## 2021-01-07 RX ADMIN — SODIUM CHLORIDE, SODIUM LACTATE, POTASSIUM CHLORIDE, AND CALCIUM CHLORIDE: 600; 310; 30; 20 INJECTION, SOLUTION INTRAVENOUS at 17:32

## 2021-01-07 RX ADMIN — Medication 10 ML: at 22:28

## 2021-01-07 RX ADMIN — KETOROLAC TROMETHAMINE 30 MG: 30 INJECTION, SOLUTION INTRAMUSCULAR; INTRAVENOUS at 18:26

## 2021-01-07 RX ADMIN — SODIUM CHLORIDE, POTASSIUM CHLORIDE, SODIUM LACTATE AND CALCIUM CHLORIDE: 600; 310; 30; 20 INJECTION, SOLUTION INTRAVENOUS at 10:07

## 2021-01-07 RX ADMIN — Medication 10 ML: at 06:33

## 2021-01-07 RX ADMIN — FENTANYL CITRATE 25 MCG: 50 INJECTION, SOLUTION INTRAMUSCULAR; INTRAVENOUS at 18:20

## 2021-01-07 RX ADMIN — PROPOFOL 20 MG: 10 INJECTION, EMULSION INTRAVENOUS at 10:30

## 2021-01-07 RX ADMIN — PROPOFOL 25 MG: 10 INJECTION, EMULSION INTRAVENOUS at 18:11

## 2021-01-07 RX ADMIN — PROPOFOL 25 MG: 10 INJECTION, EMULSION INTRAVENOUS at 18:15

## 2021-01-07 RX ADMIN — BUPIVACAINE HYDROCHLORIDE 7.5 MG: 7.5 INJECTION, SOLUTION EPIDURAL; RETROBULBAR at 17:56

## 2021-01-07 RX ADMIN — ONDANSETRON HYDROCHLORIDE 8 MG: 2 INJECTION, SOLUTION INTRAMUSCULAR; INTRAVENOUS at 13:50

## 2021-01-07 RX ADMIN — DIPHENHYDRAMINE HYDROCHLORIDE 25 MG: 50 INJECTION, SOLUTION INTRAMUSCULAR; INTRAVENOUS at 21:17

## 2021-01-07 RX ADMIN — FLUTICASONE PROPIONATE 2 SPRAY: 50 SPRAY, METERED NASAL at 08:24

## 2021-01-07 RX ADMIN — PROPOFOL 30 MG: 10 INJECTION, EMULSION INTRAVENOUS at 10:27

## 2021-01-07 NOTE — PROGRESS NOTES
Bedside shift change report given to Tonya Awan, AKIL (oncoming nurse) by Luz Salguero (offgoing nurse). Report included the following information SBAR, Kardex, Intake/Output and MAR.

## 2021-01-07 NOTE — CONSULTS
Ears/Nose/Throat Consult    Subjective:     Date of Consultation:  2021    Referring Physician: Maria Del Carmen De La Torre MD    History of Present Illness:   Patient is a 38 y.o.  female who is being seen from ENT perspective for finding of sinus congestion on left side of paranasal sinuses . Her current admission is related to her intractable n/v and her pregnancy . Relevant to ENT findings is that she is reporting no sinus sx's other than nasal congestion   she  was admitted to the hosptial for     Nausea and vomiting in pregnancy [O21.9]  Nausea/vomiting in pregnancy [O21.9]  Eating disorder affecting pregnancy, antepartum [O99.340, F50.9]. Past Medical History:   Diagnosis Date    Anorexia     Anxiety     Asthma     on albuterol prn.  Triggers cold and allergies    Avoidant-restrictive food intake disorder (ARFID)     Back pain, chronic     sciatica    Chronic bilateral low back pain with right-sided sciatica 2020    ~    GERD (gastroesophageal reflux disease) 2020    UGI 10-, GI Dr. Luiz García Gestational hypertension     Past pregnancy    HX OTHER MEDICAL      , , , ,     Hyperemesis     severe hyperemesis    Hypertension     with G12 - none this pregnancy    Iron deficiency anemia 10/08/2010    MDD (major depressive disorder), single episode with postpartum onset 2013    treated with meds - 13    Orthostatic hypotension 2020    Plantar fasciitis     Pregnancy     36 weeks    PTSD (post-traumatic stress disorder)       Family History   Problem Relation Age of Onset   Edna Khan Arthritis-rheumatoid Mother     Asthma Mother     No Known Problems Father     No Known Problems Maternal Grandmother     No Known Problems Maternal Grandfather     No Known Problems Paternal Grandmother     No Known Problems Paternal Grandfather     Asthma Son     Alcohol abuse Neg Hx     Arthritis-osteo Neg Hx     Bleeding Prob Neg Hx     Cancer Neg Hx     Diabetes Neg Hx     Elevated Lipids Neg Hx     Headache Neg Hx     Heart Disease Neg Hx     Hypertension Neg Hx     Lung Disease Neg Hx     Migraines Neg Hx     Psychiatric Disorder Neg Hx     Stroke Neg Hx     Mental Retardation Neg Hx     Anesth Problems Neg Hx       Social History     Tobacco Use    Smoking status: Never Smoker    Smokeless tobacco: Never Used   Substance Use Topics    Alcohol use: Not Currently     Frequency: Monthly or less     Drinks per session: 1 or 2     Binge frequency: Never     Past Surgical History:   Procedure Laterality Date    HX  SECTION  4/22/15    HX DILATION AND CURETTAGE      HX DILATION AND CURETTAGE  2020    HX GYN      miscarriage x 6    HX GYN      removal of left fallopian tube    HX OTHER SURGICAL      cerlcage x4, ectopic x1 1999 with removal of left fallopian tube    HX OTHER SURGICAL      cerclage w/ current pregnancy. Removed 18    SD  DELIVERY ONLY        Current Facility-Administered Medications   Medication Dose Route Frequency    OTHER(NON-FORMULARY) 20 mg  20 mg Oral BID    acetaminophen (TYLENOL) suppository 650 mg  650 mg Rectal Q4H PRN    fluticasone propionate (FLONASE) 50 mcg/actuation nasal spray 2 Spray  2 Spray Both Nostrils DAILY    pantoprazole (PROTONIX) 40 mg in 0.9% sodium chloride 10 mL injection  40 mg IntraVENous DAILY    sucralfate (CARAFATE) tablet 1 g  1 g Oral AC&HS    ondansetron (ZOFRAN) injection 8 mg  8 mg IntraVENous Q8H    alteplase (CATHFLO) 1 mg in sterile water (preservative free) 1 mL injection  1 mg InterCATHeter PRN    prazosin (MINIPRESS) capsule 2 mg  2 mg Oral QHS PRN    acetaminophen (TYLENOL) tablet 650 mg  650 mg Oral Q6H PRN    aluminum-magnesium hydroxide (MAALOX) oral suspension 30 mL  30 mL Oral QID PRN    lactated Ringers infusion  150 mL/hr IntraVENous CONTINUOUS    0.9% sodium chloride 1,000 mL with mvi, adult no. 4 with vit K 10 mL, thiamine 041 mg, folic acid 1 mg infusion   IntraVENous Q24H    OLANZapine (ZyPREXA) tablet 5 mg  5 mg Oral QHS    FLUoxetine (PROzac) capsule 60 mg  60 mg Oral DAILY    hydrOXYzine HCL (ATARAX) tablet 10 mg  10 mg Oral TID PRN    ferrous sulfate tablet 325 mg  1 Tab Oral BID WITH MEALS    promethazine (PHENERGAN) suppository 25 mg  25 mg Rectal Q6H PRN    docusate sodium (COLACE) capsule 100 mg  100 mg Oral DAILY    sodium chloride (NS) flush 5-40 mL  5-40 mL IntraVENous Q8H    sodium chloride (NS) flush 5-40 mL  5-40 mL IntraVENous PRN     Facility-Administered Medications Ordered in Other Encounters   Medication Dose Route Frequency    bupivacaine (PF) (MARCAINE) 0.75 % (7.5 mg/mL) injection   Intrathecal PRN    propofoL (DIPRIVAN) 10 mg/mL injection   IntraVENous PRN    fentaNYL citrate (PF) injection    PRN      Allergies   Allergen Reactions    Labetalol Hives    Ceclor [Cefaclor] Unknown (comments)    Pcn [Penicillins] Hives    Septra [Sulfamethoprim Ds] Unknown (comments)        Review of Systems:  Pertinent items are noted in the History of Present Illness. Objective:     Patient Vitals for the past 8 hrs:   BP Temp Pulse Resp SpO2   21 1613 113/62  60 16    21 1507 115/65  60 16    21 1430 (!) 89/64 98.5 °F (36.9 °C) 60 16    21 1338 115/72 98.3 °F (36.8 °C) 63 16 99 %   21 1115 115/71  (!) 59 15 99 %   21 1110 112/68  60 18 99 %   21 1105 112/66  60 16 99 %   21 1100 113/70  60 15 100 %   21 1055 114/69  62 20 100 %   21 1050 120/66  62 17 98 %   21 1045 105/60  60 17 98 %   21 1040 101/60 97.7 °F (36.5 °C) 64 19 99 %   21 1036 (!) 103/59  64 20 100 %     Temp (24hrs), Av.2 °F (36.8 °C), Min:97.7 °F (36.5 °C), Max:98.5 °F (36.9 °C)     190 -  0700  In: 51716.3 [P.O.:840;  I.V.:03535.3]  Out: 500 [Urine:200]    Physical Exam:   General  General Appearance- Well nourished adult in no apparent distress who is alert and cooperative. Gait- Normal. Voice- Normal voice and communication. HEENT  Head: Normally developed without evidence of trauma or lesions. Face:  Skin:  Normal color, texture, and turgor. There are no lesions of concern nor swelling or induration. Lips- normal.  Facial Nerve- Bilateral- function is equal and symmetrical with no deficit. Ear: Auricle- Left- Normal development without sinus pit, cyst or any other lesion. Right- Normal development without sinus pit, cyst or any other lesion  Auditory Canal (otoscopic examination)  Left- clean, without edema, discharge, or lesions. Right  clean, without edema, discharge, or lesions. Tympanic membrane:  Left- intact and mobile, without middle ear effusion, retraction, or sclerosis. Right- intact and mobile, without middle ear effusion, retraction or sclerosis. Mastoid- Left- No erythema, edema, tenderness, or protrusion of the auricle. Right- No erythema, edema, tenderness, or protrusion of the auricle. Nose:  Congestion on the left     Oral Cavity/OropharynxDentition- Normal dentition for age witho no evidence of discoloration, inflammation, or infection. Oral Mucosa: moist without erythema or lesions. Tongue- no lesions or palpable masses. Floor of mouth- soft without palpable masses or mucosal lesion. Palate and uvula- Palate is without cleft and the uvula is not bifid. Soft palate elevates symmetrically. Tonsils- Bilateral -Normal in size without exudates or erythema. Salivary Ducts-  Ducts appear to be normal.  Throat: Pharynx- Normal mucosal lining without erythema or mass. Larynx: difficult to examine directly. Neck:-- Trachea- midline with normal laryngeal framework with no crepitus. Salivary Glands- normal to palpation without nodules or tenderness. Thyroid- normal to palpation without mass or irregularity. Lymph Nodes- No palpable adenopathy or masses.   Range of Motion- good in all directions, with good anterior-posterior and lateral flexion and rotation. Chest and Lung Exam: Normal respiratory effort with no wheezing, retractions, or rales. Cardiovascular: Regular rate and rhythm. Normal peripheral pulses without bruits. Neurologic exam: grossly normal      fiberoptioc exam  : nasal endoscopy: notes large obstructive left polyp/ polypoid mass remodeling nasal cavity . Rt side is compressed but OPEN     MRI of head and face:  ENT pertinent findings are related to left ethmoidal and left nasal vault inflammatory congestion, extensive and continuing into max sinus as well . Assessment:   Left sided inflammatory nasal and paranasal findings, possibly consistent with impacted inspissated mucous retention , mucocele / polyp/ polypoid mass   Typically , when this process is found in a patient , the work up would be  To consider   1. A diagnostic sinus ct scan , as the MRI is not a reliable way to see the sinus disease and the extent of involvement    2. Empirically start the pt on a regimen on antibiotics and steroids ( typically Augmentin and methylprednisolone , but other products could suffice)  3.  Consider timing for elective surgery to open up and diagnostically clear the sinuses     But with the pt's issues that on going , pregnancy , N/v , etc.. , I would at this time hold on any elective input from ENT and wait to hear eventually from her primary medical team as to what role we should have, if any     Importantly , it worth noting that her sinus congestion / blockage is not causing the pt symptoms at this time and is not related to her [facility] Problems  Date Reviewed: 12/28/2020          Codes Class Noted POA    Nausea/vomiting in pregnancy ICD-10-CM: O21.9  ICD-9-CM: 643.90  1/2/2021 Unknown        Nausea and vomiting in pregnancy ICD-10-CM: O21.9  ICD-9-CM: 643.90  12/2/2020 Unknown        Eating disorder affecting pregnancy, antepartum ICD-10-CM: O99.340, F50.9  ICD-9-CM: 646.83, 307.50  12/2/2020 Unknown Signed By: Laurie Wisdom MD     January 7, 2021

## 2021-01-07 NOTE — PERIOP NOTES
TRANSFER - IN REPORT:    Verbal report received from Jennifer(name) on [facility]  being received from (unit) for ordered procedure      Report consisted of patients Situation, Background, Assessment and   Recommendations(SBAR). Information from the following report(s) SBAR, Kardex and MAR was reviewed with the receiving nurse. Opportunity for questions and clarification was provided. 1010: Pt arrives via WC.    1015: . Patient has been evaluated by anesthesia pre-procedure. Patient alert and oriented. Vital signs will not be charted by the Endoscopy nurse. All vitals, airway, and loc are monitored by anesthesia staff throughout procedure. 1035: . Endoscope was pre-cleaned at bedside immediately following procedure by Annalise Guzman.      2464: [facility] will come to ENDP recovery for fetal heart tone check per request of Dr Laith Reynaga and Charles Hicks. 1050: Jennifer at bedside/ fetal heart tones heard. Dr Laith Reynaga @ bedside. .TRANSFER - OUT REPORT:    Verbal report given to Jennifer(name) on [facility]  being transferred to [facility](unit) for routine post - op       Report consisted of patients Situation, Background, Assessment and   Recommendations(SBAR). Information from the following report(s) Procedure Summary was reviewed with the receiving nurse.     Lines:   PICC Double Lumen 43/68/30 Left;Basilic (Active)   Central Line Being Utilized Yes 01/07/21 0326   Criteria for Appropriate Use Limited/no vessel suitable for conventional peripheral access 01/07/21 0326   Site Assessment Clean, dry, & intact 01/07/21 0326   Phlebitis Assessment 0 01/07/21 0326   Infiltration Assessment 0 01/07/21 0326   Arm Circumference (cm) 30 cm 12/09/20 1732   Date of Last Dressing Change 01/05/21 01/07/21 0326   Dressing Status Clean, dry, & intact 01/07/21 0326   Action Taken Open ports on tubing capped 01/07/21 0326   External Catheter Length (cm) 1 centimeters 12/09/20 1732   Dressing Type Disk with Chlorhexadine gluconate (CHG) 01/07/21 0326   Hub Color/Line Status Infusing;Red 01/07/21 0326   Positive Blood Return (Site #1) Yes 01/07/21 0326   Hub Color/Line Status Purple;Capped;Flushed 01/07/21 0326   Positive Blood Return (Site #2) Yes 01/07/21 0326   Alcohol Cap Used Yes 01/07/21 0326       Peripheral IV 01/07/21 (Active)        Opportunity for questions and clarification was provided.

## 2021-01-07 NOTE — ANESTHESIA POSTPROCEDURE EVALUATION
Post-Anesthesia Evaluation and Assessment    Patient: Emy Morrow MRN: [id number]  SSN: [id number]    YOB: 1985  Age: 38 y.o. Sex: female      I have evaluated the patient and they are stable and ready for discharge from the PACU. Cardiovascular Function/Vital Signs  Visit Vitals  /66   Pulse 62   Temp 36.5 °C (97.7 °F)   Resp 17   Ht 5' 4\" (1.626 m)   Wt 72.4 kg (159 lb 9.6 oz)   SpO2 98%   BMI 27.40 kg/m²       Patient is status post MAC anesthesia for Procedure(s):  ESOPHAGOGASTRODUODENOSCOPY (EGD). Nausea/Vomiting: None    Postoperative hydration reviewed and adequate. Pain:  Pain Scale 1: Numeric (0 - 10) (01/07/21 1050)  Pain Intensity 1: 0 (01/07/21 1050)   Managed    Neurological Status:   Neuro  Neurologic State: Alert (01/06/21 0855)  Orientation Level: Oriented X4 (01/06/21 0855)  Cognition: Follows commands (01/06/21 0855)  Speech: Clear (01/06/21 0855)  Size L Pupil (mm): 3 (01/06/21 0855)  Size R Pupil (mm): 3 (01/06/21 0855)  LUE Motor Response: Purposeful (01/06/21 0855)  LLE Motor Response: Purposeful (01/06/21 0855)  RUE Motor Response: Purposeful (01/06/21 0855)  RLE Motor Response: Purposeful (01/06/21 0855)   At baseline    Mental Status, Level of Consciousness: Alert and  oriented to person, place, and time    Pulmonary Status:   O2 Device: Room air (01/07/21 1050)   Adequate oxygenation and airway patent    Complications related to anesthesia: None    Post-anesthesia assessment completed. No concerns    Signed By: Aminta Mejia MD     January 7, 2021              Procedure(s):  ESOPHAGOGASTRODUODENOSCOPY (EGD). MAC    <BSHSIANPOST>    INITIAL Post-op Vital signs:   Vitals Value Taken Time   /66 01/07/21 1050   Temp 36.5 °C (97.7 °F) 01/07/21 1040   Pulse 61 01/07/21 1054   Resp 22 01/07/21 1054   SpO2 100 % 01/07/21 1054   Vitals shown include unvalidated device data.

## 2021-01-07 NOTE — PROGRESS NOTES
Bedside and Verbal shift change report given to Mae Meredith  (oncoming nurse) by Preethi Jeffers  (offgoing nurse). Report included the following information SBAR, Kardex, ED Summary, Procedure Summary, Intake/Output, MAR and Recent Results.

## 2021-01-07 NOTE — ANESTHESIA PREPROCEDURE EVALUATION
Relevant Problems   RESPIRATORY SYSTEM   (+) Asthma      NEUROLOGY   (+) Depression affecting pregnancy   (+) Eating disorder affecting pregnancy, antepartum   (+) MDD (major depressive disorder)   (+) MDD (major depressive disorder), recurrent episode, moderate (HCC)   (+) Post-traumatic stress disorder, acute      CARDIOVASCULAR   (+) Unspecified essential hypertension      GASTROINTESTINAL   (+) GERD (gastroesophageal reflux disease)      ENDOCRINE   (+) Severe obesity (HCC)      HEMATOLOGY   (+) Microcytic anemia       Anesthetic History     PONV          Review of Systems / Medical History  Patient summary reviewed, nursing notes reviewed and pertinent labs reviewed    Pulmonary            Asthma        Neuro/Psych         Psychiatric history    Comments: Back pain, chronic (M54.9, G89.29)  Anxiety (F41.9)    PTSD (post-traumatic stress disorder) (F43.10)    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)    Anorexia (R63.0)     Cardiovascular    Hypertension              Exercise tolerance: >4 METS  Comments: Orthostatic hypotension (I95.1)   GI/Hepatic/Renal     GERD           Endo/Other        Blood dyscrasia and anemia     Other Findings   Comments: b-thal minor  IUP at 14 4/7  Hyperemesis (R11.10)             Physical Exam    Airway  Mallampati: II  TM Distance: 4 - 6 cm  Neck ROM: normal range of motion   Mouth opening: Normal     Cardiovascular  Regular rate and rhythm,  S1 and S2 normal,  no murmur, click, rub, or gallop  Rhythm: regular  Rate: normal         Dental    Dentition: Poor dentition     Pulmonary  Breath sounds clear to auscultation               Abdominal  GI exam deferred       Other Findings            Anesthetic Plan    ASA: 3  Anesthesia type: spinal            Anesthetic plan and risks discussed with: Patient

## 2021-01-07 NOTE — PROCEDURES
[address]  [address] [facility], [address] [place] [address]  [phone]           Endoscopic Gastroduodenoscopy Procedure Note    Crys Be  1985  [id number]    Indication: Intractable nausea, vomiting     : Rupa Bell MD    Staff: Endoscopy Technician-1: Dandre Bush  Endoscopy RN-1: Berlin Payton RN     Referring Provider: Kelli Quach NP      Anesthesia/Sedation:  MAC anesthesia      Procedure Details     After infomed consent was obtained for the procedure, with all risks and benefits of procedure explained the patient was taken to the endoscopy suite and placed in the left lateral decubitus position. Following sequential administration of sedation as per above, the endoscope was inserted into the mouth and advanced under direct vision to second portion of the duodenum. A careful inspection was made as the gastroscope was withdrawn, including a retroflexed view of the proximal stomach; findings and interventions are described below. Findings:   Esophagus:normal  Stomach: Small sliding hiatal hernia. Otherwise normal exam.   Duodenum/jejunum: normal    Therapies:  none    Specimens: * No specimens in log *           Complications:   None; patient tolerated the procedure well. EBL:  None. Impression:      -Small sliding hiatal hernia. Other than this normal exam.   -No findings to explain symptoms. Recommendations:  -Continue current management  -Follow symptoms and treat with combination scheduled anti-emetics. Consider augusto tablets, doxylamine/pyridoxine  -Check stool for h.pylori antigen and treat if positive. -Advance diet as tolerated. -Consider alternative form of nutrition - enteral or parenteral if does not tolerate diet.   -Please call us if any further questions or concerns arise.      Rupa Bell MD  1/7/2021  10:36 AM

## 2021-01-07 NOTE — OP NOTES
[facility] Cervical Cerclage Operative Report    Date of Surgery: 1/7/2021    Preoperative Diagnosis: CERCLAGE, INCOMPETENT CERVIX    Postoperative Diagnosis:Same    Surgeon(s):  Isela Guevara MD     Anesthesia: Spinal    Procedure: Procedure(s):  CERCLAGE    Indications:  Tila Beck is a [facility] female with a clinical history consistent with cervical incompetence. Cervical Cerclage was offered for treatment of cervical incompetence and the risks were explained in detail in the office and again in the hospital prior to surgery. These included risk of infection, bleeding, bladder and bowel damage and pregnancy loss, along with rupture of membranes now or later in pregnancy. These complications were all explained in detail and questions answered. It was explained to the patient that she had the option of expectant management without Cerclage placement. She understood that her care and treatment would not be affected by her choice and there was no pressure on her to choose this course of treatment. After a long discussion and clear understanding by the patient, the patient consented to the procedure while in the hospital for her intractable nausea and vomiting. .    Procedure Details:  Tila Beck was taken to the Operating Room where spinal anesthetic was administered. The patient was then placed in the dorsal lithotomy position. The vulva and distal vagina were then gently prepped with Betadine solution and draped in a sterile fashion to expose the vaginal introitus. A red rubber catheter was used to drain the urine from the bladder. The patient was then placed in Trendelenburg position to allow better visualization of the vaginal canal and the cervix. Using retractors, the cervix and distal vagina were visualized. The vaginal portion of the cervical length was 3 cm. The cervical os appeared to be closed. Fetal membranes were not visualized.   The cervix and distal vagina were again gently washed carefully with Betadine solution and dried with sterile sponges. A long ring forcep was used to retract the cervix by grasping a significant portio of the cervix, carefully placed to avoid membranes at the 10 o'clock position. Using #1 Prolene suture, the first bite of the Ramirez stitch was placed at the 12 o'clock position on the cervix at the junction of the vaginal mucosa and the portio of the cervix with the suture placed through the stroma to the level of the internal os. This procedure was repeated in four bites in a purse string fashion with sequential grasping and release of the cervix with the ring forcep to retract the cervical stroma and allow for placement of suture at the junction of the vaginal mucosa and the portio of the cervix with all bites going to the level of the internal os and including as much stroma as possible. Care was used in grasping the cervix to be atraumatic and to cause as little of trauma and bleeding as possible. The last stitch ended at approximately the 12 o'clock again and both sutures were gently retracted and a finger placed in the cervix and the suture was evaluated visually in all quadrants and felt to be at the junction of the vaginal mucosa and the portio of the cervix without including any sidewall and without including bladder or bowel. The suture was then tied with a surgeons knot. The cervix was checked again and visualized and the cervix remained pink with no significantly bleeding, internal os palpated and was tightly closed. Ten square knots were then tightly tied. The cervix was evaluated again and no bleeding was noted and tissue remained pink. Instruments and retractors were removed. At the end of the procedure, sponge, instrument and needle counts were noted to be correct.     Estimated Blood Loss:  less than 50 ml     Implants:   None  Specimen:  none    Findings:  Cervix is scarred from prior cerclages, but otherwise unremarkable

## 2021-01-07 NOTE — ANESTHESIA PREPROCEDURE EVALUATION
Relevant Problems   RESPIRATORY SYSTEM   (+) Asthma      NEUROLOGY   (+) Depression affecting pregnancy   (+) Eating disorder affecting pregnancy, antepartum   (+) MDD (major depressive disorder)   (+) MDD (major depressive disorder), recurrent episode, moderate (HCC)   (+) Post-traumatic stress disorder, acute      CARDIOVASCULAR   (+) Unspecified essential hypertension      GASTROINTESTINAL   (+) GERD (gastroesophageal reflux disease)      ENDOCRINE   (+) Severe obesity (HCC)      HEMATOLOGY   (+) Microcytic anemia       Anesthetic History     PONV          Review of Systems / Medical History  Patient summary reviewed, nursing notes reviewed and pertinent labs reviewed    Pulmonary            Asthma        Neuro/Psych         Psychiatric history     Cardiovascular    Hypertension                   GI/Hepatic/Renal     GERD           Endo/Other        Anemia     Other Findings   Comments: b-thal minor  IUP at 14 4/7           Physical Exam    Airway  Mallampati: II  TM Distance: > 6 cm  Neck ROM: normal range of motion   Mouth opening: Normal     Cardiovascular  Regular rate and rhythm,  S1 and S2 normal,  no murmur, click, rub, or gallop             Dental  No notable dental hx       Pulmonary  Breath sounds clear to auscultation               Abdominal  GI exam deferred       Other Findings            Anesthetic Plan    ASA: 2  Anesthesia type: MAC            Anesthetic plan and risks discussed with: Patient

## 2021-01-07 NOTE — PROGRESS NOTES
TRANSFER - OUT REPORT:    Verbal report given to Jovi Foster RN(name) on [facility]  being transferred to L&D(unit) for ordered procedure       Report consisted of patients Situation, Background, Assessment and   Recommendations(SBAR). Information from the following report(s) SBAR, Kardex, Intake/Output, MAR and Recent Results was reviewed with the receiving nurse. Lines:   PICC Double Lumen 01/60/15 Left;Basilic (Active)   Central Line Being Utilized Yes 01/07/21 0820   Criteria for Appropriate Use Limited/no vessel suitable for conventional peripheral access 01/07/21 0820   Site Assessment Clean, dry, & intact 01/07/21 0820   Phlebitis Assessment 0 01/07/21 0820   Infiltration Assessment 0 01/07/21 0820   Arm Circumference (cm) 30 cm 12/09/20 1732   Date of Last Dressing Change 01/05/21 01/07/21 0820   Dressing Status Clean, dry, & intact 01/07/21 0820   Action Taken Open ports on tubing capped 01/07/21 0820   External Catheter Length (cm) 1 centimeters 12/09/20 1732   Dressing Type Topical skin adhesive;Disk with Chlorhexadine gluconate (CHG) 01/07/21 0820   Hub Color/Line Status Infusing 01/07/21 0820   Positive Blood Return (Site #1) Yes 01/07/21 0820   Hub Color/Line Status Cap end changed; Purple;Flushed 01/07/21 0820   Positive Blood Return (Site #2) Yes 01/07/21 0820   Alcohol Cap Used Yes 01/07/21 0820       Peripheral IV 01/07/21 (Active)        Opportunity for questions and clarification was provided.       Patient transported with:   [employer]

## 2021-01-07 NOTE — PROGRESS NOTES
TRANSFER - OUT REPORT:    Verbal report given to MARIBELL DURAN RN (name) on Ophelia Goyal  being transferred to ENDO (unit) for ordered procedure       Report consisted of patients Situation, Background, Assessment and   Recommendations(SBAR). Information from the following report(s) SBAR, Kardex and Intake/Output was reviewed with the receiving nurse. Lines:   PICC Double Lumen 54/88/97 Left;Basilic (Active)   Central Line Being Utilized Yes 01/07/21 0326   Criteria for Appropriate Use Limited/no vessel suitable for conventional peripheral access 01/07/21 0326   Site Assessment Clean, dry, & intact 01/07/21 0326   Phlebitis Assessment 0 01/07/21 0326   Infiltration Assessment 0 01/07/21 0326   Arm Circumference (cm) 30 cm 12/09/20 1732   Date of Last Dressing Change 01/05/21 01/07/21 0326   Dressing Status Clean, dry, & intact 01/07/21 0326   Action Taken Open ports on tubing capped 01/07/21 0326   External Catheter Length (cm) 1 centimeters 12/09/20 1732   Dressing Type Disk with Chlorhexadine gluconate (CHG) 01/07/21 0326   Hub Color/Line Status Infusing;Red 01/07/21 0326   Positive Blood Return (Site #1) Yes 01/07/21 0326   Hub Color/Line Status Purple;Capped;Flushed 01/07/21 0326   Positive Blood Return (Site #2) Yes 01/07/21 0326   Alcohol Cap Used Yes 01/07/21 0326        Opportunity for questions and clarification was provided.       Patient transported with:   [facility]

## 2021-01-07 NOTE — PROGRESS NOTES
As noted by Dr. Minh Dominguez (her primary OB) the patient has cervical insufficiency, and the plan has been to place a cerclage in early January. Met with patient, questions answered.      A: 14 4/7 weeks  Cervical insufficiency by history  Persistent nausea/vomiting with underlying eating disorder and psych issues    P: To OR for prophylactic cerclage

## 2021-01-07 NOTE — PROGRESS NOTES
4[address] [facility] Lopez from [address]  checked fetal heart rate in recovery room. .

## 2021-01-07 NOTE — PROGRESS NOTES
High Risk Obstetrics Progress Note    Name: Jacinto Jorgensen MRN: [id number]  SSN: [id number]    YOB: 1985  Age: 38 y.o. Sex: female      Subjective:      LOS: 6 days    Estimated Date of Delivery: 21   Gestational Age Today: 14w4d     Patient admitted for nausea, vomiting and chronic eating disorder. States she does have nausea and vomiting, upper abdominal soreness and mild lower abdominal cramping. and does not have chest pain, shortness of breath and vaginal bleeding . Objective:     Vitals:  Blood pressure (!) 101/59, pulse (!) 59, temperature 98.1 °F (36.7 °C), resp. rate 16, height 5' 4\" (1.626 m), weight 72.4 kg (159 lb 9.6 oz), last menstrual period 2020, SpO2 98 %, not currently breastfeeding. Temp (24hrs), Av °F (36.7 °C), Min:97.8 °F (36.6 °C), Max:98.2 °F (92.0 °C)    Systolic (39IIP), KTN:597 , Min:101 , LGI:024      Diastolic (06YAZ), VDX:58, Min:59, Max:70       Intake and Output:         Physical Exam:  Patient without distress.   Heart: Regular rate and rhythm  Lung: clear to auscultation throughout lung fields, no wheezes, no rales, no rhonchi and normal respiratory effort  Abdomen: soft, nontender, gravid  Lower Extremities:  - Edema No       Membranes:  Intact          Labs:   Recent Results (from the past 36 hour(s))   PHOSPHORUS    Collection Time: 21  6:25 AM   Result Value Ref Range    Phosphorus 3.3 2.6 - 4.7 MG/DL   SARS-COV-2    Collection Time: 21  7:07 PM   Result Value Ref Range    Specimen source Nasopharyngeal      Specimen source Nasopharyngeal      COVID-19 rapid test Not detected NOTD      Specimen type NP Swab     CBC W/O DIFF    Collection Time: 21  2:24 AM   Result Value Ref Range    WBC 6.0 3.6 - 11.0 K/uL    RBC 3.40 (L) 3.80 - 5.20 M/uL    HGB 8.2 (L) 11.5 - 16.0 g/dL    HCT 26.2 (L) 35.0 - 47.0 %    MCV 77.1 (L) 80.0 - 99.0 FL    MCH 24.1 (L) 26.0 - 34.0 PG    MCHC 31.3 30.0 - 36.5 g/dL    RDW 17.1 (H) 11.5 - 14.5 % PLATELET 799 (L) 477 - 400 K/uL    MPV 11.6 8.9 - 12.9 FL    NRBC 0.0 0  WBC    ABSOLUTE NRBC 0.00 0.00 - 0.01 K/uL       Assessment and Plan: Active Problems:    Nausea and vomiting in pregnancy (12/2/2020)      Eating disorder affecting pregnancy, antepartum (12/2/2020)      Nausea/vomiting in pregnancy (1/2/2021)       37 y/o [id number] at 14 4/7 weeks by [facility] 7/4/2021 readmitted for intractable nausea/vomiting in pregnancy in light of chronic eating disorder     1.  N/V/Dehydration/weight loss since discharge from hospital             -Iv antiemetics and phenergan suppositories prn             -ivf's             -Goodie bag daily             -s/p Nutrition consult (pt saw her nutritionist Letty Dias on 12/29) for supplement orders and consider ENT to place  dobhoff vs TPN if needed-however, will not be covered by home health if she is taking anything orally. Pt 's open enrollment was 12/31 but she has put in a request to switch and will find out in about 14 days. Pt to work on looking into other insurance options that may provide better coverage and I will speak with current insurance myself this week.             -Pt has PICC line in place             -pt s/p GI consult during last admission - reconsulted; now s/p benign (sinus infection) MRI    -EGD at 11 am today             -pt s/p sugery consult last admission (known gallstones but no surgery necessary at that time)-however, may need to address, as pt having worsening upper abdominal pain now.-will try Carafate-change to slurry-s/p repeat consult with surgery      2.  Eating Disorder and Depression/Anxiety             -Thrombocytopenia resolved on 1/2 and stable 1/3             -recent h/o admission for suicidal ideation-stable currently             -Continue Prozac 60mg daily and Zyprexa 5mg daily             -Continue prn hydoxyzine and prazosin             -Pt followed closely by psychiatrist Usha Martinez (last appt           32/73)             -had appt with psychologist by telehealth at [address])             -nursing to work on only small amounts of food at a time during the day     3. CHTN-normal to low bp's off meds since weight loss     4. Anemia-know beta thalassemia minor and iron deficiency             anemia-s/p iv iron infusions during last admission             -oral iron bid     5. Hip pain/pelvic girdle weakness             -consult PT             -pelvic support belt     6.  H/o incompetent cervix             -s/p MFM appt 12/31- plan for cerclage on today at 4pm (1/7/2021)     7. DVT prophylaxis-MEGHANA hose and ambulation with assistance (pt is fall risk)     8. Daily FHTs.       9. Headache: offered tylenol AK prn     10. Brain MRI: chronic sinusitis, patient with minimal sx. Flonase.  ENT consult

## 2021-01-07 NOTE — PROGRESS NOTES
TRANSFER - IN REPORT:    Verbal report received from Telma Barnes RN(name) on Chavez Rausch  being received from [employer]) for routine progression of care      Report consisted of patients Situation, Background, Assessment and   Recommendations(SBAR). Information from the following report(s) SBAR, Kardex, Intake/Output, MAR and Recent Results was reviewed with the receiving nurse. Opportunity for questions and clarification was provided. Assessment completed upon patients arrival to unit and care assumed. 1415:  Pt received from [facility] via wheelchair for cerclage procedure. 1457:  MEGHANA hose applied and consent signed for Cerclage. 1515:  Spoke with pt regarding difficulty screening calls from her fiance. 1530:  NADIR Hebert, unit manager, in to see pt and encouraged pt to have fiance call her cellphone since it will be difficult not to identify our unit if he calls. 1605:  Up to BR to void. 1610:  Dr. Nahed Camarillo at bedside discussing plan of care with pt for cerclage. 1744:  Pt transfered to OR 1 ambulatory accompanied by REBECCA River RN.  0135:  Pt transfered to [facility] 12 via stretcher accompanied by REBECCA River RN and CRNA. 1940:  Bedside and Verbal shift change report given to NADIR Cee RN (oncoming nurse) by REBECCA River RN (offgoing nurse). Report included the following information SBAR, Kardex, Intake/Output, MAR and Recent Results.

## 2021-01-07 NOTE — PROGRESS NOTES
1450  - Transition of Care  (LESLIE) Plan:        RUR:  24 %       LOS: 7 days    GLOS: 2.5    Dx: Nausea and vomiting in pregnancy                   Notes:     -- Cerclage placement today. -- Dr. Juanita Walsh working with patients insurance, will know changes by Jan 14th (more resources to cover home IV ABX/or TPN). -- MAJOR social issues. Staff not allowed to speak with patients shivaancee. He doesn't know she's here for pregnancy related issues and patient does NOT give any permission for anyone to speak to him and update him on her status. -- Patient not medically ready for discharge. No potential discharge date      Disposition:   TBD  Transport:      TBD     CM will continue to follow and assist with LESLIE needs as they arise. Available on . Tammie PELAEZ, [address] [facility],  RN, [facility] - [phone].

## 2021-01-07 NOTE — ROUTINE PROCESS
Shannen Stager 1985 
[id number] Situation: 
Verbal report received from: [place] 
Procedure: Procedure(s): ESOPHAGOGASTRODUODENOSCOPY (EGD) Background: 
 
Preoperative diagnosis: Nausea and Vomiting Postoperative diagnosis: Hiatal hernia :  Dr. Mendel Arab Assistant(s): Endoscopy Technician-1: Sindy Mendiola Endoscopy RN-1: Tri Adler RN Specimens:no H. Pylori no Assessment: 
Intra-procedure medications Anesthesia gave intra-procedure sedation and medications, see anesthesia flow sheet Intravenous fluids: NS@ [facility] Vital signs stable Abdominal assessment: round and soft Recommendation: 
Discharge patient per MD order Return to floor room 321

## 2021-01-08 PROBLEM — E43 SEVERE PROTEIN-CALORIE MALNUTRITION (HCC): Status: ACTIVE | Noted: 2021-01-08

## 2021-01-08 PROCEDURE — 74011000250 HC RX REV CODE- 250: Performed by: OBSTETRICS & GYNECOLOGY

## 2021-01-08 PROCEDURE — 99231 SBSQ HOSP IP/OBS SF/LOW 25: CPT | Performed by: SURGERY

## 2021-01-08 PROCEDURE — 65410000002 HC RM PRIVATE OB

## 2021-01-08 PROCEDURE — C9113 INJ PANTOPRAZOLE SODIUM, VIA: HCPCS | Performed by: PHYSICIAN ASSISTANT

## 2021-01-08 PROCEDURE — 74011000258 HC RX REV CODE- 258: Performed by: OBSTETRICS & GYNECOLOGY

## 2021-01-08 PROCEDURE — 74011250637 HC RX REV CODE- 250/637: Performed by: OBSTETRICS & GYNECOLOGY

## 2021-01-08 PROCEDURE — 74011250636 HC RX REV CODE- 250/636: Performed by: OBSTETRICS & GYNECOLOGY

## 2021-01-08 PROCEDURE — 74011250636 HC RX REV CODE- 250/636: Performed by: PHYSICIAN ASSISTANT

## 2021-01-08 PROCEDURE — 74011000250 HC RX REV CODE- 250: Performed by: PHYSICIAN ASSISTANT

## 2021-01-08 RX ADMIN — SUCRALFATE 1 G: 1 TABLET ORAL at 16:13

## 2021-01-08 RX ADMIN — FOLIC ACID: 5 INJECTION, SOLUTION INTRAMUSCULAR; INTRAVENOUS; SUBCUTANEOUS at 19:06

## 2021-01-08 RX ADMIN — ONDANSETRON HYDROCHLORIDE 8 MG: 2 INJECTION, SOLUTION INTRAMUSCULAR; INTRAVENOUS at 21:21

## 2021-01-08 RX ADMIN — ONDANSETRON HYDROCHLORIDE 8 MG: 2 INJECTION, SOLUTION INTRAMUSCULAR; INTRAVENOUS at 12:58

## 2021-01-08 RX ADMIN — Medication 10 ML: at 06:13

## 2021-01-08 RX ADMIN — SUCRALFATE 1 G: 1 TABLET ORAL at 08:50

## 2021-01-08 RX ADMIN — SODIUM CHLORIDE 40 MG: 9 INJECTION INTRAMUSCULAR; INTRAVENOUS; SUBCUTANEOUS at 08:51

## 2021-01-08 RX ADMIN — FERROUS SULFATE TAB 325 MG (65 MG ELEMENTAL FE) 325 MG: 325 (65 FE) TAB at 08:51

## 2021-01-08 RX ADMIN — FERROUS SULFATE TAB 325 MG (65 MG ELEMENTAL FE) 325 MG: 325 (65 FE) TAB at 16:13

## 2021-01-08 RX ADMIN — PROMETHAZINE HYDROCHLORIDE 25 MG: 25 SUPPOSITORY RECTAL at 19:55

## 2021-01-08 RX ADMIN — DOCUSATE SODIUM 100 MG: 100 CAPSULE ORAL at 08:51

## 2021-01-08 RX ADMIN — FLUTICASONE PROPIONATE 2 SPRAY: 50 SPRAY, METERED NASAL at 09:00

## 2021-01-08 RX ADMIN — OLANZAPINE 5 MG: 5 TABLET, FILM COATED ORAL at 21:21

## 2021-01-08 RX ADMIN — ONDANSETRON HYDROCHLORIDE 8 MG: 2 INJECTION, SOLUTION INTRAMUSCULAR; INTRAVENOUS at 06:12

## 2021-01-08 RX ADMIN — SODIUM CHLORIDE, POTASSIUM CHLORIDE, SODIUM LACTATE AND CALCIUM CHLORIDE 150 ML/HR: 600; 310; 30; 20 INJECTION, SOLUTION INTRAVENOUS at 15:22

## 2021-01-08 RX ADMIN — SUCRALFATE 1 G: 1 TABLET ORAL at 21:21

## 2021-01-08 RX ADMIN — FLUOXETINE 60 MG: 20 CAPSULE ORAL at 08:51

## 2021-01-08 RX ADMIN — Medication 10 ML: at 21:23

## 2021-01-08 NOTE — PROGRESS NOTES
Bedside shift change report given to Sal Cabral (oncoming nurse) by Jerald Rhoades (L&D nurse) (offgoing nurse). Report included the following information SBAR, Kardex, Intake/Output and MAR.

## 2021-01-08 NOTE — PROGRESS NOTES
Spoke with pt again this afternoon. I have tried to reach insurance to discuss coverage for home health with enteral feeds or TPN, but I am unable to reach anyone to discuss. I spoke with case management, and I am recommending that pt start on TPN as she is now in her second trimester, she is losing weight in the hospital despite antiemetic regimen. Pt was unable to tolerate placement of dobhoff last admission. Her workup for other causes of intractable nausea and vomiting are essentially negative. Doxylamine/pyridoxine recommended by GI is not available in this hospital. We discussed risks/benefits of TPN in pregnancy and pt is in agreement to start. Will draw labs and consult nutrition for this order/management.

## 2021-01-08 NOTE — ANESTHESIA POSTPROCEDURE EVALUATION
Procedure(s):  CERCLAGE.    spinal    Anesthesia Post Evaluation        Patient location during evaluation: bedside  Patient participation: complete - patient participated  Level of consciousness: awake and alert  Pain management: adequate  Airway patency: patent  Anesthetic complications: no  Cardiovascular status: acceptable  Respiratory status: acceptable  Hydration status: acceptable  Comments: Seen, no complaints   Post anesthesia nausea and vomiting:  none  Final Post Anesthesia Temperature Assessment:  Normothermia (36.0-37.5 degrees C)      INITIAL Post-op Vital signs:   Vitals Value Taken Time   /70 01/08/21 0416   Temp 36.9 °C (98.4 °F) 01/08/21 0416   Pulse 64 01/08/21 0416   Resp 16 01/08/21 0416   SpO2 99 % 01/08/21 0416

## 2021-01-08 NOTE — PROGRESS NOTES
0740 Bedside shift change report given to Natalya Ramos RN (oncoming nurse) by Sy Batres RN (offgoing nurse). Report included the following information SBAR, Intake/Output and MAR.     0900 Patient sitting up to eat breakfast.      1000 Patient resting. 1320 Bedside shift change report given to Maricela Baumgarten RN (oncoming nurse) by Natalya Ramos RN (offgoing nurse). Report included the following information SBAR, Intake/Output and MAR.

## 2021-01-08 NOTE — PROGRESS NOTES
Comprehensive Nutrition Assessment    Type and Reason for Visit: Reassess, Consult    Nutrition Recommendations/Plan:   1. Start TPN tonight, pending electrolytes adjust over the weekend with:    Day 2 (1/9): 5%AA, D15 @ 42ml/hr    Day 3 (1/10): 5%AA, D15 @ 63ml/hr    Day 4 (1/11): 5%AA, D20 @ 83ml/hr + 500ml, 20% lipids 3x/week    2. Discontinue MVI goody bag when TPN hung tonight at 7pm. Also consider reduction in LR IVF. 3. Follow electrolytes trends with IV repletion vs adjustment of TPN doses    Nutrition Assessment:   Admit IUP 13 wks 6 days with intractable N/V, wt loss and dehydration. PMHx: depression, eating disorder. Continued inadequate oral intake likely multifactorial with hyperemesis, underlying eating disorder, and newly identified gallstones. No plans for gallbladder intervention at this time per surgery notes. EGD completed yesterday with just small hiatal hernia found. Plans to start TPN since oral intake remain inadequate. Spoke with MD via phone. Plans to involve hospitalist's to help with TPN ordering. Will start TPN tonight with 5%AA, D15 @ 42ml/hr since at risk for refeeding. See advancement recommendations above. Goal rate will provide: 2188kcal, 100g protein, 400g CHO (GIR = 3.89mg/kg/min), 2000-2500ml fluid. Admit wt 157# 12/31/20. Pre-gravid wt 68.7 kg (151#) 10/14/20. Although wt gain/loss fluctuations, net gain 6#. Suggested wt gain first trimester ~4#. Wt today also reflects ~4# (2%) loss x past two weeks. Severe wt loss of 12% x <6 months. Seen by outpatient RD JACKIE for current issues. Previous attempts at outpatient [facility] for eating disorders.      Malnutrition Assessment:  Malnutrition Status:  Severe malnutrition    Context:  Chronic illness     Findings of the 6 clinical characteristics of malnutrition:   Energy Intake:  7 - 75% or less est energy requirements for 1 month or longer  Weight Loss:  7.00 - Greater than 10% over 6 months Estimated Nutrition Needs:   Energy: 2291(0818-0446 kcal/day))  Wt used:    Protein: 86-100g (1.2-1.4g/kg)  Wt used: Current(72kg)   Fluid: 2500 mL(~35 mL/kg)     Nutrition-related medications: colace, prozac, zyrexa, zofran, protonix, carafate, LR @ 150ml/hr     Nutrition Related Findings:   BM: 1/6  Wounds:  None       Current Nutrition Therapies:  DIET REGULAR  SUPPLEMENTS Breakfast, Dinner; Ensure [employer] + Boost pudding Lunch. Meal intake:   Patient Vitals for the past 168 hrs:   % Diet Eaten   01/06/21 0855 50 %   01/05/21 0826 25 %   01/04/21 1407 25 %   01/03/21 1426 75 %   01/03/21 0845 90 %   01/01/21 1648 25 %     Anthropometric Measures:  · Height:  5' 4\" (162.6 cm)  · Current Body Wt:  71.2 kg (157 lb)(IUP 13 3/7wks)   · Admission Body Wt:  157 lb(IUP 13 3/7 wks)    · Usual Body Wt:  103.4 kg (228 lb)(>/= yr ago: 225# (5/2/20) & 229# (2/18/20))     · Ideal Body Wt:  120 lbs:  130.8 %   · Adjusted Body Weight:  N/A  · BMI Category: Pre-gravid BMI 26 (10/4/20) overweight. BMI now N/A w/pregnancy.      Wt Readings from Last 10 Encounters:   01/08/21 71.4 kg (157 lb 6.4 oz)   12/28/20 70.2 kg (154 lb 12.8 oz)   12/16/20 72.9 kg (160 lb 12.8 oz)   12/06/20 67.6 kg (149 lb)   11/18/20 65 kg (143 lb 3.2 oz)   10/14/20 68.6 kg (151 lb 3.2 oz)   08/05/20 73.4 kg (161 lb 12.8 oz)   07/29/20 75.1 kg (165 lb 9.6 oz)   07/15/20 81.3 kg (179 lb 3.2 oz)   05/02/20 102.1 kg (225 lb)         Nutrition Diagnosis:   · Inadequate protein-energy intake related to psychological cause or life stress, altered GI function as evidenced by intake 0-25%, nausea, vomiting, nutrition support-parenteral nutrition(eating disorder hyperemesis)    · Increased nutrient needs related to increased demand for energy/nutrients as evidenced by (pregnancy)      Nutrition Interventions:   Food and/or Nutrient Delivery: Continue current diet, Start parenteral nutrition  Nutrition Education and Counseling: No recommendations at this time  Coordination of Nutrition Care: Continue to monitor while inpatient, Interdisciplinary rounds    Goals:  PN meeting at least 90% needs in 4-5 days; wt maintenance/gain       Nutrition Monitoring and Evaluation:   Behavioral-Environmental Outcomes: Beliefs and attitudes  Food/Nutrient Intake Outcomes: Food and nutrient intake, Supplement intake, Parenteral nutrition intake/tolerance, Vitamin/mineral intake, IVF intake  Physical Signs/Symptoms Outcomes: Biochemical data, Weight, Nausea/vomiting    Discharge Planning:    Continue oral nutrition supplement; Continue outpatient nutrition counseling.      Jackeline Valenzuela, RD  [address] , Pager #[phone] or via [employer]

## 2021-01-08 NOTE — PROGRESS NOTES
Patient has been off the floor for most of the day. EGD noted. GI has recommended an antiemetic regimen. Recommend seeing how this works prior to offering operative intervention.

## 2021-01-08 NOTE — PROGRESS NOTES
Surgery Progress Note    1/8/2021    Admit Date: 12/30/2020    CC: Nausea    Subjective:     No emesis overnight. Still with epigastric discomfort    Constitutional: No fever or chills  Neurologic: No headache  Eyes: No scleral icterus or irritated eyes  Nose: No nasal pain or drainage  Mouth: No oral lesions or sore throat  Cardiac: No palpations or chest pain  Pulmonary: No cough or shortness or breath  Gastrointestinal: Nausea  Genitourinary: No dysuria  Musculoskeletal: No muscle or joint tenderness  Skin: No rashes or lesions  Psychiatric: No anxiety or depressed mood    Objective:     Visit Vitals  /70 (BP 1 Location: Right arm, BP Patient Position: At rest)   Pulse 64   Temp 98.4 °F (36.9 °C)   Resp 16   Ht 5' 4\" (1.626 m)   Wt 157 lb 6.4 oz (71.4 kg)   SpO2 99%   BMI 27.02 kg/m²       General: No acute distress, conversant  Eyes: PERRLA, no scleral icterus  HENT: Normocephalic without oral lesions  Neck: Trachea midline without LAD  Cardiac: Normal pulse rate and rhythm  Pulmonary: Symmetric chest rise with normal effort  GI: Soft, mild TTP in epigastrium  Skin: Warm without rash  Extremities: No edema or joint stiffness  Psych: Appropriate mood and affect    Labs, vital signs, and I/O reviewed. Assessment:     39 y/o F with gallstones and strong history of hyperemesis gravidum    Plan:     Primary management per GI and primary  Will follow along. No plans for gallbladder intervention at this time.     Mariia Ho MD  Bariatric and General Surgeon  [facility]

## 2021-01-08 NOTE — PROGRESS NOTES
2200 - No blood seen on pad. Attempted to use the restroom, patient states she didn't feel like she had to urinate. Patient still slightly unstable on legs, nurse aware on Women's unit.

## 2021-01-08 NOTE — PROGRESS NOTES
1200 - Transition of Care  (LESLIE) Plan:        RUR:  28 %       LOS: 8 days    GLOS: 2.5     Dx: Nausea and vomiting in pregnancy                    Notes:      -- This CM spoke with [employer], verified patient is covered at 100% for TPN and/or ABX at home. Update to Dr. Conrado Drake via [employer].       -- MAJOR social issues. Staff not allowed to speak with patients khushboo. He doesn't know she's here for pregnancy related issues and patient does NOT give any permission for anyone to speak to him and update him on her status.     -- Patient not medically ready for discharge. No potential discharge date       Disposition:   TBD  Transport:      TBD      CM will continue to follow and assist with LESLIE needs as they arise. Available on [employer].  [address] [facility]  MSN, [address] [facility],  RN, [facility] - [phone]. 1400 - [facility] Alert: Referral Received response from [facility] re: Referral [id number] for patient in [id number]: Referral Received Patients [place] Medicaid is covering 100%

## 2021-01-09 LAB
ALBUMIN SERPL-MCNC: 2.5 G/DL (ref 3.5–5)
ALBUMIN/GLOB SERPL: 0.7 {RATIO} (ref 1.1–2.2)
ALP SERPL-CCNC: 43 U/L (ref 45–117)
ALT SERPL-CCNC: 9 U/L (ref 12–78)
ANION GAP SERPL CALC-SCNC: 3 MMOL/L (ref 5–15)
AST SERPL-CCNC: 7 U/L (ref 15–37)
BILIRUB SERPL-MCNC: 0.2 MG/DL (ref 0.2–1)
BUN SERPL-MCNC: 7 MG/DL (ref 6–20)
BUN/CREAT SERPL: 16 (ref 12–20)
CALCIUM SERPL-MCNC: 8 MG/DL (ref 8.5–10.1)
CHLORIDE SERPL-SCNC: 109 MMOL/L (ref 97–108)
CO2 SERPL-SCNC: 25 MMOL/L (ref 21–32)
CREAT SERPL-MCNC: 0.44 MG/DL (ref 0.55–1.02)
GLOBULIN SER CALC-MCNC: 3.5 G/DL (ref 2–4)
GLUCOSE SERPL-MCNC: 67 MG/DL (ref 65–100)
MAGNESIUM SERPL-MCNC: 1.8 MG/DL (ref 1.6–2.4)
PHOSPHATE SERPL-MCNC: 3.4 MG/DL (ref 2.6–4.7)
POTASSIUM SERPL-SCNC: 3.5 MMOL/L (ref 3.5–5.1)
PROT SERPL-MCNC: 6 G/DL (ref 6.4–8.2)
SODIUM SERPL-SCNC: 137 MMOL/L (ref 136–145)

## 2021-01-09 PROCEDURE — 74011250637 HC RX REV CODE- 250/637: Performed by: OBSTETRICS & GYNECOLOGY

## 2021-01-09 PROCEDURE — 74011000258 HC RX REV CODE- 258: Performed by: OBSTETRICS & GYNECOLOGY

## 2021-01-09 PROCEDURE — 74011000250 HC RX REV CODE- 250: Performed by: PHYSICIAN ASSISTANT

## 2021-01-09 PROCEDURE — 80053 COMPREHEN METABOLIC PANEL: CPT

## 2021-01-09 PROCEDURE — 74011250636 HC RX REV CODE- 250/636: Performed by: PHYSICIAN ASSISTANT

## 2021-01-09 PROCEDURE — 74011000250 HC RX REV CODE- 250: Performed by: OBSTETRICS & GYNECOLOGY

## 2021-01-09 PROCEDURE — 36415 COLL VENOUS BLD VENIPUNCTURE: CPT

## 2021-01-09 PROCEDURE — 3E0436Z INTRODUCTION OF NUTRITIONAL SUBSTANCE INTO CENTRAL VEIN, PERCUTANEOUS APPROACH: ICD-10-PCS | Performed by: OBSTETRICS & GYNECOLOGY

## 2021-01-09 PROCEDURE — C9113 INJ PANTOPRAZOLE SODIUM, VIA: HCPCS | Performed by: PHYSICIAN ASSISTANT

## 2021-01-09 PROCEDURE — 74011250636 HC RX REV CODE- 250/636: Performed by: OBSTETRICS & GYNECOLOGY

## 2021-01-09 PROCEDURE — 65410000002 HC RM PRIVATE OB

## 2021-01-09 PROCEDURE — 83735 ASSAY OF MAGNESIUM: CPT

## 2021-01-09 PROCEDURE — 84100 ASSAY OF PHOSPHORUS: CPT

## 2021-01-09 RX ADMIN — ONDANSETRON HYDROCHLORIDE 8 MG: 2 INJECTION, SOLUTION INTRAMUSCULAR; INTRAVENOUS at 21:22

## 2021-01-09 RX ADMIN — SUCRALFATE 1 G: 1 TABLET ORAL at 21:22

## 2021-01-09 RX ADMIN — OLANZAPINE 5 MG: 5 TABLET, FILM COATED ORAL at 21:22

## 2021-01-09 RX ADMIN — SODIUM CHLORIDE 40 MG: 9 INJECTION INTRAMUSCULAR; INTRAVENOUS; SUBCUTANEOUS at 08:45

## 2021-01-09 RX ADMIN — FERROUS SULFATE TAB 325 MG (65 MG ELEMENTAL FE) 325 MG: 325 (65 FE) TAB at 17:12

## 2021-01-09 RX ADMIN — FLUOXETINE 60 MG: 20 CAPSULE ORAL at 08:45

## 2021-01-09 RX ADMIN — SUCRALFATE 1 G: 1 TABLET ORAL at 06:33

## 2021-01-09 RX ADMIN — PROMETHAZINE HYDROCHLORIDE 25 MG: 25 SUPPOSITORY RECTAL at 12:56

## 2021-01-09 RX ADMIN — ONDANSETRON HYDROCHLORIDE 8 MG: 2 INJECTION, SOLUTION INTRAMUSCULAR; INTRAVENOUS at 06:33

## 2021-01-09 RX ADMIN — SODIUM CHLORIDE, POTASSIUM CHLORIDE, SODIUM LACTATE AND CALCIUM CHLORIDE 75 ML/HR: 600; 310; 30; 20 INJECTION, SOLUTION INTRAVENOUS at 18:49

## 2021-01-09 RX ADMIN — ONDANSETRON HYDROCHLORIDE 8 MG: 2 INJECTION, SOLUTION INTRAMUSCULAR; INTRAVENOUS at 14:38

## 2021-01-09 RX ADMIN — FLUTICASONE PROPIONATE 2 SPRAY: 50 SPRAY, METERED NASAL at 08:47

## 2021-01-09 RX ADMIN — DOCUSATE SODIUM 100 MG: 100 CAPSULE ORAL at 08:45

## 2021-01-09 RX ADMIN — SUCRALFATE 1 G: 1 TABLET ORAL at 17:12

## 2021-01-09 RX ADMIN — FERROUS SULFATE TAB 325 MG (65 MG ELEMENTAL FE) 325 MG: 325 (65 FE) TAB at 08:45

## 2021-01-09 RX ADMIN — SUCRALFATE 1 G: 1 TABLET ORAL at 12:56

## 2021-01-09 RX ADMIN — Medication 10 ML: at 21:24

## 2021-01-09 RX ADMIN — THIAMINE HYDROCHLORIDE: 100 INJECTION, SOLUTION INTRAMUSCULAR; INTRAVENOUS at 18:45

## 2021-01-09 RX ADMIN — SODIUM CHLORIDE, POTASSIUM CHLORIDE, SODIUM LACTATE AND CALCIUM CHLORIDE 150 ML/HR: 600; 310; 30; 20 INJECTION, SOLUTION INTRAVENOUS at 10:04

## 2021-01-09 NOTE — PROGRESS NOTES
High Risk Obstetrics Progress Note    Name: Tila Beck MRN: [id number]  SSN: [id number]    YOB: 1985  Age: 38 y.o. Sex: female      Subjective:      LOS: 8 days    Estimated Date of Delivery: 21   Gestational Age Today: 17w8d     Patient admitted for hyperemesis gravidarum and eating disorder. States she does not have abdominal pain  , fever and vaginal bleeding . Objective:     Vitals:  Blood pressure (!) 103/59, pulse 67, temperature 97.9 °F (36.6 °C), resp. rate 16, height 5' 4\" (1.626 m), weight 71.4 kg (157 lb 6.4 oz), last menstrual period 2020, SpO2 99 %, not currently breastfeeding. Temp (24hrs), Av °F (36.7 °C), Min:97.9 °F (36.6 °C), Max:98.2 °F (83.2 °C)    Systolic (91IDE), XZH:873 , Min:98 , OCY:161      Diastolic (35ZUA), IQA:78, Min:58, Max:73       Intake and Output:         Physical Exam:  Abdomen: soft, nontender, nondistended  Fundus: soft and non tender  Lower Extremities:  - Edema No       Membranes:  Intact         Fetal Heart Rate: +      Labs: No results found for this or any previous visit (from the past 36 hour(s)). Assessment and Plan:      Principal Problem:    Nausea and vomiting in pregnancy (2020)    Active Problems:    Eating disorder affecting pregnancy, antepartum (2020)      Nausea/vomiting in pregnancy (2021)      Severe protein-calorie malnutrition ([address] (2021)         37 y/o [id number] at 14 6/7 weeks by [facility] 2021 readmitted for intractable nausea/vomiting in pregnancy in light of chronic eating disorder     1.  N/V/Dehydration/weight loss since discharge from hospital             -Iv antiemetics and phenergan suppositories prn             -ivf's             -Goodie bag daily             -s/p Nutrition consult (pt saw her nutritionist Ricky Stout on ) for supplement orders. Now on TPN and tolerating well.    AM labs pending.           -Pt has PICC line in place             -pt s/p GI consult and normal EGD 1/7             -HPylori stool culture pending             -AI s/p sugery consult -still holding off on cholecystectomy for gallstones for now   2. Eating Disorder and Depression/Anxiety             -Thrombocytopenia resolved on 1/2 and stable 1/3             -recent h/o admission for suicidal ideation-stable currently             -Continue Prozac 60mg daily and Zyprexa 5mg daily             -Continue prn hydoxyzine and prazosin             -Pt followed closely by psychiatrist Usha Martinez (last appt           03/16)             -had appt with psychologist by telehealth at [address] [place] [address] St)             -nursing to work on only small amounts of food at a time during the day     3. CHTN-normal to low bp's off meds since weight loss     4. Anemia-know beta thalassemia minor and iron deficiency             anemia-s/p iv iron infusions during last admission             -oral iron bid     5. Hip pain/pelvic girdle weakness             -consult PT             -pelvic support belt     6.  H/o incompetent cervix             -s/p cerclage placement 1/7/2021     7. DVT prophylaxis-MEGHANA hose and ambulation with assistance (pt is fall risk)     8. Daily FHTs.       9.  Headache: offered tylenol MA prn

## 2021-01-09 NOTE — PROGRESS NOTES
Bedside shift change report given to Bari Ulloa RN (oncoming nurse) by Winsome quiroga RN (offgoing nurse). Report included the following information SBAR, Intake/Output and MAR.     0850 FHT doppler 128    1255 Patient called out and requesting medication for nausea. Suppository Phenergan given to patient. [address] [facility] [address] Venice Carver about Recent lab work and updating TPN order. Verbal orders received to give TPN the same as was given yesterday.

## 2021-01-09 NOTE — PROGRESS NOTES
Bedside and Verbal shift change report given to ALBA Mcfarlane (oncoming nurse) by Deon Avila RN (offgoing nurse). Report included the following information SBAR, Kardex, ED Summary, Intake/Output, MAR, Accordion and Recent Results.

## 2021-01-10 LAB
ALBUMIN SERPL-MCNC: 2.5 G/DL (ref 3.5–5)
ALBUMIN/GLOB SERPL: 0.7 {RATIO} (ref 1.1–2.2)
ALP SERPL-CCNC: 42 U/L (ref 45–117)
ALT SERPL-CCNC: 10 U/L (ref 12–78)
ANION GAP SERPL CALC-SCNC: 6 MMOL/L (ref 5–15)
AST SERPL-CCNC: 7 U/L (ref 15–37)
BILIRUB SERPL-MCNC: 0.2 MG/DL (ref 0.2–1)
BUN SERPL-MCNC: 6 MG/DL (ref 6–20)
BUN/CREAT SERPL: 13 (ref 12–20)
CALCIUM SERPL-MCNC: 7.9 MG/DL (ref 8.5–10.1)
CHLORIDE SERPL-SCNC: 108 MMOL/L (ref 97–108)
CO2 SERPL-SCNC: 25 MMOL/L (ref 21–32)
CREAT SERPL-MCNC: 0.48 MG/DL (ref 0.55–1.02)
GLOBULIN SER CALC-MCNC: 3.6 G/DL (ref 2–4)
GLUCOSE SERPL-MCNC: 68 MG/DL (ref 65–100)
MAGNESIUM SERPL-MCNC: 1.7 MG/DL (ref 1.6–2.4)
PHOSPHATE SERPL-MCNC: 4 MG/DL (ref 2.6–4.7)
POTASSIUM SERPL-SCNC: 3.6 MMOL/L (ref 3.5–5.1)
PROT SERPL-MCNC: 6.1 G/DL (ref 6.4–8.2)
SODIUM SERPL-SCNC: 139 MMOL/L (ref 136–145)

## 2021-01-10 PROCEDURE — 84100 ASSAY OF PHOSPHORUS: CPT

## 2021-01-10 PROCEDURE — 74011250636 HC RX REV CODE- 250/636: Performed by: PHYSICIAN ASSISTANT

## 2021-01-10 PROCEDURE — 83735 ASSAY OF MAGNESIUM: CPT

## 2021-01-10 PROCEDURE — C9113 INJ PANTOPRAZOLE SODIUM, VIA: HCPCS | Performed by: PHYSICIAN ASSISTANT

## 2021-01-10 PROCEDURE — 36415 COLL VENOUS BLD VENIPUNCTURE: CPT

## 2021-01-10 PROCEDURE — 65410000002 HC RM PRIVATE OB

## 2021-01-10 PROCEDURE — 74011250637 HC RX REV CODE- 250/637: Performed by: OBSTETRICS & GYNECOLOGY

## 2021-01-10 PROCEDURE — 74011000258 HC RX REV CODE- 258: Performed by: OBSTETRICS & GYNECOLOGY

## 2021-01-10 PROCEDURE — 74011000250 HC RX REV CODE- 250: Performed by: PHYSICIAN ASSISTANT

## 2021-01-10 PROCEDURE — 74011250636 HC RX REV CODE- 250/636: Performed by: OBSTETRICS & GYNECOLOGY

## 2021-01-10 PROCEDURE — 80053 COMPREHEN METABOLIC PANEL: CPT

## 2021-01-10 PROCEDURE — 74011000250 HC RX REV CODE- 250: Performed by: OBSTETRICS & GYNECOLOGY

## 2021-01-10 RX ADMIN — FERROUS SULFATE TAB 325 MG (65 MG ELEMENTAL FE) 325 MG: 325 (65 FE) TAB at 08:31

## 2021-01-10 RX ADMIN — FLUOXETINE 60 MG: 20 CAPSULE ORAL at 08:31

## 2021-01-10 RX ADMIN — Medication 10 ML: at 14:00

## 2021-01-10 RX ADMIN — SODIUM CHLORIDE, POTASSIUM CHLORIDE, SODIUM LACTATE AND CALCIUM CHLORIDE 75 ML/HR: 600; 310; 30; 20 INJECTION, SOLUTION INTRAVENOUS at 21:43

## 2021-01-10 RX ADMIN — Medication 10 ML: at 06:33

## 2021-01-10 RX ADMIN — ONDANSETRON HYDROCHLORIDE 8 MG: 2 INJECTION, SOLUTION INTRAMUSCULAR; INTRAVENOUS at 14:31

## 2021-01-10 RX ADMIN — PROMETHAZINE HYDROCHLORIDE 25 MG: 25 SUPPOSITORY RECTAL at 12:17

## 2021-01-10 RX ADMIN — SUCRALFATE 1 G: 1 TABLET ORAL at 21:36

## 2021-01-10 RX ADMIN — FERROUS SULFATE TAB 325 MG (65 MG ELEMENTAL FE) 325 MG: 325 (65 FE) TAB at 17:53

## 2021-01-10 RX ADMIN — DOCUSATE SODIUM 100 MG: 100 CAPSULE ORAL at 08:31

## 2021-01-10 RX ADMIN — ONDANSETRON HYDROCHLORIDE 8 MG: 2 INJECTION, SOLUTION INTRAMUSCULAR; INTRAVENOUS at 21:36

## 2021-01-10 RX ADMIN — FLUTICASONE PROPIONATE 2 SPRAY: 50 SPRAY, METERED NASAL at 08:32

## 2021-01-10 RX ADMIN — OLANZAPINE 5 MG: 5 TABLET, FILM COATED ORAL at 21:36

## 2021-01-10 RX ADMIN — THIAMINE HYDROCHLORIDE: 100 INJECTION, SOLUTION INTRAMUSCULAR; INTRAVENOUS at 19:01

## 2021-01-10 RX ADMIN — SUCRALFATE 1 G: 1 TABLET ORAL at 17:53

## 2021-01-10 RX ADMIN — SUCRALFATE 1 G: 1 TABLET ORAL at 06:33

## 2021-01-10 RX ADMIN — ONDANSETRON HYDROCHLORIDE 8 MG: 2 INJECTION, SOLUTION INTRAMUSCULAR; INTRAVENOUS at 06:33

## 2021-01-10 RX ADMIN — SODIUM CHLORIDE 40 MG: 9 INJECTION INTRAMUSCULAR; INTRAVENOUS; SUBCUTANEOUS at 08:32

## 2021-01-10 RX ADMIN — SUCRALFATE 1 G: 1 TABLET ORAL at 11:45

## 2021-01-10 RX ADMIN — Medication 10 ML: at 21:38

## 2021-01-10 NOTE — PROGRESS NOTES
High Risk Obstetrics Progress Note    Name: Verona Brunner MRN: [id number]  SSN: [id number]    YOB: 1985  Age: 38 y.o. Sex: female      Subjective:      LOS: 9 days    Estimated Date of Delivery: 21   Gestational Age Today: 17w0d     Patient admitted for hyperemesis gravidarum and eating disorder. . States she is feeling better this am..    Objective:     Vitals:  Blood pressure (!) 93/56, pulse 61, temperature 98.2 °F (36.8 °C), resp. rate 16, height 5' 4\" (1.626 m), weight 72 kg (158 lb 12.8 oz), last menstrual period 2020, SpO2 99 %, not currently breastfeeding. Temp (24hrs), Av.9 °F (36.6 °C), Min:97.8 °F (36.6 °C), Max:98.2 °F (60.5 °C)    Systolic (22IOU), TAE:65 , Min:88 , MIW:981      Diastolic (25XHX), DMZ:89, Min:44, Max:56     Recent Results (from the past 12 hour(s))   METABOLIC PANEL, COMPREHENSIVE    Collection Time: 01/10/21  6:42 AM   Result Value Ref Range    Sodium 139 136 - 145 mmol/L    Potassium 3.6 3.5 - 5.1 mmol/L    Chloride 108 97 - 108 mmol/L    CO2 25 21 - 32 mmol/L    Anion gap 6 5 - 15 mmol/L    Glucose 68 65 - 100 mg/dL    BUN 6 6 - 20 MG/DL    Creatinine 0.48 (L) 0.55 - 1.02 MG/DL    BUN/Creatinine ratio 13 12 - 20      GFR est AA >60 >60 ml/min/1.73m2    GFR est non-AA >60 >60 ml/min/1.73m2    Calcium 7.9 (L) 8.5 - 10.1 MG/DL    Bilirubin, total 0.2 0.2 - 1.0 MG/DL    ALT (SGPT) 10 (L) 12 - 78 U/L    AST (SGOT) 7 (L) 15 - 37 U/L    Alk. phosphatase 42 (L) 45 - 117 U/L    Protein, total 6.1 (L) 6.4 - 8.2 g/dL    Albumin 2.5 (L) 3.5 - 5.0 g/dL    Globulin 3.6 2.0 - 4.0 g/dL    A-G Ratio 0.7 (L) 1.1 - 2.2     MAGNESIUM    Collection Time: 01/10/21  6:42 AM   Result Value Ref Range    Magnesium 1.7 1.6 - 2.4 mg/dL   PHOSPHORUS    Collection Time: 01/10/21  6:42 AM   Result Value Ref Range    Phosphorus 4.0 2.6 - 4.7 MG/DL         Intake and Output:         Physical Exam:  Patient without distress.   Lung:no distress  Abdomen: soft, nontender, nondistended       Membranes:  Intact      Fetal Heart Rate:  +        Labs:   Recent Results (from the past 36 hour(s))   METABOLIC PANEL, COMPREHENSIVE    Collection Time: 01/09/21  6:44 AM   Result Value Ref Range    Sodium 137 136 - 145 mmol/L    Potassium 3.5 3.5 - 5.1 mmol/L    Chloride 109 (H) 97 - 108 mmol/L    CO2 25 21 - 32 mmol/L    Anion gap 3 (L) 5 - 15 mmol/L    Glucose 67 65 - 100 mg/dL    BUN 7 6 - 20 MG/DL    Creatinine 0.44 (L) 0.55 - 1.02 MG/DL    BUN/Creatinine ratio 16 12 - 20      GFR est AA >60 >60 ml/min/1.73m2    GFR est non-AA >60 >60 ml/min/1.73m2    Calcium 8.0 (L) 8.5 - 10.1 MG/DL    Bilirubin, total 0.2 0.2 - 1.0 MG/DL    ALT (SGPT) 9 (L) 12 - 78 U/L    AST (SGOT) 7 (L) 15 - 37 U/L    Alk. phosphatase 43 (L) 45 - 117 U/L    Protein, total 6.0 (L) 6.4 - 8.2 g/dL    Albumin 2.5 (L) 3.5 - 5.0 g/dL    Globulin 3.5 2.0 - 4.0 g/dL    A-G Ratio 0.7 (L) 1.1 - 2.2     MAGNESIUM    Collection Time: 01/09/21  6:44 AM   Result Value Ref Range    Magnesium 1.8 1.6 - 2.4 mg/dL   PHOSPHORUS    Collection Time: 01/09/21  6:44 AM   Result Value Ref Range    Phosphorus 3.4 2.6 - 4.7 MG/DL   METABOLIC PANEL, COMPREHENSIVE    Collection Time: 01/10/21  6:42 AM   Result Value Ref Range    Sodium 139 136 - 145 mmol/L    Potassium 3.6 3.5 - 5.1 mmol/L    Chloride 108 97 - 108 mmol/L    CO2 25 21 - 32 mmol/L    Anion gap 6 5 - 15 mmol/L    Glucose 68 65 - 100 mg/dL    BUN 6 6 - 20 MG/DL    Creatinine 0.48 (L) 0.55 - 1.02 MG/DL    BUN/Creatinine ratio 13 12 - 20      GFR est AA >60 >60 ml/min/1.73m2    GFR est non-AA >60 >60 ml/min/1.73m2    Calcium 7.9 (L) 8.5 - 10.1 MG/DL    Bilirubin, total 0.2 0.2 - 1.0 MG/DL    ALT (SGPT) 10 (L) 12 - 78 U/L    AST (SGOT) 7 (L) 15 - 37 U/L    Alk.  phosphatase 42 (L) 45 - 117 U/L    Protein, total 6.1 (L) 6.4 - 8.2 g/dL    Albumin 2.5 (L) 3.5 - 5.0 g/dL    Globulin 3.6 2.0 - 4.0 g/dL    A-G Ratio 0.7 (L) 1.1 - 2.2     MAGNESIUM    Collection Time: 01/10/21  6:42 AM   Result Value Ref Range    Magnesium 1.7 1.6 - 2.4 mg/dL   PHOSPHORUS    Collection Time: 01/10/21  6:42 AM   Result Value Ref Range    Phosphorus 4.0 2.6 - 4.7 MG/DL       Assessment and Plan:      Principal Problem:    Nausea and vomiting in pregnancy (12/2/2020)    Active Problems:    Eating disorder affecting pregnancy, antepartum (12/2/2020)      Nausea/vomiting in pregnancy (1/2/2021)      Severe protein-calorie malnutrition ([address] (1/8/2021)         light of chronic eating disorder     1.  N/V/Dehydration/weight loss since discharge from hospital             -Iv antiemetics and phenergan suppositories prn             -ivf's             -Goodie bag daily             -s/p Nutrition consult (pt saw her nutritionist Tess Morel on 12/29) for supplement orders. Now on TPN and tolerating well.   labs noted and nl        -Pt has PICC line in place. Continue                 current TPN orders             -pt s/p GI consult and normal EGD 1/7             -HPylori stool culture pending             -pt s/p sugery consult -still holding off on cholecystectomy for gallstones for now   2. Eating Disorder and Depression/Anxiety             -Thrombocytopenia resolved on 1/2 and stable 1/3             -recent h/o admission for suicidal ideation-stable currently             -Continue Prozac 60mg daily and Zyprexa 5mg daily             -Continue prn hydoxyzine and prazosin             -Pt followed closely by psychiatrist Sarahi Kaufman (last appt           12/28)             -had appt with psychologist by telehealth at [address] [place] [address])             -nursing to work on only small amounts of food at a time during the day     3. CHTN-normal to low bp's off meds since weight loss     4. Anemia-know beta thalassemia minor and iron deficiency             anemia-s/p iv iron infusions during last admission             -oral iron bid     5.  Hip pain/pelvic girdle weakness             -consult PT             -pelvic support belt     6.  H/o incompetent cervix             -s/p cerclage placement 1/7/2021     7. DVT prophylaxis-MEGHANA hose and ambulation with assistance (pt is fall risk)     8. Daily FHTs.       9.  Headache: offered tylenol [place] prn

## 2021-01-10 NOTE — PROGRESS NOTES
Bedside and Verbal shift change report given to Fredrick Webb RN  (oncoming nurse) by [place]. Diane Owens RN  (offgoing nurse). Report included the following information SBAR, Kardex, OR Summary, Procedure Summary, Intake/Output, Accordion and Recent Results.

## 2021-01-10 NOTE — PROGRESS NOTES
Bedside and Verbal shift change report given to ALBA Ozuna (oncoming nurse) by Ruperto Stanton RN (offgoing nurse). Report included the following information SBAR, Kardex, Intake/Output, MAR, Accordion and Recent Results.

## 2021-01-11 LAB
ALBUMIN SERPL-MCNC: 2.5 G/DL (ref 3.5–5)
ALBUMIN/GLOB SERPL: 0.7 {RATIO} (ref 1.1–2.2)
ALP SERPL-CCNC: 37 U/L (ref 45–117)
ALT SERPL-CCNC: 9 U/L (ref 12–78)
ANION GAP SERPL CALC-SCNC: 4 MMOL/L (ref 5–15)
AST SERPL-CCNC: 7 U/L (ref 15–37)
BILIRUB SERPL-MCNC: 0.2 MG/DL (ref 0.2–1)
BUN SERPL-MCNC: 7 MG/DL (ref 6–20)
BUN/CREAT SERPL: 16 (ref 12–20)
CALCIUM SERPL-MCNC: 8.1 MG/DL (ref 8.5–10.1)
CHLORIDE SERPL-SCNC: 108 MMOL/L (ref 97–108)
CO2 SERPL-SCNC: 26 MMOL/L (ref 21–32)
CREAT SERPL-MCNC: 0.44 MG/DL (ref 0.55–1.02)
GLOBULIN SER CALC-MCNC: 3.5 G/DL (ref 2–4)
GLUCOSE SERPL-MCNC: 70 MG/DL (ref 65–100)
MAGNESIUM SERPL-MCNC: 1.8 MG/DL (ref 1.6–2.4)
PHOSPHATE SERPL-MCNC: 4.2 MG/DL (ref 2.6–4.7)
POTASSIUM SERPL-SCNC: 3.7 MMOL/L (ref 3.5–5.1)
PROT SERPL-MCNC: 6 G/DL (ref 6.4–8.2)
SODIUM SERPL-SCNC: 138 MMOL/L (ref 136–145)

## 2021-01-11 PROCEDURE — 74011000250 HC RX REV CODE- 250: Performed by: OBSTETRICS & GYNECOLOGY

## 2021-01-11 PROCEDURE — 74011250637 HC RX REV CODE- 250/637: Performed by: OBSTETRICS & GYNECOLOGY

## 2021-01-11 PROCEDURE — 80053 COMPREHEN METABOLIC PANEL: CPT

## 2021-01-11 PROCEDURE — 74011000250 HC RX REV CODE- 250: Performed by: PHYSICIAN ASSISTANT

## 2021-01-11 PROCEDURE — C9113 INJ PANTOPRAZOLE SODIUM, VIA: HCPCS | Performed by: PHYSICIAN ASSISTANT

## 2021-01-11 PROCEDURE — 84100 ASSAY OF PHOSPHORUS: CPT

## 2021-01-11 PROCEDURE — 36415 COLL VENOUS BLD VENIPUNCTURE: CPT

## 2021-01-11 PROCEDURE — 83735 ASSAY OF MAGNESIUM: CPT

## 2021-01-11 PROCEDURE — 65410000002 HC RM PRIVATE OB

## 2021-01-11 PROCEDURE — 74011250636 HC RX REV CODE- 250/636: Performed by: PHYSICIAN ASSISTANT

## 2021-01-11 PROCEDURE — 74011000258 HC RX REV CODE- 258: Performed by: OBSTETRICS & GYNECOLOGY

## 2021-01-11 PROCEDURE — 74011250636 HC RX REV CODE- 250/636: Performed by: OBSTETRICS & GYNECOLOGY

## 2021-01-11 RX ADMIN — SODIUM CHLORIDE 40 MG: 9 INJECTION INTRAMUSCULAR; INTRAVENOUS; SUBCUTANEOUS at 08:35

## 2021-01-11 RX ADMIN — SUCRALFATE 1 G: 1 TABLET ORAL at 16:45

## 2021-01-11 RX ADMIN — FERROUS SULFATE TAB 325 MG (65 MG ELEMENTAL FE) 325 MG: 325 (65 FE) TAB at 08:35

## 2021-01-11 RX ADMIN — THIAMINE HYDROCHLORIDE: 100 INJECTION, SOLUTION INTRAMUSCULAR; INTRAVENOUS at 18:36

## 2021-01-11 RX ADMIN — ONDANSETRON HYDROCHLORIDE 8 MG: 2 INJECTION, SOLUTION INTRAMUSCULAR; INTRAVENOUS at 22:01

## 2021-01-11 RX ADMIN — FLUTICASONE PROPIONATE 2 SPRAY: 50 SPRAY, METERED NASAL at 08:45

## 2021-01-11 RX ADMIN — ONDANSETRON HYDROCHLORIDE 8 MG: 2 INJECTION, SOLUTION INTRAMUSCULAR; INTRAVENOUS at 14:18

## 2021-01-11 RX ADMIN — Medication 10 ML: at 06:41

## 2021-01-11 RX ADMIN — SUCRALFATE 1 G: 1 TABLET ORAL at 11:19

## 2021-01-11 RX ADMIN — SUCRALFATE 1 G: 1 TABLET ORAL at 06:40

## 2021-01-11 RX ADMIN — ONDANSETRON HYDROCHLORIDE 8 MG: 2 INJECTION, SOLUTION INTRAMUSCULAR; INTRAVENOUS at 06:40

## 2021-01-11 RX ADMIN — PROMETHAZINE HYDROCHLORIDE 25 MG: 25 SUPPOSITORY RECTAL at 16:47

## 2021-01-11 RX ADMIN — SUCRALFATE 1 G: 1 TABLET ORAL at 22:00

## 2021-01-11 RX ADMIN — FLUOXETINE 60 MG: 20 CAPSULE ORAL at 08:35

## 2021-01-11 RX ADMIN — DOCUSATE SODIUM 100 MG: 100 CAPSULE ORAL at 08:35

## 2021-01-11 RX ADMIN — OLANZAPINE 5 MG: 5 TABLET, FILM COATED ORAL at 22:01

## 2021-01-11 RX ADMIN — FERROUS SULFATE TAB 325 MG (65 MG ELEMENTAL FE) 325 MG: 325 (65 FE) TAB at 16:45

## 2021-01-11 NOTE — PROGRESS NOTES
High Risk Obstetrics Progress Note    Name: Ashish Seals MRN: [id number]  SSN: [id number]    YOB: 1985  Age: 38 y.o. Sex: female      Subjective:      LOS: 10 days    Estimated Date of Delivery: 21   Gestational Age Today: 15w4d     Patient admitted for intractable nausea and vomiting and chronic eating disorder. States she does have mild headache , moderate nausea/vomiting and lower abdominal cramping and does not have chest pain, shortness of breath and vaginal bleeding . Objective:     Vitals:  Blood pressure 101/62, pulse 65, temperature 98 °F (36.7 °C), resp. rate 16, height 5' 4\" (1.626 m), weight 72.5 kg (159 lb 12.8 oz), last menstrual period 2020, SpO2 99 %, not currently breastfeeding. Temp (24hrs), Av °F (36.7 °C), Min:97.6 °F (36.4 °C), Max:98.2 °F (62.2 °C)    Systolic (11LDU), COZ:52 , Min:93 , DCT:980      Diastolic (96ACH), NICOLLE:08, Min:56, Max:65       Intake and Output:         Physical Exam:  Patient without distress. Heart: Regular rate and rhythm  Lung: clear to auscultation throughout lung fields, no wheezes, no rales, no rhonchi and normal respiratory effort  Abdomen: soft, nontender, gravid  Lower Extremities:  - Edema No       Membranes:  Intact      Fetal Heart Rate:  +FHTs        Labs:   Recent Results (from the past 36 hour(s))   METABOLIC PANEL, COMPREHENSIVE    Collection Time: 01/10/21  6:42 AM   Result Value Ref Range    Sodium 139 136 - 145 mmol/L    Potassium 3.6 3.5 - 5.1 mmol/L    Chloride 108 97 - 108 mmol/L    CO2 25 21 - 32 mmol/L    Anion gap 6 5 - 15 mmol/L    Glucose 68 65 - 100 mg/dL    BUN 6 6 - 20 MG/DL    Creatinine 0.48 (L) 0.55 - 1.02 MG/DL    BUN/Creatinine ratio 13 12 - 20      GFR est AA >60 >60 ml/min/1.73m2    GFR est non-AA >60 >60 ml/min/1.73m2    Calcium 7.9 (L) 8.5 - 10.1 MG/DL    Bilirubin, total 0.2 0.2 - 1.0 MG/DL    ALT (SGPT) 10 (L) 12 - 78 U/L    AST (SGOT) 7 (L) 15 - 37 U/L    Alk.  phosphatase 42 (L) 45 - 117 U/L    Protein, total 6.1 (L) 6.4 - 8.2 g/dL    Albumin 2.5 (L) 3.5 - 5.0 g/dL    Globulin 3.6 2.0 - 4.0 g/dL    A-G Ratio 0.7 (L) 1.1 - 2.2     MAGNESIUM    Collection Time: 01/10/21  6:42 AM   Result Value Ref Range    Magnesium 1.7 1.6 - 2.4 mg/dL   PHOSPHORUS    Collection Time: 01/10/21  6:42 AM   Result Value Ref Range    Phosphorus 4.0 2.6 - 4.7 MG/DL   METABOLIC PANEL, COMPREHENSIVE    Collection Time: 01/11/21  6:38 AM   Result Value Ref Range    Sodium 138 136 - 145 mmol/L    Potassium 3.7 3.5 - 5.1 mmol/L    Chloride 108 97 - 108 mmol/L    CO2 26 21 - 32 mmol/L    Anion gap 4 (L) 5 - 15 mmol/L    Glucose 70 65 - 100 mg/dL    BUN 7 6 - 20 MG/DL    Creatinine 0.44 (L) 0.55 - 1.02 MG/DL    BUN/Creatinine ratio 16 12 - 20      GFR est AA >60 >60 ml/min/1.73m2    GFR est non-AA >60 >60 ml/min/1.73m2    Calcium 8.1 (L) 8.5 - 10.1 MG/DL    Bilirubin, total 0.2 0.2 - 1.0 MG/DL    ALT (SGPT) 9 (L) 12 - 78 U/L    AST (SGOT) 7 (L) 15 - 37 U/L    Alk.  phosphatase 37 (L) 45 - 117 U/L    Protein, total 6.0 (L) 6.4 - 8.2 g/dL    Albumin 2.5 (L) 3.5 - 5.0 g/dL    Globulin 3.5 2.0 - 4.0 g/dL    A-G Ratio 0.7 (L) 1.1 - 2.2     MAGNESIUM    Collection Time: 01/11/21  6:38 AM   Result Value Ref Range    Magnesium 1.8 1.6 - 2.4 mg/dL   PHOSPHORUS    Collection Time: 01/11/21  6:38 AM   Result Value Ref Range    Phosphorus 4.2 2.6 - 4.7 MG/DL       Assessment and Plan:      Principal Problem:    Nausea and vomiting in pregnancy (12/2/2020)    Active Problems:    Eating disorder affecting pregnancy, antepartum (12/2/2020)      Nausea/vomiting in pregnancy (1/2/2021)      Severe protein-calorie malnutrition ([address] (1/8/2021)       37 y/o [id number] at 15 1/7 weeks by [facility] 7/4/2021 readmitted for intractable nausea/vomiting in pregnancy in light of chronic eating disorder     1.  N/V/Dehydration/weight loss since discharge from hospital             -Iv antiemetics and phenergan suppositories prn             -ivf's             -Berenice bag daily             -s/p Nutrition consult (pt saw her nutritionist Darlin Hashimoto on 12/29) for supplement orders. - Now on TPN and tolerating well. Hospitalist consulted for tpn management               -Pt has PICC line in place             -pt s/p GI consult and normal EGD 1/7             -HPylori stool culture pending             -pt s/p sugery consult -still holding off on cholecystectomy for gallstones for now   2. Eating Disorder and Depression/Anxiety             -Thrombocytopenia resolved on 1/2 and stable 1/3             -recent h/o admission for suicidal ideation-stable currently             -Continue Prozac 60mg daily and Zyprexa 5mg daily             -Continue prn hydoxyzine and prazosin             -Pt followed closely by psychiatrist Barry Menendez (last appt           12/28)             -had appt with psychologist by telehealth at 1/4/2021 (hCante Samaniego)-next telehealth today at [address] continue to encourage small, frequent meals.     3. CHTN-normal to low bp's off meds since weight loss     4. Anemia-know beta thalassemia minor and iron deficiency             anemia-s/p iv iron infusions during last admission             -oral iron bid     5. Hip pain/pelvic girdle weakness             -consult PT             -pelvic support belt     6.  H/o incompetent cervix             -s/p cerclage placement 1/7/2021     7. DVT prophylaxis-MEGHANA hose and ambulation with assistance (pt is fall risk)     8. Daily FHTs.       9.  Headache: offered tylenol [place] prn

## 2021-01-11 NOTE — CONSULTS
Received request for consult for management of TPN. We also don't manage TPN but utilize nutrition and pharmacy. Recommend consulting nutrition and pharmacy for TPN management. Discussed with Liu Cordero, Dr. Ayah Lazo and Dr. Natalie Esquivel. Please feel free to call with questions.

## 2021-01-11 NOTE — PROGRESS NOTES
65- MD Mejía updated on TPN orders. Request Hospitalist consult help manage. Bedside shift change report given to Melissa Orellana RN  (oncoming nurse) by Sylvie Oliveros RN  (offgoing nurse). Report included the following information SBAR, Kardex, Procedure Summary, Intake/Output, MAR and Recent Results.

## 2021-01-11 NOTE — PROGRESS NOTES
Bedside and Verbal shift change report given to ALBA Aviles (oncoming nurse) by Dayana Hurtado RN (offgoing nurse). Report included the following information SBAR, Kardex, Procedure Summary, Intake/Output, MAR, Accordion and Recent Results.

## 2021-01-12 ENCOUNTER — HOSPITAL ENCOUNTER (OUTPATIENT)
Dept: NUTRITION | Age: 36
Discharge: HOME | End: 2021-01-12

## 2021-01-12 LAB
ALBUMIN SERPL-MCNC: 2.6 G/DL (ref 3.5–5)
ALBUMIN/GLOB SERPL: 0.7 {RATIO} (ref 1.1–2.2)
ALP SERPL-CCNC: 40 U/L (ref 45–117)
ALT SERPL-CCNC: 10 U/L (ref 12–78)
ANION GAP SERPL CALC-SCNC: 5 MMOL/L (ref 5–15)
AST SERPL-CCNC: 7 U/L (ref 15–37)
BILIRUB SERPL-MCNC: 0.2 MG/DL (ref 0.2–1)
BUN SERPL-MCNC: 9 MG/DL (ref 6–20)
BUN/CREAT SERPL: 20 (ref 12–20)
CALCIUM SERPL-MCNC: 8.4 MG/DL (ref 8.5–10.1)
CHLORIDE SERPL-SCNC: 109 MMOL/L (ref 97–108)
CO2 SERPL-SCNC: 25 MMOL/L (ref 21–32)
CREAT SERPL-MCNC: 0.45 MG/DL (ref 0.55–1.02)
GLOBULIN SER CALC-MCNC: 3.6 G/DL (ref 2–4)
GLUCOSE SERPL-MCNC: 73 MG/DL (ref 65–100)
MAGNESIUM SERPL-MCNC: 1.8 MG/DL (ref 1.6–2.4)
PHOSPHATE SERPL-MCNC: 4.4 MG/DL (ref 2.6–4.7)
POTASSIUM SERPL-SCNC: 3.6 MMOL/L (ref 3.5–5.1)
PROT SERPL-MCNC: 6.2 G/DL (ref 6.4–8.2)
SODIUM SERPL-SCNC: 139 MMOL/L (ref 136–145)

## 2021-01-12 PROCEDURE — 65410000002 HC RM PRIVATE OB

## 2021-01-12 PROCEDURE — 74011000250 HC RX REV CODE- 250: Performed by: PHYSICIAN ASSISTANT

## 2021-01-12 PROCEDURE — 83735 ASSAY OF MAGNESIUM: CPT

## 2021-01-12 PROCEDURE — 36415 COLL VENOUS BLD VENIPUNCTURE: CPT

## 2021-01-12 PROCEDURE — 74011250636 HC RX REV CODE- 250/636: Performed by: PHYSICIAN ASSISTANT

## 2021-01-12 PROCEDURE — 74011000250 HC RX REV CODE- 250: Performed by: OBSTETRICS & GYNECOLOGY

## 2021-01-12 PROCEDURE — 74011000258 HC RX REV CODE- 258: Performed by: OBSTETRICS & GYNECOLOGY

## 2021-01-12 PROCEDURE — 74011250636 HC RX REV CODE- 250/636: Performed by: OBSTETRICS & GYNECOLOGY

## 2021-01-12 PROCEDURE — C9113 INJ PANTOPRAZOLE SODIUM, VIA: HCPCS | Performed by: PHYSICIAN ASSISTANT

## 2021-01-12 PROCEDURE — 74011250637 HC RX REV CODE- 250/637: Performed by: OBSTETRICS & GYNECOLOGY

## 2021-01-12 PROCEDURE — 80053 COMPREHEN METABOLIC PANEL: CPT

## 2021-01-12 PROCEDURE — 84100 ASSAY OF PHOSPHORUS: CPT

## 2021-01-12 RX ORDER — BUTALBITAL, ACETAMINOPHEN AND CAFFEINE 50; 325; 40 MG/1; MG/1; MG/1
1-2 TABLET ORAL
Status: DISCONTINUED | OUTPATIENT
Start: 2021-01-12 | End: 2021-01-15 | Stop reason: HOSPADM

## 2021-01-12 RX ADMIN — ONDANSETRON HYDROCHLORIDE 8 MG: 2 INJECTION, SOLUTION INTRAMUSCULAR; INTRAVENOUS at 22:27

## 2021-01-12 RX ADMIN — SODIUM CHLORIDE, POTASSIUM CHLORIDE, SODIUM LACTATE AND CALCIUM CHLORIDE 75 ML/HR: 600; 310; 30; 20 INJECTION, SOLUTION INTRAVENOUS at 16:39

## 2021-01-12 RX ADMIN — Medication 40 ML: at 06:26

## 2021-01-12 RX ADMIN — SODIUM CHLORIDE 40 MG: 9 INJECTION INTRAMUSCULAR; INTRAVENOUS; SUBCUTANEOUS at 10:21

## 2021-01-12 RX ADMIN — SUCRALFATE 1 G: 1 TABLET ORAL at 22:27

## 2021-01-12 RX ADMIN — FLUOXETINE 60 MG: 20 CAPSULE ORAL at 10:21

## 2021-01-12 RX ADMIN — FERROUS SULFATE TAB 325 MG (65 MG ELEMENTAL FE) 325 MG: 325 (65 FE) TAB at 10:21

## 2021-01-12 RX ADMIN — FLUTICASONE PROPIONATE 2 SPRAY: 50 SPRAY, METERED NASAL at 10:21

## 2021-01-12 RX ADMIN — SUCRALFATE 1 G: 1 TABLET ORAL at 06:32

## 2021-01-12 RX ADMIN — SUCRALFATE 1 G: 1 TABLET ORAL at 11:30

## 2021-01-12 RX ADMIN — SUCRALFATE 1 G: 1 TABLET ORAL at 16:38

## 2021-01-12 RX ADMIN — ONDANSETRON HYDROCHLORIDE 8 MG: 2 INJECTION, SOLUTION INTRAMUSCULAR; INTRAVENOUS at 14:00

## 2021-01-12 RX ADMIN — I.V. FAT EMULSION 500 ML: 20 EMULSION INTRAVENOUS at 18:30

## 2021-01-12 RX ADMIN — FERROUS SULFATE TAB 325 MG (65 MG ELEMENTAL FE) 325 MG: 325 (65 FE) TAB at 16:38

## 2021-01-12 RX ADMIN — DOCUSATE SODIUM 100 MG: 100 CAPSULE ORAL at 10:21

## 2021-01-12 RX ADMIN — ONDANSETRON HYDROCHLORIDE 8 MG: 2 INJECTION, SOLUTION INTRAMUSCULAR; INTRAVENOUS at 06:26

## 2021-01-12 RX ADMIN — THIAMINE HYDROCHLORIDE: 100 INJECTION, SOLUTION INTRAMUSCULAR; INTRAVENOUS at 18:29

## 2021-01-12 RX ADMIN — Medication 10 ML: at 14:00

## 2021-01-12 RX ADMIN — OLANZAPINE 5 MG: 5 TABLET, FILM COATED ORAL at 22:27

## 2021-01-12 NOTE — PROGRESS NOTES
High Risk Obstetrics Progress Note    Name: Gerhard Silva MRN: [id number]  SSN: [id number]    YOB: 1985  Age: 38 y.o. Sex: female      Subjective:      LOS: 11 days    Estimated Date of Delivery: 21   Gestational Age Today: 13w2d     Patient admitted for nausea and vomiting and chronic eating disorder. States she does have lower abdominal cramping, nausea and vomiting and does not have chest pain, shortness of breath and vaginal bleeding . Objective:     Vitals:  Blood pressure 97/61, pulse 71, temperature 98.2 °F (36.8 °C), resp. rate 16, height 5' 4\" (1.626 m), weight 72.1 kg (159 lb), last menstrual period 2020, SpO2 100 %, not currently breastfeeding. Temp (24hrs), Av.4 °F (36.9 °C), Min:97.9 °F (36.6 °C), Max:99 °F (43.0 °C)    Systolic (27GIM), MCMAHAN:836 , Min:97 , WS      Diastolic (09OXG), XWJ:31, Min:61, Max:70       Intake and Output:         Physical Exam:  Patient without distress. Heart: Regular rate and rhythm  Lung: clear to auscultation throughout lung fields, no wheezes, no rales, no rhonchi and normal respiratory effort  Abdomen: soft, nontender, gravid  Lower Extremities:  - Edema No       Membranes:  Intact          Labs:   Recent Results (from the past 36 hour(s))   METABOLIC PANEL, COMPREHENSIVE    Collection Time: 21  6:38 AM   Result Value Ref Range    Sodium 138 136 - 145 mmol/L    Potassium 3.7 3.5 - 5.1 mmol/L    Chloride 108 97 - 108 mmol/L    CO2 26 21 - 32 mmol/L    Anion gap 4 (L) 5 - 15 mmol/L    Glucose 70 65 - 100 mg/dL    BUN 7 6 - 20 MG/DL    Creatinine 0.44 (L) 0.55 - 1.02 MG/DL    BUN/Creatinine ratio 16 12 - 20      GFR est AA >60 >60 ml/min/1.73m2    GFR est non-AA >60 >60 ml/min/1.73m2    Calcium 8.1 (L) 8.5 - 10.1 MG/DL    Bilirubin, total 0.2 0.2 - 1.0 MG/DL    ALT (SGPT) 9 (L) 12 - 78 U/L    AST (SGOT) 7 (L) 15 - 37 U/L    Alk.  phosphatase 37 (L) 45 - 117 U/L    Protein, total 6.0 (L) 6.4 - 8.2 g/dL    Albumin 2.5 (L) 3.5 - 5.0 g/dL    Globulin 3.5 2.0 - 4.0 g/dL    A-G Ratio 0.7 (L) 1.1 - 2.2     MAGNESIUM    Collection Time: 01/11/21  6:38 AM   Result Value Ref Range    Magnesium 1.8 1.6 - 2.4 mg/dL   PHOSPHORUS    Collection Time: 01/11/21  6:38 AM   Result Value Ref Range    Phosphorus 4.2 2.6 - 4.7 MG/DL   METABOLIC PANEL, COMPREHENSIVE    Collection Time: 01/12/21  6:24 AM   Result Value Ref Range    Sodium 139 136 - 145 mmol/L    Potassium 3.6 3.5 - 5.1 mmol/L    Chloride 109 (H) 97 - 108 mmol/L    CO2 25 21 - 32 mmol/L    Anion gap 5 5 - 15 mmol/L    Glucose 73 65 - 100 mg/dL    BUN 9 6 - 20 MG/DL    Creatinine 0.45 (L) 0.55 - 1.02 MG/DL    BUN/Creatinine ratio 20 12 - 20      GFR est AA >60 >60 ml/min/1.73m2    GFR est non-AA >60 >60 ml/min/1.73m2    Calcium 8.4 (L) 8.5 - 10.1 MG/DL    Bilirubin, total 0.2 0.2 - 1.0 MG/DL    ALT (SGPT) 10 (L) 12 - 78 U/L    AST (SGOT) 7 (L) 15 - 37 U/L    Alk.  phosphatase 40 (L) 45 - 117 U/L    Protein, total 6.2 (L) 6.4 - 8.2 g/dL    Albumin 2.6 (L) 3.5 - 5.0 g/dL    Globulin 3.6 2.0 - 4.0 g/dL    A-G Ratio 0.7 (L) 1.1 - 2.2     MAGNESIUM    Collection Time: 01/12/21  6:24 AM   Result Value Ref Range    Magnesium 1.8 1.6 - 2.4 mg/dL   PHOSPHORUS    Collection Time: 01/12/21  6:24 AM   Result Value Ref Range    Phosphorus 4.4 2.6 - 4.7 MG/DL       Assessment and Plan:      Principal Problem:    Nausea and vomiting in pregnancy (12/2/2020)    Active Problems:    Eating disorder affecting pregnancy, antepartum (12/2/2020)      Nausea/vomiting in pregnancy (1/2/2021)      Severe protein-calorie malnutrition ([address] (1/8/2021)       39 y/o [id number] at 15 2/7 weeks by [facility] 7/4/2021 readmitted for intractable nausea/vomiting in pregnancy in light of chronic eating disorder     1.  N/V/Dehydration/weight loss since discharge from hospital             -Iv antiemetics and phenergan suppositories prn             -ivf's             -Goodie bag daily             -s/p Nutrition consult (pt saw her nutritionist Emerita Sanchez on 12/29) for supplement orders.                          - Now on TPN and tolerating well. Pharmacy and nutritionist managing             -Pt has PICC line in place             -pt s/p GI consult and normal EGD 1/7             -HPylori stool culture pending             -pt s/p sugery consult -still holding off on cholecystectomy for gallstones for now   2. Eating Disorder and Depression/Anxiety             -Thrombocytopenia resolved on 1/2 and stable 1/3             -recent h/o admission for suicidal ideation-stable currently             -Continue Prozac 60mg daily and Zyprexa 5mg daily             -Continue prn hydoxyzine and prazosin             -Pt followed closely by psychiatrist Deanna Christina (last appt           12/28)             -had appt with psychologist by telehealth at 1/11/2021 (Chante Bowman)-             -Will continue to encourage small, frequent meals.     3. CHTN-normal to low bp's off meds since weight loss     4. Anemia-know beta thalassemia minor and iron deficiency             anemia-s/p iv iron infusions during last admission             -oral iron bid     5. Hip pain/pelvic girdle weakness             -consult PT             -pelvic support belt     6.  H/o incompetent cervix             -s/p cerclage placement 1/7/2021     7. DVT prophylaxis-MEGHANA hose and ambulation with assistance (pt is fall risk)     8. Daily FHTs.       9.  Headache: will try fioricet today

## 2021-01-12 NOTE — ROUTINE PROCESS
2000- Bedside shift change report given to SUSHMA Galvez RN (oncoming nurse) by Curtis Botello RN (offgoing nurse). Report included the following information SBAR.

## 2021-01-12 NOTE — PROGRESS NOTES
Bedside and Verbal shift change report given to ALBA Raman RN (oncoming nurse) by Kristian [place]. Eliot Merino RN (offgoing nurse). Report included the following information SBAR, Kardex, Intake/Output, MAR, Accordion and Recent Results.

## 2021-01-13 LAB
ALBUMIN SERPL-MCNC: 2.5 G/DL (ref 3.5–5)
ALBUMIN/GLOB SERPL: 0.7 {RATIO} (ref 1.1–2.2)
ALP SERPL-CCNC: 41 U/L (ref 45–117)
ALT SERPL-CCNC: 10 U/L (ref 12–78)
ANION GAP SERPL CALC-SCNC: 6 MMOL/L (ref 5–15)
AST SERPL-CCNC: 7 U/L (ref 15–37)
BILIRUB SERPL-MCNC: <0.1 MG/DL (ref 0.2–1)
BUN SERPL-MCNC: 8 MG/DL (ref 6–20)
BUN/CREAT SERPL: 19 (ref 12–20)
CALCIUM SERPL-MCNC: 8.2 MG/DL (ref 8.5–10.1)
CHLORIDE SERPL-SCNC: 108 MMOL/L (ref 97–108)
CO2 SERPL-SCNC: 24 MMOL/L (ref 21–32)
CREAT SERPL-MCNC: 0.42 MG/DL (ref 0.55–1.02)
GLOBULIN SER CALC-MCNC: 3.6 G/DL (ref 2–4)
GLUCOSE SERPL-MCNC: 67 MG/DL (ref 65–100)
MAGNESIUM SERPL-MCNC: 1.8 MG/DL (ref 1.6–2.4)
PHOSPHATE SERPL-MCNC: 3.9 MG/DL (ref 2.6–4.7)
POTASSIUM SERPL-SCNC: 3.6 MMOL/L (ref 3.5–5.1)
PROT SERPL-MCNC: 6.1 G/DL (ref 6.4–8.2)
SODIUM SERPL-SCNC: 138 MMOL/L (ref 136–145)

## 2021-01-13 PROCEDURE — 80053 COMPREHEN METABOLIC PANEL: CPT

## 2021-01-13 PROCEDURE — 74011250636 HC RX REV CODE- 250/636: Performed by: OBSTETRICS & GYNECOLOGY

## 2021-01-13 PROCEDURE — 65410000002 HC RM PRIVATE OB

## 2021-01-13 PROCEDURE — 84100 ASSAY OF PHOSPHORUS: CPT

## 2021-01-13 PROCEDURE — 74011000250 HC RX REV CODE- 250: Performed by: OBSTETRICS & GYNECOLOGY

## 2021-01-13 PROCEDURE — 74011250636 HC RX REV CODE- 250/636: Performed by: PHYSICIAN ASSISTANT

## 2021-01-13 PROCEDURE — 74011000250 HC RX REV CODE- 250: Performed by: PHYSICIAN ASSISTANT

## 2021-01-13 PROCEDURE — C9113 INJ PANTOPRAZOLE SODIUM, VIA: HCPCS | Performed by: PHYSICIAN ASSISTANT

## 2021-01-13 PROCEDURE — 36415 COLL VENOUS BLD VENIPUNCTURE: CPT

## 2021-01-13 PROCEDURE — 74011000258 HC RX REV CODE- 258: Performed by: OBSTETRICS & GYNECOLOGY

## 2021-01-13 PROCEDURE — 74011250637 HC RX REV CODE- 250/637: Performed by: OBSTETRICS & GYNECOLOGY

## 2021-01-13 PROCEDURE — 83735 ASSAY OF MAGNESIUM: CPT

## 2021-01-13 RX ADMIN — ONDANSETRON HYDROCHLORIDE 8 MG: 2 INJECTION, SOLUTION INTRAMUSCULAR; INTRAVENOUS at 14:03

## 2021-01-13 RX ADMIN — ALUMINUM HYDROXIDE AND MAGNESIUM HYDROXIDE 30 ML: 200; 200 SUSPENSION ORAL at 18:42

## 2021-01-13 RX ADMIN — SODIUM CHLORIDE 40 MG: 9 INJECTION INTRAMUSCULAR; INTRAVENOUS; SUBCUTANEOUS at 08:45

## 2021-01-13 RX ADMIN — ONDANSETRON HYDROCHLORIDE 8 MG: 2 INJECTION, SOLUTION INTRAMUSCULAR; INTRAVENOUS at 06:52

## 2021-01-13 RX ADMIN — SUCRALFATE 1 G: 1 TABLET ORAL at 11:54

## 2021-01-13 RX ADMIN — FLUTICASONE PROPIONATE 2 SPRAY: 50 SPRAY, METERED NASAL at 08:49

## 2021-01-13 RX ADMIN — SUCRALFATE 1 G: 1 TABLET ORAL at 21:15

## 2021-01-13 RX ADMIN — FLUOXETINE 60 MG: 20 CAPSULE ORAL at 08:45

## 2021-01-13 RX ADMIN — OLANZAPINE 5 MG: 5 TABLET, FILM COATED ORAL at 21:15

## 2021-01-13 RX ADMIN — ONDANSETRON HYDROCHLORIDE 8 MG: 2 INJECTION, SOLUTION INTRAMUSCULAR; INTRAVENOUS at 21:15

## 2021-01-13 RX ADMIN — FERROUS SULFATE TAB 325 MG (65 MG ELEMENTAL FE) 325 MG: 325 (65 FE) TAB at 18:41

## 2021-01-13 RX ADMIN — FOLIC ACID: 5 INJECTION, SOLUTION INTRAMUSCULAR; INTRAVENOUS; SUBCUTANEOUS at 18:37

## 2021-01-13 RX ADMIN — Medication 10 ML: at 14:00

## 2021-01-13 RX ADMIN — DOCUSATE SODIUM 100 MG: 100 CAPSULE ORAL at 08:45

## 2021-01-13 RX ADMIN — SUCRALFATE 1 G: 1 TABLET ORAL at 18:40

## 2021-01-13 RX ADMIN — SUCRALFATE 1 G: 1 TABLET ORAL at 06:52

## 2021-01-13 RX ADMIN — FERROUS SULFATE TAB 325 MG (65 MG ELEMENTAL FE) 325 MG: 325 (65 FE) TAB at 08:45

## 2021-01-13 RX ADMIN — SODIUM CHLORIDE, POTASSIUM CHLORIDE, SODIUM LACTATE AND CALCIUM CHLORIDE 75 ML/HR: 600; 310; 30; 20 INJECTION, SOLUTION INTRAVENOUS at 21:14

## 2021-01-13 NOTE — PROGRESS NOTES
T.O.C:   Pt d/c TBD   Transport at d/c TBD   [facility] for TPN management at D/C   Emergency Contact: Refused    CM received update on progress from ALBA Fuchs CM. [facility] ([phone]) will manage TPN once pt is d/c'd. CM will continue to follow for pt needs.     Alfredo Mitchell RN

## 2021-01-13 NOTE — PROGRESS NOTES
..Bedside and Verbal shift change report given to AKIL Quigley (oncoming nurse) by AKIL Jacinto (offgoing nurse). Report included the following information SBAR, Kardex, ED Summary, Procedure Summary, Intake/Output, MAR, Accordion and Recent Results.

## 2021-01-13 NOTE — PROGRESS NOTES
Bedside and Verbal shift change report given to [address] (oncoming nurse) by Michelle Tinajero RN (offgoing nurse). Report included the following information SBAR, Kardex, Procedure Summary, Intake/Output and MAR.

## 2021-01-13 NOTE — PROGRESS NOTES
High Risk Obstetrics Progress Note    Name: Jacinto Jorgensen MRN: [id number]  SSN: [id number]    YOB: 1985  Age: 38 y.o. Sex: female      Subjective:      LOS: 12 days    Estimated Date of Delivery: 21   Gestational Age Today: 15w3d     Patient admitted for nausea, vomiting and eating disorder. States she does have moderate nausea/vomiting and lower abdominal cramping and intermittent headaches and does not have chest pain, shortness of breath and vaginal bleeding . Objective:     Vitals:  Blood pressure (!) 108/56, pulse 73, temperature 98.1 °F (36.7 °C), resp. rate 16, height 5' 4\" (1.626 m), weight 72.9 kg (160 lb 12.8 oz), last menstrual period 2020, SpO2 98 %, not currently breastfeeding. Temp (24hrs), Av.1 °F (36.7 °C), Min:97.9 °F (36.6 °C), Max:98.3 °F (10.2 °C)    Systolic (25RHX), SBU:08 , Min:90 , ICW:462      Diastolic (71YBS), MAR:52, Min:49, Max:61       Intake and Output:         Physical Exam:  Patient without distress. Heart: Regular rate and rhythm  Lung: clear to auscultation throughout lung fields, no wheezes, no rales, no rhonchi and normal respiratory effort  Abdomen: soft, nontender, gravid  Lower Extremities:  - Edema No       Membranes:  Intact    Labs:   Recent Results (from the past 36 hour(s))   METABOLIC PANEL, COMPREHENSIVE    Collection Time: 21  6:24 AM   Result Value Ref Range    Sodium 139 136 - 145 mmol/L    Potassium 3.6 3.5 - 5.1 mmol/L    Chloride 109 (H) 97 - 108 mmol/L    CO2 25 21 - 32 mmol/L    Anion gap 5 5 - 15 mmol/L    Glucose 73 65 - 100 mg/dL    BUN 9 6 - 20 MG/DL    Creatinine 0.45 (L) 0.55 - 1.02 MG/DL    BUN/Creatinine ratio 20 12 - 20      GFR est AA >60 >60 ml/min/1.73m2    GFR est non-AA >60 >60 ml/min/1.73m2    Calcium 8.4 (L) 8.5 - 10.1 MG/DL    Bilirubin, total 0.2 0.2 - 1.0 MG/DL    ALT (SGPT) 10 (L) 12 - 78 U/L    AST (SGOT) 7 (L) 15 - 37 U/L    Alk.  phosphatase 40 (L) 45 - 117 U/L    Protein, total 6.2 (L) 6.4 - 8.2 g/dL    Albumin 2.6 (L) 3.5 - 5.0 g/dL    Globulin 3.6 2.0 - 4.0 g/dL    A-G Ratio 0.7 (L) 1.1 - 2.2     MAGNESIUM    Collection Time: 01/12/21  6:24 AM   Result Value Ref Range    Magnesium 1.8 1.6 - 2.4 mg/dL   PHOSPHORUS    Collection Time: 01/12/21  6:24 AM   Result Value Ref Range    Phosphorus 4.4 2.6 - 4.7 MG/DL       Assessment and Plan:      Principal Problem:    Nausea and vomiting in pregnancy (12/2/2020)    Active Problems:    Eating disorder affecting pregnancy, antepartum (12/2/2020)      Nausea/vomiting in pregnancy (1/2/2021)      Severe protein-calorie malnutrition ([address] (1/8/2021)       37 y/o [id number] at 15 3/7 weeks by [facility] 7/4/2021 readmitted for intractable nausea/vomiting in pregnancy in light of chronic eating disorder     1.  N/V/Dehydration/weight loss since discharge from hospital             -Iv antiemetics and phenergan suppositories prn             -ivf's             -Goodie bag daily             -s/p Nutrition consult (pt saw her nutritionist Kaitlin Ramos on 12/29) for supplement orders.                          - Now on TPN and tolerating well. Pharmacy and nutritionist managing             -PW has PICC line in place             -pt s/p GI consult and normal EGD 1/7             -HPylori stool culture pending             -ED s/p sugery consult -still holding off on cholecystectomy for gallstones for now   2. Eating Disorder and Depression/Anxiety             -Thrombocytopenia resolved on 1/2 and stable 1/3             -recent h/o admission for suicidal ideation-stable currently             -Continue Prozac 60mg daily and Zyprexa 5mg daily             -Continue prn hydoxyzine and prazosin             -Pt followed closely by psychiatrist Storm Ascencio (last appt           12/28)             -had appt with psychologist by telehealth at 1/11/2021 ([employer] [facility])-             -Will continue to encourage small, frequent meals.     3. CHTN-normal to low bp's off meds since weight loss     4. Anemia-know beta thalassemia minor and iron deficiency             anemia-s/p iv iron infusions during last admission             -oral iron bid     5. Hip pain/pelvic girdle weakness             -consult PT             -pelvic support belt     6.  H/o incompetent cervix             -s/p cerclage placement 1/7/2021     7. DVT prophylaxis-MEGHANA hose and ambulation with assistance (pt is fall risk)     8. Daily FHTs.       9. Dispo-will start working on management of TPN outpatient.

## 2021-01-14 LAB
ALBUMIN SERPL-MCNC: 2.5 G/DL (ref 3.5–5)
ALBUMIN/GLOB SERPL: 0.7 {RATIO} (ref 1.1–2.2)
ALP SERPL-CCNC: 38 U/L (ref 45–117)
ALT SERPL-CCNC: 11 U/L (ref 12–78)
ANION GAP SERPL CALC-SCNC: 5 MMOL/L (ref 5–15)
AST SERPL-CCNC: 7 U/L (ref 15–37)
BILIRUB SERPL-MCNC: 0.2 MG/DL (ref 0.2–1)
BUN SERPL-MCNC: 8 MG/DL (ref 6–20)
BUN/CREAT SERPL: 21 (ref 12–20)
CALCIUM SERPL-MCNC: 8 MG/DL (ref 8.5–10.1)
CHLORIDE SERPL-SCNC: 108 MMOL/L (ref 97–108)
CO2 SERPL-SCNC: 26 MMOL/L (ref 21–32)
CREAT SERPL-MCNC: 0.39 MG/DL (ref 0.55–1.02)
GLOBULIN SER CALC-MCNC: 3.4 G/DL (ref 2–4)
GLUCOSE SERPL-MCNC: 70 MG/DL (ref 65–100)
MAGNESIUM SERPL-MCNC: 1.9 MG/DL (ref 1.6–2.4)
PHOSPHATE SERPL-MCNC: 3.7 MG/DL (ref 2.6–4.7)
POTASSIUM SERPL-SCNC: 3.6 MMOL/L (ref 3.5–5.1)
PROT SERPL-MCNC: 5.9 G/DL (ref 6.4–8.2)
SODIUM SERPL-SCNC: 139 MMOL/L (ref 136–145)

## 2021-01-14 PROCEDURE — 80053 COMPREHEN METABOLIC PANEL: CPT

## 2021-01-14 PROCEDURE — 74011250636 HC RX REV CODE- 250/636: Performed by: PHYSICIAN ASSISTANT

## 2021-01-14 PROCEDURE — 36415 COLL VENOUS BLD VENIPUNCTURE: CPT

## 2021-01-14 PROCEDURE — 74011250637 HC RX REV CODE- 250/637: Performed by: OBSTETRICS & GYNECOLOGY

## 2021-01-14 PROCEDURE — 74011000258 HC RX REV CODE- 258: Performed by: OBSTETRICS & GYNECOLOGY

## 2021-01-14 PROCEDURE — 83735 ASSAY OF MAGNESIUM: CPT

## 2021-01-14 PROCEDURE — 84100 ASSAY OF PHOSPHORUS: CPT

## 2021-01-14 PROCEDURE — 65410000002 HC RM PRIVATE OB

## 2021-01-14 PROCEDURE — 74011000250 HC RX REV CODE- 250: Performed by: OBSTETRICS & GYNECOLOGY

## 2021-01-14 PROCEDURE — 74011250636 HC RX REV CODE- 250/636: Performed by: OBSTETRICS & GYNECOLOGY

## 2021-01-14 PROCEDURE — C9113 INJ PANTOPRAZOLE SODIUM, VIA: HCPCS | Performed by: PHYSICIAN ASSISTANT

## 2021-01-14 PROCEDURE — 74011000250 HC RX REV CODE- 250: Performed by: PHYSICIAN ASSISTANT

## 2021-01-14 RX ADMIN — ONDANSETRON HYDROCHLORIDE 8 MG: 2 INJECTION, SOLUTION INTRAMUSCULAR; INTRAVENOUS at 06:32

## 2021-01-14 RX ADMIN — Medication 10 ML: at 22:18

## 2021-01-14 RX ADMIN — PROMETHAZINE HYDROCHLORIDE 25 MG: 25 SUPPOSITORY RECTAL at 08:19

## 2021-01-14 RX ADMIN — FLUTICASONE PROPIONATE 2 SPRAY: 50 SPRAY, METERED NASAL at 08:19

## 2021-01-14 RX ADMIN — ONDANSETRON HYDROCHLORIDE 8 MG: 2 INJECTION, SOLUTION INTRAMUSCULAR; INTRAVENOUS at 22:17

## 2021-01-14 RX ADMIN — OLANZAPINE 5 MG: 5 TABLET, FILM COATED ORAL at 22:17

## 2021-01-14 RX ADMIN — DOCUSATE SODIUM 100 MG: 100 CAPSULE ORAL at 08:18

## 2021-01-14 RX ADMIN — FLUOXETINE 60 MG: 20 CAPSULE ORAL at 08:18

## 2021-01-14 RX ADMIN — SUCRALFATE 1 G: 1 TABLET ORAL at 06:32

## 2021-01-14 RX ADMIN — FERROUS SULFATE TAB 325 MG (65 MG ELEMENTAL FE) 325 MG: 325 (65 FE) TAB at 08:18

## 2021-01-14 RX ADMIN — SUCRALFATE 1 G: 1 TABLET ORAL at 11:37

## 2021-01-14 RX ADMIN — SUCRALFATE 1 G: 1 TABLET ORAL at 22:17

## 2021-01-14 RX ADMIN — ONDANSETRON HYDROCHLORIDE 8 MG: 2 INJECTION, SOLUTION INTRAMUSCULAR; INTRAVENOUS at 13:56

## 2021-01-14 RX ADMIN — I.V. FAT EMULSION 500 ML: 20 EMULSION INTRAVENOUS at 19:16

## 2021-01-14 RX ADMIN — SODIUM CHLORIDE, POTASSIUM CHLORIDE, SODIUM LACTATE AND CALCIUM CHLORIDE 75 ML/HR: 600; 310; 30; 20 INJECTION, SOLUTION INTRAVENOUS at 11:38

## 2021-01-14 RX ADMIN — SODIUM CHLORIDE 40 MG: 9 INJECTION INTRAMUSCULAR; INTRAVENOUS; SUBCUTANEOUS at 08:18

## 2021-01-14 RX ADMIN — THIAMINE HYDROCHLORIDE: 100 INJECTION, SOLUTION INTRAMUSCULAR; INTRAVENOUS at 18:32

## 2021-01-14 RX ADMIN — SUCRALFATE 1 G: 1 TABLET ORAL at 17:06

## 2021-01-14 RX ADMIN — FERROUS SULFATE TAB 325 MG (65 MG ELEMENTAL FE) 325 MG: 325 (65 FE) TAB at 17:06

## 2021-01-14 NOTE — PROGRESS NOTES
Bedside and Verbal shift change report given to JARAD Glover (oncoming nurse) by Dayana Hurtado (offgoing nurse). Report included the following information SBAR, Kardex, Intake/Output, MAR and Recent Results.

## 2021-01-14 NOTE — PROGRESS NOTES
..Bedside and Verbal shift change report given to Froilan BARNARD (oncoming nurse) by Raymond Gallegos RN (offgoing nurse). Report included the following information SBAR, Kardex, Procedure Summary, Intake/Output, MAR, Accordion and Recent Results.

## 2021-01-14 NOTE — PROGRESS NOTES
Comprehensive Nutrition Assessment    Type and Reason for Visit: Reassess    Nutrition Recommendations/Interventions:    1. Switch to cyclic TPN regimen to prepare for home TPN:    5%AA, D20 @ 75ml/hr x 1 hr   5%AA, D20 @ 185ml/hr x 10 hrs   5%AA, D20 @ 75ml/hr x 1 hr    Continue lipids as ordered     2. Daily weights on standing scale while on TPN (per hospital policy)   - weight pt facing away from weight display with hx of eating disorder   - do not discuss wt with patient    3. Document all PO intake - inconsistent documentation so hard to assess oral intake/tolerance  4. Continue to document all episodes of vomiting in I/O flowsheet    Nutrition Assessment:   Admit IUP 13 wks 6 days with intractable N/V, wt loss and dehydration. PMHx: depression, eating disorder. Continued inadequate oral intake likely multifactorial with hyperemesis, underlying eating disorder, and newly identified gallstones. GI and surgery workup insignificant with no plans for intervention. TPN started on 1/8. Happy to see increased to goal rate with lipids to provide: 2188kcal, 100g protein, 400g CHO (GIR = 3.89mg/kg/min), 2000-2500ml fluid. Meets 100% needs with some PO as well. Labs WNL. Communicated with MD with plans to d/c home with TPN. Will need to transition to cyclic regimen for home. Recommend:    5%AA, D20 @ 75ml/hr x 1 hr   5%AA, D20 @ 185ml/hr x 10 hrs   5%AA, D20 @ 75ml/hr x 1 hr   Continue lipids as ordered. Will provide same nutrition as above. Admit wt 157# 12/31/20 up to 164# today - should be on daily weights with TPN (see above). Pre-gravid wt 68.7 kg (151#) 10/14/20. Severe wt loss of 12% x <6 months despite pregnancy. Seen by outpatient TAMIKO MEJIA for eating disorder issues. Previous attempts at outpatient [facility] for eating disorders. Vomiting documented on 1/12. Document all episodes of vomiting. Pt visited today.  Taking in some of supplements so will continue as ordered, but oral intake remains inadequate. Questions regarding home TPN answered. Spoke with Sandra Pacheco from [address] [place] [address] who will check in with case mgmt for final home TPN plans. Malnutrition Assessment:  Malnutrition Status:  Severe malnutrition    Context:  Chronic illness     Findings of the 6 clinical characteristics of malnutrition:   Energy Intake:  7 - 75% or less est energy requirements for 1 month or longer  Weight Loss:  7.00 - Greater than 10% over 6 months       Estimated Nutrition Needs:   Energy: 2200(2130-6083 kcal/day))    Protein: 86-100g (1.2-1.4g/kg)  Wt used: Current(72kg)   Fluid: 2500 mL(~35 mL/kg)     Nutrition-related medications: colace, iron, prozac, zyrexa, zofran, protonix, carafate, LR @ 75ml/hr     Nutrition Related Findings:   BM: 1/6 (last documentation)  Wounds:  None       Current Nutrition Therapies:  DIET:  Regular  TPN: 5%AA. D20 @ 83ml/hr + 500ml, 20% lipids 3x/week  SUPPLEMENTS Breakfast, Dinner; Ensure [employer] + Boost pudding Lunch. Meal intake:   Patient Vitals for the past 168 hrs:   % Diet Eaten   01/13/21 1643 50 %   01/10/21 1215 75 %   01/10/21 0827 25 %     Anthropometric Measures:  · Height:  5' 4\" (162.6 cm)  · Current Body Wt:  74.4 kg (164 lb)   · Admission Body Wt:  157 lb(IUP 13 3/7 wks)    · Usual Body Wt:  103.4 kg (228 lb)(>/= yr ago: 225# (5/2/20) & 229# (2/18/20))     · Ideal Body Wt:  120 lbs:  130.8 %   · Adjusted Body Weight:  N/A  · BMI Category: Pre-gravid BMI 26 (10/4/20) overweight. BMI now N/A w/pregnancy.      Wt Readings from Last 10 Encounters:   01/14/21 74.7 kg (164 lb 9.6 oz)   12/28/20 70.2 kg (154 lb 12.8 oz)   12/16/20 72.9 kg (160 lb 12.8 oz)   12/06/20 67.6 kg (149 lb)   11/18/20 65 kg (143 lb 3.2 oz)   10/14/20 68.6 kg (151 lb 3.2 oz)   08/05/20 73.4 kg (161 lb 12.8 oz)   07/29/20 75.1 kg (165 lb 9.6 oz)   07/15/20 81.3 kg (179 lb 3.2 oz)   05/02/20 102.1 kg (225 lb)     Last 3 Recorded Weights in this Encounter    01/11/21 2011 01/12/21 2103 01/14/21 0631 Weight: 72.1 kg (159 lb) 72.9 kg (160 lb 12.8 oz) 74.7 kg (164 lb 9.6 oz)     Nutrition Diagnosis:   · Inadequate protein-energy intake related to psychological cause or life stress, altered GI function as evidenced by intake 51-75%, nutrition support-parenteral nutrition(plans for home TPN)    · Increased nutrient needs related to increased demand for energy/nutrients as evidenced by (pregnancy)    Nutrition Interventions:   Food and/or Nutrient Delivery: Continue current diet, Modify parenteral nutrition, Continue oral nutrition supplement  Nutrition Education and Counseling: No recommendations at this time  Coordination of Nutrition Care: Continue to monitor while inpatient, Interdisciplinary rounds    Goals:  PN meeting at least 90% needs in 4-5 days; wt maintenance/gain       Nutrition Monitoring and Evaluation:   Behavioral-Environmental Outcomes: Beliefs and attitudes, Readiness for change  Food/Nutrient Intake Outcomes: Food and nutrient intake, Parenteral nutrition intake/tolerance  Physical Signs/Symptoms Outcomes: Weight, GI status, Fluid status or edema, Nausea/vomiting    Discharge Planning:    Home TPN, Continue outpatient nutrition counseling.      Yamil Arellano, RD  [address] , Pager #[phone] or via [employer]

## 2021-01-14 NOTE — PROGRESS NOTES
T.O.C:              Pt d/c TBD              Transport at d/c TBD              [facility] for TPN management at D/C phone # [phone]              Emergency Contact: Refused    6122: CM called [facility] spoke with intake, verified previous paperwork received and asked for any additional needs. [facility] needed the PICC report and an emergency contact for pt. Picc report faxed by CM. [facility] will not initiate care over the weekend, pt needs to d/c on Friday or Monday. CM to advised bedside RN. CM received call from [employer], requested progress notes, labs, TPN and RD note. CM faxed appropriate paperwork to [employer] ([phone]). CM spoke with bedside RN for update on pt status and plan of care. CM continues to follow pt.     Ginger Miranda RN

## 2021-01-14 NOTE — PROGRESS NOTES
Per primary care nurse request, assessed patient's PICC line because pt has reportedly been having pain in left arm; pt states that pain has been intermittent for several days, both ports with positive blood return and flushed without difficulty, no swelling or redness noted, pt reports that pain is mostly in left shoulder and outer arm and only at PICC site occasionally

## 2021-01-15 VITALS
TEMPERATURE: 98.2 F | HEIGHT: 64 IN | DIASTOLIC BLOOD PRESSURE: 57 MMHG | HEART RATE: 86 BPM | RESPIRATION RATE: 16 BRPM | SYSTOLIC BLOOD PRESSURE: 100 MMHG | WEIGHT: 166.4 LBS | OXYGEN SATURATION: 99 % | BODY MASS INDEX: 28.41 KG/M2

## 2021-01-15 LAB
ALBUMIN SERPL-MCNC: 2.6 G/DL (ref 3.5–5)
ALBUMIN/GLOB SERPL: 0.7 {RATIO} (ref 1.1–2.2)
ALP SERPL-CCNC: 41 U/L (ref 45–117)
ALT SERPL-CCNC: 23 U/L (ref 12–78)
ANION GAP SERPL CALC-SCNC: 3 MMOL/L (ref 5–15)
AST SERPL-CCNC: 18 U/L (ref 15–37)
BILIRUB SERPL-MCNC: 0.1 MG/DL (ref 0.2–1)
BUN SERPL-MCNC: 9 MG/DL (ref 6–20)
BUN/CREAT SERPL: 23 (ref 12–20)
CALCIUM SERPL-MCNC: 8 MG/DL (ref 8.5–10.1)
CHLORIDE SERPL-SCNC: 110 MMOL/L (ref 97–108)
CO2 SERPL-SCNC: 25 MMOL/L (ref 21–32)
CREAT SERPL-MCNC: 0.4 MG/DL (ref 0.55–1.02)
GLOBULIN SER CALC-MCNC: 3.5 G/DL (ref 2–4)
GLUCOSE SERPL-MCNC: 72 MG/DL (ref 65–100)
MAGNESIUM SERPL-MCNC: 1.9 MG/DL (ref 1.6–2.4)
PHOSPHATE SERPL-MCNC: 4.1 MG/DL (ref 2.6–4.7)
POTASSIUM SERPL-SCNC: 4 MMOL/L (ref 3.5–5.1)
PROT SERPL-MCNC: 6.1 G/DL (ref 6.4–8.2)
SODIUM SERPL-SCNC: 138 MMOL/L (ref 136–145)

## 2021-01-15 PROCEDURE — 74011250637 HC RX REV CODE- 250/637: Performed by: OBSTETRICS & GYNECOLOGY

## 2021-01-15 PROCEDURE — C9113 INJ PANTOPRAZOLE SODIUM, VIA: HCPCS | Performed by: PHYSICIAN ASSISTANT

## 2021-01-15 PROCEDURE — 36415 COLL VENOUS BLD VENIPUNCTURE: CPT

## 2021-01-15 PROCEDURE — 80053 COMPREHEN METABOLIC PANEL: CPT

## 2021-01-15 PROCEDURE — 74011250636 HC RX REV CODE- 250/636: Performed by: OBSTETRICS & GYNECOLOGY

## 2021-01-15 PROCEDURE — 87338 HPYLORI STOOL AG IA: CPT

## 2021-01-15 PROCEDURE — 74011250636 HC RX REV CODE- 250/636: Performed by: PHYSICIAN ASSISTANT

## 2021-01-15 PROCEDURE — 84100 ASSAY OF PHOSPHORUS: CPT

## 2021-01-15 PROCEDURE — 83735 ASSAY OF MAGNESIUM: CPT

## 2021-01-15 RX ORDER — PANTOPRAZOLE SODIUM 40 MG/1
40 TABLET, DELAYED RELEASE ORAL
Qty: 30 TAB | Refills: 2 | Status: SHIPPED | OUTPATIENT
Start: 2021-01-15 | End: 2021-08-12 | Stop reason: SDUPTHER

## 2021-01-15 RX ORDER — SUCRALFATE 1 G/1
1 TABLET ORAL
Qty: 120 TAB | Refills: 1 | Status: SHIPPED | OUTPATIENT
Start: 2021-01-15 | End: 2021-06-21

## 2021-01-15 RX ORDER — FACIAL-BODY WIPES
10 EACH TOPICAL ONCE
Status: COMPLETED | OUTPATIENT
Start: 2021-01-15 | End: 2021-01-15

## 2021-01-15 RX ADMIN — SODIUM CHLORIDE 40 MG: 9 INJECTION INTRAMUSCULAR; INTRAVENOUS; SUBCUTANEOUS at 08:20

## 2021-01-15 RX ADMIN — ONDANSETRON HYDROCHLORIDE 8 MG: 2 INJECTION, SOLUTION INTRAMUSCULAR; INTRAVENOUS at 06:01

## 2021-01-15 RX ADMIN — DOCUSATE SODIUM 100 MG: 100 CAPSULE ORAL at 08:20

## 2021-01-15 RX ADMIN — SUCRALFATE 1 G: 1 TABLET ORAL at 11:36

## 2021-01-15 RX ADMIN — ONDANSETRON HYDROCHLORIDE 8 MG: 2 INJECTION, SOLUTION INTRAMUSCULAR; INTRAVENOUS at 13:24

## 2021-01-15 RX ADMIN — BISACODYL 10 MG: 10 SUPPOSITORY RECTAL at 13:24

## 2021-01-15 RX ADMIN — Medication 10 ML: at 13:26

## 2021-01-15 RX ADMIN — FLUOXETINE 60 MG: 20 CAPSULE ORAL at 08:20

## 2021-01-15 RX ADMIN — SUCRALFATE 1 G: 1 TABLET ORAL at 06:55

## 2021-01-15 RX ADMIN — SODIUM CHLORIDE, POTASSIUM CHLORIDE, SODIUM LACTATE AND CALCIUM CHLORIDE 75 ML/HR: 600; 310; 30; 20 INJECTION, SOLUTION INTRAVENOUS at 11:34

## 2021-01-15 RX ADMIN — FLUTICASONE PROPIONATE 2 SPRAY: 50 SPRAY, METERED NASAL at 08:21

## 2021-01-15 RX ADMIN — PROMETHAZINE HYDROCHLORIDE 25 MG: 25 SUPPOSITORY RECTAL at 09:40

## 2021-01-15 RX ADMIN — FERROUS SULFATE TAB 325 MG (65 MG ELEMENTAL FE) 325 MG: 325 (65 FE) TAB at 08:20

## 2021-01-15 RX ADMIN — Medication 10 ML: at 06:01

## 2021-01-15 NOTE — DISCHARGE INSTRUCTIONS
Patient Education        Extreme Nausea and Vomiting in Pregnancy: Care Instructions  Your Care Instructions     Nausea and vomiting (often called morning sickness) are common in pregnancy. They are caused by pregnancy hormones and happen most often in the first 3 months. Some women get very sick and are not able to keep down food and fluids. This extreme morning sickness is called hyperemesis gravidarum. It can lead to a dangerous loss of fluids in the body. It also can keep you from gaining weight and getting proper nutrition during your pregnancy. Your body fluids are put back in balance with water and minerals called electrolytes. Medicine may help if you have severe nausea and vomiting. Follow-up care is a key part of your treatment and safety. Be sure to make and go to all appointments, and call your doctor if you are having problems. It's also a good idea to know your test results and keep a list of the medicines you take. How can you care for yourself at home? · Take your medicines exactly as prescribed. Call your doctor if you think you are having a problem with your medicine. · Drink plenty of fluids to prevent dehydration. Choose water and other caffeine-free clear liquids until you feel better. Try sipping on sports drinks that have salt and sugar in them. · Eat a small snack, such as crackers, before you get out of bed. Wait a few minutes, then get out of bed slowly. · Keep food in your stomach, but not too much at once. An empty stomach can make nausea worse. Eat several small meals every day instead of three large meals. · Eat more protein and less fat. · Get plenty of vitamin B6 by eating whole grains, nuts, seeds, and legumes. You can take vitamin B6 tablets if your doctor says it is okay. · Try to avoid smells and foods that make you feel sick to your stomach. · Get lots of rest.  · You may want to try acupressure bands.  They put pressure on an acupressure point in the wrist. Some women feel better using the bands. · Magalis may also help you feel better. You can use it in tea, take it as a pill, or use a magalis syrup that you can buy at a health food store. When should you call for help? Call 911 anytime you think you may need emergency care. For example, call if:    · You passed out (lost consciousness). Call your doctor now or seek immediate medical care if:    · You are sick to your stomach or cannot drink fluids.     · You have symptoms of dehydration, such as:  ? Dry eyes and a dry mouth. ? Passing only a little urine. ? Feeling thirstier than usual.     · You are not able to keep down your medicines.     · You have pain in your belly or pelvis. Watch closely for changes in your health, and be sure to contact your doctor if:    · You do not get better as expected. Where can you learn more? Go to http://www.gray.com/  Enter Z332 in the search box to learn more about \"Extreme Nausea and Vomiting in Pregnancy: Care Instructions. \"  Current as of: February 11, 2020               Content Version: 12.6  © 6984-3838 [employer]. Care instructions adapted under license by [employer] (which disclaims liability or warranty for this information). If you have questions about a medical condition or this instruction, always ask your healthcare professional. [address] any warranty or liability for your use of this information.

## 2021-01-15 NOTE — PROGRESS NOTES
I have reviewed discharge instructions and medication with the patient. The patient verbalized understanding.

## 2021-01-15 NOTE — PROGRESS NOTES
Problem: Falls - Risk of  Goal: *Absence of Falls  Description: Document Abdifatah Malin Fall Risk and appropriate interventions in the flowsheet. Outcome: Progressing Towards Goal  Note: Fall Risk Interventions:            Medication Interventions: Teach patient to arise slowly         History of Falls Interventions:  Investigate reason for fall

## 2021-01-15 NOTE — PROGRESS NOTES
Bedside and Verbal shift change report given to Maria C Barger (oncoming nurse) by Shani Camara (offgoing nurse). Report included the following information SBAR, Kardex, Intake/Output, MAR and Recent Results. 1045: MD at bedside. Patient likey to discharge today. MD to communicate with [facility]. 1100: Per case management, pt ok to d/c today    1410: stool sample sent for h. Pylori lab. Home health supplies schedule to be delivered to hospital at 1630. Estimated discharge at 1700.    1530: Bedside and Verbal shift change report given to Χλόης Jagjit (oncoming nurse) by Maria C Barger (offgoing nurse). Report included the following information SBAR, Kardex, Intake/Output, MAR and Recent Results.

## 2021-01-15 NOTE — DISCHARGE SUMMARY
Obstetrical Discharge Summary     Name: Chavez Rausch MRN: [id number]  SSN: [id number]    YOB: 1985  Age: 38 y.o. Sex: female      Allergies: Labetalol, Ceclor [cefaclor], Pcn [penicillins], and Septra [sulfamethoprim ds]    Admit Date: 12/30/2020    Discharge Date: 1/15/2021     Admitting Physician: Haydee Fam DO     Attending Physician:  Delma Dorman DO     * Admission Diagnoses: Nausea and vomiting in pregnancy [O21.9]  Nausea/vomiting in pregnancy [O21.9]  Eating disorder affecting pregnancy, antepartum [O99.340, F50.9]    * Discharge Diagnoses: This patient has no babies on file.      Additional Diagnoses:   Hospital Problems as of 1/15/2021 Date Reviewed: 12/28/2020          Codes Class Noted - Resolved POA    Severe protein-calorie malnutrition ([facility] [address] ICD-10-CM: E43  ICD-9-CM: 381  1/8/2021 - Present Yes        Nausea/vomiting in pregnancy ICD-10-CM: O21.9  ICD-9-CM: 643.90  1/2/2021 - Present Unknown        * (Principal) Nausea and vomiting in pregnancy ICD-10-CM: O21.9  ICD-9-CM: 643.90  12/2/2020 - Present Unknown        Eating disorder affecting pregnancy, antepartum ICD-10-CM: O99.340, F50.9  ICD-9-CM: 646.83, 307.50  12/2/2020 - Present Unknown             Lab Results   Component Value Date/Time    ABO/Rh(D) O POSITIVE 01/17/2020 07:09 AM    Rubella, External Immune 11/24/2020    GrBStrep, External Negative 02/20/2018    ABO,Rh O positive 11/25/2020      Immunization History   Administered Date(s) Administered    (RETIRED) Pneumococcal Vaccine (Unspecified Type) 09/14/2012    Influenza Vaccine (Quad) PF (>6 Mo Flulaval, Fluarix, and >3 Yrs Afluria, Fluzone 85962) 09/16/2020    Influenza Vaccine PF 10/22/2013    TDAP Vaccine 09/14/2012    Tdap 03/02/2018       * Procedures: cerclage  Procedure(s) with comments:  CERCLAGE - ED[facility] 7/4/21           * Discharge Condition: stable    * Hospital Course: Patient underwent further evaluation of n/v eating disorder with GI, surgery and ENT. Pt will need to follow up after delivery for surgical management of her septal polyp. Pt will be discharged home on TPN and prn iv zofran/ivf's and will continue previous home meds. * Disposition: Home    Discharge Medications:   Current Discharge Medication List      START taking these medications    Details   sucralfate (CARAFATE) 1 gram tablet Take 1 Tab by mouth Before breakfast, lunch, dinner and at bedtime. Qty: 120 Tab, Refills: 1         CONTINUE these medications which have CHANGED    Details   pantoprazole (PROTONIX) 40 mg tablet Take 1 Tab by mouth Daily (before breakfast). Indications: gastroesophageal reflux disease  Qty: 30 Tab, Refills: 2         CONTINUE these medications which have NOT CHANGED    Details   Lacto. acidophilus-Bif. animalis (Probiotic) 5 billion cell cpSP Take 1 Cap by mouth daily. Qty: 30 Cap, Refills: 1      !! FLUoxetine (PROzac) 20 mg capsule Take 1 Cap by mouth daily. Take with 40 mg cap for total daily dose of 60 mg.  Qty: 30 Cap, Refills: 2      prochlorperazine (COMPAZINE) 10 mg tablet       promethazine (Phenergan) 12.5 mg suppository       ondansetron (ZOFRAN ODT) 4 mg disintegrating tablet Take 1 Tab by mouth every six (6) hours as needed for Nausea or Vomiting. Qty: 30 Tab, Refills: 3      0.9% sodium chloride solp 1,000 mL with thiamine 100 mg/mL soln 793 mg, folic acid 5 mg/mL soln 1 mg 1 Bag by IntraVENous route every twenty-four (24) hours. Qty: 30 Bag, Refills: 2      OLANZapine (ZyPREXA) 5 mg tablet Take 1 Tab by mouth nightly. Qty: 30 Tab, Refills: 1      cholecalciferol (Vitamin D3) 25 mcg (1,000 unit) cap Take  by mouth daily. pyridoxine, vitamin B6, (Vitamin B-6) 100 mg tablet Take 100 mg by mouth daily. hydrOXYzine HCL (ATARAX) 50 mg tablet Take 1 Tab by mouth four (4) times daily. Qty: 90 Tab, Refills: 0      !! FLUoxetine (PROzac) 40 mg capsule Take 1 Cap by mouth daily.   Qty: 30 Cap, Refills: 2      doxylamine succinate (UNISOM) 25 mg tablet Take 1 Tab by mouth nightly as needed for Insomnia. Qty: 30 Tab, Refills: 2      !! OTHER 1 Ensure Enlive food supplement drink PO TID  Qty: 270 Each, Refills: 2    Associated Diagnoses: Eating disorder, unspecified type; Weight loss      calcium carbonate (Tums) 200 mg calcium (500 mg) chew Take 1 Tab by mouth two (2) times daily as needed for Other (reflux). Qty: 60 Tab, Refills: 1    Associated Diagnoses: Gastroesophageal reflux disease, unspecified whether esophagitis present      polyethylene glycol (MIRALAX) 17 gram packet Take 1 Packet by mouth daily as needed for Constipation. Qty: 90 Each, Refills: 1    Associated Diagnoses: Constipation, unspecified constipation type      !! OTHER 1 ensure pudding PO daily for lunch. Qty: 90 Each, Refills: 3    Associated Diagnoses: Eating disorder, unspecified type      prenatal vit-iron fumarate-fa (PRENATAL PLUS with IRON) 28 mg iron- 800 mcg tab       food supplemt, lactose-reduced (Ensure High Protein) liqd Take 237 mL by mouth three (3) times daily. Qty: 90 Can, Refills: 2    Associated Diagnoses: Eating disorder, unspecified type      prazosin (MINIPRESS) 2 mg capsule Take 1 Cap by mouth nightly. Indications: posttraumatic stress syndrome  Qty: 30 Cap, Refills: 0       !! - Potential duplicate medications found. Please discuss with provider. STOP taking these medications       progesterone (PROMETRIUM) 200 mg capsule Comments:   Reason for Stopping:               * Follow-up Care/Patient Instructions: Activity: no heavy lifting or excessive exercise. Diet: regular diet as tolerated but currently requiring TPN for dietary requirements.    Wound Care: None needed    Follow-up Information     Follow up With Specialties Details Why Contact Lori shell  On 1/18/2021      Edis Barrios NP Nurse Practitioner   [address]  [address], [address] [facility] Gynecology, Gynecology, Obstetrics In 3 days 3-5 days follow up in office Garrett Hicks [address]  [phone]

## 2021-01-15 NOTE — PROGRESS NOTES
Bedside shift change report given to Jed Vargas (oncoming nurse) by Paul Carter (offgoing nurse). Report included the following information SBAR, Kardex, Intake/Output and MAR.

## 2021-01-15 NOTE — DISCHARGE SUMMARY
Obstetrical Discharge Summary Name: Giuliana Fortune MRN: [id number]  SSN: [id number] YOB: 1985  Age: 38 y.o. Sex: female Allergies: Labetalol, Ceclor [cefaclor], Pcn [penicillins], and Septra [sulfamethoprim ds] Admit Date: 12/30/2020 Discharge Date: 1/15/2021 Admitting Physician: Nick Alexander DO Attending Physician:  Telma Castillo DO  
 
* Admission Diagnoses: Nausea and vomiting in pregnancy [O21.9] Nausea/vomiting in pregnancy [O21.9] Eating disorder affecting pregnancy, antepartum [O99.340, F50.9] * Discharge Diagnoses: This patient has no babies on file. Additional Diagnoses:  
Hospital Problems as of 1/15/2021 Date Reviewed: 12/28/2020 Codes Class Noted - Resolved POA Severe protein-calorie malnutrition ([facility] [address] ICD-10-CM: K64 ICD-9-CM: 764  1/8/2021 - Present Yes Nausea/vomiting in pregnancy ICD-10-CM: O21.9 ICD-9-CM: 643.90  1/2/2021 - Present Unknown * (Principal) Nausea and vomiting in pregnancy ICD-10-CM: O21.9 ICD-9-CM: 643.90  12/2/2020 - Present Unknown Eating disorder affecting pregnancy, antepartum ICD-10-CM: O99.340, F50.9 ICD-9-CM: 646.83, 307.50  12/2/2020 - Present Unknown Lab Results Component Value Date/Time ABO/Rh(D) O POSITIVE 01/17/2020 07:09 AM  
 Rubella, External Immune 11/24/2020 GrBStrep, External Negative 02/20/2018 ABO,Rh O positive 11/25/2020 Immunization History Administered Date(s) Administered  (RETIRED) Pneumococcal Vaccine (Unspecified Type) 09/14/2012  Influenza Vaccine [employer]) PF (>6 Mo Flulaval, Fluarix, and >[address] [place] [address]) 09/16/2020  Influenza Vaccine PF 10/22/2013  TDAP Vaccine 09/14/2012  Tdap 03/02/2018 * Procedures: *** Procedure(s) with comments: CERCLAGE [facility] 7/4/21 * Discharge Condition: {condition:44474934} [facility] Course: {HOSP XZXCKQ:60056225} * Disposition: {disposition:00055505} Discharge Medications:  
Current Discharge Medication List  
  
START taking these medications Details  
sucralfate (CARAFATE) 1 gram tablet Take 1 Tab by mouth Before breakfast, lunch, dinner and at bedtime. Qty: 120 Tab, Refills: 1 CONTINUE these medications which have CHANGED Details  
pantoprazole (PROTONIX) 40 mg tablet Take 1 Tab by mouth Daily (before breakfast). Indications: gastroesophageal reflux disease Qty: 30 Tab, Refills: 2 CONTINUE these medications which have NOT CHANGED Details Lacto. acidophilus-Bif. animalis (Probiotic) 5 billion cell cpSP Take 1 Cap by mouth daily. Qty: 30 Cap, Refills: 1  
  
!! FLUoxetine (PROzac) 20 mg capsule Take 1 Cap by mouth daily. Take with 40 mg cap for total daily dose of 60 mg. 
Qty: 30 Cap, Refills: 2  
  
prochlorperazine (COMPAZINE) 10 mg tablet   
  
promethazine (Phenergan) 12.5 mg suppository   
  
ondansetron (ZOFRAN ODT) 4 mg disintegrating tablet Take 1 Tab by mouth every six (6) hours as needed for Nausea or Vomiting. Qty: 30 Tab, Refills: 3  
  
0.9% sodium chloride solp 1,000 mL with thiamine 100 mg/mL soln 186 mg, folic acid 5 mg/mL soln 1 mg 1 Bag by IntraVENous route every twenty-four (24) hours. Qty: 30 Bag, Refills: 2 OLANZapine (ZyPREXA) 5 mg tablet Take 1 Tab by mouth nightly. Qty: 30 Tab, Refills: 1 cholecalciferol (Vitamin D3) 25 mcg (1,000 unit) cap Take  by mouth daily. pyridoxine, vitamin B6, (Vitamin B-6) 100 mg tablet Take 100 mg by mouth daily. hydrOXYzine HCL (ATARAX) 50 mg tablet Take 1 Tab by mouth four (4) times daily. Qty: 90 Tab, Refills: 0  
  
!! FLUoxetine (PROzac) 40 mg capsule Take 1 Cap by mouth daily. Qty: 30 Cap, Refills: 2  
  
doxylamine succinate (UNISOM) 25 mg tablet Take 1 Tab by mouth nightly as needed for Insomnia. Qty: 30 Tab, Refills: 2  
  
!! OTHER 1 Ensure Enlive food supplement drink PO TID Qty: 270 Each, Refills: 2  Associated Diagnoses: Eating disorder, unspecified type; Weight loss  
  
calcium carbonate (Tums) 200 mg calcium (500 mg) chew Take 1 Tab by mouth two (2) times daily as needed for Other (reflux). Qty: 60 Tab, Refills: 1 Associated Diagnoses: Gastroesophageal reflux disease, unspecified whether esophagitis present  
  
polyethylene glycol (MIRALAX) 17 gram packet Take 1 Packet by mouth daily as needed for Constipation. Qty: 90 Each, Refills: 1 Associated Diagnoses: Constipation, unspecified constipation type  
  
!! OTHER 1 ensure pudding PO daily for lunch. Qty: 90 Each, Refills: 3 Associated Diagnoses: Eating disorder, unspecified type  
  
prenatal vit-iron fumarate-fa (PRENATAL PLUS with IRON) 28 mg iron- 800 mcg tab   
  
food supplemt, lactose-reduced (Ensure High Protein) liqd Take 237 mL by mouth three (3) times daily. Qty: 90 Can, Refills: 2 Associated Diagnoses: Eating disorder, unspecified type  
  
prazosin (MINIPRESS) 2 mg capsule Take 1 Cap by mouth nightly. Indications: posttraumatic stress syndrome 
Qty: 30 Cap, Refills: 0  
  
 !! - Potential duplicate medications found. Please discuss with provider. STOP taking these medications  
  
 progesterone (PROMETRIUM) 200 mg capsule Comments:  
Reason for Stopping:   
   
  
 
 
* Follow-up Care/Patient Instructions: Activity: {discharge activity:13866} Diet: {diet:24428} Wound Care: {wound care:10991} Follow-up Information Follow up With Specialties Details Why Contact Lori shell  On 1/18/2021

## 2021-01-15 NOTE — PROGRESS NOTES
High Risk Obstetrics Progress Note    Name: Gerhard Silva MRN: [id number]  SSN: [id number]    YOB: 1985  Age: 38 y.o. Sex: female      Subjective:      LOS: 14 days    Estimated Date of Delivery: 21   Gestational Age Today: 16w9d     Patient admitted for nausea, vomiting and chronic eating disorder in pregnancy. States she does have nausea and vomiting and mild lower abdominal cramping. She states that she does still havint some left arm soreness at PICC site and does not have chest pain, shortness of breath and vaginal bleeding . Pt states she is ready to go home. Plans to go to her house (not fiance's). States she gave her ex-'s sister as her emergency contact for home health. Pt denies any suicidal/homicidal ideation. Objective:     Vitals:  Blood pressure (!) 140/82, pulse 87, temperature 98.6 °F (37 °C), resp. rate 16, height 5' 4\" (1.626 m), weight 75.5 kg (166 lb 6.4 oz), last menstrual period 2020, SpO2 100 %, not currently breastfeeding. Temp (24hrs), Av.5 °F (36.9 °C), Min:98.3 °F (36.8 °C), Max:98.7 °F (88.3 °C)    Systolic (01EDC), FDQ:247 , Min:97 , XTS:703      Diastolic (79IUI), VPQ:18, Min:54, Max:82       Intake and Output:     Date 01/15/21 0700 - 21 0659   Shift 6048-2396 7847-2971 5925-2899 24 Hour Total   INTAKE   I.V.(mL/kg/hr) 75   75   Shift Total(mL/kg) 75(1)   75(1)   OUTPUT   Shift Total(mL/kg)       Weight (kg) 75.5 75.5 75.5 75.5       Physical Exam:  Patient without distress.   Heart: Regular rate and rhythm  Lung: clear to auscultation throughout lung fields, no wheezes, no rales, no rhonchi and normal respiratory effort  Abdomen: soft, nontender, gravid  Lower Extremities:  - Edema No       Membranes:  Intact      Labs:   Recent Results (from the past 36 hour(s))   METABOLIC PANEL, COMPREHENSIVE    Collection Time: 21  6:17 AM   Result Value Ref Range    Sodium 139 136 - 145 mmol/L    Potassium 3.6 3.5 - 5.1 mmol/L Chloride 108 97 - 108 mmol/L    CO2 26 21 - 32 mmol/L    Anion gap 5 5 - 15 mmol/L    Glucose 70 65 - 100 mg/dL    BUN 8 6 - 20 MG/DL    Creatinine 0.39 (L) 0.55 - 1.02 MG/DL    BUN/Creatinine ratio 21 (H) 12 - 20      GFR est AA >60 >60 ml/min/1.73m2    GFR est non-AA >60 >60 ml/min/1.73m2    Calcium 8.0 (L) 8.5 - 10.1 MG/DL    Bilirubin, total 0.2 0.2 - 1.0 MG/DL    ALT (SGPT) 11 (L) 12 - 78 U/L    AST (SGOT) 7 (L) 15 - 37 U/L    Alk. phosphatase 38 (L) 45 - 117 U/L    Protein, total 5.9 (L) 6.4 - 8.2 g/dL    Albumin 2.5 (L) 3.5 - 5.0 g/dL    Globulin 3.4 2.0 - 4.0 g/dL    A-G Ratio 0.7 (L) 1.1 - 2.2     MAGNESIUM    Collection Time: 01/14/21  6:17 AM   Result Value Ref Range    Magnesium 1.9 1.6 - 2.4 mg/dL   PHOSPHORUS    Collection Time: 01/14/21  6:17 AM   Result Value Ref Range    Phosphorus 3.7 2.6 - 4.7 MG/DL   METABOLIC PANEL, COMPREHENSIVE    Collection Time: 01/15/21  7:40 AM   Result Value Ref Range    Sodium 138 136 - 145 mmol/L    Potassium 4.0 3.5 - 5.1 mmol/L    Chloride 110 (H) 97 - 108 mmol/L    CO2 25 21 - 32 mmol/L    Anion gap 3 (L) 5 - 15 mmol/L    Glucose 72 65 - 100 mg/dL    BUN 9 6 - 20 MG/DL    Creatinine 0.40 (L) 0.55 - 1.02 MG/DL    BUN/Creatinine ratio 23 (H) 12 - 20      GFR est AA >60 >60 ml/min/1.73m2    GFR est non-AA >60 >60 ml/min/1.73m2    Calcium 8.0 (L) 8.5 - 10.1 MG/DL    Bilirubin, total 0.1 (L) 0.2 - 1.0 MG/DL    ALT (SGPT) 23 12 - 78 U/L    AST (SGOT) 18 15 - 37 U/L    Alk.  phosphatase 41 (L) 45 - 117 U/L    Protein, total 6.1 (L) 6.4 - 8.2 g/dL    Albumin 2.6 (L) 3.5 - 5.0 g/dL    Globulin 3.5 2.0 - 4.0 g/dL    A-G Ratio 0.7 (L) 1.1 - 2.2     MAGNESIUM    Collection Time: 01/15/21  7:40 AM   Result Value Ref Range    Magnesium 1.9 1.6 - 2.4 mg/dL   PHOSPHORUS    Collection Time: 01/15/21  7:40 AM   Result Value Ref Range    Phosphorus 4.1 2.6 - 4.7 MG/DL       Assessment and Plan:      Principal Problem:    Nausea and vomiting in pregnancy (12/2/2020)    Active Problems: Eating disorder affecting pregnancy, antepartum (12/2/2020)      Nausea/vomiting in pregnancy (1/2/2021)      Severe protein-calorie malnutrition ([address] (1/8/2021)       37 y/o [id number] at 15 5/7 weeks by [facility] 7/4/2021 readmitted for intractable nausea/vomiting in pregnancy in light of chronic eating disorder     1.  N/V/Dehydration/weight loss since discharge from hospital             -Iv antiemetics and phenergan suppositories prn             -ivf's             -s/p Nutrition consult (pt saw her nutritionist Gaby Hay on 12/29) for supplement orders.                          - Now on TPN and tolerating well and gaining weight. Pharmacy and nutritionist helping with management             -Pt has PICC line in place-checked by PICC team yesterday  due to left arm pain/throbbing             -pt s/p GI consult and normal EGD 1/7             -HPylori stool culture pending (sample had not been sent as of 1/13)             -ZD s/p sugery consult -still holding off on cholecystectomy for gallstones for now   2. Eating Disorder and Depression/Anxiety             -Thrombocytopenia resolved on 1/2 and stable 1/3             -recent h/o admission for suicidal ideation-stable currently-pt denies any current si/hi.              -Continue Prozac 60mg daily and Zyprexa 5mg daily             -Continue prn hydoxyzine and prazosin             -Pt followed closely by psychiatrist Meri Silverman (last appt           12/28)              -had appt with psychologist by telehealth at 1/11/2021 (Chante Peters)-             -Will continue to encourage small, frequent meals.     3. CHTN-normal to low bp's off meds since weight loss     4. Anemia-know beta thalassemia minor and iron deficiency             anemia-s/p iv iron infusions during last admission             -oral iron bid     5.  Hip pain/pelvic girdle weakness             -consult PT             -pelvic support belt     6.  H/o incompetent cervix             -s/p cerclage placement 1/7/2021     7. DVT prophylaxis-MEGHANA hose and ambulation with assistance (pt is fall risk)     8. Daily FHTs.       9. Dispo-plan for discharge home today pending home health set up.

## 2021-01-17 LAB
H PYLORI AG STL QL IA: NEGATIVE
SPECIMEN SOURCE: NORMAL

## 2021-01-26 ENCOUNTER — VIRTUAL VISIT (OUTPATIENT)
Dept: BEHAVIORAL/MENTAL HEALTH CLINIC | Age: 36
End: 2021-01-26
Payer: MEDICAID

## 2021-01-26 DIAGNOSIS — F41.1 GAD (GENERALIZED ANXIETY DISORDER): ICD-10-CM

## 2021-01-26 DIAGNOSIS — F32.2 CURRENT SEVERE EPISODE OF MAJOR DEPRESSIVE DISORDER WITHOUT PSYCHOTIC FEATURES WITHOUT PRIOR EPISODE (HCC): Primary | ICD-10-CM

## 2021-01-26 DIAGNOSIS — F43.11 POST-TRAUMATIC STRESS DISORDER, ACUTE: ICD-10-CM

## 2021-01-26 PROCEDURE — 99213 OFFICE O/P EST LOW 20 MIN: CPT | Performed by: NURSE PRACTITIONER

## 2021-01-26 RX ORDER — FLUOXETINE HYDROCHLORIDE 40 MG/1
40 CAPSULE ORAL DAILY
Qty: 30 CAP | Refills: 2 | Status: SHIPPED | OUTPATIENT
Start: 2021-01-26 | End: 2021-03-01 | Stop reason: SDUPTHER

## 2021-01-26 RX ORDER — OLANZAPINE 10 MG/1
10 TABLET ORAL
Qty: 30 TAB | Refills: 2 | Status: SHIPPED | OUTPATIENT
Start: 2021-01-26 | End: 2021-03-01 | Stop reason: SDUPTHER

## 2021-01-26 NOTE — PROGRESS NOTES
CHIEF COMPLAINT:  Kenny Dacosta is a 38 y.o. female and was seen today for follow-up of psychiatric condition and psychotropic medication management. HPI:    Marla Rogers reports the following psychiatric symptoms by hx:  depression and anxiety. Overall symptoms have been present for months. Currently both are of moderate/high severity. The symptoms occur somewhat less severely but she has symptoms most days. She reports an occasional \"good day\". Symptoms made worse by chronic nausea. Pt reports medications are helpful. Met with pt via video telehelath for appt today. FAMILY/SOCIAL HX: no updates    REVIEW OF SYSTEMS:  Psychiatric:  depression, anxiety  Appetite:decreased, now on TPN   Sleep: fitful and poor with DIMS (difficulty initiating & maintaining sleep)   Neuro: no updatea    Visit Vitals  LMP 09/27/2020 (Exact Date)       Side Effects:  none    MENTAL STATUS EXAM:   Sensorium  oriented to time, place and person   Relations cooperative   Appearance:  age appropriate and casually dressed   Motor Behavior:  hypoactive and within normal limits   Speech:  hypoverbal, monotone and soft   Thought Process: goal directed   Thought Content free of delusions and free of hallucinations   Suicidal ideations no intention   Homicidal ideations no intention   Mood:  depressed   Affect:  depressed   Memory recent  adequate   Memory remote:  adequate   Concentration:  adequate   Abstraction:  abstract   Insight:  fair   Reliability fair   Judgment:  fair     MEDICAL DECISION MAKING:  Problems addressed today:    ICD-10-CM ICD-9-CM    1. Current severe episode of major depressive disorder without psychotic features without prior episode [facility]  F32.2 296.23    2. Post-traumatic stress disorder, acute  F43.11 309.81    3. DESTINI (generalized anxiety disorder)  F41.1 300.02        Assessment:   Marla Rogers is responding to treatment but only somewhat. Symptoms are still present more days than not and mod/high severity. Discussed current medications and dosages. Will increase olanzapine to 10 mg. Pt reports she has been using hydroxyzine prn with some benefit. Reviewed treatment goals and target symptoms to monitor for. Reviewed importance of ind therapy. Plan:   1. Current Outpatient Medications   Medication Sig Dispense Refill    FLUoxetine (PROzac) 40 mg capsule Take 1 Cap by mouth daily. 30 Cap 2    OLANZapine (ZyPREXA) 10 mg tablet Take 1 Tab by mouth nightly. 30 Tab 2    pantoprazole (PROTONIX) 40 mg tablet Take 1 Tab by mouth Daily (before breakfast). Indications: gastroesophageal reflux disease 30 Tab 2    sucralfate (CARAFATE) 1 gram tablet Take 1 Tab by mouth Before breakfast, lunch, dinner and at bedtime. 120 Tab 1    Lacto. acidophilus-Bif. animalis (Probiotic) 5 billion cell cpSP Take 1 Cap by mouth daily. 30 Cap 1    FLUoxetine (PROzac) 20 mg capsule Take 1 Cap by mouth daily. Take with 40 mg cap for total daily dose of 60 mg. 30 Cap 2    prochlorperazine (COMPAZINE) 10 mg tablet       promethazine (Phenergan) 12.5 mg suppository       ondansetron (ZOFRAN ODT) 4 mg disintegrating tablet Take 1 Tab by mouth every six (6) hours as needed for Nausea or Vomiting. 30 Tab 3    0.9% sodium chloride solp 1,000 mL with thiamine 100 mg/mL soln 734 mg, folic acid 5 mg/mL soln 1 mg 1 Bag by IntraVENous route every twenty-four (24) hours. 30 Bag 2    cholecalciferol (Vitamin D3) 25 mcg (1,000 unit) cap Take  by mouth daily.  pyridoxine, vitamin B6, (Vitamin B-6) 100 mg tablet Take 100 mg by mouth daily.  hydrOXYzine HCL (ATARAX) 50 mg tablet Take 1 Tab by mouth four (4) times daily. 90 Tab 0    doxylamine succinate (UNISOM) 25 mg tablet Take 1 Tab by mouth nightly as needed for Insomnia. 30 Tab 2    OTHER 1 Ensure Enlive food supplement drink PO  Each 2    calcium carbonate (Tums) 200 mg calcium (500 mg) chew Take 1 Tab by mouth two (2) times daily as needed for Other (reflux).  60 Tab 1    polyethylene glycol (MIRALAX) 17 gram packet Take 1 Packet by mouth daily as needed for Constipation. 90 Each 1    OTHER 1 ensure pudding PO daily for lunch. 90 Each 3    prenatal vit-iron fumarate-fa (PRENATAL PLUS with IRON) 28 mg iron- 800 mcg tab       food supplemt, lactose-reduced (Ensure High Protein) liqd Take 237 mL by mouth three (3) times daily. 90 Can 2    prazosin (MINIPRESS) 2 mg capsule Take 1 Cap by mouth nightly. Indications: posttraumatic stress syndrome 30 Cap 0          medication changes made today: cont prozac and hydroxyzine, cont prazosin, inc olanzapine    2. Counseling and coordination of care including instructions for treatment, risks/benefits, risk factor reduction and patient/family education. She agrees with the plan. Patient instructed to call with any side effects, questions or issues. 3.    Follow-up and Dispositions    · Return in about 4 weeks (around 2/23/2021). Magalis Man, who was evaluated through a synchronous (real-time) audio-video encounter, and/or her healthcare decision maker, is aware that it is a billable service, with coverage as determined by her insurance carrier. She provided verbal consent to proceed: Yes, and patient identification was verified. It was conducted pursuant to the emergency declaration under the [address] [place] [address], [address] [place] [address] authority and the [employer] Act. A caregiver was present when appropriate. Ability to conduct physical exam was limited. I was in the office. The patient was at home.             1/26/2021  Ofelia Donis NP

## 2021-02-11 ENCOUNTER — APPOINTMENT (OUTPATIENT)
Dept: NUTRITION | Age: 36
End: 2021-02-11

## 2021-02-13 DIAGNOSIS — K21.9 GASTROESOPHAGEAL REFLUX DISEASE, UNSPECIFIED WHETHER ESOPHAGITIS PRESENT: ICD-10-CM

## 2021-02-17 NOTE — TELEPHONE ENCOUNTER
PCP: Trupti Garcia NP    Last appt: 12/28/2020  Future Appointments   Date Time Provider Maria Elena Reed   2/18/2021 10:00 AM ULTRASOUND [address] [facility]   3/1/2021  3:00 PM Megan Ortiz, [facility] BS AMB       Requested Prescriptions     Pending Prescriptions Disp Refills    calcium carbonate (Tums) 200 mg calcium (500 mg) chew 60 Tab 1     Sig: Take 1 Tab by mouth two (2) times daily as needed for Other (reflux).        Prior labs and Blood pressures:  BP Readings from Last 3 Encounters:   01/15/21 (!) 100/57   12/28/20 115/60   12/17/20 (!) 101/56     Lab Results   Component Value Date/Time    Sodium 138 01/15/2021 07:40 AM    Potassium 4.0 01/15/2021 07:40 AM    Chloride 110 (H) 01/15/2021 07:40 AM    CO2 25 01/15/2021 07:40 AM    Anion gap 3 (L) 01/15/2021 07:40 AM    Glucose 72 01/15/2021 07:40 AM    BUN 9 01/15/2021 07:40 AM    Creatinine 0.40 (L) 01/15/2021 07:40 AM    BUN/Creatinine ratio 23 (H) 01/15/2021 07:40 AM    GFR est AA >60 01/15/2021 07:40 AM    GFR est non-AA >60 01/15/2021 07:40 AM    Calcium 8.0 (L) 01/15/2021 07:40 AM     Lab Results   Component Value Date/Time    Hemoglobin A1c 5.2 02/18/2020 11:08 AM     Lab Results   Component Value Date/Time    Cholesterol, total 172 02/18/2020 11:08 AM    HDL Cholesterol 52 02/18/2020 11:08 AM    LDL, calculated 97 02/18/2020 11:08 AM    VLDL, calculated 23 02/18/2020 11:08 AM    Triglyceride 113 02/18/2020 11:08 AM     Lab Results   Component Value Date/Time    Vitamin D 25-Hydroxy 21.6 (L) 11/27/2020 08:42 AM       Lab Results   Component Value Date/Time    TSH 0.25 (L) 12/04/2020 03:37 AM

## 2021-02-18 RX ORDER — CALCIUM CARBONATE 200(500)MG
1 TABLET,CHEWABLE ORAL
Qty: 60 TAB | Refills: 1 | Status: SHIPPED | OUTPATIENT
Start: 2021-02-18 | End: 2021-07-12 | Stop reason: SDUPTHER

## 2021-02-19 DIAGNOSIS — K59.00 CONSTIPATION, UNSPECIFIED CONSTIPATION TYPE: ICD-10-CM

## 2021-02-23 RX ORDER — POLYETHYLENE GLYCOL 3350 17 G/17G
17 POWDER, FOR SOLUTION ORAL
Qty: 90 EACH | Refills: 0 | Status: SHIPPED | OUTPATIENT
Start: 2021-02-23 | End: 2021-07-13 | Stop reason: SDUPTHER

## 2021-03-01 ENCOUNTER — VIRTUAL VISIT (OUTPATIENT)
Dept: BEHAVIORAL/MENTAL HEALTH CLINIC | Age: 36
End: 2021-03-01
Payer: MEDICAID

## 2021-03-01 DIAGNOSIS — F43.11 POST-TRAUMATIC STRESS DISORDER, ACUTE: ICD-10-CM

## 2021-03-01 DIAGNOSIS — F41.1 GAD (GENERALIZED ANXIETY DISORDER): ICD-10-CM

## 2021-03-01 DIAGNOSIS — F32.2 CURRENT SEVERE EPISODE OF MAJOR DEPRESSIVE DISORDER WITHOUT PSYCHOTIC FEATURES WITHOUT PRIOR EPISODE (HCC): Primary | ICD-10-CM

## 2021-03-01 PROCEDURE — 99214 OFFICE O/P EST MOD 30 MIN: CPT | Performed by: NURSE PRACTITIONER

## 2021-03-01 RX ORDER — FLUOXETINE HYDROCHLORIDE 20 MG/1
20 CAPSULE ORAL DAILY
Qty: 30 CAP | Refills: 2 | Status: SHIPPED | OUTPATIENT
Start: 2021-03-01 | End: 2021-08-09 | Stop reason: SDUPTHER

## 2021-03-01 RX ORDER — OLANZAPINE 10 MG/1
10 TABLET ORAL
Qty: 30 TAB | Refills: 2 | Status: SHIPPED | OUTPATIENT
Start: 2021-03-01 | End: 2021-08-09 | Stop reason: SDUPTHER

## 2021-03-01 RX ORDER — FLUOXETINE HYDROCHLORIDE 40 MG/1
40 CAPSULE ORAL DAILY
Qty: 30 CAP | Refills: 2 | Status: SHIPPED | OUTPATIENT
Start: 2021-03-01 | End: 2021-08-09 | Stop reason: SDUPTHER

## 2021-03-01 RX ORDER — PRAZOSIN HYDROCHLORIDE 2 MG/1
2 CAPSULE ORAL
Qty: 30 CAP | Refills: 0 | Status: SHIPPED | OUTPATIENT
Start: 2021-03-01 | End: 2021-08-09 | Stop reason: SDUPTHER

## 2021-03-01 NOTE — PROGRESS NOTES
CHIEF COMPLAINT:  Herlinda Smart is a 38 y.o. female and was seen today for follow-up of psychiatric condition and psychotropic medication management. HPI:    Harinder Madden reports the following psychiatric symptoms by hx: grief, depression and anxiety. Overall symptoms have been present for months. Currently symptoms are of moderate/high severity. The symptoms occur most days. Pt reports medications are somewhat beneficial. She has had ongoing N/V which makes keeping prozac down challenging. She plans to move dose admin time until after anti nausea medication. Met with pt via video telehelath for appt today. FAMILY/SOCIAL HX: psychosocial stressors    REVIEW OF SYSTEMS:  Psychiatric:  depression, anxiety, grief  Appetite:decreased, limited PO intake   Sleep: poor with DIMS (difficulty initiating & maintaining sleep), increased nightmares   Neuro: denies updates    Visit Vitals  LMP 09/27/2020 (Exact Date)       Side Effects:  none    MENTAL STATUS EXAM:   Sensorium  oriented to time, place and person   Relations cooperative   Appearance:  age appropriate and casually dressed   Motor Behavior:  gait stable and within normal limits   Speech:  monotone and soft   Thought Process: goal directed   Thought Content free of delusions and free of hallucinations   Suicidal ideations no intention   Homicidal ideations no intention   Mood:  anxious and depressed   Affect:  anxious and depressed   Memory recent  adequate   Memory remote:  adequate   Concentration:  adequate   Abstraction:  abstract   Insight:  Good/fair   Reliability good and fair   Judgment:  fair and good     MEDICAL DECISION MAKING:  Problems addressed today:    ICD-10-CM ICD-9-CM    1. Current severe episode of major depressive disorder without psychotic features without prior episode ([facility] [address]  F32.2 296.23    2. Post-traumatic stress disorder, acute  F43.11 309.81    3.  DESTINI (generalized anxiety disorder)  F41.1 300.02        Assessment:   Harinder Madden is responding to treatment somewhat. Symptoms are exacerbated and made worse by chronic stressors and grief issues. Discussed current medications and dosages. No changes made today. As noted, pt will move prozac to after she takes anti nausea medications. Will cont with prazosin. Pt has had increased nightmares and has used it PRN. Reviewed treatment goals and target symptoms to monitor for. Discussed importance of grief therapy and provided contact information. Reviewed impact of grief and provided support. Plan:   1. Current Outpatient Medications   Medication Sig Dispense Refill    FLUoxetine (PROzac) 20 mg capsule Take 1 Cap by mouth daily. Take with 40 mg cap for total daily dose of 60 mg. 30 Cap 2    FLUoxetine (PROzac) 40 mg capsule Take 1 Cap by mouth daily. 30 Cap 2    OLANZapine (ZyPREXA) 10 mg tablet Take 1 Tab by mouth nightly. 30 Tab 2    prazosin (MINIPRESS) 2 mg capsule Take 1 Cap by mouth nightly. Indications: posttraumatic stress syndrome 30 Cap 0    polyethylene glycol (MIRALAX) 17 gram packet Take 1 Packet by mouth daily as needed for Constipation. 90 Each 0    calcium carbonate (Tums) 200 mg calcium (500 mg) chew Take 1 Tab by mouth two (2) times daily as needed for Other (reflux). 60 Tab 1    doxylamine succinate (UNISOM) 25 mg tablet Take 1 Tab by mouth nightly as needed for Insomnia. 30 Tab 2    pantoprazole (PROTONIX) 40 mg tablet Take 1 Tab by mouth Daily (before breakfast). Indications: gastroesophageal reflux disease 30 Tab 2    sucralfate (CARAFATE) 1 gram tablet Take 1 Tab by mouth Before breakfast, lunch, dinner and at bedtime. 120 Tab 1    Lacto. acidophilus-Bif. animalis (Probiotic) 5 billion cell cpSP Take 1 Cap by mouth daily. 30 Cap 1    prochlorperazine (COMPAZINE) 10 mg tablet       promethazine (Phenergan) 12.5 mg suppository       ondansetron (ZOFRAN ODT) 4 mg disintegrating tablet Take 1 Tab by mouth every six (6) hours as needed for Nausea or Vomiting.  30 Tab 3  0.9% sodium chloride solp 1,000 mL with thiamine 100 mg/mL soln 977 mg, folic acid 5 mg/mL soln 1 mg 1 Bag by IntraVENous route every twenty-four (24) hours. 30 Bag 2    cholecalciferol (Vitamin D3) 25 mcg (1,000 unit) cap Take  by mouth daily.  pyridoxine, vitamin B6, (Vitamin B-6) 100 mg tablet Take 100 mg by mouth daily.  hydrOXYzine HCL (ATARAX) 50 mg tablet Take 1 Tab by mouth four (4) times daily. 90 Tab 0    OTHER 1 Ensure Enlive food supplement drink PO  Each 2    OTHER 1 ensure pudding PO daily for lunch. 90 Each 3    prenatal vit-iron fumarate-fa (PRENATAL PLUS with IRON) 28 mg iron- 800 mcg tab       food supplemt, lactose-reduced (Ensure High Protein) liqd Take 237 mL by mouth three (3) times daily. 90 Can 2          medication changes made today: cont olanzapine, cont prozac, cont prazosin prn nightmares    2. Counseling and coordination of care including instructions for treatment, risks/benefits, risk factor reduction and patient/family education. She agrees with the plan. Patient instructed to call with any side effects, questions or issues. 3.    Follow-up and Dispositions    · Return in about 8 weeks (around 4/26/2021). Alvaro Martin, who was evaluated through a synchronous (real-time) audio-video encounter, and/or her healthcare decision maker, is aware that it is a billable service, with coverage as determined by her insurance carrier. She provided verbal consent to proceed: Yes, and patient identification was verified. This visit was conducted pursuant to the emergency declaration under the [facility], [address] [place] [address] authority and the [employer] General Act. A caregiver was present when appropriate. Ability to conduct physical exam was limited. The patient was located in a state where the provider was credentialed to provide care.        3/1/2021  Courtney Stack NP

## 2021-03-02 ENCOUNTER — HOSPITAL ENCOUNTER (EMERGENCY)
Age: 36
Discharge: HOME | DRG: 566 | End: 2021-03-02
Payer: MEDICAID

## 2021-03-02 ENCOUNTER — HOSPITAL ENCOUNTER (INPATIENT)
Age: 36
LOS: 30 days | Discharge: HOME | DRG: 566 | End: 2021-04-02
Attending: EMERGENCY MEDICINE | Admitting: INTERNAL MEDICINE
Payer: MEDICAID

## 2021-03-02 VITALS
TEMPERATURE: 100.4 F | RESPIRATION RATE: 16 BRPM | DIASTOLIC BLOOD PRESSURE: 64 MMHG | HEART RATE: 120 BPM | SYSTOLIC BLOOD PRESSURE: 108 MMHG

## 2021-03-02 DIAGNOSIS — R50.9 FEVER, UNSPECIFIED FEVER CAUSE: Primary | ICD-10-CM

## 2021-03-02 LAB
ALBUMIN SERPL-MCNC: 2.8 G/DL (ref 3.5–5)
ALBUMIN/GLOB SERPL: 0.5 {RATIO} (ref 1.1–2.2)
ALP SERPL-CCNC: 154 U/L (ref 45–117)
ALT SERPL-CCNC: 31 U/L (ref 12–78)
ANION GAP SERPL CALC-SCNC: 7 MMOL/L (ref 5–15)
APPEARANCE UR: ABNORMAL
AST SERPL-CCNC: 32 U/L (ref 15–37)
BACTERIA URNS QL MICRO: ABNORMAL /HPF
BASOPHILS # BLD: 0 K/UL (ref 0–0.1)
BASOPHILS NFR BLD: 0 % (ref 0–1)
BILIRUB SERPL-MCNC: 0.7 MG/DL (ref 0.2–1)
BILIRUB UR QL CFM: NEGATIVE
BUN SERPL-MCNC: 8 MG/DL (ref 6–20)
BUN/CREAT SERPL: 11 (ref 12–20)
CALCIUM SERPL-MCNC: 8.7 MG/DL (ref 8.5–10.1)
CHLORIDE SERPL-SCNC: 107 MMOL/L (ref 97–108)
CO2 SERPL-SCNC: 23 MMOL/L (ref 21–32)
COLOR UR: ABNORMAL
COMMENT, HOLDF: NORMAL
CREAT SERPL-MCNC: 0.74 MG/DL (ref 0.55–1.02)
DIFFERENTIAL METHOD BLD: ABNORMAL
EOSINOPHIL # BLD: 0 K/UL (ref 0–0.4)
EOSINOPHIL NFR BLD: 0 % (ref 0–7)
EPITH CASTS URNS QL MICRO: ABNORMAL /LPF
ERYTHROCYTE [DISTWIDTH] IN BLOOD BY AUTOMATED COUNT: 14.6 % (ref 11.5–14.5)
GLOBULIN SER CALC-MCNC: 5.5 G/DL (ref 2–4)
GLUCOSE SERPL-MCNC: 76 MG/DL (ref 65–100)
GLUCOSE UR STRIP.AUTO-MCNC: NEGATIVE MG/DL
HCT VFR BLD AUTO: 32 % (ref 35–47)
HGB BLD-MCNC: 10.5 G/DL (ref 11.5–16)
HGB UR QL STRIP: ABNORMAL
IMM GRANULOCYTES # BLD AUTO: 0 K/UL (ref 0–0.04)
IMM GRANULOCYTES NFR BLD AUTO: 0 % (ref 0–0.5)
KETONES UR QL STRIP.AUTO: NEGATIVE MG/DL
LACTATE SERPL-SCNC: 0.9 MMOL/L (ref 0.4–2)
LEUKOCYTE ESTERASE UR QL STRIP.AUTO: ABNORMAL
LYMPHOCYTES # BLD: 1.1 K/UL (ref 0.8–3.5)
LYMPHOCYTES NFR BLD: 15 % (ref 12–49)
MCH RBC QN AUTO: 23.6 PG (ref 26–34)
MCHC RBC AUTO-ENTMCNC: 32.8 G/DL (ref 30–36.5)
MCV RBC AUTO: 72.1 FL (ref 80–99)
MONOCYTES # BLD: 0.5 K/UL (ref 0–1)
MONOCYTES NFR BLD: 6 % (ref 5–13)
NEUTS SEG # BLD: 5.6 K/UL (ref 1.8–8)
NEUTS SEG NFR BLD: 79 % (ref 32–75)
NITRITE UR QL STRIP.AUTO: NEGATIVE
NRBC # BLD: 0 K/UL (ref 0–0.01)
NRBC BLD-RTO: 0 PER 100 WBC
PH UR STRIP: >8.5 [PH] (ref 5–8)
PLATELET # BLD AUTO: 214 K/UL (ref 150–400)
PMV BLD AUTO: 9.7 FL (ref 8.9–12.9)
POTASSIUM SERPL-SCNC: 3.6 MMOL/L (ref 3.5–5.1)
PROT SERPL-MCNC: 8.3 G/DL (ref 6.4–8.2)
PROT UR STRIP-MCNC: 100 MG/DL
RBC # BLD AUTO: 4.44 M/UL (ref 3.8–5.2)
RBC #/AREA URNS HPF: ABNORMAL /HPF (ref 0–5)
SAMPLES BEING HELD,HOLD: NORMAL
SODIUM SERPL-SCNC: 137 MMOL/L (ref 136–145)
SP GR UR REFRACTOMETRY: 1.03 (ref 1–1.03)
UR CULT HOLD, URHOLD: NORMAL
UROBILINOGEN UR QL STRIP.AUTO: 1 EU/DL (ref 0.2–1)
WBC # BLD AUTO: 7.2 K/UL (ref 3.6–11)
WBC URNS QL MICRO: ABNORMAL /HPF (ref 0–4)

## 2021-03-02 PROCEDURE — 51701 INSERT BLADDER CATHETER: CPT

## 2021-03-02 PROCEDURE — 74011250637 HC RX REV CODE- 250/637: Performed by: EMERGENCY MEDICINE

## 2021-03-02 PROCEDURE — 87077 CULTURE AEROBIC IDENTIFY: CPT

## 2021-03-02 PROCEDURE — 87106 FUNGI IDENTIFICATION YEAST: CPT

## 2021-03-02 PROCEDURE — 87086 URINE CULTURE/COLONY COUNT: CPT

## 2021-03-02 PROCEDURE — 99283 EMERGENCY DEPT VISIT LOW MDM: CPT

## 2021-03-02 PROCEDURE — 99285 EMERGENCY DEPT VISIT HI MDM: CPT

## 2021-03-02 PROCEDURE — 94761 N-INVAS EAR/PLS OXIMETRY MLT: CPT

## 2021-03-02 PROCEDURE — 87040 BLOOD CULTURE FOR BACTERIA: CPT

## 2021-03-02 PROCEDURE — 36415 COLL VENOUS BLD VENIPUNCTURE: CPT

## 2021-03-02 PROCEDURE — 87186 SC STD MICRODIL/AGAR DIL: CPT

## 2021-03-02 PROCEDURE — 93005 ELECTROCARDIOGRAM TRACING: CPT

## 2021-03-02 PROCEDURE — 81001 URINALYSIS AUTO W/SCOPE: CPT

## 2021-03-02 PROCEDURE — 80053 COMPREHEN METABOLIC PANEL: CPT

## 2021-03-02 PROCEDURE — 83605 ASSAY OF LACTIC ACID: CPT

## 2021-03-02 PROCEDURE — 85025 COMPLETE CBC W/AUTO DIFF WBC: CPT

## 2021-03-02 PROCEDURE — 74011250636 HC RX REV CODE- 250/636: Performed by: EMERGENCY MEDICINE

## 2021-03-02 RX ORDER — ONDANSETRON 2 MG/ML
4 INJECTION INTRAMUSCULAR; INTRAVENOUS ONCE
Status: COMPLETED | OUTPATIENT
Start: 2021-03-02 | End: 2021-03-02

## 2021-03-02 RX ORDER — ACETAMINOPHEN 650 MG/1
975 SUPPOSITORY RECTAL
Status: COMPLETED | OUTPATIENT
Start: 2021-03-02 | End: 2021-03-02

## 2021-03-02 RX ORDER — ACETAMINOPHEN 500 MG
1000 TABLET ORAL ONCE
Status: DISCONTINUED | OUTPATIENT
Start: 2021-03-02 | End: 2021-03-02

## 2021-03-02 RX ADMIN — ONDANSETRON 4 MG: 2 INJECTION INTRAMUSCULAR; INTRAVENOUS at 23:17

## 2021-03-02 RX ADMIN — ACETAMINOPHEN 975 MG: 650 SUPPOSITORY RECTAL at 23:17

## 2021-03-02 RX ADMIN — SODIUM CHLORIDE 1000 ML: 9 INJECTION, SOLUTION INTRAVENOUS at 23:57

## 2021-03-03 PROBLEM — N10 ACUTE PYELONEPHRITIS: Status: ACTIVE | Noted: 2021-03-03

## 2021-03-03 LAB
ALBUMIN SERPL-MCNC: 2.4 G/DL (ref 3.5–5)
ALBUMIN/GLOB SERPL: 0.5 {RATIO} (ref 1.1–2.2)
ALP SERPL-CCNC: 124 U/L (ref 45–117)
ALT SERPL-CCNC: 23 U/L (ref 12–78)
ANION GAP SERPL CALC-SCNC: 8 MMOL/L (ref 5–15)
APPEARANCE UR: CLEAR
AST SERPL-CCNC: 21 U/L (ref 15–37)
ATRIAL RATE: 112 BPM
BACTERIA URNS QL MICRO: ABNORMAL /HPF
BASOPHILS # BLD: 0 K/UL (ref 0–0.1)
BASOPHILS NFR BLD: 1 % (ref 0–1)
BILIRUB SERPL-MCNC: 0.5 MG/DL (ref 0.2–1)
BILIRUB UR QL CFM: NEGATIVE
BUN SERPL-MCNC: 9 MG/DL (ref 6–20)
BUN/CREAT SERPL: 16 (ref 12–20)
CALCIUM SERPL-MCNC: 8.1 MG/DL (ref 8.5–10.1)
CALCULATED P AXIS, ECG09: 54 DEGREES
CALCULATED R AXIS, ECG10: 2 DEGREES
CALCULATED T AXIS, ECG11: 59 DEGREES
CHLORIDE SERPL-SCNC: 109 MMOL/L (ref 97–108)
CO2 SERPL-SCNC: 22 MMOL/L (ref 21–32)
COLOR UR: YELLOW
COVID-19 RAPID TEST, COVR: NOT DETECTED
CREAT SERPL-MCNC: 0.56 MG/DL (ref 0.55–1.02)
D DIMER PPP FEU-MCNC: 1.74 MG/L FEU (ref 0–0.65)
DIAGNOSIS, 93000: NORMAL
DIFFERENTIAL METHOD BLD: ABNORMAL
EOSINOPHIL # BLD: 0.1 K/UL (ref 0–0.4)
EOSINOPHIL NFR BLD: 2 % (ref 0–7)
EPITH CASTS URNS QL MICRO: ABNORMAL /LPF
ERYTHROCYTE [DISTWIDTH] IN BLOOD BY AUTOMATED COUNT: 14.6 % (ref 11.5–14.5)
GLOBULIN SER CALC-MCNC: 4.7 G/DL (ref 2–4)
GLUCOSE SERPL-MCNC: 77 MG/DL (ref 65–100)
GLUCOSE UR STRIP.AUTO-MCNC: NEGATIVE MG/DL
HCT VFR BLD AUTO: 28.4 % (ref 35–47)
HGB BLD-MCNC: 9.1 G/DL (ref 11.5–16)
HGB UR QL STRIP: ABNORMAL
IMM GRANULOCYTES # BLD AUTO: 0 K/UL (ref 0–0.04)
IMM GRANULOCYTES NFR BLD AUTO: 1 % (ref 0–0.5)
KETONES UR QL STRIP.AUTO: >80 MG/DL
LEUKOCYTE ESTERASE UR QL STRIP.AUTO: NEGATIVE
LIPASE SERPL-CCNC: 51 U/L (ref 73–393)
LYMPHOCYTES # BLD: 1.2 K/UL (ref 0.8–3.5)
LYMPHOCYTES NFR BLD: 24 % (ref 12–49)
MAGNESIUM SERPL-MCNC: 2.4 MG/DL (ref 1.6–2.4)
MCH RBC QN AUTO: 23.6 PG (ref 26–34)
MCHC RBC AUTO-ENTMCNC: 32 G/DL (ref 30–36.5)
MCV RBC AUTO: 73.6 FL (ref 80–99)
MONOCYTES # BLD: 0.5 K/UL (ref 0–1)
MONOCYTES NFR BLD: 10 % (ref 5–13)
MUCOUS THREADS URNS QL MICRO: ABNORMAL /LPF
NEUTS SEG # BLD: 3 K/UL (ref 1.8–8)
NEUTS SEG NFR BLD: 62 % (ref 32–75)
NITRITE UR QL STRIP.AUTO: POSITIVE
NRBC # BLD: 0 K/UL (ref 0–0.01)
NRBC BLD-RTO: 0 PER 100 WBC
P-R INTERVAL, ECG05: 144 MS
PH UR STRIP: 6.5 [PH] (ref 5–8)
PHOSPHATE SERPL-MCNC: 3.3 MG/DL (ref 2.6–4.7)
PLATELET # BLD AUTO: 183 K/UL (ref 150–400)
PMV BLD AUTO: 10.4 FL (ref 8.9–12.9)
POTASSIUM SERPL-SCNC: 3.5 MMOL/L (ref 3.5–5.1)
PROT SERPL-MCNC: 7.1 G/DL (ref 6.4–8.2)
PROT UR STRIP-MCNC: 100 MG/DL
Q-T INTERVAL, ECG07: 306 MS
QRS DURATION, ECG06: 76 MS
QTC CALCULATION (BEZET), ECG08: 417 MS
RBC # BLD AUTO: 3.86 M/UL (ref 3.8–5.2)
RBC #/AREA URNS HPF: ABNORMAL /HPF (ref 0–5)
SODIUM SERPL-SCNC: 139 MMOL/L (ref 136–145)
SOURCE, COVRS: NORMAL
SP GR UR REFRACTOMETRY: 1.02 (ref 1–1.03)
TROPONIN I SERPL-MCNC: <0.05 NG/ML
TSH SERPL DL<=0.05 MIU/L-ACNC: 0.87 UIU/ML (ref 0.36–3.74)
UROBILINOGEN UR QL STRIP.AUTO: 0.2 EU/DL (ref 0.2–1)
VENTRICULAR RATE, ECG03: 112 BPM
WBC # BLD AUTO: 4.8 K/UL (ref 3.6–11)
WBC URNS QL MICRO: ABNORMAL /HPF (ref 0–4)

## 2021-03-03 PROCEDURE — 65410000002 HC RM PRIVATE OB

## 2021-03-03 PROCEDURE — 74011000250 HC RX REV CODE- 250: Performed by: OBSTETRICS & GYNECOLOGY

## 2021-03-03 PROCEDURE — 74011000258 HC RX REV CODE- 258: Performed by: INTERNAL MEDICINE

## 2021-03-03 PROCEDURE — 74011250636 HC RX REV CODE- 250/636: Performed by: OBSTETRICS & GYNECOLOGY

## 2021-03-03 PROCEDURE — 74011250637 HC RX REV CODE- 250/637: Performed by: INTERNAL MEDICINE

## 2021-03-03 PROCEDURE — 36415 COLL VENOUS BLD VENIPUNCTURE: CPT

## 2021-03-03 PROCEDURE — 80053 COMPREHEN METABOLIC PANEL: CPT

## 2021-03-03 PROCEDURE — 83690 ASSAY OF LIPASE: CPT

## 2021-03-03 PROCEDURE — 85025 COMPLETE CBC W/AUTO DIFF WBC: CPT

## 2021-03-03 PROCEDURE — 84484 ASSAY OF TROPONIN QUANT: CPT

## 2021-03-03 PROCEDURE — 83735 ASSAY OF MAGNESIUM: CPT

## 2021-03-03 PROCEDURE — 84443 ASSAY THYROID STIM HORMONE: CPT

## 2021-03-03 PROCEDURE — 81001 URINALYSIS AUTO W/SCOPE: CPT

## 2021-03-03 PROCEDURE — 74011250636 HC RX REV CODE- 250/636: Performed by: INTERNAL MEDICINE

## 2021-03-03 PROCEDURE — 85379 FIBRIN DEGRADATION QUANT: CPT

## 2021-03-03 PROCEDURE — 74011000258 HC RX REV CODE- 258: Performed by: OBSTETRICS & GYNECOLOGY

## 2021-03-03 PROCEDURE — 51701 INSERT BLADDER CATHETER: CPT

## 2021-03-03 PROCEDURE — 77030019905 HC CATH URETH INTMIT MDII -A

## 2021-03-03 PROCEDURE — 87635 SARS-COV-2 COVID-19 AMP PRB: CPT

## 2021-03-03 PROCEDURE — 3E0436Z INTRODUCTION OF NUTRITIONAL SUBSTANCE INTO CENTRAL VEIN, PERCUTANEOUS APPROACH: ICD-10-PCS | Performed by: OBSTETRICS & GYNECOLOGY

## 2021-03-03 PROCEDURE — 84100 ASSAY OF PHOSPHORUS: CPT

## 2021-03-03 RX ORDER — POLYETHYLENE GLYCOL 3350 17 G/17G
17 POWDER, FOR SOLUTION ORAL DAILY PRN
Status: DISCONTINUED | OUTPATIENT
Start: 2021-03-03 | End: 2021-04-02 | Stop reason: HOSPADM

## 2021-03-03 RX ORDER — ACETAMINOPHEN 325 MG/1
650 TABLET ORAL
Status: DISCONTINUED | OUTPATIENT
Start: 2021-03-03 | End: 2021-04-02 | Stop reason: HOSPADM

## 2021-03-03 RX ORDER — FLUOXETINE HYDROCHLORIDE 20 MG/1
60 CAPSULE ORAL EVERY EVENING
Status: DISCONTINUED | OUTPATIENT
Start: 2021-03-03 | End: 2021-03-10

## 2021-03-03 RX ORDER — ONDANSETRON 2 MG/ML
4 INJECTION INTRAMUSCULAR; INTRAVENOUS
Status: DISCONTINUED | OUTPATIENT
Start: 2021-03-03 | End: 2021-03-03 | Stop reason: SDUPTHER

## 2021-03-03 RX ORDER — PANTOPRAZOLE SODIUM 40 MG/1
40 TABLET, DELAYED RELEASE ORAL
Status: DISCONTINUED | OUTPATIENT
Start: 2021-03-03 | End: 2021-03-13 | Stop reason: SDUPTHER

## 2021-03-03 RX ORDER — CALCIUM CARBONATE 200(500)MG
200 TABLET,CHEWABLE ORAL
Status: DISCONTINUED | OUTPATIENT
Start: 2021-03-03 | End: 2021-04-02 | Stop reason: HOSPADM

## 2021-03-03 RX ORDER — ACETAMINOPHEN 650 MG/1
650 SUPPOSITORY RECTAL
Status: DISCONTINUED | OUTPATIENT
Start: 2021-03-03 | End: 2021-04-02 | Stop reason: HOSPADM

## 2021-03-03 RX ORDER — SWAB
1 SWAB, NON-MEDICATED MISCELLANEOUS DAILY
Status: DISCONTINUED | OUTPATIENT
Start: 2021-03-03 | End: 2021-03-27

## 2021-03-03 RX ORDER — SUCRALFATE 1 G/1
1 TABLET ORAL
Status: DISCONTINUED | OUTPATIENT
Start: 2021-03-03 | End: 2021-04-02 | Stop reason: HOSPADM

## 2021-03-03 RX ORDER — SODIUM CHLORIDE 0.9 % (FLUSH) 0.9 %
5-40 SYRINGE (ML) INJECTION AS NEEDED
Status: DISCONTINUED | OUTPATIENT
Start: 2021-03-03 | End: 2021-04-02 | Stop reason: HOSPADM

## 2021-03-03 RX ORDER — HYDROXYZINE 25 MG/1
50 TABLET, FILM COATED ORAL 4 TIMES DAILY
Status: DISCONTINUED | OUTPATIENT
Start: 2021-03-03 | End: 2021-03-03

## 2021-03-03 RX ORDER — ONDANSETRON 2 MG/ML
4 INJECTION INTRAMUSCULAR; INTRAVENOUS
Status: DISCONTINUED | OUTPATIENT
Start: 2021-03-03 | End: 2021-04-02 | Stop reason: HOSPADM

## 2021-03-03 RX ORDER — SODIUM CHLORIDE 0.9 % (FLUSH) 0.9 %
5-40 SYRINGE (ML) INJECTION EVERY 8 HOURS
Status: DISCONTINUED | OUTPATIENT
Start: 2021-03-03 | End: 2021-04-02 | Stop reason: HOSPADM

## 2021-03-03 RX ORDER — SODIUM CHLORIDE 9 MG/ML
125 INJECTION, SOLUTION INTRAVENOUS CONTINUOUS
Status: DISCONTINUED | OUTPATIENT
Start: 2021-03-03 | End: 2021-03-08

## 2021-03-03 RX ORDER — PRAZOSIN HYDROCHLORIDE 1 MG/1
2 CAPSULE ORAL
Status: DISCONTINUED | OUTPATIENT
Start: 2021-03-03 | End: 2021-03-27

## 2021-03-03 RX ORDER — PROMETHAZINE HYDROCHLORIDE 25 MG/1
12.5 TABLET ORAL
Status: DISCONTINUED | OUTPATIENT
Start: 2021-03-03 | End: 2021-03-08

## 2021-03-03 RX ORDER — OLANZAPINE 5 MG/1
10 TABLET ORAL
Status: DISCONTINUED | OUTPATIENT
Start: 2021-03-03 | End: 2021-03-10

## 2021-03-03 RX ORDER — POLYETHYLENE GLYCOL 3350 17 G/17G
17 POWDER, FOR SOLUTION ORAL
Status: DISCONTINUED | OUTPATIENT
Start: 2021-03-03 | End: 2021-03-03 | Stop reason: SDUPTHER

## 2021-03-03 RX ADMIN — OLANZAPINE 10 MG: 5 TABLET, FILM COATED ORAL at 21:34

## 2021-03-03 RX ADMIN — PANTOPRAZOLE SODIUM 40 MG: 40 TABLET, DELAYED RELEASE ORAL at 08:02

## 2021-03-03 RX ADMIN — SUCRALFATE 1 G: 1 TABLET ORAL at 08:02

## 2021-03-03 RX ADMIN — Medication 10 ML: at 05:49

## 2021-03-03 RX ADMIN — ACETAMINOPHEN 650 MG: 650 SUPPOSITORY RECTAL at 08:06

## 2021-03-03 RX ADMIN — ONDANSETRON 4 MG: 2 INJECTION INTRAMUSCULAR; INTRAVENOUS at 20:01

## 2021-03-03 RX ADMIN — SUCRALFATE 1 G: 1 TABLET ORAL at 14:46

## 2021-03-03 RX ADMIN — Medication 1 TABLET: at 09:00

## 2021-03-03 RX ADMIN — SODIUM CHLORIDE 125 ML/HR: 9 INJECTION, SOLUTION INTRAVENOUS at 14:49

## 2021-03-03 RX ADMIN — SUCRALFATE 1 G: 1 TABLET ORAL at 21:35

## 2021-03-03 RX ADMIN — CALCIUM GLUCONATE: 94 INJECTION, SOLUTION INTRAVENOUS at 18:26

## 2021-03-03 RX ADMIN — FLUOXETINE 60 MG: 20 CAPSULE ORAL at 18:25

## 2021-03-03 RX ADMIN — ONDANSETRON 4 MG: 2 INJECTION INTRAMUSCULAR; INTRAVENOUS at 14:49

## 2021-03-03 RX ADMIN — ONDANSETRON 4 MG: 2 INJECTION INTRAMUSCULAR; INTRAVENOUS at 05:55

## 2021-03-03 RX ADMIN — I.V. FAT EMULSION 500 ML: 20 EMULSION INTRAVENOUS at 18:27

## 2021-03-03 RX ADMIN — SODIUM CHLORIDE 125 ML/HR: 9 INJECTION, SOLUTION INTRAVENOUS at 05:53

## 2021-03-03 RX ADMIN — CEFTRIAXONE SODIUM 1 G: 1 INJECTION, POWDER, FOR SOLUTION INTRAMUSCULAR; INTRAVENOUS at 05:57

## 2021-03-03 NOTE — ED NOTES
Bedside and Verbal shift change report given to Mariella Kolb RN (oncoming nurse) by Hayden Ruano RN (offgoing nurse). Report included the following information SBAR, Kardex, ED Summary, Procedure Summary, Intake/Output, MAR and Recent Results.

## 2021-03-03 NOTE — ED TRIAGE NOTES
Patient arrives ambulatory from home with CC of chills, fever and bodyaches. Back pain, cramping, headache, nausea. Patient receives TPN through a PICC line for weight loss and dehydration. Highest fever of 102 at home today. COVID test was negative on Friday. Patient is 22 weeks pregnant and was seen by L&D upstairs and was told to come here to be seen.

## 2021-03-03 NOTE — ED NOTES
Bedside shift change report given to [address] [place] [address] [address] (oncoming nurse) by Armaan Monsalve RN (offgoing nurse). Report included the following information SBAR, Kardex, ED Summary, [facility] and Recent Results.

## 2021-03-03 NOTE — PROGRESS NOTES
Comprehensive Nutrition Assessment    Type and Reason for Visit: Initial, Consult    Nutrition Recommendations/Plan:     Continue with current diet and TPN/lipid orders. Continue to monitor lytes/labs for trends especially if pt restricted TPN micro/macronutrients at home. Nutrition Assessment:      38year old admitted for presumed pyelonephritis, GA: 22w3d. PMH: depression, anxiety, eating disorder, ongoing hyperemesis s/p PICC placement, receiving home TPN and IVF. Pt with intermittent fevers PTA most likely d/t UTI. Note history of severe wt loss, restrictive eating, excessive exercising thus prompting 70-80# wt loss or 31% wt change reflecting severe wt change. Pt with difficulty going to inpatient ED treatment facilty d/t insurance issues. However, [facility] did receive nutrition therapy from [facility] outpatient ED dietitian. Unable to place NGT x3 previous attempts. During last admission, pt with 7# wt gaim while on TPN and consuming meals/supplements. Once home, did not eat d/t nausea and question if full TPN administered intentionally to prevent wt gain. Contacted [employer] for TPN formulation and reviewed. Labs wdl. Will continue with home TPN and increase lipids slightly to provide additional calories: 2000 ml (2500 ml with lipids), 2269 total calories, 1.7 gm/kg/day protein, GIR: 3.9 mgCHO/kg/min and 1.3 gm/kg/day lipids. TPN cyclin ml x1 hour  185 ml/hr x 10 hours  75 ml/hr x last hour        Malnutrition Assessment:  Malnutrition Status:  Severe malnutrition    Context:  Chronic illness     Findings of the 6 clinical characteristics of malnutrition:   Energy Intake:  Mild decrease in energy intake (specify)  Weight Loss:  7.00 - Greater than 10% over 6 months     Body Fat Loss:  7 - Severe body fat loss,     Muscle Mass Loss:  7 - Severe muscle mass loss,     Strength:  Not performed         Estimated Daily Nutrient Needs:  Energy (kcal): 6429-6424 kcal/kg;  Weight Used for Energy Requirements: Current  Protein (g):  gm/day (1.2-1.4 gm/day); Weight Used for Protein Requirements: Admission  Fluid (ml/day): 2400 ml; Method Used for Fluid Requirements: 1 ml/kcal      Wounds:     none    Current Nutrition Therapies:  DIET REGULAR  DIET ONE TIME MESSAGE  TPN ADULT - CENTRAL AA 6% D20 @ 2 L (cylced over 12 hours) and 20% 500 ml IVL x 12 hours MWF. Anthropometric Measures:  · Height:  5' 4\" (162.6 cm)  · Current Body Wt:  72.2 kg (159 lb 2.8 oz)   · Admission Body Wt:  159 lb 2.8 oz    · Usual Body Wt:  228#   5/2020;   · Ideal Body Wt:  120 lbs:  138.7 %   · Prepregnancy wt: 68.7 kg    Wt Readings from Last 20 Encounters:   03/03/21 72.2 kg (159 lb 3.2 oz)   01/15/21 75.5 kg (166 lb 6.4 oz)   12/28/20 70.2 kg (154 lb 12.8 oz)   12/16/20 72.9 kg (160 lb 12.8 oz)   12/06/20 67.6 kg (149 lb)   11/18/20 65 kg (143 lb 3.2 oz)   10/14/20 68.6 kg (151 lb 3.2 oz)   08/05/20 73.4 kg (161 lb 12.8 oz)   07/29/20 75.1 kg (165 lb 9.6 oz)   07/15/20 81.3 kg (179 lb 3.2 oz)   05/02/20 102.1 kg (225 lb)   02/18/20 103.9 kg (229 lb)   01/17/20 102.5 kg (226 lb)   01/15/20 102.1 kg (225 lb)   03/02/18 99.7 kg (219 lb 12.8 oz)   02/20/18 102.1 kg (225 lb)   09/29/17 102.1 kg (225 lb)   08/14/17 102 kg (224 lb 12.8 oz)   07/28/17 101.6 kg (224 lb)   07/12/17 102.1 kg (225 lb)   ]  Nutrition Diagnosis:   · Inadequate oral intake related to altered GI function, psychological cause or life stress as evidenced by nutrition support-parenteral nutrition      Nutrition Interventions:   Food and/or Nutrient Delivery: Continue current diet, Continue parenteral nutrition  Nutrition Education and Counseling: No recommendations at this time  Coordination of Nutrition Care: Continue to monitor while inpatient, Coordination of community care    Goals:  Meet nutritional needs via TPN for the next 5-7 days.        Nutrition Monitoring and Evaluation:   Behavioral-Environmental Outcomes: None identified  Food/Nutrient Intake Outcomes: IVF intake, Parenteral nutrition intake/tolerance  Physical Signs/Symptoms Outcomes: Biochemical data, GI status, Nausea/vomiting, Weight    Discharge Planning:    Parenteral nutrition     Electronically signed by Laurie Felix RD on 3/3/2021 at 4:59 PM    Contact: Alexy

## 2021-03-03 NOTE — PROGRESS NOTES
1130: Spoke with Dr. Bill Rodríguez. She will be the attending. Seen and examined patient. No new complaints. Will continue current treatment. ID and psych consult placed. Discussed with RN. Patient's  unaware of pregnancy, patient does not want him informed. Thank you for allowing us to participate in patient's care. We will follow along with you.

## 2021-03-03 NOTE — PROGRESS NOTES
TRANSFER - IN REPORT:    Verbal report received from Hiro Koo RN(name) on [employer]  being received from ED(unit) for routine progression of care      Report consisted of patients Situation, Background, Assessment and   Recommendations(SBAR). Information from the following report(s) SBAR, Kardex, ED Summary, Procedure Summary, Intake/Output, MAR and Recent Results was reviewed with the receiving nurse. Opportunity for questions and clarification was provided. Assessment completed upon patients arrival to unit and care assumed.        11:11 ID consult called in    1250- Dietary Consult Called in    1255- Psych consult cancelled per MD

## 2021-03-03 NOTE — ED NOTES
TRANSFER - OUT REPORT:    Verbal report given to AKIL Jacinto(name) on [employer]  being transferred to [facility](unit) for routine progression of care       Report consisted of patients Situation, Background, Assessment and   Recommendations(SBAR). Information from the following report(s) SBAR, ED Summary, [facility] and Recent Results was reviewed with the receiving nurse. Lines:   PICC Double Lumen 50/49/99 Left;Basilic (Active)       Peripheral IV 03/02/21 Right Hand (Active)   Site Assessment Clean, dry, & intact 03/02/21 2307   Phlebitis Assessment 0 03/02/21 2307   Infiltration Assessment 0 03/02/21 2307   Dressing Status Clean, dry, & intact 03/02/21 2307   Dressing Type Transparent 03/02/21 2307   Hub Color/Line Status Pink;Patent; Flushed;Capped 03/02/21 2307   Action Taken Blood drawn 03/02/21 2307   Alcohol Cap Used No 03/02/21 2307        Opportunity for questions and clarification was provided.       Patient transported with:   Trigemina

## 2021-03-03 NOTE — PROGRESS NOTES
3/2/2021  7:42 PM  Pt arrived to [facility] via wheelchair and placed in FABIANO 1. Pt main complaint of fever, chills, nausea. Pt denies obstetrical complaints including; LOF, Vaginal bleeding, contractions/pressure. Pt endorses positive fetal movement. 1950:  RN called Dr Fabiana Briceno to give report. Orders to get fetal heart tones and discharge pt to ED for care.     2005:  via doppler    Pt discharged and escorted to ER via wheelchair

## 2021-03-03 NOTE — PROGRESS NOTES
High Risk Obstetrics Progress Note    Name: Ester Story MRN: [id number]  SSN: [id number]    YOB: 1985  Age: 38 y.o. Sex: female      Subjective:      LOS: 0 days    Estimated Date of Delivery: 21   Gestational Age Today: 25w1d     Patient admitted for Hyperemesis,Fever of unknown origin- presumed pyleo, on TPN. States she does have mild fever, normal fetal movement and she had one temp reported 102 prior to admission and was mildly tachycardic 115. Since hosp and 1 dose abx, has remained afebrile. and does not have fever, nausea and vomiting and back pain. Objective:     Vitals:  Blood pressure (!) 89/55, pulse 71, temperature 97.7 °F (36.5 °C), resp. rate 18, last menstrual period 2020, SpO2 99 %, not currently breastfeeding. Temp (24hrs), Av.9 °F (37.2 °C), Min:97.7 °F (36.5 °C), Max:100.4 °F (38 °C)    Systolic (19KFJ), AQP:986 , Min:89 , DSV:419      Diastolic (01SQU), SIX:90, Min:55, Max:70       Intake and Output:         Physical Exam:  Patient without distress. Lung: clear to auscultation throughout lung fields, no wheezes, no rales, no rhonchi and normal respiratory effort  Back: costovertebral angle tenderness absent  Abdomen: soft, nontender  Fundus: soft and non tender  Lower Extremities:  - Edema No   - Patellar Reflexes: 1+ bilaterally       Membranes:  Intact    Uterine Activity:  None    Fetal Heart Rate:  Fetal heart tones documented        Labs:   Recent Results (from the past 36 hour(s))   EKG, 12 LEAD, INITIAL    Collection Time: 21  8:52 PM   Result Value Ref Range    Ventricular Rate 112 BPM    Atrial Rate 112 BPM    P-R Interval 144 ms    QRS Duration 76 ms    Q-T Interval 306 ms    QTC Calculation (Bezet) 417 ms    Calculated P Axis 54 degrees    Calculated R Axis 2 degrees    Calculated T Axis 59 degrees    Diagnosis       Sinus tachycardia  Nonspecific T wave abnormality  When compared with ECG of 04-DEC-2020 08:47,  Vent.  rate has increased BY  53 BPM  Nonspecific T wave abnormality now evident in Lateral leads  Confirmed by Desmond Thompson ([id number]) on 3/3/2021 9:52:51 AM     URINALYSIS W/MICROSCOPIC    Collection Time: 03/02/21  9:59 PM   Result Value Ref Range    Color DARK YELLOW     Appearance TURBID (A) CLEAR      Specific gravity 1.030 1.003 - 1.030      pH (UA) >8.5 (H) 5.0 - 8.0    Protein 100 (A) NEG mg/dL    Glucose Negative NEG mg/dL    Ketone Negative NEG mg/dL    Blood LARGE (A) NEG      Urobilinogen 1.0 0.2 - 1.0 EU/dL    Nitrites Negative NEG      Leukocyte Esterase SMALL (A) NEG      WBC 0-4 0 - 4 /hpf    RBC  0 - 5 /hpf    Epithelial cells MANY (A) FEW /lpf    Bacteria 4+ (A) NEG /hpf   URINE CULTURE HOLD SAMPLE    Collection Time: 03/02/21  9:59 PM    Specimen: Serum; Urine   Result Value Ref Range    Urine culture hold        Urine on hold in Microbiology dept for 2 days. If unpreserved urine is submitted, it cannot be used for addtional testing after 24 hours, recollection will be required. BILIRUBIN, CONFIRM    Collection Time: 03/02/21  9:59 PM   Result Value Ref Range    Bilirubin UA, confirm Negative NEG     CBC WITH AUTOMATED DIFF    Collection Time: 03/02/21 11:09 PM   Result Value Ref Range    WBC 7.2 3.6 - 11.0 K/uL    RBC 4.44 3.80 - 5.20 M/uL    HGB 10.5 (L) 11.5 - 16.0 g/dL    HCT 32.0 (L) 35.0 - 47.0 %    MCV 72.1 (L) 80.0 - 99.0 FL    MCH 23.6 (L) 26.0 - 34.0 PG    MCHC 32.8 30.0 - 36.5 g/dL    RDW 14.6 (H) 11.5 - 14.5 %    PLATELET 891 970 - 485 K/uL    MPV 9.7 8.9 - 12.9 FL    NRBC 0.0 0  WBC    ABSOLUTE NRBC 0.00 0.00 - 0.01 K/uL    NEUTROPHILS 79 (H) 32 - 75 %    LYMPHOCYTES 15 12 - 49 %    MONOCYTES 6 5 - 13 %    EOSINOPHILS 0 0 - 7 %    BASOPHILS 0 0 - 1 %    IMMATURE GRANULOCYTES 0 0.0 - 0.5 %    ABS. NEUTROPHILS 5.6 1.8 - 8.0 K/UL    ABS. LYMPHOCYTES 1.1 0.8 - 3.5 K/UL    ABS. MONOCYTES 0.5 0.0 - 1.0 K/UL    ABS. EOSINOPHILS 0.0 0.0 - 0.4 K/UL    ABS. BASOPHILS 0.0 0.0 - 0.1 K/UL    ABS. IMM. GRANS. 0.0 0.00 - 0.04 K/UL    DF AUTOMATED     METABOLIC PANEL, COMPREHENSIVE    Collection Time: 03/02/21 11:09 PM   Result Value Ref Range    Sodium 137 136 - 145 mmol/L    Potassium 3.6 3.5 - 5.1 mmol/L    Chloride 107 97 - 108 mmol/L    CO2 23 21 - 32 mmol/L    Anion gap 7 5 - 15 mmol/L    Glucose 76 65 - 100 mg/dL    BUN 8 6 - 20 MG/DL    Creatinine 0.74 0.55 - 1.02 MG/DL    BUN/Creatinine ratio 11 (L) 12 - 20      GFR est AA >60 >60 ml/min/1.73m2    GFR est non-AA >60 >60 ml/min/1.73m2    Calcium 8.7 8.5 - 10.1 MG/DL    Bilirubin, total 0.7 0.2 - 1.0 MG/DL    ALT (SGPT) 31 12 - 78 U/L    AST (SGOT) 32 15 - 37 U/L    Alk. phosphatase 154 (H) 45 - 117 U/L    Protein, total 8.3 (H) 6.4 - 8.2 g/dL    Albumin 2.8 (L) 3.5 - 5.0 g/dL    Globulin 5.5 (H) 2.0 - 4.0 g/dL    A-G Ratio 0.5 (L) 1.1 - 2.2     SAMPLES BEING HELD    Collection Time: 03/02/21 11:09 PM   Result Value Ref Range    SAMPLES BEING HELD 1RED,1BLUE     COMMENT        Add-on orders for these samples will be processed based on acceptable specimen integrity and analyte stability, which may vary by analyte.    LACTIC ACID    Collection Time: 03/02/21 11:09 PM   Result Value Ref Range    Lactic acid 0.9 0.4 - 2.0 MMOL/L   CULTURE, BLOOD, PAIRED    Collection Time: 03/02/21 11:09 PM    Specimen: Blood   Result Value Ref Range    Special Requests: NO SPECIAL REQUESTS      Culture result: NO GROWTH AFTER 4 HOURS     COVID-19 RAPID TEST    Collection Time: 03/03/21  1:07 AM   Result Value Ref Range    Specimen source Nasopharyngeal      COVID-19 rapid test Not detected NOTD     URINALYSIS W/ RFLX MICROSCOPIC    Collection Time: 03/03/21  1:30 AM   Result Value Ref Range    Color YELLOW      Appearance CLEAR CLEAR      Specific gravity 1.025 1.003 - 1.030      pH (UA) 6.5 5.0 - 8.0      Protein 100 (A) NEG mg/dL    Glucose Negative NEG mg/dL    Ketone >80 (A) NEG mg/dL    Blood LARGE (A) NEG      Urobilinogen 0.2 0.2 - 1.0 EU/dL    Nitrites Positive (A) NEG Leukocyte Esterase Negative NEG     BILIRUBIN, CONFIRM    Collection Time: 03/03/21  1:30 AM   Result Value Ref Range    Bilirubin UA, confirm Negative NEG     URINE MICROSCOPIC ONLY    Collection Time: 03/03/21  1:30 AM   Result Value Ref Range    WBC 5-10 0 - 4 /hpf    RBC 20-50 0 - 5 /hpf    Epithelial cells FEW FEW /lpf    Bacteria 1+ (A) NEG /hpf    Mucus 1+ (A) NEG /lpf   METABOLIC PANEL, COMPREHENSIVE    Collection Time: 03/03/21  5:48 AM   Result Value Ref Range    Sodium 139 136 - 145 mmol/L    Potassium 3.5 3.5 - 5.1 mmol/L    Chloride 109 (H) 97 - 108 mmol/L    CO2 22 21 - 32 mmol/L    Anion gap 8 5 - 15 mmol/L    Glucose 77 65 - 100 mg/dL    BUN 9 6 - 20 MG/DL    Creatinine 0.56 0.55 - 1.02 MG/DL    BUN/Creatinine ratio 16 12 - 20      GFR est AA >60 >60 ml/min/1.73m2    GFR est non-AA >60 >60 ml/min/1.73m2    Calcium 8.1 (L) 8.5 - 10.1 MG/DL    Bilirubin, total 0.5 0.2 - 1.0 MG/DL    ALT (SGPT) 23 12 - 78 U/L    AST (SGOT) 21 15 - 37 U/L    Alk. phosphatase 124 (H) 45 - 117 U/L    Protein, total 7.1 6.4 - 8.2 g/dL    Albumin 2.4 (L) 3.5 - 5.0 g/dL    Globulin 4.7 (H) 2.0 - 4.0 g/dL    A-G Ratio 0.5 (L) 1.1 - 2.2     CBC WITH AUTOMATED DIFF    Collection Time: 03/03/21  5:48 AM   Result Value Ref Range    WBC 4.8 3.6 - 11.0 K/uL    RBC 3.86 3.80 - 5.20 M/uL    HGB 9.1 (L) 11.5 - 16.0 g/dL    HCT 28.4 (L) 35.0 - 47.0 %    MCV 73.6 (L) 80.0 - 99.0 FL    MCH 23.6 (L) 26.0 - 34.0 PG    MCHC 32.0 30.0 - 36.5 g/dL    RDW 14.6 (H) 11.5 - 14.5 %    PLATELET 606 117 - 505 K/uL    MPV 10.4 8.9 - 12.9 FL    NRBC 0.0 0  WBC    ABSOLUTE NRBC 0.00 0.00 - 0.01 K/uL    NEUTROPHILS 62 32 - 75 %    LYMPHOCYTES 24 12 - 49 %    MONOCYTES 10 5 - 13 %    EOSINOPHILS 2 0 - 7 %    BASOPHILS 1 0 - 1 %    IMMATURE GRANULOCYTES 1 (H) 0.0 - 0.5 %    ABS. NEUTROPHILS 3.0 1.8 - 8.0 K/UL    ABS. LYMPHOCYTES 1.2 0.8 - 3.5 K/UL    ABS. MONOCYTES 0.5 0.0 - 1.0 K/UL    ABS. EOSINOPHILS 0.1 0.0 - 0.4 K/UL    ABS.  BASOPHILS 0.0 0.0 - 0.1 K/UL    ABS. IMM. GRANS. 0.0 0.00 - 0.04 K/UL    DF AUTOMATED     TSH 3RD GENERATION    Collection Time: 03/03/21  5:48 AM   Result Value Ref Range    TSH 0.87 0.36 - 3.74 uIU/mL   MAGNESIUM    Collection Time: 03/03/21  5:48 AM   Result Value Ref Range    Magnesium 2.4 1.6 - 2.4 mg/dL   PHOSPHORUS    Collection Time: 03/03/21  5:48 AM   Result Value Ref Range    Phosphorus 3.3 2.6 - 4.7 MG/DL   LIPASE    Collection Time: 03/03/21  5:48 AM   Result Value Ref Range    Lipase 51 (L) 73 - 393 U/L   TROPONIN I    Collection Time: 03/03/21  5:48 AM   Result Value Ref Range    Troponin-I, Qt. <0.05 <0.05 ng/mL   D DIMER    Collection Time: 03/03/21  5:48 AM   Result Value Ref Range    D-dimer 1.74 (H) 0.00 - 0.65 mg/L FEU       Assessment and Plan:      Principal Problem:    Acute pyelonephritis (3/3/2021)       Acute pyelo complicating hyperemsis on tpn   Resume TPN tomorrow  Appreciate ID consult- pending  At this time cont expectant care, baseline therapy for hyperemesis and heart tones q shift.    Expectant care

## 2021-03-03 NOTE — H&P
[address]  HISTORY AND PHYSICAL    Name:  Rita Malave  MR#:  [id number]  :  1985  ACCOUNT #:  [de-identified]  ADMIT DATE:  2021      The patient was seen, evaluated and admitted by me on 2021. PRIMARY CARE PHYSICIAN:  Tay Nicholson NP    SOURCE OF INFORMATION:  The patient and review of ED and old electronic medical records. CHIEF COMPLAINT:  Fever. HISTORY OF PRESENT ILLNESS:  This is a 41-year-old woman with past medical history significant for anxiety/depression and asthma, who was in her usual state of health until the day of her presentation at the emergency room when the patient developed fever. The patient has been having a fever for 3-4 weeks. She stated that the fever is intermittent. Today, the fever was as high as 102 at home. The patient also complained of generalized body aches and pains including chest pain as well as back pain. The back pain is located at the lower back bilaterally. She also complained of cramping abdominal pain in the lower abdomen. The patient's symptoms are very nonspecific. The patient was brought to the emergency room, initially was taken to Labor and Delivery where the fetal heart rate was checked, and according to report the fetal heart rate is normal and the patient was then advised to go to the emergency room for further evaluation of her symptoms. The patient was recently admitted to this hospital.  She was admitted from 2020 to 01/15/2021. The patient was admitted to the OB/GYN service because of intractable nausea and vomiting as well as eating disorder affecting the pregnancy. Following the hospitalization, a PICC line was inserted and the patient was discharged home on TPN as well as IV fluid therapy for the nausea and vomiting through the PICC line. She has home health visiting nurse who has been assisting her with the TPN and the IV fluid therapy at home. The patient denies cough.   The patient also denies sick contact or contact with any person with COVID-19 virus infection. When the patient arrived at the emergency room, the patient's temperature was 99.3. Her urinalysis was consistent with urinary tract infection. The patient was then referred to the hospitalist service for evaluation for admission. The patient is 22 weeks pregnant. PAST MEDICAL HISTORY:  1. Anxiety/depression. 2.  Asthma. ALLERGIES:  THE PATIENT IS ALLERGIC TO LABETALOL, PENICILLIN, AND SEPTRA. MEDICATIONS:  1. Calcium carbonate 1 tablet twice daily. 2.  Unisom 25 mg daily at bedtime for sleep. 3.  Prozac 40 mg daily. 4.  Atarax 50 mg 3 times daily. 5.  Zyprexa 10 mg daily. 6.  Zofran 4 mg every 6 hours as needed for nausea and vomiting. 7.  Protonix 40 mg daily. 8.  MiraLax 17 g daily. 9.  Minipress 2 mg daily at bedtime. 10.  Compazine 10 mg as needed for nausea and vomiting. 11.  Carafate 1 gram four times daily    FAMILY HISTORY:  This was reviewed. Her mother had asthma and rheumatoid arthritis. PAST SURGICAL HISTORY:  This is significant for  section. SOCIAL HISTORY:  No history of alcohol or tobacco abuse. REVIEW OF SYSTEMS:  HEAD, EYES, EARS, NOSE, AND THROAT:  No headache, no dizziness, no blurring of vision, no photophobia. RESPIRATORY SYSTEM:  No cough, no shortness of breath, no hemoptysis. CARDIOVASCULAR SYSTEM:  No chest pain, no orthopnea, no palpitations. GASTROINTESTINAL SYSTEM:  This is positive for nausea. No vomiting, no diarrhea, no constipation. GENITOURINARY SYSTEM:  No dysuria, no urgency, no frequency. All other systems are reviewed and they are negative. PHYSICAL EXAMINATION:  GENERAL APPEARANCE:  The patient appeared ill, in moderate distress. VITAL SIGNS:  On arrival at the emergency room; temperature 99.3, pulse 115, respiratory rate 16, blood pressure 98/63, oxygen saturation 96% on room air. HEENT:  Head:  Normocephalic, atraumatic.   Eyes: Normal eye movement. No redness, no drainage, no discharge. Ears:  Normal external ears with no evidence of drainage. Nose:  No deformity, no drainage. Mouth and Throat:  No visible oral lesion. Dry oral mucosa. NECK:  Neck is supple. No JVD, no thyromegaly. CHEST:  Clear breath sounds. No wheezing, no crackles. HEART:  Normal S1 and S2, regular. No clinically appreciable murmur. ABDOMEN:  Gravid uterus noted. Mild bilateral costophrenic angle tenderness. No rebound tenderness. No guarding. Normal bowel sounds  CNS:  Alert and oriented x3. No gross focal neurological deficit. EXTREMITIES:  No edema. Pulses 2+ bilaterally. MUSCULOSKELETAL SYSTEM:  No obvious joint deformity or swelling. SKIN:  Intact PICC line in left upper extremity without areas of redness or drainage. PSYCHIATRY:  Normal mood but flat affect. LYMPHATIC SYSTEM:  No cervical lymphadenopathy. DIAGNOSTIC DATA:  EKG shows sinus tachycardia and nonspecific T-waves abnormalities. LABORATORY DATA:  Hematology:  WBC 7.3, hemoglobin 10.5, hematocrit 32.0, platelets 562. Chemistry:  Sodium 137, potassium 3.6, chloride 107, CO2 of 23, glucose 76, BUN 8, creatinine 0.74, calcium 8.7, total bilirubin 0.7, ALT 31, AST 32, alkaline phosphatase 154, total protein 8.3, albumin level 2.8, globulin 5.5. Lactic acid level 0.9. Urinalysis: This is significant for positive nitrites, large blood, negative leukocyte esterase, 1+ bacteria. ASSESSMENT  1. Suspected acute pyelonephritis. 2.  Eating disorder affecting pregnancy. 3.  Anxiety/depression. 4.  Pregnancy. 5.  Asthma. 6.  Anemia. 7.  Sinus tachycardia. PLAN  1. Suspected acute pyelonephritis: We will admit the patient for further evaluation and treatment. We will start the patient on Rocephin. We will await urine culture and blood culture. This is the most likely cause of the patient's generalized body aches and pain as well as fever and lower abdominal pain. Although the patient has a PICC, the PICC line site looks clean and does not appear to be affected but if the patient's fever is persistent despite being on Rocephin, discontinuation of the PICC line might need to be considered  2. Eating disorder affecting pregnancy: We will continue with supportive treatment. 3.  Anxiety/depression: We will resume preadmission medication. 4.  Pregnancy:  OB/GYN consult has been requested. We will await further recommendation from the OB/GYN Service. 5.  Asthma:  The patient is asymptomatic. We will continue to monitor. 6.  Anemia: This is most likely due to anemia of pregnancy. It is mild. We will continue with prenatal vitamins and iron supplement and monitor the patient closely. 7.  Sinus tachycardia: This is most likely due to the suspected acute pyelonephritis, but the patient has a PICC line, the patient is 22-week pregnant at risk for thromboembolism. We will check D-dimer to evaluate the patient for thromboembolism. We will carry out fluid therapy. OTHER ISSUES:  Code status: The patient is a full code. We will request SCD for DVT prophylaxis. FUNCTIONAL STATUS PRIOR TO ADMISSION:  The patient came from home. The patient is ambulatory with no assistant or device. COVID PRECAUTION:  The patient was wearing a face mask. I was wearing a face mask and gloves for this patient's encounter.         MD YUN [email or web address]  D:  03/03/2021 5:26  T:  03/03/2021 7:11  JOB #:  [id number]  CC:  Jose G Grissom NP

## 2021-03-03 NOTE — ED PROVIDER NOTES
26-year-old G8, P7 at 22 weeks presents with complaints of intermittent fevers x3 to 4 weeks. Fevers associated with chills, body aches, bilateral back pain and pelvic cramping. Patient evaluated by L&D prior to arrival here, fetal heart rate checked and sent here for evaluation. Patient reports T-max today of 102. Patient has indwelling PICC line for TPN and IV fluids secondary to nausea and vomiting of pregnancy and weight loss. Labs and blood cultures sent today by home health care nurse per patient. Denies URI complaints. Denies urinary complaints. Patient reports was tested for Covid at the encouragement of her OB doctor on 2021. Patient reports she has no symptoms of Covid. PICC line placed in 2020. Denies any skin changes at site of PICC line. Denies tobacco use, drug use, alcohol use  [facility]  Primary CARE physician[facility]           Past Medical History:   Diagnosis Date    Anorexia     Anxiety     Asthma     on albuterol prn.  Triggers cold and allergies    Avoidant-restrictive food intake disorder (ARFID)     Back pain, chronic     sciatica    Chronic bilateral low back pain with right-sided sciatica 2020    ~    GERD (gastroesophageal reflux disease) 2020    [facility] , GI Dr. Leon Bishop Gestational hypertension     Past pregnancy    HX OTHER MEDICAL      , 2006, , ,     Hyperemesis     severe hyperemesis    Hypertension     with G12 - none this pregnancy    Iron deficiency anemia 10/08/2010    MDD (major depressive disorder), single episode with postpartum onset 2013    treated with meds - 13    Orthostatic hypotension 2020    Plantar fasciitis     Pregnancy     36 weeks    PTSD (post-traumatic stress disorder)        Past Surgical History:   Procedure Laterality Date    HX  SECTION  4/22/15    HX DILATION AND CURETTAGE      HX DILATION AND CURETTAGE  2020    HX GYN miscarriage x 6    HX GYN      removal of left fallopian tube    HX OTHER SURGICAL      cerlcage x4, ectopic x1  with removal of left fallopian tube    HX OTHER SURGICAL      cerclage w/ current pregnancy.  Removed 18    ND  DELIVERY ONLY           Family History:   Problem Relation Age of Onset   [address] [facility] Arthritis-rheumatoid Mother     Asthma Mother     No Known Problems Father     No Known Problems Maternal Grandmother     No Known Problems Maternal Grandfather     No Known Problems Paternal Grandmother     No Known Problems Paternal Grandfather     Asthma Son     Alcohol abuse Neg Hx     Arthritis-osteo Neg Hx     Bleeding Prob Neg Hx     Cancer Neg Hx     Diabetes Neg Hx     Elevated Lipids Neg Hx     Headache Neg Hx     Heart Disease Neg Hx     Hypertension Neg Hx     Lung Disease Neg Hx     Migraines Neg Hx     Psychiatric Disorder Neg Hx     Stroke Neg Hx     Mental Retardation Neg Hx     Anesth Problems Neg Hx        Social History     Socioeconomic History    Marital status: SINGLE     Spouse name: Not on file    Number of children: 9    Years of education: Not on file    Highest education level: 12th grade   Occupational History    Not on file   Social Needs    Financial resource strain: Not very hard    Food insecurity     Worry: Never true     Inability: Never true    Transportation needs     Medical: No     Non-medical: No   Tobacco Use    Smoking status: Never Smoker    Smokeless tobacco: Never Used   Substance and Sexual Activity    Alcohol use: Not Currently     Frequency: Monthly or less     Drinks per session: 1 or 2     Binge frequency: Never    Drug use: No    Sexual activity: Yes     Partners: Male   Lifestyle    Physical activity     Days per week: Not on file     Minutes per session: Not on file    Stress: Not on file   Relationships    Social connections     Talks on phone: Not on file     Gets together: Not on file     Attends Bahai service: Not on file     Active member of club or organization: Not on file     Attends meetings of clubs or organizations: Not on file     Relationship status: Not on file    Intimate partner violence     Fear of current or ex partner: Not on file     Emotionally abused: Not on file     Physically abused: Not on file     Forced sexual activity: Not on file   Other Topics Concern    Not on file   Social History Narrative    [facility] boyfriend (x[facility] Alia Sanchez,  reportedly with alcohol dependence. She was raised by her mother. She has no siblings. Not working. She is supported financially by the childrens' father and public support. She has no hobbies outside of her children. She has a 12th grade education and no legal entanglements. 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 ALLERGIES: Labetalol, Ceclor [cefaclor], Pcn [penicillins], and Septra [sulfamethoprim ds]    Review of Systems   Constitutional: Positive for appetite change, chills and fever. HENT: Negative for congestion, nosebleeds and rhinorrhea. Eyes: Negative for pain and redness. Respiratory: Negative for cough and shortness of breath. Cardiovascular: Negative for chest pain and palpitations. Gastrointestinal: Positive for nausea. Negative for abdominal pain and vomiting. Genitourinary: Negative for dysuria, frequency, vaginal bleeding and vaginal pain. Musculoskeletal: Positive for back pain and myalgias. Skin: Negative for rash and wound. Neurological: Negative for seizures, syncope and weakness. Hematological: Does not bruise/bleed easily. Psychiatric/Behavioral: Negative for agitation, confusion, dysphoric mood and suicidal ideas. The patient is not nervous/anxious.         Vitals:    03/02/21 2047   BP: 98/63   Pulse: (!) 115   Resp: 16   Temp: 99.3 °F (37.4 °C)   SpO2: 96% Physical Exam  Vitals signs and nursing note reviewed. Constitutional:       Appearance: She is well-developed. HENT:      Head: Normocephalic and atraumatic. Eyes:      Pupils: Pupils are equal, round, and reactive to light. Neck:      Musculoskeletal: Normal range of motion and neck supple. Trachea: No tracheal deviation. Cardiovascular:      Rate and Rhythm: Regular rhythm. Tachycardia present. Heart sounds: Normal heart sounds. Pulmonary:      Effort: Pulmonary effort is normal. No respiratory distress. Breath sounds: Normal breath sounds. No stridor. No wheezing or rales. Chest:      Chest wall: No tenderness. Abdominal:      General: Bowel sounds are normal. There is no distension. Palpations: Abdomen is soft. Tenderness: There is no abdominal tenderness. There is no rebound. Musculoskeletal: Normal range of motion. General: No tenderness. Skin:     General: Skin is warm and dry. Coloration: Skin is not pale. Findings: No rash. Neurological:      Mental Status: She is alert and oriented to person, place, and time. Cranial Nerves: No cranial nerve deficit. MDM  Number of Diagnoses or Management Options  Fever, unspecified fever cause  Diagnosis management comments: 49-year-old G8, P7 at 22 weeks presents with intermittent fevers x3 to 4 weeks. T-max today of 102. Indwelling PICC line for TPN and hydration secondary to nausea and vomiting of pregnancy with weight loss. Patient is febrile 100.4 on arrival here, tachycardic, nontoxic, hemodynamically stable, no respiratory distress, clear to auscultation bilaterally, normal room oxygen saturation. PICC line in place in left upper extremity, site well-appearing without erythema, tenderness palpation, discharge/drainage. PlanIV fluid hydration, Tylenol, CBC/CMP/lactate/blood cultures, UA/urine cultures, Covid screen.     Labs unremarkable  Dirty urine specimen       Amount and/or Complexity of Data Reviewed  Clinical lab tests: ordered and reviewed    Patient Progress  Patient progress: improved         Procedures    Midnight  Reevaluation. Patient well-appearing, no acute distress, heart rate 85, well-appearing, no acute distress. Patient agreeable with plan for admission with fever of unknown origin and PICC in place. Perfect Serve Consult for Admission  12:47 AM    ED Room Number: ER15/15  Patient Name and age:  Karo Fischer 38 y.o.  female  Working Diagnosis:   1. Fever, unspecified fever cause        COVID-19 Suspicion:  no  Sepsis present:  no  Reassessment needed: no  Code Status:  Full Code  Readmission: no  Isolation Requirements:  no  Recommended Level of Care:  med/surg  Department:[facility] Adult ED - 21       1:23 Sunitha Beltrán MD spoke with Dr. Avelina Tucker, Consult for Hospitalist. Discussed available diagnostic tests and clinical findings. He/She is in agreement with care plans as outlined. He/she will admit patient. Sunitha Beltrán MD spoke with  [facility] hospitalist, Consult for [facility]. Discussed available diagnostic tests and clinical findings. He/She is in agreement with care plans as outlined. He/she advises medical admission and OB consult.

## 2021-03-04 LAB
ANION GAP SERPL CALC-SCNC: 7 MMOL/L (ref 5–15)
BASOPHILS # BLD: 0 K/UL (ref 0–0.1)
BASOPHILS NFR BLD: 0 % (ref 0–1)
BUN SERPL-MCNC: 6 MG/DL (ref 6–20)
BUN/CREAT SERPL: 15 (ref 12–20)
CALCIUM SERPL-MCNC: 8 MG/DL (ref 8.5–10.1)
CHLORIDE SERPL-SCNC: 109 MMOL/L (ref 97–108)
CO2 SERPL-SCNC: 22 MMOL/L (ref 21–32)
CREAT SERPL-MCNC: 0.41 MG/DL (ref 0.55–1.02)
DIFFERENTIAL METHOD BLD: ABNORMAL
EOSINOPHIL # BLD: 0.2 K/UL (ref 0–0.4)
EOSINOPHIL NFR BLD: 3 % (ref 0–7)
ERYTHROCYTE [DISTWIDTH] IN BLOOD BY AUTOMATED COUNT: 14.6 % (ref 11.5–14.5)
GLUCOSE SERPL-MCNC: 74 MG/DL (ref 65–100)
HCT VFR BLD AUTO: 27.4 % (ref 35–47)
HGB BLD-MCNC: 8.7 G/DL (ref 11.5–16)
IMM GRANULOCYTES # BLD AUTO: 0 K/UL
IMM GRANULOCYTES NFR BLD AUTO: 0 %
LYMPHOCYTES # BLD: 1.7 K/UL (ref 0.8–3.5)
LYMPHOCYTES NFR BLD: 34 % (ref 12–49)
MCH RBC QN AUTO: 24 PG (ref 26–34)
MCHC RBC AUTO-ENTMCNC: 31.8 G/DL (ref 30–36.5)
MCV RBC AUTO: 75.5 FL (ref 80–99)
MONOCYTES # BLD: 0.5 K/UL (ref 0–1)
MONOCYTES NFR BLD: 9 % (ref 5–13)
NEUTS SEG # BLD: 2.6 K/UL (ref 1.8–8)
NEUTS SEG NFR BLD: 54 % (ref 32–75)
NRBC # BLD: 0 K/UL (ref 0–0.01)
NRBC BLD-RTO: 0 PER 100 WBC
PLATELET # BLD AUTO: 155 K/UL (ref 150–400)
PMV BLD AUTO: 10.3 FL (ref 8.9–12.9)
POTASSIUM SERPL-SCNC: 3.6 MMOL/L (ref 3.5–5.1)
RBC # BLD AUTO: 3.63 M/UL (ref 3.8–5.2)
RBC MORPH BLD: ABNORMAL
SODIUM SERPL-SCNC: 138 MMOL/L (ref 136–145)
WBC # BLD AUTO: 5 K/UL (ref 3.6–11)

## 2021-03-04 PROCEDURE — 74011000258 HC RX REV CODE- 258: Performed by: OBSTETRICS & GYNECOLOGY

## 2021-03-04 PROCEDURE — 85025 COMPLETE CBC W/AUTO DIFF WBC: CPT

## 2021-03-04 PROCEDURE — 74011250636 HC RX REV CODE- 250/636: Performed by: INTERNAL MEDICINE

## 2021-03-04 PROCEDURE — 36415 COLL VENOUS BLD VENIPUNCTURE: CPT

## 2021-03-04 PROCEDURE — 74011250636 HC RX REV CODE- 250/636: Performed by: OBSTETRICS & GYNECOLOGY

## 2021-03-04 PROCEDURE — 74011250637 HC RX REV CODE- 250/637: Performed by: INTERNAL MEDICINE

## 2021-03-04 PROCEDURE — 74011000250 HC RX REV CODE- 250: Performed by: INTERNAL MEDICINE

## 2021-03-04 PROCEDURE — 65410000002 HC RM PRIVATE OB

## 2021-03-04 PROCEDURE — 74011000258 HC RX REV CODE- 258: Performed by: INTERNAL MEDICINE

## 2021-03-04 PROCEDURE — 74011000250 HC RX REV CODE- 250: Performed by: OBSTETRICS & GYNECOLOGY

## 2021-03-04 PROCEDURE — 80048 BASIC METABOLIC PNL TOTAL CA: CPT

## 2021-03-04 PROCEDURE — 94760 N-INVAS EAR/PLS OXIMETRY 1: CPT

## 2021-03-04 PROCEDURE — 76816 OB US FOLLOW-UP PER FETUS: CPT | Performed by: OBSTETRICS & GYNECOLOGY

## 2021-03-04 RX ADMIN — Medication 10 ML: at 20:32

## 2021-03-04 RX ADMIN — SUCRALFATE 1 G: 1 TABLET ORAL at 11:21

## 2021-03-04 RX ADMIN — I.V. FAT EMULSION 500 ML: 20 EMULSION INTRAVENOUS at 18:13

## 2021-03-04 RX ADMIN — Medication 1 TABLET: at 08:18

## 2021-03-04 RX ADMIN — FLUOXETINE 60 MG: 20 CAPSULE ORAL at 18:00

## 2021-03-04 RX ADMIN — POLYETHYLENE GLYCOL 3350 17 G: 17 POWDER, FOR SOLUTION ORAL at 11:24

## 2021-03-04 RX ADMIN — OLANZAPINE 10 MG: 5 TABLET, FILM COATED ORAL at 22:37

## 2021-03-04 RX ADMIN — ALTEPLASE 2 MG: 2.2 INJECTION, POWDER, LYOPHILIZED, FOR SOLUTION INTRAVENOUS at 19:07

## 2021-03-04 RX ADMIN — SUCRALFATE 1 G: 1 TABLET ORAL at 16:10

## 2021-03-04 RX ADMIN — CALCIUM GLUCONATE: 94 INJECTION, SOLUTION INTRAVENOUS at 18:13

## 2021-03-04 RX ADMIN — PANTOPRAZOLE SODIUM 40 MG: 40 TABLET, DELAYED RELEASE ORAL at 06:43

## 2021-03-04 RX ADMIN — SUCRALFATE 1 G: 1 TABLET ORAL at 06:44

## 2021-03-04 RX ADMIN — ACETAMINOPHEN 650 MG: 650 SUPPOSITORY RECTAL at 20:31

## 2021-03-04 RX ADMIN — ONDANSETRON 4 MG: 2 INJECTION INTRAMUSCULAR; INTRAVENOUS at 20:31

## 2021-03-04 RX ADMIN — SODIUM CHLORIDE 125 ML/HR: 9 INJECTION, SOLUTION INTRAVENOUS at 06:44

## 2021-03-04 RX ADMIN — SUCRALFATE 1 G: 1 TABLET ORAL at 22:37

## 2021-03-04 RX ADMIN — ONDANSETRON 4 MG: 2 INJECTION INTRAMUSCULAR; INTRAVENOUS at 08:21

## 2021-03-04 RX ADMIN — ONDANSETRON 4 MG: 2 INJECTION INTRAMUSCULAR; INTRAVENOUS at 16:10

## 2021-03-04 RX ADMIN — CEFTRIAXONE SODIUM 1 G: 1 INJECTION, POWDER, FOR SOLUTION INTRAMUSCULAR; INTRAVENOUS at 04:57

## 2021-03-04 RX ADMIN — PRAZOSIN HYDROCHLORIDE 2 MG: 1 CAPSULE ORAL at 22:37

## 2021-03-04 NOTE — PROGRESS NOTES
ID Progress Note  3/4/2021    Subjective:     Afebrile. No dyspnea, abdominal pain, sore throat. She does have a headache     ROS: No anaphylaxis, seizures, syncope, hematemesis, hematochezia     Objective:     Vitals:   Visit Vitals  /62 (BP 1 Location: Right upper arm, BP Patient Position: At rest;Sitting)   Pulse 72   Temp 97.4 °F (36.3 °C)   Resp 17   Ht 5' 4\" (1.626 m)   Wt 72.2 kg (159 lb 3.2 oz)   LMP 2020 (Exact Date)   SpO2 99%   BMI 27.33 kg/m²        Tmax:  Temp (24hrs), Av.7 °F (36.5 °C), Min:97.4 °F (36.3 °C), Max:98 °F (36.7 °C)      Exam:    Not in distress  Pink conjunctivae, anicteric sclerae  No cervical lymphdenopathy   Lung clear, no rales, wheezes or rhonchi   Heart: s1, s2, RRR, no murmurs rubs or clicks  Abdomen: soft nontender, no guarding or rebound  Knees not warm or tender  Speech fluent     Labs:   Lab Results   Component Value Date/Time    WBC 5.0 2021 03:38 AM    Hemoglobin (POC) 10.4 (L) 2020 07:12 AM    HGB 8.7 (L) 2021 03:38 AM    HCT 27.4 (L) 2021 03:38 AM    PLATELET 509  03:38 AM    MCV 75.5 (L) 2021 03:38 AM    Hgb, External 33.7 2012    Hct, External 69 2012    Platelet cnt., External 307 2012     Lab Results   Component Value Date/Time    Sodium 138 2021 03:38 AM    Potassium 3.6 2021 03:38 AM    Chloride 109 (H) 2021 03:38 AM    CO2 22 2021 03:38 AM    Anion gap 7 2021 03:38 AM    Glucose 74 2021 03:38 AM    BUN 6 2021 03:38 AM    Creatinine 0.41 (L) 2021 03:38 AM    BUN/Creatinine ratio 15 2021 03:38 AM    GFR est AA >60 2021 03:38 AM    GFR est non-AA >60 2021 03:38 AM    Calcium 8.0 (L) 2021 03:38 AM    Bilirubin, total 0.5 2021 05:48 AM    Alk.  phosphatase 124 (H) 2021 05:48 AM    Protein, total 7.1 2021 05:48 AM    Albumin 2.4 (L) 2021 05:48 AM    Globulin 4.7 (H) 2021 05:48 AM    A-G Ratio 0.5 (L) 03/03/2021 05:48 AM    ALT (SGPT) 23 03/03/2021 05:48 AM             Assessment:     #1 fever - probably from UTI     #2 intrauterine pregnancy     #3 asthma     #4 depression    Recommendations:     Urine growing GNRs.  Await identification     Continue ceftriaxone for now     Aiden Hinds MD

## 2021-03-04 NOTE — PROGRESS NOTES
Hospitalist service will sign off. Please feel free to call with questions or consult if need arises.

## 2021-03-04 NOTE — PROGRESS NOTES
High Risk Obstetrics Progress Note    Name: Thelma Bryan MRN: [id number]  SSN: [id number]    YOB: 1985  Age: 38 y.o. Sex: female      Subjective:      LOS: 1 day    Estimated Date of Delivery: 21   Gestational Age Today: 18w2d     Patient admitted for fever and chills-presumed pyelonephritis. States she does have chronic nausea and vomiting, intermittent cramping, some left flank pain that is better since arrival here, normal fetal movement, intermittent chills. and does not have chest pain, shortness of breath, vaginal bleeding  and vaginal leaking of fluid . Objective:     Vitals:  Blood pressure (!) 88/58, pulse 67, temperature 98 °F (36.7 °C), resp. rate 18, height 5' 4\" (1.626 m), weight 72.2 kg (159 lb 3.2 oz), last menstrual period 2020, SpO2 99 %, not currently breastfeeding. Temp (24hrs), Av.7 °F (36.5 °C), Min:97.5 °F (36.4 °C), Max:98 °F (31.5 °C)    Systolic (41IKE), KVK:67 , Min:88 , CFZ:38      Diastolic (75FQU), DZZ:16, Min:55, Max:60       Intake and Output:         Physical Exam:  Patient without distress. Heart: Regular rate and rhythm  Lung: clear to auscultation throughout lung fields, no wheezes, no rales, no rhonchi and normal respiratory effort  Abdomen: soft, nontender, gravid  Lower Extremities:  - Edema No       Membranes:  Intact    Fetal Heart Rate: +FHTs     Labs:   Recent Results (from the past 36 hour(s))   EKG, 12 LEAD, INITIAL    Collection Time: 21  8:52 PM   Result Value Ref Range    Ventricular Rate 112 BPM    Atrial Rate 112 BPM    P-R Interval 144 ms    QRS Duration 76 ms    Q-T Interval 306 ms    QTC Calculation (Bezet) 417 ms    Calculated P Axis 54 degrees    Calculated R Axis 2 degrees    Calculated T Axis 59 degrees    Diagnosis       Sinus tachycardia  Nonspecific T wave abnormality  When compared with ECG of 04-DEC-2020 08:47,  Vent.  rate has increased BY  53 BPM  Nonspecific T wave abnormality now evident in Lateral leads  Confirmed by Maki Kelsey ([id number]) on 3/3/2021 9:52:51 AM     URINALYSIS W/MICROSCOPIC    Collection Time: 03/02/21  9:59 PM   Result Value Ref Range    Color DARK YELLOW     Appearance TURBID (A) CLEAR      Specific gravity 1.030 1.003 - 1.030      pH (UA) >8.5 (H) 5.0 - 8.0    Protein 100 (A) NEG mg/dL    Glucose Negative NEG mg/dL    Ketone Negative NEG mg/dL    Blood LARGE (A) NEG      Urobilinogen 1.0 0.2 - 1.0 EU/dL    Nitrites Negative NEG      Leukocyte Esterase SMALL (A) NEG      WBC 0-4 0 - 4 /hpf    RBC  0 - 5 /hpf    Epithelial cells MANY (A) FEW /lpf    Bacteria 4+ (A) NEG /hpf   URINE CULTURE HOLD SAMPLE    Collection Time: 03/02/21  9:59 PM    Specimen: Serum; Urine   Result Value Ref Range    Urine culture hold        Urine on hold in Microbiology dept for 2 days. If unpreserved urine is submitted, it cannot be used for addtional testing after 24 hours, recollection will be required. BILIRUBIN, CONFIRM    Collection Time: 03/02/21  9:59 PM   Result Value Ref Range    Bilirubin UA, confirm Negative NEG     CBC WITH AUTOMATED DIFF    Collection Time: 03/02/21 11:09 PM   Result Value Ref Range    WBC 7.2 3.6 - 11.0 K/uL    RBC 4.44 3.80 - 5.20 M/uL    HGB 10.5 (L) 11.5 - 16.0 g/dL    HCT 32.0 (L) 35.0 - 47.0 %    MCV 72.1 (L) 80.0 - 99.0 FL    MCH 23.6 (L) 26.0 - 34.0 PG    MCHC 32.8 30.0 - 36.5 g/dL    RDW 14.6 (H) 11.5 - 14.5 %    PLATELET 652 795 - 299 K/uL    MPV 9.7 8.9 - 12.9 FL    NRBC 0.0 0  WBC    ABSOLUTE NRBC 0.00 0.00 - 0.01 K/uL    NEUTROPHILS 79 (H) 32 - 75 %    LYMPHOCYTES 15 12 - 49 %    MONOCYTES 6 5 - 13 %    EOSINOPHILS 0 0 - 7 %    BASOPHILS 0 0 - 1 %    IMMATURE GRANULOCYTES 0 0.0 - 0.5 %    ABS. NEUTROPHILS 5.6 1.8 - 8.0 K/UL    ABS. LYMPHOCYTES 1.1 0.8 - 3.5 K/UL    ABS. MONOCYTES 0.5 0.0 - 1.0 K/UL    ABS. EOSINOPHILS 0.0 0.0 - 0.4 K/UL    ABS. BASOPHILS 0.0 0.0 - 0.1 K/UL    ABS. IMM.  GRANS. 0.0 0.00 - 0.04 K/UL    DF AUTOMATED     METABOLIC PANEL, COMPREHENSIVE    Collection Time: 03/02/21 11:09 PM   Result Value Ref Range    Sodium 137 136 - 145 mmol/L    Potassium 3.6 3.5 - 5.1 mmol/L    Chloride 107 97 - 108 mmol/L    CO2 23 21 - 32 mmol/L    Anion gap 7 5 - 15 mmol/L    Glucose 76 65 - 100 mg/dL    BUN 8 6 - 20 MG/DL    Creatinine 0.74 0.55 - 1.02 MG/DL    BUN/Creatinine ratio 11 (L) 12 - 20      GFR est AA >60 >60 ml/min/1.73m2    GFR est non-AA >60 >60 ml/min/1.73m2    Calcium 8.7 8.5 - 10.1 MG/DL    Bilirubin, total 0.7 0.2 - 1.0 MG/DL    ALT (SGPT) 31 12 - 78 U/L    AST (SGOT) 32 15 - 37 U/L    Alk. phosphatase 154 (H) 45 - 117 U/L    Protein, total 8.3 (H) 6.4 - 8.2 g/dL    Albumin 2.8 (L) 3.5 - 5.0 g/dL    Globulin 5.5 (H) 2.0 - 4.0 g/dL    A-G Ratio 0.5 (L) 1.1 - 2.2     SAMPLES BEING HELD    Collection Time: 03/02/21 11:09 PM   Result Value Ref Range    SAMPLES BEING HELD 1RED,1BLUE     COMMENT        Add-on orders for these samples will be processed based on acceptable specimen integrity and analyte stability, which may vary by analyte.    LACTIC ACID    Collection Time: 03/02/21 11:09 PM   Result Value Ref Range    Lactic acid 0.9 0.4 - 2.0 MMOL/L   CULTURE, BLOOD, PAIRED    Collection Time: 03/02/21 11:09 PM    Specimen: Blood   Result Value Ref Range    Special Requests: NO SPECIAL REQUESTS      Culture result: NO GROWTH 1 DAY     COVID-19 RAPID TEST    Collection Time: 03/03/21  1:07 AM   Result Value Ref Range    Specimen source Nasopharyngeal      COVID-19 rapid test Not detected NOTD     URINALYSIS W/ RFLX MICROSCOPIC    Collection Time: 03/03/21  1:30 AM   Result Value Ref Range    Color YELLOW      Appearance CLEAR CLEAR      Specific gravity 1.025 1.003 - 1.030      pH (UA) 6.5 5.0 - 8.0      Protein 100 (A) NEG mg/dL    Glucose Negative NEG mg/dL    Ketone >80 (A) NEG mg/dL    Blood LARGE (A) NEG      Urobilinogen 0.2 0.2 - 1.0 EU/dL    Nitrites Positive (A) NEG      Leukocyte Esterase Negative NEG     BILIRUBIN, CONFIRM    Collection Time: 03/03/21  1:30 AM   Result Value Ref Range    Bilirubin UA, confirm Negative NEG     URINE MICROSCOPIC ONLY    Collection Time: 03/03/21  1:30 AM   Result Value Ref Range    WBC 5-10 0 - 4 /hpf    RBC 20-50 0 - 5 /hpf    Epithelial cells FEW FEW /lpf    Bacteria 1+ (A) NEG /hpf    Mucus 1+ (A) NEG /lpf   METABOLIC PANEL, COMPREHENSIVE    Collection Time: 03/03/21  5:48 AM   Result Value Ref Range    Sodium 139 136 - 145 mmol/L    Potassium 3.5 3.5 - 5.1 mmol/L    Chloride 109 (H) 97 - 108 mmol/L    CO2 22 21 - 32 mmol/L    Anion gap 8 5 - 15 mmol/L    Glucose 77 65 - 100 mg/dL    BUN 9 6 - 20 MG/DL    Creatinine 0.56 0.55 - 1.02 MG/DL    BUN/Creatinine ratio 16 12 - 20      GFR est AA >60 >60 ml/min/1.73m2    GFR est non-AA >60 >60 ml/min/1.73m2    Calcium 8.1 (L) 8.5 - 10.1 MG/DL    Bilirubin, total 0.5 0.2 - 1.0 MG/DL    ALT (SGPT) 23 12 - 78 U/L    AST (SGOT) 21 15 - 37 U/L    Alk. phosphatase 124 (H) 45 - 117 U/L    Protein, total 7.1 6.4 - 8.2 g/dL    Albumin 2.4 (L) 3.5 - 5.0 g/dL    Globulin 4.7 (H) 2.0 - 4.0 g/dL    A-G Ratio 0.5 (L) 1.1 - 2.2     CBC WITH AUTOMATED DIFF    Collection Time: 03/03/21  5:48 AM   Result Value Ref Range    WBC 4.8 3.6 - 11.0 K/uL    RBC 3.86 3.80 - 5.20 M/uL    HGB 9.1 (L) 11.5 - 16.0 g/dL    HCT 28.4 (L) 35.0 - 47.0 %    MCV 73.6 (L) 80.0 - 99.0 FL    MCH 23.6 (L) 26.0 - 34.0 PG    MCHC 32.0 30.0 - 36.5 g/dL    RDW 14.6 (H) 11.5 - 14.5 %    PLATELET 828 623 - 599 K/uL    MPV 10.4 8.9 - 12.9 FL    NRBC 0.0 0  WBC    ABSOLUTE NRBC 0.00 0.00 - 0.01 K/uL    NEUTROPHILS 62 32 - 75 %    LYMPHOCYTES 24 12 - 49 %    MONOCYTES 10 5 - 13 %    EOSINOPHILS 2 0 - 7 %    BASOPHILS 1 0 - 1 %    IMMATURE GRANULOCYTES 1 (H) 0.0 - 0.5 %    ABS. NEUTROPHILS 3.0 1.8 - 8.0 K/UL    ABS. LYMPHOCYTES 1.2 0.8 - 3.5 K/UL    ABS. MONOCYTES 0.5 0.0 - 1.0 K/UL    ABS. EOSINOPHILS 0.1 0.0 - 0.4 K/UL    ABS. BASOPHILS 0.0 0.0 - 0.1 K/UL    ABS. IMM.  GRANS. 0.0 0.00 - 0.04 K/UL    DF AUTOMATED TSH 3RD GENERATION    Collection Time: 03/03/21  5:48 AM   Result Value Ref Range    TSH 0.87 0.36 - 3.74 uIU/mL   MAGNESIUM    Collection Time: 03/03/21  5:48 AM   Result Value Ref Range    Magnesium 2.4 1.6 - 2.4 mg/dL   PHOSPHORUS    Collection Time: 03/03/21  5:48 AM   Result Value Ref Range    Phosphorus 3.3 2.6 - 4.7 MG/DL   LIPASE    Collection Time: 03/03/21  5:48 AM   Result Value Ref Range    Lipase 51 (L) 73 - 393 U/L   TROPONIN I    Collection Time: 03/03/21  5:48 AM   Result Value Ref Range    Troponin-I, Qt. <0.05 <0.05 ng/mL   D DIMER    Collection Time: 03/03/21  5:48 AM   Result Value Ref Range    D-dimer 1.74 (H) 0.00 - 0.65 mg/L FEU   CBC WITH AUTOMATED DIFF    Collection Time: 03/04/21  3:38 AM   Result Value Ref Range    WBC 5.0 3.6 - 11.0 K/uL    RBC 3.63 (L) 3.80 - 5.20 M/uL    HGB 8.7 (L) 11.5 - 16.0 g/dL    HCT 27.4 (L) 35.0 - 47.0 %    MCV 75.5 (L) 80.0 - 99.0 FL    MCH 24.0 (L) 26.0 - 34.0 PG    MCHC 31.8 30.0 - 36.5 g/dL    RDW 14.6 (H) 11.5 - 14.5 %    PLATELET 244 892 - 312 K/uL    MPV 10.3 8.9 - 12.9 FL    NRBC 0.0 0  WBC    ABSOLUTE NRBC 0.00 0.00 - 0.01 K/uL    NEUTROPHILS 54 32 - 75 %    LYMPHOCYTES 34 12 - 49 %    MONOCYTES 9 5 - 13 %    EOSINOPHILS 3 0 - 7 %    BASOPHILS 0 0 - 1 %    IMMATURE GRANULOCYTES 0 %    ABS. NEUTROPHILS 2.6 1.8 - 8.0 K/UL    ABS. LYMPHOCYTES 1.7 0.8 - 3.5 K/UL    ABS. MONOCYTES 0.5 0.0 - 1.0 K/UL    ABS. EOSINOPHILS 0.2 0.0 - 0.4 K/UL    ABS. BASOPHILS 0.0 0.0 - 0.1 K/UL    ABS. IMM.  GRANS. 0.0 K/UL    DF MANUAL      RBC COMMENTS ANISOCYTOSIS  1+        RBC COMMENTS MICROCYTOSIS  1+        RBC COMMENTS HYPOCHROMIA  1+        RBC COMMENTS OVALOCYTES  1+        RBC COMMENTS TAMI CELLS  1+       METABOLIC PANEL, BASIC    Collection Time: 03/04/21  3:38 AM   Result Value Ref Range    Sodium 138 136 - 145 mmol/L    Potassium 3.6 3.5 - 5.1 mmol/L    Chloride 109 (H) 97 - 108 mmol/L    CO2 22 21 - 32 mmol/L    Anion gap 7 5 - 15 mmol/L    Glucose 74 65 - 100 mg/dL    BUN 6 6 - 20 MG/DL    Creatinine 0.41 (L) 0.55 - 1.02 MG/DL    BUN/Creatinine ratio 15 12 - 20      GFR est AA >60 >60 ml/min/1.73m2    GFR est non-AA >60 >60 ml/min/1.73m2    Calcium 8.0 (L) 8.5 - 10.1 MG/DL       Assessment and Plan:      Principal Problem:    Acute pyelonephritis (3/3/2021)       39 y/o [id number] at 22 4/7 weeks admitted for intermittent chills and fever-presumed pyelonephritis  -Afebrile and feeling better on ceftriaxone  -Blood and urine cultures pending  -Appreciate ID help    Chronic eating disorder complicated by nausea/vomiting of pregnancy  -on TPN (cyclic-home regimen via [employer])  -has outpatient nutritionistZina  -iv zofran, phenergan suppositories, carafate, ivf's    Depression with h/o hospitalization for suicidal ideation  -followed by Kathia Proctor (psychiatrist) and Kem Collet (psychologist), however pt non-compliant with counseling  -Continue Prozac, Zyprexa, and prn Prazosin, hydroxyzine    Chronic anemia-known beta thal minor  -s/p iv iron with last admission  -Hb currently at baseline    M appt for anatomy scan today at 8:30am.     Ambulate with assistance, Juan thakkar for DVT ppx (pt with intermittent dizziness and previous fall history)

## 2021-03-04 NOTE — PROGRESS NOTES
Bedside and Verbal shift change report given to SOPHY Chen RN (oncoming nurse) by Harper Villarreal RN (offgoing nurse). Report included the following information SBAR and Kardex.

## 2021-03-04 NOTE — PROGRESS NOTES
Bedside shift change report given to Kelley nAdrea RN (oncoming nurse) by Carlo Murphy RN (offgoing nurse). Report included the following information SBAR, Kardex, Intake/Output and MAR.

## 2021-03-04 NOTE — CONSULTS
ID Consult Note  NAME:  Spencer Mack   :   1985   MRN:   [id number]   Date/Time:  3/3/2021 7:41 PM  Subjective:   REASON FOR CONSULT: Fever     Enoch Vee is a 38 y.o. with a history of depression and asthma. She is currently pregnant. She was admitted here for 2 weeks in January for nausea and vomiting. She was discharged with a PICC line to get TPN at home. Over the last 2 to 3 weeks, she has been having fevers. She she did not measure her temperature. She did have some chills. She also had some bilateral flank pain as well as generalized muscle aches. There were no aggravating or alleviating factors. She has no respiratory symptoms she has not been exposed to Covid. She was actually tested last Friday and it came back negative. She again was tested on this admission and it also came back negative. Her urinalysis here showed some mild pyuria with 5-10 white blood cells but there was significant microscopic hematuria. She tells me that she has never had a history of nephrolithiasis. She has been started on ceftriaxone here and so far she has not had any fevers. Cultures are pending. Past Medical History:   Diagnosis Date    Acute pyelonephritis 3/3/2021    Anorexia     Anxiety     Asthma     on albuterol prn.  Triggers cold and allergies    Avoidant-restrictive food intake disorder (ARFID)     Back pain, chronic     sciatica    Chronic bilateral low back pain with right-sided sciatica 2020    ~    GERD (gastroesophageal reflux disease) 2020    [facility] 10-, GI Dr. Jacy Chairez Gestational hypertension     Past pregnancy    HX OTHER MEDICAL      , 2006, , ,     Hyperemesis     severe hyperemesis    Hypertension     with G12 - none this pregnancy    Iron deficiency anemia 10/08/2010    MDD (major depressive disorder), single episode with postpartum onset 2013    treated with meds - 13    Orthostatic hypotension 2020    Plantar fasciitis     Pregnancy     36 weeks    PTSD (post-traumatic stress disorder)       Past Surgical History:   Procedure Laterality Date    HX  SECTION  4/22/15    HX DILATION AND CURETTAGE      HX DILATION AND CURETTAGE  2020    HX GYN      miscarriage x 6    HX GYN      removal of left fallopian tube    HX OTHER SURGICAL      cerlcage x4, ectopic x1 1999 with removal of left fallopian tube    HX OTHER SURGICAL      cerclage w/ current pregnancy. Removed 18    MT  DELIVERY ONLY       Social History     Tobacco Use    Smoking status: Never Smoker    Smokeless tobacco: Never Used   Substance Use Topics    Alcohol use: Not Currently     Frequency: Monthly or less     Drinks per session: 1 or 2     Binge frequency: Never   No drug use    Family History   Problem Relation Age of Onset   [address] [facility] Arthritis-rheumatoid Mother     Asthma Mother     No Known Problems Father     No Known Problems Maternal Grandmother     No Known Problems Maternal Grandfather     No Known Problems Paternal Grandmother     No Known Problems Paternal Grandfather     Asthma Son     Alcohol abuse Neg Hx     Arthritis-osteo Neg Hx     Bleeding Prob Neg Hx     Cancer Neg Hx     Diabetes Neg Hx     Elevated Lipids Neg Hx     Headache Neg Hx     Heart Disease Neg Hx     Hypertension Neg Hx     Lung Disease Neg Hx     Migraines Neg Hx     Psychiatric Disorder Neg Hx     Stroke Neg Hx     Mental Retardation Neg Hx     Anesth Problems Neg Hx       Allergies   Allergen Reactions    Labetalol Hives    Ceclor [Cefaclor] Unknown (comments)    Pcn [Penicillins] Hives    Septra [Sulfamethoprim Ds] Unknown (comments)      Home Medications:  Prior to Admission Medications   Prescriptions Last Dose Informant Patient Reported?  Taking?   0.9% sodium chloride solp 1,000 mL with thiamine 100 mg/mL soln 332 mg, folic acid 5 mg/mL soln 1 mg   No No   Si Bag by IntraVENous route every -four (24) hours. FLUoxetine (PROzac) 20 mg capsule   No No   Sig: Take 1 Cap by mouth daily. Take with 40 mg cap for total daily dose of 60 mg. FLUoxetine (PROzac) 40 mg capsule   No No   Sig: Take 1 Cap by mouth daily. Lacto. acidophilus-Bif. animalis (Probiotic) 5 billion cell cpSP   No No   Sig: Take 1 Cap by mouth daily. OLANZapine (ZyPREXA) 10 mg tablet   No No   Sig: Take 1 Tab by mouth nightly. OTHER   No No   Si ensure pudding PO daily for lunch. OTHER   No No   Si Ensure Enlive food supplement drink PO TID   calcium carbonate (Tums) 200 mg calcium (500 mg) chew   No No   Sig: Take 1 Tab by mouth two (2) times daily as needed for Other (reflux). cholecalciferol (Vitamin D3) 25 mcg (1,000 unit) cap   Yes No   Sig: Take  by mouth daily. doxylamine succinate (UNISOM) 25 mg tablet   No No   Sig: Take 1 Tab by mouth nightly as needed for Insomnia. food supplemt, lactose-reduced (Ensure High Protein) liqd   No No   Sig: Take 237 mL by mouth three (3) times daily. hydrOXYzine HCL (ATARAX) 50 mg tablet   No No   Sig: Take 1 Tab by mouth four (4) times daily. ondansetron (ZOFRAN ODT) 4 mg disintegrating tablet   No No   Sig: Take 1 Tab by mouth every six (6) hours as needed for Nausea or Vomiting. pantoprazole (PROTONIX) 40 mg tablet   No No   Sig: Take 1 Tab by mouth Daily (before breakfast). Indications: gastroesophageal reflux disease   polyethylene glycol (MIRALAX) 17 gram packet   No No   Sig: Take 1 Packet by mouth daily as needed for Constipation. prazosin (MINIPRESS) 2 mg capsule   No No   Sig: Take 1 Cap by mouth nightly. Indications: posttraumatic stress syndrome   prenatal vit-iron fumarate-fa (PRENATAL PLUS with IRON) 28 mg iron- 800 mcg tab   Yes No   prochlorperazine (COMPAZINE) 10 mg tablet   Yes No   promethazine (Phenergan) 12.5 mg suppository   Yes No   pyridoxine, vitamin B6, (Vitamin B-6) 100 mg tablet   Yes No   Sig: Take 100 mg by mouth daily.    sucralfate (CARAFATE) 1 gram tablet   No No   Sig: Take 1 Tab by mouth Before breakfast, lunch, dinner and at bedtime.       Facility-Administered Medications: None     Hospital medications:  Current Facility-Administered Medications   Medication Dose Route Frequency    calcium carbonate (TUMS) chewable tablet 200 mg [elemental]  200 mg Oral BID PRN    FLUoxetine (PROzac) capsule 60 mg  60 mg Oral QPM    OLANZapine (ZyPREXA) tablet 10 mg  10 mg Oral QHS    pantoprazole (PROTONIX) tablet 40 mg  40 mg Oral ACB    prazosin (MINIPRESS) capsule 2 mg  2 mg Oral QHS    prenatal vit-iron fumarate-fa (PRENATAL PLUS with IRON) tablet 1 Tab  1 Tab Oral DAILY    sucralfate (CARAFATE) tablet 1 g  1 g Oral AC&HS    sodium chloride (NS) flush 5-40 mL  5-40 mL IntraVENous Q8H    sodium chloride (NS) flush 5-40 mL  5-40 mL IntraVENous PRN    acetaminophen (TYLENOL) tablet 650 mg  650 mg Oral Q6H PRN    Or    acetaminophen (TYLENOL) suppository 650 mg  650 mg Rectal Q6H PRN    polyethylene glycol (MIRALAX) packet 17 g  17 g Oral DAILY PRN    promethazine (PHENERGAN) tablet 12.5 mg  12.5 mg Oral Q6H PRN    cefTRIAXone (ROCEPHIN) 1 g in 0.9% sodium chloride (MBP/ADV) 50 mL MBP  1 g IntraVENous Q24H    0.9% sodium chloride infusion  125 mL/hr IntraVENous CONTINUOUS    ondansetron (ZOFRAN) injection 4 mg  4 mg IntraVENous Q4H PRN    TPN ADULT - CENTRAL   IntraVENous TITRATE    fat emulsion 20% (LIPOSYN, INTRAlipid) infusion 500 mL  500 mL IntraVENous Q24H     REVIEW OF SYSTEMS:        Const:   negative weight loss  Eyes:   negative diplopia or visual changes, negative eye pain  ENT:   negative coryza, negative sore throat  Resp:   negative hemoptysis  Cards:  negative forpalpitations  :  negative for  dysuria   GI:   Negative for hematemesis and hematochezia  Skin:   negative for rash and pruritus  Heme:   negative for easy bruising and gum/nose bleeding  MS:  negative for joint swelling  Neurolo:  She has a headache which is chronic  Psych: negative for hallucinations        Objective:   VITALS:    Visit Vitals  BP (!) 93/55 (BP 1 Location: Right arm, BP Patient Position: At rest)   Pulse 73   Temp 97.5 °F (36.4 °C)   Resp 18   Ht 5' 4\" (1.626 m)   Wt 72.2 kg (159 lb 3.2 oz)   LMP 2020 (Exact Date)   SpO2 98%   BMI 27.33 kg/m²     Temp (24hrs), Av.7 °F (37.1 °C), Min:97.5 °F (36.4 °C), Max:100.4 °F (38 °C)    PHYSICAL EXAM:   General:    Alert, cooperative, no distress, appears stated age. Head:   Normocephalic, without obvious abnormality, atraumatic. Eyes:   Conjunctivae clear, anicteric sclerae. Nose:  Nares normal.   Throat:    Lips and tongue normal.    Neck:  Supple, symmetrical    no carotid bruit and no JVD. :    Slight CVA tenderness bilaterally, no salazar catheter  Lungs:   Clear to auscultation bilaterally. No Wheezing or Rhonchi. No rales. Heart:   Regular rate and rhythm,  no murmur, rub or gallop. Abdomen:   Soft, non-tender,not distended. Bowel sounds normal.   Extremities: Knees, ankles, wrists, elbows are not warm and not tender. Skin:     No rashes or lesions. Not Jaundiced  Lymph: Cervical normal  Neurologic: Full use of extraocular muscles, no facial asymmetry, tongue midline muscle strength 5 out of 5    LAB DATA REVIEWED:    Recent Results (from the past 48 hour(s))   EKG, 12 LEAD, INITIAL    Collection Time: 21  8:52 PM   Result Value Ref Range    Ventricular Rate 112 BPM    Atrial Rate 112 BPM    P-R Interval 144 ms    QRS Duration 76 ms    Q-T Interval 306 ms    QTC Calculation (Bezet) 417 ms    Calculated P Axis 54 degrees    Calculated R Axis 2 degrees    Calculated T Axis 59 degrees    Diagnosis       Sinus tachycardia  Nonspecific T wave abnormality  When compared with ECG of 04-DEC-2020 08:47,  Vent.  rate has increased BY  53 BPM  Nonspecific T wave abnormality now evident in Lateral leads  Confirmed by Antoinette Urias ([id number]) on 3/3/2021 9:52:51 AM     URINALYSIS W/MICROSCOPIC    Collection Time: 03/02/21  9:59 PM   Result Value Ref Range    Color DARK YELLOW     Appearance TURBID (A) CLEAR      Specific gravity 1.030 1.003 - 1.030      pH (UA) >8.5 (H) 5.0 - 8.0    Protein 100 (A) NEG mg/dL    Glucose Negative NEG mg/dL    Ketone Negative NEG mg/dL    Blood LARGE (A) NEG      Urobilinogen 1.0 0.2 - 1.0 EU/dL    Nitrites Negative NEG      Leukocyte Esterase SMALL (A) NEG      WBC 0-4 0 - 4 /hpf    RBC  0 - 5 /hpf    Epithelial cells MANY (A) FEW /lpf    Bacteria 4+ (A) NEG /hpf   URINE CULTURE HOLD SAMPLE    Collection Time: 03/02/21  9:59 PM    Specimen: Serum; Urine   Result Value Ref Range    Urine culture hold        Urine on hold in Microbiology dept for 2 days. If unpreserved urine is submitted, it cannot be used for addtional testing after 24 hours, recollection will be required. BILIRUBIN, CONFIRM    Collection Time: 03/02/21  9:59 PM   Result Value Ref Range    Bilirubin UA, confirm Negative NEG     CBC WITH AUTOMATED DIFF    Collection Time: 03/02/21 11:09 PM   Result Value Ref Range    WBC 7.2 3.6 - 11.0 K/uL    RBC 4.44 3.80 - 5.20 M/uL    HGB 10.5 (L) 11.5 - 16.0 g/dL    HCT 32.0 (L) 35.0 - 47.0 %    MCV 72.1 (L) 80.0 - 99.0 FL    MCH 23.6 (L) 26.0 - 34.0 PG    MCHC 32.8 30.0 - 36.5 g/dL    RDW 14.6 (H) 11.5 - 14.5 %    PLATELET 062 471 - 511 K/uL    MPV 9.7 8.9 - 12.9 FL    NRBC 0.0 0  WBC    ABSOLUTE NRBC 0.00 0.00 - 0.01 K/uL    NEUTROPHILS 79 (H) 32 - 75 %    LYMPHOCYTES 15 12 - 49 %    MONOCYTES 6 5 - 13 %    EOSINOPHILS 0 0 - 7 %    BASOPHILS 0 0 - 1 %    IMMATURE GRANULOCYTES 0 0.0 - 0.5 %    ABS. NEUTROPHILS 5.6 1.8 - 8.0 K/UL    ABS. LYMPHOCYTES 1.1 0.8 - 3.5 K/UL    ABS. MONOCYTES 0.5 0.0 - 1.0 K/UL    ABS. EOSINOPHILS 0.0 0.0 - 0.4 K/UL    ABS. BASOPHILS 0.0 0.0 - 0.1 K/UL    ABS. IMM.  GRANS. 0.0 0.00 - 0.04 K/UL    DF AUTOMATED     METABOLIC PANEL, COMPREHENSIVE    Collection Time: 03/02/21 11:09 PM   Result Value Ref Range    Sodium 137 136 - 145 mmol/L Potassium 3.6 3.5 - 5.1 mmol/L    Chloride 107 97 - 108 mmol/L    CO2 23 21 - 32 mmol/L    Anion gap 7 5 - 15 mmol/L    Glucose 76 65 - 100 mg/dL    BUN 8 6 - 20 MG/DL    Creatinine 0.74 0.55 - 1.02 MG/DL    BUN/Creatinine ratio 11 (L) 12 - 20      GFR est AA >60 >60 ml/min/1.73m2    GFR est non-AA >60 >60 ml/min/1.73m2    Calcium 8.7 8.5 - 10.1 MG/DL    Bilirubin, total 0.7 0.2 - 1.0 MG/DL    ALT (SGPT) 31 12 - 78 U/L    AST (SGOT) 32 15 - 37 U/L    Alk. phosphatase 154 (H) 45 - 117 U/L    Protein, total 8.3 (H) 6.4 - 8.2 g/dL    Albumin 2.8 (L) 3.5 - 5.0 g/dL    Globulin 5.5 (H) 2.0 - 4.0 g/dL    A-G Ratio 0.5 (L) 1.1 - 2.2     SAMPLES BEING HELD    Collection Time: 03/02/21 11:09 PM   Result Value Ref Range    SAMPLES BEING HELD 1RED,1BLUE     COMMENT        Add-on orders for these samples will be processed based on acceptable specimen integrity and analyte stability, which may vary by analyte.    LACTIC ACID    Collection Time: 03/02/21 11:09 PM   Result Value Ref Range    Lactic acid 0.9 0.4 - 2.0 MMOL/L   CULTURE, BLOOD, PAIRED    Collection Time: 03/02/21 11:09 PM    Specimen: Blood   Result Value Ref Range    Special Requests: NO SPECIAL REQUESTS      Culture result: NO GROWTH AFTER 4 HOURS     COVID-19 RAPID TEST    Collection Time: 03/03/21  1:07 AM   Result Value Ref Range    Specimen source Nasopharyngeal      COVID-19 rapid test Not detected NOTD     URINALYSIS W/ RFLX MICROSCOPIC    Collection Time: 03/03/21  1:30 AM   Result Value Ref Range    Color YELLOW      Appearance CLEAR CLEAR      Specific gravity 1.025 1.003 - 1.030      pH (UA) 6.5 5.0 - 8.0      Protein 100 (A) NEG mg/dL    Glucose Negative NEG mg/dL    Ketone >80 (A) NEG mg/dL    Blood LARGE (A) NEG      Urobilinogen 0.2 0.2 - 1.0 EU/dL    Nitrites Positive (A) NEG      Leukocyte Esterase Negative NEG     BILIRUBIN, CONFIRM    Collection Time: 03/03/21  1:30 AM   Result Value Ref Range    Bilirubin UA, confirm Negative NEG     URINE MICROSCOPIC ONLY    Collection Time: 03/03/21  1:30 AM   Result Value Ref Range    WBC 5-10 0 - 4 /hpf    RBC 20-50 0 - 5 /hpf    Epithelial cells FEW FEW /lpf    Bacteria 1+ (A) NEG /hpf    Mucus 1+ (A) NEG /lpf   METABOLIC PANEL, COMPREHENSIVE    Collection Time: 03/03/21  5:48 AM   Result Value Ref Range    Sodium 139 136 - 145 mmol/L    Potassium 3.5 3.5 - 5.1 mmol/L    Chloride 109 (H) 97 - 108 mmol/L    CO2 22 21 - 32 mmol/L    Anion gap 8 5 - 15 mmol/L    Glucose 77 65 - 100 mg/dL    BUN 9 6 - 20 MG/DL    Creatinine 0.56 0.55 - 1.02 MG/DL    BUN/Creatinine ratio 16 12 - 20      GFR est AA >60 >60 ml/min/1.73m2    GFR est non-AA >60 >60 ml/min/1.73m2    Calcium 8.1 (L) 8.5 - 10.1 MG/DL    Bilirubin, total 0.5 0.2 - 1.0 MG/DL    ALT (SGPT) 23 12 - 78 U/L    AST (SGOT) 21 15 - 37 U/L    Alk. phosphatase 124 (H) 45 - 117 U/L    Protein, total 7.1 6.4 - 8.2 g/dL    Albumin 2.4 (L) 3.5 - 5.0 g/dL    Globulin 4.7 (H) 2.0 - 4.0 g/dL    A-G Ratio 0.5 (L) 1.1 - 2.2     CBC WITH AUTOMATED DIFF    Collection Time: 03/03/21  5:48 AM   Result Value Ref Range    WBC 4.8 3.6 - 11.0 K/uL    RBC 3.86 3.80 - 5.20 M/uL    HGB 9.1 (L) 11.5 - 16.0 g/dL    HCT 28.4 (L) 35.0 - 47.0 %    MCV 73.6 (L) 80.0 - 99.0 FL    MCH 23.6 (L) 26.0 - 34.0 PG    MCHC 32.0 30.0 - 36.5 g/dL    RDW 14.6 (H) 11.5 - 14.5 %    PLATELET 863 467 - 577 K/uL    MPV 10.4 8.9 - 12.9 FL    NRBC 0.0 0  WBC    ABSOLUTE NRBC 0.00 0.00 - 0.01 K/uL    NEUTROPHILS 62 32 - 75 %    LYMPHOCYTES 24 12 - 49 %    MONOCYTES 10 5 - 13 %    EOSINOPHILS 2 0 - 7 %    BASOPHILS 1 0 - 1 %    IMMATURE GRANULOCYTES 1 (H) 0.0 - 0.5 %    ABS. NEUTROPHILS 3.0 1.8 - 8.0 K/UL    ABS. LYMPHOCYTES 1.2 0.8 - 3.5 K/UL    ABS. MONOCYTES 0.5 0.0 - 1.0 K/UL    ABS. EOSINOPHILS 0.1 0.0 - 0.4 K/UL    ABS. BASOPHILS 0.0 0.0 - 0.1 K/UL    ABS. IMM.  GRANS. 0.0 0.00 - 0.04 K/UL    DF AUTOMATED     TSH 3RD GENERATION    Collection Time: 03/03/21  5:48 AM   Result Value Ref Range    TSH 0.87 0.36 - 3.74 uIU/mL   MAGNESIUM    Collection Time: 03/03/21  5:48 AM   Result Value Ref Range    Magnesium 2.4 1.6 - 2.4 mg/dL   PHOSPHORUS    Collection Time: 03/03/21  5:48 AM   Result Value Ref Range    Phosphorus 3.3 2.6 - 4.7 MG/DL   LIPASE    Collection Time: 03/03/21  5:48 AM   Result Value Ref Range    Lipase 51 (L) 73 - 393 U/L   TROPONIN I    Collection Time: 03/03/21  5:48 AM   Result Value Ref Range    Troponin-I, Qt. <0.05 <0.05 ng/mL   D DIMER    Collection Time: 03/03/21  5:48 AM   Result Value Ref Range    D-dimer 1.74 (H) 0.00 - 0.65 mg/L FEU         IMPRESSION    #1 fever    #2 intrauterine pregnancy    #3 asthma    #4 depression      PLAN    The patient's urinalysis only has 5-10 white blood cells, but she does have slight CVA tenderness. We cannot confirm pyelonephritis with a CT scan since she is pregnant. She has so far done well on ceftriaxone and her fevers have not recurred. She has been tested for Covid twice already and both tests have come back negative. At this moment, I would continue ceftriaxone. If her blood cultures turn positive, then we may need to remove the PICC line. We should follow-up her urine culture.                    ___________________________________________________  ID: Zaid Concepcion MD

## 2021-03-05 ENCOUNTER — APPOINTMENT (OUTPATIENT)
Dept: GENERAL RADIOLOGY | Age: 36
DRG: 566 | End: 2021-03-05
Attending: ANESTHESIOLOGY
Payer: MEDICAID

## 2021-03-05 ENCOUNTER — APPOINTMENT (OUTPATIENT)
Dept: ULTRASOUND IMAGING | Age: 36
DRG: 566 | End: 2021-03-05
Attending: OBSTETRICS & GYNECOLOGY
Payer: MEDICAID

## 2021-03-05 LAB
BACTERIA SPEC CULT: ABNORMAL
CC UR VC: ABNORMAL
SERVICE CMNT-IMP: ABNORMAL

## 2021-03-05 PROCEDURE — 74011250637 HC RX REV CODE- 250/637: Performed by: INTERNAL MEDICINE

## 2021-03-05 PROCEDURE — 76770 US EXAM ABDO BACK WALL COMP: CPT

## 2021-03-05 PROCEDURE — 65410000002 HC RM PRIVATE OB

## 2021-03-05 PROCEDURE — 74011000258 HC RX REV CODE- 258: Performed by: INTERNAL MEDICINE

## 2021-03-05 PROCEDURE — 74011250636 HC RX REV CODE- 250/636: Performed by: OBSTETRICS & GYNECOLOGY

## 2021-03-05 PROCEDURE — 94760 N-INVAS EAR/PLS OXIMETRY 1: CPT

## 2021-03-05 PROCEDURE — 74011000250 HC RX REV CODE- 250: Performed by: OBSTETRICS & GYNECOLOGY

## 2021-03-05 PROCEDURE — 05HY33Z INSERTION OF INFUSION DEVICE INTO UPPER VEIN, PERCUTANEOUS APPROACH: ICD-10-PCS | Performed by: ANESTHESIOLOGY

## 2021-03-05 PROCEDURE — 74011250636 HC RX REV CODE- 250/636: Performed by: INTERNAL MEDICINE

## 2021-03-05 PROCEDURE — 36556 INSERT NON-TUNNEL CV CATH: CPT

## 2021-03-05 PROCEDURE — 74011000250 HC RX REV CODE- 250: Performed by: INTERNAL MEDICINE

## 2021-03-05 PROCEDURE — 74011000258 HC RX REV CODE- 258: Performed by: OBSTETRICS & GYNECOLOGY

## 2021-03-05 PROCEDURE — 71045 X-RAY EXAM CHEST 1 VIEW: CPT

## 2021-03-05 PROCEDURE — 74011250637 HC RX REV CODE- 250/637: Performed by: OBSTETRICS & GYNECOLOGY

## 2021-03-05 RX ORDER — DEXTROSE MONOHYDRATE 50 MG/ML
20 INJECTION, SOLUTION INTRAVENOUS EVERY 24 HOURS
Status: DISCONTINUED | OUTPATIENT
Start: 2021-03-05 | End: 2021-03-05

## 2021-03-05 RX ORDER — DEXTROSE MONOHYDRATE 5 G/100ML
20 INJECTION INTRAVENOUS EVERY 24 HOURS
Status: DISCONTINUED | OUTPATIENT
Start: 2021-03-06 | End: 2021-03-06

## 2021-03-05 RX ORDER — HYDROCODONE BITARTRATE AND ACETAMINOPHEN 5; 325 MG/1; MG/1
2 TABLET ORAL
Status: DISCONTINUED | OUTPATIENT
Start: 2021-03-05 | End: 2021-04-02 | Stop reason: HOSPADM

## 2021-03-05 RX ORDER — SODIUM CHLORIDE 9 MG/ML
1000 INJECTION, SOLUTION INTRAVENOUS DAILY PRN
Status: DISCONTINUED | OUTPATIENT
Start: 2021-03-05 | End: 2021-04-02 | Stop reason: HOSPADM

## 2021-03-05 RX ORDER — DEXTROSE MONOHYDRATE 50 MG/ML
20 INJECTION, SOLUTION INTRAVENOUS EVERY 24 HOURS
Status: DISCONTINUED | OUTPATIENT
Start: 2021-03-06 | End: 2021-03-05 | Stop reason: SDUPTHER

## 2021-03-05 RX ORDER — DEXTROSE MONOHYDRATE 5 G/100ML
20 INJECTION INTRAVENOUS EVERY 24 HOURS
Status: DISCONTINUED | OUTPATIENT
Start: 2021-03-05 | End: 2021-03-06

## 2021-03-05 RX ORDER — FACIAL-BODY WIPES
10 EACH TOPICAL DAILY PRN
Status: DISCONTINUED | OUTPATIENT
Start: 2021-03-05 | End: 2021-04-02 | Stop reason: HOSPADM

## 2021-03-05 RX ADMIN — SODIUM CHLORIDE 125 ML/HR: 9 INJECTION, SOLUTION INTRAVENOUS at 12:16

## 2021-03-05 RX ADMIN — POLYETHYLENE GLYCOL 3350 17 G: 17 POWDER, FOR SOLUTION ORAL at 08:55

## 2021-03-05 RX ADMIN — SUCRALFATE 1 G: 1 TABLET ORAL at 16:36

## 2021-03-05 RX ADMIN — CALCIUM GLUCONATE: 94 INJECTION, SOLUTION INTRAVENOUS at 18:29

## 2021-03-05 RX ADMIN — I.V. FAT EMULSION 500 ML: 20 EMULSION INTRAVENOUS at 18:29

## 2021-03-05 RX ADMIN — HYDROCODONE BITARTRATE AND ACETAMINOPHEN 2 TABLET: 5; 325 TABLET ORAL at 18:55

## 2021-03-05 RX ADMIN — OLANZAPINE 10 MG: 5 TABLET, FILM COATED ORAL at 22:42

## 2021-03-05 RX ADMIN — SODIUM CHLORIDE 1000 ML/HR: 9 INJECTION, SOLUTION INTRAVENOUS at 13:41

## 2021-03-05 RX ADMIN — DEXTROSE MONOHYDRATE 20 ML: 5 INJECTION, SOLUTION INTRAVENOUS at 14:47

## 2021-03-05 RX ADMIN — CEFTRIAXONE SODIUM 1 G: 1 INJECTION, POWDER, FOR SOLUTION INTRAMUSCULAR; INTRAVENOUS at 05:48

## 2021-03-05 RX ADMIN — SUCRALFATE 1 G: 1 TABLET ORAL at 22:42

## 2021-03-05 RX ADMIN — HYDROCODONE BITARTRATE AND ACETAMINOPHEN 2 TABLET: 5; 325 TABLET ORAL at 09:46

## 2021-03-05 RX ADMIN — Medication 1 TABLET: at 08:55

## 2021-03-05 RX ADMIN — PANTOPRAZOLE SODIUM 40 MG: 40 TABLET, DELAYED RELEASE ORAL at 07:18

## 2021-03-05 RX ADMIN — ACETAMINOPHEN 650 MG: 650 SUPPOSITORY RECTAL at 05:07

## 2021-03-05 RX ADMIN — ONDANSETRON 4 MG: 2 INJECTION INTRAMUSCULAR; INTRAVENOUS at 16:31

## 2021-03-05 RX ADMIN — FLUOXETINE 60 MG: 20 CAPSULE ORAL at 18:00

## 2021-03-05 RX ADMIN — Medication 10 ML: at 23:10

## 2021-03-05 RX ADMIN — ONDANSETRON 4 MG: 2 INJECTION INTRAMUSCULAR; INTRAVENOUS at 05:53

## 2021-03-05 RX ADMIN — PRAZOSIN HYDROCHLORIDE 2 MG: 1 CAPSULE ORAL at 23:08

## 2021-03-05 RX ADMIN — Medication 10 ML: at 05:49

## 2021-03-05 RX ADMIN — DEXTROSE MONOHYDRATE 217 MG: 5 INJECTION, SOLUTION INTRAVENOUS at 14:49

## 2021-03-05 RX ADMIN — DEXTROSE MONOHYDRATE 20 ML: 50 INJECTION, SOLUTION INTRAVENOUS at 16:00

## 2021-03-05 RX ADMIN — SUCRALFATE 1 G: 1 TABLET ORAL at 07:18

## 2021-03-05 RX ADMIN — BISACODYL 10 MG: 10 SUPPOSITORY RECTAL at 09:46

## 2021-03-05 RX ADMIN — SUCRALFATE 1 G: 1 TABLET ORAL at 10:49

## 2021-03-05 RX ADMIN — ONDANSETRON 4 MG: 2 INJECTION INTRAMUSCULAR; INTRAVENOUS at 10:48

## 2021-03-05 NOTE — PROGRESS NOTES
ID Progress Note  3/5/2021    Subjective:     Afebrile. No dyspnea, abdominal pain, sore throat. Has some right flank pain. ROS: No anaphylaxis, seizures, syncope, hematemesis, hematochezia     Objective:     Vitals:   Visit Vitals  BP (!) 97/57 (BP 1 Location: Right upper arm, BP Patient Position: At rest;Supine)   Pulse 74   Temp 97.9 °F (36.6 °C)   Resp 16   Ht 5' 4\" (1.626 m)   Wt 72.3 kg (159 lb 6.4 oz)   LMP 2020 (Exact Date)   SpO2 99%   BMI 27.36 kg/m²        Tmax:  Temp (24hrs), Av.1 °F (36.7 °C), Min:97.9 °F (36.6 °C), Max:98.3 °F (36.8 °C)      Exam:    Not in distress  Pink conjunctivae, anicteric sclerae  No cervical lymphdenopathy   Lung clear, no rales, wheezes or rhonchi   Heart: s1, s2, RRR, no murmurs rubs or clicks  Abdomen: soft nontender, no guarding or rebound  Knees not warm or tender  Speech fluent     Labs:   Lab Results   Component Value Date/Time    WBC 5.0 2021 03:38 AM    Hemoglobin (POC) 10.4 (L) 2020 07:12 AM    HGB 8.7 (L) 2021 03:38 AM    HCT 27.4 (L) 2021 03:38 AM    PLATELET 932  03:38 AM    MCV 75.5 (L) 2021 03:38 AM    Hgb, External 33.7 2012    Hct, External 69 2012    Platelet cnt., External 307 2012     Lab Results   Component Value Date/Time    Sodium 138 2021 03:38 AM    Potassium 3.6 2021 03:38 AM    Chloride 109 (H) 2021 03:38 AM    CO2 22 2021 03:38 AM    Anion gap 7 2021 03:38 AM    Glucose 74 2021 03:38 AM    BUN 6 2021 03:38 AM    Creatinine 0.41 (L) 2021 03:38 AM    BUN/Creatinine ratio 15 2021 03:38 AM    GFR est AA >60 2021 03:38 AM    GFR est non-AA >60 2021 03:38 AM    Calcium 8.0 (L) 2021 03:38 AM    Bilirubin, total 0.5 2021 05:48 AM    Alk.  phosphatase 124 (H) 2021 05:48 AM    Protein, total 7.1 2021 05:48 AM    Albumin 2.4 (L) 2021 05:48 AM    Globulin 4.7 (H) 2021 05:48 AM    A-G Ratio 0.5 (L) 03/03/2021 05:48 AM    ALT (SGPT) 23 03/03/2021 05:48 AM             Assessment:     #1 fever - probably from UTI and candidemia      #2 intrauterine pregnancy     #3 asthma     #4 depression    Recommendations:     Urine growing GNRs. Await identification     Continue ceftriaxone for now     Her blood cultures are growing yeast and she has risk factors for candidemia namely the picc line and TPN. We will need to take the line out. Unfortunately, it is not recommended to use fluconazole and echinocandins in pregnancy as they can cause fetal harm. The recommendation is amphotericin b. I have counseled the patient that has been found to be safe for the fetus, but it can cause the patient kidney dysfunction and electrolyte abnormalities. She understands this. We will get daily BMPs and magnesium. We will get a triple lumen on her. We will bolus her with 1000 ml of NS before ambisone infusion.      Diya Simon MD

## 2021-03-05 NOTE — PERIOP NOTES
Xray here to take film, Dr. Efrain Dowell checked film for placement of line.  All lines patent ,good for use per Dr. Demetrice Peraza

## 2021-03-05 NOTE — PROGRESS NOTES
1015: Pt off unit to renal ultrasound  1030: Cardio consult called. 1045: pt reports emesis while downstairs at ultrasound    1125: ID MD at bedside.  PICC to be removed after 1 antifungal treatment given and triple lumen central line inserted

## 2021-03-05 NOTE — CONSULTS
[facility] Consult Note    Date of consult:  21  Date of admission: 3/2/2021  Primary Cardiologist: None regular (seen by me in December)  Physician Requesting consult: Dr Melani Thomas / Reason for consult: chest pain    History of the presenting illness:  Keyshawn Shah is a 38 y.o. F admitted with fevers and flank pain  Cx with pyelonephritis. Urine Cx positive for Klebsiella    Mentioned she was having some chest discomfort with deep inspiration and this prompted a Cardiology consult. No CP at rest  No h/o heart problems    She is 22 weeks pregnant  Issues with nausea and vomiting and had a PICC line placed for home TPN. Past Medical History:   Diagnosis Date    Acute pyelonephritis 3/3/2021    Anorexia     Anxiety     Asthma     on albuterol prn. Triggers cold and allergies    Avoidant-restrictive food intake disorder (ARFID)     Back pain, chronic     sciatica    Chronic bilateral low back pain with right-sided sciatica 2020    ~    GERD (gastroesophageal reflux disease) 2020    [facility] , GI Dr. Jerome Veloz Gestational hypertension     Past pregnancy    HX OTHER MEDICAL      , , , ,     Hyperemesis     severe hyperemesis    Hypertension     with G12 - none this pregnancy    Iron deficiency anemia 10/08/2010    MDD (major depressive disorder), single episode with postpartum onset 2013    treated with meds - 13    Orthostatic hypotension 2020    Plantar fasciitis     Pregnancy     36 weeks    PTSD (post-traumatic stress disorder)        Prior to Admission medications    Medication Sig Start Date End Date Taking? Authorizing Provider   FLUoxetine (PROzac) 20 mg capsule Take 1 Cap by mouth daily. Take with 40 mg cap for total daily dose of 60 mg. 3/1/21   Lam Ortiz, NP   FLUoxetine (PROzac) 40 mg capsule Take 1 Cap by mouth daily.  3/1/21   Lam Ortiz, NP   OLANZapine (ZyPREXA) 10 mg tablet Take 1 Tab by mouth nightly. 3/1/21   Mohamud Ortiz NP   prazosin (MINIPRESS) 2 mg capsule Take 1 Cap by mouth nightly. Indications: posttraumatic stress syndrome 3/1/21   Mohamud Ortiz NP   polyethylene glycol (MIRALAX) 17 gram packet Take 1 Packet by mouth daily as needed for Constipation. 2/23/21   Kanwal Roberson MD   calcium carbonate (Tums) 200 mg calcium (500 mg) chew Take 1 Tab by mouth two (2) times daily as needed for Other (reflux). 2/18/21   Kanwal Roberson MD   doxylamine succinate (UNISOM) 25 mg tablet Take 1 Tab by mouth nightly as needed for Insomnia. 2/15/21   Mohamud Ortiz NP   pantoprazole (PROTONIX) 40 mg tablet Take 1 Tab by mouth Daily (before breakfast). Indications: gastroesophageal reflux disease 1/15/21   Elroy Irving DO   sucralfate (CARAFATE) 1 gram tablet Take 1 Tab by mouth Before breakfast, lunch, dinner and at bedtime. 1/15/21   Elroy Irving DO   Lacto. acidophilus-Bif. animalis (Probiotic) 5 billion cell cpSP Take 1 Cap by mouth daily. 12/28/20   Mohamud Ortiz NP   prochlorperazine (COMPAZINE) 10 mg tablet  12/27/20   Provider, Historical   promethazine (Phenergan) 12.5 mg suppository     Provider, Historical   ondansetron (ZOFRAN ODT) 4 mg disintegrating tablet Take 1 Tab by mouth every six (6) hours as needed for Nausea or Vomiting. 12/16/20   Elroy Irving DO   0.9% sodium chloride solp 1,000 mL with thiamine 100 mg/mL soln 954 mg, folic acid 5 mg/mL soln 1 mg 1 Bag by IntraVENous route every twenty-four (24) hours. 12/16/20   Elroy Irving, DO   cholecalciferol (Vitamin D3) 25 mcg (1,000 unit) cap Take  by mouth daily. Provider, Historical   pyridoxine, vitamin B6, (Vitamin B-6) 100 mg tablet Take 100 mg by mouth daily. Provider, Historical   hydrOXYzine HCL (ATARAX) 50 mg tablet Take 1 Tab by mouth four (4) times daily.  11/9/20   Mohamud Ortiz, NP   OTHER 1 Ensure Enlive food supplement drink PO TID 11/4/20   Sharron Suárez NP   OTHER 1 ensure pudding PO daily for lunch. 10/20/20   Dileep Byrd NP   prenatal vit-iron fumarate-fa (PRENATAL PLUS with IRON) 28 mg iron- 800 mcg tab  9/28/20   Provider, Historical   food supplemt, lactose-reduced (Ensure High Protein) liqd Take 237 mL by mouth three (3) times daily.  10/14/20   Dileep Byrd NP       Allergies   Allergen Reactions    Labetalol Hives    Ceclor [Cefaclor] Unknown (comments)    Pcn [Penicillins] Hives    Septra [Sulfamethoprim Ds] Unknown (comments)        Family History   Problem Relation Age of Onset   [address] [facility] [place] Arthritis-rheumatoid Mother     Asthma Mother     No Known Problems Father     No Known Problems Maternal Grandmother     No Known Problems Maternal Grandfather     No Known Problems Paternal Grandmother     No Known Problems Paternal Grandfather     Asthma Son     Alcohol abuse Neg Hx     Arthritis-osteo Neg Hx     Bleeding Prob Neg Hx     Cancer Neg Hx     Diabetes Neg Hx     Elevated Lipids Neg Hx     Headache Neg Hx     Heart Disease Neg Hx     Hypertension Neg Hx     Lung Disease Neg Hx     Migraines Neg Hx     Psychiatric Disorder Neg Hx     Stroke Neg Hx     Mental Retardation Neg Hx     Anesth Problems Neg Hx        Social History     Socioeconomic History    Marital status: SINGLE     Spouse name: Not on file    Number of children: 7    Years of education: Not on file    Highest education level: 12th grade   Occupational History    Not on file   Social Needs    Financial resource strain: Not very hard    Food insecurity     Worry: Never true     Inability: Never true    Transportation needs     Medical: No     Non-medical: No   Tobacco Use    Smoking status: Never Smoker    Smokeless tobacco: Never Used   Substance and Sexual Activity    Alcohol use: Not Currently     Frequency: Monthly or less     Drinks per session: 1 or 2     Binge frequency: Never    Drug use: No    Sexual activity: Yes     Partners: Male   Lifestyle    Physical activity     Days per week: Not on file     Minutes per session: Not on file    Stress: Not on file   Relationships    Social connections     Talks on phone: Not on file     Gets together: Not on file     Attends Yazidism service: Not on file     Active member of club or organization: Not on file     Attends meetings of clubs or organizations: Not on file     Relationship status: Not on file    Intimate partner violence     Fear of current or ex partner: Not on file     Emotionally abused: Not on file     Physically abused: Not on file     Forced sexual activity: Not on file   Other Topics Concern    Not on file   Social History Narrative    [place] lives with boyfriend (fiance), Trina Frankel,  reportedly with alcohol dependence. She was raised by her mother. She has no siblings. Not working. She is supported financially by the childrens' father and public support. She has no hobbies outside of her children. She has a 12th grade education and no legal entanglements. 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 Review of Systems   Constitutional: Positive for fever. Negative for chills. HENT: Negative for hearing loss and nosebleeds. Eyes: Negative for blurred vision and double vision. Respiratory: Negative for cough and sputum production. Cardiovascular: Positive for chest pain. Gastrointestinal: Negative for abdominal pain, nausea and vomiting. Genitourinary: Positive for flank pain. Musculoskeletal: Negative for back pain and joint pain. Skin: Negative for itching and rash. Neurological: Negative for dizziness and headaches. Endo/Heme/Allergies: Negative for environmental allergies. Does not bruise/bleed easily.        Visit Vitals  BP (!) 97/57 (BP 1 Location: Right upper arm, BP Patient Position: At rest;Supine)   Pulse 74   Temp 97.9 °F (36.6 °C) Resp 16   Ht 5' 4\" (1.626 m)   Wt 72.3 kg (159 lb 6.4 oz)   LMP 09/27/2020 (Exact Date)   SpO2 99%   BMI 27.36 kg/m²     Physical Exam  HENT:      Head: Normocephalic and atraumatic. Eyes:      General: No scleral icterus. Conjunctiva/sclera: Conjunctivae normal.   Neck:      Vascular: No JVD. Cardiovascular:      Rate and Rhythm: Normal rate and regular rhythm. Heart sounds: Normal heart sounds. No murmur. No gallop. Pulmonary:      Effort: Pulmonary effort is normal. No respiratory distress. Breath sounds: Normal breath sounds. No stridor. No wheezing. Abdominal:      General: There is no distension. Palpations: Abdomen is soft. Musculoskeletal: Normal range of motion. General: No deformity. Skin:     General: Skin is warm and dry. Neurological:      Mental Status: She is alert and oriented to person, place, and time.    Psychiatric:         Mood and Affect: Mood and affect normal.         Lab review:  BMP:   No results found for: NA, K, CL, CO2, AGAP, GLU, BUN, CREA, GFRAA, GFRNA     CBC:  Lab Results   Component Value Date/Time    WBC 5.0 03/04/2021 03:38 AM    WBC 12.2 (H) 05/09/2012 04:27 PM    Hemoglobin (POC) 10.4 (L) 01/17/2020 07:12 AM    HGB 8.7 (L) 03/04/2021 03:38 AM    HCT 27.4 (L) 03/04/2021 03:38 AM    PLATELET 950 49/39/5564 03:38 AM    MCV 75.5 (L) 03/04/2021 03:38 AM    Hgb, External 33.7 08/03/2012    Hct, External 69 08/03/2012    Platelet cnt., External 307 08/03/2012       All Cardiac Markers in the last 24 hours:  No results found for: CPK, CK, CKMMB, CKMB, RCK3, CKMBT, CKMBPOC, CKNDX, CKND1, ANDERSON, TROPT, TROIQ, RUBIA, TROPT, TNIPOC, BNP, BNPP, BNPNT    Lab Results   Component Value Date/Time    Cholesterol, total 172 02/18/2020 11:08 AM    HDL Cholesterol 52 02/18/2020 11:08 AM    LDL, calculated 97 02/18/2020 11:08 AM    VLDL, calculated 23 02/18/2020 11:08 AM    Triglyceride 113 02/18/2020 11:08 AM        Data review:  EKG tracing personally reviewed: sinus tachycardia, minor non-specific TW changes. No ischemia. Assessment:    1. Acute pyelonephritis  2. Atypical chest pain. Sounds musculoskeletal.  3. Pregnancy    Recommendations / Plan:    Continue current therapies with IV Abx for pylenephritis  No indication for further cardiac work-up. Signed:  Lucio ARMSTRONG  [facility]  Interventional Cardiology  03/05/21

## 2021-03-05 NOTE — PROGRESS NOTES
Bedside shift change report given to Emeka Nelson (oncoming nurse) by Pily Arciniega (offgoing nurse). Report included the following information SBAR, Kardex, Intake/Output and MAR.       0220: On call OB hospitalist called and notified about yeast growth to paired blood cultures of right hand. No new orders obtained.

## 2021-03-05 NOTE — PROGRESS NOTES
Bedside and Verbal shift change report given to Arnie Small and Dwayne Burgos (oncoming nurse) by Maximiliano Sullivan (offgoing nurse). Report included the following information SBAR, Kardex, Procedure Summary, Intake/Output, MAR and Recent Results.

## 2021-03-05 NOTE — CONSULTS
Received consult for CP. EKG 3/2 w/ ST and non-specific t-wave changes. Trop negative. Per RN, pt noting chest discomfort not necessarily CP. Per hospital policy, a rapid response should be called for chest pain-discussed with RN. Would recommend a consult to cardiology for concern for CP, EKG w/ non-specific t-wave abnormality. Additionally, would recommend a psych consult for this patient as well given her psychiatric history and ongoing eating disorder. All of above discussed with RN who states she will notify attending. Please feel free to contact me with questions or concerns. I can be reached via [employer].

## 2021-03-05 NOTE — PERIOP NOTES
TRANSFER - OUT REPORT:    Verbal report given to Ophelia arcos(name) on Arturo Leon  being transferred to [facility](unit) for ordered procedure       Report consisted of patients Situation, Background, Assessment and   Recommendations(SBAR). Information from the following report(s) Kardex was reviewed with the receiving nurse. Lines:   PICC Double Lumen 82/25/89 Left;Basilic (Active)   Central Line Being Utilized Yes 03/05/21 1522   Criteria for Appropriate Use Total parenteral nutrition 03/05/21 1522   Site Assessment Clean, dry, & intact 03/05/21 1522   Phlebitis Assessment 0 03/05/21 1522   Infiltration Assessment 0 03/05/21 1522   Dressing Status Clean, dry, & intact 03/05/21 1522   Action Taken Open ports on tubing capped 03/05/21 1522   Dressing Type Disk with Chlorhexadine gluconate (CHG) 03/05/21 1522   Hub Color/Line Status Infusing 03/05/21 1522   Positive Blood Return (Site #1) Yes 03/05/21 1522   Hub Color/Line Status Purple 03/05/21 1522   Positive Blood Return (Site #2) Yes 03/05/21 1522   Alcohol Cap Used Yes 03/05/21 1522       Peripheral IV 03/02/21 Right Hand (Active)   Site Assessment Clean, dry, & intact 03/05/21 1522   Phlebitis Assessment 0 03/05/21 1522   Infiltration Assessment 0 03/05/21 1522   Dressing Status Clean, dry, & intact 03/05/21 1522   Dressing Type Tape;Transparent 03/05/21 1522   Hub Color/Line Status Pink 03/05/21 1522   Action Taken Open ports on tubing capped 03/05/21 1522   Alcohol Cap Used Yes 03/05/21 1522        Opportunity for questions and clarification was provided.       Patient transported with:   [employer]

## 2021-03-05 NOTE — PROGRESS NOTES
High Risk Obstetrics Progress Note    Name: Du Scott MRN: [id number]  SSN: [id number]    YOB: 1985  Age: 38 y.o. Sex: female      Subjective:      LOS: 2 days    Estimated Date of Delivery: 21   Gestational Age Today: 20w7d     Patient admitted for pyelonephritis in pregnancy. States she does have intermittent bilateral flank pain, constipation, nausea/vomiting, cramping and normal fetal movement as well as chest aching with deep breath and does not have shortness of breath, vaginal bleeding  and vaginal leaking of fluid . Objective:     Vitals:  Blood pressure (!) 97/57, pulse 74, temperature 97.9 °F (36.6 °C), resp. rate 16, height 5' 4\" (1.626 m), weight 72.2 kg (159 lb 3.2 oz), last menstrual period 2020, SpO2 99 %, not currently breastfeeding. Temp (24hrs), Av.1 °F (36.7 °C), Min:97.9 °F (36.6 °C), Max:98.3 °F (52.4 °C)    Systolic (07DPS), KLP:07 , Min:87 , RQZ:28      Diastolic (13WUA), KKH:78, Min:46, Max:59       Intake and Output:         Physical Exam:  Patient with distress.  Appears uncomfortable and unable to lie down from back pain  Heart: Regular rate and rhythm  Lung: clear to auscultation throughout lung fields, no wheezes, no rales, no rhonchi and normal respiratory effort  Back: costovertebral angle tenderness present bilaterally  Abdomen: soft, nontender, gravid  Lower Extremities:  - Edema No       Membranes:  Intact        Labs:   Recent Results (from the past 36 hour(s))   CBC WITH AUTOMATED DIFF    Collection Time: 21  3:38 AM   Result Value Ref Range    WBC 5.0 3.6 - 11.0 K/uL    RBC 3.63 (L) 3.80 - 5.20 M/uL    HGB 8.7 (L) 11.5 - 16.0 g/dL    HCT 27.4 (L) 35.0 - 47.0 %    MCV 75.5 (L) 80.0 - 99.0 FL    MCH 24.0 (L) 26.0 - 34.0 PG    MCHC 31.8 30.0 - 36.5 g/dL    RDW 14.6 (H) 11.5 - 14.5 %    PLATELET 831 481 - 262 K/uL    MPV 10.3 8.9 - 12.9 FL    NRBC 0.0 0  WBC    ABSOLUTE NRBC 0.00 0.00 - 0.01 K/uL    NEUTROPHILS 54 32 - 75 % LYMPHOCYTES 34 12 - 49 %    MONOCYTES 9 5 - 13 %    EOSINOPHILS 3 0 - 7 %    BASOPHILS 0 0 - 1 %    IMMATURE GRANULOCYTES 0 %    ABS. NEUTROPHILS 2.6 1.8 - 8.0 K/UL    ABS. LYMPHOCYTES 1.7 0.8 - 3.5 K/UL    ABS. MONOCYTES 0.5 0.0 - 1.0 K/UL    ABS. EOSINOPHILS 0.2 0.0 - 0.4 K/UL    ABS. BASOPHILS 0.0 0.0 - 0.1 K/UL    ABS. IMM. GRANS. 0.0 K/UL    DF MANUAL      RBC COMMENTS ANISOCYTOSIS  1+        RBC COMMENTS MICROCYTOSIS  1+        RBC COMMENTS HYPOCHROMIA  1+        RBC COMMENTS OVALOCYTES  1+        RBC COMMENTS TAMI CELLS  1+       METABOLIC PANEL, BASIC    Collection Time: 03/04/21  3:38 AM   Result Value Ref Range    Sodium 138 136 - 145 mmol/L    Potassium 3.6 3.5 - 5.1 mmol/L    Chloride 109 (H) 97 - 108 mmol/L    CO2 22 21 - 32 mmol/L    Anion gap 7 5 - 15 mmol/L    Glucose 74 65 - 100 mg/dL    BUN 6 6 - 20 MG/DL    Creatinine 0.41 (L) 0.55 - 1.02 MG/DL    BUN/Creatinine ratio 15 12 - 20      GFR est AA >60 >60 ml/min/1.73m2    GFR est non-AA >60 >60 ml/min/1.73m2    Calcium 8.0 (L) 8.5 - 10.1 MG/DL       Assessment and Plan:      Principal Problem:    Acute pyelonephritis (3/3/2021)       39 y/o [id number] at 22 5/7 weeks admitted for intermittent chills and fever-presumed pyelonephritis  -Afebrile on ceftriaxone  -Blood culture shows 1/4 yeast preliminary  -urine culture shows gram neg rods preliminary  -Appreciate ID help-?  Treat with diflucan  -bilateral flank pain- renal ultrasound today to r/o hydronephrosis or kidney stone     Chronic eating disorder complicated by nausea/vomiting of pregnancy  -on TPN (cyclic-home regimen via [employer])  -has outpatient nutritionist, Saul Bush  -iv zofran, phenergan suppositories, carafate, ivf's  -constipation- miralax and dulcolax supp    Depression with h/o hospitalization for suicidal ideation  -followed by Armen Ochoa (psychiatrist) and Lennie Collado (psychologist), however pt non-compliant with counseling  -Continue Prozac, Zyprexa, and prn Prazosin, hydroxyzine     Chronic anemia-known beta thal minor  -s/p iv iron with last admission  -Hb currently at baseline     M appt for anatomy scan today at 8:30am.      Ambulate with assistance, Juan thakkar for DVT ppx (pt with intermittent dizziness and previous fall history)    New chest pain with deep breathing-review of admission ekg shows abnormality- hospitalist consult for further eval/management if necessary.

## 2021-03-06 LAB
ANION GAP SERPL CALC-SCNC: 6 MMOL/L (ref 5–15)
BACTERIA SPEC CULT: ABNORMAL
BACTERIA SPEC CULT: ABNORMAL
BASOPHILS # BLD: 0 K/UL (ref 0–0.1)
BASOPHILS NFR BLD: 0 % (ref 0–1)
BUN SERPL-MCNC: 5 MG/DL (ref 6–20)
BUN/CREAT SERPL: 11 (ref 12–20)
CALCIUM SERPL-MCNC: 8.1 MG/DL (ref 8.5–10.1)
CC UR VC: ABNORMAL
CHLORIDE SERPL-SCNC: 108 MMOL/L (ref 97–108)
CO2 SERPL-SCNC: 24 MMOL/L (ref 21–32)
CREAT SERPL-MCNC: 0.47 MG/DL (ref 0.55–1.02)
DIFFERENTIAL METHOD BLD: ABNORMAL
EOSINOPHIL # BLD: 0.1 K/UL (ref 0–0.4)
EOSINOPHIL NFR BLD: 2 % (ref 0–7)
ERYTHROCYTE [DISTWIDTH] IN BLOOD BY AUTOMATED COUNT: 14.5 % (ref 11.5–14.5)
GLUCOSE SERPL-MCNC: 99 MG/DL (ref 65–100)
HCT VFR BLD AUTO: 26.2 % (ref 35–47)
HGB BLD-MCNC: 8.4 G/DL (ref 11.5–16)
IMM GRANULOCYTES # BLD AUTO: 0 K/UL (ref 0–0.04)
IMM GRANULOCYTES NFR BLD AUTO: 0 % (ref 0–0.5)
LYMPHOCYTES # BLD: 1.3 K/UL (ref 0.8–3.5)
LYMPHOCYTES NFR BLD: 28 % (ref 12–49)
MAGNESIUM SERPL-MCNC: 2.1 MG/DL (ref 1.6–2.4)
MCH RBC QN AUTO: 24 PG (ref 26–34)
MCHC RBC AUTO-ENTMCNC: 32.1 G/DL (ref 30–36.5)
MCV RBC AUTO: 74.9 FL (ref 80–99)
MONOCYTES # BLD: 0.5 K/UL (ref 0–1)
MONOCYTES NFR BLD: 10 % (ref 5–13)
NEUTS SEG # BLD: 2.8 K/UL (ref 1.8–8)
NEUTS SEG NFR BLD: 60 % (ref 32–75)
NRBC # BLD: 0 K/UL (ref 0–0.01)
NRBC BLD-RTO: 0 PER 100 WBC
PHOSPHATE SERPL-MCNC: 3.3 MG/DL (ref 2.6–4.7)
PLATELET # BLD AUTO: 170 K/UL (ref 150–400)
PMV BLD AUTO: 10.9 FL (ref 8.9–12.9)
POTASSIUM SERPL-SCNC: 3 MMOL/L (ref 3.5–5.1)
RBC # BLD AUTO: 3.5 M/UL (ref 3.8–5.2)
SERVICE CMNT-IMP: ABNORMAL
SODIUM SERPL-SCNC: 138 MMOL/L (ref 136–145)
WBC # BLD AUTO: 4.7 K/UL (ref 3.6–11)

## 2021-03-06 PROCEDURE — 74011250636 HC RX REV CODE- 250/636: Performed by: INTERNAL MEDICINE

## 2021-03-06 PROCEDURE — 74011250636 HC RX REV CODE- 250/636: Performed by: OBSTETRICS & GYNECOLOGY

## 2021-03-06 PROCEDURE — 74011000250 HC RX REV CODE- 250: Performed by: OBSTETRICS & GYNECOLOGY

## 2021-03-06 PROCEDURE — 84100 ASSAY OF PHOSPHORUS: CPT

## 2021-03-06 PROCEDURE — 83735 ASSAY OF MAGNESIUM: CPT

## 2021-03-06 PROCEDURE — 36415 COLL VENOUS BLD VENIPUNCTURE: CPT

## 2021-03-06 PROCEDURE — 74011250637 HC RX REV CODE- 250/637: Performed by: OBSTETRICS & GYNECOLOGY

## 2021-03-06 PROCEDURE — 74011250637 HC RX REV CODE- 250/637: Performed by: INTERNAL MEDICINE

## 2021-03-06 PROCEDURE — 74011000258 HC RX REV CODE- 258: Performed by: OBSTETRICS & GYNECOLOGY

## 2021-03-06 PROCEDURE — 74011250637 HC RX REV CODE- 250/637: Performed by: NURSE PRACTITIONER

## 2021-03-06 PROCEDURE — 94760 N-INVAS EAR/PLS OXIMETRY 1: CPT

## 2021-03-06 PROCEDURE — 80048 BASIC METABOLIC PNL TOTAL CA: CPT

## 2021-03-06 PROCEDURE — 85025 COMPLETE CBC W/AUTO DIFF WBC: CPT

## 2021-03-06 PROCEDURE — 87040 BLOOD CULTURE FOR BACTERIA: CPT

## 2021-03-06 PROCEDURE — 74011000258 HC RX REV CODE- 258: Performed by: INTERNAL MEDICINE

## 2021-03-06 PROCEDURE — 65410000002 HC RM PRIVATE OB

## 2021-03-06 RX ORDER — DEXTROSE MONOHYDRATE 50 MG/ML
20 INJECTION, SOLUTION INTRAVENOUS EVERY 24 HOURS
Status: DISCONTINUED | OUTPATIENT
Start: 2021-03-06 | End: 2021-03-20

## 2021-03-06 RX ORDER — ACETAMINOPHEN 500 MG
500 TABLET ORAL DAILY
Status: DISCONTINUED | OUTPATIENT
Start: 2021-03-06 | End: 2021-03-20

## 2021-03-06 RX ADMIN — DEXTROSE MONOHYDRATE 20 ML: 5 INJECTION, SOLUTION INTRAVENOUS at 15:04

## 2021-03-06 RX ADMIN — POLYETHYLENE GLYCOL 3350 17 G: 17 POWDER, FOR SOLUTION ORAL at 09:41

## 2021-03-06 RX ADMIN — HYDROCODONE BITARTRATE AND ACETAMINOPHEN 2 TABLET: 5; 325 TABLET ORAL at 09:37

## 2021-03-06 RX ADMIN — ONDANSETRON 4 MG: 2 INJECTION INTRAMUSCULAR; INTRAVENOUS at 00:01

## 2021-03-06 RX ADMIN — SUCRALFATE 1 G: 1 TABLET ORAL at 07:28

## 2021-03-06 RX ADMIN — SUCRALFATE 1 G: 1 TABLET ORAL at 22:05

## 2021-03-06 RX ADMIN — OLANZAPINE 10 MG: 5 TABLET, FILM COATED ORAL at 22:05

## 2021-03-06 RX ADMIN — SUCRALFATE 1 G: 1 TABLET ORAL at 15:14

## 2021-03-06 RX ADMIN — PANTOPRAZOLE SODIUM 40 MG: 40 TABLET, DELAYED RELEASE ORAL at 07:28

## 2021-03-06 RX ADMIN — HYDROCODONE BITARTRATE AND ACETAMINOPHEN 2 TABLET: 5; 325 TABLET ORAL at 01:15

## 2021-03-06 RX ADMIN — SODIUM CHLORIDE 1000 ML/HR: 9 INJECTION, SOLUTION INTRAVENOUS at 13:52

## 2021-03-06 RX ADMIN — DEXTROSE MONOHYDRATE 20 ML: 5 INJECTION, SOLUTION INTRAVENOUS at 17:34

## 2021-03-06 RX ADMIN — Medication 10 ML: at 15:26

## 2021-03-06 RX ADMIN — CEFTRIAXONE SODIUM 1 G: 1 INJECTION, POWDER, FOR SOLUTION INTRAMUSCULAR; INTRAVENOUS at 05:13

## 2021-03-06 RX ADMIN — ACETAMINOPHEN 500 MG: 500 TABLET ORAL at 14:27

## 2021-03-06 RX ADMIN — I.V. FAT EMULSION 500 ML: 20 EMULSION INTRAVENOUS at 19:29

## 2021-03-06 RX ADMIN — FLUOXETINE 60 MG: 20 CAPSULE ORAL at 18:18

## 2021-03-06 RX ADMIN — PRAZOSIN HYDROCHLORIDE 2 MG: 1 CAPSULE ORAL at 22:05

## 2021-03-06 RX ADMIN — Medication 1 TABLET: at 09:37

## 2021-03-06 RX ADMIN — SODIUM CHLORIDE 125 ML/HR: 9 INJECTION, SOLUTION INTRAVENOUS at 17:40

## 2021-03-06 RX ADMIN — CALCIUM GLUCONATE: 94 INJECTION, SOLUTION INTRAVENOUS at 19:27

## 2021-03-06 RX ADMIN — ONDANSETRON 4 MG: 2 INJECTION INTRAMUSCULAR; INTRAVENOUS at 09:27

## 2021-03-06 RX ADMIN — ONDANSETRON 4 MG: 2 INJECTION INTRAMUSCULAR; INTRAVENOUS at 15:25

## 2021-03-06 RX ADMIN — Medication 10 ML: at 19:21

## 2021-03-06 RX ADMIN — DEXTROSE MONOHYDRATE 217 MG: 5 INJECTION, SOLUTION INTRAVENOUS at 15:08

## 2021-03-06 NOTE — PROGRESS NOTES
High Risk Obstetrics Progress Note    Name: Violeta Stewart MRN: [id number]  SSN: [id number]    YOB: 1985  Age: 38 y.o. Sex: female      Subjective:      LOS: 3 days    Estimated Date of Delivery: 21   Gestational Age Today: 23w7d     Patient admitted for pyelonephritis in pregnancy. States she does have intermittent bilateral flank pain, constipation, nausea/vomiting, cramping and normal fetal movement and does not have shortness of breath, vaginal bleeding  and vaginal leaking of fluid . PICC line removed and central line placed yesterday. Has some pain over area of insertion. Objective:     Vitals:  Blood pressure 110/72, pulse 74, temperature 98.8 °F (37.1 °C), resp. rate 16, height 5' 4\" (1.626 m), weight 72.3 kg (159 lb 6.4 oz), last menstrual period 2020, SpO2 98 %, not currently breastfeeding. Temp (24hrs), Av.7 °F (37.1 °C), Min:97.6 °F (36.4 °C), Max:99.5 °F (51.5 °C)    Systolic (36MRZ), EUZ:698 , Min:88 , XYY:303      Diastolic (06MRN), QCU:99, Min:48, Max:72       Intake and Output:         Physical Exam:  Patient with distress.  Appears uncomfortable and unable to lie down from back pain  Heart: Regular rate  Lung: normal work of breathing  Back: costovertebral angle tenderness present bilaterally  Abdomen: soft, nontender, gravid  Lower Extremities:  - Edema No       Membranes:  Intact        Labs:   Recent Results (from the past 36 hour(s))   PHOSPHORUS    Collection Time: 21  5:27 AM   Result Value Ref Range    Phosphorus 3.3 2.6 - 4.7 MG/DL   CBC WITH AUTOMATED DIFF    Collection Time: 21  5:27 AM   Result Value Ref Range    WBC 4.7 3.6 - 11.0 K/uL    RBC 3.50 (L) 3.80 - 5.20 M/uL    HGB 8.4 (L) 11.5 - 16.0 g/dL    HCT 26.2 (L) 35.0 - 47.0 %    MCV 74.9 (L) 80.0 - 99.0 FL    MCH 24.0 (L) 26.0 - 34.0 PG    MCHC 32.1 30.0 - 36.5 g/dL    RDW 14.5 11.5 - 14.5 %    PLATELET 279 914 - 827 K/uL    MPV 10.9 8.9 - 12.9 FL    NRBC 0.0 0  WBC ABSOLUTE NRBC 0.00 0.00 - 0.01 K/uL    NEUTROPHILS 60 32 - 75 %    LYMPHOCYTES 28 12 - 49 %    MONOCYTES 10 5 - 13 %    EOSINOPHILS 2 0 - 7 %    BASOPHILS 0 0 - 1 %    IMMATURE GRANULOCYTES 0 0.0 - 0.5 %    ABS. NEUTROPHILS 2.8 1.8 - 8.0 K/UL    ABS. LYMPHOCYTES 1.3 0.8 - 3.5 K/UL    ABS. MONOCYTES 0.5 0.0 - 1.0 K/UL    ABS. EOSINOPHILS 0.1 0.0 - 0.4 K/UL    ABS. BASOPHILS 0.0 0.0 - 0.1 K/UL    ABS. IMM.  GRANS. 0.0 0.00 - 0.04 K/UL    DF AUTOMATED     METABOLIC PANEL, BASIC    Collection Time: 03/06/21  5:27 AM   Result Value Ref Range    Sodium 138 136 - 145 mmol/L    Potassium 3.0 (L) 3.5 - 5.1 mmol/L    Chloride 108 97 - 108 mmol/L    CO2 24 21 - 32 mmol/L    Anion gap 6 5 - 15 mmol/L    Glucose 99 65 - 100 mg/dL    BUN 5 (L) 6 - 20 MG/DL    Creatinine 0.47 (L) 0.55 - 1.02 MG/DL    BUN/Creatinine ratio 11 (L) 12 - 20      GFR est AA >60 >60 ml/min/1.73m2    GFR est non-AA >60 >60 ml/min/1.73m2    Calcium 8.1 (L) 8.5 - 10.1 MG/DL   MAGNESIUM    Collection Time: 03/06/21  5:27 AM   Result Value Ref Range    Magnesium 2.1 1.6 - 2.4 mg/dL       Assessment and Plan:      Principal Problem:    Acute pyelonephritis (3/3/2021)       39 y/o [id number] at 22 6/7 weeks admitted for intermittent chills and fever-presumed pyelonephritis  -Afebrile on ceftriaxone  -Blood culture shows 1/4 yeast preliminary, started on amphotericin per ID  -urine culture shows klebsiella, susceptible to ceftriaxone, appreciate ID assistance in tailoring to PO antibiotics when they deem appropriate  -bilateral flank pain- renal ultrasound normal on 3/5     Chronic eating disorder complicated by nausea/vomiting of pregnancy  -on TPN (cyclic-home regimen via [employer])  -has outpatient nutritionistJuan M  -iv zofran, phenergan suppositories, carafate, ivf's  -constipation- miralax and dulcolax supp    Depression with h/o hospitalization for suicidal ideation  -followed by Farhad Longoria (psychiatrist) and Donis Bales (psychologist), however pt non-compliant with counseling  -Continue Prozac, Zyprexa, and prn Prazosin, hydroxyzine     Chronic anemia-known beta thal minor  -s/p iv iron with last admission  -Hb currently at baseline        Ambulate with assistance, Juan thakkar for DVT ppx (pt with intermittent dizziness and previous fall history)    Chest pain s/p cardiac eval, likely MSK in origin    Sharif Loo MD   3/6/2021  7:45 AM

## 2021-03-06 NOTE — PROGRESS NOTES
ID Progress Note  3/6/2021    Subjective:     Afebrile. No dyspnea, abdominal pain, sore throat. Has some right flank pain. C/o severe chills while receiving IV ampho which lasted for a few hours    ROS: No anaphylaxis, seizures, syncope, hematemesis, hematochezia     Objective:     Vitals:   Visit Vitals  /72 (BP 1 Location: Left upper arm, BP Patient Position: Sitting)   Pulse 74   Temp 98.8 °F (37.1 °C)   Resp 16   Ht 5' 4\" (1.626 m)   Wt 72.3 kg (159 lb 6.4 oz)   LMP 2020 (Exact Date)   SpO2 98%   BMI 27.36 kg/m²        Tmax:  Temp (24hrs), Av.7 °F (37.1 °C), Min:97.6 °F (36.4 °C), Max:99.5 °F (37.5 °C)      Exam:    Not in distress  Pink conjunctivae, anicteric sclerae  No cervical lymphdenopathy   Lung clear, no rales, wheezes or rhonchi   Heart: s1, s2, RRR, no murmurs rubs or clicks  Abdomen: soft nontender, no guarding or rebound  Knees not warm or tender  Speech fluent     Labs:   Lab Results   Component Value Date/Time    WBC 4.7 2021 05:27 AM    Hemoglobin (POC) 10.4 (L) 2020 07:12 AM    HGB 8.4 (L) 2021 05:27 AM    HCT 26.2 (L) 2021 05:27 AM    PLATELET 745  05:27 AM    MCV 74.9 (L) 2021 05:27 AM    Hgb, External 33.7 2012    Hct, External 69 2012    Platelet cnt., External 307 2012     Lab Results   Component Value Date/Time    Sodium 138 2021 05:27 AM    Potassium 3.0 (L) 2021 05:27 AM    Chloride 108 2021 05:27 AM    CO2 24 2021 05:27 AM    Anion gap 6 2021 05:27 AM    Glucose 99 2021 05:27 AM    BUN 5 (L) 2021 05:27 AM    Creatinine 0.47 (L) 2021 05:27 AM    BUN/Creatinine ratio 11 (L) 2021 05:27 AM    GFR est AA >60 2021 05:27 AM    GFR est non-AA >60 2021 05:27 AM    Calcium 8.1 (L) 2021 05:27 AM    Bilirubin, total 0.5 2021 05:48 AM    Alk.  phosphatase 124 (H) 2021 05:48 AM    Protein, total 7.1 2021 05:48 AM    Albumin 2.4 (L) 03/03/2021 05:48 AM    Globulin 4.7 (H) 03/03/2021 05:48 AM    A-G Ratio 0.5 (L) 03/03/2021 05:48 AM    ALT (SGPT) 23 03/03/2021 05:48 AM       S/p right IJ (3/5)  Assessment:     #1 fever - probably from UTI and candidemia     Blood cx (3/2) 1/4 yeast    Ordered repeat blood cx (3/6)    Urine cx (3/2) klebsiella pneumoniae, second GNR pending - identification pending    renal US (3/5) Mild left hydronephrosis is likely within the range of normal in pregnancy.     #2 intrauterine pregnancy     #3 asthma     #4 depression    Recommendations:   Continue ceftriaxone and amphotericin  -> fluconazole and echinocandins; pregnancy C while amphotericin belong to pregnancy B category. Pt has been informed side effects of medication along with benefits and risks of using such medication.  Check daily BMP and Mg level    Severe chills with IV Ampho  - d/w clinical pharmacist; recommend to take PO tylenol 30 minutes prior to IV ampho    Hyunsun Candice Alcala NP

## 2021-03-06 NOTE — PROGRESS NOTES
Bedside and Verbal shift change report given to Bharat Araya RN (oncoming nurse) by BRYANT Urbano RN (offgoing nurse). Report included the following information SBAR, Kardex, Intake/Output, MAR and Recent Results.

## 2021-03-07 LAB
ANION GAP SERPL CALC-SCNC: 6 MMOL/L (ref 5–15)
ANION GAP SERPL CALC-SCNC: 7 MMOL/L (ref 5–15)
BUN SERPL-MCNC: 5 MG/DL (ref 6–20)
BUN SERPL-MCNC: 6 MG/DL (ref 6–20)
BUN/CREAT SERPL: 10 (ref 12–20)
BUN/CREAT SERPL: 15 (ref 12–20)
CALCIUM SERPL-MCNC: 7.5 MG/DL (ref 8.5–10.1)
CALCIUM SERPL-MCNC: 8 MG/DL (ref 8.5–10.1)
CHLORIDE SERPL-SCNC: 110 MMOL/L (ref 97–108)
CHLORIDE SERPL-SCNC: 111 MMOL/L (ref 97–108)
CO2 SERPL-SCNC: 22 MMOL/L (ref 21–32)
CO2 SERPL-SCNC: 23 MMOL/L (ref 21–32)
CREAT SERPL-MCNC: 0.41 MG/DL (ref 0.55–1.02)
CREAT SERPL-MCNC: 0.48 MG/DL (ref 0.55–1.02)
GLUCOSE SERPL-MCNC: 74 MG/DL (ref 65–100)
GLUCOSE SERPL-MCNC: 95 MG/DL (ref 65–100)
MAGNESIUM SERPL-MCNC: 1.9 MG/DL (ref 1.6–2.4)
POTASSIUM SERPL-SCNC: 2.6 MMOL/L (ref 3.5–5.1)
POTASSIUM SERPL-SCNC: 2.9 MMOL/L (ref 3.5–5.1)
SODIUM SERPL-SCNC: 139 MMOL/L (ref 136–145)
SODIUM SERPL-SCNC: 140 MMOL/L (ref 136–145)

## 2021-03-07 PROCEDURE — 74011250637 HC RX REV CODE- 250/637: Performed by: OBSTETRICS & GYNECOLOGY

## 2021-03-07 PROCEDURE — 74011250636 HC RX REV CODE- 250/636: Performed by: OBSTETRICS & GYNECOLOGY

## 2021-03-07 PROCEDURE — 74011000250 HC RX REV CODE- 250: Performed by: OBSTETRICS & GYNECOLOGY

## 2021-03-07 PROCEDURE — 74011250637 HC RX REV CODE- 250/637: Performed by: INTERNAL MEDICINE

## 2021-03-07 PROCEDURE — 83735 ASSAY OF MAGNESIUM: CPT

## 2021-03-07 PROCEDURE — 80048 BASIC METABOLIC PNL TOTAL CA: CPT

## 2021-03-07 PROCEDURE — 74011000258 HC RX REV CODE- 258: Performed by: OBSTETRICS & GYNECOLOGY

## 2021-03-07 PROCEDURE — 74011250636 HC RX REV CODE- 250/636: Performed by: INTERNAL MEDICINE

## 2021-03-07 PROCEDURE — 74011000258 HC RX REV CODE- 258: Performed by: INTERNAL MEDICINE

## 2021-03-07 PROCEDURE — 36415 COLL VENOUS BLD VENIPUNCTURE: CPT

## 2021-03-07 PROCEDURE — 94760 N-INVAS EAR/PLS OXIMETRY 1: CPT

## 2021-03-07 PROCEDURE — 74011250637 HC RX REV CODE- 250/637: Performed by: NURSE PRACTITIONER

## 2021-03-07 PROCEDURE — 65410000002 HC RM PRIVATE OB

## 2021-03-07 RX ORDER — POTASSIUM CHLORIDE 14.9 MG/ML
10 INJECTION INTRAVENOUS
Status: COMPLETED | OUTPATIENT
Start: 2021-03-07 | End: 2021-03-07

## 2021-03-07 RX ADMIN — Medication 10 ML: at 17:17

## 2021-03-07 RX ADMIN — POTASSIUM CHLORIDE 10 MEQ: 14.9 INJECTION, SOLUTION INTRAVENOUS at 09:04

## 2021-03-07 RX ADMIN — DEXTROSE MONOHYDRATE 20 ML: 5 INJECTION, SOLUTION INTRAVENOUS at 17:33

## 2021-03-07 RX ADMIN — ACETAMINOPHEN 500 MG: 500 TABLET ORAL at 09:08

## 2021-03-07 RX ADMIN — DEXTROSE MONOHYDRATE 20 ML: 5 INJECTION, SOLUTION INTRAVENOUS at 15:07

## 2021-03-07 RX ADMIN — FLUOXETINE 60 MG: 20 CAPSULE ORAL at 17:22

## 2021-03-07 RX ADMIN — SODIUM CHLORIDE 1000 ML/HR: 9 INJECTION, SOLUTION INTRAVENOUS at 13:57

## 2021-03-07 RX ADMIN — CALCIUM GLUCONATE: 94 INJECTION, SOLUTION INTRAVENOUS at 19:31

## 2021-03-07 RX ADMIN — Medication 1 TABLET: at 09:08

## 2021-03-07 RX ADMIN — Medication 10 ML: at 04:34

## 2021-03-07 RX ADMIN — SUCRALFATE 1 G: 1 TABLET ORAL at 06:34

## 2021-03-07 RX ADMIN — Medication 10 ML: at 19:24

## 2021-03-07 RX ADMIN — SODIUM CHLORIDE 125 ML/HR: 9 INJECTION, SOLUTION INTRAVENOUS at 09:58

## 2021-03-07 RX ADMIN — BISACODYL 10 MG: 10 SUPPOSITORY RECTAL at 21:59

## 2021-03-07 RX ADMIN — ONDANSETRON 4 MG: 2 INJECTION INTRAMUSCULAR; INTRAVENOUS at 09:12

## 2021-03-07 RX ADMIN — ONDANSETRON 4 MG: 2 INJECTION INTRAMUSCULAR; INTRAVENOUS at 17:15

## 2021-03-07 RX ADMIN — ONDANSETRON 4 MG: 2 INJECTION INTRAMUSCULAR; INTRAVENOUS at 23:24

## 2021-03-07 RX ADMIN — SUCRALFATE 1 G: 1 TABLET ORAL at 21:59

## 2021-03-07 RX ADMIN — I.V. FAT EMULSION 500 ML: 20 EMULSION INTRAVENOUS at 19:32

## 2021-03-07 RX ADMIN — SODIUM CHLORIDE 125 ML/HR: 9 INJECTION, SOLUTION INTRAVENOUS at 17:21

## 2021-03-07 RX ADMIN — OLANZAPINE 10 MG: 5 TABLET, FILM COATED ORAL at 21:59

## 2021-03-07 RX ADMIN — POTASSIUM CHLORIDE 10 MEQ: 14.9 INJECTION, SOLUTION INTRAVENOUS at 11:06

## 2021-03-07 RX ADMIN — SUCRALFATE 1 G: 1 TABLET ORAL at 17:22

## 2021-03-07 RX ADMIN — SUCRALFATE 1 G: 1 TABLET ORAL at 11:08

## 2021-03-07 RX ADMIN — DEXTROSE MONOHYDRATE 217 MG: 5 INJECTION, SOLUTION INTRAVENOUS at 15:10

## 2021-03-07 RX ADMIN — POLYETHYLENE GLYCOL 3350 17 G: 17 POWDER, FOR SOLUTION ORAL at 09:13

## 2021-03-07 RX ADMIN — CEFTRIAXONE SODIUM 1 G: 1 INJECTION, POWDER, FOR SOLUTION INTRAMUSCULAR; INTRAVENOUS at 04:30

## 2021-03-07 RX ADMIN — POTASSIUM CHLORIDE 10 MEQ: 14.9 INJECTION, SOLUTION INTRAVENOUS at 10:01

## 2021-03-07 RX ADMIN — PANTOPRAZOLE SODIUM 40 MG: 40 TABLET, DELAYED RELEASE ORAL at 06:34

## 2021-03-07 NOTE — PROGRESS NOTES
Pt felt slightly dizzy, sitting on the side of bed, advised to lay down , VS taken as follows:98.3,80 16/min 100% 108/69 nacho notify Dr Dasha Torres,

## 2021-03-07 NOTE — PROGRESS NOTES
Bedside and Verbal shift change report given to Chela Chavez RN (oncoming nurse) by Ta Mcelroy RN (offgoing nurse). Report included the following information SBAR, Kardex, Intake/Output, MAR and Recent Results.

## 2021-03-07 NOTE — PROGRESS NOTES
High Risk Obstetrics Progress Note    Name: Rylee Forrester MRN: [id number]  SSN: [id number]    YOB: 1985  Age: 38 y.o. Sex: female      Subjective:      LOS: 4 days    Estimated Date of Delivery: 21   Gestational Age Today: 21w0d    Patient admitted for pyelonephritis in pregnancy. States she does have intermittent bilateral flank pain, constipation, nausea/vomiting, cramping and normal fetal movement and does not have shortness of breath, vaginal bleeding  and vaginal leaking of fluid . Pain over central line improving. K low this AM at 2.6, repeat 2.9   Objective:     Vitals:  Blood pressure 99/61, pulse 60, temperature 97.6 °F (36.4 °C), resp. rate 16, height 5' 4\" (1.626 m), weight 74.2 kg (163 lb 9.6 oz), last menstrual period 2020, SpO2 100 %, not currently breastfeeding.    Temp (24hrs), Av.7 °F (36.5 °C), Min:97.6 °F (36.4 °C), Max:97.8 °F (06.1 °C)    Systolic (06WQF), SON:812 , Min:99 , TVD:683      Diastolic (25EUV), ZIF:44, Min:52, Max:69       Intake and Output:         Physical Exam:  Patient without distress  Central line in place, no edema, erythema  PICC line dressing c/d/i  Heart: Regular rate  Lung: normal work of breathing  Back: costovertebral angle tenderness present bilaterally  Abdomen: soft, nontender, gravid  Lower Extremities:  - Edema No       Membranes:  Intact      FHR: +doptones 3/6  Pending this AM  Labs:   Recent Results (from the past 36 hour(s))   PHOSPHORUS    Collection Time: 21  5:27 AM   Result Value Ref Range    Phosphorus 3.3 2.6 - 4.7 MG/DL   CBC WITH AUTOMATED DIFF    Collection Time: 21  5:27 AM   Result Value Ref Range    WBC 4.7 3.6 - 11.0 K/uL    RBC 3.50 (L) 3.80 - 5.20 M/uL    HGB 8.4 (L) 11.5 - 16.0 g/dL    HCT 26.2 (L) 35.0 - 47.0 %    MCV 74.9 (L) 80.0 - 99.0 FL    MCH 24.0 (L) 26.0 - 34.0 PG    MCHC 32.1 30.0 - 36.5 g/dL    RDW 14.5 11.5 - 14.5 %    PLATELET 175 883 - 465 K/uL    MPV 10.9 8.9 - 12.9 FL    NRBC 0.0 0  WBC    ABSOLUTE NRBC 0.00 0.00 - 0.01 K/uL    NEUTROPHILS 60 32 - 75 %    LYMPHOCYTES 28 12 - 49 %    MONOCYTES 10 5 - 13 %    EOSINOPHILS 2 0 - 7 %    BASOPHILS 0 0 - 1 %    IMMATURE GRANULOCYTES 0 0.0 - 0.5 %    ABS. NEUTROPHILS 2.8 1.8 - 8.0 K/UL    ABS. LYMPHOCYTES 1.3 0.8 - 3.5 K/UL    ABS. MONOCYTES 0.5 0.0 - 1.0 K/UL    ABS. EOSINOPHILS 0.1 0.0 - 0.4 K/UL    ABS. BASOPHILS 0.0 0.0 - 0.1 K/UL    ABS. IMM.  GRANS. 0.0 0.00 - 0.04 K/UL    DF AUTOMATED     METABOLIC PANEL, BASIC    Collection Time: 03/06/21  5:27 AM   Result Value Ref Range    Sodium 138 136 - 145 mmol/L    Potassium 3.0 (L) 3.5 - 5.1 mmol/L    Chloride 108 97 - 108 mmol/L    CO2 24 21 - 32 mmol/L    Anion gap 6 5 - 15 mmol/L    Glucose 99 65 - 100 mg/dL    BUN 5 (L) 6 - 20 MG/DL    Creatinine 0.47 (L) 0.55 - 1.02 MG/DL    BUN/Creatinine ratio 11 (L) 12 - 20      GFR est AA >60 >60 ml/min/1.73m2    GFR est non-AA >60 >60 ml/min/1.73m2    Calcium 8.1 (L) 8.5 - 10.1 MG/DL   MAGNESIUM    Collection Time: 03/06/21  5:27 AM   Result Value Ref Range    Magnesium 2.1 1.6 - 2.4 mg/dL   CULTURE, BLOOD, PAIRED    Collection Time: 03/06/21  1:13 PM    Specimen: Blood   Result Value Ref Range    Special Requests: NO SPECIAL REQUESTS      Culture result: NO GROWTH AFTER 15 HOURS     METABOLIC PANEL, BASIC    Collection Time: 03/07/21  4:39 AM   Result Value Ref Range    Sodium 139 136 - 145 mmol/L    Potassium 2.6 (LL) 3.5 - 5.1 mmol/L    Chloride 110 (H) 97 - 108 mmol/L    CO2 23 21 - 32 mmol/L    Anion gap 6 5 - 15 mmol/L    Glucose 95 65 - 100 mg/dL    BUN 5 (L) 6 - 20 MG/DL    Creatinine 0.48 (L) 0.55 - 1.02 MG/DL    BUN/Creatinine ratio 10 (L) 12 - 20      GFR est AA >60 >60 ml/min/1.73m2    GFR est non-AA >60 >60 ml/min/1.73m2    Calcium 7.5 (L) 8.5 - 10.1 MG/DL   MAGNESIUM    Collection Time: 03/07/21  4:39 AM   Result Value Ref Range    Magnesium 1.9 1.6 - 2.4 mg/dL   METABOLIC PANEL, BASIC    Collection Time: 03/07/21  6:34 AM   Result Value Ref Range    Sodium 140 136 - 145 mmol/L    Potassium 2.9 (L) 3.5 - 5.1 mmol/L    Chloride 111 (H) 97 - 108 mmol/L    CO2 22 21 - 32 mmol/L    Anion gap 7 5 - 15 mmol/L    Glucose 74 65 - 100 mg/dL    BUN 6 6 - 20 MG/DL    Creatinine 0.41 (L) 0.55 - 1.02 MG/DL    BUN/Creatinine ratio 15 12 - 20      GFR est AA >60 >60 ml/min/1.73m2    GFR est non-AA >60 >60 ml/min/1.73m2    Calcium 8.0 (L) 8.5 - 10.1 MG/DL       Assessment and Plan:      Principal Problem:    Acute pyelonephritis (3/3/2021)       39 y/o [id number] at 23 weeks admitted for intermittent chills and fever-presumed pyelonephritis  -Afebrile on ceftriaxone  -Blood culture shows 1/4 yeast preliminary, started on amphotericin per ID  -urine culture shows klebsiella, susceptible to ceftriaxone, appreciate ID assistance in tailoring to PO antibiotics when they deem appropriate  -bilateral flank pain- renal ultrasound normal on 3/5     Chronic eating disorder complicated by nausea/vomiting of pregnancy  -on TPN (cyclic-home regimen via [employer])  -HypoK this AM, discussed with inpatient pharmacy, will give 30meq IV K this AM given pt does not get TPN until tonight, increased Kacetate from 9 to 20 per pharmacy  -has outpatient nutritionistMatti  -iv zofran, phenergan suppositories, carafate, ivf's  -constipation- miralax and dulcolax supp    Depression with h/o hospitalization for suicidal ideation  -followed by Isaac Morfin (psychiatrist) and Holli Neil (psychologist), however pt non-compliant with counseling  -Continue Prozac, Zyprexa, and prn Prazosin, hydroxyzine     Chronic anemia-known beta thal minor  -s/p iv iron with last admission  -Hb currently at baseline        Ambulate with assistance, Juan thakkar for DVT ppx (pt with intermittent dizziness and previous fall history)    Chest pain s/p cardiac eval, likely MSK in origin    Julissa Hess MD   3/7/2021  8:09 AM

## 2021-03-07 NOTE — PROGRESS NOTES
Verbal shift change report given to Thaddeus Barger RN (oncoming nurse) by Armaan Oviedo RN (offgoing nurse). Report included the following information SBAR, Kardex, ED Summary, Intake/Output, MAR, Accordion and Recent Results. Pt up to bathroom    2020 Upon assessment  writer noted blood return in TPN line up to filter with lipid line above TPN filter.  [facility] notified

## 2021-03-07 NOTE — PROGRESS NOTES
ID Progress Note  3/7/2021    Subjective:     Afebrile. No dyspnea, abdominal pain, sore throat. Had some dizziness. ROS: No anaphylaxis, seizures, syncope, hematemesis, hematochezia     Objective:     Vitals:   Visit Vitals  /69 (BP 1 Location: Left upper arm, BP Patient Position: At rest)   Pulse 80   Temp 98.3 °F (36.8 °C)   Resp 16   Ht 5' 4\" (1.626 m)   Wt 74.2 kg (163 lb 9.6 oz)   LMP 2020 (Exact Date)   SpO2 100%   BMI 28.08 kg/m²        Tmax:  Temp (24hrs), Av °F (36.7 °C), Min:97.6 °F (36.4 °C), Max:98.5 °F (36.9 °C)      Exam:    Not in distress  Pink conjunctivae, anicteric sclerae  No cervical lymphdenopathy   Lung clear, no rales, wheezes or rhonchi   Heart: s1, s2, RRR, no murmurs rubs or clicks  Abdomen: soft nontender, no guarding or rebound  Knees not warm or tender  Speech fluent     Labs:   Lab Results   Component Value Date/Time    WBC 4.7 2021 05:27 AM    Hemoglobin (POC) 10.4 (L) 2020 07:12 AM    HGB 8.4 (L) 2021 05:27 AM    HCT 26.2 (L) 2021 05:27 AM    PLATELET 169  05:27 AM    MCV 74.9 (L) 2021 05:27 AM    Hgb, External 33.7 2012    Hct, External 69 2012    Platelet cnt., External 307 2012     Lab Results   Component Value Date/Time    Sodium 140 2021 06:34 AM    Potassium 2.9 (L) 2021 06:34 AM    Chloride 111 (H) 2021 06:34 AM    CO2 22 2021 06:34 AM    Anion gap 7 2021 06:34 AM    Glucose 74 2021 06:34 AM    BUN 6 2021 06:34 AM    Creatinine 0.41 (L) 2021 06:34 AM    BUN/Creatinine ratio 15 2021 06:34 AM    GFR est AA >60 2021 06:34 AM    GFR est non-AA >60 2021 06:34 AM    Calcium 8.0 (L) 2021 06:34 AM    Bilirubin, total 0.5 2021 05:48 AM    Alk.  phosphatase 124 (H) 2021 05:48 AM    Protein, total 7.1 2021 05:48 AM    Albumin 2.4 (L) 2021 05:48 AM    Globulin 4.7 (H) 2021 05:48 AM    A-G Ratio 0.5 (L) 03/03/2021 05:48 AM    ALT (SGPT) 23 03/03/2021 05:48 AM       S/p right IJ (3/5)  Assessment:     #1 fever - probably from UTI and candidemia     Blood cx (3/2) 1/4 yeast      Urine cx (3/2) klebsiella pneumoniae, second citrobacter -    renal US (3/5) Mild left hydronephrosis is likely within the range of normal in pregnancy.       #2 intrauterine pregnancy     #3 asthma     #4 depression    Recommendations:   Continue ceftriaxone and amphotericin  -> fluconazole and echinocandins have shown harm in fetuses in animal studies. Amphotericin is treatment of choice for candidemia in pregnancy      Pt has been informed side effects of medication along with benefits and risks of using such medication.  Check daily BMP and Mg level  - ff up repeat blood cx         Valery Quiroz MD

## 2021-03-08 LAB
ANION GAP SERPL CALC-SCNC: 9 MMOL/L (ref 5–15)
BUN SERPL-MCNC: 5 MG/DL (ref 6–20)
BUN/CREAT SERPL: 10 (ref 12–20)
CALCIUM SERPL-MCNC: 7.8 MG/DL (ref 8.5–10.1)
CHLORIDE SERPL-SCNC: 110 MMOL/L (ref 97–108)
CO2 SERPL-SCNC: 21 MMOL/L (ref 21–32)
CREAT SERPL-MCNC: 0.51 MG/DL (ref 0.55–1.02)
GLUCOSE BLD STRIP.AUTO-MCNC: 103 MG/DL (ref 65–100)
GLUCOSE SERPL-MCNC: 66 MG/DL (ref 65–100)
MAGNESIUM SERPL-MCNC: 1.8 MG/DL (ref 1.6–2.4)
PHOSPHATE SERPL-MCNC: 3.6 MG/DL (ref 2.6–4.7)
POTASSIUM SERPL-SCNC: 2.6 MMOL/L (ref 3.5–5.1)
SERVICE CMNT-IMP: ABNORMAL
SODIUM SERPL-SCNC: 140 MMOL/L (ref 136–145)

## 2021-03-08 PROCEDURE — 74011250637 HC RX REV CODE- 250/637: Performed by: OBSTETRICS & GYNECOLOGY

## 2021-03-08 PROCEDURE — 80048 BASIC METABOLIC PNL TOTAL CA: CPT

## 2021-03-08 PROCEDURE — 83735 ASSAY OF MAGNESIUM: CPT

## 2021-03-08 PROCEDURE — 74011000258 HC RX REV CODE- 258: Performed by: OBSTETRICS & GYNECOLOGY

## 2021-03-08 PROCEDURE — 97116 GAIT TRAINING THERAPY: CPT

## 2021-03-08 PROCEDURE — 74011250637 HC RX REV CODE- 250/637: Performed by: INTERNAL MEDICINE

## 2021-03-08 PROCEDURE — 94760 N-INVAS EAR/PLS OXIMETRY 1: CPT

## 2021-03-08 PROCEDURE — 74011250637 HC RX REV CODE- 250/637: Performed by: NURSE PRACTITIONER

## 2021-03-08 PROCEDURE — 74011250636 HC RX REV CODE- 250/636: Performed by: OBSTETRICS & GYNECOLOGY

## 2021-03-08 PROCEDURE — 74011000258 HC RX REV CODE- 258: Performed by: INTERNAL MEDICINE

## 2021-03-08 PROCEDURE — 84100 ASSAY OF PHOSPHORUS: CPT

## 2021-03-08 PROCEDURE — 74011250636 HC RX REV CODE- 250/636: Performed by: INTERNAL MEDICINE

## 2021-03-08 PROCEDURE — 74011000250 HC RX REV CODE- 250: Performed by: OBSTETRICS & GYNECOLOGY

## 2021-03-08 PROCEDURE — 97161 PT EVAL LOW COMPLEX 20 MIN: CPT

## 2021-03-08 PROCEDURE — 36415 COLL VENOUS BLD VENIPUNCTURE: CPT

## 2021-03-08 PROCEDURE — 82962 GLUCOSE BLOOD TEST: CPT

## 2021-03-08 PROCEDURE — 65410000002 HC RM PRIVATE OB

## 2021-03-08 RX ORDER — POTASSIUM CHLORIDE 29.8 MG/ML
20 INJECTION INTRAVENOUS ONCE
Status: COMPLETED | OUTPATIENT
Start: 2021-03-08 | End: 2021-03-08

## 2021-03-08 RX ORDER — PROMETHAZINE HYDROCHLORIDE 12.5 MG/1
12.5 SUPPOSITORY RECTAL
Status: DISCONTINUED | OUTPATIENT
Start: 2021-03-08 | End: 2021-04-02 | Stop reason: HOSPADM

## 2021-03-08 RX ORDER — SODIUM CHLORIDE AND POTASSIUM CHLORIDE .9; .15 G/100ML; G/100ML
SOLUTION INTRAVENOUS CONTINUOUS
Status: DISCONTINUED | OUTPATIENT
Start: 2021-03-08 | End: 2021-03-11

## 2021-03-08 RX ADMIN — FLUOXETINE 60 MG: 20 CAPSULE ORAL at 18:13

## 2021-03-08 RX ADMIN — POTASSIUM CHLORIDE AND SODIUM CHLORIDE: 900; 150 INJECTION, SOLUTION INTRAVENOUS at 09:01

## 2021-03-08 RX ADMIN — ONDANSETRON 4 MG: 2 INJECTION INTRAMUSCULAR; INTRAVENOUS at 16:03

## 2021-03-08 RX ADMIN — MINERAL OIL, PETROLATUM, PHENYLEPHRINE HCL: 14; 74.9; .25 OINTMENT RECTAL at 15:04

## 2021-03-08 RX ADMIN — SUCRALFATE 1 G: 1 TABLET ORAL at 12:06

## 2021-03-08 RX ADMIN — ONDANSETRON 4 MG: 2 INJECTION INTRAMUSCULAR; INTRAVENOUS at 08:59

## 2021-03-08 RX ADMIN — PROMETHAZINE HYDROCHLORIDE 12.5 MG: 25 TABLET ORAL at 06:34

## 2021-03-08 RX ADMIN — Medication 1 TABLET: at 09:02

## 2021-03-08 RX ADMIN — Medication 10 ML: at 14:00

## 2021-03-08 RX ADMIN — DEXTROSE MONOHYDRATE 217 MG: 5 INJECTION, SOLUTION INTRAVENOUS at 16:06

## 2021-03-08 RX ADMIN — SODIUM CHLORIDE 1000 ML/HR: 9 INJECTION, SOLUTION INTRAVENOUS at 13:57

## 2021-03-08 RX ADMIN — OLANZAPINE 10 MG: 5 TABLET, FILM COATED ORAL at 21:33

## 2021-03-08 RX ADMIN — PRAZOSIN HYDROCHLORIDE 2 MG: 1 CAPSULE ORAL at 21:33

## 2021-03-08 RX ADMIN — I.V. FAT EMULSION 250 ML: 20 EMULSION INTRAVENOUS at 19:56

## 2021-03-08 RX ADMIN — ACETAMINOPHEN 500 MG: 500 TABLET ORAL at 09:03

## 2021-03-08 RX ADMIN — PANTOPRAZOLE SODIUM 40 MG: 40 TABLET, DELAYED RELEASE ORAL at 06:34

## 2021-03-08 RX ADMIN — POTASSIUM CHLORIDE 20 MEQ: 400 INJECTION, SOLUTION INTRAVENOUS at 09:05

## 2021-03-08 RX ADMIN — POLYETHYLENE GLYCOL 3350 17 G: 17 POWDER, FOR SOLUTION ORAL at 09:03

## 2021-03-08 RX ADMIN — BISACODYL 10 MG: 10 SUPPOSITORY RECTAL at 21:34

## 2021-03-08 RX ADMIN — Medication 10 ML: at 06:34

## 2021-03-08 RX ADMIN — CEFTRIAXONE SODIUM 1 G: 1 INJECTION, POWDER, FOR SOLUTION INTRAMUSCULAR; INTRAVENOUS at 05:20

## 2021-03-08 RX ADMIN — POTASSIUM CHLORIDE: 2 INJECTION, SOLUTION, CONCENTRATE INTRAVENOUS at 19:55

## 2021-03-08 RX ADMIN — Medication 10 ML: at 21:35

## 2021-03-08 RX ADMIN — MINERAL OIL, PETROLATUM, PHENYLEPHRINE HCL: 14; 74.9; .25 OINTMENT RECTAL at 21:46

## 2021-03-08 RX ADMIN — DEXTROSE MONOHYDRATE 20 ML: 5 INJECTION, SOLUTION INTRAVENOUS at 16:08

## 2021-03-08 RX ADMIN — SUCRALFATE 1 G: 1 TABLET ORAL at 06:34

## 2021-03-08 RX ADMIN — SUCRALFATE 1 G: 1 TABLET ORAL at 18:14

## 2021-03-08 RX ADMIN — SUCRALFATE 1 G: 1 TABLET ORAL at 21:33

## 2021-03-08 RX ADMIN — ONDANSETRON 4 MG: 2 INJECTION INTRAMUSCULAR; INTRAVENOUS at 21:34

## 2021-03-08 NOTE — PROGRESS NOTES
Comprehensive Nutrition Assessment    Type and Reason for Visit: Reassess, Consult    Nutrition Recommendations/Plan:    Suggest to check BG 2 hours into infusion and 1 hour after TPN discontinued. If low BG persists, suggest to run TPN over 24 hours to maintain CHO load. Continue to monitor wt weekly (please have pt turn backwards and do not discuss wt with pt)    Continue to offer favorite foods. Continue to monitor labs/lytes for trends. Nutrition Assessment:      Chart reviewed. Urine cx (3/2) klebsiella pneumoniae, second citrobacter -  renal US (3/5) Mild left hydronephrosis is likely within the range of normal in pregnancy. Pt with 4# wt increase since admission. Potassium continues to be depleted and receiving IV KCl and TPN tonight will have additional potassium chloride and potassium acetate. Pt with a bout of hypoglycemia. Suggest to check BG 2 hours into infusion and 1 hour after TPN discontinued. If low BG persists, suggest to run TPN over 24 hours to maintain CHO load. Current TPN provides: 2250 ml (with lipids), 2340 total calories, 120 grams protein (1.6 gm/kg/day) and 0.7 gm/kg/day lipids. Pt receiving MVI x 3 week and oral prenatal MVI daily meeting estimated nutritional needs.      Medications: prazosin, Zyprexa, Tums, Carafate, prenatal plus, rocephin, amphotericin,     Meal intake:   Patient Vitals for the past 168 hrs:   % Diet Eaten   03/05/21 1522 0 %   03/04/21 1438 0 %   03/03/21 1051 0 %       Wt Readings from Last 5 Encounters:   03/07/21 74.2 kg (163 lb 9.6 oz)   01/15/21 75.5 kg (166 lb 6.4 oz)   12/28/20 70.2 kg (154 lb 12.8 oz)   12/16/20 72.9 kg (160 lb 12.8 oz)   12/06/20 67.6 kg (149 lb)   ]  Malnutrition Assessment:  Malnutrition Status:  Severe malnutrition    Context:  Chronic illness     Findings of the 6 clinical characteristics of malnutrition:   Energy Intake:  Mild decrease in energy intake (specify)  Weight Loss:  7.00 - Greater than 10% over 6 months Body Fat Loss:  7 - Severe body fat loss,     Muscle Mass Loss:  7 - Severe muscle mass loss   Strength:  Not performed         Estimated Daily Nutrient Needs:  Energy (kcal): 3025-2603 kcal/kg; Weight Used for Energy Requirements: Current  Protein (g):  gm/day (1.2-1.4 gm/day); Weight Used for Protein Requirements: Admission  Fluid (ml/day): 2400 ml; Method Used for Fluid Requirements: 1 ml/kcal    Wounds:     none    Current Nutrition Therapies:  DIET REGULAR  DIET ONE TIME MESSAGE  TPN ADULT - CENTRAL  Current Parenteral Nutrition Orders:  · Type and Formula: AA 6% D20 @ 2 liters cycled   · Lipids: 250ml, Daily  · Duration: Cyclic      Anthropometric Measures:  · Height:  5' 4\" (162.6 cm)  · Current Body Wt:  74.2 kg (163 lb 9.3 oz)   · Admission Body Wt:  159 lb 2.8 oz    · Usual Body Wt:  68.7 kg (151 lb 7.3 oz)     · Ideal Body Wt:  120 lbs:  132.6 %     Nutrition Diagnosis:   · Inadequate oral intake related to altered GI function, psychological cause or life stress as evidenced by nutrition support-parenteral nutrition      Nutrition Interventions:   Food and/or Nutrient Delivery: Continue current diet, Continue parenteral nutrition  Nutrition Education and Counseling: No recommendations at this time  Coordination of Nutrition Care: Continue to monitor while inpatient, Coordination of community care    Goals:  Meet nutritional needs via TPN for the next 5-7 days.        Nutrition Monitoring and Evaluation:   Behavioral-Environmental Outcomes: None identified  Food/Nutrient Intake Outcomes: IVF intake, Parenteral nutrition intake/tolerance  Physical Signs/Symptoms Outcomes: Biochemical data, GI status, Nausea/vomiting, Weight    Discharge Planning:    Parenteral nutrition     Electronically signed by Noemi Miller RD on 3/8/2021 at 3:46 PM    Contact: Alexy

## 2021-03-08 NOTE — PROGRESS NOTES
High Risk Obstetrics Progress Note    Name: Kwan Coles MRN: [id number]  SSN: [id number]    YOB: 1985  Age: 38 y.o. Sex: female      Subjective:      LOS: 5 days    Estimated Date of Delivery: 21   Gestational Age Today: 21w4d     Patient admitted for pyelonephritis. States she does have cramping, intermittent chills, nausea and vomiting and normal fetal movement. and does not have chest pain, contractions, shortness of breath, vaginal bleeding  and vaginal leaking of fluid . Objective:     Vitals:  Blood pressure (!) 95/55, pulse 68, temperature 98 °F (36.7 °C), resp. rate 16, height 5' 4\" (1.626 m), weight 74.2 kg (163 lb 9.6 oz), last menstrual period 2020, SpO2 98 %, not currently breastfeeding. Temp (24hrs), Av.9 °F (36.6 °C), Min:97.5 °F (36.4 °C), Max:98.5 °F (32.4 °C)    Systolic (72LCG), IQF:86 , Min:89 , HIJ:069      Diastolic (07RKR), HCR:15, Min:45, Max:69       Intake and Output:         Physical Exam:  Patient without distress.   Heart: Regular rate and rhythm  Lung: clear to auscultation throughout lung fields, no wheezes, no rales, no rhonchi and normal respiratory effort  Abdomen: soft, nontender, gravid  Lower Extremities:  - Edema No       Membranes:  Intact        Labs:   Recent Results (from the past 36 hour(s))   METABOLIC PANEL, BASIC    Collection Time: 21  4:39 AM   Result Value Ref Range    Sodium 139 136 - 145 mmol/L    Potassium 2.6 (LL) 3.5 - 5.1 mmol/L    Chloride 110 (H) 97 - 108 mmol/L    CO2 23 21 - 32 mmol/L    Anion gap 6 5 - 15 mmol/L    Glucose 95 65 - 100 mg/dL    BUN 5 (L) 6 - 20 MG/DL    Creatinine 0.48 (L) 0.55 - 1.02 MG/DL    BUN/Creatinine ratio 10 (L) 12 - 20      GFR est AA >60 >60 ml/min/1.73m2    GFR est non-AA >60 >60 ml/min/1.73m2    Calcium 7.5 (L) 8.5 - 10.1 MG/DL   MAGNESIUM    Collection Time: 21  4:39 AM   Result Value Ref Range    Magnesium 1.9 1.6 - 2.4 mg/dL   METABOLIC PANEL, BASIC    Collection Time: 03/07/21  6:34 AM   Result Value Ref Range    Sodium 140 136 - 145 mmol/L    Potassium 2.9 (L) 3.5 - 5.1 mmol/L    Chloride 111 (H) 97 - 108 mmol/L    CO2 22 21 - 32 mmol/L    Anion gap 7 5 - 15 mmol/L    Glucose 74 65 - 100 mg/dL    BUN 6 6 - 20 MG/DL    Creatinine 0.41 (L) 0.55 - 1.02 MG/DL    BUN/Creatinine ratio 15 12 - 20      GFR est AA >60 >60 ml/min/1.73m2    GFR est non-AA >60 >60 ml/min/1.73m2    Calcium 8.0 (L) 8.5 - 74.5 MG/DL   METABOLIC PANEL, BASIC    Collection Time: 03/08/21  5:13 AM   Result Value Ref Range    Sodium 140 136 - 145 mmol/L    Potassium 2.6 (LL) 3.5 - 5.1 mmol/L    Chloride 110 (H) 97 - 108 mmol/L    CO2 21 21 - 32 mmol/L    Anion gap 9 5 - 15 mmol/L    Glucose 66 65 - 100 mg/dL    BUN 5 (L) 6 - 20 MG/DL    Creatinine 0.51 (L) 0.55 - 1.02 MG/DL    BUN/Creatinine ratio 10 (L) 12 - 20      GFR est AA >60 >60 ml/min/1.73m2    GFR est non-AA >60 >60 ml/min/1.73m2    Calcium 7.8 (L) 8.5 - 10.1 MG/DL   MAGNESIUM    Collection Time: 03/08/21  5:13 AM   Result Value Ref Range    Magnesium 1.8 1.6 - 2.4 mg/dL       Assessment and Plan:      Principal Problem:    Acute pyelonephritis (3/3/2021)       39 y/o [id number] at 23 1/7 weeks admitted for intermittent chills and fever-presumed pyelonephritis  -Afebrile on ceftriaxone  -Blood culture shows 1/4 yeast preliminary, started on amphotericin per ID; repeat blood cultures 3/6 negative to date  -urine culture shows klebsiella and citrobactr susceptible to ceftriaxone, appreciate ID assistance in tailoring to PO antibiotics when they deem appropriate  -bilateral flank pain- renal ultrasound normal on 3/5-improving     Chronic eating disorder complicated by nausea/vomiting of pregnancy  -on TPN (cyclic-home regimen via [employer])  -HypoK this AM-will add 20meQ KCL in ivfs and give 20 meQ iv now; will discuss with nutrition re: change in potassium in TPN  -has outpatient nutritionistLyndon  -iv zofran, phenergan suppositories, carafate, ivf's  -constipation- miralax and dulcolax supp prn     Depression with h/o hospitalization for suicidal ideation  -followed by Be Russo (psychiatrist) and Liliana Montanez (psychologist), however pt non-compliant with counseling  -Continue Prozac, Zyprexa, and prn Prazosin, hydroxyzine     Chronic anemia-known beta thal minor  -s/p iv iron with last admission  -Hb currently at baseline        Ambulate with assistance, Juan thakkar for DVT ppx (pt with intermittent dizziness and previous fall history)  Physical therapy consult today for deconditioning     Previous chest pain s/p cardiac eval, likely MSK in origin

## 2021-03-08 NOTE — PROGRESS NOTES
Problem: Mobility Impaired (Adult and Pediatric)  Goal: *Acute Goals and Plan of Care (Insert Text)  Description: FUNCTIONAL STATUS PRIOR TO ADMISSION: Patient was independent and active without use of DME. Pt reports modified independence with basic and instrumental ADLs. Pt endorses fall in January 2/2 syncope episodes. HOME SUPPORT PRIOR TO ADMISSION: The patient lived alone with no assistance nearby. Physical Therapy Goals  Initiated 3/8/2021  1. Patient will move from supine to sit and sit to supine , scoot up and down, and roll side to side in bed with modified independence within 7 day(s). 2.  Patient will transfer from bed to chair and chair to bed with modified independence using the least restrictive device within 7 day(s). 3.  Patient will perform sit to stand with modified independence within 7 day(s). 4.  Patient will ambulate with modified independence for 300 feet with the least restrictive device within 7 day(s). 5.  Patient will ascend/descend 10 stairs with one handrail(s) with modified independence within 7 day(s). Outcome: Not Met   PHYSICAL THERAPY EVALUATION  Patient: Regi Plasencia (32 y.o. female)  Date: 3/8/2021  Primary Diagnosis: Acute pyelonephritis [N10]  Procedure(s) (LRB):  INFUSION CATHETER INSERTION (N/A) 3 Days Post-Op   Precautions:        ASSESSMENT  Based on the objective data described below, the patient presents with decreased activity tolerance and gait deviations. Pt with appropriate BP response with positional changes and did not report dizziness. Pt ambulated with fair steadiness, yet decreased inder and lateral trunk sway. Pt states she may be able to have assistance at home for IADLs, but is unsure. Anticipate pt to progress well with continued mobility and recommend OOB to chair at least 3x/day and ambulating x1 assist to prevent deconditioning.      Current Level of Function Impacting Discharge (mobility/balance): supervision for ambulation    Functional Outcome Measure: The patient scored 70/100 on the Barthel Index outcome measure which is indicative of patient is 30% dependent on others for self care. Other factors to consider for discharge: fall risk, lives alone, stairs      Patient will benefit from skilled therapy intervention to address the above noted impairments. PLAN :  Recommendations and Planned Interventions: bed mobility training, transfer training, gait training, therapeutic exercises, neuromuscular re-education, patient and family training/education and therapeutic activities      Frequency/Duration: Patient will be followed by physical therapy:  3 times a week to address goals. Recommendation for discharge: (in order for the patient to meet his/her long term goals)  Physical therapy at least 2 days/week in the home     This discharge recommendation:  Has not yet been discussed the attending provider and/or case management    IF patient discharges home will need the following DME: none         SUBJECTIVE:   Patient stated It would just be difficult to get up sometimes.     OBJECTIVE DATA SUMMARY:   HISTORY:    Past Medical History:   Diagnosis Date    Acute pyelonephritis 3/3/2021    Anorexia     Anxiety     Asthma     on albuterol prn.  Triggers cold and allergies    Avoidant-restrictive food intake disorder (ARFID)     Back pain, chronic     sciatica    Chronic bilateral low back pain with right-sided sciatica 2020    ~    GERD (gastroesophageal reflux disease) 2020    [facility] , GI Dr. Jacy Chairez Gestational hypertension     Past pregnancy    HX OTHER MEDICAL      , , , ,     Hyperemesis     severe hyperemesis    Hypertension     with G12 - none this pregnancy    Iron deficiency anemia 10/08/2010    MDD (major depressive disorder), single episode with postpartum onset 2013    treated with meds - 13    Orthostatic hypotension 2020    Plantar fasciitis     Pregnancy     36 weeks    PTSD (post-traumatic stress disorder)      Past Surgical History:   Procedure Laterality Date    HX  SECTION  4/22/15    HX DILATION AND CURETTAGE      HX DILATION AND CURETTAGE  2020    HX GYN      miscarriage x 6    HX GYN      removal of left fallopian tube    HX OTHER SURGICAL      cerlcage x4, ectopic x1 1999 with removal of left fallopian tube    HX OTHER SURGICAL      cerclage w/ current pregnancy. Removed 18    DC  DELIVERY ONLY         Personal factors and/or comorbidities impacting plan of care: PMH    Home Situation  Home Environment: Private residence  # Steps to Enter: 10  Rails to Enter: Yes  Hand Rails : Right  One/Two Story Residence: Two story  # of Interior Steps: 15  Interior Rails: Both  Living Alone: No  Support Systems: None  [facility]  Tub or Shower Type: Tub/Shower combination    EXAMINATION/PRESENTATION/DECISION MAKING:   Critical Behavior:              Hearing: Auditory  Auditory Impairment: None  Skin:  intact  Edema: none noted  Range Of Motion:  AROM: Within functional limits           PROM: Within functional limits           Strength:    Strength: Generally decreased, functional                    Tone & Sensation:   Tone: Normal              Sensation: Intact               Coordination:  Coordination: Within functional limits  Vision:      Functional Mobility:  Bed Mobility:     Supine to Sit: Supervision        Transfers:  Sit to Stand: Supervision  Stand to Sit: Supervision                       Balance:   Sitting: Intact  Standing: Without support; Intact  Ambulation/Gait Training:  Distance (ft): 50 Feet (ft)  Assistive Device: Gait belt  Ambulation - Level of Assistance: Supervision; Additional time        Gait Abnormalities: Decreased step clearance; Path deviations        Base of Support: Narrowed  Stance: Weight shift  Speed/Deepti: Slow Functional Measure:  Barthel Index:    Bathin  Bladder: 10  Bowels: 10  Groomin  Dressing: 10  Feeding: 10  Mobility: 10  Stairs: 0  Toilet Use: 5  Transfer (Bed to Chair and Back): 10  Total: 70/100       The Barthel ADL Index: Guidelines  1. The index should be used as a record of what a patient does, not as a record of what a patient could do. 2. The main aim is to establish degree of independence from any help, physical or verbal, however minor and for whatever reason. 3. The need for supervision renders the patient not independent. 4. A patient's performance should be established using the best available evidence. Asking the patient, friends/relatives and nurses are the usual sources, but direct observation and common sense are also important. However direct testing is not needed. 5. Usually the patient's performance over the preceding 24-48 hours is important, but occasionally longer periods will be relevant. 6. Middle categories imply that the patient supplies over 50 per cent of the effort. 7. Use of aids to be independent is allowed. Aubrie Oliveira., Barthel, D.W. (2567). Functional evaluation: the Barthel Index. [address] [facility] (14)2. Willma Bapmay katja IDA Sullivan, Shagufta Nara., Radha Hdz., [place], [address] [place] [address] (). Measuring the change indisability after inpatient rehabilitation; comparison of the responsiveness of the Barthel Index and Functional [place] Measure. Journal of Neurology, Neurosurgery, and Psychiatry, 66(4), 760-218. Shani Dow, N.J.A, IZA Hannah, & Layton Cooley M.A. (2004.) Assessment of post-stroke quality of life in cost-effectiveness studies: The usefulness of the Barthel Index and the EuroQoL-5D.  Quality of Life Research, 15, 361-61            Physical Therapy Evaluation Charge Determination   History Examination Presentation Decision-Making   MEDIUM  Complexity : 1-2 comorbidities / personal factors will impact the outcome/ POC  LOW Complexity : 1-2 Standardized tests and measures addressing body structure, function, activity limitation and / or participation in recreation  LOW Complexity : Stable, uncomplicated  Other outcome measures barthel index  MEDIUM      Based on the above components, the patient evaluation is determined to be of the following complexity level: LOW       Activity Tolerance:   Fair and requires rest breaks    After treatment patient left in no apparent distress:   Sitting in chair and Call bell within reach    COMMUNICATION/EDUCATION:   The patients plan of care was discussed with: Registered nurse. Fall prevention education was provided and the patient/caregiver indicated understanding., Patient/family have participated as able in goal setting and plan of care. and Patient/family agree to work toward stated goals and plan of care.     Thank you for this referral.  Mahesh Kee, PT, DPT   Time Calculation: 20 mins

## 2021-03-08 NOTE — PROGRESS NOTES
Orders received, chart reviewed and patient evaluated by physical therapy. Pending progression with skilled acute physical therapy, recommend:  Physical therapy at least 2 days/week in the home     Recommend with nursing OOB to chair 3x/day and walking daily with 1 assist. Thank you for completing as able in order to maintain patient strength, endurance and independence. Full evaluation to follow.

## 2021-03-08 NOTE — PROGRESS NOTES
Bedside and Verbal shift change report given to LILIA Dixon RN (oncoming nurse) by Gregg Michaud RN (offgoing nurse). Report included the following information SBAR, Kardex, Intake/Output, MAR, Accordion, Recent Results and Med Rec Status.

## 2021-03-08 NOTE — PROGRESS NOTES
Bedside and Verbal shift change report given to Arnie Garcia (oncoming nurse) by Susana Jack RN (offgoing nurse). Report included the following information SBAR, Kardex, Procedure Summary, Intake/Output, MAR and Recent Results.

## 2021-03-08 NOTE — PROGRESS NOTES
ID Progress Note  3/8/2021    Subjective:     Afebrile. No dyspnea, abdominal pain, sore throat. Has a slight headache. ROS: No anaphylaxis, seizures, syncope, hematemesis, hematochezia     Objective:     Vitals:   Visit Vitals  BP (!) 95/55 (BP 1 Location: Right upper arm, BP Patient Position: At rest)   Pulse 68   Temp 98 °F (36.7 °C)   Resp 16   Ht 5' 4\" (1.626 m)   Wt 74.2 kg (163 lb 9.6 oz)   LMP 2020 (Exact Date)   SpO2 98%   BMI 28.08 kg/m²        Tmax:  Temp (24hrs), Av.7 °F (36.5 °C), Min:97.5 °F (36.4 °C), Max:98 °F (36.7 °C)      Exam:    Not in distress  Pink conjunctivae, anicteric sclerae  No cervical lymphdenopathy   Lung clear, no rales, wheezes or rhonchi   Heart: s1, s2, RRR, no murmurs rubs or clicks  Abdomen: soft nontender, no guarding or rebound  Knees not warm or tender  Speech fluent     Labs:   Lab Results   Component Value Date/Time    WBC 4.7 2021 05:27 AM    Hemoglobin (POC) 10.4 (L) 2020 07:12 AM    HGB 8.4 (L) 2021 05:27 AM    HCT 26.2 (L) 2021 05:27 AM    PLATELET 948  05:27 AM    MCV 74.9 (L) 2021 05:27 AM    Hgb, External 33.7 2012    Hct, External 69 2012    Platelet cnt., External 307 2012     Lab Results   Component Value Date/Time    Sodium 140 2021 05:13 AM    Potassium 2.6 (LL) 2021 05:13 AM    Chloride 110 (H) 2021 05:13 AM    CO2 21 2021 05:13 AM    Anion gap 9 2021 05:13 AM    Glucose 66 2021 05:13 AM    BUN 5 (L) 2021 05:13 AM    Creatinine 0.51 (L) 2021 05:13 AM    BUN/Creatinine ratio 10 (L) 2021 05:13 AM    GFR est AA >60 2021 05:13 AM    GFR est non-AA >60 2021 05:13 AM    Calcium 7.8 (L) 2021 05:13 AM    Bilirubin, total 0.5 2021 05:48 AM    Alk.  phosphatase 124 (H) 2021 05:48 AM    Protein, total 7.1 2021 05:48 AM    Albumin 2.4 (L) 2021 05:48 AM    Globulin 4.7 (H) 2021 05:48 AM    A-G Ratio 0.5 (L) 03/03/2021 05:48 AM    ALT (SGPT) 23 03/03/2021 05:48 AM       S/p right IJ (3/5)  Assessment:     #1 fever - probably from UTI and candidemia     Blood cx (3/2) 1/4 yeast      Urine cx (3/2) klebsiella pneumoniae, second citrobacter -    renal US (3/5) Mild left hydronephrosis is likely within the range of normal in pregnancy.       #2 intrauterine pregnancy     #3 asthma     #4 depression    #5 hypokalemia secondary to ampho b     Recommendations:   Continue ceftriaxone and amphotericin  -> fluconazole and echinocandins have shown harm in fetuses in animal studies. Amphotericin is treatment of choice for candidemia in pregnancy. ff up repeat blood cx. If this is negative, she will need amphotericin until the 19th.   - ceftriaxone until the 12th         Pt has been informed side effects of medication along with benefits and risks of using such medication. Check daily BMP and Mg level    She has no diarrhea. Her hypokalemia is due to increased distal tubule permeability. She will need significant supplementation.  Mg level still normal.                Ghada Todd MD

## 2021-03-09 LAB
ANION GAP SERPL CALC-SCNC: 8 MMOL/L (ref 5–15)
BACTERIA SPEC CULT: ABNORMAL
BUN SERPL-MCNC: 4 MG/DL (ref 6–20)
BUN/CREAT SERPL: 8 (ref 12–20)
CALCIUM SERPL-MCNC: 7.7 MG/DL (ref 8.5–10.1)
CHLORIDE SERPL-SCNC: 112 MMOL/L (ref 97–108)
CO2 SERPL-SCNC: 21 MMOL/L (ref 21–32)
CREAT SERPL-MCNC: 0.51 MG/DL (ref 0.55–1.02)
GLUCOSE BLD STRIP.AUTO-MCNC: 171 MG/DL (ref 65–100)
GLUCOSE BLD STRIP.AUTO-MCNC: 71 MG/DL (ref 65–100)
GLUCOSE SERPL-MCNC: 61 MG/DL (ref 65–100)
MAGNESIUM SERPL-MCNC: 1.7 MG/DL (ref 1.6–2.4)
PHOSPHATE SERPL-MCNC: 3.6 MG/DL (ref 2.6–4.7)
POTASSIUM SERPL-SCNC: 2.2 MMOL/L (ref 3.5–5.1)
SERVICE CMNT-IMP: ABNORMAL
SERVICE CMNT-IMP: ABNORMAL
SERVICE CMNT-IMP: NORMAL
SODIUM SERPL-SCNC: 141 MMOL/L (ref 136–145)

## 2021-03-09 PROCEDURE — 74011250637 HC RX REV CODE- 250/637: Performed by: INTERNAL MEDICINE

## 2021-03-09 PROCEDURE — 84100 ASSAY OF PHOSPHORUS: CPT

## 2021-03-09 PROCEDURE — 36415 COLL VENOUS BLD VENIPUNCTURE: CPT

## 2021-03-09 PROCEDURE — 83735 ASSAY OF MAGNESIUM: CPT

## 2021-03-09 PROCEDURE — 74011000258 HC RX REV CODE- 258: Performed by: INTERNAL MEDICINE

## 2021-03-09 PROCEDURE — 82962 GLUCOSE BLOOD TEST: CPT

## 2021-03-09 PROCEDURE — 74011250636 HC RX REV CODE- 250/636: Performed by: INTERNAL MEDICINE

## 2021-03-09 PROCEDURE — 74011000250 HC RX REV CODE- 250: Performed by: OBSTETRICS & GYNECOLOGY

## 2021-03-09 PROCEDURE — 74011250636 HC RX REV CODE- 250/636: Performed by: OBSTETRICS & GYNECOLOGY

## 2021-03-09 PROCEDURE — 74011000258 HC RX REV CODE- 258: Performed by: OBSTETRICS & GYNECOLOGY

## 2021-03-09 PROCEDURE — 74011250637 HC RX REV CODE- 250/637: Performed by: NURSE PRACTITIONER

## 2021-03-09 PROCEDURE — 65410000002 HC RM PRIVATE OB

## 2021-03-09 PROCEDURE — 80048 BASIC METABOLIC PNL TOTAL CA: CPT

## 2021-03-09 RX ORDER — DEXTROSE MONOHYDRATE 50 MG/ML
20 INJECTION, SOLUTION INTRAVENOUS EVERY 24 HOURS
Status: DISCONTINUED | OUTPATIENT
Start: 2021-03-09 | End: 2021-03-20

## 2021-03-09 RX ORDER — POTASSIUM CHLORIDE 750 MG/1
20 TABLET, FILM COATED, EXTENDED RELEASE ORAL 3 TIMES DAILY
Status: DISCONTINUED | OUTPATIENT
Start: 2021-03-09 | End: 2021-03-11

## 2021-03-09 RX ORDER — POTASSIUM CHLORIDE 29.8 MG/ML
20 INJECTION INTRAVENOUS
Status: COMPLETED | OUTPATIENT
Start: 2021-03-09 | End: 2021-03-09

## 2021-03-09 RX ORDER — DOCUSATE SODIUM 100 MG/1
100 CAPSULE, LIQUID FILLED ORAL DAILY
Status: DISCONTINUED | OUTPATIENT
Start: 2021-03-10 | End: 2021-04-02 | Stop reason: HOSPADM

## 2021-03-09 RX ORDER — POTASSIUM CHLORIDE 29.8 MG/ML
20 INJECTION INTRAVENOUS ONCE
Status: COMPLETED | OUTPATIENT
Start: 2021-03-09 | End: 2021-03-09

## 2021-03-09 RX ADMIN — DEXTROSE MONOHYDRATE 217 MG: 5 INJECTION, SOLUTION INTRAVENOUS at 15:45

## 2021-03-09 RX ADMIN — POTASSIUM CHLORIDE AND SODIUM CHLORIDE: 900; 150 INJECTION, SOLUTION INTRAVENOUS at 18:01

## 2021-03-09 RX ADMIN — FLUOXETINE 60 MG: 20 CAPSULE ORAL at 18:01

## 2021-03-09 RX ADMIN — ACETAMINOPHEN 500 MG: 500 TABLET ORAL at 15:00

## 2021-03-09 RX ADMIN — SUCRALFATE 1 G: 1 TABLET ORAL at 06:41

## 2021-03-09 RX ADMIN — CEFTRIAXONE SODIUM 1 G: 1 INJECTION, POWDER, FOR SOLUTION INTRAMUSCULAR; INTRAVENOUS at 05:08

## 2021-03-09 RX ADMIN — POTASSIUM CHLORIDE 20 MEQ: 400 INJECTION, SOLUTION INTRAVENOUS at 16:48

## 2021-03-09 RX ADMIN — SUCRALFATE 1 G: 1 TABLET ORAL at 22:23

## 2021-03-09 RX ADMIN — SUCRALFATE 1 G: 1 TABLET ORAL at 15:56

## 2021-03-09 RX ADMIN — POLYETHYLENE GLYCOL 3350 17 G: 17 POWDER, FOR SOLUTION ORAL at 08:33

## 2021-03-09 RX ADMIN — I.V. FAT EMULSION 250 ML: 20 EMULSION INTRAVENOUS at 21:04

## 2021-03-09 RX ADMIN — POTASSIUM CHLORIDE 20 MEQ: 29.8 INJECTION, SOLUTION INTRAVENOUS at 14:56

## 2021-03-09 RX ADMIN — ONDANSETRON 4 MG: 2 INJECTION INTRAMUSCULAR; INTRAVENOUS at 10:42

## 2021-03-09 RX ADMIN — Medication 10 ML: at 05:08

## 2021-03-09 RX ADMIN — POTASSIUM CHLORIDE 20 MEQ: 400 INJECTION, SOLUTION INTRAVENOUS at 18:00

## 2021-03-09 RX ADMIN — DEXTROSE MONOHYDRATE 20 ML: 5 INJECTION, SOLUTION INTRAVENOUS at 15:41

## 2021-03-09 RX ADMIN — Medication 1 TABLET: at 08:33

## 2021-03-09 RX ADMIN — PANTOPRAZOLE SODIUM 40 MG: 40 TABLET, DELAYED RELEASE ORAL at 06:41

## 2021-03-09 RX ADMIN — SUCRALFATE 1 G: 1 TABLET ORAL at 10:43

## 2021-03-09 RX ADMIN — Medication 10 ML: at 15:01

## 2021-03-09 RX ADMIN — SODIUM CHLORIDE 1000 ML/HR: 9 INJECTION, SOLUTION INTRAVENOUS at 14:33

## 2021-03-09 RX ADMIN — ONDANSETRON 4 MG: 2 INJECTION INTRAMUSCULAR; INTRAVENOUS at 05:14

## 2021-03-09 RX ADMIN — OLANZAPINE 10 MG: 5 TABLET, FILM COATED ORAL at 22:23

## 2021-03-09 RX ADMIN — POTASSIUM CHLORIDE: 2 INJECTION, SOLUTION, CONCENTRATE INTRAVENOUS at 21:04

## 2021-03-09 RX ADMIN — DEXTROSE MONOHYDRATE 20 ML: 5 INJECTION, SOLUTION INTRAVENOUS at 17:58

## 2021-03-09 RX ADMIN — PRAZOSIN HYDROCHLORIDE 2 MG: 1 CAPSULE ORAL at 22:23

## 2021-03-09 RX ADMIN — POTASSIUM CHLORIDE 20 MEQ: 400 INJECTION, SOLUTION INTRAVENOUS at 17:59

## 2021-03-09 NOTE — PROGRESS NOTES
ID Progress Note  3/9/2021    Subjective:     Afebrile. No dyspnea, abdominal pain, sore throat. Has a slight headache. ROS: No anaphylaxis, seizures, syncope, hematemesis, hematochezia     Objective:     Vitals:   Visit Vitals  BP (!) 107/55 (BP 1 Location: Right upper arm, BP Patient Position: At rest;Lying left side)   Pulse 64   Temp 97.6 °F (36.4 °C)   Resp 16   Ht 5' 4\" (1.626 m)   Wt 74.9 kg (165 lb 3.2 oz)   LMP 2020 (Exact Date)   SpO2 100%   BMI 28.36 kg/m²        Tmax:  Temp (24hrs), Av.1 °F (36.7 °C), Min:97.6 °F (36.4 °C), Max:98.4 °F (36.9 °C)      Exam:    Not in distress  Pink conjunctivae, anicteric sclerae  No cervical lymphdenopathy   Lung clear, no rales, wheezes or rhonchi   Heart: s1, s2, RRR, no murmurs rubs or clicks  Abdomen: soft nontender, no guarding or rebound  Knees not warm or tender  Speech fluent     Labs:   Lab Results   Component Value Date/Time    WBC 4.7 2021 05:27 AM    Hemoglobin (POC) 10.4 (L) 2020 07:12 AM    HGB 8.4 (L) 2021 05:27 AM    HCT 26.2 (L) 2021 05:27 AM    PLATELET 694  05:27 AM    MCV 74.9 (L) 2021 05:27 AM    Hgb, External 33.7 2012    Hct, External 69 2012    Platelet cnt., External 307 2012     Lab Results   Component Value Date/Time    Sodium 141 2021 05:21 AM    Potassium 2.2 (LL) 2021 05:21 AM    Chloride 112 (H) 2021 05:21 AM    CO2 21 2021 05:21 AM    Anion gap 8 2021 05:21 AM    Glucose 61 (L) 2021 05:21 AM    BUN 4 (L) 2021 05:21 AM    Creatinine 0.51 (L) 2021 05:21 AM    BUN/Creatinine ratio 8 (L) 2021 05:21 AM    GFR est AA >60 2021 05:21 AM    GFR est non-AA >60 2021 05:21 AM    Calcium 7.7 (L) 2021 05:21 AM    Bilirubin, total 0.5 2021 05:48 AM    Alk.  phosphatase 124 (H) 2021 05:48 AM    Protein, total 7.1 2021 05:48 AM    Albumin 2.4 (L) 2021 05:48 AM    Globulin 4.7 (H) 2021 05:48 AM    A-G Ratio 0.5 (L) 03/03/2021 05:48 AM    ALT (SGPT) 23 03/03/2021 05:48 AM       S/p right IJ (3/5)  Assessment:     #1 fever - probably from UTI and candidemia     Blood cx (3/2) 1/4 yeast      Urine cx (3/2) klebsiella pneumoniae, second citrobacter -    renal US (3/5) Mild left hydronephrosis is likely within the range of normal in pregnancy.       #2 intrauterine pregnancy     #3 asthma     #4 depression    #5 hypokalemia secondary to ampho b     Recommendations:   Continue ceftriaxone and amphotericin  -> fluconazole and echinocandins have shown harm in fetuses in animal studies. Amphotericin is treatment of choice for candidemia in pregnancy. ff up repeat blood cx. If this is negative, she will need amphotericin until the 19th.   - ceftriaxone until the 12th         Pt has been informed side effects of medication along with benefits and risks of using such medication. Check daily BMP and Mg level    She has no diarrhea. Her hypokalemia is due to increased distal tubule permeability. She will need significant supplementation. Mg level still normal.  I will  Give a standing order of oral potassium.                 Jacques Goodman MD

## 2021-03-09 NOTE — PROGRESS NOTES
Bedside and Verbal shift change report given to [address] (oncoming nurse) by Wild Dukes RN (offgoing nurse). Report included the following information SBAR, ED Summary, Procedure Summary, Intake/Output and Recent Results. 1845: called in psyc consult.

## 2021-03-09 NOTE — PROGRESS NOTES
Physician Progress Note      PATIENT:               Alex Silva  CSN #:                  [id number]  :                       1985  ADMIT DATE:       3/2/2021 10:14 PM  [address] [place] DATE:  RESPONDING  PROVIDER #:        Matias James DO          QUERY TEXT:    Dear Attending,    Patient admitted with acute pyelonephritis, noted to have RD consult 3/3. If possible, please document in progress notes and discharge summary if you are evaluating and /or treating any of the following: The medical record reflects the following:  Risk Factors: pregnancy, eating disorder, ongoing hyperemesis  Clinical Indicators: 3/3 RD- Malnutrition Status:  Severe malnutrition  Context:  Chronic illness  Findings of the 6 clinical characteristics of malnutrition:  Energy Intake:  Mild decrease in energy intake  Weight Loss:  7.00 - Greater than 10% over 6 months  Body Fat Loss:  7 - Severe body fat loss  Muscle Mass Loss:  7 - Severe muscle mass loss  Treatment: RD consult, TPN, monitoring      Thank you,    Huong Cheatham RN  [facility] [address]  [phone]  Options provided:  -- Protein calorie malnutrition severe  -- Protein calorie malnutrition- unspecified  -- Other - I will add my own diagnosis  -- Disagree - Not applicable / Not valid  -- Disagree - Clinically unable to determine / Unknown  -- Refer to Clinical Documentation Reviewer    PROVIDER RESPONSE TEXT:    This patient has severe protein calorie malnutrition.     Query created by: Indira Castro on 3/5/2021 2:12 PM      Electronically signed by:  Matias James DO 3/9/2021 3:50 PM

## 2021-03-09 NOTE — PROGRESS NOTES
Sitter came to door requesting assistance, states \"her IV is leaking and there is blood\"    Upon entering room, small amount of blood was noted on bed sheets and  IV line was found disconnected running into the bed. Unable to tell where blood had come from. Ports were cleaned, flushed,  and reattached. Patient reminded that lines should not be messed with and risk for infection. Primary RN notified and brought to bedside. Per markell (Ivon PCT) \"patient was messing with top part of dressing to central line, patient was asked if it was bothering. Patient then turned over. Sitter got up and came closer to bed and noticed the blood on the sheets. \"

## 2021-03-09 NOTE — PROGRESS NOTES
Bedside shift change report given to Rose Vann RN (oncoming nurse) by AKIL Rehman (offgoing nurse). Report included the following information SBAR, Kardex, Intake/Output and MAR.     0407: Went into patients room to stop IV pole from beeping. Noticed that TPN bag had run dry 4 hours earlier than it should have. Rate was checked and verified at 75 mL/hr at [facility] and changed and verified at 185 mL/hr starting at 2100. Notified pharmacy, they recommended checking the pts blood sugar 1 hour after the TPN ended.     0510: Patient's blood sugar:  71

## 2021-03-09 NOTE — PROGRESS NOTES
Reason for Admission:   Unspecified fever               RUR Score:  36 %       PCP: First and Last name: Dr. Memo Viramontes    Name of Practice:   Are you a current patient: Yes/No: Yes   Approximate date of last visit:    Can you do a virtual visit with your PCP:  No             Resources/supports as identified by patient/family:       Top Challenges facing patient (as identified by patient/family and CM): Finances/Medication cost?      No              Transportation? No              Support system or lack thereof? Yes. Patient only trusts herself. Has one emergency contact and can call in ONLY an emergency if she can't speak for herself. Living arrangements? No             Self-care/ADLs/Cognition? Performs ADLs    DME: IV infusion DME    ADLs: Patient is independent. Previous HH/SNF/rehab: [facility] for IV infusion    ER Contacts: Tracey Nolan [phone] **ONLY IN AN EMERGENCY**     Current Advanced Directive/Advance Care Plan:                            Plan for utilizing home health:    Yes. [facility]                 Transition of Care Plan:        Home    Care Management Interventions  PCP Verified by CM:  Yes  Mode of Transport at Discharge: Self  Transition of Care Consult (CM Consult): [address]: No  Reason Outside [place]: Patient already serviced by other home care/hospice agency  Health Maintenance Reviewed: Yes  Current Support Network: Lives Alone  Confirm Follow Up Transport: Self  Discharge Location  Discharge Placement: Home

## 2021-03-10 LAB
ANION GAP SERPL CALC-SCNC: 8 MMOL/L (ref 5–15)
BUN SERPL-MCNC: 8 MG/DL (ref 6–20)
BUN/CREAT SERPL: 18 (ref 12–20)
CALCIUM SERPL-MCNC: 7.5 MG/DL (ref 8.5–10.1)
CHLORIDE SERPL-SCNC: 112 MMOL/L (ref 97–108)
CO2 SERPL-SCNC: 21 MMOL/L (ref 21–32)
CREAT SERPL-MCNC: 0.45 MG/DL (ref 0.55–1.02)
ERYTHROCYTE [DISTWIDTH] IN BLOOD BY AUTOMATED COUNT: 14.5 % (ref 11.5–14.5)
GLUCOSE BLD STRIP.AUTO-MCNC: 90 MG/DL (ref 65–100)
GLUCOSE SERPL-MCNC: 198 MG/DL (ref 65–100)
HCT VFR BLD AUTO: 24.1 % (ref 35–47)
HGB BLD-MCNC: 7.8 G/DL (ref 11.5–16)
MAGNESIUM SERPL-MCNC: 1.8 MG/DL (ref 1.6–2.4)
MCH RBC QN AUTO: 23.9 PG (ref 26–34)
MCHC RBC AUTO-ENTMCNC: 32.4 G/DL (ref 30–36.5)
MCV RBC AUTO: 73.9 FL (ref 80–99)
NRBC # BLD: 0 K/UL (ref 0–0.01)
NRBC BLD-RTO: 0 PER 100 WBC
PHOSPHATE SERPL-MCNC: 3.4 MG/DL (ref 2.6–4.7)
PLATELET # BLD AUTO: 234 K/UL (ref 150–400)
PMV BLD AUTO: 10.6 FL (ref 8.9–12.9)
POTASSIUM SERPL-SCNC: 3.8 MMOL/L (ref 3.5–5.1)
RBC # BLD AUTO: 3.26 M/UL (ref 3.8–5.2)
SERVICE CMNT-IMP: NORMAL
SODIUM SERPL-SCNC: 141 MMOL/L (ref 136–145)
WBC # BLD AUTO: 5.1 K/UL (ref 3.6–11)

## 2021-03-10 PROCEDURE — 74011250637 HC RX REV CODE- 250/637: Performed by: INTERNAL MEDICINE

## 2021-03-10 PROCEDURE — 74011000258 HC RX REV CODE- 258: Performed by: INTERNAL MEDICINE

## 2021-03-10 PROCEDURE — 74011250637 HC RX REV CODE- 250/637: Performed by: NURSE PRACTITIONER

## 2021-03-10 PROCEDURE — 74011250636 HC RX REV CODE- 250/636: Performed by: OBSTETRICS & GYNECOLOGY

## 2021-03-10 PROCEDURE — 80048 BASIC METABOLIC PNL TOTAL CA: CPT

## 2021-03-10 PROCEDURE — 84100 ASSAY OF PHOSPHORUS: CPT

## 2021-03-10 PROCEDURE — 97116 GAIT TRAINING THERAPY: CPT

## 2021-03-10 PROCEDURE — 82962 GLUCOSE BLOOD TEST: CPT

## 2021-03-10 PROCEDURE — 74011000258 HC RX REV CODE- 258: Performed by: OBSTETRICS & GYNECOLOGY

## 2021-03-10 PROCEDURE — 83735 ASSAY OF MAGNESIUM: CPT

## 2021-03-10 PROCEDURE — 74011000250 HC RX REV CODE- 250: Performed by: OBSTETRICS & GYNECOLOGY

## 2021-03-10 PROCEDURE — 74011250636 HC RX REV CODE- 250/636: Performed by: INTERNAL MEDICINE

## 2021-03-10 PROCEDURE — 65410000002 HC RM PRIVATE OB

## 2021-03-10 PROCEDURE — 36415 COLL VENOUS BLD VENIPUNCTURE: CPT

## 2021-03-10 PROCEDURE — 85027 COMPLETE CBC AUTOMATED: CPT

## 2021-03-10 RX ORDER — FLUOXETINE HYDROCHLORIDE 20 MG/5ML
60 LIQUID ORAL DAILY
Status: DISCONTINUED | OUTPATIENT
Start: 2021-03-10 | End: 2021-03-17

## 2021-03-10 RX ORDER — OLANZAPINE 5 MG/1
10 TABLET, ORALLY DISINTEGRATING ORAL
Status: DISCONTINUED | OUTPATIENT
Start: 2021-03-10 | End: 2021-04-02 | Stop reason: HOSPADM

## 2021-03-10 RX ADMIN — DEXTROSE MONOHYDRATE 20 ML: 5 INJECTION, SOLUTION INTRAVENOUS at 17:20

## 2021-03-10 RX ADMIN — POTASSIUM CHLORIDE: 2 INJECTION, SOLUTION, CONCENTRATE INTRAVENOUS at 19:16

## 2021-03-10 RX ADMIN — SUCRALFATE 1 G: 1 TABLET ORAL at 11:20

## 2021-03-10 RX ADMIN — SUCRALFATE 1 G: 1 TABLET ORAL at 22:24

## 2021-03-10 RX ADMIN — PANTOPRAZOLE SODIUM 40 MG: 40 TABLET, DELAYED RELEASE ORAL at 06:32

## 2021-03-10 RX ADMIN — DEXTROSE MONOHYDRATE 20 ML: 5 INJECTION, SOLUTION INTRAVENOUS at 15:10

## 2021-03-10 RX ADMIN — DEXTROSE MONOHYDRATE 217 MG: 5 INJECTION, SOLUTION INTRAVENOUS at 15:11

## 2021-03-10 RX ADMIN — Medication 10 ML: at 05:15

## 2021-03-10 RX ADMIN — CEFTRIAXONE SODIUM 1 G: 1 INJECTION, POWDER, FOR SOLUTION INTRAMUSCULAR; INTRAVENOUS at 04:50

## 2021-03-10 RX ADMIN — FLUOXETINE HYDROCHLORIDE 60 MG: 20 SOLUTION ORAL at 18:29

## 2021-03-10 RX ADMIN — SUCRALFATE 1 G: 1 TABLET ORAL at 06:32

## 2021-03-10 RX ADMIN — OLANZAPINE 10 MG: 5 TABLET, ORALLY DISINTEGRATING ORAL at 22:27

## 2021-03-10 RX ADMIN — SODIUM CHLORIDE 1000 ML/HR: 9 INJECTION, SOLUTION INTRAVENOUS at 14:03

## 2021-03-10 RX ADMIN — ACETAMINOPHEN 500 MG: 500 TABLET ORAL at 14:42

## 2021-03-10 RX ADMIN — POTASSIUM CHLORIDE AND SODIUM CHLORIDE: 900; 150 INJECTION, SOLUTION INTRAVENOUS at 11:20

## 2021-03-10 RX ADMIN — I.V. FAT EMULSION 250 ML: 20 EMULSION INTRAVENOUS at 19:17

## 2021-03-10 RX ADMIN — SUCRALFATE 1 G: 1 TABLET ORAL at 18:28

## 2021-03-10 RX ADMIN — Medication 10 ML: at 14:05

## 2021-03-10 RX ADMIN — Medication 10 ML: at 22:27

## 2021-03-10 NOTE — PROGRESS NOTES
0745: Bedside and Verbal shift change report given to [address] (oncoming nurse) by Rolanda Villeda (offgoing nurse). Report included the following information SBAR, Kardex, Intake/Output, MAR and Recent Results. 0918Beauty Copier at bedside. Pt refuse morning medications. States she is too nauseous at this time but does not want nausea medicine. Pt educated and reminded to not disconnect IJ line or adjust settings on pump. Pt states she knows this and will not.

## 2021-03-10 NOTE — PROGRESS NOTES
Problem: Mobility Impaired (Adult and Pediatric)  Goal: *Acute Goals and Plan of Care (Insert Text)  Description: FUNCTIONAL STATUS PRIOR TO ADMISSION: Patient was independent and active without use of DME. Pt reports modified independence with basic and instrumental ADLs. Pt endorses fall in January 2/2 syncope episodes. HOME SUPPORT PRIOR TO ADMISSION: The patient lived alone with no assistance nearby. Physical Therapy Goals  Initiated 3/8/2021  1. Patient will move from supine to sit and sit to supine , scoot up and down, and roll side to side in bed with modified independence within 7 day(s). 2.  Patient will transfer from bed to chair and chair to bed with modified independence using the least restrictive device within 7 day(s). 3.  Patient will perform sit to stand with modified independence within 7 day(s). 4.  Patient will ambulate with modified independence for 300 feet with the least restrictive device within 7 day(s). 5.  Patient will ascend/descend 10 stairs with one handrail(s) with modified independence within 7 day(s). Outcome: Progressing Towards Goal  PHYSICAL THERAPY TREATMENT  Patient: Ester Story (26 y.o. female)  Date: 3/10/2021  Diagnosis: Acute pyelonephritis [N10] Acute pyelonephritis  Procedure(s) (LRB):  INFUSION CATHETER INSERTION (N/A) 5 Days Post-Op  Precautions:    Chart, physical therapy assessment, plan of care and goals were reviewed. ASSESSMENT  Patient continues with skilled PT services and is progressing towards goals. Presents w/ very quiet and flat affect. Reports nausea and HA, however had been experiencing throughout the day (no c/o dizziness). She was agreeable to PT. Demonstrated functional transfers w/ Mod I to Supervision and verbal cues for safety when placing hand on pt rolling table. Standing balance intact w/o AD. Completed gait w/ CGA and no AD. Observed mild path deviations w/ mild missteps and trunk sway.  Also noted decreased step clearance on Left more than Right. No LOB overall. Amb x86 Ft approx. She returned to sitting EOB w/ sitter present. Encouraged pt to be OOB>chair at least 3x/day at mealtime to increase strength, endurance and activity tolerance. Pt verbalized understanding. Pt to continue to follow for progression of mobility and general strengthening exercises. RN aware of above. Current Level of Function Impacting Discharge (mobility/balance): Mod I to CGA x1 for functional transfers and gait w/ gait belt. Other factors to consider for discharge:   Mobility below baseline, but progressing. Lives alone. History of falls 2/2 syncope. Pt is also 23 wks pregnant. PLAN :  Patient continues to benefit from skilled intervention to address the above impairments. Continue treatment per established plan of care. to address goals. Recommendation for discharge: (in order for the patient to meet his/her long term goals)  Physical therapy at least 2 days/week in the home AND ensure assist and/or supervision for safety with mobility/transfers. This discharge recommendation:  Has been made in collaboration with the attending provider and/or case management    IF patient discharges home will need the following DME: none       SUBJECTIVE:   Patient stated I have headache and nauseous.     OBJECTIVE DATA SUMMARY:   Critical Behavior:              Functional Mobility Training:  Bed Mobility:     Supine to Sit: Modified independent  Sit to Supine: (remained sitting EOB)           Transfers:  Sit to Stand: Supervision  Stand to Sit: Modified independent                             Balance:  Sitting: Intact  Standing: Intact; Without support  Standing - Static: Good  Standing - Dynamic : Good  Ambulation/Gait Training:  Distance (ft): 86 Feet (ft)  Assistive Device: Gait belt  Ambulation - Level of Assistance: Contact guard assistance;Assist x1(mild missteps during gait, however no LOB)        Gait Abnormalities: Decreased step clearance; Path deviations;Trunk sway increased; Other        Base of Support: Narrowed     Speed/Deepti: Slow  Step Length: Left shortened;Right shortened  Swing Pattern: Left asymmetrical    Pain Rating:  Pt c/o headache and nausea but had experienced throughout day. Activity Tolerance:   Good    After treatment patient left in no apparent distress:   Call bell within reach and sitting EOB w/ sitter present    COMMUNICATION/COLLABORATION:   The patients plan of care was discussed with: Registered nurse.      Sandy ODONNELL Means,PTA   Time Calculation: 9 mins

## 2021-03-10 NOTE — PROGRESS NOTES
ID Progress Note  3/10/2021    Subjective:     Afebrile. No dyspnea,      Objective:     Vitals:   Visit Vitals  /63 (BP 1 Location: Right upper arm, BP Patient Position: At rest)   Pulse 74   Temp 97.7 °F (36.5 °C)   Resp 16   Ht 5' 4\" (1.626 m)   Wt 76.5 kg (168 lb 9.6 oz)   LMP 2020 (Exact Date)   SpO2 98%   BMI 28.94 kg/m²        Tmax:  Temp (24hrs), Av.1 °F (36.7 °C), Min:97.6 °F (36.4 °C), Max:98.7 °F (37.1 °C)      Exam:    Not in distress  Lung clear, no rales, wheezes or rhonchi   Heart: s1, s2, RRR, no murmurs rubs or clicks  Abdomen:  nontender    Labs:   Lab Results   Component Value Date/Time    WBC 5.1 03/10/2021 06:35 AM    Hemoglobin (POC) 10.4 (L) 2020 07:12 AM    HGB 7.8 (L) 03/10/2021 06:35 AM    HCT 24.1 (L) 03/10/2021 06:35 AM    PLATELET 314 /15/1823 06:35 AM    MCV 73.9 (L) 03/10/2021 06:35 AM    Hgb, External 33.7 2012    Hct, External 69 2012    Platelet cnt., External 307 2012     Lab Results   Component Value Date/Time    Sodium 141 03/10/2021 06:35 AM    Potassium 3.8 03/10/2021 06:35 AM    Chloride 112 (H) 03/10/2021 06:35 AM    CO2 21 03/10/2021 06:35 AM    Anion gap 8 03/10/2021 06:35 AM    Glucose 198 (H) 03/10/2021 06:35 AM    BUN 8 03/10/2021 06:35 AM    Creatinine 0.45 (L) 03/10/2021 06:35 AM    BUN/Creatinine ratio 18 03/10/2021 06:35 AM    GFR est AA >60 03/10/2021 06:35 AM    GFR est non-AA >60 03/10/2021 06:35 AM    Calcium 7.5 (L) 03/10/2021 06:35 AM    Bilirubin, total 0.5 2021 05:48 AM    Alk.  phosphatase 124 (H) 2021 05:48 AM    Protein, total 7.1 2021 05:48 AM    Albumin 2.4 (L) 2021 05:48 AM    Globulin 4.7 (H) 2021 05:48 AM    A-G Ratio 0.5 (L) 2021 05:48 AM    ALT (SGPT) 23 2021 05:48 AM       S/p right IJ (3/5)  Assessment:     #1 fever - probably from UTI and candidemia     Blood cx (3/2) 1/4 yeast      Urine cx (3/2) klebsiella pneumoniae, second citrobacter -    renal US (3/5) Mild left hydronephrosis is likely within the range of normal in pregnancy.       #2 intrauterine pregnancy     #3 asthma     #4 depression    #5 hypokalemia secondary to ampho b     Recommendations:   Continue ceftriaxone and amphotericin  -> fluconazole and echinocandins have shown harm in fetuses in animal studies. Amphotericin is treatment of choice for candidemia in pregnancy. ff up repeat blood cx. If this is negative, she will need amphotericin until the 19th.   - ceftriaxone until the 12th         Pt has been informed side effects of medication along with benefits and risks of using such medication.  Check daily BMP and Mg level    K now normal. Continue supplementation             Jacques Goodman MD

## 2021-03-10 NOTE — PROGRESS NOTES
High Risk Obstetrics Progress Note    Name: Chilo Morales MRN: [id number]  SSN: [id number]    YOB: 1985  Age: 38 y.o. Sex: female      Subjective:      LOS: 7 days    Estimated Date of Delivery: 21   Gestational Age Today: 18w1d     Patient admitted for pyelonephritis. States she does have cramping, chest discomfort with deep breathing, nausea, vomiting. She denies any vaginal bleeding, sob, vaginal leaking. She reports fetal movement. After speaking with nursing staff yesterday, appears that pt is messing with her iv and tpn. A sitter was ordered to start yesterday to curb this behavior and I spoke with pt regarding the need for this sitter and that she cannot tamper with her iv or tpn for her safety. Pt nodded in agreement. Objective:     Vitals:  Blood pressure 100/62, pulse 80, temperature 98.7 °F (37.1 °C), resp. rate 16, height 5' 4\" (1.626 m), weight 74.9 kg (165 lb 3.2 oz), last menstrual period 2020, SpO2 98 %, not currently breastfeeding. Temp (24hrs), Av.1 °F (36.7 °C), Min:97.6 °F (36.4 °C), Max:98.7 °F (83.5 °C)    Systolic (35WLL), GSY:435 , Min:100 , LWI:262      Diastolic (28VYJ), VCR:73, Min:55, Max:62       Intake and Output:         Physical Exam:  Patient without distress.   Heart: Regular rate and rhythm  Lung: clear to auscultation throughout lung fields, no wheezes, no rales, no rhonchi and normal respiratory effort  Abdomen: soft, nontender, gravid, mild distension  Lower Extremities:  - Edema No       Membranes:  Intact        Fetal Heart Rate:  +FHTs        Labs:   Recent Results (from the past 36 hour(s))   GLUCOSE, POC    Collection Time: 21 10:08 PM   Result Value Ref Range    Glucose (POC) 103 (H) 65 - 100 mg/dL    Performed by Jam Zambrano    GLUCOSE, POC    Collection Time: 21  5:09 AM   Result Value Ref Range    Glucose (POC) 71 65 - 100 mg/dL    Performed by Raul 64, BASIC    Collection Time: 03/09/21  5:21 AM   Result Value Ref Range    Sodium 141 136 - 145 mmol/L    Potassium 2.2 (LL) 3.5 - 5.1 mmol/L    Chloride 112 (H) 97 - 108 mmol/L    CO2 21 21 - 32 mmol/L    Anion gap 8 5 - 15 mmol/L    Glucose 61 (L) 65 - 100 mg/dL    BUN 4 (L) 6 - 20 MG/DL    Creatinine 0.51 (L) 0.55 - 1.02 MG/DL    BUN/Creatinine ratio 8 (L) 12 - 20      GFR est AA >60 >60 ml/min/1.73m2    GFR est non-AA >60 >60 ml/min/1.73m2    Calcium 7.7 (L) 8.5 - 10.1 MG/DL   MAGNESIUM    Collection Time: 03/09/21  5:21 AM   Result Value Ref Range    Magnesium 1.7 1.6 - 2.4 mg/dL   PHOSPHORUS    Collection Time: 03/09/21  8:30 AM   Result Value Ref Range    Phosphorus 3.6 2.6 - 4.7 MG/DL   GLUCOSE, POC    Collection Time: 03/09/21 11:23 PM   Result Value Ref Range    Glucose (POC) 171 (H) 65 - 100 mg/dL    Performed by [facility]    METABOLIC PANEL, BASIC    Collection Time: 03/10/21  6:35 AM   Result Value Ref Range    Sodium 141 136 - 145 mmol/L    Potassium 3.8 3.5 - 5.1 mmol/L    Chloride 112 (H) 97 - 108 mmol/L    CO2 21 21 - 32 mmol/L    Anion gap 8 5 - 15 mmol/L    Glucose 198 (H) 65 - 100 mg/dL    BUN 8 6 - 20 MG/DL    Creatinine 0.45 (L) 0.55 - 1.02 MG/DL    BUN/Creatinine ratio 18 12 - 20      GFR est AA >60 >60 ml/min/1.73m2    GFR est non-AA >60 >60 ml/min/1.73m2    Calcium 7.5 (L) 8.5 - 10.1 MG/DL   MAGNESIUM    Collection Time: 03/10/21  6:35 AM   Result Value Ref Range    Magnesium 1.8 1.6 - 2.4 mg/dL   PHOSPHORUS    Collection Time: 03/10/21  6:35 AM   Result Value Ref Range    Phosphorus 3.4 2.6 - 4.7 MG/DL   CBC W/O DIFF    Collection Time: 03/10/21  6:35 AM   Result Value Ref Range    WBC 5.1 3.6 - 11.0 K/uL    RBC 3.26 (L) 3.80 - 5.20 M/uL    HGB 7.8 (L) 11.5 - 16.0 g/dL    HCT 24.1 (L) 35.0 - 47.0 %    MCV 73.9 (L) 80.0 - 99.0 FL    MCH 23.9 (L) 26.0 - 34.0 PG    MCHC 32.4 30.0 - 36.5 g/dL    RDW 14.5 11.5 - 14.5 %    PLATELET 950 870 - 524 K/uL    MPV 10.6 8.9 - 12.9 FL    NRBC 0.0 0  WBC    ABSOLUTE NRBC 0. 00 0.00 - 0.01 K/uL       Assessment and Plan:      Principal Problem:    Acute pyelonephritis (3/3/2021)       37 y/o [id number] at 21 3/7 weeks admitted for intermittent chills and fever-presumed pyelonephritis  -Afebrile on ceftriaxone  -Blood culture shows 1/4 yeast preliminary, started on amphotericin per ID; repeat blood cultures 3/6 negative to date  -urine culture shows klebsiella and citrobactr susceptible to ceftriaxone, appreciate ID help  -bilateral flank pain- renal ultrasound normal on 3/5-improving     Chronic eating disorder complicated by nausea/vomiting of pregnancy  -on TPN (cyclic-home regimen via [employer])  -HypoK resolved this morning.   -elevated glucose- will discuss with nutrition and iv team re: change to tpn if needed.   -has outpatient nutritionist, Prabhakar Gil  -iv zofran, phenergan suppositories, carafate, ivf's  -constipation- miralax and dulcolax supp prn and daily colace     Depression with h/o hospitalization for suicidal ideation  -followed by Jarred Reddy (psychiatrist) and Adolph Nagy (psychologist), however pt non-compliant with counseling  -Continue Prozac, Zyprexa, and prn Prazosin, hydroxyzine  -psych consult pending for pt non-compliance and trying to interrupt iv meds/tpn     Chronic anemia-known beta thal minor  -s/p iv iron with last admission  -Hb currently at baseline        Ambulate with assistance, Juan thakkar for DVT ppx (pt with intermittent dizziness and previous fall history)  Physical therapy consult today for deconditioning     Previous chest pain s/p cardiac eval, likely MSK in origin

## 2021-03-10 NOTE — PROGRESS NOTES
Bedside shift change report given to Geraldine Holden RN (oncoming nurse) by Fede Tello RN (offgoing nurse). Report included the following information SBAR, Kardex, Intake/Output and MAR.

## 2021-03-11 LAB
ANION GAP SERPL CALC-SCNC: 6 MMOL/L (ref 5–15)
BACTERIA SPEC CULT: NORMAL
BUN SERPL-MCNC: 13 MG/DL (ref 6–20)
BUN/CREAT SERPL: 29 (ref 12–20)
CALCIUM SERPL-MCNC: 7.7 MG/DL (ref 8.5–10.1)
CHLORIDE SERPL-SCNC: 109 MMOL/L (ref 97–108)
CO2 SERPL-SCNC: 23 MMOL/L (ref 21–32)
CREAT SERPL-MCNC: 0.45 MG/DL (ref 0.55–1.02)
GLUCOSE BLD STRIP.AUTO-MCNC: 104 MG/DL (ref 65–100)
GLUCOSE BLD STRIP.AUTO-MCNC: 76 MG/DL (ref 65–100)
GLUCOSE BLD STRIP.AUTO-MCNC: 95 MG/DL (ref 65–100)
GLUCOSE BLD STRIP.AUTO-MCNC: 99 MG/DL (ref 65–100)
GLUCOSE SERPL-MCNC: 141 MG/DL (ref 65–100)
MAGNESIUM SERPL-MCNC: 1.8 MG/DL (ref 1.6–2.4)
PHOSPHATE SERPL-MCNC: 3.8 MG/DL (ref 2.6–4.7)
POTASSIUM SERPL-SCNC: 2.9 MMOL/L (ref 3.5–5.1)
SERVICE CMNT-IMP: ABNORMAL
SERVICE CMNT-IMP: NORMAL
SODIUM SERPL-SCNC: 138 MMOL/L (ref 136–145)

## 2021-03-11 PROCEDURE — 74011000258 HC RX REV CODE- 258: Performed by: INTERNAL MEDICINE

## 2021-03-11 PROCEDURE — 83735 ASSAY OF MAGNESIUM: CPT

## 2021-03-11 PROCEDURE — 36415 COLL VENOUS BLD VENIPUNCTURE: CPT

## 2021-03-11 PROCEDURE — 74011250637 HC RX REV CODE- 250/637: Performed by: INTERNAL MEDICINE

## 2021-03-11 PROCEDURE — 80048 BASIC METABOLIC PNL TOTAL CA: CPT

## 2021-03-11 PROCEDURE — 74011250637 HC RX REV CODE- 250/637: Performed by: NURSE PRACTITIONER

## 2021-03-11 PROCEDURE — 94760 N-INVAS EAR/PLS OXIMETRY 1: CPT

## 2021-03-11 PROCEDURE — 74011250636 HC RX REV CODE- 250/636: Performed by: OBSTETRICS & GYNECOLOGY

## 2021-03-11 PROCEDURE — 82962 GLUCOSE BLOOD TEST: CPT

## 2021-03-11 PROCEDURE — 74011000250 HC RX REV CODE- 250: Performed by: OBSTETRICS & GYNECOLOGY

## 2021-03-11 PROCEDURE — 74011250637 HC RX REV CODE- 250/637: Performed by: OBSTETRICS & GYNECOLOGY

## 2021-03-11 PROCEDURE — 74011250636 HC RX REV CODE- 250/636: Performed by: INTERNAL MEDICINE

## 2021-03-11 PROCEDURE — 84100 ASSAY OF PHOSPHORUS: CPT

## 2021-03-11 PROCEDURE — 74011000258 HC RX REV CODE- 258: Performed by: OBSTETRICS & GYNECOLOGY

## 2021-03-11 PROCEDURE — 65410000002 HC RM PRIVATE OB

## 2021-03-11 RX ORDER — SODIUM CHLORIDE 0.9 % (FLUSH) 0.9 %
5-40 SYRINGE (ML) INJECTION EVERY 8 HOURS
Status: DISCONTINUED | OUTPATIENT
Start: 2021-03-11 | End: 2021-04-02 | Stop reason: HOSPADM

## 2021-03-11 RX ORDER — POTASSIUM CHLORIDE 29.8 MG/ML
20 INJECTION INTRAVENOUS ONCE
Status: COMPLETED | OUTPATIENT
Start: 2021-03-11 | End: 2021-03-11

## 2021-03-11 RX ORDER — POTASSIUM CHLORIDE 29.8 MG/ML
20 INJECTION INTRAVENOUS ONCE
Status: DISCONTINUED | OUTPATIENT
Start: 2021-03-11 | End: 2021-03-11

## 2021-03-11 RX ORDER — POTASSIUM CHLORIDE 750 MG/1
30 TABLET, FILM COATED, EXTENDED RELEASE ORAL 3 TIMES DAILY
Status: DISCONTINUED | OUTPATIENT
Start: 2021-03-11 | End: 2021-03-12 | Stop reason: ALTCHOICE

## 2021-03-11 RX ORDER — SODIUM CHLORIDE 0.9 % (FLUSH) 0.9 %
5-40 SYRINGE (ML) INJECTION AS NEEDED
Status: DISCONTINUED | OUTPATIENT
Start: 2021-03-11 | End: 2021-04-02 | Stop reason: HOSPADM

## 2021-03-11 RX ADMIN — ACETAMINOPHEN 500 MG: 500 TABLET ORAL at 14:36

## 2021-03-11 RX ADMIN — DEXTROSE MONOHYDRATE 20 ML: 5 INJECTION, SOLUTION INTRAVENOUS at 15:09

## 2021-03-11 RX ADMIN — SUCRALFATE 1 G: 1 TABLET ORAL at 07:07

## 2021-03-11 RX ADMIN — SODIUM CHLORIDE 1000 ML/HR: 9 INJECTION, SOLUTION INTRAVENOUS at 14:03

## 2021-03-11 RX ADMIN — Medication 10 ML: at 05:12

## 2021-03-11 RX ADMIN — CEFTRIAXONE SODIUM 1 G: 1 INJECTION, POWDER, FOR SOLUTION INTRAMUSCULAR; INTRAVENOUS at 05:10

## 2021-03-11 RX ADMIN — Medication 10 ML: at 14:03

## 2021-03-11 RX ADMIN — ONDANSETRON 4 MG: 2 INJECTION INTRAMUSCULAR; INTRAVENOUS at 08:08

## 2021-03-11 RX ADMIN — DEXTROSE MONOHYDRATE 217 MG: 5 INJECTION, SOLUTION INTRAVENOUS at 15:09

## 2021-03-11 RX ADMIN — DEXTROSE MONOHYDRATE 20 ML: 5 INJECTION, SOLUTION INTRAVENOUS at 17:26

## 2021-03-11 RX ADMIN — POTASSIUM CHLORIDE 20 MEQ: 400 INJECTION, SOLUTION INTRAVENOUS at 08:53

## 2021-03-11 RX ADMIN — POTASSIUM CHLORIDE 20 MEQ: 400 INJECTION, SOLUTION INTRAVENOUS at 11:10

## 2021-03-11 RX ADMIN — POTASSIUM CHLORIDE: 2 INJECTION, SOLUTION, CONCENTRATE INTRAVENOUS at 19:19

## 2021-03-11 RX ADMIN — I.V. FAT EMULSION 250 ML: 20 EMULSION INTRAVENOUS at 19:21

## 2021-03-11 RX ADMIN — PROMETHAZINE HYDROCHLORIDE 12.5 MG: 12.5 SUPPOSITORY RECTAL at 11:42

## 2021-03-11 RX ADMIN — Medication 10 ML: at 14:04

## 2021-03-11 NOTE — PROGRESS NOTES
High Risk Obstetrics Progress Note    Name: Chilo Morales MRN: [id number]  SSN: [id number]    YOB: 1985  Age: 38 y.o. Sex: female      Subjective:      LOS: 8 days    Estimated Date of Delivery: 21   Gestational Age Today: 18w3d     Patient admitted for pyelonephritis. States she does have normal fetal movement and cramping, nausea, vomiting and does not have chest pain, shortness of breath and vaginal bleeding . Objective:     Vitals:  Blood pressure 111/66, pulse 66, temperature 97.8 °F (36.6 °C), resp. rate 16, height 5' 4\" (1.626 m), weight 77.7 kg (171 lb 6.4 oz), last menstrual period 2020, SpO2 95 %, not currently breastfeeding. Temp (24hrs), Av °F (36.7 °C), Min:97.7 °F (36.5 °C), Max:98.5 °F (19.8 °C)    Systolic (45BUY), ILW:762 , Min:106 , JBV:151      Diastolic (00RZM), LBC:16, Min:63, Max:70       Intake and Output:         Physical Exam:  Patient without distress.   Heart: Regular rate and rhythm  Lung: clear to auscultation throughout lung fields, no wheezes, no rales, no rhonchi and normal respiratory effort  Abdomen: soft, nontender, gravid  Lower Extremities:  - Edema No       Membranes:  Intact        Fetal Heart Rate:  +FHTs        Labs:   Recent Results (from the past 36 hour(s))   GLUCOSE, POC    Collection Time: 21 11:23 PM   Result Value Ref Range    Glucose (POC) 171 (H) 65 - 100 mg/dL    Performed by Ingrid Danis    METABOLIC PANEL, BASIC    Collection Time: 03/10/21  6:35 AM   Result Value Ref Range    Sodium 141 136 - 145 mmol/L    Potassium 3.8 3.5 - 5.1 mmol/L    Chloride 112 (H) 97 - 108 mmol/L    CO2 21 21 - 32 mmol/L    Anion gap 8 5 - 15 mmol/L    Glucose 198 (H) 65 - 100 mg/dL    BUN 8 6 - 20 MG/DL    Creatinine 0.45 (L) 0.55 - 1.02 MG/DL    BUN/Creatinine ratio 18 12 - 20      GFR est AA >60 >60 ml/min/1.73m2    GFR est non-AA >60 >60 ml/min/1.73m2    Calcium 7.5 (L) 8.5 - 10.1 MG/DL   MAGNESIUM    Collection Time: 03/10/21  6:35 AM Result Value Ref Range    Magnesium 1.8 1.6 - 2.4 mg/dL   PHOSPHORUS    Collection Time: 03/10/21  6:35 AM   Result Value Ref Range    Phosphorus 3.4 2.6 - 4.7 MG/DL   CBC W/O DIFF    Collection Time: 03/10/21  6:35 AM   Result Value Ref Range    WBC 5.1 3.6 - 11.0 K/uL    RBC 3.26 (L) 3.80 - 5.20 M/uL    HGB 7.8 (L) 11.5 - 16.0 g/dL    HCT 24.1 (L) 35.0 - 47.0 %    MCV 73.9 (L) 80.0 - 99.0 FL    MCH 23.9 (L) 26.0 - 34.0 PG    MCHC 32.4 30.0 - 36.5 g/dL    RDW 14.5 11.5 - 14.5 %    PLATELET 646 848 - 208 K/uL    MPV 10.6 8.9 - 12.9 FL    NRBC 0.0 0  WBC    ABSOLUTE NRBC 0.00 0.00 - 0.01 K/uL   GLUCOSE, POC    Collection Time: 03/10/21 11:02 AM   Result Value Ref Range    Glucose (POC) 90 65 - 100 mg/dL    Performed by Yajaira Saunders    GLUCOSE, POC    Collection Time: 03/11/21  1:03 AM   Result Value Ref Range    Glucose (POC) 95 65 - 100 mg/dL    Performed by [facility]    METABOLIC PANEL, BASIC    Collection Time: 03/11/21  5:10 AM   Result Value Ref Range    Sodium 138 136 - 145 mmol/L    Potassium 2.9 (L) 3.5 - 5.1 mmol/L    Chloride 109 (H) 97 - 108 mmol/L    CO2 23 21 - 32 mmol/L    Anion gap 6 5 - 15 mmol/L    Glucose 141 (H) 65 - 100 mg/dL    BUN 13 6 - 20 MG/DL    Creatinine 0.45 (L) 0.55 - 1.02 MG/DL    BUN/Creatinine ratio 29 (H) 12 - 20      GFR est AA >60 >60 ml/min/1.73m2    GFR est non-AA >60 >60 ml/min/1.73m2    Calcium 7.7 (L) 8.5 - 10.1 MG/DL   MAGNESIUM    Collection Time: 03/11/21  5:10 AM   Result Value Ref Range    Magnesium 1.8 1.6 - 2.4 mg/dL   PHOSPHORUS    Collection Time: 03/11/21  5:10 AM   Result Value Ref Range    Phosphorus 3.8 2.6 - 4.7 MG/DL   GLUCOSE, POC    Collection Time: 03/11/21  5:52 AM   Result Value Ref Range    Glucose (POC) 104 (H) 65 - 100 mg/dL    Performed by Cirilo Prabhakar        Assessment and Plan:      Principal Problem:    Acute pyelonephritis (3/3/2021)       37 y/o [id number] at 23 4/7 weeks admitted for intermittent chills and fever-presumed pyelonephritis  -Afebrile on ceftriaxone until 3/12/21  -Blood culture shows 1/4 yeast preliminary, started on amphotericin per ID until 3/19/21; repeat blood cultures 3/6 negative to date  -urine culture shows klebsiella and citrobactr susceptible to ceftriaxone, appreciate ID help  -bilateral flank pain- renal ultrasound normal on 3/5-improving     Chronic eating disorder complicated by nausea/vomiting of pregnancy  -on TPN-switched to continuous yesterday  -HypoK -replete this am with runs of kcl and possible adjustment to tpn.   -change BG check q6h  -has outpatient nutritionist, Maricruz De Paz  -iv zofran, phenergan suppositories, carafate, ivf's  -constipation- miralax and dulcolax supp prn and daily colace     Depression with h/o hospitalization for suicidal ideation  -followed by Karrie Urbano (psychiatrist) and Fina Gerard (psychologist), however pt non-compliant with counseling  -Continue Prozac, Zyprexa (both switched to liquid form to help improve compliance), and prn Prazosin, hydroxyzine  -s/p psych consult for pt non-compliance and trying to interrupt iv meds/tpn-no further recs     Chronic anemia-known beta thal minor  -s/p iv iron with last admission  -Hb currently at baseline        Ambulate with assistance, Juan thakkar for DVT ppx (pt with intermittent dizziness and previous fall history)  Physical therapy consult today for deconditioning     Previous chest pain s/p cardiac eval, likely MSK in origin

## 2021-03-11 NOTE — PROGRESS NOTES
Bedside and Verbal shift change report given to Hemal Mo (oncoming nurse) by Jeanette Gonzáles (offgoing nurse). Report included the following information SBAR, Kardex, Intake/Output, MAR and Recent Results.        [phone]: Per pharmacy recommendation, TPN paused for antifungal administration

## 2021-03-11 NOTE — PROGRESS NOTES
Bedside shift change report given to Juancarlos Tracy RN (oncoming nurse) by Sourav Almeida RN (offgoing nurse). Report included the following information SBAR, Kardex, Intake/Output and MAR.

## 2021-03-11 NOTE — PROGRESS NOTES
Problem: Falls - Risk of  Goal: *Absence of Falls  Description: Document Ashanti Huntley Fall Risk and appropriate interventions in the flowsheet.   Outcome: Progressing Towards Goal  Note: Fall Risk Interventions:            Medication Interventions: Teach patient to arise slowly         History of Falls Interventions: Room close to nurse's station         Problem: Nausea/Vomiting (Adult)  Goal: *Absence of nausea/vomiting  Outcome: Progressing Towards Goal
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
## 2021-03-12 LAB
ANION GAP SERPL CALC-SCNC: 7 MMOL/L (ref 5–15)
BUN SERPL-MCNC: 12 MG/DL (ref 6–20)
BUN/CREAT SERPL: 29 (ref 12–20)
CALCIUM SERPL-MCNC: 7.7 MG/DL (ref 8.5–10.1)
CHLORIDE SERPL-SCNC: 109 MMOL/L (ref 97–108)
CO2 SERPL-SCNC: 25 MMOL/L (ref 21–32)
CREAT SERPL-MCNC: 0.42 MG/DL (ref 0.55–1.02)
GLUCOSE BLD STRIP.AUTO-MCNC: 102 MG/DL (ref 65–100)
GLUCOSE BLD STRIP.AUTO-MCNC: 66 MG/DL (ref 65–100)
GLUCOSE BLD STRIP.AUTO-MCNC: 85 MG/DL (ref 65–100)
GLUCOSE BLD STRIP.AUTO-MCNC: 96 MG/DL (ref 65–100)
GLUCOSE SERPL-MCNC: 60 MG/DL (ref 65–100)
MAGNESIUM SERPL-MCNC: 2.1 MG/DL (ref 1.6–2.4)
PHOSPHATE SERPL-MCNC: 3.5 MG/DL (ref 2.6–4.7)
POTASSIUM SERPL-SCNC: 2.9 MMOL/L (ref 3.5–5.1)
SERVICE CMNT-IMP: ABNORMAL
SERVICE CMNT-IMP: NORMAL
SODIUM SERPL-SCNC: 141 MMOL/L (ref 136–145)

## 2021-03-12 PROCEDURE — 80048 BASIC METABOLIC PNL TOTAL CA: CPT

## 2021-03-12 PROCEDURE — 74011000258 HC RX REV CODE- 258: Performed by: INTERNAL MEDICINE

## 2021-03-12 PROCEDURE — 83735 ASSAY OF MAGNESIUM: CPT

## 2021-03-12 PROCEDURE — 74011000250 HC RX REV CODE- 250: Performed by: OBSTETRICS & GYNECOLOGY

## 2021-03-12 PROCEDURE — 65410000002 HC RM PRIVATE OB

## 2021-03-12 PROCEDURE — 84100 ASSAY OF PHOSPHORUS: CPT

## 2021-03-12 PROCEDURE — 74011250636 HC RX REV CODE- 250/636: Performed by: INTERNAL MEDICINE

## 2021-03-12 PROCEDURE — 36415 COLL VENOUS BLD VENIPUNCTURE: CPT

## 2021-03-12 PROCEDURE — 74011000258 HC RX REV CODE- 258: Performed by: OBSTETRICS & GYNECOLOGY

## 2021-03-12 PROCEDURE — 74011250637 HC RX REV CODE- 250/637: Performed by: INTERNAL MEDICINE

## 2021-03-12 PROCEDURE — 82962 GLUCOSE BLOOD TEST: CPT

## 2021-03-12 PROCEDURE — 97116 GAIT TRAINING THERAPY: CPT

## 2021-03-12 PROCEDURE — 74011250636 HC RX REV CODE- 250/636: Performed by: OBSTETRICS & GYNECOLOGY

## 2021-03-12 PROCEDURE — 74011250637 HC RX REV CODE- 250/637: Performed by: NURSE PRACTITIONER

## 2021-03-12 RX ORDER — POTASSIUM CHLORIDE 29.8 MG/ML
20 INJECTION INTRAVENOUS
Status: COMPLETED | OUTPATIENT
Start: 2021-03-12 | End: 2021-03-12

## 2021-03-12 RX ADMIN — ACETAMINOPHEN 500 MG: 500 TABLET ORAL at 18:08

## 2021-03-12 RX ADMIN — SODIUM CHLORIDE 1000 ML/HR: 9 INJECTION, SOLUTION INTRAVENOUS at 17:33

## 2021-03-12 RX ADMIN — CALCIUM GLUCONATE: 94 INJECTION, SOLUTION INTRAVENOUS at 20:53

## 2021-03-12 RX ADMIN — DEXTROSE MONOHYDRATE 20 ML: 5 INJECTION, SOLUTION INTRAVENOUS at 20:43

## 2021-03-12 RX ADMIN — Medication 10 ML: at 14:00

## 2021-03-12 RX ADMIN — Medication 1 TABLET: at 08:52

## 2021-03-12 RX ADMIN — DEXTROSE MONOHYDRATE 20 ML: 5 INJECTION, SOLUTION INTRAVENOUS at 18:37

## 2021-03-12 RX ADMIN — POTASSIUM CHLORIDE 20 MEQ: 400 INJECTION, SOLUTION INTRAVENOUS at 12:25

## 2021-03-12 RX ADMIN — POTASSIUM CHLORIDE 20 MEQ: 400 INJECTION, SOLUTION INTRAVENOUS at 15:06

## 2021-03-12 RX ADMIN — Medication 10 ML: at 21:09

## 2021-03-12 RX ADMIN — I.V. FAT EMULSION 250 ML: 20 EMULSION INTRAVENOUS at 20:52

## 2021-03-12 RX ADMIN — DEXTROSE MONOHYDRATE 217 MG: 5 INJECTION, SOLUTION INTRAVENOUS at 18:42

## 2021-03-12 RX ADMIN — Medication 30 ML: at 21:09

## 2021-03-12 RX ADMIN — CEFTRIAXONE SODIUM 1 G: 1 INJECTION, POWDER, FOR SOLUTION INTRAMUSCULAR; INTRAVENOUS at 05:06

## 2021-03-12 NOTE — PROGRESS NOTES
7474 Verbal shift change report given to Nati Figueroa RN (oncoming nurse) by Mireya Cano RN (offgoing nurse). Report included the following information SBAR.     0083 In pt's room for morning meds/assessment. Pt has refused all morning PO meds. Convinced pt to take her Prenatal Vitamin. Pt also accepted IV Potassium. She states \"I'm nauseous\". RN offered Zofran, but pt refused. 1745 Pt has refused all PO meds throughout the day. She only agreed to Tylenol 30 minutes prior to Amphotericin. 1809 Pt willing to attempt Potassium bicarb-citric acid tabs. RN provided - pt took one sip and refused to drink anymore because \"it tastes bad\". 1815 Pt reports an upset stomach and describes indigestion in her throat. RN encouraged pt to eat something, nibble on crackers, drink water or a soda. RN also encouraged pt to take Protonix. Pt refused all options. RN also discussed changing PO meds to liquids. Pt refused liquid Prozac because \"it tastes bad\". Pt does seem willing to take Protonix if it is IV.  RN will consult dinesh BONILLA.

## 2021-03-12 NOTE — PROGRESS NOTES
High Risk Obstetrics Progress Note    Name: Chilo Morales MRN: [id number]  SSN: [id number]    YOB: 1985  Age: 38 y.o. Sex: female      Subjective:      LOS: 9 days    Estimated Date of Delivery: 21   Gestational Age Today: 19w6d     Patient admitted for pyelonephritis. States she does have nausea, vomiting and normal fetal movement. and does not have chest pain, contractions, shortness of breath, vaginal bleeding  and vaginal leaking of fluid . Reports back pain improved. Objective:     Vitals:  Blood pressure (!) 101/52, pulse 70, temperature 98.2 °F (36.8 °C), resp. rate 16, height 5' 4\" (1.626 m), weight 77.7 kg (171 lb 6.4 oz), last menstrual period 2020, SpO2 100 %, not currently breastfeeding. Temp (24hrs), Av.1 °F (36.7 °C), Min:97.8 °F (36.6 °C), Max:98.5 °F (34.6 °C)    Systolic (17OKH), JTJ:487 , Min:94 , CSJ:687      Diastolic (55FJS), AFH:71, Min:52, Max:69       Intake and Output:         Physical Exam:  Patient without distress.   Heart: Regular rate and rhythm  Lung: clear to auscultation throughout lung fields, no wheezes, no rales, no rhonchi and normal respiratory effort  Abdomen: soft, nontender, gravid  Lower Extremities:  - Edema No       Membranes:  Intact    Fetal Heart Rate:  +FHTs        Labs:   Recent Results (from the past 36 hour(s))   GLUCOSE, POC    Collection Time: 21  1:03 AM   Result Value Ref Range    Glucose (POC) 95 65 - 100 mg/dL    Performed by Jam Kenya    METABOLIC PANEL, BASIC    Collection Time: 21  5:10 AM   Result Value Ref Range    Sodium 138 136 - 145 mmol/L    Potassium 2.9 (L) 3.5 - 5.1 mmol/L    Chloride 109 (H) 97 - 108 mmol/L    CO2 23 21 - 32 mmol/L    Anion gap 6 5 - 15 mmol/L    Glucose 141 (H) 65 - 100 mg/dL    BUN 13 6 - 20 MG/DL    Creatinine 0.45 (L) 0.55 - 1.02 MG/DL    BUN/Creatinine ratio 29 (H) 12 - 20      GFR est AA >60 >60 ml/min/1.73m2    GFR est non-AA >60 >60 ml/min/1.73m2    Calcium 7.7 (L) 8.5 - 10.1 MG/DL   MAGNESIUM    Collection Time: 03/11/21  5:10 AM   Result Value Ref Range    Magnesium 1.8 1.6 - 2.4 mg/dL   PHOSPHORUS    Collection Time: 03/11/21  5:10 AM   Result Value Ref Range    Phosphorus 3.8 2.6 - 4.7 MG/DL   GLUCOSE, POC    Collection Time: 03/11/21  5:52 AM   Result Value Ref Range    Glucose (POC) 104 (H) 65 - 100 mg/dL    Performed by Richelle Sarabia    GLUCOSE, POC    Collection Time: 03/11/21 12:05 PM   Result Value Ref Range    Glucose (POC) 99 65 - 100 mg/dL    Performed by Tasneem Driscoll    GLUCOSE, POC    Collection Time: 03/11/21  5:31 PM   Result Value Ref Range    Glucose (POC) 76 65 - 100 mg/dL    Performed by Tasneem Driscoll    METABOLIC PANEL, BASIC    Collection Time: 03/12/21  4:05 AM   Result Value Ref Range    Sodium 141 136 - 145 mmol/L    Potassium 2.9 (L) 3.5 - 5.1 mmol/L    Chloride 109 (H) 97 - 108 mmol/L    CO2 25 21 - 32 mmol/L    Anion gap 7 5 - 15 mmol/L    Glucose 60 (L) 65 - 100 mg/dL    BUN 12 6 - 20 MG/DL    Creatinine 0.42 (L) 0.55 - 1.02 MG/DL    BUN/Creatinine ratio 29 (H) 12 - 20      GFR est AA >60 >60 ml/min/1.73m2    GFR est non-AA >60 >60 ml/min/1.73m2    Calcium 7.7 (L) 8.5 - 10.1 MG/DL   MAGNESIUM    Collection Time: 03/12/21  4:05 AM   Result Value Ref Range    Magnesium 2.1 1.6 - 2.4 mg/dL   PHOSPHORUS    Collection Time: 03/12/21  4:05 AM   Result Value Ref Range    Phosphorus 3.5 2.6 - 4.7 MG/DL   GLUCOSE, POC    Collection Time: 03/12/21  5:59 AM   Result Value Ref Range    Glucose (POC) 85 65 - 100 mg/dL    Performed by Herman Raymond        Assessment and Plan:      Principal Problem:    Acute pyelonephritis (3/3/2021)       37 y/o [id number] at 23 5/7 weeks admitted for intermittent chills and fever-presumed pyelonephritis  -Afebrile on ceftriaxone until 3/12/21  -Blood culture shows 1/4 yeast preliminary, started on amphotericin per ID until 3/19/21; repeat blood cultures 3/6 negative to date  -urine culture shows klebsiella and citrobactr susceptible to ceftriaxone, appreciate ID help-today last day for ceftriaxone (continue amphoterocin until 3/19)  -bilateral flank pain- renal ultrasound normal on 3/5-improving     Chronic eating disorder complicated by nausea/vomiting of pregnancy  -on TPN-switched to continuous yesterday  -HypoK -replete this am with runs of kcl and possible adjustment to tpn.   -change BG check q6h  -has outpatient nutritionist, Lc Carbone  -iv zofran, phenergan suppositories, carafate, ivf's  -constipation- miralax and dulcolax supp prn and daily colace     Depression with h/o hospitalization for suicidal ideation  -followed by Frederic Mckinley (psychiatrist) and Yasmin Moon (psychologist), however pt non-compliant with counseling  -Continue Prozac, Zyprexa (both switched to liquid form to help improve compliance), and prn Prazosin, hydroxyzine  -s/p psych consult for pt non-compliance and trying to interrupt iv meds/tpn-no further recs     Chronic anemia-known beta thal minor  -s/p iv iron with last admission  -Hb currently at baseline        Ambulate with assistance, Juan thakkar for DVT ppx (pt with intermittent dizziness and previous fall history)  Physical therapy consult today for deconditioning     Previous chest pain s/p cardiac eval, likely MSK in origin

## 2021-03-12 NOTE — PROGRESS NOTES
1500 - Transition of Care  (LESLIE) Plan:        RUR:  38 %           Disposition: TBD/subject to change pending recommendations; pending medical progression    -- Chart indicates greater than 48 hours    -- Anticipate home with friend  once medically stable    -- On  amphotericin per ID until 3/19/21    -- [facility]. PCP followup: Dr. Nigel Murray    Transport: TBD    CM will continue to follow and assist with LESLIE needs as they arise. Available on [employer]. Tammie  MSN, [facility],  RN, [facility] - [phone].

## 2021-03-12 NOTE — PROGRESS NOTES
Problem: Mobility Impaired (Adult and Pediatric)  Goal: *Acute Goals and Plan of Care (Insert Text)  Description: FUNCTIONAL STATUS PRIOR TO ADMISSION: Patient was independent and active without use of DME. Pt reports modified independence with basic and instrumental ADLs. Pt endorses fall in January 2/2 syncope episodes. HOME SUPPORT PRIOR TO ADMISSION: The patient lived alone with no assistance nearby. Physical Therapy Goals  Initiated 3/8/2021  1. Patient will move from supine to sit and sit to supine , scoot up and down, and roll side to side in bed with modified independence within 7 day(s). 2.  Patient will transfer from bed to chair and chair to bed with modified independence using the least restrictive device within 7 day(s). 3.  Patient will perform sit to stand with modified independence within 7 day(s). 4.  Patient will ambulate with modified independence for 300 feet with the least restrictive device within 7 day(s). 5.  Patient will ascend/descend 10 stairs with one handrail(s) with modified independence within 7 day(s). Outcome: Progressing Towards Goal    PHYSICAL THERAPY TREATMENT  Patient: Ester Story (37 y.o. female)  Date: 3/12/2021  Diagnosis: Acute pyelonephritis [N10] Acute pyelonephritis  Procedure(s) (LRB):  INFUSION CATHETER INSERTION (N/A) 7 Days Post-Op  Precautions:    Chart, physical therapy assessment, plan of care and goals were reviewed. ASSESSMENT  Patient continues with skilled PT services and is progressing towards goals. Patient with flat affect but agreeable to intervention. Gait with a moderate inder with occasional path deviations or inconsistent step width but no LOB. Encouraged to ambulate 1-2x daily with nursing over the weekend. Will follow up Monday if she remains hospitalized. PLAN :  Patient continues to benefit from skilled intervention to address the above impairments. Continue treatment per established plan of care.   to address goals. Recommendation for discharge: (in order for the patient to meet his/her long term goals)  No skilled physical therapy/ follow up rehabilitation needs identified at this time. IF patient discharges home will need the following DME: none       SUBJECTIVE:   Patient stated I'm okay.     OBJECTIVE DATA SUMMARY:   Critical Behavior:              Functional Mobility Training:  Bed Mobility:                    Transfers:  Sit to Stand: Supervision  Stand to Sit: Modified independent                             Balance:  Sitting: Intact  Standing: Intact; Without support  Standing - Static: Good  Standing - Dynamic : Good  Ambulation/Gait Training:  Distance (ft): 150 Feet (ft)  Assistive Device: Gait belt  Ambulation - Level of Assistance: Contact guard assistance;Stand-by assistance        Gait Abnormalities: Decreased step clearance; Path deviations        Base of Support: Narrowed                         Activity Tolerance:   Good    After treatment patient left in no apparent distress:   Call bell within reach and seated on EOB with sitter present    COMMUNICATION/COLLABORATION:   The patients plan of care was discussed with: Registered nurse.      Irving Martinez PT, DPT   Time Calculation: 12 mins

## 2021-03-12 NOTE — PROGRESS NOTES
..Bedside and Verbal shift change report given to Jennifer Guzmán RN (oncoming nurse) by Faheem Suh (offgoing nurse). Report included the following information Kardex, ED Summary, Intake/Output, MAR, Accordion, Recent Results and Med Rec Status.

## 2021-03-12 NOTE — PROGRESS NOTES
ID Progress Note  3/12/2021    Subjective:     Afebrile. No dyspnea, abdominal pain,      She has not really been taking the potassium pills as they are hard to swallow. Objective:     Vitals:   Visit Vitals  BP (!) 106/52 (BP 1 Location: Left upper arm, BP Patient Position: At rest)   Pulse 69   Temp 97.8 °F (36.6 °C)   Resp 16   Ht 5' 4\" (1.626 m)   Wt 76.9 kg (169 lb 9.6 oz)   LMP 2020 (Exact Date)   SpO2 99%   BMI 29.11 kg/m²        Tmax:  Temp (24hrs), Av.1 °F (36.7 °C), Min:97.8 °F (36.6 °C), Max:98.5 °F (36.9 °C)      Exam:    Not in distress  No lymphdadenopathy   Lung clear, no rales, wheezes or rhonchi   Heart: s1, s2, RRR, no murmurs rubs or clicks  Abdomen: soft nontender, no guarding or rebound  Speech fluent     Labs:   Lab Results   Component Value Date/Time    WBC 5.1 03/10/2021 06:35 AM    Hemoglobin (POC) 10.4 (L) 2020 07:12 AM    HGB 7.8 (L) 03/10/2021 06:35 AM    HCT 24.1 (L) 03/10/2021 06:35 AM    PLATELET 668  06:35 AM    MCV 73.9 (L) 03/10/2021 06:35 AM    Hgb, External 33.7 2012    Hct, External 69 2012    Platelet cnt., External 307 2012     Lab Results   Component Value Date/Time    Sodium 141 2021 04:05 AM    Potassium 2.9 (L) 2021 04:05 AM    Chloride 109 (H) 2021 04:05 AM    CO2 25 2021 04:05 AM    Anion gap 7 2021 04:05 AM    Glucose 60 (L) 2021 04:05 AM    BUN 12 2021 04:05 AM    Creatinine 0.42 (L) 2021 04:05 AM    BUN/Creatinine ratio 29 (H) 2021 04:05 AM    GFR est AA >60 2021 04:05 AM    GFR est non-AA >60 2021 04:05 AM    Calcium 7.7 (L) 2021 04:05 AM    Bilirubin, total 0.5 2021 05:48 AM    Alk.  phosphatase 124 (H) 2021 05:48 AM    Protein, total 7.1 2021 05:48 AM    Albumin 2.4 (L) 2021 05:48 AM    Globulin 4.7 (H) 2021 05:48 AM    A-G Ratio 0.5 (L) 2021 05:48 AM    ALT (SGPT) 23 2021 05:48 AM       S/p right IJ (3/5)  Assessment:     #1 fever - probably from UTI and candidemia     Blood cx (3/2) 1/4 yeast      Urine cx (3/2) klebsiella pneumoniae, second citrobacter -    renal US (3/5) Mild left hydronephrosis is likely within the range of normal in pregnancy.       #2 intrauterine pregnancy     #3 asthma     #4 depression    #5 hypokalemia secondary to ampho b     Recommendations:   Continue ceftriaxone and amphotericin  -> fluconazole and echinocandins have shown harm in fetuses in animal studies. Amphotericin is treatment of choice for candidemia in pregnancy. ff up repeat blood cx. If this is negative, she will need amphotericin until the 19th.   - ceftriaxone until the 12th. I have arranged  for this to be discontinued after today's dose. Pt has been informed side effects of medication along with benefits and risks of using such medication. Check daily BMP and Mg level    She has not been taking the kcl pills are they are hard to swallow. Will try powder form. Will check labs over the weekend. PLease call with questions.              Toby Nichole MD

## 2021-03-13 LAB
ALBUMIN SERPL-MCNC: 2.1 G/DL (ref 3.5–5)
ALBUMIN/GLOB SERPL: 0.5 {RATIO} (ref 1.1–2.2)
ALP SERPL-CCNC: 110 U/L (ref 45–117)
ALT SERPL-CCNC: 26 U/L (ref 12–78)
ANION GAP SERPL CALC-SCNC: 3 MMOL/L (ref 5–15)
AST SERPL-CCNC: 20 U/L (ref 15–37)
BILIRUB SERPL-MCNC: 0.5 MG/DL (ref 0.2–1)
BUN SERPL-MCNC: 17 MG/DL (ref 6–20)
BUN/CREAT SERPL: 39 (ref 12–20)
CALCIUM SERPL-MCNC: 7.9 MG/DL (ref 8.5–10.1)
CHLORIDE SERPL-SCNC: 108 MMOL/L (ref 97–108)
CO2 SERPL-SCNC: 27 MMOL/L (ref 21–32)
CREAT SERPL-MCNC: 0.44 MG/DL (ref 0.55–1.02)
GLOBULIN SER CALC-MCNC: 4.2 G/DL (ref 2–4)
GLUCOSE BLD STRIP.AUTO-MCNC: 53 MG/DL (ref 65–100)
GLUCOSE BLD STRIP.AUTO-MCNC: 71 MG/DL (ref 65–100)
GLUCOSE BLD STRIP.AUTO-MCNC: 89 MG/DL (ref 65–100)
GLUCOSE BLD STRIP.AUTO-MCNC: 89 MG/DL (ref 65–100)
GLUCOSE BLD STRIP.AUTO-MCNC: 92 MG/DL (ref 65–100)
GLUCOSE SERPL-MCNC: 75 MG/DL (ref 65–100)
MAGNESIUM SERPL-MCNC: 2.2 MG/DL (ref 1.6–2.4)
PHOSPHATE SERPL-MCNC: NORMAL MG/DL (ref 2.6–4.7)
POTASSIUM SERPL-SCNC: 3 MMOL/L (ref 3.5–5.1)
PROT SERPL-MCNC: 6.3 G/DL (ref 6.4–8.2)
SERVICE CMNT-IMP: ABNORMAL
SERVICE CMNT-IMP: NORMAL
SODIUM SERPL-SCNC: 138 MMOL/L (ref 136–145)

## 2021-03-13 PROCEDURE — 74011250637 HC RX REV CODE- 250/637: Performed by: NURSE PRACTITIONER

## 2021-03-13 PROCEDURE — 83735 ASSAY OF MAGNESIUM: CPT

## 2021-03-13 PROCEDURE — 80053 COMPREHEN METABOLIC PANEL: CPT

## 2021-03-13 PROCEDURE — 84100 ASSAY OF PHOSPHORUS: CPT

## 2021-03-13 PROCEDURE — 74011250637 HC RX REV CODE- 250/637: Performed by: INTERNAL MEDICINE

## 2021-03-13 PROCEDURE — C9113 INJ PANTOPRAZOLE SODIUM, VIA: HCPCS | Performed by: OBSTETRICS & GYNECOLOGY

## 2021-03-13 PROCEDURE — 74011250636 HC RX REV CODE- 250/636: Performed by: INTERNAL MEDICINE

## 2021-03-13 PROCEDURE — 65410000002 HC RM PRIVATE OB

## 2021-03-13 PROCEDURE — 74011000258 HC RX REV CODE- 258: Performed by: INTERNAL MEDICINE

## 2021-03-13 PROCEDURE — 36415 COLL VENOUS BLD VENIPUNCTURE: CPT

## 2021-03-13 PROCEDURE — 74011000258 HC RX REV CODE- 258: Performed by: OBSTETRICS & GYNECOLOGY

## 2021-03-13 PROCEDURE — 74011250637 HC RX REV CODE- 250/637: Performed by: OBSTETRICS & GYNECOLOGY

## 2021-03-13 PROCEDURE — 82962 GLUCOSE BLOOD TEST: CPT

## 2021-03-13 PROCEDURE — 74011000250 HC RX REV CODE- 250: Performed by: OBSTETRICS & GYNECOLOGY

## 2021-03-13 PROCEDURE — 74011250636 HC RX REV CODE- 250/636: Performed by: OBSTETRICS & GYNECOLOGY

## 2021-03-13 RX ORDER — POTASSIUM CHLORIDE 7.45 MG/ML
10 INJECTION INTRAVENOUS
Status: COMPLETED | OUTPATIENT
Start: 2021-03-13 | End: 2021-03-13

## 2021-03-13 RX ADMIN — I.V. FAT EMULSION 250 ML: 20 EMULSION INTRAVENOUS at 20:28

## 2021-03-13 RX ADMIN — DOCUSATE SODIUM 100 MG: 100 CAPSULE, LIQUID FILLED ORAL at 08:39

## 2021-03-13 RX ADMIN — SUCRALFATE 1 G: 1 TABLET ORAL at 08:45

## 2021-03-13 RX ADMIN — SODIUM CHLORIDE 40 MG: 9 INJECTION INTRAMUSCULAR; INTRAVENOUS; SUBCUTANEOUS at 09:45

## 2021-03-13 RX ADMIN — Medication 20 ML: at 06:16

## 2021-03-13 RX ADMIN — DEXTROSE MONOHYDRATE 20 ML: 5 INJECTION, SOLUTION INTRAVENOUS at 20:19

## 2021-03-13 RX ADMIN — Medication 10 ML: at 13:37

## 2021-03-13 RX ADMIN — CALCIUM GLUCONATE: 94 INJECTION, SOLUTION INTRAVENOUS at 20:23

## 2021-03-13 RX ADMIN — DEXTROSE MONOHYDRATE 20 ML: 5 INJECTION, SOLUTION INTRAVENOUS at 18:15

## 2021-03-13 RX ADMIN — Medication 20 ML: at 22:00

## 2021-03-13 RX ADMIN — SODIUM CHLORIDE 1000 ML/HR: 9 INJECTION, SOLUTION INTRAVENOUS at 16:59

## 2021-03-13 RX ADMIN — POTASSIUM CHLORIDE 10 MEQ: 7.46 INJECTION, SOLUTION INTRAVENOUS at 15:54

## 2021-03-13 RX ADMIN — DEXTROSE MONOHYDRATE 217 MG: 5 INJECTION, SOLUTION INTRAVENOUS at 18:15

## 2021-03-13 RX ADMIN — POTASSIUM CHLORIDE 10 MEQ: 7.46 INJECTION, SOLUTION INTRAVENOUS at 13:46

## 2021-03-13 RX ADMIN — Medication 10 ML: at 13:38

## 2021-03-13 RX ADMIN — POTASSIUM CHLORIDE 10 MEQ: 7.46 INJECTION, SOLUTION INTRAVENOUS at 14:53

## 2021-03-13 RX ADMIN — ACETAMINOPHEN 500 MG: 500 TABLET ORAL at 17:33

## 2021-03-13 RX ADMIN — ONDANSETRON 4 MG: 2 INJECTION INTRAMUSCULAR; INTRAVENOUS at 20:12

## 2021-03-13 NOTE — PROGRESS NOTES
Bedside and Verbal shift change report given to ALBA Valadez (oncoming nurse) by Erum Lee RN (offgoing nurse). Report included the following information SBAR, Kardex, Intake/Output, MAR, Accordion and Recent Results.

## 2021-03-13 NOTE — PROGRESS NOTES
0732 Bedside and Verbal shift change report given to Nati Figueroa RN (oncoming nurse) by Yanique Woo RN (offgoing nurse). Report included the following information SBAR, Kardex, MAR and Recent Results. 1505 Pt attempted Potassium bicarb-citric acid tabs, once yesterday (unflavored), and once this afternoon at 1338 (cherry-vanilla). She refused both after one sip due to the \"bad taste\".

## 2021-03-13 NOTE — PROGRESS NOTES
History & Physical    Name: Violeta Stewart MRN: [id number]  SSN: [id number]    YOB: 1985  Age: 38 y.o. Sex: female      Subjective:     Reason for Admission:  Pregnancy and Pyelonephritis    History of Present Illness: Ms. Angel Ashby is a 38 y.o.  female with an estimated gestational age of 24w9d with Estimated Date of Delivery: 21. Patient complains of normal fetal movement for 1 days. Pregnancy has been complicated by pyelonephritis. Patient denies abdominal pain   and hyperemesis controlled with iv/po meds and tpn. OB History    Para Term  AB Living   16 7 4 2 7 7   SAB TAB Ectopic Molar Multiple Live Births   5 1 1   0 7      # Outcome Date GA Lbr Izaiah/2nd Weight Sex Delivery Anes PTL Lv   16 Current            15 Term 18 37w5d / 10:03 2.71 kg M  EPIDURAL AN N YAHAIRA   14 Term 04/22/15 39w0d  3.725 kg F  LO SPINAL AN N YAHAIRA   13 Term 13 39w4d  2.748 kg F VAGINAL DELI None  AYHAIRA   12 Para     M VAGINAL DELI   YAHAIRA   11 Ectopic 2009           10 Term 08/10/08 39w0d  2.722 kg M VAGINAL DELI EPI  YAHAIRA   9  06 34w0d   M VAGINAL DELI EPI Y YAHAIRA      Birth Comments: cerclage   8  05 36w0d   F VAGINAL DELI EPI Y YAHAIRA      Birth Comments: cerclage   7 SAB  18w0d       DEC   6 SAB  18w0d       DEC   5 SAB  20w0d       DEC   4 SAB  16w0d       DEC   3             2 TAB            1 SAB               Obstetric Comments   Gyn Dr. Mati Kellogg     Past Medical History:   Diagnosis Date    Acute pyelonephritis 3/3/2021    Anorexia     Anxiety     Asthma     on albuterol prn.  Triggers cold and allergies    Avoidant-restrictive food intake disorder (ARFID)     Back pain, chronic     sciatica    Chronic bilateral low back pain with right-sided sciatica 2020    ~    GERD (gastroesophageal reflux disease) 2020    UGI 10-, GI Dr. Gustavo Sheppard Gestational hypertension     Past pregnancy    HX OTHER MEDICAL      , , , ,     Hyperemesis     severe hyperemesis    Hypertension     with G12 - none this pregnancy    Iron deficiency anemia 10/08/2010    MDD (major depressive disorder), single episode with postpartum onset 2013    treated with meds - 13    Orthostatic hypotension 2020    Plantar fasciitis     Pregnancy     36 weeks    PTSD (post-traumatic stress disorder)      Past Surgical History:   Procedure Laterality Date    HX  SECTION  4/22/15    HX DILATION AND CURETTAGE      HX DILATION AND CURETTAGE  2020    HX GYN      miscarriage x 6    HX GYN      removal of left fallopian tube    HX OTHER SURGICAL      cerlcage x4, ectopic x1  with removal of left fallopian tube    HX OTHER SURGICAL      cerclage w/ current pregnancy.  Removed 18    [place]  DELIVERY ONLY       Social History     Occupational History    Not on file   Tobacco Use    Smoking status: Never Smoker    Smokeless tobacco: Never Used   Substance and Sexual Activity    Alcohol use: Not Currently     Frequency: Monthly or less     Drinks per session: 1 or 2     Binge frequency: Never    Drug use: No    Sexual activity: Yes     Partners: Male      Family History   Problem Relation Age of Onset   [address] [facility] Arthritis-rheumatoid Mother     Asthma Mother     No Known Problems Father     No Known Problems Maternal Grandmother     No Known Problems Maternal Grandfather     No Known Problems Paternal Grandmother     No Known Problems Paternal Grandfather     Asthma Son     Alcohol abuse Neg Hx     Arthritis-osteo Neg Hx     Bleeding Prob Neg Hx     Cancer Neg Hx     Diabetes Neg Hx     Elevated Lipids Neg Hx     Headache Neg Hx     Heart Disease Neg Hx     Hypertension Neg Hx     Lung Disease Neg Hx     Migraines Neg Hx     Psychiatric Disorder Neg Hx     Stroke Neg Hx     Mental Retardation Neg Hx     Anesth Problems Neg Hx Allergies   Allergen Reactions    Labetalol Hives    Ceclor [Cefaclor] Unknown (comments)    Pcn [Penicillins] Hives    Septra [Sulfamethoprim Ds] Unknown (comments)     Prior to Admission medications    Medication Sig Start Date End Date Taking? Authorizing Provider   FLUoxetine (PROzac) 20 mg capsule Take 1 Cap by mouth daily. Take with 40 mg cap for total daily dose of 60 mg. 3/1/21   Susu Ortiz NP   FLUoxetine (PROzac) 40 mg capsule Take 1 Cap by mouth daily. 3/1/21   Susu Ortiz NP   OLANZapine (ZyPREXA) 10 mg tablet Take 1 Tab by mouth nightly. 3/1/21   Susu Ortiz NP   prazosin (MINIPRESS) 2 mg capsule Take 1 Cap by mouth nightly. Indications: posttraumatic stress syndrome 3/1/21   Susu Ortiz NP   polyethylene glycol (MIRALAX) 17 gram packet Take 1 Packet by mouth daily as needed for Constipation. 2/23/21   Muriel Roberson MD   calcium carbonate (Tums) 200 mg calcium (500 mg) chew Take 1 Tab by mouth two (2) times daily as needed for Other (reflux). 2/18/21   Muriel Roberson MD   doxylamine succinate (UNISOM) 25 mg tablet Take 1 Tab by mouth nightly as needed for Insomnia. 2/15/21   Susu Ortiz NP   pantoprazole (PROTONIX) 40 mg tablet Take 1 Tab by mouth Daily (before breakfast). Indications: gastroesophageal reflux disease 1/15/21   Sabino Reed DO   sucralfate (CARAFATE) 1 gram tablet Take 1 Tab by mouth Before breakfast, lunch, dinner and at bedtime. 1/15/21   Sabino Reed DO   Lacto. acidophilus-Bif. animalis (Probiotic) 5 billion cell cpSP Take 1 Cap by mouth daily.  12/28/20   Susu Ortiz NP   prochlorperazine (COMPAZINE) 10 mg tablet  12/27/20   Provider, Historical   promethazine (Phenergan) 12.5 mg suppository     Provider, Historical   ondansetron (ZOFRAN ODT) 4 mg disintegrating tablet Take 1 Tab by mouth every six (6) hours as needed for Nausea or Vomiting. 12/16/20   Sabino Reed, DO   0.9% sodium chloride solp 1,000 mL with thiamine 100 mg/mL soln 305 mg, folic acid 5 mg/mL soln 1 mg 1 Bag by IntraVENous route every twenty-four (24) hours. 20   Altamease Nones, DO   cholecalciferol (Vitamin D3) 25 mcg (1,000 unit) cap Take  by mouth daily. Provider, Historical   pyridoxine, vitamin B6, (Vitamin B-6) 100 mg tablet Take 100 mg by mouth daily. Provider, Historical   hydrOXYzine HCL (ATARAX) 50 mg tablet Take 1 Tab by mouth four (4) times daily. 20   Allocca, Verdene , NP   OTHER 1 Ensure Enlive food supplement drink PO TID 20   Lesly Mcnally NP   OTHER 1 ensure pudding PO daily for lunch. 10/20/20   Lesly Mcnally NP   prenatal vit-iron fumarate-fa (PRENATAL PLUS with IRON) 28 mg iron- 800 mcg tab  20   Provider, Historical   food supplemt, lactose-reduced (Ensure High Protein) liqd Take 237 mL by mouth three (3) times daily. 10/14/20   Lesly Mcnally NP        Review of Systems:  A comprehensive review of systems was negative except for that written in the History of Present Illness. Objective:     Vitals:    Vitals:    21 0830 21 1424 21 2036 21 0156   BP:  (!) 106/52 106/62 136/74   Pulse:  69 60 75   Resp:  16 16 16   Temp:  97.8 °F (36.6 °C) 98.3 °F (36.8 °C) 97.6 °F (36.4 °C)   SpO2:  99% 99% 100%   Weight: 76.9 kg (169 lb 9.6 oz)      Height:          Temp (24hrs), Av.9 °F (36.6 °C), Min:97.6 °F (36.4 °C), Max:98.3 °F (36.8 °C)    BP  Min: 106/62  Max: 136/74     Physical Exam:  Patient without distress.   Heart: Regular rate and rhythm  Lung: clear to auscultation throughout lung fields, no wheezes, no rales, no rhonchi and normal respiratory effort  Abdomen: soft, nontender  Lower Extremities:  - Edema No     Membranes:  Intact  Uterine Activity:  None  Fetal Heart Rate:  reassurings       Lab/Data Review:  Recent Results (from the past 24 hour(s))   GLUCOSE, POC    Collection Time: 21 12:06 PM   Result Value Ref Range    Glucose (POC) 102 (H) 65 - 100 mg/dL    Performed by [employer]    GLUCOSE, POC    Collection Time: 03/12/21  6:11 PM   Result Value Ref Range    Glucose (POC) 66 65 - 100 mg/dL    Performed by Indira DAVILA (CON)    GLUCOSE, POC    Collection Time: 03/12/21 11:55 PM   Result Value Ref Range    Glucose (POC) 96 65 - 100 mg/dL    Performed by Indira DAVILA (CON)    PHOSPHORUS    Collection Time: 03/13/21  6:05 AM   Result Value Ref Range    Phosphorus PLEASE DISREGARD RESULTS 2.6 - 4.7 MG/DL   GLUCOSE, POC    Collection Time: 03/13/21  6:05 AM   Result Value Ref Range    Glucose (POC) 53 (L) 65 - 100 mg/dL    Performed by 63 Arabella Montoya, POC    Collection Time: 03/13/21  6:11 AM   Result Value Ref Range    Glucose (POC) 89 65 - 100 mg/dL    Performed by [address]ch Lisa [facility]        Assessment and Plan:     Principal Problem:    Acute pyelonephritis (3/3/2021)       - Hyperemesis Gravidarum:  Antiemetic Therapy including Phenergan/Zofran/Reglan/Zantac  Aggressive intravenous hydration  TPN  - Pyelonephritis:  Continue current care and encourage her to take meds    37 y/o [id number] at 23 6/7 weeks admitted for intermittent chills and fever-presumed pyelonephritis  -Afebrile on ceftriaxone until 3/12/21  -Blood culture shows 1/4 yeast preliminary, started on amphotericin per ID until 3/19/21; repeat blood cultures 3/6 negative to date  -urine culture shows klebsiella and citrobactr susceptible to ceftriaxone, appreciate ID help-today last day for ceftriaxone (continue amphoterocin until 3/19)  -bilateral flank pain- renal ultrasound normal on 3/5-improving     Chronic eating disorder complicated by nausea/vomiting of pregnancy  -on TPN-switched to continuous yesterday  -HypoK -replete this am with runs of kcl and possible adjustment to tpn.   -change BG check q6h  -has outpatient nutritionist, Calvin Hutton  -iv zofran, phenergan suppositories, carafate, ivf's  -constipation- miralax and dulcolax supp prn and daily colace     Depression with h/o hospitalization for suicidal ideation  -followed by Varinder Prince (psychiatrist) and Jackelyn Huitron (psychologist), however pt non-compliant with counseling  -Continue Prozac, Zyprexa (both switched to liquid form to help improve compliance), and prn Prazosin, hydroxyzine  -s/p psych consult for pt non-compliance and trying to interrupt iv meds/tpn-no further recs     Chronic anemia-known beta thal minor  -s/p iv iron with last admission  -Hb currently at baseline        Ambulate with assistance, Juan thakkar for DVT ppx (pt with intermittent dizziness and previous fall history)  Physical therapy consult today for deconditioning

## 2021-03-14 LAB
ANION GAP SERPL CALC-SCNC: 7 MMOL/L (ref 5–15)
BUN SERPL-MCNC: 20 MG/DL (ref 6–20)
BUN/CREAT SERPL: 51 (ref 12–20)
CALCIUM SERPL-MCNC: 7.6 MG/DL (ref 8.5–10.1)
CHLORIDE SERPL-SCNC: 105 MMOL/L (ref 97–108)
CO2 SERPL-SCNC: 28 MMOL/L (ref 21–32)
CREAT SERPL-MCNC: 0.39 MG/DL (ref 0.55–1.02)
GLUCOSE BLD STRIP.AUTO-MCNC: 85 MG/DL (ref 65–100)
GLUCOSE BLD STRIP.AUTO-MCNC: 89 MG/DL (ref 65–100)
GLUCOSE SERPL-MCNC: 83 MG/DL (ref 65–100)
MAGNESIUM SERPL-MCNC: 2.1 MG/DL (ref 1.6–2.4)
PHOSPHATE SERPL-MCNC: 3.2 MG/DL (ref 2.6–4.7)
POTASSIUM SERPL-SCNC: 2.9 MMOL/L (ref 3.5–5.1)
SERVICE CMNT-IMP: NORMAL
SERVICE CMNT-IMP: NORMAL
SODIUM SERPL-SCNC: 140 MMOL/L (ref 136–145)

## 2021-03-14 PROCEDURE — 80048 BASIC METABOLIC PNL TOTAL CA: CPT

## 2021-03-14 PROCEDURE — 74011250636 HC RX REV CODE- 250/636: Performed by: INTERNAL MEDICINE

## 2021-03-14 PROCEDURE — C9113 INJ PANTOPRAZOLE SODIUM, VIA: HCPCS | Performed by: OBSTETRICS & GYNECOLOGY

## 2021-03-14 PROCEDURE — 74011000258 HC RX REV CODE- 258: Performed by: OBSTETRICS & GYNECOLOGY

## 2021-03-14 PROCEDURE — 74011000250 HC RX REV CODE- 250: Performed by: OBSTETRICS & GYNECOLOGY

## 2021-03-14 PROCEDURE — 74011000258 HC RX REV CODE- 258: Performed by: INTERNAL MEDICINE

## 2021-03-14 PROCEDURE — 74011250637 HC RX REV CODE- 250/637: Performed by: NURSE PRACTITIONER

## 2021-03-14 PROCEDURE — 65410000002 HC RM PRIVATE OB

## 2021-03-14 PROCEDURE — 36415 COLL VENOUS BLD VENIPUNCTURE: CPT

## 2021-03-14 PROCEDURE — 74011250637 HC RX REV CODE- 250/637: Performed by: OBSTETRICS & GYNECOLOGY

## 2021-03-14 PROCEDURE — 82962 GLUCOSE BLOOD TEST: CPT

## 2021-03-14 PROCEDURE — 83735 ASSAY OF MAGNESIUM: CPT

## 2021-03-14 PROCEDURE — 84100 ASSAY OF PHOSPHORUS: CPT

## 2021-03-14 PROCEDURE — 74011250637 HC RX REV CODE- 250/637: Performed by: INTERNAL MEDICINE

## 2021-03-14 PROCEDURE — 74011250636 HC RX REV CODE- 250/636: Performed by: OBSTETRICS & GYNECOLOGY

## 2021-03-14 RX ORDER — POTASSIUM CHLORIDE 29.8 MG/ML
20 INJECTION INTRAVENOUS
Status: DISPENSED | OUTPATIENT
Start: 2021-03-14 | End: 2021-03-14

## 2021-03-14 RX ADMIN — POTASSIUM CHLORIDE 20 MEQ: 400 INJECTION, SOLUTION INTRAVENOUS at 14:13

## 2021-03-14 RX ADMIN — SODIUM CHLORIDE 40 MG: 9 INJECTION INTRAMUSCULAR; INTRAVENOUS; SUBCUTANEOUS at 08:21

## 2021-03-14 RX ADMIN — POTASSIUM CHLORIDE 20 MEQ: 400 INJECTION, SOLUTION INTRAVENOUS at 16:17

## 2021-03-14 RX ADMIN — Medication 10 ML: at 14:17

## 2021-03-14 RX ADMIN — I.V. FAT EMULSION 250 ML: 20 EMULSION INTRAVENOUS at 22:24

## 2021-03-14 RX ADMIN — Medication 10 ML: at 08:22

## 2021-03-14 RX ADMIN — Medication 10 ML: at 22:24

## 2021-03-14 RX ADMIN — DEXTROSE MONOHYDRATE 20 ML: 5 INJECTION, SOLUTION INTRAVENOUS at 22:19

## 2021-03-14 RX ADMIN — Medication 10 ML: at 06:10

## 2021-03-14 RX ADMIN — DEXTROSE MONOHYDRATE 20 ML: 5 INJECTION, SOLUTION INTRAVENOUS at 20:13

## 2021-03-14 RX ADMIN — ACETAMINOPHEN 500 MG: 500 TABLET ORAL at 19:39

## 2021-03-14 RX ADMIN — Medication 10 ML: at 22:27

## 2021-03-14 RX ADMIN — Medication 20 ML: at 06:09

## 2021-03-14 RX ADMIN — SODIUM CHLORIDE 1000 ML/HR: 9 INJECTION, SOLUTION INTRAVENOUS at 19:09

## 2021-03-14 RX ADMIN — DOCUSATE SODIUM 100 MG: 100 CAPSULE, LIQUID FILLED ORAL at 08:18

## 2021-03-14 RX ADMIN — SUCRALFATE 1 G: 1 TABLET ORAL at 08:17

## 2021-03-14 RX ADMIN — DEXTROSE MONOHYDRATE 217 MG: 5 INJECTION, SOLUTION INTRAVENOUS at 20:17

## 2021-03-14 RX ADMIN — CALCIUM GLUCONATE: 94 INJECTION, SOLUTION INTRAVENOUS at 22:24

## 2021-03-14 RX ADMIN — PROMETHAZINE HYDROCHLORIDE 12.5 MG: 12.5 SUPPOSITORY RECTAL at 11:56

## 2021-03-14 RX ADMIN — ONDANSETRON 4 MG: 2 INJECTION INTRAMUSCULAR; INTRAVENOUS at 19:02

## 2021-03-14 NOTE — PROGRESS NOTES
Bedside and Verbal shift change report given to ALBA Leo (oncoming nurse) by Dorota Mendoza RN (offgoing nurse). Report included the following information SBAR, Kardex, ED Summary, Intake/Output, MAR, Accordion and Recent Results.

## 2021-03-14 NOTE — PROGRESS NOTES
ID Progress Note  3/14/2021    Subjective:     Afebrile. No dyspnea, abdominal pain,      She cannot tolerate the oral Effer-K as well. Objective:     Vitals:   Visit Vitals  /61 (BP 1 Location: Right upper arm, BP Patient Position: At rest;Supine)   Pulse 63   Temp 97.8 °F (36.6 °C)   Resp 17   Ht 5' 4\" (1.626 m)   Wt 77.9 kg (171 lb 12.8 oz)   LMP 2020 (Exact Date)   SpO2 96%   BMI 29.49 kg/m²        Tmax:  Temp (24hrs), Av.1 °F (36.7 °C), Min:97.8 °F (36.6 °C), Max:98.5 °F (36.9 °C)      Exam:    Not in distress  No lymphdadenopathy   Lung clear, no rales, wheezes or rhonchi   Heart: s1, s2, RRR, no murmurs rubs or clicks  Abdomen: soft nontender, no guarding or rebound  Speech fluent     Labs:   Lab Results   Component Value Date/Time    WBC 5.1 03/10/2021 06:35 AM    Hemoglobin (POC) 10.4 (L) 2020 07:12 AM    HGB 7.8 (L) 03/10/2021 06:35 AM    HCT 24.1 (L) 03/10/2021 06:35 AM    PLATELET 312 56 06:35 AM    MCV 73.9 (L) 03/10/2021 06:35 AM    Hgb, External 33.7 2012    Hct, External 69 2012    Platelet cnt., External 307 2012     Lab Results   Component Value Date/Time    Sodium 140 2021 06:08 AM    Potassium 2.9 (L) 2021 06:08 AM    Chloride 105 2021 06:08 AM    CO2 28 2021 06:08 AM    Anion gap 7 2021 06:08 AM    Glucose 83 2021 06:08 AM    BUN 20 2021 06:08 AM    Creatinine 0.39 (L) 2021 06:08 AM    BUN/Creatinine ratio 51 (H) 2021 06:08 AM    GFR est AA >60 2021 06:08 AM    GFR est non-AA >60 2021 06:08 AM    Calcium 7.6 (L) 2021 06:08 AM    Bilirubin, total 0.5 2021 08:25 AM    Alk.  phosphatase 110 2021 08:25 AM    Protein, total 6.3 (L) 2021 08:25 AM    Albumin 2.1 (L) 2021 08:25 AM    Globulin 4.2 (H) 2021 08:25 AM    A-G Ratio 0.5 (L) 2021 08:25 AM    ALT (SGPT) 26 2021 08:25 AM       S/p right IJ (3/5)  Assessment:     #1 fever - probably from UTI and candidemia     Blood cx (3/2) 1/4 yeast      Urine cx (3/2) klebsiella pneumoniae, second citrobacter -    renal US (3/5) Mild left hydronephrosis is likely within the range of normal in pregnancy.       #2 intrauterine pregnancy     #3 asthma     #4 depression    #5 hypokalemia secondary to ampho b     Recommendations:   Continue ceftriaxone and amphotericin  -> fluconazole and echinocandins have shown harm in fetuses in animal studies. Amphotericin is treatment of choice for candidemia in pregnancy. ff up repeat blood cx. If this is negative, she will need amphotericin until the 19th.   - ceftriaxone until the 12th. I have arranged  for this to be discontinued after today's dose. Pt has been informed side effects of medication along with benefits and risks of using such medication. Check daily BMP and Mg level    She cannot tolerate Effer-K.  She is now getting runs of KCL IV for a total of 80 Meqs today                 Rachel Navarro MD

## 2021-03-14 NOTE — PROGRESS NOTES
High Risk Obstetrics Progress Note    Name: Dilshad Flores MRN: [id number]  SSN: [id number]    YOB: 1985  Age: 38 y.o. Sex: female      Subjective:      LOS: 11 days    Estimated Date of Delivery: 21   Gestational Age Today: 18w0d     Patient admitted for hyperemesis gravidarum and pyelonephritis. States she does have normal fetal movement and does not have abdominal pain  , chest pain, contractions, fever, headache , shortness of breath, swelling, vaginal bleeding , vaginal leaking of fluid  and visual disturbances. Objective:     Vitals:  Blood pressure (!) 103/56, pulse 63, temperature 98.1 °F (36.7 °C), resp. rate 14, height 5' 4\" (1.626 m), weight 77.9 kg (171 lb 12.8 oz), last menstrual period 2020, SpO2 97 %, not currently breastfeeding. Temp (24hrs), Av.2 °F (36.8 °C), Min:97.8 °F (36.6 °C), Max:98.5 °F (48.8 °C)    Systolic (96OQI), OXE:787 , Min:103 , QCX:070      Diastolic (10EET), ZKE:92, Min:54, Max:68       Intake and Output:         Physical Exam:  Patient without distress.   Heart: Regular rate and rhythm  Lung: clear to auscultation throughout lung fields, no wheezes, no rales, no rhonchi and normal respiratory effort  Abdomen: soft, nontender  Lower Extremities:  - Edema No       Membranes:  Intact    Uterine Activity:  None    Fetal Heart Rate:  reassuring        Labs:   Recent Results (from the past 36 hour(s))   GLUCOSE, POC    Collection Time: 21 11:55 PM   Result Value Ref Range    Glucose (POC) 96 65 - 100 mg/dL    Performed by Ruthie DAVILA (GLADIS)    PHOSPHORUS    Collection Time: 21  6:05 AM   Result Value Ref Range    Phosphorus PLEASE DISREGARD RESULTS 2.6 - 4.7 MG/DL   GLUCOSE, POC    Collection Time: 21  6:05 AM   Result Value Ref Range    Glucose (POC) 53 (L) 65 - 100 mg/dL    Performed by Sona Montoya, POC    Collection Time: 21  6:11 AM   Result Value Ref Range    Glucose (POC) 89 65 - 100 mg/dL Performed by SLOANE CUELLAR MYCA    METABOLIC PANEL, COMPREHENSIVE    Collection Time: 03/13/21  8:25 AM   Result Value Ref Range    Sodium 138 136 - 145 mmol/L    Potassium 3.0 (L) 3.5 - 5.1 mmol/L    Chloride 108 97 - 108 mmol/L    CO2 27 21 - 32 mmol/L    Anion gap 3 (L) 5 - 15 mmol/L    Glucose 75 65 - 100 mg/dL    BUN 17 6 - 20 MG/DL    Creatinine 0.44 (L) 0.55 - 1.02 MG/DL    BUN/Creatinine ratio 39 (H) 12 - 20      GFR est AA >60 >60 ml/min/1.73m2    GFR est non-AA >60 >60 ml/min/1.73m2    Calcium 7.9 (L) 8.5 - 10.1 MG/DL    Bilirubin, total 0.5 0.2 - 1.0 MG/DL    ALT (SGPT) 26 12 - 78 U/L    AST (SGOT) 20 15 - 37 U/L    Alk.  phosphatase 110 45 - 117 U/L    Protein, total 6.3 (L) 6.4 - 8.2 g/dL    Albumin 2.1 (L) 3.5 - 5.0 g/dL    Globulin 4.2 (H) 2.0 - 4.0 g/dL    A-G Ratio 0.5 (L) 1.1 - 2.2     MAGNESIUM    Collection Time: 03/13/21  8:25 AM   Result Value Ref Range    Magnesium 2.2 1.6 - 2.4 mg/dL   GLUCOSE, POC    Collection Time: 03/13/21  1:42 PM   Result Value Ref Range    Glucose (POC) 89 65 - 100 mg/dL    Performed by Ruddy Lemus Dr, POC    Collection Time: 03/13/21  6:19 PM   Result Value Ref Range    Glucose (POC) 71 65 - 100 mg/dL    Performed by Anatoliy Sevilla   PCT    GLUCOSE, POC    Collection Time: 03/13/21 11:33 PM   Result Value Ref Range    Glucose (POC) 92 65 - 100 mg/dL    Performed by Anatoliy Sevilla   [facility]    METABOLIC PANEL, BASIC    Collection Time: 03/14/21  6:08 AM   Result Value Ref Range    Sodium 140 136 - 145 mmol/L    Potassium 2.9 (L) 3.5 - 5.1 mmol/L    Chloride 105 97 - 108 mmol/L    CO2 28 21 - 32 mmol/L    Anion gap 7 5 - 15 mmol/L    Glucose 83 65 - 100 mg/dL    BUN 20 6 - 20 MG/DL    Creatinine 0.39 (L) 0.55 - 1.02 MG/DL    BUN/Creatinine ratio 51 (H) 12 - 20      GFR est AA >60 >60 ml/min/1.73m2    GFR est non-AA >60 >60 ml/min/1.73m2    Calcium 7.6 (L) 8.5 - 10.1 MG/DL   MAGNESIUM    Collection Time: 03/14/21  6:08 AM   Result Value Ref Range    Magnesium 2.1 1.6 - 2.4 mg/dL   PHOSPHORUS    Collection Time: 03/14/21  6:08 AM   Result Value Ref Range    Phosphorus 3.2 2.6 - 4.7 MG/DL       Assessment and Plan:      Principal Problem:    Acute pyelonephritis (3/3/2021)       Hyperemesis Gravidarum:  Antiemetic Therapy including Phenergan/Zofran/Reglan/Zantac  Aggressive intravenous hydration  Strict Input and Output and Daily weights  pyelonephritis: cont current care   39 y/o [id number] at 25 0/7 weeks admitted for intermittent chills and fever-presumed pyelonephritis  -Afebrile  -Blood culture shows 1/4 yeast preliminary, started on amphotericin per ID until 3/19/21; repeat blood cultures 3/6 negative to date  -urine culture shows klebsiella and citrobactr susceptible to ceftriaxone, appreciate ID help-today last day for ceftriaxone (continue amphoterocin until 3/19)  -bilateral flank pain- renal ultrasound normal on 3/5-improving     Chronic eating disorder complicated by nausea/vomiting of pregnancy  -on TPN-s  -HypoK -replete this am with runs of kcl and possible adjustment to tpn, cont to increase k in tpn   -change BG check q6h  -has outpatient nutritionist, Prabhakar Gil  -iv zofran, phenergan suppositories, carafate, ivf's  -constipation- miralax and dulcolax supp prn and daily colace     Depression with h/o hospitalization for suicidal ideation  -followed by Yanet De Paz (psychiatrist) and Adolph Nagy (psychologist), however pt non-compliant with counseling  -Continue Prozac, Zyprexa (both switched to liquid form to help improve compliance), and prn Prazosin, hydroxyzine  -s/p psych consult for pt non-compliance and trying to interrupt iv meds/tpn-no further recs     Chronic anemia-known beta thal minor  -s/p iv iron with last admission  -Hb currently at baseline        Ambulate with assistance, Juan thakkar for DVT ppx (pt with intermittent dizziness and previous fall history)  Physical therapy consult today for deconditioning

## 2021-03-14 NOTE — PROGRESS NOTES
2972 Verbal shift change report given to Coleman Lanes, RN (oncoming nurse) by Rosa Betancourt RN (offgoing nurse). Report included the following information SBAR.     0820 Pt willing to try dental soft diet - new order placed. 1410 4 runs of 50ml Potassium Chloride ordered at a rate of 25mls/hr. Rescheduled Amphotericin from 1500 to 2315 - after KCL completed and after 1L NS Bolus. Pharmacy aware. [address] [employer] [place] dressing changed due to loosened tegaderm. Also, pt is questioning the Potassium Chloride. She states it makes her \"feel funny\". When asked to elaborate, she described having \"a headache, and being a little dizzy\". RN educated pt on benefits of KCL and noticed the roller clamp has been engaged. RN unclamped and reset IV pump to deliver the full dose. 1900 Pt attempted soup, but felt nauseous and requested Zofran - 4mg IV given. Pt then refused last 2 runs of Potassium Chloride. Amphotericin updated.

## 2021-03-14 NOTE — PROGRESS NOTES
Bedside and Verbal shift change report given to ALBA Stanton (oncoming nurse) by Iron Willis RN (offgoing nurse). Report included the following information SBAR, Kardex, Procedure Summary, Intake/Output, MAR, Accordion and Recent Results.

## 2021-03-15 LAB
ANION GAP SERPL CALC-SCNC: 7 MMOL/L (ref 5–15)
BUN SERPL-MCNC: 20 MG/DL (ref 6–20)
BUN/CREAT SERPL: 49 (ref 12–20)
CALCIUM SERPL-MCNC: 7.7 MG/DL (ref 8.5–10.1)
CHLORIDE SERPL-SCNC: 106 MMOL/L (ref 97–108)
CO2 SERPL-SCNC: 27 MMOL/L (ref 21–32)
CREAT SERPL-MCNC: 0.41 MG/DL (ref 0.55–1.02)
GLUCOSE BLD STRIP.AUTO-MCNC: 79 MG/DL (ref 65–100)
GLUCOSE BLD STRIP.AUTO-MCNC: 86 MG/DL (ref 65–100)
GLUCOSE SERPL-MCNC: 75 MG/DL (ref 65–100)
MAGNESIUM SERPL-MCNC: 2 MG/DL (ref 1.6–2.4)
PHOSPHATE SERPL-MCNC: 3.6 MG/DL (ref 2.6–4.7)
POTASSIUM SERPL-SCNC: 3.1 MMOL/L (ref 3.5–5.1)
SERVICE CMNT-IMP: NORMAL
SERVICE CMNT-IMP: NORMAL
SODIUM SERPL-SCNC: 140 MMOL/L (ref 136–145)

## 2021-03-15 PROCEDURE — 74011250637 HC RX REV CODE- 250/637: Performed by: NURSE PRACTITIONER

## 2021-03-15 PROCEDURE — 36415 COLL VENOUS BLD VENIPUNCTURE: CPT

## 2021-03-15 PROCEDURE — 82962 GLUCOSE BLOOD TEST: CPT

## 2021-03-15 PROCEDURE — 83735 ASSAY OF MAGNESIUM: CPT

## 2021-03-15 PROCEDURE — 84100 ASSAY OF PHOSPHORUS: CPT

## 2021-03-15 PROCEDURE — 74011000258 HC RX REV CODE- 258: Performed by: INTERNAL MEDICINE

## 2021-03-15 PROCEDURE — C9113 INJ PANTOPRAZOLE SODIUM, VIA: HCPCS | Performed by: OBSTETRICS & GYNECOLOGY

## 2021-03-15 PROCEDURE — 80048 BASIC METABOLIC PNL TOTAL CA: CPT

## 2021-03-15 PROCEDURE — 74011250636 HC RX REV CODE- 250/636: Performed by: INTERNAL MEDICINE

## 2021-03-15 PROCEDURE — 65410000002 HC RM PRIVATE OB

## 2021-03-15 PROCEDURE — 74011250636 HC RX REV CODE- 250/636: Performed by: OBSTETRICS & GYNECOLOGY

## 2021-03-15 PROCEDURE — 74011250637 HC RX REV CODE- 250/637: Performed by: INTERNAL MEDICINE

## 2021-03-15 PROCEDURE — 74011000258 HC RX REV CODE- 258: Performed by: OBSTETRICS & GYNECOLOGY

## 2021-03-15 PROCEDURE — 74011250637 HC RX REV CODE- 250/637: Performed by: OBSTETRICS & GYNECOLOGY

## 2021-03-15 PROCEDURE — 74011000250 HC RX REV CODE- 250: Performed by: OBSTETRICS & GYNECOLOGY

## 2021-03-15 PROCEDURE — 97116 GAIT TRAINING THERAPY: CPT

## 2021-03-15 RX ORDER — POTASSIUM CHLORIDE 29.8 MG/ML
20 INJECTION INTRAVENOUS
Status: DISCONTINUED | OUTPATIENT
Start: 2021-03-15 | End: 2021-03-15

## 2021-03-15 RX ORDER — POTASSIUM CHLORIDE 29.8 MG/ML
20 INJECTION INTRAVENOUS
Status: DISPENSED | OUTPATIENT
Start: 2021-03-15 | End: 2021-03-15

## 2021-03-15 RX ORDER — POTASSIUM CHLORIDE 29.8 MG/ML
20 INJECTION INTRAVENOUS ONCE
Status: COMPLETED | OUTPATIENT
Start: 2021-03-15 | End: 2021-03-15

## 2021-03-15 RX ADMIN — CALCIUM GLUCONATE: 94 INJECTION, SOLUTION INTRAVENOUS at 18:36

## 2021-03-15 RX ADMIN — DEXTROSE MONOHYDRATE 217 MG: 5 INJECTION, SOLUTION INTRAVENOUS at 16:19

## 2021-03-15 RX ADMIN — Medication 10 ML: at 15:51

## 2021-03-15 RX ADMIN — Medication 10 ML: at 22:00

## 2021-03-15 RX ADMIN — POTASSIUM CHLORIDE 20 MEQ: 400 INJECTION, SOLUTION INTRAVENOUS at 10:17

## 2021-03-15 RX ADMIN — I.V. FAT EMULSION 250 ML: 20 EMULSION INTRAVENOUS at 18:36

## 2021-03-15 RX ADMIN — SODIUM CHLORIDE 40 MG: 9 INJECTION INTRAMUSCULAR; INTRAVENOUS; SUBCUTANEOUS at 08:25

## 2021-03-15 RX ADMIN — DEXTROSE MONOHYDRATE 20 ML: 5 INJECTION, SOLUTION INTRAVENOUS at 16:19

## 2021-03-15 RX ADMIN — DEXTROSE MONOHYDRATE 20 ML: 5 INJECTION, SOLUTION INTRAVENOUS at 18:34

## 2021-03-15 RX ADMIN — ACETAMINOPHEN 500 MG: 500 TABLET ORAL at 15:51

## 2021-03-15 RX ADMIN — POLYETHYLENE GLYCOL 3350 17 G: 17 POWDER, FOR SOLUTION ORAL at 08:30

## 2021-03-15 RX ADMIN — Medication 10 ML: at 21:00

## 2021-03-15 RX ADMIN — SODIUM CHLORIDE 1000 ML/HR: 9 INJECTION, SOLUTION INTRAVENOUS at 15:09

## 2021-03-15 RX ADMIN — DOCUSATE SODIUM 100 MG: 100 CAPSULE, LIQUID FILLED ORAL at 08:25

## 2021-03-15 NOTE — PROGRESS NOTES
High Risk Obstetrics Progress Note    Name: Regi Plasencia MRN: [id number]  SSN: [id number]    YOB: 1985  Age: 38 y.o. Sex: female      Subjective:      LOS: 12 days    Estimated Date of Delivery: 21   Gestational Age Today: 25w3d     Patient admitted for pyelonephritis. States she does have nausea and vomiting from chronic eating disorder, mild cramping and normal fetal movement and does not have chest pain, shortness of breath, vaginal bleeding  and vaginal leaking of fluid . Objective:     Vitals:  Blood pressure 117/65, pulse 63, temperature 98.3 °F (36.8 °C), resp. rate 14, height 5' 4\" (1.626 m), weight 77.9 kg (171 lb 12.8 oz), last menstrual period 2020, SpO2 99 %, not currently breastfeeding. Temp (24hrs), Av.1 °F (36.7 °C), Min:97.8 °F (36.6 °C), Max:98.4 °F (88.6 °C)    Systolic (45AYH), BAN:804 , Min:104 , XAI:917      Diastolic (89QSD), ALW:58, Min:52, Max:65       Intake and Output:     Date 03/15/21 0700 - 21 0659   Shift [id number] 8966-6051 3895-4326 24 Hour Total   INTAKE   I.V.(mL/kg/hr) 811.8   811.8   Shift Total(mL/kg) 811. 8(10.4)   811. 8(10.4)   OUTPUT   Shift Total(mL/kg)       Weight (kg) 77.9 77.9 77.9 77.9       Physical Exam:  Patient without distress.   Heart: Regular rate and rhythm  Lung: clear to auscultation throughout lung fields, no wheezes, no rales, no rhonchi and normal respiratory effort  Abdomen: soft, nontender, gravid  Lower Extremities:  - Edema No       Membranes:  Intact    Uterine Activity:  None      Labs:   Recent Results (from the past 36 hour(s))   GLUCOSE, POC    Collection Time: 21 11:33 PM   Result Value Ref Range    Glucose (POC) 92 65 - 100 mg/dL    Performed by Marie Tolbert   PCT    METABOLIC PANEL, BASIC    Collection Time: 21  6:08 AM   Result Value Ref Range    Sodium 140 136 - 145 mmol/L    Potassium 2.9 (L) 3.5 - 5.1 mmol/L    Chloride 105 97 - 108 mmol/L    CO2 28 21 - 32 mmol/L    Anion gap 7 5 - 15 mmol/L    Glucose 83 65 - 100 mg/dL    BUN 20 6 - 20 MG/DL    Creatinine 0.39 (L) 0.55 - 1.02 MG/DL    BUN/Creatinine ratio 51 (H) 12 - 20      GFR est AA >60 >60 ml/min/1.73m2    GFR est non-AA >60 >60 ml/min/1.73m2    Calcium 7.6 (L) 8.5 - 10.1 MG/DL   MAGNESIUM    Collection Time: 03/14/21  6:08 AM   Result Value Ref Range    Magnesium 2.1 1.6 - 2.4 mg/dL   PHOSPHORUS    Collection Time: 03/14/21  6:08 AM   Result Value Ref Range    Phosphorus 3.2 2.6 - 4.7 MG/DL   GLUCOSE, POC    Collection Time: 03/14/21 12:00 PM   Result Value Ref Range    Glucose (POC) 89 65 - 100 mg/dL    Performed by Sienna Lomeli    GLUCOSE, POC    Collection Time: 03/14/21  6:07 PM   Result Value Ref Range    Glucose (POC) 85 65 - 100 mg/dL    Performed by [address] [place] [address], BASIC    Collection Time: 03/15/21  4:46 AM   Result Value Ref Range    Sodium 140 136 - 145 mmol/L    Potassium 3.1 (L) 3.5 - 5.1 mmol/L    Chloride 106 97 - 108 mmol/L    CO2 27 21 - 32 mmol/L    Anion gap 7 5 - 15 mmol/L    Glucose 75 65 - 100 mg/dL    BUN 20 6 - 20 MG/DL    Creatinine 0.41 (L) 0.55 - 1.02 MG/DL    BUN/Creatinine ratio 49 (H) 12 - 20      GFR est AA >60 >60 ml/min/1.73m2    GFR est non-AA >60 >60 ml/min/1.73m2    Calcium 7.7 (L) 8.5 - 10.1 MG/DL   MAGNESIUM    Collection Time: 03/15/21  4:46 AM   Result Value Ref Range    Magnesium 2.0 1.6 - 2.4 mg/dL   PHOSPHORUS    Collection Time: 03/15/21  4:46 AM   Result Value Ref Range    Phosphorus 3.6 2.6 - 4.7 MG/DL       Assessment and Plan:      Principal Problem:    Acute pyelonephritis (3/3/2021)       37 y/o [id number] at 25 1/7 weeks admitted for intermittent chills and fever-presumed pyelonephritis  -Afebrile  -Blood culture shows 1/4 yeast preliminary, started on amphotericin per ID until 3/19/21; repeat blood cultures 3/6 negative to date  -urine culture shows klebsiella and citrobactr s/p course of ceftriaxone  bilateral flank pain improved- renal ultrasound normal on 3/5-improving  -appreciate I.D. help     Chronic eating disorder complicated by nausea/vomiting of pregnancy  -on TPN-continous  -HypoK -replete this am with runs of kcl and possible adjustment to tpn  -BG check q6h  [facility]  -iv zofran, phenergan suppositories, carafate, ivf's  -constipation- miralax and dulcolax supp prn and daily colace     Depression with h/o hospitalization for suicidal ideation  -followed by Jarred Reddy (psychiatrist) and Adolph Nagy (psychologist), however pt non-compliant with counseling  -Continue Prozac, Zyprexa (both switched to liquid form to help improve compliance), and prn Prazosin, hydroxyzine  -s/p psych consult for pt non-compliance and trying to interrupt iv meds/tpn-no further recs     Chronic anemia-known beta thal minor  -s/p iv iron with last admission  -Hb currently at baseline        Ambulate with assistance, Juan thakkar for DVT ppx (pt with intermittent dizziness and previous fall history)  S/p Physical therapy consult for deconditioning

## 2021-03-15 NOTE — PROGRESS NOTES
Bedside and Verbal shift change report given to Andrew Joe RN (oncoming nurse) by Gael Martinez RN (offgoing nurse). Report included the following information SBAR, Kardex, Intake/Output, MAR and Recent Results. 1230: pt received 1 of 4 ordered IV potassium runs. States potassium makes her feel light headed and woozy and is refusing a 2nd bag at this time. States she will consider another bag in a few hours. MD notified. Per MD, ok to give spaced out over the day/evening.

## 2021-03-15 NOTE — PROGRESS NOTES
Comprehensive Nutrition Assessment    Type and Reason for Visit: Reassess, Consult    Nutrition Recommendations/Plan:     Continue to provide desired meals. Suggest to drink and/or eat small amounts intermittently throughout day to prevent gut atrophy. Nutrition Assessment:      GA: 24w1d admitted for acute pyelonephritis (3/3/21), currently on amphotericin until 3/19/21; urine culture shows klebsiella and citrobactr s/p course of ceftriaxone. Pt continues with poor oral intake. Suggested to attempt to take prenatal vitamin as only able to provide IV MVI x3 week d/t national shortage. Potassium improving. Phos and magnesium wdl. Pt with 4.4 kg wt increase since admission or over the past 8 days. Suggest to drink and/or eat small amounts intermittently throughout day to prevent gut atrophy. TPN provides: 2250 ml, 2334 total calories, 120 grams protein and GIR: 3.5 mgCHO/kg/min. Malnutrition Assessment:  Malnutrition Status:  Severe malnutrition    Context:  Chronic illness     Findings of the 6 clinical characteristics of malnutrition:   Energy Intake:  Mild decrease in energy intake (specify)  Weight Loss:  7.00 - Greater than 10% over 6 months     Body Fat Loss:  7 - Severe body fat loss,     Muscle Mass Loss:  7 - Severe muscle mass loss,     Strength:  Not performed     Estimated Daily Nutrient Needs:  Energy (kcal): 9966-4278 kcal/kg; Weight Used for Energy Requirements: Current  Protein (g):  gm/day (1.2-1.4 gm/day);  Weight Used for Protein Requirements: Admission  Fluid (ml/day): 2400 ml; Method Used for Fluid Requirements: 1 ml/kcal    Wounds:     none       Current Nutrition Therapies:  Current Parenteral Nutrition Orders:  · Type and Formula: AA6% D20% @ 83 ml/hr   · Lipids: 250ml, Daily    Diet: Regular    Medications: carafate, Prenatal plus, prazosin, KCl, zyprexa, prozac, tums, amphotericin,     Anthropometric Measures:  · Height:  5' 4\" (162.6 cm)  · Current Body Wt:  74.2 kg (163 lb 9.3 oz)   · Admission Body Wt:  159 lb 2.8 oz    · Usual Body Wt:  68.7 kg (151 lb 7.3 oz)     · Ideal Body Wt:  120 lbs:  132.6 %     Nutrition Diagnosis:   · Inadequate oral intake related to altered GI function, psychological cause or life stress as evidenced by nutrition support-parenteral nutrition      Nutrition Interventions:   Food and/or Nutrient Delivery: Continue current diet, Continue parenteral nutrition  Nutrition Education and Counseling: No recommendations at this time  Coordination of Nutrition Care: Continue to monitor while inpatient, Coordination of community care    Goals:  Meet nutritional needs via TPN for the next 5-7 days.        Nutrition Monitoring and Evaluation:   Behavioral-Environmental Outcomes: None identified  Food/Nutrient Intake Outcomes: IVF intake, Parenteral nutrition intake/tolerance  Physical Signs/Symptoms Outcomes: Biochemical data, GI status, Nausea/vomiting, Weight    Discharge Planning:    Parenteral nutrition     Electronically signed by Aditya Thorpe RD on 3/15/2021 at 3:08 PM    Contact: Alexy

## 2021-03-15 NOTE — PROGRESS NOTES
ID Progress Note  3/15/2021    Subjective:     Afebrile. No dyspnea, abdominal pain,        Objective:     Vitals:   Visit Vitals  /65 (BP 1 Location: Right upper arm, BP Patient Position: At rest)   Pulse 63   Temp 98.3 °F (36.8 °C)   Resp 14   Ht 5' 4\" (1.626 m)   Wt 77.9 kg (171 lb 12.8 oz)   LMP 2020 (Exact Date)   SpO2 99%   BMI 29.49 kg/m²        Tmax:  Temp (24hrs), Av.1 °F (36.7 °C), Min:97.8 °F (36.6 °C), Max:98.4 °F (36.9 °C)      Exam:    Not in distress  Lung clear, no rales, wheezes or rhonchi   Heart: s1, s2, RRR, no murmurs rubs or clicks  Abdomen: soft nontender, no guarding or rebound  Speech fluent     Labs:   Lab Results   Component Value Date/Time    WBC 5.1 03/10/2021 06:35 AM    Hemoglobin (POC) 10.4 (L) 2020 07:12 AM    HGB 7.8 (L) 03/10/2021 06:35 AM    HCT 24.1 (L) 03/10/2021 06:35 AM    PLATELET 555  06:35 AM    MCV 73.9 (L) 03/10/2021 06:35 AM    Hgb, External 33.7 2012    Hct, External 69 2012    Platelet cnt., External 307 2012     Lab Results   Component Value Date/Time    Sodium 140 03/15/2021 04:46 AM    Potassium 3.1 (L) 03/15/2021 04:46 AM    Chloride 106 03/15/2021 04:46 AM    CO2 27 03/15/2021 04:46 AM    Anion gap 7 03/15/2021 04:46 AM    Glucose 75 03/15/2021 04:46 AM    BUN 20 03/15/2021 04:46 AM    Creatinine 0.41 (L) 03/15/2021 04:46 AM    BUN/Creatinine ratio 49 (H) 03/15/2021 04:46 AM    GFR est AA >60 03/15/2021 04:46 AM    GFR est non-AA >60 03/15/2021 04:46 AM    Calcium 7.7 (L) 03/15/2021 04:46 AM    Bilirubin, total 0.5 2021 08:25 AM    Alk.  phosphatase 110 2021 08:25 AM    Protein, total 6.3 (L) 2021 08:25 AM    Albumin 2.1 (L) 2021 08:25 AM    Globulin 4.2 (H) 2021 08:25 AM    A-G Ratio 0.5 (L) 2021 08:25 AM    ALT (SGPT) 26 2021 08:25 AM       S/p right IJ (3/5)  Assessment:     #1 fever - probably from UTI and candidemia     Blood cx (3/2)  yeast      Urine cx (3/2) klebsiella pneumoniae, second citrobacter -    renal US (3/5) Mild left hydronephrosis is likely within the range of normal in pregnancy.       #2 intrauterine pregnancy     #3 asthma     #4 depression    #5 hypokalemia secondary to ampho b     Recommendations:   Continue ceftriaxone and amphotericin  -> fluconazole and echinocandins have shown harm in fetuses in animal studies. Amphotericin is treatment of choice for candidemia in pregnancy. ff up repeat blood cx. If this is negative, she will need amphotericin until the 19th.   -> will need dilated eye exam as outpatient to make sure candida didn't go to the eyes       We can place picc on the 18th if she still needs this as outpatient        Pt has been informed side effects of medication along with benefits and risks of using such medication. Check daily BMP and Mg level    She cannot tolerate Effer-K.  I have reordered runs of KCL IV for a total of 80 Meqs                 Aiden Hinds MD

## 2021-03-15 NOTE — PROGRESS NOTES
Problem: Mobility Impaired (Adult and Pediatric)  Goal: *Acute Goals and Plan of Care (Insert Text)  Description: FUNCTIONAL STATUS PRIOR TO ADMISSION: Patient was independent and active without use of DME. Pt reports modified independence with basic and instrumental ADLs. Pt endorses fall in January 2/2 syncope episodes. HOME SUPPORT PRIOR TO ADMISSION: The patient lived alone with no assistance nearby. Physical Therapy Goals  Initiated 3/8/2021  1. Patient will move from supine to sit and sit to supine , scoot up and down, and roll side to side in bed with modified independence within 7 day(s). 2.  Patient will transfer from bed to chair and chair to bed with modified independence using the least restrictive device within 7 day(s). 3.  Patient will perform sit to stand with modified independence within 7 day(s). 4.  Patient will ambulate with modified independence for 300 feet with the least restrictive device within 7 day(s). 5.  Patient will ascend/descend 10 stairs with one handrail(s) with modified independence within 7 day(s). Outcome: Resolved/Met   PHYSICAL THERAPY TREATMENT/DISCHARGE  Patient: Alesha Castrejon (59 y.o. female)  Date: 3/15/2021  Diagnosis: Acute pyelonephritis [N10] Acute pyelonephritis  Procedure(s) (LRB):  INFUSION CATHETER INSERTION (N/A) 10 Days Post-Op  Precautions:    Chart, physical therapy assessment, plan of care and goals were reviewed. ASSESSMENT  Patient continues with skilled PT services and has progressed towards goals. Patient ambulated with fair inder and able to manage IV pole with no LOB. Pt negotiated steps with step to pattern, limited in number due to IV. Pt appears to be at baseline for functional mobility, educate pt to continue mobilizing with RN (OOB to chair, ambulate 3x/day) to prevent deconditioning, pt states no concerns. Continue to recommend HHPT upon d/c to continue progress with functional independence.      Other factors to consider for discharge: stairs at home         PLAN :  Patient will be discharged from acute skilled physical therapy at this time. Rationale for discharge:  Joseph alcala    Recommendation for discharge: (in order for the patient to meet his/her long term goals)  Physical therapy at least 2 days/week in the home     This discharge recommendation:  Has been made in collaboration with the attending provider and/or case management    IF patient discharges home will need the following DME: none       SUBJECTIVE:   Patient stated I am okay.     OBJECTIVE DATA SUMMARY:   Critical Behavior:              Functional Mobility Training:  Bed Mobility:     Supine to Sit: Modified independent  Sit to Supine: Modified independent           Transfers:  Sit to Stand: Modified independent  Stand to Sit: Modified independent                             Balance:  Sitting: Intact  Standing: Intact; Without support  Ambulation/Gait Training:  Distance (ft): 100 Feet (ft)(x2)  Assistive Device: Gait belt  Ambulation - Level of Assistance: Modified independent; Additional time        Gait Abnormalities: Decreased step clearance        Base of Support: Narrowed    Stairs:  Number of Stairs Trained: 3  Stairs - Level of Assistance: Modified independent   Rail Use: Left       Activity Tolerance:   Good    After treatment patient left in no apparent distress:   Supine in bed, Call bell within reach, Caregiver / family present, and Side rails x 3    COMMUNICATION/COLLABORATION:   The patients plan of care was discussed with: Registered nurse.      Yandy Greenberg PT, DPT   Time Calculation: 10 mins

## 2021-03-16 LAB
ANION GAP SERPL CALC-SCNC: 6 MMOL/L (ref 5–15)
BUN SERPL-MCNC: 18 MG/DL (ref 6–20)
BUN/CREAT SERPL: 56 (ref 12–20)
CALCIUM SERPL-MCNC: 7.7 MG/DL (ref 8.5–10.1)
CHLORIDE SERPL-SCNC: 108 MMOL/L (ref 97–108)
CO2 SERPL-SCNC: 26 MMOL/L (ref 21–32)
CREAT SERPL-MCNC: 0.32 MG/DL (ref 0.55–1.02)
GLUCOSE BLD STRIP.AUTO-MCNC: 75 MG/DL (ref 65–100)
GLUCOSE BLD STRIP.AUTO-MCNC: 84 MG/DL (ref 65–100)
GLUCOSE BLD STRIP.AUTO-MCNC: 86 MG/DL (ref 65–100)
GLUCOSE BLD STRIP.AUTO-MCNC: 93 MG/DL (ref 65–100)
GLUCOSE SERPL-MCNC: 70 MG/DL (ref 65–100)
MAGNESIUM SERPL-MCNC: 1.8 MG/DL (ref 1.6–2.4)
PHOSPHATE SERPL-MCNC: 3.8 MG/DL (ref 2.6–4.7)
POTASSIUM SERPL-SCNC: 3.4 MMOL/L (ref 3.5–5.1)
SERVICE CMNT-IMP: NORMAL
SODIUM SERPL-SCNC: 140 MMOL/L (ref 136–145)

## 2021-03-16 PROCEDURE — 82962 GLUCOSE BLOOD TEST: CPT

## 2021-03-16 PROCEDURE — 74011000250 HC RX REV CODE- 250: Performed by: OBSTETRICS & GYNECOLOGY

## 2021-03-16 PROCEDURE — 80048 BASIC METABOLIC PNL TOTAL CA: CPT

## 2021-03-16 PROCEDURE — 74011250637 HC RX REV CODE- 250/637: Performed by: NURSE PRACTITIONER

## 2021-03-16 PROCEDURE — 74011250636 HC RX REV CODE- 250/636: Performed by: INTERNAL MEDICINE

## 2021-03-16 PROCEDURE — 83735 ASSAY OF MAGNESIUM: CPT

## 2021-03-16 PROCEDURE — 74011250636 HC RX REV CODE- 250/636: Performed by: OBSTETRICS & GYNECOLOGY

## 2021-03-16 PROCEDURE — 74011000258 HC RX REV CODE- 258: Performed by: OBSTETRICS & GYNECOLOGY

## 2021-03-16 PROCEDURE — 36415 COLL VENOUS BLD VENIPUNCTURE: CPT

## 2021-03-16 PROCEDURE — 84100 ASSAY OF PHOSPHORUS: CPT

## 2021-03-16 PROCEDURE — 74011000258 HC RX REV CODE- 258: Performed by: INTERNAL MEDICINE

## 2021-03-16 PROCEDURE — 74011250637 HC RX REV CODE- 250/637: Performed by: OBSTETRICS & GYNECOLOGY

## 2021-03-16 PROCEDURE — 74011250637 HC RX REV CODE- 250/637: Performed by: INTERNAL MEDICINE

## 2021-03-16 PROCEDURE — 65410000002 HC RM PRIVATE OB

## 2021-03-16 PROCEDURE — C9113 INJ PANTOPRAZOLE SODIUM, VIA: HCPCS | Performed by: OBSTETRICS & GYNECOLOGY

## 2021-03-16 RX ORDER — POTASSIUM CHLORIDE 29.8 MG/ML
20 INJECTION INTRAVENOUS ONCE
Status: COMPLETED | OUTPATIENT
Start: 2021-03-16 | End: 2021-03-16

## 2021-03-16 RX ADMIN — SODIUM CHLORIDE 1000 ML/HR: 9 INJECTION, SOLUTION INTRAVENOUS at 15:09

## 2021-03-16 RX ADMIN — ACETAMINOPHEN 500 MG: 500 TABLET ORAL at 16:07

## 2021-03-16 RX ADMIN — DEXTROSE MONOHYDRATE 217 MG: 5 INJECTION, SOLUTION INTRAVENOUS at 16:13

## 2021-03-16 RX ADMIN — DEXTROSE MONOHYDRATE 20 ML: 5 INJECTION, SOLUTION INTRAVENOUS at 18:22

## 2021-03-16 RX ADMIN — DOCUSATE SODIUM 100 MG: 100 CAPSULE, LIQUID FILLED ORAL at 08:20

## 2021-03-16 RX ADMIN — SODIUM CHLORIDE 40 MG: 9 INJECTION INTRAMUSCULAR; INTRAVENOUS; SUBCUTANEOUS at 08:22

## 2021-03-16 RX ADMIN — Medication 10 ML: at 15:10

## 2021-03-16 RX ADMIN — CALCIUM GLUCONATE: 94 INJECTION, SOLUTION INTRAVENOUS at 18:22

## 2021-03-16 RX ADMIN — DEXTROSE MONOHYDRATE 20 ML: 5 INJECTION, SOLUTION INTRAVENOUS at 16:09

## 2021-03-16 RX ADMIN — Medication 10 ML: at 21:39

## 2021-03-16 RX ADMIN — POLYETHYLENE GLYCOL 3350 17 G: 17 POWDER, FOR SOLUTION ORAL at 08:21

## 2021-03-16 RX ADMIN — BISACODYL 10 MG: 10 SUPPOSITORY RECTAL at 08:22

## 2021-03-16 RX ADMIN — POTASSIUM CHLORIDE 20 MEQ: 400 INJECTION, SOLUTION INTRAVENOUS at 10:18

## 2021-03-16 RX ADMIN — SUCRALFATE 1 G: 1 TABLET ORAL at 21:37

## 2021-03-16 RX ADMIN — I.V. FAT EMULSION 250 ML: 20 EMULSION INTRAVENOUS at 18:22

## 2021-03-16 NOTE — PROGRESS NOTES
Bedside and Verbal shift change report given to LILIA Ramirez RN (oncoming nurse) by Cherry Nunez RN (offgoing nurse). Report included the following information SBAR, Kardex, Intake/Output, MAR and Accordion. 2000- Pt declines oral solution of prozac.    2109- Pt declines olanzapine, minipress and carafate. 0730- Pt declines AM carafate, but states she would like to continue to take the stool softeners.

## 2021-03-16 NOTE — PROGRESS NOTES
High Risk Obstetrics Progress Note    Name: Violeta Stewart MRN: [id number]  SSN: [id number]    YOB: 1985  Age: 38 y.o. Sex: female      Subjective:      LOS: 13 days    Estimated Date of Delivery: 21   Gestational Age Today: 18w4d     Patient admitted for pyelonephritis. States she does have nausea, vomiting, cramping and normal fetal movement. and does not have chest pain, shortness of breath, vaginal bleeding  and vaginal leaking of fluid . Objective:     Vitals:  Blood pressure 119/75, pulse 62, temperature 97.7 °F (36.5 °C), resp. rate 18, height 5' 4\" (1.626 m), weight 78.7 kg (173 lb 6.4 oz), last menstrual period 2020, SpO2 98 %, not currently breastfeeding. Temp (24hrs), Av.1 °F (36.7 °C), Min:97.7 °F (36.5 °C), Max:98.4 °F (87.5 °C)    Systolic (47RYT), FBZ:938 , Min:119 , AVM:073      Diastolic (78TRE), FVC:61, Min:67, Max:75       Intake and Output:         Physical Exam:  Patient without distress.   Heart: Regular rate and rhythm  Lung: clear to auscultation throughout lung fields, no wheezes, no rales, no rhonchi and normal respiratory effort  Abdomen: soft, nontender, gravid  Lower Extremities:  - Edema No       Membranes:  Intact        Labs:   Recent Results (from the past 36 hour(s))   METABOLIC PANEL, BASIC    Collection Time: 03/15/21  4:46 AM   Result Value Ref Range    Sodium 140 136 - 145 mmol/L    Potassium 3.1 (L) 3.5 - 5.1 mmol/L    Chloride 106 97 - 108 mmol/L    CO2 27 21 - 32 mmol/L    Anion gap 7 5 - 15 mmol/L    Glucose 75 65 - 100 mg/dL    BUN 20 6 - 20 MG/DL    Creatinine 0.41 (L) 0.55 - 1.02 MG/DL    BUN/Creatinine ratio 49 (H) 12 - 20      GFR est AA >60 >60 ml/min/1.73m2    GFR est non-AA >60 >60 ml/min/1.73m2    Calcium 7.7 (L) 8.5 - 10.1 MG/DL   MAGNESIUM    Collection Time: 03/15/21  4:46 AM   Result Value Ref Range    Magnesium 2.0 1.6 - 2.4 mg/dL   PHOSPHORUS    Collection Time: 03/15/21  4:46 AM   Result Value Ref Range    Phosphorus 3.6 2.6 - 4.7 MG/DL   GLUCOSE, POC    Collection Time: 03/15/21  1:03 PM   Result Value Ref Range    Glucose (POC) 86 65 - 100 mg/dL    Performed by David PHAM    GLUCOSE, POC    Collection Time: 03/15/21  6:45 PM   Result Value Ref Range    Glucose (POC) 79 65 - 100 mg/dL    Performed by Layne Callahan    GLUCOSE, POC    Collection Time: 03/16/21  1:20 AM   Result Value Ref Range    Glucose (POC) 84 65 - 100 mg/dL    Performed by Gerry Putnam    GLUCOSE, POC    Collection Time: 03/16/21  5:58 AM   Result Value Ref Range    Glucose (POC) 86 65 - 100 mg/dL    Performed by Gerry Putnam    METABOLIC PANEL, BASIC    Collection Time: 03/16/21  6:44 AM   Result Value Ref Range    Sodium 140 136 - 145 mmol/L    Potassium 3.4 (L) 3.5 - 5.1 mmol/L    Chloride 108 97 - 108 mmol/L    CO2 26 21 - 32 mmol/L    Anion gap 6 5 - 15 mmol/L    Glucose 70 65 - 100 mg/dL    BUN 18 6 - 20 MG/DL    Creatinine 0.32 (L) 0.55 - 1.02 MG/DL    BUN/Creatinine ratio 56 (H) 12 - 20      GFR est AA >60 >60 ml/min/1.73m2    GFR est non-AA >60 >60 ml/min/1.73m2    Calcium 7.7 (L) 8.5 - 10.1 MG/DL   MAGNESIUM    Collection Time: 03/16/21  6:44 AM   Result Value Ref Range    Magnesium 1.8 1.6 - 2.4 mg/dL   PHOSPHORUS    Collection Time: 03/16/21  6:44 AM   Result Value Ref Range    Phosphorus 3.8 2.6 - 4.7 MG/DL       Assessment and Plan:      Principal Problem:    Acute pyelonephritis (3/3/2021)       39 y/o [id number] at 25 2/7 weeks admitted for intermittent chills and fever-presumed pyelonephritis  -Afebrile  -Blood culture shows 1/4 yeast preliminary, started on amphotericin per ID until 3/19/21; repeat blood cultures 3/6 negative to date  -urine culture shows klebsiella and citrobactr s/p course of ceftriaxone  bilateral flank pain improved- renal ultrasound normal on 3/5-improving  -appreciate I.D. help     Chronic eating disorder complicated by nausea/vomiting of pregnancy  -on TPN-continous  -HypoK - continue to replete this am with runs of kcl (pt is refusing many of these due to discomfort-I have spoken to her about the importance of compliance with meds) -BG check q6h  -has outpatient nutritionist, Gabriela Magallanes  -iv zofran, phenergan suppositories, carafate, ivf's  -constipation- miralax and dulcolax supp prn and daily colace     Depression with h/o hospitalization for suicidal ideation  -followed by Marge Jacobs (psychiatrist) and Tony Howell (psychologist), however pt non-compliant with counseling  -Continue Prozac, Zyprexa (both switched to liquid form to help improve compliance), and prn Prazosin, hydroxyzine  -s/p psych consult for pt non-compliance and trying to interrupt iv meds/tpn-no further recs-per ketaning pt continues to refuse her prozac and zyprexa     Chronic anemia-known beta thal minor  -s/p iv iron with last admission  -Hb currently at baseline        Ambulate with assistance, Juan thakkar for DVT ppx (pt with intermittent dizziness and previous fall history)  S/p Physical therapy consult for deconditioning

## 2021-03-16 NOTE — PROGRESS NOTES
ID Progress Note  3/16/2021    Subjective:     Afebrile. No dyspnea, abdominal pain,        Objective:     Vitals:   Visit Vitals  /75 (BP 1 Location: Right arm, BP Patient Position: At rest)   Pulse 62   Temp 98 °F (36.7 °C)   Resp 18   Ht 5' 4\" (1.626 m)   Wt 78.7 kg (173 lb 6.4 oz)   LMP 2020 (Exact Date)   SpO2 99%   BMI 29.76 kg/m²        Tmax:  Temp (24hrs), Av.1 °F (36.7 °C), Min:97.7 °F (36.5 °C), Max:98.4 °F (36.9 °C)      Exam:    Not in distress  Lung clear, no rales, wheezes or rhonchi   Heart: s1, s2, RRR, no murmurs rubs or clicks  Abdomen: soft nontender, no guarding or rebound  Speech fluent     Labs:   Lab Results   Component Value Date/Time    WBC 5.1 03/10/2021 06:35 AM    Hemoglobin (POC) 10.4 (L) 2020 07:12 AM    HGB 7.8 (L) 03/10/2021 06:35 AM    HCT 24.1 (L) 03/10/2021 06:35 AM    PLATELET 029  06:35 AM    MCV 73.9 (L) 03/10/2021 06:35 AM    Hgb, External 33.7 2012    Hct, External 69 2012    Platelet cnt., External 307 2012     Lab Results   Component Value Date/Time    Sodium 140 2021 06:44 AM    Potassium 3.4 (L) 2021 06:44 AM    Chloride 108 2021 06:44 AM    CO2 26 2021 06:44 AM    Anion gap 6 2021 06:44 AM    Glucose 70 2021 06:44 AM    BUN 18 2021 06:44 AM    Creatinine 0.32 (L) 2021 06:44 AM    BUN/Creatinine ratio 56 (H) 2021 06:44 AM    GFR est AA >60 2021 06:44 AM    GFR est non-AA >60 2021 06:44 AM    Calcium 7.7 (L) 2021 06:44 AM    Bilirubin, total 0.5 2021 08:25 AM    Alk.  phosphatase 110 2021 08:25 AM    Protein, total 6.3 (L) 2021 08:25 AM    Albumin 2.1 (L) 2021 08:25 AM    Globulin 4.2 (H) 2021 08:25 AM    A-G Ratio 0.5 (L) 2021 08:25 AM    ALT (SGPT) 26 2021 08:25 AM       S/p right IJ (3/5)  Assessment:     #1 fever - probably from UTI and candidemia     Blood cx (32) 1/4 yeast      Urine cx (32) klebsiella pneumoniae, second citrobacter -    renal US (3/5) Mild left hydronephrosis is likely within the range of normal in pregnancy.       #2 intrauterine pregnancy     #3 asthma     #4 depression    #5 hypokalemia secondary to ampho b     Recommendations:   Continue ceftriaxone and amphotericin  -> fluconazole and echinocandins have shown harm in fetuses in animal studies. Amphotericin is treatment of choice for candidemia in pregnancy. ff up repeat blood cx. If this is negative, she will need amphotericin until the 19th.   -> will need dilated eye exam as outpatient to make sure candida didn't go to the eyes       We can place picc on the 18th if she still needs this as outpatient        Pt has been informed side effects of medication along with benefits and risks of using such medication. Check daily BMP and Mg level    k 3.4.                  Nicolasa Dey MD

## 2021-03-16 NOTE — PROGRESS NOTES
Bedside and Verbal shift change report given to [address] (oncoming nurse) by Kal BARNARD (offgoing nurse). Report included the following information SBAR, Kardex, Intake/Output, MAR and Recent Results.

## 2021-03-17 LAB
ANION GAP SERPL CALC-SCNC: 7 MMOL/L (ref 5–15)
BUN SERPL-MCNC: 18 MG/DL (ref 6–20)
BUN/CREAT SERPL: 49 (ref 12–20)
CALCIUM SERPL-MCNC: 7.9 MG/DL (ref 8.5–10.1)
CHLORIDE SERPL-SCNC: 105 MMOL/L (ref 97–108)
CO2 SERPL-SCNC: 25 MMOL/L (ref 21–32)
CREAT SERPL-MCNC: 0.37 MG/DL (ref 0.55–1.02)
GLUCOSE BLD STRIP.AUTO-MCNC: 60 MG/DL (ref 65–100)
GLUCOSE BLD STRIP.AUTO-MCNC: 73 MG/DL (ref 65–100)
GLUCOSE BLD STRIP.AUTO-MCNC: 83 MG/DL (ref 65–100)
GLUCOSE BLD STRIP.AUTO-MCNC: 93 MG/DL (ref 65–100)
GLUCOSE SERPL-MCNC: 77 MG/DL (ref 65–100)
MAGNESIUM SERPL-MCNC: 1.8 MG/DL (ref 1.6–2.4)
PHOSPHATE SERPL-MCNC: 4 MG/DL (ref 2.6–4.7)
POTASSIUM SERPL-SCNC: 3.5 MMOL/L (ref 3.5–5.1)
SERVICE CMNT-IMP: ABNORMAL
SERVICE CMNT-IMP: NORMAL
SODIUM SERPL-SCNC: 137 MMOL/L (ref 136–145)

## 2021-03-17 PROCEDURE — 94760 N-INVAS EAR/PLS OXIMETRY 1: CPT

## 2021-03-17 PROCEDURE — C9113 INJ PANTOPRAZOLE SODIUM, VIA: HCPCS | Performed by: OBSTETRICS & GYNECOLOGY

## 2021-03-17 PROCEDURE — 83735 ASSAY OF MAGNESIUM: CPT

## 2021-03-17 PROCEDURE — 36415 COLL VENOUS BLD VENIPUNCTURE: CPT

## 2021-03-17 PROCEDURE — 74011250637 HC RX REV CODE- 250/637: Performed by: NURSE PRACTITIONER

## 2021-03-17 PROCEDURE — 74011250636 HC RX REV CODE- 250/636: Performed by: OBSTETRICS & GYNECOLOGY

## 2021-03-17 PROCEDURE — 74011250637 HC RX REV CODE- 250/637: Performed by: INTERNAL MEDICINE

## 2021-03-17 PROCEDURE — 65410000002 HC RM PRIVATE OB

## 2021-03-17 PROCEDURE — 74011000258 HC RX REV CODE- 258: Performed by: INTERNAL MEDICINE

## 2021-03-17 PROCEDURE — 80048 BASIC METABOLIC PNL TOTAL CA: CPT

## 2021-03-17 PROCEDURE — 74011000250 HC RX REV CODE- 250: Performed by: OBSTETRICS & GYNECOLOGY

## 2021-03-17 PROCEDURE — 82962 GLUCOSE BLOOD TEST: CPT

## 2021-03-17 PROCEDURE — 74011250636 HC RX REV CODE- 250/636: Performed by: INTERNAL MEDICINE

## 2021-03-17 PROCEDURE — 74011000258 HC RX REV CODE- 258: Performed by: OBSTETRICS & GYNECOLOGY

## 2021-03-17 PROCEDURE — 84100 ASSAY OF PHOSPHORUS: CPT

## 2021-03-17 PROCEDURE — 74011250637 HC RX REV CODE- 250/637: Performed by: OBSTETRICS & GYNECOLOGY

## 2021-03-17 RX ORDER — FLUOXETINE HYDROCHLORIDE 20 MG/1
60 CAPSULE ORAL
Status: DISCONTINUED | OUTPATIENT
Start: 2021-03-17 | End: 2021-04-02 | Stop reason: HOSPADM

## 2021-03-17 RX ORDER — FLUOXETINE HYDROCHLORIDE 20 MG/1
60 CAPSULE ORAL DAILY
Status: DISCONTINUED | OUTPATIENT
Start: 2021-03-17 | End: 2021-03-17

## 2021-03-17 RX ADMIN — SODIUM CHLORIDE 40 MG: 9 INJECTION INTRAMUSCULAR; INTRAVENOUS; SUBCUTANEOUS at 09:29

## 2021-03-17 RX ADMIN — FLUOXETINE 60 MG: 20 CAPSULE ORAL at 21:35

## 2021-03-17 RX ADMIN — DOCUSATE SODIUM 100 MG: 100 CAPSULE, LIQUID FILLED ORAL at 09:28

## 2021-03-17 RX ADMIN — Medication 10 ML: at 21:37

## 2021-03-17 RX ADMIN — POLYETHYLENE GLYCOL 3350 17 G: 17 POWDER, FOR SOLUTION ORAL at 09:28

## 2021-03-17 RX ADMIN — ACETAMINOPHEN 500 MG: 500 TABLET ORAL at 15:51

## 2021-03-17 RX ADMIN — SUCRALFATE 1 G: 1 TABLET ORAL at 21:35

## 2021-03-17 RX ADMIN — CALCIUM GLUCONATE: 94 INJECTION, SOLUTION INTRAVENOUS at 18:32

## 2021-03-17 RX ADMIN — SUCRALFATE 1 G: 1 TABLET ORAL at 16:16

## 2021-03-17 RX ADMIN — Medication 10 ML: at 06:37

## 2021-03-17 RX ADMIN — DEXTROSE MONOHYDRATE 217 MG: 5 INJECTION, SOLUTION INTRAVENOUS at 16:19

## 2021-03-17 RX ADMIN — I.V. FAT EMULSION 250 ML: 20 EMULSION INTRAVENOUS at 18:32

## 2021-03-17 RX ADMIN — DEXTROSE MONOHYDRATE 20 ML: 5 INJECTION, SOLUTION INTRAVENOUS at 18:35

## 2021-03-17 RX ADMIN — OLANZAPINE 10 MG: 5 TABLET, ORALLY DISINTEGRATING ORAL at 21:35

## 2021-03-17 RX ADMIN — SODIUM CHLORIDE 1000 ML/HR: 9 INJECTION, SOLUTION INTRAVENOUS at 15:16

## 2021-03-17 RX ADMIN — Medication 10 ML: at 15:17

## 2021-03-17 RX ADMIN — SUCRALFATE 1 G: 1 TABLET ORAL at 06:36

## 2021-03-17 RX ADMIN — DEXTROSE MONOHYDRATE 20 ML: 5 INJECTION, SOLUTION INTRAVENOUS at 16:18

## 2021-03-17 RX ADMIN — SUCRALFATE 1 G: 1 TABLET ORAL at 11:37

## 2021-03-17 NOTE — PROGRESS NOTES
High Risk Obstetrics Progress Note    Name: Alejandra Whitehead MRN: [id number]  SSN: [id number]    YOB: 1985  Age: 38 y.o. Sex: female      Subjective:      LOS: 14 days    Estimated Date of Delivery: 21   Gestational Age Today: 18w2d     Patient admitted for pyelonephritis. States she does have nausea (but no emesis yesterday) and mild cramping and normal fetal movement and does not have chest pain, shortness of breath, vaginal bleeding  and vaginal leaking of fluid . Objective:     Vitals:  Blood pressure (!) 104/56, pulse 66, temperature 98.1 °F (36.7 °C), resp. rate 17, height 5' 4\" (1.626 m), weight 77.8 kg (171 lb 9.6 oz), last menstrual period 2020, SpO2 100 %, not currently breastfeeding. Temp (24hrs), Av.1 °F (36.7 °C), Min:97.7 °F (36.5 °C), Max:98.5 °F (08.8 °C)    Systolic (22AEO), SZU:184 , Min:104 , EFF:791      Diastolic (80FIQ), FKW:86, Min:53, Max:75       Intake and Output:         Physical Exam:  Patient without distress.   Heart: Regular rate and rhythm  Lung: clear to auscultation throughout lung fields, no wheezes, no rales, no rhonchi and normal respiratory effort  Abdomen: soft, nontender, gravid  Lower Extremities:  - Edema No       Membranes:  Intact        Fetal Heart Rate:  +fhts         Labs:   Recent Results (from the past 36 hour(s))   GLUCOSE, POC    Collection Time: 21  1:20 AM   Result Value Ref Range    Glucose (POC) 84 65 - 100 mg/dL    Performed by [employer], POC    Collection Time: 21  5:58 AM   Result Value Ref Range    Glucose (POC) 86 65 - 100 mg/dL    Performed by [facility]    METABOLIC PANEL, BASIC    Collection Time: 21  6:44 AM   Result Value Ref Range    Sodium 140 136 - 145 mmol/L    Potassium 3.4 (L) 3.5 - 5.1 mmol/L    Chloride 108 97 - 108 mmol/L    CO2 26 21 - 32 mmol/L    Anion gap 6 5 - 15 mmol/L    Glucose 70 65 - 100 mg/dL    BUN 18 6 - 20 MG/DL    Creatinine 0.32 (L) 0.55 - 1.02 MG/DL    BUN/Creatinine ratio 56 (H) 12 - 20      GFR est AA >60 >60 ml/min/1.73m2    GFR est non-AA >60 >60 ml/min/1.73m2    Calcium 7.7 (L) 8.5 - 10.1 MG/DL   MAGNESIUM    Collection Time: 03/16/21  6:44 AM   Result Value Ref Range    Magnesium 1.8 1.6 - 2.4 mg/dL   PHOSPHORUS    Collection Time: 03/16/21  6:44 AM   Result Value Ref Range    Phosphorus 3.8 2.6 - 4.7 MG/DL   GLUCOSE, POC    Collection Time: 03/16/21 11:53 AM   Result Value Ref Range    Glucose (POC) 93 65 - 100 mg/dL    Performed by Dhiraj BABIN    GLUCOSE, POC    Collection Time: 03/16/21  6:02 PM   Result Value Ref Range    Glucose (POC) 75 65 - 100 mg/dL    Performed by Lily Bishop    GLUCOSE, POC    Collection Time: 03/17/21 12:26 AM   Result Value Ref Range    Glucose (POC) 93 65 - 100 mg/dL    Performed by Keenan Cargo    GLUCOSE, POC    Collection Time: 03/17/21  6:43 AM   Result Value Ref Range    Glucose (POC) 60 (L) 65 - 100 mg/dL    Performed by Keenan [facility]    GLUCOSE, POC    Collection Time: 03/17/21  6:45 AM   Result Value Ref Range    Glucose (POC) 73 65 - 100 mg/dL    Performed by Sulema Ashford and Plan:      Principal Problem:    Acute pyelonephritis (3/3/2021)       39 y/o [id number] at 24 3/7 weeks admitted for intermittent chills and fever-presumed pyelonephritis  -Afebrile  -Blood culture shows 1/4 yeast preliminary, started on amphotericin per ID until 3/19/21; repeat blood cultures 3/6 negative to date  -urine culture shows klebsiella and citrobactr s/p course of ceftriaxone  bilateral flank pain improved- renal ultrasound normal on 3/5-improving  -appreciate I.D. help  -possible PICC placement tomorrow     Chronic eating disorder complicated by nausea/vomiting of pregnancy  -on TPN-continous  -HypoK - continue to replete this am with runs of kcl (pt is refusing many of these due to discomfort-I have spoken to her about the importance of compliance with meds) -BG check q6h  -has outpatient nutritionist, Sahra tSock  -iv zofran, phenergan suppositories, carafate, ivf's  -constipation- miralax and dulcolax supp prn and daily colace     Depression with h/o hospitalization for suicidal ideation  -followed by Alisa Hancock (psychiatrist) and Zeina Johnson (psychologist), however pt non-compliant with counseling  -Continue Prozac, Zyprexa (both switched to liquid form to help improve compliance), and prn Prazosin, hydroxyzine  -s/p psych consult for pt non-compliance and trying to interrupt iv meds/tpn-no further recs-per esperanza pt continues to refuse her prozac and zyprexa-spoke with pt today regarding this-says liquid horrible. Will switch liquid to tablet and monitor compliance with that.   -Discussed with pt today the importance of compliance with meds so that she can start to improve mentally and physically.  Pt only nods.      Chronic anemia-known beta thal minor  -s/p iv iron with last admission  -Hb currently at baseline        Ambulate with assistance, Juan thakkar for DVT ppx (pt with intermittent dizziness and previous fall history)  S/p Physical therapy consult for deconditioning

## 2021-03-17 NOTE — PROGRESS NOTES
Bedside and Verbal shift change report given to SOPHY Sebastian RN (oncoming nurse) by Raquel Valverde RN (offgoing nurse). Report included the following information SBAR and Kardex.      2130 Pt refused Olanzapine, and Minipress

## 2021-03-17 NOTE — PROGRESS NOTES
Bedside and Verbal shift change report given to Tasha Ferguson RN (oncoming nurse) by Kenan Hill (offgoing nurse). Report included the following information SBAR, Kardex, Intake/Output, MAR and Recent Results. 0830: Pt encouraged to walk throughout the day in the hallways. Educated on importance of walking. Pt nodded. 1455: pt ambulated in hallway.  Tolerated well

## 2021-03-17 NOTE — PROGRESS NOTES
ID Progress Note  3/17/2021    Subjective:     Afebrile. No dyspnea, abdominal pain,        Objective:     Vitals:   Visit Vitals  /64 (BP 1 Location: Right arm, BP Patient Position: At rest)   Pulse 60   Temp 98.4 °F (36.9 °C)   Resp 18   Ht 5' 4\" (1.626 m)   Wt 76.7 kg (169 lb)   LMP 2020 (Exact Date)   SpO2 100%   BMI 29.01 kg/m²        Tmax:  Temp (24hrs), Av.2 °F (36.8 °C), Min:97.7 °F (36.5 °C), Max:98.5 °F (36.9 °C)      Exam:    Not in distress  Lung clear, no rales, wheezes or rhonchi   Heart: s1, s2, RRR, no murmurs rubs or clicks  Abdomen: soft nontender, no guarding or rebound  Speech fluent     Labs:   Lab Results   Component Value Date/Time    WBC 5.1 03/10/2021 06:35 AM    Hemoglobin (POC) 10.4 (L) 2020 07:12 AM    HGB 7.8 (L) 03/10/2021 06:35 AM    HCT 24.1 (L) 03/10/2021 06:35 AM    PLATELET 129  06:35 AM    MCV 73.9 (L) 03/10/2021 06:35 AM    Hgb, External 33.7 2012    Hct, External 69 2012    Platelet cnt., External 307 2012     Lab Results   Component Value Date/Time    Sodium 137 2021 06:44 AM    Potassium 3.5 2021 06:44 AM    Chloride 105 2021 06:44 AM    CO2 25 2021 06:44 AM    Anion gap 7 2021 06:44 AM    Glucose 77 2021 06:44 AM    BUN 18 2021 06:44 AM    Creatinine 0.37 (L) 2021 06:44 AM    BUN/Creatinine ratio 49 (H) 2021 06:44 AM    GFR est AA >60 2021 06:44 AM    GFR est non-AA >60 2021 06:44 AM    Calcium 7.9 (L) 2021 06:44 AM    Bilirubin, total 0.5 2021 08:25 AM    Alk.  phosphatase 110 2021 08:25 AM    Protein, total 6.3 (L) 2021 08:25 AM    Albumin 2.1 (L) 2021 08:25 AM    Globulin 4.2 (H) 2021 08:25 AM    A-G Ratio 0.5 (L) 2021 08:25 AM    ALT (SGPT) 26 2021 08:25 AM       S/p right IJ (3/5)  Assessment:     #1 fever - probably from UTI and candidemia     Blood cx (3/2)  yeast      Urine cx (3/2) klebsiella pneumoniae, second citrobacter -    renal US (3/5) Mild left hydronephrosis is likely within the range of normal in pregnancy.       #2 intrauterine pregnancy     #3 asthma     #4 depression    #5 hypokalemia secondary to ampho b     Recommendations:   Continue ceftriaxone and amphotericin  -> fluconazole and echinocandins have shown harm in fetuses in animal studies. Amphotericin is treatment of choice for candidemia in pregnancy. ff up repeat blood cx. If this is negative, she will need amphotericin until the 19th.   -> will need dilated eye exam as outpatient to make sure candida didn't go to the eyes       We can place picc on the 18th. I will order it. Pt has been informed side effects of medication along with benefits and risks of using such medication. Check daily BMP and Mg level    k 3.5.                  Rad Granados MD

## 2021-03-17 NOTE — PROGRESS NOTES
Spiritual Care Assessment/Progress Note  [facility]      NAME: Stoney Callaway      MRN: [id number]  AGE: 38 y.o. SEX: female  Protestant Affiliation: No Sikh   Language: English     3/17/2021     Total Time (in minutes): 11     Spiritual Assessment begun in [address] through conversation with:         [x]Patient        [] Family    [] Friend(s)        Reason for Consult: Initial/Spiritual assessment, patient floor     Spiritual beliefs: (Please include comment if needed)     [] Identifies with a rosy tradition:         [] Supported by a rosy community:            [] Claims no spiritual orientation:           [] Seeking spiritual identity:                [] Adheres to an individual form of spirituality:           [x] Not able to assess:                           Identified resources for coping:      [] Prayer                               [] Music                  [] Guided Imagery     [x] Family/friends                 [] Pet visits     [] Devotional reading                         [] Unknown     [] Other:                                               Interventions offered during this visit: (See comments for more details)                Plan of Care:     [] Support spiritual and/or cultural needs    [] Support AMD and/or advance care planning process      [] Support grieving process   [] Coordinate Rites and/or Rituals    [] Coordination with community clergy   [] No spiritual needs identified at this time   [] Detailed Plan of Care below (See Comments)  [] Make referral to Music Therapy  [] Make referral to Pet Therapy     [] Make referral to Addiction services  [] Make referral to [facility]  [] Make referral to Spiritual Care Partner  [] No future visits requested        [x] Follow up upon further referrals     Comments:  for initial visit. Pt was resting and sitter at bedside.  talked with pt's RN and let her know of  support.   has visited pt in the past. Please contact [phone] [facility] for further support.      [address], MACE   [phone])

## 2021-03-17 NOTE — PROGRESS NOTES
Transition of Care Plan   RUR  37%    DISPOSITION: The disposition plan is home with family assistance and possible IVAB infusions with [employer]. Transport:family or friend. CRM will continue to monitor for transitions of care needs.

## 2021-03-18 LAB
ANION GAP SERPL CALC-SCNC: 3 MMOL/L (ref 5–15)
BUN SERPL-MCNC: 18 MG/DL (ref 6–20)
BUN/CREAT SERPL: 56 (ref 12–20)
CALCIUM SERPL-MCNC: 7.9 MG/DL (ref 8.5–10.1)
CHLORIDE SERPL-SCNC: 107 MMOL/L (ref 97–108)
CO2 SERPL-SCNC: 27 MMOL/L (ref 21–32)
CREAT SERPL-MCNC: 0.32 MG/DL (ref 0.55–1.02)
GLUCOSE SERPL-MCNC: 71 MG/DL (ref 65–100)
MAGNESIUM SERPL-MCNC: 1.8 MG/DL (ref 1.6–2.4)
PHOSPHATE SERPL-MCNC: 4.2 MG/DL (ref 2.6–4.7)
POTASSIUM SERPL-SCNC: 3.4 MMOL/L (ref 3.5–5.1)
SODIUM SERPL-SCNC: 137 MMOL/L (ref 136–145)

## 2021-03-18 PROCEDURE — 65410000002 HC RM PRIVATE OB

## 2021-03-18 PROCEDURE — 36415 COLL VENOUS BLD VENIPUNCTURE: CPT

## 2021-03-18 PROCEDURE — 80048 BASIC METABOLIC PNL TOTAL CA: CPT

## 2021-03-18 PROCEDURE — 74011250636 HC RX REV CODE- 250/636: Performed by: INTERNAL MEDICINE

## 2021-03-18 PROCEDURE — 74011250637 HC RX REV CODE- 250/637: Performed by: NURSE PRACTITIONER

## 2021-03-18 PROCEDURE — 74011000250 HC RX REV CODE- 250: Performed by: OBSTETRICS & GYNECOLOGY

## 2021-03-18 PROCEDURE — 74011000258 HC RX REV CODE- 258: Performed by: OBSTETRICS & GYNECOLOGY

## 2021-03-18 PROCEDURE — C9113 INJ PANTOPRAZOLE SODIUM, VIA: HCPCS | Performed by: OBSTETRICS & GYNECOLOGY

## 2021-03-18 PROCEDURE — 83735 ASSAY OF MAGNESIUM: CPT

## 2021-03-18 PROCEDURE — 74011250636 HC RX REV CODE- 250/636: Performed by: OBSTETRICS & GYNECOLOGY

## 2021-03-18 PROCEDURE — 84100 ASSAY OF PHOSPHORUS: CPT

## 2021-03-18 PROCEDURE — 36573 INSJ PICC RS&I 5 YR+: CPT | Performed by: INTERNAL MEDICINE

## 2021-03-18 PROCEDURE — 74011000258 HC RX REV CODE- 258: Performed by: INTERNAL MEDICINE

## 2021-03-18 PROCEDURE — 74011250637 HC RX REV CODE- 250/637: Performed by: OBSTETRICS & GYNECOLOGY

## 2021-03-18 PROCEDURE — 74011250637 HC RX REV CODE- 250/637: Performed by: INTERNAL MEDICINE

## 2021-03-18 RX ORDER — POTASSIUM CHLORIDE 29.8 MG/ML
20 INJECTION INTRAVENOUS ONCE
Status: COMPLETED | OUTPATIENT
Start: 2021-03-18 | End: 2021-03-18

## 2021-03-18 RX ADMIN — SODIUM CHLORIDE 40 MG: 9 INJECTION INTRAMUSCULAR; INTRAVENOUS; SUBCUTANEOUS at 10:03

## 2021-03-18 RX ADMIN — POTASSIUM CHLORIDE 20 MEQ: 400 INJECTION, SOLUTION INTRAVENOUS at 13:05

## 2021-03-18 RX ADMIN — DEXTROSE MONOHYDRATE 20 ML: 5 INJECTION, SOLUTION INTRAVENOUS at 19:30

## 2021-03-18 RX ADMIN — SUCRALFATE 1 G: 1 TABLET ORAL at 12:57

## 2021-03-18 RX ADMIN — FLUOXETINE 60 MG: 20 CAPSULE ORAL at 21:51

## 2021-03-18 RX ADMIN — ACETAMINOPHEN 500 MG: 500 TABLET ORAL at 15:37

## 2021-03-18 RX ADMIN — DEXTROSE MONOHYDRATE 217 MG: 5 INJECTION, SOLUTION INTRAVENOUS at 16:57

## 2021-03-18 RX ADMIN — CALCIUM GLUCONATE: 94 INJECTION, SOLUTION INTRAVENOUS at 18:57

## 2021-03-18 RX ADMIN — SUCRALFATE 1 G: 1 TABLET ORAL at 15:37

## 2021-03-18 RX ADMIN — OLANZAPINE 10 MG: 5 TABLET, ORALLY DISINTEGRATING ORAL at 21:51

## 2021-03-18 RX ADMIN — I.V. FAT EMULSION 250 ML: 20 EMULSION INTRAVENOUS at 18:58

## 2021-03-18 RX ADMIN — DOCUSATE SODIUM 100 MG: 100 CAPSULE, LIQUID FILLED ORAL at 10:03

## 2021-03-18 RX ADMIN — Medication 10 ML: at 06:40

## 2021-03-18 RX ADMIN — Medication 10 ML: at 10:06

## 2021-03-18 RX ADMIN — SUCRALFATE 1 G: 1 TABLET ORAL at 06:38

## 2021-03-18 RX ADMIN — POLYETHYLENE GLYCOL 3350 17 G: 17 POWDER, FOR SOLUTION ORAL at 10:17

## 2021-03-18 RX ADMIN — Medication 10 ML: at 19:00

## 2021-03-18 RX ADMIN — SUCRALFATE 1 G: 1 TABLET ORAL at 21:51

## 2021-03-18 RX ADMIN — DEXTROSE MONOHYDRATE 20 ML: 5 INJECTION, SOLUTION INTRAVENOUS at 16:56

## 2021-03-18 RX ADMIN — SODIUM CHLORIDE 1000 ML/HR: 9 INJECTION, SOLUTION INTRAVENOUS at 15:44

## 2021-03-18 RX ADMIN — Medication 1 TABLET: at 10:04

## 2021-03-18 RX ADMIN — Medication 10 ML: at 19:19

## 2021-03-18 NOTE — PROGRESS NOTES
ID Progress Note  3/18/2021    Subjective:     Afebrile. No dyspnea, abdominal pain,        Objective:     Vitals:   Visit Vitals  /69 (BP 1 Location: Right arm, BP Patient Position: Sitting)   Pulse 62   Temp 98 °F (36.7 °C)   Resp 18   Ht 5' 4\" (1.626 m)   Wt 75.8 kg (167 lb 3.2 oz)   LMP 2020 (Exact Date)   SpO2 100%   BMI 28.70 kg/m²        Tmax:  Temp (24hrs), Av.4 °F (36.9 °C), Min:98 °F (36.7 °C), Max:98.7 °F (37.1 °C)      Exam:    Not in distress  Lung clear, no rales, wheezes or rhonchi   Heart: s1, s2, RRR, no murmurs rubs or clicks  Abdomen: soft nontender, no guarding or rebound  Speech fluent     Labs:   Lab Results   Component Value Date/Time    WBC 5.1 03/10/2021 06:35 AM    Hemoglobin (POC) 10.4 (L) 2020 07:12 AM    HGB 7.8 (L) 03/10/2021 06:35 AM    HCT 24.1 (L) 03/10/2021 06:35 AM    PLATELET 618  06:35 AM    MCV 73.9 (L) 03/10/2021 06:35 AM    Hgb, External 33.7 2012    Hct, External 69 2012    Platelet cnt., External 307 2012     Lab Results   Component Value Date/Time    Sodium 137 2021 06:42 AM    Potassium 3.4 (L) 2021 06:42 AM    Chloride 107 2021 06:42 AM    CO2 27 2021 06:42 AM    Anion gap 3 (L) 2021 06:42 AM    Glucose 71 2021 06:42 AM    BUN 18 2021 06:42 AM    Creatinine 0.32 (L) 2021 06:42 AM    BUN/Creatinine ratio 56 (H) 2021 06:42 AM    GFR est AA >60 2021 06:42 AM    GFR est non-AA >60 2021 06:42 AM    Calcium 7.9 (L) 2021 06:42 AM    Bilirubin, total 0.5 2021 08:25 AM    Alk.  phosphatase 110 2021 08:25 AM    Protein, total 6.3 (L) 2021 08:25 AM    Albumin 2.1 (L) 2021 08:25 AM    Globulin 4.2 (H) 2021 08:25 AM    A-G Ratio 0.5 (L) 2021 08:25 AM    ALT (SGPT) 26 2021 08:25 AM       S/p right IJ (3/5)  Assessment:     #1 fever - probably from UTI and candidemia     Blood cx (32) 1/4 yeast      Urine cx (32) klebsiella pneumoniae, second citrobacter -    renal US (3/5) Mild left hydronephrosis is likely within the range of normal in pregnancy.       #2 intrauterine pregnancy     #3 asthma     #4 depression    #5 hypokalemia secondary to ampho b     Recommendations:   Continue ceftriaxone and amphotericin  -> fluconazole and echinocandins have shown harm in fetuses in animal studies. Amphotericin is treatment of choice for candidemia in pregnancy. ff up repeat blood cx. If this is negative, she will need amphotericin until the 19th.   -> will need dilated eye exam as outpatient to make sure candida didn't go to the eyes       picc ordered. Pt has been informed side effects of medication along with benefits and risks of using such medication. Check daily BMP and Mg level    k 3.4.                  Jake Srinivasan MD

## 2021-03-18 NOTE — PROGRESS NOTES
High Risk Obstetrics Progress Note    Name: Akilah Meza MRN: [id number]  SSN: [id number]    YOB: 1985  Age: 38 y.o. Sex: female      Subjective:      LOS: 15 days    Estimated Date of Delivery: 21   Gestational Age Today: 18w2d     Patient admitted for pyelonephritis. States she does have nausea off and on and did have emesis after prozac last night. Denies cp, sob. Reports normal fetal movement. and does not have vaginal bleeding  and vaginal leaking of fluid . Had small bm yesterday. Objective:     Vitals:  Blood pressure 121/66, pulse (!) 55, temperature 98.7 °F (37.1 °C), resp. rate 18, height 5' 4\" (1.626 m), weight 76.7 kg (169 lb), last menstrual period 2020, SpO2 100 %, not currently breastfeeding. Temp (24hrs), Av.6 °F (37 °C), Min:98.4 °F (36.9 °C), Max:98.7 °F (57.9 °C)    Systolic (79YMB), FGQ:430 , Min:115 , FGL:371      Diastolic (60LJC), UKH:83, Min:64, Max:66       Intake and Output:         Physical Exam:  Patient without distress.   Heart: Regular rate and rhythm  Lung: clear to auscultation throughout lung fields, no wheezes, no rales, no rhonchi and normal respiratory effort  Abdomen: soft, nontender, gravid  Lower Extremities:  - Edema No       Membranes:  Intact    Fetal Heart Rate:  +FHTs        Labs:   Recent Results (from the past 36 hour(s))   GLUCOSE, POC    Collection Time: 21 12:26 AM   Result Value Ref Range    Glucose (POC) 93 65 - 100 mg/dL    Performed by [employer]    GLUCOSE, POC    Collection Time: 21  6:43 AM   Result Value Ref Range    Glucose (POC) 60 (L) 65 - 100 mg/dL    Performed by Connie Wooten    METABOLIC PANEL, BASIC    Collection Time: 21  6:44 AM   Result Value Ref Range    Sodium 137 136 - 145 mmol/L    Potassium 3.5 3.5 - 5.1 mmol/L    Chloride 105 97 - 108 mmol/L    CO2 25 21 - 32 mmol/L    Anion gap 7 5 - 15 mmol/L    Glucose 77 65 - 100 mg/dL    BUN 18 6 - 20 MG/DL    Creatinine 0.37 (L) 0.55 - 1.02 MG/DL    BUN/Creatinine ratio 49 (H) 12 - 20      GFR est AA >60 >60 ml/min/1.73m2    GFR est non-AA >60 >60 ml/min/1.73m2    Calcium 7.9 (L) 8.5 - 10.1 MG/DL   MAGNESIUM    Collection Time: 03/17/21  6:44 AM   Result Value Ref Range    Magnesium 1.8 1.6 - 2.4 mg/dL   PHOSPHORUS    Collection Time: 03/17/21  6:44 AM   Result Value Ref Range    Phosphorus 4.0 2.6 - 4.7 MG/DL   GLUCOSE, POC    Collection Time: 03/17/21  6:45 AM   Result Value Ref Range    Glucose (POC) 73 65 - 100 mg/dL    Performed by Chacho Kaur    GLUCOSE, POC    Collection Time: 03/17/21 11:41 AM   Result Value Ref Range    Glucose (POC) 83 65 - 100 mg/dL    Performed by Teri Abarca        Assessment and Plan:      Principal Problem:    Acute pyelonephritis (3/3/2021)       37 y/o [id number] at 24 4/7 weeks admitted for intermittent chills and fever-presumed pyelonephritis  -Afebrile  -Blood culture with 1/4 yeast, started on amphotericin per ID until 3/19/21; repeat blood cultures 3/6 negative to date  -urine culture shows klebsiella and citrobactr s/p course of ceftriaxone  bilateral flank pain improved- renal ultrasound normal on 3/5  -appreciate I.D. help  -possible PICC placement today     Chronic eating disorder complicated by nausea/vomiting of pregnancy  -on TPN-continous  -Has been HypoK - continue to replete this am pending lab results with runs of kcl (pt is refusing many of these due to discomfort-I have spoken to her about the importance of compliance with meds) -BG check q6h  -has outpatient nutritionist, Rexann Rinne  -iv zofran, phenergan suppositories, carafate, ivf's  -constipation- miralax and dulcolax supp prn and daily colace     Depression with h/o hospitalization for suicidal ideation  -followed by Janelle Camilo (psychiatrist) and Fredrick Murphy (psychologist), however pt non-compliant with counseling  -Continue Prozac, Zyprexa (both switched to liquid form to help improve compliance but pt unable to tolerate liquid prozac so switched back to pill form 3/17), and prn Prazosin, hydroxyzine  -s/p psych consult for pt non-compliance and trying to interrupt iv meds/tpn-no further recs  -Long discussion with pt today regarding my concerns with discharge given her noncompliance with her medical therapy as well as risk for recurrent infection with PICC line and home TPN.  Advised pt to consider my concerns so we can work towards a plan that we are both comfortable with and agree on.     Chronic anemia-known beta thal minor  -s/p iv iron with last admission  -Hb currently at baseline        Ambulate with assistance, Juan thakkar for DVT ppx (pt with intermittent dizziness and previous fall history)  S/p Physical therapy consult for deconditioning

## 2021-03-18 NOTE — PROGRESS NOTES
@1730 Pt was questioning antibiotics, Pt has a quad central lines,TPN on a   different lumen. Pt  Questions and concerns directed to clinical pharmacist Swathi and Dr José Antonio Hicks call for ID, Continue TPN as ordered, and continue antibiotics running in different lumens as previously given before. Pt updated about Dr Anitra Lujan orders as well as pharmacist clinical advise. All pt's  concerns and questions answered.

## 2021-03-18 NOTE — PROGRESS NOTES
Bedside and Verbal shift change report given to Bev George RN (oncoming nurse) by Rebecca Leonard RN (offgoing nurse). Report included the following information SBAR, Kardex, Intake/Output, MAR and Recent Results.

## 2021-03-18 NOTE — PROGRESS NOTES
Bedside and Verbal shift change report given to SOPHY Spears RN (oncoming nurse) by Dona Boyer RN (offgoing nurse). Report included the following information SBAR, Kardex and Intake/Output.

## 2021-03-19 LAB
ANION GAP SERPL CALC-SCNC: 4 MMOL/L (ref 5–15)
BASOPHILS # BLD: 0 K/UL (ref 0–0.1)
BASOPHILS NFR BLD: 0 % (ref 0–1)
BUN SERPL-MCNC: 20 MG/DL (ref 6–20)
BUN/CREAT SERPL: 61 (ref 12–20)
CALCIUM SERPL-MCNC: 8.1 MG/DL (ref 8.5–10.1)
CHLORIDE SERPL-SCNC: 107 MMOL/L (ref 97–108)
CO2 SERPL-SCNC: 27 MMOL/L (ref 21–32)
CREAT SERPL-MCNC: 0.33 MG/DL (ref 0.55–1.02)
DIFFERENTIAL METHOD BLD: ABNORMAL
EOSINOPHIL # BLD: 0.2 K/UL (ref 0–0.4)
EOSINOPHIL NFR BLD: 2 % (ref 0–7)
ERYTHROCYTE [DISTWIDTH] IN BLOOD BY AUTOMATED COUNT: 14.8 % (ref 11.5–14.5)
GLUCOSE SERPL-MCNC: 72 MG/DL (ref 65–100)
HCT VFR BLD AUTO: 25.7 % (ref 35–47)
HGB BLD-MCNC: 8.3 G/DL (ref 11.5–16)
IMM GRANULOCYTES # BLD AUTO: 0 K/UL (ref 0–0.04)
IMM GRANULOCYTES NFR BLD AUTO: 0 % (ref 0–0.5)
LYMPHOCYTES # BLD: 1.7 K/UL (ref 0.8–3.5)
LYMPHOCYTES NFR BLD: 27 % (ref 12–49)
MAGNESIUM SERPL-MCNC: 2 MG/DL (ref 1.6–2.4)
MCH RBC QN AUTO: 23.9 PG (ref 26–34)
MCHC RBC AUTO-ENTMCNC: 32.3 G/DL (ref 30–36.5)
MCV RBC AUTO: 74.1 FL (ref 80–99)
MONOCYTES # BLD: 0.7 K/UL (ref 0–1)
MONOCYTES NFR BLD: 11 % (ref 5–13)
NEUTS SEG # BLD: 3.9 K/UL (ref 1.8–8)
NEUTS SEG NFR BLD: 60 % (ref 32–75)
NRBC # BLD: 0 K/UL (ref 0–0.01)
NRBC BLD-RTO: 0 PER 100 WBC
PHOSPHATE SERPL-MCNC: 3.7 MG/DL (ref 2.6–4.7)
PLATELET # BLD AUTO: 187 K/UL (ref 150–400)
PMV BLD AUTO: 10.6 FL (ref 8.9–12.9)
POTASSIUM SERPL-SCNC: 3.5 MMOL/L (ref 3.5–5.1)
RBC # BLD AUTO: 3.47 M/UL (ref 3.8–5.2)
SODIUM SERPL-SCNC: 138 MMOL/L (ref 136–145)
WBC # BLD AUTO: 6.5 K/UL (ref 3.6–11)

## 2021-03-19 PROCEDURE — 85025 COMPLETE CBC W/AUTO DIFF WBC: CPT

## 2021-03-19 PROCEDURE — 74011000258 HC RX REV CODE- 258: Performed by: OBSTETRICS & GYNECOLOGY

## 2021-03-19 PROCEDURE — 02HV33Z INSERTION OF INFUSION DEVICE INTO SUPERIOR VENA CAVA, PERCUTANEOUS APPROACH: ICD-10-PCS | Performed by: INTERNAL MEDICINE

## 2021-03-19 PROCEDURE — 80048 BASIC METABOLIC PNL TOTAL CA: CPT

## 2021-03-19 PROCEDURE — 74011250637 HC RX REV CODE- 250/637: Performed by: OBSTETRICS & GYNECOLOGY

## 2021-03-19 PROCEDURE — 74011250637 HC RX REV CODE- 250/637: Performed by: NURSE PRACTITIONER

## 2021-03-19 PROCEDURE — 74011250636 HC RX REV CODE- 250/636: Performed by: OBSTETRICS & GYNECOLOGY

## 2021-03-19 PROCEDURE — C9113 INJ PANTOPRAZOLE SODIUM, VIA: HCPCS | Performed by: OBSTETRICS & GYNECOLOGY

## 2021-03-19 PROCEDURE — 74011000258 HC RX REV CODE- 258: Performed by: INTERNAL MEDICINE

## 2021-03-19 PROCEDURE — C1751 CATH, INF, PER/CENT/MIDLINE: HCPCS

## 2021-03-19 PROCEDURE — 74011250637 HC RX REV CODE- 250/637: Performed by: INTERNAL MEDICINE

## 2021-03-19 PROCEDURE — 36415 COLL VENOUS BLD VENIPUNCTURE: CPT

## 2021-03-19 PROCEDURE — 65410000002 HC RM PRIVATE OB

## 2021-03-19 PROCEDURE — 84100 ASSAY OF PHOSPHORUS: CPT

## 2021-03-19 PROCEDURE — 83735 ASSAY OF MAGNESIUM: CPT

## 2021-03-19 PROCEDURE — 74011250636 HC RX REV CODE- 250/636: Performed by: INTERNAL MEDICINE

## 2021-03-19 PROCEDURE — 77030020365 HC SOL INJ SOD CL 0.9% 50ML

## 2021-03-19 PROCEDURE — 76937 US GUIDE VASCULAR ACCESS: CPT

## 2021-03-19 PROCEDURE — 74011000250 HC RX REV CODE- 250: Performed by: OBSTETRICS & GYNECOLOGY

## 2021-03-19 RX ADMIN — SUCRALFATE 1 G: 1 TABLET ORAL at 07:03

## 2021-03-19 RX ADMIN — DOCUSATE SODIUM 100 MG: 100 CAPSULE, LIQUID FILLED ORAL at 10:15

## 2021-03-19 RX ADMIN — Medication 30 ML: at 06:00

## 2021-03-19 RX ADMIN — CALCIUM GLUCONATE: 94 INJECTION, SOLUTION INTRAVENOUS at 18:44

## 2021-03-19 RX ADMIN — OLANZAPINE 10 MG: 5 TABLET, ORALLY DISINTEGRATING ORAL at 21:06

## 2021-03-19 RX ADMIN — Medication 10 ML: at 15:27

## 2021-03-19 RX ADMIN — Medication 1 TABLET: at 10:15

## 2021-03-19 RX ADMIN — Medication 10 ML: at 21:10

## 2021-03-19 RX ADMIN — Medication 10 ML: at 21:09

## 2021-03-19 RX ADMIN — Medication 10 ML: at 16:32

## 2021-03-19 RX ADMIN — ACETAMINOPHEN 500 MG: 500 TABLET ORAL at 15:37

## 2021-03-19 RX ADMIN — I.V. FAT EMULSION 250 ML: 20 EMULSION INTRAVENOUS at 18:50

## 2021-03-19 RX ADMIN — DEXTROSE MONOHYDRATE 20 ML: 5 INJECTION, SOLUTION INTRAVENOUS at 16:30

## 2021-03-19 RX ADMIN — POLYETHYLENE GLYCOL 3350 17 G: 17 POWDER, FOR SOLUTION ORAL at 10:37

## 2021-03-19 RX ADMIN — FLUOXETINE 60 MG: 20 CAPSULE ORAL at 21:05

## 2021-03-19 RX ADMIN — DEXTROSE MONOHYDRATE 217 MG: 5 INJECTION, SOLUTION INTRAVENOUS at 16:40

## 2021-03-19 RX ADMIN — SODIUM CHLORIDE 1000 ML/HR: 9 INJECTION, SOLUTION INTRAVENOUS at 15:25

## 2021-03-19 RX ADMIN — SUCRALFATE 1 G: 1 TABLET ORAL at 21:06

## 2021-03-19 RX ADMIN — DEXTROSE MONOHYDRATE 20 ML: 5 INJECTION, SOLUTION INTRAVENOUS at 18:48

## 2021-03-19 RX ADMIN — SUCRALFATE 1 G: 1 TABLET ORAL at 15:28

## 2021-03-19 RX ADMIN — SODIUM CHLORIDE 40 MG: 9 INJECTION INTRAMUSCULAR; INTRAVENOUS; SUBCUTANEOUS at 10:14

## 2021-03-19 NOTE — PROGRESS NOTES
Last dose of amphotericin today. K - 3.5. We will refer her to Ophthalmology on discharge to get a dilated eye exam. Will sign off. PLease call with questions.

## 2021-03-19 NOTE — PROCEDURES
PICC Placement Note    PRE-PROCEDURE VERIFICATION  Correct Procedure: yes  Correct Site:  yes  Temperature: Temp: 97.8 °F (36.6 °C), Temperature Source: Temp Source: Oral  Recent Labs     03/19/21  0456   BUN 20   CREA 0.33*      WBC 6.5     Allergies: Labetalol, Ceclor [cefaclor], Pcn [penicillins], and Septra [sulfamethoprim ds]  Education materials, including PICC Booklet, for PICC Care given to patient: yes. See Patient Education activity for further details. PROCEDURE DETAIL  A double lumen PICC line was started for TPN. The following documentation is in addition to the PICC properties in the lines/airways flowsheet :  Lot #: [id number]  Was xylocaine 1% used intradermally:  yes  Catheter Length: 40 (cm)  External Length: 3 (cm)  Arm Circumference: 30 (cm)  Vein Selection for PICC:left brachial  Central Line Bundle followed yes  Complication Related to Insertion: none      The placement was verified by VPS tip tracking technology: The  tip location is in the superior vena cava. See ECG results for PICC tip placement. Report given to nurse Clary Perkins is okay to use.     Lisbet Connolly RN

## 2021-03-19 NOTE — PROGRESS NOTES
High Risk Obstetrics Progress Note    Name: Chilo Morales MRN: [id number]  SSN: [id number]    YOB: 1985  Age: 38 y.o. Sex: female      Subjective:      LOS: 16 days    Estimated Date of Delivery: 21   Gestational Age Today: 19w9d     Patient admitted for pyelonephritis. States she does have normal fetal movement, nausea but no emesis yesterday or overnight. and does not have chest pain, contractions, shortness of breath, vaginal bleeding  and vaginal leaking of fluid . Per nursing, PICC line unable to be placed yesterday due to PICC schedule, but will get done today. Objective:     Vitals:  Blood pressure 125/73, pulse 64, temperature 97.8 °F (36.6 °C), resp. rate 19, height 5' 4\" (1.626 m), weight 75.8 kg (167 lb 3.2 oz), last menstrual period 2020, SpO2 100 %, not currently breastfeeding. Temp (24hrs), Av.1 °F (36.7 °C), Min:97.8 °F (36.6 °C), Max:98.4 °F (48.0 °C)    Systolic (70XIC), YVV:930 , Min:104 , RJB:936      Diastolic (04XIE), XWE:41, Min:55, Max:73       Intake and Output:         Physical Exam:  Patient without distress.   Heart: Regular rate and rhythm  Lung: clear to auscultation throughout lung fields, no wheezes, no rales, no rhonchi and normal respiratory effort  Abdomen: soft, nontender, gravid  Lower Extremities:  - Edema No       Membranes:  Intact      Fetal Heart Rate:  +FHTs        Labs:   Recent Results (from the past 36 hour(s))   METABOLIC PANEL, BASIC    Collection Time: 21  6:42 AM   Result Value Ref Range    Sodium 137 136 - 145 mmol/L    Potassium 3.4 (L) 3.5 - 5.1 mmol/L    Chloride 107 97 - 108 mmol/L    CO2 27 21 - 32 mmol/L    Anion gap 3 (L) 5 - 15 mmol/L    Glucose 71 65 - 100 mg/dL    BUN 18 6 - 20 MG/DL    Creatinine 0.32 (L) 0.55 - 1.02 MG/DL    BUN/Creatinine ratio 56 (H) 12 - 20      GFR est AA >60 >60 ml/min/1.73m2    GFR est non-AA >60 >60 ml/min/1.73m2    Calcium 7.9 (L) 8.5 - 10.1 MG/DL   MAGNESIUM    Collection Time: 03/18/21  6:42 AM   Result Value Ref Range    Magnesium 1.8 1.6 - 2.4 mg/dL   PHOSPHORUS    Collection Time: 03/18/21  6:42 AM   Result Value Ref Range    Phosphorus 4.2 2.6 - 4.7 MG/DL   MAGNESIUM    Collection Time: 03/19/21  4:56 AM   Result Value Ref Range    Magnesium 2.0 1.6 - 2.4 mg/dL   PHOSPHORUS    Collection Time: 03/19/21  4:56 AM   Result Value Ref Range    Phosphorus 3.7 2.6 - 4.7 MG/DL   METABOLIC PANEL, BASIC    Collection Time: 03/19/21  4:56 AM   Result Value Ref Range    Sodium 138 136 - 145 mmol/L    Potassium 3.5 3.5 - 5.1 mmol/L    Chloride 107 97 - 108 mmol/L    CO2 27 21 - 32 mmol/L    Anion gap 4 (L) 5 - 15 mmol/L    Glucose 72 65 - 100 mg/dL    BUN 20 6 - 20 MG/DL    Creatinine 0.33 (L) 0.55 - 1.02 MG/DL    BUN/Creatinine ratio 61 (H) 12 - 20      GFR est AA >60 >60 ml/min/1.73m2    GFR est non-AA >60 >60 ml/min/1.73m2    Calcium 8.1 (L) 8.5 - 10.1 MG/DL   CBC WITH AUTOMATED DIFF    Collection Time: 03/19/21  4:56 AM   Result Value Ref Range    WBC 6.5 3.6 - 11.0 K/uL    RBC 3.47 (L) 3.80 - 5.20 M/uL    HGB 8.3 (L) 11.5 - 16.0 g/dL    HCT 25.7 (L) 35.0 - 47.0 %    MCV 74.1 (L) 80.0 - 99.0 FL    MCH 23.9 (L) 26.0 - 34.0 PG    MCHC 32.3 30.0 - 36.5 g/dL    RDW 14.8 (H) 11.5 - 14.5 %    PLATELET 608 761 - 385 K/uL    MPV 10.6 8.9 - 12.9 FL    NRBC 0.0 0  WBC    ABSOLUTE NRBC 0.00 0.00 - 0.01 K/uL    NEUTROPHILS 60 32 - 75 %    LYMPHOCYTES 27 12 - 49 %    MONOCYTES 11 5 - 13 %    EOSINOPHILS 2 0 - 7 %    BASOPHILS 0 0 - 1 %    IMMATURE GRANULOCYTES 0 0.0 - 0.5 %    ABS. NEUTROPHILS 3.9 1.8 - 8.0 K/UL    ABS. LYMPHOCYTES 1.7 0.8 - 3.5 K/UL    ABS. MONOCYTES 0.7 0.0 - 1.0 K/UL    ABS. EOSINOPHILS 0.2 0.0 - 0.4 K/UL    ABS. BASOPHILS 0.0 0.0 - 0.1 K/UL    ABS. IMM.  GRANS. 0.0 0.00 - 0.04 K/UL    DF AUTOMATED         Assessment and Plan:      Principal Problem:    Acute pyelonephritis (3/3/2021)       39 y/o [id number] at 24 5/7 weeks admitted for intermittent chills and fever-presumed pyelonephritis  -Afebrile  -Blood culture with 1/4 yeast, started on amphotericin per ID until 3/19/21; repeat blood cultures 3/6 negative to date  -urine culture shows klebsiella and citrobactr s/p course of ceftriaxone  bilateral flank pain improved- renal ultrasound normal on 3/5  -appreciate I.D. help  -plan for PICC placement today     Chronic eating disorder complicated by nausea/vomiting of pregnancy  -on TPN-continous  -Normal potassium level today.   -Continue BG check q6h  -has outpatient nutritionist, Johnson Mccarty  -iv zofran, phenergan suppositories, carafate, ivf's  -constipation- miralax and dulcolax supp prn and daily colace     Depression with h/o hospitalization for suicidal ideation  -followed by Dorena Hodgkin (psychiatrist) and Genny Romero (psychologist), however pt non-compliant with counseling  -Continue Prozac, Zyprexa (both switched to liquid form to help improve compliance but pt unable to tolerate liquid prozac so switched back to pill form 3/17), and prn Prazosin, hydroxyzine  -s/p psych consult for pt non-compliance and trying to interrupt iv meds/tpn-no further recs  -Long discussion with pt earlier this week regarding my concerns with discharge given her noncompliance with her medical therapy as well as risk for recurrent infection with PICC line and home TPN. Advised pt to consider my concerns so we can work towards a plan that we are both comfortable with and agree on.     Chronic anemia-known beta thal minor  -s/p iv iron with last admission  -Hb currently at baseline        Ambulate with assistance, Juan thakkar for DVT ppx (pt with intermittent dizziness and previous fall history)  S/p Physical therapy consult for deconditioning    Long conversation over the phone with patient yesterday afternoon and again this morning re: PICC and TPN and treatment plan.  Advised pt that this treatment plan is our recommendation, but she has a say in this plan and if she doesn't agree or want something, she needs to tell us. I also advised that we have to consider the health of her baby. Pt at this time verbalized that she is in agreement to proceed with PICC line and TPN. I again encouraged her to be compliant with her medications and to consider options for someone to be with her if she is discharged to help monitor/care for her at home. I am concerned for her safety and her baby's safety if discharged to her home by herself given her history of tampering with PICC line/TPN.  Discussed with nursing staff and we will consult ethics.

## 2021-03-19 NOTE — PROGRESS NOTES
Bedside and Verbal shift change report given to Blade Morgan RN (oncoming nurse) by Thania Napier RN (offgoing nurse). Report included the following information SBAR, Kardex, Intake/Output, MAR and Recent Results.

## 2021-03-19 NOTE — PROGRESS NOTES
Bedside and Verbal shift change report given to SOPHY Eason RN (oncoming nurse) by Bev George RN (offgoing nurse). Report included the following information SBAR and Kardex.      2200 Pt refused Minipress

## 2021-03-20 LAB
ANION GAP SERPL CALC-SCNC: 6 MMOL/L (ref 5–15)
BUN SERPL-MCNC: 22 MG/DL (ref 6–20)
BUN/CREAT SERPL: 61 (ref 12–20)
CALCIUM SERPL-MCNC: 8.1 MG/DL (ref 8.5–10.1)
CHLORIDE SERPL-SCNC: 107 MMOL/L (ref 97–108)
CO2 SERPL-SCNC: 27 MMOL/L (ref 21–32)
CREAT SERPL-MCNC: 0.36 MG/DL (ref 0.55–1.02)
GLUCOSE SERPL-MCNC: 71 MG/DL (ref 65–100)
PHOSPHATE SERPL-MCNC: 3.9 MG/DL (ref 2.6–4.7)
POTASSIUM SERPL-SCNC: 3.5 MMOL/L (ref 3.5–5.1)
SODIUM SERPL-SCNC: 140 MMOL/L (ref 136–145)

## 2021-03-20 PROCEDURE — C9113 INJ PANTOPRAZOLE SODIUM, VIA: HCPCS | Performed by: OBSTETRICS & GYNECOLOGY

## 2021-03-20 PROCEDURE — 74011250637 HC RX REV CODE- 250/637: Performed by: INTERNAL MEDICINE

## 2021-03-20 PROCEDURE — 84100 ASSAY OF PHOSPHORUS: CPT

## 2021-03-20 PROCEDURE — 74011250636 HC RX REV CODE- 250/636: Performed by: OBSTETRICS & GYNECOLOGY

## 2021-03-20 PROCEDURE — 74011000250 HC RX REV CODE- 250: Performed by: OBSTETRICS & GYNECOLOGY

## 2021-03-20 PROCEDURE — 36415 COLL VENOUS BLD VENIPUNCTURE: CPT

## 2021-03-20 PROCEDURE — 74011250637 HC RX REV CODE- 250/637: Performed by: NURSE PRACTITIONER

## 2021-03-20 PROCEDURE — 74011000258 HC RX REV CODE- 258: Performed by: OBSTETRICS & GYNECOLOGY

## 2021-03-20 PROCEDURE — 65410000002 HC RM PRIVATE OB

## 2021-03-20 PROCEDURE — 74011250637 HC RX REV CODE- 250/637: Performed by: OBSTETRICS & GYNECOLOGY

## 2021-03-20 PROCEDURE — 80048 BASIC METABOLIC PNL TOTAL CA: CPT

## 2021-03-20 RX ADMIN — FLUOXETINE 60 MG: 20 CAPSULE ORAL at 21:03

## 2021-03-20 RX ADMIN — Medication 10 ML: at 09:07

## 2021-03-20 RX ADMIN — SUCRALFATE 1 G: 1 TABLET ORAL at 21:03

## 2021-03-20 RX ADMIN — OLANZAPINE 10 MG: 5 TABLET, ORALLY DISINTEGRATING ORAL at 21:03

## 2021-03-20 RX ADMIN — POLYETHYLENE GLYCOL 3350 17 G: 17 POWDER, FOR SOLUTION ORAL at 09:56

## 2021-03-20 RX ADMIN — SUCRALFATE 1 G: 1 TABLET ORAL at 17:59

## 2021-03-20 RX ADMIN — Medication 10 ML: at 06:41

## 2021-03-20 RX ADMIN — Medication 1 TABLET: at 09:06

## 2021-03-20 RX ADMIN — SODIUM CHLORIDE 40 MG: 9 INJECTION INTRAMUSCULAR; INTRAVENOUS; SUBCUTANEOUS at 09:06

## 2021-03-20 RX ADMIN — CALCIUM GLUCONATE: 94 INJECTION, SOLUTION INTRAVENOUS at 18:31

## 2021-03-20 RX ADMIN — Medication 20 ML: at 06:40

## 2021-03-20 RX ADMIN — I.V. FAT EMULSION 250 ML: 20 EMULSION INTRAVENOUS at 18:32

## 2021-03-20 RX ADMIN — SUCRALFATE 1 G: 1 TABLET ORAL at 13:13

## 2021-03-20 RX ADMIN — MINERAL OIL, PETROLATUM, PHENYLEPHRINE HCL: 14; 74.9; .25 OINTMENT RECTAL at 09:57

## 2021-03-20 RX ADMIN — DOCUSATE SODIUM 100 MG: 100 CAPSULE, LIQUID FILLED ORAL at 09:06

## 2021-03-20 RX ADMIN — ONDANSETRON 4 MG: 2 INJECTION INTRAMUSCULAR; INTRAVENOUS at 17:59

## 2021-03-20 RX ADMIN — SUCRALFATE 1 G: 1 TABLET ORAL at 06:50

## 2021-03-20 RX ADMIN — ONDANSETRON 4 MG: 2 INJECTION INTRAMUSCULAR; INTRAVENOUS at 00:09

## 2021-03-20 RX ADMIN — Medication 10 ML: at 00:09

## 2021-03-20 NOTE — PROGRESS NOTES
Bedside and Verbal shift change report given to [address] (oncoming nurse) by Nathaniel Syed RN (offgoing nurse). Report included the following information SBAR, Kardex, Procedure Summary, Intake/Output, MAR and Quality Measures.

## 2021-03-20 NOTE — PROGRESS NOTES
High Risk Obstetrics Progress Note    Name: Shaina Blood MR N: [id number]  SSN: [id number]    YOB: 1985  Age: 38 y.o. Sex: female      Subjective:      LOS: 17 days    Estimated Date of Delivery: 21   Gestational Age Today: 18w6d     Patient admitted for pyelonephritis. States she does have intermittent nausea, but no emesis, cramping and normal fetal movement. and does not have chest pain, contractions, shortness of breath, vaginal bleeding  and vaginal leaking of fluid . Pt states she spoke with a few friends about help with when she goes home but no one can provide 24h monitoring/care. Objective:     Vitals:  Blood pressure 121/73, pulse 65, temperature 98.3 °F (36.8 °C), resp. rate 16, height 5' 4\" (1.626 m), weight 75.8 kg (167 lb 3.2 oz), last menstrual period 2020, SpO2 98 %, not currently breastfeeding. Temp (24hrs), Av.2 °F (36.8 °C), Min:98.1 °F (36.7 °C), Max:98.3 °F (71.7 °C)    Systolic (42ALP), ALMA:828 , Min:105 , ITB:143      Diastolic (53RWQ), QP, Min:63, Max:75       Intake and Output:         Physical Exam:  Patient without distress.   Heart: Regular rate and rhythm  Lung: clear to auscultation throughout lung fields, no wheezes, no rales, no rhonchi and normal respiratory effort  Abdomen: soft, nontender, gravid  Lower Extremities:  - Edema No       Membranes:  Intact      Fetal Heart Rate:  +FHTs        Labs:   Recent Results (from the past 36 hour(s))   MAGNESIUM    Collection Time: 21  4:56 AM   Result Value Ref Range    Magnesium 2.0 1.6 - 2.4 mg/dL   PHOSPHORUS    Collection Time: 21  4:56 AM   Result Value Ref Range    Phosphorus 3.7 2.6 - 4.7 MG/DL   METABOLIC PANEL, BASIC    Collection Time: 21  4:56 AM   Result Value Ref Range    Sodium 138 136 - 145 mmol/L    Potassium 3.5 3.5 - 5.1 mmol/L    Chloride 107 97 - 108 mmol/L    CO2 27 21 - 32 mmol/L    Anion gap 4 (L) 5 - 15 mmol/L    Glucose 72 65 - 100 mg/dL    BUN 20 6 - 20 MG/DL    Creatinine 0.33 (L) 0.55 - 1.02 MG/DL    BUN/Creatinine ratio 61 (H) 12 - 20      GFR est AA >60 >60 ml/min/1.73m2    GFR est non-AA >60 >60 ml/min/1.73m2    Calcium 8.1 (L) 8.5 - 10.1 MG/DL   CBC WITH AUTOMATED DIFF    Collection Time: 03/19/21  4:56 AM   Result Value Ref Range    WBC 6.5 3.6 - 11.0 K/uL    RBC 3.47 (L) 3.80 - 5.20 M/uL    HGB 8.3 (L) 11.5 - 16.0 g/dL    HCT 25.7 (L) 35.0 - 47.0 %    MCV 74.1 (L) 80.0 - 99.0 FL    MCH 23.9 (L) 26.0 - 34.0 PG    MCHC 32.3 30.0 - 36.5 g/dL    RDW 14.8 (H) 11.5 - 14.5 %    PLATELET 800 654 - 808 K/uL    MPV 10.6 8.9 - 12.9 FL    NRBC 0.0 0  WBC    ABSOLUTE NRBC 0.00 0.00 - 0.01 K/uL    NEUTROPHILS 60 32 - 75 %    LYMPHOCYTES 27 12 - 49 %    MONOCYTES 11 5 - 13 %    EOSINOPHILS 2 0 - 7 %    BASOPHILS 0 0 - 1 %    IMMATURE GRANULOCYTES 0 0.0 - 0.5 %    ABS. NEUTROPHILS 3.9 1.8 - 8.0 K/UL    ABS. LYMPHOCYTES 1.7 0.8 - 3.5 K/UL    ABS. MONOCYTES 0.7 0.0 - 1.0 K/UL    ABS. EOSINOPHILS 0.2 0.0 - 0.4 K/UL    ABS. BASOPHILS 0.0 0.0 - 0.1 K/UL    ABS. IMM.  GRANS. 0.0 0.00 - 0.04 K/UL    DF AUTOMATED     PHOSPHORUS    Collection Time: 03/20/21  6:38 AM   Result Value Ref Range    Phosphorus 3.9 2.6 - 4.7 MG/DL   METABOLIC PANEL, BASIC    Collection Time: 03/20/21  6:38 AM   Result Value Ref Range    Sodium 140 136 - 145 mmol/L    Potassium 3.5 3.5 - 5.1 mmol/L    Chloride 107 97 - 108 mmol/L    CO2 27 21 - 32 mmol/L    Anion gap 6 5 - 15 mmol/L    Glucose 71 65 - 100 mg/dL    BUN 22 (H) 6 - 20 MG/DL    Creatinine 0.36 (L) 0.55 - 1.02 MG/DL    BUN/Creatinine ratio 61 (H) 12 - 20      GFR est AA >60 >60 ml/min/1.73m2    GFR est non-AA >60 >60 ml/min/1.73m2    Calcium 8.1 (L) 8.5 - 10.1 MG/DL       Assessment and Plan:      Principal Problem:    Acute pyelonephritis (3/3/2021)       39 y/o [id number] at 24 6/7 weeks admitted for intermittent chills and fever-presumed pyelonephritis  -Afebrile  -Blood culture with 1/4 yeast, s/p amphotericin; repeat blood cultures 3/6 negative to date  -urine culture shows klebsiella and citrobactr s/p course of ceftriaxone  bilateral flank pain improved- renal ultrasound normal on 3/5  -appreciate I.D. help  -s/p PICC placement 3/19/21- will remove central line today.     Chronic eating disorder complicated by nausea/vomiting of pregnancy  -on TPN-continous  -Normal potassium level today.   -Continue BG check q6h  -has outpatient nutritionist, Judit Sheppard  -iv zofran, phenergan suppositories, carafate, ivf's  -constipation- miralax and dulcolax supp prn and daily colace     Depression with h/o hospitalization for suicidal ideation  -followed by Anitra Victor (psychiatrist) and Harl Alpers (psychologist), however pt non-compliant with counseling  -Continue Prozac, Zyprexa (both switched to liquid form to help improve compliance but pt unable to tolerate liquid prozac so switched back to pill form 3/17), and prn Prazosin, hydroxyzine  -s/p psych consult for pt non-compliance and trying to interrupt iv meds/tpn-no further recs  -Long discussion with pt earlier this week regarding my concerns with discharge given her noncompliance with her medical therapy as well as risk for recurrent infection with PICC line and home TPN.  Advised pt to consider my concerns so we can work towards a plan that we are both comfortable with and agree on.     Chronic anemia-known beta thal minor  -s/p iv iron with last admission  -Hb currently at baseline        Ambulate with assistance, Jaun thakkar for DVT ppx (pt with intermittent dizziness and previous fall history)  S/p Physical therapy consult for deconditioning     After consult with ethics, plan will be to continue with current plan of inpatient TPN with sitter for pt and baby safety until which time we feel patient is safe to go home.

## 2021-03-20 NOTE — PROGRESS NOTES
4142 Verbal shift change report given to Delon Morejon RN (oncoming nurse) by Juliet Martin RN (offgoing nurse). Report included the following information SBAR, Kardex, Intake/Output, MAR, Accordion, Recent Results and Med Rec Status. 1583 In pt's room for assessment. Pt has voiced social concerns and would benefit from talking to a trusted medical professional. Will attempt to call JARAD Ortiz NP.    [address] [facility] office of Ghanshyam Ireland NP and [facility] to discuss options for pt to get a visit.

## 2021-03-20 NOTE — PROGRESS NOTES
Bedside and Verbal shift change report given to ALBA Parada (oncoming nurse) by Claudia Lucero RN (offgoing nurse). Report included the following information SBAR, Kardex, Intake/Output, MAR, Accordion and Recent Results.

## 2021-03-21 LAB
ANION GAP SERPL CALC-SCNC: 3 MMOL/L (ref 5–15)
BUN SERPL-MCNC: 18 MG/DL (ref 6–20)
BUN/CREAT SERPL: 47 (ref 12–20)
CALCIUM SERPL-MCNC: 7.8 MG/DL (ref 8.5–10.1)
CHLORIDE SERPL-SCNC: 108 MMOL/L (ref 97–108)
CO2 SERPL-SCNC: 26 MMOL/L (ref 21–32)
CREAT SERPL-MCNC: 0.38 MG/DL (ref 0.55–1.02)
GLUCOSE SERPL-MCNC: 56 MG/DL (ref 65–100)
PHOSPHATE SERPL-MCNC: 4 MG/DL (ref 2.6–4.7)
POTASSIUM SERPL-SCNC: 3.8 MMOL/L (ref 3.5–5.1)
SODIUM SERPL-SCNC: 137 MMOL/L (ref 136–145)

## 2021-03-21 PROCEDURE — 74011250636 HC RX REV CODE- 250/636: Performed by: OBSTETRICS & GYNECOLOGY

## 2021-03-21 PROCEDURE — 74011000250 HC RX REV CODE- 250: Performed by: OBSTETRICS & GYNECOLOGY

## 2021-03-21 PROCEDURE — 80048 BASIC METABOLIC PNL TOTAL CA: CPT

## 2021-03-21 PROCEDURE — C9113 INJ PANTOPRAZOLE SODIUM, VIA: HCPCS | Performed by: OBSTETRICS & GYNECOLOGY

## 2021-03-21 PROCEDURE — 74011250637 HC RX REV CODE- 250/637: Performed by: INTERNAL MEDICINE

## 2021-03-21 PROCEDURE — 74011250637 HC RX REV CODE- 250/637: Performed by: NURSE PRACTITIONER

## 2021-03-21 PROCEDURE — 84100 ASSAY OF PHOSPHORUS: CPT

## 2021-03-21 PROCEDURE — 74011000258 HC RX REV CODE- 258: Performed by: OBSTETRICS & GYNECOLOGY

## 2021-03-21 PROCEDURE — 36415 COLL VENOUS BLD VENIPUNCTURE: CPT

## 2021-03-21 PROCEDURE — 74011250637 HC RX REV CODE- 250/637: Performed by: OBSTETRICS & GYNECOLOGY

## 2021-03-21 PROCEDURE — 65410000002 HC RM PRIVATE OB

## 2021-03-21 RX ADMIN — I.V. FAT EMULSION 250 ML: 20 EMULSION INTRAVENOUS at 19:15

## 2021-03-21 RX ADMIN — POTASSIUM CHLORIDE: 2 INJECTION, SOLUTION, CONCENTRATE INTRAVENOUS at 19:17

## 2021-03-21 RX ADMIN — SUCRALFATE 1 G: 1 TABLET ORAL at 16:47

## 2021-03-21 RX ADMIN — SUCRALFATE 1 G: 1 TABLET ORAL at 06:32

## 2021-03-21 RX ADMIN — Medication 10 ML: at 14:00

## 2021-03-21 RX ADMIN — OLANZAPINE 10 MG: 5 TABLET, ORALLY DISINTEGRATING ORAL at 21:16

## 2021-03-21 RX ADMIN — SUCRALFATE 1 G: 1 TABLET ORAL at 12:04

## 2021-03-21 RX ADMIN — SODIUM CHLORIDE 40 MG: 9 INJECTION INTRAMUSCULAR; INTRAVENOUS; SUBCUTANEOUS at 08:16

## 2021-03-21 RX ADMIN — Medication 1 TABLET: at 08:15

## 2021-03-21 RX ADMIN — ONDANSETRON 4 MG: 2 INJECTION INTRAMUSCULAR; INTRAVENOUS at 13:47

## 2021-03-21 RX ADMIN — POLYETHYLENE GLYCOL 3350 17 G: 17 POWDER, FOR SOLUTION ORAL at 08:15

## 2021-03-21 RX ADMIN — FLUOXETINE 60 MG: 20 CAPSULE ORAL at 21:16

## 2021-03-21 RX ADMIN — SUCRALFATE 1 G: 1 TABLET ORAL at 21:16

## 2021-03-21 RX ADMIN — Medication 10 ML: at 21:17

## 2021-03-21 RX ADMIN — Medication 10 ML: at 06:00

## 2021-03-21 RX ADMIN — DOCUSATE SODIUM 100 MG: 100 CAPSULE, LIQUID FILLED ORAL at 08:15

## 2021-03-21 RX ADMIN — Medication 20 ML: at 13:47

## 2021-03-21 RX ADMIN — ONDANSETRON 4 MG: 2 INJECTION INTRAMUSCULAR; INTRAVENOUS at 19:11

## 2021-03-21 RX ADMIN — Medication 10 ML: at 08:17

## 2021-03-21 NOTE — PROGRESS NOTES
Bedside and Verbal shift change report given to LILIA Haq RN (oncoming nurse) by Xochitl Emmanuel RN (offgoing nurse). Report included the following information SBAR, Kardex, Intake/Output, MAR, Accordion, Recent Results and Med Rec Status.      2200- Pt declines minipress

## 2021-03-21 NOTE — PROGRESS NOTES
2232 Bedside and Verbal shift change report given to Avery Hoffmann RN (oncoming nurse) by Ann Kasper RN (offgoing nurse). Report included the following information SBAR.     1920 Discussed possibility of having Home Health assistance with TPN after discharge. Consult to case management entered to evaluate RN assistance every day vs once a week.

## 2021-03-21 NOTE — PROGRESS NOTES
[facility] Progress Note    Akilah Meza  25w0d    Patient admitted for pyelo 25w0d Estimated Date of Delivery: 21 states she does have normal fetal movement and baseline nausea. Denies vomiting or other cos. Ate \"a little\"    Vitals:    Patient Vitals for the past 24 hrs:   BP Temp Pulse Resp SpO2 Weight   21 0813 105/63 98.1 °F (36.7 °C) 77 16 99 %    21 0437 (!) 96/49 97.3 °F (36.3 °C) (!) 59 16     21 0432      76.5 kg (168 lb 9.6 oz)   21 2022 110/61 98 °F (36.7 °C) 65 16     21 1600 (!) 110/59 98.3 °F (36.8 °C) 79 16 99 %      Temp (24hrs), Av.9 °F (36.6 °C), Min:97.3 °F (36.3 °C), Max:98.3 °F (36.8 °C)    I&O:   No intake/output data recorded.  1901 -  0700  In: 200 [P.O.:200]  Out: -       Exam:  Patient without distress.                Abdomen soft, non-tender               Fundus soft and non tender               Lower extremities edema No                                           Labs:   Recent Results (from the past 24 hour(s))   PHOSPHORUS    Collection Time: 21  5:10 AM   Result Value Ref Range    Phosphorus 4.0 2.6 - 4.7 MG/DL   METABOLIC PANEL, BASIC    Collection Time: 21  5:10 AM   Result Value Ref Range    Sodium 137 136 - 145 mmol/L    Potassium 3.8 3.5 - 5.1 mmol/L    Chloride 108 97 - 108 mmol/L    CO2 26 21 - 32 mmol/L    Anion gap 3 (L) 5 - 15 mmol/L    Glucose 56 (L) 65 - 100 mg/dL    BUN 18 6 - 20 MG/DL    Creatinine 0.38 (L) 0.55 - 1.02 MG/DL    BUN/Creatinine ratio 47 (H) 12 - 20      GFR est AA >60 >60 ml/min/1.73m2    GFR est non-AA >60 >60 ml/min/1.73m2    Calcium 7.8 (L) 8.5 - 10.1 MG/DL       Assessment and Plan:   IUP @ 25w0d   Patient Active Problem List    Diagnosis Date Noted    Acute pyelonephritis 2021    Severe protein-calorie malnutrition ([facility] [address] 2021    Nausea/vomiting in pregnancy 2021    Chronic bilateral low back pain with right-sided sciatica 2020    GERD (gastroesophageal reflux disease) 12/28/2020    Calculus of gallbladder without cholecystitis 12/07/2020    Nausea and vomiting in pregnancy 12/02/2020    Eating disorder affecting pregnancy, antepartum 12/02/2020    Depression affecting pregnancy 12/02/2020    MDD (major depressive disorder) 09/02/2020    Post-traumatic stress disorder, acute 05/03/2020    Severe obesity ([facility] [address] 02/18/2020    Pregnancy 03/01/2018    Pregnant 03/01/2018    Cervical incompetence 09/29/2017    Incompetent cervix 09/29/2017    Sleep disturbance 04/06/2017    Non compliance w medication regimen 04/06/2017    MDD (major depressive disorder), recurrent episode, moderate ([address] 09/09/2013    Unspecified essential hypertension 05/09/2013    Back pain, chronic     Asthma 10/08/2010    Microcytic anemia 10/08/2010    Anxiety 10/08/2010     39 y/o [id number] at 22 0/7 weeks admitted for pyelonephritis  -Pyelo now resolved and AF (s/p Ceftriaxone for Klebsiella and Citrobacter urine cx); renal ultrasound normal on 3/5  -Blood culture with 1/4 yeast, s/p amphotericin; repeat blood cultures 3/6 negative to date   -s/p new PICC placement 3/19/21 (old PICC removed after +blood cxs)     Chronic eating disorder complicated by nausea/vomiting of pregnancy  -on TPN-continous  -Normal potassium level today.   -Continue BG check q6h  -has outpatient nutritionist, Dinora Hein  -iv zofran, phenergan suppositories, carafate, ivf's  -constipation- miralax and dulcolax supp prn and daily colace     Depression with h/o hospitalization for suicidal ideation  -followed by Jyothi Mejia (psychiatrist) and Carlin Ribera (psychologist), however pt non-compliant with counseling  -Continue Prozac, Zyprexa (both switched to liquid form to help improve compliance but pt unable to tolerate liquid prozac so switched back to pill form 3/17), and prn Prazosin, hydroxyzine  -s/p psych consult for pt non-compliance and trying to interrupt iv meds/tpn-no further recs  -Dr. Carolina Batista has had long discussion with pt regarding concerns with discharge given her noncompliance with her medical therapy as well as risk for recurrent infection with PICC line and home TPN.  After consult with ethics, plan is to continue with current plan of inpatient TPN with sitter for pt and baby safety until which time we feel patient is safe to go home.      Chronic anemia-known beta thal minor  -s/p iv iron with last admission  -Hb currently at baseline     Ambulate with assistance, Juan thakkar for DVT ppx (pt with intermittent dizziness and previous fall history)  S/p Physical therapy consult for deconditioning

## 2021-03-22 ENCOUNTER — TELEPHONE (OUTPATIENT)
Dept: BEHAVIORAL/MENTAL HEALTH CLINIC | Age: 36
End: 2021-03-22

## 2021-03-22 LAB
ANION GAP SERPL CALC-SCNC: 6 MMOL/L (ref 5–15)
BUN SERPL-MCNC: 14 MG/DL (ref 6–20)
BUN/CREAT SERPL: 36 (ref 12–20)
CALCIUM SERPL-MCNC: 7.8 MG/DL (ref 8.5–10.1)
CHLORIDE SERPL-SCNC: 107 MMOL/L (ref 97–108)
CO2 SERPL-SCNC: 25 MMOL/L (ref 21–32)
CREAT SERPL-MCNC: 0.39 MG/DL (ref 0.55–1.02)
GLUCOSE SERPL-MCNC: 72 MG/DL (ref 65–100)
PHOSPHATE SERPL-MCNC: 3.8 MG/DL (ref 2.6–4.7)
POTASSIUM SERPL-SCNC: 3.6 MMOL/L (ref 3.5–5.1)
SODIUM SERPL-SCNC: 138 MMOL/L (ref 136–145)

## 2021-03-22 PROCEDURE — 74011250637 HC RX REV CODE- 250/637: Performed by: NURSE PRACTITIONER

## 2021-03-22 PROCEDURE — 74011250636 HC RX REV CODE- 250/636: Performed by: OBSTETRICS & GYNECOLOGY

## 2021-03-22 PROCEDURE — 94760 N-INVAS EAR/PLS OXIMETRY 1: CPT

## 2021-03-22 PROCEDURE — 36415 COLL VENOUS BLD VENIPUNCTURE: CPT

## 2021-03-22 PROCEDURE — C9113 INJ PANTOPRAZOLE SODIUM, VIA: HCPCS | Performed by: OBSTETRICS & GYNECOLOGY

## 2021-03-22 PROCEDURE — 74011000250 HC RX REV CODE- 250: Performed by: OBSTETRICS & GYNECOLOGY

## 2021-03-22 PROCEDURE — 80048 BASIC METABOLIC PNL TOTAL CA: CPT

## 2021-03-22 PROCEDURE — 74011000258 HC RX REV CODE- 258: Performed by: OBSTETRICS & GYNECOLOGY

## 2021-03-22 PROCEDURE — 65410000002 HC RM PRIVATE OB

## 2021-03-22 PROCEDURE — 84100 ASSAY OF PHOSPHORUS: CPT

## 2021-03-22 PROCEDURE — 74011250637 HC RX REV CODE- 250/637: Performed by: INTERNAL MEDICINE

## 2021-03-22 PROCEDURE — 74011250637 HC RX REV CODE- 250/637: Performed by: OBSTETRICS & GYNECOLOGY

## 2021-03-22 RX ADMIN — POLYETHYLENE GLYCOL 3350 17 G: 17 POWDER, FOR SOLUTION ORAL at 08:15

## 2021-03-22 RX ADMIN — SUCRALFATE 1 G: 1 TABLET ORAL at 13:08

## 2021-03-22 RX ADMIN — Medication 20 ML: at 05:16

## 2021-03-22 RX ADMIN — Medication 10 ML: at 19:18

## 2021-03-22 RX ADMIN — ONDANSETRON 4 MG: 2 INJECTION INTRAMUSCULAR; INTRAVENOUS at 05:12

## 2021-03-22 RX ADMIN — Medication 10 ML: at 17:19

## 2021-03-22 RX ADMIN — SODIUM CHLORIDE 40 MG: 9 INJECTION INTRAMUSCULAR; INTRAVENOUS; SUBCUTANEOUS at 08:10

## 2021-03-22 RX ADMIN — OLANZAPINE 10 MG: 5 TABLET, ORALLY DISINTEGRATING ORAL at 21:27

## 2021-03-22 RX ADMIN — Medication 20 ML: at 05:15

## 2021-03-22 RX ADMIN — Medication 1 TABLET: at 08:09

## 2021-03-22 RX ADMIN — DOCUSATE SODIUM 100 MG: 100 CAPSULE, LIQUID FILLED ORAL at 08:09

## 2021-03-22 RX ADMIN — ONDANSETRON 4 MG: 2 INJECTION INTRAMUSCULAR; INTRAVENOUS at 23:49

## 2021-03-22 RX ADMIN — I.V. FAT EMULSION 250 ML: 20 EMULSION INTRAVENOUS at 19:20

## 2021-03-22 RX ADMIN — SUCRALFATE 1 G: 1 TABLET ORAL at 08:09

## 2021-03-22 RX ADMIN — ONDANSETRON 4 MG: 2 INJECTION INTRAMUSCULAR; INTRAVENOUS at 09:41

## 2021-03-22 RX ADMIN — ONDANSETRON 4 MG: 2 INJECTION INTRAMUSCULAR; INTRAVENOUS at 17:17

## 2021-03-22 RX ADMIN — SUCRALFATE 1 G: 1 TABLET ORAL at 17:19

## 2021-03-22 RX ADMIN — FLUOXETINE 60 MG: 20 CAPSULE ORAL at 21:27

## 2021-03-22 RX ADMIN — POTASSIUM CHLORIDE: 2 INJECTION, SOLUTION, CONCENTRATE INTRAVENOUS at 19:19

## 2021-03-22 RX ADMIN — SUCRALFATE 1 G: 1 TABLET ORAL at 21:26

## 2021-03-22 NOTE — PROGRESS NOTES
Bedside and Verbal shift change report given to ALBA Allen (oncoming nurse) by Clyde Phipps RN (offgoing nurse). Report included the following information SBAR, Kardex, ED Summary, Intake/Output, MAR, Accordion and Recent Results.

## 2021-03-22 NOTE — PROGRESS NOTES
High Risk Obstetrics Progress Note    Name: Arturo Leon MRN: [id number]  SSN: [id number]    YOB: 1985  Age: 38 y.o. Sex: female      Subjective:      LOS: 19 days    Estimated Date of Delivery: 21   Gestational Age Today: 18w2d     Patient admitted for pyelonephritis. States she does have intermittent nausea, but no emesis, normal fetal movement. and does not have chest pain, contractions, shortness of breath, vaginal bleeding  and vaginal leaking of fluid . Per nursing, pt kept down her meds and requested crackers and ginger ale and was able to keep down. Pt reports that she ambulated yesterday. Also reports bm. Objective:     Vitals:  Blood pressure (!) 90/52, pulse 65, temperature 97.9 °F (36.6 °C), resp. rate 14, height 5' 4\" (1.626 m), weight 76.5 kg (168 lb 9.6 oz), last menstrual period 2020, SpO2 99 %, not currently breastfeeding. Temp (24hrs), Av.8 °F (36.6 °C), Min:97.3 °F (36.3 °C), Max:98.1 °F (06.4 °C)    Systolic (39FFW), ZPY:926 , Min:90 , DCY:782      Diastolic (25RCY), CCJ:36, Min:52, Max:70       Intake and Output:         Physical Exam:  Patient without distress.   Heart: Regular rate and rhythm  Lung: clear to auscultation throughout lung fields, no wheezes, no rales, no rhonchi and normal respiratory effort  Abdomen: soft, nontender, gravid  Lower Extremities:  - Edema No       Membranes:  Intact    Fetal Heart Rate:  +FHTs        Labs:   Recent Results (from the past 36 hour(s))   PHOSPHORUS    Collection Time: 21  5:10 AM   Result Value Ref Range    Phosphorus 4.0 2.6 - 4.7 MG/DL   METABOLIC PANEL, BASIC    Collection Time: 21  5:10 AM   Result Value Ref Range    Sodium 137 136 - 145 mmol/L    Potassium 3.8 3.5 - 5.1 mmol/L    Chloride 108 97 - 108 mmol/L    CO2 26 21 - 32 mmol/L    Anion gap 3 (L) 5 - 15 mmol/L    Glucose 56 (L) 65 - 100 mg/dL    BUN 18 6 - 20 MG/DL    Creatinine 0.38 (L) 0.55 - 1.02 MG/DL    BUN/Creatinine ratio 47 (H) 12 - 20      GFR est AA >60 >60 ml/min/1.73m2    GFR est non-AA >60 >60 ml/min/1.73m2    Calcium 7.8 (L) 8.5 - 10.1 MG/DL   PHOSPHORUS    Collection Time: 03/22/21  5:09 AM   Result Value Ref Range    Phosphorus 3.8 2.6 - 4.7 MG/DL   METABOLIC PANEL, BASIC    Collection Time: 03/22/21  5:09 AM   Result Value Ref Range    Sodium 138 136 - 145 mmol/L    Potassium 3.6 3.5 - 5.1 mmol/L    Chloride 107 97 - 108 mmol/L    CO2 25 21 - 32 mmol/L    Anion gap 6 5 - 15 mmol/L    Glucose 72 65 - 100 mg/dL    BUN 14 6 - 20 MG/DL    Creatinine 0.39 (L) 0.55 - 1.02 MG/DL    BUN/Creatinine ratio 36 (H) 12 - 20      GFR est AA >60 >60 ml/min/1.73m2    GFR est non-AA >60 >60 ml/min/1.73m2    Calcium 7.8 (L) 8.5 - 10.1 MG/DL       Assessment and Plan:      Principal Problem:    Acute pyelonephritis (3/3/2021)       39 y/o [id number] at 22 1/7 weeks admitted for pyelonephritis  -Pyelo now resolved and AF (s/p Ceftriaxone for Klebsiella and Citrobacter urine cx); renal ultrasound normal on 3/5  -Blood culture with 1/4 yeast, s/p amphotericin; repeat blood cultures 3/6 negative   -s/p new PICC placement 3/19/21 (old PICC removed after +blood cxs)     Chronic eating disorder complicated by nausea/vomiting of pregnancy  -on TPN-continous  -Normal potassium level today.   -Continue BG check q6h  -has outpatient nutritionistMatti  -iv zofran, phenergan suppositories, carafate, ivf's  -constipation- miralax and dulcolax supp prn and daily colace     Depression with h/o hospitalization for suicidal ideation  -followed by Isaac Morfin (psychiatrist) and Holli Neil (psychologist), however pt non-compliant with counseling  -Continue Prozac, Zyprexa (both switched to liquid form to help improve compliance but pt unable to tolerate liquid prozac so switched back to pill form 3/17), and prn Prazosin, hydroxyzine  -s/p psych consult for pt non-compliance and trying to interrupt iv meds/tpn-no further recs  -Dr. Samantha Cobb has had long discussion with pt regarding concerns with discharge given her noncompliance with her medical therapy as well as risk for recurrent infection with PICC line and home TPN.  After consult with ethics, plan is to continue with current plan of inpatient TPN with sitter for pt and baby safety until which time we feel patient is safe to go home.      Chronic anemia-known beta thal minor  -s/p iv iron with last admission  -Hb currently at baseline     Ambulate with assistance, Juan thakkar for DVT ppx (pt with intermittent dizziness and previous fall history)  S/p Physical therapy consult for deconditioning

## 2021-03-22 NOTE — TELEPHONE ENCOUNTER
Called pt's nurse to get an update on pt's admission/condition. Pt has been dealing with ongoing issues of malnutrition, N/V and struggling to fully engage in treatment. Agreed to try and come by for visit/assessment on 3/23 or 3/24.

## 2021-03-22 NOTE — PROGRESS NOTES
Problem: Nutrition Deficit  Goal: *Optimize nutritional status  Outcome: Progressing Towards Goal     Problem: Falls - Risk of  Goal: *Absence of Falls  Description: Document Burrell Canavan Fall Risk and appropriate interventions in the flowsheet. Outcome: Progressing Towards Goal  Note: Fall Risk Interventions:            Medication Interventions: Teach patient to arise slowly         History of Falls Interventions:  Investigate reason for fall

## 2021-03-22 NOTE — PROGRESS NOTES
Bedside and Verbal shift change report given to Arnie Small and Maura Reza (oncoming nurse) by [facility] (offgoing nurse). Report included the following information SBAR, Kardex, Procedure Summary, Intake/Output, MAR and Recent Results.

## 2021-03-22 NOTE — PROGRESS NOTES
Ethics consult completed with Vicky Srivastava RN. Dr. Mar Cortez updated on conversation and will also speak directly with ethics.

## 2021-03-23 ENCOUNTER — DOCUMENTATION ONLY (OUTPATIENT)
Dept: BEHAVIORAL/MENTAL HEALTH CLINIC | Age: 36
End: 2021-03-23

## 2021-03-23 LAB
ANION GAP SERPL CALC-SCNC: 5 MMOL/L (ref 5–15)
BUN SERPL-MCNC: 13 MG/DL (ref 6–20)
BUN/CREAT SERPL: 37 (ref 12–20)
CALCIUM SERPL-MCNC: 8.1 MG/DL (ref 8.5–10.1)
CHLORIDE SERPL-SCNC: 106 MMOL/L (ref 97–108)
CO2 SERPL-SCNC: 25 MMOL/L (ref 21–32)
CREAT SERPL-MCNC: 0.35 MG/DL (ref 0.55–1.02)
GLUCOSE SERPL-MCNC: 61 MG/DL (ref 65–100)
PHOSPHATE SERPL-MCNC: 4.5 MG/DL (ref 2.6–4.7)
POTASSIUM SERPL-SCNC: 3.9 MMOL/L (ref 3.5–5.1)
SODIUM SERPL-SCNC: 136 MMOL/L (ref 136–145)

## 2021-03-23 PROCEDURE — 36415 COLL VENOUS BLD VENIPUNCTURE: CPT

## 2021-03-23 PROCEDURE — 74011000250 HC RX REV CODE- 250: Performed by: OBSTETRICS & GYNECOLOGY

## 2021-03-23 PROCEDURE — 74011250637 HC RX REV CODE- 250/637: Performed by: NURSE PRACTITIONER

## 2021-03-23 PROCEDURE — 84100 ASSAY OF PHOSPHORUS: CPT

## 2021-03-23 PROCEDURE — 74011000258 HC RX REV CODE- 258: Performed by: OBSTETRICS & GYNECOLOGY

## 2021-03-23 PROCEDURE — C9113 INJ PANTOPRAZOLE SODIUM, VIA: HCPCS | Performed by: OBSTETRICS & GYNECOLOGY

## 2021-03-23 PROCEDURE — 74011250636 HC RX REV CODE- 250/636: Performed by: OBSTETRICS & GYNECOLOGY

## 2021-03-23 PROCEDURE — 94760 N-INVAS EAR/PLS OXIMETRY 1: CPT

## 2021-03-23 PROCEDURE — 65410000002 HC RM PRIVATE OB

## 2021-03-23 PROCEDURE — 74011250637 HC RX REV CODE- 250/637: Performed by: OBSTETRICS & GYNECOLOGY

## 2021-03-23 PROCEDURE — 74011250637 HC RX REV CODE- 250/637: Performed by: INTERNAL MEDICINE

## 2021-03-23 PROCEDURE — 80048 BASIC METABOLIC PNL TOTAL CA: CPT

## 2021-03-23 RX ADMIN — ONDANSETRON 4 MG: 2 INJECTION INTRAMUSCULAR; INTRAVENOUS at 17:51

## 2021-03-23 RX ADMIN — Medication 10 ML: at 09:48

## 2021-03-23 RX ADMIN — OLANZAPINE 10 MG: 5 TABLET, ORALLY DISINTEGRATING ORAL at 21:10

## 2021-03-23 RX ADMIN — SUCRALFATE 1 G: 1 TABLET ORAL at 21:10

## 2021-03-23 RX ADMIN — Medication 10 ML: at 22:00

## 2021-03-23 RX ADMIN — ONDANSETRON 4 MG: 2 INJECTION INTRAMUSCULAR; INTRAVENOUS at 05:12

## 2021-03-23 RX ADMIN — DOCUSATE SODIUM 100 MG: 100 CAPSULE, LIQUID FILLED ORAL at 09:48

## 2021-03-23 RX ADMIN — I.V. FAT EMULSION 250 ML: 20 EMULSION INTRAVENOUS at 19:00

## 2021-03-23 RX ADMIN — Medication 10 ML: at 19:03

## 2021-03-23 RX ADMIN — SODIUM CHLORIDE 40 MG: 9 INJECTION INTRAMUSCULAR; INTRAVENOUS; SUBCUTANEOUS at 09:47

## 2021-03-23 RX ADMIN — Medication 1 TABLET: at 09:48

## 2021-03-23 RX ADMIN — FLUOXETINE 60 MG: 20 CAPSULE ORAL at 21:10

## 2021-03-23 RX ADMIN — POTASSIUM CHLORIDE: 2 INJECTION, SOLUTION, CONCENTRATE INTRAVENOUS at 18:53

## 2021-03-23 RX ADMIN — POLYETHYLENE GLYCOL 3350 17 G: 17 POWDER, FOR SOLUTION ORAL at 09:59

## 2021-03-23 RX ADMIN — SUCRALFATE 1 G: 1 TABLET ORAL at 13:24

## 2021-03-23 RX ADMIN — ONDANSETRON 4 MG: 2 INJECTION INTRAMUSCULAR; INTRAVENOUS at 09:48

## 2021-03-23 RX ADMIN — SUCRALFATE 1 G: 1 TABLET ORAL at 06:42

## 2021-03-23 RX ADMIN — SUCRALFATE 1 G: 1 TABLET ORAL at 17:52

## 2021-03-23 RX ADMIN — Medication 10 ML: at 17:53

## 2021-03-23 NOTE — PROGRESS NOTES
Bedside and Verbal shift change report given to Ciara Shafer RN (oncoming nurse) by LILIA Han RN (offgoing nurse). Report included the following information SBAR, Kardex, Intake/Output, MAR and Recent Results.

## 2021-03-23 NOTE — PROGRESS NOTES
@ Nav, Behavioral health care manager, from Office of Dr Chung Lynne called in to let patient know that Dr will be in touch with her at 4:00 pm via virtual phone. Pt notified of plan of care. Dr Rocky Atkins updated also about plan of care.

## 2021-03-23 NOTE — PROGRESS NOTES
Bedside shift change report given to LILIA Gage RN (oncoming nurse) by Brook Wagoner RN (offgoing nurse). Report included the following information SBAR, Kardex, Intake/Output, MAR, Accordion and Recent Results.

## 2021-03-23 NOTE — PROGRESS NOTES
High Risk Obstetrics Progress Note    Name: Syed Mckoy MRN: [id number]  SSN: [id number]    YOB: 1985  Age: 38 y.o. Sex: female      Subjective:      LOS: 20 days    Estimated Date of Delivery: 21   Gestational Age Today: 25w2d     Patient admitted for pyelonephritis. States she does have intermittent nausea but denies emesis. Able to keep down psych meds but felt nauseous. Reports normal fetal movement and mild baseline cramping. and does not have chest pain, shortness of breath, vaginal bleeding  and vaginal leaking of fluid . Objective:     Vitals:  Blood pressure 106/65, pulse 65, temperature 98.3 °F (36.8 °C), resp. rate 16, height 5' 4\" (1.626 m), weight 76.5 kg (168 lb 9.6 oz), last menstrual period 2020, SpO2 98 %, not currently breastfeeding. Temp (24hrs), Av.2 °F (36.8 °C), Min:98.2 °F (36.8 °C), Max:98.3 °F (80.3 °C)    Systolic (96ZKC), SLF:46 , Min:90 , SQQ:971      Diastolic (69BNN), RNS:88, Min:51, Max:65       Intake and Output:         Physical Exam:  Patient without distress.   Heart: Regular rate and rhythm  Lung: clear to auscultation throughout lung fields, no wheezes, no rales, no rhonchi and normal respiratory effort  Abdomen: soft, nontender, gravid  Lower Extremities:  - Edema No       Membranes:  Intact    Fetal Heart Rate:  +FHTs        Labs:   Recent Results (from the past 36 hour(s))   PHOSPHORUS    Collection Time: 21  5:09 AM   Result Value Ref Range    Phosphorus 3.8 2.6 - 4.7 MG/DL   METABOLIC PANEL, BASIC    Collection Time: 21  5:09 AM   Result Value Ref Range    Sodium 138 136 - 145 mmol/L    Potassium 3.6 3.5 - 5.1 mmol/L    Chloride 107 97 - 108 mmol/L    CO2 25 21 - 32 mmol/L    Anion gap 6 5 - 15 mmol/L    Glucose 72 65 - 100 mg/dL    BUN 14 6 - 20 MG/DL    Creatinine 0.39 (L) 0.55 - 1.02 MG/DL    BUN/Creatinine ratio 36 (H) 12 - 20      GFR est AA >60 >60 ml/min/1.73m2    GFR est non-AA >60 >60 ml/min/1.73m2    Calcium 7.8 (L) 8.5 - 10.1 MG/DL   PHOSPHORUS    Collection Time: 03/23/21  6:46 AM   Result Value Ref Range    Phosphorus 4.5 2.6 - 4.7 MG/DL   METABOLIC PANEL, BASIC    Collection Time: 03/23/21  6:46 AM   Result Value Ref Range    Sodium 136 136 - 145 mmol/L    Potassium 3.9 3.5 - 5.1 mmol/L    Chloride 106 97 - 108 mmol/L    CO2 25 21 - 32 mmol/L    Anion gap 5 5 - 15 mmol/L    Glucose 61 (L) 65 - 100 mg/dL    BUN 13 6 - 20 MG/DL    Creatinine 0.35 (L) 0.55 - 1.02 MG/DL    BUN/Creatinine ratio 37 (H) 12 - 20      GFR est AA >60 >60 ml/min/1.73m2    GFR est non-AA >60 >60 ml/min/1.73m2    Calcium 8.1 (L) 8.5 - 10.1 MG/DL       Assessment and Plan:      Principal Problem:    Acute pyelonephritis (3/3/2021)       37 y/o [id number] at 22 2/7 weeks admitted for pyelonephritis  -Pyelo now resolved and AF (s/p Ceftriaxone for Klebsiella and Citrobacter urine cx); renal ultrasound normal on 3/5  -Blood culture with 1/4 yeast, s/p amphotericin; repeat blood cultures 3/6 negative   -s/p new PICC placement 3/19/21 (old PICC removed after +blood cxs)     Chronic eating disorder complicated by nausea/vomiting of pregnancy  -on TPN-continous  -Normal potassium level today and trending up-will decrease potassium acetate in TPN today.   -Continue BG check q6h  -has outpatient nutritionist, Julia Lord  -iv zofran, phenergan suppositories, carafate, ivf's  -constipation- miralax and dulcolax supp prn and daily colace     Depression with h/o hospitalization for suicidal ideation  -followed by Be Russo (psychiatrist) and Liliana Montanez (psychologist), however pt non-compliant with counseling  -Continue Prozac, Zyprexa (both switched to liquid form to help improve compliance but pt unable to tolerate liquid prozac so switched back to pill form 3/17), and prn Prazosin, hydroxyzine  -s/p psych consult for pt non-compliance and trying to interrupt iv meds/tpn-no further recs  -Dr. Nida Villareal has had long discussion with pt regarding concerns with discharge given her noncompliance with her medical therapy as well as risk for recurrent infection with PICC line and home TPN.  After consult with ethics, plan is to continue with current plan of inpatient TPN with sitter for pt and baby safety until which time we feel patient is safe to go home.      Chronic anemia-known beta thal minor  -s/p iv iron with last admission  -Hb currently at baseline     Ambulate with assistance, Juan thakkar for DVT ppx (pt with intermittent dizziness and previous fall history)  S/p Physical therapy consult for deconditioning

## 2021-03-24 LAB
ANION GAP SERPL CALC-SCNC: 6 MMOL/L (ref 5–15)
BUN SERPL-MCNC: 12 MG/DL (ref 6–20)
BUN/CREAT SERPL: 32 (ref 12–20)
CALCIUM SERPL-MCNC: 8.1 MG/DL (ref 8.5–10.1)
CHLORIDE SERPL-SCNC: 107 MMOL/L (ref 97–108)
CO2 SERPL-SCNC: 24 MMOL/L (ref 21–32)
CREAT SERPL-MCNC: 0.38 MG/DL (ref 0.55–1.02)
GLUCOSE SERPL-MCNC: 61 MG/DL (ref 65–100)
MAGNESIUM SERPL-MCNC: 1.8 MG/DL (ref 1.6–2.4)
PHOSPHATE SERPL-MCNC: 4.6 MG/DL (ref 2.6–4.7)
POTASSIUM SERPL-SCNC: 3.8 MMOL/L (ref 3.5–5.1)
SODIUM SERPL-SCNC: 137 MMOL/L (ref 136–145)

## 2021-03-24 PROCEDURE — C9113 INJ PANTOPRAZOLE SODIUM, VIA: HCPCS | Performed by: OBSTETRICS & GYNECOLOGY

## 2021-03-24 PROCEDURE — 36415 COLL VENOUS BLD VENIPUNCTURE: CPT

## 2021-03-24 PROCEDURE — 74011250637 HC RX REV CODE- 250/637: Performed by: OBSTETRICS & GYNECOLOGY

## 2021-03-24 PROCEDURE — 65410000002 HC RM PRIVATE OB

## 2021-03-24 PROCEDURE — 80048 BASIC METABOLIC PNL TOTAL CA: CPT

## 2021-03-24 PROCEDURE — 83735 ASSAY OF MAGNESIUM: CPT

## 2021-03-24 PROCEDURE — 74011000258 HC RX REV CODE- 258: Performed by: OBSTETRICS & GYNECOLOGY

## 2021-03-24 PROCEDURE — 74011250636 HC RX REV CODE- 250/636: Performed by: OBSTETRICS & GYNECOLOGY

## 2021-03-24 PROCEDURE — 74011250637 HC RX REV CODE- 250/637: Performed by: NURSE PRACTITIONER

## 2021-03-24 PROCEDURE — 84100 ASSAY OF PHOSPHORUS: CPT

## 2021-03-24 PROCEDURE — 74011250637 HC RX REV CODE- 250/637: Performed by: INTERNAL MEDICINE

## 2021-03-24 PROCEDURE — 74011000250 HC RX REV CODE- 250: Performed by: OBSTETRICS & GYNECOLOGY

## 2021-03-24 PROCEDURE — 94760 N-INVAS EAR/PLS OXIMETRY 1: CPT

## 2021-03-24 RX ADMIN — FLUOXETINE 60 MG: 20 CAPSULE ORAL at 21:11

## 2021-03-24 RX ADMIN — Medication 10 ML: at 09:31

## 2021-03-24 RX ADMIN — OLANZAPINE 10 MG: 5 TABLET, ORALLY DISINTEGRATING ORAL at 21:11

## 2021-03-24 RX ADMIN — DOCUSATE SODIUM 100 MG: 100 CAPSULE, LIQUID FILLED ORAL at 09:14

## 2021-03-24 RX ADMIN — SODIUM CHLORIDE 40 MG: 9 INJECTION INTRAMUSCULAR; INTRAVENOUS; SUBCUTANEOUS at 09:24

## 2021-03-24 RX ADMIN — SUCRALFATE 1 G: 1 TABLET ORAL at 11:30

## 2021-03-24 RX ADMIN — SUCRALFATE 1 G: 1 TABLET ORAL at 07:30

## 2021-03-24 RX ADMIN — I.V. FAT EMULSION 250 ML: 20 EMULSION INTRAVENOUS at 18:01

## 2021-03-24 RX ADMIN — Medication 1 TABLET: at 09:13

## 2021-03-24 RX ADMIN — SUCRALFATE 1 G: 1 TABLET ORAL at 21:12

## 2021-03-24 RX ADMIN — Medication 10 ML: at 22:00

## 2021-03-24 RX ADMIN — POTASSIUM CHLORIDE: 2 INJECTION, SOLUTION, CONCENTRATE INTRAVENOUS at 18:02

## 2021-03-24 RX ADMIN — SUCRALFATE 1 G: 1 TABLET ORAL at 16:24

## 2021-03-24 RX ADMIN — Medication 10 ML: at 06:00

## 2021-03-24 RX ADMIN — ONDANSETRON 4 MG: 2 INJECTION INTRAMUSCULAR; INTRAVENOUS at 16:24

## 2021-03-24 RX ADMIN — POLYETHYLENE GLYCOL 3350 17 G: 17 POWDER, FOR SOLUTION ORAL at 09:28

## 2021-03-24 NOTE — PROGRESS NOTES
High Risk Obstetrics Progress Note    Name: Alejandra Whitehead MRN: [id number]  SSN: [id number]    YOB: 1985  Age: 38 y.o. Sex: female      Subjective:      LOS: 21 days    Estimated Date of Delivery: 21   Gestational Age Today: 20w1d     Patient admitted for pyelonephritis. States she does have normal fetal movement and intermittent nausea and cramping. Pt reports that she had a small amount of emesis after her zyprexa and prozac last night. Not sure how much of the meds she lost lost. and does not have chest pain, contractions, vaginal bleeding  and vaginal leaking of fluid . Objective:     Vitals:  Blood pressure 104/65, pulse 73, temperature 98.2 °F (36.8 °C), resp. rate 16, height 5' 4\" (1.626 m), weight 76.7 kg (169 lb 2.9 oz), last menstrual period 2020, SpO2 98 %, not currently breastfeeding. Temp (24hrs), Av.4 °F (36.9 °C), Min:98.2 °F (36.8 °C), Max:98.6 °F (37 °C)    Systolic (03EYJ), OVW:169 , Min:103 , UXU:582      Diastolic (10UAA), IPC:77, Min:61, Max:69       Intake and Output:         Physical Exam:  Patient without distress. Resting comfortably and more talkative yesterday and today.   Heart: Regular rate and rhythm  Lung: no wheezes, no rales, no rhonchi and normal respiratory effort  Abdomen: soft, nontender, gravid  Lower Extremities:  - Edema No       Membranes:  Intact    Fetal Heart Rate:  Positive FHTs        Labs:   Recent Results (from the past 36 hour(s))   PHOSPHORUS    Collection Time: 21  6:46 AM   Result Value Ref Range    Phosphorus 4.5 2.6 - 4.7 MG/DL   METABOLIC PANEL, BASIC    Collection Time: 21  6:46 AM   Result Value Ref Range    Sodium 136 136 - 145 mmol/L    Potassium 3.9 3.5 - 5.1 mmol/L    Chloride 106 97 - 108 mmol/L    CO2 25 21 - 32 mmol/L    Anion gap 5 5 - 15 mmol/L    Glucose 61 (L) 65 - 100 mg/dL    BUN 13 6 - 20 MG/DL    Creatinine 0.35 (L) 0.55 - 1.02 MG/DL    BUN/Creatinine ratio 37 (H) 12 - 20      GFR est AA >60 >60 ml/min/1.73m2    GFR est non-AA >60 >60 ml/min/1.73m2    Calcium 8.1 (L) 8.5 - 10.1 MG/DL   PHOSPHORUS    Collection Time: 03/24/21  6:28 AM   Result Value Ref Range    Phosphorus 4.6 2.6 - 4.7 MG/DL   METABOLIC PANEL, BASIC    Collection Time: 03/24/21  6:28 AM   Result Value Ref Range    Sodium 137 136 - 145 mmol/L    Potassium 3.8 3.5 - 5.1 mmol/L    Chloride 107 97 - 108 mmol/L    CO2 24 21 - 32 mmol/L    Anion gap 6 5 - 15 mmol/L    Glucose 61 (L) 65 - 100 mg/dL    BUN 12 6 - 20 MG/DL    Creatinine 0.38 (L) 0.55 - 1.02 MG/DL    BUN/Creatinine ratio 32 (H) 12 - 20      GFR est AA >60 >60 ml/min/1.73m2    GFR est non-AA >60 >60 ml/min/1.73m2    Calcium 8.1 (L) 8.5 - 10.1 MG/DL   MAGNESIUM    Collection Time: 03/24/21  6:28 AM   Result Value Ref Range    Magnesium 1.8 1.6 - 2.4 mg/dL       Assessment and Plan:      Principal Problem:    Acute pyelonephritis (3/3/2021)       39 y/o [id number] at 25 3/7 weeks admitted for pyelonephritis  -Pyelo now resolved and AF (s/p Ceftriaxone for Klebsiella and Citrobacter urine cx); renal ultrasound normal on 3/5  -Blood culture with 1/4 yeast, s/p amphotericin; repeat blood cultures 3/6 negative   -s/p new PICC placement 3/19/21 (old PICC removed after +blood cxs)     Chronic eating disorder complicated by nausea/vomiting of pregnancy  -on TPN-continous  -Normal potassium level today.  Will d/w iv room re: any changes recommended with potassium today.   -Continue BG check q6h  -has outpatient nutritionist, Prabhakar Gil  -iv zofran, phenergan suppositories, carafate, ivf's  -constipation- miralax and dulcolax supp prn and daily colace     Depression with h/o hospitalization for suicidal ideation  -followed by Jarred Reddy (psychiatrist) and Adolph Nagy (psychologist), however pt non-compliant with counseling  -Continue Prozac, Zyprexa (both switched to liquid form to help improve compliance but pt unable to tolerate liquid prozac so switched back to pill form 3/17), and prn Prazosin, hydroxyzine  -s/p psych consult for pt non-compliance and trying to interrupt iv meds/tpn-no further recs  -appreciate Loren's input and telehealth meeting with patient.    -Dr. Fabiola Duarte has had long discussion with pt regarding concerns with discharge given her noncompliance with her medical therapy as well as risk for recurrent infection with PICC line and home TPN.  After consult with ethics, plan is to continue with current plan of inpatient TPN with sitter for pt and baby safety until which time we feel patient is safe to go home.      Chronic anemia-known beta thal minor  -s/p iv iron with last admission  -Hb currently at baseline     Ambulate with assistance, Juan thakkar for DVT ppx (pt with intermittent dizziness and previous fall history)  S/p Physical therapy consult for deconditioning

## 2021-03-24 NOTE — PROGRESS NOTES
Bedside and Verbal shift change report given to LILIA Owen RN (oncoming nurse) by Rome Neal RN (offgoing nurse). Report included the following information SBAR, Kardex, Intake/Output, MAR, Accordion and Recent Results.

## 2021-03-24 NOTE — PROGRESS NOTES
0800: Verbal shift change report given to SUSHMA Ansari RN (oncoming nurse) by Xi Brooke RN (offgoing nurse). Report included the following information SBAR, Kardex, MAR and Recent Results. 1000: Verbal shift change report given to Ange You (oncoming nurse) by SUSHMA Ansari RN (offgoing nurse). Report included the following information SBAR and Kardex.

## 2021-03-24 NOTE — PROGRESS NOTES
Comprehensive Nutrition Assessment    Type and Reason for Visit: Reassess    Nutrition Recommendations/Plan:     Add IV MVI daily    Check Vit D level (previsouly depleted)    Check serum zinc (may need to increase zinc in TPN)    Consider placement of PEG/J for nutrition support vs TPN if able. Nutrition Assessment:      GA: 25w3d admitted for acute pyelonephritis (3/3/21). Pt continues with poor oral intake. Pt with 5 kg since admission. TPN continues and provides: 2250 ml, 2340 calories and 120 g protein. Confirmed with PharmD of ample stock of IV MVI, therefore suggest to add IV MVI daily as pt does not consistently take oral medications. Labs much improved, corrected calcium 9.6. Consider PEG or PEG/J placement to reduce complications when compared to parenteral nutrition. Malnutrition Assessment:  Malnutrition Status:  Severe malnutrition    Context:  Chronic illness     Findings of the 6 clinical characteristics of malnutrition:   Energy Intake:  Mild decrease in energy intake (specify)  Weight Loss:  7.00 - Greater than 10% over 6 months     Body Fat Loss:  7 - Severe body fat loss,     Muscle Mass Loss:  7 - Severe muscle mass lossGrip Strength:  Not performed     Estimated Daily Nutrient Needs:  Energy (kcal): 6047-4411 kcal/kg; Weight Used for Energy Requirements: Current  Protein (g):  gm/day (1.2-1.4 gm/day);  Weight Used for Protein Requirements: Admission  Fluid (ml/day): 2400 ml; Method Used for Fluid Requirements: 1 ml/kcal    Wounds:      none    Current Nutrition Therapies:  DIET ONE TIME MESSAGE  DIET DENTAL SOFT (SOFT SOLID)  TPN ADULT - CENTRAL  TPN ADULT - CENTRAL  Current Parenteral Nutrition Orders:  · Type and Formula: AA 6% D20 @ 83 ml/hr  · Lipids: 250ml, Daily      Anthropometric Measures:  · Height:  5' 4\" (162.6 cm)  · Current Body Wt:  74.2 kg (163 lb 9.3 oz)   · Admission Body Wt:  159 lb 2.8 oz    · Usual Body Wt:  68.7 kg (151 lb 7.3 oz)     · Ideal Body Wt:  120 lbs:  132.6 %     Nutrition Diagnosis:   · Inadequate oral intake related to altered GI function, psychological cause or life stress as evidenced by nutrition support-parenteral nutrition    Nutrition Interventions:   Food and/or Nutrient Delivery: Continue current diet, Continue parenteral nutrition  Nutrition Education and Counseling: No recommendations at this time  Coordination of Nutrition Care: Continue to monitor while inpatient, Coordination of community care    Goals:  Meet nutritional needs via TPN for the next 5-7 days.        Nutrition Monitoring and Evaluation:   Behavioral-Environmental Outcomes: None identified  Food/Nutrient Intake Outcomes: IVF intake, Parenteral nutrition intake/tolerance  Physical Signs/Symptoms Outcomes: Biochemical data, GI status, Nausea/vomiting, Weight    Discharge Planning:    Parenteral nutrition     Electronically signed by Damon Riggs RD on 3/24/2021 at 3:50 PM    Contact: Alexy

## 2021-03-24 NOTE — PROGRESS NOTES
LESLIE;  rur 39%  Patient did have a virtual [facility] visit last evening. She continues to require a sitter and no change in her current plan of care at this time.

## 2021-03-24 NOTE — PROGRESS NOTES
Bedside and Verbal shift change report given to [address] (oncoming nurse) by Gabby Acevedo (offgoing nurse). Report included the following information SBAR, Kardex, ED Summary, Procedure Summary, Intake/Output and MAR.

## 2021-03-24 NOTE — ROUTINE PROCESS
Bedside shift change report given to SOPHY Mc RN (oncoming nurse) by SUSHMA Alvarenga (offgoing nurse).  Report included the following information SBAR, Intake/Output, MAR and Recent Results.

## 2021-03-25 LAB
25(OH)D3 SERPL-MCNC: 12.9 NG/ML (ref 30–100)
ALBUMIN SERPL-MCNC: 2.3 G/DL (ref 3.5–5)
ALBUMIN/GLOB SERPL: 0.5 {RATIO} (ref 1.1–2.2)
ALP SERPL-CCNC: 107 U/L (ref 45–117)
ALT SERPL-CCNC: 26 U/L (ref 12–78)
ANION GAP SERPL CALC-SCNC: 5 MMOL/L (ref 5–15)
AST SERPL-CCNC: 18 U/L (ref 15–37)
BILIRUB SERPL-MCNC: 0.3 MG/DL (ref 0.2–1)
BUN SERPL-MCNC: 10 MG/DL (ref 6–20)
BUN/CREAT SERPL: 36 (ref 12–20)
CALCIUM SERPL-MCNC: 8 MG/DL (ref 8.5–10.1)
CHLORIDE SERPL-SCNC: 106 MMOL/L (ref 97–108)
CO2 SERPL-SCNC: 24 MMOL/L (ref 21–32)
CREAT SERPL-MCNC: 0.28 MG/DL (ref 0.55–1.02)
GLOBULIN SER CALC-MCNC: 4.5 G/DL (ref 2–4)
GLUCOSE SERPL-MCNC: 61 MG/DL (ref 65–100)
MAGNESIUM SERPL-MCNC: 1.6 MG/DL (ref 1.6–2.4)
PHOSPHATE SERPL-MCNC: 4.3 MG/DL (ref 2.6–4.7)
POTASSIUM SERPL-SCNC: 3.7 MMOL/L (ref 3.5–5.1)
PROT SERPL-MCNC: 6.8 G/DL (ref 6.4–8.2)
SODIUM SERPL-SCNC: 135 MMOL/L (ref 136–145)

## 2021-03-25 PROCEDURE — 74011000250 HC RX REV CODE- 250: Performed by: OBSTETRICS & GYNECOLOGY

## 2021-03-25 PROCEDURE — 84630 ASSAY OF ZINC: CPT

## 2021-03-25 PROCEDURE — 74011250637 HC RX REV CODE- 250/637: Performed by: INTERNAL MEDICINE

## 2021-03-25 PROCEDURE — 36415 COLL VENOUS BLD VENIPUNCTURE: CPT

## 2021-03-25 PROCEDURE — 83735 ASSAY OF MAGNESIUM: CPT

## 2021-03-25 PROCEDURE — 74011250637 HC RX REV CODE- 250/637: Performed by: OBSTETRICS & GYNECOLOGY

## 2021-03-25 PROCEDURE — 65410000002 HC RM PRIVATE OB

## 2021-03-25 PROCEDURE — 94760 N-INVAS EAR/PLS OXIMETRY 1: CPT

## 2021-03-25 PROCEDURE — C9113 INJ PANTOPRAZOLE SODIUM, VIA: HCPCS | Performed by: OBSTETRICS & GYNECOLOGY

## 2021-03-25 PROCEDURE — 82306 VITAMIN D 25 HYDROXY: CPT

## 2021-03-25 PROCEDURE — 74011250637 HC RX REV CODE- 250/637: Performed by: NURSE PRACTITIONER

## 2021-03-25 PROCEDURE — 74011250636 HC RX REV CODE- 250/636: Performed by: OBSTETRICS & GYNECOLOGY

## 2021-03-25 PROCEDURE — 74011000258 HC RX REV CODE- 258: Performed by: OBSTETRICS & GYNECOLOGY

## 2021-03-25 PROCEDURE — 84100 ASSAY OF PHOSPHORUS: CPT

## 2021-03-25 PROCEDURE — 80053 COMPREHEN METABOLIC PANEL: CPT

## 2021-03-25 RX ADMIN — OLANZAPINE 10 MG: 5 TABLET, ORALLY DISINTEGRATING ORAL at 22:20

## 2021-03-25 RX ADMIN — SUCRALFATE 1 G: 1 TABLET ORAL at 11:46

## 2021-03-25 RX ADMIN — POLYETHYLENE GLYCOL 3350 17 G: 17 POWDER, FOR SOLUTION ORAL at 08:32

## 2021-03-25 RX ADMIN — Medication 1 TABLET: at 08:30

## 2021-03-25 RX ADMIN — DOCUSATE SODIUM 100 MG: 100 CAPSULE, LIQUID FILLED ORAL at 08:30

## 2021-03-25 RX ADMIN — ONDANSETRON 4 MG: 2 INJECTION INTRAMUSCULAR; INTRAVENOUS at 14:56

## 2021-03-25 RX ADMIN — Medication 10 ML: at 16:53

## 2021-03-25 RX ADMIN — SODIUM CHLORIDE 40 MG: 9 INJECTION INTRAMUSCULAR; INTRAVENOUS; SUBCUTANEOUS at 08:29

## 2021-03-25 RX ADMIN — I.V. FAT EMULSION 250 ML: 20 EMULSION INTRAVENOUS at 18:36

## 2021-03-25 RX ADMIN — POTASSIUM CHLORIDE: 2 INJECTION, SOLUTION, CONCENTRATE INTRAVENOUS at 18:39

## 2021-03-25 RX ADMIN — FLUOXETINE 60 MG: 20 CAPSULE ORAL at 22:20

## 2021-03-25 RX ADMIN — SUCRALFATE 1 G: 1 TABLET ORAL at 06:30

## 2021-03-25 RX ADMIN — SUCRALFATE 1 G: 1 TABLET ORAL at 22:20

## 2021-03-25 RX ADMIN — SUCRALFATE 1 G: 1 TABLET ORAL at 16:54

## 2021-03-25 NOTE — PROGRESS NOTES
Problem: Nutrition Deficit  Goal: *Optimize nutritional status  Outcome: Progressing Towards Goal     Problem: Falls - Risk of  Goal: *Absence of Falls  Description: Document Jacky Shove Fall Risk and appropriate interventions in the flowsheet.   Outcome: Progressing Towards Goal  Note: Fall Risk Interventions:            Medication Interventions: Teach patient to arise slowly         History of Falls Interventions: Room close to nurse's station

## 2021-03-25 NOTE — PROGRESS NOTES
Bedside and Verbal shift change report given to LILIA Beltran RN (oncoming nurse) by Kathy Mc RN (offgoing nurse). Report included the following information SBAR, Kardex, Intake/Output, MAR, Accordion and Recent Results.

## 2021-03-25 NOTE — PROGRESS NOTES
1200 - Transition of Care  (LESLIE) Plan:        RUR:  39 %           Disposition: TBD/subject to change pending recommendations; pending medical progression    -- Chart indicates greater than 48 hours    -- Anticipate home with (ex: with family assistance and outpatient services ) once medically stable    -- Care conference with Ethics last week. Maybe complex case management case. Email to Evonne BARNARD and Tracie Alvarez - Dr. Anjali Moncada following.      --CM spoke briefly with patient. Mom concerned about not being able to attend outside commitments re: court appointments. This CM stated we would be more than happy to write a letter explaining why she is still inpatient. . I also recommended she speak with her OBGYN to relate her concerns. She verbalized understanding. PCP followup: Annette Romero NP    Transport: TBD Sue Mcintyre is appropriate at [facility]    CM will continue to follow and assist with LESLIE needs as they arise. Available on [employer]. Tammie  MSN, [facility], RN, [facility] - [phone].

## 2021-03-25 NOTE — PROGRESS NOTES
Bedside and Verbal shift change report given to Arnie Small and La Nena Mueller (oncoming nurse) by Chuckie Delaney (offgoing nurse). Report included the following information SBAR, Kardex, Procedure Summary, Intake/Output, MAR and Recent Results.

## 2021-03-25 NOTE — PROGRESS NOTES
High Risk Obstetrics Progress Note    Name: Syed Mckoy MRN: [id number]  SSN: [id number]    YOB: 1985  Age: 38 y.o. Sex: female      Subjective:      LOS: 22 days    Estimated Date of Delivery: 21   Gestational Age Today: 21w3d     Patient admitted for pyelonephritis. States she does have normal fetal movement, mild cramping and intermittent nausea. Pt states she did not keep her prozac and zyprexa down again last night. and does not have chest pain, contractions, vaginal bleeding  and vaginal leaking of fluid . Objective:     Vitals:  Blood pressure 137/67, pulse 71, temperature 97.9 °F (36.6 °C), resp. rate 16, height 5' 4\" (1.626 m), weight 77.2 kg (170 lb 3.2 oz), last menstrual period 2020, SpO2 99 %, not currently breastfeeding. Temp (24hrs), Av.1 °F (36.7 °C), Min:97.9 °F (36.6 °C), Max:98.5 °F (65.0 °C)    Systolic (18OHR), FQM:512 , Min:109 , BXL:396      Diastolic (37CCB), LAD:94, Min:66, Max:70       Intake and Output:         Physical Exam:  Patient without distress.   Heart: Regular rate and rhythm  Lung: clear to auscultation throughout lung fields, no wheezes, no rales, no rhonchi and normal respiratory effort  Abdomen: soft, nontender, gravid  Lower Extremities:  - Edema No       Membranes:  Intact    Fetal Heart Rate:  +FHTs        Labs:   Recent Results (from the past 36 hour(s))   PHOSPHORUS    Collection Time: 21  6:28 AM   Result Value Ref Range    Phosphorus 4.6 2.6 - 4.7 MG/DL   METABOLIC PANEL, BASIC    Collection Time: 21  6:28 AM   Result Value Ref Range    Sodium 137 136 - 145 mmol/L    Potassium 3.8 3.5 - 5.1 mmol/L    Chloride 107 97 - 108 mmol/L    CO2 24 21 - 32 mmol/L    Anion gap 6 5 - 15 mmol/L    Glucose 61 (L) 65 - 100 mg/dL    BUN 12 6 - 20 MG/DL    Creatinine 0.38 (L) 0.55 - 1.02 MG/DL    BUN/Creatinine ratio 32 (H) 12 - 20      GFR est AA >60 >60 ml/min/1.73m2    GFR est non-AA >60 >60 ml/min/1.73m2    Calcium 8.1 (L) 8.5 - 10.1 MG/DL   MAGNESIUM    Collection Time: 03/24/21  6:28 AM   Result Value Ref Range    Magnesium 1.8 1.6 - 2.4 mg/dL       Assessment and Plan:      Principal Problem:    Acute pyelonephritis (3/3/2021)       39 y/o [id number] at 25 4/7 weeks admitted for pyelonephritis  -Pyelo now resolved and AF (s/p Ceftriaxone for Klebsiella and Citrobacter urine cx); renal ultrasound normal on 3/5  -Blood culture with 1/4 yeast, s/p amphotericin; repeat blood cultures 3/6 negative   -s/p new PICC placement 3/19/21 (old PICC removed after +blood cxs)     Chronic eating disorder   -on TPN-continous  -checking zinc and vit D levels per nurtition recs  -Continue BG check q6h  -has outpatient nutritionist, Gabriela Magallanes  -iv zofran, phenergan suppositories, carafate, ivf's  -constipation- miralax and dulcolax supp prn and daily colace     Depression with h/o hospitalization for suicidal ideation  -followed by Marge Jacobs (psychiatrist) and Tony Howell (psychologist), however pt non-compliant with counseling  -Continue Prozac, Zyprexa (both switched to liquid form to help improve compliance but pt unable to tolerate liquid prozac so switched back to pill form 3/17), and prn Prazosin, hydroxyzine  -s/p psych consult for pt non-compliance and trying to interrupt iv meds/tpn-no further recs  -appreciate Loren's input and telehealth meeting with patient.     -Dr. Joe Desir has had long discussion with pt regarding concerns with discharge given her noncompliance with her medical therapy as well as risk for recurrent infection with PICC line and home TPN.  After consult with ethics, plan is to continue with current plan of inpatient TPN with sitter for pt and baby safety until which time we feel patient is safe to go home.      Chronic anemia-known beta thal minor  -s/p iv iron with last admission  -Hb currently at baseline     Ambulate with assistance, Juan thakkar for DVT ppx (pt with intermittent dizziness and previous fall history)  S/p Physical therapy consult for deconditioning

## 2021-03-26 LAB
ANION GAP SERPL CALC-SCNC: 5 MMOL/L (ref 5–15)
BUN SERPL-MCNC: 11 MG/DL (ref 6–20)
BUN/CREAT SERPL: 26 (ref 12–20)
CALCIUM SERPL-MCNC: 7.8 MG/DL (ref 8.5–10.1)
CHLORIDE SERPL-SCNC: 106 MMOL/L (ref 97–108)
CO2 SERPL-SCNC: 24 MMOL/L (ref 21–32)
CREAT SERPL-MCNC: 0.43 MG/DL (ref 0.55–1.02)
GLUCOSE SERPL-MCNC: 66 MG/DL (ref 65–100)
MAGNESIUM SERPL-MCNC: 1.7 MG/DL (ref 1.6–2.4)
PHOSPHATE SERPL-MCNC: 4.1 MG/DL (ref 2.6–4.7)
POTASSIUM SERPL-SCNC: 3.5 MMOL/L (ref 3.5–5.1)
SODIUM SERPL-SCNC: 135 MMOL/L (ref 136–145)

## 2021-03-26 PROCEDURE — 74011000250 HC RX REV CODE- 250: Performed by: OBSTETRICS & GYNECOLOGY

## 2021-03-26 PROCEDURE — 65410000002 HC RM PRIVATE OB

## 2021-03-26 PROCEDURE — C9113 INJ PANTOPRAZOLE SODIUM, VIA: HCPCS | Performed by: OBSTETRICS & GYNECOLOGY

## 2021-03-26 PROCEDURE — 94760 N-INVAS EAR/PLS OXIMETRY 1: CPT

## 2021-03-26 PROCEDURE — 74011250637 HC RX REV CODE- 250/637: Performed by: OBSTETRICS & GYNECOLOGY

## 2021-03-26 PROCEDURE — 83735 ASSAY OF MAGNESIUM: CPT

## 2021-03-26 PROCEDURE — 36415 COLL VENOUS BLD VENIPUNCTURE: CPT

## 2021-03-26 PROCEDURE — 74011250637 HC RX REV CODE- 250/637: Performed by: INTERNAL MEDICINE

## 2021-03-26 PROCEDURE — 74011250636 HC RX REV CODE- 250/636: Performed by: OBSTETRICS & GYNECOLOGY

## 2021-03-26 PROCEDURE — 74011000258 HC RX REV CODE- 258: Performed by: OBSTETRICS & GYNECOLOGY

## 2021-03-26 PROCEDURE — 80048 BASIC METABOLIC PNL TOTAL CA: CPT

## 2021-03-26 PROCEDURE — 84100 ASSAY OF PHOSPHORUS: CPT

## 2021-03-26 PROCEDURE — 74011250637 HC RX REV CODE- 250/637: Performed by: NURSE PRACTITIONER

## 2021-03-26 RX ADMIN — Medication 10 ML: at 06:58

## 2021-03-26 RX ADMIN — SUCRALFATE 1 G: 1 TABLET ORAL at 17:06

## 2021-03-26 RX ADMIN — I.V. FAT EMULSION 250 ML: 20 EMULSION INTRAVENOUS at 19:18

## 2021-03-26 RX ADMIN — SUCRALFATE 1 G: 1 TABLET ORAL at 11:31

## 2021-03-26 RX ADMIN — Medication 1 TABLET: at 09:00

## 2021-03-26 RX ADMIN — POLYETHYLENE GLYCOL 3350 17 G: 17 POWDER, FOR SOLUTION ORAL at 11:31

## 2021-03-26 RX ADMIN — SODIUM CHLORIDE 40 MG: 9 INJECTION INTRAMUSCULAR; INTRAVENOUS; SUBCUTANEOUS at 11:38

## 2021-03-26 RX ADMIN — FLUOXETINE 60 MG: 20 CAPSULE ORAL at 21:17

## 2021-03-26 RX ADMIN — SUCRALFATE 1 G: 1 TABLET ORAL at 06:58

## 2021-03-26 RX ADMIN — SUCRALFATE 1 G: 1 TABLET ORAL at 21:17

## 2021-03-26 RX ADMIN — DOCUSATE SODIUM 100 MG: 100 CAPSULE, LIQUID FILLED ORAL at 11:31

## 2021-03-26 RX ADMIN — POTASSIUM CHLORIDE: 2 INJECTION, SOLUTION, CONCENTRATE INTRAVENOUS at 19:10

## 2021-03-26 RX ADMIN — OLANZAPINE 10 MG: 5 TABLET, ORALLY DISINTEGRATING ORAL at 21:17

## 2021-03-26 NOTE — PROGRESS NOTES
Bedside and Verbal shift change report given to Walt Dixon (oncoming nurse) by Kavin Jaquez (offgoing nurse). Report included the following information SBAR, Kardex, MAR and Recent Results.

## 2021-03-26 NOTE — PROGRESS NOTES
9671 Spoke with Dr. Preeti Sharma regarding pt's zoom appointments with Felipa Fernandez NP (Psychiatrist). MD is okay for patient to have privacy during these meetings as long as appt is a visual meeting and not just audio.

## 2021-03-26 NOTE — PROGRESS NOTES
..Bedside and Verbal shift change report given to Fritz Cuadra RN (oncoming nurse) by Fide Israel (offgoing nurse). Report included the following information SBAR, Kardex, Intake/Output, MAR, Accordion, Recent Results and Med Rec Status.

## 2021-03-26 NOTE — PROGRESS NOTES
High Risk Obstetrics Progress Note    Name: Chula Bell MRN: [id number]  SSN: [id number]    YOB: 1985  Age: 38 y.o. Sex: female      Subjective:      LOS: 23 days    Estimated Date of Delivery: 21   Gestational Age Today: 25w10d     Patient admitted for pyelonephritis. States she does have normal fetal movement and intermittent nausea and cramping but no emesis yesterday/overnight and does not have chest pain, contractions, shortness of breath, vaginal bleeding  and vaginal leaking of fluid . Objective:     Vitals:  Blood pressure (!) 105/57, pulse 78, temperature 98 °F (36.7 °C), resp. rate 16, height 5' 4\" (1.626 m), weight 77.3 kg (170 lb 6.4 oz), last menstrual period 2020, SpO2 98 %, not currently breastfeeding. Temp (24hrs), Av.1 °F (36.7 °C), Min:97.8 °F (36.6 °C), Max:98.4 °F (69.7 °C)    Systolic (91LFZ), WJF:389 , Min:99 , UGL:855      Diastolic (64WAM), NGT:04, Min:57, Max:67       Intake and Output:         Physical Exam:  Patient without distress.   Heart: Regular rate and rhythm  Lung: clear to auscultation throughout lung fields, no wheezes, no rales, no rhonchi and normal respiratory effort  Abdomen: soft, nontender, gravid  Lower Extremities:  - Edema No       Membranes:  Intact    Fetal Heart Rate:  +FHTs        Labs:   Recent Results (from the past 36 hour(s))   VITAMIN D, 25 HYDROXY    Collection Time: 21  6:57 AM   Result Value Ref Range    Vitamin D 25-Hydroxy 12.9 (L) 30 - 096 ng/mL   METABOLIC PANEL, COMPREHENSIVE    Collection Time: 21  8:28 AM   Result Value Ref Range    Sodium 135 (L) 136 - 145 mmol/L    Potassium 3.7 3.5 - 5.1 mmol/L    Chloride 106 97 - 108 mmol/L    CO2 24 21 - 32 mmol/L    Anion gap 5 5 - 15 mmol/L    Glucose 61 (L) 65 - 100 mg/dL    BUN 10 6 - 20 MG/DL    Creatinine 0.28 (L) 0.55 - 1.02 MG/DL    BUN/Creatinine ratio 36 (H) 12 - 20      GFR est AA >60 >60 ml/min/1.73m2    GFR est non-AA >60 >60 ml/min/1.73m2 Calcium 8.0 (L) 8.5 - 10.1 MG/DL    Bilirubin, total 0.3 0.2 - 1.0 MG/DL    ALT (SGPT) 26 12 - 78 U/L    AST (SGOT) 18 15 - 37 U/L    Alk.  phosphatase 107 45 - 117 U/L    Protein, total 6.8 6.4 - 8.2 g/dL    Albumin 2.3 (L) 3.5 - 5.0 g/dL    Globulin 4.5 (H) 2.0 - 4.0 g/dL    A-G Ratio 0.5 (L) 1.1 - 2.2     PHOSPHORUS    Collection Time: 03/25/21  8:28 AM   Result Value Ref Range    Phosphorus 4.3 2.6 - 4.7 MG/DL   MAGNESIUM    Collection Time: 03/25/21  8:28 AM   Result Value Ref Range    Magnesium 1.6 1.6 - 2.4 mg/dL       Assessment and Plan:      Principal Problem:    Acute pyelonephritis (3/3/2021)       37 y/o [id number] at 25 5/7 weeks admitted for pyelonephritis  -Pyelo now resolved and AF (s/p Ceftriaxone for Klebsiella and Citrobacter urine cx); renal ultrasound normal on 3/5  -Blood culture with 1/4 yeast, s/p amphotericin; repeat blood cultures 3/6 negative   -s/p new PICC placement 3/19/21 (old PICC removed after +blood cxs)     Chronic eating disorder   -on TPN-continous  -checking zinc and vit D levels per nurtition recs  -Continue BG check q6h  -has outpatient nutritionistGabriela  -iv zofran, phenergan suppositories, carafate, ivf's  -constipation- miralax and dulcolax supp prn and daily colace  -vit D deficiency-will try to supplement     Depression with h/o hospitalization for suicidal ideation  -followed by Marge Jacobs (psychiatrist) and Tony Howell (psychologist), however pt non-compliant with counseling  -Continue Prozac, Zyprexa (both switched to liquid form to help improve compliance but pt unable to tolerate liquid prozac so switched back to pill form 3/17), and prn Prazosin, hydroxyzine  -s/p psych consult for pt non-compliance and trying to interrupt iv meds/tpn-no further recs  -appreciate Loren's input and telehealth meeting with patient.     -Dr. Joe Desir has had long discussion with pt regarding concerns with discharge given her noncompliance with her medical therapy as well as risk for recurrent infection with PICC line and home TPN.  After consult with ethics, plan is to continue with current plan of inpatient TPN with sitter for pt and baby safety until which time we feel patient is safe to go home.      Chronic anemia-known beta thal minor  -s/p iv iron with last admission  -Hb currently at baseline     Ambulate with assistance, Juan thakkar for DVT ppx (pt with intermittent dizziness and previous fall history)  S/p Physical therapy consult for deconditioning

## 2021-03-26 NOTE — PROGRESS NOTES
Bedside shift change report given to Kelley Andrea RN (oncoming nurse) by [facility], RN (offgoing nurse). Report included the following information SBAR, Kardex, Intake/Output and MAR.

## 2021-03-27 LAB
ANION GAP SERPL CALC-SCNC: 5 MMOL/L (ref 5–15)
BUN SERPL-MCNC: 11 MG/DL (ref 6–20)
BUN/CREAT SERPL: 35 (ref 12–20)
CALCIUM SERPL-MCNC: 8 MG/DL (ref 8.5–10.1)
CHLORIDE SERPL-SCNC: 108 MMOL/L (ref 97–108)
CO2 SERPL-SCNC: 25 MMOL/L (ref 21–32)
CREAT SERPL-MCNC: 0.31 MG/DL (ref 0.55–1.02)
GLUCOSE SERPL-MCNC: 67 MG/DL (ref 65–100)
MAGNESIUM SERPL-MCNC: 1.9 MG/DL (ref 1.6–2.4)
PHOSPHATE SERPL-MCNC: 4.3 MG/DL (ref 2.6–4.7)
POTASSIUM SERPL-SCNC: 3.5 MMOL/L (ref 3.5–5.1)
SODIUM SERPL-SCNC: 138 MMOL/L (ref 136–145)
ZINC SERPL-MCNC: 40 UG/DL (ref 44–115)

## 2021-03-27 PROCEDURE — 84100 ASSAY OF PHOSPHORUS: CPT

## 2021-03-27 PROCEDURE — 74011000250 HC RX REV CODE- 250: Performed by: OBSTETRICS & GYNECOLOGY

## 2021-03-27 PROCEDURE — 74011250637 HC RX REV CODE- 250/637: Performed by: INTERNAL MEDICINE

## 2021-03-27 PROCEDURE — 74011250636 HC RX REV CODE- 250/636: Performed by: OBSTETRICS & GYNECOLOGY

## 2021-03-27 PROCEDURE — 74011000258 HC RX REV CODE- 258: Performed by: OBSTETRICS & GYNECOLOGY

## 2021-03-27 PROCEDURE — 36415 COLL VENOUS BLD VENIPUNCTURE: CPT

## 2021-03-27 PROCEDURE — 80048 BASIC METABOLIC PNL TOTAL CA: CPT

## 2021-03-27 PROCEDURE — 65410000002 HC RM PRIVATE OB

## 2021-03-27 PROCEDURE — 74011250637 HC RX REV CODE- 250/637: Performed by: NURSE PRACTITIONER

## 2021-03-27 PROCEDURE — 74011250637 HC RX REV CODE- 250/637: Performed by: OBSTETRICS & GYNECOLOGY

## 2021-03-27 PROCEDURE — 83735 ASSAY OF MAGNESIUM: CPT

## 2021-03-27 PROCEDURE — C9113 INJ PANTOPRAZOLE SODIUM, VIA: HCPCS | Performed by: OBSTETRICS & GYNECOLOGY

## 2021-03-27 RX ORDER — CHOLECALCIFEROL (VITAMIN D3) 10(400)/ML
30 DROPS ORAL DAILY
Status: DISCONTINUED | OUTPATIENT
Start: 2021-03-27 | End: 2021-04-02 | Stop reason: HOSPADM

## 2021-03-27 RX ORDER — PRAZOSIN HYDROCHLORIDE 1 MG/1
2 CAPSULE ORAL
Status: DISCONTINUED | OUTPATIENT
Start: 2021-03-27 | End: 2021-04-02 | Stop reason: HOSPADM

## 2021-03-27 RX ORDER — HYDROXYZINE 25 MG/1
50 TABLET, FILM COATED ORAL
Status: DISCONTINUED | OUTPATIENT
Start: 2021-03-27 | End: 2021-04-02 | Stop reason: HOSPADM

## 2021-03-27 RX ADMIN — POTASSIUM CHLORIDE: 2 INJECTION, SOLUTION, CONCENTRATE INTRAVENOUS at 18:34

## 2021-03-27 RX ADMIN — DOCUSATE SODIUM 100 MG: 100 CAPSULE, LIQUID FILLED ORAL at 09:40

## 2021-03-27 RX ADMIN — SUCRALFATE 1 G: 1 TABLET ORAL at 21:20

## 2021-03-27 RX ADMIN — Medication 10 ML: at 21:20

## 2021-03-27 RX ADMIN — Medication 30 MCG: at 11:59

## 2021-03-27 RX ADMIN — Medication 10 ML: at 21:19

## 2021-03-27 RX ADMIN — FLUOXETINE 60 MG: 20 CAPSULE ORAL at 21:17

## 2021-03-27 RX ADMIN — I.V. FAT EMULSION 250 ML: 20 EMULSION INTRAVENOUS at 18:35

## 2021-03-27 RX ADMIN — SUCRALFATE 1 G: 1 TABLET ORAL at 16:54

## 2021-03-27 RX ADMIN — HYDROXYZINE HYDROCHLORIDE 50 MG: 25 TABLET, FILM COATED ORAL at 21:18

## 2021-03-27 RX ADMIN — OLANZAPINE 10 MG: 5 TABLET, ORALLY DISINTEGRATING ORAL at 21:18

## 2021-03-27 RX ADMIN — SODIUM CHLORIDE 40 MG: 9 INJECTION INTRAMUSCULAR; INTRAVENOUS; SUBCUTANEOUS at 09:39

## 2021-03-27 RX ADMIN — ONDANSETRON 4 MG: 2 INJECTION INTRAMUSCULAR; INTRAVENOUS at 07:16

## 2021-03-27 RX ADMIN — SUCRALFATE 1 G: 1 TABLET ORAL at 07:15

## 2021-03-27 RX ADMIN — SUCRALFATE 1 G: 1 TABLET ORAL at 11:59

## 2021-03-27 RX ADMIN — POLYETHYLENE GLYCOL 3350 17 G: 17 POWDER, FOR SOLUTION ORAL at 10:03

## 2021-03-27 NOTE — PROGRESS NOTES
High Risk Obstetrics Progress Note    Name: Makenzie Yates MRN: [id number]  SSN: [id number]    YOB: 1985  Age: 38 y.o. Sex: female      Subjective:      LOS: 24 days    Estimated Date of Delivery: 21   Gestational Age Today: 23w6d     Patient admitted for pyelonephritis. States she does have normal fetal movement and intermittent nausea, but no emesis last night, some occasional contractions and cramping and does not have chest pain, shortness of breath, vaginal bleeding  and vaginal leaking of fluid . Objective:     Vitals:  Blood pressure (!) 96/51, pulse 79, temperature 98.4 °F (36.9 °C), resp. rate 16, height 5' 4\" (1.626 m), weight 76.9 kg (169 lb 9.6 oz), last menstrual period 2020, SpO2 97 %, not currently breastfeeding. Temp (24hrs), Av.1 °F (36.7 °C), Min:97.9 °F (36.6 °C), Max:98.4 °F (92.3 °C)    Systolic (38MLM), EGA:497 , Min:96 , ZMU:629      Diastolic (31JXC), BGX:73, Min:51, Max:74       Intake and Output:         Physical Exam:  Patient without distress. Flat affect.   Heart: Regular rate and rhythm  Lung: clear to auscultation throughout lung fields, no wheezes, no rales, no rhonchi and normal respiratory effort  Abdomen: soft, nontender, gravid  Lower Extremities:  - Edema No       Membranes:  Intact    Fetal Heart Rate:  +FHTs        Labs:   Recent Results (from the past 36 hour(s))   MAGNESIUM    Collection Time: 21  8:54 AM   Result Value Ref Range    Magnesium 1.7 1.6 - 2.4 mg/dL   METABOLIC PANEL, BASIC    Collection Time: 21  8:54 AM   Result Value Ref Range    Sodium 135 (L) 136 - 145 mmol/L    Potassium 3.5 3.5 - 5.1 mmol/L    Chloride 106 97 - 108 mmol/L    CO2 24 21 - 32 mmol/L    Anion gap 5 5 - 15 mmol/L    Glucose 66 65 - 100 mg/dL    BUN 11 6 - 20 MG/DL    Creatinine 0.43 (L) 0.55 - 1.02 MG/DL    BUN/Creatinine ratio 26 (H) 12 - 20      GFR est AA >60 >60 ml/min/1.73m2    GFR est non-AA >60 >60 ml/min/1.73m2    Calcium 7.8 (L) 8.5 - 10.1 MG/DL   PHOSPHORUS    Collection Time: 03/26/21  8:54 AM   Result Value Ref Range    Phosphorus 4.1 2.6 - 4.7 MG/DL   PHOSPHORUS    Collection Time: 03/27/21  2:13 AM   Result Value Ref Range    Phosphorus 4.3 2.6 - 4.7 MG/DL   METABOLIC PANEL, BASIC    Collection Time: 03/27/21  2:13 AM   Result Value Ref Range    Sodium 138 136 - 145 mmol/L    Potassium 3.5 3.5 - 5.1 mmol/L    Chloride 108 97 - 108 mmol/L    CO2 25 21 - 32 mmol/L    Anion gap 5 5 - 15 mmol/L    Glucose 67 65 - 100 mg/dL    BUN 11 6 - 20 MG/DL    Creatinine 0.31 (L) 0.55 - 1.02 MG/DL    BUN/Creatinine ratio 35 (H) 12 - 20      GFR est AA >60 >60 ml/min/1.73m2    GFR est non-AA >60 >60 ml/min/1.73m2    Calcium 8.0 (L) 8.5 - 10.1 MG/DL   MAGNESIUM    Collection Time: 03/27/21  2:13 AM   Result Value Ref Range    Magnesium 1.9 1.6 - 2.4 mg/dL       Assessment and Plan:      Principal Problem:    Acute pyelonephritis (3/3/2021)       39 y/o [id number] at 25 6/7 weeks admitted for pyelonephritis  -Pyelo now resolved and AF (s/p Ceftriaxone for Klebsiella and Citrobacter urine cx); renal ultrasound normal on 3/5  -Blood culture with 1/4 yeast, s/p amphotericin; repeat blood cultures 3/6 negative   -s/p new PICC placement 3/19/21 (old PICC removed after +blood cxs)     Chronic eating disorder   -on TPN-continous  -Vitamin D deficiency-supplementing for repletion  -Zinc level still pending  -Continue BG check q6h  -has outpatient nutritionist, Valerie Colon  -iv zofran, phenergan suppositories, carafate, ivf's  -constipation- miralax and dulcolax supp prn and daily colace     Depression with h/o hospitalization for suicidal ideation  -followed by Rafal Bond (psychiatrist) and Maria Luisa Mei (psychologist), however pt non-compliant with counseling  -Continue Prozac, Zyprexa (both switched to liquid form to help improve compliance but pt unable to tolerate liquid prozac so switched back to pill form 3/17), and prn Prazosin, hydroxyzine  -s/p psych consult for pt non-compliance and trying to interrupt iv meds/tpn-no further recs  -appreciate Loren's input and telehealth meeting with patient.     -I had another long discussion with pt regarding concerns with discharge given her noncompliance with her medical therapy as well as risk for recurrent infection with PICC line and home TPN. After consult with ethics, plan is to continue with current plan of inpatient TPN with sitter for pt and baby safety until which time we feel patient is safe to go home. Given these concerns, pt advised that at this point, we would not be able to send her home with a PICC line. Pt asked to please let me know what she feels we can do to help her.  She may need a letter for her , but she will let me know if/when/what exactly she needs.      Chronic anemia-known beta thal minor  -s/p iv iron with last admission  -Hb currently at baseline     Ambulate with assistance, Juan thakkar for DVT ppx (pt with intermittent dizziness and previous fall history)  S/p Physical therapy consult for deconditioning  -order for wheelchair with sitter to go outside if desired.

## 2021-03-27 NOTE — PROGRESS NOTES
0878 Bedside and Verbal shift change report given to Nghia (oncoming nurse) by Deepa Matson (offgoing nurse). Report included the following information SBAR, Kardex, Intake/Output, MAR, Accordion and Recent Results. 1320 Patient visited outside with PCT, Joseph Roach.

## 2021-03-28 LAB
ANION GAP SERPL CALC-SCNC: 7 MMOL/L (ref 5–15)
BUN SERPL-MCNC: 12 MG/DL (ref 6–20)
BUN/CREAT SERPL: 31 (ref 12–20)
CALCIUM SERPL-MCNC: 8 MG/DL (ref 8.5–10.1)
CHLORIDE SERPL-SCNC: 108 MMOL/L (ref 97–108)
CO2 SERPL-SCNC: 24 MMOL/L (ref 21–32)
CREAT SERPL-MCNC: 0.39 MG/DL (ref 0.55–1.02)
GLUCOSE SERPL-MCNC: 71 MG/DL (ref 65–100)
MAGNESIUM SERPL-MCNC: 1.8 MG/DL (ref 1.6–2.4)
PHOSPHATE SERPL-MCNC: 4.4 MG/DL (ref 2.6–4.7)
POTASSIUM SERPL-SCNC: 3.8 MMOL/L (ref 3.5–5.1)
SODIUM SERPL-SCNC: 139 MMOL/L (ref 136–145)

## 2021-03-28 PROCEDURE — 74011000250 HC RX REV CODE- 250: Performed by: OBSTETRICS & GYNECOLOGY

## 2021-03-28 PROCEDURE — C9113 INJ PANTOPRAZOLE SODIUM, VIA: HCPCS | Performed by: OBSTETRICS & GYNECOLOGY

## 2021-03-28 PROCEDURE — 74011000258 HC RX REV CODE- 258: Performed by: OBSTETRICS & GYNECOLOGY

## 2021-03-28 PROCEDURE — 74011250636 HC RX REV CODE- 250/636: Performed by: OBSTETRICS & GYNECOLOGY

## 2021-03-28 PROCEDURE — 83735 ASSAY OF MAGNESIUM: CPT

## 2021-03-28 PROCEDURE — 80048 BASIC METABOLIC PNL TOTAL CA: CPT

## 2021-03-28 PROCEDURE — 84100 ASSAY OF PHOSPHORUS: CPT

## 2021-03-28 PROCEDURE — 36415 COLL VENOUS BLD VENIPUNCTURE: CPT

## 2021-03-28 PROCEDURE — 74011250637 HC RX REV CODE- 250/637: Performed by: INTERNAL MEDICINE

## 2021-03-28 PROCEDURE — 65410000002 HC RM PRIVATE OB

## 2021-03-28 PROCEDURE — 74011250637 HC RX REV CODE- 250/637: Performed by: OBSTETRICS & GYNECOLOGY

## 2021-03-28 PROCEDURE — 74011250637 HC RX REV CODE- 250/637: Performed by: NURSE PRACTITIONER

## 2021-03-28 RX ADMIN — Medication 20 ML: at 05:37

## 2021-03-28 RX ADMIN — Medication 10 ML: at 21:13

## 2021-03-28 RX ADMIN — Medication 5 ML: at 08:32

## 2021-03-28 RX ADMIN — SUCRALFATE 1 G: 1 TABLET ORAL at 21:12

## 2021-03-28 RX ADMIN — ONDANSETRON 4 MG: 2 INJECTION INTRAMUSCULAR; INTRAVENOUS at 18:56

## 2021-03-28 RX ADMIN — BISACODYL 10 MG: 10 SUPPOSITORY RECTAL at 21:22

## 2021-03-28 RX ADMIN — Medication 10 ML: at 21:12

## 2021-03-28 RX ADMIN — ONDANSETRON 4 MG: 2 INJECTION INTRAMUSCULAR; INTRAVENOUS at 13:07

## 2021-03-28 RX ADMIN — POTASSIUM CHLORIDE: 2 INJECTION, SOLUTION, CONCENTRATE INTRAVENOUS at 19:00

## 2021-03-28 RX ADMIN — POLYETHYLENE GLYCOL 3350 17 G: 17 POWDER, FOR SOLUTION ORAL at 08:40

## 2021-03-28 RX ADMIN — FLUOXETINE 60 MG: 20 CAPSULE ORAL at 21:12

## 2021-03-28 RX ADMIN — OLANZAPINE 10 MG: 5 TABLET, ORALLY DISINTEGRATING ORAL at 21:12

## 2021-03-28 RX ADMIN — Medication 20 ML: at 05:38

## 2021-03-28 RX ADMIN — SODIUM CHLORIDE 40 MG: 9 INJECTION INTRAMUSCULAR; INTRAVENOUS; SUBCUTANEOUS at 08:26

## 2021-03-28 RX ADMIN — SUCRALFATE 1 G: 1 TABLET ORAL at 13:07

## 2021-03-28 RX ADMIN — Medication 30 MCG: at 08:33

## 2021-03-28 RX ADMIN — SUCRALFATE 1 G: 1 TABLET ORAL at 08:26

## 2021-03-28 RX ADMIN — I.V. FAT EMULSION 250 ML: 20 EMULSION INTRAVENOUS at 18:59

## 2021-03-28 RX ADMIN — SUCRALFATE 1 G: 1 TABLET ORAL at 17:04

## 2021-03-28 RX ADMIN — DOCUSATE SODIUM 100 MG: 100 CAPSULE, LIQUID FILLED ORAL at 08:25

## 2021-03-28 NOTE — PROGRESS NOTES
Bedside and Verbal shift change report given to ALBA Trejo RN (oncoming nurse) by John Bustillo. AKIL Ramirez & NADIR Hall RN (offgoing nurse). Report included the following information SBAR, Kardex, Intake/Output, MAR, Accordion and Recent Results.

## 2021-03-28 NOTE — PROGRESS NOTES
6015 Verbal shift change report given to Jamia Banks RN (oncoming nurse) by Ander Dyer RN (offgoing nurse). Report included the following information SBAR. Pt sleeping comfortably during shift change.

## 2021-03-28 NOTE — PROGRESS NOTES
High Risk Obstetrics Progress Note    Name: Shaina Blood MRN: [id number]  SSN: [id number]    YOB: 1985  Age: 38 y.o. Sex: female      Subjective:      LOS: 25 days    Estimated Date of Delivery: 21   Gestational Age Today: 34w0d     Patient admitted for pyelonephritis. States she does have normal fetal movement and intermittent nausea but no emesis and was able to keep down a bite of chicken salad sandwich yesterday. and does not have chest pain, vaginal bleeding  and vaginal leaking of fluid . Also reports occasional cramping. Objective:     Vitals:  Blood pressure 111/73, pulse 82, temperature 98.4 °F (36.9 °C), resp. rate 16, height 5' 4\" (1.626 m), weight 76.9 kg (169 lb 9.6 oz), last menstrual period 2020, SpO2 99 %, not currently breastfeeding. Temp (24hrs), Av.1 °F (36.7 °C), Min:97.7 °F (36.5 °C), Max:98.4 °F (27.8 °C)    Systolic (00UKW), YDE:162 , Min:103 , NVT:628      Diastolic (74KTM), OYC:32, Min:66, Max:78       Intake and Output:         Physical Exam:  Patient without distress.  A little more talkative today  Heart: Regular rate and rhythm  Lung: clear to auscultation throughout lung fields, no wheezes, no rales, no rhonchi and normal respiratory effort  Abdomen: soft, nontender, gravid  Lower Extremities:  - Edema No       Membranes:  Intact    Fetal Heart Rate:  +FHTs        Labs:   Recent Results (from the past 36 hour(s))   PHOSPHORUS    Collection Time: 21  2:13 AM   Result Value Ref Range    Phosphorus 4.3 2.6 - 4.7 MG/DL   METABOLIC PANEL, BASIC    Collection Time: 21  2:13 AM   Result Value Ref Range    Sodium 138 136 - 145 mmol/L    Potassium 3.5 3.5 - 5.1 mmol/L    Chloride 108 97 - 108 mmol/L    CO2 25 21 - 32 mmol/L    Anion gap 5 5 - 15 mmol/L    Glucose 67 65 - 100 mg/dL    BUN 11 6 - 20 MG/DL    Creatinine 0.31 (L) 0.55 - 1.02 MG/DL    BUN/Creatinine ratio 35 (H) 12 - 20      GFR est AA >60 >60 ml/min/1.73m2    GFR est non-AA >60 >60 ml/min/1.73m2    Calcium 8.0 (L) 8.5 - 10.1 MG/DL   MAGNESIUM    Collection Time: 03/27/21  2:13 AM   Result Value Ref Range    Magnesium 1.9 1.6 - 2.4 mg/dL   PHOSPHORUS    Collection Time: 03/28/21  5:36 AM   Result Value Ref Range    Phosphorus 4.4 2.6 - 4.7 MG/DL   METABOLIC PANEL, BASIC    Collection Time: 03/28/21  5:36 AM   Result Value Ref Range    Sodium 139 136 - 145 mmol/L    Potassium 3.8 3.5 - 5.1 mmol/L    Chloride 108 97 - 108 mmol/L    CO2 24 21 - 32 mmol/L    Anion gap 7 5 - 15 mmol/L    Glucose 71 65 - 100 mg/dL    BUN 12 6 - 20 MG/DL    Creatinine 0.39 (L) 0.55 - 1.02 MG/DL    BUN/Creatinine ratio 31 (H) 12 - 20      GFR est AA >60 >60 ml/min/1.73m2    GFR est non-AA >60 >60 ml/min/1.73m2    Calcium 8.0 (L) 8.5 - 10.1 MG/DL   MAGNESIUM    Collection Time: 03/28/21  5:36 AM   Result Value Ref Range    Magnesium 1.8 1.6 - 2.4 mg/dL       Assessment and Plan:      Principal Problem:    Acute pyelonephritis (3/3/2021)       37 y/o [id number] at 26 0/7 weeks admitted for pyelonephritis  -Pyelo now resolved and AF (s/p Ceftriaxone for Klebsiella and Citrobacter urine cx); renal ultrasound normal on 3/5  -Blood culture with 1/4 yeast, s/p amphotericin; repeat blood cultures 3/6 negative   -s/p new PICC placement 3/19/21 (old PICC removed after +blood cxs)     Chronic eating disorder   -on TPN-continous  -Vitamin D deficiency-supplementing for repletion  -Zinc level still pending  -Continue BG check q6h  -has outpatient nutritionist, Xuan Vazquez  -iv zofran, phenergan suppositories, carafate, ivf's  -constipation- miralax and dulcolax supp prn and daily colace     Depression with h/o hospitalization for suicidal ideation  -followed by Chung Lynne (psychiatrist) and Jackelyn Huitron (psychologist), however pt non-compliant with counseling  -Continue Prozac, Zyprexa (both switched to liquid form to help improve compliance but pt unable to tolerate liquid prozac so switched back to pill form 3/17), and prn Prazosin, hydroxyzine  -s/p psych consult for pt non-compliance and trying to interrupt iv meds/tpn-no further recs  -appreciate Loren's input and telehealth meeting with patient.     -I had another long discussion with pt 3/27/21 regarding concerns with discharge given her noncompliance with her medical therapy as well as risk for recurrent infection with PICC line and home TPN. After consult with ethics, plan is to continue with current plan of inpatient TPN with sitter for pt and baby safety until which time we feel patient is safe to go home. Given these concerns, pt advised that at this point, we would not be able to send her home with a PICC line. Pt asked to please let me know what she feels we can do to help her.  She may need a letter for her , but she will let me know if/when/what exactly she needs.      Chronic anemia-known beta thal minor  -s/p iv iron with last admission  -Hb currently at baseline     Ambulate with assistance, Juan thakkar for DVT ppx (pt with intermittent dizziness and previous fall history)  S/p Physical therapy consult for deconditioning  -order for wheelchair with sitter to go outside if desired.

## 2021-03-29 LAB
ANION GAP SERPL CALC-SCNC: 4 MMOL/L (ref 5–15)
BUN SERPL-MCNC: 15 MG/DL (ref 6–20)
BUN/CREAT SERPL: 38 (ref 12–20)
CALCIUM SERPL-MCNC: 7.8 MG/DL (ref 8.5–10.1)
CHLORIDE SERPL-SCNC: 107 MMOL/L (ref 97–108)
CO2 SERPL-SCNC: 27 MMOL/L (ref 21–32)
CREAT SERPL-MCNC: 0.4 MG/DL (ref 0.55–1.02)
GLUCOSE SERPL-MCNC: 67 MG/DL (ref 65–100)
MAGNESIUM SERPL-MCNC: 2.2 MG/DL (ref 1.6–2.4)
PHOSPHATE SERPL-MCNC: 3.6 MG/DL (ref 2.6–4.7)
POTASSIUM SERPL-SCNC: 3.8 MMOL/L (ref 3.5–5.1)
SODIUM SERPL-SCNC: 138 MMOL/L (ref 136–145)

## 2021-03-29 PROCEDURE — 74011000258 HC RX REV CODE- 258: Performed by: OBSTETRICS & GYNECOLOGY

## 2021-03-29 PROCEDURE — 80048 BASIC METABOLIC PNL TOTAL CA: CPT

## 2021-03-29 PROCEDURE — 36415 COLL VENOUS BLD VENIPUNCTURE: CPT

## 2021-03-29 PROCEDURE — 83735 ASSAY OF MAGNESIUM: CPT

## 2021-03-29 PROCEDURE — 84100 ASSAY OF PHOSPHORUS: CPT

## 2021-03-29 PROCEDURE — 74011000250 HC RX REV CODE- 250: Performed by: OBSTETRICS & GYNECOLOGY

## 2021-03-29 PROCEDURE — 65410000002 HC RM PRIVATE OB

## 2021-03-29 PROCEDURE — 74011250637 HC RX REV CODE- 250/637: Performed by: INTERNAL MEDICINE

## 2021-03-29 PROCEDURE — 74011250636 HC RX REV CODE- 250/636: Performed by: OBSTETRICS & GYNECOLOGY

## 2021-03-29 PROCEDURE — C9113 INJ PANTOPRAZOLE SODIUM, VIA: HCPCS | Performed by: OBSTETRICS & GYNECOLOGY

## 2021-03-29 PROCEDURE — 74011250637 HC RX REV CODE- 250/637: Performed by: NURSE PRACTITIONER

## 2021-03-29 PROCEDURE — 74011250637 HC RX REV CODE- 250/637: Performed by: OBSTETRICS & GYNECOLOGY

## 2021-03-29 RX ADMIN — Medication 20 ML: at 06:32

## 2021-03-29 RX ADMIN — SUCRALFATE 1 G: 1 TABLET ORAL at 16:41

## 2021-03-29 RX ADMIN — POLYETHYLENE GLYCOL 3350 17 G: 17 POWDER, FOR SOLUTION ORAL at 08:44

## 2021-03-29 RX ADMIN — Medication 30 MCG: at 08:34

## 2021-03-29 RX ADMIN — SUCRALFATE 1 G: 1 TABLET ORAL at 11:44

## 2021-03-29 RX ADMIN — Medication 10 ML: at 18:30

## 2021-03-29 RX ADMIN — FLUOXETINE 60 MG: 20 CAPSULE ORAL at 21:27

## 2021-03-29 RX ADMIN — POTASSIUM CHLORIDE: 2 INJECTION, SOLUTION, CONCENTRATE INTRAVENOUS at 18:20

## 2021-03-29 RX ADMIN — OLANZAPINE 10 MG: 5 TABLET, ORALLY DISINTEGRATING ORAL at 21:27

## 2021-03-29 RX ADMIN — DOCUSATE SODIUM 100 MG: 100 CAPSULE, LIQUID FILLED ORAL at 08:34

## 2021-03-29 RX ADMIN — ONDANSETRON 4 MG: 2 INJECTION INTRAMUSCULAR; INTRAVENOUS at 18:17

## 2021-03-29 RX ADMIN — SUCRALFATE 1 G: 1 TABLET ORAL at 06:31

## 2021-03-29 RX ADMIN — SODIUM CHLORIDE 40 MG: 9 INJECTION INTRAMUSCULAR; INTRAVENOUS; SUBCUTANEOUS at 08:34

## 2021-03-29 RX ADMIN — SUCRALFATE 1 G: 1 TABLET ORAL at 22:00

## 2021-03-29 RX ADMIN — I.V. FAT EMULSION 250 ML: 20 EMULSION INTRAVENOUS at 18:23

## 2021-03-29 NOTE — PROGRESS NOTES
Bedside and Verbal shift change report given to ALBA Allen (oncoming nurse) by Clyde Phipps RN (offgoing nurse). Report included the following information SBAR, Kardex, Intake/Output, MAR, Accordion and Recent Results.

## 2021-03-29 NOTE — PROGRESS NOTES
High Risk Obstetrics Progress Note    Name: Arturo Leon MRN: [id number]  SSN: [id number]    YOB: 1985  Age: 38 y.o. Sex: female      Subjective:      LOS: 26 days    Estimated Date of Delivery: 21   Gestational Age Today: 29w4d     Patient admitted for pyelonephritis. States she does have normal fetal movement and intermittent nausea and had emesis after zyprexa/prozac yesterday. having trouble sleeping as well and having some cramping and does not have chest pain, shortness of breath, vaginal bleeding  and vaginal leaking of fluid . Objective:     Vitals:  Blood pressure (!) 95/56, pulse 85, temperature 97.2 °F (36.2 °C), resp. rate 16, height 5' 4\" (1.626 m), weight 78.2 kg (172 lb 4.8 oz), last menstrual period 2020, SpO2 99 %, not currently breastfeeding. Temp (24hrs), Av.2 °F (36.8 °C), Min:97.2 °F (36.2 °C), Max:98.5 °F (80.1 °C)    Systolic (58VGM), DC , Min:95 , PXT:294      Diastolic (68OLM), QLJ:95, Min:56, Max:73       Intake and Output:         Physical Exam:  Patient without distress.   Heart: Regular rate and rhythm  Lung: clear to auscultation throughout lung fields, no wheezes, no rales, no rhonchi and normal respiratory effort  Abdomen: soft, nontender, gravid  Lower Extremities:  - Edema No       Membranes:  Intact}    Fetal Heart Rate:  +FHTs        Labs:   Recent Results (from the past 36 hour(s))   PHOSPHORUS    Collection Time: 21  5:36 AM   Result Value Ref Range    Phosphorus 4.4 2.6 - 4.7 MG/DL   METABOLIC PANEL, BASIC    Collection Time: 21  5:36 AM   Result Value Ref Range    Sodium 139 136 - 145 mmol/L    Potassium 3.8 3.5 - 5.1 mmol/L    Chloride 108 97 - 108 mmol/L    CO2 24 21 - 32 mmol/L    Anion gap 7 5 - 15 mmol/L    Glucose 71 65 - 100 mg/dL    BUN 12 6 - 20 MG/DL    Creatinine 0.39 (L) 0.55 - 1.02 MG/DL    BUN/Creatinine ratio 31 (H) 12 - 20      GFR est AA >60 >60 ml/min/1.73m2    GFR est non-AA >60 >60 ml/min/1.73m2 Calcium 8.0 (L) 8.5 - 10.1 MG/DL   MAGNESIUM    Collection Time: 03/28/21  5:36 AM   Result Value Ref Range    Magnesium 1.8 1.6 - 2.4 mg/dL   PHOSPHORUS    Collection Time: 03/29/21  6:30 AM   Result Value Ref Range    Phosphorus 3.6 2.6 - 4.7 MG/DL   METABOLIC PANEL, BASIC    Collection Time: 03/29/21  6:30 AM   Result Value Ref Range    Sodium 138 136 - 145 mmol/L    Potassium 3.8 3.5 - 5.1 mmol/L    Chloride 107 97 - 108 mmol/L    CO2 27 21 - 32 mmol/L    Anion gap 4 (L) 5 - 15 mmol/L    Glucose 67 65 - 100 mg/dL    BUN 15 6 - 20 MG/DL    Creatinine 0.40 (L) 0.55 - 1.02 MG/DL    BUN/Creatinine ratio 38 (H) 12 - 20      GFR est AA >60 >60 ml/min/1.73m2    GFR est non-AA >60 >60 ml/min/1.73m2    Calcium 7.8 (L) 8.5 - 10.1 MG/DL   MAGNESIUM    Collection Time: 03/29/21  6:30 AM   Result Value Ref Range    Magnesium 2.2 1.6 - 2.4 mg/dL       Assessment and Plan:      Principal Problem:    Acute pyelonephritis (3/3/2021)       39 y/o [id number] at 26 1/7 weeks admitted for pyelonephritis  -Pyelo now resolved and AF (s/p Ceftriaxone for Klebsiella and Citrobacter urine cx); renal ultrasound normal on 3/5  -Blood culture with 1/4 yeast, s/p amphotericin; repeat blood cultures 3/6 negative   -s/p new PICC placement 3/19/21 (old PICC removed after +blood cxs)     Chronic eating disorder   -on TPN-continous  -Vitamin D deficiency-supplementing for repletion  -Zinc level still pending  -Continue BG check q6h  -has outpatient nutritionistStacy  -iv zofran, phenergan suppositories, carafate, ivf's  -constipation- miralax and dulcolax supp prn and daily colace     Depression with h/o hospitalization for suicidal ideation  -followed by Peterson Gonzalez (psychiatrist) and Tinnie Lennox (psychologist), however pt non-compliant with counseling  -Continue Prozac, Zyprexa (both switched to liquid form to help improve compliance but pt unable to tolerate liquid prozac so switched back to pill form 3/17), and prn Prazosin, hydroxyzine  -s/p psych consult for pt non-compliance and trying to interrupt iv meds/tpn-no further recs  -appreciate Loren's input and telehealth meeting with patient.     -I had another long discussion with pt 3/27/21 regarding concerns with discharge given her noncompliance with her medical therapy as well as risk for recurrent infection with PICC line and home TPN.  After consult with ethics, plan is to continue with current plan of inpatient TPN with sitter for pt and baby safety until which time we feel patient is safe to go home. Given these concerns, pt advised that at this point, we would not be able to send her home with a PICC line. Pt asked to please let me know what she feels we can do to help her.  She may need a letter for her , but she will let me know if/when/what exactly she needs.      Chronic anemia-known beta thal minor  -s/p iv iron with last admission  -Hb currently at baseline     Ambulate with assistance, Juan thakkar for DVT ppx (pt with intermittent dizziness and previous fall history)  S/p Physical therapy consult for deconditioning  -order for wheelchair with sitter to go outside if desired.

## 2021-03-29 NOTE — PROGRESS NOTES
Bedside and Verbal shift change report given to [address] (oncoming nurse) by Re Carson RN (offgoing nurse). Report included the following information SBAR, Kardex, Procedure Summary, Intake/Output and MAR.

## 2021-03-29 NOTE — PROGRESS NOTES
1040 - Transition of Care  (LESLIE) Plan:        RUR:  41 %           Disposition: TBD/subject to change pending recommendations; pending medical progression    -- Chart indicates greater than 48 hours    -- Potential complex case management case. Email sent to [facility] via Interim Nurse  Alix Sullivan (Evonne) Scott Nelson MSN, RN, ACNS, RNC-MNN. .   --  Per OB MD, \"After consult with ethics, plan is to continue with current plan of inpatient TPN with sitter for pt and baby safety until which time we feel patient is safe to go home. Given these concerns, pt advised that at this point, we would not be able to send her home with a PICC line. Pt asked to please let me know what she feels we can do to help her. She may need a letter for her , but she will let me know if/when/what exactly she needs\" .      PCP followup: Rachid Farrell NP    Transport: TBVERO Renee is appropriate at [facility]    CM will continue to follow and assist with LESLIE needs as they arise. Available on [employer]. Tammie  MSN, [address] [facility], RN, CCM - [phone].

## 2021-03-30 LAB
ANION GAP SERPL CALC-SCNC: 6 MMOL/L (ref 5–15)
BUN SERPL-MCNC: 12 MG/DL (ref 6–20)
BUN/CREAT SERPL: 32 (ref 12–20)
CALCIUM SERPL-MCNC: 7.8 MG/DL (ref 8.5–10.1)
CHLORIDE SERPL-SCNC: 107 MMOL/L (ref 97–108)
CO2 SERPL-SCNC: 25 MMOL/L (ref 21–32)
CREAT SERPL-MCNC: 0.38 MG/DL (ref 0.55–1.02)
GLUCOSE SERPL-MCNC: 62 MG/DL (ref 65–100)
MAGNESIUM SERPL-MCNC: 2.1 MG/DL (ref 1.6–2.4)
PHOSPHATE SERPL-MCNC: 4 MG/DL (ref 2.6–4.7)
POTASSIUM SERPL-SCNC: 3.8 MMOL/L (ref 3.5–5.1)
SODIUM SERPL-SCNC: 138 MMOL/L (ref 136–145)

## 2021-03-30 PROCEDURE — 74011250636 HC RX REV CODE- 250/636: Performed by: OBSTETRICS & GYNECOLOGY

## 2021-03-30 PROCEDURE — 74011250637 HC RX REV CODE- 250/637: Performed by: INTERNAL MEDICINE

## 2021-03-30 PROCEDURE — 74011250637 HC RX REV CODE- 250/637: Performed by: NURSE PRACTITIONER

## 2021-03-30 PROCEDURE — 74011000250 HC RX REV CODE- 250: Performed by: OBSTETRICS & GYNECOLOGY

## 2021-03-30 PROCEDURE — 65410000002 HC RM PRIVATE OB

## 2021-03-30 PROCEDURE — 80048 BASIC METABOLIC PNL TOTAL CA: CPT

## 2021-03-30 PROCEDURE — 74011250637 HC RX REV CODE- 250/637: Performed by: OBSTETRICS & GYNECOLOGY

## 2021-03-30 PROCEDURE — C9113 INJ PANTOPRAZOLE SODIUM, VIA: HCPCS | Performed by: OBSTETRICS & GYNECOLOGY

## 2021-03-30 PROCEDURE — 74011000258 HC RX REV CODE- 258: Performed by: OBSTETRICS & GYNECOLOGY

## 2021-03-30 PROCEDURE — 83735 ASSAY OF MAGNESIUM: CPT

## 2021-03-30 PROCEDURE — 36415 COLL VENOUS BLD VENIPUNCTURE: CPT

## 2021-03-30 PROCEDURE — 84100 ASSAY OF PHOSPHORUS: CPT

## 2021-03-30 RX ADMIN — ONDANSETRON 4 MG: 2 INJECTION INTRAMUSCULAR; INTRAVENOUS at 10:55

## 2021-03-30 RX ADMIN — SUCRALFATE 1 G: 1 TABLET ORAL at 08:23

## 2021-03-30 RX ADMIN — DOCUSATE SODIUM 100 MG: 100 CAPSULE, LIQUID FILLED ORAL at 08:23

## 2021-03-30 RX ADMIN — SODIUM CHLORIDE 40 MG: 9 INJECTION INTRAMUSCULAR; INTRAVENOUS; SUBCUTANEOUS at 08:23

## 2021-03-30 RX ADMIN — Medication 30 MCG: at 08:38

## 2021-03-30 RX ADMIN — POTASSIUM CHLORIDE: 2 INJECTION, SOLUTION, CONCENTRATE INTRAVENOUS at 18:56

## 2021-03-30 RX ADMIN — ONDANSETRON 4 MG: 2 INJECTION INTRAMUSCULAR; INTRAVENOUS at 21:16

## 2021-03-30 RX ADMIN — SUCRALFATE 1 G: 1 TABLET ORAL at 21:16

## 2021-03-30 RX ADMIN — POLYETHYLENE GLYCOL 3350 17 G: 17 POWDER, FOR SOLUTION ORAL at 08:23

## 2021-03-30 RX ADMIN — SUCRALFATE 1 G: 1 TABLET ORAL at 18:56

## 2021-03-30 RX ADMIN — FLUOXETINE 60 MG: 20 CAPSULE ORAL at 21:16

## 2021-03-30 RX ADMIN — OLANZAPINE 10 MG: 5 TABLET, ORALLY DISINTEGRATING ORAL at 21:16

## 2021-03-30 RX ADMIN — I.V. FAT EMULSION 250 ML: 20 EMULSION INTRAVENOUS at 18:56

## 2021-03-30 RX ADMIN — SUCRALFATE 1 G: 1 TABLET ORAL at 10:55

## 2021-03-30 NOTE — PROGRESS NOTES
Problem: Nutrition Deficit  Goal: *Optimize nutritional status  Outcome: Progressing Towards Goal     Problem: Falls - Risk of  Goal: *Absence of Falls  Description: Document [facility] Fall Risk and appropriate interventions in the flowsheet.   Outcome: Progressing Towards Goal  Note: Fall Risk Interventions:            Medication Interventions: Patient to call before getting OOB, Teach patient to arise slowly         History of Falls Interventions: Room close to nurse's station         Problem: Nausea/Vomiting (Adult)  Goal: *Absence of nausea/vomiting  Outcome: Progressing Towards Goal

## 2021-03-30 NOTE — PROGRESS NOTES
Bedside and Verbal shift change report given to Giovanna BARNARD and Marito Holliday (oncoming nurse) by Kris Ring (offgoing nurse). Report included the following information SBAR, Kardex, Procedure Summary, Intake/Output, MAR and Recent Results.

## 2021-03-30 NOTE — PROGRESS NOTES
High Risk Obstetrics Progress Note    Name: Ester Story MRN: [id number]  SSN: [id number]    YOB: 1985  Age: 38 y.o. Sex: female      Subjective:      LOS: 27 days    Estimated Date of Delivery: 21   Gestational Age Today: 26w2d     Patient admitted for pyelonephritis. States she does have normal fetal movement and intermittent nausea and emesis again last night. States she did keep down a small piece of lasana. and does not have chest pain, contractions, shortness of breath, vaginal bleeding  and vaginal leaking of fluid . Objective:     Vitals:  Blood pressure 114/69, pulse 83, temperature 97.8 °F (36.6 °C), resp. rate 16, height 5' 4\" (1.626 m), weight 78.6 kg (173 lb 3.2 oz), last menstrual period 2020, SpO2 98 %, not currently breastfeeding. Temp (24hrs), Av.9 °F (36.6 °C), Min:97.2 °F (36.2 °C), Max:98.7 °F (01.8 °C)    Systolic (77JYG), KGA:171 , Min:91 , THE:322      Diastolic (84IFF), YWT:61, Min:56, Max:69       Intake and Output:         Physical Exam:  Patient without distress.   Heart: Regular rate and rhythm  Lung: clear to auscultation throughout lung fields, no wheezes, no rales, no rhonchi and normal respiratory effort  Abdomen: soft, nontender, gravid  Lower Extremities:  - Edema No       Membranes:  Intact    Fetal Heart Rate:  +FHTs        Labs:   Recent Results (from the past 36 hour(s))   PHOSPHORUS    Collection Time: 21  6:30 AM   Result Value Ref Range    Phosphorus 3.6 2.6 - 4.7 MG/DL   METABOLIC PANEL, BASIC    Collection Time: 21  6:30 AM   Result Value Ref Range    Sodium 138 136 - 145 mmol/L    Potassium 3.8 3.5 - 5.1 mmol/L    Chloride 107 97 - 108 mmol/L    CO2 27 21 - 32 mmol/L    Anion gap 4 (L) 5 - 15 mmol/L    Glucose 67 65 - 100 mg/dL    BUN 15 6 - 20 MG/DL    Creatinine 0.40 (L) 0.55 - 1.02 MG/DL    BUN/Creatinine ratio 38 (H) 12 - 20      GFR est AA >60 >60 ml/min/1.73m2    GFR est non-AA >60 >60 ml/min/1.73m2    Calcium 7.8 (L) 8.5 - 10.1 MG/DL   MAGNESIUM    Collection Time: 03/29/21  6:30 AM   Result Value Ref Range    Magnesium 2.2 1.6 - 2.4 mg/dL   PHOSPHORUS    Collection Time: 03/30/21  4:52 AM   Result Value Ref Range    Phosphorus 4.0 2.6 - 4.7 MG/DL   METABOLIC PANEL, BASIC    Collection Time: 03/30/21  4:52 AM   Result Value Ref Range    Sodium 138 136 - 145 mmol/L    Potassium 3.8 3.5 - 5.1 mmol/L    Chloride 107 97 - 108 mmol/L    CO2 25 21 - 32 mmol/L    Anion gap 6 5 - 15 mmol/L    Glucose 62 (L) 65 - 100 mg/dL    BUN 12 6 - 20 MG/DL    Creatinine 0.38 (L) 0.55 - 1.02 MG/DL    BUN/Creatinine ratio 32 (H) 12 - 20      GFR est AA >60 >60 ml/min/1.73m2    GFR est non-AA >60 >60 ml/min/1.73m2    Calcium 7.8 (L) 8.5 - 10.1 MG/DL   MAGNESIUM    Collection Time: 03/30/21  4:52 AM   Result Value Ref Range    Magnesium 2.1 1.6 - 2.4 mg/dL       Assessment and Plan:      Principal Problem:    Acute pyelonephritis (3/3/2021)       39 y/o [id number] at 26 2/7 weeks admitted for pyelonephritis  -Pyelo now resolved and AF (s/p Ceftriaxone for Klebsiella and Citrobacter urine cx); renal ultrasound normal on 3/5  -Blood culture with 1/4 yeast, s/p amphotericin; repeat blood cultures 3/6 negative   -s/p new PICC placement 3/19/21 (old PICC removed after +blood cxs)     Chronic eating disorder   -on TPN-continous  -Vitamin D deficiency-supplementing for repletion  -Zinc level still pending  -Continue BG check q6h  -has outpatient nutritionist, Xuan Vazquez  -iv zofran, phenergan suppositories, carafate, ivf's  -constipation- miralax and dulcolax supp prn and daily colace  -continue to encourage trying small amounts of food    Depression with h/o hospitalization for suicidal ideation  -followed by Varinder Prince (psychiatrist) and Jackelyn Huitron (psychologist), however pt non-compliant with counseling  -Continue Prozac, Zyprexa (both switched to liquid form to help improve compliance but pt unable to tolerate liquid prozac so switched back to pill form 3/17), and prn Prazosin, hydroxyzine  -s/p psych consult for pt non-compliance and trying to interrupt iv meds/tpn-no further recs  -appreciate Loren's input and telehealth meeting with patient.  -many social issues that patient is trying to work through. Needs to try to go to court on Tuesday, 4/6/21 but know the concerns I have with discharge. I advised that she see if this can been handled virtually but she doesn't think this is possible.      -I had another long discussion with pt 3/27/21 regarding concerns with discharge given her noncompliance with her medical therapy as well as risk for recurrent infection with PICC line and home TPN.  After consult with ethics, plan is to continue with current plan of inpatient TPN with sitter for pt and baby safety until which time we feel patient is safe to go home. Given these concerns, pt advised that at this point, we would not be able to send her home with a PICC line. Pt asked to please let me know what she feels we can do to help her.  She may need a letter for her , but she will let me know if/when/what exactly she needs.      Chronic anemia-known beta thal minor  -s/p iv iron with last admission  -Hb currently at baseline     Ambulate with assistance, Juan thakkar for DVT ppx (pt with intermittent dizziness and previous fall history)  S/p Physical therapy consult for deconditioning  -order for wheelchair with sitter to go outside if desired.

## 2021-03-30 NOTE — PROGRESS NOTES
Bedside and Verbal shift change report given to [address] (oncoming nurse) by Laurie Pelaez (offgoing nurse). Report included the following information SBAR, Kardex, Procedure Summary, Intake/Output and MAR.

## 2021-03-31 LAB
ANION GAP SERPL CALC-SCNC: 6 MMOL/L (ref 5–15)
BUN SERPL-MCNC: 12 MG/DL (ref 6–20)
BUN/CREAT SERPL: 36 (ref 12–20)
CALCIUM SERPL-MCNC: 7.9 MG/DL (ref 8.5–10.1)
CHLORIDE SERPL-SCNC: 108 MMOL/L (ref 97–108)
CO2 SERPL-SCNC: 23 MMOL/L (ref 21–32)
CREAT SERPL-MCNC: 0.33 MG/DL (ref 0.55–1.02)
ERYTHROCYTE [DISTWIDTH] IN BLOOD BY AUTOMATED COUNT: 14.4 % (ref 11.5–14.5)
GLUCOSE SERPL-MCNC: 79 MG/DL (ref 65–100)
HCT VFR BLD AUTO: 24.4 % (ref 35–47)
HGB BLD-MCNC: 7.8 G/DL (ref 11.5–16)
MAGNESIUM SERPL-MCNC: 1.9 MG/DL (ref 1.6–2.4)
MCH RBC QN AUTO: 23.9 PG (ref 26–34)
MCHC RBC AUTO-ENTMCNC: 32 G/DL (ref 30–36.5)
MCV RBC AUTO: 74.8 FL (ref 80–99)
NRBC # BLD: 0 K/UL (ref 0–0.01)
NRBC BLD-RTO: 0 PER 100 WBC
PHOSPHATE SERPL-MCNC: 4 MG/DL (ref 2.6–4.7)
PLATELET # BLD AUTO: 165 K/UL (ref 150–400)
PMV BLD AUTO: 11.7 FL (ref 8.9–12.9)
POTASSIUM SERPL-SCNC: 3.9 MMOL/L (ref 3.5–5.1)
RBC # BLD AUTO: 3.26 M/UL (ref 3.8–5.2)
SODIUM SERPL-SCNC: 137 MMOL/L (ref 136–145)
WBC # BLD AUTO: 7.5 K/UL (ref 3.6–11)

## 2021-03-31 PROCEDURE — 83735 ASSAY OF MAGNESIUM: CPT

## 2021-03-31 PROCEDURE — 74011000250 HC RX REV CODE- 250: Performed by: OBSTETRICS & GYNECOLOGY

## 2021-03-31 PROCEDURE — 84100 ASSAY OF PHOSPHORUS: CPT

## 2021-03-31 PROCEDURE — 74011250637 HC RX REV CODE- 250/637: Performed by: INTERNAL MEDICINE

## 2021-03-31 PROCEDURE — C9113 INJ PANTOPRAZOLE SODIUM, VIA: HCPCS | Performed by: OBSTETRICS & GYNECOLOGY

## 2021-03-31 PROCEDURE — 85027 COMPLETE CBC AUTOMATED: CPT

## 2021-03-31 PROCEDURE — 65410000002 HC RM PRIVATE OB

## 2021-03-31 PROCEDURE — 74011250637 HC RX REV CODE- 250/637: Performed by: NURSE PRACTITIONER

## 2021-03-31 PROCEDURE — 74011250636 HC RX REV CODE- 250/636: Performed by: OBSTETRICS & GYNECOLOGY

## 2021-03-31 PROCEDURE — 80048 BASIC METABOLIC PNL TOTAL CA: CPT

## 2021-03-31 PROCEDURE — 94760 N-INVAS EAR/PLS OXIMETRY 1: CPT

## 2021-03-31 PROCEDURE — 74011250637 HC RX REV CODE- 250/637: Performed by: OBSTETRICS & GYNECOLOGY

## 2021-03-31 PROCEDURE — 74011000258 HC RX REV CODE- 258: Performed by: OBSTETRICS & GYNECOLOGY

## 2021-03-31 PROCEDURE — 36415 COLL VENOUS BLD VENIPUNCTURE: CPT

## 2021-03-31 RX ADMIN — SUCRALFATE 1 G: 1 TABLET ORAL at 11:47

## 2021-03-31 RX ADMIN — POLYETHYLENE GLYCOL 3350 17 G: 17 POWDER, FOR SOLUTION ORAL at 11:51

## 2021-03-31 RX ADMIN — POTASSIUM CHLORIDE: 2 INJECTION, SOLUTION, CONCENTRATE INTRAVENOUS at 18:27

## 2021-03-31 RX ADMIN — DOCUSATE SODIUM 100 MG: 100 CAPSULE, LIQUID FILLED ORAL at 11:51

## 2021-03-31 RX ADMIN — SUCRALFATE 1 G: 1 TABLET ORAL at 16:24

## 2021-03-31 RX ADMIN — Medication 30 MCG: at 11:47

## 2021-03-31 RX ADMIN — I.V. FAT EMULSION 250 ML: 20 EMULSION INTRAVENOUS at 18:47

## 2021-03-31 RX ADMIN — SUCRALFATE 1 G: 1 TABLET ORAL at 21:20

## 2021-03-31 RX ADMIN — SUCRALFATE 1 G: 1 TABLET ORAL at 06:34

## 2021-03-31 RX ADMIN — ONDANSETRON 4 MG: 2 INJECTION INTRAMUSCULAR; INTRAVENOUS at 21:20

## 2021-03-31 RX ADMIN — Medication 10 ML: at 21:26

## 2021-03-31 RX ADMIN — OLANZAPINE 10 MG: 5 TABLET, ORALLY DISINTEGRATING ORAL at 21:20

## 2021-03-31 RX ADMIN — FLUOXETINE 60 MG: 20 CAPSULE ORAL at 21:20

## 2021-03-31 RX ADMIN — SODIUM CHLORIDE 40 MG: 9 INJECTION INTRAMUSCULAR; INTRAVENOUS; SUBCUTANEOUS at 11:51

## 2021-03-31 NOTE — PROGRESS NOTES
Bedside and Verbal shift change report given to [address] [place] (oncoming nurse) by Yuki Espinoza (offgoing nurse). Report included the following information SBAR, Kardex, Procedure Summary, Intake/Output and MAR.

## 2021-03-31 NOTE — PROGRESS NOTES
Bedside and Verbal shift change report given to Giovanna BARNARD and Geovani Bourne (oncoming nurse) by Tobias Perry RN (offgoing nurse). Report included the following information SBAR, Kardex, Procedure Summary, Intake/Output, MAR and Recent Results.

## 2021-03-31 NOTE — PROGRESS NOTES
Problem: Falls - Risk of  Goal: *Absence of Falls  Description: Document [facility] Fall Risk and appropriate interventions in the flowsheet.   Outcome: Progressing Towards Goal  Note: Fall Risk Interventions:            Medication Interventions: Teach patient to arise slowly         History of Falls Interventions: Room close to nurse's station         Problem: Nausea/Vomiting (Adult)  Goal: *Absence of nausea/vomiting  Outcome: Progressing Towards Goal

## 2021-03-31 NOTE — PROGRESS NOTES
Spiritual Care Assessment/Progress Note  [facility]      NAME: Taina Jones      MRN: [id number]  AGE: 38 y.o.  SEX: female  Adventist Affiliation: No Rastafarian   Language: English     3/31/2021     Total Time (in minutes): 13     Spiritual Assessment begun in [address] through conversation with:         [x]Patient        [] Family    [] Friend(s)        Reason for Consult: Initial/Spiritual assessment, patient floor     Spiritual beliefs: (Please include comment if needed)     [x] Identifies with a rosy tradition:         [] Supported by a rosy community:            [] Claims no spiritual orientation:           [] Seeking spiritual identity:                [] Adheres to an individual form of spirituality:           [] Not able to assess:                           Identified resources for coping:      [x] Prayer                               [] Music                  [] Guided Imagery     [x] Family/friends                 [] Pet visits     [] Devotional reading                         [] Unknown     [] Other:                                               Interventions offered during this visit: (See comments for more details)    Patient Interventions: Affirmation of emotions/emotional suffering, Affirmation of rosy, Normalization of emotional/spiritual concerns, Prayer (assurance of)           Plan of Care:     [] Support spiritual and/or cultural needs    [] Support AMD and/or advance care planning process      [] Support grieving process   [] Coordinate Rites and/or Rituals    [] Coordination with community clergy   [] No spiritual needs identified at this time   [] Detailed Plan of Care below (See Comments)  [] Make referral to Music Therapy  [] Make referral to Pet Therapy     [] Make referral to Addiction services  [] Make referral to [facility]  [] Make referral to Spiritual Care Partner  [] No future visits requested        [x] Follow up upon further referrals Comments:  for initial visit. Pt has been in the hospital for an extended visit.  has been checking in with staff. Pt shared that she was doing ok and appreciative of support. Let her know of  support and availability.  talked with pt's nurse and discussed how to give the best care to pt.  provided pastoral listening and support. Please contact [phone] for further support.      [address] Lily Part[place], [facility] [place]   [phone])

## 2021-03-31 NOTE — PROGRESS NOTES
High Risk Obstetrics Progress Note    Name: Mabel Herman MRN: [id number]  SSN: [id number]    YOB: 1985  Age: 38 y.o. Sex: female      Subjective:      LOS: 28 days    Estimated Date of Delivery: 21   Gestational Age Today: 34w2d     Patient admitted for pyelonephritis. States she does have normal fetal movement and does not have chest pain, contractions, shortness of breath, vaginal bleeding  and vaginal leaking of fluid . Able to eat small bites yesterday    Objective:     Vitals:  Blood pressure (!) 113/52, pulse 81, temperature 98.4 °F (36.9 °C), resp. rate 16, height 5' 4\" (1.626 m), weight 78.6 kg (173 lb 3.2 oz), last menstrual period 2020, SpO2 97 %, not currently breastfeeding. Temp (24hrs), Av.1 °F (36.7 °C), Min:97.8 °F (36.6 °C), Max:98.4 °F (29.6 °C)    Systolic (54UZA), XKB:910 , Min:92 , LDJ:586      Diastolic (03IEJ), XYO:80, Min:51, Max:70       Intake and Output:         Physical Exam:  Patient without distress.   Heart: Regular rate and rhythm  Lung: clear to auscultation throughout lung fields, no wheezes, no rales, no rhonchi and normal respiratory effort  Abdomen: soft, nontender, gravid  Lower Extremities:  - Edema No       Membranes:  Intact    Fetal Heart Rate:  +FHTs        Labs:   Recent Results (from the past 36 hour(s))   PHOSPHORUS    Collection Time: 21  4:52 AM   Result Value Ref Range    Phosphorus 4.0 2.6 - 4.7 MG/DL   METABOLIC PANEL, BASIC    Collection Time: 21  4:52 AM   Result Value Ref Range    Sodium 138 136 - 145 mmol/L    Potassium 3.8 3.5 - 5.1 mmol/L    Chloride 107 97 - 108 mmol/L    CO2 25 21 - 32 mmol/L    Anion gap 6 5 - 15 mmol/L    Glucose 62 (L) 65 - 100 mg/dL    BUN 12 6 - 20 MG/DL    Creatinine 0.38 (L) 0.55 - 1.02 MG/DL    BUN/Creatinine ratio 32 (H) 12 - 20      GFR est AA >60 >60 ml/min/1.73m2    GFR est non-AA >60 >60 ml/min/1.73m2    Calcium 7.8 (L) 8.5 - 10.1 MG/DL   MAGNESIUM    Collection Time: 21 4:52 AM   Result Value Ref Range    Magnesium 2.1 1.6 - 2.4 mg/dL   PHOSPHORUS    Collection Time: 03/31/21  4:19 AM   Result Value Ref Range    Phosphorus 4.0 2.6 - 4.7 MG/DL   METABOLIC PANEL, BASIC    Collection Time: 03/31/21  4:19 AM   Result Value Ref Range    Sodium 137 136 - 145 mmol/L    Potassium 3.9 3.5 - 5.1 mmol/L    Chloride 108 97 - 108 mmol/L    CO2 23 21 - 32 mmol/L    Anion gap 6 5 - 15 mmol/L    Glucose 79 65 - 100 mg/dL    BUN 12 6 - 20 MG/DL    Creatinine 0.33 (L) 0.55 - 1.02 MG/DL    BUN/Creatinine ratio 36 (H) 12 - 20      GFR est AA >60 >60 ml/min/1.73m2    GFR est non-AA >60 >60 ml/min/1.73m2    Calcium 7.9 (L) 8.5 - 10.1 MG/DL   MAGNESIUM    Collection Time: 03/31/21  4:19 AM   Result Value Ref Range    Magnesium 1.9 1.6 - 2.4 mg/dL   CBC W/O DIFF    Collection Time: 03/31/21  4:19 AM   Result Value Ref Range    WBC 7.5 3.6 - 11.0 K/uL    RBC 3.26 (L) 3.80 - 5.20 M/uL    HGB 7.8 (L) 11.5 - 16.0 g/dL    HCT 24.4 (L) 35.0 - 47.0 %    MCV 74.8 (L) 80.0 - 99.0 FL    MCH 23.9 (L) 26.0 - 34.0 PG    MCHC 32.0 30.0 - 36.5 g/dL    RDW 14.4 11.5 - 14.5 %    PLATELET 461 375 - 424 K/uL    MPV 11.7 8.9 - 12.9 FL    NRBC 0.0 0  WBC    ABSOLUTE NRBC 0.00 0.00 - 0.01 K/uL       Assessment and Plan:      Principal Problem:    Acute pyelonephritis (3/3/2021)       37 y/o [id number] at 26 3/7 weeks admitted for pyelonephritis  -Pyelo now resolved and AF (s/p Ceftriaxone for Klebsiella and Citrobacter urine cx); renal ultrasound normal on 3/5  -Blood culture with 1/4 yeast, s/p amphotericin; repeat blood cultures 3/6 negative   -s/p new PICC placement 3/19/21 (old PICC removed after +blood cxs)     Chronic eating disorder   -on TPN-continous  -Vitamin D deficiency-supplementing for repletion  -Zinc level still pending  -Continue BG check q6h  -has outpatient nutritionistIvonne  -iv zofran, phenergan suppositories, carafate, ivf's  -constipation- miralax and dulcolax supp prn and daily colace  -continue to encourage trying small amounts of food    Depression with h/o hospitalization for suicidal ideation  -followed by Peterson Gonzalez (psychiatrist) and Tinnie Lennox (psychologist), however pt non-compliant with counseling  -Continue Prozac, Zyprexa (both switched to liquid form to help improve compliance but pt unable to tolerate liquid prozac so switched back to pill form 3/17), and prn Prazosin, hydroxyzine  -s/p psych consult for pt non-compliance and trying to interrupt iv meds/tpn-no further recs  -appreciate Loren's input and telehealth meeting with patient.  -many social issues that patient is trying to work through. Needs to try to go to court on Tuesday, 4/6/21 but know the concerns I have with discharge. I advised that she see if this can been handled virtually but she doesn't think this is possible.      -I had another long discussion with pt 3/27/21 regarding concerns with discharge given her noncompliance with her medical therapy as well as risk for recurrent infection with PICC line and home TPN.  After consult with ethics, plan is to continue with current plan of inpatient TPN with sitter for pt and baby safety until which time we feel patient is safe to go home. Given these concerns, pt advised that at this point, we would not be able to send her home with a PICC line. Pt asked to please let me know what she feels we can do to help her. She may need a letter for her , but she will let me know if/when/what exactly she needs.      Chronic anemia-known beta thal minor  -s/p iv iron with last admission  -Hb currently at baseline     Ambulate with assistance, Juan thakkar for DVT ppx (pt with intermittent dizziness and previous fall history)  S/p Physical therapy consult for deconditioning  -order for wheelchair with sitter to go outside if desired.     Antony Walker MD   3/31/2021  8:24 AM

## 2021-04-01 LAB
ANION GAP SERPL CALC-SCNC: 5 MMOL/L (ref 5–15)
BUN SERPL-MCNC: 11 MG/DL (ref 6–20)
BUN/CREAT SERPL: 34 (ref 12–20)
CALCIUM SERPL-MCNC: 8.2 MG/DL (ref 8.5–10.1)
CHLORIDE SERPL-SCNC: 108 MMOL/L (ref 97–108)
CO2 SERPL-SCNC: 23 MMOL/L (ref 21–32)
CREAT SERPL-MCNC: 0.32 MG/DL (ref 0.55–1.02)
GLUCOSE SERPL-MCNC: 70 MG/DL (ref 65–100)
MAGNESIUM SERPL-MCNC: 1.8 MG/DL (ref 1.6–2.4)
PHOSPHATE SERPL-MCNC: 4.4 MG/DL (ref 2.6–4.7)
POTASSIUM SERPL-SCNC: 3.7 MMOL/L (ref 3.5–5.1)
SODIUM SERPL-SCNC: 136 MMOL/L (ref 136–145)

## 2021-04-01 PROCEDURE — 74011250636 HC RX REV CODE- 250/636: Performed by: OBSTETRICS & GYNECOLOGY

## 2021-04-01 PROCEDURE — 74011250637 HC RX REV CODE- 250/637: Performed by: NURSE PRACTITIONER

## 2021-04-01 PROCEDURE — 74011250637 HC RX REV CODE- 250/637: Performed by: INTERNAL MEDICINE

## 2021-04-01 PROCEDURE — 74011000250 HC RX REV CODE- 250: Performed by: OBSTETRICS & GYNECOLOGY

## 2021-04-01 PROCEDURE — 74011000258 HC RX REV CODE- 258: Performed by: OBSTETRICS & GYNECOLOGY

## 2021-04-01 PROCEDURE — 74011250637 HC RX REV CODE- 250/637: Performed by: OBSTETRICS & GYNECOLOGY

## 2021-04-01 PROCEDURE — 84100 ASSAY OF PHOSPHORUS: CPT

## 2021-04-01 PROCEDURE — 80048 BASIC METABOLIC PNL TOTAL CA: CPT

## 2021-04-01 PROCEDURE — 65410000002 HC RM PRIVATE OB

## 2021-04-01 PROCEDURE — C9113 INJ PANTOPRAZOLE SODIUM, VIA: HCPCS | Performed by: OBSTETRICS & GYNECOLOGY

## 2021-04-01 PROCEDURE — 36415 COLL VENOUS BLD VENIPUNCTURE: CPT

## 2021-04-01 PROCEDURE — 83735 ASSAY OF MAGNESIUM: CPT

## 2021-04-01 RX ADMIN — SODIUM CHLORIDE 40 MG: 9 INJECTION INTRAMUSCULAR; INTRAVENOUS; SUBCUTANEOUS at 09:04

## 2021-04-01 RX ADMIN — Medication 10 ML: at 21:26

## 2021-04-01 RX ADMIN — SUCRALFATE 1 G: 1 TABLET ORAL at 06:59

## 2021-04-01 RX ADMIN — DOCUSATE SODIUM 100 MG: 100 CAPSULE, LIQUID FILLED ORAL at 09:04

## 2021-04-01 RX ADMIN — I.V. FAT EMULSION 250 ML: 20 EMULSION INTRAVENOUS at 18:46

## 2021-04-01 RX ADMIN — SUCRALFATE 1 G: 1 TABLET ORAL at 12:06

## 2021-04-01 RX ADMIN — SUCRALFATE 1 G: 1 TABLET ORAL at 16:52

## 2021-04-01 RX ADMIN — ONDANSETRON 4 MG: 2 INJECTION INTRAMUSCULAR; INTRAVENOUS at 21:25

## 2021-04-01 RX ADMIN — POTASSIUM CHLORIDE: 2 INJECTION, SOLUTION, CONCENTRATE INTRAVENOUS at 18:42

## 2021-04-01 RX ADMIN — OLANZAPINE 10 MG: 5 TABLET, ORALLY DISINTEGRATING ORAL at 21:25

## 2021-04-01 RX ADMIN — ONDANSETRON 4 MG: 2 INJECTION INTRAMUSCULAR; INTRAVENOUS at 08:24

## 2021-04-01 RX ADMIN — SUCRALFATE 1 G: 1 TABLET ORAL at 21:25

## 2021-04-01 RX ADMIN — FLUOXETINE 60 MG: 20 CAPSULE ORAL at 21:25

## 2021-04-01 RX ADMIN — ONDANSETRON 4 MG: 2 INJECTION INTRAMUSCULAR; INTRAVENOUS at 15:41

## 2021-04-01 RX ADMIN — Medication 30 MCG: at 09:03

## 2021-04-01 NOTE — PROGRESS NOTES
Pt called me in the office today to say that her  is recommending that she be in-person at the court date on Tuesday (custody hearing for her children) and that she is supposed to meet with the  on Saturday before the court date. I advised pt that I would come talk with her in person today. I have just now come in to talk with Jayce Reddy. I have explained to her that I recommend she stay in the hospital to continue with TPN for the nutrition of herself and her baby as she has yet to be able to keep any significant amount of food/liquid down. I have offered to write a letter to the , talk to the  by phone or even help to ensure a strong internet setup for her to be present in court \"virtually\" so that she can stay in the hospital and continue with her current treatment. I have advised Jayce Reddy that if she feels that she has to leave, she will need to sign out against medical advise and the PICC line will need to be removed. I have also advised her that if she leaves, she is more than welcome to return as soon as she can/is able so that we can resume her treatment in a safe environment for her. I have advised Jayce Reddy to please let me know if there is anything else I can do to help her be able to stay in the hospital and continue with her current treatment. Jayce Reddy verbalized understanding.

## 2021-04-01 NOTE — PROGRESS NOTES
0800 Bedside and Verbal shift change report given to Yolie (oncoming nurse) by Jaxson Mullen (offgoing nurse). Report included the following information SBAR, Kardex, Intake/Output, MAR, Accordion and Recent Results.

## 2021-04-01 NOTE — PROGRESS NOTES
High Risk Obstetrics Progress Note    Name: Violeta Stewart MRN: [id number]  SSN: [id number]    YOB: 1985  Age: 38 y.o. Sex: female      Subjective:      LOS: 29 days    Estimated Date of Delivery: 21   Gestational Age Today: 30w1d     Patient admitted for pyelonephritis. States she does have normal fetal movement, intermittent nausea with emesis after psych meds again last night. and does not have chest pain, shortness of breath, vaginal bleeding  and vaginal leaking of fluid . Objective:     Vitals:  Blood pressure 112/72, pulse 77, temperature 97.8 °F (36.6 °C), resp. rate 16, height 5' 4\" (1.626 m), weight 78.6 kg (173 lb 3.2 oz), last menstrual period 2020, SpO2 100 %, not currently breastfeeding. Temp (24hrs), Av.1 °F (36.7 °C), Min:97.8 °F (36.6 °C), Max:98.4 °F (41.1 °C)    Systolic (06RTZ), KKF:466 , Min:96 , MKX:739      Diastolic (07SHF), XLP:03, Min:52, Max:72       Intake and Output:         Physical Exam:  Patient without distress.   Heart: Regular rate and rhythm  Lung: clear to auscultation throughout lung fields, no wheezes, no rales, no rhonchi and normal respiratory effort  Abdomen: soft, nontender, gravid  Lower Extremities:  - Edema No       Membranes:  Intact    Fetal Heart Rate:  +FHTs        Labs:   Recent Results (from the past 36 hour(s))   PHOSPHORUS    Collection Time: 21  4:19 AM   Result Value Ref Range    Phosphorus 4.0 2.6 - 4.7 MG/DL   METABOLIC PANEL, BASIC    Collection Time: 21  4:19 AM   Result Value Ref Range    Sodium 137 136 - 145 mmol/L    Potassium 3.9 3.5 - 5.1 mmol/L    Chloride 108 97 - 108 mmol/L    CO2 23 21 - 32 mmol/L    Anion gap 6 5 - 15 mmol/L    Glucose 79 65 - 100 mg/dL    BUN 12 6 - 20 MG/DL    Creatinine 0.33 (L) 0.55 - 1.02 MG/DL    BUN/Creatinine ratio 36 (H) 12 - 20      GFR est AA >60 >60 ml/min/1.73m2    GFR est non-AA >60 >60 ml/min/1.73m2    Calcium 7.9 (L) 8.5 - 10.1 MG/DL   MAGNESIUM    Collection Time: 03/31/21  4:19 AM   Result Value Ref Range    Magnesium 1.9 1.6 - 2.4 mg/dL   CBC W/O DIFF    Collection Time: 03/31/21  4:19 AM   Result Value Ref Range    WBC 7.5 3.6 - 11.0 K/uL    RBC 3.26 (L) 3.80 - 5.20 M/uL    HGB 7.8 (L) 11.5 - 16.0 g/dL    HCT 24.4 (L) 35.0 - 47.0 %    MCV 74.8 (L) 80.0 - 99.0 FL    MCH 23.9 (L) 26.0 - 34.0 PG    MCHC 32.0 30.0 - 36.5 g/dL    RDW 14.4 11.5 - 14.5 %    PLATELET 254 353 - 565 K/uL    MPV 11.7 8.9 - 12.9 FL    NRBC 0.0 0  WBC    ABSOLUTE NRBC 0.00 0.00 - 0.01 K/uL   PHOSPHORUS    Collection Time: 04/01/21  4:34 AM   Result Value Ref Range    Phosphorus 4.4 2.6 - 4.7 MG/DL   METABOLIC PANEL, BASIC    Collection Time: 04/01/21  4:34 AM   Result Value Ref Range    Sodium 136 136 - 145 mmol/L    Potassium 3.7 3.5 - 5.1 mmol/L    Chloride 108 97 - 108 mmol/L    CO2 23 21 - 32 mmol/L    Anion gap 5 5 - 15 mmol/L    Glucose 70 65 - 100 mg/dL    BUN 11 6 - 20 MG/DL    Creatinine 0.32 (L) 0.55 - 1.02 MG/DL    BUN/Creatinine ratio 34 (H) 12 - 20      GFR est AA >60 >60 ml/min/1.73m2    GFR est non-AA >60 >60 ml/min/1.73m2    Calcium 8.2 (L) 8.5 - 10.1 MG/DL   MAGNESIUM    Collection Time: 04/01/21  4:34 AM   Result Value Ref Range    Magnesium 1.8 1.6 - 2.4 mg/dL       Assessment and Plan:      Principal Problem:    Acute pyelonephritis (3/3/2021)       37 y/o [id number] at 26 4/7 weeks admitted for pyelonephritis  -Pyelo now resolved and AF (s/p Ceftriaxone for Klebsiella and Citrobacter urine cx); renal ultrasound normal on 3/5  -Blood culture with 1/4 yeast, s/p amphotericin; repeat blood cultures 3/6 negative   -s/p new PICC placement 3/19/21 (old PICC removed after +blood cxs)     Chronic eating disorder   -on TPN-continous  -Vitamin D deficiency-supplementing for repletion  -Zinc level low-will d/w nutrition  -Continue BG check q6h  -has outpatient nutritionistIvonne  -iv zofran, phenergan suppositories, carafate, ivf's  -constipation- miralax and dulcolax supp prn and daily colace  -continue to encourage trying small amounts of food     Depression with h/o hospitalization for suicidal ideation  -followed by Nilsa Loera (psychiatrist) and Brennon Zimmerman (psychologist), however pt non-compliant with counseling  -Continue Prozac, Zyprexa (both switched to liquid form to help improve compliance but pt unable to tolerate liquid prozac so switched back to pill form 3/17), and prn Prazosin, hydroxyzine  -s/p psych consult for pt non-compliance and trying to interrupt iv meds/tpn-no further recs  -appreciate Loren's input and telehealth meeting with patient.  -many social issues that patient is trying to work through. Needs to try to go to court on Tuesday, 4/6/21 but know the concerns I have with discharge. I advised that she see if this can been handled virtually but she doesn't think this is possible.      -I had another long discussion with pt 3/27/21 regarding concerns with discharge given her noncompliance with her medical therapy as well as risk for recurrent infection with PICC line and home TPN.  After consult with ethics, plan is to continue with current plan of inpatient TPN with sitter for pt and baby safety until which time we feel patient is safe to go home. Given these concerns, pt advised that at this point, we would not be able to send her home with a PICC line. Pt asked to please let me know what she feels we can do to help her.  She may need a letter for her , but she will let me know if/when/what exactly she needs.      Chronic anemia-known beta thal minor  -s/p iv iron with last admission  -Hb currently at baseline     Ambulate with assistance, Juan thakkar for DVT ppx (pt with intermittent dizziness and previous fall history)  S/p Physical therapy consult for deconditioning  -order for wheelchair with sitter to go outside if desired.

## 2021-04-01 NOTE — PROGRESS NOTES
Bedside and Verbal shift change report given to LILIA Gómez RN (oncoming nurse) by Telma Bob RN (offgoing nurse). Report included the following information SBAR, Kardex, Intake/Output, MAR, Accordion and Recent Results.

## 2021-04-02 VITALS
DIASTOLIC BLOOD PRESSURE: 70 MMHG | TEMPERATURE: 98.2 F | RESPIRATION RATE: 18 BRPM | HEIGHT: 64 IN | WEIGHT: 173 LBS | SYSTOLIC BLOOD PRESSURE: 123 MMHG | HEART RATE: 89 BPM | OXYGEN SATURATION: 100 % | BODY MASS INDEX: 29.53 KG/M2

## 2021-04-02 LAB
MAGNESIUM SERPL-MCNC: 1.8 MG/DL (ref 1.6–2.4)
PHOSPHATE SERPL-MCNC: 4.3 MG/DL (ref 2.6–4.7)

## 2021-04-02 PROCEDURE — 74011250636 HC RX REV CODE- 250/636: Performed by: OBSTETRICS & GYNECOLOGY

## 2021-04-02 PROCEDURE — 36415 COLL VENOUS BLD VENIPUNCTURE: CPT

## 2021-04-02 PROCEDURE — 84100 ASSAY OF PHOSPHORUS: CPT

## 2021-04-02 PROCEDURE — 74011000250 HC RX REV CODE- 250: Performed by: OBSTETRICS & GYNECOLOGY

## 2021-04-02 PROCEDURE — 74011250637 HC RX REV CODE- 250/637: Performed by: INTERNAL MEDICINE

## 2021-04-02 PROCEDURE — 83735 ASSAY OF MAGNESIUM: CPT

## 2021-04-02 PROCEDURE — C9113 INJ PANTOPRAZOLE SODIUM, VIA: HCPCS | Performed by: OBSTETRICS & GYNECOLOGY

## 2021-04-02 PROCEDURE — 74011250637 HC RX REV CODE- 250/637: Performed by: OBSTETRICS & GYNECOLOGY

## 2021-04-02 PROCEDURE — 80053 COMPREHEN METABOLIC PANEL: CPT

## 2021-04-02 PROCEDURE — 77030020847 HC STATLOK BARD -A

## 2021-04-02 RX ADMIN — SODIUM CHLORIDE 40 MG: 9 INJECTION INTRAMUSCULAR; INTRAVENOUS; SUBCUTANEOUS at 08:54

## 2021-04-02 RX ADMIN — Medication 10 ML: at 08:57

## 2021-04-02 RX ADMIN — Medication 10 ML: at 06:41

## 2021-04-02 RX ADMIN — Medication 30 MCG: at 08:52

## 2021-04-02 RX ADMIN — ONDANSETRON 4 MG: 2 INJECTION INTRAMUSCULAR; INTRAVENOUS at 09:12

## 2021-04-02 RX ADMIN — SUCRALFATE 1 G: 1 TABLET ORAL at 06:39

## 2021-04-02 RX ADMIN — POLYETHYLENE GLYCOL 3350 17 G: 17 POWDER, FOR SOLUTION ORAL at 09:00

## 2021-04-02 RX ADMIN — DOCUSATE SODIUM 100 MG: 100 CAPSULE, LIQUID FILLED ORAL at 08:54

## 2021-04-02 NOTE — PROGRESS NOTES
Problem: Nutrition Deficit  Goal: *Optimize nutritional status  Outcome: Progressing Towards Goal     Problem: Falls - Risk of  Goal: *Absence of Falls  Description: Document Edna Larson Fall Risk and appropriate interventions in the flowsheet.   Outcome: Progressing Towards Goal  Note: Fall Risk Interventions:            Medication Interventions: Teach patient to arise slowly         History of Falls Interventions: Room close to nurse's station

## 2021-04-02 NOTE — PROGRESS NOTES
0758 Verbal shift change report given to Mikki Minor RN (oncoming nurse) by Joycelyn Edgar RN (offgoing nurse). Report included the following information SBAR.     1100 Pt called RN to state she wants to initiate the process to leave AMA. RN provided pt with AMA form to review and acknowledge. RN also called MD who is aware of pt's desire to leave AMA, and reassures pt that she can follow up with MD office next week as well as return to hospital for additional treatment. MD had a discussion with patient yesterday afternoon regarding risk of leaving and benefits of staying. MD will come to unit this afternoon to sign the Informed Refusal paperwork. 1145 PICC line removed. Pt educated on remaining in supine position for 30 minutes.

## 2021-04-02 NOTE — PROGRESS NOTES
Bedside and Verbal shift change report given to Giovanna BARNARD and Gilberto GARDNER (oncoming nurse) by Sukhwinder Toledo RN (offgoing nurse). Report included the following information SBAR, Kardex, Procedure Summary, Intake/Output, MAR and Recent Results.

## 2021-04-03 LAB
ALBUMIN SERPL-MCNC: 2.3 G/DL (ref 3.5–5)
ALBUMIN/GLOB SERPL: 0.6 {RATIO} (ref 1.1–2.2)
ALP SERPL-CCNC: 88 U/L (ref 45–117)
ALT SERPL-CCNC: 17 U/L (ref 12–78)
ANION GAP SERPL CALC-SCNC: 7 MMOL/L (ref 5–15)
AST SERPL-CCNC: 12 U/L (ref 15–37)
BILIRUB SERPL-MCNC: 0.2 MG/DL (ref 0.2–1)
BUN SERPL-MCNC: 12 MG/DL (ref 6–20)
BUN/CREAT SERPL: 30 (ref 12–20)
CALCIUM SERPL-MCNC: 7.6 MG/DL (ref 8.5–10.1)
CHLORIDE SERPL-SCNC: 109 MMOL/L (ref 97–108)
CO2 SERPL-SCNC: 24 MMOL/L (ref 21–32)
CREAT SERPL-MCNC: 0.4 MG/DL (ref 0.55–1.02)
GLOBULIN SER CALC-MCNC: 4 G/DL (ref 2–4)
GLUCOSE SERPL-MCNC: 55 MG/DL (ref 65–100)
POTASSIUM SERPL-SCNC: 3.9 MMOL/L (ref 3.5–5.1)
PROT SERPL-MCNC: 6.3 G/DL (ref 6.4–8.2)
SODIUM SERPL-SCNC: 140 MMOL/L (ref 136–145)

## 2021-04-06 ENCOUNTER — VIRTUAL VISIT (OUTPATIENT)
Dept: BEHAVIORAL/MENTAL HEALTH CLINIC | Age: 36
End: 2021-04-06
Payer: MEDICAID

## 2021-04-06 DIAGNOSIS — F32.2 CURRENT SEVERE EPISODE OF MAJOR DEPRESSIVE DISORDER WITHOUT PSYCHOTIC FEATURES WITHOUT PRIOR EPISODE (HCC): Primary | ICD-10-CM

## 2021-04-06 DIAGNOSIS — F43.11 POST-TRAUMATIC STRESS DISORDER, ACUTE: ICD-10-CM

## 2021-04-06 DIAGNOSIS — F41.1 GAD (GENERALIZED ANXIETY DISORDER): ICD-10-CM

## 2021-04-06 PROCEDURE — 99213 OFFICE O/P EST LOW 20 MIN: CPT | Performed by: NURSE PRACTITIONER

## 2021-04-06 NOTE — PROGRESS NOTES
CHIEF COMPLAINT:  Mabel Herman is a 38 y.o. female and was seen today for follow-up of psychiatric condition and psychotropic medication management. HPI:    Saira Velasquez reports the following psychiatric symptoms by hx:  depression and anxiety. Overall symptoms have been present for months. Currently symptoms are of moderate/high severity. The symptoms occur daily. Pt reports medications are somewhat helpful. Met with pt via audio telehealth for appt today. Pt provided verbal consent for Aby Arguello RN, NP student, to participate in session today. FAMILY/SOCIAL HX: ongoing psychosocial stressors    REVIEW OF SYSTEMS:  Psychiatric:  depression, anxiety  Appetite:decreased, chronic nausea  Sleep: fitful   Neuro: no updates reported    Visit Vitals  LMP 09/27/2020 (Exact Date)       Side Effects:  none    MENTAL STATUS EXAM:   Sensorium  oriented to time, place and person   Relations cooperative   Appearance:  age appropriate and casually dressed   Motor Behavior:  gait stable and within normal limits   Speech:  normal volume   Thought Process: goal directed   Thought Content free of delusions and free of hallucinations   Suicidal ideations no intention   Homicidal ideations no intention   Mood:  anxious and depressed   Affect:  anxious and depressed   Memory recent  adequate   Memory remote:  adequate   Concentration:  adequate   Abstraction:  abstract   Insight:  fair and good   Reliability fair   Judgment:  fair     MEDICAL DECISION MAKING:  Problems addressed today:    ICD-10-CM ICD-9-CM    1. Current severe episode of major depressive disorder without psychotic features without [facility]  F32.2 296.23    2. Post-traumatic stress disorder, acute  F43.11 309.81    3. DESTINI (generalized anxiety disorder)  F41.1 300.02        Assessment:   Saira Velasquez is responding to treatment somewhat. Currently depression and anxiety symptoms are mod/high severity. Discussed current medications and dosages. Pt states nausea is still high but she has been able to take prozac and olanzapine. Discussed focusing on nutrient dense food choices. Pt reports she has been working on current stressors. She is having nightmares prn so only uses prazosin as needed. Reviewed treatment goals and target symptoms to monitor for. Plan:   1. Current Outpatient Medications   Medication Sig Dispense Refill    FLUoxetine (PROzac) 20 mg capsule Take 1 Cap by mouth daily. Take with 40 mg cap for total daily dose of 60 mg. 30 Cap 2    FLUoxetine (PROzac) 40 mg capsule Take 1 Cap by mouth daily. 30 Cap 2    OLANZapine (ZyPREXA) 10 mg tablet Take 1 Tab by mouth nightly. 30 Tab 2    prazosin (MINIPRESS) 2 mg capsule Take 1 Cap by mouth nightly. Indications: posttraumatic stress syndrome 30 Cap 0    polyethylene glycol (MIRALAX) 17 gram packet Take 1 Packet by mouth daily as needed for Constipation. 90 Each 0    calcium carbonate (Tums) 200 mg calcium (500 mg) chew Take 1 Tab by mouth two (2) times daily as needed for Other (reflux). 60 Tab 1    doxylamine succinate (UNISOM) 25 mg tablet Take 1 Tab by mouth nightly as needed for Insomnia. 30 Tab 2    pantoprazole (PROTONIX) 40 mg tablet Take 1 Tab by mouth Daily (before breakfast). Indications: gastroesophageal reflux disease 30 Tab 2    sucralfate (CARAFATE) 1 gram tablet Take 1 Tab by mouth Before breakfast, lunch, dinner and at bedtime. 120 Tab 1    Lacto. acidophilus-Bif. animalis (Probiotic) 5 billion cell cpSP Take 1 Cap by mouth daily. 30 Cap 1    prochlorperazine (COMPAZINE) 10 mg tablet       promethazine (Phenergan) 12.5 mg suppository       ondansetron (ZOFRAN ODT) 4 mg disintegrating tablet Take 1 Tab by mouth every six (6) hours as needed for Nausea or Vomiting. 30 Tab 3    0.9% sodium chloride solp 1,000 mL with thiamine 100 mg/mL soln 822 mg, folic acid 5 mg/mL soln 1 mg 1 Bag by IntraVENous route every twenty-four (24) hours.  30 Bag 2    cholecalciferol (Vitamin D3) 25 mcg (1,000 unit) cap Take  by mouth daily.  pyridoxine, vitamin B6, (Vitamin B-6) 100 mg tablet Take 100 mg by mouth daily.  hydrOXYzine HCL (ATARAX) 50 mg tablet Take 1 Tab by mouth four (4) times daily. 90 Tab 0    OTHER 1 Ensure Enlive food supplement drink PO  Each 2    OTHER 1 ensure pudding PO daily for lunch. 90 Each 3    prenatal vit-iron fumarate-fa (PRENATAL PLUS with IRON) 28 mg iron- 800 mcg tab       food supplemt, lactose-reduced (Ensure High Protein) liqd Take 237 mL by mouth three (3) times daily. 90 Can 2          medication changes made today: cont prozac, olanzapine and prn prazosin    2. Counseling and coordination of care including instructions for treatment, risks/benefits, risk factor reduction and patient/family education. She agrees with the plan. Patient instructed to call with any side effects, questions or issues. 3.    Follow-up and Dispositions    · Return in about 1 week (around 4/13/2021). Dennie Drape is a 38 y.o. female, evaluated via audio-only technology on 4/6/2021 for Medication Management      Dennie Drape, who was evaluated through a patient-initiated, synchronous (real-time) audio only encounter, and/or her healthcare decision maker, is aware that it is a billable service, with coverage as determined by her insurance carrier. She provided verbal consent to proceed: Yes. She has not had a related appointment within my department in the past 7 days or scheduled within the next 24 hours.       Total Time: minutes: 11-20 minutes        4/6/2021  Jacob Enciso NP

## 2021-04-08 NOTE — DISCHARGE SUMMARY
Obstetrical Discharge Summary     Name: Makenzie Yates MRN: [id number]  SSN: [id number]    YOB: 1985  Age: 38 y.o. Sex: female      Allergies: Labetalol, Ceclor [cefaclor], Pcn [penicillins], and Septra [sulfamethoprim ds]    Admit Date: 3/2/2021    Discharge Date: 4/2/2021    Admitting Physician: Vicky Kim MD     Attending Physician:  Taylor Farmer DO    * Admission Diagnoses: Acute pyelonephritis [N10]    * Discharge Diagnoses:   Pyelonephritis-resolved, Fungemia-resolved, Malnutrtion from eating disorder-chronic, Depression/anxiety    Additional Diagnoses:   Hospital Problems as of 4/2/2021 Date Reviewed: 3/3/2021          Codes Class Noted - Resolved POA    * (Principal) Acute pyelonephritis ICD-10-CM: N10  ICD-9-CM: 590.10  3/3/2021 - Present Yes             Lab Results   Component Value Date/Time    ABO/Rh(D) O POSITIVE 01/17/2020 07:09 AM    Rubella, External Immune 11/24/2020    GrBStrep, External Negative 02/20/2018    ABO,Rh O positive 11/25/2020      Immunization History   Administered Date(s) Administered    (RETIRED) Pneumococcal Vaccine (Unspecified Type) 09/14/2012    Influenza Vaccine (Quad) PF (>6 Mo Flulaval, Fluarix, and >3 Yrs Afluria, Fluzone 82922) 09/16/2020    Influenza Vaccine PF 10/22/2013    TDAP Vaccine 09/14/2012    Tdap 03/02/2018       * Procedures: Central line placed and removed. New PICC line placed (removed at discharge)  Procedure(s): INFUSION CATHETER INSERTION           * Discharge Condition: improved    [facility] Course: Patient admitted and underwent weeks of iv abx therapy for pyelonephritis and fungemia. Her original picc line was removed and a central line placed for this therapy. Following treatment, a new PICC line was placed to continue TPN for her malnutrition for chronic eating disorder and central line was removed.  Pt ultimately decided to sign out against medical advice on 4/2/21 with the understanding that her TPN was still necessary for the health of she and her baby but because of her high risk behavior with the picc line prior to admission, the PICC line and thus TPN would need to be removed/stopped on her discharge. Pt was advised (and encouraged to) that she can return for repeat admission and treatment as soon as she needed/wanted to. * Disposition: Home    Discharge Medications:   Discharge Medication List as of 4/2/2021 12:55 PM          * Follow-up Care/Patient Instructions: Activity: no strenuous exercise or intercourse  Diet: as tolerated  Wound Care: keep sites of previous lines clean/dry    Follow-up Information    None                     .

## 2021-04-13 ENCOUNTER — OFFICE VISIT (OUTPATIENT)
Dept: BEHAVIORAL/MENTAL HEALTH CLINIC | Age: 36
End: 2021-04-13
Payer: MEDICAID

## 2021-04-13 VITALS
DIASTOLIC BLOOD PRESSURE: 71 MMHG | TEMPERATURE: 98.2 F | SYSTOLIC BLOOD PRESSURE: 119 MMHG | OXYGEN SATURATION: 98 % | HEIGHT: 64 IN | RESPIRATION RATE: 16 BRPM | HEART RATE: 83 BPM | WEIGHT: 167.4 LBS | BODY MASS INDEX: 28.58 KG/M2

## 2021-04-13 DIAGNOSIS — F32.2 CURRENT SEVERE EPISODE OF MAJOR DEPRESSIVE DISORDER WITHOUT PSYCHOTIC FEATURES WITHOUT PRIOR EPISODE (HCC): Primary | ICD-10-CM

## 2021-04-13 DIAGNOSIS — F43.11 POST-TRAUMATIC STRESS DISORDER, ACUTE: ICD-10-CM

## 2021-04-13 DIAGNOSIS — F41.1 GAD (GENERALIZED ANXIETY DISORDER): ICD-10-CM

## 2021-04-13 PROCEDURE — 90836 PSYTX W PT W E/M 45 MIN: CPT | Performed by: NURSE PRACTITIONER

## 2021-04-13 PROCEDURE — 99213 OFFICE O/P EST LOW 20 MIN: CPT | Performed by: NURSE PRACTITIONER

## 2021-04-13 NOTE — PROGRESS NOTES
CHIEF COMPLAINT:  Omar Felix is a 38 y.o. female and was seen today for follow-up of psychiatric condition and psychotropic medication management. HPI:    Segundo Mann reports the following psychiatric symptoms:  depression and anxiety. The symptoms have been present for months and are of moderate/high severity. The symptoms occur daily. FAMILY/SOCIAL HX: psychosocial stressors    REVIEW OF SYSTEMS:  Psychiatric:  depression, anxiety  Appetite:decreased   Sleep: fitful   Neuro: no changes/updates    Side Effects:  none, difficulty taking meds due N/V    MENTAL STATUS EXAM:   Sensorium  oriented to time, place and person   Relations cooperative   Appearance:  age appropriate and casually dressed   Motor Behavior:  gait stable and within normal limits   Speech:  soft   Thought Process: goal directed   Thought Content free of delusions and free of hallucinations   Suicidal ideations no intention   Homicidal ideations no intention   Mood:  anxious and depressed   Affect:  anxious and depressed   Memory recent  adequate   Memory remote:  adequate   Concentration:  adequate   Abstraction:  abstract   Insight:  fair   Reliability fair and limited   Judgment:  fair and limited     MEDICAL DECISION MAKING:  Problems addressed today:    ICD-10-CM ICD-9-CM    1. Current severe episode of major depressive disorder without psychotic features without prior episode ([facility] [place] [facility]  F32.2 296.23    2. Post-traumatic stress disorder, acute  F43.11 309.81    3. DESTINI (generalized anxiety disorder)  F41.1 300.02        Assessment:   Segundo Mann is responding to treatment somewhat. Depression symptoms are slightly less severe. Pt still with significant anxiety. Anxiety symptoms c/w with PTSD and OCD. Discussed options for med management. Overall they are limited due to severe N/V associated with severity of worry and emotions.  Reviewed strategies for self care including taking medications, focusing on high nutrient food options and cognitively re-framing stressors. Plan:   1. Current Outpatient Medications   Medication Sig Dispense Refill    FLUoxetine (PROzac) 20 mg capsule Take 1 Cap by mouth daily. Take with 40 mg cap for total daily dose of 60 mg. 30 Cap 2    FLUoxetine (PROzac) 40 mg capsule Take 1 Cap by mouth daily. 30 Cap 2    OLANZapine (ZyPREXA) 10 mg tablet Take 1 Tab by mouth nightly. 30 Tab 2    prazosin (MINIPRESS) 2 mg capsule Take 1 Cap by mouth nightly. Indications: posttraumatic stress syndrome 30 Cap 0    calcium carbonate (Tums) 200 mg calcium (500 mg) chew Take 1 Tab by mouth two (2) times daily as needed for Other (reflux). 60 Tab 1    sucralfate (CARAFATE) 1 gram tablet Take 1 Tab by mouth Before breakfast, lunch, dinner and at bedtime. 120 Tab 1    Lacto. acidophilus-Bif. animalis (Probiotic) 5 billion cell cpSP Take 1 Cap by mouth daily. 30 Cap 1    ondansetron (ZOFRAN ODT) 4 mg disintegrating tablet Take 1 Tab by mouth every six (6) hours as needed for Nausea or Vomiting. 30 Tab 3    cholecalciferol (Vitamin D3) 25 mcg (1,000 unit) cap Take  by mouth daily.  pyridoxine, vitamin B6, (Vitamin B-6) 100 mg tablet Take 100 mg by mouth daily.  hydrOXYzine HCL (ATARAX) 50 mg tablet Take 1 Tab by mouth four (4) times daily. 90 Tab 0    OTHER 1 Ensure Enlive food supplement drink PO  Each 2    OTHER 1 ensure pudding PO daily for lunch. 90 Each 3    prenatal vit-iron fumarate-fa (PRENATAL PLUS with IRON) 28 mg iron- 800 mcg tab       food supplemt, lactose-reduced (Ensure High Protein) liqd Take 237 mL by mouth three (3) times daily. 90 Can 2    polyethylene glycol (MIRALAX) 17 gram packet Take 1 Packet by mouth daily as needed for Constipation. 90 Each 0    doxylamine succinate (UNISOM) 25 mg tablet Take 1 Tab by mouth nightly as needed for Insomnia. 30 Tab 2    pantoprazole (PROTONIX) 40 mg tablet Take 1 Tab by mouth Daily (before breakfast).  Indications: gastroesophageal reflux disease 30 Tab 2    prochlorperazine (COMPAZINE) 10 mg tablet       promethazine (Phenergan) 12.5 mg suppository       0.9% sodium chloride solp 1,000 mL with thiamine 100 mg/mL soln 672 mg, folic acid 5 mg/mL soln 1 mg 1 Bag by IntraVENous route every twenty-four (24) hours. 30 Bag 2          medication changes made today: cont olanzapine and prozac, pt only using prazosin prn    2. Counseling and coordination of care including instructions for treatment, risks/benefits, risk factor reduction and patient/family education. She agrees with the plan. Patient instructed to call with any side effects, questions or issues. 3.    Follow-up and Dispositions    · Return in about 2 weeks (around 4/27/2021). Total face to face time 90 minutes    PSYCHOTHERAPY:  approx 45-55 minutes  Type:  Supportive, cognitive, TI  Focus:  Self care, care during pregnancy, stressors  [facility] is progressing.     4/13/2021  Shayy Caballero NP

## 2021-04-13 NOTE — PROGRESS NOTES
Chief Complaint   Patient presents with    Follow-up     Visit Vitals  /71 (BP 1 Location: Left upper arm, BP Patient Position: Sitting, BP Cuff Size: Adult)   Pulse 83   Temp 98.2 °F (36.8 °C) (Temporal)   Resp 16   Ht 5' 4\" (1.626 m)   Wt 75.9 kg (167 lb 6.4 oz)   SpO2 98%   BMI 28.73 kg/m²

## 2021-04-26 ENCOUNTER — OFFICE VISIT (OUTPATIENT)
Dept: BEHAVIORAL/MENTAL HEALTH CLINIC | Age: 36
End: 2021-04-26
Payer: MEDICAID

## 2021-04-26 VITALS
HEART RATE: 92 BPM | TEMPERATURE: 97.2 F | SYSTOLIC BLOOD PRESSURE: 98 MMHG | DIASTOLIC BLOOD PRESSURE: 58 MMHG | WEIGHT: 165.2 LBS | BODY MASS INDEX: 28.36 KG/M2 | RESPIRATION RATE: 18 BRPM | OXYGEN SATURATION: 98 %

## 2021-04-26 DIAGNOSIS — F32.2 CURRENT SEVERE EPISODE OF MAJOR DEPRESSIVE DISORDER WITHOUT PSYCHOTIC FEATURES WITHOUT PRIOR EPISODE (HCC): Primary | ICD-10-CM

## 2021-04-26 DIAGNOSIS — F41.1 GAD (GENERALIZED ANXIETY DISORDER): ICD-10-CM

## 2021-04-26 DIAGNOSIS — F43.11 POST-TRAUMATIC STRESS DISORDER, ACUTE: ICD-10-CM

## 2021-04-26 PROCEDURE — 90834 PSYTX W PT 45 MINUTES: CPT | Performed by: NURSE PRACTITIONER

## 2021-04-26 PROCEDURE — 99212 OFFICE O/P EST SF 10 MIN: CPT | Performed by: NURSE PRACTITIONER

## 2021-04-26 NOTE — PROGRESS NOTES
CHIEF COMPLAINT:  Antonio Lynch is a 38 y.o. female and was seen today for follow-up of psychiatric condition and psychotropic medication management. HPI:    Salvador potts reports the following psychiatric symptoms:  depression and anxiety. The symptoms have been present for months and are of mod/high to high severity. The symptoms occur daily. Pt has had some response to medications but due to severe N/V she has had a difficult time taking routinely. REVIEW OF SYSTEMS:  Psychiatric:  depression, anxiety  Appetite:decreased   Sleep: fitful     Side Effects:  none    MENTAL STATUS EXAM:   Sensorium  oriented to time, place and person   Relations cooperative   Appearance:  age appropriate and casually dressed   Motor Behavior:  gait stable and within normal limits   Speech:  soft   Thought Process: goal directed   Thought Content free of delusions and free of hallucinations   Suicidal ideations no intention   Homicidal ideations no intention   Mood:  anxious and depressed   Affect:  anxious and depressed   Memory recent  adequate   Memory remote:  adequate   Concentration:  adequate   Abstraction:  abstract   Insight:  fair   Reliability fair   Judgment:  fair         MEDICAL DECISION MAKING:  Problems addressed today:    ICD-10-CM ICD-9-CM    1. Current severe episode [facility] major depressive [facility] without psychotic features [facility]  F32.2 296.23    2. Post-traumatic stress disorder, acute  F43.11 309.81    3. DESTINI (generalized anxiety disorder)  F41.1 300.02        Assessment:   Salvador potts is responding to treatment somewhat. As noted above pt has had severe N/V and has had a tough time taking medications consistently. No changes required today. Plan:   1. Current Outpatient Medications   Medication Sig Dispense Refill    FLUoxetine (PROzac) 20 mg capsule Take 1 Cap by mouth daily.  Take with 40 mg cap for total daily dose of 60 mg. 30 Cap 2    FLUoxetine (PROzac) 40 mg capsule Take 1 Cap by mouth daily. 30 Cap 2    OLANZapine (ZyPREXA) 10 mg tablet Take 1 Tab by mouth nightly. 30 Tab 2    prazosin (MINIPRESS) 2 mg capsule Take 1 Cap by mouth nightly. Indications: posttraumatic stress syndrome 30 Cap 0    polyethylene glycol (MIRALAX) 17 gram packet Take 1 Packet by mouth daily as needed for Constipation. 90 Each 0    calcium carbonate (Tums) 200 mg calcium (500 mg) chew Take 1 Tab by mouth two (2) times daily as needed for Other (reflux). 60 Tab 1    doxylamine succinate (UNISOM) 25 mg tablet Take 1 Tab by mouth nightly as needed for Insomnia. 30 Tab 2    pantoprazole (PROTONIX) 40 mg tablet Take 1 Tab by mouth Daily (before breakfast). Indications: gastroesophageal reflux disease 30 Tab 2    sucralfate (CARAFATE) 1 gram tablet Take 1 Tab by mouth Before breakfast, lunch, dinner and at bedtime. 120 Tab 1    Lacto. acidophilus-Bif. animalis (Probiotic) 5 billion cell cpSP Take 1 Cap by mouth daily. 30 Cap 1    prochlorperazine (COMPAZINE) 10 mg tablet       promethazine (Phenergan) 12.5 mg suppository       ondansetron (ZOFRAN ODT) 4 mg disintegrating tablet Take 1 Tab by mouth every six (6) hours as needed for Nausea or Vomiting. 30 Tab 3    0.9% sodium chloride solp 1,000 mL with thiamine 100 mg/mL soln 692 mg, folic acid 5 mg/mL soln 1 mg 1 Bag by IntraVENous route every twenty-four (24) hours. 30 Bag 2    cholecalciferol (Vitamin D3) 25 mcg (1,000 unit) cap Take  by mouth daily.  pyridoxine, vitamin B6, (Vitamin B-6) 100 mg tablet Take 100 mg by mouth daily.  hydrOXYzine HCL (ATARAX) 50 mg tablet Take 1 Tab by mouth four (4) times daily. 90 Tab 0    OTHER 1 Ensure Enlive food supplement drink PO  Each 2    OTHER 1 ensure pudding PO daily for lunch. 90 Each 3    prenatal vit-iron fumarate-fa (PRENATAL PLUS with IRON) 28 mg iron- 800 mcg tab       food supplemt, lactose-reduced (Ensure High Protein) liqd Take 237 mL by mouth three (3) times daily.  90 Can 2 medication changes: cont prozac, prazosin, olanzapine    2. Counseling and coordination of care including instructions for treatment, risks/benefits, risk factor reduction and patient/family education. She agrees with the plan. Patient instructed to call with any side effects, questions or issues. 3.    Follow-up and Dispositions    · Return in about 1 week (around 5/3/2021). Total face to face time: 50-55 mins    PSYCHOTHERAPY:  approx 45 minutes  Type:  Supportive, trauma informed, cognitive  Focus:  Past hx, current pregnancy, self care/boundaries  [facility] is active and participates in care. Pt still with significant stressors and conflict over best decisions. Pt agrees to keep a journal over the next week to explore cognitions/feelings next week.     Domenico Posada NP  4/26/2021

## 2021-05-03 ENCOUNTER — HOSPITAL ENCOUNTER (OUTPATIENT)
Dept: PERINATAL CARE | Age: 36
Discharge: HOME | End: 2021-05-03
Attending: OBSTETRICS & GYNECOLOGY
Payer: MEDICAID

## 2021-05-03 ENCOUNTER — OFFICE VISIT (OUTPATIENT)
Dept: BEHAVIORAL/MENTAL HEALTH CLINIC | Age: 36
End: 2021-05-03
Payer: MEDICAID

## 2021-05-03 VITALS
HEART RATE: 86 BPM | WEIGHT: 164.2 LBS | BODY MASS INDEX: 28.03 KG/M2 | TEMPERATURE: 97.7 F | DIASTOLIC BLOOD PRESSURE: 74 MMHG | RESPIRATION RATE: 17 BRPM | HEIGHT: 64 IN | OXYGEN SATURATION: 98 % | SYSTOLIC BLOOD PRESSURE: 108 MMHG

## 2021-05-03 DIAGNOSIS — F41.1 GAD (GENERALIZED ANXIETY DISORDER): ICD-10-CM

## 2021-05-03 DIAGNOSIS — F43.11 POST-TRAUMATIC STRESS DISORDER, ACUTE: ICD-10-CM

## 2021-05-03 DIAGNOSIS — F32.2 CURRENT SEVERE EPISODE OF MAJOR DEPRESSIVE DISORDER WITHOUT PSYCHOTIC FEATURES WITHOUT PRIOR EPISODE (HCC): Primary | ICD-10-CM

## 2021-05-03 PROCEDURE — 99213 OFFICE O/P EST LOW 20 MIN: CPT | Performed by: NURSE PRACTITIONER

## 2021-05-03 PROCEDURE — 76816 OB US FOLLOW-UP PER FETUS: CPT | Performed by: OBSTETRICS & GYNECOLOGY

## 2021-05-03 PROCEDURE — 90832 PSYTX W PT 30 MINUTES: CPT | Performed by: NURSE PRACTITIONER

## 2021-05-03 NOTE — PROGRESS NOTES
CHIEF COMPLAINT:  Opal Evangelista is a 38 y.o. female and was seen today for follow-up of psychiatric condition and psychotropic medication management. HPI:    Trang Li reports the following psychiatric symptoms:  depression and anxiety. The symptoms have been present for months and are of mod/high severity. The symptoms occur daily. REVIEW OF SYSTEMS:  Psychiatric:  depression, anxiety  Appetite:decreased, poor secondary to N/V  Sleep: fitful and poor with DIMS (difficulty initiating & maintaining sleep)     Side Effects:  GI disturbance    MENTAL STATUS EXAM:   Sensorium  oriented to time, place and person   Relations cooperative   Appearance:  age appropriate and casually dressed   Motor Behavior:  gait stable and within normal limits   Speech:  soft   Thought Process: goal directed   Thought Content free of delusions and free of hallucinations   Suicidal ideations no intention   Homicidal ideations no intention   Mood:  anxious and depressed   Affect:  anxious and depressed   Memory recent  adequate   Memory remote:  adequate   Concentration:  adequate   Abstraction:  abstract   Insight:  fair and limited   Reliability fair and limited   Judgment:  fair and limited         MEDICAL DECISION MAKING:  Problems addressed today:    ICD-10-CM ICD-9-CM    1. Current severe episode of major depressive disorder without psychotic features [facility]  F32.2 296.23    2. Post-traumatic stress disorder, acute  F43.11 309.81    3. DESTINI (generalized anxiety disorder)  F41.1 300.02        Assessment:   Trang Li is responding to treatment somewhat. Pt still having problems with keeping medications down due to N/V. Discussed options and due to nature of prozac (long acting) and olanzapine (works quickly when able to take it) pt on best med options. Plan:   1. Current Outpatient Medications   Medication Sig Dispense Refill    FLUoxetine (PROzac) 20 mg capsule Take 1 Cap by mouth daily.  Take with 40 mg cap for total daily dose of 60 mg. 30 Cap 2    FLUoxetine (PROzac) 40 mg capsule Take 1 Cap by mouth daily. 30 Cap 2    OLANZapine (ZyPREXA) 10 mg tablet Take 1 Tab by mouth nightly. 30 Tab 2    prazosin (MINIPRESS) 2 mg capsule Take 1 Cap by mouth nightly. Indications: posttraumatic stress syndrome 30 Cap 0    polyethylene glycol (MIRALAX) 17 gram packet Take 1 Packet by mouth daily as needed for Constipation. 90 Each 0    calcium carbonate (Tums) 200 mg calcium (500 mg) chew Take 1 Tab by mouth two (2) times daily as needed for Other (reflux). 60 Tab 1    doxylamine succinate (UNISOM) 25 mg tablet Take 1 Tab by mouth nightly as needed for Insomnia. 30 Tab 2    pantoprazole (PROTONIX) 40 mg tablet Take 1 Tab by mouth Daily (before breakfast). Indications: gastroesophageal reflux disease 30 Tab 2    sucralfate (CARAFATE) 1 gram tablet Take 1 Tab by mouth Before breakfast, lunch, dinner and at bedtime. 120 Tab 1    Lacto. acidophilus-Bif. animalis (Probiotic) 5 billion cell cpSP Take 1 Cap by mouth daily. 30 Cap 1    promethazine (Phenergan) 12.5 mg suppository       ondansetron (ZOFRAN ODT) 4 mg disintegrating tablet Take 1 Tab by mouth every six (6) hours as needed for Nausea or Vomiting. 30 Tab 3    cholecalciferol (Vitamin D3) 25 mcg (1,000 unit) cap Take  by mouth daily.  pyridoxine, vitamin B6, (Vitamin B-6) 100 mg tablet Take 100 mg by mouth daily.  hydrOXYzine HCL (ATARAX) 50 mg tablet Take 1 Tab by mouth four (4) times daily. 90 Tab 0    OTHER 1 Ensure Enlive food supplement drink PO  Each 2    OTHER 1 ensure pudding PO daily for lunch. 90 Each 3    prenatal vit-iron fumarate-fa (PRENATAL PLUS with IRON) 28 mg iron- 800 mcg tab       food supplemt, lactose-reduced (Ensure High Protein) liqd Take 237 mL by mouth three (3) times daily.  90 Can 2    prochlorperazine (COMPAZINE) 10 mg tablet       0.9% sodium chloride solp 1,000 mL with thiamine 100 mg/mL soln 100 mg, folic acid 5 mg/mL soln 1 mg 1 Bag by IntraVENous route every twenty-four (24) hours. 30 Bag 2          medication changes: cont prozac and olanzapine    2. Counseling and coordination of care including instructions for treatment, risks/benefits, risk factor reduction and patient/family education. She agrees with the plan. Patient instructed to call with any side effects, questions or issues. 3.    Follow-up and Dispositions    · Return in about 1 week (around 5/10/2021). PSYCHOTHERAPY:  approx 20 minutes, total face to face time 50-55 minutes  Type:  Supportive, trauma informed, cognitive  Focus:  Current stressors  Lesley Walker is progressing somewhat. Pt dealing with significant stressors with limited support. Worked to reinforce support network and resources.     Lizet Moncada NP  5/3/2021

## 2021-05-03 NOTE — PROGRESS NOTES
Chief Complaint   Patient presents with    Follow Up Appointment     Major depressive     Visit Vitals  /74 (BP 1 Location: Left arm, BP Patient Position: Sitting, BP Cuff Size: Adult)   Pulse 86   Temp 97.7 °F (36.5 °C) (Temporal)   Resp 17   Ht 5' 4\" (1.626 m)   Wt 74.5 kg (164 lb 3.2 oz)   SpO2 98%   BMI 28.18 kg/m²

## 2021-05-12 ENCOUNTER — OFFICE VISIT (OUTPATIENT)
Dept: BEHAVIORAL/MENTAL HEALTH CLINIC | Age: 36
End: 2021-05-12
Payer: MEDICAID

## 2021-05-12 ENCOUNTER — HOSPITAL ENCOUNTER (INPATIENT)
Age: 36
LOS: 40 days | Discharge: HOME | DRG: 542 | End: 2021-06-21
Attending: OBSTETRICS & GYNECOLOGY | Admitting: OBSTETRICS & GYNECOLOGY
Payer: MEDICAID

## 2021-05-12 DIAGNOSIS — F32.2 CURRENT SEVERE EPISODE OF MAJOR DEPRESSIVE DISORDER WITHOUT PSYCHOTIC FEATURES WITHOUT PRIOR EPISODE (HCC): Primary | ICD-10-CM

## 2021-05-12 DIAGNOSIS — F41.1 GAD (GENERALIZED ANXIETY DISORDER): ICD-10-CM

## 2021-05-12 DIAGNOSIS — F43.11 POST-TRAUMATIC STRESS DISORDER, ACUTE: ICD-10-CM

## 2021-05-12 PROBLEM — O23.43 UTI IN PREGNANCY, ANTEPARTUM, THIRD TRIMESTER: Status: ACTIVE | Noted: 2021-05-12

## 2021-05-12 LAB
ALBUMIN SERPL-MCNC: 2.6 G/DL (ref 3.5–5)
ALBUMIN/GLOB SERPL: 0.6 {RATIO} (ref 1.1–2.2)
ALP SERPL-CCNC: 167 U/L (ref 45–117)
ALT SERPL-CCNC: 19 U/L (ref 12–78)
ANION GAP SERPL CALC-SCNC: 9 MMOL/L (ref 5–15)
AST SERPL-CCNC: 18 U/L (ref 15–37)
BASOPHILS # BLD: 0 K/UL (ref 0–0.1)
BASOPHILS NFR BLD: 0 % (ref 0–1)
BILIRUB SERPL-MCNC: 0.3 MG/DL (ref 0.2–1)
BUN SERPL-MCNC: 9 MG/DL (ref 6–20)
BUN/CREAT SERPL: 13 (ref 12–20)
CALCIUM SERPL-MCNC: 8.4 MG/DL (ref 8.5–10.1)
CHLORIDE SERPL-SCNC: 100 MMOL/L (ref 97–108)
CO2 SERPL-SCNC: 26 MMOL/L (ref 21–32)
COVID-19 RAPID TEST, COVR: NOT DETECTED
CREAT SERPL-MCNC: 0.7 MG/DL (ref 0.55–1.02)
CREAT UR-MCNC: 260 MG/DL
DIFFERENTIAL METHOD BLD: ABNORMAL
EOSINOPHIL # BLD: 0.1 K/UL (ref 0–0.4)
EOSINOPHIL NFR BLD: 1 % (ref 0–7)
ERYTHROCYTE [DISTWIDTH] IN BLOOD BY AUTOMATED COUNT: 14.3 % (ref 11.5–14.5)
GLOBULIN SER CALC-MCNC: 4.5 G/DL (ref 2–4)
GLUCOSE SERPL-MCNC: 95 MG/DL (ref 65–100)
HCT VFR BLD AUTO: 29.9 % (ref 35–47)
HGB BLD-MCNC: 9.3 G/DL (ref 11.5–16)
IMM GRANULOCYTES # BLD AUTO: 0 K/UL (ref 0–0.04)
IMM GRANULOCYTES NFR BLD AUTO: 0 % (ref 0–0.5)
LYMPHOCYTES # BLD: 2.1 K/UL (ref 0.8–3.5)
LYMPHOCYTES NFR BLD: 28 % (ref 12–49)
MCH RBC QN AUTO: 23 PG (ref 26–34)
MCHC RBC AUTO-ENTMCNC: 31.1 G/DL (ref 30–36.5)
MCV RBC AUTO: 74 FL (ref 80–99)
MONOCYTES # BLD: 0.7 K/UL (ref 0–1)
MONOCYTES NFR BLD: 9 % (ref 5–13)
NEUTS SEG # BLD: 4.6 K/UL (ref 1.8–8)
NEUTS SEG NFR BLD: 62 % (ref 32–75)
NRBC # BLD: 0 K/UL (ref 0–0.01)
NRBC BLD-RTO: 0 PER 100 WBC
PHOSPHATE SERPL-MCNC: 3.3 MG/DL (ref 2.6–4.7)
PLATELET # BLD AUTO: 245 K/UL (ref 150–400)
PMV BLD AUTO: 11.6 FL (ref 8.9–12.9)
POTASSIUM SERPL-SCNC: 2.8 MMOL/L (ref 3.5–5.1)
PROT SERPL-MCNC: 7.1 G/DL (ref 6.4–8.2)
PROT UR-MCNC: 29 MG/DL (ref 0–11.9)
PROT/CREAT UR-RTO: 0.1
RBC # BLD AUTO: 4.04 M/UL (ref 3.8–5.2)
SARS-COV-2, COV2: NORMAL
SODIUM SERPL-SCNC: 135 MMOL/L (ref 136–145)
SOURCE, COVRS: NORMAL
THERAPEUTIC MAGNESI,THMG: 2.1 MG/DL (ref 4.8–8.4)
WBC # BLD AUTO: 7.5 K/UL (ref 3.6–11)

## 2021-05-12 PROCEDURE — 0UVC7ZZ RESTRICTION OF CERVIX, VIA NATURAL OR ARTIFICIAL OPENING: ICD-10-PCS | Performed by: OBSTETRICS & GYNECOLOGY

## 2021-05-12 PROCEDURE — 96366 THER/PROPH/DIAG IV INF ADDON: CPT

## 2021-05-12 PROCEDURE — 36415 COLL VENOUS BLD VENIPUNCTURE: CPT

## 2021-05-12 PROCEDURE — 74011000258 HC RX REV CODE- 258: Performed by: OBSTETRICS & GYNECOLOGY

## 2021-05-12 PROCEDURE — 96365 THER/PROPH/DIAG IV INF INIT: CPT

## 2021-05-12 PROCEDURE — 84156 ASSAY OF PROTEIN URINE: CPT

## 2021-05-12 PROCEDURE — 96360 HYDRATION IV INFUSION INIT: CPT

## 2021-05-12 PROCEDURE — 87635 SARS-COV-2 COVID-19 AMP PRB: CPT

## 2021-05-12 PROCEDURE — 74011250637 HC RX REV CODE- 250/637: Performed by: OBSTETRICS & GYNECOLOGY

## 2021-05-12 PROCEDURE — 74011250636 HC RX REV CODE- 250/636: Performed by: OBSTETRICS & GYNECOLOGY

## 2021-05-12 PROCEDURE — 83735 ASSAY OF MAGNESIUM: CPT

## 2021-05-12 PROCEDURE — 65410000002 HC RM PRIVATE OB

## 2021-05-12 PROCEDURE — 85025 COMPLETE CBC W/AUTO DIFF WBC: CPT

## 2021-05-12 PROCEDURE — 80053 COMPREHEN METABOLIC PANEL: CPT

## 2021-05-12 PROCEDURE — 99285 EMERGENCY DEPT VISIT HI MDM: CPT

## 2021-05-12 PROCEDURE — 84100 ASSAY OF PHOSPHORUS: CPT

## 2021-05-12 PROCEDURE — 99214 OFFICE O/P EST MOD 30 MIN: CPT | Performed by: NURSE PRACTITIONER

## 2021-05-12 RX ORDER — FAMOTIDINE 10 MG/ML
20 INJECTION INTRAVENOUS EVERY 12 HOURS
Status: DISCONTINUED | OUTPATIENT
Start: 2021-05-12 | End: 2021-05-15

## 2021-05-12 RX ORDER — ACETAMINOPHEN 650 MG/1
650 SUPPOSITORY RECTAL
Status: DISCONTINUED | OUTPATIENT
Start: 2021-05-12 | End: 2021-06-21 | Stop reason: HOSPADM

## 2021-05-12 RX ORDER — SODIUM CHLORIDE, SODIUM LACTATE, POTASSIUM CHLORIDE, CALCIUM CHLORIDE 600; 310; 30; 20 MG/100ML; MG/100ML; MG/100ML; MG/100ML
125 INJECTION, SOLUTION INTRAVENOUS CONTINUOUS
Status: DISCONTINUED | OUTPATIENT
Start: 2021-05-12 | End: 2021-05-13

## 2021-05-12 RX ORDER — FLUOXETINE HYDROCHLORIDE 20 MG/1
60 CAPSULE ORAL DAILY
Status: DISCONTINUED | OUTPATIENT
Start: 2021-05-13 | End: 2021-06-21 | Stop reason: HOSPADM

## 2021-05-12 RX ORDER — DIPHENHYDRAMINE HYDROCHLORIDE 50 MG/ML
25 INJECTION, SOLUTION INTRAMUSCULAR; INTRAVENOUS
Status: DISCONTINUED | OUTPATIENT
Start: 2021-05-12 | End: 2021-06-21 | Stop reason: HOSPADM

## 2021-05-12 RX ORDER — POTASSIUM CHLORIDE 750 MG/1
40 TABLET, FILM COATED, EXTENDED RELEASE ORAL
Status: COMPLETED | OUTPATIENT
Start: 2021-05-12 | End: 2021-05-12

## 2021-05-12 RX ORDER — OLANZAPINE 5 MG/1
10 TABLET ORAL
Status: DISCONTINUED | OUTPATIENT
Start: 2021-05-12 | End: 2021-06-21 | Stop reason: HOSPADM

## 2021-05-12 RX ORDER — PRAZOSIN HYDROCHLORIDE 1 MG/1
2 CAPSULE ORAL
Status: DISCONTINUED | OUTPATIENT
Start: 2021-05-12 | End: 2021-06-21 | Stop reason: HOSPADM

## 2021-05-12 RX ORDER — PROMETHAZINE HYDROCHLORIDE 25 MG/1
25 SUPPOSITORY RECTAL
Status: DISCONTINUED | OUTPATIENT
Start: 2021-05-12 | End: 2021-06-21 | Stop reason: HOSPADM

## 2021-05-12 RX ORDER — ONDANSETRON 2 MG/ML
8 INJECTION INTRAMUSCULAR; INTRAVENOUS
Status: DISCONTINUED | OUTPATIENT
Start: 2021-05-12 | End: 2021-06-21 | Stop reason: HOSPADM

## 2021-05-12 RX ORDER — HYDROXYZINE 50 MG/1
50 TABLET, FILM COATED ORAL
Status: DISCONTINUED | OUTPATIENT
Start: 2021-05-12 | End: 2021-06-21 | Stop reason: HOSPADM

## 2021-05-12 RX ADMIN — FAMOTIDINE 20 MG: 10 INJECTION, SOLUTION INTRAVENOUS at 21:29

## 2021-05-12 RX ADMIN — SODIUM CHLORIDE, POTASSIUM CHLORIDE, SODIUM LACTATE AND CALCIUM CHLORIDE 1000 ML: 600; 310; 30; 20 INJECTION, SOLUTION INTRAVENOUS at 20:00

## 2021-05-12 RX ADMIN — POTASSIUM CHLORIDE 40 MEQ: 750 TABLET, FILM COATED, EXTENDED RELEASE ORAL at 21:36

## 2021-05-12 RX ADMIN — SODIUM CHLORIDE, POTASSIUM CHLORIDE, SODIUM LACTATE AND CALCIUM CHLORIDE 125 ML/HR: 600; 310; 30; 20 INJECTION, SOLUTION INTRAVENOUS at 19:44

## 2021-05-12 RX ADMIN — ONDANSETRON 8 MG: 2 INJECTION INTRAMUSCULAR; INTRAVENOUS at 22:49

## 2021-05-12 RX ADMIN — CEFTRIAXONE SODIUM 1 G: 1 INJECTION, POWDER, FOR SOLUTION INTRAMUSCULAR; INTRAVENOUS at 20:04

## 2021-05-12 RX ADMIN — OLANZAPINE 10 MG: 5 TABLET, FILM COATED ORAL at 21:29

## 2021-05-12 NOTE — PROGRESS NOTES
1816: Pt arrived to L&D rm 2 for IV antibiotics, labs, and monitoring. 1930: Bedside and Verbal shift change report given to ZENA Lim RN (oncoming nurse) by Maren Fowler. Devan Philippe RN (offgoing nurse). Report included the following information SBAR, Intake/Output, MAR and Recent Results.

## 2021-05-12 NOTE — PROGRESS NOTES
1930-   Bedside shift change report given to Simran Uriarte RN (oncoming nurse) by Ernst Main RN (offgoing nurse). Report included the following information SBAR, MAR and Accordion. 2028- TRANSFER - OUT REPORT:    Verbal report given to JAMAL Lerner RN(name) on Jesse Sherman  being transferred to [employer]) for routine progression of care       Report consisted of patients Situation, Background, Assessment and   Recommendations(SBAR). Information from the following report(s) SBAR, MAR, Accordion and Recent Results was reviewed with the receiving nurse. Lines:   Peripheral IV 05/12/21 Right Wrist (Active)        Opportunity for questions and clarification was provided.       Patient transported with:   Registered Nurse

## 2021-05-12 NOTE — PROGRESS NOTES
CHIEF COMPLAINT:  Lowell Christina is a 38 y.o. female and was seen today for follow-up of psychiatric condition and psychotropic medication management. HPI:    Lissette Prince reports the following psychiatric symptoms by hx:  depression and anxiety. Overall symptoms have been present for months. Currently symptoms are moderate/high severity. Pt states she has had some days with less severe symptoms but overall symptoms do remain high severity secondary to current stressors. The symptoms occur daily. Pt reports medications are of limited benefit as she continues with problems with eating. Met with pt to review current treatment plan. FAMILY/SOCIAL HX: psychosocial stressors    REVIEW OF SYSTEMS:  Psychiatric:  depression, anxiety  Appetite:decreased   Sleep: poor with DIMS (difficulty initiating & maintaining sleep)   Neuro: no changes    Visit Vitals  LMP 09/27/2020 (Exact Date)       Side Effects:  none    MENTAL STATUS EXAM:   Sensorium  oriented to time, place and person   Relations cooperative   Appearance:  age appropriate and casually dressed   Motor Behavior:  gait stable and within normal limits   Speech:  normal volume   Thought Process: goal directed   Thought Content free of delusions and free of hallucinations   Suicidal ideations no intention   Homicidal ideations no intention   Mood:  anxious and depressed   Affect:  anxious and depressed   Memory recent  adequate   Memory remote:  adequate   Concentration:  adequate   Abstraction:  abstract   Insight:  fair   Reliability fair   Judgment:  fair     MEDICAL DECISION MAKING:  Problems addressed today:    ICD-10-CM ICD-9-CM    1. Current severe episode of major depressive disorder without psychotic features without prior episode ([facility]  F32.2 296.23    2. Post-traumatic stress disorder, acute  F43.11 309.81    3. DESTINI (generalized anxiety disorder)  F41.1 300.02        Assessment:   Lissette Prince is not fully responding to treatment.  She is invested in appointments and is interactive but she continues to have problems with N/V which makes consistently taking medications problematic. Symptoms are still exacerbated and made worse by significant stressors. Reviewed current medications and dosages. No changes required. Reviewed treatment goals and target symptoms to monitor for. Discussed healthy cognitions r/t self care and decision making. Plan:   1. Current Outpatient Medications   Medication Sig Dispense Refill    FLUoxetine (PROzac) 20 mg capsule Take 1 Cap by mouth daily. Take with 40 mg cap for total daily dose of 60 mg. 30 Cap 2    FLUoxetine (PROzac) 40 mg capsule Take 1 Cap by mouth daily. 30 Cap 2    OLANZapine (ZyPREXA) 10 mg tablet Take 1 Tab by mouth nightly. 30 Tab 2    prazosin (MINIPRESS) 2 mg capsule Take 1 Cap by mouth nightly. Indications: posttraumatic stress syndrome 30 Cap 0    polyethylene glycol (MIRALAX) 17 gram packet Take 1 Packet by mouth daily as needed for Constipation. 90 Each 0    calcium carbonate (Tums) 200 mg calcium (500 mg) chew Take 1 Tab by mouth two (2) times daily as needed for Other (reflux). 60 Tab 1    doxylamine succinate (UNISOM) 25 mg tablet Take 1 Tab by mouth nightly as needed for Insomnia. 30 Tab 2    pantoprazole (PROTONIX) 40 mg tablet Take 1 Tab by mouth Daily (before breakfast). Indications: gastroesophageal reflux disease 30 Tab 2    sucralfate (CARAFATE) 1 gram tablet Take 1 Tab by mouth Before breakfast, lunch, dinner and at bedtime. 120 Tab 1    Lacto. acidophilus-Bif. animalis (Probiotic) 5 billion cell cpSP Take 1 Cap by mouth daily. 30 Cap 1    prochlorperazine (COMPAZINE) 10 mg tablet       promethazine (Phenergan) 12.5 mg suppository       ondansetron (ZOFRAN ODT) 4 mg disintegrating tablet Take 1 Tab by mouth every six (6) hours as needed for Nausea or Vomiting.  30 Tab 3    0.9% sodium chloride solp 1,000 mL with thiamine 100 mg/mL soln 319 mg, folic acid 5 mg/mL soln 1 mg 1 Bag by IntraVENous route every twenty-four (24) hours. 30 Bag 2    cholecalciferol (Vitamin D3) 25 mcg (1,000 unit) cap Take  by mouth daily.  pyridoxine, vitamin B6, (Vitamin B-6) 100 mg tablet Take 100 mg by mouth daily.  hydrOXYzine HCL (ATARAX) 50 mg tablet Take 1 Tab by mouth four (4) times daily. 90 Tab 0    OTHER 1 Ensure Enlive food supplement drink PO  Each 2    OTHER 1 ensure pudding PO daily for lunch. 90 Each 3    prenatal vit-iron fumarate-fa (PRENATAL PLUS with IRON) 28 mg iron- 800 mcg tab       food supplemt, lactose-reduced (Ensure High Protein) liqd Take 237 mL by mouth three (3) times daily. 90 Can 2          medication changes made today: no changes, cont prozac and olanzapine    2. Counseling and coordination of care including instructions for treatment, risks/benefits, risk factor reduction and patient/family education. She agrees with the plan. Patient instructed to call with any side effects, questions or issues. 3.    Follow-up and Dispositions    · Return in about 1 week (around 5/19/2021).          5/12/2021  Valeriy Coon NP

## 2021-05-13 ENCOUNTER — VIRTUAL VISIT (OUTPATIENT)
Dept: BEHAVIORAL/MENTAL HEALTH CLINIC | Age: 36
End: 2021-05-13
Payer: MEDICAID

## 2021-05-13 ENCOUNTER — TELEPHONE (OUTPATIENT)
Dept: BEHAVIORAL/MENTAL HEALTH CLINIC | Age: 36
End: 2021-05-13

## 2021-05-13 DIAGNOSIS — F41.1 GAD (GENERALIZED ANXIETY DISORDER): ICD-10-CM

## 2021-05-13 DIAGNOSIS — F32.2 CURRENT SEVERE EPISODE OF MAJOR DEPRESSIVE DISORDER WITHOUT PSYCHOTIC FEATURES WITHOUT PRIOR EPISODE (HCC): Primary | ICD-10-CM

## 2021-05-13 DIAGNOSIS — F43.11 POST-TRAUMATIC STRESS DISORDER, ACUTE: ICD-10-CM

## 2021-05-13 PROCEDURE — 59025 FETAL NON-STRESS TEST: CPT

## 2021-05-13 PROCEDURE — 74011250636 HC RX REV CODE- 250/636: Performed by: OBSTETRICS & GYNECOLOGY

## 2021-05-13 PROCEDURE — 65410000002 HC RM PRIVATE OB

## 2021-05-13 PROCEDURE — 99213 OFFICE O/P EST LOW 20 MIN: CPT | Performed by: NURSE PRACTITIONER

## 2021-05-13 PROCEDURE — 74011250637 HC RX REV CODE- 250/637: Performed by: OBSTETRICS & GYNECOLOGY

## 2021-05-13 PROCEDURE — 74011000258 HC RX REV CODE- 258: Performed by: OBSTETRICS & GYNECOLOGY

## 2021-05-13 RX ORDER — SODIUM CHLORIDE AND POTASSIUM CHLORIDE .9; .15 G/100ML; G/100ML
SOLUTION INTRAVENOUS CONTINUOUS
Status: DISCONTINUED | OUTPATIENT
Start: 2021-05-13 | End: 2021-05-16 | Stop reason: ALTCHOICE

## 2021-05-13 RX ADMIN — FAMOTIDINE 20 MG: 10 INJECTION, SOLUTION INTRAVENOUS at 09:24

## 2021-05-13 RX ADMIN — POTASSIUM CHLORIDE AND SODIUM CHLORIDE: 900; 150 INJECTION, SOLUTION INTRAVENOUS at 20:44

## 2021-05-13 RX ADMIN — FLUOXETINE 60 MG: 20 CAPSULE ORAL at 09:24

## 2021-05-13 RX ADMIN — FAMOTIDINE 20 MG: 10 INJECTION, SOLUTION INTRAVENOUS at 20:42

## 2021-05-13 RX ADMIN — POTASSIUM CHLORIDE AND SODIUM CHLORIDE: 900; 150 INJECTION, SOLUTION INTRAVENOUS at 09:28

## 2021-05-13 RX ADMIN — OLANZAPINE 10 MG: 5 TABLET, FILM COATED ORAL at 20:50

## 2021-05-13 RX ADMIN — ONDANSETRON 8 MG: 2 INJECTION INTRAMUSCULAR; INTRAVENOUS at 20:44

## 2021-05-13 RX ADMIN — CEFTRIAXONE SODIUM 1 G: 1 INJECTION, POWDER, FOR SOLUTION INTRAMUSCULAR; INTRAVENOUS at 20:30

## 2021-05-13 RX ADMIN — ONDANSETRON 8 MG: 2 INJECTION INTRAMUSCULAR; INTRAVENOUS at 05:10

## 2021-05-13 NOTE — TELEPHONE ENCOUNTER
Pt calls   Asking for a phone call from [facility] today. States [facility] said you all would talk today. I did tell pt that provider had clinic all day until 4pm but I would make sure [employer] got the message.

## 2021-05-13 NOTE — PROGRESS NOTES
Bedside and Verbal shift change report given to SOPHY Spears RN (oncoming nurse) by Ladarius Lim RN (offgoing nurse). Report included the following information SBAR and Kardex.

## 2021-05-13 NOTE — PROGRESS NOTES
Transitions of Care:  CRM attempted to visit patient and complete assessment. Patient declined and stated she did not want to talk at this time and complete the one stop assessment. Patient was recently on this unit within the last thirty days.

## 2021-05-13 NOTE — PROGRESS NOTES
CHIEF COMPLAINT:  Omar Felix is a 38 y.o. female and was seen today for follow-up of psychiatric condition and psychotropic medication management. HPI:    Segundo Mann reports the following psychiatric symptoms by hx:  depression and anxiety. Overall symptoms have been present for months. Currently both are of moderate/high to high severity. The symptoms occur more frequently and more severely. Pt reports she did agree to inpatient hospitalization for nutrition management. Pt states she has been feeling anxious and is not sure what to expect. Pt reports medications are of limited benefit as she was not consistently able to keep them down. Met with pt via audio telehealth for appt today. FAMILY/SOCIAL HX: inpatient admission    REVIEW OF SYSTEMS:  Psychiatric:  depression, anxiety  Appetite:decreased   Sleep: poor with DIMS (difficulty initiating & maintaining sleep)   Neuro: no updates    Visit Vitals  LMP 09/27/2020 (Exact Date)       Side Effects:  none    MENTAL STATUS EXAM:   Sensorium  oriented to time, place and person   Relations cooperative   Appearance:  Not assessed   Motor Behavior:  not assessed   Speech:  monotone and soft   Thought Process: goal directed   Thought Content free of delusions and free of hallucinations   Suicidal ideations no intention   Homicidal ideations no intention   Mood:  anxious and depressed   Affect:  anxious and depressed   Memory recent  adequate   Memory remote:  adequate   Concentration:  adequate   Abstraction:  abstract   Insight:  fair   Reliability fair   Judgment:  fair     MEDICAL DECISION MAKING:  Problems addressed today:    ICD-10-CM ICD-9-CM    1. Current severe episode of major depressive disorder without psychotic features without [facility]  F32.2 296.23    2. Post-traumatic stress disorder, acute  F43.11 309.81    3. DESTINI (generalized anxiety disorder)  F41.1 300.02        Assessment:   Segundo Mann is not responding to treatment at this time. Symptoms are exacerbated and mod/high to high severity. Have been working with pt to establish therapeutic relationship so we can work with her OB on a plan for increased support. Pt cautious and has had a hard establishing a trusting relationship due to numerous stressors she has endured. Discussed strategies to build healthy communication. Informed pt that we are working to identify resources for more intensive treatment. Reviewed current medications and dosages. No changes required. Reviewed treatment goals and target symptoms to monitor for. Plan:   1.     Facility-Administered Medications Ordered in Other Visits   Medication Dose Route Frequency Provider Last Rate Last Admin    0.9% sodium chloride with KCl 20 mEq/L infusion   IntraVENous CONTINUOUS Tilda Silk,  mL/hr at 05/13/21 0928 New Bag at 05/13/21 0928    cefTRIAXone (ROCEPHIN) 1 g in 0.9% sodium chloride (MBP/ADV) 50 mL MBP  1 g IntraVENous Q24H Tilda Silk,  mL/hr at 05/12/21 2004 1 g at 05/12/21 2004    acetaminophen (TYLENOL) suppository 650 mg  650 mg Rectal Q6H PRN Tilda Silk, DO        diphenhydrAMINE (BENADRYL) injection 25 mg  25 mg IntraVENous Q6H PRN Tilda Silk, DO        OLANZapine (ZyPREXA) tablet 10 mg  10 mg Oral QHS Tilda Silk, DO   10 mg at 05/12/21 2129    FLUoxetine (PROzac) capsule 60 mg  60 mg Oral DAILY Tilda Silk, DO   60 mg at 05/13/21 4970    prazosin (MINIPRESS) capsule 2 mg  2 mg Oral QHS PRN Tilda Silk, DO        [facility] injection 8 mg  8 mg IntraVENous Q6H PRN Tilda Silk, DO   8 mg at 05/13/21 0510    promethazine (PHENERGAN) suppository 25 mg  25 mg Rectal Q6H PRN Tilda Silk, DO        hydrOXYzine HCL (ATARAX) tablet 50 mg  50 mg Oral TID PRN Tilda Silk, DO        famotidine (PF) (PEPCID) injection 20 mg  20 mg IntraVENous Q12H Tilda Silk, DO   20 mg at 05/13/21 3164          medication changes made today: cont prozac, prazosin, and olanzapine    2. Counseling and coordination of care including instructions for treatment, risks/benefits, risk factor reduction and patient/family education. She agrees with the plan. Patient instructed to call with any side effects, questions or issues. 3.    Follow-up and Dispositions    · Return in about 1 week (around 5/20/2021). Lowell Christina is a 38 y.o. female, evaluated via audio-only technology on 5/13/2021 for Medication Management  Abdulaziz, who was evaluated through a patient-initiated, synchronous (real-time) audio only encounter, and/or her healthcare decision maker, is aware that it is a billable service, with coverage as determined by her insurance carrier. She provided verbal consent to proceed: Yes. She has not had a related appointment within my department in the past 7 days or scheduled within the next 24 hours.       Total Time: minutes: 11-20 minutes         5/13/2021  Garret Joyner NP

## 2021-05-13 NOTE — H&P
High Risk Obstetrics Progress Note    Name: Yuki Lopez MRN: [id number]  SSN: [id number]    YOB: 1985  Age: 38 y.o. Sex: female      Subjective:      LOS: 0 days    Estimated Date of Delivery: 21   Gestational Age Today: 32w3d     Patient admitted for UTI and failed outpatient medical therapy due to chronic eating disorder. States she does have moderate contractions, severe nausea/vomiting and normal fetal movement and does not have chest pain, shortness of breath, vaginal bleeding  and vaginal leaking of fluid . Patient reports some right flank pain. Objective:     Vitals:  Blood pressure 110/74, pulse 82, temperature 98.2 °F (36.8 °C), resp. rate 14, height 5' 4\" (1.626 m), weight 73.9 kg (163 lb), last menstrual period 2020, not currently breastfeeding. Temp (24hrs), Av.2 °F (36.8 °C), Min:98.2 °F (36.8 °C), Max:98.2 °F (30.4 °C)    Systolic (87DZN), QGA:357 , Min:110 , RXD:081      Diastolic (51UIW), RFD:36, Min:74, Max:74       Intake and Output:         Physical Exam:  Patient without distress.   Heart: Regular rate and rhythm  Lung: clear to auscultation throughout lung fields, no wheezes, no rales, no rhonchi and normal respiratory effort  Abdomen: soft, nontender, gravid  Lower Extremities:  - Edema No       Membranes:  Intact    Uterine Activity:  Rare contraction with irritability    Fetal Heart Rate:  Reactive  Baseline: 110-120s per minute  Variability: moderate  Accelerations: yes  Decelerations: none        Labs:   Recent Results (from the past 36 hour(s))   CBC WITH AUTOMATED DIFF    Collection Time: 21  6:30 PM   Result Value Ref Range    WBC 7.5 3.6 - 11.0 K/uL    RBC 4.04 3.80 - 5.20 M/uL    HGB 9.3 (L) 11.5 - 16.0 g/dL    HCT 29.9 (L) 35.0 - 47.0 %    MCV 74.0 (L) 80.0 - 99.0 FL    MCH 23.0 (L) 26.0 - 34.0 PG    MCHC 31.1 30.0 - 36.5 g/dL    RDW 14.3 11.5 - 14.5 %    PLATELET 945 929 - 010 K/uL    MPV 11.6 8.9 - 12.9 FL    NRBC 0.0 0  WBC ABSOLUTE NRBC 0.00 0.00 - 0.01 K/uL    NEUTROPHILS 62 32 - 75 %    LYMPHOCYTES 28 12 - 49 %    MONOCYTES 9 5 - 13 %    EOSINOPHILS 1 0 - 7 %    BASOPHILS 0 0 - 1 %    IMMATURE GRANULOCYTES 0 0.0 - 0.5 %    ABS. NEUTROPHILS 4.6 1.8 - 8.0 K/UL    ABS. LYMPHOCYTES 2.1 0.8 - 3.5 K/UL    ABS. MONOCYTES 0.7 0.0 - 1.0 K/UL    ABS. EOSINOPHILS 0.1 0.0 - 0.4 K/UL    ABS. BASOPHILS 0.0 0.0 - 0.1 K/UL    ABS. IMM. GRANS. 0.0 0.00 - 0.04 K/UL    DF AUTOMATED     MAGNESIUM, THERAPEUTIC    Collection Time: 05/12/21  6:30 PM   Result Value Ref Range    Therapeutic magnesium 2.1 (L) 4.8 - 8.4 MG/DL   PHOSPHORUS    Collection Time: 05/12/21  6:30 PM   Result Value Ref Range    Phosphorus 3.3 2.6 - 4.7 MG/DL   METABOLIC PANEL, COMPREHENSIVE    Collection Time: 05/12/21  6:30 PM   Result Value Ref Range    Sodium 135 (L) 136 - 145 mmol/L    Potassium 2.8 (L) 3.5 - 5.1 mmol/L    Chloride 100 97 - 108 mmol/L    CO2 26 21 - 32 mmol/L    Anion gap 9 5 - 15 mmol/L    Glucose 95 65 - 100 mg/dL    BUN 9 6 - 20 MG/DL    Creatinine 0.70 0.55 - 1.02 MG/DL    BUN/Creatinine ratio 13 12 - 20      GFR est AA >60 >60 ml/min/1.73m2    GFR est non-AA >60 >60 ml/min/1.73m2    Calcium 8.4 (L) 8.5 - 10.1 MG/DL    Bilirubin, total 0.3 0.2 - 1.0 MG/DL    ALT (SGPT) 19 12 - 78 U/L    AST (SGOT) 18 15 - 37 U/L    Alk. phosphatase 167 (H) 45 - 117 U/L    Protein, total 7.1 6.4 - 8.2 g/dL    Albumin 2.6 (L) 3.5 - 5.0 g/dL    Globulin 4.5 (H) 2.0 - 4.0 g/dL    A-G Ratio 0.6 (L) 1.1 - 2.2     SARS-COV-2    Collection Time: 05/12/21  7:42 PM   Result Value Ref Range    SARS-CoV-2 Please find results under separate order     COVID-19 RAPID TEST    Collection Time: 05/12/21  7:42 PM   Result Value Ref Range    Specimen source Nasopharyngeal      COVID-19 rapid test Not detected NOTD         Assessment and Plan:       Active Problems:    Pregnant (3/1/2018)      Eating disorder affecting pregnancy, antepartum (12/2/2020)      UTI in pregnancy, antepartum, third trimester (2021)       37 y/o [id number] at 32 3/7 weeks admitted for UTI s/p failed outpatient medical therapy due to chronic eating disorder    Urine culture 21 positive for Klebsiella and EColi   -normal WBC's  -Ceftriaxone 1g q24h  -repeat urine culture in 7 days    Chronic eating disorder  -ivf's  -pt has phone appointment with Tarry Needs tomorrow at 10am for intake for possible partial hospitalization program   -will discuss with Diego after this intake appt re: management plans-if pt stays inpatient, may need PICC line and TPN if she is in agreement    Hypokalemia  -replete in iv    Cervical incompetence-cerclage in place    H/o prior  with successful     Severe depression/anxiety with h/o suicidal ideation  -followed closely by Thanh Mcfarlane  -continue zyprexa and prozac as well as prn prazosin and hydroxyzine    GERD  -famotidine 20mg bid    Ambulate  Daily NST    MFM scan on 5/3/21-59%tile and Vertex  -next appt 21    Anemia-hemoglobin at baseline; asymptomatic  -known beta thal minor  -consider Iv iron therapy if drops    CHTN-stable normal bp's off meds    DVT PPx-ambulation and mirtha hose.

## 2021-05-13 NOTE — PROGRESS NOTES
High Risk Obstetrics Progress Note    Name: Chilo Morales MRN: [id number]  SSN: [id number]    YOB: 1985  Age: 38 y.o. Sex: female      Subjective:      LOS: 1 day    Estimated Date of Delivery: 21   Gestational Age Today: 28w1d     Patient admitted for UTI s/p failed outpatient therapy. States she does have mild contractions, moderate nausea/vomiting and normal fetal movement and does not have chest pain, shortness of breath, vaginal bleeding  and vaginal leaking of fluid . Objective:     Vitals:  Blood pressure 102/60, pulse 60, temperature 97.7 °F (36.5 °C), resp. rate 17, height 5' 4\" (1.626 m), weight 73.9 kg (163 lb), last menstrual period 2020, SpO2 99 %, not currently breastfeeding. Temp (24hrs), Av °F (36.7 °C), Min:97.7 °F (36.5 °C), Max:98.2 °F (36.6 °C)    Systolic (07CIO), GDQ:362 , Min:102 , ANJ:880      Diastolic (86BFQ), NXV:68, Min:60, Max:74       Intake and Output:         Physical Exam:  Patient without distress. Heart: Regular rate and rhythm  Lung: clear to auscultation throughout lung fields, no wheezes, no rales, no rhonchi and normal respiratory effort  Abdomen: soft, nontender, gravid  Lower Extremities:  - Edema No       Membranes:  Intact    NST pending today       Labs:   Recent Results (from the past 36 hour(s))   CBC WITH AUTOMATED DIFF    Collection Time: 21  6:30 PM   Result Value Ref Range    WBC 7.5 3.6 - 11.0 K/uL    RBC 4.04 3.80 - 5.20 M/uL    HGB 9.3 (L) 11.5 - 16.0 g/dL    HCT 29.9 (L) 35.0 - 47.0 %    MCV 74.0 (L) 80.0 - 99.0 FL    MCH 23.0 (L) 26.0 - 34.0 PG    MCHC 31.1 30.0 - 36.5 g/dL    RDW 14.3 11.5 - 14.5 %    PLATELET 353 002 - 020 K/uL    MPV 11.6 8.9 - 12.9 FL    NRBC 0.0 0  WBC    ABSOLUTE NRBC 0.00 0.00 - 0.01 K/uL    NEUTROPHILS 62 32 - 75 %    LYMPHOCYTES 28 12 - 49 %    MONOCYTES 9 5 - 13 %    EOSINOPHILS 1 0 - 7 %    BASOPHILS 0 0 - 1 %    IMMATURE GRANULOCYTES 0 0.0 - 0.5 %    ABS.  NEUTROPHILS 4.6 1.8 - 8.0 K/UL    ABS. LYMPHOCYTES 2.1 0.8 - 3.5 K/UL    ABS. MONOCYTES 0.7 0.0 - 1.0 K/UL    ABS. EOSINOPHILS 0.1 0.0 - 0.4 K/UL    ABS. BASOPHILS 0.0 0.0 - 0.1 K/UL    ABS. IMM. GRANS. 0.0 0.00 - 0.04 K/UL    DF AUTOMATED     MAGNESIUM, THERAPEUTIC    Collection Time: 05/12/21  6:30 PM   Result Value Ref Range    Therapeutic magnesium 2.1 (L) 4.8 - 8.4 MG/DL   PHOSPHORUS    Collection Time: 05/12/21  6:30 PM   Result Value Ref Range    Phosphorus 3.3 2.6 - 4.7 MG/DL   METABOLIC PANEL, COMPREHENSIVE    Collection Time: 05/12/21  6:30 PM   Result Value Ref Range    Sodium 135 (L) 136 - 145 mmol/L    Potassium 2.8 (L) 3.5 - 5.1 mmol/L    Chloride 100 97 - 108 mmol/L    CO2 26 21 - 32 mmol/L    Anion gap 9 5 - 15 mmol/L    Glucose 95 65 - 100 mg/dL    BUN 9 6 - 20 MG/DL    Creatinine 0.70 0.55 - 1.02 MG/DL    BUN/Creatinine ratio 13 12 - 20      GFR est AA >60 >60 ml/min/1.73m2    GFR est non-AA >60 >60 ml/min/1.73m2    Calcium 8.4 (L) 8.5 - 10.1 MG/DL    Bilirubin, total 0.3 0.2 - 1.0 MG/DL    ALT (SGPT) 19 12 - 78 U/L    AST (SGOT) 18 15 - 37 U/L    Alk. phosphatase 167 (H) 45 - 117 U/L    Protein, total 7.1 6.4 - 8.2 g/dL    Albumin 2.6 (L) 3.5 - 5.0 g/dL    Globulin 4.5 (H) 2.0 - 4.0 g/dL    A-G Ratio 0.6 (L) 1.1 - 2.2     SARS-COV-2    Collection Time: 05/12/21  7:42 PM   Result Value Ref Range    SARS-CoV-2 Please find results under separate order     COVID-19 RAPID TEST    Collection Time: 05/12/21  7:42 PM   Result Value Ref Range    Specimen source Nasopharyngeal      COVID-19 rapid test Not detected NOTD     PROTEIN/CREATININE RATIO, URINE    Collection Time: 05/12/21  8:07 PM   Result Value Ref Range    Protein, urine random 29 (H) 0.0 - 11.9 mg/dL    Creatinine, urine 260.00 mg/dL    Protein/Creat. urine Ratio 0.1         Assessment and Plan:       Active Problems:    Pregnant (3/1/2018)      Eating disorder affecting pregnancy, antepartum (12/2/2020)      UTI in pregnancy, antepartum, third trimester (2021)       39 y/o [id number] at 32 4/7 weeks admitted for UTI s/p failed outpatient medical therapy due to chronic eating disorder     Urine culture 21 positive for Klebsiella and EColi   -normal WBC's  -Ceftriaxone 1g q24h  -repeat urine culture in 7 days     Chronic eating disorder  -ivf's  -pt has phone appointment with Joanne Solano today at 10am for intake for possible partial hospitalization program   -will discuss with Diego after this intake appt re: management plans-if pt stays inpatient, may need PICC line and TPN if she is in agreement-pt declines TPN at this time     Hypokalemia  -replete in iv and s/p oral repletion yesterday  -repeat potassium tomorrow     Cervical incompetence-cerclage in place     H/o prior  with successful      Severe depression/anxiety with h/o suicidal ideation  -followed closely by Braydon Mitchell  -continue zyprexa and prozac as well as prn prazosin and hydroxyzine     GERD  -famotidine 20mg bid     Ambulate  Daily NST     MFM scan on 5/3/21-59%tile and Vertex  -next appt 21     Anemia-hemoglobin at baseline; asymptomatic  -known beta thal minor  -consider Iv iron therapy if drops     CHTN-stable normal bp's off meds     DVT PPx-ambulation and mirtha hose

## 2021-05-14 ENCOUNTER — TELEPHONE (OUTPATIENT)
Dept: BEHAVIORAL/MENTAL HEALTH CLINIC | Age: 36
End: 2021-05-14

## 2021-05-14 LAB
ANION GAP SERPL CALC-SCNC: 5 MMOL/L (ref 5–15)
BUN SERPL-MCNC: 6 MG/DL (ref 6–20)
BUN/CREAT SERPL: 9 (ref 12–20)
CALCIUM SERPL-MCNC: 7.4 MG/DL (ref 8.5–10.1)
CHLORIDE SERPL-SCNC: 109 MMOL/L (ref 97–108)
CO2 SERPL-SCNC: 25 MMOL/L (ref 21–32)
CREAT SERPL-MCNC: 0.68 MG/DL (ref 0.55–1.02)
GLUCOSE SERPL-MCNC: 68 MG/DL (ref 65–100)
POTASSIUM SERPL-SCNC: 3.3 MMOL/L (ref 3.5–5.1)
SODIUM SERPL-SCNC: 139 MMOL/L (ref 136–145)

## 2021-05-14 PROCEDURE — 80048 BASIC METABOLIC PNL TOTAL CA: CPT

## 2021-05-14 PROCEDURE — 74011250637 HC RX REV CODE- 250/637: Performed by: OBSTETRICS & GYNECOLOGY

## 2021-05-14 PROCEDURE — 65410000002 HC RM PRIVATE OB

## 2021-05-14 PROCEDURE — 74011000258 HC RX REV CODE- 258: Performed by: OBSTETRICS & GYNECOLOGY

## 2021-05-14 PROCEDURE — 59025 FETAL NON-STRESS TEST: CPT

## 2021-05-14 PROCEDURE — 36415 COLL VENOUS BLD VENIPUNCTURE: CPT

## 2021-05-14 PROCEDURE — 74011250636 HC RX REV CODE- 250/636: Performed by: OBSTETRICS & GYNECOLOGY

## 2021-05-14 RX ORDER — POTASSIUM CHLORIDE 750 MG/1
20 TABLET, FILM COATED, EXTENDED RELEASE ORAL
Status: COMPLETED | OUTPATIENT
Start: 2021-05-14 | End: 2021-05-14

## 2021-05-14 RX ADMIN — ONDANSETRON 8 MG: 2 INJECTION INTRAMUSCULAR; INTRAVENOUS at 04:41

## 2021-05-14 RX ADMIN — FLUOXETINE 60 MG: 20 CAPSULE ORAL at 08:26

## 2021-05-14 RX ADMIN — POTASSIUM CHLORIDE AND SODIUM CHLORIDE: 900; 150 INJECTION, SOLUTION INTRAVENOUS at 21:15

## 2021-05-14 RX ADMIN — FAMOTIDINE 20 MG: 10 INJECTION, SOLUTION INTRAVENOUS at 08:26

## 2021-05-14 RX ADMIN — POTASSIUM CHLORIDE AND SODIUM CHLORIDE: 900; 150 INJECTION, SOLUTION INTRAVENOUS at 04:45

## 2021-05-14 RX ADMIN — FAMOTIDINE 20 MG: 10 INJECTION, SOLUTION INTRAVENOUS at 20:30

## 2021-05-14 RX ADMIN — CEFTRIAXONE SODIUM 1 G: 1 INJECTION, POWDER, FOR SOLUTION INTRAMUSCULAR; INTRAVENOUS at 20:30

## 2021-05-14 RX ADMIN — OLANZAPINE 10 MG: 5 TABLET, FILM COATED ORAL at 21:19

## 2021-05-14 RX ADMIN — POTASSIUM CHLORIDE AND SODIUM CHLORIDE: 900; 150 INJECTION, SOLUTION INTRAVENOUS at 12:59

## 2021-05-14 RX ADMIN — POTASSIUM CHLORIDE 20 MEQ: 750 TABLET, FILM COATED, EXTENDED RELEASE ORAL at 08:26

## 2021-05-14 NOTE — PROGRESS NOTES
Bedside and Verbal shift change report given to SOPHY Whittaker RN (oncoming nurse) by SUSHMA Ramirez RN (offgoing nurse). Report included the following information SBAR, Kardex and Procedure Summary.

## 2021-05-14 NOTE — PROGRESS NOTES
Comprehensive Nutrition Assessment    Type and Reason for Visit: Initial, Consult    Nutrition Recommendations/Plan:     Add oral supplements daily   Add IV MVI daily, IV thiamine and folic acid    If TPN initiated begin with AA 5% D10 @ 1 liter  Day 2: AA 5% D10 @ 2 liters  Day 3: Add 250 ml daily IV Lipids  Day 4: AA 6% D15% @ 2 liters  Day 5: AA 6% D20% @ 2 liters    Check blind weights weekly    Nutrition Assessment:      Pt admitted for UTI and failed outpatient medical therapy due to chronic eating disorder. GA: 32w5d  Urine culture 5/5/21 positive for Klebsiella and EColi   Pt with 4.6 kg wt loss since discharge or 5.8% wt change over the past 6 weeks reflecting severe wt change. Pt only taking \"sips\" and \"spoonfulls\" at home. Always felt dizzy, nauseated and weak. Discussed that poor nutrition will produce above side effects. Unable to take prenatal MVI or oral KCL. PMH: depression, eating disorder, hyperemesis, eating disorder, gallstones on last admission s/p GI and surgery workup insignificant with no plans for intervention. Note history of severe wt loss, restrictive eating, excessive exercising thus prompting 70-80# wt loss or 31% wt change reflecting severe wt change. Pt with difficulty going to inpatient ED treatment facilty d/t insurance issues. However, Ms. Valerie Jefferson did receive nutrition therapy from [facility] outpatient ED dietitian. Currently reviewing possible outpt treatment at [facility]. Previous admissions unable to place NGT and home TPN not successful as pt suspected of withholding IVF nutrition. Suggest to trail oral supplements once more. Previously denied supplements d/t fear of vomiting and the taste of supplements once vomited. Recommended to try consuming oral supplements via 1 ounce medicine cups, slowly for a goal of at least 2 Ensure Enlive daily initially if able. Pt does not wish to receive TPN as it is so limiting.  However, if TPN started begin with AA5% D10 @ 1 liter and increase gradually to goal.     Monitor labs/lytes for trends as pt is at severe risk of refeeding. Malnutrition Assessment:  Malnutrition Status:  Severe malnutrition    Context:  Chronic illness     Findings of the 6 clinical characteristics of malnutrition:   Energy Intake:  7 - 75% or less est energy requirements for 1 month or longer  Weight Loss:  7.00 - Greater than 10% over 6 months     Body Fat Loss:  7 - Severe body fat loss,     Muscle Mass Loss:  7 - Severe muscle mass loss,     Strength:  Not performed         Estimated Daily Nutrient Needs:  Energy (kcal): 5494-5141 kcal/day; Weight Used for Energy Requirements: Pre-pregnancy  Protein (g):  g/day  (1.2-1.4 g/day); Weight Used for Protein Requirements: Pre-pregnancy  Fluid (ml/day): 2400 ml; Method Used for Fluid Requirements: 1 ml/kcal    Wounds:    None       Current Nutrition Therapies:  DIET REGULAR    Anthropometric Measures:  · Height:  5' 4\" (162.6 cm)  · Current Body Wt:  73.9 kg (162 lb 14.7 oz)   · Admission Body Wt:  162 lb 14.7 oz    · Usual Body Wt:  68.5 kg (151 lb)     · Ideal Body Wt:  120 lbs:        Nutrition Diagnosis:   · Inadequate oral intake related to altered GI function, psychological cause or life stress as evidenced by intake 0-25%    · Increased nutrient needs related to increased demand for energy/nutrients as evidenced by pregnancy. Nutrition Interventions:   Food and/or Nutrient Delivery: Continue current diet, Continue oral nutrition supplement  Nutrition Education and Counseling: No recommendations at this time  Coordination of Nutrition Care: Continue to monitor while inpatient    Goals:  Consume 1 supplement and 25% meals x 5-7 days.        Nutrition Monitoring and Evaluation:   Behavioral-Environmental Outcomes: None identified  Food/Nutrient Intake Outcomes: Food and nutrient intake, Supplement intake  Physical Signs/Symptoms Outcomes: Biochemical data, GI status, Nausea/vomiting, Weight    Discharge Planning:     Too soon to determine     Electronically signed by Narayan Zimmer RD on 5/14/2021 at 4:36 PM    Contact: Alexy

## 2021-05-14 NOTE — PROGRESS NOTES
High Risk Obstetrics Progress Note    Name: Ester Story MRN: [id number]  SSN: [id number]    YOB: 1985  Age: 38 y.o. Sex: female      Subjective:      LOS: 2 days    Estimated Date of Delivery: 21   Gestational Age Today: 30w10d     Patient admitted for UTI with right flank pain s/p failed outpatient management and chronic eating disorder. States she does have mild contractions, mild headache , moderate nausea/vomiting and normal fetal movement and does not have chest pain, shortness of breath, vaginal bleeding  and vaginal leaking of fluid . Reports no emesis overnight. Did not eat anything yesterday. Objective:     Vitals:  Blood pressure 96/63, pulse 65, temperature 97.9 °F (36.6 °C), resp. rate 18, height 5' 4\" (1.626 m), weight 73.9 kg (163 lb), last menstrual period 2020, SpO2 98 %, not currently breastfeeding. Temp (24hrs), Av.8 °F (36.6 °C), Min:97.6 °F (36.4 °C), Max:97.9 °F (22.9 °C)    Systolic (49JEI), YQE:472 , Min:96 , JEVON:350      Diastolic (62FBQ), RYN:33, Min:63, Max:64       Intake and Output:         Physical Exam:  Patient without distress.   Heart: Regular rate and rhythm  Lung: clear to auscultation throughout lung fields, no wheezes, no rales, no rhonchi and normal respiratory effort  Abdomen: soft, nontender, gravid  Lower Extremities:  - Edema No       Membranes:  Intact    Uterine Activity:  1 contraction on monitoring from yesterday    Fetal Heart Rate:  Reactive  Baseline: 120s per minute  Variability: moderate  Accelerations: yes  Decelerations: none        Labs:   Recent Results (from the past 36 hour(s))   SARS-COV-2    Collection Time: 21  7:42 PM   Result Value Ref Range    SARS-CoV-2 Please find results under separate order     COVID-19 RAPID TEST    Collection Time: 21  7:42 PM   Result Value Ref Range    Specimen source Nasopharyngeal      COVID-19 rapid test Not detected NOTD     PROTEIN/CREATININE RATIO, URINE    Collection Time: 21  8:07 PM   Result Value Ref Range    Protein, urine random 29 (H) 0.0 - 11.9 mg/dL    Creatinine, urine 260.00 mg/dL    Protein/Creat. urine Ratio 0.1     METABOLIC PANEL, BASIC    Collection Time: 21  5:00 AM   Result Value Ref Range    Sodium 139 136 - 145 mmol/L    Potassium 3.3 (L) 3.5 - 5.1 mmol/L    Chloride 109 (H) 97 - 108 mmol/L    CO2 25 21 - 32 mmol/L    Anion gap 5 5 - 15 mmol/L    Glucose 68 65 - 100 mg/dL    BUN 6 6 - 20 MG/DL    Creatinine 0.68 0.55 - 1.02 MG/DL    BUN/Creatinine ratio 9 (L) 12 - 20      GFR est AA >60 >60 ml/min/1.73m2    GFR est non-AA >60 >60 ml/min/1.73m2    Calcium 7.4 (L) 8.5 - 10.1 MG/DL       Assessment and Plan: Active Problems:    Pregnant (3/1/2018)      Eating disorder affecting pregnancy, antepartum (2020)      UTI in pregnancy, antepartum, third trimester (2021)       37 y/o [id number] at 32 5/7 weeks admitted for UTI s/p failed outpatient medical therapy due to chronic eating disorder     Urine culture 21 positive for Klebsiella and EColi   -afebrile and normal WBC's but right flank pain  -Ceftriaxone 1g q24h  -repeat urine culture in 7 days     Chronic eating disorder  -ivf's  -s/p phone appointment with Daniel Mckeon  for intake for possible partial hospitalization program   -Diego confirmed that restarting TPN would not affect their plan for treatment, etc -discussed this with pt. She is considering this but not ready to restart yet. Will also discuss with nutrition      Hypokalemia  -improving.  Continue to replete in iv and oral repletion   -repeat potassium tomorrow     Cervical incompetence-cerclage in place     H/o prior  with successful      Severe depression/anxiety with h/o suicidal ideation  -followed closely by Horacio Toro her help  -continue zyprexa and prozac as well as prn prazosin and hydroxyzine     GERD  -famotidine 20mg bid     Ambulate  Daily NST     MFM scan on 5/3/21-59%tile and Vertex  -next appt 5/20/21     Anemia-hemoglobin at baseline; asymptomatic  -known beta thal minor  -consider Iv iron therapy if drops     CHTN-stable normal bp's off meds     DVT PPx-ambulation and mirtha hose

## 2021-05-15 LAB
ANION GAP SERPL CALC-SCNC: 4 MMOL/L (ref 5–15)
BUN SERPL-MCNC: 4 MG/DL (ref 6–20)
BUN/CREAT SERPL: 8 (ref 12–20)
CALCIUM SERPL-MCNC: 7.4 MG/DL (ref 8.5–10.1)
CHLORIDE SERPL-SCNC: 113 MMOL/L (ref 97–108)
CO2 SERPL-SCNC: 20 MMOL/L (ref 21–32)
CREAT SERPL-MCNC: 0.5 MG/DL (ref 0.55–1.02)
GLUCOSE SERPL-MCNC: 66 MG/DL (ref 65–100)
POTASSIUM SERPL-SCNC: 4.1 MMOL/L (ref 3.5–5.1)
SODIUM SERPL-SCNC: 137 MMOL/L (ref 136–145)

## 2021-05-15 PROCEDURE — 74011250636 HC RX REV CODE- 250/636: Performed by: OBSTETRICS & GYNECOLOGY

## 2021-05-15 PROCEDURE — 80048 BASIC METABOLIC PNL TOTAL CA: CPT

## 2021-05-15 PROCEDURE — 74011250637 HC RX REV CODE- 250/637: Performed by: OBSTETRICS & GYNECOLOGY

## 2021-05-15 PROCEDURE — 65410000002 HC RM PRIVATE OB

## 2021-05-15 PROCEDURE — 74011000258 HC RX REV CODE- 258: Performed by: OBSTETRICS & GYNECOLOGY

## 2021-05-15 PROCEDURE — 36415 COLL VENOUS BLD VENIPUNCTURE: CPT

## 2021-05-15 RX ORDER — FAMOTIDINE 20 MG/1
20 TABLET, FILM COATED ORAL EVERY 12 HOURS
Status: DISCONTINUED | OUTPATIENT
Start: 2021-05-15 | End: 2021-05-16

## 2021-05-15 RX ADMIN — ONDANSETRON 8 MG: 2 INJECTION INTRAMUSCULAR; INTRAVENOUS at 15:03

## 2021-05-15 RX ADMIN — ONDANSETRON 8 MG: 2 INJECTION INTRAMUSCULAR; INTRAVENOUS at 21:43

## 2021-05-15 RX ADMIN — POTASSIUM CHLORIDE AND SODIUM CHLORIDE: 900; 150 INJECTION, SOLUTION INTRAVENOUS at 13:27

## 2021-05-15 RX ADMIN — POTASSIUM CHLORIDE AND SODIUM CHLORIDE: 900; 150 INJECTION, SOLUTION INTRAVENOUS at 04:54

## 2021-05-15 RX ADMIN — POTASSIUM CHLORIDE AND SODIUM CHLORIDE: 900; 150 INJECTION, SOLUTION INTRAVENOUS at 21:40

## 2021-05-15 RX ADMIN — OLANZAPINE 10 MG: 5 TABLET, FILM COATED ORAL at 21:40

## 2021-05-15 RX ADMIN — FLUOXETINE 60 MG: 20 CAPSULE ORAL at 10:16

## 2021-05-15 RX ADMIN — FAMOTIDINE 20 MG: 10 INJECTION, SOLUTION INTRAVENOUS at 10:27

## 2021-05-15 RX ADMIN — ONDANSETRON 8 MG: 2 INJECTION INTRAMUSCULAR; INTRAVENOUS at 08:34

## 2021-05-15 RX ADMIN — CEFTRIAXONE SODIUM 1 G: 1 INJECTION, POWDER, FOR SOLUTION INTRAMUSCULAR; INTRAVENOUS at 20:12

## 2021-05-15 RX ADMIN — FAMOTIDINE 20 MG: 20 TABLET ORAL at 20:12

## 2021-05-15 NOTE — PROGRESS NOTES
High Risk Obstetrics Progress Note    Name: Maggie Partida MRN: [id number]  SSN: [id number]    YOB: 1985  Age: 38 y.o. Sex: female      Subjective:      LOS: 3 days    Estimated Date of Delivery: 21   Gestational Age Today: 32w6d   Pt states having some cramping this AM.  Mild rt sided flank pain. +FM. No VB nor ROM. Objective:     Vitals:  Blood pressure 102/66, pulse 69, temperature 98.1 °F (36.7 °C), resp. rate 16, height 5' 4\" (1.626 m), weight 73.9 kg (163 lb), last menstrual period 2020, SpO2 99 %, not currently breastfeeding. Temp (24hrs), Av.2 °F (36.8 °C), Min:98 °F (36.7 °C), Max:98.4 °F (98.9 °C)    Systolic (95VPQ), NJU:692 , Min:98 , BBA:139      Diastolic (16ALE), XLE:88, Min:56, Max:72       Intake and Output:         Physical Exam:  Back: costovertebral angle tenderness absent  Abdomen: soft, nontender, soft, nondistended  Fundus: soft and non tender  Lower Extremities:  - Edema No       Membranes:  Intact    Uterine Activity:  Pending. Jasmine Profit Jasmine Profit Counseling given: Not Answered   be monitored ASAP    Fetal Heart Rate:  +        Labs:   Recent Results (from the past 36 hour(s))   METABOLIC PANEL, BASIC    Collection Time: 21  5:00 AM   Result Value Ref Range    Sodium 139 136 - 145 mmol/L    Potassium 3.3 (L) 3.5 - 5.1 mmol/L    Chloride 109 (H) 97 - 108 mmol/L    CO2 25 21 - 32 mmol/L    Anion gap 5 5 - 15 mmol/L    Glucose 68 65 - 100 mg/dL    BUN 6 6 - 20 MG/DL    Creatinine 0.68 0.55 - 1.02 MG/DL    BUN/Creatinine ratio 9 (L) 12 - 20      GFR est AA >60 >60 ml/min/1.73m2    GFR est non-AA >60 >60 ml/min/1.73m2    Calcium 7.4 (L) 8.5 - 10.1 MG/DL       Assessment and Plan:       Active Problems:    Pregnant (3/1/2018)      Eating disorder affecting pregnancy, antepartum (2020)      UTI in pregnancy, antepartum, third trimester (2021)       37 y/o [id number] at 32 6/7 weeks admitted for UTI s/p failed outpatient medical therapy due to chronic eating disorder     Urine culture 21 positive for Klebsiella and EColi   -afebrile and normal WBC's but right flank pain  -Ceftriaxone 1g q24h  -repeat urine culture in 7 days     Chronic eating disorder  -ivf's  -s/p phone appointment with Michael Barrett  for intake for possible partial hospitalization program   -[facility] confirmed that restarting TPN would not affect their plan for treatment, etc -discussed this with pt. She is considering this but not ready to restart yet. Will also discuss with nutrition      Hypokalemia  -improving.  Continue to replete in iv and oral repletion   -repeat potassium today (not done yet)     Cervical incompetence-cerclage in place     H/o prior  with successful      Severe depression/anxiety with h/o suicidal ideation  -followed closely by Dante Caraballo her help  -continue zyprexa and prozac as well as prn prazosin and hydroxyzine     GERD  -famotidine 20mg bid     Ambulate  Daily NST     MFM scan on 5/3/21-59%tile and Vertex  -next appt 21     Anemia-hemoglobin at baseline; asymptomatic  -known beta thal minor  -consider Iv iron therapy if drops     CHTN-stable normal bp's off meds     DVT PPx-ambulation and mirtha hose

## 2021-05-15 NOTE — PROGRESS NOTES
.Bedside and Verbal shift change report given to Jennifer Guzmán RN (oncoming nurse) by Mortimer Ege, RN (offgoing nurse). Report included the following information SBAR, Kardex, Procedure Summary, Intake/Output, MAR, Accordion and Recent Results.

## 2021-05-15 NOTE — PROGRESS NOTES
Clinical Pharmacy Note: IV to PO Automatic Conversion  Please note: Halina Stokes medication(s) (famotidine) has/have been changed from IV to PO based on the following critiera:    - Patient is tolerating oral medications  - Patient is tolerating a diet more advanced than clear liquids  - Patient is not requiring vasopressors    This IV to PO conversion is based on the P&T approved automatic conversion policy for eligible patients. Please call with questions.

## 2021-05-15 NOTE — PROGRESS NOTES
..Bedside and Verbal shift change report given to Tayla Lemus RN (oncoming nurse) by Farhad Freitas RN (offgoing nurse). Report included the following information SBAR, Kardex, Intake/Output, MAR, Accordion, Recent Results and Med Rec Status.

## 2021-05-16 PROCEDURE — 74011000258 HC RX REV CODE- 258: Performed by: OBSTETRICS & GYNECOLOGY

## 2021-05-16 PROCEDURE — 65410000002 HC RM PRIVATE OB

## 2021-05-16 PROCEDURE — 74011250636 HC RX REV CODE- 250/636: Performed by: OBSTETRICS & GYNECOLOGY

## 2021-05-16 PROCEDURE — 74011250637 HC RX REV CODE- 250/637: Performed by: OBSTETRICS & GYNECOLOGY

## 2021-05-16 PROCEDURE — 74011000250 HC RX REV CODE- 250: Performed by: OBSTETRICS & GYNECOLOGY

## 2021-05-16 RX ORDER — SODIUM CHLORIDE, SODIUM LACTATE, POTASSIUM CHLORIDE, CALCIUM CHLORIDE 600; 310; 30; 20 MG/100ML; MG/100ML; MG/100ML; MG/100ML
125 INJECTION, SOLUTION INTRAVENOUS CONTINUOUS
Status: DISCONTINUED | OUTPATIENT
Start: 2021-05-16 | End: 2021-05-24

## 2021-05-16 RX ADMIN — ONDANSETRON 8 MG: 2 INJECTION INTRAMUSCULAR; INTRAVENOUS at 16:34

## 2021-05-16 RX ADMIN — FAMOTIDINE 20 MG: 10 INJECTION, SOLUTION INTRAVENOUS at 10:22

## 2021-05-16 RX ADMIN — ONDANSETRON 8 MG: 2 INJECTION INTRAMUSCULAR; INTRAVENOUS at 08:21

## 2021-05-16 RX ADMIN — OLANZAPINE 10 MG: 5 TABLET, FILM COATED ORAL at 22:11

## 2021-05-16 RX ADMIN — POTASSIUM CHLORIDE AND SODIUM CHLORIDE: 900; 150 INJECTION, SOLUTION INTRAVENOUS at 05:57

## 2021-05-16 RX ADMIN — FAMOTIDINE 20 MG: 10 INJECTION, SOLUTION INTRAVENOUS at 22:11

## 2021-05-16 RX ADMIN — CEFTRIAXONE SODIUM 1 G: 1 INJECTION, POWDER, FOR SOLUTION INTRAMUSCULAR; INTRAVENOUS at 20:22

## 2021-05-16 RX ADMIN — SODIUM CHLORIDE, POTASSIUM CHLORIDE, SODIUM LACTATE AND CALCIUM CHLORIDE 125 ML/HR: 600; 310; 30; 20 INJECTION, SOLUTION INTRAVENOUS at 10:22

## 2021-05-16 RX ADMIN — FLUOXETINE 60 MG: 20 CAPSULE ORAL at 08:21

## 2021-05-16 NOTE — PROGRESS NOTES
Bedside and Verbal shift change report given to Sushila Gale RN  (oncoming nurse) by Dyana Mednoza RN  (offgoing nurse). Report included the following information SBAR, Kardex, Intake/Output, MAR and Recent Results. Pt resting comfortably in bed. 9295- pt given Zofran, encouraged to attempt to eat some breakfast. Declines wanting blinds opened in room at this time. C/o occasional cramping, denies leaking of fluids and contractions.        4630- Patient had 1 episode of vomiting (300 mL)

## 2021-05-16 NOTE — PROGRESS NOTES
High Risk Obstetrics Progress Note    Name: Adrian Lockechester MRN: [id number]  SSN: [id number]    YOB: 1985  Age: 38 y.o. Sex: female      Subjective:      LOS: 4 days    Estimated Date of Delivery: 21   Gestational Age Today: 33w0d     Pt states feels about the same. ... still having occ cramping. Monitor going on now. Showed nothing yesterday. Reports good FM. No VB/ROM    Objective:     Vitals:  Blood pressure 105/69, pulse (!) 54, temperature 97.5 °F (36.4 °C), resp. rate 16, height 5' 4\" (1.626 m), weight 73.9 kg (163 lb), last menstrual period 2020, SpO2 100 %, not currently breastfeeding. Temp (24hrs), Av.2 °F (36.8 °C), Min:97.5 °F (36.4 °C), Max:98.7 °F (20.8 °C)    Systolic (52TNW), KYQ:668 , Min:103 , RRO:729      Diastolic (96UAY), WVQ:44, Min:64, Max:69       Intake and Output:         Physical Exam:  Back: costovertebral angle tenderness absent  Abdomen: soft, nontender  Fundus: soft and non tender  Lower Extremities:  - Edema No       Membranes:  Intact    Uterine Activity:  Results pending    Fetal Heart Rate:  Reactive yest.  Monitoring for this AM getting underway. Labs:   Recent Results (from the past 36 hour(s))   METABOLIC PANEL, BASIC    Collection Time: 05/15/21 12:05 PM   Result Value Ref Range    Sodium 137 136 - 145 mmol/L    Potassium 4.1 3.5 - 5.1 mmol/L    Chloride 113 (H) 97 - 108 mmol/L    CO2 20 (L) 21 - 32 mmol/L    Anion gap 4 (L) 5 - 15 mmol/L    Glucose 66 65 - 100 mg/dL    BUN 4 (L) 6 - 20 MG/DL    Creatinine 0.50 (L) 0.55 - 1.02 MG/DL    BUN/Creatinine ratio 8 (L) 12 - 20      GFR est AA >60 >60 ml/min/1.73m2    GFR est non-AA >60 >60 ml/min/1.73m2    Calcium 7.4 (L) 8.5 - 10.1 MG/DL       Assessment and Plan:       Active Problems:    Pregnant (3/1/2018)      Eating disorder affecting pregnancy, antepartum (2020)      UTI in pregnancy, antepartum, third trimester (2021)       37 y/o [id number] at 33 0/7 weeks admitted for UTI s/p failed outpatient medical therapy due to chronic eating disorder     Urine culture 21 positive for Klebsiella and EColi   -afebrile and normal WBC's but right flank pain  -Ceftriaxone 1g q24h  -repeat urine culture in 7 days     Chronic eating disorder  -ivf's  -s/p phone appointment with Stuart Catherine  for intake for possible partial hospitalization program   -[facility] confirmed that restarting TPN would not affect their plan for treatment, etc -discussed this with pt. She is considering this but not ready to restart yet. Will also discuss with nutrition      Hypokalemia- resolved. Will hold k+ in IV fluids.   Switch fluids to LR       Cervical incompetence-cerclage in place     H/o prior  with successful      Severe depression/anxiety with h/o suicidal ideation  -followed closely by Eli Rene her help  -continue zyprexa and prozac as well as prn prazosin and hydroxyzine     GERD  -famotidine 20mg bid     Ambulate  Daily NST     MFM scan on 5/3/21-59%tile and Vertex  -next appt 21     Anemia-hemoglobin at baseline; asymptomatic  -known beta thal minor  -consider Iv iron therapy if drops     CHTN-stable normal bp's off meds     DVT PPx-ambulation and mirtha hose

## 2021-05-17 PROCEDURE — 74011000250 HC RX REV CODE- 250: Performed by: OBSTETRICS & GYNECOLOGY

## 2021-05-17 PROCEDURE — 59025 FETAL NON-STRESS TEST: CPT

## 2021-05-17 PROCEDURE — 74011250636 HC RX REV CODE- 250/636: Performed by: OBSTETRICS & GYNECOLOGY

## 2021-05-17 PROCEDURE — 74011250637 HC RX REV CODE- 250/637: Performed by: OBSTETRICS & GYNECOLOGY

## 2021-05-17 PROCEDURE — 65410000002 HC RM PRIVATE OB

## 2021-05-17 RX ADMIN — FLUOXETINE 60 MG: 20 CAPSULE ORAL at 09:36

## 2021-05-17 RX ADMIN — ONDANSETRON 8 MG: 2 INJECTION INTRAMUSCULAR; INTRAVENOUS at 21:07

## 2021-05-17 RX ADMIN — FAMOTIDINE 20 MG: 10 INJECTION, SOLUTION INTRAVENOUS at 21:07

## 2021-05-17 RX ADMIN — FAMOTIDINE 20 MG: 10 INJECTION, SOLUTION INTRAVENOUS at 09:37

## 2021-05-17 RX ADMIN — ONDANSETRON 8 MG: 2 INJECTION INTRAMUSCULAR; INTRAVENOUS at 15:22

## 2021-05-17 RX ADMIN — ONDANSETRON 8 MG: 2 INJECTION INTRAMUSCULAR; INTRAVENOUS at 00:54

## 2021-05-17 RX ADMIN — SODIUM CHLORIDE, POTASSIUM CHLORIDE, SODIUM LACTATE AND CALCIUM CHLORIDE 125 ML/HR: 600; 310; 30; 20 INJECTION, SOLUTION INTRAVENOUS at 09:37

## 2021-05-17 RX ADMIN — OLANZAPINE 10 MG: 5 TABLET, FILM COATED ORAL at 21:07

## 2021-05-17 NOTE — PROGRESS NOTES
High Risk Obstetrics Progress Note    Name: Lowell Christina MRN: [id number]  SSN: [id number]    YOB: 1985  Age: 38 y.o. Sex: female      Subjective:      LOS: 5 days    Estimated Date of Delivery: 21   Gestational Age Today: 33w1d     Patient admitted for UTI s/p failed outpatient management in light of chronic eating disorder in pregnancy. . States she does have mild contractions, moderate nausea/vomiting and normal fetal movement and does not have chest pain, shortness of breath, vaginal bleeding  and vaginal leaking of fluid . Objective:     Vitals:  Blood pressure 120/71, pulse (!) 55, temperature 97.7 °F (36.5 °C), resp. rate 16, height 5' 4\" (1.626 m), weight 73.9 kg (163 lb), last menstrual period 2020, SpO2 98 %, not currently breastfeeding. Temp (24hrs), Av.8 °F (36.6 °C), Min:97.5 °F (36.4 °C), Max:98.2 °F (96.2 °C)    Systolic (60KVC), NEERAJ:493 , Min:105 , MHR:523      Diastolic (69CLG), EZH:61, Min:67, Max:71       Intake and Output:         Physical Exam:  Patient without distress. Heart: Regular rate and rhythm  Lung: clear to auscultation throughout lung fields, no wheezes, no rales, no rhonchi and normal respiratory effort  Abdomen: soft, nontender, gravid  Lower Extremities:  - Edema No       Membranes:  Intact    Uterine Activity:  occ contraction    Fetal Heart Rate:  Reactive  Baseline: 115-120s per minute  Variability: moderate  Accelerations: yes  Decelerations: none        Labs: No results found for this or any previous visit (from the past 36 hour(s)). Assessment and Plan:       Active Problems:    Pregnant (3/1/2018)      Eating disorder affecting pregnancy, antepartum (2020)      UTI in pregnancy, antepartum, third trimester (2021)       37 y/o [id number] at 33 1/7 weeks admitted for UTI s/p failed outpatient medical therapy due to chronic eating disorder     Urine culture 21 positive for Klebsiella and EColi   -afebrile and normal WBC's but right flank pain which is now improving  -discontinue Ceftriaxone today. -repeat urine culture this week     Chronic eating disorder  -ivf's  -s/p phone appointment with Ab Leavitt  for intake for possible partial hospitalization program   -Diego confirmed that restarting TPN would not affect their plan for treatment, etc -discussed this with pt. She is considering this but not ready to restart yet. Further discussion with pt re: placement of dobhoff by ENT (as nursing unable to pass in the past) as a source of nutrition for pregnancy without as much infection risk. Pt unsure and will consider this option.  Will try to speak with Diego today to see if/when entry to partial hospitalization program will be in effect.      Hypokalemia-resolved     Cervical incompetence-cerclage in place     H/o prior  with successful      Severe depression/anxiety with h/o suicidal ideation  -followed closely by Jackelin Ash her help  -continue zyprexa and prozac as well as prn prazosin and hydroxyzine     GERD  -famotidine 20mg bid     Ambulate  Daily NST     MFM scan on 5/3/21-59%tile and Vertex  -next appt 21     Anemia-hemoglobin at baseline; asymptomatic  -known beta thal minor  -consider Iv iron therapy if drops     CHTN-stable normal bp's off meds     DVT PPx-ambulation and mirtha hose

## 2021-05-17 NOTE — PROGRESS NOTES
0809 Bedside and Verbal shift change report given to PER (oncoming nurse) by SOPHY ESTEVEZ (offgoing nurse). Report included the following information SBAR, Kardex, Intake/Output, MAR, Accordion and Recent Results.

## 2021-05-17 NOTE — PROGRESS NOTES
0920 - Transition of Care  (LESLIE) Plan:        RUR:  25 %           Disposition: TBD/subject to change pending recommendations; pending medical progression    -- Chart indicates greater than 48 hours    -- 39 y/o [id number] at 33 1/7 weeks admitted for UTI s/p failed outpatient medical therapy due to chronic eating disorder    -- Anticipate home once medically stable    -- Dr. Joan Choi following - OBGYN (see note)    -- MFM scan-next appt 5/20/21    PCP followup: Tere Clarkeia, NP     Transport: TBD    CM will continue to follow and assist with LESLIE needs as they arise. Available on [employer]. Tammie  MSN, [facility], RN, CCM - [phone].

## 2021-05-18 LAB
ALBUMIN SERPL-MCNC: 2.1 G/DL (ref 3.5–5)
ALBUMIN/GLOB SERPL: 0.5 {RATIO} (ref 1.1–2.2)
ALP SERPL-CCNC: 165 U/L (ref 45–117)
ALT SERPL-CCNC: 17 U/L (ref 12–78)
ANION GAP SERPL CALC-SCNC: 6 MMOL/L (ref 5–15)
APPEARANCE UR: ABNORMAL
AST SERPL-CCNC: 15 U/L (ref 15–37)
BACTERIA URNS QL MICRO: ABNORMAL /HPF
BILIRUB SERPL-MCNC: 0.3 MG/DL (ref 0.2–1)
BILIRUB UR QL: NEGATIVE
BUN SERPL-MCNC: 4 MG/DL (ref 6–20)
BUN/CREAT SERPL: 6 (ref 12–20)
CALCIUM SERPL-MCNC: 7.8 MG/DL (ref 8.5–10.1)
CHLORIDE SERPL-SCNC: 110 MMOL/L (ref 97–108)
CO2 SERPL-SCNC: 22 MMOL/L (ref 21–32)
COLOR UR: ABNORMAL
CREAT SERPL-MCNC: 0.64 MG/DL (ref 0.55–1.02)
EPITH CASTS URNS QL MICRO: ABNORMAL /LPF
ERYTHROCYTE [DISTWIDTH] IN BLOOD BY AUTOMATED COUNT: 14.8 % (ref 11.5–14.5)
GLOBULIN SER CALC-MCNC: 4.1 G/DL (ref 2–4)
GLUCOSE SERPL-MCNC: 95 MG/DL (ref 65–100)
GLUCOSE UR STRIP.AUTO-MCNC: NEGATIVE MG/DL
HCT VFR BLD AUTO: 29.1 % (ref 35–47)
HGB BLD-MCNC: 9 G/DL (ref 11.5–16)
HGB UR QL STRIP: NEGATIVE
KETONES UR QL STRIP.AUTO: NEGATIVE MG/DL
LEUKOCYTE ESTERASE UR QL STRIP.AUTO: NEGATIVE
MCH RBC QN AUTO: 23.1 PG (ref 26–34)
MCHC RBC AUTO-ENTMCNC: 30.9 G/DL (ref 30–36.5)
MCV RBC AUTO: 74.6 FL (ref 80–99)
NITRITE UR QL STRIP.AUTO: NEGATIVE
NRBC # BLD: 0 K/UL (ref 0–0.01)
NRBC BLD-RTO: 0 PER 100 WBC
PH UR STRIP: >8.5 [PH] (ref 5–8)
PLATELET # BLD AUTO: 191 K/UL (ref 150–400)
PMV BLD AUTO: 11.7 FL (ref 8.9–12.9)
POTASSIUM SERPL-SCNC: 3.5 MMOL/L (ref 3.5–5.1)
PROT SERPL-MCNC: 6.2 G/DL (ref 6.4–8.2)
PROT UR STRIP-MCNC: NEGATIVE MG/DL
RBC # BLD AUTO: 3.9 M/UL (ref 3.8–5.2)
RBC #/AREA URNS HPF: ABNORMAL /HPF (ref 0–5)
SODIUM SERPL-SCNC: 138 MMOL/L (ref 136–145)
SP GR UR REFRACTOMETRY: 1.01 (ref 1–1.03)
UA: UC IF INDICATED,UAUC: ABNORMAL
UROBILINOGEN UR QL STRIP.AUTO: 0.2 EU/DL (ref 0.2–1)
WBC # BLD AUTO: 6.6 K/UL (ref 3.6–11)
WBC URNS QL MICRO: ABNORMAL /HPF (ref 0–4)
YEAST BUDDING URNS QL: PRESENT

## 2021-05-18 PROCEDURE — 74011250637 HC RX REV CODE- 250/637: Performed by: OBSTETRICS & GYNECOLOGY

## 2021-05-18 PROCEDURE — 59025 FETAL NON-STRESS TEST: CPT

## 2021-05-18 PROCEDURE — 81001 URINALYSIS AUTO W/SCOPE: CPT

## 2021-05-18 PROCEDURE — 74011250636 HC RX REV CODE- 250/636: Performed by: OBSTETRICS & GYNECOLOGY

## 2021-05-18 PROCEDURE — 36415 COLL VENOUS BLD VENIPUNCTURE: CPT

## 2021-05-18 PROCEDURE — 65410000002 HC RM PRIVATE OB

## 2021-05-18 PROCEDURE — 74011000250 HC RX REV CODE- 250: Performed by: OBSTETRICS & GYNECOLOGY

## 2021-05-18 PROCEDURE — 85027 COMPLETE CBC AUTOMATED: CPT

## 2021-05-18 PROCEDURE — 80053 COMPREHEN METABOLIC PANEL: CPT

## 2021-05-18 RX ADMIN — OLANZAPINE 10 MG: 5 TABLET, FILM COATED ORAL at 21:39

## 2021-05-18 RX ADMIN — SODIUM CHLORIDE, POTASSIUM CHLORIDE, SODIUM LACTATE AND CALCIUM CHLORIDE 125 ML/HR: 600; 310; 30; 20 INJECTION, SOLUTION INTRAVENOUS at 17:04

## 2021-05-18 RX ADMIN — ONDANSETRON 8 MG: 2 INJECTION INTRAMUSCULAR; INTRAVENOUS at 21:35

## 2021-05-18 RX ADMIN — FAMOTIDINE 20 MG: 10 INJECTION, SOLUTION INTRAVENOUS at 21:38

## 2021-05-18 RX ADMIN — FAMOTIDINE 20 MG: 10 INJECTION, SOLUTION INTRAVENOUS at 08:50

## 2021-05-18 RX ADMIN — ONDANSETRON 8 MG: 2 INJECTION INTRAMUSCULAR; INTRAVENOUS at 15:12

## 2021-05-18 RX ADMIN — ONDANSETRON 8 MG: 2 INJECTION INTRAMUSCULAR; INTRAVENOUS at 08:49

## 2021-05-18 RX ADMIN — FLUOXETINE 60 MG: 20 CAPSULE ORAL at 08:49

## 2021-05-18 NOTE — CONSULTS
[facility]  Hospitalist Group    Hospitalist Consult  Primary Care Provider: Ismael Landaverde NP  Consult requested by: Amparo Mantilla DO     Subjective:     Jesse Sherman is a 38 y.o. female with a past medical history of acute pyelonephritis, anorexia, asthma, chronic back pain, GERD and gestational hypertension who is currently admitted to the hospital with UTI, failed outpatient medical therapy due to chronic eating disorder. Hospitalist was consulted for malnutrition and chronic eating disorder. Of examination patient is awake alert and oriented x4. Sitting up in bed, states that she just finished eating lunch. Lunch tray noted at bedside half sandwich and bag of chips gone. She denies any syncope, dizziness, shortness of breath, chest pain or tightness. States that she has been working with her OB and discussing options of nutrition: PICC line with TPN and remain inpatient versus outpatient with Jason. Denies any medical complaints and states no issues with pregnancy at this time. Review of Systems:    A comprehensive review of systems was negative except for that written in the History of Present Illness. Past Medical History:   Diagnosis Date    Acute pyelonephritis 3/3/2021    Anorexia     Anxiety     Asthma     on albuterol prn.  Triggers cold and allergies    Avoidant-restrictive food intake disorder (ARFID)     Back pain, chronic     sciatica    Chronic bilateral low back pain with right-sided sciatica 2020    ~    GERD (gastroesophageal reflux disease) 2020    UGI , GI Dr. Yen Tierney Gestational hypertension     Past pregnancy    [facility] 2005, 2006, , ,     Hyperemesis     severe hyperemesis    Hypertension     with G12 - none this pregnancy    Iron deficiency anemia 10/08/2010    MDD (major depressive disorder), single episode with postpartum onset 2013    treated with meds - 13    Orthostatic hypotension 2020    Plantar fasciitis     Pregnancy     36 weeks    PTSD (post-traumatic stress disorder)       Past Surgical History:   Procedure Laterality Date    HX  SECTION  4/22/15    HX DILATION AND CURETTAGE      HX DILATION AND CURETTAGE  2020    HX GYN      miscarriage x 6    HX GYN      removal of left fallopian tube    HX OTHER SURGICAL      cerlcage x4, ectopic x1  with removal of left fallopian tube    HX OTHER SURGICAL      cerclage w/ current pregnancy. Removed 18    [place]  DELIV[place] ONLY       Prior to Admission medications    Medication Sig Start Date End Date Taking? Authorizing Provider   FLUoxetine (PROzac) 20 mg capsule Take 1 Cap by mouth daily. Take with 40 mg cap for total daily dose of 60 mg. 3/1/21  Yes Trisha Ortiz NP   FLUoxetine (PROzac) 40 mg capsule Take 1 Cap by mouth daily. 3/1/21  Yes Trisha Ortiz NP   OLANZapine (ZyPREXA) 10 mg tablet Take 1 Tab by mouth nightly. 3/1/21  Yes Trisha Ortiz NP   prazosin (MINIPRESS) 2 mg capsule Take 1 Cap by mouth nightly. Indications: posttraumatic stress syndrome 3/1/21  Yes Trisha Ortiz NP   calcium carbonate (Tums) 200 mg calcium (500 mg) chew Take 1 Tab by mouth two (2) times daily as needed for Other (reflux). 21  Yes Gregory Roberson MD   promethazine (Phenergan) 12.5 mg suppository    Yes Provider, Historical   ondansetron (ZOFRAN ODT) 4 mg disintegrating tablet Take 1 Tab by mouth every six (6) hours as needed for Nausea or Vomiting. 20  Yes Marylen Mannheim, DO   hydrOXYzine HCL (ATARAX) 50 mg tablet Take 1 Tab by mouth four (4) times daily. 20  Yes Kathy Connell NP   food supplemt, lactose-reduced (Ensure High Protein) liqd Take 237 mL by mouth three (3) times daily. 10/14/20  Yes Ana Fernandez NP   polyethylene glycol (MIRALAX) 17 gram packet Take 1 Packet by mouth daily as needed for Constipation.  21   Karol Gregory Billy MD   doxylamine succinate (UNISOM) 25 mg tablet Take 1 Tab by mouth nightly as needed for Insomnia. 2/15/21   Trisha Ortiz NP   pantoprazole (PROTONIX) 40 mg tablet Take 1 Tab by mouth Daily (before breakfast). Indications: gastroesophageal reflux disease 1/15/21   Marylen Mannheim, DO   sucralfate (CARAFATE) 1 gram tablet Take 1 Tab by mouth Before breakfast, lunch, dinner and at bedtime. 1/15/21   Marylen Mannheim, DO   Lacto. acidophilus-Bif. animalis (Probiotic) 5 billion cell cpSP Take 1 Cap by mouth daily. 12/28/20   Trisha Ortiz NP   prochlorperazine (COMPAZINE) 10 mg tablet  12/27/20   Provider, Historical   0.9% sodium chloride solp 1,000 mL with thiamine 100 mg/mL soln 225 mg, folic acid 5 mg/mL soln 1 mg 1 Bag by IntraVENous route every twenty-four (24) hours. 12/16/20   Marylen Mannheim, DO   cholecalciferol (Vitamin D3) 25 mcg (1,000 unit) cap Take  by mouth daily. Provider, Historical   pyridoxine, vitamin B6, (Vitamin B-6) 100 mg tablet Take 100 mg by mouth daily. Provider, Historical   OTHER 1 Ensure Enlive food supplement drink PO TID 11/4/20   Ana Fernandez NP   OTHER 1 ensure pudding PO daily for lunch.  10/20/20   Ana Fernandez NP   prenatal vit-iron fumarate-fa (PRENATAL PLUS with IRON) 28 mg iron- 800 mcg tab  9/28/20   Provider, Historical     Allergies   Allergen Reactions    Labetalol Hives    Ceclor [Cefaclor] Unknown (comments)    Pcn [Penicillins] Hives    Septra [Sulfamethoprim Ds] Unknown (comments)      Family History   Problem Relation Age of Onset   Dm Koo Arthritis-rheumatoid Mother     Asthma Mother     No Known Problems Father     No Known Problems Maternal Grandmother     No Known Problems Maternal Grandfather     No Known Problems Paternal Grandmother     No Known Problems Paternal Grandfather     Asthma Son     Alcohol abuse Neg Hx     Arthritis-osteo Neg Hx     Bleeding Prob Neg Hx     Cancer Neg Hx     Diabetes Neg Hx     Elevated Lipids Neg Hx     Headache Neg Hx     Heart Disease Neg Hx     Hypertension Neg Hx     Lung Disease Neg Hx     Migraines Neg Hx     Psychiatric Disorder Neg Hx     Stroke Neg Hx     Mental Retardation Neg Hx     Anesth Problems Neg Hx         SOCIAL HISTORY:  Patient resides at Home. Patient ambulates with Independent. Objective:       Physical Exam:   Visit Vitals  /69 (BP 1 Location: Left upper arm, BP Patient Position: At rest;Sitting)   Pulse 61   Temp 98.2 °F (36.8 °C)   Resp 17   Ht 5' 4\" (1.626 m)   Wt 73.9 kg (163 lb)   LMP 09/27/2020 (Exact Date)   SpO2 100%   Breastfeeding No   BMI 27.98 kg/m²     General appearance: alert, cooperative, no distress, appears stated age  Lungs: clear to auscultation bilaterally  Heart: regular rate and rhythm, S1, S2 normal, no murmur, click, rub or gallop  Abdomen: soft, non-tender. Bowel sounds normal. No masses,  no organomegaly  Extremities: extremities normal, atraumatic, no cyanosis or edema  Pulses: 2+ and symmetric  Skin: Skin color, texture, turgor normal. No rashes or lesions  Neurologic: Grossly normal        Data Review: All diagnostic labs and studies have been reviewed. Assessment:       Active Problems:    Pregnant (3/1/2018)      Eating disorder affecting pregnancy, antepartum (12/2/2020)      UTI in pregnancy, antepartum, third trimester (5/12/2021)        Plan:     Chronic eating disorder, with severe caloric malnutrition  Continue IV fluids  Primary team discu discussing possibility of remaining inpatient getting a PICC line and having TPN versus Dobbhoff to start enteral feeds outpatient. Primary team working to obtain a higher level of care at [employer] patient program in [facility] if patient agreeable.   Nutrition following  Consider psych consultation  Labs reviewed    UTI  Urine culture 5/5/2021 + for Klebsiella and E. coli  Repeat urine culture pending today  Continue ceftriaxone  Consider ID consultation for antibiotic management    Severe depression/anxiety with history of suicidal ideations  Patient followed by Piper Sanders current treatment    Anemia  Asymptomatic, hemoglobin at baseline  Known beta Thal minor  Monitor labs      Thank you for allowing us to participate in patient's care. Labs and chart reviewed. Continue current treatment and further recommendations as listed above. If you have any further questions or need further assistance please do not hesitate to contact us again.  We will sign off now  Signed By: Nelli Coreas NP     Date of Service:  5/18/2021

## 2021-05-18 NOTE — PROGRESS NOTES
Bedside and Verbal shift change report given to SOPHY Carlos RN (oncoming nurse) by SUSHMA Ramirez RN (offgoing nurse). Report included the following information SBAR and Kardex.

## 2021-05-18 NOTE — LACTATION NOTE
Initial [facility] LC visit - This is moms 8th baby and she is planning to breast feed as she did for her last 4. Discussed the importance of breast milk for babies in general, and the extra importance and benefits for pre-term babies. Discussed pumping and storage of breast milk. Discussed family centered care and what we do in the NICU to keep the family at the center of our care. Answered moms questions. Will continue to follow.

## 2021-05-18 NOTE — PROGRESS NOTES
Bedside and Verbal shift change report given to Kelli RN and [facility] RN  (oncoming nurse) by Stefanie Michael RN (offgoing nurse). Report included the following information SBAR, Kardex, Procedure Summary, Intake/Output and MAR.

## 2021-05-18 NOTE — PROGRESS NOTES
Spiritual Care Assessment/Progress Note  [facility]      NAME: Violeta Stewart      MRN: [id number]  AGE: 38 y.o.  SEX: female  Confucianist Affiliation: No Confucianism   Language: English     5/18/2021     Total Time (in minutes): 15     Spiritual Assessment begun in [address] through conversation with:         [x]Patient        [] Family    [] Friend(s)        Reason for Consult: Initial/Spiritual assessment, patient floor     Spiritual beliefs: (Please include comment if needed)     [] Identifies with a rosy tradition:         [] Supported by a rosy community:            [] Claims no spiritual orientation:           [] Seeking spiritual identity:                [] Adheres to an individual form of spirituality:           [x] Not able to assess:                           Identified resources for coping:      [] Prayer                               [] Music                  [] Guided Imagery     [x] Family/friends                 [] Pet visits     [] Devotional reading                         [] Unknown     [] Other:                                               Interventions offered during this visit: (See comments for more details)    Patient Interventions: Affirmation of emotions/emotional suffering, Catharsis/review of pertinent events in supportive environment, Normalization of emotional/spiritual concerns           Plan of Care:     [] Support spiritual and/or cultural needs    [] Support AMD and/or advance care planning process      [] Support grieving process   [] Coordinate Rites and/or Rituals    [] Coordination with community clergy   [] No spiritual needs identified at this time   [] Detailed Plan of Care below (See Comments)  [] Make referral to Music Therapy  [] Make referral to Pet Therapy     [] Make referral to Addiction services  [] Make referral to [facility]  [] Make referral to Spiritual Care Partner  [] No future visits requested        [x] Follow up upon further referrals     Comments:  for initial visit. Let pt know of  availability.  provided pastoral listening and support.  spoke with pt's RN. Please contact [phone] Jaun Payne for further support.      [address] Lily Partida, MACE   [phone])

## 2021-05-18 NOTE — PROGRESS NOTES
High Risk Obstetrics Progress Note    Name: Ester Story MRN: [id number]  SSN: [id number]    YOB: 1985  Age: 38 y.o. Sex: female      Subjective:      LOS: 6 days    Estimated Date of Delivery: 21   Gestational Age Today: 33w2d     Patient admitted for UTI s/p failed outpatient management in light of chronic eating disorder. States she does have moderate headache , moderate nausea/vomiting, normal fetal movement and back pain and right flank pain and does not have chest pain, shortness of breath, vaginal bleeding  and vaginal leaking of fluid . Objective:     Vitals:  Blood pressure 117/75, pulse 65, temperature 98.1 °F (36.7 °C), resp. rate 18, height 5' 4\" (1.626 m), weight 73.9 kg (163 lb), last menstrual period 2020, SpO2 100 %, not currently breastfeeding. Temp (24hrs), Av.1 °F (36.7 °C), Min:98 °F (36.7 °C), Max:98.2 °F (15.2 °C)    Systolic (53WTK), SOI:930 , Min:112 , NIF:388      Diastolic (94PYN), IG, Min:72, Max:75       Intake and Output:         Physical Exam:  Patient without distress. Heart: Regular rate and rhythm  Lung: clear to auscultation throughout lung fields, no wheezes, no rales, no rhonchi and normal respiratory effort  Abdomen: soft, nontender, gravid  Lower Extremities:  - Edema No       Membranes:  Intact    Uterine Activity:  None    Fetal Heart Rate:  Reactive  Baseline: 120s per minute  Variability: moderate  Accelerations: yes  Decelerations: none        Labs: No results found for this or any previous visit (from the past 36 hour(s)). Assessment and Plan:       Active Problems:    Pregnant (3/1/2018)      Eating disorder affecting pregnancy, antepartum (2020)      UTI in pregnancy, antepartum, third trimester (2021)       37 y/o [id number] at 33 2/7 weeks admitted for UTI s/p failed outpatient medical therapy due to chronic eating disorder     Urine culture 21 positive for Klebsiella and EColi   -afebrile and normal WBC's but right flank pain which ha worsened since yesterday  -s/p Ceftriaxone  -repeat urine culture today     Chronic eating disorder  -ivf's  -spoke with Dr Alejandra Kang with [facility] partial hospitalization program here in [place]-pt does not qualify for this program as she requires high level of care that is available at [facility] inpatient program in [facility]. I spoke with pt re: this option as they will accept pregnant patients.   -Again discussed need for nutrition as pt still not getting much of the Ensure in. We discussed placement of dobhoff by ENT to start enteral feeds which would possibly allow for her to be discharged and get this nutrition as an outpatient. We also discussed the option of PICC line placement with TPN as she has done before, but because of previous admission for line infection, she would need to stay inpatient to receive this nutrition. Patient currently overwhelmed with all of this information. She will consider options and let me know.  She does understand the necessity to make a decision soon.   -consult medicine service for following patient for eating disorder/malnutrition     Hypokalemia-resolved; recheck labs tomorrow     Cervical incompetence-cerclage in place     H/o prior  with successful      Severe depression/anxiety with h/o suicidal ideation  -followed closely by Abisai Pan her help  -continue zyprexa and prozac as well as prn prazosin and hydroxyzine     GERD  -famotidine 20mg bid     Ambulate  Daily NST     MFM scan on 5/3/21-59%tile and Vertex  -next appt 21     Anemia-hemoglobin at baseline; asymptomatic  -known beta thal minor  -consider Iv iron therapy if drops     CHTN-stable normal bp's off meds     DVT PPx-ambulation and mirtha hose

## 2021-05-18 NOTE — PROGRESS NOTES
Physician Progress Note      PATIENT:               Hope Gosselin  CSN #:                  [id number]  :                       1985  ADMIT DATE:       2021 6:17 PM  [address] [place] DATE:  RESPONDING  PROVIDER #:        Eleno Arias DO [address] [facility] DO          QUERY TEXT:    Patient admitted for UTI and failed outpatient medical therapy due to chronic eating disorder. Registered Dietician noted severe malnutrition in their  note. If possible, please document in progress notes and discharge summary if you are evaluating and /or treating any of the following: The medical record reflects the following:  Risk Factors: 36yo with chronic eating disorder  Clinical Indicators: Registered Dietician  note  - Note history of severe wt loss, restrictive eating, excessive exercising thus prompting 70-80# wt loss or 31% wt change reflecting severe wt change. - Severe malnutrition  - Findings of the 6 clinical characteristics of malnutrition:  - Energy Intake:  7 - 75% or less est energy requirements for 1 month or longer  - Weight Loss:  7.00 - Greater than 10% over 6 months  - Body Fat Loss:  7 - Severe body fat loss,  - Muscle Mass Loss:  7 - Severe muscle mass loss,  - Inadequate oral intake related to altered GI function, psychological cause or life stress as evidenced by intake 0-25%  - Increased nutrient needs related to increased demand for energy/nutrients as evidenced by pregnancy.   Treatment: registered dietician following, nutritional supplements (Eduardo Sherwood), and regular diet    Thank you,  Lady Thomas  [phone]  Options provided:  -- Protein calorie malnutrition moderate  -- Protein calorie malnutrition severe  -- Other - I will add my own diagnosis  -- Disagree - Not applicable / Not valid  -- Disagree - Clinically unable to determine / Unknown  -- Refer to Clinical Documentation Reviewer    PROVIDER RESPONSE TEXT:    This patient has severe protein calorie malnutrition.     Query created by: Pedro Aquino on 5/17/2021 2:01 PM      Electronically signed by:  Maeve Vasquez DO 5/17/2021 9:27 PM

## 2021-05-19 PROCEDURE — 74011000250 HC RX REV CODE- 250: Performed by: OBSTETRICS & GYNECOLOGY

## 2021-05-19 PROCEDURE — 59025 FETAL NON-STRESS TEST: CPT

## 2021-05-19 PROCEDURE — 87086 URINE CULTURE/COLONY COUNT: CPT

## 2021-05-19 PROCEDURE — 87077 CULTURE AEROBIC IDENTIFY: CPT

## 2021-05-19 PROCEDURE — 87186 SC STD MICRODIL/AGAR DIL: CPT

## 2021-05-19 PROCEDURE — 74011250637 HC RX REV CODE- 250/637: Performed by: OBSTETRICS & GYNECOLOGY

## 2021-05-19 PROCEDURE — 74011250636 HC RX REV CODE- 250/636: Performed by: OBSTETRICS & GYNECOLOGY

## 2021-05-19 PROCEDURE — 65410000002 HC RM PRIVATE OB

## 2021-05-19 RX ADMIN — ONDANSETRON 8 MG: 2 INJECTION INTRAMUSCULAR; INTRAVENOUS at 15:51

## 2021-05-19 RX ADMIN — FAMOTIDINE 20 MG: 10 INJECTION, SOLUTION INTRAVENOUS at 09:00

## 2021-05-19 RX ADMIN — SODIUM CHLORIDE, POTASSIUM CHLORIDE, SODIUM LACTATE AND CALCIUM CHLORIDE 125 ML/HR: 600; 310; 30; 20 INJECTION, SOLUTION INTRAVENOUS at 00:16

## 2021-05-19 RX ADMIN — ONDANSETRON 8 MG: 2 INJECTION INTRAMUSCULAR; INTRAVENOUS at 21:33

## 2021-05-19 RX ADMIN — MICONAZOLE NITRATE 200 MG: 200 SUPPOSITORY VAGINAL at 21:37

## 2021-05-19 RX ADMIN — OLANZAPINE 10 MG: 5 TABLET, FILM COATED ORAL at 21:36

## 2021-05-19 RX ADMIN — FLUOXETINE 60 MG: 20 CAPSULE ORAL at 09:00

## 2021-05-19 RX ADMIN — ONDANSETRON 8 MG: 2 INJECTION INTRAMUSCULAR; INTRAVENOUS at 09:00

## 2021-05-19 RX ADMIN — SODIUM CHLORIDE, POTASSIUM CHLORIDE, SODIUM LACTATE AND CALCIUM CHLORIDE 125 ML/HR: 600; 310; 30; 20 INJECTION, SOLUTION INTRAVENOUS at 15:52

## 2021-05-19 RX ADMIN — FAMOTIDINE 20 MG: 10 INJECTION, SOLUTION INTRAVENOUS at 21:41

## 2021-05-19 NOTE — PROGRESS NOTES
High Risk Obstetrics Progress Note    Name: Syed Mckoy MRN: [id number]  SSN: [id number]    YOB: 1985  Age: 38 y.o. Sex: female      Subjective:      LOS: 7 days    Estimated Date of Delivery: 21   Gestational Age Today: 33w3d     Patient admitted for UTI s/p failed outpatient therapy in light of chronic eating disorder. States she does have mild contractions, mild headache , moderate nausea/vomiting and normal fetal movement and does not have vaginal bleeding  and vaginal leaking of fluid . Objective:     Vitals:  Blood pressure 104/64, pulse 77, temperature 98 °F (36.7 °C), resp. rate 14, height 5' 4\" (1.626 m), weight 73.9 kg (163 lb), last menstrual period 2020, SpO2 100 %, not currently breastfeeding. Temp (24hrs), Av.1 °F (36.7 °C), Min:97.9 °F (36.6 °C), Max:98.3 °F (24.4 °C)    Systolic (00SGF), RII:025 , Min:104 , XDB:222      Diastolic (53ZQZ), LPA:17, Min:63, Max:69       Intake and Output:         Physical Exam:  Patient without distress. Heart: Regular rate and rhythm  Lung: clear to auscultation throughout lung fields, no wheezes, no rales, no rhonchi and normal respiratory effort  Abdomen: soft, nontender, gravid  Lower Extremities:  - Edema No       Membranes:  Intact    NST pending today    Labs:   Recent Results (from the past 36 hour(s))   METABOLIC PANEL, COMPREHENSIVE    Collection Time: 21  9:08 AM   Result Value Ref Range    Sodium 138 136 - 145 mmol/L    Potassium 3.5 3.5 - 5.1 mmol/L    Chloride 110 (H) 97 - 108 mmol/L    CO2 22 21 - 32 mmol/L    Anion gap 6 5 - 15 mmol/L    Glucose 95 65 - 100 mg/dL    BUN 4 (L) 6 - 20 MG/DL    Creatinine 0.64 0.55 - 1.02 MG/DL    BUN/Creatinine ratio 6 (L) 12 - 20      GFR est AA >60 >60 ml/min/1.73m2    GFR est non-AA >60 >60 ml/min/1.73m2    Calcium 7.8 (L) 8.5 - 10.1 MG/DL    Bilirubin, total 0.3 0.2 - 1.0 MG/DL    ALT (SGPT) 17 12 - 78 U/L    AST (SGOT) 15 15 - 37 U/L    Alk.  phosphatase 165 (H) 45 - 117 U/L    Protein, total 6.2 (L) 6.4 - 8.2 g/dL    Albumin 2.1 (L) 3.5 - 5.0 g/dL    Globulin 4.1 (H) 2.0 - 4.0 g/dL    A-G Ratio 0.5 (L) 1.1 - 2.2     CBC W/O DIFF    Collection Time: 05/18/21  9:08 AM   Result Value Ref Range    WBC 6.6 3.6 - 11.0 K/uL    RBC 3.90 3.80 - 5.20 M/uL    HGB 9.0 (L) 11.5 - 16.0 g/dL    HCT 29.1 (L) 35.0 - 47.0 %    MCV 74.6 (L) 80.0 - 99.0 FL    MCH 23.1 (L) 26.0 - 34.0 PG    MCHC 30.9 30.0 - 36.5 g/dL    RDW 14.8 (H) 11.5 - 14.5 %    PLATELET 929 004 - 391 K/uL    MPV 11.7 8.9 - 12.9 FL    NRBC 0.0 0  WBC    ABSOLUTE NRBC 0.00 0.00 - 0.01 K/uL   URINALYSIS W/ REFLEX CULTURE    Collection Time: 05/18/21 10:54 AM    Specimen: Urine   Result Value Ref Range    Color YELLOW/STRAW      Appearance CLOUDY (A) CLEAR      Specific gravity 1.011 1.003 - 1.030      pH (UA) >8.5 (H) 5.0 - 8.0    Protein Negative NEG mg/dL    Glucose Negative NEG mg/dL    Ketone Negative NEG mg/dL    Bilirubin Negative NEG      Blood Negative NEG      Urobilinogen 0.2 0.2 - 1.0 EU/dL    Nitrites Negative NEG      Leukocyte Esterase Negative NEG      WBC 0-4 0 - 4 /hpf    RBC 0-5 0 - 5 /hpf    Epithelial cells FEW FEW /lpf    Bacteria 1+ (A) NEG /hpf    UA:UC IF INDICATED CULTURE NOT INDICATED BY UA RESULT CNI      Budding yeast PRESENT (A) NEG         Assessment and Plan:       Active Problems:    Pregnant (3/1/2018)      Eating disorder affecting pregnancy, antepartum (12/2/2020)      UTI in pregnancy, antepartum, third trimester (5/12/2021)       37 y/o [id number] at 33 3/7 weeks admitted for UTI s/p failed outpatient medical therapy due to chronic eating disorder     Urine culture 5/5/21 positive for Klebsiella and EColi   -afebrile and normal WBC's but right flank pain which has worsened since yesterday  -s/p Ceftriaxone  -UA with budding yeast-urine culture pending?     Chronic eating disorder  -ivf's  -spoke with Dr Grant Ortiz with [facility] partial hospitalization program here in [place]-pt does not qualify for this program as she requires high level of care that is available at [facility] [place] inpatient program in [facility]. I spoke with pt re: this option as they will accept pregnant patients.   -Again discussed need for nutrition as pt still not getting much of the Ensure in. We discussed placement of dobhoff by ENT to start enteral feeds which would possibly allow for her to be discharged and get this nutrition as an outpatient. We also discussed the option of PICC line placement with TPN as she has done before, but because of previous admission for line infection, she would need to stay inpatient to receive this nutrition. Patient currently overwhelmed with all of this information. She will consider options and let me know. She does understand the necessity to make a decision soon. Discussion of above continued today. Pt advised of the urgency to move forward with nutrition.    -s/p medicine consult-noted that pt had eating half sandwich and bag of chips yesterday- will have nursing monitor intake more closely.      Hypokalemia-resolved; recheck labs tomorrow     Cervical incompetence-cerclage in place     H/o prior  with successful      Severe depression/anxiety with h/o suicidal ideation  -followed closely by Helena Salvador her help  -continue zyprexa and prozac as well as prn prazosin and hydroxyzine     GERD  -famotidine 20mg bid     Ambulate  Daily NST     MFM scan on 5/3/21-59%tile and Vertex  -next appt 21     Anemia-hemoglobin at baseline; asymptomatic  -known beta thal minor  -consider Iv iron therapy if drops     CHTN-stable normal bp's off meds     DVT PPx-ambulation and mirtha hose

## 2021-05-19 NOTE — PROGRESS NOTES
Comprehensive Nutrition Assessment    Type and Reason for Visit: Reassess    Nutrition Recommendations/Plan:     Continue with blinded wt checks. Add MVI, folic acid and thiamin to IV fluids. RN to document oral intake in flow sheet or notes. Nutrition Assessment:        5/14/21:  Pt admitted for UTI and failed outpatient medical therapy due to chronic eating disorder. GA: 32w5d  Urine culture 5/5/21 positive for Klebsiella and EColi   Pt with 4.6 kg wt loss since discharge or 5.8% wt change over the past 6 weeks reflecting severe wt change. Pt only taking \"sips\" and \"spoonfulls\" at home. Always felt dizzy, nauseated and weak. Discussed that poor nutrition will produce above side effects. Unable to take prenatal MVI or oral KCL.    PMH: depression, eating disorder, hyperemesis, eating disorder, gallstones on last admission s/p GI and surgery workup insignificant with no plans for intervention. Note history of severe wt loss, restrictive eating, excessive exercising thus prompting 70-80# wt loss or 31% wt change reflecting severe wt change.  Pt with difficulty going to inpatient ED treatment facilty d/t insurance issues. However, Ms. Jose Alberto Hsu did receive nutrition therapy from [facility] outpatient ED dietitian. Currently reviewing possible outpt treatment at [facility] [place]. Previous admissions unable to place NGT and home TPN not successful as pt suspected of withholding IVF nutrition.      Suggest to trail oral supplements once more. Previously denied supplements d/t fear of vomiting and the taste of supplements once vomited. Recommended to try consuming oral supplements via 1 ounce medicine cups, slowly for a goal of at least 2 Ensure Enlive daily initially if able.      Pt does not wish to receive TPN as it is so limiting. However, if TPN started begin with AA5% D10 @ 1 liter and increase gradually to goal.      Monitor labs/lytes for trends as pt is at severe risk of refeeding.      5/19/21:  Pt with 4.3 kg wt increase since admit most d/t fluids. Pt is attempting to consume meals, usually one small meal daily to every other day. Attempting oral supplements and taking ~2 oz daily. Encouraged intake to consume at least 2 small meals a day and 2 oral supplements to prevent further nutrition support. Pt vacillating between TPN and enteral nutrition via Dobbhoff. Malnutrition Assessment:  Malnutrition Status:  Severe malnutrition    Context:  Chronic illness     Findings of the 6 clinical characteristics of malnutrition:   Energy Intake:  7 - 75% or less est energy requirements for 1 month or longer  Weight Loss:  7.00 - Greater than 10% over 6 months     Body Fat Loss:  7 - Severe body fat loss,     Muscle Mass Loss:  7 - Severe muscle mass loss,        Estimated Daily Nutrient Needs:  Energy (kcal): 8017-0499 kcal/day; Weight Used for Energy Requirements: Pre-pregnancy  Protein (g):  g/day  (1.2-1.4 g/day); Weight Used for Protein Requirements: Pre-pregnancy  Fluid (ml/day): 2400 ml; Method Used for Fluid Requirements: 1 ml/kcal       Wounds:    None       Current Nutrition Therapies:  DIET REGULAR  DIET NUTRITIONAL SUPPLEMENTS All Meals; Ensure Enlive  DIET ONE TIME MESSAGE    Anthropometric Measures:  · Height:  5' 4\" (162.6 cm)  · Current Body Wt:  73.9 kg (162 lb 14.7 oz)   · Admission Body Wt:  162 lb 14.7 oz    · Usual Body Wt:  68.5 kg (151 lb)     · Ideal Body Wt:  120 lbs:        Nutrition Diagnosis:   · Inadequate oral intake related to altered GI function, psychological cause or life stress as evidenced by intake 0-25%    · Increased nutrient needs related to increased demand for energy/nutrients as evidenced by pregnancy.        Nutrition Interventions:   Food and/or Nutrient Delivery: Continue current diet, Continue oral nutrition supplement  Nutrition Education and Counseling: No recommendations at this time  Coordination of Nutrition Care: Continue to monitor while inpatient    Goals:  Consume 1 supplement and 25% meals x 5-7 days. Nutrition Monitoring and Evaluation:   Behavioral-Environmental Outcomes: None identified  Food/Nutrient Intake Outcomes: Food and nutrient intake, Supplement intake  Physical Signs/Symptoms Outcomes: Biochemical data, GI status, Nausea/vomiting, Weight    Discharge Planning:     Too soon to determine     Electronically signed by Promise Ortiz RD on 5/19/2021 at 1:38 PM    Contact: Alexy

## 2021-05-19 NOTE — PROGRESS NOTES
Bedside and Verbal shift change report given to Dona BARNARD  (oncoming nurse) by Jovanni Ron RN  (offgoing nurse). Report included the following information SBAR, Kardex, Intake/Output, MAR and Recent Results. 5812- MD Mejía updated with urine culture. Lab will process culture now. 1000- ** Breakfast Intake**: 30 mL of strawberry ensure, 1/2 fruit cup     1018- pt placed on fetal monitors. 1044- pt removed from fetal monitors, request to use bathroom. NST reactive. 1320- pt. Ambulated in hallway for 10 mins.     1345 - ** Lunch Intake**: Did not eat lunch tray or supplement ensure

## 2021-05-20 PROCEDURE — 65410000002 HC RM PRIVATE OB

## 2021-05-20 PROCEDURE — 74011250636 HC RX REV CODE- 250/636: Performed by: OBSTETRICS & GYNECOLOGY

## 2021-05-20 PROCEDURE — 74011250637 HC RX REV CODE- 250/637: Performed by: OBSTETRICS & GYNECOLOGY

## 2021-05-20 PROCEDURE — 59025 FETAL NON-STRESS TEST: CPT

## 2021-05-20 PROCEDURE — 74011000250 HC RX REV CODE- 250: Performed by: OBSTETRICS & GYNECOLOGY

## 2021-05-20 RX ADMIN — ONDANSETRON 8 MG: 2 INJECTION INTRAMUSCULAR; INTRAVENOUS at 15:16

## 2021-05-20 RX ADMIN — SODIUM CHLORIDE, POTASSIUM CHLORIDE, SODIUM LACTATE AND CALCIUM CHLORIDE 125 ML/HR: 600; 310; 30; 20 INJECTION, SOLUTION INTRAVENOUS at 21:39

## 2021-05-20 RX ADMIN — ONDANSETRON 8 MG: 2 INJECTION INTRAMUSCULAR; INTRAVENOUS at 08:41

## 2021-05-20 RX ADMIN — FAMOTIDINE 20 MG: 10 INJECTION, SOLUTION INTRAVENOUS at 10:55

## 2021-05-20 RX ADMIN — FLUOXETINE 60 MG: 20 CAPSULE ORAL at 08:41

## 2021-05-20 RX ADMIN — MICONAZOLE NITRATE 200 MG: 200 SUPPOSITORY VAGINAL at 21:25

## 2021-05-20 RX ADMIN — OLANZAPINE 10 MG: 5 TABLET, FILM COATED ORAL at 21:17

## 2021-05-20 RX ADMIN — ONDANSETRON 8 MG: 2 INJECTION INTRAMUSCULAR; INTRAVENOUS at 21:16

## 2021-05-20 RX ADMIN — FAMOTIDINE 20 MG: 10 INJECTION, SOLUTION INTRAVENOUS at 21:17

## 2021-05-20 RX ADMIN — PROMETHAZINE HYDROCHLORIDE 25 MG: 25 SUPPOSITORY RECTAL at 20:38

## 2021-05-20 NOTE — PROGRESS NOTES
5184 - Transition of Care  (LESLIE) Plan:        RUR:  25 %            Disposition: TBD/subject to change pending recommendations; pending medical progression     -- Chart indicates greater than 48 hours     -- 39 y/o [id number] at 33 1/7 weeks admitted for UTI s/p failed outpatient medical therapy due to chronic eating disorder     -- Anticipate home once medically stable     -- Dr. Mikaela Mcclellan closely following - OBGYN (see notes)     -- Dobhoff placement for nutrition pending - Dr. Darlin Arora - ENT consult in. .      PCP followup: Sherill Jeans, NP      Transport: TBD     CM will continue to follow and assist with LESLIE needs as they arise. Available on [employer].  Tammie  MSN, [address] [facility], RN, [facility] - [phone]

## 2021-05-20 NOTE — PROGRESS NOTES
Indication: AMA Multigravida 3rd Tri O09.523.   ____________________________________________________________________________  History: Age: 38 years. Maternal age at [facility] 37: 38 years. : 14 Para: 7. Current Pregnancy: Pre- pregnancy data: Weight 138 lbs. Height 5 ft 5 ins. BMI 23.0. Obstetric History: Mode of last delivery:  Section x 1 with  x 1.  ____________________________________________________________________________  Dating:  Stated EDC:  EDC: 21 GA by stated EDC: 33w4d  Best Overall Assessment: 21 EDC: 21 Assessed GA: 33w4d  The Best Overall Assessment is based on the stated EDC.  ____________________________________________________________________________  General Evaluation:  Fetal heart activity: present. Fetal heart rate: 152 bpm.   Presentation: cephalic. Fetal movement: visible. Amniotic Fluid: normal. ANDREE  20.3 cm. Maximal vertical pocket 6.3 cm. Cord: 3 vessels. Fetal cord insertion site: Normal.   Placenta: anterior. ____________________________________________________________________________  Anatomy Scan:  Mann gestation. Fetal Anatomy:  Visualized with normal appearance: head, brain, face, abdominal wall, gastrointestinal tract, kidneys, bladder. Heart: The 4-chamber view was obtained but outflow tracts could not be adequately visualized. Summary of Ultrasound Findings:  Transabdominal US. U/S machine: Adore Me E8 Expert. U/S view: limited by late gestational age.     ____________________________________________________________________________  Fetal Wellbeing Assessment:  Amniotic fluid: normal. ANDREE: 20.3 cm. MVP: 6.3 cm. Q1: 4.1 cm. Q2: 5.7 cm. Q3: 6.3 cm. Q4: 4.2 cm. Biophysical Profile: Fetal body movements: normal (2), Fetal tone: normal (2), Fetal breathing movements: normal (2), Amniotic fluid volume: normal (2). Score 8 / 8.      Summary: Reassuring fetal status. ____________________________________________________________________________  Report Summary:  Impression: Ms. Shannan Webster is a 42yo  at 33w4 admitted for pyelonephritis (couldn't tolerate outpatient antibiotic tx) and hyperemesis gravidarum in the background of a chronic eating disorder. She has failed multiple anti-emetic regimens. PMH is also notable for depression with suicidal ideation, cervical incompetence with cerclage in place, CHTN stable without meds. The patient had a PICC placed previously bubt is nervous about having one placed again because she developed a line infection. She had a few attempts at [facility] placement but could not tolerate it. The patient reports that the plan is to try Dobhoff again today. Today she reports that she is not tolerating anything PO other than a small amount of fruit this morning. ULTRASOUND:    On US two weeks ago, EFW was 59th centile. Today we see a cephalic SLIUP with normal fluid. BPP is 8/8. Reassuring fetal surveillance. Recommendations: Interdisciplinary management with GI/nutritionist, psychiatry and counseling is recommended. MFM will continue to follow. If patient remains in hospital, cont weekly BPP. Otherwise recheck growth in 2 weeks. It is reasonable to remove the cerclage electively anytime after 36w0. Dobhoff is preferred to PICC in pregnancy because of the risk of line sepsis (including candidemia) with PICC. If PICC for TPN is utilized, I agree with the patient that inpatient management may be preferred to that she can be monitored for si/sx of infection. Delivery at 38w0 for [facility] controlled without meds (since HEG is not a listed indication per ACOG , although we may deliver earlier for other indications) is recommended in the absence of earlier indications.

## 2021-05-20 NOTE — PROGRESS NOTES
High Risk Obstetrics Progress Note    Name: Keyshawn Shah MRN: [id number]  SSN: [id number]    YOB: 1985  Age: 38 y.o. Sex: female      Subjective:      LOS: 8 days    Estimated Date of Delivery: 21   Gestational Age Today: 33w4d     Patient admitted for UTI s/p failed outpatient management due to chronic eating disorder. States she does have moderate nausea/vomiting and normal fetal movement and does not have headache , vaginal bleeding  and vaginal leaking of fluid . Objective:     Vitals:  Blood pressure 106/67, pulse (!) 52, temperature 98 °F (36.7 °C), resp. rate 17, height 5' 4\" (1.626 m), weight 79 kg (174 lb 3.2 oz), last menstrual period 2020, SpO2 99 %, not currently breastfeeding. Temp (24hrs), Av °F (36.7 °C), Min:97.5 °F (36.4 °C), Max:98.3 °F (99.4 °C)    Systolic (12XMN), WSX:156 , Min:104 , NQJ:677      Diastolic (78XFL), [place]:08, Min:56, Max:76       Intake and Output:         Physical Exam:  Patient without distress. Heart: Regular rate and rhythm  Lung: clear to auscultation throughout lung fields, no wheezes, no rales, no rhonchi and normal respiratory effort  Abdomen: soft, nontender, gravid  Lower Extremities:  - Edema No       Membranes:  Intact    Uterine Activity:  None    Fetal Heart Rate:  Reactive on NST 21  Baseline: 120s per minute  Variability: moderate  Accelerations: yes  Decelerations: none        Labs: No results found for this or any previous visit (from the past 36 hour(s)). Assessment and Plan:       Active Problems:    Pregnant (3/1/2018)      Eating disorder affecting pregnancy, antepartum (2020)      UTI in pregnancy, antepartum, third trimester (2021)       39 y/o [id number] at 33 4/7 weeks admitted for UTI s/p failed outpatient medical therapy due to chronic eating disorder     Urine culture 21 positive for Klebsiella and EColi   -afebrile and normal WBC's but still with right flank pain   -s/p Ceftriaxone  -UA with budding yeast-miconazole vaginal for suspected vaginal contamination of yeast  -urine culture pending      Chronic eating disorder  -ivf's  -spoke with Dr Matthew Garcia with [facility] partial hospitalization program here in [place]-pt does not qualify for this program as she requires high level of care that is available at [facility] inpatient program in [facility]. I spoke with pt re: this option as they will accept pregnant patients.   -Again discussed need for nutrition as pt still not getting much of the Ensure in. We discussed placement of dobhoff by ENT to start enteral feeds which would possibly allow for her to be discharged and get this nutrition as an outpatient. We also discussed the option of PICC line placement with TPN as she has done before, but because of previous admission for line infection, she would need to stay inpatient to receive this nutrition.  Spoke with pt again re: her nutrition. I am recommending pt try dobhoff placement as she is having a difficult time making her own decision and I feel this would be less risk and give her the option for discharge. She has verbalized her agreement with this trial. Will consult Dr Sandra Garcia with ENT who has seen pt in prior admission.    -s/p medicine consult     Cervical incompetence-cerclage in place     H/o prior  with successful      Severe depression/anxiety with h/o suicidal ideation  -followed closely by La Browne her help  -continue zyprexa and prozac as well as prn prazosin and hydroxyzine     GERD  -famotidine 20mg bid     Ambulate  Daily NST     MFM scan on 5/3/21-59%tile and Vertex  -next appt today at 9am     Anemia-hemoglobin at baseline; asymptomatic  -known beta thal minor  -consider Iv iron therapy if drops     CHTN-stable normal bp's off meds     DVT PPx-ambulation and mirtha hose

## 2021-05-20 NOTE — PROGRESS NOTES
Bedside and Verbal shift change report given to [address] (oncoming nurse) by Deacon Abreu RN (offgoing nurse). Report included the following information SBAR, ED Summary, Procedure Summary, Intake/Output, MAR and Recent Results. 1700: pt ambulated in sr for 10 minutes then took a shower. 1900: **Dinner Intake*: pt did not eat off tray    2100: pt ambulated in sr for 10 minutes    2200: oral intake of 30mL of ensure.

## 2021-05-20 NOTE — PROGRESS NOTES
Bedside and Verbal shift change report given to Khang Carlson, RN and Venu Estrella, RN (oncoming nurse) by Carlo Murphy, AKIL (offgoing nurse). Report included the following information SBAR, Kardex, Intake/Output, MAR and Recent Results. 0840: Patient vomited 200cc     0900: Patient to [facility].    9220: Patient returned to floor. 1000: Otolaryngology consult called.  stated the consult would happen today. 1015: Patient did not eat anything for breakfast.    1245: Oral intake of 30mL of ensure. 1733: Patient ambulated in sr for 10 minutes. 1800: Patient did not eat anything for dinner.

## 2021-05-21 ENCOUNTER — APPOINTMENT (OUTPATIENT)
Dept: ULTRASOUND IMAGING | Age: 36
DRG: 542 | End: 2021-05-21
Attending: OBSTETRICS & GYNECOLOGY
Payer: MEDICAID

## 2021-05-21 PROCEDURE — 74011250636 HC RX REV CODE- 250/636: Performed by: OBSTETRICS & GYNECOLOGY

## 2021-05-21 PROCEDURE — 76770 US EXAM ABDO BACK WALL COMP: CPT

## 2021-05-21 PROCEDURE — 74011250637 HC RX REV CODE- 250/637: Performed by: OBSTETRICS & GYNECOLOGY

## 2021-05-21 PROCEDURE — 74011000250 HC RX REV CODE- 250: Performed by: OBSTETRICS & GYNECOLOGY

## 2021-05-21 PROCEDURE — 76819 FETAL BIOPHYS PROFIL W/O NST: CPT | Performed by: OBSTETRICS & GYNECOLOGY

## 2021-05-21 PROCEDURE — 59025 FETAL NON-STRESS TEST: CPT

## 2021-05-21 PROCEDURE — 65270000029 HC RM PRIVATE

## 2021-05-21 PROCEDURE — 74011000250 HC RX REV CODE- 250: Performed by: OTOLARYNGOLOGY

## 2021-05-21 RX ORDER — NIFEDIPINE 10 MG/1
10 CAPSULE ORAL ONCE
Status: COMPLETED | OUTPATIENT
Start: 2021-05-21 | End: 2021-05-21

## 2021-05-21 RX ORDER — LIDOCAINE HYDROCHLORIDE 20 MG/ML
10 INJECTION, SOLUTION INFILTRATION; PERINEURAL ONCE
Status: DISPENSED | OUTPATIENT
Start: 2021-05-21 | End: 2021-05-22

## 2021-05-21 RX ORDER — VANCOMYCIN HYDROCHLORIDE
1250 EVERY 12 HOURS
Status: DISCONTINUED | OUTPATIENT
Start: 2021-05-22 | End: 2021-05-23

## 2021-05-21 RX ORDER — BETAMETHASONE SODIUM PHOSPHATE AND BETAMETHASONE ACETATE 3; 3 MG/ML; MG/ML
12 INJECTION, SUSPENSION INTRA-ARTICULAR; INTRALESIONAL; INTRAMUSCULAR; SOFT TISSUE EVERY 24 HOURS
Status: COMPLETED | OUTPATIENT
Start: 2021-05-21 | End: 2021-05-22

## 2021-05-21 RX ORDER — BUTORPHANOL TARTRATE 1 MG/ML
1 INJECTION INTRAMUSCULAR; INTRAVENOUS ONCE
Status: COMPLETED | OUTPATIENT
Start: 2021-05-21 | End: 2021-05-21

## 2021-05-21 RX ORDER — LIDOCAINE HYDROCHLORIDE 40 MG/ML
SOLUTION TOPICAL ONCE
Status: COMPLETED | OUTPATIENT
Start: 2021-05-21 | End: 2021-05-21

## 2021-05-21 RX ORDER — NIFEDIPINE 10 MG/1
10 CAPSULE ORAL EVERY 6 HOURS
Status: DISCONTINUED | OUTPATIENT
Start: 2021-05-22 | End: 2021-05-22

## 2021-05-21 RX ORDER — OXYMETAZOLINE HCL 0.05 %
2 SPRAY, NON-AEROSOL (ML) NASAL
Status: DISPENSED | OUTPATIENT
Start: 2021-05-21 | End: 2021-05-22

## 2021-05-21 RX ORDER — VANCOMYCIN 1.75 GRAM/500 ML IN 0.9 % SODIUM CHLORIDE INTRAVENOUS
1750 ONCE
Status: COMPLETED | OUTPATIENT
Start: 2021-05-21 | End: 2021-05-21

## 2021-05-21 RX ADMIN — BUTORPHANOL TARTRATE 1 MG: 1 INJECTION, SOLUTION INTRAMUSCULAR; INTRAVENOUS at 21:45

## 2021-05-21 RX ADMIN — SODIUM CHLORIDE, POTASSIUM CHLORIDE, SODIUM LACTATE AND CALCIUM CHLORIDE 125 ML/HR: 600; 310; 30; 20 INJECTION, SOLUTION INTRAVENOUS at 05:05

## 2021-05-21 RX ADMIN — OLANZAPINE 10 MG: 5 TABLET, FILM COATED ORAL at 21:16

## 2021-05-21 RX ADMIN — FLUOXETINE 60 MG: 20 CAPSULE ORAL at 09:08

## 2021-05-21 RX ADMIN — ONDANSETRON 8 MG: 2 INJECTION INTRAMUSCULAR; INTRAVENOUS at 16:42

## 2021-05-21 RX ADMIN — ACETAMINOPHEN 650 MG: 650 SUPPOSITORY RECTAL at 16:40

## 2021-05-21 RX ADMIN — ONDANSETRON 8 MG: 2 INJECTION INTRAMUSCULAR; INTRAVENOUS at 11:22

## 2021-05-21 RX ADMIN — ACETAMINOPHEN 650 MG: 650 SUPPOSITORY RECTAL at 10:29

## 2021-05-21 RX ADMIN — DIPHENHYDRAMINE HYDROCHLORIDE 25 MG: 50 INJECTION, SOLUTION INTRAMUSCULAR; INTRAVENOUS at 17:43

## 2021-05-21 RX ADMIN — FAMOTIDINE 20 MG: 10 INJECTION, SOLUTION INTRAVENOUS at 09:08

## 2021-05-21 RX ADMIN — LIDOCAINE HYDROCHLORIDE: 40 SOLUTION TOPICAL at 18:31

## 2021-05-21 RX ADMIN — FAMOTIDINE 20 MG: 10 INJECTION, SOLUTION INTRAVENOUS at 21:16

## 2021-05-21 RX ADMIN — NIFEDIPINE 10 MG: 10 CAPSULE ORAL at 21:45

## 2021-05-21 RX ADMIN — FOLIC ACID: 5 INJECTION, SOLUTION INTRAMUSCULAR; INTRAVENOUS; SUBCUTANEOUS at 12:45

## 2021-05-21 RX ADMIN — BETAMETHASONE SODIUM PHOSPHATE AND BETAMETHASONE ACETATE 12 MG: 3; 3 INJECTION, SUSPENSION INTRA-ARTICULAR; INTRALESIONAL; INTRAMUSCULAR at 23:24

## 2021-05-21 RX ADMIN — SODIUM CHLORIDE, POTASSIUM CHLORIDE, SODIUM LACTATE AND CALCIUM CHLORIDE 1000 ML: 600; 310; 30; 20 INJECTION, SOLUTION INTRAVENOUS at 20:16

## 2021-05-21 RX ADMIN — VANCOMYCIN HYDROCHLORIDE 1750 MG: 10 INJECTION, POWDER, LYOPHILIZED, FOR SOLUTION INTRAVENOUS at 18:25

## 2021-05-21 RX ADMIN — MICONAZOLE NITRATE 200 MG: 200 SUPPOSITORY VAGINAL at 21:11

## 2021-05-21 RX ADMIN — ONDANSETRON 8 MG: 2 INJECTION INTRAMUSCULAR; INTRAVENOUS at 05:03

## 2021-05-21 NOTE — PROGRESS NOTES
High Risk Obstetrics Progress Note    Name: Violeta Stewart MRN: [id number]  SSN: [id number]    YOB: 1985  Age: 38 y.o. Sex: female      Subjective:      LOS: 9 days    Estimated Date of Delivery: 21   Gestational Age Today: 33w5d     Patient admitted for UTI s/p failed outpatient therapy due to chronic eating disorder. States she does have mild contractions, moderate nausea/vomiting and normal fetal movement and does not have chest pain, shortness of breath, vaginal bleeding  and vaginal leaking of fluid . Pt reports a lot of pain in right flank/side/back region today. Objective:     Vitals:  Blood pressure (!) 110/52, pulse (!) 57, temperature 98.2 °F (36.8 °C), resp. rate 14, height 5' 4\" (1.626 m), weight 79.7 kg (175 lb 9.6 oz), last menstrual period 2020, SpO2 99 %, not currently breastfeeding. Temp (24hrs), Av.1 °F (36.7 °C), Min:98 °F (36.7 °C), Max:98.2 °F (62.1 °C)    Systolic (81PEQ), RYV:550 , Min:106 , GRAHAM:550      Diastolic (08WJE), QJI:61, Min:52, Max:72       Intake and Output:         Physical Exam:  Patient without distress. Heart: Regular rate and rhythm  Lung: clear to auscultation throughout lung fields, no wheezes, no rales, no rhonchi and normal respiratory effort  Abdomen: soft, nontender, gravid  Lower Extremities:  - Edema No       Membranes:  Intact    Uterine Activity:  1 contraction on NST yesterday    Fetal Heart Rate:  Reactive on NST 21  Baseline: 120s per minute  Variability: moderate  Accelerations: yes  Decelerations: none        Labs: No results found for this or any previous visit (from the past 36 hour(s)). Assessment and Plan:       Active Problems:    Pregnant (3/1/2018)      Eating disorder affecting pregnancy, antepartum (2020)      UTI in pregnancy, antepartum, third trimester (2021)       37 y/o [id number] at 33 5/7 weeks admitted for UTI s/p failed outpatient medical therapy due to chronic eating disorder     Urine culture 21 positive for Klebsiella and EColi   -afebrile and normal WBC's but still with right flank pain-worse today  -s/p Ceftriaxone  -UA with budding yeast-miconazole vaginal for suspected vaginal contamination of yeast  -urine culture pending  -renal ultrasound today      Chronic eating disorder  -ivf's  -spoke with Dr Cathy Chu with [facility] partial hospitalization program here in [place]-pt does not qualify for this program as she requires high level of care that is available at [facility] inpatient program in [facility]. I spoke with pt re: this option as they will accept pregnant patients.   -Previously discussed need for nutrition as pt still not getting much of the Ensure in. We discussed placement of dobhoff by ENT to start enteral feeds which would possibly allow for her to be discharged and get this nutrition as an outpatient. We also discussed the option of PICC line placement with TPN as she has done before, but because of previous admission for line infection, she would need to stay inpatient to receive this nutrition.  Spoke with pt again re: her nutrition. I am recommending pt try dobhoff placement as she is having a difficult time making her own decision and I feel this would be less risk and give her the option for discharge.   She has verbalized her agreement with this trial. Will consult Dr Jose F David with ENT who has seen pt in prior admission. -Dr Anish Low to come after office today.  -will start enteral feeds after placement of dobhoff   -s/p medicine consult  -start Goodie bag daily     Cervical incompetence-cerclage in place     H/o prior  with successful      Severe depression/anxiety with h/o suicidal ideation  -followed closely by Sarah Vance her help  -continue zyprexa and prozac as well as prn prazosin and hydroxyzine     GERD  -famotidine 20mg bid     Ambulate  Daily NST     MFM scan on 5/3/21-59%tile and Vertex  -next appt today at [address] [facility] at baseline; asymptomatic  -known beta thal minor  -consider Iv iron therapy if drops     CHTN-stable normal bp's off meds     DVT PPx-ambulation and mirtha hose

## 2021-05-21 NOTE — PROGRESS NOTES
Bedside shift change report given to Tristen Gomez (oncoming nurse) by Venu Estrella (offgoing nurse). Report included the following information SBAR, Kardex, Intake/Output and MAR.     2130:  [address] monitored. 135-150 heart rate. 2300: Pt stated she has had no more of her ensure.

## 2021-05-21 NOTE — PROGRESS NOTES
Pharmacist Note - Vancomycin Dosing    Consult provided for this 38 y.o. female for indication of UTI. Antibiotic regimen(s): vancomycin  Patient on vancomycin PTA? NO     No results for input(s): WBC, CREA, BUN in the last 72 hours. Frequency of BMP: daily x3  Height: 162.6 cm  Weight: 79.7 kg  Est CrCl: 112 ml/min; UO: - ml/kg/hr  Temp (24hrs), Av.1 °F (36.7 °C), Min:98 °F (36.7 °C), Max:98.2 °F (36.8 °C)    Cultures:   urine  >100K possible enterococcus    Goal trough = 10 - 15 mcg/mL    Therapy will be initiated with a loading dose of 1750 mg IV x 1 to be followed by a maintenance dose of 1250 mg IV every 12 hours. Pharmacy to follow patient daily and order levels / make dose adjustments as appropriate.

## 2021-05-21 NOTE — PROGRESS NOTES
2680 Bedside and Verbal shift change report given to Rusty Landry RN (oncoming nurse) by  Capri Longoria RN (offgoing nurse). Report included the following information SBAR.     [address] [facility] for consult - no record of previous consult. Dr. David Whitehead consulted. Plans to see pt today. 0% of breakfast eaten. [address] [facility] with Richard Sethi MD regarding Dobhoff. Recommendation made for IR as MD is unavailable until after clinic.  4914 Notified OB who reiterates importance of ENT inserting Dobhoff. Jorge Starr called back stating they are not consulting at [facility] today. [address] [facility] - MDs are out of the office today. [phone] [facility] is on call. No one is covering - notified OB. 7893 Only lunch consumed was a few sips of ginger ale.

## 2021-05-21 NOTE — PROGRESS NOTES
Comprehensive Nutrition Assessment    Type and Reason for Visit: Reassess    Nutrition Recommendations/Plan:     If able to begin enteral feedings start with Osmolite 1.2 @ 30 ml/hr     Check lytes (phos, mag, potassium) daily for trends prior to advancing enteral feedings as pt is at high risk for refeeding. If feedings tolerated today increase by 20 ml/hr daily towards 70 ml/hr. Flush with 105 ml water every 4 hours. Once enteral feedings initiated, adjust IVF. Continue MVI, thiamine, folic acid in IVF. Continue with blinded wt checks. If unable to place Dobhoff, begin TPN:   Day 1:  AA 5% D10 @ 1 liter  Day 2: AA 5% D10 @ 2 liters  Day 3: Add 250 ml daily IV Lipids  Day 4: AA 6% D15% @ 2 liters with daily IVL  Day 5: AA 6% D20% @ 2 liters with daily IVL  Add MVI daily to TPN. Nutrition Assessment:     Pt for Dobbhoff placement today. MVI, thiamine and folic acid started via IVF. If able to begin enteral feedings start with Osmolite 1.2 @ 30 ml/hr and increase daily towards nutritional goals of 70 ml/hr. This will provide: 1680 ml, 2016 calories, 92 g protein and 1377 ml free fluids. Suggest to add 105 ml water flush every 4 hours to provide 2000 ml total fluid. Once labs stable and enteral feedings tolerated, reduce time and condense feedings to run over 12-16 hours a day. Estimated Daily Nutrient Needs:  Energy (kcal): 4097-6364 kcal/day; Weight Used for Energy Requirements: Pre-pregnancy  Protein (g):  g/day  (1.2-1.4 g/day);  Weight Used for Protein Requirements: Pre-pregnancy  Fluid (ml/day): 2400 ml; Method Used for Fluid Requirements: 1 ml/kcal      Anthropometric Measures:  · Height:  5' 4\" (162.6 cm)  · Current Body Wt:  73.9 kg (162 lb 14.7 oz)   · Admission Body Wt:  162 lb 14.7 oz    · Usual Body Wt:  68.5 kg (151 lb)     · Ideal Body Wt:  120 lbs:        Nutrition Interventions:   Food and/or Nutrient Delivery: Continue current diet, Continue oral nutrition supplement  Nutrition Education and Counseling: No recommendations at this time  Coordination of Nutrition Care: Continue to monitor while inpatient    Goals:  Consume 1 supplement and 25% meals x 5-7 days. Nutrition Monitoring and Evaluation:   Behavioral-Environmental Outcomes: None identified  Food/Nutrient Intake Outcomes: Food and nutrient intake, Supplement intake  Physical Signs/Symptoms Outcomes: Biochemical data, GI status, Nausea/vomiting, Weight    Discharge Planning:     Too soon to determine     Electronically signed by Aditya Thorpe RD on 5/21/2021 at 3:47 PM    Contact: Alexy

## 2021-05-21 NOTE — CONSULTS
Otolaryngology-Head and Neck Surgery Consult    Patient: Jaclyn Aden    : 1985     MRN: [id number]  Date of Service: 21    Consult requested by: Marylen Mannheim, DO    Reason for Consultation:  Need for enteral access    History of Present Illness: Jaclyn Aden is a 38 y.o. female seen in consultation for enteral access via dobhoff. H/o nasal polyposis. Pregnant and with eating disorder. She has agreed to try a feeding tube. Im told that [facility] ent refused the consult. Dr. Eben Keita called Dr. Michael Villanueva and indicated that the baby was at risk and I agreed to come and try placement. Past Medical History:  has a past medical history of Acute pyelonephritis (3/3/2021), Anorexia, Anxiety, Asthma, Avoidant-restrictive food intake disorder (ARFID), Back pain, chronic (), Chronic bilateral low back pain with right-sided sciatica (2020), GERD (gastroesophageal reflux disease) (2020), Gestational hypertension, OTHER MEDICAL, Hyperemesis, Hypertension, Iron deficiency anemia (10/08/2010), MDD (major depressive disorder), single episode with postpartum onset (2013), Orthostatic hypotension (2020), Plantar fasciitis, Pregnancy, and PTSD (post-traumatic stress disorder).  She also has no past medical history of Abnormal Pap smear, Abnormal Papanicolaou smear of cervix, Acquired hypothyroidism, Chlamydia, Complication of anesthesia, Diabetes ([address] Diabetes mellitus, Essential hypertension, Genital herpes, unspecified, Gestational diabetes, Gonorrhea, Heart abnormalities, Heart abnormality, Herpes gestationis, Herpes simplex without mention of complication, Human immunodeficiency virus (HIV) disease ([address] Infertility, Infertility, female, Liver disease, Phlebitis and thrombophlebitis of unspecified site, Pituitary disorder ([address] Polycystic disease, ovaries, Rhesus isoimmunization unspecified as to episode of care in pregnancy, Sickle cell trait syndrome ([address] Sickle-cell disease, unspecified, Syphilis, Systemic lupus erythematosus ([facility] [address] [facility] Thyroid activity decreased, Trauma, Unspecified breast disorder, or Unspecified epilepsy without mention of intractable epilepsy. Past Surgical History:  has a past surgical history that includes pr  delivery only; hx gyn; hx gyn; hx  section (4/22/15); hx other surgical; hx other surgical; hx dilation and curettage; and hx dilation and curettage (2020). Medications:     Current Facility-Administered Medications:     0.9% sodium chloride 1,000 mL with mvi (adult no. 4 with vit K) 10 mL, thiamine 475 mg, folic acid 1 mg infusion, , IntraVENous, Q24H, Arturo Mejía DO, Last Rate: 125 mL/hr at 21 1245, New Bag at 21 1245    vancomycin (VANCOCIN) 1,195.5 mg in 0.9% sodium chloride 250 mL IVPB, 15 mg/kg, IntraVENous, Q12H, Zakiya Leblanc MD    oxymetazoline (AFRIN) 0.05 % nasal spray 2 Spray, 2 Spray, Both Nostrils, ONCE PRN, Adrianna Zamarripa MD    lidocaine (XYLOCAINE) 4 % (40 mg/mL) topical solution, , Topical, ONCE, Antoine Gordillo MD    lidocaine (XYLOCAINE) 20 mg/mL (2 %) injection 200 mg, 10 mL, Topical, ONCE, Antoine Gordillo MD    miconazole (MICOTIN) 200 mg vaginal suppository 200 mg, 200 mg, Vaginal, QHS, Arturo Mejía DO, 200 mg at 21 2125    lactated Ringers infusion, 125 mL/hr, IntraVENous, CONTINUOUS, Hernan Carson MD, Last Rate: 125 mL/hr at 21 0505, 125 mL/hr at 21 0505    famotidine (PF) (PEPCID) 20 mg in 0.9% sodium chloride 10 mL injection, 20 mg, IntraVENous, Q12H, Hernan Carson MD, 20 mg at 21 0908    acetaminophen (TYLENOL) suppository 650 mg, 650 mg, Rectal, Q6H PRN, David Fountain DO, 650 mg at 21 1640    diphenhydrAMINE (BENADRYL) injection 25 mg, 25 mg, IntraVENous, Q6H PRN, Arturo Mejía DO    OLANZapine (ZyPREXA) tablet 10 mg, 10 mg, Oral, QHS, Arturo Mejía, , 10 mg at 21    FLUoxetine (PROzac) capsule 60 mg, 60 mg, Oral, DAILY, [place] Lacer, DO, 60 mg at 05/21/21 0908    prazosin (MINIPRESS) capsule 2 mg, 2 mg, Oral, QHS PRN, [place] Lacer, DO    ondansetron [facility]) injection 8 mg, 8 mg, IntraVENous, Q6H PRN, Arturo Mejía, DO, 8 mg at 05/21/21 1642    promethazine (PHENERGAN) suppository 25 mg, 25 mg, Rectal, Q6H PRN, [place] Lacer, DO, 25 mg at 05/20/21 2038    hydrOXYzine HCL (ATARAX) tablet 50 mg, 50 mg, Oral, TID PRN, [place] Lacer, DO    Allergies: Allergies   Allergen Reactions    Labetalol Hives    Ceclor [Cefaclor] Unknown (comments)    Pcn [Penicillins] Hives    Septra [Sulfamethoprim Ds] Unknown (comments)        Social History:   Social History     Tobacco Use    Smoking status: Never Smoker    Smokeless tobacco: Never Used   Substance Use Topics    Alcohol use: Not Currently        Family History:   Family History   Problem Relation Age of Onset   Mariah Kuhn Arthritis-rheumatoid Mother     Asthma Mother     No Known Problems Father     No Known Problems Maternal Grandmother     No Known Problems Maternal Grandfather     No Known Problems Paternal Grandmother     No Known Problems Paternal Grandfather     Asthma Son     Alcohol abuse Neg Hx     Arthritis-osteo Neg Hx     Bleeding Prob Neg Hx     Cancer Neg Hx     Diabetes Neg Hx     Elevated Lipids Neg Hx     Headache Neg Hx     Heart Disease Neg Hx     Hypertension Neg Hx     Lung Disease Neg Hx     Migraines Neg Hx     Psychiatric Disorder Neg Hx     Stroke Neg Hx     Mental Retardation Neg Hx     Anesth Problems Neg Hx          Physical Examination:     Vital Signs:   Visit Vitals  BP (!) 110/52 (BP 1 Location: Right upper arm, BP Patient Position: At rest;Lying left side)   Pulse (!) 57   Temp 98.2 °F (36.8 °C)   Resp 14   Ht 5' 4\" (1.626 m)   Wt 79.7 kg (175 lb 9.6 oz)   LMP 09/27/2020 (Exact Date)   SpO2 99%   Breastfeeding No   BMI 30.14 kg/m²       Constitutional: The patient is in no acute distress.   Psychiatric: The patient is alert and appropriate. Nose: Septal spur bilaterally. There is cephalic obstruction on the left ? polyp. Oral Cavity and Oropharynx/Gastrointestinal:   No lesions of the tongue, mucosa, roof or floor of mouth. Tongue is midline and mobile. Palate elevates symmetrically. Respiratory: There is normal work of breathing without stridor or stertor. No signs of airway compromise. Pregnant    Procedure: Placement of feeding tube. Topical lidocaine and decongestant applied on cotton balls. Then a dobhoff was passed through the right nare successfully to the pharynx. She began gagging and demanding that it be removed. Despite much coaxing she insisted that I remove it. It was removed. Assessment:  1. Pregnant  2. Eating disorder-need for enteral access. 3. Nasal obstruction  4. Nasal mass  5. Septal deviation    Plan:  The nasal mass has been evaluated by Dr. Nikia Ochoa previously. I cant see the MRI now and a nasal endoscope was not needed for tube placement. I was able to pass the tube through the nose without issue but unfortunately she wont allow placement. She should follow-up following her pregnancy for treatment of the nasal mass. Although Im not on call for [facility] I have and will continue to make myself available for the health of an infant.     Mitzi Willams MD

## 2021-05-21 NOTE — PROGRESS NOTES
Dr. Lin Race into see pt to insert tube for feedings. Pt did not tolerate procedure  to stop ask to stop and remove tube pt was gagging and to reach out to MD hand to stop. Procedure stopped and tube removed. MD did not try again pt refused. Doctor talked with Dr. Paco Martin. No new orders written.

## 2021-05-21 NOTE — PROGRESS NOTES
Transition of Care Plan:        RUR:  28 %            Disposition: TBD- pending medical progression    Dobhoff  Placement later today    Patient will need HHC and Enteral supplies if goes home with Tube Feeding     -- Chart indicates greater than 48 hours     -- 36 y/o [id number] at 33 1/7 weeks admitted for UTI s/p failed outpatient medical therapy due to chronic eating disorder        -- Dr. Cecille Kulkarni closely following - OBGYN (see notes)        PCP followup: Corina Oliver, NP      Transport: TBD   CM will continue to follow and assist with LESLIE needs as they arise.      Dania Samuel MSA, RN,CRM

## 2021-05-22 LAB
ANION GAP SERPL CALC-SCNC: 10 MMOL/L (ref 5–15)
BACTERIA SPEC CULT: ABNORMAL
BACTERIA SPEC CULT: ABNORMAL
BUN SERPL-MCNC: 4 MG/DL (ref 6–20)
BUN/CREAT SERPL: 10 (ref 12–20)
CALCIUM SERPL-MCNC: 7.9 MG/DL (ref 8.5–10.1)
CC UR VC: ABNORMAL
CHLORIDE SERPL-SCNC: 112 MMOL/L (ref 97–108)
CO2 SERPL-SCNC: 16 MMOL/L (ref 21–32)
CREAT SERPL-MCNC: 0.4 MG/DL (ref 0.55–1.02)
GLUCOSE SERPL-MCNC: 75 MG/DL (ref 65–100)
POTASSIUM SERPL-SCNC: 3.7 MMOL/L (ref 3.5–5.1)
SERVICE CMNT-IMP: ABNORMAL
SODIUM SERPL-SCNC: 138 MMOL/L (ref 136–145)

## 2021-05-22 PROCEDURE — 80048 BASIC METABOLIC PNL TOTAL CA: CPT

## 2021-05-22 PROCEDURE — 74011000250 HC RX REV CODE- 250: Performed by: OBSTETRICS & GYNECOLOGY

## 2021-05-22 PROCEDURE — 74011250637 HC RX REV CODE- 250/637: Performed by: OBSTETRICS & GYNECOLOGY

## 2021-05-22 PROCEDURE — 74011250636 HC RX REV CODE- 250/636: Performed by: OBSTETRICS & GYNECOLOGY

## 2021-05-22 PROCEDURE — 36415 COLL VENOUS BLD VENIPUNCTURE: CPT

## 2021-05-22 PROCEDURE — 65410000002 HC RM PRIVATE OB

## 2021-05-22 RX ORDER — NIFEDIPINE 10 MG/1
10 CAPSULE ORAL
Status: DISCONTINUED | OUTPATIENT
Start: 2021-05-22 | End: 2021-05-22

## 2021-05-22 RX ORDER — NIFEDIPINE 10 MG/1
10 CAPSULE ORAL EVERY 6 HOURS
Status: DISCONTINUED | OUTPATIENT
Start: 2021-05-22 | End: 2021-05-23

## 2021-05-22 RX ADMIN — NIFEDIPINE 10 MG: 10 CAPSULE ORAL at 08:24

## 2021-05-22 RX ADMIN — SODIUM CHLORIDE, POTASSIUM CHLORIDE, SODIUM LACTATE AND CALCIUM CHLORIDE 125 ML/HR: 600; 310; 30; 20 INJECTION, SOLUTION INTRAVENOUS at 01:38

## 2021-05-22 RX ADMIN — FOLIC ACID: 5 INJECTION, SOLUTION INTRAMUSCULAR; INTRAVENOUS; SUBCUTANEOUS at 12:03

## 2021-05-22 RX ADMIN — ONDANSETRON 8 MG: 2 INJECTION INTRAMUSCULAR; INTRAVENOUS at 19:42

## 2021-05-22 RX ADMIN — OLANZAPINE 10 MG: 5 TABLET, FILM COATED ORAL at 21:09

## 2021-05-22 RX ADMIN — ONDANSETRON 8 MG: 2 INJECTION INTRAMUSCULAR; INTRAVENOUS at 13:13

## 2021-05-22 RX ADMIN — NIFEDIPINE 10 MG: 10 CAPSULE ORAL at 21:09

## 2021-05-22 RX ADMIN — NIFEDIPINE 10 MG: 10 CAPSULE ORAL at 14:48

## 2021-05-22 RX ADMIN — NIFEDIPINE 10 MG: 10 CAPSULE ORAL at 04:04

## 2021-05-22 RX ADMIN — ONDANSETRON 8 MG: 2 INJECTION INTRAMUSCULAR; INTRAVENOUS at 06:54

## 2021-05-22 RX ADMIN — FLUOXETINE 60 MG: 20 CAPSULE ORAL at 08:24

## 2021-05-22 RX ADMIN — VANCOMYCIN HYDROCHLORIDE 1250 MG: 10 INJECTION, POWDER, LYOPHILIZED, FOR SOLUTION INTRAVENOUS at 06:19

## 2021-05-22 RX ADMIN — BETAMETHASONE SODIUM PHOSPHATE AND BETAMETHASONE ACETATE 12 MG: 3; 3 INJECTION, SUSPENSION INTRA-ARTICULAR; INTRALESIONAL; INTRAMUSCULAR at 23:23

## 2021-05-22 RX ADMIN — FAMOTIDINE 20 MG: 10 INJECTION, SOLUTION INTRAVENOUS at 10:33

## 2021-05-22 RX ADMIN — FAMOTIDINE 20 MG: 10 INJECTION, SOLUTION INTRAVENOUS at 21:08

## 2021-05-22 RX ADMIN — VANCOMYCIN HYDROCHLORIDE 1250 MG: 10 INJECTION, POWDER, LYOPHILIZED, FOR SOLUTION INTRAVENOUS at 18:30

## 2021-05-22 NOTE — PROGRESS NOTES
Bedside and Verbal shift change report given to LILIA Arreguin RN (oncoming nurse) by Christiano Ray. Mahesh Arauz RN (offgoing nurse). Report included the following information SBAR, Kardex, Intake/Output, MAR, Accordion, Recent Results and Med Rec Status.

## 2021-05-22 NOTE — PROGRESS NOTES
Labor Progress Note  Patient seen, fetal heart rate and contraction pattern evaluated, patient examined. She feels much better. Contractions stopped during the night and she hasn't had any for the last several hours. No data found. Physical Exam:  Cervical Exam:  Deferred  Uterine Activity: rare  Fetal Heart Rate: Baseline: 140s per minute  Variability: moderate  Accelerations: yes  Decelerations: none    Assessment/Plan:  Threatened  labor now no longer jed. Will transfer back to . Change nifedipine to PRN. Complete BMTZ course.

## 2021-05-22 NOTE — PROGRESS NOTES
2300: TRANSFER - IN REPORT:    Verbal report received from AKIL Collins(name) on Omar Felix  being received from *U**(unit) for change in patient condition(jed)      Report consisted of patients Situation, Background, Assessment and   Recommendations(SBAR). Information from the following report(s) SBAR, Kardex, Procedure Summary, Intake/Output, MAR, Accordion, Recent Results and Med Rec Status was reviewed with the receiving nurse. Opportunity for questions and clarification was provided. Assessment completed upon patients arrival to unit and care assumed. 0715: TRANSFER - OUT REPORT:    Verbal report given to LILIA Ott RN(name) on Omar Felix  being transferred to room 315 on [facility](unit) for routine progression of care       Report consisted of patients Situation, Background, Assessment and   Recommendations(SBAR). Information from the following report(s) SBAR, Kardex, Procedure Summary, Intake/Output, MAR, Accordion, Recent Results and Med Rec Status was reviewed with the receiving nurse. Lines:   Peripheral IV 05/20/21 Anterior;Distal;Left Forearm (Active)   Site Assessment Clean, dry, & intact 05/21/21 2005   Phlebitis Assessment 0 05/21/21 2005   Infiltration Assessment 0 05/21/21 2005   Dressing Status Clean, dry, & intact 05/21/21 2005   Dressing Type Transparent 05/21/21 2005   Hub Color/Line Status Infusing 05/21/21 2005   Action Taken Open ports on tubing capped 05/21/21 2005   Alcohol Cap Used Yes 05/21/21 2005        Opportunity for questions and clarification was provided.       Patient transported with:   Registered Nurse

## 2021-05-22 NOTE — PROGRESS NOTES
Called to see patient because of increasingly frequent, painful contractions. She's been having contractions during her hospital stay at about this intensity but not this frequent. About 7 PM they began to be more frequent. I ordered a bolus earlier and that has just finished and contractions continue. On sterile vaginal exam cervix is 1/long/high but very soft; cerclage is in place and not on tension  On monitor ctxs q2-4 minutes  FHTs reactive    Threatened  labor in the setting of a cerclage  Will try a dose of procardia and a dose of pain medication  If those don't work then will bring her over to L&D for monitoring     Plan of care reviewed with patient and nurse. All questions answered. UPDATE 1115PM: Contractions continued so I transferred her to L&D for prolonged monitoring. Will start BMTZ. Continue nifedipine q6H. Magnesium for neuroprotection not indicated a she is past 32 weeks. If pain worsens will consider removing cerclage. Plan reviewed with patient and nurse. All questions answered.

## 2021-05-22 NOTE — PROGRESS NOTES
0745 Verbal shift change report given to Dona Calderon RN (oncoming nurse) by Jamal Mcuhgh RN (offgoing nurse). Report included the following information SBAR.

## 2021-05-23 LAB
ANION GAP SERPL CALC-SCNC: 8 MMOL/L (ref 5–15)
BUN SERPL-MCNC: 3 MG/DL (ref 6–20)
BUN/CREAT SERPL: 7 (ref 12–20)
CALCIUM SERPL-MCNC: 8.4 MG/DL (ref 8.5–10.1)
CHLORIDE SERPL-SCNC: 108 MMOL/L (ref 97–108)
CO2 SERPL-SCNC: 21 MMOL/L (ref 21–32)
CREAT SERPL-MCNC: 0.41 MG/DL (ref 0.55–1.02)
DATE LAST DOSE: NORMAL
GLUCOSE SERPL-MCNC: 105 MG/DL (ref 65–100)
POTASSIUM SERPL-SCNC: 3.6 MMOL/L (ref 3.5–5.1)
REPORTED DOSE,DOSE: NORMAL UNITS
REPORTED DOSE/TIME,TMG: NORMAL
SODIUM SERPL-SCNC: 137 MMOL/L (ref 136–145)
VANCOMYCIN TROUGH SERPL-MCNC: 5.9 UG/ML (ref 5–10)

## 2021-05-23 PROCEDURE — 74011000250 HC RX REV CODE- 250: Performed by: OBSTETRICS & GYNECOLOGY

## 2021-05-23 PROCEDURE — 74011250637 HC RX REV CODE- 250/637: Performed by: OBSTETRICS & GYNECOLOGY

## 2021-05-23 PROCEDURE — 80048 BASIC METABOLIC PNL TOTAL CA: CPT

## 2021-05-23 PROCEDURE — 65410000002 HC RM PRIVATE OB

## 2021-05-23 PROCEDURE — 80202 ASSAY OF VANCOMYCIN: CPT

## 2021-05-23 PROCEDURE — 74011250636 HC RX REV CODE- 250/636: Performed by: OBSTETRICS & GYNECOLOGY

## 2021-05-23 PROCEDURE — 36415 COLL VENOUS BLD VENIPUNCTURE: CPT

## 2021-05-23 PROCEDURE — 59025 FETAL NON-STRESS TEST: CPT

## 2021-05-23 RX ORDER — VANCOMYCIN HYDROCHLORIDE
1250 EVERY 8 HOURS
Status: DISCONTINUED | OUTPATIENT
Start: 2021-05-23 | End: 2021-06-02 | Stop reason: SDUPTHER

## 2021-05-23 RX ORDER — NIFEDIPINE 10 MG/1
10 CAPSULE ORAL
Status: DISCONTINUED | OUTPATIENT
Start: 2021-05-23 | End: 2021-05-28

## 2021-05-23 RX ADMIN — VANCOMYCIN HYDROCHLORIDE 1250 MG: 10 INJECTION, POWDER, LYOPHILIZED, FOR SOLUTION INTRAVENOUS at 06:05

## 2021-05-23 RX ADMIN — FAMOTIDINE 20 MG: 10 INJECTION, SOLUTION INTRAVENOUS at 21:36

## 2021-05-23 RX ADMIN — FOLIC ACID: 5 INJECTION, SOLUTION INTRAMUSCULAR; INTRAVENOUS; SUBCUTANEOUS at 13:52

## 2021-05-23 RX ADMIN — FAMOTIDINE 20 MG: 10 INJECTION, SOLUTION INTRAVENOUS at 09:00

## 2021-05-23 RX ADMIN — NIFEDIPINE 10 MG: 10 CAPSULE ORAL at 02:57

## 2021-05-23 RX ADMIN — ONDANSETRON 8 MG: 2 INJECTION INTRAMUSCULAR; INTRAVENOUS at 08:15

## 2021-05-23 RX ADMIN — VANCOMYCIN HYDROCHLORIDE 1250 MG: 10 INJECTION, POWDER, LYOPHILIZED, FOR SOLUTION INTRAVENOUS at 21:46

## 2021-05-23 RX ADMIN — ONDANSETRON 8 MG: 2 INJECTION INTRAMUSCULAR; INTRAVENOUS at 20:15

## 2021-05-23 RX ADMIN — FLUOXETINE 60 MG: 20 CAPSULE ORAL at 08:13

## 2021-05-23 RX ADMIN — VANCOMYCIN HYDROCHLORIDE 1250 MG: 10 INJECTION, POWDER, LYOPHILIZED, FOR SOLUTION INTRAVENOUS at 14:00

## 2021-05-23 RX ADMIN — OLANZAPINE 10 MG: 5 TABLET, FILM COATED ORAL at 21:36

## 2021-05-23 NOTE — PROGRESS NOTES
Bedside and Verbal shift change report given to [address] (oncoming nurse) by Viraj Knowles (offgoing nurse). Report included the following information SBAR, Kardex, Procedure Summary, Intake/Output and MAR.     0800- MD at bedside, procardia changed to PRN, patient resting in bed, no complaints of contractions, leaking or bleeding, vitals stable.      0900- 0% of breakfast eaten 100mL of water     1200- 0% of lunch 30mL of Ensure 500mL of water    1400- 0% of food or Ensure, 500mL of water

## 2021-05-23 NOTE — PROGRESS NOTES
Bedside and Verbal shift change report given to ALBA Echavarria (oncoming nurse) by Telma Bob RN (offgoing nurse). Report included the following information SBAR, Kardex, ED Summary, Intake/Output, MAR, Accordion and Recent Results. [id number]:  Pt states she hasn't had any intake during this time.

## 2021-05-23 NOTE — PROGRESS NOTES
High Risk Obstetrics Progress Note    Name: Russel Jolly MRN: [id number]  SSN: [id number]    YOB: 1985  Age: 38 y.o. Sex: female      Subjective:      LOS: 11 days    Estimated Date of Delivery: 21   Gestational Age Today: 34w0d     Patient admitted for UTI s/p failed outpatient therapy due to chronic eating disorder. States she does have mild contractions, moderate nausea/vomiting and normal fetal movement and does not have chest pain, shortness of breath, vaginal bleeding  and vaginal leaking of fluid . Denies any contractions overnight. Feels like flank pain is improving. Objective:     Vitals:  Blood pressure 127/71, pulse (!) 57, temperature 98.1 °F (36.7 °C), resp. rate 16, height 5' 4\" (1.626 m), weight 80.8 kg (178 lb 3.2 oz), last menstrual period 2020, SpO2 99 %, not currently breastfeeding. Temp (24hrs), Av °F (36.7 °C), Min:97.7 °F (36.5 °C), Max:98.3 °F (76.8 °C)    Systolic (75AMY), ODL:519 , Min:105 , TSL:364      Diastolic (75PBY), MFQ:34, Min:55, Max:84       Intake and Output:         Physical Exam:  Patient without distress.   Heart: Regular rate and rhythm  Lung: clear to auscultation throughout lung fields, no wheezes, no rales, no rhonchi and normal respiratory effort  Abdomen: soft, nontender, gravid  Lower Extremities:  - Edema No       Membranes:  Intact    Uterine Activity:  pending    Fetal Heart Rate:  Reactive tracing from   Todays NST pending      Labs:   Recent Results (from the past 36 hour(s))   METABOLIC PANEL, BASIC    Collection Time: 21 10:48 AM   Result Value Ref Range    Sodium 138 136 - 145 mmol/L    Potassium 3.7 3.5 - 5.1 mmol/L    Chloride 112 (H) 97 - 108 mmol/L    CO2 16 (L) 21 - 32 mmol/L    Anion gap 10 5 - 15 mmol/L    Glucose 75 65 - 100 mg/dL    BUN 4 (L) 6 - 20 MG/DL    Creatinine 0.40 (L) 0.55 - 1.02 MG/DL    BUN/Creatinine ratio 10 (L) 12 - 20      GFR est AA >60 >60 ml/min/1.73m2    GFR est non-AA >60 >60 ml/min/1.73m2    Calcium 7.9 (L) 8.5 - 00.0 MG/DL   METABOLIC PANEL, BASIC    Collection Time: 05/23/21  6:03 AM   Result Value Ref Range    Sodium 137 136 - 145 mmol/L    Potassium 3.6 3.5 - 5.1 mmol/L    Chloride 108 97 - 108 mmol/L    CO2 21 21 - 32 mmol/L    Anion gap 8 5 - 15 mmol/L    Glucose 105 (H) 65 - 100 mg/dL    BUN 3 (L) 6 - 20 MG/DL    Creatinine 0.41 (L) 0.55 - 1.02 MG/DL    BUN/Creatinine ratio 7 (L) 12 - 20      GFR est AA >60 >60 ml/min/1.73m2    GFR est non-AA >60 >60 ml/min/1.73m2    Calcium 8.4 (L) 8.5 - 10.1 MG/DL   VANCOMYCIN, TROUGH    Collection Time: 05/23/21  6:03 AM   Result Value Ref Range    Vancomycin,trough 5.9 5.0 - 10.0 ug/mL    Reported dose date NOT PROVIDED      Reported dose time: NOT PROVIDED      Reported dose: NOT PROVIDED UNITS       Assessment and Plan:       Active Problems:    Pregnant (3/1/2018)      Eating disorder affecting pregnancy, antepartum (12/2/2020)      UTI in pregnancy, antepartum, third trimester (5/12/2021)       37 y/o [id number] at 34w0d weeks admitted for UTI s/p failed outpatient medical therapy due to chronic eating disorder     Urine culture 5/5/21 positive for Klebsiella and EColi   -afebrile and normal WBC's   -s/p Ceftriaxone  -Urine cx 5/19 + enterococcus  --Started on Vanc on 5/21 for this, sensitive to this  -UA with budding yeast-miconazole vaginal for suspected vaginal contamination of yeast  -renal ultrasound  5/21 WNL     Chronic eating disorder  -ivf's  -spoke with Dr Alejandra Kang with [facility] partial hospitalization program here in [place]-pt does not qualify for this program as she requires high level of care that is available at [facility] inpatient program in [place]. I spoke with pt re: this option as they will accept pregnant patients.   -Previously discussed need for nutrition as pt still not getting much of the Ensure in. We discussed placement of dobhoff by ENT to start enteral feeds which would possibly allow for her to be discharged and get this nutrition as an outpatient. We also discussed the option of PICC line placement with TPN as she has done before, but because of previous admission for line infection, she would need to stay inpatient to receive this nutrition.  Spoke with pt again re: her nutrition. I am recommending pt try dobhoff placement as she is having a difficult time making her own decision and I feel this would be less risk and give her the option for discharge.   She has verbalized her agreement with this trial. Will consult Dr Suri Plaza with ENT who has seen pt in prior admission. -Dr Duncan Manus to come after office today.  -Unable to place dobhoff so will discuss with MFM PICC vs PEG tube  -s/p medicine consult  -start Goodie bag daily     Cervical incompetence-cerclage in place  Threatened PTL  BMTZ #1and #2 on  and   Procardia prn     H/o prior  with successful , pt desires        Severe depression/anxiety with h/o suicidal ideation  -followed closely by Radha Pulido her help  -continue zyprexa and prozac as well as prn prazosin and hydroxyzine     GERD  -famotidine 20mg bid     Ambulate  Daily NST     MFM scan on 5/3/21-59%tile and Vertex  -saw MFM on  8 BPP, vtx on US     Anemia-hemoglobin at baseline; asymptomatic  -known beta thal minor  -consider Iv iron therapy if drops     CHTN-stable normal bp's off meds     DVT PPx-ambulation and mirtha bijan Mccormick MD   2021  8:35 AM

## 2021-05-23 NOTE — PROGRESS NOTES
Pharmacist Note - Vancomycin Dosing  Therapy day 3  Indication: E.faecalis UTI (pcn allergy)  Current regimen: 1250 mg IV Q12H    Recent Labs     05/23/21  0603 05/22/21  1048   CREA 0.41* 0.40*   BUN 3* 4*       A Trough Level resulted at 5.9 mcg/mL which was obtained 11.5 hrs post-dose. Goal trough: 10 - 15 mcg/mL     Plan: Change to 1250 mg IV Q8H. Pharmacy will continue to monitor this patient daily for changes in clinical status and renal function.

## 2021-05-23 NOTE — PROGRESS NOTES
Bedside and Verbal shift change report given to LILIA Calhoun RN (oncoming nurse) by Ludivina eKene RN (offgoing nurse). Report included the following information SBAR, Kardex, Intake/Output, MAR, Accordion and Recent Results.

## 2021-05-24 ENCOUNTER — TELEPHONE (OUTPATIENT)
Dept: BEHAVIORAL/MENTAL HEALTH CLINIC | Age: 36
End: 2021-05-24

## 2021-05-24 LAB
ANION GAP SERPL CALC-SCNC: 7 MMOL/L (ref 5–15)
BUN SERPL-MCNC: 4 MG/DL (ref 6–20)
BUN/CREAT SERPL: 10 (ref 12–20)
CALCIUM SERPL-MCNC: 7.4 MG/DL (ref 8.5–10.1)
CHLORIDE SERPL-SCNC: 112 MMOL/L (ref 97–108)
CO2 SERPL-SCNC: 23 MMOL/L (ref 21–32)
CREAT SERPL-MCNC: 0.39 MG/DL (ref 0.55–1.02)
GLUCOSE SERPL-MCNC: 79 MG/DL (ref 65–100)
POTASSIUM SERPL-SCNC: 3.2 MMOL/L (ref 3.5–5.1)
SODIUM SERPL-SCNC: 142 MMOL/L (ref 136–145)

## 2021-05-24 PROCEDURE — 74011250637 HC RX REV CODE- 250/637: Performed by: OBSTETRICS & GYNECOLOGY

## 2021-05-24 PROCEDURE — 80048 BASIC METABOLIC PNL TOTAL CA: CPT

## 2021-05-24 PROCEDURE — 74011250636 HC RX REV CODE- 250/636: Performed by: OBSTETRICS & GYNECOLOGY

## 2021-05-24 PROCEDURE — 36415 COLL VENOUS BLD VENIPUNCTURE: CPT

## 2021-05-24 PROCEDURE — 65410000002 HC RM PRIVATE OB

## 2021-05-24 PROCEDURE — 74011000250 HC RX REV CODE- 250: Performed by: OBSTETRICS & GYNECOLOGY

## 2021-05-24 PROCEDURE — 59025 FETAL NON-STRESS TEST: CPT

## 2021-05-24 RX ORDER — DEXTROSE, SODIUM CHLORIDE, AND POTASSIUM CHLORIDE 5; .9; .15 G/100ML; G/100ML; G/100ML
125 INJECTION INTRAVENOUS CONTINUOUS
Status: DISCONTINUED | OUTPATIENT
Start: 2021-05-24 | End: 2021-06-21 | Stop reason: HOSPADM

## 2021-05-24 RX ADMIN — ONDANSETRON 8 MG: 2 INJECTION INTRAMUSCULAR; INTRAVENOUS at 19:08

## 2021-05-24 RX ADMIN — NIFEDIPINE 10 MG: 10 CAPSULE ORAL at 13:51

## 2021-05-24 RX ADMIN — OLANZAPINE 10 MG: 5 TABLET, FILM COATED ORAL at 22:06

## 2021-05-24 RX ADMIN — VANCOMYCIN HYDROCHLORIDE 1250 MG: 10 INJECTION, POWDER, LYOPHILIZED, FOR SOLUTION INTRAVENOUS at 05:40

## 2021-05-24 RX ADMIN — ONDANSETRON 8 MG: 2 INJECTION INTRAMUSCULAR; INTRAVENOUS at 11:39

## 2021-05-24 RX ADMIN — FAMOTIDINE 20 MG: 10 INJECTION, SOLUTION INTRAVENOUS at 22:07

## 2021-05-24 RX ADMIN — FOLIC ACID: 5 INJECTION, SOLUTION INTRAMUSCULAR; INTRAVENOUS; SUBCUTANEOUS at 11:41

## 2021-05-24 RX ADMIN — ONDANSETRON 8 MG: 2 INJECTION INTRAMUSCULAR; INTRAVENOUS at 05:36

## 2021-05-24 RX ADMIN — VANCOMYCIN HYDROCHLORIDE 1250 MG: 10 INJECTION, POWDER, LYOPHILIZED, FOR SOLUTION INTRAVENOUS at 16:05

## 2021-05-24 RX ADMIN — FLUOXETINE 60 MG: 20 CAPSULE ORAL at 08:53

## 2021-05-24 RX ADMIN — POTASSIUM CHLORIDE, DEXTROSE MONOHYDRATE AND SODIUM CHLORIDE 125 ML/HR: 150; 5; 900 INJECTION, SOLUTION INTRAVENOUS at 10:01

## 2021-05-24 RX ADMIN — FAMOTIDINE 20 MG: 10 INJECTION, SOLUTION INTRAVENOUS at 09:01

## 2021-05-24 NOTE — CONSULTS
ID Consult Note  NAME:  Antonio Lynch   :   1985   MRN:   [id number]   Date/Time:  2021 7:54 PM  Subjective:   REASON FOR CONSULT:   Urinary tract infection  Salvador potts is a 38 y.o. with a history of candidemia, eating disorder, major depressive disorder and hyperemesis. She is currently in her 34th week of pregnancy. I had seen her in 2021 for candidemia secondary to a PICC line infection. Because she was pregnant, she had to be treated with amphotericin. She complete this on . She was requiring TPN for nutritional support. She could not be discharged with the PICC so she signed out AMA because she had needed to address something as an outpatient. In May 2021, she was diagnosed to have bacteriuria with Klebsiella and E. coli. She was admitted here for IV antibiotics. She got ceftriaxone which she took for 7 days. Repeat culture though grew out Enterococcus. She is now on vancomycin which was started on May 21. The patient tells me that she does not have any dysuria, urgency or frequency. She does complain of some right flank pain which has been present even before she got admitted this time around. She has not had any fever or any chills. There have been no aggravating or alleviating symptoms. Past Medical History:   Diagnosis Date    Acute pyelonephritis 3/3/2021    Anorexia     Anxiety     Asthma     on albuterol prn.  Triggers cold and allergies    Avoidant-restrictive food intake disorder (ARFID)     Back pain, chronic     sciatica    Chronic bilateral low back pain with right-sided sciatica 2020    ~    GERD (gastroesophageal reflux disease) 2020    [facility] , GI Dr. Amie Meier Gestational hypertension     Past pregnancy    [facility]      2005, 2006, 2008, , 2013    Hyperemesis     severe hyperemesis    Hypertension     with G12 - none this pregnancy    Iron deficiency anemia 10/08/2010    MDD (major depressive disorder), single episode with postpartum onset 2013    treated with meds - 13    Orthostatic hypotension 2020    Plantar fasciitis     Pregnancy     36 weeks    PTSD (post-traumatic stress disorder)       Past Surgical History:   Procedure Laterality Date    HX  SECTION  4/22/15    HX DILATION AND CURETTAGE      HX DILATION AND CURETTAGE  2020    HX GYN      miscarriage x 6    HX GYN      removal of left fallopian tube    HX OTHER SURGICAL      cerlcage x4, ectopic x1 1999 with removal of left fallopian tube    HX OTHER SURGICAL      cerclage w/ current pregnancy. Removed 18    [place]  DELIVERY ONLY       Social History     Tobacco Use    Smoking status: Never Smoker    Smokeless tobacco: Never Used   Substance Use Topics    Alcohol use: Not Currently      Family History   Problem Relation Age of Onset   [place] Arthritis-rheumatoid Mother     Asthma Mother     No Known Problems Father     No Known Problems Maternal Grandmother     No Known Problems Maternal Grandfather     No Known Problems Paternal Grandmother     No Known Problems Paternal Grandfather     Asthma Son     Alcohol abuse Neg Hx     Arthritis-osteo Neg Hx     Bleeding Prob Neg Hx     Cancer Neg Hx     Diabetes Neg Hx     Elevated Lipids Neg Hx     Headache Neg Hx     Heart Disease Neg Hx     Hypertension Neg Hx     Lung Disease Neg Hx     Migraines Neg Hx     Psychiatric Disorder Neg Hx     Stroke Neg Hx     Mental Retardation Neg Hx     Anesth Problems Neg Hx       Allergies   Allergen Reactions    Labetalol Hives    Ceclor [Cefaclor] Unknown (comments)    Pcn [Penicillins] Hives    Septra [Sulfamethoprim Ds] Unknown (comments)      Home Medications:  Prior to Admission Medications   Prescriptions Last Dose Informant Patient Reported?  Taking?   0.9% sodium chloride solp 1,000 mL with thiamine 100 mg/mL soln 792 mg, folic acid 5 mg/mL soln 1 mg Not Taking at Unknown time  No No Si Bag by IntraVENous route every twenty-four (24) hours. FLUoxetine (PROzac) 20 mg capsule 2021 at Unknown time  No Yes   Sig: Take 1 Cap by mouth daily. Take with 40 mg cap for total daily dose of 60 mg. FLUoxetine (PROzac) 40 mg capsule 2021 at Unknown time  No Yes   Sig: Take 1 Cap by mouth daily. Lacto. acidophilus-Bif. animalis (Probiotic) 5 billion cell cpSP Not Taking at Unknown time  No No   Sig: Take 1 Cap by mouth daily. OLANZapine (ZyPREXA) 10 mg tablet 2021 at Unknown time  No Yes   Sig: Take 1 Tab by mouth nightly. OTHER Not Taking at Unknown time  No No   Si ensure pudding PO daily for lunch. OTHER Not Taking at Unknown time  No No   Si Ensure Enlive food supplement drink PO TID   calcium carbonate (Tums) 200 mg calcium (500 mg) chew 2021 at Unknown time  No Yes   Sig: Take 1 Tab by mouth two (2) times daily as needed for Other (reflux). cholecalciferol (Vitamin D3) 25 mcg (1,000 unit) cap Not Taking at Unknown time  Yes No   Sig: Take  by mouth daily. doxylamine succinate (UNISOM) 25 mg tablet Not Taking at Unknown time  No No   Sig: Take 1 Tab by mouth nightly as needed for Insomnia. food supplemt, lactose-reduced (Ensure High Protein) liqd 2021 at Unknown time  No Yes   Sig: Take 237 mL by mouth three (3) times daily. hydrOXYzine HCL (ATARAX) 50 mg tablet 2021 at Unknown time  No Yes   Sig: Take 1 Tab by mouth four (4) times daily. ondansetron (ZOFRAN ODT) 4 mg disintegrating tablet 2021 at Unknown time  No Yes   Sig: Take 1 Tab by mouth every six (6) hours as needed for Nausea or Vomiting. pantoprazole (PROTONIX) 40 mg tablet Not Taking at Unknown time  No No   Sig: Take 1 Tab by mouth Daily (before breakfast). Indications: gastroesophageal reflux disease   polyethylene glycol (MIRALAX) 17 gram packet Not Taking at Unknown time  No No   Sig: Take 1 Packet by mouth daily as needed for Constipation.    prazosin (MINIPRESS) 2 mg capsule 4/12/2021 at Unknown time  No Yes   Sig: Take 1 Cap by mouth nightly. Indications: posttraumatic stress syndrome   prenatal vit-iron fumarate-fa (PRENATAL PLUS with IRON) 28 mg iron- 800 mcg tab Not Taking at Unknown time  Yes No   prochlorperazine (COMPAZINE) 10 mg tablet Not Taking at Unknown time  Yes No   promethazine (Phenergan) 12.5 mg suppository 5/11/2021 at Unknown time  Yes Yes   pyridoxine, vitamin B6, (Vitamin B-6) 100 mg tablet Not Taking at Unknown time  Yes No   Sig: Take 100 mg by mouth daily. sucralfate (CARAFATE) 1 gram tablet Not Taking at Unknown time  No No   Sig: Take 1 Tab by mouth Before breakfast, lunch, dinner and at bedtime. Facility-Administered Medications: None     Hospital medications:  Current Facility-Administered Medications   Medication Dose Route Frequency    dextrose 5% - 0.9% NaCl with KCl 20 mEq/L infusion  125 mL/hr IntraVENous CONTINUOUS    NIFEdipine (PROCARDIA) capsule 10 mg  10 mg Oral Q6H PRN    vancomycin (VANCOCIN) 1250 mg in  ml infusion  1,250 mg IntraVENous Q8H    0.9% sodium chloride 1,000 mL with mvi (adult no. 4 with vit K) 10 mL, thiamine 289 mg, folic acid 1 mg infusion   IntraVENous Q24H    Vancomycin Pharmacy Dosing   Other PRN    famotidine (PF) (PEPCID) 20 mg in 0.9% sodium chloride 10 mL injection  20 mg IntraVENous Q12H    acetaminophen (TYLENOL) suppository 650 mg  650 mg Rectal Q6H PRN    diphenhydrAMINE (BENADRYL) injection 25 mg  25 mg IntraVENous Q6H PRN    OLANZapine (ZyPREXA) tablet 10 mg  10 mg Oral QHS    FLUoxetine (PROzac) capsule 60 mg  60 mg Oral DAILY    prazosin (MINIPRESS) capsule 2 mg  2 mg Oral QHS PRN    ondansetron (ZOFRAN) injection 8 mg  8 mg IntraVENous Q6H PRN    promethazine (PHENERGAN) suppository 25 mg  25 mg Rectal Q6H PRN    hydrOXYzine HCL (ATARAX) tablet 50 mg  50 mg Oral TID PRN     REVIEW OF SYSTEMS:        Const:    negative weight loss  Eyes:   negative diplopia or visual changes, negative eye pain  ENT:   negative coryza, negative sore throat  Resp:   negative cough, hemoptysis, dyspnea  Cards:  negative for chest pain, palpitations  :  negative for gross hematuria  GI:   Negative for hematemesis and hematochezia  Skin:   negative for rash and pruritus  Heme:   negative for easy bruising and gum/nose bleeding  MS:  negative for myalgias, arthralgias  Neurolo:  negative for headaches, dizziness, vertigo, memory problems   Psych:  negative for hallucinations        Objective:   VITALS:    Visit Vitals  /80 (BP 1 Location: Right arm, BP Patient Position: At rest)   Pulse 61   Temp 98.7 °F (37.1 °C)   Resp 16   Ht 5' 4\" (1.626 m)   Wt 81.5 kg (179 lb 9.6 oz)   LMP 2020 (Exact Date)   SpO2 98%   Breastfeeding No   BMI 30.83 kg/m²     Temp (24hrs), Av.5 °F (36.9 °C), Min:98.2 °F (36.8 °C), Max:98.7 °F (37.1 °C)    PHYSICAL EXAM:   General:    Alert, cooperative, no distress, appears stated age. Head:   Normocephalic, without obvious abnormality, atraumatic. Eyes:   Conjunctivae clear, anicteric sclerae. Nose:  Nares normal.   Throat:    Lips and tongue normal.  No Thrush  Neck:  Supple, symmetrical    no carotid bruit and no JVD. :    Slight right CVA tenderness, no salazar catheter  Lungs:   Clear to auscultation bilaterally. No Wheezing or Rhonchi. No rales. Heart:   Regular rate and rhythm,  no murmur, rub or gallop. Abdomen:   Soft, non-tender,not distended. Bowel sounds normal.   Extremities: Knees, ankles, wrists, elbows are not warm and not tender. No pedal edema  Skin:     No rashes or lesions.   Not Jaundiced  Lymph: Cervical normal  Neurologic: Full use of extraocular muscles, no facial asymmetry, tongue midline muscle strength 5 out of 5    LAB DATA REVIEWED:    Recent Results (from the past 48 hour(s))   METABOLIC PANEL, BASIC    Collection Time: 21  6:03 AM   Result Value Ref Range    Sodium 137 136 - 145 mmol/L    Potassium 3.6 3.5 - 5.1 mmol/L Chloride 108 97 - 108 mmol/L    CO2 21 21 - 32 mmol/L    Anion gap 8 5 - 15 mmol/L    Glucose 105 (H) 65 - 100 mg/dL    BUN 3 (L) 6 - 20 MG/DL    Creatinine 0.41 (L) 0.55 - 1.02 MG/DL    BUN/Creatinine ratio 7 (L) 12 - 20      GFR est AA >60 >60 ml/min/1.73m2    GFR est non-AA >60 >60 ml/min/1.73m2    Calcium 8.4 (L) 8.5 - 10.1 MG/DL   VANCOMYCIN, TROUGH    Collection Time: 05/23/21  6:03 AM   Result Value Ref Range    Vancomycin,trough 5.9 5.0 - 10.0 ug/mL    Reported dose date NOT PROVIDED      Reported dose time: NOT PROVIDED      Reported dose: NOT PROVIDED UNITS   METABOLIC PANEL, BASIC    Collection Time: 05/24/21  5:52 AM   Result Value Ref Range    Sodium 142 136 - 145 mmol/L    Potassium 3.2 (L) 3.5 - 5.1 mmol/L    Chloride 112 (H) 97 - 108 mmol/L    CO2 23 21 - 32 mmol/L    Anion gap 7 5 - 15 mmol/L    Glucose 79 65 - 100 mg/dL    BUN 4 (L) 6 - 20 MG/DL    Creatinine 0.39 (L) 0.55 - 1.02 MG/DL    BUN/Creatinine ratio 10 (L) 12 - 20      GFR est AA >60 >60 ml/min/1.73m2    GFR est non-AA >60 >60 ml/min/1.73m2    Calcium 7.4 (L) 8.5 - 10.1 MG/DL         IMPRESSION    #1 UTI with some right flank CVA tenderness    #2 intrauterine pregnancy    #3 asthma    #4 depression    #5 history of candidemia during this current pregnancy s/p meant with amphotericin    PLAN    The patient tells me that she cannot take any oral medication as she cannot keep the pills down. Would treat her with vancomycin until Estela 3. With her hyperemesis and penicillin allergy, it will be difficult to put her on suppressive regimen. I do not plan to keep her on vancomycin as a means of prophylaxis.                    ___________________________________________________  ID: Maurilio Patterson MD

## 2021-05-24 NOTE — PROGRESS NOTES
0730- Bedside and Verbal shift change report given to Candido York RN  (oncoming nurse) by Joselo Moss. Ester Bob RN  (offgoing nurse). Report included the following information SBAR, Kardex, Intake/Output, Accordion and Recent Results. 7587- ID consult called. 7221- breakfast intake - 0% of tray, 30 ml of ensure.  30 ml of apple juice     1140- lunch intake- 0% of tray, 30 ml of ensure

## 2021-05-24 NOTE — PROGRESS NOTES
High Risk Obstetrics Progress Note    Name: Maggie Partida MRN: [id number]  SSN: [id number]    YOB: 1985  Age: 38 y.o. Sex: female      Subjective:      LOS: 12 days    Estimated Date of Delivery: 21   Gestational Age Today: 34w3d     Patient admitted for UTI s/p failed outpatient management due to chronic eating disorder. s/p ceftriaxone, now with new Enterococcus UTI on vancomycin due to PCN allergy. . States she does have intermittent right flank pain (normal renal ultrasound), normal fetal movement, contractions that come and go and does not have chest pain, shortness of breath, vaginal bleeding  and vaginal leaking of fluid . Objective:     Vitals:  Blood pressure 104/63, pulse 60, temperature 98.2 °F (36.8 °C), resp. rate 16, height 5' 4\" (1.626 m), weight 80.8 kg (178 lb 3.2 oz), last menstrual period 2020, SpO2 98 %, not currently breastfeeding. Temp (24hrs), Av.4 °F (36.9 °C), Min:98.1 °F (36.7 °C), Max:98.6 °F (37 °C)    Systolic (76KHB), KMN:313 , Min:104 , IVC:824      Diastolic (72IEG), BWJ:88, Min:63, Max:75       Intake and Output:         Physical Exam:  Patient without distress.   Heart: Regular rate and rhythm  Lung: clear to auscultation throughout lung fields, no wheezes, no rales, no rhonchi and normal respiratory effort  Abdomen: soft, nontender, gravid  Lower Extremities:  - Edema No       Membranes:  Intact    Uterine Activity:  1 contraction on nst yesterday    Fetal Heart Rate:  Reactive  Baseline: 125s per minute  Variability: moderate  Accelerations: yes  Decelerations: none        Labs:   Recent Results (from the past 36 hour(s))   METABOLIC PANEL, BASIC    Collection Time: 21  6:03 AM   Result Value Ref Range    Sodium 137 136 - 145 mmol/L    Potassium 3.6 3.5 - 5.1 mmol/L    Chloride 108 97 - 108 mmol/L    CO2 21 21 - 32 mmol/L    Anion gap 8 5 - 15 mmol/L    Glucose 105 (H) 65 - 100 mg/dL    BUN 3 (L) 6 - 20 MG/DL    Creatinine 0.41 (L) 0.55 - 1.02 MG/DL    BUN/Creatinine ratio 7 (L) 12 - 20      GFR est AA >60 >60 ml/min/1.73m2    GFR est non-AA >60 >60 ml/min/1.73m2    Calcium 8.4 (L) 8.5 - 10.1 MG/DL   VANCOMYCIN, TROUGH    Collection Time: 05/23/21  6:03 AM   Result Value Ref Range    Vancomycin,trough 5.9 5.0 - 10.0 ug/mL    Reported dose date NOT PROVIDED      Reported dose time: NOT PROVIDED      Reported dose: NOT PROVIDED UNITS   METABOLIC PANEL, BASIC    Collection Time: 05/24/21  5:52 AM   Result Value Ref Range    Sodium 142 136 - 145 mmol/L    Potassium 3.2 (L) 3.5 - 5.1 mmol/L    Chloride 112 (H) 97 - 108 mmol/L    CO2 23 21 - 32 mmol/L    Anion gap 7 5 - 15 mmol/L    Glucose 79 65 - 100 mg/dL    BUN 4 (L) 6 - 20 MG/DL    Creatinine 0.39 (L) 0.55 - 1.02 MG/DL    BUN/Creatinine ratio 10 (L) 12 - 20      GFR est AA >60 >60 ml/min/1.73m2    GFR est non-AA >60 >60 ml/min/1.73m2    Calcium 7.4 (L) 8.5 - 10.1 MG/DL       Assessment and Plan:       Active Problems:    Pregnant (3/1/2018)      Eating disorder affecting pregnancy, antepartum (12/2/2020)      UTI in pregnancy, antepartum, third trimester (5/12/2021)       39 y/o [id number] at 34w1d weeks admitted for UTI s/p failed outpatient medical therapy due to chronic eating disorder     Urine culture 5/5/21 positive for Klebsiella and EColi-s/p 4 days of ceftriaxone  -Repeat urine cx on 5/19 positive for enterococcus-now on vanc since 5/21  -consult I.D. to help with duration of vanc therapy and any recs for suppresion therapy given eating disorder  -afebrile and normal WBC's   -UA with budding yeast-miconazole vaginal for suspected vaginal contamination of yeast  -renal ultrasound  5/21 WNL     Chronic eating disorder  -ivf's  -spoke with Dr Grant Ortiz with [facility] partial hospitalization program here in [place]-pt does not qualify for this program as she requires high level of care that is available at [facility] in [facility]. I spoke with pt re: this option as they will accept pregnant patients.   -Again discussed need for nutrition as pt still not getting much of the Ensure in. Pt unable to tolerate dobhoff by ENT. Will discuss with MFM today re: PEG tube vs PICC.  Pt aware that if PICC and TPN is started, she would need to stay inpatient to receive this nutrition given previous h/o line infection with this.  -s/p medicine consult  -Goodie bag daily  -Hypokalemia-add KCL back into ivfs.      Cervical incompetence-cerclage in place  Threatened PTL  BMTZ #1and #2 on  and   Procardia prn     H/o prior  with successful , pt desires       Severe depression/anxiety with h/o suicidal ideation  -followed closely by Ed Rodney her help  -continue zyprexa and prozac as well as prn prazosin and hydroxyzine     GERD  -famotidine 20mg bid     Ambulate  Daily NST     MFM scan on 5/3/21-59%tile and Vertex  -saw MFM on  8/8 BPP, vtx on US     Anemia-hemoglobin at baseline; asymptomatic  -known beta thal minor  -consider Iv iron therapy if drops     CHTN-stable normal bp's off meds     DVT PPx-ambulation and mirtha hose

## 2021-05-25 LAB
ALBUMIN SERPL-MCNC: 2 G/DL (ref 3.5–5)
ALBUMIN/GLOB SERPL: 0.5 {RATIO} (ref 1.1–2.2)
ALP SERPL-CCNC: 151 U/L (ref 45–117)
ALT SERPL-CCNC: 38 U/L (ref 12–78)
ANION GAP SERPL CALC-SCNC: 7 MMOL/L (ref 5–15)
AST SERPL-CCNC: 31 U/L (ref 15–37)
BILIRUB SERPL-MCNC: 0.3 MG/DL (ref 0.2–1)
BUN SERPL-MCNC: 3 MG/DL (ref 6–20)
BUN/CREAT SERPL: 7 (ref 12–20)
CALCIUM SERPL-MCNC: 7.7 MG/DL (ref 8.5–10.1)
CHLORIDE SERPL-SCNC: 111 MMOL/L (ref 97–108)
CO2 SERPL-SCNC: 22 MMOL/L (ref 21–32)
CREAT SERPL-MCNC: 0.42 MG/DL (ref 0.55–1.02)
ERYTHROCYTE [DISTWIDTH] IN BLOOD BY AUTOMATED COUNT: 15.3 % (ref 11.5–14.5)
GLOBULIN SER CALC-MCNC: 3.8 G/DL (ref 2–4)
GLUCOSE SERPL-MCNC: 84 MG/DL (ref 65–100)
HCT VFR BLD AUTO: 25.5 % (ref 35–47)
HGB BLD-MCNC: 8 G/DL (ref 11.5–16)
MCH RBC QN AUTO: 22.9 PG (ref 26–34)
MCHC RBC AUTO-ENTMCNC: 31.4 G/DL (ref 30–36.5)
MCV RBC AUTO: 73.1 FL (ref 80–99)
NRBC # BLD: 0 K/UL (ref 0–0.01)
NRBC BLD-RTO: 0 PER 100 WBC
PLATELET # BLD AUTO: 195 K/UL (ref 150–400)
PMV BLD AUTO: 12.4 FL (ref 8.9–12.9)
POTASSIUM SERPL-SCNC: 2.8 MMOL/L (ref 3.5–5.1)
PROT SERPL-MCNC: 5.8 G/DL (ref 6.4–8.2)
RBC # BLD AUTO: 3.49 M/UL (ref 3.8–5.2)
SODIUM SERPL-SCNC: 140 MMOL/L (ref 136–145)
WBC # BLD AUTO: 5.2 K/UL (ref 3.6–11)

## 2021-05-25 PROCEDURE — 00HU33Z INSERTION OF INFUSION DEVICE INTO SPINAL CANAL, PERCUTANEOUS APPROACH: ICD-10-PCS | Performed by: ANESTHESIOLOGY

## 2021-05-25 PROCEDURE — 74011250636 HC RX REV CODE- 250/636: Performed by: OBSTETRICS & GYNECOLOGY

## 2021-05-25 PROCEDURE — 36415 COLL VENOUS BLD VENIPUNCTURE: CPT

## 2021-05-25 PROCEDURE — 65410000002 HC RM PRIVATE OB

## 2021-05-25 PROCEDURE — 76937 US GUIDE VASCULAR ACCESS: CPT

## 2021-05-25 PROCEDURE — 02HV33Z INSERTION OF INFUSION DEVICE INTO SUPERIOR VENA CAVA, PERCUTANEOUS APPROACH: ICD-10-PCS | Performed by: OBSTETRICS & GYNECOLOGY

## 2021-05-25 PROCEDURE — 74011250637 HC RX REV CODE- 250/637: Performed by: OBSTETRICS & GYNECOLOGY

## 2021-05-25 PROCEDURE — 80053 COMPREHEN METABOLIC PANEL: CPT

## 2021-05-25 PROCEDURE — 59025 FETAL NON-STRESS TEST: CPT

## 2021-05-25 PROCEDURE — 36573 INSJ PICC RS&I 5 YR+: CPT | Performed by: OBSTETRICS & GYNECOLOGY

## 2021-05-25 PROCEDURE — 85027 COMPLETE CBC AUTOMATED: CPT

## 2021-05-25 PROCEDURE — 74011000250 HC RX REV CODE- 250: Performed by: OBSTETRICS & GYNECOLOGY

## 2021-05-25 PROCEDURE — 77030020365 HC SOL INJ SOD CL 0.9% 50ML

## 2021-05-25 PROCEDURE — C1751 CATH, INF, PER/CENT/MIDLINE: HCPCS

## 2021-05-25 RX ORDER — POTASSIUM CHLORIDE 750 MG/1
40 TABLET, FILM COATED, EXTENDED RELEASE ORAL
Status: COMPLETED | OUTPATIENT
Start: 2021-05-25 | End: 2021-05-25

## 2021-05-25 RX ADMIN — NIFEDIPINE 10 MG: 10 CAPSULE ORAL at 10:00

## 2021-05-25 RX ADMIN — ONDANSETRON 8 MG: 2 INJECTION INTRAMUSCULAR; INTRAVENOUS at 20:41

## 2021-05-25 RX ADMIN — ONDANSETRON 8 MG: 2 INJECTION INTRAMUSCULAR; INTRAVENOUS at 14:22

## 2021-05-25 RX ADMIN — VANCOMYCIN HYDROCHLORIDE 1250 MG: 10 INJECTION, POWDER, LYOPHILIZED, FOR SOLUTION INTRAVENOUS at 14:25

## 2021-05-25 RX ADMIN — POTASSIUM CHLORIDE, DEXTROSE MONOHYDRATE AND SODIUM CHLORIDE 125 ML/HR: 150; 5; 900 INJECTION, SOLUTION INTRAVENOUS at 22:41

## 2021-05-25 RX ADMIN — VANCOMYCIN HYDROCHLORIDE 1250 MG: 10 INJECTION, POWDER, LYOPHILIZED, FOR SOLUTION INTRAVENOUS at 22:42

## 2021-05-25 RX ADMIN — POTASSIUM CHLORIDE 40 MEQ: 750 TABLET, EXTENDED RELEASE ORAL at 08:25

## 2021-05-25 RX ADMIN — PROMETHAZINE HYDROCHLORIDE 25 MG: 25 SUPPOSITORY RECTAL at 11:31

## 2021-05-25 RX ADMIN — FAMOTIDINE 20 MG: 10 INJECTION, SOLUTION INTRAVENOUS at 20:42

## 2021-05-25 RX ADMIN — VANCOMYCIN HYDROCHLORIDE 1250 MG: 10 INJECTION, POWDER, LYOPHILIZED, FOR SOLUTION INTRAVENOUS at 00:04

## 2021-05-25 RX ADMIN — VANCOMYCIN HYDROCHLORIDE 1250 MG: 10 INJECTION, POWDER, LYOPHILIZED, FOR SOLUTION INTRAVENOUS at 08:25

## 2021-05-25 RX ADMIN — OLANZAPINE 10 MG: 5 TABLET, FILM COATED ORAL at 22:42

## 2021-05-25 RX ADMIN — FOLIC ACID: 5 INJECTION, SOLUTION INTRAMUSCULAR; INTRAVENOUS; SUBCUTANEOUS at 14:28

## 2021-05-25 RX ADMIN — FLUOXETINE 60 MG: 20 CAPSULE ORAL at 08:25

## 2021-05-25 RX ADMIN — POTASSIUM CHLORIDE, DEXTROSE MONOHYDRATE AND SODIUM CHLORIDE 125 ML/HR: 150; 5; 900 INJECTION, SOLUTION INTRAVENOUS at 07:37

## 2021-05-25 RX ADMIN — ONDANSETRON 8 MG: 2 INJECTION INTRAMUSCULAR; INTRAVENOUS at 04:57

## 2021-05-25 NOTE — PROGRESS NOTES
Bedside and Verbal shift change report given to Silva Workman RN  (oncoming nurse) by Yimi Dinh RN  (offgoing nurse). Report included the following information SBAR, Kardex, Intake/Output, MAR and Recent Results. Breakfast intake - 30 ml Ensure, 30 ml of grape juice. 0% of breakfast tray    0907- IV infiltrated. Critical transport team attempted, no luck. 6181- IV team notified, recommending patient get PICC     7344- MD notified through perfect serve. 1000- Pt called out requesting procardia, states has had to two intense contractions.    1002-Pt placed on fetal monitors for NST.   1425- Lunch intake - 0% of tray and ensure taken   1800- Dinner intake - 0% of tray and ensure taken

## 2021-05-25 NOTE — PROGRESS NOTES
2015 Bedside and Verbal shift change report given to SOPHY Dorsey by Patricia Partida. Stas, RN. Report included the following information SBAR, Intake/Output and MAR.     2330 Bedside and Verbal shift change report given to SOPHY Parker RN by Shona Watters. Usha Navarrete RN. Report included the following information SBAR, Intake/Output and MAR.

## 2021-05-25 NOTE — PROCEDURES
PICC Placement Note    PRE-PROCEDURE VERIFICATION  Correct Procedure: yes  Correct Site:  yes  Temperature: Temp: 98.2 °F (36.8 °C), Temperature Source: Temp Source: Oral  Recent Labs     05/25/21  0509   BUN 3*   CREA 0.42*      WBC 5.2     Allergies: Labetalol, Ceclor [cefaclor], Pcn [penicillins], and Septra [sulfamethoprim ds]  Education materials, including PICC Booklet, for PICC Care given to patient: yes. See Patient Education activity for further details. PROCEDURE DETAIL  A double lumen PICC line was started for vascular access. The following documentation is in addition to the PICC properties in the lines/airways flowsheet :  Lot #: [id number]  Was xylocaine 1% used intradermally:  yes  Catheter Length: 40 at 2 cm (cm)  Vein Selection for PICC:left basilic  Central Line Bundle followed yes  Complication Related to Insertion: none    The placement was verified by ECG/Sapiens technology: The  tip location is on the left side and the tip is in the  superior vena cava. See ECG results for PICC tip placement. Report given to nurse Cale Dupree. Line is okay to use.     Carmelina Hahn RN

## 2021-05-25 NOTE — PROGRESS NOTES
ID Progress Note  2021    Subjective:     Afebrile. No dyspnea, abdominal pain, headache or sore throat    ROS: No anaphylaxis, seizures, syncope, hematemesis, hematochezia     Objective:     Vitals:   Visit Vitals  /71 (BP 1 Location: Right arm, BP Patient Position: At rest)   Pulse 61   Temp 98.2 °F (36.8 °C)   Resp 16   Ht 5' 4\" (1.626 m)   Wt 79.8 kg (176 lb)   LMP 2020 (Exact Date)   SpO2 99%   Breastfeeding No   BMI 30.21 kg/m²        Tmax:  Temp (24hrs), Av.3 °F (36.8 °C), Min:98.2 °F (36.8 °C), Max:98.6 °F (37 °C)      Exam:    Not in distress  Pink conjunctivae, anicteric sclerae  No cervical lymphdenopathy   Lung clear, no rales, wheezes or rhonchi   Heart: s1, s2, RRR, no murmurs rubs or clicks  Abdomen: soft nontender, no guarding or rebound  Knees not warm or tender  Speech fluent   No cva tenderness  Labs:   Lab Results   Component Value Date/Time    WBC 5.2 2021 05:09 AM    Hemoglobin (POC) 10.4 (L) 2020 07:12 AM    HGB 8.0 (L) 2021 05:09 AM    HCT 25.5 (L) 2021 05:09 AM    PLATELET 604 1718 05:09 AM    MCV 73.1 (L) 2021 05:09 AM    Hgb, External 33.7 2012 12:00 AM    Hct, External 69 2012 12:00 AM    Platelet cnt., External 307 2012 12:00 AM     Lab Results   Component Value Date/Time    Sodium 140 2021 05:09 AM    Potassium 2.8 (L) 2021 05:09 AM    Chloride 111 (H) 2021 05:09 AM    CO2 22 2021 05:09 AM    Anion gap 7 2021 05:09 AM    Glucose 84 2021 05:09 AM    BUN 3 (L) 2021 05:09 AM    Creatinine 0.42 (L) 2021 05:09 AM    BUN/Creatinine ratio 7 (L) 2021 05:09 AM    GFR est AA >60 2021 05:09 AM    GFR est non-AA >60 2021 05:09 AM    Calcium 7.7 (L) 2021 05:09 AM    Bilirubin, total 0.3 2021 05:09 AM    Alk.  phosphatase 151 (H) 2021 05:09 AM    Protein, total 5.8 (L) 2021 05:09 AM    Albumin 2.0 (L) 2021 05:09 AM    Globulin 3.8 05/25/2021 05:09 AM    A-G Ratio 0.5 (L) 05/25/2021 05:09 AM    ALT (SGPT) 38 05/25/2021 05:09 AM             Assessment:     #1 UTI with some right flank CVA tenderness     #2 intrauterine pregnancy     #3 asthma     #4 depression     #5 history of candidemia during this current pregnancy s/p meant with amphotericin            Recommendations: The patient tells me that she cannot take any oral medication as she cannot keep the pills down. Would treat her with vancomycin until Estela 3. With her hyperemesis and penicillin allergy, it will be difficult to put her on suppressive regimen.   I do not plan to keep her on vancomycin as a means of prophylaxis      Davis Cevallos MD

## 2021-05-25 NOTE — PROGRESS NOTES
Pt complained of nausea on shift. Given zofran with desired effects. Pt ate 0% of her dinner and complained that the nausea prevented her from eating.

## 2021-05-25 NOTE — PROGRESS NOTES
High Risk Obstetrics Progress Note    Name: Antonio Lynch MRN: [id number]  SSN: [id number]    YOB: 1985  Age: 38 y.o. Sex: female      Subjective:      LOS: 13 days    Estimated Date of Delivery: 21   Gestational Age Today: 35w2d     Patient admitted for UTI s/p failed outpatient management due to chronic eating disorder. States she does have moderate nausea/vomiting and normal fetal movement and does not have chest pain, contractions, shortness of breath, vaginal bleeding  and vaginal leaking of fluid . Objective:     Vitals:  Blood pressure 129/70, pulse 68, temperature 98.2 °F (36.8 °C), resp. rate 16, height 5' 4\" (1.626 m), weight 79.8 kg (176 lb), last menstrual period 2020, SpO2 95 %, not currently breastfeeding. Temp (24hrs), Av.5 °F (36.9 °C), Min:98.2 °F (36.8 °C), Max:98.7 °F (05.1 °C)    Systolic (97WEA), CVE:518 , Min:113 , NYW:407      Diastolic (61CRG), TLB:69, Min:67, Max:80       Intake and Output:         Physical Exam:  Patient without distress.   Heart: Regular rate and rhythm  Lung: clear to auscultation throughout lung fields, no wheezes, no rales, no rhonchi and normal respiratory effort  Abdomen: soft, nontender, gravid  Lower Extremities:  - Edema No       Membranes:  Intact    Uterine Activity:  None    Fetal Heart Rate:  Reactive  Baseline: 120s per minute  Variability: moderate  Accelerations: yes  Decelerations: none        Labs:   Recent Results (from the past 36 hour(s))   METABOLIC PANEL, BASIC    Collection Time: 21  5:52 AM   Result Value Ref Range    Sodium 142 136 - 145 mmol/L    Potassium 3.2 (L) 3.5 - 5.1 mmol/L    Chloride 112 (H) 97 - 108 mmol/L    CO2 23 21 - 32 mmol/L    Anion gap 7 5 - 15 mmol/L    Glucose 79 65 - 100 mg/dL    BUN 4 (L) 6 - 20 MG/DL    Creatinine 0.39 (L) 0.55 - 1.02 MG/DL    BUN/Creatinine ratio 10 (L) 12 - 20      GFR est AA >60 >60 ml/min/1.73m2    GFR est non-AA >60 >60 ml/min/1.73m2    Calcium 7.4 (L) 8.5 - 49.3 MG/DL   METABOLIC PANEL, COMPREHENSIVE    Collection Time: 05/25/21  5:09 AM   Result Value Ref Range    Sodium 140 136 - 145 mmol/L    Potassium 2.8 (L) 3.5 - 5.1 mmol/L    Chloride 111 (H) 97 - 108 mmol/L    CO2 22 21 - 32 mmol/L    Anion gap 7 5 - 15 mmol/L    Glucose 84 65 - 100 mg/dL    BUN 3 (L) 6 - 20 MG/DL    Creatinine 0.42 (L) 0.55 - 1.02 MG/DL    BUN/Creatinine ratio 7 (L) 12 - 20      GFR est AA >60 >60 ml/min/1.73m2    GFR est non-AA >60 >60 ml/min/1.73m2    Calcium 7.7 (L) 8.5 - 10.1 MG/DL    Bilirubin, total 0.3 0.2 - 1.0 MG/DL    ALT (SGPT) 38 12 - 78 U/L    AST (SGOT) 31 15 - 37 U/L    Alk. phosphatase 151 (H) 45 - 117 U/L    Protein, total 5.8 (L) 6.4 - 8.2 g/dL    Albumin 2.0 (L) 3.5 - 5.0 g/dL    Globulin 3.8 2.0 - 4.0 g/dL    A-G Ratio 0.5 (L) 1.1 - 2.2     CBC W/O DIFF    Collection Time: 05/25/21  5:09 AM   Result Value Ref Range    WBC 5.2 3.6 - 11.0 K/uL    RBC 3.49 (L) 3.80 - 5.20 M/uL    HGB 8.0 (L) 11.5 - 16.0 g/dL    HCT 25.5 (L) 35.0 - 47.0 %    MCV 73.1 (L) 80.0 - 99.0 FL    MCH 22.9 (L) 26.0 - 34.0 PG    MCHC 31.4 30.0 - 36.5 g/dL    RDW 15.3 (H) 11.5 - 14.5 %    PLATELET 548 945 - 067 K/uL    MPV 12.4 8.9 - 12.9 FL    NRBC 0.0 0  WBC    ABSOLUTE NRBC 0.00 0.00 - 0.01 K/uL       Assessment and Plan:       Active Problems:    Pregnant (3/1/2018)      Eating disorder affecting pregnancy, antepartum (12/2/2020)      UTI in pregnancy, antepartum, third trimester (5/12/2021)       39 y/o [id number] at 34w2d weeks admitted for UTI s/p failed outpatient medical therapy due to chronic eating disorder     Urine culture 5/5/21 positive for Klebsiella and EColi-s/p 4 days of ceftriaxone  -Repeat urine cx on 5/19 positive for enterococcus-now on vanc since 5/21  -s/p I.D. consult-plan to continue vanc until 6/3/21.  -afebrile and normal WBC's   -UA with budding yeast-miconazole vaginal for suspected vaginal contamination of yeast  -renal ultrasound  5/21 WNL     Chronic eating disorder  -ivf's  -spoke with Dr Snow Jung with [facility] here in [place]-pt does not qualify for this program as she requires high level of care that is available at [facility] in [facility]. I spoke with pt re: this option as they will accept pregnant patients.   -Again discussed need for nutrition as pt still not getting much of the Ensure in. Pt unable to tolerate dobhoff by ENT. Advised pt today that MFM does not think TPN necessary now from fetal perspective as baby's growth is normal. Discussed use of TPN for her nutrition. Pt considering the option but not ready to start. Pt aware that if PICC and TPN is started, she would need to stay inpatient to receive this nutrition given previous h/o line infection with this.  -s/p medicine consult  -Goodie bag daily  -Hypokalemia-add KCL back into ivfs. K lower today despite KCL in ivf.  Will give oral as well.       Cervical incompetence-cerclage in place  Threatened PTL  BMTZ #1and #2 on  and   Procardia prn     H/o prior  with successful , pt desires       Severe depression/anxiety with h/o suicidal ideation  -followed closely by Giancarlo Bussing her help  -continue zyprexa and prozac as well as prn prazosin and hydroxyzine     GERD  -famotidine 20mg bid     Ambulate  Daily NST     MFM scan on 5/3/21-59%tile and Vertex  -saw MFM on  8/8 BPP, vtx on US     Anemia-hemoglobin at baseline; asymptomatic  -known beta thal minor  -consider Iv iron therapy if drops     CHTN-stable normal bp's off meds     DVT PPx-ambulation and mirtha hose

## 2021-05-25 NOTE — PROGRESS NOTES
TRANSITION OF CARE  RUR--31%  Disposition--TBD. Transport--TBD    CM reviewed case with treatment team during [address] [facility] [address]. Patient continues medically unstable for discharge with plan of care under ongoing review.

## 2021-05-26 LAB
ANION GAP SERPL CALC-SCNC: 5 MMOL/L (ref 5–15)
BUN SERPL-MCNC: 3 MG/DL (ref 6–20)
BUN/CREAT SERPL: 8 (ref 12–20)
CALCIUM SERPL-MCNC: 7.7 MG/DL (ref 8.5–10.1)
CHLORIDE SERPL-SCNC: 108 MMOL/L (ref 97–108)
CO2 SERPL-SCNC: 23 MMOL/L (ref 21–32)
COMMENT, HOLDF: NORMAL
CREAT SERPL-MCNC: 0.37 MG/DL (ref 0.55–1.02)
DATE LAST DOSE: ABNORMAL
DATE LAST DOSE: ABNORMAL
GLUCOSE SERPL-MCNC: 96 MG/DL (ref 65–100)
IRON SATN MFR SERPL: 7 % (ref 20–50)
IRON SERPL-MCNC: 27 UG/DL (ref 35–150)
POTASSIUM SERPL-SCNC: 3 MMOL/L (ref 3.5–5.1)
REPORTED DOSE,DOSE: ABNORMAL UNITS
REPORTED DOSE,DOSE: ABNORMAL UNITS
REPORTED DOSE/TIME,TMG: 1500
REPORTED DOSE/TIME,TMG: ABNORMAL
SAMPLES BEING HELD,HOLD: NORMAL
SODIUM SERPL-SCNC: 136 MMOL/L (ref 136–145)
TIBC SERPL-MCNC: 413 UG/DL (ref 250–450)
VANCOMYCIN TROUGH SERPL-MCNC: 10.2 UG/ML (ref 5–10)
VANCOMYCIN TROUGH SERPL-MCNC: 22.6 UG/ML (ref 5–10)

## 2021-05-26 PROCEDURE — 74011250637 HC RX REV CODE- 250/637: Performed by: OBSTETRICS & GYNECOLOGY

## 2021-05-26 PROCEDURE — 74011250636 HC RX REV CODE- 250/636: Performed by: OBSTETRICS & GYNECOLOGY

## 2021-05-26 PROCEDURE — 80202 ASSAY OF VANCOMYCIN: CPT

## 2021-05-26 PROCEDURE — 80048 BASIC METABOLIC PNL TOTAL CA: CPT

## 2021-05-26 PROCEDURE — 36415 COLL VENOUS BLD VENIPUNCTURE: CPT

## 2021-05-26 PROCEDURE — 74011000250 HC RX REV CODE- 250: Performed by: OBSTETRICS & GYNECOLOGY

## 2021-05-26 PROCEDURE — 83540 ASSAY OF IRON: CPT

## 2021-05-26 PROCEDURE — 59025 FETAL NON-STRESS TEST: CPT

## 2021-05-26 PROCEDURE — 65410000002 HC RM PRIVATE OB

## 2021-05-26 RX ADMIN — FOLIC ACID: 5 INJECTION, SOLUTION INTRAMUSCULAR; INTRAVENOUS; SUBCUTANEOUS at 15:03

## 2021-05-26 RX ADMIN — OLANZAPINE 10 MG: 5 TABLET, FILM COATED ORAL at 21:15

## 2021-05-26 RX ADMIN — FAMOTIDINE 20 MG: 10 INJECTION, SOLUTION INTRAVENOUS at 08:51

## 2021-05-26 RX ADMIN — VANCOMYCIN HYDROCHLORIDE 1250 MG: 10 INJECTION, POWDER, LYOPHILIZED, FOR SOLUTION INTRAVENOUS at 15:03

## 2021-05-26 RX ADMIN — ONDANSETRON 8 MG: 2 INJECTION INTRAMUSCULAR; INTRAVENOUS at 21:19

## 2021-05-26 RX ADMIN — ONDANSETRON 8 MG: 2 INJECTION INTRAMUSCULAR; INTRAVENOUS at 08:50

## 2021-05-26 RX ADMIN — VANCOMYCIN HYDROCHLORIDE 1250 MG: 10 INJECTION, POWDER, LYOPHILIZED, FOR SOLUTION INTRAVENOUS at 07:00

## 2021-05-26 RX ADMIN — FAMOTIDINE 20 MG: 10 INJECTION, SOLUTION INTRAVENOUS at 21:15

## 2021-05-26 RX ADMIN — NIFEDIPINE 10 MG: 10 CAPSULE ORAL at 05:11

## 2021-05-26 RX ADMIN — FLUOXETINE 60 MG: 20 CAPSULE ORAL at 08:50

## 2021-05-26 RX ADMIN — ONDANSETRON 8 MG: 2 INJECTION INTRAMUSCULAR; INTRAVENOUS at 15:03

## 2021-05-26 NOTE — PROGRESS NOTES
Bedside and Verbal shift change report given to Marina Valencia RN  (oncoming nurse) by LILIA Aguillon / Summer Zaragoza  (offgoing nurse). Report included the following information SBAR, Kardex, Intake/Output, MAR and Accordion. Breakfast intake: 0% of tray and ensure.    Lunch intake: 0%  Dinner intake: 0%

## 2021-05-26 NOTE — PROGRESS NOTES
Pharmacist Note - Vancomycin Dosing  Therapy day 6  Indication: E. Faecalis UTI  Current regimen: 1250 mg IV Q8h    Recent Labs     05/26/21  1638 05/25/21  0509 05/24/21  0552   WBC  --  5.2  --    CREA 0.37* 0.42* 0.39*   BUN 3* 3* 4*       A Level resulted at 22.6 mcg/mL at 1625. Not a true trough as scheduled dose had been given at 1500. Goal trough: 10 - 15 mcg/mL     Plan: Will place order on hold and repeat level in 8 hours. Pharmacy will continue to monitor this patient daily for changes in clinical status and renal function.

## 2021-05-26 NOTE — PROGRESS NOTES
1930: Bedside shift change report given to AKIL Norris and Kal RN (oncoming nurse) by Jatin Horton RN (offgoing nurse). Report included the following information SBAR, Kardex, Intake/Output, MAR and Recent Results. [id number]: Patient states she hasn't had any intake during this time.

## 2021-05-26 NOTE — PROGRESS NOTES
History & Physical    Name: Spencer Mack MRN: [id number]  SSN: [id number]    YOB: 1985  Age: 38 y.o. Sex: female      Subjective:     Reason for Admission:  Pregnancy and UTI s/p failed outpatient therapy due to chronic eating disorder    History of Present Illness: Ms. Dasia Guerrero is a 38 y.o.  female with an estimated gestational age of 26w3d with Estimated Date of Delivery: 21. Patient complains of moderate nausea/vomiting and normal fetal movement Patient denies chest pain, headache , shortness of breath, vaginal bleeding  and vaginal leaking of fluid . OB History    Para Term  AB Living   16 7 4 2 7 7   SAB TAB Ectopic Molar Multiple Live Births   5 1 1   0 7      # Outcome Date GA Lbr Izaiah/2nd Weight Sex Delivery Anes PTL Lv   16 Current            15 Term 18 37w5d / 10:03 2.71 kg M  EPIDURAL AN N YAHAIRA   14 Term 04/22/15 39w0d  3.725 kg F  LO SPINAL AN N YAHAIRA   13 Term 13 39w4d  2.748 kg F VAGINAL DELI None  YAHAIRA   12 Para     M VAGINAL DELI   YAHAIRA   11 Ectopic 2009           10 Term 08/10/08 39w0d  2.722 kg M VAGINAL DELI EPI  YAHAIRA   9  06 34w0d   M VAGINAL DELI EPI Y YAHAIRA      Birth Comments: cerclage   8  05 36w0d   F VAGINAL DELI EPI Y YAHAIRA      Birth Comments: cerclage   7 SAB  18w0d       DEC   6 SAB  18w0d       DEC   5 SAB  20w0d       DEC   4 SAB  16w0d       DEC   3             2 TAB            1 SAB               Obstetric Comments   Gyn Dr. Martin Rolle     Past Medical History:   Diagnosis Date    Acute pyelonephritis 3/3/2021    Anorexia     Anxiety     Asthma     on albuterol prn.  Triggers cold and allergies    Avoidant-restrictive food intake disorder (ARFID)     Back pain, chronic     sciatica    Chronic bilateral low back pain with right-sided sciatica 2020    ~    GERD (gastroesophageal reflux disease) 2020    UGI 10-, GI Dr. Elvis Cotter  Gestational hypertension     Past pregnancy    HX OTHER MEDICAL      , , , ,     Hyperemesis     severe hyperemesis    Hypertension     with G12 - none this pregnancy    Iron deficiency anemia 10/08/2010    MDD (major depressive disorder), single episode with postpartum onset 2013    treated with meds - 13    Orthostatic hypotension 2020    Plantar fasciitis     Pregnancy     36 weeks    PTSD (post-traumatic stress disorder)      Past Surgical History:   Procedure Laterality Date    HX  SECTION  4/22/15    HX DILATION AND CURETTAGE      HX DILATION AND CURETTAGE  2020    HX GYN      miscarriage x 6    HX GYN      removal of left fallopian tube    HX OTHER SURGICAL      cerlcage x4, ectopic x1  with removal of left fallopian tube    HX OTHER SURGICAL      cerclage w/ current pregnancy.  Removed 18    [place]  DELIVERY ONLY       Social History     Occupational History    Not on file   Tobacco Use    Smoking status: Never Smoker    Smokeless tobacco: Never Used   Substance and Sexual Activity    Alcohol use: Not Currently    Drug use: No    Sexual activity: Yes     Partners: Male      Family History   Problem Relation Age of Onset   [place]ard[place] S[place] Arthritis-rheumatoid Mother     Asthma Mother     No Known Problems Father     No Known Problems Maternal Grandmother     No Known Problems Maternal Grandfather     No Known Problems Paternal Grandmother     No Known Problems Paternal Grandfather     Asthma Son     Alcohol abuse Neg Hx     Arthritis-osteo Neg Hx     Bleeding Prob Neg Hx     Cancer Neg Hx     Diabetes Neg Hx     Elevated Lipids Neg Hx     Headache Neg Hx     Heart Disease Neg Hx     Hypertension Neg Hx     Lung Disease Neg Hx     Migraines Neg Hx     Psychiatric Disorder Neg Hx     Stroke Neg Hx     Mental Retardation Neg Hx     Anesth Problems Neg Hx        Allergies   Allergen Reactions    Labetalol Hives    Ceclor [Cefaclor] Unknown (comments)    Pcn [Penicillins] Hives    Septra [Sulfamethoprim Ds] Unknown (comments)     Prior to Admission medications    Medication Sig Start Date End Date Taking? Authorizing Provider   FLUoxetine (PROzac) 20 mg capsule Take 1 Cap by mouth daily. Take with 40 mg cap for total daily dose of 60 mg. 3/1/21  Yes Allocca Eladia Tenisha, NP   FLUoxetine (PROzac) 40 mg capsule Take 1 Cap by mouth daily. 3/1/21  Yes Allocca Eladia Tenisha, NP   OLANZapine (ZyPREXA) 10 mg tablet Take 1 Tab by mouth nightly. 3/1/21  Yes Allocca, Eladia Tenisha, NP   prazosin (MINIPRESS) 2 mg capsule Take 1 Cap by mouth nightly. Indications: posttraumatic stress syndrome 3/1/21  Yes Julio Ortizagros , NP   calcium carbonate (Tums) 200 mg calcium (500 mg) chew Take 1 Tab by mouth two (2) times daily as needed for Other (reflux). 2/18/21  Yes Hair Roberson MD   promethazine (Phenergan) 12.5 mg suppository    Yes Provider, Historical   ondansetron (ZOFRAN ODT) 4 mg disintegrating tablet Take 1 Tab by mouth every six (6) hours as needed for Nausea or Vomiting. 12/16/20  Yes Timothy Mingle, DO   hydrOXYzine HCL (ATARAX) 50 mg tablet Take 1 Tab by mouth four (4) times daily. 11/9/20  Yes Rigoberto Phillips NP   food supplemt, lactose-reduced (Ensure High Protein) liqd Take 237 mL by mouth three (3) times daily. 10/14/20  Yes Tere Qualia, MENA   polyethylene glycol (MIRALAX) 17 gram packet Take 1 Packet by mouth daily as needed for Constipation. 2/23/21   Hair Roberson MD   doxylamine succinate (UNISOM) 25 mg tablet Take 1 Tab by mouth nightly as needed for Insomnia. 2/15/21   Maxca, Eladia Tenisha, NP   pantoprazole (PROTONIX) 40 mg tablet Take 1 Tab by mouth Daily (before breakfast). Indications: gastroesophageal reflux disease 1/15/21   Timothy Mingle, DO   sucralfate (CARAFATE) 1 gram tablet Take 1 Tab by mouth Before breakfast, lunch, dinner and at bedtime.  1/15/21   Timothy Ramos DO Lacto.acidophilus-Bif. animalis (Probiotic) 5 billion cell cpSP Take 1 Cap by mouth daily. 20   Jose Ortiz NP   prochlorperazine (COMPAZINE) 10 mg tablet  20   Provider, Historical   0.9% sodium chloride solp 1,000 mL with thiamine 100 mg/mL soln 019 mg, folic acid 5 mg/mL soln 1 mg 1 Bag by IntraVENous route every twenty-four (24) hours. 20   Rani Casillas,    cholecalciferol (Vitamin D3) 25 mcg (1,000 unit) cap Take  by mouth daily. Provider, Historical   pyridoxine, vitamin B6, (Vitamin B-6) 100 mg tablet Take 100 mg by mouth daily. Provider, Historical   OTHER 1 Ensure Enlive food supplement drink PO TID 20   Leona Leal NP   OTHER 1 ensure pudding PO daily for lunch. 10/20/20   Leona Leal NP   prenatal vit-iron fumarate-fa (PRENATAL PLUS with IRON) 28 mg iron- 800 mcg tab  20   Provider, Historical        Review of Systems:  Pertinent items are noted in the History of Present Illness. Objective:     Vitals:    Vitals:    21 1615 21 2000 21 0500 21 0751   BP: 136/71 130/79 129/72 132/79   Pulse: 61 63 (!) 58 (!) 58   Resp: 16 16 16 16   Temp: 98.2 °F (36.8 °C) 98.4 °F (36.9 °C) 98.3 °F (36.8 °C) 98.3 °F (36.8 °C)   SpO2: 99% 100% 99% 99%   Weight:   78.9 kg (174 lb)    Height:          Temp (24hrs), Av.3 °F (36.8 °C), Min:98.2 °F (36.8 °C), Max:98.4 °F (36.9 °C)    BP  Min: 129/72  Max: 136/71     Physical Exam:  Patient without distress.   Heart: Regular rate and rhythm  Lung: clear to auscultation throughout lung fields, no wheezes, no rales, no rhonchi and normal respiratory effort  Abdomen: soft, nontender, gravid  Lower Extremities:  - Edema No     Membranes:  Intact  Uterine Activity:  None  Fetal Heart Rate:  Reactive  Baseline: 130s per minute  Variability: moderate  Accelerations: yes  Decelerations: none       Lab/Data Review:  Recent Results (from the past 24 hour(s))   SAMPLES BEING HELD    Collection Time: 21  5:23 AM   Result Value Ref Range    SAMPLES BEING HELD 1lav 1pst     COMMENT        Add-on orders for these samples will be processed based on acceptable specimen integrity and analyte stability, which may vary by analyte. Assessment and Plan: Active Problems:    Pregnant (3/1/2018)      Eating disorder affecting pregnancy, antepartum (2020)      UTI in pregnancy, antepartum, third trimester (2021)       39 y/o [id number] at 34w3d weeks admitted for UTI s/p failed outpatient medical therapy due to chronic eating disorder     Urine culture 21 positive for Klebsiella and EColi-s/p 4 days of ceftriaxone  -Repeat urine cx on  positive for enterococcus-now on vanc since   -s/p I.D. consult-plan to continue vanc until 6/3/21. Appreciate help  -afebrile and normal WBC's   -UA with budding yeast-miconazole vaginal for suspected vaginal contamination of yeast  -renal ultrasound   WNL     Chronic eating disorder  -ivf's  -spoke with Dr Krista Andrea with [facility] partial hospitalization program here in [place]-pt does not qualify for this program as she requires high level of care that is available at [facility] inpatient program in [facility]. I spoke with pt re: this option as they will accept pregnant patients.   -Again discussed need for nutrition as pt still not getting much of the Ensure in. Pt unable to tolerate dobhoff by ENT. Advised pt today that MFM does not think TPN necessary now from fetal perspective as baby's growth is normal. Discussed use of TPN for her nutrition. Pt declines TPN at this time.     -Goodie bag daily  -Hypokalemia-labs pending today     Cervical incompetence-cerclage in place  Threatened PTL  BMTZ #1and #2 on  and   Procardia prn     H/o prior  with successful , pt desires       Severe depression/anxiety with h/o suicidal ideation  -followed closely by Lobo Pedro her help  -continue zyprexa and prozac as well as prn prazosin and hydroxyzine     GERD  -famotidine 20mg bid     Ambulate  Daily NST     MFM scan on 5/3/21-59%tile and Vertex  -saw MFM on 5/20 8/8 BPP, vtx on US; next appt tomorrow     Anemia-hemoglobin at baseline; asymptomatic  -known beta thal minor  -iron panel today  -consider Iv iron therapy pending results     CHTN-stable normal bp's off meds     DVT PPx-ambulation and mirtha hose

## 2021-05-26 NOTE — PROGRESS NOTES
ID Progress Note  2021    Subjective:     Afebrile. No dyspnea, abdominal pain, headache or sore throat    ROS: No anaphylaxis, seizures, syncope, hematemesis, hematochezia     Objective:     Vitals:   Visit Vitals  /79 (BP 1 Location: Right upper arm, BP Patient Position: At rest;Lying left side)   Pulse (!) 58   Temp 98.3 °F (36.8 °C)   Resp 16   Ht 5' 4\" (1.626 m)   Wt 78.9 kg (174 lb)   LMP 2020 (Exact Date)   SpO2 99%   Breastfeeding No   BMI 29.87 kg/m²        Tmax:  Temp (24hrs), Av.3 °F (36.8 °C), Min:98.2 °F (36.8 °C), Max:98.4 °F (36.9 °C)      Exam:    Not in distress  Pink conjunctivae, anicteric sclerae  No cervical lymphdenopathy   Lung clear, no rales, wheezes or rhonchi   Heart: s1, s2, RRR, no murmurs rubs or clicks  Abdomen: soft nontender, no guarding or rebound  Knees not warm or tender  Speech fluent   No cva tenderness  Labs:   Lab Results   Component Value Date/Time    WBC 5.2 2021 05:09 AM    Hemoglobin (POC) 10.4 (L) 2020 07:12 AM    HGB 8.0 (L) 2021 05:09 AM    HCT 25.5 (L) 2021 05:09 AM    PLATELET 946  05:09 AM    MCV 73.1 (L) 2021 05:09 AM    Hgb, External 33.7 2012 12:00 AM    Hct, External 69 2012 12:00 AM    Platelet cnt., External 307 2012 12:00 AM     Lab Results   Component Value Date/Time    Sodium 140 2021 05:09 AM    Potassium 2.8 (L) 2021 05:09 AM    Chloride 111 (H) 2021 05:09 AM    CO2 22 2021 05:09 AM    Anion gap 7 2021 05:09 AM    Glucose 84 2021 05:09 AM    BUN 3 (L) 2021 05:09 AM    Creatinine 0.42 (L) 2021 05:09 AM    BUN/Creatinine ratio 7 (L) 2021 05:09 AM    GFR est AA >60 2021 05:09 AM    GFR est non-AA >60 2021 05:09 AM    Calcium 7.7 (L) 2021 05:09 AM    Bilirubin, total 0.3 2021 05:09 AM    Alk.  phosphatase 151 (H) 2021 05:09 AM    Protein, total 5.8 (L) 2021 05:09 AM    Albumin 2.0 (L) 2021 05:09 AM    Globulin 3.8 05/25/2021 05:09 AM    A-G Ratio 0.5 (L) 05/25/2021 05:09 AM    ALT (SGPT) 38 05/25/2021 05:09 AM             Assessment:     #1 UTI with some right flank CVA tenderness     #2 intrauterine pregnancy     #3 asthma     #4 depression     #5 history of candidemia during this current pregnancy s/p meant with amphotericin     #6 hypokalemia          Recommendations: The patient tells me that she cannot take any oral medication as she cannot keep the pills down. Would treat her with vancomycin until Estela 3. With her hyperemesis and penicillin allergy, it will be difficult to put her on suppressive regimen.   I do not plan to keep her on vancomycin as a means of prophylaxis    Check bmp today       Maurilio Patterson MD

## 2021-05-27 PROCEDURE — 65410000002 HC RM PRIVATE OB

## 2021-05-27 PROCEDURE — 74011250637 HC RX REV CODE- 250/637: Performed by: OBSTETRICS & GYNECOLOGY

## 2021-05-27 PROCEDURE — 74011250636 HC RX REV CODE- 250/636: Performed by: OBSTETRICS & GYNECOLOGY

## 2021-05-27 PROCEDURE — 74011000250 HC RX REV CODE- 250: Performed by: OBSTETRICS & GYNECOLOGY

## 2021-05-27 PROCEDURE — 76819 FETAL BIOPHYS PROFIL W/O NST: CPT | Performed by: OBSTETRICS & GYNECOLOGY

## 2021-05-27 PROCEDURE — 99253 IP/OBS CNSLTJ NEW/EST LOW 45: CPT | Performed by: INTERNAL MEDICINE

## 2021-05-27 PROCEDURE — 59025 FETAL NON-STRESS TEST: CPT

## 2021-05-27 RX ORDER — POTASSIUM CHLORIDE 750 MG/1
40 TABLET, FILM COATED, EXTENDED RELEASE ORAL
Status: COMPLETED | OUTPATIENT
Start: 2021-05-27 | End: 2021-05-27

## 2021-05-27 RX ORDER — LANOLIN ALCOHOL/MO/W.PET/CERES
1000 CREAM (GRAM) TOPICAL DAILY
Status: DISCONTINUED | OUTPATIENT
Start: 2021-05-28 | End: 2021-06-21 | Stop reason: HOSPADM

## 2021-05-27 RX ADMIN — POTASSIUM CHLORIDE 40 MEQ: 750 TABLET, EXTENDED RELEASE ORAL at 11:46

## 2021-05-27 RX ADMIN — OLANZAPINE 10 MG: 5 TABLET, FILM COATED ORAL at 21:12

## 2021-05-27 RX ADMIN — FOLIC ACID: 5 INJECTION, SOLUTION INTRAMUSCULAR; INTRAVENOUS; SUBCUTANEOUS at 18:46

## 2021-05-27 RX ADMIN — VANCOMYCIN HYDROCHLORIDE 1250 MG: 10 INJECTION, POWDER, LYOPHILIZED, FOR SOLUTION INTRAVENOUS at 23:14

## 2021-05-27 RX ADMIN — FAMOTIDINE 20 MG: 10 INJECTION, SOLUTION INTRAVENOUS at 11:56

## 2021-05-27 RX ADMIN — NIFEDIPINE 10 MG: 10 CAPSULE ORAL at 16:57

## 2021-05-27 RX ADMIN — POTASSIUM CHLORIDE, DEXTROSE MONOHYDRATE AND SODIUM CHLORIDE 125 ML/HR: 150; 5; 900 INJECTION, SOLUTION INTRAVENOUS at 06:27

## 2021-05-27 RX ADMIN — FAMOTIDINE 20 MG: 10 INJECTION, SOLUTION INTRAVENOUS at 21:12

## 2021-05-27 RX ADMIN — VANCOMYCIN HYDROCHLORIDE 1250 MG: 10 INJECTION, POWDER, LYOPHILIZED, FOR SOLUTION INTRAVENOUS at 15:19

## 2021-05-27 RX ADMIN — FLUOXETINE 60 MG: 20 CAPSULE ORAL at 12:03

## 2021-05-27 RX ADMIN — ONDANSETRON 8 MG: 2 INJECTION INTRAMUSCULAR; INTRAVENOUS at 11:48

## 2021-05-27 RX ADMIN — ONDANSETRON 8 MG: 2 INJECTION INTRAMUSCULAR; INTRAVENOUS at 06:27

## 2021-05-27 NOTE — PROGRESS NOTES
Called in hematology consult to [facility]. Dr Caesar Thomas or Neel Boo NP will see patient.

## 2021-05-27 NOTE — CONSULTS
[facility] [place] [facility], [place] [address], [address] [place]: [phone]  F: [phone]    Reason for consult   Regi Plasencia is a 38 y.o. female who is seen in consultation at the request of Dr. Susan Stratton for evaluation of Anemia    History of Present Illness:   Patient is a 38 y.o. female who is in her 3rd trimester of 8th pregnancy , has a h/o iron deficiency and thal minor who is admitted with a UTI, nausea, decreased appetite . We are consulted for anemia    She states that she has received IV iron with her previous pregnancies. Has no bleeding, fatigue, has nausea, no pain, no falls, no fevers. Past Medical History:   Diagnosis Date    Acute pyelonephritis 3/3/2021    Anorexia     Anxiety     Asthma     on albuterol prn.  Triggers cold and allergies    Avoidant-restrictive food intake disorder (ARFID)     Back pain, chronic     sciatica    Chronic bilateral low back pain with right-sided sciatica 2020    ~    GERD (gastroesophageal reflux disease) 2020    UGI , GI Dr. Ventura Brothers Gestational hypertension     Past pregnancy    HX OTHER MEDICAL      , 2006, 2008, ,     Hyperemesis     severe hyperemesis    Hypertension     with G12 - none this pregnancy    Iron deficiency anemia 10/08/2010    MDD (major depressive disorder), single episode with postpartum onset 2013    treated with meds - 13    Orthostatic hypotension 2020    Plantar fasciitis     Pregnancy     36 weeks    PTSD (post-traumatic stress disorder)       Past Surgical History:   Procedure Laterality Date    HX  SECTION  4/22/15    HX DILATION AND CURETTAGE      HX DILATION AND CURETTAGE  2020    HX GYN      miscarriage x 6    HX GYN      removal of left fallopian tube    HX OTHER SURGICAL      cerlcage x4, ectopic x1  with removal of left fallopian tube    HX OTHER SURGICAL      cerclage w/ current pregnancy. Removed 18    [place]  DELIVERY ONLY        Social History     Tobacco Use    Smoking status: Never Smoker    Smokeless tobacco: Never Used   Substance Use Topics    Alcohol use: Not Currently      Family History   Problem Relation Age of Onset   [place] Arthritis-rheumatoid Mother     Asthma Mother     No Known Problems Father     No Known Problems Maternal Grandmother     No Known Problems Maternal Grandfather     No Known Problems Paternal Grandmother     No Known Problems Paternal Grandfather     Asthma Son     Alcohol abuse Neg Hx     Arthritis-osteo Neg Hx     Bleeding Prob Neg Hx     Cancer Neg Hx     Diabetes Neg Hx     Elevated Lipids Neg Hx     Headache Neg Hx     Heart Disease Neg Hx     Hypertension Neg Hx     Lung Disease Neg Hx     Migraines Neg Hx     Psychiatric Disorder Neg Hx     Stroke Neg Hx     Mental Retardation Neg Hx     Anesth Problems Neg Hx      Current Facility-Administered Medications   Medication Dose Route Frequency    dextrose 5% - 0.9% NaCl with KCl 20 mEq/L infusion  125 mL/hr IntraVENous CONTINUOUS    NIFEdipine (PROCARDIA) capsule 10 mg  10 mg Oral Q6H PRN    vancomycin (VANCOCIN) 1250 mg in  ml infusion  1,250 mg IntraVENous Q8H    0.9% sodium chloride 1,000 mL with mvi (adult no. 4 with vit K) 10 mL, thiamine 263 mg, folic acid 1 mg infusion   IntraVENous Q24H    Vancomycin Pharmacy Dosing   Other PRN    famotidine (PF) (PEPCID) 20 mg in 0.9% sodium chloride 10 mL injection  20 mg IntraVENous Q12H    acetaminophen (TYLENOL) suppository 650 mg  650 mg Rectal Q6H PRN    diphenhydrAMINE (BENADRYL) injection 25 mg  25 mg IntraVENous Q6H PRN    OLANZapine (ZyPREXA) tablet 10 mg  10 mg Oral QHS    FLUoxetine (PROzac) capsule 60 mg  60 mg Oral DAILY    prazosin (MINIPRESS) capsule 2 mg  2 mg Oral QHS PRN    ondansetron (ZOFRAN) injection 8 mg  8 mg IntraVENous Q6H PRN    promethazine (PHENERGAN) suppository 25 mg  25 mg Rectal Q6H PRN    hydrOXYzine HCL (ATARAX) tablet 50 mg  50 mg Oral TID PRN      Allergies   Allergen Reactions    Labetalol Hives    Ceclor [Cefaclor] Unknown (comments)    Pcn [Penicillins] Hives    Septra [Sulfamethoprim Ds] Unknown (comments)        Review of Systems: A complete review of systems was obtained, negative except as described above. Physical Exam:     Visit Vitals  /70 (BP 1 Location: Right arm, BP Patient Position: At rest)   Pulse 81   Temp 98.3 °F (36.8 °C)   Resp 16   Ht 5' 4\" (1.626 m)   Wt 174 lb 8 oz (79.2 kg)   LMP 09/27/2020 (Exact Date)   SpO2 99%   Breastfeeding No   BMI 29.95 kg/m²     ECOG PS: 1  General: No distress  Eyes: PERRLA, anicteric sclerae  HENT: Atraumatic, OP clear  Neck: Supple  Lymphatic: No cervical, supraclavicular, or inguinal adenopathy  Respiratory:  normal respiratory effort  CV: Normal rate,no peripheral edema  GI: Soft, Pregnant  Skin: No rashes, ecchymoses, or petechiae. Normal temperature, turgor, and texture. Psych: Alert, oriented, appropriate affect, normal judgment/insight    Results:     Lab Results   Component Value Date/Time    WBC 5.2 05/25/2021 05:09 AM    HGB 8.0 (L) 05/25/2021 05:09 AM    HCT 25.5 (L) 05/25/2021 05:09 AM    PLATELET 215 17/61/8409 05:09 AM    MCV 73.1 (L) 05/25/2021 05:09 AM    ABS.  NEUTROPHILS 4.6 05/12/2021 06:30 PM    Hemoglobin (POC) 10.4 (L) 01/17/2020 07:12 AM    Hgb, External 33.7 08/03/2012 12:00 AM    Hct, External 69 08/03/2012 12:00 AM    Platelet cnt., External 307 08/03/2012 12:00 AM     Lab Results   Component Value Date/Time    Sodium 136 05/26/2021 04:38 PM    Potassium 3.0 (L) 05/26/2021 04:38 PM    Chloride 108 05/26/2021 04:38 PM    CO2 23 05/26/2021 04:38 PM    Glucose 96 05/26/2021 04:38 PM    BUN 3 (L) 05/26/2021 04:38 PM    Creatinine 0.37 (L) 05/26/2021 04:38 PM    GFR est AA >60 05/26/2021 04:38 PM    GFR est non-AA >60 05/26/2021 04:38 PM    Calcium 7.7 (L) 05/26/2021 04:38 PM    Glucose (POC) 83 03/17/2021 11:41 AM     Lab Results   Component Value Date/Time    Bilirubin, total 0.3 05/25/2021 05:09 AM    ALT (SGPT) 38 05/25/2021 05:09 AM    Alk. phosphatase 151 (H) 05/25/2021 05:09 AM    Protein, total 5.8 (L) 05/25/2021 05:09 AM    Albumin 2.0 (L) 05/25/2021 05:09 AM    Globulin 3.8 05/25/2021 05:09 AM       Lab Results   Component Value Date/Time    Reticulocyte count 1.0 12/10/2020 06:15 AM    Iron % saturation 7 (L) 05/26/2021 04:38 PM    TIBC 413 05/26/2021 04:38 PM    Ferritin 11 (L) 12/10/2020 12:48 PM    Vitamin B12 306 12/10/2020 12:48 PM    Haptoglobin 74 12/10/2020 12:48 PM    ROCÍO Poly NEG 12/10/2020 12:48 PM     12/10/2020 12:48 PM    TSH 0.87 03/03/2021 05:48 AM    Lipase 51 (L) 03/03/2021 05:48 AM     Lab Results   Component Value Date/Time    D-dimer 1.74 (H) 03/03/2021 05:48 AM       Records reviewed and summarized above. Pathology report(s) reviewed above. Radiology report(s) reviewed above. Assessment:   1) Microcytic anemia    Hypochromic , hypoproliferative  Has Beta thal minor but has superimposed iron deficiency  She has an eating disorder and borderline B12  Management as below    2) UTI    3) Normal pregnancy    4) PTSD    5) Eating disorder    Plan:     · Venofer 200 mg daily x 5 days  · FA - 0.4 mg daily  · B12 - 1000 mcg daily po    Hematology will sign off    I appreciate the opportunity to participate in Ms. Alec Talbert care.     Signed By: Storm Dockery MD

## 2021-05-27 NOTE — PROGRESS NOTES
High Risk Obstetrics Progress Note    Name: Spencer Mack MRN: [id number]  SSN: [id number]    YOB: 1985  Age: 38 y.o. Sex: female      Subjective:      LOS: 15 days    Estimated Date of Delivery: 21   Gestational Age Today: 31w1d     Patient admitted for UTI s/p failed outpatient therapy due to chronic eating disorder. States she does have moderate nausea/vomiting, normal fetal movement and just not feeling well this morning but can state why and does not have chest pain, headache , vaginal bleeding  and vaginal leaking of fluid . Patient states she doesn't want to eat. Objective:     Vitals:  Blood pressure 129/73, pulse 60, temperature 98 °F (36.7 °C), resp. rate 16, height 5' 4\" (1.626 m), weight 79.2 kg (174 lb 8 oz), last menstrual period 2020, SpO2 100 %, not currently breastfeeding. Temp (24hrs), Av.1 °F (36.7 °C), Min:98 °F (36.7 °C), Max:98.2 °F (08.6 °C)    Systolic (52YWF), GWV:175 , Min:105 , HWW:571      Diastolic (41BFV), PCX:07, Min:64, Max:73       Intake and Output:         Physical Exam:  Patient very tearful today. Heart: Regular rate and rhythm  Lung: clear to auscultation throughout lung fields, no wheezes, no rales, no rhonchi and normal respiratory effort  Abdomen: soft, nontender, gravid  Lower Extremities:  - Edema No       Membranes:  Intact    Uterine Activity:  1 contraction on 20-30 min NST    Fetal Heart Rate:  Reactive  Baseline: 120s per minute  Variability: moderate  Accelerations: yes  Decelerations: none        Labs:   Recent Results (from the past 36 hour(s))   SAMPLES BEING HELD    Collection Time: 21  5:23 AM   Result Value Ref Range    SAMPLES BEING HELD 1lav 1pst     COMMENT        Add-on orders for these samples will be processed based on acceptable specimen integrity and analyte stability, which may vary by analyte.    Estle Seen    Collection Time: 21  4:25 PM   Result Value Ref Range    Vancomycin,trough 22.6 (HH) 5.0 - 10.0 ug/mL    Reported dose date [id number]      Reported dose time: 1500      Reported dose: 1250 MG UNITS   IRON PROFILE    Collection Time: 05/26/21  4:38 PM   Result Value Ref Range    Iron 27 (L) 35 - 150 ug/dL    TIBC 413 250 - 450 ug/dL    Iron % saturation 7 (L) 20 - 50 %   METABOLIC PANEL, BASIC    Collection Time: 05/26/21  4:38 PM   Result Value Ref Range    Sodium 136 136 - 145 mmol/L    Potassium 3.0 (L) 3.5 - 5.1 mmol/L    Chloride 108 97 - 108 mmol/L    CO2 23 21 - 32 mmol/L    Anion gap 5 5 - 15 mmol/L    Glucose 96 65 - 100 mg/dL    BUN 3 (L) 6 - 20 MG/DL    Creatinine 0.37 (L) 0.55 - 1.02 MG/DL    BUN/Creatinine ratio 8 (L) 12 - 20      GFR est AA >60 >60 ml/min/1.73m2    GFR est non-AA >60 >60 ml/min/1.73m2    Calcium 7.7 (L) 8.5 - 10.1 MG/DL   VANCOMYCIN, TROUGH    Collection Time: 05/26/21 10:58 PM   Result Value Ref Range    Vancomycin,trough 10.2 (H) 5.0 - 10.0 ug/mL    Reported dose date NOT PROVIDED      Reported dose time: NOT PROVIDED      Reported dose: NOT PROVIDED UNITS       Assessment and Plan: Active Problems:    Pregnant (3/1/2018)      Eating disorder affecting pregnancy, antepartum (12/2/2020)      UTI in pregnancy, antepartum, third trimester (5/12/2021)       39 y/o [id number] at 34w4d weeks admitted for UTI s/p failed outpatient medical therapy due to chronic eating disorder     Urine culture 5/5/21 positive for Klebsiella and EColi-s/p 4 days of ceftriaxone  -Repeat urine cx on 5/19 positive for enterococcus-now on vanc since 5/21  -s/p I.D. consult-plan to continue vanc until 6/3/21.  Appreciate help  -afebrile and normal WBC's   -UA with budding yeast-miconazole vaginal for suspected vaginal contamination of yeast  -renal ultrasound  5/21 WNL     Chronic eating disorder  -ivf's  -spoke with Dr Alexsander Chisholm with [facility] partial hospitalization program here in [place]-pt does not qualify for this program as she requires high level of care that is available at [facility] inpatient program in [facility]. I spoke with pt re: this option as they will accept pregnant patients.   -Again discussed need for nutrition as pt input worse over the last few days than previously. Pt unable to tolerate dobhoff by ENT. Advised pt today that MFM does not think TPN necessary now from fetal perspective as baby's growth is normal. Discussed use of TPN for her nutrition.  Pt declines TPN again today.    -Goodie bag daily  -Hypokalemia-continue KCL in ivfs and add Carloscarli Diaz today.     Cervical incompetence-cerclage in place  Threatened PTL  BMTZ #1and #2 on  and   Procardia prn     H/o prior  with successful , pt desires       Severe depression/anxiety with h/o suicidal ideation  -followed closely by Radha Pulido her help  -continue zyprexa and prozac as well as prn prazosin and hydroxyzine     GERD  -famotidine 20mg bid     Ambulate  Daily NST     MFM scan on 5/3/21-59%tile and Vertex  -saw MFM on  8/8 BPP, vtx on US; next appt tomorrow     Anemia-hemoglobin at baseline; asymptomatic  -known beta thal minor  -iron panel today  -iv iron today     CHTN-stable normal bp's off meds     DVT PPx-ambulation and mirtha hose

## 2021-05-27 NOTE — PROGRESS NOTES
1940: Bedside shift change report given to AKIL Norris and Kla RN (oncoming nurse) by Latricia Mares RN (offgoing nurse). Report included the following information SBAR, Kardex, Intake/Output, MAR and Recent Results.      [id number]: Patient states she hasn't had any intake during this time

## 2021-05-28 LAB
ANION GAP SERPL CALC-SCNC: 6 MMOL/L (ref 5–15)
BUN SERPL-MCNC: 2 MG/DL (ref 6–20)
BUN/CREAT SERPL: 8 (ref 12–20)
CALCIUM SERPL-MCNC: 7.4 MG/DL (ref 8.5–10.1)
CHLORIDE SERPL-SCNC: 108 MMOL/L (ref 97–108)
CO2 SERPL-SCNC: 24 MMOL/L (ref 21–32)
CREAT SERPL-MCNC: 0.25 MG/DL (ref 0.55–1.02)
GLUCOSE SERPL-MCNC: 68 MG/DL (ref 65–100)
POTASSIUM SERPL-SCNC: 3.2 MMOL/L (ref 3.5–5.1)
SODIUM SERPL-SCNC: 138 MMOL/L (ref 136–145)

## 2021-05-28 PROCEDURE — 36415 COLL VENOUS BLD VENIPUNCTURE: CPT

## 2021-05-28 PROCEDURE — 74011250636 HC RX REV CODE- 250/636: Performed by: OBSTETRICS & GYNECOLOGY

## 2021-05-28 PROCEDURE — 99284 EMERGENCY DEPT VISIT MOD MDM: CPT

## 2021-05-28 PROCEDURE — 74011000258 HC RX REV CODE- 258: Performed by: INTERNAL MEDICINE

## 2021-05-28 PROCEDURE — 74011000250 HC RX REV CODE- 250: Performed by: OBSTETRICS & GYNECOLOGY

## 2021-05-28 PROCEDURE — 74011250636 HC RX REV CODE- 250/636: Performed by: INTERNAL MEDICINE

## 2021-05-28 PROCEDURE — 74011250637 HC RX REV CODE- 250/637: Performed by: OBSTETRICS & GYNECOLOGY

## 2021-05-28 PROCEDURE — 59025 FETAL NON-STRESS TEST: CPT

## 2021-05-28 PROCEDURE — 74011250637 HC RX REV CODE- 250/637: Performed by: INTERNAL MEDICINE

## 2021-05-28 PROCEDURE — 80048 BASIC METABOLIC PNL TOTAL CA: CPT

## 2021-05-28 PROCEDURE — 65410000002 HC RM PRIVATE OB

## 2021-05-28 RX ADMIN — VANCOMYCIN HYDROCHLORIDE 1250 MG: 10 INJECTION, POWDER, LYOPHILIZED, FOR SOLUTION INTRAVENOUS at 15:15

## 2021-05-28 RX ADMIN — POTASSIUM CHLORIDE, DEXTROSE MONOHYDRATE AND SODIUM CHLORIDE 125 ML/HR: 150; 5; 900 INJECTION, SOLUTION INTRAVENOUS at 02:56

## 2021-05-28 RX ADMIN — OLANZAPINE 10 MG: 5 TABLET, FILM COATED ORAL at 21:14

## 2021-05-28 RX ADMIN — FAMOTIDINE 20 MG: 10 INJECTION, SOLUTION INTRAVENOUS at 09:54

## 2021-05-28 RX ADMIN — FOLIC ACID: 5 INJECTION, SOLUTION INTRAMUSCULAR; INTRAVENOUS; SUBCUTANEOUS at 17:48

## 2021-05-28 RX ADMIN — CYANOCOBALAMIN TAB 500 MCG 1000 MCG: 500 TAB at 10:02

## 2021-05-28 RX ADMIN — ONDANSETRON 8 MG: 2 INJECTION INTRAMUSCULAR; INTRAVENOUS at 23:08

## 2021-05-28 RX ADMIN — VANCOMYCIN HYDROCHLORIDE 1250 MG: 10 INJECTION, POWDER, LYOPHILIZED, FOR SOLUTION INTRAVENOUS at 06:55

## 2021-05-28 RX ADMIN — IRON SUCROSE 200 MG: 20 INJECTION, SOLUTION INTRAVENOUS at 11:06

## 2021-05-28 RX ADMIN — FLUOXETINE 60 MG: 20 CAPSULE ORAL at 10:01

## 2021-05-28 RX ADMIN — ONDANSETRON 8 MG: 2 INJECTION INTRAMUSCULAR; INTRAVENOUS at 17:21

## 2021-05-28 RX ADMIN — FAMOTIDINE 20 MG: 10 INJECTION, SOLUTION INTRAVENOUS at 21:14

## 2021-05-28 RX ADMIN — VANCOMYCIN HYDROCHLORIDE 1250 MG: 10 INJECTION, POWDER, LYOPHILIZED, FOR SOLUTION INTRAVENOUS at 23:01

## 2021-05-28 NOTE — PROGRESS NOTES
ID Progress Note  2021    Subjective:     Afebrile. No dyspnea,     Objective:     Vitals:   Visit Vitals  BP (!) 95/54 (BP 1 Location: Right upper arm, BP Patient Position: Lying left side)   Pulse 60   Temp 97.9 °F (36.6 °C)   Resp 17   Ht 5' 4\" (1.626 m)   Wt 77.6 kg (171 lb)   LMP 2020 (Exact Date)   SpO2 100%   Breastfeeding No   BMI 29.35 kg/m²        Tmax:  Temp (24hrs), Av.2 °F (36.8 °C), Min:97.9 °F (36.6 °C), Max:98.3 °F (36.8 °C)      Exam:    Not in distress  Lung clear, no rales, wheezes or rhonchi   Heart: s1, s2, RRR, no murmurs rubs or clicks  Abdomen: soft nontender, no guarding or rebound  Speech fluent   No cva tenderness  Labs:   Lab Results   Component Value Date/Time    WBC 5.2 2021 05:09 AM    Hemoglobin (POC) 10.4 (L) 2020 07:12 AM    HGB 8.0 (L) 2021 05:09 AM    HCT 25.5 (L) 2021 05:09 AM    PLATELET 714  05:09 AM    MCV 73.1 (L) 2021 05:09 AM    Hgb, External 33.7 2012 12:00 AM    Hct, External 69 2012 12:00 AM    Platelet cnt., External 307 2012 12:00 AM     Lab Results   Component Value Date/Time    Sodium 138 2021 03:06 AM    Potassium 3.2 (L) 2021 03:06 AM    Chloride 108 2021 03:06 AM    CO2 24 2021 03:06 AM    Anion gap 6 2021 03:06 AM    Glucose 68 2021 03:06 AM    BUN 2 (L) 2021 03:06 AM    Creatinine 0.25 (L) 2021 03:06 AM    BUN/Creatinine ratio 8 (L) 2021 03:06 AM    GFR est AA >60 2021 03:06 AM    GFR est non-AA >60 2021 03:06 AM    Calcium 7.4 (L) 2021 03:06 AM    Bilirubin, total 0.3 2021 05:09 AM    Alk.  phosphatase 151 (H) 2021 05:09 AM    Protein, total 5.8 (L) 2021 05:09 AM    Albumin 2.0 (L) 2021 05:09 AM    Globulin 3.8 2021 05:09 AM    A-G Ratio 0.5 (L) 2021 05:09 AM    ALT (SGPT) 38 2021 05:09 AM             Assessment:     #1 UTI with some right flank CVA tenderness     #2 intrauterine pregnancy     #3 asthma     #4 depression     #5 history of candidemia during this current pregnancy s/p meant with amphotericin     #6 hypokalemia          Recommendations:     Would treat her with vancomycin until Estela 3.       Check cbc tomorrow    Team available over the weekend if needed       Zaid Concepcion MD

## 2021-05-28 NOTE — PROGRESS NOTES
1500: Bedside and Verbal shift change report given to SOPHY Carlos RN (oncoming nurse) by Esdras Smith. Kate Ochoa RN (offgoing nurse). Report included the following information SBAR, Intake/Output, MAR and Recent Results. 1930: Verbal shift change report given to M. Iona Dance RN (oncoming nurse) by Esdras Smith. Davis Carlos RN (offgoing nurse). Report included the following information SBAR, Intake/Output, MAR and Recent Results.

## 2021-05-28 NOTE — PROGRESS NOTES
Bedside and Verbal shift change report given to ALBA Stanton (oncoming nurse) by Jessica Soriano. Sai Rowe RN (offgoing nurse). Report included the following information SBAR, Kardex, Intake/Output, MAR, Accordion and Recent Results.

## 2021-05-28 NOTE — PROGRESS NOTES
Pt called out c/o contactions from the lower abd radiating to the back pt states they mild denies any sharp pain. Pt placed on monitor during monitoring only showed one contraction which pt felt after denied any more pain. Notified Dr. Susan Stratton no new orders received.

## 2021-05-28 NOTE — PROGRESS NOTES
High Risk Obstetrics Progress Note    Name: Kenneth Phillips MRN: [id number]  SSN: [id number]    YOB: 1985  Age: 38 y.o. Sex: female      Subjective:      LOS: 16 days    Estimated Date of Delivery: 21   Gestational Age Today: 35w7d     Patient admitted for UTI s/p failed outpatient treatment due to chronic eating disorder. States she does have mild headache , normal fetal movement and right flank pain and occasional contractions and does not have chest pain, shortness of breath, vaginal bleeding  and vaginal leaking of fluid . Objective:     Vitals:  Blood pressure (!) 95/54, pulse 60, temperature 97.9 °F (36.6 °C), resp. rate 17, height 5' 4\" (1.626 m), weight 77.6 kg (171 lb), last menstrual period 2020, SpO2 100 %, not currently breastfeeding. Temp (24hrs), Av.2 °F (36.8 °C), Min:97.9 °F (36.6 °C), Max:98.4 °F (31.2 °C)    Systolic (82WAI), PAULA:221 , Min:95 , BFL:236      Diastolic (80CQZ), KLQ:84, Min:54, Max:79       Intake and Output:         Physical Exam:  Patient without distress.   Heart: Regular rate and rhythm  Lung: clear to auscultation throughout lung fields, no wheezes, no rales, no rhonchi and normal respiratory effort  Abdomen: soft, nontender, gravid  Lower Extremities:  - Edema No       Membranes:  Intact    NST pending for today        Labs:   Recent Results (from the past 36 hour(s))   VANCOMYCIN, TROUGH    Collection Time: 21 10:58 PM   Result Value Ref Range    Vancomycin,trough 10.2 (H) 5.0 - 10.0 ug/mL    Reported dose date NOT PROVIDED      Reported dose time: NOT PROVIDED      Reported dose: NOT PROVIDED UNITS   METABOLIC PANEL, BASIC    Collection Time: 21  3:06 AM   Result Value Ref Range    Sodium 138 136 - 145 mmol/L    Potassium 3.2 (L) 3.5 - 5.1 mmol/L    Chloride 108 97 - 108 mmol/L    CO2 24 21 - 32 mmol/L    Anion gap 6 5 - 15 mmol/L    Glucose 68 65 - 100 mg/dL    BUN 2 (L) 6 - 20 MG/DL    Creatinine 0.25 (L) 0.55 - 1.02 MG/DL BUN/Creatinine ratio 8 (L) 12 - 20      GFR est AA >60 >60 ml/min/1.73m2    GFR est non-AA >60 >60 ml/min/1.73m2    Calcium 7.4 (L) 8.5 - 10.1 MG/DL       Assessment and Plan: Active Problems:    Pregnant (3/1/2018)      Eating disorder affecting pregnancy, antepartum (2020)      UTI in pregnancy, antepartum, third trimester (2021)       37 y/o [id number] at 34w5d weeks admitted for UTI s/p failed outpatient medical therapy due to chronic eating disorder     Urine culture 21 positive for Klebsiella and EColi-s/p 4 days of ceftriaxone  -Repeat urine cx on  positive for enterococcus-now on vanc since   -s/p I.D. consult-plan to continue vanc until 6/3/21. Appreciate help  -afebrile and normal WBC's   -UA with budding yeast-s/p miconazole vaginal for suspected vaginal contamination of yeast  -renal ultrasound   WNL     Chronic eating disorder  -ivf's  -spoke with Dr Julian Alvarado with [facility] partial hospitalization program here in [place]-pt does not qualify for this program as she requires high level of care that is available at [facility] inpatient program in [facility]. I spoke with pt re: this option as they will accept pregnant patients.   -Again discussed need for nutrition as pt input worse over the last few days than previously. Pt unable to tolerate dobhoff by ENT. Advised pt today that MFM does not think TPN necessary now from fetal perspective as baby's growth is normal. Discussed use of TPN for her nutrition. Pt declines TPN.     -Goodie bag daily  -Hypokalemia-continue KCL in ivfs and add Kdur today.     Cervical incompetence-cerclage in place  -Threatened PTL  -s/pBMTZ #1and #2 on  and   -Plan to remove cerclage at 36 weeks or with significant contractions      H/o prior  with successful , pt desires       Severe depression/anxiety with h/o suicidal ideation  -followed closely by Clydene Nageotte her help  -continue zyprexa and prozac as well as prn prazosin and hydroxyzine     GERD  -famotidine 20mg bid     Ambulate  Daily NST     MFM scan on 5/3/21-59%tile and Vertex  -saw MFM on 5/20 8/8 BPP, vtx on US; next appt tomorrow     Anemia-hemoglobin at baseline; asymptomatic  -known beta thal minor  -s/p hematology consult-appreciate recs  -iv iron today and x 5 days     CHTN-stable normal bp's off meds     DVT PPx-ambulation and mirtha hose

## 2021-05-28 NOTE — FORENSIC NURSE
FNE consulted by Dr. Susan Stratton. FNE obtained history, and patient declines law enforcement involvement. FNE reported to Dr. Susan Stratton that patient declines law enforcement involvement and community resources offered. MD verbalized understanding. SBAR given to SOPHY Matthews RN and care of patient relinquished back to primary RN.

## 2021-05-29 LAB
ANION GAP SERPL CALC-SCNC: 7 MMOL/L (ref 5–15)
BUN SERPL-MCNC: 3 MG/DL (ref 6–20)
BUN/CREAT SERPL: 8 (ref 12–20)
CALCIUM SERPL-MCNC: 7.7 MG/DL (ref 8.5–10.1)
CHLORIDE SERPL-SCNC: 110 MMOL/L (ref 97–108)
CO2 SERPL-SCNC: 24 MMOL/L (ref 21–32)
CREAT SERPL-MCNC: 0.36 MG/DL (ref 0.55–1.02)
DATE LAST DOSE: ABNORMAL
GLUCOSE SERPL-MCNC: 73 MG/DL (ref 65–100)
POTASSIUM SERPL-SCNC: 3.3 MMOL/L (ref 3.5–5.1)
REPORTED DOSE,DOSE: ABNORMAL UNITS
REPORTED DOSE/TIME,TMG: ABNORMAL
SODIUM SERPL-SCNC: 141 MMOL/L (ref 136–145)
VANCOMYCIN TROUGH SERPL-MCNC: 11.5 UG/ML (ref 5–10)

## 2021-05-29 PROCEDURE — 74011250636 HC RX REV CODE- 250/636: Performed by: OBSTETRICS & GYNECOLOGY

## 2021-05-29 PROCEDURE — 80048 BASIC METABOLIC PNL TOTAL CA: CPT

## 2021-05-29 PROCEDURE — 80202 ASSAY OF VANCOMYCIN: CPT

## 2021-05-29 PROCEDURE — 74011250637 HC RX REV CODE- 250/637: Performed by: OBSTETRICS & GYNECOLOGY

## 2021-05-29 PROCEDURE — 74011000250 HC RX REV CODE- 250: Performed by: OBSTETRICS & GYNECOLOGY

## 2021-05-29 PROCEDURE — 59025 FETAL NON-STRESS TEST: CPT

## 2021-05-29 PROCEDURE — 74011250636 HC RX REV CODE- 250/636: Performed by: INTERNAL MEDICINE

## 2021-05-29 PROCEDURE — 65410000002 HC RM PRIVATE OB

## 2021-05-29 PROCEDURE — 74011000258 HC RX REV CODE- 258: Performed by: INTERNAL MEDICINE

## 2021-05-29 PROCEDURE — 36415 COLL VENOUS BLD VENIPUNCTURE: CPT

## 2021-05-29 PROCEDURE — 74011250637 HC RX REV CODE- 250/637: Performed by: INTERNAL MEDICINE

## 2021-05-29 RX ORDER — DOCUSATE SODIUM 100 MG/1
100 CAPSULE, LIQUID FILLED ORAL DAILY
Status: DISCONTINUED | OUTPATIENT
Start: 2021-05-29 | End: 2021-06-21 | Stop reason: HOSPADM

## 2021-05-29 RX ORDER — POLYETHYLENE GLYCOL 3350 17 G/17G
17 POWDER, FOR SOLUTION ORAL DAILY PRN
Status: DISCONTINUED | OUTPATIENT
Start: 2021-05-29 | End: 2021-06-21 | Stop reason: HOSPADM

## 2021-05-29 RX ADMIN — FOLIC ACID: 5 INJECTION, SOLUTION INTRAMUSCULAR; INTRAVENOUS; SUBCUTANEOUS at 14:51

## 2021-05-29 RX ADMIN — DOCUSATE SODIUM 100 MG: 100 CAPSULE, LIQUID FILLED ORAL at 12:39

## 2021-05-29 RX ADMIN — FAMOTIDINE 20 MG: 10 INJECTION, SOLUTION INTRAVENOUS at 21:13

## 2021-05-29 RX ADMIN — CYANOCOBALAMIN TAB 500 MCG 1000 MCG: 500 TAB at 09:55

## 2021-05-29 RX ADMIN — FLUOXETINE 60 MG: 20 CAPSULE ORAL at 09:54

## 2021-05-29 RX ADMIN — POTASSIUM CHLORIDE, DEXTROSE MONOHYDRATE AND SODIUM CHLORIDE 125 ML/HR: 150; 5; 900 INJECTION, SOLUTION INTRAVENOUS at 01:46

## 2021-05-29 RX ADMIN — VANCOMYCIN HYDROCHLORIDE 1250 MG: 10 INJECTION, POWDER, LYOPHILIZED, FOR SOLUTION INTRAVENOUS at 15:52

## 2021-05-29 RX ADMIN — OLANZAPINE 10 MG: 5 TABLET, FILM COATED ORAL at 21:14

## 2021-05-29 RX ADMIN — IRON SUCROSE 200 MG: 20 INJECTION, SOLUTION INTRAVENOUS at 09:55

## 2021-05-29 RX ADMIN — VANCOMYCIN HYDROCHLORIDE 1250 MG: 10 INJECTION, POWDER, LYOPHILIZED, FOR SOLUTION INTRAVENOUS at 06:52

## 2021-05-29 RX ADMIN — POTASSIUM CHLORIDE, DEXTROSE MONOHYDRATE AND SODIUM CHLORIDE 125 ML/HR: 150; 5; 900 INJECTION, SOLUTION INTRAVENOUS at 12:40

## 2021-05-29 RX ADMIN — VANCOMYCIN HYDROCHLORIDE 1250 MG: 10 INJECTION, POWDER, LYOPHILIZED, FOR SOLUTION INTRAVENOUS at 23:09

## 2021-05-29 RX ADMIN — ONDANSETRON 8 MG: 2 INJECTION INTRAMUSCULAR; INTRAVENOUS at 21:14

## 2021-05-29 RX ADMIN — FAMOTIDINE 20 MG: 10 INJECTION, SOLUTION INTRAVENOUS at 09:54

## 2021-05-29 NOTE — PROGRESS NOTES
High Risk Obstetrics Progress Note    Name: Mabel Herman MRN: [id number]  SSN: [id number]    YOB: 1985  Age: 38 y.o. Sex: female      Subjective:      LOS: 17 days    Estimated Date of Delivery: 21   Gestational Age Today: 34w7d     Patient admitted for UTI s/p failed outpatient treatment. Pt does have a known chronic eating disorder. PICC line in place. Pt's only complaints this morning are nausea and constipation, which she reports are not new issues for her. She is agreeable to colace daily and Miralax PO PRN. She denies HA, vision changes, CP, SOB, leaking of fluid, vaginal bleeding or contractions. She does report good FM. Objective:     Vitals:  Blood pressure (!) 108/55, pulse 62, temperature 97.8 °F (36.6 °C), resp. rate 16, height 5' 4\" (1.626 m), weight 79.1 kg (174 lb 6.4 oz), last menstrual period 2020, SpO2 99 %, not currently breastfeeding. Temp (24hrs), Av.2 °F (36.8 °C), Min:97.8 °F (36.6 °C), Max:98.5 °F (83.3 °C)    Systolic (98LEI), HUC:604 , Min:108 , RQN:252      Diastolic (79EUE), JIV:88, Min:55, Max:72       Intake and Output:         Physical Exam:  Patient without distress.   Heart: Regular rate and rhythm, no murmur  Lung: clear to auscultation throughout lung fields and normal respiratory effort  Abdomen: soft, nontender, gravid  Fundus: soft and non tender  Lower Extremities: no edema bilaterally       Membranes:  Intact    Uterine Activity:  None    Fetal Heart Rate:  Reactive  Baseline: 150 per minute  Variability: moderate  Accelerations: yes  Decelerations: none  Uterine contractions: none        Labs:   Recent Results (from the past 36 hour(s))   METABOLIC PANEL, BASIC    Collection Time: 21  3:06 AM   Result Value Ref Range    Sodium 138 136 - 145 mmol/L    Potassium 3.2 (L) 3.5 - 5.1 mmol/L    Chloride 108 97 - 108 mmol/L    CO2 24 21 - 32 mmol/L    Anion gap 6 5 - 15 mmol/L    Glucose 68 65 - 100 mg/dL    BUN 2 (L) 6 - 20 MG/DL Creatinine 0.25 (L) 0.55 - 1.02 MG/DL    BUN/Creatinine ratio 8 (L) 12 - 20      GFR est AA >60 >60 ml/min/1.73m2    GFR est non-AA >60 >60 ml/min/1.73m2    Calcium 7.4 (L) 8.5 - 44.6 MG/DL   METABOLIC PANEL, BASIC    Collection Time: 05/29/21  6:50 AM   Result Value Ref Range    Sodium 141 136 - 145 mmol/L    Potassium 3.3 (L) 3.5 - 5.1 mmol/L    Chloride 110 (H) 97 - 108 mmol/L    CO2 24 21 - 32 mmol/L    Anion gap 7 5 - 15 mmol/L    Glucose 73 65 - 100 mg/dL    BUN 3 (L) 6 - 20 MG/DL    Creatinine 0.36 (L) 0.55 - 1.02 MG/DL    BUN/Creatinine ratio 8 (L) 12 - 20      GFR est AA >60 >60 ml/min/1.73m2    GFR est non-AA >60 >60 ml/min/1.73m2    Calcium 7.7 (L) 8.5 - 10.1 MG/DL   VANCOMYCIN, TROUGH    Collection Time: 05/29/21  6:50 AM   Result Value Ref Range    Vancomycin,trough 11.5 (H) 5.0 - 10.0 ug/mL    Reported dose date NOT PROVIDED      Reported dose time: NOT PROVIDED      Reported dose: NOT PROVIDED UNITS       Assessment and Plan:       Active Problems:    Pregnant (3/1/2018)      Eating disorder affecting pregnancy, antepartum (12/2/2020)      UTI in pregnancy, antepartum, third trimester (5/12/2021)          Pt is a 39 y/o [id number] at 34w6d weeks admitted for UTI s/p failed outpatient medical therapy due to chronic eating disorder.     Urine culture 5/5/21 positive for Klebsiella and EColi-s/p 4 days of ceftriaxone  -Repeat urine cx on 5/19 positive for enterococcus-now on vanc since 5/21  -s/p I.D. consult-plan to continue vanc until 6/3/21. Appreciate care and management plan  -afebrile and normal WBC's   -UA with budding yeast-s/p miconazole vaginal for suspected vaginal contamination of yeast  -renal ultrasound  5/21 WNL     Chronic eating disorder  -IV fluids, PICC line in place  -spoke with Dr Grant Ortiz with [facility] partial hospitalization program here in [facility] does not qualify for this program as she requires high level of care that is available at [facility] inpatient program in [facility]. I spoke with pt re: this option as they will accept pregnant patients.   -Again discussed need for nutrition as pt input worse over the last few days than previously. Pt unable to tolerate dobhoff by ENT. Pt advised by Dr. Ree Blancas on  that MFM does not think TPN necessary now from fetal perspective as baby's growth is normal. Discussed use of TPN for her nutrition. Pt declines TPN.     -Goodie bag daily  -Hypokalemia-continue KCL in ivfs and add Kdur 21. Potassium 3.3 on 21.     Cervical incompetence-cerclage in place  -Threatened PTL  -s/pBMTZ #1and #2 on  and   -Plan to remove cerclage at 36 weeks or with significant contractions      H/o prior  with successful , pt desires   -plan for GBS collection in the next week      Severe depression/anxiety with h/o suicidal ideation  -followed closely by La Browne her help  -continue zyprexa and prozac as well as prn prazosin and hydroxyzine     GERD  -famotidine 20mg bid     Ambulate  Daily NST     MFM scan on 5/3/21-59%tile and Vertex  -saw MFM on  8/8 BPP, vtx on US     Anemia-hemoglobin at baseline; asymptomatic  -known beta thal minor  -s/p hematology consult-appreciate recs  -iv iron today and x 5 days     CHTN-stable normal bp's off meds     DVT PPx-ambulation and mirtha hose      Reviewed plan of care today with patient and her nurse - all in agreement to continue care as outlined above.     Luann Espinosa,   21

## 2021-05-29 NOTE — PROGRESS NOTES
0730: Bedside and Verbal shift change report given to PER (oncoming nurse) by Faby ANTON (offgoing nurse). Report included the following information SBAR, Kardex, Intake/Output, MAR, Accordion, Recent Results and Med Rec Status.

## 2021-05-29 NOTE — PROGRESS NOTES
Pharmacist Note - Vancomycin Dosing  Therapy day 9  Indication: E.faecalis UTI (pcn allergy)  -failed outpt therapy--unable to tolerate po (n/v)  Current regimen: 1250mg IV every 8 hours    Recent Labs     05/29/21  0650 05/28/21  0306 05/26/21  1638   CREA 0.36* 0.25* 0.37*   BUN 3* 2* 3*       A Trough Level resulted at 11.5 mcg/mL which was obtained ~8 hrs post-dose. Goal trough: 10 - 15 mcg/mL       Plan: Continue current regimen. Pharmacy will continue to monitor this patient daily for changes in clinical status and renal function.     Paul Murphy, PharmD, BCPP, [facility] Specialist, [facility]

## 2021-05-29 NOTE — PROGRESS NOTES
Bedside and Verbal shift change report given to ALBA Ledezma RN (oncoming nurse) by Christiane Ramirez RN (offgoing nurse). Report included the following information SBAR, Kardex, OR Summary, Procedure Summary, Intake/Output, MAR, Accordion and Recent Results.

## 2021-05-30 PROCEDURE — 74011250637 HC RX REV CODE- 250/637: Performed by: OBSTETRICS & GYNECOLOGY

## 2021-05-30 PROCEDURE — 59025 FETAL NON-STRESS TEST: CPT

## 2021-05-30 PROCEDURE — 74011000258 HC RX REV CODE- 258: Performed by: INTERNAL MEDICINE

## 2021-05-30 PROCEDURE — 65410000002 HC RM PRIVATE OB

## 2021-05-30 PROCEDURE — 74011250636 HC RX REV CODE- 250/636: Performed by: INTERNAL MEDICINE

## 2021-05-30 PROCEDURE — 74011250637 HC RX REV CODE- 250/637: Performed by: INTERNAL MEDICINE

## 2021-05-30 PROCEDURE — 74011000250 HC RX REV CODE- 250: Performed by: OBSTETRICS & GYNECOLOGY

## 2021-05-30 PROCEDURE — 74011250636 HC RX REV CODE- 250/636: Performed by: OBSTETRICS & GYNECOLOGY

## 2021-05-30 RX ADMIN — POTASSIUM CHLORIDE, DEXTROSE MONOHYDRATE AND SODIUM CHLORIDE 125 ML/HR: 150; 5; 900 INJECTION, SOLUTION INTRAVENOUS at 04:20

## 2021-05-30 RX ADMIN — FLUOXETINE 60 MG: 20 CAPSULE ORAL at 09:10

## 2021-05-30 RX ADMIN — VANCOMYCIN HYDROCHLORIDE 1250 MG: 10 INJECTION, POWDER, LYOPHILIZED, FOR SOLUTION INTRAVENOUS at 23:06

## 2021-05-30 RX ADMIN — CYANOCOBALAMIN TAB 500 MCG 1000 MCG: 500 TAB at 09:10

## 2021-05-30 RX ADMIN — FOLIC ACID: 5 INJECTION, SOLUTION INTRAMUSCULAR; INTRAVENOUS; SUBCUTANEOUS at 13:13

## 2021-05-30 RX ADMIN — VANCOMYCIN HYDROCHLORIDE 1250 MG: 10 INJECTION, POWDER, LYOPHILIZED, FOR SOLUTION INTRAVENOUS at 14:49

## 2021-05-30 RX ADMIN — ONDANSETRON 8 MG: 2 INJECTION INTRAMUSCULAR; INTRAVENOUS at 09:12

## 2021-05-30 RX ADMIN — FAMOTIDINE 20 MG: 10 INJECTION, SOLUTION INTRAVENOUS at 21:12

## 2021-05-30 RX ADMIN — OLANZAPINE 10 MG: 5 TABLET, FILM COATED ORAL at 21:12

## 2021-05-30 RX ADMIN — IRON SUCROSE 200 MG: 20 INJECTION, SOLUTION INTRAVENOUS at 09:20

## 2021-05-30 RX ADMIN — DOCUSATE SODIUM 100 MG: 100 CAPSULE, LIQUID FILLED ORAL at 09:10

## 2021-05-30 RX ADMIN — FAMOTIDINE 20 MG: 10 INJECTION, SOLUTION INTRAVENOUS at 09:12

## 2021-05-30 RX ADMIN — VANCOMYCIN HYDROCHLORIDE 1250 MG: 10 INJECTION, POWDER, LYOPHILIZED, FOR SOLUTION INTRAVENOUS at 07:02

## 2021-05-30 RX ADMIN — PROMETHAZINE HYDROCHLORIDE 25 MG: 25 SUPPOSITORY RECTAL at 13:05

## 2021-05-30 NOTE — PROGRESS NOTES
1545 Bedside and Verbal shift change report given to Gian Matias RN (oncoming nurse) by Liseth Redman RN (offgoing nurse). Report included the following information SBAR and Accordion. 1940 Bedside and Verbal shift change report given to Moses Dave RN (oncoming nurse) by Gian Matias RN (offgoing nurse). Report included the following information SBAR.

## 2021-05-30 NOTE — PROGRESS NOTES
Bedside and Verbal shift change report given to ALBA Hoffmann (oncoming nurse) by NATHANIEL Lacy RN (offgoing nurse). Report included the following information SBAR, Kardex, ED Summary, Intake/Output, MAR, Accordion and Recent Results.

## 2021-05-30 NOTE — PROGRESS NOTES
Bedside and Verbal shift change report given to Candido Rebolledo RN (oncoming nurse) by Don Mcguire RN (offgoing nurse). Report included the following information SBAR, Kardex, Intake/Output, MAR and Recent Results. 0930: pt ate aprox 15% of breakfast. Some pancake. 1220: pt tried to drink some of her ensure.  Vomitted about 300ccs following attempt

## 2021-05-30 NOTE — PROGRESS NOTES
High Risk Obstetrics Progress Note    Name: Chilo Morales MRN: [id number]  SSN: [id number]    YOB: 1985  Age: 38 y.o. Sex: female      Subjective:      LOS: 18 days    Estimated Date of Delivery: 21   Gestational Age Today: 35w0d     Patient admitted for UTI s/p failed outpatient treatment. Pt does have a known chronic eating disorder. PICC line still in place. She reports cramping last night intermittently that has improved this morning. She denies vaginal bleeding or LOF. Good FM. She states nausea is \"the way it always is\" and she does report low back pain. She is urinating well and has not yet had a bowel movement. She is passing gas. She was able to eat a small amount of food this morning and has tolerated that well so far. She denies HA, fever, chills, vision changes, CP, SOB or leg pain bilaterally. Objective:     Vitals:  Blood pressure (!) 110/56, pulse 69, temperature 98.6 °F (37 °C), resp. rate 16, height 5' 4\" (1.626 m), weight 79.1 kg (174 lb 6.4 oz), last menstrual period 2020, SpO2 99 %, not currently breastfeeding. Temp (24hrs), Av.3 °F (36.8 °C), Min:98.1 °F (36.7 °C), Max:98.6 °F (37 °C)    Systolic (88QCK), OXA:694 , Min:107 , CUK:843      Diastolic (39HPV), MHT:66, Min:56, Max:84       Intake and Output:         Physical Exam:  Patient without distress.   Lung: normal respiratory effort  Abdomen: soft, nontender, gravid  Fundus: soft and non tender  Lower Extremities: no edema bilaterally       Membranes:  Intact    Uterine Activity:  None    Fetal Heart Rate:  Baseline: 135 per minute  Variability: moderate  Accelerations: yes, 10x10  Decelerations: none  Uterine contractions: none        Labs:   Recent Results (from the past 36 hour(s))   METABOLIC PANEL, BASIC    Collection Time: 21  6:50 AM   Result Value Ref Range    Sodium 141 136 - 145 mmol/L    Potassium 3.3 (L) 3.5 - 5.1 mmol/L    Chloride 110 (H) 97 - 108 mmol/L    CO2 24 21 - 32 mmol/L Anion gap 7 5 - 15 mmol/L    Glucose 73 65 - 100 mg/dL    BUN 3 (L) 6 - 20 MG/DL    Creatinine 0.36 (L) 0.55 - 1.02 MG/DL    BUN/Creatinine ratio 8 (L) 12 - 20      GFR est AA >60 >60 ml/min/1.73m2    GFR est non-AA >60 >60 ml/min/1.73m2    Calcium 7.7 (L) 8.5 - 10.1 MG/DL   VANCOMYCIN, TROUGH    Collection Time: 05/29/21  6:50 AM   Result Value Ref Range    Vancomycin,trough 11.5 (H) 5.0 - 10.0 ug/mL    Reported dose date NOT PROVIDED      Reported dose time: NOT PROVIDED      Reported dose: NOT PROVIDED UNITS       Assessment and Plan: Active Problems:    Pregnant (3/1/2018)      Eating disorder affecting pregnancy, antepartum (12/2/2020)      UTI in pregnancy, antepartum, third trimester (5/12/2021)       Pt is a 37 y/o [id number] at 35w0d weeks admitted for UTI s/p failed outpatient medical therapy due to chronic eating disorder.     Urine culture 5/5/21 positive for Klebsiella and EColi-s/p 4 days of ceftriaxone  -Repeat urine cx on 5/19 positive for enterococcus-now on vanc since 5/21  -s/p I.D. consult-plan to continue vanc until 6/3/21. Appreciate care and management plan  -afebrile and normal WBC's   -UA with budding yeast-s/p miconazole vaginal for suspected vaginal contamination of yeast  -renal ultrasound  5/21 WNL     Chronic eating disorder  -IV fluids, PICC line in place  -spoke with Dr Amber Tejada with [facility] partial hospitalization program here in [place]-pt does not qualify for this program as she requires high level of care that is available at [facility] inpatient program in [facility]. I spoke with pt re: this option as they will accept pregnant patients.   -Again discussed need for nutrition as pt input worse over the last few days than previously. Pt unable to tolerate dobhoff by ENT. Pt advised by Dr. Preeti Sharma on 5/28 that MFM does not think TPN necessary now from fetal perspective as baby's growth is normal. Discussed use of TPN for her nutrition. Pt declines TPN.     -Goodie bag daily  -Hypokalemia-continue KCL in ivfs and add Kdur 21.  Potassium 3.3 on 21.  -BMP ordered for 21     Cervical incompetence-cerclage in place  -Threatened PTL  -s/pBMTZ #1and #2 on  and   -Plan to remove cerclage at 36 weeks or with significant contractions      H/o prior  with successful , pt desires   -plan for GBS collection in the next week      Severe depression/anxiety with h/o suicidal ideation  -followed closely by Kt Price her help  -continue zyprexa and prozac as well as prn prazosin and hydroxyzine     GERD  -famotidine 20mg bid     Ambulate  Daily NST     MFM scan on 5/3/21-59%tile and Vertex  -saw MFM on  8/8 BPP, vtx on US     Anemia-hemoglobin at baseline; asymptomatic  -known beta thal minor  -s/p hematology consult-appreciate recs  -iv iron today and x 5 days     CHTN-stable normal bp's off meds     DVT PPx-ambulation and mirtha bijan Burton, DO  21

## 2021-05-31 LAB
ANION GAP SERPL CALC-SCNC: 7 MMOL/L (ref 5–15)
APPEARANCE UR: ABNORMAL
BACTERIA URNS QL MICRO: ABNORMAL /HPF
BILIRUB UR QL: NEGATIVE
BUN SERPL-MCNC: 2 MG/DL (ref 6–20)
BUN/CREAT SERPL: 6 (ref 12–20)
CALCIUM SERPL-MCNC: 7.8 MG/DL (ref 8.5–10.1)
CAOX CRY URNS QL MICRO: ABNORMAL
CHLORIDE SERPL-SCNC: 112 MMOL/L (ref 97–108)
CO2 SERPL-SCNC: 23 MMOL/L (ref 21–32)
COLOR UR: ABNORMAL
CREAT SERPL-MCNC: 0.35 MG/DL (ref 0.55–1.02)
EPITH CASTS URNS QL MICRO: ABNORMAL /LPF
GLUCOSE SERPL-MCNC: 75 MG/DL (ref 65–100)
GLUCOSE UR STRIP.AUTO-MCNC: NEGATIVE MG/DL
HGB UR QL STRIP: NEGATIVE
KETONES UR QL STRIP.AUTO: NEGATIVE MG/DL
LEUKOCYTE ESTERASE UR QL STRIP.AUTO: ABNORMAL
NITRITE UR QL STRIP.AUTO: NEGATIVE
PH UR STRIP: 8.5 [PH] (ref 5–8)
POTASSIUM SERPL-SCNC: 3.6 MMOL/L (ref 3.5–5.1)
PROT UR STRIP-MCNC: NEGATIVE MG/DL
RBC #/AREA URNS HPF: ABNORMAL /HPF (ref 0–5)
SODIUM SERPL-SCNC: 142 MMOL/L (ref 136–145)
SP GR UR REFRACTOMETRY: 1.01 (ref 1–1.03)
UROBILINOGEN UR QL STRIP.AUTO: 0.2 EU/DL (ref 0.2–1)
WBC URNS QL MICRO: ABNORMAL /HPF (ref 0–4)

## 2021-05-31 PROCEDURE — 65410000002 HC RM PRIVATE OB

## 2021-05-31 PROCEDURE — 74011250637 HC RX REV CODE- 250/637: Performed by: INTERNAL MEDICINE

## 2021-05-31 PROCEDURE — 81001 URINALYSIS AUTO W/SCOPE: CPT

## 2021-05-31 PROCEDURE — 74011000258 HC RX REV CODE- 258: Performed by: INTERNAL MEDICINE

## 2021-05-31 PROCEDURE — 74011250636 HC RX REV CODE- 250/636: Performed by: OBSTETRICS & GYNECOLOGY

## 2021-05-31 PROCEDURE — 74011000250 HC RX REV CODE- 250: Performed by: OBSTETRICS & GYNECOLOGY

## 2021-05-31 PROCEDURE — 80048 BASIC METABOLIC PNL TOTAL CA: CPT

## 2021-05-31 PROCEDURE — 74011250636 HC RX REV CODE- 250/636: Performed by: INTERNAL MEDICINE

## 2021-05-31 PROCEDURE — 36415 COLL VENOUS BLD VENIPUNCTURE: CPT

## 2021-05-31 PROCEDURE — 59025 FETAL NON-STRESS TEST: CPT

## 2021-05-31 PROCEDURE — 74011250637 HC RX REV CODE- 250/637: Performed by: OBSTETRICS & GYNECOLOGY

## 2021-05-31 RX ADMIN — ONDANSETRON 8 MG: 2 INJECTION INTRAMUSCULAR; INTRAVENOUS at 17:59

## 2021-05-31 RX ADMIN — FAMOTIDINE 20 MG: 10 INJECTION, SOLUTION INTRAVENOUS at 08:42

## 2021-05-31 RX ADMIN — IRON SUCROSE 200 MG: 20 INJECTION, SOLUTION INTRAVENOUS at 10:07

## 2021-05-31 RX ADMIN — VANCOMYCIN HYDROCHLORIDE 1250 MG: 10 INJECTION, POWDER, LYOPHILIZED, FOR SOLUTION INTRAVENOUS at 15:17

## 2021-05-31 RX ADMIN — FOLIC ACID: 5 INJECTION, SOLUTION INTRAMUSCULAR; INTRAVENOUS; SUBCUTANEOUS at 14:22

## 2021-05-31 RX ADMIN — DOCUSATE SODIUM 100 MG: 100 CAPSULE, LIQUID FILLED ORAL at 08:42

## 2021-05-31 RX ADMIN — ONDANSETRON 8 MG: 2 INJECTION INTRAMUSCULAR; INTRAVENOUS at 08:42

## 2021-05-31 RX ADMIN — FLUOXETINE 60 MG: 20 CAPSULE ORAL at 08:42

## 2021-05-31 RX ADMIN — ACETAMINOPHEN 650 MG: 650 SUPPOSITORY RECTAL at 08:52

## 2021-05-31 RX ADMIN — VANCOMYCIN HYDROCHLORIDE 1250 MG: 10 INJECTION, POWDER, LYOPHILIZED, FOR SOLUTION INTRAVENOUS at 23:13

## 2021-05-31 RX ADMIN — CYANOCOBALAMIN TAB 500 MCG 1000 MCG: 500 TAB at 08:42

## 2021-05-31 RX ADMIN — FAMOTIDINE 20 MG: 10 INJECTION, SOLUTION INTRAVENOUS at 21:26

## 2021-05-31 RX ADMIN — VANCOMYCIN HYDROCHLORIDE 1250 MG: 10 INJECTION, POWDER, LYOPHILIZED, FOR SOLUTION INTRAVENOUS at 06:58

## 2021-05-31 RX ADMIN — POTASSIUM CHLORIDE, DEXTROSE MONOHYDRATE AND SODIUM CHLORIDE 125 ML/HR: 150; 5; 900 INJECTION, SOLUTION INTRAVENOUS at 14:22

## 2021-05-31 RX ADMIN — OLANZAPINE 10 MG: 5 TABLET, FILM COATED ORAL at 21:27

## 2021-05-31 RX ADMIN — POLYETHYLENE GLYCOL 3350 17 G: 17 POWDER, FOR SOLUTION ORAL at 08:52

## 2021-05-31 NOTE — PROGRESS NOTES
High Risk Obstetrics Progress Note    Name: Omar Felix MRN: [id number]  SSN: [id number]    YOB: 1985  Age: 38 y.o. Sex: female      Subjective:      LOS: 19 days    Estimated Date of Delivery: 21   Gestational Age Today: 35w1d     Patient admitted for UTI s/p failed outpatient treatment and has known chronic eating disorder. States she has aching pain on both sides of back and some discomfort with urination. Tried heating pad last night and will take tylenol now. Nausea unchanged. Baby moving a lot. No ctx pain but will get occasional lower pelvic cramps. No VB/LOF. Objective:     Vitals:  Blood pressure (P) 123/78, pulse (P) 63, temperature (P) 98 °F (36.7 °C), resp. rate (P) 16, height 5' 4\" (1.626 m), weight 79.1 kg (174 lb 6.4 oz), last menstrual period 2020, SpO2 (P) 100 %, not currently breastfeeding. Temp (24hrs), Av.2 °F (36.8 °C), Min:98 °F (36.7 °C), Max:98.6 °F (37 °C)    Systolic (77GYI), TNI:694 , Min:103 , RGW:552      Diastolic (20UQD), VJQ:33, Min:56, Max:78       Intake and Output:         Physical Exam:  Patient without distress. Back: costovertebral angle tenderness is negative b/l. +lumbar muscle tenderness bilaterally  Abdomen: soft, nontender  Fundus: soft and non tender  Lower Extremities:  - Edema No       Membranes:  Intact    Uterine Activity:  None    Fetal Heart Rate:   For NST today        Labs:   Recent Results (from the past 36 hour(s))   METABOLIC PANEL, BASIC    Collection Time: 21  3:39 AM   Result Value Ref Range    Sodium 142 136 - 145 mmol/L    Potassium 3.6 3.5 - 5.1 mmol/L    Chloride 112 (H) 97 - 108 mmol/L    CO2 23 21 - 32 mmol/L    Anion gap 7 5 - 15 mmol/L    Glucose 75 65 - 100 mg/dL    BUN 2 (L) 6 - 20 MG/DL    Creatinine 0.35 (L) 0.55 - 1.02 MG/DL    BUN/Creatinine ratio 6 (L) 12 - 20      GFR est AA >60 >60 ml/min/1.73m2    GFR est non-AA >60 >60 ml/min/1.73m2    Calcium 7.8 (L) 8.5 - 10.1 MG/DL       Assessment and Plan:      Active Problems:    Pregnant (3/1/2018)      Eating disorder affecting pregnancy, antepartum (2020)      UTI in pregnancy, antepartum, third trimester (2021)       Pt is a 35 y/o [id number] at 35w1 d weeks admitted for UTI s/p failed outpatient medical therapy due to chronic eating disorder.     Urine culture 21 positive for Klebsiella and EColi-s/p 4 days of ceftriaxone  -Repeat urine cx on  positive for enterococcus-now on vanc since   -s/p I.D. consult-plan to continue vanc until 6/3/21. Appreciate care and management plan  -afebrile and normal WBC's   -UA with budding yeast-s/p miconazole vaginal for suspected vaginal contamination of yeast  -renal ultrasound   WNL  - : new complaints of urinary discomfort - will check UA     Chronic eating disorder  -IV fluids, PICC line in place  -spoke with Dr Mauricio Carmona with [facility] partial hospitalization program here in [place]-pt does not qualify for this program as she requires high level of care that is available at [facility] inpatient program in [facility]. I spoke with pt re: this option as they will accept pregnant patients.   -Again discussed need for nutrition as pt input worse over the last few days than previously. Pt unable to tolerate dobhoff by ENT. Pt advised by Dr. Susan Stratton on  that MFM does not think TPN necessary now from fetal perspective as baby's growth is normal. Discussed use of TPN for her nutrition. Pt declines TPN.     -Goodie bag daily  -Hypokalemia-continue KCL in ivfs and add Kdur 21.  Potassium 3.3 on 21.  -BMP ordered for 21-normal range     Cervical incompetence-cerclage in place  -Threatened PTL  -s/pBMTZ #1and #2 on  and   -Plan to remove cerclage at 36 weeks or with significant contractions      H/o prior  with successful , pt desires   -plan for GBS collection in the next week      Severe depression/anxiety with h/o suicidal ideation  -followed closely by Rachael Best [place]-appreciate her help  -continue zyprexa and prozac as well as prn prazosin and hydroxyzine     GERD  -famotidine 20mg bid     Ambulate  Daily NST     MFM scan on 5/3/21-59%tile and Vertex  -saw MFM on 5/20 8/8 BPP, vtx on US     Anemia-hemoglobin at baseline; asymptomatic  -known beta thal minor  -s/p hematology consult-appreciate recs  -iv iron today and x 5 days     CHTN-stable normal bp's off meds     DVT PPx-ambulation and mirtha hose    Back pain-suspect musculoskeletal in nature; can alternate ice/heat packs. Suggested to patient to try some gentle stretches and can get an exercise ball from L&D and see if sitting on that and moving hips/pelvis help.

## 2021-05-31 NOTE — PROGRESS NOTES
Bedside and Verbal shift change report given to Candy Carbone RN  (oncoming nurse) by Anne Disla . Caesar Che  (offgoing nurse). Report included the following information SBAR, Kardex, Intake/Output, MAR and Recent Results. 0% of breakfast tray and ensure. Pt did drink 8oz of OB with miralax. 1009- pt placed on fetal monitors. Denies any complaints at this time. 1300- lunch intake: 0% of tray and ensure. Pt did drink one can of ginger ale.

## 2021-06-01 LAB — GRBS, EXTERNAL: NEGATIVE

## 2021-06-01 PROCEDURE — 97161 PT EVAL LOW COMPLEX 20 MIN: CPT

## 2021-06-01 PROCEDURE — 87077 CULTURE AEROBIC IDENTIFY: CPT

## 2021-06-01 PROCEDURE — 74011000258 HC RX REV CODE- 258: Performed by: INTERNAL MEDICINE

## 2021-06-01 PROCEDURE — 65410000002 HC RM PRIVATE OB

## 2021-06-01 PROCEDURE — 74011000250 HC RX REV CODE- 250: Performed by: OBSTETRICS & GYNECOLOGY

## 2021-06-01 PROCEDURE — 74011250637 HC RX REV CODE- 250/637: Performed by: INTERNAL MEDICINE

## 2021-06-01 PROCEDURE — 74011250636 HC RX REV CODE- 250/636: Performed by: OBSTETRICS & GYNECOLOGY

## 2021-06-01 PROCEDURE — 74011250637 HC RX REV CODE- 250/637: Performed by: OBSTETRICS & GYNECOLOGY

## 2021-06-01 PROCEDURE — 87081 CULTURE SCREEN ONLY: CPT

## 2021-06-01 PROCEDURE — 77030020847 HC STATLOK BARD -A

## 2021-06-01 PROCEDURE — 59025 FETAL NON-STRESS TEST: CPT

## 2021-06-01 PROCEDURE — 36415 COLL VENOUS BLD VENIPUNCTURE: CPT

## 2021-06-01 PROCEDURE — 87086 URINE CULTURE/COLONY COUNT: CPT

## 2021-06-01 PROCEDURE — 74011250636 HC RX REV CODE- 250/636: Performed by: INTERNAL MEDICINE

## 2021-06-01 PROCEDURE — 87186 SC STD MICRODIL/AGAR DIL: CPT

## 2021-06-01 RX ADMIN — IRON SUCROSE 200 MG: 20 INJECTION, SOLUTION INTRAVENOUS at 09:24

## 2021-06-01 RX ADMIN — OLANZAPINE 10 MG: 5 TABLET, FILM COATED ORAL at 22:07

## 2021-06-01 RX ADMIN — CYANOCOBALAMIN TAB 500 MCG 1000 MCG: 500 TAB at 09:00

## 2021-06-01 RX ADMIN — FAMOTIDINE 20 MG: 10 INJECTION, SOLUTION INTRAVENOUS at 09:00

## 2021-06-01 RX ADMIN — POTASSIUM CHLORIDE, DEXTROSE MONOHYDRATE AND SODIUM CHLORIDE 125 ML/HR: 150; 5; 900 INJECTION, SOLUTION INTRAVENOUS at 00:53

## 2021-06-01 RX ADMIN — POTASSIUM CHLORIDE, DEXTROSE MONOHYDRATE AND SODIUM CHLORIDE 125 ML/HR: 150; 5; 900 INJECTION, SOLUTION INTRAVENOUS at 08:59

## 2021-06-01 RX ADMIN — POLYETHYLENE GLYCOL 3350 17 G: 17 POWDER, FOR SOLUTION ORAL at 09:19

## 2021-06-01 RX ADMIN — DOCUSATE SODIUM 100 MG: 100 CAPSULE, LIQUID FILLED ORAL at 09:00

## 2021-06-01 RX ADMIN — FLUOXETINE 60 MG: 20 CAPSULE ORAL at 09:00

## 2021-06-01 RX ADMIN — FAMOTIDINE 20 MG: 10 INJECTION, SOLUTION INTRAVENOUS at 22:07

## 2021-06-01 RX ADMIN — ONDANSETRON 8 MG: 2 INJECTION INTRAMUSCULAR; INTRAVENOUS at 22:07

## 2021-06-01 RX ADMIN — FOLIC ACID: 5 INJECTION, SOLUTION INTRAMUSCULAR; INTRAVENOUS; SUBCUTANEOUS at 15:02

## 2021-06-01 RX ADMIN — VANCOMYCIN HYDROCHLORIDE 1250 MG: 10 INJECTION, POWDER, LYOPHILIZED, FOR SOLUTION INTRAVENOUS at 15:02

## 2021-06-01 RX ADMIN — ACETAMINOPHEN 650 MG: 650 SUPPOSITORY RECTAL at 07:05

## 2021-06-01 RX ADMIN — POTASSIUM CHLORIDE, DEXTROSE MONOHYDRATE AND SODIUM CHLORIDE 125 ML/HR: 150; 5; 900 INJECTION, SOLUTION INTRAVENOUS at 17:22

## 2021-06-01 RX ADMIN — ONDANSETRON 8 MG: 2 INJECTION INTRAMUSCULAR; INTRAVENOUS at 15:14

## 2021-06-01 RX ADMIN — VANCOMYCIN HYDROCHLORIDE 1250 MG: 10 INJECTION, POWDER, LYOPHILIZED, FOR SOLUTION INTRAVENOUS at 07:06

## 2021-06-01 RX ADMIN — VANCOMYCIN HYDROCHLORIDE 1250 MG: 10 INJECTION, POWDER, LYOPHILIZED, FOR SOLUTION INTRAVENOUS at 23:22

## 2021-06-01 RX ADMIN — ONDANSETRON 8 MG: 2 INJECTION INTRAMUSCULAR; INTRAVENOUS at 05:07

## 2021-06-01 NOTE — PROGRESS NOTES
1144 Bedside shift change report given to Essence Beltran RN (oncoming nurse) by Kerline Conrad RN (offgoing nurse). Report included the following information SBAR.     1300 Pt did not eat/drink anything off her lunch tray.

## 2021-06-01 NOTE — PROGRESS NOTES
1945: Bedside shift change report given to AKIL Norris and Jorge and AKIL Nath (oncoming nurse) by Mitzy Rider RN (offgoing nurse). Report included the following information SBAR, Kardex, Intake/Output, MAR and Recent Results.

## 2021-06-01 NOTE — LACTATION NOTE
Initial [facility] LC visit - This is moms 8th baby (and 2nd nicu baby). She breast fed some of her babies but stated that she may not be able to breast feed  This baby because of her poor nutrition status. Also discussed the possibility of donor milk. Discussed the importance of breast milk for babies in general, and the extra importance and benefits for pre-term babies. Discussed pumping and storage of breast milk. Discussed family centered care and what we do in the NICU to keep the family at the center of our care. Answered moms questions. Will continue to follow.

## 2021-06-01 NOTE — PROGRESS NOTES
High Risk Obstetrics Progress Note    Name: Dennie Drape MRN: [id number]  SSN: [id number]    YOB: 1985  Age: 38 y.o. Sex: female      Subjective:      LOS: 20 days    Estimated Date of Delivery: 21   Gestational Age Today: 35w2d     Patient admitted for UTI s/p failed outpatient treatment due to chronic eating disorder. States she does have moderate nausea/vomiting, normal fetal movement and occasional contractions but mild as well as bilateral flank pain and hip discomfort and does not have chest pain, shortness of breath, vaginal bleeding  and vaginal leaking of fluid . Objective:     Vitals:  Blood pressure (!) 111/59, pulse (!) 59, temperature 98 °F (36.7 °C), resp. rate 16, height 5' 4\" (1.626 m), weight 77.9 kg (171 lb 12.8 oz), last menstrual period 2020, SpO2 98 %, not currently breastfeeding. Temp (24hrs), Av.2 °F (36.8 °C), Min:98 °F (36.7 °C), Max:98.7 °F (94.1 °C)    Systolic (99TWM), OAZ:143 , Min:111 , YON:683      Diastolic (92PLD), EFA:30, Min:59, Max:80       Intake and Output:         Physical Exam:  Patient without distress.   Heart: Regular rate and rhythm  Lung: clear to auscultation throughout lung fields, no wheezes, no rales, no rhonchi and normal respiratory effort  Abdomen: soft, nontender, gravid  Lower Extremities:  - Edema No       Membranes:  Intact    Uterine Activity:  None on     Fetal Heart Rate:  Reactive on   Baseline: 130s per minute  Variability: moderate  Accelerations: yes  Decelerations: none        Labs:   Recent Results (from the past 36 hour(s))   METABOLIC PANEL, BASIC    Collection Time: 21  3:39 AM   Result Value Ref Range    Sodium 142 136 - 145 mmol/L    Potassium 3.6 3.5 - 5.1 mmol/L    Chloride 112 (H) 97 - 108 mmol/L    CO2 23 21 - 32 mmol/L    Anion gap 7 5 - 15 mmol/L    Glucose 75 65 - 100 mg/dL    BUN 2 (L) 6 - 20 MG/DL    Creatinine 0.35 (L) 0.55 - 1.02 MG/DL    BUN/Creatinine ratio 6 (L) 12 - 20      GFR est AA >60 >60 ml/min/1.73m2    GFR est non-AA >60 >60 ml/min/1.73m2    Calcium 7.8 (L) 8.5 - 10.1 MG/DL   URINALYSIS W/MICROSCOPIC    Collection Time: 05/31/21 11:24 AM   Result Value Ref Range    Color YELLOW/STRAW      Appearance CLOUDY (A) CLEAR      Specific gravity 1.014 1.003 - 1.030      pH (UA) 8.5 (H) 5.0 - 8.0      Protein Negative NEG mg/dL    Glucose Negative NEG mg/dL    Ketone Negative NEG mg/dL    Bilirubin Negative NEG      Blood Negative NEG      Urobilinogen 0.2 0.2 - 1.0 EU/dL    Nitrites Negative NEG      Leukocyte Esterase TRACE (A) NEG      WBC 0-4 0 - 4 /hpf    RBC 0-5 0 - 5 /hpf    Epithelial cells MODERATE (A) FEW /lpf    Bacteria 3+ (A) NEG /hpf    CA Oxalate crystals FEW (A) NEG         Assessment and Plan:       Active Problems:    Pregnant (3/1/2018)      Eating disorder affecting pregnancy, antepartum (12/2/2020)      UTI in pregnancy, antepartum, third trimester (5/12/2021)       Pt is a 37 y/o [id number] at [facility] admitted for UTI s/p failed outpatient medical therapy due to chronic eating disorder.     Urine culture 5/5/21 positive for Klebsiella and EColi-s/p 4 days of ceftriaxone  -Repeat urine cx on 5/19 positive for enterococcus-now on vanc since 5/21  -s/p I.D. consult-plan to continue vanc until 6/3/21. Appreciate care and management plan  -afebrile and normal WBC's   -UA with budding yeast-s/p miconazole vaginal for suspected vaginal contamination of yeast  -renal ultrasound  5/21 WNL  - 5/31: new complaints of urinary discomfort - UA positive-urine culture sent today     Chronic eating disorder  -IV fluids, PICC line in place  -spoke with Dr Amber Tejada with [facility] partial hospitalization program here in [place]-pt does not qualify for this program as she requires high level of care that is available at [facility] inpatient program in [facility]. I spoke with pt re: this option as they will accept pregnant patients.   -Again discussed need for nutrition as pt input worse over the last few days than previously. Pt unable to tolerate dobhoff by ENT. Pt advised by Dr. Ree Blancas on  that MFM does not think TPN necessary now from fetal perspective as baby's growth is normal. Discussed use of TPN for her nutrition. Pt declines TPN.     -Goodie bag daily  -Hypokalemia-continue KCL in ivfs and add Kdur 21.  Potassium 3.3 on 21.  -BMP ordered for 21-normal range     Cervical incompetence-cerclage in place  -Threatened PTL  -s/pBMTZ #1and #2 on  and   -Plan to remove cerclage at 36 weeks or with significant contractions      H/o prior  with successful , pt desires   -plan for GBS collection today.      Severe depression/anxiety with h/o suicidal ideation  -followed closely by La [place] her help  -continue zyprexa and prozac as well as prn prazosin and hydroxyzine     GERD  -famotidine 20mg bid     Ambulate  Daily NST     MFM scan on 5/3/21-59%tile and Vertex  -saw MFM on  8/8 BPP, vtx on US     Anemia-hemoglobin at baseline; asymptomatic  -known beta thal minor  -s/p hematology consult-appreciate recs  -iv iron today and x 5 days     CHTN-stable normal bp's off meds     DVT PPx-ambulation and mirtha hose     Back pain-PT consult today for deconditioning and requested to fit for pregnancy support belt

## 2021-06-01 NOTE — PROGRESS NOTES
ID Progress Note  2021    Subjective:     Afebrile. No dyspnea,     Objective:     Vitals:   Visit Vitals  /65 (BP 1 Location: Right arm, BP Patient Position: At rest)   Pulse 61   Temp 98.8 °F (37.1 °C)   Resp 16   Ht 5' 4\" (1.626 m)   Wt 77.9 kg (171 lb 12.8 oz)   LMP 2020 (Exact Date)   SpO2 99%   Breastfeeding No   BMI 29.49 kg/m²        Tmax:  Temp (24hrs), Av.4 °F (36.9 °C), Min:98 °F (36.7 °C), Max:98.8 °F (37.1 °C)      Exam:    Not in distress  Lung clear, no rales, wheezes or rhonchi   Heart: s1, s2, RRR, no murmurs rubs or clicks  Abdomen: soft nontender, no guarding or rebound  Speech fluent   No cva tenderness  Labs:   Lab Results   Component Value Date/Time    WBC 5.2 2021 05:09 AM    Hemoglobin (POC) 10.4 (L) 2020 07:12 AM    HGB 8.0 (L) 2021 05:09 AM    HCT 25.5 (L) 2021 05:09 AM    PLATELET 849 84/10/3959 05:09 AM    MCV 73.1 (L) 2021 05:09 AM    Hgb, External 33.7 2012 12:00 AM    Hct, External 69 2012 12:00 AM    Platelet cnt., External 307 2012 12:00 AM     Lab Results   Component Value Date/Time    Sodium 142 2021 03:39 AM    Potassium 3.6 2021 03:39 AM    Chloride 112 (H) 2021 03:39 AM    CO2 23 2021 03:39 AM    Anion gap 7 2021 03:39 AM    Glucose 75 2021 03:39 AM    BUN 2 (L) 2021 03:39 AM    Creatinine 0.35 (L) 2021 03:39 AM    BUN/Creatinine ratio 6 (L) 2021 03:39 AM    GFR est AA >60 2021 03:39 AM    GFR est non-AA >60 2021 03:39 AM    Calcium 7.8 (L) 2021 03:39 AM    Bilirubin, total 0.3 2021 05:09 AM    Alk.  phosphatase 151 (H) 2021 05:09 AM    Protein, total 5.8 (L) 2021 05:09 AM    Albumin 2.0 (L) 2021 05:09 AM    Globulin 3.8 2021 05:09 AM    A-G Ratio 0.5 (L) 2021 05:09 AM    ALT (SGPT) 38 2021 05:09 AM             Assessment:     #1 UTI with some right flank CVA tenderness     #2 intrauterine pregnancy     #3 asthma     #4 depression     #5 history of candidemia during this current pregnancy s/p meant with amphotericin     #6 hypokalemia          Recommendations:     Would treat her with vancomycin until Estela 3.             Agatha Flores MD

## 2021-06-01 NOTE — PROGRESS NOTES
PHYSICAL THERAPY EVALUATION/DISCHARGE  Patient: Keyshawn Shah (75 y.o. female)  Date: 6/1/2021  Primary Diagnosis: Pregnant [Z34.90]  Eating disorder affecting pregnancy, antepartum [O99.340, F50.9]  UTI in pregnancy, antepartum, third trimester [O23.43]       Precautions:          ASSESSMENT  Based on the objective data described below, the patient is presently modified independent with transfers and ambulation and appears to be at baseline for functional mobility. Pt reports no concerns for mobility or stairs at home. Pt reports back pain and order requesting pregnancy belt. Therapy does not stock pregnancy belts. Obtained abdominal binder and cut in half. Pt reported comfort from binder, educated pt to wear with mobility. No further concerns stated, anticipate no therapy needs. Recommend follow-up with OP PT postpartum once cleared by MD if back pain persists. Functional Outcome Measure: The patient scored 90/100 on the Barthel Index outcome measure which is indicative of patient is 10% impaired with self care and dependency on others. Other factors to consider for discharge: back pain     Further skilled acute physical therapy is not indicated at this time. PLAN :  Recommendation for discharge: (in order for the patient to meet his/her long term goals)  No skilled physical therapy/ follow up rehabilitation needs identified at this time. This discharge recommendation:  Has not yet been discussed the attending provider and/or case management    IF patient discharges home will need the following DME: none       SUBJECTIVE:   Patient stated That feels better.  pt referring to modified abdominal binder    OBJECTIVE DATA SUMMARY:   HISTORY:    Past Medical History:   Diagnosis Date    Acute pyelonephritis 3/3/2021    Anorexia     Anxiety     Asthma     on albuterol prn.  Triggers cold and allergies    Avoidant-restrictive food intake disorder (ARFID)     Back pain, chronic 2010    sciatica  Chronic bilateral low back pain with right-sided sciatica 2020    ~    GERD (gastroesophageal reflux disease) 2020    [facility] , GI Dr. Afshan Horton Gestational hypertension     Past pregnancy    HX OTHER MEDICAL      , , , ,     Hyperemesis     severe hyperemesis    Hypertension     with G12 - none this pregnancy    Iron deficiency anemia 10/08/2010    MDD (major depressive disorder), single episode with postpartum onset 2013    treated with meds - 13    Orthostatic hypotension 2020    Plantar fasciitis     Pregnancy     36 weeks    PTSD (post-traumatic stress disorder)      Past Surgical History:   Procedure Laterality Date    HX  SECTION  4/22/15    HX DILATION AND CURETTAGE      HX DILATION AND CURETTAGE  2020    HX GYN      miscarriage x 6    HX GYN      removal of left fallopian tube    HX OTHER SURGICAL      cerlcage x4, ectopic x1 1999 with removal of left fallopian tube    HX OTHER SURGICAL      cerclage w/ current pregnancy. Removed 18    [place]  DELIVERY ONLY         Prior level of function: independent   Personal factors and/or comorbidities impacting plan of care: pregnant in 3rd trimester    Home Situation  Home Environment: Private residence  # Steps to Enter: 10  Rails to Enter: Yes  Hand Rails : Left  Wheelchair Ramp: No  One/Two Story Residence: Two story  # of Interior Steps: 15  Interior Rails: Left  Living Alone: No  Support Systems: Spouse/Significant Other/Partner  Current DME Used/Available at Home: None    EXAMINATION/PRESENTATION/DECISION MAKING:   Critical Behavior:  Neurologic State: Alert  Orientation Level: Oriented X4  Cognition: Follows commands     Hearing: Auditory  Auditory Impairment: None  Skin:  intact  Edema: none   Range Of Motion:  AROM: Within functional limits           PROM: Within functional limits           Strength:    Strength:  Within functional limits Tone & Sensation:   Tone: Normal              Sensation: Intact               Coordination:  Coordination: Within functional limits  Vision:      Functional Mobility:  Bed Mobility:     Supine to Sit: Modified independent  Sit to Supine: Modified independent     Transfers:  Sit to Stand: Modified independent  Stand to Sit: Modified independent                       Balance:   Sitting: Intact  Standing: Intact; Without support  Ambulation/Gait Training:  Distance (ft): 15 Feet (ft)     Ambulation - Level of Assistance: Modified independent     Base of Support: Widened    Functional Measure:  Barthel Index:    Bathin  Bladder: 10  Bowels: 10  Groomin  Dressing: 10  Feeding: 10  Mobility: 15  Stairs: 5  Toilet Use: 10  Transfer (Bed to Chair and Back): 15  Total: 90/100       The Barthel ADL Index: Guidelines  1. The index should be used as a record of what a patient does, not as a record of what a patient could do. 2. The main aim is to establish degree of independence from any help, physical or verbal, however minor and for whatever reason. 3. The need for supervision renders the patient not independent. 4. A patient's performance should be established using the best available evidence. Asking the patient, friends/relatives and nurses are the usual sources, but direct observation and common sense are also important. However direct testing is not needed. 5. Usually the patient's performance over the preceding 24-48 hours is important, but occasionally longer periods will be relevant. 6. Middle categories imply that the patient supplies over 50 per cent of the effort. 7. Use of aids to be independent is allowed. Carol Agustin., Barthel, D.W. ([id number]). Functional evaluation: the Barthel Index. [address] [facility] (14)2. Robert Escort katja IDA Sullivan, Abhinav Anand., [facility]., [place], [address] [place], [place] [address] ().  Measuring the change indisability after inpatient rehabilitation; comparison of the responsiveness of the Barthel Index and Functional [place] Measure. Journal of Neurology, Neurosurgery, and Psychiatry, 66(4), 091-136. KAREN Addison, IZA Hannah, & Johnna Lincoln M.A. (2004.) Assessment of post-stroke quality of life in cost-effectiveness studies: The usefulness of the Barthel Index and the EuroQoL-5D. Quality of Life Research, 15, 937-66            Physical Therapy Evaluation Charge Determination   History Examination Presentation Decision-Making   MEDIUM  Complexity : 1-2 comorbidities / personal factors will impact the outcome/ POC  LOW Complexity : 1-2 Standardized tests and measures addressing body structure, function, activity limitation and / or participation in recreation  LOW Complexity : Stable, uncomplicated  Other outcome measures barthel index  LOW       Based on the above components, the patient evaluation is determined to be of the following complexity level: LOW       Activity Tolerance:   Good      After treatment patient left in no apparent distress:   Supine in bed and Call bell within reach    COMMUNICATION/EDUCATION:   The patients plan of care was discussed with: Registered nurse and Rehabilitation technician. Fall prevention education was provided and the patient/caregiver indicated understanding. and Patient/family have participated as able in goal setting and plan of care.     Thank you for this referral.  Kodi Anand, PT, DPT   Time Calculation: 10 mins

## 2021-06-02 LAB
ANION GAP SERPL CALC-SCNC: 7 MMOL/L (ref 5–15)
BUN SERPL-MCNC: 2 MG/DL (ref 6–20)
BUN/CREAT SERPL: 5 (ref 12–20)
CALCIUM SERPL-MCNC: 7.9 MG/DL (ref 8.5–10.1)
CHLORIDE SERPL-SCNC: 108 MMOL/L (ref 97–108)
CO2 SERPL-SCNC: 25 MMOL/L (ref 21–32)
CREAT SERPL-MCNC: 0.41 MG/DL (ref 0.55–1.02)
GLUCOSE SERPL-MCNC: 67 MG/DL (ref 65–100)
POTASSIUM SERPL-SCNC: 3.8 MMOL/L (ref 3.5–5.1)
SODIUM SERPL-SCNC: 140 MMOL/L (ref 136–145)

## 2021-06-02 PROCEDURE — 74011000250 HC RX REV CODE- 250: Performed by: OBSTETRICS & GYNECOLOGY

## 2021-06-02 PROCEDURE — 36415 COLL VENOUS BLD VENIPUNCTURE: CPT

## 2021-06-02 PROCEDURE — 74011250636 HC RX REV CODE- 250/636: Performed by: OBSTETRICS & GYNECOLOGY

## 2021-06-02 PROCEDURE — 74011250636 HC RX REV CODE- 250/636: Performed by: INTERNAL MEDICINE

## 2021-06-02 PROCEDURE — 74011250637 HC RX REV CODE- 250/637: Performed by: INTERNAL MEDICINE

## 2021-06-02 PROCEDURE — 65410000002 HC RM PRIVATE OB

## 2021-06-02 PROCEDURE — 80048 BASIC METABOLIC PNL TOTAL CA: CPT

## 2021-06-02 PROCEDURE — 74011000258 HC RX REV CODE- 258: Performed by: INTERNAL MEDICINE

## 2021-06-02 PROCEDURE — 59025 FETAL NON-STRESS TEST: CPT

## 2021-06-02 PROCEDURE — 74011250637 HC RX REV CODE- 250/637: Performed by: OBSTETRICS & GYNECOLOGY

## 2021-06-02 RX ORDER — FAMOTIDINE 20 MG/1
20 TABLET, FILM COATED ORAL EVERY 12 HOURS
Status: DISCONTINUED | OUTPATIENT
Start: 2021-06-02 | End: 2021-06-03

## 2021-06-02 RX ADMIN — VANCOMYCIN HYDROCHLORIDE 1250 MG: 10 INJECTION, POWDER, LYOPHILIZED, FOR SOLUTION INTRAVENOUS at 07:00

## 2021-06-02 RX ADMIN — FLUOXETINE 60 MG: 20 CAPSULE ORAL at 09:23

## 2021-06-02 RX ADMIN — CYANOCOBALAMIN TAB 500 MCG 1000 MCG: 500 TAB at 09:23

## 2021-06-02 RX ADMIN — FAMOTIDINE 20 MG: 20 TABLET ORAL at 21:38

## 2021-06-02 RX ADMIN — FAMOTIDINE 20 MG: 10 INJECTION, SOLUTION INTRAVENOUS at 09:23

## 2021-06-02 RX ADMIN — ONDANSETRON 8 MG: 2 INJECTION INTRAMUSCULAR; INTRAVENOUS at 21:38

## 2021-06-02 RX ADMIN — DOCUSATE SODIUM 100 MG: 100 CAPSULE, LIQUID FILLED ORAL at 09:23

## 2021-06-02 RX ADMIN — CEFTRIAXONE SODIUM 1 G: 1 INJECTION, POWDER, FOR SOLUTION INTRAMUSCULAR; INTRAVENOUS at 18:46

## 2021-06-02 RX ADMIN — OLANZAPINE 10 MG: 5 TABLET, FILM COATED ORAL at 21:38

## 2021-06-02 RX ADMIN — ONDANSETRON 8 MG: 2 INJECTION INTRAMUSCULAR; INTRAVENOUS at 09:32

## 2021-06-02 RX ADMIN — FOLIC ACID: 5 INJECTION, SOLUTION INTRAMUSCULAR; INTRAVENOUS; SUBCUTANEOUS at 13:37

## 2021-06-02 RX ADMIN — ALTEPLASE 1 MG: 2.2 INJECTION, POWDER, LYOPHILIZED, FOR SOLUTION INTRAVENOUS at 06:51

## 2021-06-02 NOTE — PROGRESS NOTES
ID Progress Note  2021    Subjective:     Afebrile. No dyspnea    Objective:     Vitals:   Visit Vitals  /77 (BP 1 Location: Right upper arm, BP Patient Position: At rest)   Pulse 78   Temp 98.2 °F (36.8 °C)   Resp 16   Ht 5' 4\" (1.626 m)   Wt 76.8 kg (169 lb 6.4 oz)   LMP 2020 (Exact Date)   SpO2 98%   Breastfeeding No   BMI 29.08 kg/m²        Tmax:  Temp (24hrs), Av.1 °F (36.7 °C), Min:97.6 °F (36.4 °C), Max:98.3 °F (36.8 °C)      Exam:    Not in distress  Lung clear, no rales, wheezes or rhonchi   Heart: s1, s2, RRR, no murmurs rubs or clicks  Abdomen: soft nontender, no guarding or rebound  Speech fluent     Labs:   Lab Results   Component Value Date/Time    WBC 5.2 2021 05:09 AM    Hemoglobin (POC) 10.4 (L) 2020 07:12 AM    HGB 8.0 (L) 2021 05:09 AM    HCT 25.5 (L) 2021 05:09 AM    PLATELET 059 10/93/1014 05:09 AM    MCV 73.1 (L) 2021 05:09 AM    Hgb, External 33.7 2012 12:00 AM    Hct, External 69 2012 12:00 AM    Platelet cnt., External 307 2012 12:00 AM     Lab Results   Component Value Date/Time    Sodium 140 2021 07:37 AM    Potassium 3.8 2021 07:37 AM    Chloride 108 2021 07:37 AM    CO2 25 2021 07:37 AM    Anion gap 7 2021 07:37 AM    Glucose 67 2021 07:37 AM    BUN 2 (L) 2021 07:37 AM    Creatinine 0.41 (L) 2021 07:37 AM    BUN/Creatinine ratio 5 (L) 2021 07:37 AM    GFR est AA >60 2021 07:37 AM    GFR est non-AA >60 2021 07:37 AM    Calcium 7.9 (L) 2021 07:37 AM    Bilirubin, total 0.3 2021 05:09 AM    Alk.  phosphatase 151 (H) 2021 05:09 AM    Protein, total 5.8 (L) 2021 05:09 AM    Albumin 2.0 (L) 2021 05:09 AM    Globulin 3.8 2021 05:09 AM    A-G Ratio 0.5 (L) 2021 05:09 AM    ALT (SGPT) 38 2021 05:09 AM             Assessment:     #1 UTI with some right flank CVA tenderness     #2 intrauterine pregnancy     #3 asthma     #4 depression     #5 history of candidemia during this current pregnancy s/p meant with amphotericin     #6 hypokalemia          Recommendations:     Would treat her with vancomycin until Estela 3.       Asya [address] Robert Mccallum MD

## 2021-06-02 NOTE — PROGRESS NOTES
Clinical Pharmacy Note: IV to PO Automatic Conversion  Please note: Halina Stokes medication(s) (famotidine) has/have been changed from IV to PO based on the following critiera:    Patient is tolerating oral medications  Patient is tolerating a diet more advanced than clear liquids  Patient is not requiring vasopressors    This IV to PO conversion is based on the [employer] approved automatic conversion policy for eligible patients. Please call with questions.

## 2021-06-02 NOTE — PROGRESS NOTES
Comprehensive Nutrition Assessment    Type and Reason for Visit: Reassess    Nutrition Recommendations/Plan:     Continue to provide favorite foods items as tolerated. Nutrition Assessment:        Pt continues with poor oral intake, will occasional consume partial sandwich or chips, 1-2 ounces of Ensure Enlive. Encouraged taking at least 10 bites from every meal and 50% supplements but this produces much anxiety with pt. Pt does not wish to return to previous outpt RD as discussing portion sizes and foods promoted too much stress. Hope to be able to obtain adequate assistance via [employer] after delivery if able. IV fluids continue with MVI, thiamine and folic acid. Malnutrition Assessment:  Malnutrition Status:  Severe malnutrition    Context:  Chronic illness     Findings of the 6 clinical characteristics of malnutrition:   Energy Intake:  7 - 75% or less est energy requirements for 1 month or longer  Weight Loss:  7.00 - Greater than 10% over 6 months     Body Fat Loss:  7 - Severe body fat loss,     Muscle Mass Loss:  7 - Severe muscle mass loss,        Estimated Daily Nutrient Needs:  Energy (kcal): 2919-2056 kcal/day; Weight Used for Energy Requirements: Pre-pregnancy  Protein (g):  g/day  (1.2-1.4 g/day); Weight Used for Protein Requirements: Pre-pregnancy  Fluid (ml/day): 2400 ml; Method Used for Fluid Requirements: 1 ml/kcal    Wounds:    None       Current Nutrition Therapies:  DIET REGULAR  DIET NUTRITIONAL SUPPLEMENTS All Meals;  Ensure Enlive  DIET ONE TIME MESSAGE    Medications: Vit B12, Rocephin, IVF with MVI, thiamine and folic acid    Anthropometric Measures:  · Height:  5' 4\" (162.6 cm)  · Current Body Wt:  73.9 kg (162 lb 14.7 oz)   · Admission Body Wt:  162 lb 14.7 oz    · Usual Body Wt:  68.5 kg (151 lb)     · Ideal Body Wt:  120 lbs:        Nutrition Diagnosis:   · Inadequate oral intake related to altered GI function, psychological cause or life stress as evidenced by intake 0-25%    · Increased nutrient needs related to increased demand for energy/nutrients as evidenced by other (specify)      Nutrition Interventions:   Food and/or Nutrient Delivery: Continue current diet, Continue oral nutrition supplement  Nutrition Education and Counseling: No recommendations at this time  Coordination of Nutrition Care: Continue to monitor while inpatient    Goals:  Consume 1 supplement and 25% meals x 5-7 days. Nutrition Monitoring and Evaluation:   Behavioral-Environmental Outcomes: None identified  Food/Nutrient Intake Outcomes: Food and nutrient intake, Supplement intake  Physical Signs/Symptoms Outcomes: Biochemical data, GI status, Nausea/vomiting, Weight    Discharge Planning:     Too soon to determine     Electronically signed by Romulo Viramontes RD on 6/2/2021 at 7:31 PM    Contact: Alexy

## 2021-06-02 NOTE — PROGRESS NOTES
Bedside and Verbal shift change report given to [address] (oncoming nurse) by Viv Martinez RN (offgoing nurse). Report included the following information SBAR, Kardex, OR Summary, Procedure Summary, Intake/Output and MAR.

## 2021-06-02 NOTE — ADT AUTH CERT NOTES
Utilization Reviews 
 
  
Urinary Tract Infection (UTI) - Care Day 21 (6/1/2021) by Chanel Arora 
 
  
Review Entered Review Status 6/1/2021 12:25 Completed  
  
Criteria Review Care Day: 21 Care Date: 6/1/2021 Level of Care: Inpatient Floor Guideline Day 3 Clinical Status   
(X) * Hemodynamic stability 6/1/2021 12:25:35 EDT by Chanel Arora   
  VSS   
(X) * Mental status at baseline 6/1/2021 12:25:35 EDT by Chanel Arora   
  LOC alert   
( ) * Antibiotic regimen for next level of care established 6/1/2021 12:25:35 EDT by Chanel Arora   
  vancomycin 1,250mg IV q8   
(X) * Urine output adequate 6/1/2021 12:25:35 EDT by Chanel Arora   
  voiding   
(X) * Renal function at baseline or acceptable for next level of care 6/1/2021 12:25:35 EDT by Chanel Arora   
  voiding   
(X) * Pain absent or managed 6/1/2021 12:25:35 EDT by Chanel Arora   
  Pain 0/10   
( ) * Oral intake adequate 6/1/2021 12:25:35 EDT by Chanel Arora   
  pt input worse over the last few days than previously   
(X) * Afebrile or temperature acceptable for next level of care 6/1/2021 12:25:35 EDT by Chanel Arora   
  Temp 98.8   
(X) * Vomiting absent 6/1/2021 12:25:35 EDT by Chanel Arora   
  none noted ( ) * Discharge plans and education understood Activity   
(X) * Ambulatory or acceptable for next level of care 6/1/2021 12:25:35 EDT by Hugo Avila up ad verito Routes   
(X) * Oral hydration, medications, and diet 6/1/2021 12:25:35 EDT by Hugo Avila regular diet with ensure enlive nutritional supplement, PO meds Medications (X) Antibiotics 6/1/2021 12:25:35 EDT by Chanel Arora   
  vancomycin 1,250mg IV q8   
* Milestone Additional Notes Date of care: 6/1/2021 IP- LOC- L&D Obgyn PN: Subjective: LOS: 20 days    
Estimated Date of Delivery: 7/4/21 Gestational Age Today: 32w1d Patient admitted for UTI s/p failed outpatient treatment due to chronic eating disorder. States she does have moderate nausea/vomiting, normal fetal movement and occasional contractions but mild as well as bilateral flank pain and hip discomfort and does not have chest pain, shortness of breath, vaginal bleeding  and vaginal leaking of fluid Patient without distress. Heart: Regular rate and rhythm Lung: clear to auscultation throughout lung fields, no wheezes, no rales, no rhonchi and normal respiratory effort Abdomen: soft, nontender, gravid Lower Extremities:  - Edema No     
Membranes:  Intact Uterine Activity:  None on 5/31 Fetal Heart Rate:  Reactive on 5/31 Baseline: 130s per minute Variability: moderate Accelerations: yes Decelerations: none    
Assessment and Plan: Active Problems:  
  Pregnant (3/1/2018)  
  Eating disorder affecting pregnancy, antepartum (12/2/2020)  
  UTI in pregnancy, antepartum, third trimester (5/12/2021) Pt is a 39 y/o [id number] at 35w2d weeks admitted for UTI s/p failed outpatient medical therapy due to chronic eating disorder. Urine culture 5/5/21 positive for Klebsiella and EColi-s/p 4 days of ceftriaxone  
-Repeat urine cx on 5/19 positive for enterococcus-now on vanc since 5/21  
-s/p I.D. consult-plan to continue vanc until 6/3/21. Appreciate care and management plan  
-afebrile and normal WBC's   
-UA with budding yeast-s/p miconazole vaginal for suspected vaginal contamination of yeast  
-renal ultrasound  5/21 WNL  
- 5/31: new complaints of urinary discomfort - UA positive-urine culture sent today  
   
Chronic eating disorder -IV fluids, PICC line in place  
-spoke with Dr Rahul Mireles with [facility] partial hospitalization program here in [place]-pt does not qualify for this program as she requires high level of care that is available at [facility] inpatient program in [facility]. I spoke with pt re: this option as they will accept pregnant patients.   
-Again discussed need for nutrition as pt input worse over the last few days than previously. Pt unable to tolerate dobhoff by ENT. Pt advised by Dr. Rubia Moreau on  that MFM does not think TPN necessary now from fetal perspective as baby's growth is normal. Discussed use of TPN for her nutrition. Pt declines TPN.    
-Goodie bag daily -Hypokalemia-continue KCL in ivfs and add Kdur 21. Potassium 3.3 on 21.  
-BMP ordered for 21-normal range  
   
Cervical incompetence-cerclage in place  
-Threatened PTL  
-s/pBMTZ #1and #2 on  and   
-Plan to remove cerclage at 36 weeks or with significant contractions   
   
H/o prior  with successful , pt desires   
-plan for GBS collection today.  
   
 Severe depression/anxiety with h/o suicidal ideation  
-followed closely by Bibi Mantilla her help  
-continue zyprexa and prozac as well as prn prazosin and hydroxyzine  
   
GERD  
-famotidine 20mg bid  
   
Ambulate Daily NST  
   
MFM scan on 5/3/21-59%tile and Vertex  
-saw MFM on  8/8 BPP, vtx on US  
   
Anemia-hemoglobin at baseline; asymptomatic  
-known beta thal minor  
-s/p hematology consult-appreciate recs  
-iv iron today and x 5 days  
   
CHTN-stable normal bp's off meds  
   
DVT PPx-ambulation and mirtha hose  
   
Back pain-PT consult today for deconditioning and requested to fit for pregnancy support belt ID PN: Assessment: #1 UTI with some right flank CVA tenderness #2 intrauterine pregnancy #3 asthma #4 depression #5 history of candidemia during this current pregnancy s/p meant with amphotericin #6 hypokalemia Recommendations:  
Would treat her with vancomycin until Estela 3.   
    
Vitals: Temp 98.8, HR 59, RR 16, 98% on RA No labs at time of review Medications:   
0.9% sodium chloride 1,000 mL with mvi (adult no. 4 with vit K) 10 mL, thiamine 386 mg, folic acid 1 mg infusion IV q24 Vitamin B12 1,000mcg PO daily Acetaminophen 650mg rectal q6 PRN x1  
D5NS with KCl 20mEq/L 125mL/hr IV Colace 100mg PO daily Pepcid 20mg IV q12 Prozac 60mg PO daily Zyprexa 10mg PO every bedtime Zofran 8mg IV q6 PRN x2  
Miralax 17g PO daily PRN x1 Venofer 200mg IV daily Plan: regular diet with ensure enlive nutritional supplement, daily weight, fetal heart rate qshift, fetal nonstress test daily, up ad verito, BMP every other day, PT  
  
  
  
  
  
  
  
Urinary Tract Infection (UTI) - Care Day 20 (5/31/2021) by Carlene Tipton 
 
  
Review Entered Review Status 6/1/2021 12:22 Completed  
  
Criteria Review Care Day: 20 Care Date: 5/31/2021 Level of Care: Inpatient Floor Guideline Day 3 Clinical Status   
(X) * Hemodynamic stability 6/1/2021 12:22:45 EDT by Carlene Tipton   
  VSS   
(X) * Mental status at baseline 6/1/2021 12:22:45 EDT by Carlene Tipton   
  LOC alert   
( ) * Antibiotic regimen for next level of care established 6/1/2021 12:22:45 EDT by Carlene Tipton   
  vancomycin 1,250mg IV q8   
(X) * Urine output adequate 6/1/2021 12:22:45 EDT by Carlene Tipton   
  voiding   
(X) * Renal function at baseline or acceptable for next level of care 6/1/2021 12:22:45 EDT by Carlene Tipton   
  BUN: 2 (L) Creatinine: 0.35 (L) BUN/Creatinine ratio: 6 (L)   
(X) * Pain absent or managed 6/1/2021 12:22:45 EDT by Carlene Tipton   
  Pain 0/10   
( ) * Oral intake adequate 6/1/2021 12:22:45 EDT by Carlene Tipton   
  pt input worse over the last few days than previously   
(X) * Afebrile or temperature acceptable for next level of care 6/1/2021 12:22:45 EDT by Carlene Tipton   
  Temp 98.7   
(X) * Vomiting absent 6/1/2021 12:22:45 EDT by Carlene Tipton   
  none noted ( ) * Discharge plans and education understood Activity   
(X) * Ambulatory or acceptable for next level of care 6/1/2021 12:22:45 EDT by Carlene israel ad verito Routes   
(X) * Oral hydration, medications, and diet 6/1/2021 12:22:45 EDT by Anton Mc   
  regular diet with ensure enlive nutritional supplement Interventions (X) Renal function tests, urinalysis 6/1/2021 12:22:45 EDT by Anton Mc   
  BUN: 2 (L) Creatinine: 0.35 (L) BUN/Creatinine ratio: 6 (L) Medications (X) Antibiotics 6/1/2021 12:22:45 EDT by Anton Mc   
  vancomycin 1,250mg IV q8   
* Milestone Additional Notes Date of care: 5/31/2021 IP- LOC- L&D Obgyn PN: Subjective: LOS: 19 days    
Estimated Date of Delivery: 7/4/21 Gestational Age Today: 27w4d Patient admitted for UTI s/p failed outpatient treatment and has known chronic eating disorder. States she has aching pain on both sides of back and some discomfort with urination. Tried heating pad last night and will take tylenol now. Nausea unchanged. Baby moving a lot. No ctx pain but will get occasional lower pelvic cramps.  No VB/LOF. Patient without distress. Back: costovertebral angle tenderness is negative b/l. +lumbar muscle tenderness bilaterally Abdomen: soft, nontender Fundus: soft and non tender Lower Extremities:  - Edema No     
Membranes:  Intact Uterine Activity:  None Fetal Heart Rate:  For NST today    
Assessment and Plan: Active Problems:  
  Pregnant (3/1/2018)  
  Eating disorder affecting pregnancy, antepartum (12/2/2020)  
  UTI in pregnancy, antepartum, third trimester (5/12/2021) Pt is a 39 y/o [id number] at 35w1 d weeks admitted for UTI s/p failed outpatient medical therapy due to chronic eating disorder. Urine culture 5/5/21 positive for Klebsiella and EColi-s/p 4 days of ceftriaxone  
-Repeat urine cx on 5/19 positive for enterococcus-now on vanc since 5/21  
-s/p I.D. consult-plan to continue vanc until 6/3/21.  Appreciate care and management plan  
-afebrile and normal WBC's   
-UA with budding yeast-s/p miconazole vaginal for suspected vaginal contamination of yeast  
-renal ultrasound   WNL  
- : new complaints of urinary discomfort - will check UA  
   
Chronic eating disorder -IV fluids, PICC line in place  
-spoke with Dr Kathy George with [facility] partial hospitalization program here in [place]-pt does not qualify for this program as she requires high level of care that is available at [facility] inpatient program in [facility]. I spoke with pt re: this option as they will accept pregnant patients.   
-Again discussed need for nutrition as pt input worse over the last few days than previously. Pt unable to tolerate dobhoff by ENT. Pt advised by Dr. Anabela Partida on  that MFM does not think TPN necessary now from fetal perspective as baby's growth is normal. Discussed use of TPN for her nutrition. Pt declines TPN.    
-Goodie bag daily -Hypokalemia-continue KCL in ivfs and add Kdur 21. Potassium 3.3 on 21.  
-BMP ordered for 21-normal range  
   
Cervical incompetence-cerclage in place  
-Threatened PTL  
-s/pBMTZ #1and #2 on  and   
-Plan to remove cerclage at 36 weeks or with significant contractions   
   
H/o prior  with successful , pt desires   
-plan for GBS collection in the next week  
   
 Severe depression/anxiety with h/o suicidal ideation  
-followed closely by Vickie Morales her help  
-continue zyprexa and prozac as well as prn prazosin and hydroxyzine  
   
GERD  
-famotidine 20mg bid  
   
Ambulate Daily NST  
   
MFM scan on 5/3/21-59%tile and Vertex  
-saw MFM on  8/8 BPP, vtx on US  
   
Anemia-hemoglobin at baseline; asymptomatic  
-known beta thal minor  
-s/p hematology consult-appreciate recs  
-iv iron today and x 5 days  
   
CHTN-stable normal bp's off meds  
   
DVT PPx-ambulation and mirtha hose  
   
Back pain-suspect musculoskeletal in nature; can alternate ice/heat packs.  Suggested to patient to try some gentle stretches and can get an exercise ball from L&D and see if sitting on that and moving hips/pelvis help.  
     
Vitals: Temp 98.7, HR 71, /78, RR 16, 98% on RA Labs:  
5/31/2021 03:39 Sodium: 142 Potassium: 3.6 Chloride: 112 (H)  
CO2: 23 Anion gap: 7 Glucose: 75 Calcium: 7.8 (L) Medications:   
0.9% sodium chloride 1,000 mL with mvi (adult no. 4 with vit K) 10 mL, thiamine 012 mg, folic acid 1 mg infusion IV q24 Vitamin B12 1,000mcg PO daily Acetaminophen 650mg rectal q6 PRN x1  
D5NS with KCl 20mEq/L 125mL/hr IV Colace 100mg PO daily Pepcid 20mg IV q12 Prozac 60mg PO daily Zyprexa 10mg PO every bedtime Zofran 8mg IV q6 PRN x2  
Miralax 17g PO daily PRN x1 Venofer 200mg IV daily Plan: regular diet with ensure enlive nutritional supplement, daily weight, fetal heart rate qshift, fetal nonstress test daily, up ad verito, BMP every other day  
  
  
  
  
  
  
  
  
Urinary Tract Infection (UTI) - Care Day 19 (5/30/2021) by Yimi Maldonado 
 
  
Review Entered Review Status 6/1/2021 11:58 Completed  
  
Criteria Review Care Day: 19 Care Date: 5/30/2021 Level of Care: Inpatient Floor Guideline Day 3 Clinical Status   
(X) * Hemodynamic stability 6/1/2021 11:58:23 EDT by Yimi Maldonado   
  VSS   
(X) * Mental status at baseline 6/1/2021 11:58:23 EDT by Yimi Maldonado   
  LOC alert   
( ) * Antibiotic regimen for next level of care established 6/1/2021 11:58:23 EDT by Yimi Maldonado   
  Vancomycin 1,250mg IV q8   
(X) * Urine output adequate 6/1/2021 11:58:23 EDT by Yimi Maldonado   
  voiding   
(X) * Renal function at baseline or acceptable for next level of care 6/1/2021 11:58:23 EDT by Yimi Maldonado   
  voiding   
(X) * Pain absent or managed 6/1/2021 11:58:23 EDT by Yimi Maldonado   
  Pain 0/10   
( ) * Oral intake adequate 6/1/2021 11:58:23 EDT by Yimi Maldonado   
  pt input worse over the last few days than previously   
(X) * Afebrile or temperature acceptable for next level of care 6/1/2021 11:58:23 EDT by Jose Carlos Emmanuel   
  Temp 98.6   
(X) * Vomiting absent 6/1/2021 11:58:23 EDT by Jose Carlos Emmanuel   
  none noted ( ) * Discharge plans and education understood Activity   
(X) * Ambulatory or acceptable for next level of care 6/1/2021 11:58:23 EDT by Jonas Alonso up ad verito Routes   
(X) * Oral hydration, medications, and diet 6/1/2021 11:58:23 EDT by Jonas Alonso regular diet with ensure enlive nutritional supplement, PO meds Medications (X) Antibiotics 6/1/2021 11:58:23 EDT by Jose Carlos Emmanuel   
  Vancomycin 1,250mg IV q8   
* Milestone Additional Notes Date of care: 5/30/2021 IP- LOC- L&D Obgyn PN: Subjective: LOS: 18 days    
Estimated Date of Delivery: 7/4/21 Gestational Age Today: 29w0d Patient admitted for UTI s/p failed outpatient treatment. Pt does have a known chronic eating disorder. PICC line still in place. She reports cramping last night intermittently that has improved this morning. She denies vaginal bleeding or LOF. Good FM. She states nausea is \"the way it always is\" and she does report low back pain. She is urinating well and has not yet had a bowel movement. She is passing gas. She was able to eat a small amount of food this morning and has tolerated that well so far. She denies HA, fever, chills, vision changes, CP, SOB or leg pain bilaterally Patient without distress. Lung: normal respiratory effort Abdomen: soft, nontender, gravid Fundus: soft and non tender Lower Extremities: no edema bilaterally     
Membranes:  Intact Uterine Activity:  None Fetal Heart Rate:  Baseline: 135 per minute Variability: moderate Accelerations: yes, 10x10 Decelerations: none Uterine contractions: none    
Assessment and Plan: Active Problems:  
  Pregnant (3/1/2018)  
  Eating disorder affecting pregnancy, antepartum (12/2/2020)  
  UTI in pregnancy, antepartum, third trimester (2021) Pt is a 37 y/o [id number] at 35w0d weeks admitted for UTI s/p failed outpatient medical therapy due to chronic eating disorder. Urine culture 21 positive for Klebsiella and EColi-s/p 4 days of ceftriaxone  
-Repeat urine cx on  positive for enterococcus-now on vanc since   
-s/p I.D. consult-plan to continue vanc until 6/3/21. Appreciate care and management plan  
-afebrile and normal WBC's   
-UA with budding yeast-s/p miconazole vaginal for suspected vaginal contamination of yeast  
-renal ultrasound   WNL  
   
Chronic eating disorder -IV fluids, PICC line in place  
-spoke with Dr Tyra Mcneil with [facility] partial hospitalization program here in [place]-pt does not qualify for this program as she requires high level of care that is available at [facility] inpatient program in [facility]. I spoke with pt re: this option as they will accept pregnant patients.   
-Again discussed need for nutrition as pt input worse over the last few days than previously. Pt unable to tolerate dobhoff by ENT. Pt advised by Dr. Tamika Bull on  that MFM does not think TPN necessary now from fetal perspective as baby's growth is normal. Discussed use of TPN for her nutrition. Pt declines TPN.    
-Goodie bag daily -Hypokalemia-continue KCL in ivfs and add Kdur 21. Potassium 3.3 on 21.  
-BMP ordered for 21  
   
Cervical incompetence-cerclage in place  
-Threatened PTL  
-s/pBMTZ #1and #2 on  and   
-Plan to remove cerclage at 36 weeks or with significant contractions   
   
H/o prior  with successful , pt desires   
-plan for GBS collection in the next week  
   
 Severe depression/anxiety with h/o suicidal ideation  
-followed closely by Yasmin Trent her help  
-continue zyprexa and prozac as well as prn prazosin and hydroxyzine  
   
GERD  
-famotidine 20mg bid  
   
Ambulate Daily NST  
   
MFM scan on 5/3/21-59%tile and Vertex  
-saw MFM on 5/20 8/8 BPP, vtx on US  
   
Anemia-hemoglobin at baseline; asymptomatic  
-known beta thal minor  
-s/p hematology consult-appreciate recs  
-iv iron today and x 5 days  
   
CHTN-stable normal bp's off meds  
   
DVT PPx-ambulation and mirtha hose  
     
Vitals: Temp 98.6, HR 82, /56, RR 16, 97% on RA No labs at time of review Medications:   
0.9% sodium chloride 1,000 mL with mvi (adult no. 4 with vit K) 10 mL, thiamine 684 mg, folic acid 1 mg infusion IV q24 Vitamin B12 1,000mcg PO daily D5NS with KCl 20mEq/L 125mL/hr IV Colace 100mg PO daily Pepcid 20mg IV q12 Prozac 60mg PO daily Zyprexa 10mg PO every bedtime Zofran 8mg IV q6 PRN x1 Phenergan 25mg rectal q6 PRN x1 Venofer 200mg IV daily Plan: regular diet with ensure enlive nutritional supplement, daily weight, fetal heart rate qshift, fetal nonstress test daily, up ad verito, BMP every other day  
  
  
  
  
  
  
  
Urinary Tract Infection (UTI) - Care Day 18 (5/29/2021) by Son Barraza 
 
  
Review Entered Review Status 6/1/2021 11:51 Completed  
  
Criteria Review Care Day: 18 Care Date: 5/29/2021 Level of Care: Inpatient Floor Guideline Day 3 Clinical Status   
(X) * Hemodynamic stability 6/1/2021 11:51:49 EDT by Son Barraza   
  VSS   
(X) * Mental status at baseline 6/1/2021 11:51:49 EDT by Son Barraza   
  LOC alert   
( ) * Antibiotic regimen for next level of care established 6/1/2021 11:51:49 EDT by Son Barraza   
  Vancomycin 1,250mg IV q8   
(X) * Urine output adequate 6/1/2021 11:51:49 EDT by Son Barraza   
  voiding   
(X) * Renal function at baseline or acceptable for next level of care 6/1/2021 11:51:49 EDT by Son Barraza   
  BUN: 3 (L) Creatinine: 0.36 (L)   
(X) * Pain absent or managed 6/1/2021 11:51:49 EDT by Son Barraza   
  Pain 0/10   
( ) * Oral intake adequate 6/1/2021 11:51:49 EDT by Dayna Dick   
  need for nutrition as pt input worse over the last few days than previously   
(X) * Afebrile or temperature acceptable for next level of care 6/1/2021 11:51:49 EDT by Dayna Dick   
  Temp 98.3   
(X) * Vomiting absent 6/1/2021 11:51:49 EDT by Dayna Dick   
  none noted ( ) * Discharge plans and education understood Activity   
(X) * Ambulatory or acceptable for next level of care 6/1/2021 11:51:49 EDT by Wilder Delacruz up ad verito Routes   
(X) * Oral hydration, medications, and diet 6/1/2021 11:51:49 EDT by Wilder Delacruz regular diet with ensure enlive nutritional supplement, PO meds Interventions (X) Renal function tests, urinalysis 6/1/2021 11:51:49 EDT by Dayna Dick   
  BUN: 3 (L) Creatinine: 0.36 (L) BUN/Creatinine ratio: 8 (L) Medications (X) Antibiotics 6/1/2021 11:51:49 EDT by Dayna Dick   
  Vancomycin 1,250mg IV q8   
* Milestone Additional Notes Date of care: 5/29/2021 IP- LOC- L&D Obgyn PN: Subjective: LOS: 17 days    
Estimated Date of Delivery: 7/4/21 Gestational Age Today: 34w7d Patient admitted for UTI s/p failed outpatient treatment. Pt does have a known chronic eating disorder. PICC line in place. Pt's only complaints this morning are nausea and constipation, which she reports are not new issues for her. She is agreeable to colace daily and Miralax PO PRN. She denies HA, vision changes, CP, SOB, leaking of fluid, vaginal bleeding or contractions. She does report good FM. Patient without distress. Heart: Regular rate and rhythm, no murmur Lung: clear to auscultation throughout lung fields and normal respiratory effort Abdomen: soft, nontender, gravid Fundus: soft and non tender Lower Extremities: no edema bilaterally     
Membranes:  Intact Uterine Activity:  None Fetal Heart Rate:  Reactive Baseline: 150 per minute Variability: moderate Accelerations: yes Decelerations: none Uterine contractions: none    
Assessment and Plan: Active Problems:  
  Pregnant (3/1/2018)  
  Eating disorder affecting pregnancy, antepartum (2020) UTI in pregnancy, antepartum, third trimester (2021) Pt is a 39 y/o [id number] at 34w6d weeks admitted for UTI s/p failed outpatient medical therapy due to chronic eating disorder. Urine culture 21 positive for Klebsiella and EColi-s/p 4 days of ceftriaxone  
-Repeat urine cx on  positive for enterococcus-now on vanc since   
-s/p I.D. consult-plan to continue vanc until 6/3/21. Appreciate care and management plan  
-afebrile and normal WBC's   
-UA with budding yeast-s/p miconazole vaginal for suspected vaginal contamination of yeast  
-renal ultrasound   WNL  
   
Chronic eating disorder -IV fluids, PICC line in place  
-spoke with Dr Daisy Morrissey with [facility] partial hospitalization program here in [place]-pt does not qualify for this program as she requires high level of care that is available at [facility] inpatient program in [facility]. I spoke with pt re: this option as they will accept pregnant patients.   
-Again discussed need for nutrition as pt input worse over the last few days than previously. Pt unable to tolerate dobhoff by ENT. Pt advised by Dr. Ander Buitrago on  that MFM does not think TPN necessary now from fetal perspective as baby's growth is normal. Discussed use of TPN for her nutrition. Pt declines TPN.    
-Goodie bag daily -Hypokalemia-continue KCL in ivfs and add Kdur 21.  Potassium 3.3 on 21.  
   
Cervical incompetence-cerclage in place  
-Threatened PTL  
-s/pBMTZ #1and #2 on  and   
-Plan to remove cerclage at 36 weeks or with significant contractions   
   
H/o prior  with successful , pt desires   
-plan for GBS collection in the next week  
   
 Severe depression/anxiety with h/o suicidal ideation  
-followed closely by Kodi Anand Alloca-appreciate her help  
-continue zyprexa and prozac as well as prn prazosin and hydroxyzine  
   
GERD  
-famotidine 20mg bid  
   
Ambulate Daily NST  
   
MFM scan on 5/3/21-59%tile and Vertex  
-saw MFM on 5/20 8/8 BPP, vtx on US  
   
Anemia-hemoglobin at baseline; asymptomatic  
-known beta thal minor  
-s/p hematology consult-appreciate recs  
-iv iron today and x 5 days  
   
CHTN-stable normal bp's off meds  
   
DVT PPx-ambulation and mirtha hose  
     
Vitals: Temp 98.3, HR 71, /65, RR 18, 99% on RA Labs:  
5/29/2021 06:50 Sodium: 141 Potassium: 3.3 (L) Chloride: 110 (H)  
CO2: 24 Anion gap: 7 Glucose: 73 Calcium: 7.7 (L) Medications:   
0.9% sodium chloride 1,000 mL with mvi (adult no. 4 with vit K) 10 mL, thiamine 257 mg, folic acid 1 mg infusion IV q24 Vitamin B12 1,000mcg PO daily D5NS with KCl 20mEq/L 125mL/hr IV Colace 100mg PO daily Pepcid 20mg IV q12 Prozac 60mg PO daily Zyprexa 10mg PO every bedtime Zofran 8mg IV q6 PRN x1 Venofer 200mg IV daily Plan: regular diet with ensure enlive nutritional supplement, daily weight, fetal heart rate qshift, fetal nonstress test daily, up ad verito, BMP every other day  
  
  
  
  
  
  
  
Urinary Tract Infection (UTI) - Care Day 17 (5/28/2021) by Rafa Ramirez 
 
  
Review Entered Review Status 6/1/2021 11:37 Completed  
  
Criteria Review Care Day: 17 Care Date: 5/28/2021 Level of Care: Inpatient Floor Guideline Day 3 Clinical Status   
(X) * Hemodynamic stability 6/1/2021 11:37:24 EDT by Rafa Ramirez   
  VSS   
(X) * Mental status at baseline 6/1/2021 11:37:24 EDT by Rafa Ramirez   
  LOC alert   
( ) * Antibiotic regimen for next level of care established 6/1/2021 11:37:24 EDT by Rafa Ramirez   
  s/p I.D. consult-plan to continue vanc until 6/3/21   
(X) * Urine output adequate 6/1/2021 11:37:24 EDT by Rafa Rmairez   
  voiding   
(X) * Renal function at baseline or acceptable for next level of care 6/1/2021 11:37:24 EDT by Jeremy Chang   
  BUN: 2 (L) Creatinine: 0.25 (L)   
(X) * Pain absent or managed 6/1/2021 11:37:24 EDT by Jeremy Chang   
  Pain 0/10   
( ) * Oral intake adequate 6/1/2021 11:37:24 EDT by Ning Thompson Again discussed need for nutrition as pt input worse over the last few days than previously   
(X) * Afebrile or temperature acceptable for next level of care 6/1/2021 11:37:24 EDT by Jeremy Chang   
  Temp 98.5   
(X) * Vomiting absent 6/1/2021 11:37:24 EDT by Jeremy Chang   
  none noted ( ) * Discharge plans and education understood Activity   
(X) * Ambulatory or acceptable for next level of care 6/1/2021 11:37:24 EDT by Ning Thompson up ad verito Routes   
(X) * Oral hydration, medications, and diet 6/1/2021 11:37:24 EDT by Jeremy Chang   
  regular diet with ensure enlive nutritional supplement, PO meds Interventions (X) Renal function tests, urinalysis 6/1/2021 11:37:24 EDT by Jeremy Chang   
  BUN: 2 (L) Creatinine: 0.25 (L) BUN/Creatinine ratio: 8 (L) Medications (X) Antibiotics 6/1/2021 11:37:24 EDT by Jeremy Chang   
  Vancomycin 1,250mg IV q8   
* Milestone Additional Notes Date of care: 5/28/2021 IP- LOC- L&D Obgyn PN: Subjective: LOS: 16 days    
Estimated Date of Delivery: 7/4/21 Gestational Age Today: 35w7d Patient admitted for UTI s/p failed outpatient treatment due to chronic eating disorder. States she does have mild headache , normal fetal movement and right flank pain and occasional contractions and does not have chest pain, shortness of breath, vaginal bleeding  and vaginal leaking of fluid Patient without distress. Heart: Regular rate and rhythm Lung: clear to auscultation throughout lung fields, no wheezes, no rales, no rhonchi and normal respiratory effort Abdomen: soft, nontender, gravid Lower Extremities:  - Edema No     
Membranes:  Intact NST pending for today    
Assessment and Plan: Active Problems:  
  Pregnant (3/1/2018)  
  Eating disorder affecting pregnancy, antepartum (2020)  
  UTI in pregnancy, antepartum, third trimester (2021) 37 y/o [id number] at 34w5d weeks admitted for UTI s/p failed outpatient medical therapy due to chronic eating disorder Urine culture 21 positive for Klebsiella and EColi-s/p 4 days of ceftriaxone  
-Repeat urine cx on  positive for enterococcus-now on vanc since   
-s/p I.D. consult-plan to continue vanc until 6/3/21. Appreciate help  
-afebrile and normal WBC's   
-UA with budding yeast-s/p miconazole vaginal for suspected vaginal contamination of yeast  
-renal ultrasound   WNL  
   
Chronic eating disorder  
-ivf's  
-spoke with Dr Mauricio Carmona with [facility] partial hospitalization program here in [place]-pt does not qualify for this program as she requires high level of care that is available at [facility] inpatient program in [facility]. I spoke with pt re: this option as they will accept pregnant patients.   
-Again discussed need for nutrition as pt input worse over the last few days than previously. Pt unable to tolerate dobhoff by ENT. Advised pt today that MFM does not think TPN necessary now from fetal perspective as baby's growth is normal. Discussed use of TPN for her nutrition. Pt declines TPN.    
-Goodie bag daily -Hypokalemia-continue KCL in ivfs and add Kdur today.  
   
Cervical incompetence-cerclage in place  
-Threatened PTL  
-s/pBMTZ #1and #2 on  and   
-Plan to remove cerclage at 36 weeks or with significant contractions   
   
H/o prior  with successful , pt desires   
   
 Severe depression/anxiety with h/o suicidal ideation  
-followed closely by Irene Begum her help  
-continue zyprexa and prozac as well as prn prazosin and hydroxyzine  
   
GERD -famotidine 20mg bid  
   
Ambulate Daily NST  
   
MFM scan on 5/3/21-59%tile and Vertex  
-saw MFM on 5/20 8/8 BPP, vtx on US; next appt tomorrow  
   
Anemia-hemoglobin at baseline; asymptomatic  
-known beta thal minor  
-s/p hematology consult-appreciate recs  
-iv iron today and x 5 days  
   
CHTN-stable normal bp's off meds  
   
DVT PPx-ambulation and mirtha hose ID PN: Assessment: #1 UTI with some right flank CVA tenderness #2 intrauterine pregnancy #3 asthma #4 depression #5 history of candidemia during this current pregnancy s/p meant with amphotericin #6 hypokalemia Recommendations:  
Would treat her with vancomycin until Estela 3.    
Check cbc tomorrow Team available over the weekend if needed  
     
Vitals: Temp 98.5, HR 64, /67, RR 16, 99% on RA Labs:  
5/28/2021 03:06 Sodium: 138 Potassium: 3.2 (L) Chloride: 108 CO2: 24 Anion gap: 6 Glucose: 68 Calcium: 7.4 (L) Medications:   
0.9% sodium chloride 1,000 mL with mvi (adult no. 4 with vit K) 10 mL, thiamine 657 mg, folic acid 1 mg infusion IV q24 Vitamin B12 1,000mcg PO daily D5NS with KCl 20mEq/L 125mL/hr IV Pepcid 20mg IV q12 Prozac 60mg PO daily Zyprexa 10mg PO every bedtime Zofran 8mg IV q6 PRN x2 Venofer 200mg IV daily Plan: regular diet with ensure enlive nutritional supplement, daily weight, fetal heart rate qshift, fetal nonstress test daily, up ad verito, BMP every other day  
  
  
  
  
  
  
  
Urinary Tract Infection (UTI) - Care Day 16 (5/27/2021) by Sarah Esparza RN 
 
  
Review Entered Review Status 5/27/2021 13:20 Completed  
  
Criteria Review Care Day: 16 Care Date: 5/27/2021 Level of Care: Inpatient Floor Guideline Day 3 Level Of Care ( ) Floor to discharge 5/27/2021 13:20:24 EDT by Jose De Jesus Ny   
  Inpatient floor Clinical Status   
(X) * Hemodynamic stability 5/27/2021 13:20:24 EDT by Jose De Jesus Ny   
  VS: T: 98.4, B/P: 129/73, HR 67, RR 16, SPO2 100 RA   
(X) * Mental status at baseline 5/27/2021 13:20:24 EDT by Eve Squires no abnormalities noted ( ) * Antibiotic regimen for next level of care established   
(X) * Urine output adequate 5/27/2021 13:20:24 EDT by Theresa Méndez   
  voiding   
(X) * Renal function at baseline or acceptable for next level of care 5/27/2021 13:20:24 EDT by Eve Squires no labs today   
(X) * Pain absent or managed 5/27/2021 13:20:24 EDT by Eve Squires none noted ( ) * Oral intake adequate 5/27/2021 13:20:24 EDT by Eve Squires no documentation of intake   
(X) * Afebrile or temperature acceptable for next level of care 5/27/2021 13:20:24 EDT by Theresa Méndez   
  temp 98.4   
( ) * Vomiting absent 5/27/2021 13:20:25 EDT by Laurel Mccoy she does have moderate nausea/vomiting ( ) * Discharge plans and education understood Activity   
(X) * Ambulatory or acceptable for next level of care 5/27/2021 13:20:25 EDT by Eve Squires up ad verito Routes   
(X) * Oral hydration, medications, and diet 5/27/2021 13:20:25 EDT by Theresa Méndez   
  prozac 60mg po daily, zyprexa 10mg po qhs, potassium chloride 40mEq po Medications ( ) Antibiotics 5/27/2021 13:20:25 EDT by Eve Squires not administered today * Milestone Additional Notes 5/27/2021 LOS: 15 days    
Estimated Date of Delivery: 7/4/21 Gestational Age Today: 31w1d   
   
Patient admitted for UTI s/p failed outpatient therapy due to chronic eating disorder. States she does have moderate nausea/vomiting, normal fetal movement and just not feeling well this morning but can state why and does not have chest pain, headache , vaginal bleeding  and vaginal leaking of fluid . Patient states she doesn't want to eat. Physical Exam:  
Patient very tearful today. Heart: Regular rate and rhythm Lung: clear to auscultation throughout lung fields, no wheezes, no rales, no rhonchi and normal respiratory effort Abdomen: soft, nontender, gravid Lower Extremities:  - Edema No     
   
Membranes:  Intact  
   
Uterine Activity:  1 contraction on 20-30 min NST  
   
Fetal Heart Rate:  Reactive Baseline: 120s per minute Variability: moderate Accelerations: yes Decelerations: none    
  
Assessment and Plan:  
   
Active Problems:  
  Pregnant (3/1/2018)    
  Eating disorder affecting pregnancy, antepartum (2020)    
  UTI in pregnancy, antepartum, third trimester (2021)  
   
   
39 y/o [id number] at 34w4d weeks admitted for UTI s/p failed outpatient medical therapy due to chronic eating disorder  
   
Urine culture 21 positive for Klebsiella and EColi-s/p 4 days of ceftriaxone  
-Repeat urine cx on  positive for enterococcus-now on vanc since   
-s/p I.D. consult-plan to continue vanc until 6/3/21. Appreciate help  
-afebrile and normal WBC's   
-UA with budding yeast-miconazole vaginal for suspected vaginal contamination of yeast  
-renal ultrasound   WNL  
   
Chronic eating disorder  
-ivf's  
-spoke with Dr Shirley Cosme with [facility] partial hospitalization program here in [place]-pt does not qualify for this program as she requires high level of care that is available at [facility] in [facility]. I spoke with pt re: this option as they will accept pregnant patients.   
-Again discussed need for nutrition as pt input worse over the last few days than previously. Pt unable to tolerate dobhoff by ENT. Advised pt today that MFM does not think TPN necessary now from fetal perspective as baby's growth is normal. Discussed use of TPN for her nutrition. Pt declines TPN again today.    
-Goodie bag daily -Hypokalemia-continue KCL in ivfs and add Kdur today.  
   
Cervical incompetence-cerclage in place Threatened PTL BMTZ #1and #2 on  and  Procardia prn  
   
H/o prior  with successful , pt desires   
   
 Severe depression/anxiety with h/o suicidal ideation  
-followed closely by Derek Hurt her help  
-continue zyprexa and prozac as well as prn prazosin and hydroxyzine  
   
GERD  
-famotidine 20mg bid  
   
Ambulate Daily NST  
   
MFM scan on 5/3/21-59%tile and Vertex  
-saw MFM on  8/8 BPP, vtx on US; next appt tomorrow  
   
Anemia-hemoglobin at baseline; asymptomatic  
-known beta thal minor  
-iron panel today  
-iv iron today  
   
CHTN-stable normal bp's off meds  
   
DVT PPx-ambulation and mirtha hose  
   
Other meds 0.9% sodium chloride 1,000 mL with mvi (adult no. 4 with vit K) 10 mL, thiamine 062 mg, folic acid 1 mg infusion q 24hrs    
dextrose 5% - 0.9% NaCl with KCl 20 mEq/L infusion   @ 125ml/hr  
famotidine (PF) (PEPCID) 20 mg in 0.9% sodium chloride 10 mL injection   IV q 12hrs  
ondansetron (ZOFRAN) injection 8 mg  q6hrs prn x2 Other orders: /Hematology consult  
  
  
Urinary Tract Infection (UTI) - Care Day 15 (2021) by Bandar Man RN 
 
  
Review Entered Review Status 2021 13:05 Completed  
  
Criteria Review Care Day: 15 Care Date: 2021 Level of Care: Inpatient Floor Guideline Day 3 Level Of Care ( ) Floor to discharge 2021 13:05:05 EDT by Emperatriz Ramos inpatient floor Clinical Status   
(X) * Hemodynamic stability 2021 13:05:05 EDT by Evelina Burgos   
  VS: T: 98.3, B/P: 132/79, HR 58, RR 16, SPO2 96 RA   
(X) * Mental status at baseline 2021 13:05:05 EDT by Emperatriz Ramos no abnormalities noted ( ) * Antibiotic regimen for next level of care established 2021 13:05:05 EDT by Evelina Burgos   
  on IV vanc   
(X) * Urine output adequate 2021 13:05:05 EDT by Emperatriz Ramos patient is voiding   
(X) * Renal function at baseline or acceptable for next level of care 2021 13:05:05 EDT by Evelina Burgos   
  BUN 3, creat 0.37 (X) * Pain absent or managed ( ) * Oral intake adequate 5/27/2021 13:05:05 EDT by Speedy Lopez   
  Patient complains of moderate nausea/vomiting Intake 0% documented today   
(X) * Afebrile or temperature acceptable for next level of care 5/27/2021 13:05:05 EDT by Speedy Lopez   
  temp 98.3   
( ) * Vomiting absent 5/27/2021 13:05:05 EDT by Sky Neville c/o mod nausea/vomiting ( ) * Discharge plans and education understood Activity   
(X) * Ambulatory or acceptable for next level of care Routes   
(X) * Oral hydration, medications, and diet 5/27/2021 13:05:05 EDT by Speedy Lopez   
  prozac 60mg po daily, procardia 10mg po q 6hrs prn x1, zyprexa 10mg po qhs Interventions (X) Renal function tests, urinalysis 5/27/2021 13:05:05 EDT by Speedy Lopez   
  Na 136K+ 3.0, cl 108, CO2 23, gluc 96, BUN 3, creat 0.37, ca 7.7, iron 27, Iron %sat 7 Medications (X) Antibiotics * Milestone Additional Notes 5/26/2021 OB/GYN Reason for Admission:  Pregnancy and UTI s/p failed outpatient therapy due to chronic eating disorder  
   
History of Present Illness: Ms. Sylvia Severin is a 38 y.o.  female with an estimated gestational age of 26w3d with Estimated Date of Delivery: 7/4/21. Patient complains of moderate nausea/vomiting and normal fetal movement Patient denies chest pain, headache , shortness of breath, vaginal bleeding  and vaginal leaking of fluid . Physical Exam:  
Patient without distress. Heart: Regular rate and rhythm Lung: clear to auscultation throughout lung fields, no wheezes, no rales, no rhonchi and normal respiratory effort Abdomen: soft, nontender, gravid Lower Extremities:  - Edema No     
Membranes:  Intact Uterine Activity:  None Fetal Heart Rate:  Reactive Baseline: 130s per minute Variability: moderate Accelerations: yes Decelerations: none     
   
Assessment and Plan: Active Problems:  
  Pregnant (3/1/2018)    
  Eating disorder affecting pregnancy, antepartum (2020)    
  UTI in pregnancy, antepartum, third trimester (2021)  
   
   
39 y/o [id number] at 34w3d weeks admitted for UTI s/p failed outpatient medical therapy due to chronic eating disorder  
   
Urine culture 21 positive for Klebsiella and EColi-s/p 4 days of ceftriaxone  
-Repeat urine cx on  positive for enterococcus-now on vanc since   
-s/p I.D. consult-plan to continue vanc until 6/3/21. Appreciate help  
-afebrile and normal WBC's   
-UA with budding yeast-miconazole vaginal for suspected vaginal contamination of yeast  
-renal ultrasound   WNL  
   
Chronic eating disorder  
-ivf's  
-spoke with Dr Daniel Lay with [facility] partial hospitalization program here in [place]-pt does not qualify for this program as she requires high level of care that is available at [facility] inpatient program in [facility]. I spoke with pt re: this option as they will accept pregnant patients.   
-Again discussed need for nutrition as pt still not getting much of the Ensure in. Pt unable to tolerate dobhoff by ENT. Advised pt today that MFM does not think TPN necessary now from fetal perspective as baby's growth is normal. Discussed use of TPN for her nutrition. Pt declines TPN at this time.    
-Goodie bag daily -Hypokalemia-labs pending today  
   
Cervical incompetence-cerclage in place Threatened PTL BMTZ #1and #2 on  and  Procardia prn  
   
H/o prior  with successful , pt desires   
   
 Severe depression/anxiety with h/o suicidal ideation  
-followed closely by Radha Pulido her help  
-continue zyprexa and prozac as well as prn prazosin and hydroxyzine  
   
GERD  
-famotidine 20mg bid  
   
Ambulate Daily NST  
   
MFM scan on 5/3/21-59%tile and Vertex  
-saw MFM on  8/8 BPP, vtx on US; next appt tomorrow  
   
Anemia-hemoglobin at baseline; asymptomatic -known beta thal minor  
-iron panel today  
-consider Iv iron therapy pending results  
   
CHTN-stable normal bp's off meds  
   
DVT PPx-ambulation and mirtha hose INFECTIOUS DISEASE Afebrile. No dyspnea, abdominal pain, headache or sore throat  
   
ROS: No anaphylaxis, seizures, syncope, hematemesis, hematochezia   
   
Assessment: #1 UTI with some right flank CVA tenderness  
   
#2 intrauterine pregnancy  
   
#3 asthma  
   
#4 depression  
   
#5 history of candidemia during this current pregnancy s/p meant with amphotericin  
   
#6 hypokalemia   
    
Recommendations: The patient tells me that she cannot take any oral medication as she cannot keep the pills down.  Would treat her with vancomycin until Estela 3.  With her hyperemesis and penicillin allergy, it will be difficult to put her on suppressive regimen.  I do not plan to keep her on vancomycin as a means of prophylaxis  
   
Check bmp today Other meds 0.9% sodium chloride 1,000 mL with mvi (adult no. 4 with vit K) 10 mL, thiamine 957 mg, folic acid 1 mg infusion q 24hrs    
dextrose 5% - 0.9% NaCl with KCl 20 mEq/L infusion   @ 125ml/hr  
famotidine (PF) (PEPCID) 20 mg in 0.9% sodium chloride 10 mL injection   IV q 12hrs  
ondansetron (ZOFRAN) injection 8 mg  q6hrs prn x3  
vancomycin (VANCOCIN) 1250 mg in  ml infusion   IV q 8hrs  
  
  
  
Urinary Tract Infection (UTI) - Care Day 14 (5/25/2021) by Brandyn Meehan 
 
  
Review Entered Review Status 5/25/2021 16:28 Completed  
  
Criteria Review Care Day: 14 Care Date: 5/25/2021 Level of Care: Inpatient Floor Guideline Day 3 Clinical Status   
(X) * Hemodynamic stability 5/25/2021 16:28:09 EDT by Brandyn Meehan   
  VSS   
(X) * Mental status at baseline 5/25/2021 16:28:09 EDT by Brandyn Meehan   
  LOC alert   
( ) * Antibiotic regimen for next level of care established 5/25/2021 16:28:09 EDT by Brandyn Meehan   
  plan to continue vanc until 6/3/21. (X) * Urine output adequate 5/25/2021 16:28:09 EDT by Chacorta Branch   
  voiding   
(X) * Renal function at baseline or acceptable for next level of care 5/25/2021 16:28:09 EDT by Chacorta Branch   
  BUN: 3 (L) Creatinine: 0.42 (L)   
(X) * Pain absent or managed 5/25/2021 16:28:09 EDT by Chacorta Branch   
  Pain 0/10   
( ) * Oral intake adequate 5/25/2021 16:28:09 EDT by Elvis Rodney Again discussed need for nutrition as pt still not getting much of the Ensure in. Pt unable to tolerate dobhoff by ENT   
(X) * Afebrile or temperature acceptable for next level of care 5/25/2021 16:28:09 EDT by Felipa Díaz 98.2 (X) * Vomiting absent 5/25/2021 16:28:09 EDT by Chacorta Branch   
  Zofran 8mg IV q6 PRN x2 Phenergan 25mg rectal q6 PRN x1   
( ) * Discharge plans and education understood Activity   
(X) * Ambulatory or acceptable for next level of care 5/25/2021 16:28:09 EDT by Elvis Rodney up ad verito Routes   
(X) * Oral hydration, medications, and diet 5/25/2021 16:28:09 EDT by Chacorta Branch   
  regular diet with ensure enlive nutritional supplements, tube feedings, PO meds Interventions (X) Renal function tests, urinalysis 5/25/2021 16:28:09 EDT by Chacorta Branch   
  BUN: 3 (L) Creatinine: 0.42 (L) Medications (X) Antibiotics 5/25/2021 16:28:09 EDT by Chacorta Branch   
  Vancomycin 1,250mg IV q8   
* Milestone Additional Notes Date of care: 5/25/2021 IP- LOC- L&D Obgyn PN: Subjective: LOS: 13 days    
Estimated Date of Delivery: 7/4/21 Gestational Age Today: 35w2d Patient admitted for UTI s/p failed outpatient management due to chronic eating disorder. States she does have moderate nausea/vomiting and normal fetal movement and does not have chest pain, contractions, shortness of breath, vaginal bleeding  and vaginal leaking of fluid Physical Exam:  
Patient without distress. Heart: Regular rate and rhythm Lung: clear to auscultation throughout lung fields, no wheezes, no rales, no rhonchi and normal respiratory effort Abdomen: soft, nontender, gravid Lower Extremities:  - Edema No     
Membranes:  Intact Uterine Activity:  None Fetal Heart Rate:  Reactive Baseline: 120s per minute Variability: moderate Accelerations: yes Decelerations: none    
Assessment and Plan: Active Problems:  
  Pregnant (3/1/2018)  
  Eating disorder affecting pregnancy, antepartum (12/2/2020)  
  UTI in pregnancy, antepartum, third trimester (5/12/2021) 37 y/o [id number] at 34w2d weeks admitted for UTI s/p failed outpatient medical therapy due to chronic eating disorder  
   
Urine culture 5/5/21 positive for Klebsiella and EColi-s/p 4 days of ceftriaxone  
-Repeat urine cx on 5/19 positive for enterococcus-now on vanc since 5/21  
-s/p I.D. consult-plan to continue vanc until 6/3/21.  
-afebrile and normal WBC's   
-UA with budding yeast-miconazole vaginal for suspected vaginal contamination of yeast  
-renal ultrasound  5/21 WNL  
   
Chronic eating disorder  
-ivf's  
-spoke with Dr Krista Andrea with [facility] partial hospitalization program here in [place]-pt does not qualify for this program as she requires high level of care that is available at [facility] inpatient program in [facility]. I spoke with pt re: this option as they will accept pregnant patients.   
-Again discussed need for nutrition as pt still not getting much of the Ensure in. Pt unable to tolerate dobhoff by ENT. Advised pt today that MFM does not think TPN necessary now from fetal perspective as baby's growth is normal. Discussed use of TPN for her nutrition.  Pt considering the option but not ready to start.  Pt aware that if PICC and TPN is started, she would need to stay inpatient to receive this nutrition given previous h/o line infection with this.  -s/p medicine consult  
-Goodie bag daily -Hypokalemia-add KCL back into ivfs. K lower today despite KCL in ivf. Will give oral as well.    
   
Cervical incompetence-cerclage in place Threatened PTL BMTZ #1and #2 on  and  Procardia prn  
   
H/o prior  with successful , pt desires   
   
 Severe depression/anxiety with h/o suicidal ideation  
-followed closely by Irene Begum her help  
-continue zyprexa and prozac as well as prn prazosin and hydroxyzine  
   
GERD  
-famotidine 20mg bid  
   
Ambulate Daily NST  
   
MFM scan on 5/3/21-59%tile and Vertex  
-saw MFM on  8/8 BPP, vtx on US  
   
Anemia-hemoglobin at baseline; asymptomatic  
-known beta thal minor  
-consider Iv iron therapy if drops  
   
CHTN-stable normal bp's off meds  
   
DVT PPx-ambulation and mirtha hose Vitals:  Temp 98.2, HR 57, /69, RR 16, 95% on RA  
   
Labs:  
2021 05:09 WBC: 5.2 NRBC: 0.0  
RBC: 3.49 (L) HGB: 8.0 (L) HCT: 25.5 (L) MCV: 73.1 (L) MCH: 22.9 (L) MCHC: 31.4  
RDW: 15.3 (H) PLATELET: 045 MPV: 12.4 ABSOLUTE NRBC: 0.00 Sodium: 140 Potassium: 2.8 (L) Chloride: 111 (H)  
CO2: 22 Anion gap: 7 Glucose: 84 BUN: 3 (L) Creatinine: 0.42 (L) BUN/Creatinine ratio: 7 (L) Calcium: 7.7 (L) Bilirubin, total: 0.3 Protein, total: 5.8 (L) Albumin: 2.0 (L) Globulin: 3.8 A-G Ratio: 0.5 (L) ALT: 38 AST: 31 Alk. phosphatase: 151 (H)  
   
Medications:   
0.9% sodium chloride 1,000 mL with mvi (adult no. 4 with vit K) 10 mL, thiamine 706 mg, folic acid 1 mg infusion IV q24 D5NS with KCl 20mEq/L 125mL/hr IV Pepcid 20mg IV q12 Prozac 60mg PO daily Nifedipine 10mg PO q6 PRN x1 Zyprexa 10mg PO every bedtime Zofran 8mg IV q6 PRN x2 Phenergan 25mg rectal q6 PRN x1 Potassium chloride SR 40mEq PO x1 Plan: regular diet with ensure enlive nutritional supplements, tube feedings, daily weights, fetal heart rate qshift, up ad verito

## 2021-06-02 NOTE — PROGRESS NOTES
High Risk Obstetrics Progress Note    Name: Chilo Morales MRN: [id number]  SSN: [id number]    YOB: 1985  Age: 38 y.o. Sex: female      Subjective:      LOS: 21 days    Estimated Date of Delivery: 21   Gestational Age Today: 30w2d     Patient admitted for UTI s/p failed outpatient treatment due to chronic eating disorder. Continues to have moderate nausea/vomiting. Per nurse only took in about 30ml of ensure yesterday. Pt reports normal FM. Still having back pain but abdominal binder has helped. States she doesn't have new complaints. Objective:     Vitals:  Blood pressure 119/77, pulse 78, temperature 98.2 °F (36.8 °C), resp. rate 16, height 5' 4\" (1.626 m), weight 76.8 kg (169 lb 6.4 oz), last menstrual period 2020, SpO2 98 %, not currently breastfeeding. Temp (24hrs), Av.1 °F (36.7 °C), Min:97.6 °F (36.4 °C), Max:98.3 °F (47.5 °C)    Systolic (06YDX), ERC:846 , Min:98 , ZYW:503      Diastolic (94SKX), MBP:21, Min:53, Max:77       Intake and Output:     Date 21 0700 - 21 0659   Shift 8061-2633 7278-7720 6863-9947 24 Hour Total   INTAKE   P.O. 100   100   Shift Total(mL/kg) 100(1.3)   100(1.3)   OUTPUT   Shift Total(mL/kg)       Weight (kg) 76.8 76.8 76.8 76.8       Physical Exam:  Patient without distress.   Back: costovertebral angle tenderness absent; tender paraspinal muscles and lower abdominal muscles  Abdomen: soft, nontender gravid  Lower Extremities:  - Edema No       Membranes:  Intact    Uterine Activity:  None    Fetal Heart Rate:  120s mod +accels no decels        Labs:   Recent Results (from the past 36 hour(s))   CULTURE, URINE    Collection Time: 21  1:36 PM    Specimen: Clean catch; Urine    URINE   Result Value Ref Range    Special Requests: NO SPECIAL REQUESTS      [place] Count >100,000  COLONIES/mL        Culture result: GRAM NEGATIVE RODS (A)      Culture result: CHECKING FOR POSSIBLE 2ND GRAM NEGATIVE ROBERTO (A)     METABOLIC PANEL, BASIC    Collection Time: 06/02/21  7:37 AM   Result Value Ref Range    Sodium 140 136 - 145 mmol/L    Potassium 3.8 3.5 - 5.1 mmol/L    Chloride 108 97 - 108 mmol/L    CO2 25 21 - 32 mmol/L    Anion gap 7 5 - 15 mmol/L    Glucose 67 65 - 100 mg/dL    BUN 2 (L) 6 - 20 MG/DL    Creatinine 0.41 (L) 0.55 - 1.02 MG/DL    BUN/Creatinine ratio 5 (L) 12 - 20      GFR est AA >60 >60 ml/min/1.73m2    GFR est non-AA >60 >60 ml/min/1.73m2    Calcium 7.9 (L) 8.5 - 10.1 MG/DL       Assessment and Plan: Active Problems:    Pregnant (3/1/2018)      Eating disorder affecting pregnancy, antepartum (12/2/2020)      UTI in pregnancy, antepartum, third trimester (5/12/2021)       Pt is a 37 y/o [id number] at 35w3d weeks admitted for UTI s/p failed outpatient medical therapy due to chronic eating disorder.     Urine culture 5/5/21 positive for Klebsiella and EColi-s/p 4 days of ceftriaxone  -Repeat urine cx on 5/19 positive for enterococcus-now on vanc since 5/21  -s/p I.D. consult-plan to continue vanc until 6/3/21. Appreciate care and management plan  -afebrile and normal WBC's   -UA with budding yeast-s/p miconazole vaginal for suspected vaginal contamination of yeast  -renal ultrasound  5/21 WNL  - 5/31: new complaints of urinary discomfort - UA positive-urine culture sent today     Chronic eating disorder  -IV fluids, PICC line in place  -spoke with Dr Morris Martinez with [facility] here in [place]-pt does not qualify for this program as she requires high level of care that is available at [facility] inpatient program in [facility]. I spoke with pt re: this option as they will accept pregnant patients.   -Again discussed need for nutrition as pt input worse over the last few days than previously. Pt unable to tolerate dobhoff by ENT. Pt advised by Dr. Mar Cortez on 5/28 that MFM does not think TPN necessary now from fetal perspective as baby's growth is normal. Discussed use of TPN for her nutrition. Pt declines TPN.     -Goodie bag daily  -Hypokalemia-continue KCL in ivfs and add Kdur 21.  Potassium 3.3 on 21.  -BMP ordered for 21-normal range     Cervical incompetence-cerclage in place  -Threatened PTL  -s/pBMTZ #1and #2 on  and   -Plan to remove cerclage at 36 weeks or with significant contractions      H/o prior  with successful , pt desires   -GBS pending.      Severe depression/anxiety with h/o suicidal ideation  -followed closely by Vickie Morales her help  -continue zyprexa and prozac as well as prn prazosin and hydroxyzine     GERD  -famotidine 20mg bid     Ambulate  Daily NST     MFM scan on 5/3/21-59%tile and Vertex  -saw MFM on  8/8 BPP, vtx on US     Anemia-hemoglobin at baseline; asymptomatic  -known beta thal minor  -s/p hematology consult-appreciate recs  -iv iron today and x 5 days     CHTN-stable normal bp's off meds     DVT PPx-ambulation and mirtha hose     Back pain-s/p PT consult and pt was given a support belt which is helping

## 2021-06-03 LAB
BASOPHILS # BLD: 0 K/UL (ref 0–0.1)
BASOPHILS NFR BLD: 0 % (ref 0–1)
COMMENT, HOLDF: NORMAL
DIFFERENTIAL METHOD BLD: ABNORMAL
EOSINOPHIL # BLD: 0.1 K/UL (ref 0–0.4)
EOSINOPHIL NFR BLD: 1 % (ref 0–7)
ERYTHROCYTE [DISTWIDTH] IN BLOOD BY AUTOMATED COUNT: 16.8 % (ref 11.5–14.5)
HCT VFR BLD AUTO: 27.7 % (ref 35–47)
HGB BLD-MCNC: 8.5 G/DL (ref 11.5–16)
IMM GRANULOCYTES # BLD AUTO: 0 K/UL (ref 0–0.04)
IMM GRANULOCYTES NFR BLD AUTO: 1 % (ref 0–0.5)
LYMPHOCYTES # BLD: 2.2 K/UL (ref 0.8–3.5)
LYMPHOCYTES NFR BLD: 29 % (ref 12–49)
MCH RBC QN AUTO: 22.9 PG (ref 26–34)
MCHC RBC AUTO-ENTMCNC: 30.7 G/DL (ref 30–36.5)
MCV RBC AUTO: 74.7 FL (ref 80–99)
MONOCYTES # BLD: 0.8 K/UL (ref 0–1)
MONOCYTES NFR BLD: 11 % (ref 5–13)
NEUTS SEG # BLD: 4.3 K/UL (ref 1.8–8)
NEUTS SEG NFR BLD: 58 % (ref 32–75)
NRBC # BLD: 0 K/UL (ref 0–0.01)
NRBC BLD-RTO: 0 PER 100 WBC
PLATELET # BLD AUTO: 173 K/UL (ref 150–400)
PMV BLD AUTO: 11.4 FL (ref 8.9–12.9)
RBC # BLD AUTO: 3.71 M/UL (ref 3.8–5.2)
SAMPLES BEING HELD,HOLD: NORMAL
WBC # BLD AUTO: 7.4 K/UL (ref 3.6–11)

## 2021-06-03 PROCEDURE — 36415 COLL VENOUS BLD VENIPUNCTURE: CPT

## 2021-06-03 PROCEDURE — 74011250636 HC RX REV CODE- 250/636: Performed by: OBSTETRICS & GYNECOLOGY

## 2021-06-03 PROCEDURE — 76816 OB US FOLLOW-UP PER FETUS: CPT | Performed by: OBSTETRICS & GYNECOLOGY

## 2021-06-03 PROCEDURE — 74011000250 HC RX REV CODE- 250: Performed by: OBSTETRICS & GYNECOLOGY

## 2021-06-03 PROCEDURE — 74011250636 HC RX REV CODE- 250/636: Performed by: INTERNAL MEDICINE

## 2021-06-03 PROCEDURE — 74011250637 HC RX REV CODE- 250/637: Performed by: OBSTETRICS & GYNECOLOGY

## 2021-06-03 PROCEDURE — 76819 FETAL BIOPHYS PROFIL W/O NST: CPT | Performed by: OBSTETRICS & GYNECOLOGY

## 2021-06-03 PROCEDURE — 74011250637 HC RX REV CODE- 250/637: Performed by: INTERNAL MEDICINE

## 2021-06-03 PROCEDURE — 74011000258 HC RX REV CODE- 258: Performed by: INTERNAL MEDICINE

## 2021-06-03 PROCEDURE — 65410000002 HC RM PRIVATE OB

## 2021-06-03 PROCEDURE — 85025 COMPLETE CBC W/AUTO DIFF WBC: CPT

## 2021-06-03 RX ORDER — FAMOTIDINE 10 MG/ML
20 INJECTION INTRAVENOUS EVERY 12 HOURS
Status: DISCONTINUED | OUTPATIENT
Start: 2021-06-03 | End: 2021-06-03 | Stop reason: SDUPTHER

## 2021-06-03 RX ADMIN — FAMOTIDINE 20 MG: 10 INJECTION INTRAVENOUS at 11:24

## 2021-06-03 RX ADMIN — ONDANSETRON 8 MG: 2 INJECTION INTRAMUSCULAR; INTRAVENOUS at 06:55

## 2021-06-03 RX ADMIN — POTASSIUM CHLORIDE, DEXTROSE MONOHYDRATE AND SODIUM CHLORIDE 125 ML/HR: 150; 5; 900 INJECTION, SOLUTION INTRAVENOUS at 22:59

## 2021-06-03 RX ADMIN — OLANZAPINE 10 MG: 5 TABLET, FILM COATED ORAL at 21:41

## 2021-06-03 RX ADMIN — FOLIC ACID: 5 INJECTION, SOLUTION INTRAMUSCULAR; INTRAVENOUS; SUBCUTANEOUS at 14:14

## 2021-06-03 RX ADMIN — POLYETHYLENE GLYCOL 3350 17 G: 17 POWDER, FOR SOLUTION ORAL at 11:32

## 2021-06-03 RX ADMIN — ONDANSETRON 8 MG: 2 INJECTION INTRAMUSCULAR; INTRAVENOUS at 14:14

## 2021-06-03 RX ADMIN — FLUOXETINE 60 MG: 20 CAPSULE ORAL at 11:25

## 2021-06-03 RX ADMIN — FAMOTIDINE 20 MG: 10 INJECTION INTRAVENOUS at 21:42

## 2021-06-03 RX ADMIN — DOCUSATE SODIUM 100 MG: 100 CAPSULE, LIQUID FILLED ORAL at 11:25

## 2021-06-03 RX ADMIN — CYANOCOBALAMIN TAB 500 MCG 1000 MCG: 500 TAB at 11:24

## 2021-06-03 RX ADMIN — CEFTRIAXONE SODIUM 1 G: 1 INJECTION, POWDER, FOR SOLUTION INTRAMUSCULAR; INTRAVENOUS at 14:23

## 2021-06-03 NOTE — PROGRESS NOTES
1030: Bedside and Verbal shift change report given to Be Billy RN (oncoming nurse) by Sandee Garcia RN (offgoing nurse). Report included the following information SBAR, MAR and Recent Results.

## 2021-06-03 NOTE — PROGRESS NOTES
2000: Bedside and Verbal shift change report given to PER (oncoming nurse) by Isak Angeles (offgoing nurse). Report included the following information SBAR, Kardex, Intake/Output, MAR, Accordion and Recent Results.

## 2021-06-03 NOTE — PROGRESS NOTES
Indication: AMA Multigravida 3rd Tri O09.523.   ____________________________________________________________________________  History: Age: 38 years. Maternal age at [facility]: 38 years. : 14 Para: 7. Current Pregnancy: Pre- pregnancy data: Weight 138 lbs. Height 5 ft 5 ins. BMI 23.0. Obstetric History: Mode of last delivery:  Section x 1 with  x 1.  ____________________________________________________________________________  Dating:  Stated EDC:  EDC: 21 GA by stated EDC: 35w4d  Current Scan on: 21 EDC: 21 GA by current scan: 35w3d  Best Overall Assessment: 21 EDC: 21 Assessed GA: 35w4d  The calculation of the gestational age by current scan was based on BPD, OFD, HC, AC, FL and HUM. The Best Overall Assessment is based on the stated EDC.  ____________________________________________________________________________  General Evaluation:  Fetal heart activity: present. Fetal heart rate: 141 bpm.   Presentation: cephalic. Fetal movement: visible. Amniotic Fluid: normal. ANDREE  16.5 cm. Maximal vertical pocket 5.2 cm. Cord: 3 vessels. Fetal cord insertion site: Normal.   Placenta: anterior. ____________________________________________________________________________  Anatomy Scan:  Mann gestation. Biometry:  BPD 90.7 mm 85th% 36w5d (35w5d to 37w6d)  .5 mm 31st% 36w1d (33w3d to 38w6d)  .2 mm 46th% 35w1d (34w1d to 36w1d)  FL 66.7 mm 15th% 34w2d (31w3d to 37w2d)  .3 mm 34th% 34w2d  HUM 60.4 mm 54th% 35w5d  EFW (lbs/oz) 5 lbs 12 ozs  EFW (g) 2618 g  39th%       Fetal Anatomy:  Visualized with normal appearance: head, face, abdominal wall, gastrointestinal tract, kidneys, bladder. Heart: The 4-chamber view was obtained but outflow tracts could not be adequately visualized. Summary of Ultrasound Findings:  Transabdominal US. U/S machine: Kublaxuson E8 Expert. Impression:  The fetal anatomy survey appears normal and fetal growth is appropriate.    ____________________________________________________________________________  Fetal Wellbeing Assessment:  Amniotic fluid: normal. ANDREE: 16.5 cm. MVP: 5.2 cm. Q1: 3.0 cm. Q2: 4.6 cm. Q3: 3.7 cm. Q4: 5.2 cm. Biophysical Profile: Fetal body movements: normal (2), Fetal tone: normal (2), Fetal breathing movements: normal (2), Amniotic fluid volume: normal (2). Score 8 / 8. Summary: Reassuring fetal status. ____________________________________________________________________________  Report Summary:  Impression: Ms. Mateo Simon is a 42yo  at 35w4 admitted for hyperemesis in the setting of anxiety, depression, PTSD, and eating disorder. She  has a cerclage in place. She currently has a PICC line in place (cannot get any other IV access) and is receiving multivitamin IV fluid daily. Today we see a cephalic SLIUP with AGA growth. EFW is at 39th centile and [facility] is at 49th centile. Fluid is normal and BPP is 8/8. Reassuring fetal surveillance. Recommendations: Please remove cerclage at 36w0. We will repeat BPP in 1 week if undelivered. Delivery around 37w0 is reasonable considering extreme intractable hyperemesis and other concurrent medical diagnoses.

## 2021-06-03 NOTE — PROGRESS NOTES
ID Progress Note  6/3/2021    Subjective:     Afebrile. No dyspnea, abdominal pain, headache or sore throat. Has some nausea and vomiting. ROS: No anaphylaxis, seizures, syncope, hematemesis, hematochezia     Objective:     Vitals:   Visit Vitals  /60 (BP 1 Location: Right upper arm, BP Patient Position: At rest)   Pulse 68   Temp 98 °F (36.7 °C)   Resp 16   Ht 5' 4\" (1.626 m)   Wt 75.5 kg (166 lb 6.4 oz)   LMP 2020 (Exact Date)   SpO2 98%   Breastfeeding No   BMI 28.56 kg/m²        Tmax:  Temp (24hrs), Av.1 °F (36.7 °C), Min:97.8 °F (36.6 °C), Max:98.2 °F (36.8 °C)      Exam:    Not in distress  Pink conjunctivae, anicteric sclerae  No cervical lymphdenopathy   Lung clear, no rales, wheezes or rhonchi   Heart: s1, s2, RRR, no murmurs rubs or clicks  Abdomen: soft nontender, no guarding or rebound  Knees not warm or tender  Speech fluent     Labs:   Lab Results   Component Value Date/Time    WBC 7.4 2021 07:07 AM    Hemoglobin (POC) 10.4 (L) 2020 07:12 AM    HGB 8.5 (L) 2021 07:07 AM    HCT 27.7 (L) 2021 07:07 AM    PLATELET 381  07:07 AM    MCV 74.7 (L) 2021 07:07 AM    Hgb, External 33.7 2012 12:00 AM    Hct, External 69 2012 12:00 AM    Platelet cnt., External 307 2012 12:00 AM     Lab Results   Component Value Date/Time    Sodium 140 2021 07:37 AM    Potassium 3.8 2021 07:37 AM    Chloride 108 2021 07:37 AM    CO2 25 2021 07:37 AM    Anion gap 7 2021 07:37 AM    Glucose 67 2021 07:37 AM    BUN 2 (L) 2021 07:37 AM    Creatinine 0.41 (L) 2021 07:37 AM    BUN/Creatinine ratio 5 (L) 2021 07:37 AM    GFR est AA >60 2021 07:37 AM    GFR est non-AA >60 2021 07:37 AM    Calcium 7.9 (L) 2021 07:37 AM    Bilirubin, total 0.3 2021 05:09 AM    Alk.  phosphatase 151 (H) 2021 05:09 AM    Protein, total 5.8 (L) 2021 05:09 AM    Albumin 2.0 (L) 2021 05:09 AM Globulin 3.8 05/25/2021 05:09 AM    A-G Ratio 0.5 (L) 05/25/2021 05:09 AM    ALT (SGPT) 38 05/25/2021 05:09 AM             Assessment:     #1 UTI with some right flank CVA tenderness     #2 intrauterine pregnancy     #3 asthma     #4 depression     #5 history of candidemia during this current pregnancy s/p meant with amphotericin     #6 hypokalemia          Recommendations:      Repeat cx with gram negatives. I changed her to ceftriaxone yesterday. Ff up urine cx. If we need to recheck her urine again, would do straight cath. Of note her UA on may 31  had normal WBCs.        Lucas Lau MD

## 2021-06-03 NOTE — PROGRESS NOTES
High Risk Obstetrics Progress Note    Name: Syed Mckoy MRN: [id number]  SSN: [id number]    YOB: 1985  Age: 38 y.o. Sex: female      Subjective:      LOS: 22 days    Estimated Date of Delivery: 21   Gestational Age Today: 29w2d     Patient admitted for UTI s/p failed outpatient management due to chronic eating disorder. Pt s/p ceftriaxone for initial UTI-then vancomycin course due to new UTI and now back on ceftriaxone due to another UTI. . States she does have mild contractions, moderate headache , moderate nausea/vomiting and normal fetal movement and does not have chest pain, shortness of breath, vaginal bleeding  and vaginal leaking of fluid . Objective:     Vitals:  Blood pressure (!) 140/80, pulse 63, temperature 97.8 °F (36.6 °C), resp. rate 15, height 5' 4\" (1.626 m), weight 75.5 kg (166 lb 6.4 oz), last menstrual period 2020, SpO2 100 %, not currently breastfeeding. Temp (24hrs), Av.1 °F (36.7 °C), Min:97.8 °F (36.6 °C), Max:98.2 °F (39.0 °C)    Systolic (64TGL), DWL:037 , Min:113 , IBY:765      Diastolic (29LTF), DWY:20, Min:62, Max:80       Intake and Output:         Physical Exam:  Patient without distress.   Heart: Regular rate and rhythm  Lung: clear to auscultation throughout lung fields, no wheezes, no rales, no rhonchi and normal respiratory effort  Abdomen: soft, nontender, gravid  Lower Extremities:  - Edema No       Membranes:  Intact    Uterine Activity:  1 contraction on monitoring today    Fetal Heart Rate:  Reactive  Baseline: 120s per minute  Variability: moderate  Accelerations: yes  Decelerations: none        Labs:   Recent Results (from the past 36 hour(s))   CULTURE, GENITAL GROUP B STREP    Collection Time: 21 10:01 PM    Specimen: VAGINAL/RECTAL   Result Value Ref Range    Special Requests: NO SPECIAL REQUESTS      Culture result: NO GROUP B BETA STREPTOCOCCUS ISOLATED     METABOLIC PANEL, BASIC    Collection Time: 21  7:37 AM Result Value Ref Range    Sodium 140 136 - 145 mmol/L    Potassium 3.8 3.5 - 5.1 mmol/L    Chloride 108 97 - 108 mmol/L    CO2 25 21 - 32 mmol/L    Anion gap 7 5 - 15 mmol/L    Glucose 67 65 - 100 mg/dL    BUN 2 (L) 6 - 20 MG/DL    Creatinine 0.41 (L) 0.55 - 1.02 MG/DL    BUN/Creatinine ratio 5 (L) 12 - 20      GFR est AA >60 >60 ml/min/1.73m2    GFR est non-AA >60 >60 ml/min/1.73m2    Calcium 7.9 (L) 8.5 - 10.1 MG/DL   SAMPLES BEING HELD    Collection Time: 21  7:07 AM   Result Value Ref Range    SAMPLES BEING HELD 1LAV     COMMENT        Add-on orders for these samples will be processed based on acceptable specimen integrity and analyte stability, which may vary by analyte. Assessment and Plan:       Active Problems:    Pregnant (3/1/2018)      Eating disorder affecting pregnancy, antepartum (2020)      UTI in pregnancy, antepartum, third trimester (2021)          Pt is a 35 y/o [id number] at 35w4d weeks admitted for UTI s/p failed outpatient medical therapy due to chronic eating disorder.     Urine culture 21 positive for Klebsiella and EColi-s/p 4 days of ceftriaxone  -Repeat urine cx on  positive for enterococcus-now s/p vanc -  - Appreciate care and management plan  -afebrile and normal WBC's   -renal ultrasound   WNL  - : Gram negative rods-sensitivites pending-restarted ceftriaxone .     Chronic eating disorder  -IV fluids, PICC line in place  -spoke with Dr Daisy Morrissey with [facility] partial hospitalization program here in [place]-pt does not qualify for this program as she requires high level of care that is available at [facility] inpatient program in [facility]. I spoke with pt re: this option but pt unsure about this option as she doesn't have a plan for care of her  if she enters this program  -Again discussed need for nutrition as pt input worse over the last few days than previously and losing weight daily. Pt unable to tolerate dobhoff by ENT. Pt advised by Dr. Ree Blancas on  that MFM does not think TPN necessary now from fetal perspective as baby's growth is normal. Discussed use of TPN for her nutrition again today. Pt again declines TPN.     -Goodie bag daily  -Hypokalemia-repleted.  continue KCL in ivfs   -Cervical incompetence-cerclage in place  -Threatened PTL  -s/pBMTZ #1and #2 on  and   -Plan to remove cerclage at 36 weeks or with significant contractions      H/o prior  with successful , pt desires   -GBS pending.      Severe depression/anxiety with h/o suicidal ideation  -followed closely by La Browne her help  -continue zyprexa and prozac as well as prn prazosin and hydroxyzine     GERD  -famotidine 20mg bid     Ambulate  Daily NST     MFM scan on 5/3/21-59%tile and Vertex  -saw MFM on  8/8 BPP, vtx on US     Anemia-hemoglobin at baseline; asymptomatic  -known beta thal minor  -s/p hematology consult-appreciate recs  -s/p iv iron (venofer 200mg) x 5 days     CHTN-stable normal bp's off meds     DVT PPx-ambulation and mirtha hose     Back pain-s/p PT consult and pt was given a support belt which is helping

## 2021-06-04 LAB
BACTERIA SPEC CULT: ABNORMAL
BACTERIA SPEC CULT: ABNORMAL
BACTERIA SPEC CULT: NORMAL
CC UR VC: ABNORMAL
SERVICE CMNT-IMP: ABNORMAL
SERVICE CMNT-IMP: NORMAL

## 2021-06-04 PROCEDURE — 65410000002 HC RM PRIVATE OB

## 2021-06-04 PROCEDURE — 74011250636 HC RX REV CODE- 250/636: Performed by: OBSTETRICS & GYNECOLOGY

## 2021-06-04 PROCEDURE — 74011000258 HC RX REV CODE- 258: Performed by: INTERNAL MEDICINE

## 2021-06-04 PROCEDURE — 74011250637 HC RX REV CODE- 250/637: Performed by: OBSTETRICS & GYNECOLOGY

## 2021-06-04 PROCEDURE — 74011250637 HC RX REV CODE- 250/637: Performed by: INTERNAL MEDICINE

## 2021-06-04 PROCEDURE — 59025 FETAL NON-STRESS TEST: CPT

## 2021-06-04 PROCEDURE — 74011000250 HC RX REV CODE- 250: Performed by: OBSTETRICS & GYNECOLOGY

## 2021-06-04 PROCEDURE — 74011250636 HC RX REV CODE- 250/636: Performed by: INTERNAL MEDICINE

## 2021-06-04 RX ADMIN — POLYETHYLENE GLYCOL 3350 17 G: 17 POWDER, FOR SOLUTION ORAL at 09:11

## 2021-06-04 RX ADMIN — FAMOTIDINE 20 MG: 10 INJECTION INTRAVENOUS at 20:33

## 2021-06-04 RX ADMIN — FOLIC ACID: 5 INJECTION, SOLUTION INTRAMUSCULAR; INTRAVENOUS; SUBCUTANEOUS at 14:41

## 2021-06-04 RX ADMIN — POTASSIUM CHLORIDE, DEXTROSE MONOHYDRATE AND SODIUM CHLORIDE 125 ML/HR: 150; 5; 900 INJECTION, SOLUTION INTRAVENOUS at 15:58

## 2021-06-04 RX ADMIN — OLANZAPINE 10 MG: 5 TABLET, FILM COATED ORAL at 22:35

## 2021-06-04 RX ADMIN — CEFTRIAXONE SODIUM 1 G: 1 INJECTION, POWDER, FOR SOLUTION INTRAMUSCULAR; INTRAVENOUS at 14:41

## 2021-06-04 RX ADMIN — DOCUSATE SODIUM 100 MG: 100 CAPSULE, LIQUID FILLED ORAL at 08:58

## 2021-06-04 RX ADMIN — ONDANSETRON 8 MG: 2 INJECTION INTRAMUSCULAR; INTRAVENOUS at 08:52

## 2021-06-04 RX ADMIN — FLUOXETINE 60 MG: 20 CAPSULE ORAL at 08:59

## 2021-06-04 RX ADMIN — FAMOTIDINE 20 MG: 10 INJECTION INTRAVENOUS at 08:56

## 2021-06-04 RX ADMIN — CYANOCOBALAMIN TAB 500 MCG 1000 MCG: 500 TAB at 08:58

## 2021-06-04 NOTE — PROGRESS NOTES
1156 Bedside shift change report given to Coleman Lanes, RN (oncoming nurse) by Effie Isbell RN (offgoing nurse). Report included the following information SBAR.     0899 Pt consumed 0% of her lunch.

## 2021-06-04 NOTE — PROGRESS NOTES
ID Progress Note  2021    Subjective:     Afebrile. No dyspnea    Objective:     Vitals:   Visit Vitals  BP (!) 101/55 (BP 1 Location: Right upper arm, BP Patient Position: At rest;Lying left side)   Pulse 63   Temp 97.9 °F (36.6 °C)   Resp 15   Ht 5' 4\" (1.626 m)   Wt 74.8 kg (164 lb 12.8 oz)   LMP 2020 (Exact Date)   SpO2 100%   Breastfeeding No   BMI 28.29 kg/m²        Tmax:  Temp (24hrs), Av.8 °F (36.6 °C), Min:97.5 °F (36.4 °C), Max:98.1 °F (36.7 °C)      Exam:    Not in distress  Lung clear, no rales, wheezes or rhonchi   Heart: s1, s2, RRR, no murmurs rubs or clicks  Abdomen: soft nontender  Speech fluent     Labs:   Lab Results   Component Value Date/Time    WBC 7.4 2021 07:07 AM    Hemoglobin (POC) 10.4 (L) 2020 07:12 AM    HGB 8.5 (L) 2021 07:07 AM    HCT 27.7 (L) 2021 07:07 AM    PLATELET 856  07:07 AM    MCV 74.7 (L) 2021 07:07 AM    Hgb, External 33.7 2012 12:00 AM    Hct, External 69 2012 12:00 AM    Platelet cnt., External 307 2012 12:00 AM     Lab Results   Component Value Date/Time    Sodium 140 2021 07:37 AM    Potassium 3.8 2021 07:37 AM    Chloride 108 2021 07:37 AM    CO2 25 2021 07:37 AM    Anion gap 7 2021 07:37 AM    Glucose 67 2021 07:37 AM    BUN 2 (L) 2021 07:37 AM    Creatinine 0.41 (L) 2021 07:37 AM    BUN/Creatinine ratio 5 (L) 2021 07:37 AM    GFR est AA >60 2021 07:37 AM    GFR est non-AA >60 2021 07:37 AM    Calcium 7.9 (L) 2021 07:37 AM    Bilirubin, total 0.3 2021 05:09 AM    Alk.  phosphatase 151 (H) 2021 05:09 AM    Protein, total 5.8 (L) 2021 05:09 AM    Albumin 2.0 (L) 2021 05:09 AM    Globulin 3.8 2021 05:09 AM    A-G Ratio 0.5 (L) 2021 05:09 AM    ALT (SGPT) 38 2021 05:09 AM             Assessment:     #1 UTI with some right flank CVA tenderness     #2 intrauterine pregnancy     #3 asthma     #4 depression     #5 history of candidemia during this current pregnancy s/p meant with amphotericin     #6 hypokalemia          Recommendations:      Repeat cx with gram negatives. I changed her to ceftriaxone. Ff up urine cx. If we need to recheck her urine again, would do straight cath. Of note her UA on may 31  had normal WBCs. Team available over the weekend if needed.        Opal Rangel MD

## 2021-06-04 NOTE — PROGRESS NOTES
High Risk Obstetrics Progress Note    Name: Dilshad Flores MRN: [id number]  SSN: [id number]    YOB: 1985  Age: 38 y.o. Sex: female      Subjective:      LOS: 23 days    Estimated Date of Delivery: 21   Gestational Age Today: 27w7d     Patient admitted for UTI s/p failed outpatient treatment due to chronic eating disorder. States she does have moderate nausea/vomiting and normal fetal movement and does not have chest pain, shortness of breath, vaginal bleeding  and vaginal leaking of fluid . Denies any significant contractions. No emesis overnight but nausea. Reports that she is not eating. Objective:     Vitals:  Blood pressure 108/68, pulse 60, temperature 97.8 °F (36.6 °C), resp. rate 14, height 5' 4\" (1.626 m), weight 75.5 kg (166 lb 6.4 oz), last menstrual period 2020, SpO2 100 %, not currently breastfeeding. Temp (24hrs), Av.9 °F (36.6 °C), Min:97.5 °F (36.4 °C), Max:98.1 °F (53.6 °C)    Systolic (53XVH), MZB:744 , Min:108 , TCV:624      Diastolic (71QNG), YKT:33, Min:60, Max:75       Intake and Output:         Physical Exam:  Patient without distress. Heart: Regular rate and rhythm  Lung: clear to auscultation throughout lung fields, no wheezes, no rales, no rhonchi and normal respiratory effort  Abdomen: soft, nontender, gravid  Lower Extremities:  - Edema No       Membranes:  Intact    NST pending for today        Labs:   Recent Results (from the past 36 hour(s))   SAMPLES BEING HELD    Collection Time: 21  7:07 AM   Result Value Ref Range    SAMPLES BEING HELD 1LAV     COMMENT        Add-on orders for these samples will be processed based on acceptable specimen integrity and analyte stability, which may vary by analyte.    CBC WITH AUTOMATED DIFF    Collection Time: 21  7:07 AM   Result Value Ref Range    WBC 7.4 3.6 - 11.0 K/uL    RBC 3.71 (L) 3.80 - 5.20 M/uL    HGB 8.5 (L) 11.5 - 16.0 g/dL    HCT 27.7 (L) 35.0 - 47.0 %    MCV 74.7 (L) 80.0 - 99.0 FL MCH 22.9 (L) 26.0 - 34.0 PG    MCHC 30.7 30.0 - 36.5 g/dL    RDW 16.8 (H) 11.5 - 14.5 %    PLATELET 524 326 - 250 K/uL    MPV 11.4 8.9 - 12.9 FL    NRBC 0.0 0  WBC    ABSOLUTE NRBC 0.00 0.00 - 0.01 K/uL    NEUTROPHILS 58 32 - 75 %    LYMPHOCYTES 29 12 - 49 %    MONOCYTES 11 5 - 13 %    EOSINOPHILS 1 0 - 7 %    BASOPHILS 0 0 - 1 %    IMMATURE GRANULOCYTES 1 (H) 0.0 - 0.5 %    ABS. NEUTROPHILS 4.3 1.8 - 8.0 K/UL    ABS. LYMPHOCYTES 2.2 0.8 - 3.5 K/UL    ABS. MONOCYTES 0.8 0.0 - 1.0 K/UL    ABS. EOSINOPHILS 0.1 0.0 - 0.4 K/UL    ABS. BASOPHILS 0.0 0.0 - 0.1 K/UL    ABS. IMM. GRANS. 0.0 0.00 - 0.04 K/UL    DF AUTOMATED         Assessment and Plan:       Active Problems:    Pregnant (3/1/2018)      Eating disorder affecting pregnancy, antepartum (2020)      UTI in pregnancy, antepartum, third trimester (2021)       Pt is a 35 y/o [id number] at 35w5d weeks admitted for UTI s/p failed outpatient medical therapy due to chronic eating disorder.     Urine culture 21 positive for Klebsiella and EColi-s/p 4 days of ceftriaxone  -Repeat urine cx on  positive for enterococcus-now s/p vanc -  - Appreciate care and management plan  -afebrile and normal WBC's   -renal ultrasound   WNL  - : Gram negative rods-sensitivites pending-restarted ceftriaxone .     Chronic eating disorder  -IV fluids, PICC line in place  -spoke with Dr Citlali Ruiz with [facility] here in [place]-pt does not qualify for this program as she requires high level of care that is available at [facility] in [facility]. I spoke with pt re: this option but pt unsure about this option as she doesn't have a plan for care of her  if she enters this program  -I continue to discuss need for nutrition as pt input minimal and losing weight daily. Pt unable to tolerate dobhoff by ENT. Pt advised by Dr. Sharron Monge on  that MFM does not think TPN necessary now from fetal perspective as baby's growth is normal. Continue to discuss use of TPN for her nutrition. Pt continues to decline TPN.     -Goodie bag daily  -Hypokalemia-repleted. continue KCL in ivfs   -Cervical incompetence-cerclage in place  -Threatened PTL  -s/pBMTZ #1and #2 on  and   -Plan to remove cerclage at 36 weeks or with significant contractions      H/o prior  with successful , pt desires   -GBS pending.      Severe depression/anxiety with h/o suicidal ideation  -followed closely by Holils Jin her help  -continue zyprexa and prozac as well as prn prazosin and hydroxyzine     GERD  -famotidine 20mg bid     Ambulate  Daily NST     MFM scan on 5/3/21-59%tile and Vertex  -saw MFM on  8/8 BPP, vtx on US     Anemia- asymptomatic  -known beta thal minor  -s/p hematology consult-appreciate recs  -s/p iv iron (venofer 200mg) x 5 days     CHTN-stable normal bp's off meds     DVT PPx-ambulation and mirtha hose     Back pain-s/p PT consult and pt was given a support belt which is helping     Pt requesting COVID vaccine if available.

## 2021-06-04 NOTE — PROGRESS NOTES
0759 Bedside, Verbal and Written shift change report given to BRYANT Travis, AKIL (oncoming nurse) by BRYANT Urbano RN (offgoing nurse). Report included the following information SBAR, Intake/Output, MAR, Accordion, Recent Results and Med Rec Status. Dr. Joe Desir updated on plan of care. Asked about availability of covid vaccine for patient. Spoke with Tracey Carter RN/Unit Director - not available for patients at this time. 1156 Bedside, Verbal and Written shift change report given to Nirmala Garcia RN (oncoming nurse) by BRYANT Travis RN (offgoing nurse). Report included the following information SBAR, Intake/Output, MAR, Accordion, Recent Results and Med Rec Status.

## 2021-06-05 LAB
ANION GAP SERPL CALC-SCNC: 5 MMOL/L (ref 5–15)
BUN SERPL-MCNC: 3 MG/DL (ref 6–20)
BUN/CREAT SERPL: 7 (ref 12–20)
CALCIUM SERPL-MCNC: 8 MG/DL (ref 8.5–10.1)
CHLORIDE SERPL-SCNC: 109 MMOL/L (ref 97–108)
CO2 SERPL-SCNC: 22 MMOL/L (ref 21–32)
CREAT SERPL-MCNC: 0.44 MG/DL (ref 0.55–1.02)
GLUCOSE SERPL-MCNC: 85 MG/DL (ref 65–100)
POTASSIUM SERPL-SCNC: 3.7 MMOL/L (ref 3.5–5.1)
SODIUM SERPL-SCNC: 136 MMOL/L (ref 136–145)

## 2021-06-05 PROCEDURE — 74011250636 HC RX REV CODE- 250/636: Performed by: OBSTETRICS & GYNECOLOGY

## 2021-06-05 PROCEDURE — 36415 COLL VENOUS BLD VENIPUNCTURE: CPT

## 2021-06-05 PROCEDURE — 65410000002 HC RM PRIVATE OB

## 2021-06-05 PROCEDURE — 80048 BASIC METABOLIC PNL TOTAL CA: CPT

## 2021-06-05 PROCEDURE — 74011250637 HC RX REV CODE- 250/637: Performed by: OBSTETRICS & GYNECOLOGY

## 2021-06-05 PROCEDURE — 74011000250 HC RX REV CODE- 250: Performed by: OBSTETRICS & GYNECOLOGY

## 2021-06-05 PROCEDURE — 74011000258 HC RX REV CODE- 258: Performed by: INTERNAL MEDICINE

## 2021-06-05 PROCEDURE — 74011250636 HC RX REV CODE- 250/636: Performed by: INTERNAL MEDICINE

## 2021-06-05 PROCEDURE — 59025 FETAL NON-STRESS TEST: CPT

## 2021-06-05 PROCEDURE — 74011250637 HC RX REV CODE- 250/637: Performed by: INTERNAL MEDICINE

## 2021-06-05 RX ORDER — VANCOMYCIN HYDROCHLORIDE
1250 EVERY 8 HOURS
Status: DISCONTINUED | OUTPATIENT
Start: 2021-06-05 | End: 2021-06-13

## 2021-06-05 RX ORDER — VANCOMYCIN 1.75 GRAM/500 ML IN 0.9 % SODIUM CHLORIDE INTRAVENOUS
1750 ONCE
Status: COMPLETED | OUTPATIENT
Start: 2021-06-05 | End: 2021-06-05

## 2021-06-05 RX ADMIN — VANCOMYCIN HYDROCHLORIDE 1250 MG: 10 INJECTION, POWDER, LYOPHILIZED, FOR SOLUTION INTRAVENOUS at 17:24

## 2021-06-05 RX ADMIN — CEFTRIAXONE SODIUM 1 G: 1 INJECTION, POWDER, FOR SOLUTION INTRAMUSCULAR; INTRAVENOUS at 15:08

## 2021-06-05 RX ADMIN — FAMOTIDINE 20 MG: 10 INJECTION INTRAVENOUS at 21:18

## 2021-06-05 RX ADMIN — FAMOTIDINE 20 MG: 10 INJECTION INTRAVENOUS at 09:15

## 2021-06-05 RX ADMIN — DOCUSATE SODIUM 100 MG: 100 CAPSULE, LIQUID FILLED ORAL at 09:15

## 2021-06-05 RX ADMIN — ACETAMINOPHEN 650 MG: 650 SUPPOSITORY RECTAL at 11:25

## 2021-06-05 RX ADMIN — ONDANSETRON 8 MG: 2 INJECTION INTRAMUSCULAR; INTRAVENOUS at 09:14

## 2021-06-05 RX ADMIN — CYANOCOBALAMIN TAB 500 MCG 1000 MCG: 500 TAB at 09:15

## 2021-06-05 RX ADMIN — VANCOMYCIN HYDROCHLORIDE 1750 MG: 10 INJECTION, POWDER, LYOPHILIZED, FOR SOLUTION INTRAVENOUS at 09:22

## 2021-06-05 RX ADMIN — FLUOXETINE 60 MG: 20 CAPSULE ORAL at 09:15

## 2021-06-05 RX ADMIN — FOLIC ACID: 5 INJECTION, SOLUTION INTRAMUSCULAR; INTRAVENOUS; SUBCUTANEOUS at 14:13

## 2021-06-05 RX ADMIN — POTASSIUM CHLORIDE, DEXTROSE MONOHYDRATE AND SODIUM CHLORIDE 125 ML/HR: 150; 5; 900 INJECTION, SOLUTION INTRAVENOUS at 04:51

## 2021-06-05 RX ADMIN — OLANZAPINE 10 MG: 5 TABLET, FILM COATED ORAL at 21:18

## 2021-06-05 NOTE — PROGRESS NOTES
Pharmacist Note - Vancomycin Dosing    Consult provided for this 38 y.o. female for indication of enterococcus UTI during pregnancy. Antibiotic regimen(s): vancomycin + CTX  Patient on vancomycin PTA? NO- previously on vancomycin -    Recent Labs     21  1321 21  0707   WBC  --  7.4   CREA 0.44*  --    BUN 3*  --      Frequency of BMP: tomorrow  Height: 162 cm  Weight: 74.8 kg  Est CrCl: 110 ml/min; UO: --- ml/kg/hr  Temp (24hrs), Av.1 °F (36.7 °C), Min:98 °F (36.7 °C), Max:98.2 °F (36.8 °C)    Cultures:   urine (clean catch): > 100K enterococcus faecalis   urine (clean catch): > 100L morganella morganii + enterococcus faecalis      MRSA Swab ordered (if applicable)? N/A    Goal trough = 10 - 15 mcg/mL    Therapy will be initiated with a loading dose of 1750 mg IV x 1 to be followed by a maintenance dose of 1250 mg IV every 8 hours. Pharmacy to follow patient daily and order levels / make dose adjustments as appropriate.

## 2021-06-05 NOTE — PROGRESS NOTES
Bedside and Verbal shift change report given to LILIA Page RN (oncoming nurse) by Thomas Cordova RN (offgoing nurse). Report included the following information SBAR, Kardex, Intake/Output, MAR, Accordion, Recent Results and Med Rec Status. 1900- Pt ate 0% of dinner drank a \"sip\" of ginger ale.

## 2021-06-05 NOTE — PROGRESS NOTES
Bedside and Verbal shift change report given to Thomas Cordova RN  (oncoming nurse) by Mari Brooks  (offgoing nurse). Report included the following information SBAR, Kardex, Intake/Output, MAR and Recent Results.      Breakfast - 0% of tray   Lunch- 0% of tray

## 2021-06-06 LAB
ABO + RH BLD: NORMAL
ALBUMIN SERPL-MCNC: 2.2 G/DL (ref 3.5–5)
ALBUMIN/GLOB SERPL: 0.5 {RATIO} (ref 1.1–2.2)
ALP SERPL-CCNC: 221 U/L (ref 45–117)
ALT SERPL-CCNC: 20 U/L (ref 12–78)
ANION GAP SERPL CALC-SCNC: 3 MMOL/L (ref 5–15)
AST SERPL-CCNC: 15 U/L (ref 15–37)
BILIRUB SERPL-MCNC: 0.3 MG/DL (ref 0.2–1)
BLOOD GROUP ANTIBODIES SERPL: NORMAL
BUN SERPL-MCNC: 3 MG/DL (ref 6–20)
BUN/CREAT SERPL: 8 (ref 12–20)
CALCIUM SERPL-MCNC: 8.1 MG/DL (ref 8.5–10.1)
CHLORIDE SERPL-SCNC: 110 MMOL/L (ref 97–108)
CO2 SERPL-SCNC: 24 MMOL/L (ref 21–32)
CREAT SERPL-MCNC: 0.37 MG/DL (ref 0.55–1.02)
DATE LAST DOSE: ABNORMAL
ERYTHROCYTE [DISTWIDTH] IN BLOOD BY AUTOMATED COUNT: 17.1 % (ref 11.5–14.5)
GLOBULIN SER CALC-MCNC: 4.1 G/DL (ref 2–4)
GLUCOSE SERPL-MCNC: 71 MG/DL (ref 65–100)
HCT VFR BLD AUTO: 29 % (ref 35–47)
HGB BLD-MCNC: 9 G/DL (ref 11.5–16)
MCH RBC QN AUTO: 23.4 PG (ref 26–34)
MCHC RBC AUTO-ENTMCNC: 31 G/DL (ref 30–36.5)
MCV RBC AUTO: 75.3 FL (ref 80–99)
NRBC # BLD: 0 K/UL (ref 0–0.01)
NRBC BLD-RTO: 0 PER 100 WBC
PLATELET # BLD AUTO: 168 K/UL (ref 150–400)
PMV BLD AUTO: 12 FL (ref 8.9–12.9)
POTASSIUM SERPL-SCNC: 3.8 MMOL/L (ref 3.5–5.1)
PROT SERPL-MCNC: 6.3 G/DL (ref 6.4–8.2)
RBC # BLD AUTO: 3.85 M/UL (ref 3.8–5.2)
REPORTED DOSE,DOSE: ABNORMAL UNITS
REPORTED DOSE/TIME,TMG: ABNORMAL
SODIUM SERPL-SCNC: 137 MMOL/L (ref 136–145)
SPECIMEN EXP DATE BLD: NORMAL
VANCOMYCIN TROUGH SERPL-MCNC: 12.2 UG/ML (ref 5–10)
WBC # BLD AUTO: 6.6 K/UL (ref 3.6–11)

## 2021-06-06 PROCEDURE — 85027 COMPLETE CBC AUTOMATED: CPT

## 2021-06-06 PROCEDURE — 74011250637 HC RX REV CODE- 250/637: Performed by: OBSTETRICS & GYNECOLOGY

## 2021-06-06 PROCEDURE — 80053 COMPREHEN METABOLIC PANEL: CPT

## 2021-06-06 PROCEDURE — 74011250636 HC RX REV CODE- 250/636: Performed by: OBSTETRICS & GYNECOLOGY

## 2021-06-06 PROCEDURE — 65410000002 HC RM PRIVATE OB

## 2021-06-06 PROCEDURE — 80202 ASSAY OF VANCOMYCIN: CPT

## 2021-06-06 PROCEDURE — 86901 BLOOD TYPING SEROLOGIC RH(D): CPT

## 2021-06-06 PROCEDURE — 74011250637 HC RX REV CODE- 250/637: Performed by: INTERNAL MEDICINE

## 2021-06-06 PROCEDURE — 36415 COLL VENOUS BLD VENIPUNCTURE: CPT

## 2021-06-06 PROCEDURE — 74011000258 HC RX REV CODE- 258: Performed by: INTERNAL MEDICINE

## 2021-06-06 PROCEDURE — 74011000250 HC RX REV CODE- 250: Performed by: OBSTETRICS & GYNECOLOGY

## 2021-06-06 PROCEDURE — 74011250636 HC RX REV CODE- 250/636: Performed by: INTERNAL MEDICINE

## 2021-06-06 RX ADMIN — DOCUSATE SODIUM 100 MG: 100 CAPSULE, LIQUID FILLED ORAL at 08:54

## 2021-06-06 RX ADMIN — FAMOTIDINE 20 MG: 10 INJECTION INTRAVENOUS at 21:13

## 2021-06-06 RX ADMIN — POTASSIUM CHLORIDE, DEXTROSE MONOHYDRATE AND SODIUM CHLORIDE 125 ML/HR: 150; 5; 900 INJECTION, SOLUTION INTRAVENOUS at 23:23

## 2021-06-06 RX ADMIN — CEFTRIAXONE SODIUM 1 G: 1 INJECTION, POWDER, FOR SOLUTION INTRAMUSCULAR; INTRAVENOUS at 14:45

## 2021-06-06 RX ADMIN — OLANZAPINE 10 MG: 5 TABLET, FILM COATED ORAL at 21:14

## 2021-06-06 RX ADMIN — VANCOMYCIN HYDROCHLORIDE 1250 MG: 10 INJECTION, POWDER, LYOPHILIZED, FOR SOLUTION INTRAVENOUS at 17:16

## 2021-06-06 RX ADMIN — FLUOXETINE 60 MG: 20 CAPSULE ORAL at 09:11

## 2021-06-06 RX ADMIN — ONDANSETRON 8 MG: 2 INJECTION INTRAMUSCULAR; INTRAVENOUS at 06:56

## 2021-06-06 RX ADMIN — CYANOCOBALAMIN TAB 500 MCG 1000 MCG: 500 TAB at 08:53

## 2021-06-06 RX ADMIN — VANCOMYCIN HYDROCHLORIDE 1250 MG: 10 INJECTION, POWDER, LYOPHILIZED, FOR SOLUTION INTRAVENOUS at 09:05

## 2021-06-06 RX ADMIN — FOLIC ACID: 5 INJECTION, SOLUTION INTRAMUSCULAR; INTRAVENOUS; SUBCUTANEOUS at 14:44

## 2021-06-06 RX ADMIN — FAMOTIDINE 20 MG: 10 INJECTION INTRAVENOUS at 09:00

## 2021-06-06 RX ADMIN — HYDROXYZINE HYDROCHLORIDE 50 MG: 25 TABLET, FILM COATED ORAL at 08:54

## 2021-06-06 RX ADMIN — VANCOMYCIN HYDROCHLORIDE 1250 MG: 10 INJECTION, POWDER, LYOPHILIZED, FOR SOLUTION INTRAVENOUS at 01:03

## 2021-06-06 RX ADMIN — ONDANSETRON 8 MG: 2 INJECTION INTRAMUSCULAR; INTRAVENOUS at 21:22

## 2021-06-06 RX ADMIN — POLYETHYLENE GLYCOL 3350 17 G: 17 POWDER, FOR SOLUTION ORAL at 17:22

## 2021-06-06 NOTE — PROGRESS NOTES
3602  Received pt from [facility] for removal of cerclage, s/o Dr. Linnette Gordon. 1000  Dr. Linnette Gordon at bedside, pt tolerated removal of cerclage well, will monitor for a few hours. 1200  Dr. Linnette Gordon at bedside, viewed strip, OK for transfer back to [facility].  1210  Pt to room 315 via wheelchair, no complaints of bleeding or pains.  Verbal report given to [facility]

## 2021-06-06 NOTE — PROGRESS NOTES
Pharmacist Note - Vancomycin Dosing  Therapy day 2  Indication:  Enterococcus UTI during pregnancy  - previously on vancomycin 5/21-6/2    Current regimen:  1250 mg IV every 8 hours    Recent Labs     06/06/21  0443 06/05/21  1321   WBC 6.6  --    CREA 0.37* 0.44*   BUN 3* 3*       A Trough Level resulted at 12.2 mcg/mL which was obtained 8 hrs post-dose. Goal trough: 10 - 15 mcg/mL     Plan: Continue current regimen. Pharmacy will continue to monitor this patient daily for changes in clinical status and renal function.

## 2021-06-06 NOTE — PROGRESS NOTES
Labor Progress Note  Patient seen, fetal heart rate and contraction pattern evaluated, patient examined. No data found. Physical Exam:  Speculum placed in vagina. Cerclage stitch grasped and right side of stitch cut with scissors. Cerclage removed without difficulty. Small amount of bleeding noted. Cervix 50/3/-3    Assessment/Plan:  [id number] at 36 0/7 weeks with h/o cervical incompetence  Cerclage now removed. Will monitor for s/sx of imminent delivery. If no evidence of labor, will transfer back to [facility]. Category I tracing.

## 2021-06-06 NOTE — PROGRESS NOTES
High Risk Obstetrics Progress Note    Name: Syed Mckoy MRN: [id number]  SSN: [id number]    YOB: 1985  Age: 38 y.o. Sex: female      Subjective:      LOS: 25 days    Estimated Date of Delivery: 21   Gestational Age Today: 44w0d     Patient admitted for UTI s/p failed outpatient treatment due to chronic eating disorder. States she does have moderate nausea/vomiting, moderate pelvic pressure and normal fetal movement and does not have chest pain, shortness of breath, vaginal bleeding  and vaginal leaking of fluid . Objective:     Vitals:  Blood pressure 113/87, pulse 63, temperature 98.3 °F (36.8 °C), resp. rate 15, height 5' 4\" (1.626 m), weight 75.3 kg (166 lb), last menstrual period 2020, SpO2 99 %, not currently breastfeeding. Temp (24hrs), Av.3 °F (36.8 °C), Min:98.1 °F (36.7 °C), Max:98.5 °F (20.0 °C)    Systolic (87EXC), QZU:366 , Min:113 , XYU:767      Diastolic (36QHX), TKZ:91, Min:73, Max:87       Intake and Output:         Physical Exam:  Patient without distress.   Heart: Regular rate and rhythm  Lung: clear to auscultation throughout lung fields, no wheezes, no rales, no rhonchi and normal respiratory effort  Abdomen: soft, nontender, gravid  Lower Extremities:  - Edema No       Membranes:  Intact    Uterine Activity:  Irritability on monitoring yesterday    Fetal Heart Rate:  Reactive on NST 6/  Baseline: 120s per minute  Variability: moderate  Accelerations: yes  Decelerations: none        Labs:   Recent Results (from the past 36 hour(s))   METABOLIC PANEL, BASIC    Collection Time: 21  1:21 PM   Result Value Ref Range    Sodium 136 136 - 145 mmol/L    Potassium 3.7 3.5 - 5.1 mmol/L    Chloride 109 (H) 97 - 108 mmol/L    CO2 22 21 - 32 mmol/L    Anion gap 5 5 - 15 mmol/L    Glucose 85 65 - 100 mg/dL    BUN 3 (L) 6 - 20 MG/DL    Creatinine 0.44 (L) 0.55 - 1.02 MG/DL    BUN/Creatinine ratio 7 (L) 12 - 20      GFR est AA >60 >60 ml/min/1.73m2    GFR est non-AA >60 >60 ml/min/1.73m2    Calcium 8.0 (L) 8.5 - 10.1 MG/DL   CBC W/O DIFF    Collection Time: 06/06/21  4:43 AM   Result Value Ref Range    WBC 6.6 3.6 - 11.0 K/uL    RBC 3.85 3.80 - 5.20 M/uL    HGB 9.0 (L) 11.5 - 16.0 g/dL    HCT 29.0 (L) 35.0 - 47.0 %    MCV 75.3 (L) 80.0 - 99.0 FL    MCH 23.4 (L) 26.0 - 34.0 PG    MCHC 31.0 30.0 - 36.5 g/dL    RDW 17.1 (H) 11.5 - 14.5 %    PLATELET 147 662 - 941 K/uL    MPV 12.0 8.9 - 12.9 FL    NRBC 0.0 0  WBC    ABSOLUTE NRBC 0.00 0.00 - 2.69 K/uL   METABOLIC PANEL, COMPREHENSIVE    Collection Time: 06/06/21  4:43 AM   Result Value Ref Range    Sodium 137 136 - 145 mmol/L    Potassium 3.8 3.5 - 5.1 mmol/L    Chloride 110 (H) 97 - 108 mmol/L    CO2 24 21 - 32 mmol/L    Anion gap 3 (L) 5 - 15 mmol/L    Glucose 71 65 - 100 mg/dL    BUN 3 (L) 6 - 20 MG/DL    Creatinine 0.37 (L) 0.55 - 1.02 MG/DL    BUN/Creatinine ratio 8 (L) 12 - 20      GFR est AA >60 >60 ml/min/1.73m2    GFR est non-AA >60 >60 ml/min/1.73m2    Calcium 8.1 (L) 8.5 - 10.1 MG/DL    Bilirubin, total 0.3 0.2 - 1.0 MG/DL    ALT (SGPT) 20 12 - 78 U/L    AST (SGOT) 15 15 - 37 U/L    Alk. phosphatase 221 (H) 45 - 117 U/L    Protein, total 6.3 (L) 6.4 - 8.2 g/dL    Albumin 2.2 (L) 3.5 - 5.0 g/dL    Globulin 4.1 (H) 2.0 - 4.0 g/dL    A-G Ratio 0.5 (L) 1.1 - 2.2     TYPE & SCREEN    Collection Time: 06/06/21  4:43 AM   Result Value Ref Range    Crossmatch Expiration 06/09/2021,2356     ABO/Rh(D) [place] Jurist POSITIVE     Antibody screen NEG        Assessment and Plan:       Active Problems:    Pregnant (3/1/2018)      Eating disorder affecting pregnancy, antepartum (12/2/2020)      UTI in pregnancy, antepartum, third trimester (5/12/2021)       Pt is a 35 y/o [id number] at 36w0d weeks admitted for UTI s/p failed outpatient medical therapy due to chronic eating disorder.     Urine culture 5/5/21 positive for Klebsiella and EColi-s/p 4 days of ceftriaxone  -Repeat urine cx on 5/19 positive for enterococcus-now s/p vanc -  - Appreciate I.D. care and management plan  -afebrile and normal WBC's   -renal ultrasound   WNL  - : Morganelli (sensitive to ceftriaxone) and Enterococcus (sensitive to vanc) restarted ceftriaxone . Restart vanc 21     Chronic eating disorder  -IV fluids, PICC line in place  -spoke with Dr Jose Barrett with [facility] partial hospitalization program here in [place]-pt does not qualify for this program as she requires high level of care that is available at [facility] inpatient program in [facility]. I spoke with pt re: this option but pt unsure about this option as she doesn't have a plan for care of her  if she enters this program  -I continue to discuss need for nutrition as pt input minimal and losing weight daily. Pt unable to tolerate dobhoff by ENT. Pt advised by Dr. Jade Egan on  that MFM does not think TPN necessary now from fetal perspective as baby's growth is normal. Continue to discuss use of TPN for her nutrition. Pt continues to decline TPN.     -Goodie bag daily  -Hypokalemia-repleted. continue KCL in ivfs   -Cervical incompetence-cerclage in place-moving pt over to L&D now for remove of cerclage  -Threatened PTL  -s/pBMTZ #1and #2 on  and      H/o prior  with successful , pt desires   -GBS pending.      Severe depression/anxiety with h/o suicidal ideation  -followed closely by Aide Mckinney her help  -continue zyprexa and prozac as well as prn prazosin and hydroxyzine     GERD  -famotidine 20mg bid     Ambulate  Daily NST     MFM scan on 6/3- 39% tile (5-12);  BPP, vtx     Anemia- asymptomatic  -known beta thal minor  -s/p hematology consult-appreciate recs  -s/p iv iron (venofer 200mg) x 5 days     CHTN-stable normal bp's off meds     DVT PPx-ambulation and mirtha hose     Back pain-s/p PT consult and pt was given a support belt which is helping

## 2021-06-07 LAB
ANION GAP SERPL CALC-SCNC: 6 MMOL/L (ref 5–15)
BUN SERPL-MCNC: 2 MG/DL (ref 6–20)
BUN/CREAT SERPL: 5 (ref 12–20)
CALCIUM SERPL-MCNC: 7.7 MG/DL (ref 8.5–10.1)
CHLORIDE SERPL-SCNC: 110 MMOL/L (ref 97–108)
CO2 SERPL-SCNC: 23 MMOL/L (ref 21–32)
CREAT SERPL-MCNC: 0.37 MG/DL (ref 0.55–1.02)
GLUCOSE SERPL-MCNC: 70 MG/DL (ref 65–100)
POTASSIUM SERPL-SCNC: 3.9 MMOL/L (ref 3.5–5.1)
SODIUM SERPL-SCNC: 139 MMOL/L (ref 136–145)

## 2021-06-07 PROCEDURE — 74011250637 HC RX REV CODE- 250/637: Performed by: INTERNAL MEDICINE

## 2021-06-07 PROCEDURE — 80048 BASIC METABOLIC PNL TOTAL CA: CPT

## 2021-06-07 PROCEDURE — 74011250636 HC RX REV CODE- 250/636: Performed by: OBSTETRICS & GYNECOLOGY

## 2021-06-07 PROCEDURE — 74011250637 HC RX REV CODE- 250/637: Performed by: OBSTETRICS & GYNECOLOGY

## 2021-06-07 PROCEDURE — 36415 COLL VENOUS BLD VENIPUNCTURE: CPT

## 2021-06-07 PROCEDURE — 74011000250 HC RX REV CODE- 250: Performed by: OBSTETRICS & GYNECOLOGY

## 2021-06-07 PROCEDURE — 74011250636 HC RX REV CODE- 250/636: Performed by: INTERNAL MEDICINE

## 2021-06-07 PROCEDURE — 65410000002 HC RM PRIVATE OB

## 2021-06-07 PROCEDURE — 59025 FETAL NON-STRESS TEST: CPT

## 2021-06-07 PROCEDURE — 74011000258 HC RX REV CODE- 258: Performed by: INTERNAL MEDICINE

## 2021-06-07 RX ADMIN — CYANOCOBALAMIN TAB 500 MCG 1000 MCG: 500 TAB at 08:54

## 2021-06-07 RX ADMIN — FOLIC ACID: 5 INJECTION, SOLUTION INTRAMUSCULAR; INTRAVENOUS; SUBCUTANEOUS at 15:47

## 2021-06-07 RX ADMIN — POTASSIUM CHLORIDE, DEXTROSE MONOHYDRATE AND SODIUM CHLORIDE 125 ML/HR: 150; 5; 900 INJECTION, SOLUTION INTRAVENOUS at 07:30

## 2021-06-07 RX ADMIN — ONDANSETRON 8 MG: 2 INJECTION INTRAMUSCULAR; INTRAVENOUS at 07:26

## 2021-06-07 RX ADMIN — CEFTRIAXONE SODIUM 1 G: 1 INJECTION, POWDER, FOR SOLUTION INTRAMUSCULAR; INTRAVENOUS at 15:47

## 2021-06-07 RX ADMIN — VANCOMYCIN HYDROCHLORIDE 1250 MG: 10 INJECTION, POWDER, LYOPHILIZED, FOR SOLUTION INTRAVENOUS at 10:58

## 2021-06-07 RX ADMIN — HYDROXYZINE HYDROCHLORIDE 50 MG: 25 TABLET, FILM COATED ORAL at 17:33

## 2021-06-07 RX ADMIN — ONDANSETRON 8 MG: 2 INJECTION INTRAMUSCULAR; INTRAVENOUS at 21:06

## 2021-06-07 RX ADMIN — FLUOXETINE 60 MG: 20 CAPSULE ORAL at 08:54

## 2021-06-07 RX ADMIN — DOCUSATE SODIUM 100 MG: 100 CAPSULE, LIQUID FILLED ORAL at 08:54

## 2021-06-07 RX ADMIN — FAMOTIDINE 20 MG: 10 INJECTION INTRAVENOUS at 21:06

## 2021-06-07 RX ADMIN — FAMOTIDINE 20 MG: 10 INJECTION INTRAVENOUS at 08:54

## 2021-06-07 RX ADMIN — VANCOMYCIN HYDROCHLORIDE 1250 MG: 10 INJECTION, POWDER, LYOPHILIZED, FOR SOLUTION INTRAVENOUS at 19:36

## 2021-06-07 RX ADMIN — VANCOMYCIN HYDROCHLORIDE 1250 MG: 10 INJECTION, POWDER, LYOPHILIZED, FOR SOLUTION INTRAVENOUS at 01:06

## 2021-06-07 NOTE — PROGRESS NOTES
ID Progress Note  2021    Subjective:     Afebrile. No dyspnea, slight back pain. Objective:     Vitals:   Visit Vitals  BP (!) 105/56 (BP 1 Location: Right arm, BP Patient Position: At rest)   Pulse 61   Temp 98.4 °F (36.9 °C)   Resp 16   Ht 5' 4\" (1.626 m)   Wt 75 kg (165 lb 6.4 oz)   LMP 2020 (Exact Date)   SpO2 99%   Breastfeeding No   BMI 28.39 kg/m²        Tmax:  Temp (24hrs), Av.3 °F (36.8 °C), Min:97.9 °F (36.6 °C), Max:98.6 °F (37 °C)      Exam:    Not in distress  Lung clear, no rales, wheezes or rhonchi   Heart: s1, s2, RRR, no murmurs rubs or clicks  Abdomen: soft nontender  Speech fluent     Labs:   Lab Results   Component Value Date/Time    WBC 6.6 2021 04:43 AM    Hemoglobin (POC) 10.4 (L) 2020 07:12 AM    HGB 9.0 (L) 2021 04:43 AM    HCT 29.0 (L) 2021 04:43 AM    PLATELET 312  04:43 AM    MCV 75.3 (L) 2021 04:43 AM    Hgb, External 33.7 2012 12:00 AM    Hct, External 69 2012 12:00 AM    Platelet cnt., External 307 2012 12:00 AM     Lab Results   Component Value Date/Time    Sodium 139 2021 05:52 AM    Potassium 3.9 2021 05:52 AM    Chloride 110 (H) 2021 05:52 AM    CO2 23 2021 05:52 AM    Anion gap 6 2021 05:52 AM    Glucose 70 2021 05:52 AM    BUN 2 (L) 2021 05:52 AM    Creatinine 0.37 (L) 2021 05:52 AM    BUN/Creatinine ratio 5 (L) 2021 05:52 AM    GFR est AA >60 2021 05:52 AM    GFR est non-AA >60 2021 05:52 AM    Calcium 7.7 (L) 2021 05:52 AM    Bilirubin, total 0.3 2021 04:43 AM    Alk.  phosphatase 221 (H) 2021 04:43 AM    Protein, total 6.3 (L) 2021 04:43 AM    Albumin 2.2 (L) 2021 04:43 AM    Globulin 4.1 (H) 2021 04:43 AM    A-G Ratio 0.5 (L) 2021 04:43 AM    ALT (SGPT) 20 2021 04:43 AM             Assessment:     #1 UTI with some right flank CVA tenderness     #2 intrauterine pregnancy     #3 asthma     #4 depression     #5 history of candidemia during this current pregnancy s/p meant with amphotericin     #6 hypokalemia          Recommendations:      Continue vanc and ceftriaxone for e faecalis and morganella. If we need to recheck her urine again, would do straight cath. Of note her UA on may 31  had normal WBCs.            Joe Nath MD

## 2021-06-07 NOTE — ADT AUTH CERT NOTES
Utilization Reviews 
 
  
Urinary Tract Infection (UTI) - Care Day 26 (6/6/2021) by Patricio Velazquez RN 
 
  
Review Entered Review Status 6/7/2021 15:26 Completed  
  
Criteria Review Care Day: 26 Care Date: 6/6/2021 Level of Care: Inpatient Floor Guideline Day 3 Level Of Care (X) Floor to discharge Clinical Status   
(X) * Hemodynamic stability 6/7/2021 15:26:48 EDT by Sandy Joseph   
  98.6 80 99/56 16 100% room air   
(X) * Mental status at baseline 6/7/2021 15:26:48 EDT by Sandy Joseph   
  ALERT,ORIENTED   
( ) * Antibiotic regimen for next level of care established   
(X) * Urine output adequate   
(X) * Renal function at baseline or acceptable for next level of care   
(X) * Pain absent or managed ( ) * Oral intake adequate   
(X) * Afebrile or temperature acceptable for next level of care 6/7/2021 15:26:48 EDT by Lucien Rued   
  temp 98.6   
(X) * Vomiting absent   
( ) * Discharge plans and education understood Activity   
(X) * Ambulatory or acceptable for next level of care 6/7/2021 15:26:48 EDT by Bk Mckeon up ad verito Routes   
(X) * Oral hydration, medications, and diet 6/7/2021 15:26:48 EDT by Lucien Rued   
  regular diet ensure enlive Medications (X) Antibiotics 6/7/2021 15:26:48 EDT by Lucien Rued   
  Vancomycin 1,250mg iv q8 Rocephin 1g iv q24 * Milestone Additional Notes 6/6/2021 LOC IP L&D  
VS 98.6 80 99/56 16 100% room air LABS    
HGB: 9.0 (L) HCT: 29.0 (L) MCV: 75.3 (L) MCH: 23.4 (L) MCHC: 31.0  
RDW: 17.1 (H) PLATELET: 412 Chloride: 110 (H)  
CO2: 24 Anion gap: 3 (L) Glucose: 71 BUN: 3 (L) Creatinine: 0.37 (L) BUN/Creatinine ratio: 8 (L) Calcium: 8.1 (L)  
GFR est non-AA: >60  
GFR est AA: >60 Bilirubin, total: 0.3 Protein, total: 6.3 (L) Albumin: 2.2 (L) Globulin: 4.1 (H) A-G Ratio: 0.5 (L) ALT: 20 AST: 15 Alk. phosphatase: 221 (H) MEDS  
NS with mvi IV qday Vitamin 1000mcg po qday D5NS with 20meq kcl @ 125 cc/hr Pepcid 20mg iv q12 Prozac 60mg po qday Atarax 50mg tid prn x1 Zyprexa 10mg po qhs  
Zofran 8mg iv q6 prn x2  
  
ATTENDING NOTE  
  LOS: 25 days    
Estimated Date of Delivery: 7/4/21 Gestational Age Today: 44w0d   
   
Patient admitted for UTI s/p failed outpatient treatment due to chronic eating disorder. States she does have moderate nausea/vomiting, moderate pelvic pressure and normal fetal movement and does not have chest pain, shortness of breath, vaginal bleeding  and vaginal leaking of fluid . Physical Exam:  
Patient without distress. Heart: Regular rate and rhythm Lung: clear to auscultation throughout lung fields, no wheezes, no rales, no rhonchi and normal respiratory effort Abdomen: soft, nontender, gravid Lower Extremities:  - Edema No     
   
Membranes:  Intact  
   
Uterine Activity:  Irritability on monitoring yesterday  
   
Fetal Heart Rate:  Reactive on NST 6/5 Baseline: 120s per minute Variability: moderate Accelerations: yes Decelerations: none    
  Eating disorder affecting pregnancy, antepartum (12/2/2020)    
  UTI in pregnancy, antepartum, third trimester (5/12/2021)  
   
   
Pt is a 39 y/o [id number] at 36w0d weeks admitted for UTI s/p failed outpatient medical therapy due to chronic eating disorder.  
   
Urine culture 5/5/21 positive for Klebsiella and EColi-s/p 4 days of ceftriaxone  
-Repeat urine cx on 5/19 positive for enterococcus-now s/p vanc 5/21-6/2 - Appreciate I.D. care and management plan  
-afebrile and normal WBC's   
-renal ultrasound  5/21 WNL  
- 5/31: Morganelli (sensitive to ceftriaxone) and Enterococcus (sensitive to vanc) restarted ceftriaxone 6/2. Restart vanc 6/5/21  
   
Chronic eating disorder -IV fluids, PICC line in place  
-spoke with Dr Sophia Etienne with [facility] hospitalization program here in [place]-pt does not qualify for this program as she requires high level of care that is available at [facility] [place] inpatient [facility] in [facility]. I spoke with pt re: this option but pt unsure about this option as she doesn't have a plan for care of her  if she enters this program  
-I continue to discuss need for nutrition as pt input minimal and losing weight daily. Pt unable to tolerate dobhoff by ENT. Pt advised by Dr. Ta Leong on  that MFM does not think TPN necessary now from fetal perspective as baby's growth is normal. Continue to discuss use of TPN for her nutrition. Pt continues to decline TPN.    
-Goodie bag daily -Hypokalemia-repleted. continue KCL in ivfs   
-Cervical incompetence-cerclage in place-moving pt over to L&D now for remove of cerclage  
-Threatened PTL  
-s/pBMTZ #1and #2 on  and   
   
H/o prior  with successful , pt desires   
-GBS pending.  
   
 Severe depression/anxiety with h/o suicidal ideation  
-followed closely by Moreno Dwyer her help  
-continue zyprexa and prozac as well as prn prazosin and hydroxyzine  
   
GERD  
-famotidine 20mg bid  
   
Ambulate Daily NST  
   
MFM scan on 6/3- 39% tile (5-12); 8/8 BPP, vtx  
   
Anemia- asymptomatic  
-known beta thal minor  
-s/p hematology consult-appreciate recs -s/p iv iron (venofer 200mg) x 5 days  
   
CHTN-stable normal bp's off meds  
   
DVT PPx-ambulation and mirtha hose  
   
Back pain-s/p PT consult and pt was given a support belt which is helping INFECTIOUS DISEASE NOTE Cultures  Reviewed. Has enterococcus and morganella. Agree with ceftriaxone and vanc  
  
  
Urinary Tract Infection (UTI) - Care Day 25 (2021) by Anne Whiting RN 
 
  
Review Entered Review Status 2021 15:14 Completed  
  
Criteria Review Care Day: 25 Care Date: 2021 Level of Care: Inpatient Floor Guideline Day 3 Level Of Care (X) Floor to discharge 2021 15:14:00 EDT by Sandy Wade   
  ip L&D Clinical Status   
(X) * Hemodynamic stability 6/7/2021 15:14:00 EDT by Sandy Ribeiro   
  98.5 68 107/55 16 100% ROOM AIR   
(X) * Mental status at baseline 6/7/2021 15:14:00 EDT by Floresita Cooper   
  alert,oriented ( ) * Antibiotic regimen for next level of care established   
(X) * Urine output adequate   
(X) * Renal function at baseline or acceptable for next level of care 6/7/2021 15:14:00 EDT by Floresita Cooper   
  labs stable   
(X) * Pain absent or managed 6/7/2021 15:14:00 EDT by Floresita Cooper   
  she does have mild headache   
( ) * Oral intake adequate   
(X) * Afebrile or temperature acceptable for next level of care 6/7/2021 15:14:00 EDT by Floresita Cooper   
  temp 98.5   
(X) * Vomiting absent 6/7/2021 15:14:00 EDT by Floresita Cooper   
  moderate nausea/vomiting ( ) * Discharge plans and education understood Activity   
(X) * Ambulatory or acceptable for next level of care 6/7/2021 15:14:00 EDT by Floresita Cooper   
  up ad verito Routes   
(X) * Oral hydration, medications, and diet 6/7/2021 15:14:00 EDT by Floresita Cooper   
  regular diet ensure enlive Pt advised by Dr. Bill Rodríguez on 5/28 that MFM does not think TPN necessary now from fetal perspective as baby's growth is normal. Continue to discuss use of TPN for her nutrition. Pt continues to decline TPN. Medications (X) Antibiotics 6/7/2021 15:14:00 EDT by Floresita Cooper   
  Vancomycin 1,250mg iv q8 Rocephin 1g iv q24 * Milestone Additional Notes 6/5/2021 LOC IP L&D  
VS 98.5 68 107/55 16 100% ROOM AIR  
LABS    
Chloride: 109 (H)  
CO2: 22 Anion gap: 5 Glucose: 85 BUN: 3 (L) Creatinine: 0.44 (L) BUN/Creatinine ratio: 7 (L) Calcium: 8.0 (L) MEDS  
NS with mvi IV qday Tylenol 650mg rectally q6 prn x1 Rocephin 1g iv q24 Vitamin 1000mcg po qday D5NS with 20meq kcl @ 125 cc/hr Pepcid 20mg iv q12 Prozac 60mg po qday Zyprexa 10mg po qhs  
Zofran 8mg iv q6 prn x1 Vancomycin 1,250mg iv q8  
  
ATTENDING NOTE  
 LOS: 24 days    
Estimated Date of Delivery: 7/4/21 Gestational Age Today: 26w5d   
   
Patient admitted for UTI s/p failed outpatient therapy due to chronic eating disorder. . States she does have mild headache , moderate nausea/vomiting and normal fetal movement and does not have chest pain, shortness of breath, vaginal bleeding  and vaginal leaking of fluid . Reports occasional contraction-sporadic. No emesis. Has not tried to eat anything because doesn't want to get sick.   
   
   
Physical Exam:  
Patient without distress. Heart: Regular rate and rhythm Lung: clear to auscultation throughout lung fields, no wheezes, no rales, no rhonchi and normal respiratory effort Abdomen: soft, nontender, gravid Lower Extremities:  - Edema No     
   
Membranes:  Intact  
   
Uterine Activity:  None  
   
Fetal Heart Rate:  Reactive (on NST 6/4/21) Baseline: 130s per minute Variability: moderate Accelerations: yes Decelerations: none    
Active Problems:  
  Pregnant (3/1/2018)    
  Eating disorder affecting pregnancy, antepartum (12/2/2020)    
  UTI in pregnancy, antepartum, third trimester (5/12/2021)  
   
   
Pt is a 39 y/o [id number] at 35w6d weeks admitted for UTI s/p failed outpatient medical therapy due to chronic eating disorder.  
   
Urine culture 5/5/21 positive for Klebsiella and EColi-s/p 4 days of ceftriaxone  
-Repeat urine cx on 5/19 positive for enterococcus-now s/p vanc 5/21-6/2 - Appreciate I.D. care and management plan  
-afebrile and normal WBC's   
-renal ultrasound  5/21 WNL  
- 5/31: Morganelli (sensitive to ceftriaxone) and Enterococcus (sensitive to vanc) restarted ceftriaxone 6/2. Restart vanc 6/5/21  
   
Chronic eating disorder -IV fluids, PICC line in place  
-spoke with Dr Karol Parekh with [facility] here in [place]-pt does not qualify for this program as she requires high level of care that is available at [facility] inpatient [facility] in [facility]. I spoke with pt re: this option but pt unsure about this option as she doesn't have a plan for care of her  if she enters this program  
-I continue to discuss need for nutrition as pt input minimal and losing weight daily. Pt unable to tolerate dobhoff by ENT. Pt advised by Dr. Ree Blancas on  that MFM does not think TPN necessary now from fetal perspective as baby's growth is normal. Continue to discuss use of TPN for her nutrition. Pt continues to decline TPN.    
-Goodie bag daily -Hypokalemia-repleted. continue KCL in ivfs   
-Cervical incompetence-cerclage in place  
-Threatened PTL  
-s/pBMTZ #1and #2 on  and   
-Plan to remove cerclage at 36 weeks (tomorrow) or sooner with significant contractions   
   
H/o prior  with successful , pt desires   
-GBS pending.  
   
 Severe depression/anxiety with h/o suicidal ideation  
-followed closely by La Browne her help  
-continue zyprexa and prozac as well as prn prazosin and hydroxyzine  
   
GERD  
-famotidine 20mg bid  
   
Ambulate Daily NST  
   
MFM scan on 6/3- 39% tile (5-12); 8/8 BPP, vtx  
   
Anemia- asymptomatic  
-known beta thal minor  
-s/p hematology consult-appreciate recs -s/p iv iron (venofer 200mg) x 5 days  
-send sample to blood bank tomorrow with labs  
  CHTN-stable normal bp's off meds  
   
DVT PPx-ambulation and mirtha hose  
   
Back pain-s/p PT consult and pt was given a support belt which is helping   
   
  
  
Urinary Tract Infection (UTI) - Care Day 24 (2021) by Sunny Perez RN 
 
  
Review Entered Review Status 2021 15:16 Completed  
  
Criteria Review Care Day: 24 Care Date: 2021 Level of Care: Inpatient Floor Guideline Day 3 Clinical Status   
(X) * Hemodynamic stability 2021 15:16:25 EDT by Osmel Ochoa   97.9   60  16  101/55  99% ra   
(X) * Mental status at baseline ( ) * Antibiotic regimen for next level of care established   
(X) * Urine output adequate   
(X) * Renal function at baseline or acceptable for next level of care   
(X) * Pain absent or managed ( ) * Oral intake adequate   
(X) * Afebrile or temperature acceptable for next level of care   
(X) * Vomiting absent   
( ) * Discharge plans and education understood Activity   
(X) * Ambulatory or acceptable for next level of care Routes   
(X) * Oral hydration, medications, and diet 6/4/2021 15:16:25 EDT by Tyra Marie   
  reg diet  
 
rocephin 1g iv q24   prozac 60mg po qd   Zyprexa 10mg qhs   Zofran 8mg iv q6 prn x 2    pepcid 20mg iv q12  vanc 1250mg iv q8 Medications (X) Antibiotics 6/4/2021 15:16:25 EDT by Tyra Marie   
  rocephin 1g iv q24   vanc 1250mg iv q8 * Milestone Additional Notes 6/4/21  inpt L&D  
97.9   60  16  101/55  99% ra No labs Meds:  NS with mvi vit k thiamine folic acid iv O93  rocephin 1g iv q24   prozac 60mg po qd   Zyprexa 10mg qhs   Zofran 8mg iv q6 prn x 2    pepcid 20mg iv q12  vanc 1250mg iv q8 Orders:  reg diet   daily wt  FHT qs  
  
OB note Assessment and Plan:  
   
Active Problems:  
  Pregnant (3/1/2018)    
  Eating disorder affecting pregnancy, antepartum (12/2/2020)    
  UTI in pregnancy, antepartum, third trimester (5/12/2021)  
   
   
Pt is a 39 y/o [id number] at 35w5d weeks admitted for UTI s/p failed outpatient medical therapy due to chronic eating disorder.  
   
Urine culture 5/5/21 positive for Klebsiella and EColi-s/p 4 days of ceftriaxone  
-Repeat urine cx on 5/19 positive for enterococcus-now s/p vanc 5/21-6/2 - Appreciate care and management plan  
-afebrile and normal WBC's   
-renal ultrasound  5/21 WNL  
- 5/31: Gram negative rods-sensitivites pending-restarted ceftriaxone 6/2.  
   
Chronic eating disorder -IV fluids, PICC line in place -spoke with Dr Shirley Cosme with [facility] hospitalization program here in [place]-pt does not qualify for this program as she requires high level of care that is available at [facility] inpatient program in [facility]. I spoke with pt re: this option but pt unsure about this option as she doesn't have a plan for care of her  if she enters this program  
-I continue to discuss need for nutrition as pt input minimal and losing weight daily. Pt unable to tolerate dobhoff by ENT. Pt advised by Dr. Linnette Gordon on  that MFM does not think TPN necessary now from fetal perspective as baby's growth is normal. Continue to discuss use of TPN for her nutrition. Pt continues to decline TPN.    
-Goodie bag daily -Hypokalemia-repleted. continue KCL in ivfs   
-Cervical incompetence-cerclage in place  
-Threatened PTL  
-s/pBMTZ #1and #2 on  and   
-Plan to remove cerclage at 36 weeks or with significant contractions   
   
H/o prior  with successful , pt desires   
-GBS pending.  
   
 Severe depression/anxiety with h/o suicidal ideation  
-followed closely by Shemar Comment her help  
-continue zyprexa and prozac as well as prn prazosin and hydroxyzine  
   
GERD  
-famotidine 20mg bid  
   
Ambulate Daily NST  
   
MFM scan on 5/3/21-59%tile and Vertex  
-saw MFM on  8/8 BPP, vtx on US  
   
Anemia- asymptomatic  
-known beta thal minor  
-s/p hematology consult-appreciate recs -s/p iv iron (venofer 200mg) x 5 days  
   
CHTN-stable normal bp's off meds  
   
DVT PPx-ambulation and mirtha hose  
   
Back pain-s/p PT consult and pt was given a support belt which is helping   
   
Pt requesting COVID vaccine if available.   
   
   
ID note Assessment:  
   
#1 UTI with some right flank CVA tenderness  
   
#2 intrauterine pregnancy  
   
#3 asthma  
   
#4 depression  
   
#5 history of candidemia during this current pregnancy s/p meant with amphotericin  
   
#6 hypokalemia    
   
   
Recommendations:  
   
Repeat cx with gram negatives. I changed her to ceftriaxone. Ff up urine cx. If we need to recheck her urine again, would do straight cath. Of note her UA on may 31  had normal WBCs.

## 2021-06-07 NOTE — PROGRESS NOTES
High Risk Obstetrics Progress Note    Name: Thelma Bryan MRN: [id number]  SSN: [id number]    YOB: 1985  Age: 38 y.o. Sex: female      Subjective:      LOS: 26 days    Estimated Date of Delivery: 21   Gestational Age Today: 43w3d     Patient admitted for UTI s/p failed outpatient management due to chronic eating disorder. States she does have moderate nausea/vomiting, normal fetal movement and pelvic cramping with occasional contractions. She also had an episode of spotting when wiping over night but nothing since. and does not have chest pain, shortness of breath and vaginal leaking of fluid . No emesis but hasn't tried to eat anything in days. Objective:     Vitals:  Blood pressure (!) 107/58, pulse 61, temperature 97.9 °F (36.6 °C), resp. rate 16, height 5' 4\" (1.626 m), weight 75.3 kg (166 lb), last menstrual period 2020, SpO2 100 %, not currently breastfeeding. Temp (24hrs), Av.2 °F (36.8 °C), Min:97.9 °F (36.6 °C), Max:98.6 °F (37 °C)    Systolic (20DRY), YVS:766 , Min:99 , OED:893      Diastolic (47WHH), INY:23, Min:56, Max:78       Intake and Output:         Physical Exam:  Patient without distress.   Heart: Regular rate and rhythm  Lung: clear to auscultation throughout lung fields, no wheezes, no rales, no rhonchi and normal respiratory effort  Abdomen: soft, nontender, gravid  Lower Extremities:  - Edema No       Membranes:  Intact    NST pending for today      Labs:   Recent Results (from the past 36 hour(s))   CBC W/O DIFF    Collection Time: 21  4:43 AM   Result Value Ref Range    WBC 6.6 3.6 - 11.0 K/uL    RBC 3.85 3.80 - 5.20 M/uL    HGB 9.0 (L) 11.5 - 16.0 g/dL    HCT 29.0 (L) 35.0 - 47.0 %    MCV 75.3 (L) 80.0 - 99.0 FL    MCH 23.4 (L) 26.0 - 34.0 PG    MCHC 31.0 30.0 - 36.5 g/dL    RDW 17.1 (H) 11.5 - 14.5 %    PLATELET 554 157 - 856 K/uL    MPV 12.0 8.9 - 12.9 FL    NRBC 0.0 0  WBC    ABSOLUTE NRBC 0.00 0.00 - 1.75 K/uL   METABOLIC PANEL, COMPREHENSIVE    Collection Time: 06/06/21  4:43 AM   Result Value Ref Range    Sodium 137 136 - 145 mmol/L    Potassium 3.8 3.5 - 5.1 mmol/L    Chloride 110 (H) 97 - 108 mmol/L    CO2 24 21 - 32 mmol/L    Anion gap 3 (L) 5 - 15 mmol/L    Glucose 71 65 - 100 mg/dL    BUN 3 (L) 6 - 20 MG/DL    Creatinine 0.37 (L) 0.55 - 1.02 MG/DL    BUN/Creatinine ratio 8 (L) 12 - 20      GFR est AA >60 >60 ml/min/1.73m2    GFR est non-AA >60 >60 ml/min/1.73m2    Calcium 8.1 (L) 8.5 - 10.1 MG/DL    Bilirubin, total 0.3 0.2 - 1.0 MG/DL    ALT (SGPT) 20 12 - 78 U/L    AST (SGOT) 15 15 - 37 U/L    Alk. phosphatase 221 (H) 45 - 117 U/L    Protein, total 6.3 (L) 6.4 - 8.2 g/dL    Albumin 2.2 (L) 3.5 - 5.0 g/dL    Globulin 4.1 (H) 2.0 - 4.0 g/dL    A-G Ratio 0.5 (L) 1.1 - 2.2     TYPE & SCREEN    Collection Time: 06/06/21  4:43 AM   Result Value Ref Range    Crossmatch Expiration 06/09/2021,2359     ABO/Rh(D) Liana Copcourtney POSITIVE     Antibody screen NEG    VANCOMYCIN, TROUGH    Collection Time: 06/06/21  5:00 PM   Result Value Ref Range    Vancomycin,trough 12.2 (H) 5.0 - 10.0 ug/mL    Reported dose date NOT PROVIDED      Reported dose time: NOT PROVIDED      Reported dose: NOT PROVIDED UNITS   METABOLIC PANEL, BASIC    Collection Time: 06/07/21  5:52 AM   Result Value Ref Range    Sodium 139 136 - 145 mmol/L    Potassium 3.9 3.5 - 5.1 mmol/L    Chloride 110 (H) 97 - 108 mmol/L    CO2 23 21 - 32 mmol/L    Anion gap 6 5 - 15 mmol/L    Glucose 70 65 - 100 mg/dL    BUN 2 (L) 6 - 20 MG/DL    Creatinine 0.37 (L) 0.55 - 1.02 MG/DL    BUN/Creatinine ratio 5 (L) 12 - 20      GFR est AA >60 >60 ml/min/1.73m2    GFR est non-AA >60 >60 ml/min/1.73m2    Calcium 7.7 (L) 8.5 - 10.1 MG/DL       Assessment and Plan:       Active Problems:    Pregnant (3/1/2018)      Eating disorder affecting pregnancy, antepartum (12/2/2020)      UTI in pregnancy, antepartum, third trimester (5/12/2021)       Pt is a 35 y/o [id number] at 36w1d weeks admitted for UTI s/p failed outpatient medical therapy due to chronic eating disorder.     Urine culture 21 positive for Klebsiella and EColi-s/p 4 days of ceftriaxone  -Repeat urine cx on  positive for enterococcus-now s/p vanc -  - Appreciate I.D. care and management plan  -afebrile and normal WBC's   -renal ultrasound   WNL  - : Morganelli (sensitive to ceftriaxone) and Enterococcus (sensitive to vanc) restarted ceftriaxone . Restart vanc 21     Chronic eating disorder  -IV fluids, PICC line in place  -spoke with Dr Geri Viera with [facility] partial hospitalization program here in [place]-pt does not qualify for this program as she requires high level of care that is available at [facility] inpatient program in [facility]. I spoke with pt re: this option but pt unsure about this option as she doesn't have a plan for care of her  if she enters this program  -I continue to discuss need for nutrition as pt input minimal and losing weight daily. Pt unable to tolerate dobhoff by ENT. Pt advised by Dr. Ta Leong on  that MFM does not think TPN necessary now from fetal perspective as baby's growth is normal. Continue to discuss use of TPN for her nutrition. Pt continues to decline TPN.     -Goodie bag daily  -Hypokalemia-repleted. continue KCL in ivfs   -Cervical incompetence-s/p cerclage removal 21  -Threatened PTL  -s/pBMTZ #1and #2 on  and      H/o prior  with successful , pt desires   -GBS pending.      Severe depression/anxiety with h/o suicidal ideation  -followed closely by Moreno Dwyer her help  -continue zyprexa and prozac as well as prn prazosin and hydroxyzine     GERD  -famotidine 20mg bid     Ambulate  Daily NST     MFM scan on 6/3- 39% tile (5-12);  BPP, vtx     Anemia- asymptomatic  -known beta thal minor  -s/p hematology consult-appreciate recs  -s/p iv iron (venofer 200mg) x 5 days     CHTN-stable normal bp's off meds     DVT PPx-ambulation and mirtha hose     Back pain-s/p PT consult and pt was given a support belt which is helping

## 2021-06-07 NOTE — PROGRESS NOTES
Bedside and Verbal shift change report given to ALBA Gonzalez (oncoming nurse) by Celeste Colmenares. Marva Baldwin RN (offgoing nurse). Report included the following information SBAR, Kardex, Intake/Output, MAR, Accordion and Recent Results. 0057:  Pt ambulated to the bathroom to void and when she wiped she noticed a scant amount of bright red blood on toilet paper. Pt states there's nothing on her underwear and nothing in the toilet, just when she wiped. No other new complaints. Will monitor more closely.

## 2021-06-08 PROCEDURE — 74011250637 HC RX REV CODE- 250/637: Performed by: INTERNAL MEDICINE

## 2021-06-08 PROCEDURE — 59025 FETAL NON-STRESS TEST: CPT

## 2021-06-08 PROCEDURE — 74011000258 HC RX REV CODE- 258: Performed by: INTERNAL MEDICINE

## 2021-06-08 PROCEDURE — 74011250636 HC RX REV CODE- 250/636: Performed by: INTERNAL MEDICINE

## 2021-06-08 PROCEDURE — 65410000002 HC RM PRIVATE OB

## 2021-06-08 PROCEDURE — 74011250637 HC RX REV CODE- 250/637: Performed by: OBSTETRICS & GYNECOLOGY

## 2021-06-08 PROCEDURE — 74011000250 HC RX REV CODE- 250: Performed by: OBSTETRICS & GYNECOLOGY

## 2021-06-08 PROCEDURE — 74011250636 HC RX REV CODE- 250/636: Performed by: OBSTETRICS & GYNECOLOGY

## 2021-06-08 RX ADMIN — FAMOTIDINE 20 MG: 10 INJECTION INTRAVENOUS at 20:23

## 2021-06-08 RX ADMIN — VANCOMYCIN HYDROCHLORIDE 1250 MG: 10 INJECTION, POWDER, LYOPHILIZED, FOR SOLUTION INTRAVENOUS at 12:24

## 2021-06-08 RX ADMIN — FLUOXETINE 60 MG: 20 CAPSULE ORAL at 08:24

## 2021-06-08 RX ADMIN — ONDANSETRON 8 MG: 2 INJECTION INTRAMUSCULAR; INTRAVENOUS at 08:23

## 2021-06-08 RX ADMIN — CYANOCOBALAMIN TAB 500 MCG 1000 MCG: 500 TAB at 08:23

## 2021-06-08 RX ADMIN — ONDANSETRON 8 MG: 2 INJECTION INTRAMUSCULAR; INTRAVENOUS at 16:45

## 2021-06-08 RX ADMIN — FAMOTIDINE 20 MG: 10 INJECTION INTRAVENOUS at 08:23

## 2021-06-08 RX ADMIN — FOLIC ACID: 5 INJECTION, SOLUTION INTRAMUSCULAR; INTRAVENOUS; SUBCUTANEOUS at 16:45

## 2021-06-08 RX ADMIN — VANCOMYCIN HYDROCHLORIDE 1250 MG: 10 INJECTION, POWDER, LYOPHILIZED, FOR SOLUTION INTRAVENOUS at 04:00

## 2021-06-08 RX ADMIN — POTASSIUM CHLORIDE, DEXTROSE MONOHYDRATE AND SODIUM CHLORIDE 125 ML/HR: 150; 5; 900 INJECTION, SOLUTION INTRAVENOUS at 22:42

## 2021-06-08 RX ADMIN — POLYETHYLENE GLYCOL 3350 17 G: 17 POWDER, FOR SOLUTION ORAL at 08:33

## 2021-06-08 RX ADMIN — CEFTRIAXONE SODIUM 1 G: 1 INJECTION, POWDER, FOR SOLUTION INTRAMUSCULAR; INTRAVENOUS at 15:01

## 2021-06-08 RX ADMIN — VANCOMYCIN HYDROCHLORIDE 1250 MG: 10 INJECTION, POWDER, LYOPHILIZED, FOR SOLUTION INTRAVENOUS at 20:30

## 2021-06-08 RX ADMIN — DOCUSATE SODIUM 100 MG: 100 CAPSULE, LIQUID FILLED ORAL at 08:24

## 2021-06-08 RX ADMIN — POTASSIUM CHLORIDE, DEXTROSE MONOHYDRATE AND SODIUM CHLORIDE 125 ML/HR: 150; 5; 900 INJECTION, SOLUTION INTRAVENOUS at 12:59

## 2021-06-08 NOTE — PROGRESS NOTES
High Risk Obstetrics Progress Note    Name: Stoney Callaway MRN: [id number]  SSN: [id number]    YOB: 1985  Age: 38 y.o. Sex: female      Subjective:      LOS: 27 days    Estimated Date of Delivery: 21   Gestational Age Today: 37w1d     Patient admitted for UTI s/p failed outpatient therapy due to chronic eating disorder. States she does have normal fetal movement and a lot of pelvic cramping and occasional contractions. Still with some flank pain. and does not have chest pain, headache , shortness of breath, vaginal bleeding  and vaginal leaking of fluid . Objective:     Vitals:  Blood pressure (!) 106/56, pulse 60, temperature 98 °F (36.7 °C), resp. rate 16, height 5' 4\" (1.626 m), weight 75.5 kg (166 lb 6.4 oz), last menstrual period 2020, SpO2 100 %, not currently breastfeeding. Temp (24hrs), Av.2 °F (36.8 °C), Min:97.9 °F (36.6 °C), Max:98.5 °F (04.3 °C)    Systolic (53TUY), TSB:255 , Min:100 , PLX:794      Diastolic (95RVI), FWV:74, Min:56, Max:68       Intake and Output:         Physical Exam:  Patient without distress.   Heart: Regular rate and rhythm  Lung: clear to auscultation throughout lung fields, no wheezes, no rales, no rhonchi and normal respiratory effort  Abdomen: soft, nontender, gravid  Lower Extremities:  - Edema No       Membranes:  Intact    Uterine Activity:  None on 21 NST    Fetal Heart Rate:  Reactive on NST 21  Baseline: 135s per minute  Variability: moderate  Accelerations: yes  Decelerations: none        Labs:   Recent Results (from the past 36 hour(s))   METABOLIC PANEL, BASIC    Collection Time: 21  5:52 AM   Result Value Ref Range    Sodium 139 136 - 145 mmol/L    Potassium 3.9 3.5 - 5.1 mmol/L    Chloride 110 (H) 97 - 108 mmol/L    CO2 23 21 - 32 mmol/L    Anion gap 6 5 - 15 mmol/L    Glucose 70 65 - 100 mg/dL    BUN 2 (L) 6 - 20 MG/DL    Creatinine 0.37 (L) 0.55 - 1.02 MG/DL    BUN/Creatinine ratio 5 (L) 12 - 20      GFR est AA >60 >60 ml/min/1.73m2    GFR est non-AA >60 >60 ml/min/1.73m2    Calcium 7.7 (L) 8.5 - 10.1 MG/DL       Assessment and Plan: Active Problems:    Pregnant (3/1/2018)      Eating disorder affecting pregnancy, antepartum (2020)      UTI in pregnancy, antepartum, third trimester (2021)       Pt is a 35 y/o [id number] at [facility] admitted for UTI s/p failed outpatient medical therapy due to chronic eating disorder.     Urine culture 21 positive for Klebsiella and EColi-s/p 4 days of ceftriaxone  -Repeat urine cx on  positive for enterococcus-now s/p vanc -  - Appreciate I.D. care and management plan  -afebrile and normal WBC's   -renal ultrasound   WNL  - : Morganelli (sensitive to ceftriaxone) and Enterococcus (sensitive to vanc) restarted ceftriaxone . Restart vanc 21     Chronic eating disorder  -IV fluids, PICC line in place  -spoke with Dr Snow Jung with [facility] partial hospitalization program here in [place]-pt does not qualify for this program as she requires high level of care that is available at [facility] inpatient program in [facility]. I spoke with pt re: this option but pt unsure about this option as she doesn't have a plan for care of her  if she enters this program  -I continue to discuss need for nutrition as pt input minimal and losing weight daily. Pt unable to tolerate dobhoff by ENT. Pt advised by Dr. Sugey Camp on  that MFM does not think TPN necessary now from fetal perspective as baby's growth is normal. Continue to discuss use of TPN for her nutrition. Pt continues to decline TPN.     -Goodie bag daily  -Hypokalemia-repleted. continue KCL in ivfs   -Cervical incompetence-s/p cerclage removal 21  -Threatened PTL  -s/pBMTZ #1and #2 on  and      H/o prior  with successful , pt desires   -GBS pending.      Severe depression/anxiety with h/o suicidal ideation  -followed closely by Giancarlo Marrerosing her help  -continue zyprexa and prozac as well as prn prazosin and hydroxyzine     GERD  -famotidine 20mg bid     Ambulate  Daily NST     MFM scan on 6/3- 39% tile (5-12); 8/8 BPP, vtx     Anemia- asymptomatic  -known beta thal minor  -s/p hematology consult-appreciate recs  -s/p iv iron (venofer 200mg) x 5 days     CHTN-stable normal bp's off meds     DVT PPx-ambulation and mirtha hose     Back pain-s/p PT consult and pt was given a support belt which is helping

## 2021-06-08 NOTE — PROGRESS NOTES
Bedside and Verbal shift change report given to Dona RN (oncoming nurse) by SUSHMA Ramirez RN  (offgoing nurse). Report included the following information SBAR, Kardex, Intake/Output, MAR and Recent Results. Breakfast: 0% tray & ensure. OJ taken with mirlax   Lunch: 0% tray & ensure.

## 2021-06-08 NOTE — PROGRESS NOTES
for follow up visit.  talked with pt's RN for update. Please contact [phone] [facility] for further support.      [address] Lily Partida, MACE   [phone]

## 2021-06-08 NOTE — PROGRESS NOTES
1945: Bedside and Verbal shift change report given to PER (oncoming nurse) by SUSHMA Farley (offgoing nurse). Report included the following information SBAR, Kardex, Intake/Output, MAR, Accordion and Recent Results. 0645: Pt reported only drank a total of 16 oz of ginger ale by mouth since start of shift. Encouraged pt to drink Ensure.

## 2021-06-08 NOTE — PROGRESS NOTES
1930: Bedside shift change report given to Myrna RN and Gabriela Gonzalez RN (oncoming nurse) by Marce Whitaker RN (offgoing nurse). Report included the following information SBAR, Kardex, Intake/Output, MAR and Recent Results. 2200: Patient called out and reported a small amount of blood on toilet paper when she wiped. Upon assessment, there was a small amount of red blood with a small amount of pink mucousy discharge on toilet paper. Patient states she is in no pain at this time. RN instructed patient to call out if she notices any other bleeding or discharge. Patient resting comfortably at this time. Will continue to monitor. 2300: Patient called out to report more vaginal bleeding while in the bathroom. Same amount and consistency as before. Now stating she feels contractions every 15-20 minutes. Patient on monitor. 2330: One contraction traced on monitor. MD notified. Will continue to monitor. 0030: Contractions are becoming closer together and more frequently. MD notified. 0040: MD at bedside and is checking patient's cervix. Cervix still at 3 cm. Instructed to transfer to LD.    0055: TRANSFER - OUT REPORT:    Verbal report given to Jacqueline Bradley (name) on Minor Longest  being transferred to Labor and Delivery (unit) for routine progression of care       Report consisted of patients Situation, Background, Assessment and   Recommendations(SBAR). Information from the following report(s) SBAR, Kardex, Intake/Output, MAR and Recent Results was reviewed with the receiving nurse.     Lines:   PICC Double Lumen 85/74/23 Left;Basilic (Active)   Central Line Being Utilized Yes 06/08/21 2020   Criteria for Appropriate Use Limited/no vessel suitable for conventional peripheral access 06/08/21 2020   Site Assessment Clean, dry, & intact 06/08/21 2020   Phlebitis Assessment 0 06/08/21 2020   Infiltration Assessment 0 06/08/21 2020   Date of Last Dressing Change 06/08/21 06/08/21 2020   Dressing Status Clean, dry, & intact 06/08/21 2020   Action Taken Open ports on tubing capped 06/08/21 2020   External Catheter Length (cm) 3 centimeters 06/08/21 2020   Dressing Type Disk with Chlorhexadine gluconate (CHG); Transparent 06/08/21 2020   Hub Color/Line Status Infusing;Pink 06/08/21 2020   Positive Blood Return (Site #1) Yes 06/08/21 2020   Hub Color/Line Status White; Infusing 06/08/21 2020   Positive Blood Return (Site #2) Yes 06/08/21 2020   Alcohol Cap Used Yes 06/08/21 2020        Opportunity for questions and clarification was provided.       Patient transported with:   Registered Nurse

## 2021-06-09 ENCOUNTER — TELEPHONE (OUTPATIENT)
Dept: BEHAVIORAL/MENTAL HEALTH CLINIC | Age: 36
End: 2021-06-09

## 2021-06-09 LAB
ANION GAP SERPL CALC-SCNC: 7 MMOL/L (ref 5–15)
BUN SERPL-MCNC: 2 MG/DL (ref 6–20)
BUN/CREAT SERPL: 5 (ref 12–20)
CALCIUM SERPL-MCNC: 8.2 MG/DL (ref 8.5–10.1)
CHLORIDE SERPL-SCNC: 108 MMOL/L (ref 97–108)
CO2 SERPL-SCNC: 22 MMOL/L (ref 21–32)
CREAT SERPL-MCNC: 0.37 MG/DL (ref 0.55–1.02)
DATE LAST DOSE: ABNORMAL
GLUCOSE SERPL-MCNC: 73 MG/DL (ref 65–100)
POTASSIUM SERPL-SCNC: 3.9 MMOL/L (ref 3.5–5.1)
REPORTED DOSE,DOSE: ABNORMAL UNITS
REPORTED DOSE/TIME,TMG: ABNORMAL
SODIUM SERPL-SCNC: 137 MMOL/L (ref 136–145)
VANCOMYCIN TROUGH SERPL-MCNC: 11.4 UG/ML (ref 5–10)

## 2021-06-09 PROCEDURE — 36415 COLL VENOUS BLD VENIPUNCTURE: CPT

## 2021-06-09 PROCEDURE — 74011250637 HC RX REV CODE- 250/637: Performed by: INTERNAL MEDICINE

## 2021-06-09 PROCEDURE — 65410000002 HC RM PRIVATE OB

## 2021-06-09 PROCEDURE — 74011250636 HC RX REV CODE- 250/636: Performed by: OBSTETRICS & GYNECOLOGY

## 2021-06-09 PROCEDURE — 74011250637 HC RX REV CODE- 250/637: Performed by: OBSTETRICS & GYNECOLOGY

## 2021-06-09 PROCEDURE — 80202 ASSAY OF VANCOMYCIN: CPT

## 2021-06-09 PROCEDURE — 80048 BASIC METABOLIC PNL TOTAL CA: CPT

## 2021-06-09 PROCEDURE — 74011000250 HC RX REV CODE- 250: Performed by: OBSTETRICS & GYNECOLOGY

## 2021-06-09 RX ORDER — BUTORPHANOL TARTRATE 1 MG/ML
1 INJECTION INTRAMUSCULAR; INTRAVENOUS
Status: DISCONTINUED | OUTPATIENT
Start: 2021-06-09 | End: 2021-06-09 | Stop reason: HOSPADM

## 2021-06-09 RX ADMIN — POLYETHYLENE GLYCOL 3350 17 G: 17 POWDER, FOR SOLUTION ORAL at 08:19

## 2021-06-09 RX ADMIN — FAMOTIDINE 20 MG: 10 INJECTION INTRAVENOUS at 20:43

## 2021-06-09 RX ADMIN — POTASSIUM CHLORIDE, DEXTROSE MONOHYDRATE AND SODIUM CHLORIDE 125 ML/HR: 150; 5; 900 INJECTION, SOLUTION INTRAVENOUS at 08:57

## 2021-06-09 RX ADMIN — DOCUSATE SODIUM 100 MG: 100 CAPSULE, LIQUID FILLED ORAL at 08:14

## 2021-06-09 RX ADMIN — FLUOXETINE 60 MG: 20 CAPSULE ORAL at 08:13

## 2021-06-09 RX ADMIN — FOLIC ACID: 5 INJECTION, SOLUTION INTRAMUSCULAR; INTRAVENOUS; SUBCUTANEOUS at 16:08

## 2021-06-09 RX ADMIN — OLANZAPINE 10 MG: 5 TABLET, FILM COATED ORAL at 22:22

## 2021-06-09 RX ADMIN — FAMOTIDINE 20 MG: 10 INJECTION INTRAVENOUS at 08:14

## 2021-06-09 RX ADMIN — ONDANSETRON 8 MG: 2 INJECTION INTRAMUSCULAR; INTRAVENOUS at 12:23

## 2021-06-09 RX ADMIN — VANCOMYCIN HYDROCHLORIDE 1250 MG: 10 INJECTION, POWDER, LYOPHILIZED, FOR SOLUTION INTRAVENOUS at 11:31

## 2021-06-09 RX ADMIN — BUTORPHANOL TARTRATE 1 MG: 1 INJECTION, SOLUTION INTRAMUSCULAR; INTRAVENOUS at 01:26

## 2021-06-09 RX ADMIN — VANCOMYCIN HYDROCHLORIDE 1250 MG: 10 INJECTION, POWDER, LYOPHILIZED, FOR SOLUTION INTRAVENOUS at 20:42

## 2021-06-09 RX ADMIN — ONDANSETRON 8 MG: 2 INJECTION INTRAMUSCULAR; INTRAVENOUS at 20:43

## 2021-06-09 RX ADMIN — POTASSIUM CHLORIDE, DEXTROSE MONOHYDRATE AND SODIUM CHLORIDE 125 ML/HR: 150; 5; 900 INJECTION, SOLUTION INTRAVENOUS at 22:22

## 2021-06-09 RX ADMIN — ONDANSETRON 8 MG: 2 INJECTION INTRAMUSCULAR; INTRAVENOUS at 01:13

## 2021-06-09 RX ADMIN — CYANOCOBALAMIN TAB 500 MCG 1000 MCG: 500 TAB at 08:13

## 2021-06-09 RX ADMIN — VANCOMYCIN HYDROCHLORIDE 1250 MG: 10 INJECTION, POWDER, LYOPHILIZED, FOR SOLUTION INTRAVENOUS at 05:37

## 2021-06-09 NOTE — PROGRESS NOTES
Labor Progress Note    Patient seen, fetal heart rate and contraction pattern evaluated. Physical Exam:  Cervical Exam: not examined  Membranes:  Intact  Uterine Activity: Frequency: Irregular  Fetal Heart Rate: Reactive  Accelerations: yes  Decelerations: none  Variability- moderate  Uterine contractions: irregular    Assessment/Plan:  Reassuring fetal status.    observered on L&D overnight without  labor progress  Will transfer back to Antepartum now    Lam Rolon MD

## 2021-06-09 NOTE — TELEPHONE ENCOUNTER
Made contact with pt. Pt still inpatient at [address] [place] [address] at this time and is requesting to speak to provider. Advised pt that provider is on vacation but will get message to her to please reach out.     Sky Dominguez RN

## 2021-06-09 NOTE — PROGRESS NOTES
Verbal shift change report given to SUSHMA Mcclelland , RN (oncoming nurse) by Avery Hoffmann RN, RN (offgoing nurse). Report included the following information SBAR. Patient complained of nausea and cramping pain. Stated she could manage the cramping because it comes and goes. Declined tylenol. Told her she also could have phenergan suppository, she declined and stated if the nausea became worse she would take it. Patient educated on the benefits of utilizing medication to control pain and nausea. Still declined. 1900 pt ate none of her food.

## 2021-06-09 NOTE — PROGRESS NOTES
High Risk Obstetrics Progress Note    Name: Kwan Coles MRN: [id number]  SSN: [id number]    YOB: 1985  Age: 38 y.o. Sex: female      Subjective:      LOS: 28 days    Estimated Date of Delivery: 21   Gestational Age Today: 43w3d     Patient admitted for UTI s/p failed outpatient treatment due to chronic eating disorder. States she does have normal fetal movement, intermittent contractions-was transferred overnight for rule out labor but contractions spaced out again, a small amount of bleeding with contractions overnight which has now resolved, continuous nausea but no emesis as pt hasn't eaten anything in over a week or more and does not have chest pain, headache , shortness of breath and vaginal leaking of fluid . Objective:     Vitals:  Blood pressure 125/79, pulse 62, temperature 98.4 °F (36.9 °C), resp. rate 16, height 5' 4\" (1.626 m), weight 75.3 kg (166 lb), last menstrual period 2020, SpO2 100 %, not currently breastfeeding. Temp (24hrs), Av.2 °F (36.8 °C), Min:97.7 °F (36.5 °C), Max:98.9 °F (36.4 °C)    Systolic (61NWE), WWJ:340 , Min:106 , OAQ:799      Diastolic (17JLI), NKD:24, Min:61, Max:79       Intake and Output:         Physical Exam:  Patient without distress. Heart: Regular rate and rhythm  Lung: clear to auscultation throughout lung fields, no wheezes, no rales, no rhonchi and normal respiratory effort  Abdomen: soft, nontender, gravid  Lower Extremities:  - Edema No       Membranes:  Intact    Monitoring this morning on L&D: Category I tracing.  Contractions present but irregular      Labs:   Recent Results (from the past 36 hour(s))   VANCOMYCIN, TROUGH    Collection Time: 21  3:48 AM   Result Value Ref Range    Vancomycin,trough 11.4 (H) 5.0 - 10.0 ug/mL    Reported dose date NOT PROVIDED      Reported dose time: NOT PROVIDED      Reported dose: NOT PROVIDED UNITS   METABOLIC PANEL, BASIC    Collection Time: 21  3:48 AM   Result Value Ref Range    Sodium 137 136 - 145 mmol/L    Potassium 3.9 3.5 - 5.1 mmol/L    Chloride 108 97 - 108 mmol/L    CO2 22 21 - 32 mmol/L    Anion gap 7 5 - 15 mmol/L    Glucose 73 65 - 100 mg/dL    BUN 2 (L) 6 - 20 MG/DL    Creatinine 0.37 (L) 0.55 - 1.02 MG/DL    BUN/Creatinine ratio 5 (L) 12 - 20      GFR est AA >60 >60 ml/min/1.73m2    GFR est non-AA >60 >60 ml/min/1.73m2    Calcium 8.2 (L) 8.5 - 10.1 MG/DL       Assessment and Plan: Active Problems:    Pregnant (3/1/2018)      Eating disorder affecting pregnancy, antepartum (2020)      UTI in pregnancy, antepartum, third trimester (2021)       Pt is a 35 y/o [id number] at 36w3 weeks admitted for UTI s/p failed outpatient medical therapy due to chronic eating disorder.     Urine culture 21 positive for Klebsiella and EColi-s/p 4 days of ceftriaxone  -Repeat urine cx on  positive for enterococcus-now s/p vanc -  - Appreciate I.D. care and management plan  -afebrile and normal WBC's   -renal ultrasound   WNL  - : Morganelli (sensitive to ceftriaxone) and Enterococcus (sensitive to vanc) restarted ceftriaxone . Restart vanc 21     Chronic eating disorder  -IV fluids, PICC line in place  -spoke with Dr Morris Martinez with [facility] partial hospitalization program here in [place]-pt does not qualify for this program as she requires high level of care that is available at [facility] inpatient program in [facility]. I spoke with pt re: this option but pt unsure about this option as she doesn't have a plan for care of her  if she enters this program  -I continue to discuss need for nutrition as pt input minimal and losing weight daily. Pt unable to tolerate dobhoff by ENT. Pt advised by Dr. Mar Cortez on  that MFM does not think TPN necessary now from fetal perspective as baby's growth is normal. Continue to discuss use of TPN for her nutrition. Pt continues to decline TPN.     -Goodie bag daily  -Hypokalemia-repleted. continue KCL in ivfs   -Cervical incompetence-s/p cerclage removal 21  -Threatened PTL  -s/pBMTZ #1and #2 on  and      H/o prior  with successful , pt desires   -GBS pending.      Severe depression/anxiety with h/o suicidal ideation  -followed closely by Odella Goldberg her help  -continue zyprexa and prozac as well as prn prazosin and hydroxyzine     GERD  -famotidine 20mg bid     Ambulate  Daily NST     MFM scan on 6/3- 39% tile (5-12);  BPP, vtx     Anemia- asymptomatic  -known beta thal minor  -s/p hematology consult-appreciate recs  -s/p iv iron (venofer 200mg) x 5 days     CHTN-stable normal bp's off meds     DVT PPx-ambulation and mirtha hose     Back pain-s/p PT consult and pt was given a support belt which is helping

## 2021-06-09 NOTE — ADT AUTH CERT NOTES
Utilization Reviews 
 
  
Urinary Tract Infection (UTI) - Care Day 28 (6/8/2021) by Rafa Ramirez 
 
  
Review Entered Review Status 6/9/2021 16:43 Completed  
  
Criteria Review Care Day: 28 Care Date: 6/8/2021 Level of Care: Inpatient Floor Guideline Day 3 Clinical Status   
(X) * Hemodynamic stability 6/9/2021 16:43:53 EDT by Rafa Ramirez   
  VSS   
(X) * Mental status at baseline 6/9/2021 16:43:53 EDT by Rafa Ramirez   
  LOC alert   
( ) * Antibiotic regimen for next level of care established 6/9/2021 16:43:53 EDT by Rafa Ramirez   
  Vancomycin 1,250mg IV q8 Ceftriaxone 1g IV q24   
(X) * Urine output adequate 6/9/2021 16:43:53 EDT by Rafa Ramirez   
  voiding   
(X) * Renal function at baseline or acceptable for next level of care 6/9/2021 16:43:53 EDT by Rafa Ramirez   
  voiding   
(X) * Pain absent or managed 6/9/2021 16:43:53 EDT by Rafa Ramirez   
  Pain 0/10   
( ) * Oral intake adequate 6/9/2021 16:43:53 EDT by Rafa Ramierz   
  pt input minimal and losing weight daily   
(X) * Afebrile or temperature acceptable for next level of care 6/9/2021 16:43:53 EDT by Rafa Ramirez   
  Temp 98.9   
(X) * Vomiting absent 6/9/2021 16:43:53 EDT by Rafa Ramirez   
  none noted ( ) * Discharge plans and education understood Activity   
(X) * Ambulatory or acceptable for next level of care 6/9/2021 16:43:53 EDT by Rafa Ramirez   
  up ad verito Routes   
(X) * Oral hydration, medications, and diet 6/9/2021 16:43:53 EDT by Rafa Ramirez   
  regular diet with ensure enlive nutritional supplement, PO meds Medications (X) Antibiotics 6/9/2021 16:43:53 EDT by Rafa Ramirez   
  Vancomycin 1,250mg IV q8 Ceftriaxone 1g IV q24   
* Milestone Additional Notes Date of care: 6/8/2021 IP- LOC- L&D Obgyn PN: Subjective: LOS: 27 days    
Estimated Date of Delivery: 7/4/21 Gestational Age Today: 37w1d Patient admitted for UTI s/p failed outpatient therapy due to chronic eating disorder. States she does have normal fetal movement and a lot of pelvic cramping and occasional contractions. Still with some flank pain. and does not have chest pain, headache , shortness of breath, vaginal bleeding  and vaginal leaking of fluid Patient without distress. Heart: Regular rate and rhythm Lung: clear to auscultation throughout lung fields, no wheezes, no rales, no rhonchi and normal respiratory effort Abdomen: soft, nontender, gravid Lower Extremities:  - Edema No     
Membranes:  Intact Uterine Activity:  None on 21 NST Fetal Heart Rate:  Reactive on NST 21 Baseline: 135s per minute Variability: moderate Accelerations: yes Decelerations: none    
Assessment and Plan: Active Problems:  
  Pregnant (3/1/2018)  
  Eating disorder affecting pregnancy, antepartum (2020)  
  UTI in pregnancy, antepartum, third trimester (2021) Pt is a 37 y/o [id number] at 36w2d weeks admitted for UTI s/p failed outpatient medical therapy due to chronic eating disorder. Urine culture 21 positive for Klebsiella and EColi-s/p 4 days of ceftriaxone  
-Repeat urine cx on  positive for enterococcus-now s/p vanc - - Appreciate I.D. care and management plan  
-afebrile and normal WBC's   
-renal ultrasound   WNL  
- : Morganelli (sensitive to ceftriaxone) and Enterococcus (sensitive to vanc) restarted ceftriaxone . Restart vanc 21  
   
Chronic eating disorder -IV fluids, PICC line in place  
-spoke with Dr Kathy George with [facility] here in [place]-pt does not qualify for this program as she requires high level of care that is available at [facility] inpatient program in [facility]. I spoke with pt re: this option but pt unsure about this option as she doesn't have a plan for care of her  if she enters this program  
-I continue to discuss need for nutrition as pt input minimal and losing weight daily. Pt unable to tolerate dobhoff by ENT. Pt advised by Dr. Girma Fontanez on  that MFM does not think TPN necessary now from fetal perspective as baby's growth is normal. Continue to discuss use of TPN for her nutrition. Pt continues to decline TPN.    
-Goodie bag daily -Hypokalemia-repleted. continue KCL in ivfs   
-Cervical incompetence-s/p cerclage removal 21  
-Threatened PTL  
-s/pBMTZ #1and #2 on  and   
   
H/o prior  with successful , pt desires   
-GBS pending.  
   
 Severe depression/anxiety with h/o suicidal ideation  
-followed closely by Ed Rodney her help  
-continue zyprexa and prozac as well as prn prazosin and hydroxyzine  
   
GERD  
-famotidine 20mg bid  
   
Ambulate Daily NST  
   
MFM scan on 6/3- 39% tile (5-12);  BPP, vtx  
   
Anemia- asymptomatic  
-known beta thal minor  
-s/p hematology consult-appreciate recs -s/p iv iron (venofer 200mg) x 5 days  
   
CHTN-stable normal bp's off meds  
   
DVT PPx-ambulation and meghana hose  
   
Back pain-s/p PT consult and pt was given a support belt which is helping   
   
Vitals: Temp 98.9, HR 69, /56, RR 16, 99% on RA No labs at time of review Medications:  
0.9% sodium chloride 1,000 mL with mvi (adult no. 4 with vit K) 10 mL, thiamine 126 mg, folic acid 1 mg infusion IV q24 Vitamin b12 1,000mcg PO daily D5NS with KCl 20mEq/L 125mL/hr IV Colace 100mg PO daily Pepcid 20mg IV q12 Prozac 60mg PO daily Zyprexa 10mg PO every bedtime Zofran 8mg IV q6 PRN x2  
Miralax 17g PO daily PRN x1 Plan: regular diet with ensure enlive nutritional supplement, daily weights, fetal heart rate qshift, fetal nonstress test daily, MEGHANA hose, up ad verito  
  
  
  
  
  
  
  
  
  
Urinary Tract Infection (UTI) - Care Day 27 (2021) by Jennifer Guaman 
 
  
Review Entered Review Status 2021 16:34 Completed  
  
Criteria Review Care Day: 32 Care Date: 6/7/2021 Level of Care: Inpatient Floor Guideline Day 3 Level Of Care (X) Floor to discharge Clinical Status   
(X) * Hemodynamic stability 6/9/2021 16:34:25 EDT by Kathy Whittaker   
  VSS   
(X) * Mental status at baseline 6/9/2021 16:34:25 EDT by Kathy Whittaker   
  LOC alert   
( ) * Antibiotic regimen for next level of care established 6/9/2021 16:34:25 EDT by Kathy Whittaker   
  Continue vanc and ceftriaxone for e faecalis and morganella   
(X) * Urine output adequate 6/9/2021 16:34:25 EDT by Kathy Whittaker   
  voiding   
(X) * Renal function at baseline or acceptable for next level of care 6/9/2021 16:34:25 EDT by Kathy Whittaker   
  BUN: 2 (L) Creatinine: 0.37 (L)   
(X) * Pain absent or managed 6/9/2021 16:34:25 EDT by Kathy Whittaker   
  Pain 0/10   
( ) * Oral intake adequate 6/9/2021 16:34:25 EDT by Kathy Whittaker   
  No emesis but hasn't tried to eat anything in days.   
(X) * Afebrile or temperature acceptable for next level of care 6/9/2021 16:34:25 EDT by Kathy Whittaker   
  Temp 98.5   
(X) * Vomiting absent 6/9/2021 16:34:25 EDT by Kathy Whittaker   
  no emesis ( ) * Discharge plans and education understood Activity   
(X) * Ambulatory or acceptable for next level of care 6/9/2021 16:34:25 EDT by Kathy Whittaker   
  up ad verito Routes   
(X) * Oral hydration, medications, and diet 6/9/2021 16:34:25 EDT by Kathy Whittaker   
  regular diet with ensure enlive nutritional supplement Interventions (X) Renal function tests, urinalysis 6/9/2021 16:34:25 EDT by Kathy Whittaker   
  see below Medications (X) Antibiotics 6/9/2021 16:34:25 EDT by Kathy Whittaker   
  Vancomycin 1,250mg IV q8 Ceftriaxone 1g IV q24   
* Milestone Additional Notes Date of care: 6/7/2021 IP- LOC- L&D Obrichardn PN: LOS: 26 days   Estimated Date of Delivery: 21 Gestational Age Today: 43w3d Patient admitted for UTI s/p failed outpatient management due to chronic eating disorder. States she does have moderate nausea/vomiting, normal fetal movement and pelvic cramping with occasional contractions. She also had an episode of spotting when wiping over night but nothing since. and does not have chest pain, shortness of breath and vaginal leaking of fluid . No emesis but hasn't tried to eat anything in days. Patient without distress. Heart: Regular rate and rhythm Lung: clear to auscultation throughout lung fields, no wheezes, no rales, no rhonchi and normal respiratory effort Abdomen: soft, nontender, gravid Lower Extremities:  - Edema No     
Membranes:  Intact NST pending for today Assessment and Plan: Active Problems:  
  Pregnant (3/1/2018)  
  Eating disorder affecting pregnancy, antepartum (2020)  
  UTI in pregnancy, antepartum, third trimester (2021) Pt is a 37 y/o [id number] at 36w1d weeks admitted for UTI s/p failed outpatient medical therapy due to chronic eating disorder. Urine culture 21 positive for Klebsiella and EColi-s/p 4 days of ceftriaxone  
-Repeat urine cx on  positive for enterococcus-now s/p vanc - - Appreciate I.D. care and management plan  
-afebrile and normal WBC's   
-renal ultrasound   WNL  
- : Morganelli (sensitive to ceftriaxone) and Enterococcus (sensitive to vanc) restarted ceftriaxone . Restart vanc 21  
   
Chronic eating disorder -IV fluids, PICC line in place  
-spoke with Dr Matthew Garcia with [facility] here in [place]-pt does not qualify for this program as she requires high level of care that is available at [facility] inpatient program in [facility]. I spoke with pt re: this option but pt unsure about this option as she doesn't have a plan for care of her  if she enters this program  
-I continue to discuss need for nutrition as pt input minimal and losing weight daily. Pt unable to tolerate dobhoff by ENT. Pt advised by Dr. Nida Villareal on  that MFM does not think TPN necessary now from fetal perspective as baby's growth is normal. Continue to discuss use of TPN for her nutrition. Pt continues to decline TPN.    
-Goodie bag daily -Hypokalemia-repleted. continue KCL in ivfs   
-Cervical incompetence-s/p cerclage removal 21  
-Threatened PTL  
-s/pBMTZ #1and #2 on  and   
   
H/o prior  with successful , pt desires   
-GBS pending.  
   
 Severe depression/anxiety with h/o suicidal ideation  
-followed closely by Hollis Jin her help  
-continue zyprexa and prozac as well as prn prazosin and hydroxyzine  
   
GERD  
-famotidine 20mg bid  
   
Ambulate Daily NST  
   
MFM scan on 6/3- 39% tile (5-12);  BPP, vtx  
   
Anemia- asymptomatic  
-known beta thal minor  
-s/p hematology consult-appreciate recs -s/p iv iron (venofer 200mg) x 5 days  
   
CHTN-stable normal bp's off meds  
   
DVT PPx-ambulation and mirtha hose  
   
Back pain-s/p PT consult and pt was given a support belt which is helping ID PN: Assessment: #1 UTI with some right flank CVA tenderness #2 intrauterine pregnancy #3 asthma #4 depression #5 history of candidemia during this current pregnancy s/p meant with amphotericin #6 hypokalemia Recommendations:  
Continue vanc and ceftriaxone for e faecalis and morganella.  If we need to recheck her urine again, would do straight cath. Of note her UA on may 31  had normal WBCs.   
   
Vitals: Temp 98.5, HR 59, /56, RR 16, 99% on RA Labs:  
2021 05:52 Sodium: 139 Potassium: 3.9 Chloride: 110 (H)  
CO2: 23 Anion gap: 6 Glucose: 70 BUN: 2 (L) Creatinine: 0.37 (L) BUN/Creatinine ratio: 5 (L) Calcium: 7.7 (L) GFR est non-AA: >60  
GFR est AA: >60 Medications:  
0.9% sodium chloride 1,000 mL with mvi (adult no. 4 with vit K) 10 mL, thiamine 128 mg, folic acid 1 mg infusion IV q24 Vitamin b12 1,000mcg PO daily D5NS with KCl 20mEq/L 125mL/hr IV Colace 100mg PO daily Pepcid 20mg IV q12 Prozac 60mg PO daily Hydroxyzine 50mg PO TID PRN x1 Zyprexa 10mg PO every bedtime Zofran 8mg IV q6 PRN x2  
  
Plan: regular diet with ensure enlive nutritional supplement, daily weights, fetal heart rate qshift, fetal nonstress test daily, MEGHANA hose, up ad verito

## 2021-06-09 NOTE — PROGRESS NOTES
TRANSITIONS OF CARE :  CRM notes that patient did spend some time in L and D last nite and returned to her room earlier this am.  Patient is now comfortable in her room and denies any pain or feeling any contractions at this time. She is tentatively scheduled to deliver on June 12th. Patient remains on IVAB. Care management will continue to follow for any transition of care needs.

## 2021-06-09 NOTE — PROGRESS NOTES
TRANSFER - IN REPORT:    Verbal report received from [employer], RN (name) on Antonio Lynch  being received from Labor and Delivery (unit) for routine progression of care      Report consisted of patients Situation, Background, Assessment and   Recommendations(SBAR). Information from the following report(s) SBAR, Kardex, Intake/Output, MAR and Recent Results was reviewed with the receiving nurse. Opportunity for questions and clarification was provided. Assessment completed upon patients arrival to unit and care assumed.

## 2021-06-09 NOTE — PROGRESS NOTES
Bedside and Verbal shift change report given to Indy Sloan RN (oncoming nurse) by Jane Polo and Soraida RN (offgoing nurse). Report included the following information SBAR, Kardex, Intake/Output, MAR and Recent Results.

## 2021-06-09 NOTE — PROGRESS NOTES
Labor Progress Note    Patient seen, fetal heart rate and contraction pattern evaluated. Physical Exam:  Cervical Exam: 3-4/50/-3  Membranes:  Intact  Uterine Activity: Frequency: regular Q 5min  Fetal Heart Rate: Reactive  Accelerations: yes  Decelerations: none  Variable- moderate  Uterine contractions: regular    Assessment/Plan:  Reassuring fetal status.    Will continue to observe overnight for labor  Transfer to L&D  Pain meds JANE Brito MD

## 2021-06-09 NOTE — PROGRESS NOTES
Comprehensive Nutrition Assessment    Type and Reason for Visit: Reassess    Nutrition Recommendations/Plan:     Continue to provide favorite foods items as tolerated.      Continue with blinded wt checks. Retry oral prenatal MVI and Vit D daily     RN's continue to record all oral intake in flow sheets     Nutrition Assessment:     GA: 36w3d  Pt with 2.9 kg wt loss since admission. Continues to refuse meals, does not drink supplements and refusing TPN. Continue concern with ongoing suboptimal intake of macro/micronutriets affecting infant and much more the patient. Unable to place Dobbhoff tube d/t anxiety of placement and pt also with nasal obstruction, nasal mass and septal deviation. Continue to provide support and provide meals as desired. However, pt does not like to discuss foods or exchanges. Suggest to retry Dobbhoff placement or NGT placement in rt nares under light sedation if able. Once infant delivered, pt would benefit from follow up at an eating disorder facility or with a strong eating disorder team.     Malnutrition Assessment:  Malnutrition Status:  Severe malnutrition    Context:  Chronic illness     Findings of the 6 clinical characteristics of malnutrition:   Energy Intake:  7 - 75% or less est energy requirements for 1 month or longer  Weight Loss:  7.00 - Greater than 10% over 6 months     Body Fat Loss:  7 - Severe body fat loss,     Muscle Mass Loss:  7 - Severe muscle mass loss,        Estimated Daily Nutrient Needs:  Energy (kcal): 5532-2914 kcal/day; Weight Used for Energy Requirements: Pre-pregnancy  Protein (g):  g/day  (1.2-1.4 g/day); Weight Used for Protein Requirements: Pre-pregnancy  Fluid (ml/day): 2400 ml; Method Used for Fluid Requirements: 1 ml/kcal    Wounds:    None       Current Nutrition Therapies:  DIET REGULAR  DIET NUTRITIONAL SUPPLEMENTS All Meals;  Ensure Enlive  DIET ONE TIME MESSAGE    Anthropometric Measures:  · Height:  5' 4\" (162.6 cm)  · Current Body Wt:  73.9 kg (162 lb 14.7 oz)   · Admission Body Wt:  162 lb 14.7 oz    · Usual Body Wt:  68.5 kg (151 lb)     · Ideal Body Wt:  120 lbs:        Nutrition Diagnosis:   · Inadequate oral intake related to altered GI function, psychological cause or life stress as evidenced by intake 0-25%    · Increased nutrient needs related to increased demand for energy/nutrients as evidenced by pregnancy. Nutrition Interventions:   Food and/or Nutrient Delivery: Continue current diet, Continue oral nutrition supplement  Nutrition Education and Counseling: Counseling needed  Coordination of Nutrition Care: Continue to monitor while inpatient    Goals:  Consume 1 supplement and 25% meals x 5-7 days. Nutrition Monitoring and Evaluation:   Behavioral-Environmental Outcomes: None identified  Food/Nutrient Intake Outcomes: Food and nutrient intake, Supplement intake  Physical Signs/Symptoms Outcomes: Biochemical data, GI status, Nausea/vomiting, Weight    Discharge Planning:     Too soon to determine     Electronically signed by Marge Arcos RD on 6/9/2021 at 4:01 PM    Contact: [place]

## 2021-06-09 NOTE — PROGRESS NOTES
ID Progress Note  2021    Subjective:     Afebrile. No dyspnea    Objective:     Vitals:   Visit Vitals  BP (!) 112/59 (BP 1 Location: Right arm, BP Patient Position: At rest;Lying left side)   Pulse 69   Temp 98.4 °F (36.9 °C)   Resp 16   Ht 5' 4\" (1.626 m)   Wt 75.3 kg (166 lb)   LMP 2020 (Exact Date)   SpO2 98%   Breastfeeding No   BMI 28.49 kg/m²        Tmax:  Temp (24hrs), Av °F (36.7 °C), Min:97.7 °F (36.5 °C), Max:98.4 °F (36.9 °C)      Exam:    Not in distress  Lung clear, no rales, wheezes or rhonchi   Heart: s1, s2, RRR, no murmurs rubs or clicks  Abdomen:  nontender  Speech fluent     Labs:   Lab Results   Component Value Date/Time    WBC 6.6 2021 04:43 AM    Hemoglobin (POC) 10.4 (L) 2020 07:12 AM    HGB 9.0 (L) 2021 04:43 AM    HCT 29.0 (L) 2021 04:43 AM    PLATELET 100  04:43 AM    MCV 75.3 (L) 2021 04:43 AM    Hgb, External 33.7 2012 12:00 AM    Hct, External 69 2012 12:00 AM    Platelet cnt., External 307 2012 12:00 AM     Lab Results   Component Value Date/Time    Sodium 137 2021 03:48 AM    Potassium 3.9 2021 03:48 AM    Chloride 108 2021 03:48 AM    CO2 22 2021 03:48 AM    Anion gap 7 2021 03:48 AM    Glucose 73 2021 03:48 AM    BUN 2 (L) 2021 03:48 AM    Creatinine 0.37 (L) 2021 03:48 AM    BUN/Creatinine ratio 5 (L) 2021 03:48 AM    GFR est AA >60 2021 03:48 AM    GFR est non-AA >60 2021 03:48 AM    Calcium 8.2 (L) 2021 03:48 AM    Bilirubin, total 0.3 2021 04:43 AM    Alk.  phosphatase 221 (H) 2021 04:43 AM    Protein, total 6.3 (L) 2021 04:43 AM    Albumin 2.2 (L) 2021 04:43 AM    Globulin 4.1 (H) 2021 04:43 AM    A-G Ratio 0.5 (L) 2021 04:43 AM    ALT (SGPT) 20 2021 04:43 AM             Assessment:     #1 UTI with some right flank CVA tenderness     #2 intrauterine pregnancy     #3 asthma     #4 depression     #5 history of candidemia during this current pregnancy s/p meant with amphotericin     #6 hypokalemia          Recommendations:      Continue vanc and ceftriaxone for e faecalis and morganella. If we need to recheck her urine again, would do straight cath. Of note her UA on may 31  had normal WBCs.            Toby Nichole MD

## 2021-06-09 NOTE — PROGRESS NOTES
~9482: TRANSFER - IN REPORT:    Verbal report received from ELOISE Horne Earing on [facility]  being received from [facility] for change in patient condition(contractions)      Report consisted of patients Situation, Background, Assessment and   Recommendations(SBAR). Information from the following report(s) SBAR was reviewed with the receiving nurse. Opportunity for questions and clarification was provided. Assessment completed upon patients arrival to unit and care assumed. ~0115: The patient is offered a heating pad for back discomfort and she declines. ~0130: The patient is offered water or juice and she declines. ~0608: Dr Lázaro Garcia is at the nurses station. He was updated on the patient's contraction pattern and reports a decrease in contraction frequency and intensity. Dr Lázaro Garcia states that the patient can be transferred back to [address] [place] [address] Road: [facility] Shoemaker is called and informed that the patient is stable and will be transferred back to them. ~1075: TRANSFER - OUT REPORT:    Verbal report given to ELOISE Horne Earing on [facility]  being transferred to [facility] for routine progression of care       Report consisted of patients Situation, Background, Assessment and   Recommendations(SBAR). Information from the following report(s) SBAR was reviewed with the receiving nurse.     Lines:   PICC Double Lumen 86/01/67 Left;Basilic (Active)   Central Line Being Utilized Yes 06/09/21 0100   Criteria for Appropriate Use Limited/no vessel suitable for conventional peripheral access 06/09/21 0100   Site Assessment Clean, dry, & intact 06/09/21 0100   Phlebitis Assessment 0 06/09/21 0100   Infiltration Assessment 0 06/09/21 0100   Date of Last Dressing Change 06/08/21 06/09/21 0100   Dressing Status Clean, dry, & intact 06/09/21 0100   Action Taken Open ports on tubing capped 06/08/21 2020   External Catheter Length (cm) 3 centimeters 06/08/21 2020   Dressing Type Disk with Chlorhexadine gluconate (CHG); Transparent 06/08/21 2020   Hub Color/Line Status Infusing;Pink 06/08/21 2020   Positive Blood Return (Site #1) Yes 06/08/21 2020   Hub Color/Line Status White; Infusing 06/08/21 2020   Positive Blood Return (Site #2) Yes 06/08/21 2020   Alcohol Cap Used Yes 06/08/21 2020        Opportunity for questions and clarification was provided.       Patient transported with:   Registered Nurse

## 2021-06-09 NOTE — PROGRESS NOTES
Pharmacist Note - Vancomycin Dosing  Therapy day 5  Indication: Enterococcus UTI during pregnancy  Current regimen: 1250 mg IV q8hrs     Recent Labs     06/09/21  0348 06/07/21  0552   CREA 0.37* 0.37*   BUN 2* 2*     A Trough Level resulted at 11.4 mcg/mL which was obtained 7.25 hrs post-dose. The extrapolated \"true\" trough is approximately ~10 mcg/mL based on the patient's known kinetics. Goal trough: 10 - 15 mcg/mL       Plan: Continue current regimen. Pharmacy will continue to monitor this patient daily for changes in clinical status and renal function.     Paola Edwards, PharmD  Clinical Pharmacist, Orthopedics and Med/Surg  [id number] [employer] ()

## 2021-06-09 NOTE — PROGRESS NOTES
[id number] Verbal shift change report given to Shasha Canales RN (oncoming nurse) by Debra Cid RN (offgoing nurse). Report included the following information SBAR. Pt did not eat any of her lunch.

## 2021-06-10 PROCEDURE — 74011250636 HC RX REV CODE- 250/636: Performed by: OBSTETRICS & GYNECOLOGY

## 2021-06-10 PROCEDURE — 59025 FETAL NON-STRESS TEST: CPT

## 2021-06-10 PROCEDURE — 36415 COLL VENOUS BLD VENIPUNCTURE: CPT

## 2021-06-10 PROCEDURE — 74011250637 HC RX REV CODE- 250/637: Performed by: INTERNAL MEDICINE

## 2021-06-10 PROCEDURE — 74011000258 HC RX REV CODE- 258: Performed by: INTERNAL MEDICINE

## 2021-06-10 PROCEDURE — 65410000002 HC RM PRIVATE OB

## 2021-06-10 PROCEDURE — 74011250636 HC RX REV CODE- 250/636: Performed by: INTERNAL MEDICINE

## 2021-06-10 PROCEDURE — 74011250637 HC RX REV CODE- 250/637: Performed by: OBSTETRICS & GYNECOLOGY

## 2021-06-10 PROCEDURE — 86901 BLOOD TYPING SEROLOGIC RH(D): CPT

## 2021-06-10 PROCEDURE — 74011000250 HC RX REV CODE- 250: Performed by: OBSTETRICS & GYNECOLOGY

## 2021-06-10 PROCEDURE — 76819 FETAL BIOPHYS PROFIL W/O NST: CPT | Performed by: OBSTETRICS & GYNECOLOGY

## 2021-06-10 RX ADMIN — ONDANSETRON 8 MG: 2 INJECTION INTRAMUSCULAR; INTRAVENOUS at 08:14

## 2021-06-10 RX ADMIN — CYANOCOBALAMIN TAB 500 MCG 1000 MCG: 500 TAB at 08:14

## 2021-06-10 RX ADMIN — VANCOMYCIN HYDROCHLORIDE 1250 MG: 10 INJECTION, POWDER, LYOPHILIZED, FOR SOLUTION INTRAVENOUS at 12:08

## 2021-06-10 RX ADMIN — FAMOTIDINE 20 MG: 10 INJECTION INTRAVENOUS at 08:14

## 2021-06-10 RX ADMIN — VANCOMYCIN HYDROCHLORIDE 1250 MG: 10 INJECTION, POWDER, LYOPHILIZED, FOR SOLUTION INTRAVENOUS at 19:19

## 2021-06-10 RX ADMIN — FOLIC ACID: 5 INJECTION, SOLUTION INTRAMUSCULAR; INTRAVENOUS; SUBCUTANEOUS at 14:26

## 2021-06-10 RX ADMIN — FAMOTIDINE 20 MG: 10 INJECTION INTRAVENOUS at 21:21

## 2021-06-10 RX ADMIN — ONDANSETRON 8 MG: 2 INJECTION INTRAMUSCULAR; INTRAVENOUS at 21:21

## 2021-06-10 RX ADMIN — VANCOMYCIN HYDROCHLORIDE 1250 MG: 10 INJECTION, POWDER, LYOPHILIZED, FOR SOLUTION INTRAVENOUS at 04:17

## 2021-06-10 RX ADMIN — OLANZAPINE 10 MG: 5 TABLET, FILM COATED ORAL at 21:21

## 2021-06-10 RX ADMIN — DOCUSATE SODIUM 100 MG: 100 CAPSULE, LIQUID FILLED ORAL at 08:14

## 2021-06-10 RX ADMIN — ALTEPLASE 1 MG: 2.2 INJECTION, POWDER, LYOPHILIZED, FOR SOLUTION INTRAVENOUS at 17:11

## 2021-06-10 RX ADMIN — CEFTRIAXONE SODIUM 1 G: 1 INJECTION, POWDER, FOR SOLUTION INTRAMUSCULAR; INTRAVENOUS at 15:50

## 2021-06-10 RX ADMIN — FLUOXETINE 60 MG: 20 CAPSULE ORAL at 08:14

## 2021-06-10 NOTE — PROGRESS NOTES
High Risk Obstetrics Progress Note    Name: Mabel Herman MRN: [id number]  SSN: [id number]    YOB: 1985  Age: 38 y.o. Sex: female      Subjective:      LOS: 29 days    Estimated Date of Delivery: 21   Gestational Age Today: 37w2d     Patient admitted for UTI s/p failed outpatient management due to chronic eating disorder. States she have intermittent contractions and normal fetal movement and occasional headache but denies any vb/lof/cp/sob. I spoke with Colette Jules (psychiatry) who is speaking with pt on a weekly basis to work on coping strategies and helping pt to develop a plan for after delivery. We continue to encourage pt to consider inpatient hospitalization program for eating disorders after delivery. Objective:     Vitals:  Blood pressure 110/61, pulse 66, temperature 97.6 °F (36.4 °C), resp. rate 16, height 5' 4\" (1.626 m), weight 75.8 kg (167 lb 3.2 oz), last menstrual period 2020, SpO2 99 %, not currently breastfeeding. Temp (24hrs), Av °F (36.7 °C), Min:97.6 °F (36.4 °C), Max:98.4 °F (96.9 °C)    Systolic (77FRT), MUK:278 , Min:103 , RTM:419      Diastolic (74NAS), TMT:58, Min:57, Max:61       Intake and Output:         Physical Exam:  Patient without distress. Heart: Regular rate and rhythm  Lung: clear to auscultation throughout lung fields, no wheezes, no rales, no rhonchi and normal respiratory effort  Abdomen: soft, nontender, gravid  Lower Extremities:  - Edema No       Membranes:  Intact     Nst pending for today.       Labs:   Recent Results (from the past 36 hour(s))   VANCOMYCIN, TROUGH    Collection Time: 21  3:48 AM   Result Value Ref Range    Vancomycin,trough 11.4 (H) 5.0 - 10.0 ug/mL    Reported dose date NOT PROVIDED      Reported dose time: NOT PROVIDED      Reported dose: NOT PROVIDED UNITS   METABOLIC PANEL, BASIC    Collection Time: 21  3:48 AM   Result Value Ref Range    Sodium 137 136 - 145 mmol/L    Potassium 3.9 3.5 - 5.1 mmol/L    Chloride 108 97 - 108 mmol/L    CO2 22 21 - 32 mmol/L    Anion gap 7 5 - 15 mmol/L    Glucose 73 65 - 100 mg/dL    BUN 2 (L) 6 - 20 MG/DL    Creatinine 0.37 (L) 0.55 - 1.02 MG/DL    BUN/Creatinine ratio 5 (L) 12 - 20      GFR est AA >60 >60 ml/min/1.73m2    GFR est non-AA >60 >60 ml/min/1.73m2    Calcium 8.2 (L) 8.5 - 10.1 MG/DL       Assessment and Plan:       Active Problems:    Pregnant (3/1/2018)      Eating disorder affecting pregnancy, antepartum (2020)      UTI in pregnancy, antepartum, third trimester (2021)       Pt is a 37 y/o [id number] at 36w4 weeks admitted for UTI s/p failed outpatient medical therapy due to chronic eating disorder.     Urine culture 21 positive for Klebsiella and EColi-s/p 4 days of ceftriaxone  -Repeat urine cx on  positive for enterococcus-now s/p vanc -  - Appreciate I.D. care and management plan  -afebrile and normal WBC's   -renal ultrasound   WNL  - : Morganelli (sensitive to ceftriaxone) and Enterococcus (sensitive to vanc) restarted ceftriaxone . Restart vanc 21     Chronic eating disorder  -IV fluids, PICC line in place  -spoke with Dr Boris Daly with [facility] partial hospitalization program here in [place]-pt does not qualify for this program as she requires high level of care that is available at [facility] inpatient program in [facility]. I spoke with pt re: this option but pt unsure about this option as she doesn't have a plan for care of her  if she enters this program-Loren Oneal and I are continuing to work with pt to help her develop a plan of care for she and her  after delivery  -I continue to discuss need for nutrition as pt input minimal and losing weight daily. Pt unable to tolerate dobhoff by ENT. Pt advised by Dr. Paco Martin on  that MFM does not think TPN necessary now from fetal perspective as baby's growth is normal. Continue to discuss use of TPN for her nutrition. Pt continues to decline TPN.   -Appreciate nutritionist help   -Goodie bag daily  -Hypokalemia-repleted. continue KCL in ivfs   -Cervical incompetence-s/p cerclage removal 21  -Threatened PTL  -s/pBMTZ #1and #2 on  and      H/o prior  with successful , pt desires   -GBS pending.      Severe depression/anxiety with h/o suicidal ideation  -followed closely by Abisai Pan her help  -continue zyprexa and prozac as well as prn prazosin and hydroxyzine     GERD  -famotidine 20mg bid     Ambulate  Daily NST     MFM scan on 6/3- 39% tile (5-12);  BPP, vtx     Anemia- asymptomatic  -known beta thal minor  -s/p hematology consult-appreciate recs  -s/p iv iron (venofer 200mg) x 5 days     CHTN-stable normal bp's off meds     DVT PPx-ambulation and mirtha hose     Back pain-s/p PT consult and pt was given a support belt which is helping

## 2021-06-10 NOTE — PROGRESS NOTES
1930: Bedside shift change report given to Myrna RN and Bryan Taveras RN (oncoming nurse) by Ulisses Green RN (offgoing nurse). Report included the following information SBAR, Kardex, Intake/Output, MAR and Recent Results.

## 2021-06-11 PROBLEM — E46 MALNUTRITION (HCC): Status: ACTIVE | Noted: 2021-06-11

## 2021-06-11 LAB
BASOPHILS # BLD: 0 K/UL (ref 0–0.1)
BASOPHILS NFR BLD: 0 % (ref 0–1)
DIFFERENTIAL METHOD BLD: ABNORMAL
EOSINOPHIL # BLD: 0.1 K/UL (ref 0–0.4)
EOSINOPHIL NFR BLD: 1 % (ref 0–7)
ERYTHROCYTE [DISTWIDTH] IN BLOOD BY AUTOMATED COUNT: 17.3 % (ref 11.5–14.5)
HCT VFR BLD AUTO: 29.1 % (ref 35–47)
HGB BLD-MCNC: 9.1 G/DL (ref 11.5–16)
IMM GRANULOCYTES # BLD AUTO: 0 K/UL (ref 0–0.04)
IMM GRANULOCYTES NFR BLD AUTO: 0 % (ref 0–0.5)
LYMPHOCYTES # BLD: 2 K/UL (ref 0.8–3.5)
LYMPHOCYTES NFR BLD: 29 % (ref 12–49)
MCH RBC QN AUTO: 23.5 PG (ref 26–34)
MCHC RBC AUTO-ENTMCNC: 31.3 G/DL (ref 30–36.5)
MCV RBC AUTO: 75 FL (ref 80–99)
MONOCYTES # BLD: 0.6 K/UL (ref 0–1)
MONOCYTES NFR BLD: 9 % (ref 5–13)
NEUTS SEG # BLD: 4.2 K/UL (ref 1.8–8)
NEUTS SEG NFR BLD: 61 % (ref 32–75)
NRBC # BLD: 0 K/UL (ref 0–0.01)
NRBC BLD-RTO: 0 PER 100 WBC
PLATELET # BLD AUTO: 144 K/UL (ref 150–400)
PMV BLD AUTO: 11.4 FL (ref 8.9–12.9)
RBC # BLD AUTO: 3.88 M/UL (ref 3.8–5.2)
WBC # BLD AUTO: 6.9 K/UL (ref 3.6–11)

## 2021-06-11 PROCEDURE — 59025 FETAL NON-STRESS TEST: CPT

## 2021-06-11 PROCEDURE — 74011250637 HC RX REV CODE- 250/637: Performed by: INTERNAL MEDICINE

## 2021-06-11 PROCEDURE — 74011250637 HC RX REV CODE- 250/637: Performed by: OBSTETRICS & GYNECOLOGY

## 2021-06-11 PROCEDURE — 74011000250 HC RX REV CODE- 250: Performed by: OBSTETRICS & GYNECOLOGY

## 2021-06-11 PROCEDURE — 85025 COMPLETE CBC W/AUTO DIFF WBC: CPT

## 2021-06-11 PROCEDURE — 74011250636 HC RX REV CODE- 250/636: Performed by: OBSTETRICS & GYNECOLOGY

## 2021-06-11 PROCEDURE — 74011000258 HC RX REV CODE- 258: Performed by: INTERNAL MEDICINE

## 2021-06-11 PROCEDURE — 65410000002 HC RM PRIVATE OB

## 2021-06-11 PROCEDURE — 36415 COLL VENOUS BLD VENIPUNCTURE: CPT

## 2021-06-11 PROCEDURE — 74011250636 HC RX REV CODE- 250/636: Performed by: INTERNAL MEDICINE

## 2021-06-11 RX ADMIN — FLUOXETINE 60 MG: 20 CAPSULE ORAL at 11:15

## 2021-06-11 RX ADMIN — DOCUSATE SODIUM 100 MG: 100 CAPSULE, LIQUID FILLED ORAL at 11:15

## 2021-06-11 RX ADMIN — VANCOMYCIN HYDROCHLORIDE 1250 MG: 10 INJECTION, POWDER, LYOPHILIZED, FOR SOLUTION INTRAVENOUS at 03:33

## 2021-06-11 RX ADMIN — POTASSIUM CHLORIDE, DEXTROSE MONOHYDRATE AND SODIUM CHLORIDE 125 ML/HR: 150; 5; 900 INJECTION, SOLUTION INTRAVENOUS at 13:44

## 2021-06-11 RX ADMIN — OLANZAPINE 10 MG: 5 TABLET, FILM COATED ORAL at 21:53

## 2021-06-11 RX ADMIN — FAMOTIDINE 20 MG: 10 INJECTION INTRAVENOUS at 11:15

## 2021-06-11 RX ADMIN — FAMOTIDINE 20 MG: 10 INJECTION INTRAVENOUS at 21:54

## 2021-06-11 RX ADMIN — ONDANSETRON 8 MG: 2 INJECTION INTRAMUSCULAR; INTRAVENOUS at 09:21

## 2021-06-11 RX ADMIN — VANCOMYCIN HYDROCHLORIDE 1250 MG: 10 INJECTION, POWDER, LYOPHILIZED, FOR SOLUTION INTRAVENOUS at 22:00

## 2021-06-11 RX ADMIN — FOLIC ACID: 5 INJECTION, SOLUTION INTRAMUSCULAR; INTRAVENOUS; SUBCUTANEOUS at 16:51

## 2021-06-11 RX ADMIN — ONDANSETRON 8 MG: 2 INJECTION INTRAMUSCULAR; INTRAVENOUS at 18:17

## 2021-06-11 RX ADMIN — VANCOMYCIN HYDROCHLORIDE 1250 MG: 10 INJECTION, POWDER, LYOPHILIZED, FOR SOLUTION INTRAVENOUS at 13:52

## 2021-06-11 RX ADMIN — CEFTRIAXONE SODIUM 1 G: 1 INJECTION, POWDER, FOR SOLUTION INTRAMUSCULAR; INTRAVENOUS at 16:44

## 2021-06-11 RX ADMIN — CYANOCOBALAMIN TAB 500 MCG 1000 MCG: 500 TAB at 11:18

## 2021-06-11 NOTE — PROGRESS NOTES
High Risk Obstetrics Progress Note    Name: Lowell Christina MRN: [id number]  SSN: [id number]    YOB: 1985  Age: 38 y.o. Sex: female      Subjective:      LOS: 30 days    Estimated Date of Delivery: 21   Gestational Age Today: 44w9d     Patient admitted for UTI s/p failed outpatient treatment due to chronic eating disorder. States she does not have chest pain, shortness of breath, vaginal bleeding  and vaginal leaking of fluid  and she reports good fetal movement, contractions that are still spaced out but painful and more pressure. Objective:     Vitals:  Blood pressure (!) 108/55, pulse (!) 59, temperature 98 °F (36.7 °C), resp. rate 16, height 5' 4\" (1.626 m), weight 76.1 kg (167 lb 12.8 oz), last menstrual period 2020, SpO2 93 %, not currently breastfeeding. Temp (24hrs), Av.9 °F (36.6 °C), Min:97.4 °F (36.3 °C), Max:98.4 °F (53.1 °C)    Systolic (71DXU), OHU:417 , Min:108 , TWW:804      Diastolic (97RMA), IDD:03, Min:55, Max:65       Intake and Output:         Physical Exam:  Patient without distress.   Heart: Regular rate and rhythm  Lung: clear to auscultation throughout lung fields, no wheezes, no rales, no rhonchi and normal respiratory effort  Abdomen: soft, nontender, gravid  Lower Extremities:  - Edema No       Membranes:  Intact    Uterine Activity:  None on NST yesterday    Fetal Heart Rate:  Reactive on yesterday's NST  Baseline: 125s per minute  Variability: moderate  Accelerations: yes  Decelerations: none      Cx: 50/3/-3      Labs:   Recent Results (from the past 36 hour(s))   TYPE & SCREEN    Collection Time: 06/10/21 12:13 PM   Result Value Ref Range    Crossmatch Expiration 2021,2359     ABO/Rh(D) Cheyenne Voratan POSITIVE     Antibody screen NEG    CBC WITH AUTOMATED DIFF    Collection Time: 21  3:41 AM   Result Value Ref Range    WBC 6.9 3.6 - 11.0 K/uL    RBC 3.88 3.80 - 5.20 M/uL    HGB 9.1 (L) 11.5 - 16.0 g/dL    HCT 29.1 (L) 35.0 - 47.0 %    MCV 75.0 (L) 80.0 - 99.0 FL    MCH 23.5 (L) 26.0 - 34.0 PG    MCHC 31.3 30.0 - 36.5 g/dL    RDW 17.3 (H) 11.5 - 14.5 %    PLATELET 403 (L) 723 - 400 K/uL    MPV 11.4 8.9 - 12.9 FL    NRBC 0.0 0  WBC    ABSOLUTE NRBC 0.00 0.00 - 0.01 K/uL    NEUTROPHILS 61 32 - 75 %    LYMPHOCYTES 29 12 - 49 %    MONOCYTES 9 5 - 13 %    EOSINOPHILS 1 0 - 7 %    BASOPHILS 0 0 - 1 %    IMMATURE GRANULOCYTES 0 0.0 - 0.5 %    ABS. NEUTROPHILS 4.2 1.8 - 8.0 K/UL    ABS. LYMPHOCYTES 2.0 0.8 - 3.5 K/UL    ABS. MONOCYTES 0.6 0.0 - 1.0 K/UL    ABS. EOSINOPHILS 0.1 0.0 - 0.4 K/UL    ABS. BASOPHILS 0.0 0.0 - 0.1 K/UL    ABS. IMM. GRANS. 0.0 0.00 - 0.04 K/UL    DF AUTOMATED         Assessment and Plan:       Active Problems:    Pregnant (3/1/2018)      Eating disorder affecting pregnancy, antepartum (2020)      UTI in pregnancy, antepartum, third trimester (2021)       Pt is a 37 y/o [id number] at 36w5 weeks admitted for UTI s/p failed outpatient medical therapy due to chronic eating disorder.     Urine culture 21 positive for Klebsiella and EColi-s/p 4 days of ceftriaxone  -Repeat urine cx on  positive for enterococcus-now s/p vanc -  - Appreciate I.D. care and management plan  -afebrile and normal WBC's   -renal ultrasound   WNL  - : Morganelli (sensitive to ceftriaxone) and Enterococcus (sensitive to vanc) restarted ceftriaxone . Restart vanc 21     Chronic eating disorder  -IV fluids, PICC line in place  -spoke with Dr Snow Jung with [facility] partial hospitalization program here in [place]-pt does not qualify for this program as she requires high level of care that is available at [facility] inpatient program in [place]. I spoke with pt re: this option but pt unsure about this option as she doesn't have a plan for care of her  if she enters this program-Loren Oneal and I are continuing to work with pt to help her develop a plan of care for she and her  after delivery  -I continue to discuss need for nutrition as pt input minimal and losing weight daily. Pt unable to tolerate dobhoff by ENT. Pt advised by Dr. Khoi Vidales on  that MFM does not think TPN necessary now from fetal perspective as baby's growth is normal. Continue to discuss use of TPN for her nutrition. Pt continues to decline TPN.    -Appreciate nutritionist help   -Goodie bag daily  -Hypokalemia-repleted. continue KCL in ivfs   -Cervical incompetence-s/p cerclage removal 21  -Threatened PTL  -s/pBMTZ #1and #2 on  and      H/o prior  with successful , pt desires   -GBS pending.      Severe depression/anxiety with h/o suicidal ideation  -followed closely by Alfie Mora her help  -continue zyprexa and prozac as well as prn prazosin and hydroxyzine     GERD  -famotidine 20mg bid     Ambulate  Daily NST     MFM scan on 6/3- 39% tile (5-12);  BPP, vtx     Anemia- asymptomatic  -known beta thal minor  -s/p hematology consult-appreciate recs  -s/p iv iron (venofer 200mg) x 5 days     CHTN-stable normal bp's off meds     DVT PPx-ambulation and mirtha hose     Back pain-s/p PT consult and pt was given a support belt which is helping

## 2021-06-11 NOTE — PROGRESS NOTES
ID Progress Note  2021    Subjective:     Afebrile. No dyspnea    Objective:     Vitals:   Visit Vitals  BP (!) 108/55 (BP 1 Location: Right upper arm, BP Patient Position: At rest)   Pulse (!) 59   Temp 98 °F (36.7 °C)   Resp 16   Ht 5' 4\" (1.626 m)   Wt 76.1 kg (167 lb 12.8 oz)   LMP 2020 (Exact Date)   SpO2 93%   Breastfeeding No   BMI 28.80 kg/m²        Tmax:  Temp (24hrs), Av.7 °F (36.5 °C), Min:97.4 °F (36.3 °C), Max:98 °F (36.7 °C)      Exam:    Not in distress  Lung clear, no rales, wheezes or rhonchi   Heart: s1, s2, RRR, no murmurs rubs or clicks  Abdomen:  nontender  Speech fluent     Labs:   Lab Results   Component Value Date/Time    WBC 6.9 2021 03:41 AM    Hemoglobin (POC) 10.4 (L) 2020 07:12 AM    HGB 9.1 (L) 2021 03:41 AM    HCT 29.1 (L) 2021 03:41 AM    PLATELET 672 (L)  03:41 AM    MCV 75.0 (L) 2021 03:41 AM    Hgb, External 33.7 2012 12:00 AM    Hct, External 69 2012 12:00 AM    Platelet cnt., External 307 2012 12:00 AM     Lab Results   Component Value Date/Time    Sodium 137 2021 03:48 AM    Potassium 3.9 2021 03:48 AM    Chloride 108 2021 03:48 AM    CO2 22 2021 03:48 AM    Anion gap 7 2021 03:48 AM    Glucose 73 2021 03:48 AM    BUN 2 (L) 2021 03:48 AM    Creatinine 0.37 (L) 2021 03:48 AM    BUN/Creatinine ratio 5 (L) 2021 03:48 AM    GFR est AA >60 2021 03:48 AM    GFR est non-AA >60 2021 03:48 AM    Calcium 8.2 (L) 2021 03:48 AM    Bilirubin, total 0.3 2021 04:43 AM    Alk.  phosphatase 221 (H) 2021 04:43 AM    Protein, total 6.3 (L) 2021 04:43 AM    Albumin 2.2 (L) 2021 04:43 AM    Globulin 4.1 (H) 2021 04:43 AM    A-G Ratio 0.5 (L) 2021 04:43 AM    ALT (SGPT) 20 2021 04:43 AM             Assessment:     #1 UTI with some right flank CVA tenderness     #2 intrauterine pregnancy     #3 asthma     #4 depression     #5 history of candidemia during this current pregnancy s/p meant with amphotericin     #6 hypokalemia          Recommendations:      Continue vanc and ceftriaxone for e faecalis and morganella.   Straight cath on Monday for urine culture    Team available over the weekend if needed     Skylar Casillas MD

## 2021-06-11 NOTE — PROGRESS NOTES
Bedside and Verbal shift change report given to LILIA [facility] RN (oncoming nurse) by Elvin Brock RN (offgoing nurse). Report included the following information SBAR, Kardex, Intake/Output, MAR, Accordion, Recent Results and Med Rec Status. [id number]- pt reports eating nothing since midnight.

## 2021-06-12 LAB
ANION GAP SERPL CALC-SCNC: 7 MMOL/L (ref 5–15)
BUN SERPL-MCNC: 2 MG/DL (ref 6–20)
BUN/CREAT SERPL: 6 (ref 12–20)
CALCIUM SERPL-MCNC: 7.7 MG/DL (ref 8.5–10.1)
CHLORIDE SERPL-SCNC: 110 MMOL/L (ref 97–108)
CO2 SERPL-SCNC: 22 MMOL/L (ref 21–32)
CREAT SERPL-MCNC: 0.35 MG/DL (ref 0.55–1.02)
GLUCOSE SERPL-MCNC: 64 MG/DL (ref 65–100)
POTASSIUM SERPL-SCNC: 3.5 MMOL/L (ref 3.5–5.1)
SODIUM SERPL-SCNC: 139 MMOL/L (ref 136–145)

## 2021-06-12 PROCEDURE — 80048 BASIC METABOLIC PNL TOTAL CA: CPT

## 2021-06-12 PROCEDURE — 74011000258 HC RX REV CODE- 258: Performed by: INTERNAL MEDICINE

## 2021-06-12 PROCEDURE — 65410000002 HC RM PRIVATE OB

## 2021-06-12 PROCEDURE — 74011250637 HC RX REV CODE- 250/637: Performed by: INTERNAL MEDICINE

## 2021-06-12 PROCEDURE — 74011000250 HC RX REV CODE- 250: Performed by: OBSTETRICS & GYNECOLOGY

## 2021-06-12 PROCEDURE — 59025 FETAL NON-STRESS TEST: CPT

## 2021-06-12 PROCEDURE — 74011250637 HC RX REV CODE- 250/637: Performed by: OBSTETRICS & GYNECOLOGY

## 2021-06-12 PROCEDURE — 74011250636 HC RX REV CODE- 250/636: Performed by: OBSTETRICS & GYNECOLOGY

## 2021-06-12 PROCEDURE — 36415 COLL VENOUS BLD VENIPUNCTURE: CPT

## 2021-06-12 PROCEDURE — 74011250636 HC RX REV CODE- 250/636: Performed by: INTERNAL MEDICINE

## 2021-06-12 RX ADMIN — ONDANSETRON 8 MG: 2 INJECTION INTRAMUSCULAR; INTRAVENOUS at 21:31

## 2021-06-12 RX ADMIN — CYANOCOBALAMIN TAB 500 MCG 1000 MCG: 500 TAB at 09:10

## 2021-06-12 RX ADMIN — CEFTRIAXONE SODIUM 1 G: 1 INJECTION, POWDER, FOR SOLUTION INTRAMUSCULAR; INTRAVENOUS at 17:21

## 2021-06-12 RX ADMIN — VANCOMYCIN HYDROCHLORIDE 1250 MG: 10 INJECTION, POWDER, LYOPHILIZED, FOR SOLUTION INTRAVENOUS at 06:13

## 2021-06-12 RX ADMIN — VANCOMYCIN HYDROCHLORIDE 1250 MG: 10 INJECTION, POWDER, LYOPHILIZED, FOR SOLUTION INTRAVENOUS at 22:52

## 2021-06-12 RX ADMIN — FAMOTIDINE 20 MG: 10 INJECTION INTRAVENOUS at 21:18

## 2021-06-12 RX ADMIN — FOLIC ACID: 5 INJECTION, SOLUTION INTRAMUSCULAR; INTRAVENOUS; SUBCUTANEOUS at 15:02

## 2021-06-12 RX ADMIN — ONDANSETRON 8 MG: 2 INJECTION INTRAMUSCULAR; INTRAVENOUS at 00:16

## 2021-06-12 RX ADMIN — FAMOTIDINE 20 MG: 10 INJECTION INTRAVENOUS at 09:11

## 2021-06-12 RX ADMIN — VANCOMYCIN HYDROCHLORIDE 1250 MG: 10 INJECTION, POWDER, LYOPHILIZED, FOR SOLUTION INTRAVENOUS at 15:05

## 2021-06-12 RX ADMIN — ONDANSETRON 8 MG: 2 INJECTION INTRAMUSCULAR; INTRAVENOUS at 15:02

## 2021-06-12 RX ADMIN — OLANZAPINE 10 MG: 5 TABLET, FILM COATED ORAL at 21:18

## 2021-06-12 RX ADMIN — DOCUSATE SODIUM 100 MG: 100 CAPSULE, LIQUID FILLED ORAL at 09:10

## 2021-06-12 RX ADMIN — FLUOXETINE 60 MG: 20 CAPSULE ORAL at 09:11

## 2021-06-12 RX ADMIN — POTASSIUM CHLORIDE, DEXTROSE MONOHYDRATE AND SODIUM CHLORIDE 125 ML/HR: 150; 5; 900 INJECTION, SOLUTION INTRAVENOUS at 12:55

## 2021-06-12 NOTE — PROGRESS NOTES
0745 Bedside and Verbal shift change report given to Andrew Redman RN (oncoming nurse) by Meghan Oseguera RN (offgoing nurse). Report included the following information SBAR.

## 2021-06-12 NOTE — PROGRESS NOTES
High Risk Obstetrics Progress Note    Name: Adrian [place] MRN: [id number]  SSN: [id number]    YOB: 1985  Age: 38 y.o. Sex: female      Subjective:      LOS: 31 days    Estimated Date of Delivery: 21   Gestational Age Today: 36w7d     Patient admitted for chronic and recurrent uti. States she does have intermitent contractions. Objective:     Vitals:  Blood pressure 112/66, pulse 62, temperature 98.2 °F (36.8 °C), resp. rate 16, height 5' 4\" (1.626 m), weight 75.2 kg (165 lb 12.8 oz), last menstrual period 2020, SpO2 98 %, not currently breastfeeding. Temp (24hrs), Av.3 °F (36.8 °C), Min:98.1 °F (36.7 °C), Max:98.7 °F (25.4 °C)    Systolic (20COA), GWV:485 , Min:107 , TWQ:786      Diastolic (26CXS), EWV:75, Min:63, Max:71       Intake and Output:         Physical Exam:  Patient without distress. Heart: Regular rate and rhythm  Lung: clear to auscultation throughout lung fields, no wheezes, no rales, no rhonchi and normal respiratory effort  Abdomen: soft, nontender  Fundus: soft and non tender  Lower Extremities:  - Edema No       Membranes:  Intact    Uterine Activity:  None    Fetal Heart Rate:  Reactive nst         Labs:   Recent Results (from the past 36 hour(s))   CBC WITH AUTOMATED DIFF    Collection Time: 21  3:41 AM   Result Value Ref Range    WBC 6.9 3.6 - 11.0 K/uL    RBC 3.88 3.80 - 5.20 M/uL    HGB 9.1 (L) 11.5 - 16.0 g/dL    HCT 29.1 (L) 35.0 - 47.0 %    MCV 75.0 (L) 80.0 - 99.0 FL    MCH 23.5 (L) 26.0 - 34.0 PG    MCHC 31.3 30.0 - 36.5 g/dL    RDW 17.3 (H) 11.5 - 14.5 %    PLATELET 480 (L) 057 - 400 K/uL    MPV 11.4 8.9 - 12.9 FL    NRBC 0.0 0  WBC    ABSOLUTE NRBC 0.00 0.00 - 0.01 K/uL    NEUTROPHILS 61 32 - 75 %    LYMPHOCYTES 29 12 - 49 %    MONOCYTES 9 5 - 13 %    EOSINOPHILS 1 0 - 7 %    BASOPHILS 0 0 - 1 %    IMMATURE GRANULOCYTES 0 0.0 - 0.5 %    ABS. NEUTROPHILS 4.2 1.8 - 8.0 K/UL    ABS. LYMPHOCYTES 2.0 0.8 - 3.5 K/UL    ABS.  MONOCYTES 0.6 0.0 - 1.0 K/UL    ABS. EOSINOPHILS 0.1 0.0 - 0.4 K/UL    ABS. BASOPHILS 0.0 0.0 - 0.1 K/UL    ABS. IMM. GRANS. 0.0 0.00 - 0.04 K/UL    DF AUTOMATED     METABOLIC PANEL, BASIC    Collection Time: 21  6:22 AM   Result Value Ref Range    Sodium 139 136 - 145 mmol/L    Potassium 3.5 3.5 - 5.1 mmol/L    Chloride 110 (H) 97 - 108 mmol/L    CO2 22 21 - 32 mmol/L    Anion gap 7 5 - 15 mmol/L    Glucose 64 (L) 65 - 100 mg/dL    BUN 2 (L) 6 - 20 MG/DL    Creatinine 0.35 (L) 0.55 - 1.02 MG/DL    BUN/Creatinine ratio 6 (L) 12 - 20      GFR est AA >60 >60 ml/min/1.73m2    GFR est non-AA >60 >60 ml/min/1.73m2    Calcium 7.7 (L) 8.5 - 10.1 MG/DL       Assessment and Plan: Active Problems:    Pregnant (3/1/2018)      Eating disorder affecting pregnancy, antepartum (2020)      UTI in pregnancy, antepartum, third trimester (2021)      Malnutrition ([address] (2021)       UTI- cont abx, id care   - cont current care, appreciate id input. Hx of mutliple uti most recent morganelli and enterococcus    Chronic eating dx: cont current care, ivf.  Plan possible placement w [facility] s/p delivery. Appreciate pysch input and at great risk ppd.     Hx LTCS w  prev, desires     GERD - amotidine    Daily NST    Anemia: s/p iv iron, known beta thal vickie, cont current care    Htn- normotensive off meds    DVT- cont prophylaxis

## 2021-06-12 NOTE — PROGRESS NOTES
Bedside and Verbal shift change report given to NADIR Ledezma (oncoming nurse) by Devon Harrington (offgoing nurse). Report included the following information SBAR, Kardex, Intake/Output, MAR, Accordion and Recent Results.

## 2021-06-12 NOTE — PROGRESS NOTES
Bedside and Verbal shift change report given to ALBA Hood Nurse Extern (oncoming nurse) by Devonte Etienne RN (offgoing nurse). Report included the following information SBAR, Kardex, Intake/Output, MAR, Accordion and Recent Results.

## 2021-06-13 LAB
ABO + RH BLD: NORMAL
BLOOD GROUP ANTIBODIES SERPL: NORMAL
DATE LAST DOSE: ABNORMAL
REPORTED DOSE,DOSE: ABNORMAL UNITS
REPORTED DOSE/TIME,TMG: ABNORMAL
SPECIMEN EXP DATE BLD: NORMAL
VANCOMYCIN TROUGH SERPL-MCNC: 14.3 UG/ML (ref 5–10)

## 2021-06-13 PROCEDURE — 74011250637 HC RX REV CODE- 250/637: Performed by: OBSTETRICS & GYNECOLOGY

## 2021-06-13 PROCEDURE — 59025 FETAL NON-STRESS TEST: CPT

## 2021-06-13 PROCEDURE — 80202 ASSAY OF VANCOMYCIN: CPT

## 2021-06-13 PROCEDURE — 74011250636 HC RX REV CODE- 250/636: Performed by: INTERNAL MEDICINE

## 2021-06-13 PROCEDURE — 74011250636 HC RX REV CODE- 250/636: Performed by: OBSTETRICS & GYNECOLOGY

## 2021-06-13 PROCEDURE — 86901 BLOOD TYPING SEROLOGIC RH(D): CPT

## 2021-06-13 PROCEDURE — 65410000002 HC RM PRIVATE OB

## 2021-06-13 PROCEDURE — 74011000258 HC RX REV CODE- 258: Performed by: INTERNAL MEDICINE

## 2021-06-13 PROCEDURE — 74011250637 HC RX REV CODE- 250/637: Performed by: INTERNAL MEDICINE

## 2021-06-13 PROCEDURE — 74011000250 HC RX REV CODE- 250: Performed by: OBSTETRICS & GYNECOLOGY

## 2021-06-13 PROCEDURE — 36415 COLL VENOUS BLD VENIPUNCTURE: CPT

## 2021-06-13 RX ADMIN — VANCOMYCIN HYDROCHLORIDE 1250 MG: 10 INJECTION, POWDER, LYOPHILIZED, FOR SOLUTION INTRAVENOUS at 15:00

## 2021-06-13 RX ADMIN — ONDANSETRON 8 MG: 2 INJECTION INTRAMUSCULAR; INTRAVENOUS at 13:47

## 2021-06-13 RX ADMIN — FAMOTIDINE 20 MG: 10 INJECTION INTRAVENOUS at 20:59

## 2021-06-13 RX ADMIN — ALTEPLASE 1 MG: 2.2 INJECTION, POWDER, LYOPHILIZED, FOR SOLUTION INTRAVENOUS at 20:53

## 2021-06-13 RX ADMIN — POTASSIUM CHLORIDE, DEXTROSE MONOHYDRATE AND SODIUM CHLORIDE 125 ML/HR: 150; 5; 900 INJECTION, SOLUTION INTRAVENOUS at 18:08

## 2021-06-13 RX ADMIN — OLANZAPINE 10 MG: 5 TABLET, FILM COATED ORAL at 21:05

## 2021-06-13 RX ADMIN — FAMOTIDINE 20 MG: 10 INJECTION INTRAVENOUS at 08:25

## 2021-06-13 RX ADMIN — DOCUSATE SODIUM 100 MG: 100 CAPSULE, LIQUID FILLED ORAL at 08:24

## 2021-06-13 RX ADMIN — VANCOMYCIN HYDROCHLORIDE 1000 MG: 1 INJECTION, POWDER, LYOPHILIZED, FOR SOLUTION INTRAVENOUS at 23:26

## 2021-06-13 RX ADMIN — CYANOCOBALAMIN TAB 500 MCG 1000 MCG: 500 TAB at 08:25

## 2021-06-13 RX ADMIN — ONDANSETRON 8 MG: 2 INJECTION INTRAMUSCULAR; INTRAVENOUS at 19:32

## 2021-06-13 RX ADMIN — ONDANSETRON 8 MG: 2 INJECTION INTRAMUSCULAR; INTRAVENOUS at 07:19

## 2021-06-13 RX ADMIN — VANCOMYCIN HYDROCHLORIDE 1250 MG: 10 INJECTION, POWDER, LYOPHILIZED, FOR SOLUTION INTRAVENOUS at 07:18

## 2021-06-13 RX ADMIN — CEFTRIAXONE SODIUM 1 G: 1 INJECTION, POWDER, FOR SOLUTION INTRAMUSCULAR; INTRAVENOUS at 18:08

## 2021-06-13 RX ADMIN — FLUOXETINE 60 MG: 20 CAPSULE ORAL at 08:24

## 2021-06-13 RX ADMIN — FOLIC ACID: 5 INJECTION, SOLUTION INTRAMUSCULAR; INTRAVENOUS; SUBCUTANEOUS at 13:47

## 2021-06-13 RX ADMIN — POTASSIUM CHLORIDE, DEXTROSE MONOHYDRATE AND SODIUM CHLORIDE 125 ML/HR: 150; 5; 900 INJECTION, SOLUTION INTRAVENOUS at 03:21

## 2021-06-13 NOTE — PROGRESS NOTES
1163 Bedside and Verbal shift change report given to Elier Henriquez RN (oncoming nurse) by Raza Schmidt RN/SN Daysi (offgoing nurse). Report included the following information SBAR. Pt did not eat anything for breakfast today, and she did not order a lunch or dinner tray.

## 2021-06-13 NOTE — PROGRESS NOTES
Pharmacist Note - Vancomycin Dosing  Therapy day 9  Indication: Enterococcus UTI during pregnancy  Current regimen: 1250 mg IV q8h    Recent Labs     06/12/21  0622 06/11/21  0341   WBC  --  6.9   CREA 0.35*  --    BUN 2*  --        A Trough Level resulted at 14.3 mcg/mL which was obtained 8.5 hrs post-dose. The extrapolated \"true\" trough is approximately 15.5 mcg/mL based on the patient's known kinetics. Goal trough: 10 - 15 mcg/mL     Plan: Change to 1000 mg IV q8h for a predicted trough of 12.4 mcg/mL . Pharmacy will continue to monitor this patient daily for changes in clinical status and renal function.

## 2021-06-13 NOTE — PROGRESS NOTES
History & Physical    Name: Stoney Callaway MRN: [id number]  SSN: [id number]    YOB: 1985  Age: 38 y.o. Sex: female      Subjective:     Reason for Admission:  Pregnancy and anemia, recurrent uti, eating dx    History of Present Illness: Ms. Valerie Jefferson is a 38 y.o.  female with an estimated gestational age of 41w0d with Estimated Date of Delivery: 21. Patient complains of normal fetal movement for 1 days. Pregnancy has been complicated by recurrent uti, eating dx, anemia, likely depression. Patient denies abdominal pain  , chest pain, fever, headache , nausea and vomiting and right upper quadrant pain  . OB History    Para Term  AB Living   16 7 4 2 7 7   SAB TAB Ectopic Molar Multiple Live Births   5 1 1   0 7      # Outcome Date GA Lbr Izaiah/2nd Weight Sex Delivery Anes PTL Lv   16 Current            15 Term 18 37w5d / 10:03 2.71 kg M  EPIDURAL AN N YAHAIRA   14 Term 04/22/15 39w0d  3.725 kg F  LO SPINAL AN N YAHAIRA   13 Term 13 39w4d  2.748 kg F VAGINAL DELI None  YAHAIRA   12 Para     M VAGINAL DELI   YAHAIRA   11 Ectopic            10 Term 08/10/08 39w0d  2.722 kg M VAGINAL DELI EPI  YAHAIRA   9  06 34w0d   M VAGINAL DELI EPI Y YAHAIRA      Birth Comments: cerclage   8  05 36w0d   F VAGINAL DELI EPI Y YAHAIRA      Birth Comments: cerclage   7 2003 18w0d       DEC   6 2002 18w0d       DEC   5 2002 20w0d       DEC   4 2001 16w0d       DEC   3             2 TAB            1 SAB               Obstetric Comments   Gyn Dr. Raul Esparza     Past Medical History:   Diagnosis Date    Acute pyelonephritis 3/3/2021    Anorexia     Anxiety     Asthma     on albuterol prn.  Triggers cold and allergies    Avoidant-restrictive food intake disorder (ARFID)     Back pain, chronic     sciatica    Chronic bilateral low back pain with right-sided sciatica 2020    ~    GERD (gastroesophageal reflux disease) 2020    UGI 10-, GI Dr. Cristina Murphy Gestational hypertension     Past pregnancy    HX OTHER MEDICAL      , , , ,     Hyperemesis     severe hyperemesis    Hypertension     with G12 - none this pregnancy    Iron deficiency anemia 10/08/2010    MDD (major depressive disorder), single episode with postpartum onset 2013    treated with meds - 13    Orthostatic hypotension 2020    Plantar fasciitis     Pregnancy     36 weeks    PTSD (post-traumatic stress disorder)      Past Surgical History:   Procedure Laterality Date    HX  SECTION  4/22/15    HX DILATION AND CURETTAGE      HX DILATION AND CURETTAGE  2020    HX GYN      miscarriage x 6    HX GYN      removal of left fallopian tube    HX OTHER SURGICAL      cerlcage x4, ectopic x1 1999 with removal of left fallopian tube    HX OTHER SURGICAL      cerclage w/ current pregnancy.  Removed 18    DC  DELIVERY ONLY       Social History     Occupational History    Not on file   Tobacco Use    Smoking status: Never Smoker    Smokeless tobacco: Never Used   Substance and Sexual Activity    Alcohol use: Not Currently    Drug use: No    Sexual activity: Yes     Partners: Male      Family History   Problem Relation Age of Onset   Quinton Re Arthritis-rheumatoid Mother     Asthma Mother     No Known Problems Father     No Known Problems Maternal Grandmother     No Known Problems Maternal Grandfather     No Known Problems Paternal Grandmother     No Known Problems Paternal Grandfather     Asthma Son     Alcohol abuse Neg Hx     Arthritis-osteo Neg Hx     Bleeding Prob Neg Hx     Cancer Neg Hx     Diabetes Neg Hx     Elevated Lipids Neg Hx     Headache Neg Hx     Heart Disease Neg Hx     Hypertension Neg Hx     Lung Disease Neg Hx     Migraines Neg Hx     Psychiatric Disorder Neg Hx     Stroke Neg Hx     Mental Retardation Neg Hx     Anesth Problems Neg Hx Allergies   Allergen Reactions    Labetalol Hives    Pcn [Penicillins] Hives    Ceclor [Cefaclor] Unknown (comments)    Septra [Sulfamethoprim Ds] Unknown (comments)     Prior to Admission medications    Medication Sig Start Date End Date Taking? Authorizing Provider   FLUoxetine (PROzac) 20 mg capsule Take 1 Cap by mouth daily. Take with 40 mg cap for total daily dose of 60 mg. 3/1/21  Yes Jh Ortiz NP   FLUoxetine (PROzac) 40 mg capsule Take 1 Cap by mouth daily. 3/1/21  Yes Jh Ortiz NP   OLANZapine (ZyPREXA) 10 mg tablet Take 1 Tab by mouth nightly. 3/1/21  Yes Jh Ortiz NP   prazosin (MINIPRESS) 2 mg capsule Take 1 Cap by mouth nightly. Indications: posttraumatic stress syndrome 3/1/21  Yes Jh Ortiz NP   calcium carbonate (Tums) 200 mg calcium (500 mg) chew Take 1 Tab by mouth two (2) times daily as needed for Other (reflux). 2/18/21  Yes Jordan Roberson MD   promethazine (Phenergan) 12.5 mg suppository    Yes Provider, Historical   ondansetron (ZOFRAN ODT) 4 mg disintegrating tablet Take 1 Tab by mouth every six (6) hours as needed for Nausea or Vomiting. 12/16/20  Yes Pollo Ma DO   hydrOXYzine HCL (ATARAX) 50 mg tablet Take 1 Tab by mouth four (4) times daily. 11/9/20  Yes Mathieu Mckinney NP   food supplemt, lactose-reduced (Ensure High Protein) liqd Take 237 mL by mouth three (3) times daily. 10/14/20  Yes Khoi Solano NP   polyethylene glycol (MIRALAX) 17 gram packet Take 1 Packet by mouth daily as needed for Constipation. 2/23/21   Jordan Roberson MD   doxylamine succinate (UNISOM) 25 mg tablet Take 1 Tab by mouth nightly as needed for Insomnia. 2/15/21   Jh Ortiz NP   pantoprazole (PROTONIX) 40 mg tablet Take 1 Tab by mouth Daily (before breakfast).  Indications: gastroesophageal reflux disease 1/15/21   Pollo Ma DO   sucralfate (CARAFATE) 1 gram tablet Take 1 Tab by mouth Before breakfast, lunch, dinner and at bedtime. 1/15/21   Sheilajatinder Chain, DO   Lacto. acidophilus-Bif. animalis (Probiotic) 5 billion cell cpSP Take 1 Cap by mouth daily. 20   Lam Ortiz NP   prochlorperazine (COMPAZINE) 10 mg tablet  20   Provider, Historical   0.9% sodium chloride solp 1,000 mL with thiamine 100 mg/mL soln 195 mg, folic acid 5 mg/mL soln 1 mg 1 Bag by IntraVENous route every twenty-four (24) hours. 20   Faizan Burgos, DO   cholecalciferol (Vitamin D3) 25 mcg (1,000 unit) cap Take  by mouth daily. Provider, Historical   pyridoxine, vitamin B6, (Vitamin B-6) 100 mg tablet Take 100 mg by mouth daily. Provider, Historical   OTHER 1 Ensure Enlive food supplement drink PO TID 20   Shirin Johnson NP   OTHER 1 ensure pudding PO daily for lunch. 10/20/20   Shirin Johnson NP   prenatal vit-iron fumarate-fa (PRENATAL PLUS with IRON) 28 mg iron- 800 mcg tab  20   Provider, Historical        Review of Systems:  A comprehensive review of systems was negative except for that written in the History of Present Illness. Objective:     Vitals:    Vitals:    21 1248 21 2109 21 0321 21 0825   BP: (!) 104/57 126/62 (!) 98/50 110/78   Pulse: (!) 56 (!) 59 66 68   Resp: 16 16 16 16   Temp: 97.8 °F (36.6 °C) 98.4 °F (36.9 °C) 97.8 °F (36.6 °C) 98.4 °F (36.9 °C)   SpO2: 99% 99% 98%    Weight:       Height:          Temp (24hrs), Av.1 °F (36.7 °C), Min:97.8 °F (36.6 °C), Max:98.4 °F (36.9 °C)    BP  Min: 98/50  Max: 126/62     Physical Exam:  Patient without distress.   Heart: Regular rate and rhythm  Lung: clear to auscultation throughout lung fields, no wheezes, no rales, no rhonchi and normal respiratory effort  Abdomen: soft, nontender  Fundus: soft and non tender  Lower Extremities:  - Edema No     Membranes:  Intact  Uterine Activity:  None  Fetal Heart Rate:  Reactive       Lab/Data Review:  Recent Results (from the past 24 hour(s))   TYPE & SCREEN    Collection Time: 21 7:19 AM   Result Value Ref Range    Crossmatch Expiration 2021,8983     ABO/Rh(D) O POSITIVE     Antibody screen NEG        Assessment and Plan: Active Problems:    Pregnant (3/1/2018)      Eating disorder affecting pregnancy, antepartum (2020)      UTI in pregnancy, antepartum, third trimester (2021)      Malnutrition ([address] (2021)       Recurrent uti, anemia, hx ltcs desires  (one prior), eating disorder  UTI: cont current care, appreciate id input  Hx LTCS- plan repeat    GERD- famotidine bid  Fetal- reassuring and reactive, cont daily NST  Anemia: s/p iv iron, known beta thal, last hct 29. DVT- cont current care    No significant changes in care at this time.   Comp met mildly abnormal but nothing requiring emergent intervention and pt is resistant to any non-emergent interventions

## 2021-06-14 LAB
APPEARANCE UR: CLEAR
BILIRUB UR QL: NEGATIVE
COLOR UR: NORMAL
GLUCOSE UR STRIP.AUTO-MCNC: NEGATIVE MG/DL
HGB UR QL STRIP: NEGATIVE
KETONES UR QL STRIP.AUTO: NEGATIVE MG/DL
LEUKOCYTE ESTERASE UR QL STRIP.AUTO: NEGATIVE
NITRITE UR QL STRIP.AUTO: NEGATIVE
PH UR STRIP: 7.5 [PH] (ref 5–8)
PROT UR STRIP-MCNC: NEGATIVE MG/DL
SP GR UR REFRACTOMETRY: 1.01 (ref 1–1.03)
UROBILINOGEN UR QL STRIP.AUTO: 0.2 EU/DL (ref 0.2–1)

## 2021-06-14 PROCEDURE — 74011250637 HC RX REV CODE- 250/637: Performed by: INTERNAL MEDICINE

## 2021-06-14 PROCEDURE — 74011000258 HC RX REV CODE- 258: Performed by: INTERNAL MEDICINE

## 2021-06-14 PROCEDURE — 65410000002 HC RM PRIVATE OB

## 2021-06-14 PROCEDURE — 74011250637 HC RX REV CODE- 250/637: Performed by: OBSTETRICS & GYNECOLOGY

## 2021-06-14 PROCEDURE — 81003 URINALYSIS AUTO W/O SCOPE: CPT

## 2021-06-14 PROCEDURE — 59025 FETAL NON-STRESS TEST: CPT

## 2021-06-14 PROCEDURE — 74011000250 HC RX REV CODE- 250: Performed by: OBSTETRICS & GYNECOLOGY

## 2021-06-14 PROCEDURE — 74011250636 HC RX REV CODE- 250/636: Performed by: INTERNAL MEDICINE

## 2021-06-14 PROCEDURE — 74011250636 HC RX REV CODE- 250/636: Performed by: OBSTETRICS & GYNECOLOGY

## 2021-06-14 PROCEDURE — 87086 URINE CULTURE/COLONY COUNT: CPT

## 2021-06-14 RX ADMIN — DOCUSATE SODIUM 100 MG: 100 CAPSULE, LIQUID FILLED ORAL at 10:27

## 2021-06-14 RX ADMIN — FLUOXETINE 60 MG: 20 CAPSULE ORAL at 10:26

## 2021-06-14 RX ADMIN — FOLIC ACID: 5 INJECTION, SOLUTION INTRAMUSCULAR; INTRAVENOUS; SUBCUTANEOUS at 13:59

## 2021-06-14 RX ADMIN — ONDANSETRON 8 MG: 2 INJECTION INTRAMUSCULAR; INTRAVENOUS at 07:50

## 2021-06-14 RX ADMIN — FAMOTIDINE 20 MG: 10 INJECTION INTRAVENOUS at 20:54

## 2021-06-14 RX ADMIN — FAMOTIDINE 20 MG: 10 INJECTION INTRAVENOUS at 10:27

## 2021-06-14 RX ADMIN — VANCOMYCIN HYDROCHLORIDE 1000 MG: 1 INJECTION, POWDER, LYOPHILIZED, FOR SOLUTION INTRAVENOUS at 07:15

## 2021-06-14 RX ADMIN — ONDANSETRON 8 MG: 2 INJECTION INTRAMUSCULAR; INTRAVENOUS at 01:34

## 2021-06-14 RX ADMIN — CYANOCOBALAMIN TAB 500 MCG 1000 MCG: 500 TAB at 10:26

## 2021-06-14 RX ADMIN — CEFTRIAXONE SODIUM 1 G: 1 INJECTION, POWDER, FOR SOLUTION INTRAMUSCULAR; INTRAVENOUS at 16:28

## 2021-06-14 RX ADMIN — VANCOMYCIN HYDROCHLORIDE 1000 MG: 1 INJECTION, POWDER, LYOPHILIZED, FOR SOLUTION INTRAVENOUS at 16:28

## 2021-06-14 RX ADMIN — OLANZAPINE 10 MG: 5 TABLET, FILM COATED ORAL at 21:33

## 2021-06-14 RX ADMIN — ONDANSETRON 8 MG: 2 INJECTION INTRAMUSCULAR; INTRAVENOUS at 14:07

## 2021-06-14 RX ADMIN — VANCOMYCIN HYDROCHLORIDE 1000 MG: 1 INJECTION, POWDER, LYOPHILIZED, FOR SOLUTION INTRAVENOUS at 23:34

## 2021-06-14 NOTE — PROGRESS NOTES
High Risk Obstetrics Progress Note    Name: Keyshawn Shah MRN: [id number]  SSN: [id number]    YOB: 1985  Age: 38 y.o. Sex: female      Subjective:      LOS: 33 days    Estimated Date of Delivery: 21   Gestational Age Today: 42w4d     Patient admitted for UTI s/p failed outpatient management due to chronic eating disorder. States she does have mild contractions, moderate nausea/vomiting and normal fetal movement and does not have chest pain, shortness of breath, vaginal bleeding  and vaginal leaking of fluid . Objective:     Vitals:  Blood pressure 111/68, pulse (!) 57, temperature 98.4 °F (36.9 °C), resp. rate 16, height 5' 4\" (1.626 m), weight 75.8 kg (167 lb), last menstrual period 2020, SpO2 100 %, not currently breastfeeding. Temp (24hrs), Av.3 °F (36.8 °C), Min:98.2 °F (36.8 °C), Max:98.4 °F (14.8 °C)    Systolic (53RBH), DYE:884 , Min:110 , LESLIE:918      Diastolic (89VTQ), ZSD:97, Min:68, Max:78       Intake and Output:         Physical Exam:  Patient without distress. Heart: Regular rate and rhythm  Lung: clear to auscultation throughout lung fields, no wheezes, no rales, no rhonchi and normal respiratory effort  Abdomen: soft, nontender, gravid  Lower Extremities:  - Edema No       Membranes:  Intact    Today's NST pending        Labs:   Recent Results (from the past 36 hour(s))   TYPE & SCREEN    Collection Time: 21  7:19 AM   Result Value Ref Range    Crossmatch Expiration 2021,2359     ABO/Rh(D) Maudine Edgar POSITIVE     Antibody screen NEG    VANCOMYCIN, TROUGH    Collection Time: 21  3:50 PM   Result Value Ref Range    Vancomycin,trough 14.3 (H) 5.0 - 10.0 ug/mL    Reported dose date NOT PROVIDED      Reported dose time: NOT PROVIDED      Reported dose: NOT PROVIDED UNITS       Assessment and Plan:       Active Problems:    Pregnant (3/1/2018)      Eating disorder affecting pregnancy, antepartum (2020)      UTI in pregnancy, antepartum, third trimester (2021)      Malnutrition ([address] (2021)       Pt is a 37 y/o [id number] at 37w1 weeks admitted for UTI s/p failed outpatient medical therapy due to chronic eating disorder.     Urine culture 21 positive for Klebsiella and EColi-s/p 4 days of ceftriaxone  -Repeat urine cx on  positive for enterococcus-now s/p vanc -  - Appreciate I.D. care and management plan  -afebrile and normal WBC's   -renal ultrasound   WNL  - : Morganelli (sensitive to ceftriaxone) and Enterococcus (sensitive to vanc) restarted ceftriaxone . Restart vanc 21  -straight cath urine culture today per ID recs     Chronic eating disorder  -IV fluids, PICC line in place  -spoke with Dr Luc Hernandez with [facility] partial hospitalization program here in [place]-pt does not qualify for this program as she requires high level of care that is available at [facility] inpatient program in [facility]. I spoke with pt re: this option but pt unsure about this option as she doesn't have a plan for care of her  if she enters this program-oLren Oneal and I are continuing to work with pt to help her develop a plan of care for she and her  after delivery  -I continue to discuss need for nutrition as pt input minimal and losing weight daily. Pt unable to tolerate dobhoff by ENT. Pt advised by Dr. Blil Rodríguez on  that MFM does not think TPN necessary now from fetal perspective as baby's growth is normal. Continue to discuss use of TPN for her nutrition. Pt continues to decline TPN.    -Appreciate nutritionist help   -Goodie bag daily  -Hypokalemia-repleted. continue KCL in ivfs   -Cervical incompetence-s/p cerclage removal 21  -Threatened PTL  -s/pBMTZ #1and #2 on  and      H/o prior  with successful , pt desires   -GBS pending.      Severe depression/anxiety with h/o suicidal ideation  -followed closely by Odella Goldberg her help  -continue zyprexa and prozac as well as prn prazosin and hydroxyzine     GERD  -famotidine 20mg bid     Ambulate  Daily NST     MFM scan on 6/3- 39% tile (5-12); 8/8 BPP, vtx     Anemia- asymptomatic  -known beta thal minor  -s/p hematology consult-appreciate recs  -s/p iv iron (venofer 200mg) x 5 days     CHTN-stable normal bp's off meds     DVT PPx-ambulation and mirtha hose     Back pain-s/p PT consult and pt was given a support belt which is helping

## 2021-06-14 NOTE — PROGRESS NOTES
ID Progress Note  2021    Subjective:     Afebrile. No dyspnea    Objective:     Vitals:   Visit Vitals  /61 (BP 1 Location: Right upper arm, BP Patient Position: At rest)   Pulse 62   Temp 98.4 °F (36.9 °C)   Resp 16   Ht 5' 4\" (1.626 m)   Wt 75 kg (165 lb 6.4 oz)   LMP 2020 (Exact Date)   SpO2 100%   Breastfeeding No   BMI 28.39 kg/m²        Tmax:  Temp (24hrs), Av.3 °F (36.8 °C), Min:98.2 °F (36.8 °C), Max:98.4 °F (36.9 °C)      Exam:    Not in distress  Lung clear, no rales, wheezes or rhonchi   Heart: s1, s2, RRR, no murmurs rubs or clicks  Abdomen:  nontender  Speech fluent     Labs:   Lab Results   Component Value Date/Time    WBC 6.9 2021 03:41 AM    Hemoglobin (POC) 10.4 (L) 2020 07:12 AM    HGB 9.1 (L) 2021 03:41 AM    HCT 29.1 (L) 2021 03:41 AM    PLATELET 450 (L)  03:41 AM    MCV 75.0 (L) 2021 03:41 AM    Hgb, External 33.7 2012 12:00 AM    Hct, External 69 2012 12:00 AM    Platelet cnt., External 307 2012 12:00 AM     Lab Results   Component Value Date/Time    Sodium 139 2021 06:22 AM    Potassium 3.5 2021 06:22 AM    Chloride 110 (H) 2021 06:22 AM    CO2 22 2021 06:22 AM    Anion gap 7 2021 06:22 AM    Glucose 64 (L) 2021 06:22 AM    BUN 2 (L) 2021 06:22 AM    Creatinine 0.35 (L) 2021 06:22 AM    BUN/Creatinine ratio 6 (L) 2021 06:22 AM    GFR est AA >60 2021 06:22 AM    GFR est non-AA >60 2021 06:22 AM    Calcium 7.7 (L) 2021 06:22 AM    Bilirubin, total 0.3 2021 04:43 AM    Alk.  phosphatase 221 (H) 2021 04:43 AM    Protein, total 6.3 (L) 2021 04:43 AM    Albumin 2.2 (L) 2021 04:43 AM    Globulin 4.1 (H) 2021 04:43 AM    A-G Ratio 0.5 (L) 2021 04:43 AM    ALT (SGPT) 20 2021 04:43 AM             Assessment:     #1 UTI with some right flank CVA tenderness     #2 intrauterine pregnancy     #3 asthma     #4 depression     #5 history of candidemia during this current pregnancy s/p meant with amphotericin     #6 hypokalemia          Recommendations:      Straight cath today for ua and uc.      Xiomy Arce MD

## 2021-06-14 NOTE — PROGRESS NOTES
Bedside and Verbal shift change report given to SN Elizabeth (oncoming nurse) by Junette Curling, RN (offgoing nurse). Report included the following information SBAR, Kardex, Intake/Output, MAR, Accordion and Recent Results.

## 2021-06-14 NOTE — PROGRESS NOTES
Bedside and Verbal shift change report given to [address] (oncoming nurse) by Clinton Mckenna (offgoing nurse). Report included the following information SBAR, Kardex, Procedure Summary, Intake/Output and MAR.

## 2021-06-14 NOTE — ADT AUTH CERT NOTES
Utilization Reviews 
 
  
Urinary Tract Infection (UTI) - Care Day 34 (6/14/2021) by Ky Rivas RN 
 
  
Review Entered Review Status 6/14/2021 13:35 Completed  
  
Criteria Review Care Day: 34 Care Date: 6/14/2021 Level of Care: Inpatient Floor Guideline Day 3 Level Of Care ( ) Floor to discharge 6/14/2021 13:35:38 EDT by Ludmila Stark   
  ip antepartum Clinical Status   
(X) * Hemodynamic stability 6/14/2021 13:35:38 EDT by Sandy Kelly   
  98.4 62 113/61 16 100% ROOM AIR   
(X) * Mental status at baseline 6/14/2021 13:35:38 EDT by Ludmila Stark   
  alert,oriented ( ) * Antibiotic regimen for next level of care established   
(X) * Urine output adequate 6/14/2021 13:35:38 EDT by Ludmila Stark   
  WNL   
(X) * Renal function at baseline or acceptable for next level of care   
(X) * Pain absent or managed 6/14/2021 13:35:38 EDT by Ludmila Stark   
  states some contractions ( ) * Oral intake adequate   
(X) * Afebrile or temperature acceptable for next level of care 6/14/2021 13:35:38 EDT by Ludmila Stark   
  temp 98.4 (X) * Vomiting absent   
( ) * Discharge plans and education understood Activity   
(X) * Ambulatory or acceptable for next level of care 6/14/2021 13:35:38 EDT by Vadim Nolan as tolerated w/ assistance Routes   
(X) * Oral hydration, medications, and diet 6/14/2021 13:35:38 EDT by Ludmila Stark   
  regular diet with ensure enlive supplements Interventions (X) Renal function tests, urinalysis 6/14/2021 13:35:38 EDT by Ludmila Stark   
  UA in review Medications (X) Antibiotics 6/14/2021 13:35:38 EDT by Ludmila Stark   
  Rocephin 1g iv q24 * Milestone Additional Notes 6/14/2021 LOC IP ANTEPARTUM  
VS 98.4 62 113/61 16 100% ROOM AIR  
LABS UA Color: YELLOW/STRAW Appearance: CLEAR Specific gravity: 1.009  
pH (UA): 7.5  
Protein: Negative Glucose: Negative Ketone: Negative Blood: Negative Bilirubin: Negative Urobilinogen: 0.2 Nitrites: Negative Leukocyte Esterase: Negative MEDS  
NS with MVI IV q24 Vitamin b 12 1000mcg po qday Pepcid 20mg iv q12 Prozac 60mg po qday Zyprexa 10mg po qday Zofran 8mg iv q6 prn x2 Vancomycin 1000mg iv q8  
  
ATTENDING NOTE  
 LOS: 33 days    
Estimated Date of Delivery: 7/4/21 Gestational Age Today: 42w4d   
   
Patient admitted for UTI s/p failed outpatient management due to chronic eating disorder. States she does have mild contractions, moderate nausea/vomiting and normal fetal movement and does not have chest pain, shortness of breath, vaginal bleeding  and vaginal leaking of fluid . Physical Exam:  
Patient without distress. Heart: Regular rate and rhythm Lung: clear to auscultation throughout lung fields, no wheezes, no rales, no rhonchi and normal respiratory effort Abdomen: soft, nontender, gravid Lower Extremities:  - Edema No     
   
Membranes:  Intact  
   
Today's NST pending Active Problems:  
  Pregnant (3/1/2018)    
  Eating disorder affecting pregnancy, antepartum (12/2/2020)    
  UTI in pregnancy, antepartum, third trimester (5/12/2021)    
  Malnutrition ([facility] [address] (6/11/2021)  
   
   
Pt is a 39 y/o [id number] at 37w1 weeks admitted for UTI s/p failed outpatient medical therapy due to chronic eating disorder.  
   
Urine culture 5/5/21 positive for Klebsiella and EColi-s/p 4 days of ceftriaxone  
-Repeat urine cx on 5/19 positive for enterococcus-now s/p vanc 5/21-6/2 - Appreciate I.D. care and management plan  
-afebrile and normal WBC's   
-renal ultrasound  5/21 WNL  
- 5/31: Morganelli (sensitive to ceftriaxone) and Enterococcus (sensitive to vanc) restarted ceftriaxone 6/2. Restart vanc 6/5/21  
-straight cath urine culture today per ID recs  
   
Chronic eating disorder -IV fluids, PICC line in place  
-spoke with  Nima with [facility] partial hospitalization program here in [place]-pt does not qualify for this program as she requires high level of care that is available at [facility] inpatient [facility] in [facility]. I spoke with pt re: this option but pt unsure about this option as she doesn't have a plan for care of her  if she enters this program-Loren Oneal and I are continuing to work with pt to help her develop a plan of care for she and her  after delivery  
-I continue to discuss need for nutrition as pt input minimal and losing weight daily. Pt unable to tolerate dobhoff by ENT. Pt advised by Dr. Singh Sharma on  that MFM does not think TPN necessary now from fetal perspective as baby's growth is normal. Continue to discuss use of TPN for her nutrition. Pt continues to decline TPN. -Appreciate nutritionist help   
-Goodie bag daily -Hypokalemia-repleted. continue KCL in ivfs   
-Cervical incompetence-s/p cerclage removal 21  
-Threatened PTL  
-s/pBMTZ #1and #2 on  and   
   
H/o prior  with successful , pt desires   
-GBS pending.  
   
 Severe depression/anxiety with h/o suicidal ideation  
-followed closely by Marco Render her help  
-continue zyprexa and prozac as well as prn prazosin and hydroxyzine  
   
GERD  
-famotidine 20mg bid  
   
Ambulate Daily NST  
   
MFM scan on 6/3- 39% tile (5-12);  BPP, vtx  
   
Anemia- asymptomatic  
-known beta thal minor  
-s/p hematology consult-appreciate recs -s/p iv iron (venofer 200mg) x 5 days  
   
CHTN-stable normal bp's off meds  
   
DVT PPx-ambulation and mirtha hose  
   
Back pain-s/p PT consult and pt was given a support belt which is helping   
   
INFECTIOUS DISEASE Afebrile. No dyspnea Exam:    
Not in distress Lung clear, no rales, wheezes or rhonchi Heart: s1, s2, RRR, no murmurs rubs or clicks Abdomen:  nontender Speech fluent #1 UTI with some right flank CVA tenderness  
   
#2 intrauterine pregnancy  
   
#3 asthma  
   
#4 depression  
   
#5 history of candidemia during this current pregnancy s/p meant with amphotericin  
   
#6 hypokalemia   
   
   
   
Recommendations:  
   
Straight cath today for ua and uc.  
  
  
Urinary Tract Infection (UTI) - Care Day 33 (6/13/2021) by Mary Yan RN 
 
  
Review Entered Review Status 6/14/2021 13:27 Completed  
  
Criteria Review Care Day: 35 Care Date: 6/13/2021 Level of Care: Inpatient Floor Guideline Day 3 Level Of Care ( ) Floor to discharge 6/14/2021 13:27:28 EDT by Vinod Chambers   
  IP antepartum Clinical Status   
(X) * Hemodynamic stability 6/14/2021 13:27:28 EDT by Vinod Chambers   
  98.3 58 112/68 16 99% ROOM AIR   
(X) * Mental status at baseline 6/14/2021 13:27:28 EDT by Roni Arrington alert,oriented ( ) * Antibiotic regimen for next level of care established   
(X) * Urine output adequate 6/14/2021 13:27:28 EDT by Vinod Chambers   
  WNL   
(X) * Renal function at baseline or acceptable for next level of care   
(X) * Pain absent or managed 6/14/2021 13:27:28 EDT by Vinod Chambers   
  denies abd pain   
( ) * Oral intake adequate 6/14/2021 13:27:28 EDT by Vinod Chambers   
  limited PO intake   
(X) * Afebrile or temperature acceptable for next level of care 6/14/2021 13:27:28 EDT by Vinod Chambers   
  temp 98.3   
(X) * Vomiting absent   
( ) * Discharge plans and education understood Activity   
(X) * Ambulatory or acceptable for next level of care 6/14/2021 13:27:28 EDT by Roni Arrington as verito Routes   
(X) * Oral hydration, medications, and diet 6/14/2021 13:27:28 EDT by Vinod Chambers   
  regular diet with ensure enlive all meals Medications (X) Antibiotics 6/14/2021 13:27:28 EDT by Vinod Chambers   
  Rocephin 1g iv qday * Milestone Additional Notes 6/13/2021 LOC IP ANTEPARTUM  
VS 98.3 58 112/68 16 99% ROOM AIR  
LABS    
Vancomycin,trough: 14.3 (H) MEDS  
NS with MVI iv qday Vitamin b 12 1000mcg po qday D5NS with 20meq KCL @ 125cc/hr Colace 100mg po qday Pepcid 20mg iv q12 Prozac 20mg po qday Zyprexa 10mg po qhs  
Vancomycin 1000mg iv q8 Zofran 8mg iv q6 prn x3 ATTENDING NOTE Reason for Admission:  Pregnancy and anemia, recurrent uti, eating dx  
   
History of Present Illness: Ms. Sangeeta Dela Cruz is a 38 y.o.  female with an estimated gestational age of 41w0d with Estimated Date of Delivery: 21. Patient complains of normal fetal movement for 1 days. Pregnancy has been complicated by recurrent uti, eating dx, anemia, likely depression. Patient denies abdominal pain  , chest pain, fever, headache , nausea and vomiting and right upper quadrant pain    
Physical Exam:  
Patient without distress. Heart: Regular rate and rhythm Lung: clear to auscultation throughout lung fields, no wheezes, no rales, no rhonchi and normal respiratory effort Abdomen: soft, nontender Fundus: soft and non tender Lower Extremities:  - Edema No     
Membranes:  Intact Uterine Activity:  None Fetal Heart Rate:  Reactive     
Active Problems:  
  Pregnant (3/1/2018)    
  Eating disorder affecting pregnancy, antepartum (2020)    
  UTI in pregnancy, antepartum, third trimester (2021)    
  Malnutrition ([address] (2021)    
   
Recurrent uti, anemia, hx ltcs desires  (one prior), eating disorder UTI: cont current care, appreciate id input Hx LTCS- plan repeat  GERD- famotidine bid Fetal- reassuring and reactive, cont daily NST Anemia: s/p iv iron, known beta thal, last hct 29.    
DVT- cont current care  
   
No significant changes in care at this time.  Comp met mildly abnormal but nothing requiring emergent intervention and pt is resistant to any non-emergent interventions   
  
  
Urinary Tract Infection (UTI) - Care Day 32 (6/12/2021) by aJcky Mckeon RN 
 
  
Review Entered Review Status 6/14/2021 11:09 Completed  
  
Criteria Review Care Day: 32 Care Date: 6/12/2021 Level of Care: Inpatient Floor Guideline Day 3 Level Of Care ( ) Floor to discharge 6/14/2021 11:09:45 EDT by Chriss Benjamin   
  ip antepartum Clinical Status   
(X) * Hemodynamic stability 6/14/2021 11:09:45 EDT by Sandy Abel   
  98.4 56 104/57 16 99% ROOM AIR   
(X) * Mental status at baseline 6/14/2021 11:09:45 EDT by Chriss Benjamin   
  alert,oriented ( ) * Antibiotic regimen for next level of care established   
(X) * Urine output adequate 6/14/2021 11:09:45 EDT by Chriss Benjamin   
  WNL   
(X) * Renal function at baseline or acceptable for next level of care   
(X) * Pain absent or managed 6/14/2021 11:09:45 EDT by Chriss Benjamin   
  per attending note patient having intermittant contractions ( ) * Oral intake adequate   
(X) * Afebrile or temperature acceptable for next level of care 6/14/2021 11:09:45 EDT by Chriss Benjamin   
  temp 98.4 (X) * Vomiting absent 6/14/2021 11:09:45 EDT by Chriss Benjamin   
  none noted ( ) * Discharge plans and education understood Activity   
(X) * Ambulatory or acceptable for next level of care 6/14/2021 11:09:45 EDT by Chriss Benjamin   
  as tolerated Routes   
(X) * Oral hydration, medications, and diet 6/14/2021 11:09:45 EDT by Chriss Benjamin   
  regular diet Medications (X) Antibiotics 6/14/2021 11:09:45 EDT by Chriss Benjamin   
  Rocephin 1g iv qday * Milestone Additional Notes 6/12/2021 LOC IP  ANTEPARTUM  
VS 98.4 56 104/57 16 99% ROOM AIR  
LABS    
Chloride: 110 (H)  
CO2: 22 Anion gap: 7 Glucose: 64 (L) BUN: 2 (L) Creatinine: 0.35 (L) BUN/Creatinine ratio: 6 (L) Calcium: 7.7 (L) MEDS  
NS with MVI iv qday Vitamin b12 1000mcg po qday D5 NS with 20meq of kcl @ 125 cc/hr Pepcid 20mg iv q12 Prozac 60mg po qday Zyprexa 10mg po qhs  
Zofran 8mg iv q6 prn x3 Vancomycin 1,250mg iv q8  
  
ATTENDING NOTE Gestational Age Today: 36w7d   
   
Patient admitted for chronic and recurrent uti. States she does have intermitent contractions.  
   
Physical Exam:  
Patient without distress. Heart: Regular rate and rhythm Lung: clear to auscultation throughout lung fields, no wheezes, no rales, no rhonchi and normal respiratory effort Abdomen: soft, nontender Fundus: soft and non tender Lower Extremities:  - Edema No     
   
Membranes:  Intact  
   
Uterine Activity:  None  
   
Fetal Heart Rate:  Reactive nst     
Active Problems:  
  Pregnant (3/1/2018)    
  Eating disorder affecting pregnancy, antepartum (2020)    
  UTI in pregnancy, antepartum, third trimester (2021)    
  Malnutrition ([address] (2021)  
   
   
UTI- cont abx, id care   
- cont current care, appreciate id input.  Hx of mutliple uti most recent morganelli and enterococcus  
   
Chronic eating dx: cont current care, ivf.  Plan possible placement w [facility] s/p delivery.  Appreciate pysch input and at great risk ppd.  
   
Hx LTCS w  prev, desires   
   
GERD - amotidine  
   
Daily NST  
   
Anemia: s/p iv iron, known beta thal vickie, cont current care  
   
Htn- normotensive off meds  
   
DVT- cont prophylaxis

## 2021-06-15 LAB
BACTERIA SPEC CULT: NORMAL
SERVICE CMNT-IMP: NORMAL

## 2021-06-15 PROCEDURE — 74011000250 HC RX REV CODE- 250: Performed by: OBSTETRICS & GYNECOLOGY

## 2021-06-15 PROCEDURE — 74011250636 HC RX REV CODE- 250/636: Performed by: OBSTETRICS & GYNECOLOGY

## 2021-06-15 PROCEDURE — 65410000002 HC RM PRIVATE OB

## 2021-06-15 PROCEDURE — 74011250637 HC RX REV CODE- 250/637: Performed by: INTERNAL MEDICINE

## 2021-06-15 PROCEDURE — 74011250637 HC RX REV CODE- 250/637: Performed by: OBSTETRICS & GYNECOLOGY

## 2021-06-15 PROCEDURE — 59025 FETAL NON-STRESS TEST: CPT

## 2021-06-15 PROCEDURE — 77030020847 HC STATLOK BARD -A

## 2021-06-15 RX ADMIN — CYANOCOBALAMIN TAB 500 MCG 1000 MCG: 500 TAB at 08:37

## 2021-06-15 RX ADMIN — OLANZAPINE 10 MG: 5 TABLET, FILM COATED ORAL at 21:47

## 2021-06-15 RX ADMIN — ONDANSETRON 8 MG: 2 INJECTION INTRAMUSCULAR; INTRAVENOUS at 02:49

## 2021-06-15 RX ADMIN — FAMOTIDINE 20 MG: 10 INJECTION INTRAVENOUS at 21:46

## 2021-06-15 RX ADMIN — ONDANSETRON 8 MG: 2 INJECTION INTRAMUSCULAR; INTRAVENOUS at 15:44

## 2021-06-15 RX ADMIN — VANCOMYCIN HYDROCHLORIDE 1000 MG: 1 INJECTION, POWDER, LYOPHILIZED, FOR SOLUTION INTRAVENOUS at 15:39

## 2021-06-15 RX ADMIN — FOLIC ACID: 5 INJECTION, SOLUTION INTRAMUSCULAR; INTRAVENOUS; SUBCUTANEOUS at 14:01

## 2021-06-15 RX ADMIN — FAMOTIDINE 20 MG: 10 INJECTION INTRAVENOUS at 08:37

## 2021-06-15 RX ADMIN — ONDANSETRON 8 MG: 2 INJECTION INTRAMUSCULAR; INTRAVENOUS at 09:13

## 2021-06-15 RX ADMIN — VANCOMYCIN HYDROCHLORIDE 1000 MG: 1 INJECTION, POWDER, LYOPHILIZED, FOR SOLUTION INTRAVENOUS at 06:54

## 2021-06-15 RX ADMIN — FLUOXETINE 60 MG: 20 CAPSULE ORAL at 08:37

## 2021-06-15 RX ADMIN — ONDANSETRON 8 MG: 2 INJECTION INTRAMUSCULAR; INTRAVENOUS at 21:43

## 2021-06-15 RX ADMIN — DOCUSATE SODIUM 100 MG: 100 CAPSULE, LIQUID FILLED ORAL at 08:37

## 2021-06-15 NOTE — PROGRESS NOTES
High Risk Obstetrics Progress Note    Name: Jaclyn Aden MRN: [id number]  SSN: [id number]    YOB: 1985  Age: 38 y.o. Sex: female      Subjective:      LOS: 34 days    Estimated Date of Delivery: 21   Gestational Age Today: 42w2d     Patient admitted for UTI s/p failed outpatient management due to chronic eating disorder. . States she does have mild headache , normal fetal movement and cramping with typical occasional contraction. She remains nauseated. She did sip some ginger ale. and does not have chest pain, shortness of breath, vaginal bleeding  and vaginal leaking of fluid . Objective:     Vitals:  Blood pressure 99/60, pulse 66, temperature 97.8 °F (36.6 °C), resp. rate 16, height 5' 4\" (1.626 m), weight 75.7 kg (166 lb 12.8 oz), last menstrual period 2020, SpO2 100 %, not currently breastfeeding. Temp (24hrs), Av.9 °F (36.6 °C), Min:97.7 °F (36.5 °C), Max:98.1 °F (63.8 °C)    Systolic (71OWP), RYV:465 , Min:99 , XEB:924      Diastolic (50IEH), DWW:09, Min:53, Max:79       Intake and Output:         Physical Exam:  Patient without distress.   Heart: Regular rate and rhythm  Lung: clear to auscultation throughout lung fields, no wheezes, no rales, no rhonchi and normal respiratory effort  Abdomen: soft, nontender, gravid  Lower Extremities:  - Edema No       Membranes:  Intact    Uterine Activity:  1 contraction on monitor yesterday    Fetal Heart Rate:  Reactive (NST 21)  Baseline: 130s per minute  Variability: moderate  Accelerations: yes  Decelerations: none        Labs:   Recent Results (from the past 36 hour(s))   URINALYSIS W/ RFLX MICROSCOPIC    Collection Time: 21 10:18 AM   Result Value Ref Range    Color YELLOW/STRAW      Appearance CLEAR CLEAR      Specific gravity 1.009 1.003 - 1.030      pH (UA) 7.5 5.0 - 8.0      Protein Negative NEG mg/dL    Glucose Negative NEG mg/dL    Ketone Negative NEG mg/dL    Bilirubin Negative NEG      Blood Negative NEG Urobilinogen 0.2 0.2 - 1.0 EU/dL    Nitrites Negative NEG      Leukocyte Esterase Negative NEG     CULTURE, URINE    Collection Time: 21 10:25 AM    Specimen: Cath Urine    URINE   Result Value Ref Range    Special Requests: NO SPECIAL REQUESTS      Culture result: No growth (<1,000 CFU/ML)         Assessment and Plan:       Active Problems:    Pregnant (3/1/2018)      Eating disorder affecting pregnancy, antepartum (2020)      UTI in pregnancy, antepartum, third trimester (2021)      Malnutrition ([address] (2021)       Pt is a 37 y/o [id number] at [facility] admitted for UTI s/p failed outpatient medical therapy due to chronic eating disorder.     Urine culture 21 positive for Klebsiella and EColi-s/p 4 days of ceftriaxone  -Repeat urine cx on  positive for enterococcus-now s/p vanc -6  - Appreciate I.D. care and management plan  -afebrile and normal WBC's   -renal ultrasound   WNL  - : Morganelli (sensitive to ceftriaxone) and Enterococcus (sensitive to vanc) restarted ceftriaxone . Restart vanc 21  -straight cath urine culture 21 negative.     Chronic eating disorder  -IV fluids, PICC line in place  -spoke with Dr Alejandra Kang with [facility] partial hospitalization program here in [place]-pt does not qualify for this program as she requires high level of care that is available at [facility] inpatient program in [facility]. I spoke with pt re: this option but pt unsure about this option as she doesn't have a plan for care of her  if she enters this program-Loren Oneal and I are continuing to work with pt to help her develop a plan of care for she and her  after delivery  -case management consult today to help with logistics of pt entering into [facility] program in [facility] (she is now open to this option if she can make arrangements for her child)  -I continue to discuss need for nutrition as pt input minimal and losing weight daily. Pt unable to tolerate dobhoff by ENT. Pt advised by Dr. Rubia Moreau on  that MFM does not think TPN necessary now from fetal perspective as baby's growth is normal. Continue to discuss use of TPN for her nutrition. Pt continues to decline TPN.    -Appreciate nutritionist help   -Goodie bag daily  -Hypokalemia-repleted. continue KCL in ivfs   -Cervical incompetence-s/p cerclage removal 21  -Threatened PTL  -s/pBMTZ #1and #2 on  and      H/o prior  with successful , pt desires   -GBS pending.      Severe depression/anxiety with h/o suicidal ideation  -followed closely by Bibi Mantilla her help  -continue zyprexa and prozac as well as prn prazosin and hydroxyzine     GERD  -famotidine 20mg bid     Ambulate  Daily NST     MFM scan on 6/3- 39% tile (5-12);  BPP, vtx     Anemia- asymptomatic  -known beta thal minor  -s/p hematology consult-appreciate recs  -s/p iv iron (venofer 200mg) x 5 days     CHTN-stable normal bp's off meds     DVT PPx-ambulation and mirtha hose     Back pain-s/p PT consult and pt was given a support belt which is helping

## 2021-06-15 NOTE — PROGRESS NOTES
Bedside and Verbal shift change report given to Eddy Redman RN (oncoming nurse) by ELOISE Muniz  (offgoing nurse). Report included the following information SBAR, Kardex, Intake/Output, MAR and Recent Results.      Breakfast: 0% of tray intake  Lunch: 0% of tray intake

## 2021-06-15 NOTE — PROGRESS NOTES
1940: Bedside shift change report given to Dior Lyles RN (oncoming nurse) by Chelsea Ansari RN (offgoing nurse). Report included the following information SBAR, Kardex, Intake/Output, MAR and Recent Results.

## 2021-06-15 NOTE — PROGRESS NOTES
ID Progress Note  6/15/2021    Subjective:     Afebrile. No dyspnea. She says she is doing ok. Objective:     Vitals:   Visit Vitals  BP (!) 95/47 (BP 1 Location: Right arm, BP Patient Position: At rest)   Pulse 66   Temp 98 °F (36.7 °C)   Resp 16   Ht 5' 4\" (1.626 m)   Wt 75.7 kg (166 lb 12.8 oz)   LMP 2020 (Exact Date)   SpO2 100%   Breastfeeding No   BMI 28.63 kg/m²        Tmax:  Temp (24hrs), Av.9 °F (36.6 °C), Min:97.7 °F (36.5 °C), Max:98.1 °F (36.7 °C)      Exam:    Not in distress  Lung clear, no rales, wheezes or rhonchi   Heart: s1, s2, RRR, no murmurs rubs or clicks  Abdomen:  nontender  Speech fluent     Labs:   Lab Results   Component Value Date/Time    WBC 6.9 2021 03:41 AM    Hemoglobin (POC) 10.4 (L) 2020 07:12 AM    HGB 9.1 (L) 2021 03:41 AM    HCT 29.1 (L) 2021 03:41 AM    PLATELET 497 (L)  03:41 AM    MCV 75.0 (L) 2021 03:41 AM    Hgb, External 33.7 2012 12:00 AM    Hct, External 69 2012 12:00 AM    Platelet cnt., External 307 2012 12:00 AM     Lab Results   Component Value Date/Time    Sodium 139 2021 06:22 AM    Potassium 3.5 2021 06:22 AM    Chloride 110 (H) 2021 06:22 AM    CO2 22 2021 06:22 AM    Anion gap 7 2021 06:22 AM    Glucose 64 (L) 2021 06:22 AM    BUN 2 (L) 2021 06:22 AM    Creatinine 0.35 (L) 2021 06:22 AM    BUN/Creatinine ratio 6 (L) 2021 06:22 AM    GFR est AA >60 2021 06:22 AM    GFR est non-AA >60 2021 06:22 AM    Calcium 7.7 (L) 2021 06:22 AM    Bilirubin, total 0.3 2021 04:43 AM    Alk.  phosphatase 221 (H) 2021 04:43 AM    Protein, total 6.3 (L) 2021 04:43 AM    Albumin 2.2 (L) 2021 04:43 AM    Globulin 4.1 (H) 2021 04:43 AM    A-G Ratio 0.5 (L) 2021 04:43 AM    ALT (SGPT) 20 2021 04:43 AM             Assessment:     #1 UTI with some right flank CVA tenderness     #2 intrauterine pregnancy     #3 asthma     #4 depression     #5 history of candidemia during this current pregnancy s/p meant with amphotericin     #6 hypokalemia          Recommendations:       UA and urine culture negative. Will discontinue abx. Will sign off. Please call with questions.      Frida Pena MD

## 2021-06-15 NOTE — PROGRESS NOTES
Transition Plan of Care  RUR 34%-High  Disposition- consult noted to assist with planning at discharge to [facility] inpatient care in [facility] for eating disorder. Will be complicated logistically since patient is pregnant and delivery may be within the next 5-7 days. We will need to have a place for the  to reside as the [facility] will not allow child to accompany the patient. Patient has limited support system and she is very hesitant to leave . Options are limited but CM will continue to pursue every option available.   Traci Meier, RN CRM  Ext [phone]

## 2021-06-16 LAB
ABO + RH BLD: NORMAL
ABO + RH BLD: NORMAL
ANION GAP SERPL CALC-SCNC: 7 MMOL/L (ref 5–15)
BLOOD GROUP ANTIBODIES SERPL: NORMAL
BLOOD GROUP ANTIBODIES SERPL: NORMAL
BUN SERPL-MCNC: 2 MG/DL (ref 6–20)
BUN/CREAT SERPL: 7 (ref 12–20)
CALCIUM SERPL-MCNC: 8 MG/DL (ref 8.5–10.1)
CHLORIDE SERPL-SCNC: 108 MMOL/L (ref 97–108)
CO2 SERPL-SCNC: 22 MMOL/L (ref 21–32)
CREAT SERPL-MCNC: 0.29 MG/DL (ref 0.55–1.02)
GLUCOSE SERPL-MCNC: 58 MG/DL (ref 65–100)
POTASSIUM SERPL-SCNC: 3.5 MMOL/L (ref 3.5–5.1)
SODIUM SERPL-SCNC: 137 MMOL/L (ref 136–145)
SPECIMEN EXP DATE BLD: NORMAL
SPECIMEN EXP DATE BLD: NORMAL

## 2021-06-16 PROCEDURE — 86901 BLOOD TYPING SEROLOGIC RH(D): CPT

## 2021-06-16 PROCEDURE — 74011250637 HC RX REV CODE- 250/637: Performed by: INTERNAL MEDICINE

## 2021-06-16 PROCEDURE — 80048 BASIC METABOLIC PNL TOTAL CA: CPT

## 2021-06-16 PROCEDURE — 74011250637 HC RX REV CODE- 250/637: Performed by: OBSTETRICS & GYNECOLOGY

## 2021-06-16 PROCEDURE — 74011000250 HC RX REV CODE- 250: Performed by: OBSTETRICS & GYNECOLOGY

## 2021-06-16 PROCEDURE — 36415 COLL VENOUS BLD VENIPUNCTURE: CPT

## 2021-06-16 PROCEDURE — 65410000002 HC RM PRIVATE OB

## 2021-06-16 PROCEDURE — 74011250636 HC RX REV CODE- 250/636: Performed by: OBSTETRICS & GYNECOLOGY

## 2021-06-16 PROCEDURE — 59025 FETAL NON-STRESS TEST: CPT

## 2021-06-16 RX ADMIN — FOLIC ACID: 5 INJECTION, SOLUTION INTRAMUSCULAR; INTRAVENOUS; SUBCUTANEOUS at 15:11

## 2021-06-16 RX ADMIN — FAMOTIDINE 20 MG: 10 INJECTION INTRAVENOUS at 12:12

## 2021-06-16 RX ADMIN — ONDANSETRON 8 MG: 2 INJECTION INTRAMUSCULAR; INTRAVENOUS at 21:47

## 2021-06-16 RX ADMIN — ONDANSETRON 8 MG: 2 INJECTION INTRAMUSCULAR; INTRAVENOUS at 06:08

## 2021-06-16 RX ADMIN — OLANZAPINE 10 MG: 5 TABLET, FILM COATED ORAL at 21:47

## 2021-06-16 RX ADMIN — POTASSIUM CHLORIDE, DEXTROSE MONOHYDRATE AND SODIUM CHLORIDE 125 ML/HR: 150; 5; 900 INJECTION, SOLUTION INTRAVENOUS at 05:55

## 2021-06-16 RX ADMIN — DOCUSATE SODIUM 100 MG: 100 CAPSULE, LIQUID FILLED ORAL at 12:12

## 2021-06-16 RX ADMIN — CYANOCOBALAMIN TAB 500 MCG 1000 MCG: 500 TAB at 12:11

## 2021-06-16 RX ADMIN — FLUOXETINE 60 MG: 20 CAPSULE ORAL at 12:12

## 2021-06-16 RX ADMIN — ONDANSETRON 8 MG: 2 INJECTION INTRAMUSCULAR; INTRAVENOUS at 12:16

## 2021-06-16 RX ADMIN — FAMOTIDINE 20 MG: 10 INJECTION INTRAVENOUS at 21:47

## 2021-06-16 NOTE — PROGRESS NOTES
High Risk Obstetrics Progress Note    Name: Jessica Lopez MRN: [id number]  SSN: [id number]    YOB: 1985  Age: 38 y.o. Sex: female      Subjective:      LOS: 35 days    Estimated Date of Delivery: 21   Gestational Age Today: 44w3d     Patient admitted for UTI s/p failed outpatient management due to chronic eating disorder. States she does have mild abdominal pain, moderate headache , moderate nausea/vomiting and normal fetal movement and does not have vaginal bleeding  and vaginal leaking of fluid . Objective:     Vitals:  Blood pressure 106/61, pulse (!) 59, temperature 98.6 °F (37 °C), resp. rate 16, height 5' 4\" (1.626 m), weight 76 kg (167 lb 9.6 oz), last menstrual period 2020, SpO2 98 %, not currently breastfeeding. Temp (24hrs), Av.1 °F (36.7 °C), Min:97.8 °F (36.6 °C), Max:98.6 °F (37 °C)    Systolic (29RCG), KPX:540 , Min:95 , VFX:769      Diastolic (06OKV), MTT:98, Min:47, Max:62       Intake and Output:         Physical Exam:  Patient without distress. Heart: Regular rate and rhythm  Lung: clear to auscultation throughout lung fields, no wheezes, no rales, no rhonchi and normal respiratory effort  Abdomen: soft, nontender, gravid  Lower Extremities:  - Edema No       Membranes:  Intact    NST pending for today      Labs: No results found for this or any previous visit (from the past 36 hour(s)). Assessment and Plan:       Active Problems:    Pregnant (3/1/2018)      Eating disorder affecting pregnancy, antepartum (2020)      UTI in pregnancy, antepartum, third trimester (2021)      Malnutrition ([address] (2021)       Pt is a 37 y/o [id number] at 37w3d weeks admitted for UTI s/p failed outpatient medical therapy due to chronic eating disorder.     Urine culture 21 positive for Klebsiella and EColi-s/p 4 days of ceftriaxone  -Repeat urine cx on  positive for enterococcus-now s/p vanc -  - Appreciate I.D. care and management plan  -afebrile and normal WBC's   -renal ultrasound   WNL  - : Morganelli (sensitive to ceftriaxone) and Enterococcus (sensitive to vanc) restarted ceftriaxone . Restart vanc 21  -straight cath urine culture 21 negative-abx discontinued 6/15.     Chronic eating disorder  -IV fluids, PICC line in place  -spoke with Dr Citlali Ruiz with [facility] here in [place]-pt does not qualify for this program as she requires high level of care that is available at [facility] in [facility]. I spoke with pt re: this option but pt unsure about this option as she doesn't have a plan for care of her  if she enters this program-Loren Oneal and I are continuing to work with pt to help her develop a plan of care for she and her  after delivery  -case management working to help with logistics of pt entering into [facility] in [facility] (she is now open to this option if she can make arrangements for her child)  -I continue to discuss need for nutrition as pt input minimal and losing weight daily. Pt unable to tolerate dobhoff by ENT. Pt advised by Dr. Sharron Monge on  that MFM does not think TPN necessary now from fetal perspective as baby's growth is normal. Continue to discuss use of TPN for her nutrition. Pt continues to decline TPN.    -Appreciate nutritionist help   -Goodie bag daily  -Hypokalemia-repleted. continue KCL in ivfs   -Cervical incompetence-s/p cerclage removal 21  -Threatened PTL  -s/pBMTZ #1and #2 on  and      H/o prior  with successful , pt desires   -GBS pending.      Severe depression/anxiety with h/o suicidal ideation  -followed closely by Carlos A Bond her help  -continue zyprexa and prozac as well as prn prazosin and hydroxyzine     GERD  -famotidine 20mg bid     Ambulate  Daily NST     MFM scan on 6/3- 39% tile (5-12);  BPP, vtx     Anemia- asymptomatic  -known beta thal minor  -s/p hematology consult-appreciate recs  -s/p iv iron (venofer 200mg) x 5 days     CHTN-stable normal bp's off meds     DVT PPx-ambulation and mirtha hose     Back pain-s/p PT consult and pt was given a support belt which is helping     Plan for induction of labor on 6/18/21.

## 2021-06-16 NOTE — PROGRESS NOTES
I was told today that the COVID vaccine is again available for our inpatients. I advised pt of this and she desires to proceed with covid vaccine.

## 2021-06-17 PROCEDURE — 74011250637 HC RX REV CODE- 250/637: Performed by: INTERNAL MEDICINE

## 2021-06-17 PROCEDURE — 74011250636 HC RX REV CODE- 250/636: Performed by: OBSTETRICS & GYNECOLOGY

## 2021-06-17 PROCEDURE — 74011250637 HC RX REV CODE- 250/637: Performed by: OBSTETRICS & GYNECOLOGY

## 2021-06-17 PROCEDURE — 65410000002 HC RM PRIVATE OB

## 2021-06-17 PROCEDURE — 76819 FETAL BIOPHYS PROFIL W/O NST: CPT | Performed by: OBSTETRICS & GYNECOLOGY

## 2021-06-17 PROCEDURE — 74011000250 HC RX REV CODE- 250: Performed by: OBSTETRICS & GYNECOLOGY

## 2021-06-17 PROCEDURE — 59025 FETAL NON-STRESS TEST: CPT

## 2021-06-17 RX ADMIN — FLUOXETINE 60 MG: 20 CAPSULE ORAL at 09:00

## 2021-06-17 RX ADMIN — ONDANSETRON 8 MG: 2 INJECTION INTRAMUSCULAR; INTRAVENOUS at 15:09

## 2021-06-17 RX ADMIN — FAMOTIDINE 20 MG: 10 INJECTION INTRAVENOUS at 21:05

## 2021-06-17 RX ADMIN — FAMOTIDINE 20 MG: 10 INJECTION INTRAVENOUS at 08:59

## 2021-06-17 RX ADMIN — ONDANSETRON 8 MG: 2 INJECTION INTRAMUSCULAR; INTRAVENOUS at 09:00

## 2021-06-17 RX ADMIN — PRAZOSIN HYDROCHLORIDE 2 MG: 1 CAPSULE ORAL at 00:29

## 2021-06-17 RX ADMIN — ONDANSETRON 8 MG: 2 INJECTION INTRAMUSCULAR; INTRAVENOUS at 21:05

## 2021-06-17 RX ADMIN — POTASSIUM CHLORIDE, DEXTROSE MONOHYDRATE AND SODIUM CHLORIDE 125 ML/HR: 150; 5; 900 INJECTION, SOLUTION INTRAVENOUS at 00:31

## 2021-06-17 RX ADMIN — DOCUSATE SODIUM 100 MG: 100 CAPSULE, LIQUID FILLED ORAL at 09:00

## 2021-06-17 RX ADMIN — OLANZAPINE 10 MG: 5 TABLET, FILM COATED ORAL at 21:05

## 2021-06-17 RX ADMIN — FOLIC ACID: 5 INJECTION, SOLUTION INTRAMUSCULAR; INTRAVENOUS; SUBCUTANEOUS at 15:09

## 2021-06-17 RX ADMIN — CYANOCOBALAMIN TAB 500 MCG 1000 MCG: 500 TAB at 09:00

## 2021-06-17 RX ADMIN — POTASSIUM CHLORIDE, DEXTROSE MONOHYDRATE AND SODIUM CHLORIDE 125 ML/HR: 150; 5; 900 INJECTION, SOLUTION INTRAVENOUS at 23:52

## 2021-06-17 RX ADMIN — POTASSIUM CHLORIDE, DEXTROSE MONOHYDRATE AND SODIUM CHLORIDE 125 ML/HR: 150; 5; 900 INJECTION, SOLUTION INTRAVENOUS at 09:00

## 2021-06-17 NOTE — PROGRESS NOTES
History & Physical    Name: Jesse Sherman MRN: [id number]  SSN: [id number]    YOB: 1985  Age: 38 y.o. Sex: female      Subjective:     Reason for Admission:  Pregnancy and recurrent uti    History of Present Illness: Ms. Joann Prasad is a 38 y.o.  female with an estimated gestational age of 37w1d with Estimated Date of Delivery: 21. Patient complains of recurrent uti,good fetal movement, occasional contractions for several weeks weeks. Pregnancy has been complicated by recurrent uti, prev ltcs, eating dx. Patient denies abdominal pain  , chest pain, fever, nausea and vomiting, shortness of breath, vaginal bleeding  and vaginal leaking of fluid . OB History    Para Term  AB Living   16 7 4 2 7 7   SAB TAB Ectopic Molar Multiple Live Births   5 1 1   0 7      # Outcome Date GA Lbr Izaiah/2nd Weight Sex Delivery Anes PTL Lv   16 Current            15 Term 18 37w5d / 10:03 2.71 kg M  EPIDURAL AN N YAHAIRA   14 Term 04/22/15 39w0d  3.725 kg F  LO SPINAL AN N YAHAIRA   13 Term 13 39w4d  2.748 kg F VAGINAL DELI None  YAHAIRA   12 Para     M VAGINAL DELI   YAHAIRA   11 Ectopic 2009           10 Term 08/10/08 39w0d  2.722 kg M VAGINAL DELI EPI  YAHAIRA   9  06 34w0d   M VAGINAL DELI EPI Y YAHAIRA      Birth Comments: cerclage   8  05 36w0d   F VAGINAL DELI EPI Y YAHAIRA      Birth Comments: cerclage   7 SAB  18w0d       DEC   6 2002 18w0d       DEC   5 SAB  20w0d       DEC   4 SAB  16w0d       DEC   3             2 TAB            1 SAB               Obstetric Comments   Gyn Dr. Shea Montalvo     Past Medical History:   Diagnosis Date    Acute pyelonephritis 3/3/2021    Anorexia     Anxiety     Asthma     on albuterol prn.  Triggers cold and allergies    Avoidant-restrictive food intake disorder (ARFID)     Back pain, chronic     sciatica    Chronic bilateral low back pain with right-sided sciatica 2020 ~    GERD (gastroesophageal reflux disease) 2020    [facility]I , GI Dr. Maggie Alarcon Gestational hypertension     Past pregnancy    HX OTHER MEDICAL      , , , ,     Hyperemesis     severe hyperemesis    Hypertension     with G12 - none this pregnancy    Iron deficiency anemia 10/08/2010    MDD (major depressive disorder), single episode with postpartum onset 2013    treated with meds - 13    Orthostatic hypotension 2020    Plantar fasciitis     Pregnancy     36 weeks    PTSD (post-traumatic stress disorder)      Past Surgical History:   Procedure Laterality Date    HX  SECTION  4/22/15    HX DILATION AND CURETTAGE      HX DILATION AND CURETTAGE  2020    HX GYN      miscarriage x 6    HX GYN      removal of left fallopian tube    HX OTHER SURGICAL      cerlcage x4, ectopic x1 1999 with removal of left fallopian tube    HX OTHER SURGICAL      cerclage w/ current pregnancy.  Removed 18    TX  DELIVERY ONLY       Social History     Occupational History    Not on file   Tobacco Use    Smoking status: Never Smoker    Smokeless tobacco: Never Used   Substance and Sexual Activity    Alcohol use: Not Currently    Drug use: No    Sexual activity: Yes     Partners: Male      Family History   Problem Relation Age of Onset   [place] Arthritis-rheumatoid Mother     Asthma Mother     No Known Problems Father     No Known Problems Maternal Grandmother     No Known Problems Maternal Grandfather     No Known Problems Paternal Grandmother     No Known Problems Paternal Grandfather     Asthma Son     Alcohol abuse Neg Hx     Arthritis-osteo Neg Hx     Bleeding Prob Neg Hx     Cancer Neg Hx     Diabetes Neg Hx     Elevated Lipids Neg Hx     Headache Neg Hx     Heart Disease Neg Hx     Hypertension Neg Hx     Lung Disease Neg Hx     Migraines Neg Hx     Psychiatric Disorder Neg Hx     Stroke Neg Hx     Mental Retardation Neg Hx  Anesth Problems Neg Hx        Allergies   Allergen Reactions    Labetalol Hives    Pcn [Penicillins] Hives    Ceclor [Cefaclor] Unknown (comments)    Septra [Sulfamethoprim Ds] Unknown (comments)     Prior to Admission medications    Medication Sig Start Date End Date Taking? Authorizing Provider   FLUoxetine (PROzac) 20 mg capsule Take 1 Cap by mouth daily. Take with 40 mg cap for total daily dose of 60 mg. 3/1/21  Yes Allocca, Derotha Grams, NP   FLUoxetine (PROzac) 40 mg capsule Take 1 Cap by mouth daily. 3/1/21  Yes Allocca, Derotha Grams, NP   OLANZapine (ZyPREXA) 10 mg tablet Take 1 Tab by mouth nightly. 3/1/21  Yes Allocca, Derotha Grams, NP   prazosin (MINIPRESS) 2 mg capsule Take 1 Cap by mouth nightly. Indications: posttraumatic stress syndrome 3/1/21  Yes Allocca, Derotha Grams, NP   calcium carbonate (Tums) 200 mg calcium (500 mg) chew Take 1 Tab by mouth two (2) times daily as needed for Other (reflux). 2/18/21  Yes Mitzi Roberson MD   promethazine (Phenergan) 12.5 mg suppository    Yes Provider, Historical   ondansetron (ZOFRAN ODT) 4 mg disintegrating tablet Take 1 Tab by mouth every six (6) hours as needed for Nausea or Vomiting. 12/16/20  Yes Shaylee Olmstead DO   hydrOXYzine HCL (ATARAX) 50 mg tablet Take 1 Tab by mouth four (4) times daily. 11/9/20  Yes Verroberto HillsdaleMENA calvo   food supplemt, lactose-reduced (Ensure High Protein) liqd Take 237 mL by mouth three (3) times daily. 10/14/20  Yes Alvin Matamoros NP   polyethylene glycol (MIRALAX) 17 gram packet Take 1 Packet by mouth daily as needed for Constipation. 2/23/21   Mitzi Roberson MD   doxylamine succinate (UNISOM) 25 mg tablet Take 1 Tab by mouth nightly as needed for Insomnia. 2/15/21   Allocca, Derotha Grams, NP   pantoprazole (PROTONIX) 40 mg tablet Take 1 Tab by mouth Daily (before breakfast).  Indications: gastroesophageal reflux disease 1/15/21   Shaylee Olmstead DO   sucralfate (CARAFATE) 1 gram tablet Take 1 Tab by mouth Before breakfast, lunch, dinner and at bedtime. 1/15/21   Salem Floor, DO   Lacto. acidophilus-Bif. animalis (Probiotic) 5 billion cell cpSP Take 1 Cap by mouth daily. 20   Keith Ortiz NP   prochlorperazine (COMPAZINE) 10 mg tablet  20   Provider, Historical   0.9% sodium chloride solp 1,000 mL with thiamine 100 mg/mL soln 872 mg, folic acid 5 mg/mL soln 1 mg 1 Bag by IntraVENous route every twenty-four (24) hours. 20   [facility], DO   cholecalciferol (Vitamin D3) 25 mcg (1,000 unit) cap Take  by mouth daily. Provider, Historical   pyridoxine, vitamin B6, (Vitamin B-6) 100 mg tablet Take 100 mg by mouth daily. Provider, Historical   OTHER 1 Ensure Enlive food supplement drink PO TID 20   Sandra Nagy NP   OTHER 1 ensure pudding PO daily for lunch. 10/20/20   Sandra Nagy NP   prenatal vit-iron fumarate-fa (PRENATAL PLUS with IRON) 28 mg iron- 800 mcg tab  20   Provider, Historical        Review of Systems:  A comprehensive review of systems was negative except for that written in the History of Present Illness. Objective:     Vitals:    Vitals:    21 0829 21 1520 21 2058 21 0031   BP: (!) 105/57 (!) 96/51 121/69 127/66   Pulse: 60 (!) 57 62 (!) 55   Resp:    Temp: 97.9 °F (36.6 °C) 98.1 °F (36.7 °C) 98.7 °F (37.1 °C) 98.2 °F (36.8 °C)   SpO2: 97% 100% 100% 100%   Weight:    75.4 kg (166 lb 3.2 oz)   Height:          Temp (24hrs), Av.2 °F (36.8 °C), Min:97.9 °F (36.6 °C), Max:98.7 °F (37.1 °C)    BP  Min: 96/51  Max: 127/66     Physical Exam:  Patient without distress.   Heart: Regular rate and rhythm  Lung: clear to auscultation throughout lung fields, no wheezes, no rales, no rhonchi and normal respiratory effort  Abdomen: soft, nontender  Fundus: soft and non tender  Lower Extremities:  - Edema No     Membranes:  Intact  Uterine Activity:  None  Fetal Heart Rate:  Reactive       Lab/Data Review:  No results found for this or any previous visit (from the past 24 hour(s)). Assessment and Plan:      Active Problems:    Pregnant (3/1/2018)      Eating disorder affecting pregnancy, antepartum (2020)      UTI in pregnancy, antepartum, third trimester (2021)      Malnutrition ([address] (2021)       Recurrent uti, Prev LTCS w successful , eating dx  Cont current plan, expectant care  Reactive fetal testing

## 2021-06-17 NOTE — PROGRESS NOTES
for follow up visit. Pt resting, talked with pt's RN for update. The plan is for pt to be induced tomorrow. Please contact [phone] [address] [facility] for further support.      [employer] Lily Partida, MACE   [phone]

## 2021-06-17 NOTE — PROGRESS NOTES
TRANSITIONS OF CARE :  CRM attempted to do a search for a [facility] partial hospital stay program to help treat patient post partum. I was unsuccessful in finding any information in the [facility] that would help accomodate patient and her  at this time.

## 2021-06-18 ENCOUNTER — ANESTHESIA (OUTPATIENT)
Dept: LABOR AND DELIVERY | Age: 36
DRG: 542 | End: 2021-06-18
Payer: MEDICAID

## 2021-06-18 ENCOUNTER — ANESTHESIA EVENT (OUTPATIENT)
Dept: LABOR AND DELIVERY | Age: 36
DRG: 542 | End: 2021-06-18
Payer: MEDICAID

## 2021-06-18 LAB
ANION GAP SERPL CALC-SCNC: 7 MMOL/L (ref 5–15)
BUN SERPL-MCNC: 4 MG/DL (ref 6–20)
BUN/CREAT SERPL: 10 (ref 12–20)
CALCIUM SERPL-MCNC: 7.9 MG/DL (ref 8.5–10.1)
CHLORIDE SERPL-SCNC: 110 MMOL/L (ref 97–108)
CO2 SERPL-SCNC: 22 MMOL/L (ref 21–32)
CREAT SERPL-MCNC: 0.4 MG/DL (ref 0.55–1.02)
ERYTHROCYTE [DISTWIDTH] IN BLOOD BY AUTOMATED COUNT: 17 % (ref 11.5–14.5)
GLUCOSE SERPL-MCNC: 69 MG/DL (ref 65–100)
HCT VFR BLD AUTO: 31 % (ref 35–47)
HGB BLD-MCNC: 9.8 G/DL (ref 11.5–16)
MCH RBC QN AUTO: 23.6 PG (ref 26–34)
MCHC RBC AUTO-ENTMCNC: 31.6 G/DL (ref 30–36.5)
MCV RBC AUTO: 74.7 FL (ref 80–99)
NRBC # BLD: 0 K/UL (ref 0–0.01)
NRBC BLD-RTO: 0 PER 100 WBC
PLATELET # BLD AUTO: 201 K/UL (ref 150–400)
PMV BLD AUTO: 11.1 FL (ref 8.9–12.9)
POTASSIUM SERPL-SCNC: 3.6 MMOL/L (ref 3.5–5.1)
RBC # BLD AUTO: 4.15 M/UL (ref 3.8–5.2)
SODIUM SERPL-SCNC: 139 MMOL/L (ref 136–145)
WBC # BLD AUTO: 6.3 K/UL (ref 3.6–11)

## 2021-06-18 PROCEDURE — 36415 COLL VENOUS BLD VENIPUNCTURE: CPT

## 2021-06-18 PROCEDURE — 74011250636 HC RX REV CODE- 250/636: Performed by: OBSTETRICS & GYNECOLOGY

## 2021-06-18 PROCEDURE — A4300 CATH IMPL VASC ACCESS PORTAL: HCPCS

## 2021-06-18 PROCEDURE — 80048 BASIC METABOLIC PNL TOTAL CA: CPT

## 2021-06-18 PROCEDURE — 75410000002 HC LABOR FEE PER 1 HR

## 2021-06-18 PROCEDURE — 65410000002 HC RM PRIVATE OB

## 2021-06-18 PROCEDURE — 75410000003 HC RECOV DEL/VAG/CSECN EA 0.5 HR

## 2021-06-18 PROCEDURE — 76060000078 HC EPIDURAL ANESTHESIA

## 2021-06-18 PROCEDURE — 77030014125 HC TY EPDRL BBMI -B: Performed by: ANESTHESIOLOGY

## 2021-06-18 PROCEDURE — 85027 COMPLETE CBC AUTOMATED: CPT

## 2021-06-18 PROCEDURE — 88307 TISSUE EXAM BY PATHOLOGIST: CPT

## 2021-06-18 PROCEDURE — 75410000000 HC DELIVERY VAGINAL/SINGLE

## 2021-06-18 PROCEDURE — 77030019905 HC CATH URETH INTMIT MDII -A

## 2021-06-18 PROCEDURE — 74011000250 HC RX REV CODE- 250: Performed by: OBSTETRICS & GYNECOLOGY

## 2021-06-18 PROCEDURE — 74011000250 HC RX REV CODE- 250: Performed by: ANESTHESIOLOGY

## 2021-06-18 PROCEDURE — 74011250637 HC RX REV CODE- 250/637: Performed by: OBSTETRICS & GYNECOLOGY

## 2021-06-18 RX ORDER — OXYTOCIN/RINGER'S LACTATE 30/500 ML
87.3 PLASTIC BAG, INJECTION (ML) INTRAVENOUS AS NEEDED
Status: DISCONTINUED | OUTPATIENT
Start: 2021-06-18 | End: 2021-06-21 | Stop reason: HOSPADM

## 2021-06-18 RX ORDER — SODIUM CHLORIDE 0.9 % (FLUSH) 0.9 %
5-40 SYRINGE (ML) INJECTION AS NEEDED
Status: DISCONTINUED | OUTPATIENT
Start: 2021-06-18 | End: 2021-06-21 | Stop reason: HOSPADM

## 2021-06-18 RX ORDER — FENTANYL/BUPIVACAINE/NS/PF 2-1250MCG
1-16 PREFILLED PUMP RESERVOIR EPIDURAL CONTINUOUS
Status: DISCONTINUED | OUTPATIENT
Start: 2021-06-18 | End: 2021-06-18 | Stop reason: SDUPTHER

## 2021-06-18 RX ORDER — OXYTOCIN/RINGER'S LACTATE 30/500 ML
0-20 PLASTIC BAG, INJECTION (ML) INTRAVENOUS
Status: DISCONTINUED | OUTPATIENT
Start: 2021-06-18 | End: 2021-06-18

## 2021-06-18 RX ORDER — BUPIVACAINE HYDROCHLORIDE 5 MG/ML
30 INJECTION, SOLUTION EPIDURAL; INTRACAUDAL AS NEEDED
Status: DISCONTINUED | OUTPATIENT
Start: 2021-06-18 | End: 2021-06-18

## 2021-06-18 RX ORDER — FENTANYL CITRATE 50 UG/ML
100 INJECTION, SOLUTION INTRAMUSCULAR; INTRAVENOUS ONCE
Status: DISCONTINUED | OUTPATIENT
Start: 2021-06-18 | End: 2021-06-18

## 2021-06-18 RX ORDER — SODIUM CHLORIDE 0.9 % (FLUSH) 0.9 %
5-40 SYRINGE (ML) INJECTION EVERY 8 HOURS
Status: DISCONTINUED | OUTPATIENT
Start: 2021-06-18 | End: 2021-06-21 | Stop reason: HOSPADM

## 2021-06-18 RX ORDER — OXYTOCIN/RINGER'S LACTATE 30/500 ML
87.3 PLASTIC BAG, INJECTION (ML) INTRAVENOUS AS NEEDED
Status: DISCONTINUED | OUTPATIENT
Start: 2021-06-18 | End: 2021-06-18

## 2021-06-18 RX ORDER — AMMONIA 15 % (W/V)
1 AMPUL (EA) INHALATION AS NEEDED
Status: DISCONTINUED | OUTPATIENT
Start: 2021-06-18 | End: 2021-06-21 | Stop reason: HOSPADM

## 2021-06-18 RX ORDER — TERBUTALINE SULFATE 1 MG/ML
0.25 INJECTION SUBCUTANEOUS AS NEEDED
Status: DISCONTINUED | OUTPATIENT
Start: 2021-06-18 | End: 2021-06-18

## 2021-06-18 RX ORDER — SIMETHICONE 80 MG
80 TABLET,CHEWABLE ORAL
Status: DISCONTINUED | OUTPATIENT
Start: 2021-06-18 | End: 2021-06-21 | Stop reason: HOSPADM

## 2021-06-18 RX ORDER — HYDROCODONE BITARTRATE AND ACETAMINOPHEN 5; 325 MG/1; MG/1
1 TABLET ORAL
Status: DISCONTINUED | OUTPATIENT
Start: 2021-06-18 | End: 2021-06-21 | Stop reason: HOSPADM

## 2021-06-18 RX ORDER — HYDROCORTISONE ACETATE PRAMOXINE HCL 2.5; 1 G/100G; G/100G
CREAM TOPICAL AS NEEDED
Status: DISCONTINUED | OUTPATIENT
Start: 2021-06-18 | End: 2021-06-21 | Stop reason: HOSPADM

## 2021-06-18 RX ORDER — OXYTOCIN/RINGER'S LACTATE 30/500 ML
10 PLASTIC BAG, INJECTION (ML) INTRAVENOUS AS NEEDED
Status: DISCONTINUED | OUTPATIENT
Start: 2021-06-18 | End: 2021-06-18

## 2021-06-18 RX ORDER — EPHEDRINE SULFATE/0.9% NACL/PF 50 MG/5 ML
10 SYRINGE (ML) INTRAVENOUS
Status: COMPLETED | OUTPATIENT
Start: 2021-06-18 | End: 2021-06-18

## 2021-06-18 RX ORDER — NALOXONE HYDROCHLORIDE 0.4 MG/ML
0.4 INJECTION, SOLUTION INTRAMUSCULAR; INTRAVENOUS; SUBCUTANEOUS AS NEEDED
Status: DISCONTINUED | OUTPATIENT
Start: 2021-06-18 | End: 2021-06-18 | Stop reason: SDUPTHER

## 2021-06-18 RX ORDER — EPHEDRINE SULFATE/0.9% NACL/PF 50 MG/5 ML
10 SYRINGE (ML) INTRAVENOUS
Status: DISCONTINUED | OUTPATIENT
Start: 2021-06-18 | End: 2021-06-18 | Stop reason: SDUPTHER

## 2021-06-18 RX ORDER — SODIUM CHLORIDE, SODIUM LACTATE, POTASSIUM CHLORIDE, CALCIUM CHLORIDE 600; 310; 30; 20 MG/100ML; MG/100ML; MG/100ML; MG/100ML
125 INJECTION, SOLUTION INTRAVENOUS CONTINUOUS
Status: DISCONTINUED | OUTPATIENT
Start: 2021-06-18 | End: 2021-06-18

## 2021-06-18 RX ORDER — IBUPROFEN 400 MG/1
800 TABLET ORAL EVERY 8 HOURS
Status: DISCONTINUED | OUTPATIENT
Start: 2021-06-18 | End: 2021-06-21 | Stop reason: HOSPADM

## 2021-06-18 RX ORDER — NALOXONE HYDROCHLORIDE 0.4 MG/ML
0.4 INJECTION, SOLUTION INTRAMUSCULAR; INTRAVENOUS; SUBCUTANEOUS AS NEEDED
Status: DISCONTINUED | OUTPATIENT
Start: 2021-06-18 | End: 2021-06-18

## 2021-06-18 RX ORDER — ALPRAZOLAM 0.5 MG/1
0.5 TABLET ORAL
Status: DISCONTINUED | OUTPATIENT
Start: 2021-06-18 | End: 2021-06-19

## 2021-06-18 RX ORDER — MISOPROSTOL 200 UG/1
TABLET ORAL
Status: DISCONTINUED
Start: 2021-06-18 | End: 2021-06-18

## 2021-06-18 RX ORDER — SODIUM CHLORIDE 0.9 % (FLUSH) 0.9 %
5-40 SYRINGE (ML) INJECTION AS NEEDED
Status: DISCONTINUED | OUTPATIENT
Start: 2021-06-18 | End: 2021-06-18

## 2021-06-18 RX ORDER — HYDROCORTISONE 1 %
CREAM (GRAM) TOPICAL AS NEEDED
Status: DISCONTINUED | OUTPATIENT
Start: 2021-06-18 | End: 2021-06-21 | Stop reason: HOSPADM

## 2021-06-18 RX ORDER — FENTANYL/BUPIVACAINE/NS/PF 2-1250MCG
1-16 PREFILLED PUMP RESERVOIR EPIDURAL CONTINUOUS
Status: DISCONTINUED | OUTPATIENT
Start: 2021-06-18 | End: 2021-06-18

## 2021-06-18 RX ORDER — OXYTOCIN/RINGER'S LACTATE 30/500 ML
10 PLASTIC BAG, INJECTION (ML) INTRAVENOUS AS NEEDED
Status: DISCONTINUED | OUTPATIENT
Start: 2021-06-18 | End: 2021-06-21 | Stop reason: HOSPADM

## 2021-06-18 RX ORDER — SODIUM CHLORIDE 0.9 % (FLUSH) 0.9 %
5-40 SYRINGE (ML) INJECTION EVERY 8 HOURS
Status: DISCONTINUED | OUTPATIENT
Start: 2021-06-18 | End: 2021-06-18

## 2021-06-18 RX ADMIN — Medication 10 ML/HR: at 13:15

## 2021-06-18 RX ADMIN — ONDANSETRON 8 MG: 2 INJECTION INTRAMUSCULAR; INTRAVENOUS at 09:04

## 2021-06-18 RX ADMIN — FAMOTIDINE 20 MG: 10 INJECTION INTRAVENOUS at 21:32

## 2021-06-18 RX ADMIN — OLANZAPINE 10 MG: 5 TABLET, FILM COATED ORAL at 22:25

## 2021-06-18 RX ADMIN — FOLIC ACID: 5 INJECTION, SOLUTION INTRAMUSCULAR; INTRAVENOUS; SUBCUTANEOUS at 17:16

## 2021-06-18 RX ADMIN — ALPRAZOLAM 0.5 MG: 0.25 TABLET ORAL at 15:03

## 2021-06-18 RX ADMIN — OXYTOCIN 1 MILLI-UNITS/MIN: 10 INJECTION INTRAVENOUS at 06:45

## 2021-06-18 RX ADMIN — FLUOXETINE 60 MG: 20 CAPSULE ORAL at 08:56

## 2021-06-18 RX ADMIN — SODIUM CHLORIDE, POTASSIUM CHLORIDE, SODIUM LACTATE AND CALCIUM CHLORIDE 125 ML/HR: 600; 310; 30; 20 INJECTION, SOLUTION INTRAVENOUS at 06:25

## 2021-06-18 RX ADMIN — SODIUM CHLORIDE, POTASSIUM CHLORIDE, SODIUM LACTATE AND CALCIUM CHLORIDE 125 ML/HR: 600; 310; 30; 20 INJECTION, SOLUTION INTRAVENOUS at 13:07

## 2021-06-18 RX ADMIN — Medication 10 MG: at 13:30

## 2021-06-18 RX ADMIN — FAMOTIDINE 20 MG: 10 INJECTION INTRAVENOUS at 08:57

## 2021-06-18 RX ADMIN — IBUPROFEN 800 MG: 400 TABLET, FILM COATED ORAL at 15:56

## 2021-06-18 RX ADMIN — CYANOCOBALAMIN TAB 500 MCG 1000 MCG: 500 TAB at 15:57

## 2021-06-18 NOTE — PROGRESS NOTES
Labor Progress Note  Patient seen, fetal heart rate and contraction pattern evaluated, patient examined. Starting to get uncomfortable with contractions. Patient Vitals for the past 1 hrs:   BP Temp Pulse Resp   06/18/21 1158 106/60 98.1 °F (36.7 °C) (!) 56 16       Physical Exam:  Cervical Exam:  4 cm dilated    50% effaced    -2 station    Membranes:  Artificial Rupture of Membranes; Amniotic Fluid: scant bloody of unsure if actually ruptured fluid  Uterine Activity: Frequency: Every 3-6 minutes on 7 pit  Fetal Heart Rate: Reactive  Baseline: 130s per minute  Variability: moderate  Accelerations: yes  Decelerations: none    Assessment/Plan:  Reassuring fetal status, Labor  Progressing normally, Continue plan for vaginal delivery. Epidural prn.

## 2021-06-18 NOTE — PROGRESS NOTES
Labor Progress Note  Patient seen, fetal heart rate and contraction pattern evaluated, patient examined. Pt tired and nervous. Patient Vitals for the past 1 hrs:   BP Pulse   21 0801 (!) 111/58 (!) 57       Physical Exam:  Cervical Exam:  3 cm dilated    50% effaced    -2 station    Membranes:  Intact  Uterine Activity: 2 contractions so far on 2 pit  Fetal Heart Rate: Reactive  Baseline: 115s per minute  Variability: moderate  Accelerations: yes  Decelerations: none    Assessment/Plan:  39 y/o [id number] at 40 5/7 weeks   IOL for maternal medical comorbidities including h/o CHTN, chronic eating disorder with malnutrition, severe depression/anxiety/PTSD, beta thalassemia minor with iron deficiency anemia. H/o  for [place] followed by successful . Pt verbalized understanding of risk of TOLAC including but not limited to risk of uterine rupture, bleeding, morbidity/mortality to mom/baby and agrees to proceed with induction  Category I tracing  -Titrate pitocin to adequate labor  -Continue daily meds  -T&S active as pt is high risk of [facility]  Kip Reddy (pt's psychiatrist) is following closely and available if needed as pt is high risk of PP depression/psychosis  GBS negative. Continuing to try to reach [facility]  to help coordinate pt entry into [facility] inpatient eating disorder clinic after delivery.  Case management trying to help with coordination of care of  if/when pt able to go into program.

## 2021-06-18 NOTE — ROUTINE PROCESS
1759- TRANSFER - IN REPORT: 
 
Verbal report received from Kasey Garcia RN(name) on [employer]  being received from L&D(unit) for routine progression of care Report consisted of patients Situation, Background, Assessment and  
Recommendations(SBAR). Information from the following report(s) SBAR was reviewed with the receiving nurse. Opportunity for questions and clarification was provided. Assessment completed upon patients arrival to unit and care assumed.

## 2021-06-18 NOTE — PROGRESS NOTES
[place] report received from [address] [place] [address] SBAR report to Preeti Beth RN

## 2021-06-18 NOTE — ANESTHESIA PREPROCEDURE EVALUATION
Relevant Problems   No relevant active problems       Anesthetic History   No history of anesthetic complications            Review of Systems / Medical History  Patient summary reviewed, nursing notes reviewed and pertinent labs reviewed    Pulmonary  Within defined limits                 Neuro/Psych         Psychiatric history     Cardiovascular    Hypertension              Exercise tolerance: >4 METS     GI/Hepatic/Renal     GERD: well controlled           Endo/Other  Within defined limits           Other Findings              Physical Exam    Airway  Mallampati: II  TM Distance: > 6 cm  Neck ROM: normal range of motion   Mouth opening: Normal     Cardiovascular  Regular rate and rhythm,  S1 and S2 normal,  no murmur, click, rub, or gallop             Dental  No notable dental hx       Pulmonary  Breath sounds clear to auscultation               Abdominal  GI exam deferred       Other Findings            Anesthetic Plan    ASA: 2  Anesthesia type: epidural            Anesthetic plan and risks discussed with: Patient

## 2021-06-18 NOTE — PROGRESS NOTES
1940: Bedside shift change report given to Jose M Higuera RN (oncoming nurse) by Nathen Flores RN (offgoing nurse). Report included the following information SBAR, Kardex, Intake/Output, MAR and Recent Results. 9014: TRANSFER - OUT REPORT:    Verbal report given to Kiera Cooper RN (name) on Thelma Irvin  being transferred to Labor and Delivery (unit) for routine progression of care       Report consisted of patients Situation, Background, Assessment and   Recommendations(SBAR). Information from the following report(s) SBAR, Kardex, Intake/Output, MAR and Recent Results was reviewed with the receiving nurse. Lines:   PICC Double Lumen 54/82/76 Left;Basilic (Active)   Central Line Being Utilized Yes 06/18/21 0400   Criteria for Appropriate Use Limited/no vessel suitable for conventional peripheral access 06/18/21 0400   Site Assessment Clean, dry, & intact 06/18/21 0400   Phlebitis Assessment 0 06/18/21 0400   Infiltration Assessment 0 06/18/21 0400   Date of Last Dressing Change 06/15/21 06/18/21 0400   Dressing Status Clean, dry, & intact 06/18/21 0400   Action Taken Open ports on tubing capped 06/18/21 0400   External Catheter Length (cm) 3 centimeters 06/18/21 0400   Dressing Type Disk with Chlorhexadine gluconate (CHG); Tape;Transparent 06/18/21 0400   Hub Color/Line Status Pink; Infusing 06/18/21 0400   Positive Blood Return (Site #1) Yes 06/18/21 0400   Hub Color/Line Status White; Infusing 06/18/21 0400   Positive Blood Return (Site #2) Yes 06/18/21 0400   Alcohol Cap Used Yes 06/18/21 0400        Opportunity for questions and clarification was provided.       Patient transported with:   Registered Nurse

## 2021-06-18 NOTE — PROCEDURES
Delivery Note    Obstetrician:  Raul Esparza DO    Assistant: none    Pre-Delivery Diagnosis: Term pregnancy, Induced labor and Pregnancy complicated by: chronic eating disorder with malnutrition, CHTN, depression/anxiety/PTSD, anemia, recurrent UTI's throughout pregnancy    Post-Delivery Diagnosis: Living  infant(s) and Male    Intrapartum Event: None    Procedure: Spontaneous vaginal delivery    Epidural: YES    Monitor:  Fetal Heart Tones - External and Uterine Contractions - External    Indications for instrumental delivery: none    Estimated Blood Loss: No data found 100cc    Episiotomy: n/a    Laceration(s):  none    Laceration(s) repair: NO    Presentation: Cephalic    Fetal Description: moraes    Fetal Position: Occiput Anterior    Birth Weight: pending    Birth Length: pending    Apgar - One Minute: 8    Apgar - Five Minutes: 9    Umbilical Cord: 3 vessels present  Specimens: placenta (normal appearance and intact)-to path  Complications:  none           Cord Blood Results:   Information for the patient's :  Diane Trimble [[id number]]   No results found for: PCTABR, PCTDIG, BILI, ABORH     Prenatal Labs:     Lab Results   Component Value Date/Time    ABO/Rh(D) O POSITIVE 2021 06:13 AM    HBsAg, External negative 2020 12:00 AM    HIV, External negative 2020 12:00 AM    Rubella, External Immune 2020 12:00 AM    RPR, External non reactive 2020 12:00 AM    Gonorrhea, External negative 2020 12:00 AM    Chlamydia, External negative 2020 12:00 AM    GrBStrep, External negative 2021 12:00 AM        Attending Attestation: I performed the procedure

## 2021-06-18 NOTE — PROGRESS NOTES
1130 Resume care. 1744 TRANSFER - OUT REPORT:    Verbal report given to Pam Day RN(name) on [facility]  being transferred to [facility](unit) for routine progression of care       Report consisted of patients Situation, Background, Assessment and   Recommendations(SBAR). Information from the following report(s) SBAR, Intake/Output and MAR was reviewed with the receiving nurse. Lines:   PICC Double Lumen 85/26/25 Left;Basilic (Active)   Central Line Being Utilized Yes 06/18/21 0400   Criteria for Appropriate Use Limited/no vessel suitable for conventional peripheral access 06/18/21 0400   Site Assessment Clean, dry, & intact 06/18/21 0400   Phlebitis Assessment 0 06/18/21 0400   Infiltration Assessment 0 06/18/21 0400   Date of Last Dressing Change 06/15/21 06/18/21 0400   Dressing Status Clean, dry, & intact 06/18/21 0400   Action Taken Open ports on tubing capped 06/18/21 0400   External Catheter Length (cm) 3 centimeters 06/18/21 0400   Dressing Type Disk with Chlorhexadine gluconate (CHG); Tape;Transparent 06/18/21 0400   Hub Color/Line Status Pink; Infusing 06/18/21 0400   Positive Blood Return (Site #1) Yes 06/18/21 0400   Hub Color/Line Status White; Infusing 06/18/21 0400   Positive Blood Return (Site #2) Yes 06/18/21 0400   Alcohol Cap Used Yes 06/18/21 0400        Opportunity for questions and clarification was provided.       Patient transported with:   [employer]

## 2021-06-18 NOTE — ANESTHESIA PROCEDURE NOTES
Epidural Block    Patient location during procedure: OB  Start time: 6/18/2021 12:58 PM  End time: 6/18/2021 1:10 PM  Reason for block: labor epidural  Staffing  Performed: attending   Anesthesiologist: Saray Gay MD  Preanesthetic Checklist  Completed: patient identified, IV checked, site marked, risks and benefits discussed, surgical consent, monitors and equipment checked, pre-op evaluation and timeout performed  Block Placement  Patient position: sitting  Prep: DuraPrep  Sterility prep: cap, drape, gloves and mask  Sedation level: no sedation  Patient monitoring: continuous pulse oximetry and heart rate  Approach: midline  Location: lumbar  Lumbar location: L3-L4  Epidural  Loss of resistance technique: air  Guidance: landmark technique  Needle  Needle type: Tuohy   Needle gauge: 17 G  Needle length: 9 cm  Needle insertion depth: 6 cm  Catheter type: end hole  Catheter size: 19 G  Catheter at skin depth: 10 cm  Catheter securement method: clear occlusive dressing and surgical tape  Test dose: negative  Assessment  Sensory level: T10  Block outcome: pain improved  Number of attempts: 1  Procedure assessment: patient tolerated procedure well with no immediate complications

## 2021-06-19 PROCEDURE — 74011250637 HC RX REV CODE- 250/637: Performed by: OBSTETRICS & GYNECOLOGY

## 2021-06-19 PROCEDURE — 65410000002 HC RM PRIVATE OB

## 2021-06-19 PROCEDURE — 74011000250 HC RX REV CODE- 250: Performed by: OBSTETRICS & GYNECOLOGY

## 2021-06-19 PROCEDURE — 74011250636 HC RX REV CODE- 250/636: Performed by: OBSTETRICS & GYNECOLOGY

## 2021-06-19 RX ORDER — ONDANSETRON 4 MG/1
8 TABLET, ORALLY DISINTEGRATING ORAL
Status: DISCONTINUED | OUTPATIENT
Start: 2021-06-19 | End: 2021-06-21 | Stop reason: HOSPADM

## 2021-06-19 RX ADMIN — DOCUSATE SODIUM 100 MG: 100 CAPSULE, LIQUID FILLED ORAL at 09:34

## 2021-06-19 RX ADMIN — CYANOCOBALAMIN TAB 500 MCG 1000 MCG: 500 TAB at 18:13

## 2021-06-19 RX ADMIN — IBUPROFEN 800 MG: 400 TABLET, FILM COATED ORAL at 00:44

## 2021-06-19 RX ADMIN — FAMOTIDINE 20 MG: 10 INJECTION INTRAVENOUS at 18:13

## 2021-06-19 RX ADMIN — IBUPROFEN 800 MG: 400 TABLET, FILM COATED ORAL at 09:33

## 2021-06-19 RX ADMIN — IBUPROFEN 800 MG: 400 TABLET, FILM COATED ORAL at 18:12

## 2021-06-19 RX ADMIN — ONDANSETRON 8 MG: 4 TABLET, ORALLY DISINTEGRATING ORAL at 18:13

## 2021-06-19 RX ADMIN — POTASSIUM CHLORIDE, DEXTROSE MONOHYDRATE AND SODIUM CHLORIDE 125 ML/HR: 150; 5; 900 INJECTION, SOLUTION INTRAVENOUS at 08:52

## 2021-06-19 RX ADMIN — POTASSIUM CHLORIDE, DEXTROSE MONOHYDRATE AND SODIUM CHLORIDE 125 ML/HR: 150; 5; 900 INJECTION, SOLUTION INTRAVENOUS at 00:46

## 2021-06-19 RX ADMIN — OLANZAPINE 10 MG: 5 TABLET, FILM COATED ORAL at 21:54

## 2021-06-19 RX ADMIN — FLUOXETINE 60 MG: 20 CAPSULE ORAL at 09:33

## 2021-06-19 RX ADMIN — FAMOTIDINE 20 MG: 10 INJECTION INTRAVENOUS at 09:34

## 2021-06-19 RX ADMIN — ONDANSETRON 8 MG: 4 TABLET, ORALLY DISINTEGRATING ORAL at 10:36
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
## 2021-06-19 NOTE — PROGRESS NOTES
Post-Partum Day Number 1 Progress Note    Patient doing well post-partum without significant complaint. Voiding without difficulty, normal lochia. Denies cp/sob. Ambulating without problem. Denies dizziness. Pt still trying to figure out where to go after delivery. Admits to eating a little something. No emesis but persistent nausea. Vitals:    Patient Vitals for the past 8 hrs:   BP Temp Pulse Resp   21 0305 117/71 98 °F (36.7 °C) 64 16     Temp (24hrs), Av.8 °F (36.6 °C), Min:97.4 °F (36.3 °C), Max:98.1 °F (36.7 °C)      Vital signs stable, afebrile. Exam:  Patient without distress. Nursing and bonding with baby   Heart  Regular rate and rhythm   Lungs CTA b/l              Abdomen soft, fundus firm at level of umbilicus, nontender               Lower extremities are negative for swelling, cords or tenderness. Lab/Data Review:  no new labs    Assessment and Plan:  PPD#1 s/p . Benign labs. Boy-circ today. Chronic eating disorder with malnutrition-try to switch from iv famotidine and zofran to oral   Depression/Anxiety/PTSD-continue zyprexa, prozac and prn hydroxyzine and prazosin. Will stop xanax as pt is nursing.    CHTN-normal bp's on no meds  Chronic anemia-stable and asymptomatic  Plan for discharge on Monday after case management able to help with discharge planning/safety of mom/baby

## 2021-06-19 NOTE — LACTATION NOTE
This note was copied from a baby's chart. Initial Lactation consultation - Baby born vaginally yesterday afternoon to a  mom at 40 5/7 weeks gestation. Mom did not notice breast changes during her pregnancy and has not seen any colostrum yet. She said baby has been latching and nursing 10-15 minutes. I did not see the baby at the breast.    Mom said she breast fed her other children for a year each. Mom will continue to watch the baby for feeding cues and will feed when he looks hungry. She will not limit the time the baby is at the breast.     Mom has been diagnosed with anorexia. She had been admitted inpatient during her pregnancy for TPN. I talked to mom about the importance of her nutrition and fluid intake for her health and milk supply.

## 2021-06-20 LAB
ANION GAP SERPL CALC-SCNC: 4 MMOL/L (ref 5–15)
BUN SERPL-MCNC: 3 MG/DL (ref 6–20)
BUN/CREAT SERPL: 8 (ref 12–20)
CALCIUM SERPL-MCNC: 8.2 MG/DL (ref 8.5–10.1)
CHLORIDE SERPL-SCNC: 110 MMOL/L (ref 97–108)
CO2 SERPL-SCNC: 25 MMOL/L (ref 21–32)
CREAT SERPL-MCNC: 0.39 MG/DL (ref 0.55–1.02)
GLUCOSE SERPL-MCNC: 73 MG/DL (ref 65–100)
POTASSIUM SERPL-SCNC: 3.6 MMOL/L (ref 3.5–5.1)
SODIUM SERPL-SCNC: 139 MMOL/L (ref 136–145)

## 2021-06-20 PROCEDURE — 74011250637 HC RX REV CODE- 250/637: Performed by: OBSTETRICS & GYNECOLOGY

## 2021-06-20 PROCEDURE — 74011000250 HC RX REV CODE- 250: Performed by: OBSTETRICS & GYNECOLOGY

## 2021-06-20 PROCEDURE — 36415 COLL VENOUS BLD VENIPUNCTURE: CPT

## 2021-06-20 PROCEDURE — 65410000002 HC RM PRIVATE OB

## 2021-06-20 PROCEDURE — 74011250636 HC RX REV CODE- 250/636: Performed by: OBSTETRICS & GYNECOLOGY

## 2021-06-20 PROCEDURE — 80048 BASIC METABOLIC PNL TOTAL CA: CPT

## 2021-06-20 RX ORDER — FAMOTIDINE 20 MG/1
20 TABLET, FILM COATED ORAL 2 TIMES DAILY
Status: DISCONTINUED | OUTPATIENT
Start: 2021-06-20 | End: 2021-06-21 | Stop reason: HOSPADM

## 2021-06-20 RX ADMIN — FOLIC ACID: 5 INJECTION, SOLUTION INTRAMUSCULAR; INTRAVENOUS; SUBCUTANEOUS at 18:18

## 2021-06-20 RX ADMIN — IBUPROFEN 800 MG: 400 TABLET, FILM COATED ORAL at 01:12

## 2021-06-20 RX ADMIN — IBUPROFEN 800 MG: 400 TABLET, FILM COATED ORAL at 10:11

## 2021-06-20 RX ADMIN — DOCUSATE SODIUM 100 MG: 100 CAPSULE, LIQUID FILLED ORAL at 10:11

## 2021-06-20 RX ADMIN — ONDANSETRON 8 MG: 4 TABLET, ORALLY DISINTEGRATING ORAL at 01:15

## 2021-06-20 RX ADMIN — ONDANSETRON 8 MG: 4 TABLET, ORALLY DISINTEGRATING ORAL at 10:11

## 2021-06-20 RX ADMIN — FAMOTIDINE 20 MG: 20 TABLET ORAL at 18:18

## 2021-06-20 RX ADMIN — Medication 10 ML: at 18:17

## 2021-06-20 RX ADMIN — FAMOTIDINE 20 MG: 20 TABLET ORAL at 10:11

## 2021-06-20 RX ADMIN — OLANZAPINE 10 MG: 5 TABLET, FILM COATED ORAL at 22:31

## 2021-06-20 RX ADMIN — CYANOCOBALAMIN TAB 500 MCG 1000 MCG: 500 TAB at 18:17

## 2021-06-20 RX ADMIN — FLUOXETINE 60 MG: 20 CAPSULE ORAL at 10:11

## 2021-06-20 RX ADMIN — POTASSIUM CHLORIDE, DEXTROSE MONOHYDRATE AND SODIUM CHLORIDE 125 ML/HR: 150; 5; 900 INJECTION, SOLUTION INTRAVENOUS at 01:12

## 2021-06-20 RX ADMIN — IBUPROFEN 800 MG: 400 TABLET, FILM COATED ORAL at 18:17

## 2021-06-20 NOTE — ROUTINE PROCESS
Bedside shift change report given to Jayce Duron RN (oncoming nurse) by Dwight Nguyen (offgoing nurse). Report included the following information SBAR. Pt ate 0% of dinner plate, drank 1/2 gingerale.

## 2021-06-20 NOTE — PROGRESS NOTES
Bedside shift change report given to SOPHY Marte RN (oncoming nurse) by Jos Souza RN (offgoing nurse). Report included the following information SBAR.     1000  Pt ate some fruit only from breakfast tray, but then purged into an emesis bag which was found on bedside table. Did take morning PO meds with little difficulty. Is c/o some nausea and was able to take PO Zofran. Did order lunch tray. Will continue to monitor.

## 2021-06-20 NOTE — PROGRESS NOTES
Post-Partum Day Number 2 Progress/Discharge Note    Patient doing well post-partum without significant complaint. Voiding without difficulty, normal lochia. Denies cp/sob. Ambulating without dizziness. Vitals:    Patient Vitals for the past 8 hrs:   BP Temp Pulse Resp   21 0410 109/71 97.9 °F (36.6 °C) 71 16     Temp (24hrs), Av °F (36.7 °C), Min:97.9 °F (36.6 °C), Max:98 °F (36.7 °C)      Vital signs stable, afebrile. Exam:  Patient without distress. Heart regular rate and rhythm   Lungs CTA b/l               Abdomen soft, fundus firm at level of umbilicus, non tender               Lower extremities are negative for swelling, cords or tenderness. Lab/Data Review:  BMP:   Lab Results   Component Value Date/Time     2021 04:16 AM    K 3.6 2021 04:16 AM     (H) 2021 04:16 AM    CO2 25 2021 04:16 AM    AGAP 4 (L) 2021 04:16 AM    GLU 73 2021 04:16 AM    BUN 3 (L) 2021 04:16 AM    CREA 0.39 (L) 2021 04:16 AM    GFRAA >60 2021 04:16 AM    GFRNA >60 2021 04:16 AM       Assessment and Plan:    PPD#2 s/p . Benign labs. Boy-s/p circ. Chronic eating disorder with malnutrition-try to switch from iv famotidine and zofran to oral   Depression/Anxiety/PTSD-continue zyprexa, prozac and prn hydroxyzine and prazosin. Will stop xanax as pt is nursing.    CHTN-normal bp's on no meds  Chronic anemia-stable and asymptomatic  Plan for discharge on Monday after case management able to help with discharge planning/safety of mom/baby

## 2021-06-20 NOTE — LACTATION NOTE
This note was copied from a baby's chart. Mom states the baby has been latching and nursing well. She will continue to feed the baby according to his feeding cues. She will call out as needed for assistance. Baby's 36 hour weight loss is 5.5%.

## 2021-06-21 VITALS
BODY MASS INDEX: 28.38 KG/M2 | OXYGEN SATURATION: 98 % | HEART RATE: 55 BPM | RESPIRATION RATE: 14 BRPM | WEIGHT: 166.2 LBS | TEMPERATURE: 98 F | DIASTOLIC BLOOD PRESSURE: 82 MMHG | HEIGHT: 64 IN | SYSTOLIC BLOOD PRESSURE: 123 MMHG

## 2021-06-21 PROCEDURE — 74011250637 HC RX REV CODE- 250/637: Performed by: OBSTETRICS & GYNECOLOGY

## 2021-06-21 PROCEDURE — 74011250636 HC RX REV CODE- 250/636: Performed by: OBSTETRICS & GYNECOLOGY

## 2021-06-21 RX ORDER — IBUPROFEN 800 MG/1
800 TABLET ORAL
Qty: 20 TABLET | Refills: 0 | Status: SHIPPED | OUTPATIENT
Start: 2021-06-21 | End: 2021-08-12

## 2021-06-21 RX ADMIN — POTASSIUM CHLORIDE, DEXTROSE MONOHYDRATE AND SODIUM CHLORIDE 125 ML/HR: 150; 5; 900 INJECTION, SOLUTION INTRAVENOUS at 02:18

## 2021-06-21 RX ADMIN — FLUOXETINE 60 MG: 20 CAPSULE ORAL at 09:16

## 2021-06-21 RX ADMIN — ONDANSETRON 8 MG: 4 TABLET, ORALLY DISINTEGRATING ORAL at 02:50

## 2021-06-21 RX ADMIN — DOCUSATE SODIUM 100 MG: 100 CAPSULE, LIQUID FILLED ORAL at 09:16

## 2021-06-21 RX ADMIN — IBUPROFEN 800 MG: 400 TABLET, FILM COATED ORAL at 02:50

## 2021-06-21 RX ADMIN — FAMOTIDINE 20 MG: 20 TABLET ORAL at 09:17

## 2021-06-21 NOTE — PROGRESS NOTES
LESLIE:  CM consulted to assess for discharge needs. CM introduced to mom, Bj Arrizaa. She delivered a baby boy on 6/18/2021. She is ready for discharge but has some social issues. She has transportation. Mom states her source of income is her fiance and his mother. Mom has a home but is afraid to return to it due to \" toxic relationship\" with her ex-partner. She has 7 other children a 15,8 and 10year old females and 17,17,8 and 1year old males. Ex- partner is the father of all the children but they are presently staying with her fiance. Stated she has spoken with Forensics during one of her previous admissions and did not want to speak to them now. She feels as though it will only make things worse with her ex. CM has called several shelters and was told that but all the referrals go through the following number:  [facility]  [phone]. Called the number and was told the mother will need to call. MICAH offered to set up a week`s stay for mom and baby  at a local motel to give her some time to sort through things. She refused. Stated that is what I was thinking about making a reservation for a couple of days. CM offered several times but mom is adamant about doing this herself. Mom seem to have some issues she is not comfortable with talking about right now. She is followed by Psych and is on Prozac and Zyprexa. Spoke with Dr. Rocky Atkins and explained the encounter with mom and that MICAH has given the following resources:    [facility]  patient will have to call [phone]   If no one answers, leave name and telephone number and they will call you back. Domestic [address] [id number] [facility])  [phone]  ** Must bring picture ID** to help with diapers and clothing. [address]  [address] [facility], [address]  [phone]  Handle. [facility]  Violence shelters.     - [address] [facility] [phone]   - [address] [facility] and [address] [facility] [phone]  - Emergency [address] [facility] · [phone] - [address] [place] [address] [phone]        -    [place] [facility] [address] · [phone]              Open 24 hours   [facility] - [address] · [phone]  Open 24 hours

## 2021-06-21 NOTE — ROUTINE PROCESS
0730: Bedside shift change report given to SOPHY Kauffman RN (oncoming nurse) by Hanna Winn. Pradeep Rouse RN (offgoing nurse). Report included the following information SBAR. Pt ate 0% of breakfast tray and lunch tray but did drink 1 gingerale and 1 pitcher of water. 1250: Spoke with Radha OBRIEN and Dr. Fabiola Duarte. James Parker called Dr. Fabiola Duarte and notified her of pt declining hotel. Resources given by James Parker CM. Order received from Dr. Fabiola Duarte to DC PICC line and DC pt home today. Pt to  Depo RX and have Dr. Fabiola Duarte administer in office today. Per Dr. Fabiola Duarte pt ok to drive herself home. 1335: EPDS score of 29. Pt answered sometimes to thoughts of harming herself. Pt states they are \"on and off. \" Notified Dr. Fabiola Duarte and she will closely follow up with pt within 1 week and pt will follow up with psychiatrist as well.   1400: PICC line removed. Pt tolerated well. 1430: Baby Box given to pt and instructed on use. Discharge instructions reviewed with pt and all questions answered. Follow up in 1 week with Dr. Fabiola Duarte. Pt taken to Dr. Sergey Kauffman office, given Depo shot, and pt discharged.

## 2021-06-21 NOTE — PROGRESS NOTES
2804 Verbal shift change report given to [facility] (oncoming nurse) by Huong Liz RN (offgoing nurse). Report included the following information SBAR, Kardex, Intake/Output, MAR and Recent Results. 65 Pt sent infant to nursery for approx 45min this afternoon. Showered and tends to infant sons and her own ADLs. Has not had visitors this shift. Sits in quiet, dimly lit rm with tv off, holding infant in her bed. Flat affect and verbally guarded. Reminded of need to complete EPDS. Refused supper tray and offer of po fluids this shift. Emesis bag with contents on bedside table. PO meds accepted when offered/due.

## 2021-06-21 NOTE — DISCHARGE INSTRUCTIONS
POSTPARTUM DISCHARGE INSTRUCTIONS       Name:  Rylee Forrester  YOB: 1985  Admission Diagnosis:  Pregnant [Z34.90]  Eating disorder affecting pregnancy, antepartum [O99.340, F50.9]  UTI in pregnancy, antepartum, third trimester [O23.43]     Discharge Diagnosis:    Problem List as of 6/21/2021 Date Reviewed: 5/13/2021        Codes Class Noted - Resolved    Malnutrition ([facility] ICD-10-CM: E46  ICD-9-CM: 263.9  6/11/2021 - Present        UTI in pregnancy, antepartum, third trimester ICD-10-CM: O23.43  ICD-9-CM: 646.63, 599.0  5/12/2021 - Present        Acute pyelonephritis ICD-10-CM: N10  ICD-9-CM: 590.10  3/3/2021 - Present        Severe protein-calorie malnutrition ([facility] ICD-10-CM: E43  ICD-9-CM: 262  1/8/2021 - Present        Nausea/vomiting in pregnancy ICD-10-CM: O21.9  ICD-9-CM: 643.90  1/2/2021 - Present        Chronic bilateral low back pain with right-sided sciatica ICD-10-CM: M54.41, G89.29  ICD-9-CM: 724.2, 724.3, 338.29  12/28/2020 - Present    Overview Signed 12/28/2020  2:34 PM by Yosvany Barnhart MD     ~2010             GERD (gastroesophageal reflux disease) ICD-10-CM: K21.9  ICD-9-CM: 530.81  12/28/2020 - Present    Overview Signed 12/28/2020  9:31 PM by Yosvany Barnhart MD     [facility] , GI Dr. Gallo Castillo             Calculus of gallbladder without cholecystitis ICD-10-CM: K80.20  ICD-9-CM: 574.20  12/7/2020 - Present        Nausea and vomiting in pregnancy ICD-10-CM: O21.9  ICD-9-CM: 643.90  12/2/2020 - Present        Eating disorder affecting pregnancy, antepartum ICD-10-CM: O99.340, F50.9  ICD-9-CM: 646.83, 307.50  12/2/2020 - Present        Depression affecting pregnancy ICD-10-CM: O99.340, F32.9  ICD-9-CM: 648.40, 311  12/2/2020 - Present        MDD (major depressive disorder) ICD-10-CM: F32.9  ICD-9-CM: 296.20  9/2/2020 - Present        Post-traumatic stress disorder, acute ICD-10-CM: F43.11  ICD-9-CM: 309.81  5/3/2020 - Present        Severe obesity ([facility] ICD-10-CM: E66.01  ICD-9-CM: 278.01  2/18/2020 - Present        Pregnancy ICD-10-CM: Z34.90  ICD-9-CM: V22.2  3/1/2018 - Present        Pregnant ICD-10-CM: Z34.90  ICD-9-CM: V22.2  3/1/2018 - Present        Cervical incompetence ICD-10-CM: N88.3  ICD-9-CM: 622.5  9/29/2017 - Present        Incompetent cervix ICD-10-CM: N88.3  ICD-9-CM: 622.5  9/29/2017 - Present        Sleep disturbance ICD-10-CM: G47.9  ICD-9-CM: 780.50  4/6/2017 - Present        Non compliance w medication regimen ICD-10-CM: Z91.14  ICD-9-CM: V15.81  4/6/2017 - Present        MDD (major depressive disorder), recurrent episode, moderate (HCC) (Chronic) ICD-10-CM: F33.1  ICD-9-CM: 296.32  9/9/2013 - Present        Unspecified essential hypertension ICD-10-CM: I10  ICD-9-CM: 401.9  5/9/2013 - Present        Back pain, chronic ICD-10-CM: M54.9, G89.29  ICD-9-CM: 724.5, 338.29  Unknown - Present        Asthma ICD-10-CM: J45.909  ICD-9-CM: 493.90  10/8/2010 - Present        Microcytic anemia ICD-10-CM: D50.9  ICD-9-CM: 280.9  10/8/2010 - Present        Anxiety ICD-10-CM: F41.9  ICD-9-CM: 300.00  10/8/2010 - Present        RESOLVED: Major depression ICD-10-CM: F32.9  ICD-9-CM: 296.20  5/2/2020 - 5/3/2020        RESOLVED: Breech presentation, no version ICD-10-CM: O32. 1XX0  ICD-9-CM: 652.20  4/22/2015 - 10/27/2015        RESOLVED: Hyperemesis arising during pregnancy ICD-10-CM: O21.0  ICD-9-CM: 643.00  11/24/2014 - 10/27/2015        RESOLVED: Pregnancy (Chronic) ICD-10-CM: Z34.90  ICD-9-CM: V22.2  10/27/2014 - 10/27/2015            Attending Physician:  Hyun Glaser, DO    Delivery Type:  Vaginal Childbirth: What To Expect At Home    Your Recovery: Your body will slowly heal in the next few weeks. It is easy to get too tired and overwhelmed during the first weeks after your baby is born. Changes in your hormones can shift your mood without warning. You may find it hard to meet the extra demands on your energy and time.  Take it easy on yourself. Follow-up care is a key part of your treatment and safety. Be sure to make and go to all appointments, and call your doctor if you are having problems. It's also a good idea to know your test results and keep a list of the medicines you take. How can you care for yourself at home? Vaginal bleeding and cramps  · After delivery, you will have a bloody discharge from the vagina. This will turn pink within a week and then white or yellow after about 10 days. It may last for 2 to 4 weeks or longer, until the uterus has healed. Use pads instead of tampons until you stop bleeding. · Do not worry if you pass some blood clots, as long as they are smaller than a golf ball. If you have a tear or stitches in your vaginal area, change the pad at least every 4 hours to prevent soreness and infection. · You may have cramps for the first few days after childbirth. These are normal and occur as the uterus shrinks to normal size. Take an over-the-counter pain medicine, such as acetaminophen (Tylenol), ibuprofen (Advil, Motrin), or naproxen (Aleve), for cramps. Read and follow all instructions on the label. Do not take aspirin, because it can cause more bleeding. Do not take acetaminophen (Tylenol) and other acetaminophen containing medications (i.e. Percocet) at the same time. Breast fullness  · Your breasts may overfill (engorge) in the first few days after delivery. To help milk flow and to relieve pain, warm your breasts in the shower or by using warm, moist towels before nursing. · If you are not nursing, do not put warmth on your breasts or touch your breasts. Wear a tight bra or sports bra and use ice until the fullness goes away. This usually takes 2 to 3 days. · Put ice or a cold pack on your breast after nursing to reduce swelling and pain. Put a thin cloth between the ice and your skin. Activity  · Eat a balanced diet. Do not try to lose weight by cutting calories.  Keep taking your prenatal vitamins, or take a multivitamin. · Get as much rest as you can. Try to take naps when your baby sleeps during the day. · Get some exercise every day. But do not do any heavy exercise until your doctor says it is okay. · Wait until you are healed (about 4 to 6 weeks) before you have sexual intercourse. Your doctor will tell you when it is okay to have sex. · Talk to your doctor about birth control. You can get pregnant even before your period returns. Also, you can get pregnant while you are breast-feeding. Mental Health  · Many women get the \"baby blues\" during the first few days after childbirth. You may lose sleep, feel irritable, and cry easily. You may feel happy one minute and sad the next. Hormone changes are one cause of these emotional changes. Also, the demands of a new baby, along with visits from relatives or other family needs, add to a mother's stress. The \"baby blues\" often peak around the fourth day. Then they ease up in less than 2 weeks. · If your moodiness or anxiety lasts for more than 2 weeks, or if you feel like life is not worth living, you may have postpartum depression. This is different for each mother. Some mothers with serious depression may worry intensely about their infant's well-being. Others may feel distant from their child. Some mothers might even feel that they might harm their baby. A mother may have signs of paranoia, wondering if someone is watching her. · With all the changes in your life, you may not know if you are depressed. Pregnancy sometimes causes changes in how you feel that are similar to the symptoms of depression. · Symptoms of depression include:  · Feeling sad or hopeless and losing interest in daily activities. These are the most common symptoms of depression. · Sleeping too much or not enough. · Feeling tired. You may feel as if you have no energy. · Eating too much or too little.   · [facility] [employer] ([employer]) offers a Warm line; Chat with the Expert phone sessions; Information and Articles about Pregnancy and Postpartum Mood Disorders; Comprehensive List of Free Support Groups; Knowledgeable local coordinators who will offer support, information, and resources; Guide to Resources on [employer]; Calendar of events in the  mood disorders community; Latest News and Research; and [facility] [address] for [employer]. Remember - You are not alone; You are not to blame; With help, you will be well. 1-011-551-PPD(9468). WWW. POSTPARTUM. NET   · Writing or talking about death, such as writing suicide notes or talking about guns, knives, or pills. Keep the numbers for these national suicide hotlines: 4-071-684-TALK (1-211.383.1666) and 6-804-KHCVRLT (5-790.128.7538). If you or someone you know talks about suicide or feeling hopeless, get help right away. Constipation and Hemorrhoids  · Drink plenty of fluids, enough so that your urine is light yellow or clear like water. If you have kidney, heart, or liver disease and have to limit fluids, talk with your doctor before you increase the amount of fluids you drink. · Eat plenty of fiber each day. Have a bran muffin or bran cereal for breakfast, and try eating a piece of fruit for a mid-afternoon snack. · For painful, itchy hemorrhoids, put ice or a cold pack on the area several times a day for 10 minutes at a time. Follow this by putting a warm compress on the area for another 10 to 20 minutes or by sitting in a shallow, warm bath. When should you call for help? Call 911 anytime you think you may need emergency care. For example, call if:  · You are thinking of hurting yourself, your baby, or anyone else. · You passed out (lost consciousness). · You have symptoms of a blood clot in your lung (called a pulmonary embolism). These may include:    · Sudden chest pain. · Trouble breathing. · Coughing up blood.     Call your doctor now or seek immediate medical care if:  · You have severe vaginal bleeding. · You are soaking through a pad each hour for 2 or more hours. · Your vaginal bleeding seems to be getting heavier or is still bright red 4 days after delivery. · You are dizzy or lightheaded, or you feel like you may faint. · You are vomiting or cannot keep fluids down. · You have a fever. · You have new or more belly pain. · You pass tissue (not just blood). · Your vaginal discharge smells bad. · Your belly feels tender or full and hard. · Your breasts are continuously painful or red. · You feel sad, anxious, or hopeless for more than a few days. · You have sudden, severe pain in your belly. · You have symptoms of a blood clot in your leg (called a deep vein thrombosis),          such as:  · Pain in your calf, back of the knee, thigh, or groin. · Redness and swelling in your leg or groin. · You have symptoms of preeclampsia, such as:  · Sudden swelling of your face, hands, or feet. · New vision problems (such as dimness or blurring). · A severe headache. · Your blood pressure is higher than it should be or rises suddenly. · You have new nausea or vomiting. Watch closely for changes in your health, and be sure to contact your doctor if you have any problems. Additional Information:  Learning About Hypertensive Disorders After Childbirth    What is preeclampsia? A woman with preeclampsia has blood pressure that is higher than usual. She may also have other serious symptoms. Preeclampsia can be dangerous. When it is severe, it can cause seizures (eclampsia) or liver or kidney damage. When the liver is affected, some women get HELLP syndrome, a blood-clotting and bleeding problem. HELLP can come on quickly and can be deadly. This is why your doctor checks you and your baby often. Preeclampsia usually occurs after 20 weeks of pregnancy. In rare cases, it is first noted right after childbirth.  Most often, it starts near the end of pregnancy and goes away after childbirth. What are the symptoms? Mild preeclampsia usually doesn't cause symptoms. But preeclampsia can cause rapid weight gain and sudden swelling of the hands and face. Severe preeclampsia does cause symptoms. It can cause a very bad headache and trouble seeing and breathing. It also can cause belly pain. You may also urinate less than usual.    If you have new preeclampsia symptoms after you go home from the hospital, call your doctor right away. What can you expect after you have had preeclampsia? In the hospital  After the baby and the placenta are delivered, preeclampsia usually starts to improve. Most women get better in the first few days after childbirth. After having preeclampsia, you still have a risk of seizures for a day or more after childbirth. (Very rarely, seizures happen later on.) So your doctor may have you take magnesium sulfate for a day or more to prevent seizures. You may also take medicine to lower your blood pressure. When you go home  Your blood pressure will most likely return to normal a few days after delivery. Your doctor will want to check your blood pressure sometime in the first week after you leave the hospital.    Some women still have high blood pressure 6 weeks after childbirth. But most return to normal levels over the long term. · Take and record your blood pressure at home if your doctor tells you to. · Learn the importance of the two measures of blood pressure (such as 120 over 80, or 120/80). The first number is the systolic pressure. This is the force of blood on the artery walls as the heart pumps. The second number is the diastolic pressure. This is the force of blood on the artery walls between heartbeats, when the heart is at rest. You have a choice of monitors to use. Manual monitor: You pump up the cuff and use a stethoscope to listen for your  Pulse. · Electronic monitor: The cuff inflates, and a gauge shows your pulse rate.   · To take your blood pressure:  · Ask your doctor to check your blood pressure monitor to be sure that it is accurate and that the cuff fits you. Also ask your doctor to watch you use it, to make sure that you are using it right. · You should not eat, use tobacco products, or use medicine known to raise blood pressure (such as some nasal decongestant sprays) before you take your blood pressure. · Avoid taking your blood pressure if you have just exercised or are nervous or upset. Rest at least 15 minutes before you take your blood pressure. · Be safe with medicines. If you take medicine, take it exactly as prescribed. Call your doctor if you think you are having a problem with your medicine. · Do not smoke. Quitting smoking will help lower your blood pressure and improve your baby's growth and health. If you need help quitting, talk to your doctor about stop-smoking programs and medicines. These can increase your chances of quitting for good. · Eat a balanced and healthy diet that has lots of fruits and vegetables. Long-term health   After you have had preeclampsia, you have a higher-than-average risk of heart disease, stroke, and kidney disease. This may be because the same things that cause preeclampsia also cause heart and kidney disease. To protect your health, work with your doctor on living a heart-healthy lifestyle and getting the checkups you need. Your doctor may also want you to check your blood pressure at home. Follow-up care is a key part of your treatment and safety. Be sure to make and go to all appointments, and call your doctor if you are having problems. It's also a good idea to know your test results and keep a list of the medicines you take. Preventing Infection at Home    We care about preventing infection and avoiding the spread of germs - not only when you are in the hospital but also when you return home.  When you return home from the hospital, it's important to take the following steps to help prevent infection and avoid spreading germs that could infect you and others. Ask everyone in your home to follow these guidelines, too. Clean Your Hands  · Clean your hands whenever your hands are visibly dirty, before you eat, before or after touching your mouth, nose or eyes, and before preparing food. Clean them after contact with body fluids, using the restroom, touching animals or changing diapers. · When washing hands, wet them with warm water and work up a lather. Rub hands for at least 15 seconds, then rinse them and pat them dry with a clean towel or paper towel. · When using hand sanitizers, it should take about 15 seconds to rub your hands dry. If not, you probably didn't apply enough . Cover Your Sneeze or Cough  Germs are released into the air whenever you sneeze or cough. To prevent the spread of infection:  · Turn away from other people before coughing or sneezing. · Cover your mouth or nose with a tissue when you cough or sneeze. Put the tissue in the trash. · If you don't have a tissue, cough or sneeze into your upper sleeve, not your hands. · Always clean your hands after coughing or sneezing. Care for Wounds  Your skin is your body's first line of defense against germs, but an open wound leaves an easy way for germs to enter your body. To prevent infection:  · Clean your hands before and after changing wound dressings, and wear gloves to change dressings if recommended by your doctor. · Take special care with IV lines or other devices inserted into the body. If you must touch them, clean your hands first.  · Follow any specific instructions from your doctor to care for your wounds. Contact your doctor if you experience any signs of infection, such as fever or increased redness at the surgical or wound site. Keep a Clean Home  · Clean or wipe commonly touched hard surfaces like door handles, sinks, tabletops, phones and TV remotes.   · Use products labeled \"disinfectant\" to kill harmful bacteria and viruses. · Use a clean cloth or paper towel to clean and dry surfaces. Wiping surfaces with a dirty dishcloth, sponge or towel will only spread germs. · Never share toothbrushes, ray, drinking glasses, utensils, razor blades, face cloths or bath towels to avoid spreading germs. · Be sure that the linens that you sleep on are clean. · Keep pets away from wounds and wash your hands after touching pets, their toys or bedding. We care about you and your health. Remember, preventing infections is a   team effort between you, your family, friends and health care providers. Postpartum Support    PARENTS:  Are you feeling sad or depressed? Is it difficult for you to enjoy yourself? Do you feel more irritable or tense? Do you feel anxious or panicky? Are you having difficulty bonding with your baby? Do you feel as if you are \"out of control\" or \"going crazy\"? Are you worried that you might hurt your baby or yourself? FAMILIES: Do you worry that something is wrong but don't know how to help? Do you think that your partner or spouse is having problems coping? Are you worried that it may never get better? While many women experience some mild mood change or \"the blues\" during or after the birth of a child, 1 in 9 women experience more significant symptoms of depression or anxiety. 1 in 10 Dads become depressed during the first year. Things you can do  Being a good parent includes taking care of yourself. If you take care of yourself, you will be able to take better care of your baby and your family. · Talk to a counselor or healthcare provider who has training in  mood and anxiety problems. · Learn as much as you can about pregnancy and postpartum depression and anxiety. · Get support from family and friends. Ask for help when you need it. · Join a support group in your area or online.   · Keep active by walking, stretching or whatever form of exercise helps you to feel better. · Get enough rest and time for yourself. · Eat a healthy diet. · Don't give up! It may take more than one try to get the right help you need. These are general instructions for a healthy lifestyle:    No smoking/ No tobacco products/ Avoid exposure to second hand smoke    Surgeon General's Warning:  Quitting smoking now greatly reduces serious risk to your health. Obesity, smoking, and sedentary lifestyle greatly increases your risk for illness    A healthy diet, regular physical exercise & weight monitoring are important for maintaining a healthy lifestyle    Recognize signs and symptoms of STROKE:    F-face looks uneven    A-arms unable to move or move unevenly    S-speech slurred or non-existent    T-time-call 911 as soon as signs and symptoms begin - DO NOT go       back to bed or wait to see if you get better - TIME IS BRAIN. I have had the opportunity to make my options or choices for discharge. I have received and understand these instructions. We know that all of us are dealing with a tremendous amount of uncertainty, confusion and disruption to our daily lives, which may result in increased anxiety, depression and fear. If you are feeling unsettled or worse, please know that we are here to help. During this time of increased caution and care for one another, [facility] [place] ([address] [place] [address]) is offering virtual support groups to ALL MOTHERS in [place] regardless of the age of your child/children as a way to help weather this emotional storm together. Social support is an important part of self-care during this time of physical distancing. Virtual postpartum support group meetings available at www. postpartumva.org  Warm Line: 494.612.4456    Breastfeeding Support Groups (virtual)  1st and 3rd Wednesday of each month  2nd and 4th Tuesday of each month    [place] at [email or web address] under the \"About Us\" and \"Classes and Events tabs\"

## 2021-06-21 NOTE — PROGRESS NOTES
Post-Partum Day Number 3 Progress/Discharge Note    Patient doing well post-partum without significant complaint. Voiding without difficulty, normal lochia. Reports some upper chest discomfort-unsure if anxiety-doesn't think it is heartburn. Still with persistent nausea. States that she isn't sure where she will go on discharge right now. Doesn't feel comfortable going either place (home or boyfriend's house). Vitals:    Patient Vitals for the past 8 hrs:   BP Temp Pulse Resp SpO2   21 0239 135/83 98.6 °F (37 °C) (!) 52 14 99 %     Temp (24hrs), Av.1 °F (36.7 °C), Min:97.8 °F (36.6 °C), Max:98.6 °F (37 °C)      Vital signs stable, afebrile. Exam:  Patient without distress. Heart regular rate and rhythm   Lungs CTA B/l               Abdomen soft, fundus firm at level of umbilicus, non tender               Lower extremities are negative for swelling, cords or tenderness. Lab/Data Review:  no new labs    Assessment and Plan:    PPD#3 s/p . Benign labs. Boy-s/p circ. Chronic eating disorder with malnutrition-try to switch from iv famotidine and zofran to oral   Depression/Anxiety/PTSD-continue zyprexa, prozac and prn hydroxyzine and prazosin.    CHTN-overall normal bp's on no meds  Chronic anemia-stable and asymptomatic  Plan for discharge today after case management able to help with discharge planning/safety of mom/baby-place to go,etc.

## 2021-06-23 NOTE — ADT AUTH CERT NOTES
Utilization Reviews       2021 clinical by Ayaka Bell RN       Review Entered Review Status   2021 08:34 In Primary      Criteria Review   2021  LOC IP MOTHER/INFANT UNIT  VS 98 55 143/86 14 100% ROOM AIR  LABS    Chloride: 110 (H)  CO2: 25  Anion gap: 4 (L)  Glucose: 73  BUN: 3 (L)  Creatinine: 0.39 (L)  BUN/Creatinine ratio: 8 (L)  Calcium: 8.2 (L)           MEDS  NS with mvi iv qday  Vitamin b12 1000mcg po qday  D5NS with 20 kcl @ 125 cc/hr  Pepcid 20mg po bid  Prozac 60mg po qday  Motrin 800mg po q8  Zyprexa 10mg po qhs  Zofran 8mg po q6 prn x2     ATTENDING NOTE  Patient doing well post-partum without significant complaint.  Voiding without difficulty, normal lochia. Denies cp/sob. Ambulating without dizziness.   Exam:  Patient without distress.              KQIKU regular rate and rhythm              Lungs CTA b/l               Abdomen soft, fundus firm at level of umbilicus, non tender               Lower extremities are negative for swelling, cords or tenderness. Assessment and Plan:    PPD#2 s/p . Benign labs. Boy-s/p circ. Chronic eating disorder with malnutrition-try to switch from iv famotidine and zofran to oral   Depression/Anxiety/PTSD-continue zyprexa, prozac and prn hydroxyzine and prazosin. Will stop xanax as pt is nursing.    CHTN-normal bp's on no meds  Chronic anemia-stable and asymptomatic  Plan for discharge on Monday after case management able to help with discharge planning/safety of mom/baby       2021 clinical by Kianna Wade RN       Review Entered Review Status   2021 08:31 In Primary      Criteria Review   2021  LOC IP MOTHER/INFANT UNIT  VS 98 70 115/70 16  MEDS  Vitamin b12 1000mcg po qday  D5NS with 20 kcl @ 125 cc/hr  Pepcid 20mg iv q12  Prozac 60mg po qday  Motrin 800mg po q8  Zyprexa 10mg po qhs  Zofran 8mg po q6 prn x2     ATTENDING NOTE  Patient doing well post-partum without significant complaint.  Voiding without difficulty, normal lochia. Denies cp/sob. Ambulating without problem. Denies dizziness. Pt still trying to figure out where to go after delivery. Admits to eating a little something. No emesis but persistent nausea. Exam:  Patient without distress. Nursing and bonding with baby              YUJZT  Regular rate and rhythm              Lungs CTA b/l              Abdomen soft, fundus firm at level of umbilicus, nontender               Lower extremities are negative for swelling, cords or tenderness. Assessment and Plan:  PPD#1 s/p . Benign labs. Boy-circ today. Chronic eating disorder with malnutrition-try to switch from iv famotidine and zofran to oral   Depression/Anxiety/PTSD-continue zyprexa, prozac and prn hydroxyzine and prazosin. Will stop xanax as pt is nursing. CHTN-normal bp's on no meds  Chronic anemia-stable and asymptomatic  Plan for discharge on Monday after case management able to help with discharge planning/safety of mom/baby              2021 clinical/delivery by Doug Red RN       Review Entered Review Status   2021 08:28 In Primary      Criteria Review   2021  LOC IP L&D  VS 98.1 106/60 56 16     ATTENDING NOTE  Patient seen, fetal heart rate and contraction pattern evaluated, patient examined. Starting to get uncomfortable with contractions.   Physical Exam:  Cervical Exam:  4 cm dilated    50% effaced    -2 station    Membranes:  Artificial Rupture of Membranes; Amniotic Fluid: scant bloody of unsure if actually ruptured fluid  Uterine Activity: Frequency: Every 3-6 minutes on 7 pit  Fetal Heart Rate: Reactive  Baseline: 130s per minute  Variability: moderate  Accelerations: yes  Decelerations: none     Assessment/Plan:  Reassuring fetal status, Labor  Progressing normally, Continue plan for vaginal delivery.  Epidural prn.     DELIVERY NOTE  Pre-Delivery Diagnosis: Term pregnancy, Induced labor and Pregnancy complicated by: chronic eating disorder with malnutrition, CHTN, depression/anxiety/PTSD, anemia, recurrent UTI's throughout pregnancy     Post-Delivery Diagnosis: Living  infant(s) and Male     Intrapartum Event: None     Procedure: Spontaneous vaginal delivery     Epidural: YES     Monitor:  Fetal Heart Tones - External and Uterine Contractions - External     Apgar - One Minute: 8     Apgar - Five Minutes: 9     Umbilical Cord: 3 vessels present  Specimens: placenta (normal appearance and intact)-to path  Complications:  none

## 2021-06-24 ENCOUNTER — TELEPHONE (OUTPATIENT)
Dept: BEHAVIORAL/MENTAL HEALTH CLINIC | Age: 36
End: 2021-06-24

## 2021-06-24 NOTE — TELEPHONE ENCOUNTER
Opened phone encounter in error. Should be documentation only. Confirmed patient was discharged on 6/21/21. Patient can have VV with provider.

## 2021-07-02 NOTE — DISCHARGE SUMMARY
Obstetrical Discharge Summary     Name: Jaclyn Aden MRN: [id number]  SSN: [id number]    YOB: 1985  Age: 38 y.o. Sex: female      Allergies: Labetalol, Pcn [penicillins], Ceclor [cefaclor], and Septra [sulfamethoprim ds]    Admit Date: 2021    Discharge Date: 21    Admitting Physician: Mabel Plaza DO     Attending Physician:  No att. providers found     * Admission Diagnoses: Pregnant [Z34.90]  Eating disorder affecting pregnancy, antepartum [O99.340, F50.9]  UTI in pregnancy, antepartum, third trimester [O23.43]    * Discharge Diagnoses:   Information for the patient's :  Patti Guy [[id number]]   Delivery of a 3 kg male infant via Vaginal, Spontaneous on 2021 at 2:35 PM  by Mabel Plaza. Apgars were 8  and 9 . Additional Diagnoses:   Hospital Problems as of 2021 Date Reviewed: 2021        Codes Class Noted - Resolved POA    Malnutrition ([facility] ICD-10-CM: E46  ICD-9-CM: 263.9  2021 - Present Unknown        UTI in pregnancy, antepartum, third trimester ICD-10-CM: O23.43  ICD-9-CM: 646.63, 599.0  2021 - Present Unknown        Eating disorder affecting pregnancy, antepartum ICD-10-CM: O99.340, F50.9  ICD-9-CM: 646.83, 307.50  2020 - Present Unknown        Pregnant ICD-10-CM: Z34.90  ICD-9-CM: V22.2  3/1/2018 - Present Unknown             Lab Results   Component Value Date/Time    ABO/Rh(D) O POSITIVE 2021 06:13 AM    Rubella, External Immune 2020 12:00 AM    GrBStrep, External negative 2021 12:00 AM    ABO,Rh O positive 2020 12:00 AM      Immunization History   Administered Date(s) Administered    (RETIRED) Pneumococcal Vaccine (Unspecified Type) 2012    Influenza Vaccine (Quad) PF (>6 Mo Flulaval, Fluarix, and >3 Yrs Afluria, Fluzone [id number]) 2020    Influenza Vaccine PF 10/22/2013    TDAP Vaccine 2012    Tdap 2018       * Procedures:  on 21.  boy  * No surgery found *      Retired Use Flowsheet (6) [place]  Depression Scale  I have been able to laugh and see the funny side of things: Not at all  I have looked forward with enjoyment to things: Hardly at all  I have blamed myself unnecessarily when things went wrong: Yes, most of the time  I have been anxious or worried for no good reason: Yes, very often  I have felt scared or panicky for no very good reason: Yes, quite a lot  Things have been getting on top of me: Yes, most of the time I haven't been able to cope at all  I have been so unhappy that I have had difficulty sleeping: Yes, most of the time  I have felt sad or miserable: Yes, most of the time  I have been so unhappy that I have been crying: Yes, most of the time  The thought of harming myself has occurred to me: Sometimes  Total Score: 29    * Discharge Condition: stable    * Hospital Course: Normal hospital course following the delivery. Pt has close follow up with myself, her psychiatrist and the pediatrician    * Disposition: Home    Discharge Medications:   Discharge Medication List as of 2021  1:19 PM      START taking these medications    Details   ibuprofen (MOTRIN) 800 mg tablet Take 1 Tablet by mouth every eight (8) hours as needed for Pain., Normal, Disp-20 Tablet, R-0         CONTINUE these medications which have NOT CHANGED    Details   !! FLUoxetine (PROzac) 20 mg capsule Take 1 Cap by mouth daily. Take with 40 mg cap for total daily dose of 60 mg., Normal, Disp-30 Cap, R-2      !! FLUoxetine (PROzac) 40 mg capsule Take 1 Cap by mouth daily. , Normal, Disp-30 Cap, R-2      OLANZapine (ZyPREXA) 10 mg tablet Take 1 Tab by mouth nightly., Normal, Disp-30 Tab, R-2      prazosin (MINIPRESS) 2 mg capsule Take 1 Cap by mouth nightly. Indications: posttraumatic stress syndrome, Normal, Disp-30 Cap, R-0      polyethylene glycol (MIRALAX) 17 gram packet Take 1 Packet by mouth daily as needed for Constipation. , Normal, Disp-90 Each, R-0      calcium carbonate (Tums) 200 mg calcium (500 mg) chew Take 1 Tab by mouth two (2) times daily as needed for Other (reflux). , Normal, Disp-60 Tab, R-1      pantoprazole (PROTONIX) 40 mg tablet Take 1 Tab by mouth Daily (before breakfast). Indications: gastroesophageal reflux disease, Normal, Disp-30 Tab, R-2      Lacto. acidophilus-Bif. animalis (Probiotic) 5 billion cell cpSP Take 1 Cap by mouth daily. , Normal, Disp-30 Cap, R-1      ondansetron (ZOFRAN ODT) 4 mg disintegrating tablet Take 1 Tab by mouth every six (6) hours as needed for Nausea or Vomiting., Normal, Disp-30 Tab, R-3      cholecalciferol (Vitamin D3) 25 mcg (1,000 unit) cap Take  by mouth daily. , Historical Med      hydrOXYzine HCL (ATARAX) 50 mg tablet Take 1 Tab by mouth four (4) times daily. , Normal, Disp-90 Tab,R-0      !! OTHER 1 Ensure Enlive food supplement drink PO TID, Print, Disp-270 Each,R-2      !! OTHER 1 ensure pudding PO daily for lunch., Print, Disp-90 Each,R-3      prenatal vit-iron fumarate-fa (PRENATAL PLUS with IRON) 28 mg iron- 800 mcg tab Historical Med      food supplemt, lactose-reduced (Ensure High Protein) liqd Take 237 mL by mouth three (3) times daily. , Normal, Disp-90 Can,R-2       !! - Potential duplicate medications found. Please discuss with provider. STOP taking these medications       doxylamine succinate (UNISOM) 25 mg tablet Comments:   Reason for Stopping:         sucralfate (CARAFATE) 1 gram tablet Comments:   Reason for Stopping:         prochlorperazine (COMPAZINE) 10 mg tablet Comments:   Reason for Stopping:         promethazine (Phenergan) 12.5 mg suppository Comments:   Reason for Stoppin.9% sodium chloride solp 1,000 mL with thiamine 100 mg/mL soln 186 mg, folic acid 5 mg/mL soln 1 mg Comments:   Reason for Stopping:         pyridoxine, vitamin B6, (Vitamin B-6) 100 mg tablet Comments:   Reason for Stopping:               * Follow-up Care/Patient Instructions:   Activity: no sex, douching, tampons, bath/pool x 6 weeks  Diet:regular diet as tolerated  Wound Care: None needed    Follow-up Information     Follow up With Specialties Details Why Contact Info    Mireya Martinez NP Nurse Practitioner   [address] [address]  [address], [address] [facility] Gynecology, Gynecology, Obstetrics Schedule an appointment as soon as possible for a visit in 1 week Postpartum Follow-Up [address] [place] [address] [phone]                        .

## 2021-07-08 ENCOUNTER — TELEPHONE (OUTPATIENT)
Dept: BEHAVIORAL/MENTAL HEALTH CLINIC | Age: 36
End: 2021-07-08

## 2021-07-08 DIAGNOSIS — K21.9 GASTROESOPHAGEAL REFLUX DISEASE, UNSPECIFIED WHETHER ESOPHAGITIS PRESENT: ICD-10-CM

## 2021-07-08 RX ORDER — FEXOFENADINE HYDROCHLORIDE 180 MG/1
TABLET ORAL
Qty: 72 TABLET | OUTPATIENT
Start: 2021-07-08

## 2021-07-12 RX ORDER — CALCIUM CARBONATE 200(500)MG
1 TABLET,CHEWABLE ORAL
Qty: 60 TABLET | Refills: 1 | Status: SHIPPED | OUTPATIENT
Start: 2021-07-12 | End: 2021-11-01 | Stop reason: SDUPTHER

## 2021-07-20 ENCOUNTER — TRANSCRIBE ORDER (OUTPATIENT)
Dept: SCHEDULING | Age: 36
End: 2021-07-20

## 2021-07-20 DIAGNOSIS — R22.43 LOCALIZED SWELLING, MASS AND LUMP, LOWER LIMB, BILATERAL: Primary | ICD-10-CM

## 2021-07-20 DIAGNOSIS — M79.606 LEG PAIN: ICD-10-CM

## 2021-07-20 DIAGNOSIS — R60.0 LOCALIZED EDEMA: ICD-10-CM

## 2021-07-22 ENCOUNTER — HOSPITAL ENCOUNTER (OUTPATIENT)
Dept: ULTRASOUND IMAGING | Age: 36
Discharge: HOME | End: 2021-07-22
Attending: OBSTETRICS & GYNECOLOGY
Payer: MEDICAID

## 2021-07-22 DIAGNOSIS — M79.606 LEG PAIN: ICD-10-CM

## 2021-07-22 DIAGNOSIS — R60.0 LOCALIZED EDEMA: ICD-10-CM

## 2021-07-22 DIAGNOSIS — R22.43 LOCALIZED SWELLING, MASS AND LUMP, LOWER LIMB, BILATERAL: ICD-10-CM

## 2021-07-22 PROCEDURE — 93970 EXTREMITY STUDY: CPT

## 2021-08-09 ENCOUNTER — OFFICE VISIT (OUTPATIENT)
Dept: BEHAVIORAL/MENTAL HEALTH CLINIC | Age: 36
End: 2021-08-09
Payer: MEDICAID

## 2021-08-09 VITALS
HEART RATE: 67 BPM | HEIGHT: 63 IN | DIASTOLIC BLOOD PRESSURE: 72 MMHG | BODY MASS INDEX: 29.48 KG/M2 | WEIGHT: 166.4 LBS | OXYGEN SATURATION: 98 % | TEMPERATURE: 97.7 F | SYSTOLIC BLOOD PRESSURE: 122 MMHG | RESPIRATION RATE: 16 BRPM

## 2021-08-09 DIAGNOSIS — F43.11 POST-TRAUMATIC STRESS DISORDER, ACUTE: ICD-10-CM

## 2021-08-09 DIAGNOSIS — F32.2 CURRENT SEVERE EPISODE OF MAJOR DEPRESSIVE DISORDER WITHOUT PSYCHOTIC FEATURES WITHOUT PRIOR EPISODE (HCC): Primary | ICD-10-CM

## 2021-08-09 DIAGNOSIS — F41.1 GAD (GENERALIZED ANXIETY DISORDER): ICD-10-CM

## 2021-08-09 PROCEDURE — 99214 OFFICE O/P EST MOD 30 MIN: CPT | Performed by: NURSE PRACTITIONER

## 2021-08-09 RX ORDER — FLUOXETINE HYDROCHLORIDE 40 MG/1
40 CAPSULE ORAL DAILY
Qty: 30 CAPSULE | Refills: 2 | Status: SHIPPED | OUTPATIENT
Start: 2021-08-09 | End: 2021-11-01 | Stop reason: ALTCHOICE

## 2021-08-09 RX ORDER — PRAZOSIN HYDROCHLORIDE 2 MG/1
2 CAPSULE ORAL
Qty: 30 CAPSULE | Refills: 0 | Status: SHIPPED | OUTPATIENT
Start: 2021-08-09 | End: 2021-12-06

## 2021-08-09 RX ORDER — FLUOXETINE HYDROCHLORIDE 20 MG/1
20 CAPSULE ORAL DAILY
Qty: 30 CAPSULE | Refills: 2 | Status: SHIPPED | OUTPATIENT
Start: 2021-08-09 | End: 2021-11-01 | Stop reason: ALTCHOICE

## 2021-08-09 RX ORDER — OLANZAPINE 10 MG/1
10 TABLET ORAL
Qty: 30 TABLET | Refills: 2 | Status: SHIPPED | OUTPATIENT
Start: 2021-08-09 | End: 2021-11-01 | Stop reason: ALTCHOICE

## 2021-08-09 NOTE — PROGRESS NOTES
CHIEF COMPLAINT:  Haylee Hidalgo is a 38 y.o. female and was seen today for follow-up of psychiatric condition and psychotropic medication management. HPI:    Douglas Dillon reports the following psychiatric symptoms by hx:  depression and anxiety. Overall symptoms have been present for months. Currently symptoms are of moderate/high to high severity. The symptoms occur daily. Pt reports she has not been taking medications consistently. Met with pt via video telehealth for appt today or to review current treatment plan. FAMILY/SOCIAL HX: psychosocial stressors    REVIEW OF SYSTEMS:  Psychiatric symptoms being monitored for:  depression, anxiety  Appetite:decreased   Sleep: fitful and poor with DIMS (difficulty initiating & maintaining sleep)   Neuro: no changes/updates    Visit Vitals  /72 (BP 1 Location: Right arm, BP Patient Position: Sitting, BP Cuff Size: Adult)   Pulse 67   Temp 97.7 °F (36.5 °C) (Temporal)   Resp 16   Ht 5' 3\" (1.6 m)   Wt 75.5 kg (166 lb 6.4 oz)   SpO2 98%   BMI 29.48 kg/m²       Side Effects:  none    MENTAL STATUS EXAM:   Sensorium  oriented to time, place and person   Relations cooperative   Appearance:  age appropriate and casually dressed   Motor Behavior:  gait stable and within normal limits   Speech:  soft   Thought Process: goal directed   Thought Content free of delusions and free of hallucinations   Suicidal ideations no intention, death wishes secondary to stressors   Homicidal ideations no intention   Mood:  anxious and depressed   Affect:  anxious and depressed   Memory recent  adequate   Memory remote:  adequate   Concentration:  adequate   Abstraction:  abstract   Insight:  fair and limited   Reliability fair   Judgment:  fair and limited     MEDICAL DECISION MAKING:  Problems addressed today:    ICD-10-CM ICD-9-CM    1. Current severe episode of major depressive disorder without psychotic features without prior episode ([facility]  F32.2 296.23    2.  Post-traumatic stress disorder, acute  F43.11 309.81    3. DESTINI (generalized anxiety disorder)  F41.1 300.02        Assessment:   Sharon Rayo is not responding to treatment. Symptoms are exacerbated. Pt has not been taking medication consistently. Discussed current medications and dosages. Reinforced benefit of consistent use. Pt discussed current stressors. Reviewed safety options and pt not ready to make any changes at this time. Pt did share she has crisis numbers if needed. Reviewed treatment goals and target symptoms to monitor for. Plan:   1. Current Outpatient Medications   Medication Sig Dispense Refill    FLUoxetine (PROzac) 20 mg capsule Take 1 Capsule by mouth daily. Take with 40 mg cap for total daily dose of 60 mg. 30 Capsule 2    FLUoxetine (PROzac) 40 mg capsule Take 1 Capsule by mouth daily. 30 Capsule 2    OLANZapine (ZyPREXA) 10 mg tablet Take 1 Tablet by mouth nightly. 30 Tablet 2    prazosin (MINIPRESS) 2 mg capsule Take 1 Capsule by mouth nightly. Indications: posttraumatic stress syndrome 30 Capsule 0    polyethylene glycol (MIRALAX) 17 gram packet Take 1 Packet by mouth daily as needed for Constipation. 90 Each 2    calcium carbonate (Tums) 200 mg calcium (500 mg) chew Take 1 Tablet by mouth two (2) times daily as needed for Other (reflux). 60 Tablet 1    ibuprofen (MOTRIN) 800 mg tablet Take 1 Tablet by mouth every eight (8) hours as needed for Pain. 20 Tablet 0    ondansetron (ZOFRAN ODT) 4 mg disintegrating tablet Take 1 Tab by mouth every six (6) hours as needed for Nausea or Vomiting. 30 Tab 3    cholecalciferol (Vitamin D3) 25 mcg (1,000 unit) cap Take  by mouth daily.  hydrOXYzine HCL (ATARAX) 50 mg tablet Take 1 Tab by mouth four (4) times daily. 90 Tab 0    OTHER 1 Ensure Enlive food supplement drink PO  Each 2    OTHER 1 ensure pudding PO daily for lunch.  90 Each 3    prenatal vit-iron fumarate-fa (PRENATAL PLUS with IRON) 28 mg iron- 800 mcg tab       food supplemt, lactose-reduced (Ensure High Protein) liqd Take 237 mL by mouth three (3) times daily. 90 Can 2    pantoprazole (PROTONIX) 40 mg tablet Take 1 Tab by mouth Daily (before breakfast). Indications: gastroesophageal reflux disease (Patient not taking: Reported on 8/9/2021) 30 Tab 2    Lacto. acidophilus-Bif. animalis (Probiotic) 5 billion cell cpSP Take 1 Cap by mouth daily. (Patient not taking: Reported on 8/9/2021) 30 Cap 1          medication changes made today: cont prozac, olanzapine, prazosin    2. Counseling and coordination of care including instructions for treatment, risks/benefits, risk factor reduction and patient/family education. She agrees with the plan. Patient instructed to call with any side effects, questions or issues.      3.    Follow-up and Dispositions    · Return in about 1 month (around 9/9/2021).         8/9/2021  Linda Alves NP

## 2021-08-09 NOTE — PROGRESS NOTES
Chief Complaint   Patient presents with    Medication Management     Visit Vitals  /72 (BP 1 Location: Right arm, BP Patient Position: Sitting, BP Cuff Size: Adult)   Pulse 67   Temp 97.7 °F (36.5 °C) (Temporal)   Resp 16   Ht 5' 3\" (1.6 m)   Wt 75.5 kg (166 lb 6.4 oz)   SpO2 98%   BMI 29.48 kg/m²     Prior to Admission medications    Medication Sig Start Date End Date Taking? Authorizing Provider   polyethylene glycol (MIRALAX) 17 gram packet Take 1 Packet by mouth daily as needed for Constipation. 7/13/21  Yes Anusha Moreno NP   calcium carbonate (Tums) 200 mg calcium (500 mg) chew Take 1 Tablet by mouth two (2) times daily as needed for Other (reflux). 7/12/21  Yes Anusha Moreno NP   ibuprofen (MOTRIN) 800 mg tablet Take 1 Tablet by mouth every eight (8) hours as needed for Pain. 6/21/21  Yes Jacob Gerber,    FLUoxetine (PROzac) 20 mg capsule Take 1 Cap by mouth daily. Take with 40 mg cap for total daily dose of 60 mg. 3/1/21  Yes Stephanie Ortiz NP   FLUoxetine (PROzac) 40 mg capsule Take 1 Cap by mouth daily. 3/1/21  Yes Stephanie Ortiz NP   OLANZapine (ZyPREXA) 10 mg tablet Take 1 Tab by mouth nightly. 3/1/21  Yes Stephanie Ortiz NP   prazosin (MINIPRESS) 2 mg capsule Take 1 Cap by mouth nightly. Indications: posttraumatic stress syndrome 3/1/21  Yes Stephanie Ortiz NP   ondansetron (ZOFRAN ODT) 4 mg disintegrating tablet Take 1 Tab by mouth every six (6) hours as needed for Nausea or Vomiting. 12/16/20  Yes Jacob Gerber DO   cholecalciferol (Vitamin D3) 25 mcg (1,000 unit) cap Take  by mouth daily. Yes Provider, Historical   hydrOXYzine HCL (ATARAX) 50 mg tablet Take 1 Tab by mouth four (4) times daily. 11/9/20  Yes Stephanie Ortiz NP   OTHER 1 Ensure Enlive food supplement drink PO TID 11/4/20  Yes Anusha Moreno NP   OTHER 1 ensure pudding PO daily for lunch.  10/20/20  Yes Anusha Moreno NP   prenatal vit-iron fumarate-fa (PRENATAL PLUS with IRON) 28 mg iron- 800 mcg tab  9/28/20  Yes Provider, Historical   food supplemt, lactose-reduced (Ensure High Protein) liqd Take 237 mL by mouth three (3) times daily. 10/14/20  Yes Rocio Rincon NP   pantoprazole (PROTONIX) 40 mg tablet Take 1 Tab by mouth Daily (before breakfast). Indications: gastroesophageal reflux disease  Patient not taking: Reported on 8/9/2021 1/15/21   Lorie Macias, DO   Lacto. acidophilus-Bif. animalis (Probiotic) 5 billion cell cpSP Take 1 Cap by mouth daily.   Patient not taking: Reported on 8/9/2021 12/28/20   Stephon Chang NP

## 2021-08-12 ENCOUNTER — OFFICE VISIT (OUTPATIENT)
Dept: FAMILY MEDICINE CLINIC | Age: 36
End: 2021-08-12
Payer: MEDICAID

## 2021-08-12 VITALS
OXYGEN SATURATION: 99 % | SYSTOLIC BLOOD PRESSURE: 110 MMHG | DIASTOLIC BLOOD PRESSURE: 62 MMHG | WEIGHT: 166 LBS | RESPIRATION RATE: 16 BRPM | HEIGHT: 63 IN | TEMPERATURE: 98.4 F | BODY MASS INDEX: 29.41 KG/M2 | HEART RATE: 72 BPM

## 2021-08-12 DIAGNOSIS — K21.9 GASTROESOPHAGEAL REFLUX DISEASE WITHOUT ESOPHAGITIS: ICD-10-CM

## 2021-08-12 DIAGNOSIS — F50.9 EATING DISORDER, UNSPECIFIED TYPE: ICD-10-CM

## 2021-08-12 DIAGNOSIS — K59.00 CONSTIPATION, UNSPECIFIED CONSTIPATION TYPE: ICD-10-CM

## 2021-08-12 DIAGNOSIS — F32.2 CURRENT SEVERE EPISODE OF MAJOR DEPRESSIVE DISORDER WITHOUT PSYCHOTIC FEATURES, UNSPECIFIED WHETHER RECURRENT (HCC): ICD-10-CM

## 2021-08-12 DIAGNOSIS — R60.9 PERIPHERAL EDEMA: Primary | ICD-10-CM

## 2021-08-12 DIAGNOSIS — Z78.9 BREASTFEEDING (INFANT): ICD-10-CM

## 2021-08-12 PROCEDURE — 99214 OFFICE O/P EST MOD 30 MIN: CPT | Performed by: NURSE PRACTITIONER

## 2021-08-12 RX ORDER — POLYETHYLENE GLYCOL 3350 17 G/17G
17 POWDER, FOR SOLUTION ORAL
Qty: 90 EACH | Refills: 2 | Status: SHIPPED | OUTPATIENT
Start: 2021-08-12 | End: 2022-07-08 | Stop reason: SDUPTHER

## 2021-08-12 RX ORDER — PANTOPRAZOLE SODIUM 40 MG/1
40 TABLET, DELAYED RELEASE ORAL
Qty: 90 TABLET | Refills: 1 | Status: SHIPPED | OUTPATIENT
Start: 2021-08-12 | End: 2022-01-18 | Stop reason: SDUPTHER

## 2021-08-12 NOTE — PROGRESS NOTES
[facility] [facility] Note  Subjective:      Evelina Dominguez is a 38 y.o. female who presents for an acute visit with the following chief complaints. Chief Complaint   Patient presents with    Leg Swelling     Hospitalized at [address] [place] [address] from [address] [place] [address] Date: 2021, Discharge Date: 21. Admitted for malnutrition, eating disorder affecting pregnancy. Had delivery of a 3 kg male infant via Vaginal, Spontaneous on 2021. OBGYN is Jero Quiros. Since discharge she notes bilateral leg swelling. The location of the edema is feet, ankle, shins. The edema is present all day. Associated tightness of the lower legs, burning of the toes, itching. The swelling has been aggravated by nothing. Relieved by elevating legs. Cardiac risk factors include restrictive eating disorder. Evaluated by OBGYN, had dopplers of BL LE on  that were negative for DVT. Prescribed HCTZ with little relief of swelling and caused dizziness. Seeing psychiatry, Joanne Lyons, NP routinely for depression, anxiety, eating disorder. She states her mood is stable. Denies SI/HI. Enjoying her time with her . Exclusively breastfeeding 10 week old. Drinking water throughout the day. Eating yogurt, smoothies, 2-3 ensures per day. If she eats too much, it still makes her nauseated. She is unable able to go inpatient eating disorder program because she does not have anyone to watch her children, she doesn't want to leave all 8 children with her partner. She was denied entrance from the out patient programs. She is seen closely by her psychiatrist and OBGYN. Current Outpatient Medications   Medication Sig Dispense Refill    pantoprazole (PROTONIX) 40 mg tablet Take 1 Tablet by mouth Daily (before breakfast). Indications: gastroesophageal reflux disease 90 Tablet 1    polyethylene glycol (MIRALAX) 17 gram packet Take 1 Packet by mouth daily as needed for Constipation.  90 Each 2    FLUoxetine (PROzac) 20 mg capsule Take 1 Capsule by mouth daily. Take with 40 mg cap for total daily dose of 60 mg. 30 Capsule 2    FLUoxetine (PROzac) 40 mg capsule Take 1 Capsule by mouth daily. 30 Capsule 2    OLANZapine (ZyPREXA) 10 mg tablet Take 1 Tablet by mouth nightly. 30 Tablet 2    prazosin (MINIPRESS) 2 mg capsule Take 1 Capsule by mouth nightly. Indications: posttraumatic stress syndrome 30 Capsule 0    calcium carbonate (Tums) 200 mg calcium (500 mg) chew Take 1 Tablet by mouth two (2) times daily as needed for Other (reflux). 60 Tablet 1    ondansetron (ZOFRAN ODT) 4 mg disintegrating tablet Take 1 Tab by mouth every six (6) hours as needed for Nausea or Vomiting. 30 Tab 3    cholecalciferol (Vitamin D3) 25 mcg (1,000 unit) cap Take  by mouth daily.  hydrOXYzine HCL (ATARAX) 50 mg tablet Take 1 Tab by mouth four (4) times daily. 90 Tab 0    OTHER 1 Ensure Enlive food supplement drink PO  Each 2    OTHER 1 ensure pudding PO daily for lunch. 90 Each 3    prenatal vit-iron fumarate-fa (PRENATAL PLUS with IRON) 28 mg iron- 800 mcg tab       food supplemt, lactose-reduced (Ensure High Protein) liqd Take 237 mL by mouth three (3) times daily. 90 Can 2     Allergies   Allergen Reactions    Labetalol Hives    Pcn [Penicillins] Hives    Ceclor [Cefaclor] Unknown (comments)    Septra [Sulfamethoprim Ds] Unknown (comments)       ROS:   Complete review of systems was reviewed with pertinent information listed in HPI. Review of Systems   Constitutional: Negative for chills, diaphoresis, fever, malaise/fatigue and weight loss. HENT: Negative for congestion, ear pain, hearing loss, sinus pain, sore throat and tinnitus. Eyes: Negative for blurred vision and double vision. Respiratory: Negative for cough, hemoptysis, sputum production, shortness of breath and wheezing. Cardiovascular: Positive for leg swelling. Negative for chest pain, palpitations, orthopnea and claudication.    Gastrointestinal: Negative for abdominal pain, blood in stool, constipation, diarrhea, heartburn, melena, nausea and vomiting. Genitourinary: Negative for dysuria, frequency, hematuria and urgency. Musculoskeletal: Negative for joint pain and myalgias. Skin: Negative. Negative for itching and rash. Neurological: Negative for dizziness, tingling, weakness and headaches. Endo/Heme/Allergies: Negative for polydipsia. Psychiatric/Behavioral: Positive for depression. Negative for hallucinations, memory loss, substance abuse and suicidal ideas. The patient is not nervous/anxious and does not have insomnia. Objective:     Visit Vitals  /62 (BP 1 Location: Left arm, BP Patient Position: Sitting, BP Cuff Size: Adult)   Pulse 72   Temp 98.4 °F (36.9 °C) (Oral)   Resp 16   Ht 5' 3\" (1.6 m)   Wt 166 lb (75.3 kg)   SpO2 99%   BMI 29.41 kg/m²       Vitals and Nurse Documentation reviewed. Physical Exam  Vitals and nursing note reviewed. Constitutional:       General: She is not in acute distress. Appearance: She is normal weight. She is not ill-appearing, toxic-appearing or diaphoretic. HENT:      Head: Normocephalic and atraumatic. Cardiovascular:      Rate and Rhythm: Normal rate and regular rhythm. Pulses: Normal pulses. Dorsalis pedis pulses are 2+ on the right side and 2+ on the left side. Posterior tibial pulses are 2+ on the right side and 2+ on the left side. Heart sounds: Normal heart sounds, S1 normal and S2 normal. No murmur heard. No friction rub. No gallop. Pulmonary:      Effort: Pulmonary effort is normal. No respiratory distress. Breath sounds: Normal breath sounds. No decreased breath sounds, wheezing, rhonchi or rales. Chest:      Chest wall: No tenderness. Abdominal:      General: Bowel sounds are normal. There is no distension. Palpations: Abdomen is soft. Tenderness: There is no abdominal tenderness. There is no guarding.    Musculoskeletal: Right lower le+ Pitting Edema present. Left lower le+ Pitting Edema present. Skin:     General: Skin is warm and dry. Capillary Refill: Capillary refill takes less than 2 seconds. Coloration: Skin is not mottled or pale. Findings: No ecchymosis, erythema, petechiae, rash or wound. Neurological:      Mental Status: She is alert and oriented to person, place, and time. Gait: Gait is intact. Psychiatric:         Attention and Perception: Attention normal.         Mood and Affect: Mood is not anxious or depressed. Affect is not tearful. Speech: Speech normal.         Behavior: Behavior normal. Behavior is cooperative. Thought Content: Thought content normal. Thought content does not include homicidal or suicidal plan. Cognition and Memory: Cognition normal.         Assessment/Plan:     Diagnoses and all orders for this visit:    1. Peripheral edema: Acute issue post partum. Recent dopplers negative for DVT. HCTZ caused dizziness. Will obtain CMP, TTE. Consider lasix therapy. Advised compression stockings, elevating legs, walking, hydration, high protein diet. -     METABOLIC PANEL, COMPREHENSIVE; Future  -     ECHO ADULT COMPLETE; Future  -     TSH 3RD GENERATION; Future    2. Current severe episode of major depressive disorder without psychotic features, unspecified whether recurrent ([address]): Stable, managed by psychiatry     3. Eating disorder, unspecified type: Restricting eating patterns however, this appears improved from out last visit. She is tolerating po hydration, smoothies, ensures. Discussed importance of hydration and protein intake especially with exclusive breast feeding. She states understanding. Advised continued prenatal vitamin.   -     METABOLIC PANEL, COMPREHENSIVE; Future  -     ECHO ADULT COMPLETE; Future    4. Gastroesophageal reflux disease without esophagitis: Continue PPI. -     pantoprazole (PROTONIX) 40 mg tablet;  Take 1 Tablet by mouth Daily (before breakfast). Indications: gastroesophageal reflux disease    5. Constipation, unspecified constipation type: Continue Miralax PRN  -     polyethylene glycol (MIRALAX) 17 gram packet; Take 1 Packet by mouth daily as needed for Constipation. 6. Breastfeeding (infant): Continue infant vitamin D supplementation.   -     VITAMIN D, 25 HYDROXY; Future      Follow-up and Dispositions    · Return in about 6 weeks (around 9/23/2021) for Follow-up.        Discussed expected course/resolution/complications of diagnosis in detail with patient.  Medication risks/benefits/costs/interactions/alternatives discussed with patient.  Pt was given an after visit summary which includes diagnoses, current medications & vitals.  Pt expressed understanding with the diagnosis and plan

## 2021-08-13 LAB
25(OH)D3+25(OH)D2 SERPL-MCNC: 14.6 NG/ML (ref 30–100)
ALBUMIN SERPL-MCNC: 4 G/DL (ref 3.8–4.8)
ALBUMIN/GLOB SERPL: 1.4 {RATIO} (ref 1.2–2.2)
ALP SERPL-CCNC: 69 IU/L (ref 48–121)
ALT SERPL-CCNC: 12 IU/L (ref 0–32)
AST SERPL-CCNC: 15 IU/L (ref 0–40)
BILIRUB SERPL-MCNC: 0.3 MG/DL (ref 0–1.2)
BUN SERPL-MCNC: 6 MG/DL (ref 6–20)
BUN/CREAT SERPL: 7 (ref 9–23)
CALCIUM SERPL-MCNC: 9.5 MG/DL (ref 8.7–10.2)
CHLORIDE SERPL-SCNC: 105 MMOL/L (ref 96–106)
CO2 SERPL-SCNC: 21 MMOL/L (ref 20–29)
CREAT SERPL-MCNC: 0.84 MG/DL (ref 0.57–1)
GLOBULIN SER CALC-MCNC: 2.9 G/DL (ref 1.5–4.5)
GLUCOSE SERPL-MCNC: 72 MG/DL (ref 65–99)
POTASSIUM SERPL-SCNC: 4.6 MMOL/L (ref 3.5–5.2)
PROT SERPL-MCNC: 6.9 G/DL (ref 6–8.5)
SODIUM SERPL-SCNC: 141 MMOL/L (ref 134–144)
SPECIMEN STATUS REPORT, ROLRST: NORMAL
TSH SERPL DL<=0.005 MIU/L-ACNC: 1.14 UIU/ML (ref 0.45–4.5)

## 2021-08-16 ENCOUNTER — OFFICE VISIT (OUTPATIENT)
Dept: BEHAVIORAL/MENTAL HEALTH CLINIC | Age: 36
End: 2021-08-16
Payer: MEDICAID

## 2021-08-16 VITALS
HEIGHT: 63 IN | DIASTOLIC BLOOD PRESSURE: 80 MMHG | RESPIRATION RATE: 17 BRPM | SYSTOLIC BLOOD PRESSURE: 122 MMHG | TEMPERATURE: 97.9 F | BODY MASS INDEX: 28.63 KG/M2 | HEART RATE: 70 BPM | OXYGEN SATURATION: 98 % | WEIGHT: 161.6 LBS

## 2021-08-16 DIAGNOSIS — F41.1 GAD (GENERALIZED ANXIETY DISORDER): ICD-10-CM

## 2021-08-16 DIAGNOSIS — F43.11 POST-TRAUMATIC STRESS DISORDER, ACUTE: ICD-10-CM

## 2021-08-16 DIAGNOSIS — F32.2 CURRENT SEVERE EPISODE OF MAJOR DEPRESSIVE DISORDER WITHOUT PSYCHOTIC FEATURES WITHOUT PRIOR EPISODE (HCC): Primary | ICD-10-CM

## 2021-08-16 PROCEDURE — 99214 OFFICE O/P EST MOD 30 MIN: CPT | Performed by: NURSE PRACTITIONER

## 2021-08-16 NOTE — PROGRESS NOTES
CHIEF COMPLAINT:  Felicita Santana is a 38 y.o. female and was seen today for follow-up of psychiatric condition and psychotropic medication management. HPI:    Caitlyn Arroyo reports the following psychiatric symptoms by hx:  depression and anxiety. Overall symptoms have been present for months. Currently symptoms are of moderate/high to high severity. The symptoms occur daily. Pt reports medications are somewhat beneficial. Met with pt for appt today to review current treatment plan. FAMILY/SOCIAL HX: psychosocial stressors    REVIEW OF SYSTEMS:  Psychiatric symptoms being monitored for:  depression, anxiety  Appetite:decreased   Sleep: poor with DIMS (difficulty initiating & maintaining sleep)   Neuro: no changes/updates    Visit Vitals  /80 (BP 1 Location: Left arm, BP Patient Position: Sitting, BP Cuff Size: Adult)   Pulse 70   Temp 97.9 °F (36.6 °C) (Temporal)   Resp 17   Ht 5' 3\" (1.6 m)   Wt 73.3 kg (161 lb 9.6 oz)   SpO2 98%   BMI 28.63 kg/m²       Side Effects:  none    MENTAL STATUS EXAM:   Sensorium  oriented to time, place and person   Relations cooperative   Appearance:  age appropriate and casually dressed   Motor Behavior:  gait stable and within normal limits   Speech:  normal volume   Thought Process: goal directed   Thought Content free of delusions and free of hallucinations   Suicidal ideations no intention   Homicidal ideations no intention   Mood:  anxious and depressed   Affect:  anxious and depressed   Memory recent  adequate   Memory remote:  adequate   Concentration:  adequate   Abstraction:  abstract   Insight:  fair and good   Reliability good and fair   Judgment:  fair and good     MEDICAL DECISION MAKING:  Problems addressed today:    ICD-10-CM ICD-9-CM    1. Current severe episode of major depressive disorder without psychotic features without prior episode ([facility]  F32.2 296.23    2. Post-traumatic stress disorder, acute  F43.11 309.81    3.  DESTINI (generalized anxiety disorder)  F41.1 300.02        Assessment:   [facility] is not fully responding to treatment. Symptoms are exacerbated. Discussed current medications and dosages. Pt has been working on taking them consistently. Reviewed treatment goals and target symptoms to monitor for. Discussed impact of stressors and will continue to reinforce safety guidelines. Plan:   1. Current Outpatient Medications   Medication Sig Dispense Refill    pantoprazole (PROTONIX) 40 mg tablet Take 1 Tablet by mouth Daily (before breakfast). Indications: gastroesophageal reflux disease 90 Tablet 1    polyethylene glycol (MIRALAX) 17 gram packet Take 1 Packet by mouth daily as needed for Constipation. 90 Each 2    FLUoxetine (PROzac) 20 mg capsule Take 1 Capsule by mouth daily. Take with 40 mg cap for total daily dose of 60 mg. 30 Capsule 2    FLUoxetine (PROzac) 40 mg capsule Take 1 Capsule by mouth daily. 30 Capsule 2    OLANZapine (ZyPREXA) 10 mg tablet Take 1 Tablet by mouth nightly. 30 Tablet 2    prazosin (MINIPRESS) 2 mg capsule Take 1 Capsule by mouth nightly. Indications: posttraumatic stress syndrome 30 Capsule 0    calcium carbonate (Tums) 200 mg calcium (500 mg) chew Take 1 Tablet by mouth two (2) times daily as needed for Other (reflux). 60 Tablet 1    ondansetron (ZOFRAN ODT) 4 mg disintegrating tablet Take 1 Tab by mouth every six (6) hours as needed for Nausea or Vomiting. 30 Tab 3    cholecalciferol (Vitamin D3) 25 mcg (1,000 unit) cap Take  by mouth daily.  hydrOXYzine HCL (ATARAX) 50 mg tablet Take 1 Tab by mouth four (4) times daily. 90 Tab 0    OTHER 1 Ensure Enlive food supplement drink PO  Each 2    OTHER 1 ensure pudding PO daily for lunch. 90 Each 3    prenatal vit-iron fumarate-fa (PRENATAL PLUS with IRON) 28 mg iron- 800 mcg tab       food supplemt, lactose-reduced (Ensure High Protein) liqd Take 237 mL by mouth three (3) times daily.  90 Can 2          medication changes made today: cont prazosin, olanzapine, prozac    2. Counseling and coordination of care including instructions for treatment, risks/benefits, risk factor reduction and patient/family education. She agrees with the plan. Patient instructed to call with any side effects, questions or issues. 3.    Follow-up and Dispositions    · Return in about 4 weeks (around 9/13/2021).          8/16/2021  Angela Grandchild, NP

## 2021-08-16 NOTE — PROGRESS NOTES
Chief Complaint   Patient presents with    Medication Management     Visit Vitals  /80 (BP 1 Location: Left arm, BP Patient Position: Sitting, BP Cuff Size: Adult)   Pulse 70   Temp 97.9 °F (36.6 °C) (Temporal)   Resp 17   Ht 5' 3\" (1.6 m)   Wt 73.3 kg (161 lb 9.6 oz)   SpO2 98%   BMI 28.63 kg/m²     Prior to Admission medications    Medication Sig Start Date End Date Taking? Authorizing Provider   pantoprazole (PROTONIX) 40 mg tablet Take 1 Tablet by mouth Daily (before breakfast). Indications: gastroesophageal reflux disease 8/12/21  Yes Raquel Moon NP   polyethylene glycol (MIRALAX) 17 gram packet Take 1 Packet by mouth daily as needed for Constipation. 8/12/21  Yes Raquel Moon NP   FLUoxetine (PROzac) 20 mg capsule Take 1 Capsule by mouth daily. Take with 40 mg cap for total daily dose of 60 mg. 8/9/21  Yes Melissa Ortiz NP   FLUoxetine (PROzac) 40 mg capsule Take 1 Capsule by mouth daily. 8/9/21  Yes Melissa Ortiz NP   OLANZapine (ZyPREXA) 10 mg tablet Take 1 Tablet by mouth nightly. 8/9/21  Yes Melissa Ortiz NP   prazosin (MINIPRESS) 2 mg capsule Take 1 Capsule by mouth nightly. Indications: posttraumatic stress syndrome 8/9/21  Yes Melissa Ortiz NP   calcium carbonate (Tums) 200 mg calcium (500 mg) chew Take 1 Tablet by mouth two (2) times daily as needed for Other (reflux). 7/12/21  Yes Raquel Moon NP   ondansetron (ZOFRAN ODT) 4 mg disintegrating tablet Take 1 Tab by mouth every six (6) hours as needed for Nausea or Vomiting. 12/16/20  Yes Sabino Cole,    cholecalciferol (Vitamin D3) 25 mcg (1,000 unit) cap Take  by mouth daily. Yes Provider, Historical   hydrOXYzine HCL (ATARAX) 50 mg tablet Take 1 Tab by mouth four (4) times daily. 11/9/20  Yes Melissa Ortiz NP   OTHER 1 Ensure Enlive food supplement drink PO TID 11/4/20  Yes Raquel Moon NP   OTHER 1 ensure pudding PO daily for lunch.  10/20/20  Yes Raquel Moon NP   prenatal vit-iron fumarate-fa (PRENATAL PLUS with IRON) 28 mg iron- 800 mcg tab  9/28/20  Yes Provider, Historical   food supplemt, lactose-reduced (Ensure High Protein) liqd Take 237 mL by mouth three (3) times daily.  10/14/20  Yes Wilmer Iqbal, NP

## 2021-08-17 DIAGNOSIS — E55.9 VITAMIN D DEFICIENCY: Primary | ICD-10-CM

## 2021-08-17 RX ORDER — ERGOCALCIFEROL 1.25 MG/1
50000 CAPSULE ORAL
Qty: 12 CAPSULE | Refills: 0 | Status: SHIPPED | OUTPATIENT
Start: 2021-08-17 | End: 2022-09-07

## 2021-08-17 NOTE — PROGRESS NOTES
Thyroid is at goal. Vitamin D level is low. I will send in a prescription strength vitamin D supplement. Once your complete this, you can return to your daily over the counter supplement. Your remaining labs are stable and improved. Please let me know if you have any additional questions.   Renetta Ormond, NP

## 2021-08-24 ENCOUNTER — TELEPHONE (OUTPATIENT)
Dept: FAMILY MEDICINE CLINIC | Age: 36
End: 2021-08-24

## 2021-08-24 NOTE — TELEPHONE ENCOUNTER
Patient called complaining about both legs and feet are swollen. Can not walk spoke to nurse Errol Partida told me to tell the patient to go to the ER.      Best call back #[phone]

## 2021-08-26 ENCOUNTER — OFFICE VISIT (OUTPATIENT)
Dept: FAMILY MEDICINE CLINIC | Age: 36
End: 2021-08-26
Payer: MEDICAID

## 2021-08-26 VITALS
BODY MASS INDEX: 29.91 KG/M2 | OXYGEN SATURATION: 98 % | WEIGHT: 168.8 LBS | DIASTOLIC BLOOD PRESSURE: 76 MMHG | RESPIRATION RATE: 18 BRPM | TEMPERATURE: 97.5 F | HEIGHT: 63 IN | HEART RATE: 86 BPM | SYSTOLIC BLOOD PRESSURE: 116 MMHG

## 2021-08-26 DIAGNOSIS — R60.9 PERIPHERAL EDEMA: Primary | ICD-10-CM

## 2021-08-26 PROCEDURE — 99214 OFFICE O/P EST MOD 30 MIN: CPT | Performed by: FAMILY MEDICINE

## 2021-08-26 RX ORDER — MEDROXYPROGESTERONE ACETATE 150 MG/ML
INJECTION, SUSPENSION INTRAMUSCULAR
COMMUNITY
Start: 2021-06-21 | End: 2021-11-03 | Stop reason: ALTCHOICE

## 2021-08-26 RX ORDER — CHLORTHALIDONE 25 MG/1
25 TABLET ORAL DAILY
Qty: 14 TABLET | Refills: 0 | Status: SHIPPED | OUTPATIENT
Start: 2021-08-26 | End: 2022-03-22 | Stop reason: SDUPTHER

## 2021-08-26 NOTE — PROGRESS NOTES
Patient Name: Cheli Nicholson   MRN: [id number]    [place] Hany is a 38 y.o. female who presents with the following:     Patient continues to have bilateral leg swelling up to below her knees which is getting worse. Was given a low-dose HCTZ for a brief period which did not help. Recent labs were only notable for low vitamin D. She has an upcoming TTE scheduled. Her bilateral leg swelling did occur about a week after delivering her son, who is currently 3 months old. Initial Doppler ultrasound was negative for DVT on 7/22/2021. Pregnancy was complicated by malnutrition in which she had a PICC line for TPN due to chronic eating disorder. She is purely breast-feeding her infant. Did not notice a change in her milk supply when she was on HCTZ. Review of Systems   Constitutional: Negative for fever, malaise/fatigue and weight loss. Respiratory: Negative for cough, hemoptysis, shortness of breath and wheezing. Cardiovascular: Positive for leg swelling. Negative for chest pain, palpitations and PND. Gastrointestinal: Negative for abdominal pain, constipation, diarrhea, nausea and vomiting. The patient's medications, allergies, past medical history, surgical history, family history and social history were reviewed and updated where appropriate. Current Outpatient Medications:     ergocalciferol (ERGOCALCIFEROL) 1,250 mcg (50,000 unit) capsule, Take 1 Capsule by mouth every seven (7) days. , Disp: 12 Capsule, Rfl: 0    pantoprazole (PROTONIX) 40 mg tablet, Take 1 Tablet by mouth Daily (before breakfast). Indications: gastroesophageal reflux disease, Disp: 90 Tablet, Rfl: 1    polyethylene glycol (MIRALAX) 17 gram packet, Take 1 Packet by mouth daily as needed for Constipation. , Disp: 90 Each, Rfl: 2    FLUoxetine (PROzac) 20 mg capsule, Take 1 Capsule by mouth daily.  Take with 40 mg cap for total daily dose of 60 mg., Disp: 30 Capsule, Rfl: 2    FLUoxetine (PROzac) 40 mg capsule, Take 1 Capsule by mouth daily. , Disp: 30 Capsule, Rfl: 2    OLANZapine (ZyPREXA) 10 mg tablet, Take 1 Tablet by mouth nightly., Disp: 30 Tablet, Rfl: 2    prazosin (MINIPRESS) 2 mg capsule, Take 1 Capsule by mouth nightly. Indications: posttraumatic stress syndrome, Disp: 30 Capsule, Rfl: 0    calcium carbonate (Tums) 200 mg calcium (500 mg) chew, Take 1 Tablet by mouth two (2) times daily as needed for Other (reflux). , Disp: 60 Tablet, Rfl: 1    hydrOXYzine HCL (ATARAX) 50 mg tablet, Take 1 Tab by mouth four (4) times daily. , Disp: 90 Tab, Rfl: 0    OTHER, 1 Ensure Enlive food supplement drink PO TID, Disp: 270 Each, Rfl: 2    OTHER, 1 ensure pudding PO daily for lunch., Disp: 90 Each, Rfl: 3    prenatal vit-iron fumarate-fa (PRENATAL PLUS with IRON) 28 mg iron- 800 mcg tab, , Disp: , Rfl:     food supplemt, lactose-reduced (Ensure High Protein) liqd, Take 237 mL by mouth three (3) times daily. , Disp: 90 Can, Rfl: 2    medroxyPROGESTERone (DEPO-PROVERA) 150 mg/mL injection, , Disp: , Rfl:     Allergies   Allergen Reactions    Labetalol Hives    Pcn [Penicillins] Hives    Ceclor [Cefaclor] Unknown (comments)    Septra [Sulfamethoprim Ds] Unknown (comments)         OBJECTIVE    Visit Vitals  /76 (BP 1 Location: Right upper arm, BP Patient Position: Sitting, BP Cuff Size: Adult)   Pulse 86   Temp 97.5 °F (36.4 °C) (Temporal)   Resp 18   Ht 5' 3\" (1.6 m)   Wt 168 lb 12.8 oz (76.6 kg)   SpO2 98%   Breastfeeding Yes   BMI 29.90 kg/m²       Physical Exam  Vitals and nursing note reviewed. Constitutional:       General: She is not in acute distress. Appearance: She is not diaphoretic. Eyes:      Conjunctiva/sclera: Conjunctivae normal.      Pupils: Pupils are equal, round, and reactive to light. Cardiovascular:      Rate and Rhythm: Normal rate and regular rhythm. Heart sounds: Normal heart sounds. No murmur heard. No friction rub. No gallop.     Pulmonary:      Effort: Pulmonary effort is normal. No respiratory distress. Breath sounds: Normal breath sounds. No wheezing. Musculoskeletal:      Right lower leg: Edema (2+ pitting edema up to below the knee) present. Left lower leg: Edema (2+ pitting edema up to below the knee) present. Skin:     General: Skin is warm and dry. Neurological:      Mental Status: She is alert. ASSESSMENT AND PLAN  Reji Lovell is a 38 y.o. female who presents today for:    1. Peripheral edema  Will trial chlorthalidone as a moderate strength diuretic to try to remove some of the leg swelling without affecting her milk production. If the chlorthalidone does not help the leg swelling and her milk production is not otherwise affected, will then try Lasix. Will reorder duplex to reassess legs as she states the swelling is getting worse. Will also follow-up with TTE results. Reviewed signs and symptoms that would indicate a worsening medical condition which would require immediate evaluation and treatment; patient expressed understanding of plan. - chlorthalidone (HYGROTON) 25 mg tablet; Take 1 Tablet by mouth daily. Dispense: 14 Tablet; Refill: 0  - DUPLEX LOWER EXT VENOUS BILAT; Future       Medications Discontinued During This Encounter   Medication Reason    ondansetron (ZOFRAN ODT) 4 mg disintegrating tablet LIST CLEANUP           Treatment risks/benefits/costs/interactions/alternatives discussed with patient. Advised patient to call back or return to office if symptoms worsen/change/persist. If patient cannot reach us or should anything more severe/urgent arise he/she should proceed directly to the nearest emergency department. Discussed expected course/resolution/complications of diagnosis in detail with patient. Patient expressed understanding with the diagnosis and plan. Loren Duvall M.D.

## 2021-08-26 NOTE — PROGRESS NOTES
Identified pt with two pt identifiers(name and ). Reviewed record in preparation for visit and have obtained necessary documentation. Chief Complaint   Patient presents with    Leg Swelling     both        Vitals:    21 1515   Weight: 168 lb 12.8 oz (76.6 kg)   Height: 5' 3\" (1.6 m)   PainSc:   5   PainLoc: Leg       Health Maintenance Due   Topic    Hepatitis C Screening     COVID-19 Vaccine (1)    PAP AKA CERVICAL CYTOLOGY        Coordination of Care Questionnaire:  :   1) Have you been to an emergency room, urgent care, or hospitalized since your last visit? If yes, where when, and reason for visit? no       2. Have seen or consulted any other health care provider since your last visit? If yes, where when, and reason for visit? NO      Patient is accompanied by self I have received verbal consent from Cheryl Kurtz to discuss any/all medical information while they are present in the room.

## 2021-08-30 ENCOUNTER — HOSPITAL ENCOUNTER (OUTPATIENT)
Dept: NON INVASIVE DIAGNOSTICS | Age: 36
Discharge: HOME | End: 2021-08-30
Attending: NURSE PRACTITIONER
Payer: MEDICAID

## 2021-08-30 DIAGNOSIS — R60.9 PERIPHERAL EDEMA: ICD-10-CM

## 2021-08-30 DIAGNOSIS — F50.9 EATING DISORDER, UNSPECIFIED TYPE: ICD-10-CM

## 2021-08-30 LAB
ECHO AV AREA PEAK VELOCITY: 1.96 CM2
ECHO AV PEAK GRADIENT: 7.47 MMHG
ECHO AV PEAK VELOCITY: 136.64 CM/S
ECHO LA AREA 4C: 18.09 CM2
ECHO LA MAJOR AXIS: 3.23 CM
ECHO LA TO AORTIC ROOT RATIO: 1.24
ECHO LA TO AORTIC ROOT RATIO: 1.24
ECHO LA VOL 4C: 48.18 ML (ref 22–52)
ECHO LV INTERNAL DIMENSION DIASTOLIC: 4.22 CM (ref 3.9–5.3)
ECHO LV INTERNAL DIMENSION SYSTOLIC: 2.73 CM
ECHO LV IVSD: 0.83 CM (ref 0.6–0.9)
ECHO LV MASS 2D: 116.9 G (ref 67–162)
ECHO LV POSTERIOR WALL DIASTOLIC: 0.94 CM (ref 0.6–0.9)
ECHO LVOT DIAM: 1.77 CM
ECHO LVOT PEAK GRADIENT: 4.78 MMHG
ECHO LVOT PEAK VELOCITY: 109.29 CM/S
ECHO MV A VELOCITY: 56.25 CM/S
ECHO MV E VELOCITY: 98.5 CM/S
ECHO MV E/A RATIO: 1.75
ECHO RV INTERNAL DIMENSION: 3.87 CM
ECHO RV TAPSE: 3.02 CM (ref 1.5–2)
ECHO TV REGURGITANT MAX VELOCITY: 229.37 CM/S
ECHO TV REGURGITANT PEAK GRADIENT: 21.04 MMHG

## 2021-08-30 PROCEDURE — 93306 TTE W/DOPPLER COMPLETE: CPT

## 2021-08-30 PROCEDURE — 93306 TTE W/DOPPLER COMPLETE: CPT | Performed by: INTERNAL MEDICINE

## 2021-08-31 ENCOUNTER — HOSPITAL ENCOUNTER (OUTPATIENT)
Dept: VASCULAR SURGERY | Age: 36
Discharge: HOME | End: 2021-08-31
Attending: FAMILY MEDICINE
Payer: MEDICAID

## 2021-08-31 DIAGNOSIS — R60.9 PERIPHERAL EDEMA: ICD-10-CM

## 2021-08-31 PROCEDURE — 93970 EXTREMITY STUDY: CPT

## 2021-08-31 NOTE — PROGRESS NOTES
Dear Ms. Bonilla,    I wanted to follow up on your recent test results:    Ultrasound is normal. There are no blood clots.

## 2021-09-02 NOTE — PROGRESS NOTES
Dear Ms. Christina Morillo,    I wanted to follow up on your recent test results:     The heart ultrasound was normal.

## 2021-09-13 ENCOUNTER — OFFICE VISIT (OUTPATIENT)
Dept: BEHAVIORAL/MENTAL HEALTH CLINIC | Age: 36
End: 2021-09-13
Payer: MEDICAID

## 2021-09-13 ENCOUNTER — DOCUMENTATION ONLY (OUTPATIENT)
Dept: BEHAVIORAL/MENTAL HEALTH CLINIC | Age: 36
End: 2021-09-13

## 2021-09-13 VITALS
TEMPERATURE: 98.1 F | DIASTOLIC BLOOD PRESSURE: 95 MMHG | OXYGEN SATURATION: 98 % | SYSTOLIC BLOOD PRESSURE: 145 MMHG | BODY MASS INDEX: 29.48 KG/M2 | HEIGHT: 63 IN | HEART RATE: 78 BPM | WEIGHT: 166.4 LBS | RESPIRATION RATE: 16 BRPM

## 2021-09-13 DIAGNOSIS — F43.11 POST-TRAUMATIC STRESS DISORDER, ACUTE: ICD-10-CM

## 2021-09-13 DIAGNOSIS — F41.1 GAD (GENERALIZED ANXIETY DISORDER): ICD-10-CM

## 2021-09-13 DIAGNOSIS — F32.2 CURRENT SEVERE EPISODE OF MAJOR DEPRESSIVE DISORDER WITHOUT PSYCHOTIC FEATURES WITHOUT PRIOR EPISODE (HCC): Primary | ICD-10-CM

## 2021-09-13 PROCEDURE — 99214 OFFICE O/P EST MOD 30 MIN: CPT | Performed by: NURSE PRACTITIONER

## 2021-09-13 RX ORDER — FLUOXETINE HYDROCHLORIDE 90 MG/1
90 CAPSULE, DELAYED RELEASE PELLETS ORAL
Qty: 30 CAPSULE | Refills: 2 | Status: SHIPPED | OUTPATIENT
Start: 2021-09-13 | End: 2022-04-19 | Stop reason: CLARIF

## 2021-09-13 RX ORDER — OLANZAPINE 10 MG/1
10 TABLET, ORALLY DISINTEGRATING ORAL
Qty: 30 TABLET | Refills: 2 | Status: SHIPPED | OUTPATIENT
Start: 2021-09-13 | End: 2022-01-18

## 2021-09-13 NOTE — PROGRESS NOTES
Chief Complaint   Patient presents with    Medication Management     Visit Vitals  BP (!) 145/95 (BP 1 Location: Left arm, BP Patient Position: Sitting, BP Cuff Size: Adult)   Pulse 78   Temp 98.1 °F (36.7 °C) (Temporal)   Resp 16   Ht 5' 3\" (1.6 m)   Wt 75.5 kg (166 lb 6.4 oz)   SpO2 98%   BMI 29.48 kg/m²     Prior to Admission medications    Medication Sig Start Date End Date Taking? Authorizing Provider   OLANZapine (ZyPREXA zydis) 10 mg disintegrating tablet Take 1 Tablet by mouth nightly. 9/13/21  Yes Kris Ortiz NP   FLUoxetine DR (PROzac WEEKLY) 90 mg DR capsule Take 1 Capsule by mouth every seven (7) days. 9/13/21  Yes Kris Ortiz NP   medroxyPROGESTERone (DEPO-PROVERA) 150 mg/mL injection  6/21/21  Yes Provider, Historical   chlorthalidone (HYGROTON) 25 mg tablet Take 1 Tablet by mouth daily. 8/26/21  Yes Daphne Brian MD   ergocalciferol (ERGOCALCIFEROL) 1,250 mcg (50,000 unit) capsule Take 1 Capsule by mouth every seven (7) days. 8/17/21  Yes Kristin Kerr NP   pantoprazole (PROTONIX) 40 mg tablet Take 1 Tablet by mouth Daily (before breakfast). Indications: gastroesophageal reflux disease 8/12/21  Yes Kristin Kerr NP   polyethylene glycol (MIRALAX) 17 gram packet Take 1 Packet by mouth daily as needed for Constipation. 8/12/21  Yes Kristin Kerr NP   FLUoxetine (PROzac) 20 mg capsule Take 1 Capsule by mouth daily. Take with 40 mg cap for total daily dose of 60 mg. 8/9/21  Yes Kris Ortiz NP   FLUoxetine (PROzac) 40 mg capsule Take 1 Capsule by mouth daily. 8/9/21  Yes Kris Ortiz NP   OLANZapine (ZyPREXA) 10 mg tablet Take 1 Tablet by mouth nightly. 8/9/21  Yes Kris Ortiz NP   prazosin (MINIPRESS) 2 mg capsule Take 1 Capsule by mouth nightly. Indications: posttraumatic stress syndrome 8/9/21  Yes Allocca, Deloise Ear, NP   calcium carbonate (Tums) 200 mg calcium (500 mg) chew Take 1 Tablet by mouth two (2) times daily as needed for Other (reflux).  7/12/21 Yes Rita Cai NP   hydrOXYzine HCL (ATARAX) 50 mg tablet Take 1 Tab by mouth four (4) times daily. 11/9/20  Yes Pedro Ortiz NP   OTHER 1 Ensure Enlive food supplement drink PO TID 11/4/20  Yes Rita Cai NP   OTHER 1 ensure pudding PO daily for lunch. 10/20/20  Yes Rita Cai NP   prenatal vit-iron fumarate-fa (PRENATAL PLUS with IRON) 28 mg iron- 800 mcg tab  9/28/20  Yes Provider, Historical   food supplemt, lactose-reduced (Ensure High Protein) liqd Take 237 mL by mouth three (3) times daily.  10/14/20  Yes Rita Cai NP

## 2021-09-13 NOTE — PROGRESS NOTES
Starlin Ganser Key: [id number] - PA Case ID: [id number] - Rx #: [id number] help?  Call us at [phone]    Status  Sent to Plan today    Drug  FLUoxetine HCl 90MG dr capsules    Form  [employer] Electronic PA Form (2592 NCPDP)
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
## 2021-09-14 NOTE — BH NOTES
Chief Complaint:  I am feeling sleepy. Length of Stay: 24 Days    Interval History:  Mae Spear states she is feeling ok this morning other than feeling sleepy and dizzy. EKG-NSR. VSS. She ate 95% of her breakfast this morning, no vomiting, she stayed in the day room the whole time. She denies si hi or avh. She feels she is a lot better than when she first got here. She is more interactive. Nursing staff report she is certainly better. Remains compliant with her meds. Past Medical History:  Past Medical History:   Diagnosis Date    Anxiety     Asthma     on albuterol prn.  Triggers cold and allergies    Back pain, chronic 2010    sciatica    Gestational hypertension     Past pregnancy    HX OTHER MEDICAL      , , , ,     Hyperemesis     severe hyperemesis    Hypertension     with G12 - none this pregnancy    Iron deficiency anemia 10/08/2010    MDD (major depressive disorder), single episode with postpartum onset 2013    treated with meds - 13    Plantar fasciitis     Pregnancy     36 weeks    PTSD (post-traumatic stress disorder)            Labs:  Lab Results   Component Value Date/Time    WBC 4.9 2020 05:00 PM    WBC 12.2 (H) 2012 04:27 PM    Hemoglobin (POC) 10.4 (L) 2020 07:12 AM    HGB 10.2 (L) 2020 05:00 PM    HCT 32.2 (L) 2020 05:00 PM    PLATELET 617  05:00 PM    MCV 72.5 (L) 2020 05:00 PM    Hgb, External 33.7 2012    Hct, External 69 2012    Platelet cnt., External 307 2012      Lab Results   Component Value Date/Time    Sodium 138 2020 01:21 PM    Potassium 3.6 2020 01:21 PM    Chloride 106 2020 01:21 PM    CO2 23 2020 01:21 PM    Anion gap 9 2020 01:21 PM    Glucose 65 2020 01:21 PM    BUN 12 2020 01:21 PM    Creatinine 0.76 2020 01:21 PM    BUN/Creatinine ratio 16 2020 01:21 PM    GFR est AA >60 2020 01:21 PM    GFR est non-AA >60 09/21/2020 01:21 PM    Calcium 9.1 09/21/2020 01:21 PM    Bilirubin, total 0.6 09/21/2020 01:21 PM    Alk.  phosphatase 56 09/21/2020 01:21 PM    Protein, total 8.1 09/21/2020 01:21 PM    Albumin 3.7 09/21/2020 01:21 PM    Globulin 4.4 (H) 09/21/2020 01:21 PM    A-G Ratio 0.8 (L) 09/21/2020 01:21 PM    ALT (SGPT) 13 09/21/2020 01:21 PM      Vitals:    09/25/20 0829 09/25/20 1542 09/25/20 2030 09/26/20 0740   BP: 101/70 109/74 109/75 117/78   Pulse: 91 89 60 93   Resp: 12 16 16 16   Temp: 98.2 °F (36.8 °C) 98.6 °F (37 °C) 98.4 °F (36.9 °C) 98.1 °F (36.7 °C)   SpO2: 98% 100% 98% 100%   Weight:       Height:             Current Facility-Administered Medications   Medication Dose Route Frequency Provider Last Rate Last Dose    traZODone (DESYREL) tablet 150 mg  150 mg Oral QHS Silver Swan MD   150 mg at 09/25/20 2300    DULoxetine (CYMBALTA) capsule 120 mg  120 mg Oral QHS Silver Swan MD   120 mg at 09/25/20 2101    glucose chewable tablet 16 g  4 Tab Oral PRN Candida Au MD   4 Tab at 09/12/20 1745    glucagon (GLUCAGEN) injection 1 mg  1 mg IntraMUSCular PRN Candida Au MD        pantoprazole (PROTONIX) tablet 40 mg  40 mg Oral ACB Silver Swan MD   40 mg at 09/26/20 0703    QUEtiapine (SEROquel) tablet 300 mg  300 mg Oral QHS Silver Swan MD   300 mg at 09/25/20 2144    prazosin (MINIPRESS) capsule 2 mg  2 mg Oral QHS Rosy Gutierrez, NP   2 mg at 09/25/20 2100    magnesium oxide (MAG-OX) tablet 400 mg  400 mg Oral DAILY Rosy Gutierrez NP   400 mg at 09/25/20 0831    promethazine (PHENERGAN) suppository 25 mg  25 mg Rectal Q6H PRN Gissel Capone NP   25 mg at 09/06/20 1111    HYDROcodone-acetaminophen (NORCO) 5-325 mg per tablet 1 Tab  1 Tab Oral Q6H PRN José Broussard DO   1 Tab at 09/06/20 1415    ibuprofen (MOTRIN) tablet 600 mg  600 mg Oral Q6H PRN Silver Swan MD   600 mg at 09/06/20 1011    OLANZapine (ZyPREXA) tablet 5 mg  5 mg Oral Q6H PRN Valentin PHAM NP        haloperidol lactate (HALDOL) injection 5 mg  5 mg IntraMUSCular Q6H PR[facility]        benztropine (COGENTIN) tablet 1 mg  1 mg Oral BID PRN Carlie Sloan NP        diphenhydrAMINE (BENADRYL) injection 50 mg  50 mg IntraMUSCular BID PRN Carlie Sloan NP        hydrOXYzine HCL (ATARAX) tablet 50 mg  50 mg Oral TID PRN Carlie Sloan NP        LORazepam (ATIVAN) injection 1 mg  1 mg IntraMUSCular Q4H PRN Carlie Sloan NP        acetaminophen (TYLENOL) tablet 650 mg  650 mg Oral Q4H PRN Halina Sloan NP   650 mg at 09/04/20 2123    magnesium hydroxide (MILK OF MAGNESIA) 400 mg/5 mL oral suspension 30 mL  30 mL Oral DAILY PRN Halina Sloan NP   30 mL at 09/23/20 2235    ondansetron (ZOFRAN ODT) tablet 4 mg  4 mg Oral Q6H PRN Halina Sloan, NP   4 mg at 09/25/20 1407         Mental Status Exam:  Eye contact: limited  Grooming: fair  Psychomotor activity: decreased. Speech is spontaneous, soft  Mood is \"okay\"  Affect: blunt  Perception: Denies any AH or VH  Suicidal ideation: denies si  Cognition is grossly intact     Lab:  Recent Results (from the past 24 hour(s))   GLUCOSE, POC    Collection Time: 09/25/20  3:53 PM   Result Value Ref Range    Glucose (POC) 97 65 - 100 mg/dL    Performed by Tor Vu          Physical Exam:  Body habitus: Body mass index is 23.89 kg/m². Musculoskeletal system: normal gait  Tremor - neg  Cog wheeling - neg      Assessment and Plan:  Maksim Mitchell meets criteria for a diagnosis of Recurrent major depression, severe without psychotic symptoms; posttraumatic stress disorder, chronic. Continue the medication regimen as prescribed  Consider getting a court order for medications/feedings/labs as needed. Disposition planning to continue.      I certify that this patients inpatient psychiatric hospital services furnished since the previous certification were, and continue to be, required for treatment that could reasonably be expected to improve the patient's condition, or for diagnostic study, and that the patient continues to need, on a daily basis, active treatment furnished directly by or requiring the supervision of inpatient psychiatric facility personnel. In addition, the hospital records show that services furnished were intensive treatment services, admission or related services, or equivalent services. [FreeTextEntry1] : Ms. VENTURA is a 67 year old female  who is here for a follow up  CT scan of the chest  that demonstrated a small patchy left lower lobe nodule that is mildly increased in size from the previous imaging study. The etiology remains unclear. Of note she does have significant shortness of breath with activity functions  on a daily basis. She denies fever, chills, night sweats, weight loss or weight gain. Discussed in our previous visit was scheduling Non- Contrast CT Chest Scan for surveillance purposes. She is here to review her latest results. \par

## 2021-09-30 RX ORDER — TRAZODONE HYDROCHLORIDE 50 MG/1
50 TABLET ORAL
Qty: 30 TABLET | Refills: 0 | Status: SHIPPED | OUTPATIENT
Start: 2021-09-30 | End: 2021-11-02

## 2021-09-30 NOTE — PROGRESS NOTES
Called patient. She reports she is not sleeping at night and this has been a chronic problem. Reviewed past med trials and she has used trazodone with benefit. Discussed dosing and will re-start 50 mg with option to add a second tab if needed. Informed pt we continue to watch for cancellations so we can get her in off of cancellation list for a f/u appt.

## 2021-11-01 ENCOUNTER — OFFICE VISIT (OUTPATIENT)
Dept: FAMILY MEDICINE CLINIC | Age: 36
End: 2021-11-01
Payer: MEDICAID

## 2021-11-01 VITALS
DIASTOLIC BLOOD PRESSURE: 66 MMHG | HEART RATE: 64 BPM | SYSTOLIC BLOOD PRESSURE: 104 MMHG | WEIGHT: 167 LBS | TEMPERATURE: 98.7 F | BODY MASS INDEX: 29.59 KG/M2 | OXYGEN SATURATION: 99 % | HEIGHT: 63 IN | RESPIRATION RATE: 16 BRPM

## 2021-11-01 DIAGNOSIS — F33.1 MDD (MAJOR DEPRESSIVE DISORDER), RECURRENT EPISODE, MODERATE (HCC): Primary | ICD-10-CM

## 2021-11-01 DIAGNOSIS — K21.9 GASTROESOPHAGEAL REFLUX DISEASE, UNSPECIFIED WHETHER ESOPHAGITIS PRESENT: ICD-10-CM

## 2021-11-01 DIAGNOSIS — M25.551 RIGHT HIP PAIN: ICD-10-CM

## 2021-11-01 DIAGNOSIS — Z23 NEEDS FLU SHOT: ICD-10-CM

## 2021-11-01 PROCEDURE — 99214 OFFICE O/P EST MOD 30 MIN: CPT | Performed by: NURSE PRACTITIONER

## 2021-11-01 PROCEDURE — 90686 IIV4 VACC NO PRSV 0.5 ML IM: CPT | Performed by: NURSE PRACTITIONER

## 2021-11-01 PROCEDURE — 90000 INFLUENZA VIRUS VAC QUAD,SPLIT,PRESV FREE SYRINGE IM: CPT | Performed by: NURSE PRACTITIONER

## 2021-11-01 RX ORDER — CALCIUM CARBONATE 200(500)MG
1 TABLET,CHEWABLE ORAL
Qty: 60 TABLET | Refills: 1 | Status: SHIPPED | OUTPATIENT
Start: 2021-11-01 | End: 2021-12-13

## 2021-11-01 RX ORDER — ONDANSETRON 4 MG/1
TABLET, ORALLY DISINTEGRATING ORAL
COMMUNITY
Start: 2021-10-15 | End: 2022-03-29 | Stop reason: SDUPTHER

## 2021-11-01 NOTE — PROGRESS NOTES
Assessment/Plan:     Diagnoses and all orders for this visit:    1. MDD (major depressive disorder), recurrent episode, moderate ([facility]  I have given resources for [facility] and  to assist with resources. 2. Needs flu shot  -     INFLUENZA VIRUS VAC QUAD,SPLIT,PRESV FREE SYRINGE IM    3. Gastroesophageal reflux disease, unspecified whether esophagitis present  -     calcium carbonate (Tums) 200 mg calcium (500 mg) chew; Take 1 Tablet by mouth two (2) times daily as needed for PRN Reason (Other) (reflux). Stable. Refilled today. Continue current therapy. 4. Right hip pain  -     REFERRAL TO SPORTS MEDICINE for additional evaluation. Discussed expected course/resolution/complications of diagnosis in detail with patient. Medication risks/benefits/costs/interactions/alternatives discussed with patient. Pt was given after visit summary which includes diagnoses, current medications & vitals. Pt expressed understanding with the diagnosis and plan          Subjective:      Elijah Grimaldo is a 43 y.o. female who presents for had concerns including Depression. Depression  Patient complains of depression. She complains of depressed mood. Onset is longstanding,  gradually worsening since that time. She denies current suicidal and homicidal plan or intent. Family history significant for nothing. Possible organic causes contributing are: none. Risk factors: previous episode of depression and negative life events (current living environment/family relationships) Previous treatment includes SSRI and individual therapy. She complains of the following side effects from the treatment: none. She is currently followed by counseling and a dietitian for a longstanding history of anorexia.          Patient Active Problem List   Diagnosis Code    Asthma J45.909    Microcytic anemia D50.9    Anxiety F41.9    Unspecified essential hypertension I10    MDD (major depressive disorder), recurrent episode, moderate (HCC) F33.1    Sleep disturbance G47.9    Severe obesity (HCC) E66.01    Post-traumatic stress disorder, acute F43.11    Calculus of gallbladder without cholecystitis K80.20    Chronic bilateral low back pain with right-sided sciatica M54.41, G89.29    GERD (gastroesophageal reflux disease) K21.9    Severe protein-calorie malnutrition (HCC) E43    Malnutrition (HCC) E46    Anorexia nervosa, restricting type F50.01       Current Outpatient Medications   Medication Sig Dispense Refill    ondansetron (ZOFRAN ODT) 4 mg disintegrating tablet       calcium carbonate (Tums) 200 mg calcium (500 mg) chew Take 1 Tablet by mouth two (2) times daily as needed for PRN Reason (Other) (reflux). 60 Tablet 1    OLANZapine (ZyPREXA zydis) 10 mg disintegrating tablet Take 1 Tablet by mouth nightly. 30 Tablet 2    FLUoxetine DR (PROzac WEEKLY) 90 mg DR capsule Take 1 Capsule by mouth every seven (7) days. 30 Capsule 2    pantoprazole (PROTONIX) 40 mg tablet Take 1 Tablet by mouth Daily (before breakfast). Indications: gastroesophageal reflux disease 90 Tablet 1    polyethylene glycol (MIRALAX) 17 gram packet Take 1 Packet by mouth daily as needed for Constipation. 90 Each 2    prazosin (MINIPRESS) 2 mg capsule Take 1 Capsule by mouth nightly. Indications: posttraumatic stress syndrome 30 Capsule 0    hydrOXYzine HCL (ATARAX) 50 mg tablet Take 1 Tab by mouth four (4) times daily. 90 Tab 0    OTHER 1 Ensure Enlive food supplement drink PO  Each 2    OTHER 1 ensure pudding PO daily for lunch. 90 Each 3    prenatal vit-iron fumarate-fa (PRENATAL PLUS with IRON) 28 mg iron- 800 mcg tab       food supplemt, lactose-reduced (Ensure High Protein) liqd Take 237 mL by mouth three (3) times daily. 90 Can 2    traZODone (DESYREL) 50 mg tablet TAKE 1 TABLET BY MOUTH NIGHTLY AS NEEDED FOR SLEEP 30 Tablet 1    chlorthalidone (HYGROTON) 25 mg tablet Take 1 Tablet by mouth daily. (Patient not taking: Reported on 11/1/2021) 14 Tablet 0    ergocalciferol (ERGOCALCIFEROL) 1,250 mcg (50,000 unit) capsule Take 1 Capsule by mouth every seven (7) days. (Patient not taking: Reported on 11/1/2021) 12 Capsule 0       Allergies   Allergen Reactions    Labetalol Hives    Pcn [Penicillins] Hives    Ceclor [Cefaclor] Unknown (comments)    Septra [Sulfamethoprim Ds] Unknown (comments)       ROS:   Review of Systems   Constitutional: Positive for malaise/fatigue. Eyes: Negative for blurred vision. Respiratory: Negative for shortness of breath. Cardiovascular: Negative for chest pain. Musculoskeletal: Positive for joint pain. Psychiatric/Behavioral: Positive for depression. Negative for suicidal ideas. The patient is nervous/anxious. Objective:     Visit Vitals  /66 (BP 1 Location: Left upper arm, BP Patient Position: Sitting, BP Cuff Size: Large adult long)   Pulse 64   Temp 98.7 °F (37.1 °C) (Temporal)   Resp 16   Ht 5' 3\" (1.6 m)   Wt 167 lb (75.8 kg)   LMP  (LMP Unknown)   SpO2 99%   BMI 29.58 kg/m²       Vitals and Nurse Documentation reviewed. Physical Exam  Constitutional:       General: She is not in acute distress. Cardiovascular:      Heart sounds: S1 normal and S2 normal. No murmur heard. No friction rub. No gallop. Pulmonary:      Effort: No respiratory distress. Breath sounds: Normal breath sounds. Skin:     General: Skin is warm and dry. Psychiatric:         Mood and Affect: Affect normal. Mood is depressed. Behavior: Behavior is withdrawn. On the basis of positive PHQ-9 screening (PHQ 9 Score: 11), C-SSRS completed and additional evaluation and assessment performed. Patient will follow-up in 3 months.

## 2021-11-01 NOTE — PROGRESS NOTES
Chief Complaint   Patient presents with    Depression       1. \"Have you been to the ER, urgent care clinic since your last visit? Hospitalized since your last visit? \" No    2. \"Have you seen or consulted any other health care providers outside of the [address] [place] [address] since your last visit? \" No     3. For patients over 45: Has the patient had a colonoscopy? No     If the patient is female:    4. For patients over 40: Has the patient had a mammogram? No    5. For patients over 21: Has the patient had a pap smear? Yes, but HM not satisfied.  Rooming MA/LPN to request most recent results, [facility]    3 most recent [facility] Screens 11/1/2021   Little interest or pleasure in doing things Nearly every day   Feeling down, depressed, irritable, or hopeless More than half the days   Total Score PHQ 2 5   Trouble falling or staying asleep, or sleeping too much -   Feeling tired or having little energy -   Poor appetite, weight loss, or overeating -   Feeling bad about yourself - or that you are a failure or have let yourself or your family down -   Trouble concentrating on things such as school, work, reading, or watching TV -   Moving or speaking so slowly that other people could have noticed; or the opposite being so fidgety that others notice -   Thoughts of being better off dead, or hurting yourself in some way -   PHQ 9 Score -   How difficult have these problems made it for you to do your work, take care of your home and get along with others -     Health Maintenance Due   Topic Date Due    Hepatitis C Screening  Never done    COVID-19 Vaccine (1) Never done    Cervical cancer screen  06/25/2017    Flu Vaccine (1) 09/01/2021

## 2021-11-02 RX ORDER — TRAZODONE HYDROCHLORIDE 50 MG/1
TABLET ORAL
Qty: 30 TABLET | Refills: 1 | Status: SHIPPED | OUTPATIENT
Start: 2021-11-02 | End: 2022-01-18

## 2021-11-03 PROBLEM — F50.01 ANOREXIA NERVOSA, RESTRICTING TYPE: Status: ACTIVE | Noted: 2021-11-03

## 2021-11-03 PROBLEM — Z34.90 PREGNANT: Status: RESOLVED | Noted: 2018-03-01 | Resolved: 2021-11-03

## 2021-11-03 PROBLEM — Z91.14 NON COMPLIANCE W MEDICATION REGIMEN: Status: RESOLVED | Noted: 2017-04-06 | Resolved: 2021-11-03

## 2021-11-03 PROBLEM — F50.9 EATING DISORDER AFFECTING PREGNANCY, ANTEPARTUM: Status: RESOLVED | Noted: 2020-12-02 | Resolved: 2021-11-03

## 2021-11-03 PROBLEM — O21.9 NAUSEA AND VOMITING IN PREGNANCY: Status: RESOLVED | Noted: 2020-12-02 | Resolved: 2021-11-03

## 2021-11-03 PROBLEM — O23.43 UTI IN PREGNANCY, ANTEPARTUM, THIRD TRIMESTER: Status: RESOLVED | Noted: 2021-05-12 | Resolved: 2021-11-03

## 2021-11-03 PROBLEM — N10 ACUTE PYELONEPHRITIS: Status: RESOLVED | Noted: 2021-03-03 | Resolved: 2021-11-03

## 2021-11-03 PROBLEM — F32.A DEPRESSION AFFECTING PREGNANCY: Status: RESOLVED | Noted: 2020-12-02 | Resolved: 2021-11-03

## 2021-11-03 PROBLEM — F32.9 MDD (MAJOR DEPRESSIVE DISORDER): Status: RESOLVED | Noted: 2020-09-02 | Resolved: 2021-11-03

## 2021-11-03 PROBLEM — O99.340 EATING DISORDER AFFECTING PREGNANCY, ANTEPARTUM: Status: RESOLVED | Noted: 2020-12-02 | Resolved: 2021-11-03

## 2021-11-03 PROBLEM — N88.3 INCOMPETENT CERVIX: Status: RESOLVED | Noted: 2017-09-29 | Resolved: 2021-11-03

## 2021-11-03 PROBLEM — Z34.90 PREGNANCY: Status: RESOLVED | Noted: 2018-03-01 | Resolved: 2021-11-03

## 2021-11-03 PROBLEM — N88.3 CERVICAL INCOMPETENCE: Status: RESOLVED | Noted: 2017-09-29 | Resolved: 2021-11-03

## 2021-11-03 PROBLEM — O99.340 DEPRESSION AFFECTING PREGNANCY: Status: RESOLVED | Noted: 2020-12-02 | Resolved: 2021-11-03

## 2021-11-03 PROBLEM — O21.9 NAUSEA/VOMITING IN PREGNANCY: Status: RESOLVED | Noted: 2021-01-02 | Resolved: 2021-11-03

## 2021-11-03 PROBLEM — Z91.148 NON COMPLIANCE W MEDICATION REGIMEN: Status: RESOLVED | Noted: 2017-04-06 | Resolved: 2021-11-03

## 2021-11-04 ENCOUNTER — PATIENT OUTREACH (OUTPATIENT)
Dept: CASE MANAGEMENT | Age: 36
End: 2021-11-04

## 2021-11-05 NOTE — PROGRESS NOTES
Social Work Note     SW received referral from pt's provider requesting a follow up call with patient to discuss needs and provider resources. SW made several outreach attempts but were unsuccessful. SW left voice mails requesting a call back. SW will make a 3rd attempt at a later date.     Kacy Francisco, [address] [facility] Team   [facility] Coordination Team  [phone]

## 2021-11-12 ENCOUNTER — OFFICE VISIT (OUTPATIENT)
Dept: INTERNAL MEDICINE CLINIC | Age: 36
End: 2021-11-12
Payer: MEDICAID

## 2021-11-12 VITALS
HEIGHT: 63 IN | WEIGHT: 167 LBS | BODY MASS INDEX: 29.59 KG/M2 | SYSTOLIC BLOOD PRESSURE: 124 MMHG | HEART RATE: 64 BPM | OXYGEN SATURATION: 100 % | RESPIRATION RATE: 16 BRPM | DIASTOLIC BLOOD PRESSURE: 83 MMHG | TEMPERATURE: 98.3 F

## 2021-11-12 DIAGNOSIS — M70.61 GREATER TROCHANTERIC BURSITIS OF RIGHT HIP: Primary | ICD-10-CM

## 2021-11-12 PROCEDURE — 99214 OFFICE O/P EST MOD 30 MIN: CPT | Performed by: FAMILY MEDICINE

## 2021-11-12 RX ORDER — CYCLOBENZAPRINE HCL 10 MG
10 TABLET ORAL
Qty: 20 TABLET | Refills: 0 | Status: SHIPPED | OUTPATIENT
Start: 2021-11-12 | End: 2021-12-13

## 2021-11-12 RX ORDER — NAPROXEN 500 MG/1
500 TABLET ORAL 2 TIMES DAILY WITH MEALS
Qty: 30 TABLET | Refills: 0 | Status: SHIPPED | OUTPATIENT
Start: 2021-11-12 | End: 2021-12-13

## 2021-11-12 NOTE — PROGRESS NOTES
Chief Complaint   Patient presents with    Generalized Body Aches     she is a 43y.o. year old female who presents for evaluation of body pain  Pain Assessment Encounter      [facility]  11/12/2021  Onset of Symptoms: Patient states has had pain for a year  ________________________________________________________________________  Description:Patient states no injures    Frequency: more than 5 times a day  Pain Scale:(1-10): 5  Trauma Hx:   Hx of similar symptoms: No:   Radiation: YES, hip and all over body   Duration:  continuous      Progression: has worsened  What makes it better?: heat and OTC meds  What makes it worse?:exercise and walking  Medications tried: ibuprofen,icyhot    Reviewed and agree with Nurse Note and duplicated in this note. Reviewed PmHx, RxHx, FmHx, SocHx, AllgHx and updated and dated in the chart. Family History   Problem Relation Age of Onset   Wesley Iniguez Arthritis-rheumatoid Mother     Asthma Mother     No Known Problems Father     No Known Problems Maternal Grandmother     No Known Problems Maternal Grandfather     No Known Problems Paternal Grandmother     No Known Problems Paternal Grandfather     Asthma Son     Alcohol abuse Neg Hx     Arthritis-osteo Neg Hx     Bleeding Prob Neg Hx     Cancer Neg Hx     Diabetes Neg Hx     Elevated Lipids Neg Hx     Headache Neg Hx     Heart Disease Neg Hx     Hypertension Neg Hx     Lung Disease Neg Hx     Migraines Neg Hx     Psychiatric Disorder Neg Hx     Stroke Neg Hx     Mental Retardation Neg Hx     Anesth Problems Neg Hx        Past Medical History:   Diagnosis Date    Acute pyelonephritis 3/3/2021    Anorexia     Anxiety     Asthma     on albuterol prn.  Triggers cold and allergies    Avoidant-restrictive food intake disorder (ARFID)     Back pain, chronic 2010    sciatica    Chronic bilateral low back pain with right-sided sciatica 12/28/2020    ~2010    GERD (gastroesophageal reflux disease) 12/28/2020 [facility] , GI Dr. Ambrosio Starks Gestational hypertension     Past pregnancy    HX OTHER MEDICAL      2005, 2006, , ,     Hyperemesis     severe hyperemesis    Hypertension     with G12 - none this pregnancy    Iron deficiency anemia 10/08/2010    MDD (major depressive disorder), single episode with postpartum onset 2013    treated with meds - 11    Orthostatic hypotension 2020    Plantar fasciitis     Pregnancy     36 weeks    PTSD (post-traumatic stress disorder)       Social History     Socioeconomic History    Marital status: SINGLE    Number of children: 7    Highest education level: 12th grade   Tobacco Use    Smoking status: Never Smoker    Smokeless tobacco: Never Used   Substance and Sexual Activity    Alcohol use: Not Currently    Drug use: No    Sexual activity: Yes     Partners: Male   Social History Narrative    Franki Oliver lives with boyfriend (fiance), Esther Brandt,  reportedly with alcohol dependence. She was raised by her mother. She has no siblings. Not working. She is supported financially by the childrens' father and public support. She has no hobbies outside of her children. She has a 12th grade education and no legal entanglements. 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              Review of Systems - negative except as listed above      Objective:     Vitals:    21 0956   BP: 124/83   Pulse: 64   Resp: 16   Temp: 98.3 °F (36.8 °C)   SpO2: 100%   Weight: 167 lb (75.8 kg)   Height: 5' 3\" (1.6 m)       Physical Examination: General appearance - alert, well appearing, and in no distress  Back exam - full range of motion, no tenderness, palpable spasm or pain on motion  Neurological - alert, oriented, normal speech, no focal findings or movement disorder noted  Musculoskeletal -   MSK - Hip right:    Deformity: None    ROM: Flexion: Normal    Extension: Normal     Internal/external rotation: Normal      Gait: Normal       Palpation:    L1-L5: Negative tenderness    Sacrum: Negative tenderness    Coccyx: Negativetenderness    Left Paraspinal: Negativetenderness    Right Paraspinal: Negativetenderness    Greater trochanter: Positivetenderness    Ischial Tuberosity: Negativetenderness    Piriformis: Negativetenderness       Strength (0-5/5)    Hip Flexion:  Left: 5/5  Right: 5/5    Hip Extension: Left: 5/5  Right: 5/5    Hip Abduction: Left: 5/5  Right: 5/5    Hip Adduction: Left: 5/5  Right: 5/5    Knee Extension: Left: 5/5  Right: 5/5    Knee Flexion:  Left: 5/5  Right: 5/5    One leg squat:       Sensation: Intact, no deficits      DTR:    Patella:2       Achilles: 2   Special test:    Straight leg:Negative     Zabrinas:Negative     Piriformis:Negative     FADIR is Negative      Extremities - peripheral pulses normal, no pedal edema, no clubbing or cyanosis  Skin - normal coloration and turgor, no rashes, no suspicious skin lesions noted     Assessment/ Plan:   Diagnoses and all orders for this visit:    1. Greater trochanteric bursitis of right hip  -     XR HIP RT W OR WO PELV 2-3 VWS; Future    Other orders  -     naproxen (NAPROSYN) 500 mg tablet; Take 1 Tablet by mouth two (2) times daily (with meals). -     cyclobenzaprine (FLEXERIL) 10 mg tablet; Take 1 Tablet by mouth three (3) times daily as needed for Muscle Spasm(s). Patient will start with home physical therapy may need official physical therapy but unable to with her infant at this time. Patient would like to start on oral medications versus cortisone injection which I think is fair. Patient will follow up in 4 weeks          1) Remember to stay active and/or exercise regularly (I suggest 30-45 minutes daily)   2) For reliable dietary information, go to www. EATRIGHT.org. You may wish to consider seeing the nutritionist at [facility] [phone], also consider the Mediterranean diet. I have discussed the diagnosis with the patient and the intended plan as seen in the above orders. The patient has received an after-visit summary and questions were answered concerning future plans. Medication Side Effects and Warnings were discussed with patient,  Patient Labs were reviewed and or requested, and  Patient Past Records were reviewed and or requested  yes      Pt agrees to call or return to clinic and/or go to closest ER with any worsening of symptoms. This may include, but not limited to increased fever (>100.4) with NSAIDS or Tylenol, increased edema, confusion, rash, worsening of presenting symptoms. Please note that this dictation was completed with Cheggin, the computer voice recognition software. Quite often unanticipated grammatical, syntax, homophones, and other interpretive errors are inadvertently transcribed by the computer software. Please disregard these errors. Please excuse any errors that have escaped final proofreading. Thank you.

## 2021-12-06 RX ORDER — PRAZOSIN HYDROCHLORIDE 2 MG/1
2 CAPSULE ORAL
Qty: 30 CAPSULE | Refills: 0 | Status: SHIPPED | OUTPATIENT
Start: 2021-12-06 | End: 2022-01-12

## 2021-12-10 DIAGNOSIS — K21.9 GASTROESOPHAGEAL REFLUX DISEASE, UNSPECIFIED WHETHER ESOPHAGITIS PRESENT: ICD-10-CM

## 2021-12-13 RX ORDER — CYCLOBENZAPRINE HCL 10 MG
10 TABLET ORAL
Qty: 20 TABLET | Refills: 0 | Status: SHIPPED | OUTPATIENT
Start: 2021-12-13 | End: 2022-01-10

## 2021-12-13 RX ORDER — FEXOFENADINE HYDROCHLORIDE 180 MG/1
TABLET ORAL
Qty: 72 TABLET | Refills: 0 | Status: SHIPPED | OUTPATIENT
Start: 2021-12-13 | End: 2022-01-18

## 2021-12-13 RX ORDER — NAPROXEN 500 MG/1
500 TABLET ORAL 2 TIMES DAILY WITH MEALS
Qty: 30 TABLET | Refills: 0 | Status: SHIPPED | OUTPATIENT
Start: 2021-12-13 | End: 2022-01-10

## 2022-01-06 PROCEDURE — 73502 X-RAY EXAM HIP UNI 2-3 VIEWS: CPT | Performed by: FAMILY MEDICINE

## 2022-01-10 RX ORDER — CYCLOBENZAPRINE HCL 10 MG
TABLET ORAL
Qty: 20 TABLET | Refills: 0 | Status: SHIPPED | OUTPATIENT
Start: 2022-01-10 | End: 2022-01-26

## 2022-01-10 RX ORDER — NAPROXEN 500 MG/1
TABLET ORAL
Qty: 30 TABLET | Refills: 0 | Status: SHIPPED | OUTPATIENT
Start: 2022-01-10 | End: 2022-01-26

## 2022-01-12 RX ORDER — PRAZOSIN HYDROCHLORIDE 2 MG/1
2 CAPSULE ORAL
Qty: 30 CAPSULE | Refills: 0 | Status: SHIPPED | OUTPATIENT
Start: 2022-01-12 | End: 2022-02-14

## 2022-01-17 DIAGNOSIS — K21.9 GASTROESOPHAGEAL REFLUX DISEASE, UNSPECIFIED WHETHER ESOPHAGITIS PRESENT: ICD-10-CM

## 2022-01-18 DIAGNOSIS — K21.9 GASTROESOPHAGEAL REFLUX DISEASE WITHOUT ESOPHAGITIS: ICD-10-CM

## 2022-01-18 RX ORDER — OLANZAPINE 10 MG/1
TABLET, ORALLY DISINTEGRATING ORAL
Qty: 30 TABLET | Refills: 2 | Status: SHIPPED | OUTPATIENT
Start: 2022-01-18 | End: 2022-04-07

## 2022-01-18 RX ORDER — FEXOFENADINE HYDROCHLORIDE 180 MG/1
TABLET ORAL
Qty: 72 TABLET | Refills: 0 | Status: SHIPPED | OUTPATIENT
Start: 2022-01-18 | End: 2022-02-14

## 2022-01-18 RX ORDER — TRAZODONE HYDROCHLORIDE 50 MG/1
TABLET ORAL
Qty: 30 TABLET | Refills: 1 | Status: SHIPPED | OUTPATIENT
Start: 2022-01-18 | End: 2022-03-08

## 2022-01-18 NOTE — TELEPHONE ENCOUNTER
Last Visit: 11/1/21 MENA Solis  Next Appointment: 1/25/22 MENA Solis  Previous Refill Encounter(s): 8/12/21 90 + 1    Requested Prescriptions     Pending Prescriptions Disp Refills    pantoprazole (PROTONIX) 40 mg tablet 90 Tablet 1     Sig: Take 1 Tablet by mouth Daily (before breakfast).  Indications: gastroesophageal reflux disease

## 2022-01-19 RX ORDER — PANTOPRAZOLE SODIUM 40 MG/1
40 TABLET, DELAYED RELEASE ORAL
Qty: 90 TABLET | Refills: 1 | Status: SHIPPED | OUTPATIENT
Start: 2022-01-19 | End: 2022-06-27

## 2022-01-25 ENCOUNTER — OFFICE VISIT (OUTPATIENT)
Dept: FAMILY MEDICINE CLINIC | Age: 37
End: 2022-01-25
Payer: MEDICAID

## 2022-01-25 VITALS
WEIGHT: 162.2 LBS | RESPIRATION RATE: 16 BRPM | TEMPERATURE: 98.6 F | DIASTOLIC BLOOD PRESSURE: 70 MMHG | HEART RATE: 73 BPM | SYSTOLIC BLOOD PRESSURE: 109 MMHG | OXYGEN SATURATION: 100 % | BODY MASS INDEX: 28.74 KG/M2 | HEIGHT: 63 IN

## 2022-01-25 DIAGNOSIS — F33.1 MDD (MAJOR DEPRESSIVE DISORDER), RECURRENT EPISODE, MODERATE (HCC): Primary | Chronic | ICD-10-CM

## 2022-01-25 DIAGNOSIS — G43.709 CHRONIC MIGRAINE WITHOUT AURA WITHOUT STATUS MIGRAINOSUS, NOT INTRACTABLE: ICD-10-CM

## 2022-01-25 PROCEDURE — 99214 OFFICE O/P EST MOD 30 MIN: CPT | Performed by: NURSE PRACTITIONER

## 2022-01-25 NOTE — PROGRESS NOTES
Assessment/Plan:     Diagnoses and all orders for this visit:    1. MDD (major depressive disorder), recurrent episode, moderate ([facility] [address]  Continue follow-up with Geri Liu with appointment in the coming weeks. 2. Chronic migraine without aura without status migrainosus, not intractable  Uncontrolled. Would not advise Topamax due to history of eating disorder and potential weight loss effect. Would not advise TCA secondary to other psychiatric medications which are currently taken and managed by psychiatry. Will initiate Gypsy Araya at this time and follow-up in 3 months or sooner as needed. Continue to offer resources regarding her living situation at this time. Discussed expected course/resolution/complications of diagnosis in detail with patient. Medication risks/benefits/costs/interactions/alternatives discussed with patient. Pt was given after visit summary which includes diagnoses, current medications & vitals. Pt expressed understanding with the diagnosis and plan          Subjective:      Floresita Hinds is a 43 y.o. female who presents for had concerns including Depression and GERD. Depression  Patient complains of depression. She complains of depressed mood. Onset is longstanding,  gradually worsening since that time. She denies current suicidal and homicidal plan or intent. Family history significant for nothing. Possible organic causes contributing are: none. Risk factors: previous episode of depression and negative life events (current living environment/family relationships) Previous treatment includes SSRI and individual therapy. She complains of the following side effects from the treatment: none. She is followed by psychiatry and has follow-up in the coming weeks. Reports a history of migraines which are worsening over time. Symptoms are occurring several times weekly. She is utilizing OTC treatment without improvement.   History of eating disorder. She is overdue for follow-up with Dr. Reba Erazo in relationship to several pain concerns today. Patient Active Problem List   Diagnosis Code    Asthma J45.909    Microcytic anemia D50.9    Anxiety F41.9    Unspecified essential hypertension I10    MDD (major depressive disorder), recurrent episode, moderate (HCC) F33.1    Sleep disturbance G47.9    Severe obesity (HCC) E66.01    Post-traumatic stress disorder, acute F43.11    Calculus of gallbladder without cholecystitis K80.20    Chronic bilateral low back pain with right-sided sciatica M54.41, G89.29    GERD (gastroesophageal reflux disease) K21.9    Severe protein-calorie malnutrition (HCC) E43    Malnutrition (HCC) E46    Anorexia nervosa, restricting type F50.01       Current Outpatient Medications   Medication Sig Dispense Refill    pantoprazole (PROTONIX) 40 mg tablet Take 1 Tablet by mouth Daily (before breakfast). Indications: gastroesophageal reflux disease 90 Tablet 1    Antacid Ultra Strength 400 mg calcium (1,000 mg) chew TAKE 1/2 TABLET BY MOUTH TWICE DAILY AS NEEDED FOR REFLUX 72 Tablet 0    OLANZapine (ZyPREXA zydis) 10 mg disintegrating tablet TAKE 1 TABLET BY MOUTH EVERY DAY AT NIGHT 30 Tablet 2    traZODone (DESYREL) 50 mg tablet TAKE 1 TABLET BY MOUTH NIGHTLY AS NEEDED FOR SLEEP 30 Tablet 1    prazosin (MINIPRESS) 2 mg capsule TAKE 1 CAPSULE BY MOUTH NIGHTLY. INDICATIONS: POSTTRAUMATIC STRESS SYNDROME 30 Capsule 0    ondansetron (ZOFRAN ODT) 4 mg disintegrating tablet       FLUoxetine DR (PROzac WEEKLY) 90 mg DR capsule Take 1 Capsule by mouth every seven (7) days. 30 Capsule 2    ergocalciferol (ERGOCALCIFEROL) 1,250 mcg (50,000 unit) capsule Take 1 Capsule by mouth every seven (7) days. 12 Capsule 0    polyethylene glycol (MIRALAX) 17 gram packet Take 1 Packet by mouth daily as needed for Constipation.  90 Each 2    OTHER 1 Ensure Enlive food supplement drink PO  Each 2    OTHER 1 ensure pudding PO daily for lunch. 90 Each 3    prenatal vit-iron fumarate-fa (PRENATAL PLUS with IRON) 28 mg iron- 800 mcg tab       food supplemt, lactose-reduced (Ensure High Protein) liqd Take 237 mL by mouth three (3) times daily. 90 Can 2    hydrOXYzine HCL (ATARAX) 50 mg tablet Take 1 Tablet by mouth four (4) times daily. 90 Tablet 0    naproxen (NAPROSYN) 500 mg tablet TAKE 1 TABLET BY MOUTH TWICE A DAY WITH MEALS 30 Tablet 0    cyclobenzaprine (FLEXERIL) 10 mg tablet TAKE 1 TABLET BY MOUTH THREE TIMES A DAY AS NEEDED FOR MUSCLE SPASMS 20 Tablet 0    chlorthalidone (HYGROTON) 25 mg tablet Take 1 Tablet by mouth daily. (Patient not taking: Reported on 1/25/2022) 14 Tablet 0       Allergies   Allergen Reactions    Labetalol Hives    Pcn [Penicillins] Hives    Ceclor [Cefaclor] Unknown (comments)    Septra [Sulfamethoprim Ds] Unknown (comments)       ROS:   Review of Systems   Constitutional: Negative for malaise/fatigue. Eyes: Negative for blurred vision. Respiratory: Negative for shortness of breath. Cardiovascular: Negative for chest pain. Objective:     Visit Vitals  /70 (BP 1 Location: Left upper arm, BP Patient Position: Sitting, BP Cuff Size: Large adult long)   Pulse 73   Temp 98.6 °F (37 °C) (Temporal)   Resp 16   Ht 5' 3\" (1.6 m)   Wt 162 lb 3.2 oz (73.6 kg)   SpO2 100%   Breastfeeding Yes   BMI 28.73 kg/m²       Vitals and Nurse Documentation reviewed. Physical Exam  Constitutional:       General: She is not in acute distress. Cardiovascular:      Heart sounds: S1 normal and S2 normal. No murmur heard. No friction rub. No gallop. Pulmonary:      Effort: No respiratory distress. Breath sounds: Normal breath sounds. Skin:     General: Skin is warm and dry.    Psychiatric:         Mood and Affect: Mood and affect normal.

## 2022-01-25 NOTE — PROGRESS NOTES
Chief Complaint   Patient presents with    Depression    GERD       1. \"Have you been to the ER, urgent care clinic since your last visit? Hospitalized since your last visit? \" No    2. \"Have you seen or consulted any other health care providers outside of the [address] [place] [address] since your last visit? \" No     3. For patients over 45: Has the patient had a colonoscopy? NA - based on age     If the patient is female:    4. For patients over 36: Has the patient had a mammogram? NA - based on age    11. For patients over 21: Has the patient had a pap smear? Yes - Care Gap present.  Rooming MA/LPN to request most recent results, VPFW    3 most recent PHQ Screens 1/25/2022   Little interest or pleasure in doing things Not at all   Feeling down, depressed, irritable, or hopeless Several days   Total Score PHQ 2 1   Trouble falling or staying asleep, or sleeping too much -   Feeling tired or having little energy -   Poor appetite, weight loss, or overeating -   Feeling bad about yourself - or that you are a failure or have let yourself or your family down -   Trouble concentrating on things such as school, work, reading, or watching TV -   Moving or speaking so slowly that other people could have noticed; or the opposite being so fidgety that others notice -   Thoughts of being better off dead, or hurting yourself in some way -   PHQ 9 Score -   How difficult have these problems made it for you to do your work, take care of your home and get along with others -     Health Maintenance Due   Topic Date Due    Hepatitis C Screening  Never done    COVID-19 Vaccine (1) Never done    Cervical cancer screen  06/25/2017

## 2022-01-26 RX ORDER — CYCLOBENZAPRINE HCL 10 MG
TABLET ORAL
Qty: 20 TABLET | Refills: 0 | Status: SHIPPED | OUTPATIENT
Start: 2022-01-26 | End: 2022-06-09

## 2022-01-26 RX ORDER — NAPROXEN 500 MG/1
TABLET ORAL
Qty: 30 TABLET | Refills: 0 | Status: SHIPPED | OUTPATIENT
Start: 2022-01-26 | End: 2022-02-14

## 2022-01-27 RX ORDER — HYDROXYZINE 50 MG/1
50 TABLET, FILM COATED ORAL 4 TIMES DAILY
Qty: 90 TABLET | Refills: 0 | Status: SHIPPED | OUTPATIENT
Start: 2022-01-27 | End: 2022-03-15

## 2022-01-28 ENCOUNTER — TELEPHONE (OUTPATIENT)
Dept: FAMILY MEDICINE CLINIC | Age: 37
End: 2022-01-28

## 2022-01-28 NOTE — TELEPHONE ENCOUNTER
MENA Solis,    Received call from field office for [facility] stating medication will require PA for patient insurance: [place]. Rx not noted on current med list but stated at last visit with MENA Solis. Caller name:  Daria Mendiola, [facility], noted his number if more information needed: [phone]    No info on [address] Medicaid number. Initiate PA. Not sure where the rx was sent?   Thanks, Je Gay

## 2022-02-01 ENCOUNTER — OFFICE VISIT (OUTPATIENT)
Dept: INTERNAL MEDICINE CLINIC | Age: 37
End: 2022-02-01
Payer: MEDICAID

## 2022-02-01 ENCOUNTER — TELEPHONE (OUTPATIENT)
Dept: RHEUMATOLOGY | Age: 37
End: 2022-02-01

## 2022-02-01 VITALS
WEIGHT: 165 LBS | HEIGHT: 63 IN | RESPIRATION RATE: 16 BRPM | HEART RATE: 67 BPM | OXYGEN SATURATION: 100 % | DIASTOLIC BLOOD PRESSURE: 82 MMHG | BODY MASS INDEX: 29.23 KG/M2 | SYSTOLIC BLOOD PRESSURE: 120 MMHG | TEMPERATURE: 98.8 F

## 2022-02-01 DIAGNOSIS — G89.29 CHRONIC BILATERAL LOW BACK PAIN WITHOUT SCIATICA: ICD-10-CM

## 2022-02-01 DIAGNOSIS — R20.2 NUMBNESS AND TINGLING OF UPPER AND LOWER EXTREMITIES OF BOTH SIDES: ICD-10-CM

## 2022-02-01 DIAGNOSIS — M54.9 MID BACK PAIN: ICD-10-CM

## 2022-02-01 DIAGNOSIS — M25.50 MULTIPLE JOINT PAIN: Primary | ICD-10-CM

## 2022-02-01 DIAGNOSIS — M70.62 TROCHANTERIC BURSITIS OF BOTH HIPS: ICD-10-CM

## 2022-02-01 DIAGNOSIS — M54.50 CHRONIC BILATERAL LOW BACK PAIN WITHOUT SCIATICA: ICD-10-CM

## 2022-02-01 DIAGNOSIS — R20.0 NUMBNESS AND TINGLING OF UPPER AND LOWER EXTREMITIES OF BOTH SIDES: ICD-10-CM

## 2022-02-01 DIAGNOSIS — M70.61 TROCHANTERIC BURSITIS OF BOTH HIPS: ICD-10-CM

## 2022-02-01 PROCEDURE — 99214 OFFICE O/P EST MOD 30 MIN: CPT | Performed by: FAMILY MEDICINE

## 2022-02-01 RX ORDER — TIZANIDINE 4 MG/1
4 TABLET ORAL
Qty: 30 TABLET | Refills: 3 | Status: SHIPPED | OUTPATIENT
Start: 2022-02-01 | End: 2022-03-24

## 2022-02-01 NOTE — PROGRESS NOTES
Chief Complaint   Patient presents with    Generalized Body Aches     Patient is here for a follow up      she is a 43y.o. year old female who presents for follow up of injury. Follow Up Pain Assessment Encounter      Onset of Symptoms: Patient states she has had pain for months  ________________________________________________________________________  Description: Pain is now  has worsened and states that has spread to her other hip. Patient states that she has been doing her home exercises but is unable to do official physical therapy due to having an infant at home. Patient states that she does have a high level of anxiety and stress. No trauma history. Patient states that the pain is also spread to her  low back mid back and upper neck area along with numbness and tingling in bilateral hands and feet. Pain Scale:(1-10): 8  Duration:  continuous  Radiation: knee, thigh, hip and low back  What makes it better?: OTC meds  What makes it worse?:exercise, strecthing and walking  Medications tried: acetaminophen, ibuprofen  Modalities tried: home pt        Reviewed and agree with Nurse Note and duplicated in this note. Reviewed PmHx, RxHx, FmHx, SocHx, AllgHx and updated and dated in the chart.     Family History   Problem Relation Age of Onset   Larry Lobe Arthritis-rheumatoid Mother     Asthma Mother     No Known Problems Father     No Known Problems Maternal Grandmother     No Known Problems Maternal Grandfather     No Known Problems Paternal Grandmother     No Known Problems Paternal Grandfather     Asthma Son     Alcohol abuse Neg Hx     OSTEOARTHRITIS Neg Hx     Bleeding Prob Neg Hx     Cancer Neg Hx     Diabetes Neg Hx     Elevated Lipids Neg Hx     Headache Neg Hx     Heart Disease Neg Hx     Hypertension Neg Hx     Lung Disease Neg Hx     Migraines Neg Hx     Psychiatric Disorder Neg Hx     Stroke Neg Hx     Mental Retardation Neg Hx     Anesth Problems Neg Hx        Past Medical History:   Diagnosis Date    Acute pyelonephritis 3/3/2021    Anorexia     Anxiety     Asthma     on albuterol prn. Triggers cold and allergies    Avoidant-restrictive food intake disorder (ARFID)     Back pain, chronic     sciatica    Chronic bilateral low back pain with right-sided sciatica 2020    ~    GERD (gastroesophageal reflux disease) 2020    [facility] , GI Dr. Key Shrestha Gestational hypertension     Past pregnancy    HX OTHER MEDICAL      , , , ,     Hyperemesis     severe hyperemesis    Hypertension     with G12 - none this pregnancy    Iron deficiency anemia 10/08/2010    MDD (major depressive disorder), single episode with postpartum onset 2013    treated with meds - 13    Orthostatic hypotension 2020    Plantar fasciitis     Pregnancy     36 weeks    PTSD (post-traumatic stress disorder)       Social History     Socioeconomic History    Marital status: SINGLE    Number of children: 7    Highest education level: 12th grade   Tobacco Use    Smoking status: Never Smoker    Smokeless tobacco: Never Used   Substance and Sexual Activity    Alcohol use: Not Currently    Drug use: No    Sexual activity: Yes     Partners: Male   Social History Narrative    [place] lives with boyfriend (fimagdaleno), Indira Cabral,  reportedly with alcohol dependence. She was raised by her mother. She has no siblings. Not working. She is supported financially by the childrens' father and public support. She has no hobbies outside of her children. She has a 12th grade education and no legal entanglements. 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              Review of Systems - negative except as listed above      Objective:     Vitals:    22 0946   BP: 120/82   Pulse: 67   Resp: 16   Temp: 98.8 °F (37.1 °C)   SpO2: 100% Weight: 165 lb (74.8 kg)   Height: 5' 3\" (1.6 m)       Physical Examination:     General appearance - alert, well appearing, appears anxious  Back exam - full range of motion, no tenderness, palpable spasm or pain on motion, negative straight leg and deep tendon reflexes are distally intact  Neurological - alert, oriented, normal speech, no focal findings or movement disorder noted  Musculoskeletal -   MSK - Hip bilateral: Exam is limited due to pain   Deformity: None    ROM:     Flexion: Decreased    Extension: Decreased    Internal/external rotation: Normal      Gait: Normal       Palpation:    L1-L5: Negative tenderness    Sacrum: Negative tenderness    Coccyx: Negativetenderness    Left Paraspinal: Negativetenderness    Right Paraspinal: Negativetenderness    Greater trochanter: Positivetenderness    Ischial Tuberosity: Negativetenderness    Piriformis: Negativetenderness       Strength (0-5/5)    Hip Flexion:  Left: 5/5  Right: 5/5    Hip Extension: Left: 5/5  Right: 5/5    Hip Abduction: Left: 5/5  Right: 5/5    Hip Adduction: Left: 5/5  Right: 5/5    Knee Extension: Left: 5/5  Right: 5/5    Knee Flexion:  Left: 5/5  Right: 5/5    One leg squat:       Sensation: Intact, no deficits      DTR:    Patella:2       Achilles: 2   Special test:    Straight leg:Negative     Zabrinas:Negative     Piriformis:Negative     FADIR is Negative      Extremities - peripheral pulses normal, no pedal edema, no clubbing or cyanosis  Skin - normal coloration and turgor, no rashes, no suspicious skin lesions noted      Assessment/ Plan:   Diagnoses and all orders for this visit:    1. Multiple joint pain  -     CRP, HIGH SENSITIVITY; Future  -     RHEUMASSURE; Future  -     REFERRAL TO RHEUMATOLOGY  -     REFERRAL TO PHYSICAL THERAPY    2. Numbness and tingling of upper and lower extremities of both sides  -     REFERRAL TO NEUROLOGY    3.  Trochanteric bursitis of both hips  -     REFERRAL TO PHYSICAL THERAPY    4. Chronic bilateral low back pain without sciatica  -     REFERRAL TO PHYSICAL THERAPY    5. Mid back pain  -     REFERRAL TO PHYSICAL THERAPY       second opinions from both rheumatology and neurology  Recommended official physical therapy patient will try to [facility] with her schedule  Consider polymyalgia due to pain all over body work-up is normal  Encourage patient to speak to her psychiatrist regarding her pain  Pathophysiology, recovery and rehabilitation process discussed and questions answered   Counseling for 30 Minutes of the total visit duration   Pictures and figures used as necessary   Provided reassurance   Monitor response to Physical Therapy   Recommend activity modification   Recommend  lower impact activities-walking, Eliptical, Nordic Track, cycling or swimming   Follow up in 4 week(s)              I have discussed the diagnosis with the patient and the intended plan as seen in the above orders. The patient has received an after-visit summary and questions were answered concerning future plans. Medication Side Effects and Warnings were discussed with patient,  Patient Labs were reviewed and or requested, and  Patient Past Records were reviewed and or requested  yes     Pt agrees to call or return to clinic and/or go to closest ER with any worsening of symptoms. This may include, but not limited to increased fever (>100.4) with NSAIDS or Tylenol, increased edema, confusion, rash, worsening of presenting symptoms. Please note that this dictation was completed with [employer]CE, the computer voice recognition software. Quite often unanticipated grammatical, syntax, homophones, and other interpretive errors are inadvertently transcribed by the computer software. Please disregard these errors. Please excuse any errors that have escaped final proofreading. Thank you.

## 2022-02-01 NOTE — TELEPHONE ENCOUNTER
Pt called to schedule np appt. Referral in chart. Pt scheduled for next available np appt, informed pt paperwork will be sent via mail and to arrive 30 mins early.

## 2022-02-08 ENCOUNTER — TELEPHONE (OUTPATIENT)
Dept: FAMILY MEDICINE CLINIC | Age: 37
End: 2022-02-08

## 2022-02-08 NOTE — TELEPHONE ENCOUNTER
Juan Jose Cates AustinPrisca [phone]     Her insurance is requesting a prior auth in order to approve Alcides Campbell for her. No call back needed.

## 2022-02-13 DIAGNOSIS — K21.9 GASTROESOPHAGEAL REFLUX DISEASE, UNSPECIFIED WHETHER ESOPHAGITIS PRESENT: ICD-10-CM

## 2022-02-14 LAB
14.3.3 ETA, RHEUM. ARTHRITIS: <0.2 NG/ML
CCP IGA+IGG SERPL IA-ACNC: <20 UNITS
CRP SERPL HS-MCNC: 1.08 MG/L (ref 0–3)
RHEUMATOID FACT SERPL-ACNC: <14 UNITS/ML

## 2022-02-14 RX ORDER — PRAZOSIN HYDROCHLORIDE 2 MG/1
2 CAPSULE ORAL
Qty: 30 CAPSULE | Refills: 0 | Status: SHIPPED | OUTPATIENT
Start: 2022-02-14 | End: 2022-03-15

## 2022-02-14 RX ORDER — NAPROXEN 500 MG/1
TABLET ORAL
Qty: 30 TABLET | Refills: 0 | Status: SHIPPED | OUTPATIENT
Start: 2022-02-14 | End: 2022-03-07

## 2022-02-14 RX ORDER — FEXOFENADINE HYDROCHLORIDE 180 MG/1
TABLET ORAL
Qty: 72 TABLET | Refills: 0 | Status: SHIPPED | OUTPATIENT
Start: 2022-02-14 | End: 2022-04-26 | Stop reason: SDUPTHER

## 2022-02-14 NOTE — TELEPHONE ENCOUNTER
Chief Complaint   Patient presents with    Prior Auth     Qulipta 60MG tablets:  PA Case: [id number], Status: Denied. Notification: Completed.

## 2022-02-15 NOTE — PROGRESS NOTES
Order is sent to the [facility]  and they will contact patient.   Problem: Falls - Risk of  Goal: *Absence of Falls  Description: Document Corey Bender Fall Risk and appropriate interventions in the flowsheet.   Outcome: Progressing Towards Goal  Note: Fall Risk Interventions:            Medication Interventions: Teach patient to arise slowly                   Problem: Depressed Mood (Adult/Pediatric)  Goal: *STG: Verbalizes anger, guilt, and other feelings in a constructive manor  Outcome: Not Progressing Towards Goal  Goal: *STG: Attends activities and groups  Outcome: Not Progressing Towards Goal  Goal: *STG: Demonstrates reduction in symptoms and increase in insight into coping skills/future focused  Outcome: Not Progressing Towards Goal     Problem: Nutrition Deficit  Goal: *Optimize nutritional status  Outcome: Not Progressing Towards Goal

## 2022-02-16 ENCOUNTER — TELEPHONE (OUTPATIENT)
Dept: FAMILY MEDICINE CLINIC | Age: 37
End: 2022-02-16

## 2022-02-21 NOTE — TELEPHONE ENCOUNTER
Pt called regarding previous notes. Pt said her insurance denied the med. She wants to know if there is a alternate med. Also, said she had discussed with [facility] regardiing possible switching to Dr. Marcus Ferreira,  or staying with [facility]. Pt said she would like to switch to Dr. Marcus Ferreira.

## 2022-02-24 NOTE — TELEPHONE ENCOUNTER
Called, left vm for pt to return call to office. Writer informed patient that the company is in process of helping her get med. They are faxing over form to complete.

## 2022-02-24 NOTE — TELEPHONE ENCOUNTER
Called, spoke to Denia   Two pt identifiers confirmed. Informed them that Medication denied.  Spoke to [employer] and she stated that she will fax over a form to assist with patient getting Medication

## 2022-03-03 ENCOUNTER — TELEPHONE (OUTPATIENT)
Dept: FAMILY MEDICINE CLINIC | Age: 37
End: 2022-03-03

## 2022-03-03 RX ORDER — ATOGEPANT 60 MG/1
60 TABLET ORAL DAILY
Qty: 90 TABLET | Refills: 1 | Status: SHIPPED | OUTPATIENT
Start: 2022-03-03 | End: 2022-03-22

## 2022-03-03 NOTE — TELEPHONE ENCOUNTER
Will you sign New order for patient to receive medicine for Migraine. This is the company that will help her get it with a saving card. Will You sign it please.

## 2022-03-03 NOTE — TELEPHONE ENCOUNTER
PCP: Sendy Reyna NP    Last appt: 1/25/2022  Future Appointments   Date Time Provider Maria Elena Reed   3/15/2022 10:45 AM Corey Felder MD [facility] BS AMB   3/30/2022 10:00 AM Gary Kim  S [facility] BS AMB   4/26/2022 10:30 AM Leti Solis NP [facility] BS AMB   5/23/2022 10:00 AM Echo Valentin MD AO BS AMB       Requested Prescriptions     Pending Prescriptions Disp Refills    atogepant (Qulipta) 60 mg tab 90 Tablet 1     Sig: Take 60 mg by mouth daily.        Prior labs and Blood pressures:  BP Readings from Last 3 Encounters:   02/01/22 120/82   01/25/22 109/70   11/12/21 124/83     Lab Results   Component Value Date/Time    Sodium 141 08/12/2021 12:00 AM    Potassium 4.6 08/12/2021 12:00 AM    Chloride 105 08/12/2021 12:00 AM    CO2 21 08/12/2021 12:00 AM    Anion gap 4 (L) 06/20/2021 04:16 AM    Glucose 72 08/12/2021 12:00 AM    BUN 6 08/12/2021 12:00 AM    Creatinine 0.84 08/12/2021 12:00 AM    BUN/Creatinine ratio 7 (L) 08/12/2021 12:00 AM    GFR est  08/12/2021 12:00 AM    GFR est non-AA 89 08/12/2021 12:00 AM    Calcium 9.5 08/12/2021 12:00 AM     Lab Results   Component Value Date/Time    Hemoglobin A1c 5.2 02/18/2020 11:08 AM     Lab Results   Component Value Date/Time    Cholesterol, total 172 02/18/2020 11:08 AM    HDL Cholesterol 52 02/18/2020 11:08 AM    LDL, calculated 97 02/18/2020 11:08 AM    VLDL, calculated 23 02/18/2020 11:08 AM    Triglyceride 113 02/18/2020 11:08 AM     Lab Results   Component Value Date/Time    Vitamin D 25-Hydroxy 12.9 (L) 03/25/2021 06:57 AM    VITAMIN D, 25-HYDROXY 14.6 (L) 08/12/2021 12:00 AM       Lab Results   Component Value Date/Time    TSH 1.140 08/12/2021 12:00 AM

## 2022-03-07 RX ORDER — NAPROXEN 500 MG/1
TABLET ORAL
Qty: 30 TABLET | Refills: 0 | Status: SHIPPED | OUTPATIENT
Start: 2022-03-07 | End: 2022-03-25

## 2022-03-18 PROBLEM — K21.9 GERD (GASTROESOPHAGEAL REFLUX DISEASE): Status: ACTIVE | Noted: 2020-12-28

## 2022-03-18 PROBLEM — F50.019 ANOREXIA NERVOSA, RESTRICTING TYPE: Status: ACTIVE | Noted: 2021-11-03

## 2022-03-18 PROBLEM — F43.11 POST-TRAUMATIC STRESS DISORDER, ACUTE: Status: ACTIVE | Noted: 2020-05-03

## 2022-03-18 PROBLEM — F50.01 ANOREXIA NERVOSA, RESTRICTING TYPE: Status: ACTIVE | Noted: 2021-11-03

## 2022-03-19 PROBLEM — G47.9 SLEEP DISTURBANCE: Status: ACTIVE | Noted: 2017-04-06

## 2022-03-19 PROBLEM — E66.01 SEVERE OBESITY (HCC): Status: ACTIVE | Noted: 2020-02-18

## 2022-03-19 PROBLEM — E43 SEVERE PROTEIN-CALORIE MALNUTRITION (HCC): Status: ACTIVE | Noted: 2021-01-08

## 2022-03-19 PROBLEM — K80.20 CALCULUS OF GALLBLADDER WITHOUT CHOLECYSTITIS: Status: ACTIVE | Noted: 2020-12-07

## 2022-03-20 PROBLEM — G89.29 CHRONIC BILATERAL LOW BACK PAIN WITH RIGHT-SIDED SCIATICA: Status: ACTIVE | Noted: 2020-12-28

## 2022-03-20 PROBLEM — M54.41 CHRONIC BILATERAL LOW BACK PAIN WITH RIGHT-SIDED SCIATICA: Status: ACTIVE | Noted: 2020-12-28

## 2022-03-20 PROBLEM — E46 MALNUTRITION (HCC): Status: ACTIVE | Noted: 2021-06-11

## 2022-03-21 ENCOUNTER — TELEPHONE (OUTPATIENT)
Dept: FAMILY MEDICINE CLINIC | Age: 37
End: 2022-03-21

## 2022-03-21 NOTE — TELEPHONE ENCOUNTER
Called patient to reschedule 03/22/22 appt. Also sent patient a  Live Mobilehart message to reschedule offered 8am and 11:45am appts tiffanie/ Yaa.

## 2022-03-21 NOTE — TELEPHONE ENCOUNTER
called pt lvm moving to [facility] open 11:15 am slot. If patient does not want please reschedule.  Removing the hold from MENA Harris 8am and 11:45

## 2022-03-22 ENCOUNTER — OFFICE VISIT (OUTPATIENT)
Dept: FAMILY MEDICINE CLINIC | Age: 37
End: 2022-03-22
Payer: MEDICAID

## 2022-03-22 ENCOUNTER — PATIENT MESSAGE (OUTPATIENT)
Dept: FAMILY MEDICINE CLINIC | Age: 37
End: 2022-03-22

## 2022-03-22 VITALS
RESPIRATION RATE: 16 BRPM | BODY MASS INDEX: 27.64 KG/M2 | OXYGEN SATURATION: 97 % | SYSTOLIC BLOOD PRESSURE: 115 MMHG | WEIGHT: 156 LBS | HEIGHT: 63 IN | HEART RATE: 78 BPM | TEMPERATURE: 98.3 F | DIASTOLIC BLOOD PRESSURE: 73 MMHG

## 2022-03-22 DIAGNOSIS — R60.9 PERIPHERAL EDEMA: Primary | ICD-10-CM

## 2022-03-22 DIAGNOSIS — J45.20 MILD INTERMITTENT ASTHMA WITHOUT COMPLICATION: ICD-10-CM

## 2022-03-22 DIAGNOSIS — F50.9 EATING DISORDER, UNSPECIFIED TYPE: ICD-10-CM

## 2022-03-22 PROCEDURE — 99214 OFFICE O/P EST MOD 30 MIN: CPT | Performed by: FAMILY MEDICINE

## 2022-03-22 RX ORDER — ALBUTEROL SULFATE 90 UG/1
2 AEROSOL, METERED RESPIRATORY (INHALATION)
Qty: 1 EACH | Refills: 2 | Status: SHIPPED | OUTPATIENT
Start: 2022-03-22 | End: 2022-08-01

## 2022-03-22 RX ORDER — CHLORTHALIDONE 25 MG/1
25 TABLET ORAL DAILY
Qty: 90 TABLET | Refills: 2 | Status: SHIPPED | OUTPATIENT
Start: 2022-03-22

## 2022-03-22 RX ORDER — FEEDING PUMP
1 EACH MISCELLANEOUS DAILY
Qty: 90 EACH | Refills: 1 | Status: SHIPPED | OUTPATIENT
Start: 2022-03-22

## 2022-03-22 RX ORDER — PROTEIN SUPPLEMENT
POWDER (GRAM) ORAL
Qty: 750 G | Refills: 1 | Status: SHIPPED | OUTPATIENT
Start: 2022-03-22

## 2022-03-22 NOTE — PROGRESS NOTES
Chief Complaint   Patient presents with    Leg Swelling    Leg Pain    Abdominal Pain    Constipation     lack of appetite       1. \"Have you been to the ER, urgent care clinic since your last visit? Hospitalized since your last visit? \" No    2. \"Have you seen or consulted any other health care providers outside of the [address] [place] [address] since your last visit? \" No     3. For patients aged 39-70: Has the patient had a colonoscopy / FIT/ Cologuard? NA - based on age      If the patient is female:    4. For patients aged 41-77: Has the patient had a mammogram within the past 2 years? NA - based on age or sex      11. For patients aged 21-65: Has the patient had a pap smear?  Yes - no Care Gap present    3 most recent PHQ Screens 2/1/2022   Little interest or pleasure in doing things Not at all   Feeling down, depressed, irritable, or hopeless Not at all   Total Score PHQ 2 0   Trouble falling or staying asleep, or sleeping too much -   Feeling tired or having little energy -   Poor appetite, weight loss, or overeating -   Feeling bad about yourself - or that you are a failure or have let yourself or your family down -   Trouble concentrating on things such as school, work, reading, or watching TV -   Moving or speaking so slowly that other people could have noticed; or the opposite being so fidgety that others notice -   Thoughts of being better off dead, or hurting yourself in some way -   PHQ 9 Score -   How difficult have these problems made it for you to do your work, take care of your home and get along with others -

## 2022-03-22 NOTE — PROGRESS NOTES
Patient Name: Kiara Carrillo   MRN: [id number]    Devonte Melendrez is a 43 y.o. female who presents with the following:     Hx of bilateral leg swelling which responds well to chlorthalidone. Prior US have been negative for clots. Thought to be due to protein malnutrition due to chronic eating disorder. Continue to have abdominal pain, cramping, constipation, and vomiting. Previously had an EGD which showed hiatal hernia. Taking PPI which helps. Interested in outpatient therapy for her eating disorder. Has had trouble finding one locally. Prior PCP gave her protein supplements. BP Readings from Last 3 Encounters:   03/22/22 115/73   02/01/22 120/82   01/25/22 109/70     Wt Readings from Last 3 Encounters:   03/22/22 156 lb (70.8 kg)   02/01/22 165 lb (74.8 kg)   01/25/22 162 lb 3.2 oz (73.6 kg)     Review of Systems   Constitutional: Negative for fever, malaise/fatigue and weight loss. Respiratory: Negative for cough, hemoptysis, shortness of breath and wheezing. Cardiovascular: Positive for leg swelling. Negative for chest pain, palpitations and PND. Gastrointestinal: Positive for abdominal pain, constipation, heartburn, nausea and vomiting. Negative for blood in stool and diarrhea. The patient's medications, allergies, past medical history, surgical history, family history and social history were reviewed and updated where appropriate. Current Outpatient Medications:     prazosin (MINIPRESS) 2 mg capsule, TAKE 1 CAPSULE BY MOUTH NIGHTLY. INDICATIONS: POSTTRAUMATIC STRESS SYNDROME, Disp: 30 Capsule, Rfl: 0    hydrOXYzine HCL (ATARAX) 50 mg tablet, Take 1 Tablet by mouth four (4) times daily as needed for Anxiety. , Disp: 90 Tablet, Rfl: 0    traZODone (DESYREL) 50 mg tablet, TAKE 1 TABLET BY MOUTH EVERY DAY AT BEDTIME AS NEEDED FOR SLEEP, Disp: 30 Tablet, Rfl: 0    naproxen (NAPROSYN) 500 mg tablet, TAKE 1 TABLET BY MOUTH TWICE A DAY WITH MEALS, Disp: 30 Tablet, Rfl: 0    atogepant (Qulipta) 60 mg tab, Take 60 mg by mouth daily. , Disp: 90 Tablet, Rfl: 1    Antacid Ultra Strength 400 mg calcium (1,000 mg) chew, TAKE 1/2 TABLET BY MOUTH TWICE DAILY AS NEEDED FOR REFLUX, Disp: 72 Tablet, Rfl: 0    tiZANidine (ZANAFLEX) 4 mg tablet, Take 1 Tablet by mouth every six (6) hours as needed for Muscle Spasm(s). , Disp: 30 Tablet, Rfl: 3    cyclobenzaprine (FLEXERIL) 10 mg tablet, TAKE 1 TABLET BY MOUTH THREE TIMES A DAY AS NEEDED FOR MUSCLE SPASMS, Disp: 20 Tablet, Rfl: 0    pantoprazole (PROTONIX) 40 mg tablet, Take 1 Tablet by mouth Daily (before breakfast). Indications: gastroesophageal reflux disease, Disp: 90 Tablet, Rfl: 1    OLANZapine (ZyPREXA zydis) 10 mg disintegrating tablet, TAKE 1 TABLET BY MOUTH EVERY DAY AT NIGHT, Disp: 30 Tablet, Rfl: 2    ondansetron (ZOFRAN ODT) 4 mg disintegrating tablet, , Disp: , Rfl:     FLUoxetine DR (PROzac WEEKLY) 90 mg DR capsule, Take 1 Capsule by mouth every seven (7) days. , Disp: 30 Capsule, Rfl: 2    chlorthalidone (HYGROTON) 25 mg tablet, Take 1 Tablet by mouth daily. , Disp: 14 Tablet, Rfl: 0    ergocalciferol (ERGOCALCIFEROL) 1,250 mcg (50,000 unit) capsule, Take 1 Capsule by mouth every seven (7) days. , Disp: 12 Capsule, Rfl: 0    polyethylene glycol (MIRALAX) 17 gram packet, Take 1 Packet by mouth daily as needed for Constipation. , Disp: 90 Each, Rfl: 2    OTHER, 1 Ensure Enlive food supplement drink PO TID, Disp: 270 Each, Rfl: 2    OTHER, 1 ensure pudding PO daily for lunch., Disp: 90 Each, Rfl: 3    prenatal vit-iron fumarate-fa (PRENATAL PLUS with IRON) 28 mg iron- 800 mcg tab, , Disp: , Rfl:     food supplemt, lactose-reduced (Ensure High Protein) liqd, Take 237 mL by mouth three (3) times daily. , Disp: 90 Can, Rfl: 2    Allergies   Allergen Reactions    Labetalol Hives    Pcn [Penicillins] Hives    Ceclor [Cefaclor] Unknown (comments)    Septra [Sulfamethoprim Ds] Unknown (comments)         OBJECTIVE    Visit Vitals  /73 (BP 1 Location: Left upper arm, BP Patient Position: Sitting, BP Cuff Size: Adult)   Pulse 78   Temp 98.3 °F (36.8 °C) (Temporal)   Resp 16   Ht 5' 3\" (1.6 m)   Wt 156 lb (70.8 kg)   SpO2 97%   BMI 27.63 kg/m²       Physical Exam  Vitals and nursing note reviewed. Constitutional:       General: She is not in acute distress. Appearance: Normal appearance. She is not toxic-appearing. HENT:      Head: Normocephalic and atraumatic. Eyes:      Pupils: Pupils are equal, round, and reactive to light. Pulmonary:      Effort: Pulmonary effort is normal.   Musculoskeletal:         General: Normal range of motion. Skin:     General: Skin is warm and dry. Neurological:      Mental Status: She is alert. Mental status is at baseline. Psychiatric:         Mood and Affect: Mood normal.         Behavior: Behavior normal.           ASSESSMENT AND PLAN  Vicky Espana is a 43 y.o. female who presents today for:    1. Peripheral edema  - chlorthalidone (HYGROTON) 25 mg tablet; Take 1 Tablet by mouth daily. Dispense: 90 Tablet; Refill: 2    2. Eating disorder, unspecified type  Will research outpatient eating disorder clinics. - food supplemt, lactose-reduced (Ensure High Protein) liqd; Take 237 mL by mouth daily. Dispense: 90 Each; Refill: 1    3. Mild intermittent asthma without complication  - albuterol (PROVENTIL HFA, VENTOLIN HFA, PROAIR HFA) 90 mcg/actuation inhaler; Take 2 Puffs by inhalation every six (6) hours as needed for Wheezing. Dispense: 1 Each; Refill: 2       Medications Discontinued During This Encounter   Medication Reason    chlorthalidone (HYGROTON) 25 mg tablet REORDER    atogepant (Qulipta) 60 mg tab LIST CLEANUP    food supplemt, lactose-reduced (Ensure High Protein) liqd REORDER     Treatment risks/benefits/costs/interactions/alternatives discussed with patient.   Advised patient to call back or return to office if symptoms worsen/change/persist. If patient cannot reach us or should anything more severe/urgent arise he/she should proceed directly to the nearest emergency department. Discussed expected course/resolution/complications of diagnosis in detail with patient. Patient expressed understanding with the diagnosis and plan. This dictation may have been completed with Dragon, the computer voice recognition software. Unanticipated grammatical, syntax, homophones, and other interpretive errors are sometimes inadvertently transcribed by the computer software. Please disregard any errors that have escaped final proofreading. Loren Mace M.D.

## 2022-03-24 RX ORDER — TIZANIDINE 4 MG/1
4 TABLET ORAL
Qty: 30 TABLET | Refills: 3 | Status: SHIPPED | OUTPATIENT
Start: 2022-03-24 | End: 2022-05-02

## 2022-03-25 RX ORDER — NAPROXEN 500 MG/1
TABLET ORAL
Qty: 30 TABLET | Refills: 0 | Status: SHIPPED | OUTPATIENT
Start: 2022-03-25 | End: 2022-04-12

## 2022-03-29 ENCOUNTER — PATIENT MESSAGE (OUTPATIENT)
Dept: FAMILY MEDICINE CLINIC | Age: 37
End: 2022-03-29

## 2022-03-29 RX ORDER — ONDANSETRON 4 MG/1
4 TABLET, ORALLY DISINTEGRATING ORAL
Qty: 30 TABLET | Refills: 0 | Status: SHIPPED | OUTPATIENT
Start: 2022-03-29 | End: 2022-04-12

## 2022-03-30 ENCOUNTER — TELEPHONE (OUTPATIENT)
Dept: NEUROLOGY | Age: 37
End: 2022-03-30

## 2022-03-30 ENCOUNTER — OFFICE VISIT (OUTPATIENT)
Dept: NEUROLOGY | Age: 37
End: 2022-03-30
Payer: MEDICAID

## 2022-03-30 VITALS
DIASTOLIC BLOOD PRESSURE: 72 MMHG | HEIGHT: 65 IN | HEART RATE: 76 BPM | BODY MASS INDEX: 25.33 KG/M2 | OXYGEN SATURATION: 97 % | WEIGHT: 152 LBS | RESPIRATION RATE: 16 BRPM | SYSTOLIC BLOOD PRESSURE: 122 MMHG

## 2022-03-30 DIAGNOSIS — M25.50 MULTIPLE JOINT PAIN: ICD-10-CM

## 2022-03-30 DIAGNOSIS — R52 GENERALIZED PAIN: ICD-10-CM

## 2022-03-30 DIAGNOSIS — R51.9 CHRONIC DAILY HEADACHE: ICD-10-CM

## 2022-03-30 DIAGNOSIS — M79.10 MUSCLE PAIN: ICD-10-CM

## 2022-03-30 DIAGNOSIS — G43.709 CHRONIC MIGRAINE WITHOUT AURA WITHOUT STATUS MIGRAINOSUS, NOT INTRACTABLE: ICD-10-CM

## 2022-03-30 DIAGNOSIS — R20.2 NUMBNESS AND TINGLING OF UPPER AND LOWER EXTREMITIES OF BOTH SIDES: Primary | ICD-10-CM

## 2022-03-30 DIAGNOSIS — R20.0 NUMBNESS AND TINGLING OF UPPER AND LOWER EXTREMITIES OF BOTH SIDES: Primary | ICD-10-CM

## 2022-03-30 PROCEDURE — 99205 OFFICE O/P NEW HI 60 MIN: CPT | Performed by: PSYCHIATRY & NEUROLOGY

## 2022-03-30 NOTE — PATIENT INSTRUCTIONS
We discussed headache preventive medications in regards to your chronic daily headache + frequent migraine headaches. I would not prescribe you a beta-blocker as your reported that you had an allergic reaction to Labetalol    I would not prescribe you Topamax as you have the potential to get pregnant, and there is a small risk that if you get pregnant while on Topamax, you child could have cleft lip, cleft palate. I would not prescribe you an Antidepressant such as Nortriptyline or Amitriptyline because you are already on anti-depressant, and secondly, because these meds make you sleepy and you need to be alert enough to take care of your children (ie if you had to wake up at night time). The newer migraine preventive meds (Ander Rasmussen, Emgality, Nurtec, [place]) are not cleared to take during breastfeeding. I recommend you asking your child Pediatrician if they are aware of any headache preventive meds YOU can take while you are breastfeeding. We discussed that Riboflavin, Vit B2 might be an option, but again, that would need to be cleared by Child's pediatrician.

## 2022-03-30 NOTE — PROGRESS NOTES
Chief Complaint   Patient presents with    New Patient     c/o numbness, tingling, in bilateral hands, legs and feet, referred by Dr Herman Dupree, s/p childbirth 6/18/22, she states since child birth     Visit Vitals  /72   Pulse 76   Resp 16   Ht 5' 5\" (1.651 m)   Wt 68.9 kg (152 lb)   SpO2 97%   BMI 25.29 kg/m²

## 2022-03-30 NOTE — PROGRESS NOTES
John Granger (1985) is a 43 y.o. female, new patient, here for evaluation of the following     Chief complaint(s):   Chief Complaint   Patient presents with    New Patient     c/o numbness, tingling, in bilateral hands, legs and feet, referred by Dr Braydon Diaz, s/p childbirth 22, she states since child birth       HPI: 43 y.o. female history of pyelonephritis, anorexia, asthma, anxiety, avoidant restrictive food intake disorder, chronic back pain with sciatica, GERD, gestational hypertension, history of hyperemesis, history of iron deficiency anemia, major depressive disorder, orthostatic, plantar fasciitis, PTSD    Reviewed Dr Willie Varela note 2022. Pt was reporting generalized body aching, numbness/ tingling of both hands and feet. He referred to rheumatology and neurology for opinions. His consideration was for polymyalgia due to pain all of her body. He checked room sure panel which was negative and CRP was also normal.Patient has an appointment to see [facility] on 2022 (new patient visit). Patient reports that she has had numbness and tingling in all extremities since around 2021. She says she is nondiabetic. She also reports having pain all over her body. She recalls Dr. Braydon Diaz suggesting she may have fibromyalgia. She confirms that she does have an upcoming, new patient rheumatology visit in May 2022    Brain MRI done on 2021 while patient was pregnant, for complaints of intractable nausea and vomitin. No acute intracranial abnormality. 2. Paranasal sinus disease with heterogeneous material filling the left paranasal sinuses and slightly expansile process in the left maxillary ostium and ethmoid sinus. Findings suggest chronic sinusitis with possible allergic fungal sinus disease given the signal characteristics. Left sided nasal or  antrochoanal polyp not excluded.     XR right hip done on 2022: Normal right hip views    Review of Systems: Abdominal pain, anxiety, chest pain, constipation, depression, leg swelling, frequent headaches, nausea vomiting, muscle pain muscle weakness, joint pain, poor appetite, fatigue    ==================================================    Allergies   Allergen Reactions    Labetalol Hives    Pcn [Penicillins] Hives    Ceclor [Cefaclor] Unknown (comments)    Septra [Sulfamethoprim Ds] Unknown (comments)       Current Outpatient Medications   Medication Sig Dispense Refill    ondansetron (ZOFRAN ODT) 4 mg disintegrating tablet Take 1 Tablet by mouth every eight (8) hours as needed for Nausea or Vomiting. 30 Tablet 0    naproxen (NAPROSYN) 500 mg tablet TAKE 1 TABLET BY MOUTH TWICE A DAY WITH MEALS 30 Tablet 0    tiZANidine (ZANAFLEX) 4 mg tablet TAKE 1 TABLET BY MOUTH EVERY SIX (6) HOURS AS NEEDED FOR MUSCLE SPASM(S). 30 Tablet 3    chlorthalidone (HYGROTON) 25 mg tablet Take 1 Tablet by mouth daily. 90 Tablet 2    food supplemt, lactose-reduced (Ensure High Protein) liqd Take 237 mL by mouth daily. 90 Each 1    albuterol (PROVENTIL HFA, VENTOLIN HFA, PROAIR HFA) 90 mcg/actuation inhaler Take 2 Puffs by inhalation every six (6) hours as needed for Wheezing. 1 Each 2    protein powd 1-2 servings (15-18 g per serving) daily with meals. 750 g 1    prazosin (MINIPRESS) 2 mg capsule TAKE 1 CAPSULE BY MOUTH NIGHTLY. INDICATIONS: POSTTRAUMATIC STRESS SYNDROME 30 Capsule 0    hydrOXYzine HCL (ATARAX) 50 mg tablet Take 1 Tablet by mouth four (4) times daily as needed for Anxiety.  90 Tablet 0    traZODone (DESYREL) 50 mg tablet TAKE 1 TABLET BY MOUTH EVERY DAY AT BEDTIME AS NEEDED FOR SLEEP 30 Tablet 0    Antacid Ultra Strength 400 mg calcium (1,000 mg) chew TAKE 1/2 TABLET BY MOUTH TWICE DAILY AS NEEDED FOR REFLUX 72 Tablet 0    cyclobenzaprine (FLEXERIL) 10 mg tablet TAKE 1 TABLET BY MOUTH THREE TIMES A DAY AS NEEDED FOR MUSCLE SPASMS 20 Tablet 0    pantoprazole (PROTONIX) 40 mg tablet Take 1 Tablet by mouth Daily (before breakfast). Indications: gastroesophageal reflux disease 90 Tablet 1    FLUoxetine DR (PROzac WEEKLY) 90 mg DR capsule Take 1 Capsule by mouth every seven (7) days. 30 Capsule 2    ergocalciferol (ERGOCALCIFEROL) 1,250 mcg (50,000 unit) capsule Take 1 Capsule by mouth every seven (7) days. 12 Capsule 0    polyethylene glycol (MIRALAX) 17 gram packet Take 1 Packet by mouth daily as needed for Constipation. 90 Each 2    OTHER 1 Ensure Enlive food supplement drink PO  Each 2    OTHER 1 ensure pudding PO daily for lunch. 90 Each 3    OLANZapine (ZyPREXA zydis) 10 mg disintegrating tablet TAKE 1 TABLET BY MOUTH EVERY DAY AT NIGHT 30 Tablet 2    prenatal vit-iron fumarate-fa (PRENATAL PLUS with IRON) 28 mg iron- 800 mcg tab          Past Medical History:   Diagnosis Date    Acute pyelonephritis 3/3/2021    Anorexia     Anxiety     Asthma     on albuterol prn.  Triggers cold and allergies    Avoidant-restrictive food intake disorder (ARFID)     Back pain, chronic     sciatica    Chronic bilateral low back pain with right-sided sciatica 2020    ~    GERD (gastroesophageal reflux disease) 2020    [facility] 10-, GI Dr. Daniele Conley Gestational hypertension     Past pregnancy    HX OTHER MEDICAL      , 2006, , ,     Hyperemesis     severe hyperemesis    Hypertension     with G12 - none this pregnancy    Iron deficiency anemia 10/08/2010    MDD (major depressive disorder), single episode with postpartum onset 2013    treated with meds - 13    Orthostatic hypotension 2020    Plantar fasciitis     Pregnancy     36 weeks    PTSD (post-traumatic stress disorder)        Past Surgical History:   Procedure Laterality Date    HX  SECTION  4/22/15    HX DILATION AND CURETTAGE      HX DILATION AND CURETTAGE  2020    HX GYN      miscarriage x 6    HX GYN      removal of left fallopian tube    HX OTHER SURGICAL      cerlcage x4, ectopic x1  with removal of left fallopian tube    HX OTHER SURGICAL      cerclage w/ current pregnancy. Removed 18    DC  DELIVERY ONLY         family history includes Arthritis-rheumatoid in her mother; Asthma in her mother and son; No Known Problems in her father, maternal grandfather, maternal grandmother, paternal grandfather, and paternal grandmother. reports that she has never smoked. She has never used smokeless tobacco. She reports previous alcohol use. She reports that she does not use drugs. PHYSICAL EXAM    Vitals:    22 0958   BP: 122/72   Pulse: 76   Resp: 16   Height: 5' 5\" (1.651 m)   Weight: 68.9 kg (152 lb)   SpO2: 97%       Awake alert resting in chair and son at her side   EOMI hearing speech normal, visual normal  5 out of 5 strength upper and lower extremities proximal and distal  Tender to touch in all extremities  Intact to light touch pinprick temperature and vibration in all extremities  Stands and ambulates without difficulty  DTRs 2+ patellars, 1+ biceps, symmetric.     ==================================================    ASSESSMENT/ PLAN:       ICD-10-CM ICD-9-CM    1. Numbness and tingling of upper and lower extremities of both sides  R20.0 782. 0 EMG LIMITED    R20.2     2. Generalized pain  R52 780.96 EMG LIMITED      CK      CK   3. Multiple joint pain  M25.50 719.49 EMG LIMITED   4. Muscle pain  M79.10 729.1 CK      CK   5. Chronic daily headache  R51.9 784.0    6. Chronic migraine without aura without status migrainosus, not intractable  G43.709 346.70       Check EMG/ NCS 1 arm and 1 leg to eval for above complaints  Check CK level to eval for any evidence of muscle breakdown  D/w patient that I think neuropathy is less likely; more likely she has Fibromyalgia.    She expresses understanding and has plans to see Rheumatologist as noted above    2) Chronic headaches (pt reports daily headache with 5 days a week being migraine)   Discussed the following with patient and provided it in her check out paperwork. We discussed headache preventive medications in regards to your chronic daily headache + frequent migraine headaches. I would not prescribe you a beta-blocker as your reported that you had an allergic reaction to Labetalol    I would not prescribe you Topamax as you have the potential to get pregnant, and there is a small risk that if you get pregnant while on Topamax, you child could have cleft lip, cleft palate. I would not prescribe you an Antidepressant such as Nortriptyline or Amitriptyline because you are already on anti-depressant, and secondly, because these meds make you sleepy and you need to be alert enough to take care of your children (ie if you had to wake up at night time). The newer migraine preventive meds (Arelia Li, Emgality, Nurtec, [place]) are not cleared to take during breastfeeding. I recommend you asking your child Pediatrician if they are aware of any headache preventive meds YOU can take while you are breastfeeding. We discussed that Riboflavin, Vit B2 might be an option, but again, that would need to be cleared by Child's pediatrician. F/u in 4-5 weeks to go over Lab, EMG test results        An electronic signature was used to authenticate this note.   -- Norah Molina MD

## 2022-03-30 NOTE — TELEPHONE ENCOUNTER
Patient stated the dr scheduled a procedure for April 25 and a follow up for April 27 but both dates needs to be reschedule for a later time. Please call.

## 2022-03-30 NOTE — LETTER
3/30/2022    Patient: Allen Partida   YOB: 1985   Date of Visit: 3/30/2022     Hafsa Zamora MD  [address] [facility]    Dear Hafsa Zamora MD,      Thank you for referring Ms. Ashvin Carbone to [facility] for evaluation. My notes for this consultation are attached. If you have questions, please do not hesitate to call me. I look forward to following your patient along with you.       Sincerely,    Brooklyn Wen MD

## 2022-04-02 LAB — CK SERPL-CCNC: 97 U/L (ref 32–182)

## 2022-04-07 RX ORDER — HYDROXYZINE 50 MG/1
50 TABLET, FILM COATED ORAL
Qty: 90 TABLET | Refills: 0 | Status: SHIPPED | OUTPATIENT
Start: 2022-04-07 | End: 2022-05-09

## 2022-04-07 RX ORDER — TRAZODONE HYDROCHLORIDE 50 MG/1
TABLET ORAL
Qty: 30 TABLET | Refills: 0 | Status: SHIPPED | OUTPATIENT
Start: 2022-04-07 | End: 2022-04-19 | Stop reason: SDUPTHER

## 2022-04-07 RX ORDER — OLANZAPINE 10 MG/1
TABLET, ORALLY DISINTEGRATING ORAL
Qty: 30 TABLET | Refills: 2 | Status: SHIPPED | OUTPATIENT
Start: 2022-04-07 | End: 2022-06-03 | Stop reason: SDUPTHER

## 2022-04-12 ENCOUNTER — OFFICE VISIT (OUTPATIENT)
Dept: BEHAVIORAL/MENTAL HEALTH CLINIC | Age: 37
End: 2022-04-12
Payer: MEDICAID

## 2022-04-12 VITALS
HEIGHT: 62 IN | HEART RATE: 78 BPM | TEMPERATURE: 97.3 F | SYSTOLIC BLOOD PRESSURE: 118 MMHG | OXYGEN SATURATION: 98 % | BODY MASS INDEX: 26.17 KG/M2 | DIASTOLIC BLOOD PRESSURE: 78 MMHG | RESPIRATION RATE: 17 BRPM | WEIGHT: 142.2 LBS

## 2022-04-12 DIAGNOSIS — F32.2 CURRENT SEVERE EPISODE OF MAJOR DEPRESSIVE DISORDER WITHOUT PSYCHOTIC FEATURES WITHOUT PRIOR EPISODE (HCC): Primary | ICD-10-CM

## 2022-04-12 DIAGNOSIS — F41.1 GAD (GENERALIZED ANXIETY DISORDER): ICD-10-CM

## 2022-04-12 DIAGNOSIS — F43.11 POST-TRAUMATIC STRESS DISORDER, ACUTE: ICD-10-CM

## 2022-04-12 PROCEDURE — 99214 OFFICE O/P EST MOD 30 MIN: CPT | Performed by: NURSE PRACTITIONER

## 2022-04-12 RX ORDER — ONDANSETRON 4 MG/1
TABLET, ORALLY DISINTEGRATING ORAL
Qty: 30 TABLET | Refills: 0 | Status: SHIPPED | OUTPATIENT
Start: 2022-04-12 | End: 2022-04-26 | Stop reason: SDUPTHER

## 2022-04-12 RX ORDER — NAPROXEN 500 MG/1
TABLET ORAL
Qty: 30 TABLET | Refills: 0 | Status: SHIPPED | OUTPATIENT
Start: 2022-04-12 | End: 2022-04-25 | Stop reason: SDUPTHER

## 2022-04-12 NOTE — PROGRESS NOTES
CHIEF COMPLAINT:  Damaris Madden is a 43 y.o. female and was seen today for follow-up of psychiatric condition and psychotropic medication management. HPI:    Meghna Parada reports the following psychiatric symptoms by hx:  depression and anxiety. Overall symptoms have been present for months. Currently symptoms are of moderate/high severity. The symptoms occur daily and are impacted by daily stressors. Pt reports medications are of some benefit. Met with pt for appt today to review current treatment plan. FAMILY/SOCIAL HX: psychosocial stressors/pt states limited options    REVIEW OF SYSTEMS:  Psychiatric symptoms being monitored for:  depression, anxiety  Appetite:decreased   Sleep: poor with DIMS (difficulty initiating & maintaining sleep)   Neuro: chronic pain    Visit Vitals  /78 (BP 1 Location: Left upper arm, BP Patient Position: Sitting, BP Cuff Size: Adult)   Pulse 78   Temp 97.3 °F (36.3 °C) (Temporal)   Resp 17   Ht 5' 2\" (1.575 m)   Wt 64.5 kg (142 lb 3.2 oz)   SpO2 98%   BMI 26.01 kg/m²       Side Effects:  none    MENTAL STATUS EXAM:   Sensorium  oriented to time, place and person   Relations cooperative   Appearance:  age appropriate and casually dressed   Motor Behavior:  gait stable, within normal limits and moves carefully due to pain   Speech:  monotone and soft   Thought Process: goal directed   Thought Content free of delusions and free of hallucinations   Suicidal ideations death wishes   Homicidal ideations no intention   Mood:  anxious and depressed   Affect:  depressed and flat   Memory recent  adequate   Memory remote:  adequate   Concentration:  adequate   Abstraction:  abstract   Insight:  fair and good   Reliability good and fair   Judgment:  fair and good     MEDICAL DECISION MAKING:  Problems addressed today:    ICD-10-CM ICD-9-CM    1. Current severe episode of major depressive disorder without psychotic features without prior episode ([facility].)  F32.2 296.23    2. Post-traumatic stress disorder, acute  F43.11 309.81    3. DESTINI (generalized anxiety disorder)  F41.1 300.02        Assessment:   Rozanna Cranker is not fully responding to treatment. Symptoms are exacerbated and pt dealing with significant household stress. Discussed current medications and dosages. Pt will try hydroxyzine at 100 mg. Will change rx if this works well. Discussed options for support. Reviewed treatment goals and target symptoms to monitor for. Plan:   1. Current Outpatient Medications   Medication Sig Dispense Refill    naproxen (NAPROSYN) 500 mg tablet TAKE 1 TABLET BY MOUTH TWICE A DAY WITH MEALS 30 Tablet 0    ondansetron (ZOFRAN ODT) 4 mg disintegrating tablet TAKE 1 TABLET BY MOUTH EVERY 8 HOURS AS NEEDED FOR NAUSEA AND VOMITING 30 Tablet 0    traZODone (DESYREL) 50 mg tablet TAKE 1 TABLET BY MOUTH AT BEDTIME AS NEEDED FOR SLEEP 30 Tablet 0    OLANZapine (ZyPREXA zydis) 10 mg disintegrating tablet TAKE 1 TABLET BY MOUTH EVERY DAY AT NIGHT 30 Tablet 2    hydrOXYzine HCL (ATARAX) 50 mg tablet TAKE 1 TABLET BY MOUTH FOUR (4) TIMES DAILY AS NEEDED FOR ANXIETY. 90 Tablet 0    tiZANidine (ZANAFLEX) 4 mg tablet TAKE 1 TABLET BY MOUTH EVERY SIX (6) HOURS AS NEEDED FOR MUSCLE SPASM(S). 30 Tablet 3    chlorthalidone (HYGROTON) 25 mg tablet Take 1 Tablet by mouth daily. 90 Tablet 2    food supplemt, lactose-reduced (Ensure High Protein) liqd Take 237 mL by mouth daily. 90 Each 1    albuterol (PROVENTIL HFA, VENTOLIN HFA, PROAIR HFA) 90 mcg/actuation inhaler Take 2 Puffs by inhalation every six (6) hours as needed for Wheezing. 1 Each 2    protein powd 1-2 servings (15-18 g per serving) daily with meals. 750 g 1    prazosin (MINIPRESS) 2 mg capsule TAKE 1 CAPSULE BY MOUTH NIGHTLY.  INDICATIONS: POSTTRAUMATIC STRESS SYNDROME 30 Capsule 0    Antacid Ultra Strength 400 mg calcium (1,000 mg) chew TAKE 1/2 TABLET BY MOUTH TWICE DAILY AS NEEDED FOR REFLUX 72 Tablet 0    pantoprazole (PROTONIX) 40 mg tablet Take 1 Tablet by mouth Daily (before breakfast). Indications: gastroesophageal reflux disease 90 Tablet 1    FLUoxetine DR (PROzac WEEKLY) 90 mg DR capsule Take 1 Capsule by mouth every seven (7) days. 30 Capsule 2    polyethylene glycol (MIRALAX) 17 gram packet Take 1 Packet by mouth daily as needed for Constipation. 90 Each 2    OTHER 1 Ensure Enlive food supplement drink PO  Each 2    OTHER 1 ensure pudding PO daily for lunch. 90 Each 3    prenatal vit-iron fumarate-fa (PRENATAL PLUS with IRON) 28 mg iron- 800 mcg tab       cyclobenzaprine (FLEXERIL) 10 mg tablet TAKE 1 TABLET BY MOUTH THREE TIMES A DAY AS NEEDED FOR MUSCLE SPASMS (Patient not taking: Reported on 4/12/2022) 20 Tablet 0    ergocalciferol (ERGOCALCIFEROL) 1,250 mcg (50,000 unit) capsule Take 1 Capsule by mouth every seven (7) days. (Patient not taking: Reported on 4/12/2022) 12 Capsule 0          medication changes made today: cont prazosin, olanzapine, prozac and inc hydroxyzine    2. Counseling and coordination of care including instructions for treatment, risks/benefits, risk factor reduction and patient/family education. She agrees with the plan. Patient instructed to call with any side effects, questions or issues. 3.    Follow-up and Dispositions    · Return in about 2 weeks (around 4/26/2022).          4/12/2022  Parveen Lockhart NP

## 2022-04-12 NOTE — PROGRESS NOTES
Chief Complaint   Patient presents with    Medication Management     Visit Vitals  /78 (BP 1 Location: Left upper arm, BP Patient Position: Sitting, BP Cuff Size: Adult)   Pulse 78   Temp 97.3 °F (36.3 °C) (Temporal)   Resp 17   Ht 5' 2\" (1.575 m)   Wt 64.5 kg (142 lb 3.2 oz)   SpO2 98%   BMI 26.01 kg/m²     Prior to Admission medications    Medication Sig Start Date End Date Taking? Authorizing Provider   naproxen (NAPROSYN) 500 mg tablet TAKE 1 TABLET BY MOUTH TWICE A DAY WITH MEALS 4/12/22  Yes José Luis Rivera MD   ondansetron (ZOFRAN ODT) 4 mg disintegrating tablet TAKE 1 TABLET BY MOUTH EVERY 8 HOURS AS NEEDED FOR NAUSEA AND VOMITING 4/12/22  Yes Светлана Zuniga MD   traZODone (DESYREL) 50 mg tablet TAKE 1 TABLET BY MOUTH AT BEDTIME AS NEEDED FOR SLEEP 4/7/22  Yes Milton Ortiz NP   OLANZapine (ZyPREXA zydis) 10 mg disintegrating tablet TAKE 1 TABLET BY MOUTH EVERY DAY AT NIGHT 4/7/22  Yes Milton Ortiz NP   hydrOXYzine HCL (ATARAX) 50 mg tablet TAKE 1 TABLET BY MOUTH FOUR (4) TIMES DAILY AS NEEDED FOR ANXIETY. 4/7/22  Yes Milton Ortiz NP   tiZANidine (ZANAFLEX) 4 mg tablet TAKE 1 TABLET BY MOUTH EVERY SIX (6) HOURS AS NEEDED FOR MUSCLE SPASM(S). 3/24/22  Yes Sebas Rivera MD   chlorthalidone (HYGROTON) 25 mg tablet Take 1 Tablet by mouth daily. 3/22/22  Yes Светлана Zuniga MD   food supplemt, lactose-reduced (Ensure High Protein) liqd Take 237 mL by mouth daily. 3/22/22  Yes Светлана Zuniga MD   albuterol (PROVENTIL HFA, VENTOLIN HFA, PROAIR HFA) 90 mcg/actuation inhaler Take 2 Puffs by inhalation every six (6) hours as needed for Wheezing. 3/22/22  Yes Светлана Zuniga MD   protein powd 1-2 servings (15-18 g per serving) daily with meals. 3/22/22  Yes Cristina Reyna MD   prazosin (MINIPRESS) 2 mg capsule TAKE 1 CAPSULE BY MOUTH NIGHTLY.  INDICATIONS: POSTTRAUMATIC STRESS SYNDROME 3/15/22  Yes Milton Ortiz NP   Antacid Ultra Strength 400 mg calcium (1,000 mg) chew TAKE 1/2 TABLET BY MOUTH TWICE DAILY AS NEEDED FOR REFLUX 2/14/22  Yes Shorty Solis NP   pantoprazole (PROTONIX) 40 mg tablet Take 1 Tablet by mouth Daily (before breakfast). Indications: gastroesophageal reflux disease 1/19/22  Yes Shorty Solis NP   FLUoxetine DR (PROzac WEEKLY) 90 mg DR capsule Take 1 Capsule by mouth every seven (7) days. 9/13/21  Yes Steve Ortzi NP   polyethylene glycol (MIRALAX) 17 gram packet Take 1 Packet by mouth daily as needed for Constipation. 8/12/21  Yes Jose Guadalupe Lord NP   OTHER 1 Ensure Enlive food supplement drink PO TID 11/4/20  Yes Jose Guadalupe Lord NP   OTHER 1 ensure pudding PO daily for lunch. 10/20/20  Yes Jose Guadalupe Lord NP   prenatal vit-iron fumarate-fa (PRENATAL PLUS with IRON) 28 mg iron- 800 mcg tab  9/28/20  Yes Provider, Historical   ondansetron (ZOFRAN ODT) 4 mg disintegrating tablet Take 1 Tablet by mouth every eight (8) hours as needed for Nausea or Vomiting. 3/29/22 4/12/22  Tahira Jno MD   naproxen (NAPROSYN) 500 mg tablet TAKE 1 TABLET BY MOUTH TWICE A DAY WITH MEALS 3/25/22 4/12/22  Jeffry Reyes MD   cyclobenzaprine (FLEXERIL) 10 mg tablet TAKE 1 TABLET BY MOUTH THREE TIMES A DAY AS NEEDED FOR MUSCLE SPASMS  Patient not taking: Reported on 4/12/2022 1/26/22   Jeffry Reyes MD   ergocalciferol (ERGOCALCIFEROL) 1,250 mcg (50,000 unit) capsule Take 1 Capsule by mouth every seven (7) days.   Patient not taking: Reported on 4/12/2022 8/17/21   Jose Guadalupe Lord NP

## 2022-04-18 ENCOUNTER — TRANSCRIBE ORDER (OUTPATIENT)
Dept: SCHEDULING | Age: 37
End: 2022-04-18

## 2022-04-18 DIAGNOSIS — N63.20 LUMP OF LEFT BREAST: ICD-10-CM

## 2022-04-18 DIAGNOSIS — N64.4 BREAST PAIN: Primary | ICD-10-CM

## 2022-04-19 ENCOUNTER — OFFICE VISIT (OUTPATIENT)
Dept: BEHAVIORAL/MENTAL HEALTH CLINIC | Age: 37
End: 2022-04-19
Payer: MEDICAID

## 2022-04-19 VITALS
HEART RATE: 85 BPM | OXYGEN SATURATION: 98 % | HEIGHT: 62 IN | DIASTOLIC BLOOD PRESSURE: 70 MMHG | BODY MASS INDEX: 25.91 KG/M2 | SYSTOLIC BLOOD PRESSURE: 101 MMHG | WEIGHT: 140.8 LBS | RESPIRATION RATE: 20 BRPM | TEMPERATURE: 96.6 F

## 2022-04-19 DIAGNOSIS — F32.2 CURRENT SEVERE EPISODE OF MAJOR DEPRESSIVE DISORDER WITHOUT PSYCHOTIC FEATURES WITHOUT PRIOR EPISODE (HCC): Primary | ICD-10-CM

## 2022-04-19 DIAGNOSIS — F43.11 POST-TRAUMATIC STRESS DISORDER, ACUTE: ICD-10-CM

## 2022-04-19 DIAGNOSIS — F41.1 GAD (GENERALIZED ANXIETY DISORDER): ICD-10-CM

## 2022-04-19 PROCEDURE — 99214 OFFICE O/P EST MOD 30 MIN: CPT | Performed by: NURSE PRACTITIONER

## 2022-04-19 RX ORDER — TRAZODONE HYDROCHLORIDE 50 MG/1
TABLET ORAL
Qty: 30 TABLET | Refills: 2 | Status: SHIPPED | OUTPATIENT
Start: 2022-04-19 | End: 2022-06-03 | Stop reason: SDUPTHER

## 2022-04-19 RX ORDER — PRAZOSIN HYDROCHLORIDE 1 MG/1
1 CAPSULE ORAL
Qty: 30 CAPSULE | Refills: 2 | Status: SHIPPED | OUTPATIENT
Start: 2022-04-19 | End: 2022-06-03 | Stop reason: SDUPTHER

## 2022-04-19 RX ORDER — FLUOXETINE HYDROCHLORIDE 40 MG/1
40 CAPSULE ORAL DAILY
Qty: 30 CAPSULE | Refills: 2 | Status: SHIPPED | OUTPATIENT
Start: 2022-04-19 | End: 2022-06-03 | Stop reason: SDUPTHER

## 2022-04-19 NOTE — PROGRESS NOTES
Chief Complaint   Patient presents with    Medication Management     Visit Vitals  /70 (BP 1 Location: Right upper arm, BP Patient Position: Sitting)   Pulse 85   Temp (!) 96.6 °F (35.9 °C) (Temporal)   Resp 20   Ht 5' 2\" (1.575 m)   Wt 140 lb 12.8 oz (63.9 kg)   SpO2 98%   BMI 25.75 kg/m²     Prior to Admission medications    Medication Sig Start Date End Date Taking? Authorizing Provider   naproxen (NAPROSYN) 500 mg tablet TAKE 1 TABLET BY MOUTH TWICE A DAY WITH MEALS 4/12/22  Yes José Luis Rivera MD   prazosin (MINIPRESS) 2 mg capsule TAKE 1 CAPSULE BY MOUTH NIGHTLY. INDICATIONS: POSTTRAUMATIC STRESS SYNDROME 4/12/22  Yes Allocca Erlyndsay Patel, NP   ondansetron (ZOFRAN ODT) 4 mg disintegrating tablet TAKE 1 TABLET BY MOUTH EVERY 8 HOURS AS NEEDED FOR NAUSEA AND VOMITING 4/12/22  Yes Neela Freitas MD   traZODone (DESYREL) 50 mg tablet TAKE 1 TABLET BY MOUTH AT BEDTIME AS NEEDED FOR SLEEP 4/7/22  Yes Allocca Erven Patel, NP   OLANZapine (ZyPREXA zydis) 10 mg disintegrating tablet TAKE 1 TABLET BY MOUTH EVERY DAY AT NIGHT 4/7/22  Yes Allocca, Erven Patel, NP   hydrOXYzine HCL (ATARAX) 50 mg tablet TAKE 1 TABLET BY MOUTH FOUR (4) TIMES DAILY AS NEEDED FOR ANXIETY. 4/7/22  Yes Maxca Erven Patel, NP   tiZANidine (ZANAFLEX) 4 mg tablet TAKE 1 TABLET BY MOUTH EVERY SIX (6) HOURS AS NEEDED FOR MUSCLE SPASM(S). 3/24/22  Yes Jonny Barger MD   chlorthalidone (HYGROTON) 25 mg tablet Take 1 Tablet by mouth daily. 3/22/22  Yes Neela Freitas MD   food supplemt, lactose-reduced (Ensure High Protein) liqd Take 237 mL by mouth daily. 3/22/22  Yes Neela Freitas MD   albuterol (PROVENTIL HFA, VENTOLIN HFA, PROAIR HFA) 90 mcg/actuation inhaler Take 2 Puffs by inhalation every six (6) hours as needed for Wheezing. 3/22/22  Yes Neela Freitas MD   protein powd 1-2 servings (15-18 g per serving) daily with meals.  3/22/22  Yes Neela Freitas MD   Antacid Ultra Strength 400 mg calcium (1,000 mg) chew TAKE 1/2 TABLET BY MOUTH TWICE DAILY AS NEEDED FOR REFLUX 2/14/22  Yes Jessica Solis NP   cyclobenzaprine (FLEXERIL) 10 mg tablet TAKE 1 TABLET BY MOUTH THREE TIMES A DAY AS NEEDED FOR MUSCLE SPASMS 1/26/22  Yes Hemanth Duvall MD   pantoprazole (PROTONIX) 40 mg tablet Take 1 Tablet by mouth Daily (before breakfast). Indications: gastroesophageal reflux disease 1/19/22  Yes Jessica Solis NP   FLUoxetine DR (PROzac WEEKLY) 90 mg DR capsule Take 1 Capsule by mouth every seven (7) days. 9/13/21  Yes Kristen Ortiz NP   polyethylene glycol (MIRALAX) 17 gram packet Take 1 Packet by mouth daily as needed for Constipation. 8/12/21  Yes Brenda Camejo NP   OTHER 1 Ensure Enlive food supplement drink PO TID 11/4/20  Yes Brenda Camejo NP   OTHER 1 ensure pudding PO daily for lunch. 10/20/20  Yes Brenda Camejo NP   prenatal vit-iron fumarate-fa (PRENATAL PLUS with IRON) 28 mg iron- 800 mcg tab  9/28/20  Yes Provider, Historical   ergocalciferol (ERGOCALCIFEROL) 1,250 mcg (50,000 unit) capsule Take 1 Capsule by mouth every seven (7) days.   Patient not taking: Reported on 4/12/2022 8/17/21   Brenda Camejo NP

## 2022-04-19 NOTE — PROGRESS NOTES
CHIEF COMPLAINT:  Cheryl Kurtz is a 43 y.o. female and was seen today for follow-up of psychiatric condition and psychotropic medication management. HPI:    Viviana Carmichael reports the following psychiatric symptoms by hx:  depression and anxiety. Overall symptoms have been present for months. Currently symptoms are of moderate/high severity. The symptoms occur daily. Pt reports medications are of some benefit. Pt reports she has not been able to remember weekly dosing for antidepressant. Met with pt for appt today to review current treatment plan. FAMILY/SOCIAL HX: psychosocial stressors    REVIEW OF SYSTEMS:  Psychiatric symptoms being monitored for:  depression, anxiety   Appetite:decreased, hx eating disorder   Sleep: poor with DIMS (difficulty initiating & maintaining sleep), nightmares   Neuro: no changes or updates    Visit Vitals  /70 (BP 1 Location: Right upper arm, BP Patient Position: Sitting)   Pulse 85   Temp (!) 96.6 °F (35.9 °C) (Temporal)   Resp 20   Ht 5' 2\" (1.575 m)   Wt 63.9 kg (140 lb 12.8 oz)   SpO2 98%   BMI 25.75 kg/m²       Side Effects:  none    MENTAL STATUS EXAM:   Sensorium  oriented to time, place and person   Relations cooperative, passive   Appearance:  age appropriate and casually dressed   Motor Behavior:  gait stable and within normal limits   Speech:  soft, barely audible at times   Thought Process: goal directed   Thought Content free of delusions and free of hallucinations   Suicidal ideations death wishes, chronically due to stress of current situation   Homicidal ideations none   Mood:  anxious and depressed   Affect:  anxious, constricted and depressed   Memory recent  adequate   Memory remote:  adequate   Concentration:  adequate   Abstraction:  abstract   Insight:  fair and limited   Reliability fair   Judgment:  fair and limited     MEDICAL DECISION MAKING:  Problems addressed today:    ICD-10-CM ICD-9-CM    1.  Current severe episode of major depressive disorder without psychotic features without prior episode ([facility]  F32.2 296.23    2. Post-traumatic stress disorder, acute  F43.11 309.81    3. DESTINI (generalized anxiety disorder)  F41.1 300.02        Assessment:   Sienna Mitchell is not responding to treatment. Symptoms are exacerbated. Had called for resources for patient (no identifying factors used) and was able to get resources for legal consultation, housing and support. Discussed current medications and dosages. Will restart daily prozac as pt has had a hard time remembering to take a weekly med option. Will also increase prazosin (BP stable) for ongoing nightmares as she reports some benefit from current dose. Pt states she is still having issues with N/V which also makes consistent med use challenging. Discussed transition of care. Reviewed use of program at [facility] that may be able to address PCP/MH needs in same program. Reviewed treatment goals and target symptoms to monitor for. Plan:   1. Current Outpatient Medications   Medication Sig Dispense Refill    FLUoxetine (PROzac) 40 mg capsule Take 1 Capsule by mouth daily. 30 Capsule 2    traZODone (DESYREL) 50 mg tablet TAKE 1 TABLET BY MOUTH AT BEDTIME AS NEEDED FOR SLEEP 30 Tablet 2    prazosin (MINIPRESS) 1 mg capsule Take 1 Capsule by mouth nightly. Take 1 mg cap with Prazosin 2 mg for total dose of 3 mg nightly. Indications: posttraumatic stress syndrome 30 Capsule 2    naproxen (NAPROSYN) 500 mg tablet TAKE 1 TABLET BY MOUTH TWICE A DAY WITH MEALS 30 Tablet 0    ondansetron (ZOFRAN ODT) 4 mg disintegrating tablet TAKE 1 TABLET BY MOUTH EVERY 8 HOURS AS NEEDED FOR NAUSEA AND VOMITING 30 Tablet 0    OLANZapine (ZyPREXA zydis) 10 mg disintegrating tablet TAKE 1 TABLET BY MOUTH EVERY DAY AT NIGHT 30 Tablet 2    hydrOXYzine HCL (ATARAX) 50 mg tablet TAKE 1 TABLET BY MOUTH FOUR (4) TIMES DAILY AS NEEDED FOR ANXIETY.  90 Tablet 0    tiZANidine (ZANAFLEX) 4 mg tablet TAKE 1 TABLET BY MOUTH EVERY SIX (6) HOURS AS NEEDED FOR MUSCLE SPASM(S). 30 Tablet 3    chlorthalidone (HYGROTON) 25 mg tablet Take 1 Tablet by mouth daily. 90 Tablet 2    food supplemt, lactose-reduced (Ensure High Protein) liqd Take 237 mL by mouth daily. 90 Each 1    albuterol (PROVENTIL HFA, VENTOLIN HFA, PROAIR HFA) 90 mcg/actuation inhaler Take 2 Puffs by inhalation every six (6) hours as needed for Wheezing. 1 Each 2    protein powd 1-2 servings (15-18 g per serving) daily with meals. 750 g 1    Antacid Ultra Strength 400 mg calcium (1,000 mg) chew TAKE 1/2 TABLET BY MOUTH TWICE DAILY AS NEEDED FOR REFLUX 72 Tablet 0    cyclobenzaprine (FLEXERIL) 10 mg tablet TAKE 1 TABLET BY MOUTH THREE TIMES A DAY AS NEEDED FOR MUSCLE SPASMS 20 Tablet 0    pantoprazole (PROTONIX) 40 mg tablet Take 1 Tablet by mouth Daily (before breakfast). Indications: gastroesophageal reflux disease 90 Tablet 1    polyethylene glycol (MIRALAX) 17 gram packet Take 1 Packet by mouth daily as needed for Constipation. 90 Each 2    OTHER 1 Ensure Enlive food supplement drink PO  Each 2    OTHER 1 ensure pudding PO daily for lunch. 90 Each 3    prenatal vit-iron fumarate-fa (PRENATAL PLUS with IRON) 28 mg iron- 800 mcg tab       ergocalciferol (ERGOCALCIFEROL) 1,250 mcg (50,000 unit) capsule Take 1 Capsule by mouth every seven (7) days. (Patient not taking: Reported on 4/12/2022) 12 Capsule 0          medication changes made today: cont olanzapine, inc prazosin, switch prozac to daily, cont prn trazodone and hydroxyzine    2. Counseling and coordination of care including instructions for treatment, risks/benefits, risk factor reduction and patient/family education. She agrees with the plan. Patient instructed to call with any side effects, questions or issues. 3.    Follow-up and Dispositions    · Return for transition.          4/19/2022  Augustina Valadez NP

## 2022-04-20 ENCOUNTER — TELEPHONE (OUTPATIENT)
Dept: NEUROLOGY | Age: 37
End: 2022-04-20

## 2022-04-25 RX ORDER — NAPROXEN 500 MG/1
500 TABLET ORAL 2 TIMES DAILY WITH MEALS
Qty: 30 TABLET | Refills: 0 | Status: SHIPPED | OUTPATIENT
Start: 2022-04-25 | End: 2022-05-09

## 2022-04-26 ENCOUNTER — OFFICE VISIT (OUTPATIENT)
Dept: FAMILY MEDICINE CLINIC | Age: 37
End: 2022-04-26
Payer: MEDICAID

## 2022-04-26 ENCOUNTER — PATIENT MESSAGE (OUTPATIENT)
Dept: NEUROLOGY | Age: 37
End: 2022-04-26

## 2022-04-26 ENCOUNTER — TELEPHONE (OUTPATIENT)
Dept: FAMILY MEDICINE CLINIC | Age: 37
End: 2022-04-26

## 2022-04-26 VITALS
TEMPERATURE: 98.3 F | HEIGHT: 62 IN | OXYGEN SATURATION: 98 % | BODY MASS INDEX: 25.43 KG/M2 | RESPIRATION RATE: 16 BRPM | WEIGHT: 138.2 LBS | HEART RATE: 77 BPM | DIASTOLIC BLOOD PRESSURE: 64 MMHG | SYSTOLIC BLOOD PRESSURE: 106 MMHG

## 2022-04-26 DIAGNOSIS — Z11.59 NEED FOR HEPATITIS C SCREENING TEST: ICD-10-CM

## 2022-04-26 DIAGNOSIS — G43.709 CHRONIC MIGRAINE WITHOUT AURA WITHOUT STATUS MIGRAINOSUS, NOT INTRACTABLE: Primary | ICD-10-CM

## 2022-04-26 DIAGNOSIS — G43.809 OTHER MIGRAINE WITHOUT STATUS MIGRAINOSUS, NOT INTRACTABLE: ICD-10-CM

## 2022-04-26 DIAGNOSIS — G89.29 OTHER CHRONIC PAIN: ICD-10-CM

## 2022-04-26 DIAGNOSIS — K21.9 GASTROESOPHAGEAL REFLUX DISEASE, UNSPECIFIED WHETHER ESOPHAGITIS PRESENT: ICD-10-CM

## 2022-04-26 DIAGNOSIS — R53.83 FATIGUE, UNSPECIFIED TYPE: ICD-10-CM

## 2022-04-26 DIAGNOSIS — Z13.1 SCREENING FOR DIABETES MELLITUS: ICD-10-CM

## 2022-04-26 DIAGNOSIS — F50.9 EATING DISORDER, UNSPECIFIED TYPE: ICD-10-CM

## 2022-04-26 DIAGNOSIS — Z00.00 ROUTINE GENERAL MEDICAL EXAMINATION AT HEALTH CARE FACILITY: Primary | ICD-10-CM

## 2022-04-26 DIAGNOSIS — Z13.220 SCREENING FOR LIPID DISORDERS: ICD-10-CM

## 2022-04-26 DIAGNOSIS — E55.9 VITAMIN D DEFICIENCY: ICD-10-CM

## 2022-04-26 PROCEDURE — 99214 OFFICE O/P EST MOD 30 MIN: CPT | Performed by: FAMILY MEDICINE

## 2022-04-26 PROCEDURE — 99395 PREV VISIT EST AGE 18-39: CPT | Performed by: FAMILY MEDICINE

## 2022-04-26 RX ORDER — ONDANSETRON 4 MG/1
TABLET, ORALLY DISINTEGRATING ORAL
Qty: 60 TABLET | Refills: 0 | Status: SHIPPED | OUTPATIENT
Start: 2022-04-26 | End: 2022-05-13

## 2022-04-26 RX ORDER — ERENUMAB-AOOE 70 MG/ML
70 INJECTION SUBCUTANEOUS
Qty: 1 EACH | Refills: 1 | Status: SHIPPED | OUTPATIENT
Start: 2022-05-29 | End: 2022-08-01

## 2022-04-26 RX ORDER — RIBOFLAVIN (VITAMIN B2) 400 MG
400 TABLET ORAL DAILY
Qty: 90 TABLET | Refills: 3 | Status: SHIPPED | OUTPATIENT
Start: 2022-04-26 | End: 2022-09-07

## 2022-04-26 RX ORDER — CALCIUM CARBONATE 400(1000)
TABLET,CHEWABLE ORAL
Qty: 90 TABLET | Refills: 3 | Status: SHIPPED | OUTPATIENT
Start: 2022-04-26

## 2022-04-26 RX ORDER — ERENUMAB-AOOE 70 MG/ML
70 INJECTION SUBCUTANEOUS ONCE
Qty: 1 EACH | Refills: 0 | Status: SHIPPED | COMMUNITY
Start: 2022-04-26 | End: 2022-04-26

## 2022-04-26 NOTE — PROGRESS NOTES
Patient Name: Gillian Freeman   MRN: [id number]    Kike Cueva is a 43 y.o. female who presents with the following:     Cervical Cancer Screening: UTD, done by OBGYN. CAD risk factors:  HTN: wnl  BP Readings from Last 3 Encounters:   04/26/22 106/64   04/19/22 101/70   04/12/22 118/78     Lipid: due  Lab Results   Component Value Date/Time    Cholesterol, total 172 02/18/2020 11:08 AM    HDL Cholesterol 52 02/18/2020 11:08 AM    LDL, calculated 97 02/18/2020 11:08 AM    VLDL, calculated 23 02/18/2020 11:08 AM    Triglyceride 113 02/18/2020 11:08 AM     DM: due  Lab Results   Component Value Date/Time    Hemoglobin A1c 5.2 02/18/2020 11:08 AM     Wt Readings from Last 3 Encounters:   04/26/22 138 lb 3.2 oz (62.7 kg)   04/19/22 140 lb 12.8 oz (63.9 kg)   04/12/22 142 lb 3.2 oz (64.5 kg)     Continue to have abdominal pain, cramping, constipation, and vomiting. Previously had an EGD which showed hiatal hernia. Taking PPI which helps. Takes Zofran every 6 hours but can go days without it. Has upcoming GI appt. Interested in outpatient therapy for her eating disorder. Has had trouble finding one locally. Has chronic pain in all areas of body. Saw [facility] who thinks she has fibromyalgia and told to follow up with PCP. She does have an appt with a different rheumatologist.  Seeing psychiatry right now who just restarted her on Prozac; the psychiatrist is leaving soon and pt is unsure of the transition process. Saw neurology for chronic migraines. She was breastfeeding at the time so options were limited but she states she now stopped breastfeeding. Review of Systems   Constitutional: Negative for fever, malaise/fatigue and weight loss. Respiratory: Negative for cough, hemoptysis, shortness of breath and wheezing. Cardiovascular: Negative for chest pain, palpitations, leg swelling and PND.    Gastrointestinal: Negative for abdominal pain, constipation, diarrhea, nausea and vomiting. The patient's medications, allergies, past medical history, surgical history, family history and social history were reviewed and updated where appropriate. Current Outpatient Medications:     naproxen (NAPROSYN) 500 mg tablet, Take 1 Tablet by mouth two (2) times daily (with meals). , Disp: 30 Tablet, Rfl: 0    FLUoxetine (PROzac) 40 mg capsule, Take 1 Capsule by mouth daily. , Disp: 30 Capsule, Rfl: 2    traZODone (DESYREL) 50 mg tablet, TAKE 1 TABLET BY MOUTH AT BEDTIME AS NEEDED FOR SLEEP, Disp: 30 Tablet, Rfl: 2    prazosin (MINIPRESS) 1 mg capsule, Take 1 Capsule by mouth nightly. Take 1 mg cap with Prazosin 2 mg for total dose of 3 mg nightly. Indications: posttraumatic stress syndrome, Disp: 30 Capsule, Rfl: 2    ondansetron (ZOFRAN ODT) 4 mg disintegrating tablet, TAKE 1 TABLET BY MOUTH EVERY 8 HOURS AS NEEDED FOR NAUSEA AND VOMITING, Disp: 30 Tablet, Rfl: 0    OLANZapine (ZyPREXA zydis) 10 mg disintegrating tablet, TAKE 1 TABLET BY MOUTH EVERY DAY AT NIGHT, Disp: 30 Tablet, Rfl: 2    hydrOXYzine HCL (ATARAX) 50 mg tablet, TAKE 1 TABLET BY MOUTH FOUR (4) TIMES DAILY AS NEEDED FOR ANXIETY., Disp: 90 Tablet, Rfl: 0    tiZANidine (ZANAFLEX) 4 mg tablet, TAKE 1 TABLET BY MOUTH EVERY SIX (6) HOURS AS NEEDED FOR MUSCLE SPASM(S)., Disp: 30 Tablet, Rfl: 3    chlorthalidone (HYGROTON) 25 mg tablet, Take 1 Tablet by mouth daily. , Disp: 90 Tablet, Rfl: 2    food supplemt, lactose-reduced (Ensure High Protein) liqd, Take 237 mL by mouth daily. , Disp: 90 Each, Rfl: 1    albuterol (PROVENTIL HFA, VENTOLIN HFA, PROAIR HFA) 90 mcg/actuation inhaler, Take 2 Puffs by inhalation every six (6) hours as needed for Wheezing., Disp: 1 Each, Rfl: 2    protein powd, 1-2 servings (15-18 g per serving) daily with meals. , Disp: 750 g, Rfl: 1    Antacid Ultra Strength 400 mg calcium (1,000 mg) chew, TAKE 1/2 TABLET BY MOUTH TWICE DAILY AS NEEDED FOR REFLUX, Disp: 72 Tablet, Rfl: 0    pantoprazole (PROTONIX) 40 mg tablet, Take 1 Tablet by mouth Daily (before breakfast). Indications: gastroesophageal reflux disease, Disp: 90 Tablet, Rfl: 1    polyethylene glycol (MIRALAX) 17 gram packet, Take 1 Packet by mouth daily as needed for Constipation. , Disp: 90 Each, Rfl: 2    OTHER, 1 Ensure Enlive food supplement drink PO TID, Disp: 270 Each, Rfl: 2    OTHER, 1 ensure pudding PO daily for lunch., Disp: 90 Each, Rfl: 3    cyclobenzaprine (FLEXERIL) 10 mg tablet, TAKE 1 TABLET BY MOUTH THREE TIMES A DAY AS NEEDED FOR MUSCLE SPASMS (Patient not taking: Reported on 4/26/2022), Disp: 20 Tablet, Rfl: 0    ergocalciferol (ERGOCALCIFEROL) 1,250 mcg (50,000 unit) capsule, Take 1 Capsule by mouth every seven (7) days. (Patient not taking: Reported on 4/12/2022), Disp: 12 Capsule, Rfl: 0    prenatal vit-iron fumarate-fa (PRENATAL PLUS with IRON) 28 mg iron- 800 mcg tab, , Disp: , Rfl:     Allergies   Allergen Reactions    Labetalol Hives    Pcn [Penicillins] Hives    Ceclor [Cefaclor] Unknown (comments)    Septra [Sulfamethoprim Ds] Unknown (comments)       OBJECTIVE    Visit Vitals  /64 (BP 1 Location: Left upper arm, BP Patient Position: Sitting, BP Cuff Size: Adult)   Pulse 77   Temp 98.3 °F (36.8 °C) (Temporal)   Resp 16   Ht 5' 2\" (1.575 m)   Wt 138 lb 3.2 oz (62.7 kg)   SpO2 98%   BMI 25.28 kg/m²       Physical Exam  Constitutional:       General: She is not in acute distress. Appearance: She is not diaphoretic. HENT:      Head: Normocephalic. Right Ear: External ear normal.      Left Ear: External ear normal.   Eyes:      Conjunctiva/sclera: Conjunctivae normal.      Pupils: Pupils are equal, round, and reactive to light. Cardiovascular:      Rate and Rhythm: Normal rate and regular rhythm. Heart sounds: Normal heart sounds. No murmur heard. No friction rub. No gallop. Pulmonary:      Effort: Pulmonary effort is normal. No respiratory distress.       Breath sounds: Normal breath sounds. No wheezing or rales. Abdominal:      General: Bowel sounds are normal. There is no distension. Palpations: Abdomen is soft. Tenderness: There is no abdominal tenderness. There is no guarding or rebound. Skin:     General: Skin is warm and dry. Neurological:      Mental Status: She is alert and oriented to person, place, and time. Psychiatric:         Mood and Affect: Affect normal.         Cognition and Memory: Memory normal.         Judgment: Judgment normal.           ASSESSMENT AND PLAN  Merary Spivey is a 43 y.o. female who presents today for:    1. Routine general medical examination at health care facility  Reviewed age appropriate screening tests as recommended by the [place] Preventive Services Database with patient today. 2. Screening for diabetes mellitus  - HEMOGLOBIN A1C WITH EAG; Future  - HEMOGLOBIN A1C WITH EAG    3. Screening for lipid disorders  - CBC W/O DIFF; Future  - METABOLIC PANEL, COMPREHENSIVE; Future  - LIPID PANEL; Future  - CBC W/O DIFF  - METABOLIC PANEL, COMPREHENSIVE  - LIPID PANEL    4. Need for hepatitis C screening test  - HEPATITIS C AB; Future  - HEPATITIS C AB    5. Vitamin D deficiency  - VITAMIN D, 25 HYDROXY; Future  - VITAMIN D, 25 HYDROXY    6. Fatigue, unspecified type  - TSH 3RD GENERATION; Future  - T4 (THYROXINE); Future  - VITAMIN B12 & FOLATE; Future  - TSH 3RD GENERATION  - T4 (THYROXINE)  - VITAMIN B12 & FOLATE    7. Other migraine without status migrainosus, not intractable  Will try riboflavin;  Pt to f/u with neurology with other migraine tx options as she is no longer breastfeeding.  - riboflavin, vitamin B2, 400 mg tab; Take 400 mg by mouth daily. Dispense: 90 Tablet; Refill: 3    8. Gastroesophageal reflux disease, unspecified whether esophagitis present  Pt to discuss with GI about chronic nausea; reviewed that chronic Zofran use is not ideal due to potential side effects.  Will provide refill this one last time and then defer to GI.  - calcium carbonate (Antacid Ultra Strength) 400 mg calcium (1,000 mg) chew; TAKE 1/2 TABLET BY MOUTH DAILY  Dispense: 90 Tablet; Refill: 3    9. Other chronic pain  Pt to discuss with psychiatry about Cymbalta as an option for her mental health and chronic pain treatment. 10. Eating disorder, unspecified type  Pt to reach out to her insurance on which locations are within her network. She does have a  so I encouraged her to ask them for local resources as well. Medications Discontinued During This Encounter   Medication Reason    Antacid Ultra Strength 400 mg calcium (1,000 mg) chew REORDER    ondansetron (ZOFRAN ODT) 4 mg disintegrating tablet REORDER           Treatment risks/benefits/costs/interactions/alternatives discussed with patient. Advised patient to call back or return to office if symptoms worsen/change/persist. If patient cannot reach us or should anything more severe/urgent arise he/she should proceed directly to the nearest emergency department. Discussed expected course/resolution/complications of diagnosis in detail with patient. Patient expressed understanding with the diagnosis and plan. This dictation may have been completed with Dragon, the rollApp voice recognition software. Unanticipated grammatical, syntax, homophones, and other interpretive errors are sometimes inadvertently transcribed by the computer software. Please disregard any errors that have escaped final proofreading. Loren Randall M.D.

## 2022-04-26 NOTE — TELEPHONE ENCOUNTER
From: Ana Mcpherson  To: Peter Walter MD  Sent: 4/26/2022 11:42 AM EDT  Subject: Migraines    I am having a lot of pain from migraines. I have stopped breastfeeding and in need of something to relieve the pain. Is there a medication that I can take now since I am no longer nursing?  Thank you
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
## 2022-04-27 LAB
25(OH)D3+25(OH)D2 SERPL-MCNC: 6.8 NG/ML (ref 30–100)
ALBUMIN SERPL-MCNC: 4.3 G/DL (ref 3.8–4.8)
ALBUMIN/GLOB SERPL: 1.6 {RATIO} (ref 1.2–2.2)
ALP SERPL-CCNC: 54 IU/L (ref 44–121)
ALT SERPL-CCNC: 12 IU/L (ref 0–32)
AST SERPL-CCNC: 24 IU/L (ref 0–40)
BILIRUB SERPL-MCNC: 0.4 MG/DL (ref 0–1.2)
BUN SERPL-MCNC: 8 MG/DL (ref 6–20)
BUN/CREAT SERPL: 9 (ref 9–23)
CALCIUM SERPL-MCNC: 9.7 MG/DL (ref 8.7–10.2)
CHLORIDE SERPL-SCNC: 92 MMOL/L (ref 96–106)
CHOLEST SERPL-MCNC: 215 MG/DL (ref 100–199)
CO2 SERPL-SCNC: 31 MMOL/L (ref 20–29)
CREAT SERPL-MCNC: 0.88 MG/DL (ref 0.57–1)
EGFR: 86 ML/MIN/1.73
ERYTHROCYTE [DISTWIDTH] IN BLOOD BY AUTOMATED COUNT: 15.3 % (ref 11.7–15.4)
EST. AVERAGE GLUCOSE BLD GHB EST-MCNC: 108 MG/DL
FOLATE SERPL-MCNC: 4 NG/ML
GLOBULIN SER CALC-MCNC: 2.7 G/DL (ref 1.5–4.5)
GLUCOSE SERPL-MCNC: 78 MG/DL (ref 65–99)
HBA1C MFR BLD: 5.4 % (ref 4.8–5.6)
HCT VFR BLD AUTO: 41.9 % (ref 34–46.6)
HCV AB S/CO SERPL IA: <0.1 S/CO RATIO (ref 0–0.9)
HDLC SERPL-MCNC: 81 MG/DL
HGB BLD-MCNC: 13.5 G/DL (ref 11.1–15.9)
IMP & REVIEW OF LAB RESULTS: NORMAL
LDLC SERPL CALC-MCNC: 122 MG/DL (ref 0–99)
MCH RBC QN AUTO: 23.7 PG (ref 26.6–33)
MCHC RBC AUTO-ENTMCNC: 32.2 G/DL (ref 31.5–35.7)
MCV RBC AUTO: 74 FL (ref 79–97)
PLATELET # BLD AUTO: 273 X10E3/UL (ref 150–450)
POTASSIUM SERPL-SCNC: 2.9 MMOL/L (ref 3.5–5.2)
PROT SERPL-MCNC: 7 G/DL (ref 6–8.5)
RBC # BLD AUTO: 5.7 X10E6/UL (ref 3.77–5.28)
SODIUM SERPL-SCNC: 140 MMOL/L (ref 134–144)
T4 SERPL-MCNC: 8.9 UG/DL (ref 4.5–12)
TRIGL SERPL-MCNC: 69 MG/DL (ref 0–149)
TSH SERPL DL<=0.005 MIU/L-ACNC: 0.7 UIU/ML (ref 0.45–4.5)
VIT B12 SERPL-MCNC: 250 PG/ML (ref 232–1245)
VLDLC SERPL CALC-MCNC: 12 MG/DL (ref 5–40)
WBC # BLD AUTO: 6.9 X10E3/UL (ref 3.4–10.8)

## 2022-04-28 ENCOUNTER — TELEPHONE (OUTPATIENT)
Dept: NEUROLOGY | Age: 37
End: 2022-04-28

## 2022-04-28 DIAGNOSIS — E55.9 VITAMIN D DEFICIENCY: ICD-10-CM

## 2022-04-28 DIAGNOSIS — E87.6 HYPOKALEMIA: Primary | ICD-10-CM

## 2022-04-28 RX ORDER — ASPIRIN 325 MG
TABLET, DELAYED RELEASE (ENTERIC COATED) ORAL
Qty: 8 CAPSULE | Refills: 0 | Status: SHIPPED | OUTPATIENT
Start: 2022-04-28

## 2022-04-28 RX ORDER — POTASSIUM CHLORIDE 750 MG/1
10 TABLET, EXTENDED RELEASE ORAL DAILY
Qty: 90 TABLET | Refills: 3 | Status: SHIPPED | OUTPATIENT
Start: 2022-04-28 | End: 2022-05-28 | Stop reason: SDUPTHER

## 2022-04-28 NOTE — PROGRESS NOTES
Dear Ms. Stefania Trice,    I wanted to follow up on your recent test results:    Vitamin D levels are extremely low. I have sent in a prescription for high dose vitamin D3 at 50,000 units once a week for 8 weeks. After 8 weeks, switch to a lower dose of over-the-counter vitamin D3 2000 units every day. Your potassium level is low. I would start a potassium supplement which I have sent to your pharmacy. Please return in 2 weeks for just a lab visit (non fasting) to recheck your levels. Cholesterol is high; I would encourage healthy food choices and regular exercise before starting medications for this.   Thyroid, vitamin B12, folate, and hepatitis C test are normal.

## 2022-04-29 ENCOUNTER — PATIENT MESSAGE (OUTPATIENT)
Dept: NEUROLOGY | Age: 37
End: 2022-04-29

## 2022-05-02 ENCOUNTER — PATIENT MESSAGE (OUTPATIENT)
Dept: NEUROLOGY | Age: 37
End: 2022-05-02

## 2022-05-02 RX ORDER — TIZANIDINE 4 MG/1
4 TABLET ORAL
Qty: 30 TABLET | Refills: 3 | Status: SHIPPED | OUTPATIENT
Start: 2022-05-02 | End: 2022-05-31

## 2022-05-09 ENCOUNTER — APPOINTMENT (OUTPATIENT)
Dept: FAMILY MEDICINE CLINIC | Age: 37
End: 2022-05-09

## 2022-05-09 RX ORDER — HYDROXYZINE 50 MG/1
50 TABLET, FILM COATED ORAL
Qty: 90 TABLET | Refills: 2 | Status: SHIPPED | OUTPATIENT
Start: 2022-05-09 | End: 2022-06-03 | Stop reason: SDUPTHER

## 2022-05-09 RX ORDER — NAPROXEN 500 MG/1
TABLET ORAL
Qty: 30 TABLET | Refills: 0 | Status: SHIPPED | OUTPATIENT
Start: 2022-05-09 | End: 2022-05-24

## 2022-05-10 DIAGNOSIS — E87.6 HYPOKALEMIA: Primary | ICD-10-CM

## 2022-05-10 LAB
BUN SERPL-MCNC: 5 MG/DL (ref 6–20)
BUN/CREAT SERPL: 6 (ref 9–23)
CALCIUM SERPL-MCNC: 9.2 MG/DL (ref 8.7–10.2)
CHLORIDE SERPL-SCNC: 95 MMOL/L (ref 96–106)
CO2 SERPL-SCNC: 34 MMOL/L (ref 20–29)
CREAT SERPL-MCNC: 0.89 MG/DL (ref 0.57–1)
EGFR: 85 ML/MIN/1.73
GLUCOSE SERPL-MCNC: 73 MG/DL (ref 65–99)
MAGNESIUM SERPL-MCNC: 2.3 MG/DL (ref 1.6–2.3)
PHOSPHATE SERPL-MCNC: 3.7 MG/DL (ref 3–4.3)
POTASSIUM SERPL-SCNC: 3 MMOL/L (ref 3.5–5.2)
SODIUM SERPL-SCNC: 137 MMOL/L (ref 134–144)

## 2022-05-10 NOTE — PROGRESS NOTES
Dear Ms. Lordford Miah,    I wanted to follow up on your recent test results: Your potassium is still quite low (it might be due to the chlorthalidone). Are you taking the potassium supplements? I would recommend increasing it to 20 Meq a day (2 tabs of the 10 Meq tab). Please set up another lab appt in 2 weeks to recheck this.

## 2022-05-13 RX ORDER — ONDANSETRON 4 MG/1
TABLET, ORALLY DISINTEGRATING ORAL
Qty: 60 TABLET | Refills: 0 | Status: SHIPPED | OUTPATIENT
Start: 2022-05-13 | End: 2022-06-24

## 2022-05-16 ENCOUNTER — TRANSCRIBE ORDER (OUTPATIENT)
Dept: SCHEDULING | Age: 37
End: 2022-05-16

## 2022-05-16 DIAGNOSIS — R10.11 RUQ PAIN: ICD-10-CM

## 2022-05-16 DIAGNOSIS — K59.00 CONSTIPATION: ICD-10-CM

## 2022-05-16 DIAGNOSIS — R11.2 NAUSEA WITH VOMITING: Primary | ICD-10-CM

## 2022-05-16 DIAGNOSIS — R14.0 BLOATING: ICD-10-CM

## 2022-05-18 ENCOUNTER — TELEPHONE (OUTPATIENT)
Dept: BEHAVIORAL/MENTAL HEALTH CLINIC | Age: 37
End: 2022-05-18

## 2022-05-23 ENCOUNTER — OFFICE VISIT (OUTPATIENT)
Dept: RHEUMATOLOGY | Age: 37
End: 2022-05-23

## 2022-05-23 ENCOUNTER — OFFICE VISIT (OUTPATIENT)
Dept: BEHAVIORAL/MENTAL HEALTH CLINIC | Age: 37
End: 2022-05-23
Payer: MEDICAID

## 2022-05-23 VITALS
TEMPERATURE: 96.7 F | WEIGHT: 141.6 LBS | HEIGHT: 62 IN | BODY MASS INDEX: 26.06 KG/M2 | HEART RATE: 119 BPM | RESPIRATION RATE: 16 BRPM | OXYGEN SATURATION: 98 % | DIASTOLIC BLOOD PRESSURE: 68 MMHG | SYSTOLIC BLOOD PRESSURE: 111 MMHG

## 2022-05-23 VITALS
BODY MASS INDEX: 25.36 KG/M2 | OXYGEN SATURATION: 98 % | RESPIRATION RATE: 18 BRPM | HEIGHT: 62 IN | SYSTOLIC BLOOD PRESSURE: 106 MMHG | TEMPERATURE: 98 F | DIASTOLIC BLOOD PRESSURE: 69 MMHG | HEART RATE: 57 BPM | WEIGHT: 137.8 LBS

## 2022-05-23 DIAGNOSIS — F41.1 GAD (GENERALIZED ANXIETY DISORDER): ICD-10-CM

## 2022-05-23 DIAGNOSIS — M79.7 FIBROMYALGIA: Primary | ICD-10-CM

## 2022-05-23 DIAGNOSIS — M62.838 TRAPEZIUS MUSCLE SPASM: ICD-10-CM

## 2022-05-23 DIAGNOSIS — F43.11 POST-TRAUMATIC STRESS DISORDER, ACUTE: ICD-10-CM

## 2022-05-23 DIAGNOSIS — R53.83 OTHER FATIGUE: ICD-10-CM

## 2022-05-23 DIAGNOSIS — F32.2 CURRENT SEVERE EPISODE OF MAJOR DEPRESSIVE DISORDER WITHOUT PSYCHOTIC FEATURES WITHOUT PRIOR EPISODE (HCC): Primary | ICD-10-CM

## 2022-05-23 PROCEDURE — 99213 OFFICE O/P EST LOW 20 MIN: CPT | Performed by: NURSE PRACTITIONER

## 2022-05-23 PROCEDURE — 99205 OFFICE O/P NEW HI 60 MIN: CPT | Performed by: INTERNAL MEDICINE

## 2022-05-23 NOTE — LETTER
6/6/2022    Patient: Jovi Pandey   YOB: 1985   Date of Visit: 5/23/2022     Emerita Kaufman MD  [address] [facility] [address]  [address]    Dear MD Alejandra Epps MD,      Thank you for referring Ms. Jose Alberto Melendrez to [facility] for evaluation. My notes for this consultation are attached. If you have questions, please do not hesitate to call me.      Sincerely,    Deniz Ryan MD
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
## 2022-05-23 NOTE — PROGRESS NOTES
Chief Complaint   Patient presents with    Medication Management       Visit Vitals  /68 (BP 1 Location: Right arm, BP Patient Position: Sitting)   Pulse (!) 119   Temp (!) 96.7 °F (35.9 °C) (Temporal)   Resp 16   Ht 5' 2\" (1.575 m)   Wt 64.2 kg (141 lb 9.6 oz)   SpO2 98%   BMI 25.90 kg/m²

## 2022-05-23 NOTE — LETTER
NOTIFICATION RETURN TO WORK / SCHOOL     11/21/2022 11:27 AM    Ms. Cheryl Kurtz  [address] [address]      To Whom It May Concern:    Cheryl Kurtz is currently under the care of [address] [place] [address]. She will return to work/school on: Friday, November 25th, 2022    If there are questions or concerns please have the patient contact our office.         Sincerely,      Dr. Rocco Mayen

## 2022-05-23 NOTE — LETTER
NOTIFICATION RETURN TO WORK / SCHOOL    11/21/2022 11:28 AM    Ms. Kiara Dorsey  [address] [address]      To Whom It May Concern:    Kiara Dorsey is currently under the care of [address] [place] [address]. She will return to work/school on: ***    If there are questions or concerns please have the patient contact our office.         Sincerely,      Brianne Camp MD

## 2022-05-23 NOTE — PROGRESS NOTES
CHIEF COMPLAINT:  Sea Avina is a 43 y.o. female and was seen today for follow-up of psychiatric condition and psychotropic medication management. HPI:    Annie Akhtar reports the following psychiatric symptoms by hx:  depression and anxiety. Overall symptoms have been present for months. Currently symptoms are of moderate/high severity. The symptoms occur more days than not. Pt reports medications are helpful but she is not able to take then regularly due to N/V. Met with pt for appt today to review current treatment plan. FAMILY/SOCIAL HX: psychosocial stressors    REVIEW OF SYSTEMS:  Psychiatric symptoms being monitored for:  depression, anxiety  Appetite:decreased   Sleep: poor with DIMS (difficulty initiating & maintaining sleep)   Neuro: chronic pain    Visit Vitals  /68 (BP 1 Location: Right arm, BP Patient Position: Sitting)   Pulse (!) 119   Temp (!) 96.7 °F (35.9 °C) (Temporal)   Resp 16   Ht 5' 2\" (1.575 m)   Wt 64.2 kg (141 lb 9.6 oz)   SpO2 98%   BMI 25.90 kg/m²       Side Effects:  none    MENTAL STATUS EXAM:   Sensorium  oriented to time, place and person   Relations cooperative, passive   Appearance:  age appropriate and casually dressed   Motor Behavior:  gait stable and within normal limits   Speech:  monotone and soft   Thought Process: goal directed   Thought Content free of delusions and free of hallucinations   Suicidal ideations death wishes, denies plan but has had chronic wishes due to stressors   Homicidal ideations no intention   Mood:  anxious and depressed   Affect:  anxious, constricted and depressed   Memory recent  adequate   Memory remote:  adequate   Concentration:  adequate   Abstraction:  abstract   Insight:  fair   Reliability fair   Judgment:  fair     MEDICAL DECISION MAKING:  Problems addressed today:    ICD-10-CM ICD-9-CM    1. Current severe episode of major depressive disorder without psychotic features without prior episode ([facility]  F32.2 296.23    2. Post-traumatic stress disorder, acute  F43.11 309.81    3. DESTINI (generalized anxiety disorder)  F41.1 300.02        Assessment:   Carlotta Oseguera is not responding to treatment. Symptoms are exacerbated. Discussed current medications and dosages. She reports due to chronic nausea she has not been able to take medications consistently. Reviewed priority medication and advised pt to focus on olanzapine and then prozac. Discussed possibility of using hydroxyzine possibly before meds for anti-nausea effect. Discussed options for resources and follow up care. Reviewed treatment goals and target symptoms to monitor for. Discussed transition of care. Plan:   1. Current Outpatient Medications   Medication Sig Dispense Refill    linaCLOtide (Linzess) 145 mcg cap capsule       ondansetron (ZOFRAN ODT) 4 mg disintegrating tablet TAKE 1 TABLET BY MOUTH EVERY 8 HOURS AS NEEDED FOR NAUSEA AND VOMITING 60 Tablet 0    hydrOXYzine HCL (ATARAX) 50 mg tablet TAKE 1 TABLET BY MOUTH FOUR (4) TIMES DAILY AS NEEDED FOR ANXIETY. 90 Tablet 2    naproxen (NAPROSYN) 500 mg tablet TAKE 1 TABLET BY MOUTH TWICE A DAY WITH MEALS 30 Tablet 0    tiZANidine (ZANAFLEX) 4 mg tablet TAKE 1 TABLET BY MOUTH EVERY SIX (6) HOURS AS NEEDED FOR MUSCLE SPASM(S). 30 Tablet 3    cholecalciferol (VITAMIN D3) (50,000 UNITS /1250 MCG) capsule Take 1 capsule once a week for the next 8 weeks then switch to OTC vitamin D3 2000 units daily 8 Capsule 0    potassium chloride (KLOR-CON M10) 10 mEq tablet Take 1 Tablet by mouth daily. 90 Tablet 3    riboflavin, vitamin B2, 400 mg tab Take 400 mg by mouth daily. (Patient not taking: Reported on 5/23/2022) 90 Tablet 3    calcium carbonate (Antacid Ultra Strength) 400 mg calcium (1,000 mg) chew TAKE 1/2 TABLET BY MOUTH DAILY 90 Tablet 3    [START ON 5/29/2022] erenumab-aooe (Aimovig Autoinjector) 70 mg/mL injection 1 mL by SubCUTAneous route every thirty (30) days.  Indications: migraine prevention 1 Each 1    FLUoxetine (PROzac) 40 mg capsule Take 1 Capsule by mouth daily. 30 Capsule 2    traZODone (DESYREL) 50 mg tablet TAKE 1 TABLET BY MOUTH AT BEDTIME AS NEEDED FOR SLEEP 30 Tablet 2    prazosin (MINIPRESS) 1 mg capsule Take 1 Capsule by mouth nightly. Take 1 mg cap with Prazosin 2 mg for total dose of 3 mg nightly. Indications: posttraumatic stress syndrome 30 Capsule 2    OLANZapine (ZyPREXA zydis) 10 mg disintegrating tablet TAKE 1 TABLET BY MOUTH EVERY DAY AT NIGHT 30 Tablet 2    chlorthalidone (HYGROTON) 25 mg tablet Take 1 Tablet by mouth daily. 90 Tablet 2    food supplemt, lactose-reduced (Ensure High Protein) liqd Take 237 mL by mouth daily. 90 Each 1    albuterol (PROVENTIL HFA, VENTOLIN HFA, PROAIR HFA) 90 mcg/actuation inhaler Take 2 Puffs by inhalation every six (6) hours as needed for Wheezing. 1 Each 2    protein powd 1-2 servings (15-18 g per serving) daily with meals. 750 g 1    cyclobenzaprine (FLEXERIL) 10 mg tablet TAKE 1 TABLET BY MOUTH THREE TIMES A DAY AS NEEDED FOR MUSCLE SPASMS (Patient not taking: Reported on 4/26/2022) 20 Tablet 0    pantoprazole (PROTONIX) 40 mg tablet Take 1 Tablet by mouth Daily (before breakfast). Indications: gastroesophageal reflux disease 90 Tablet 1    ergocalciferol (ERGOCALCIFEROL) 1,250 mcg (50,000 unit) capsule Take 1 Capsule by mouth every seven (7) days. 12 Capsule 0    polyethylene glycol (MIRALAX) 17 gram packet Take 1 Packet by mouth daily as needed for Constipation. 90 Each 2    OTHER 1 Ensure Enlive food supplement drink PO  Each 2    OTHER 1 ensure pudding PO daily for lunch. 90 Each 3    prenatal vit-iron fumarate-fa (PRENATAL PLUS with IRON) 28 mg iron- 800 mcg tab  (Patient not taking: Reported on 4/26/2022)            medication changes made today: cont olanzapine, prozac, prn trazodone and prazosin, cont prn hydroxyzine    2.   Counseling and coordination of care including instructions for treatment, risks/benefits, risk factor reduction and patient/family education. She agrees with the plan. Patient instructed to call with any side effects, questions or issues. 3.    Follow-up and Dispositions    · Return transition.          5/23/2022  Vianney Thompson NP

## 2022-05-24 RX ORDER — NAPROXEN 500 MG/1
TABLET ORAL
Qty: 30 TABLET | Refills: 0 | Status: SHIPPED | OUTPATIENT
Start: 2022-05-24 | End: 2022-06-06

## 2022-05-25 LAB
BUN SERPL-MCNC: 6 MG/DL (ref 6–20)
BUN/CREAT SERPL: 8 (ref 9–23)
CALCIUM SERPL-MCNC: 9.1 MG/DL (ref 8.7–10.2)
CENTROMERE B AB SER-ACNC: <0.2 AI (ref 0–0.9)
CHLORIDE SERPL-SCNC: 100 MMOL/L (ref 96–106)
CHROMATIN AB SERPL-ACNC: <0.2 AI (ref 0–0.9)
CO2 SERPL-SCNC: 27 MMOL/L (ref 20–29)
CREAT SERPL-MCNC: 0.74 MG/DL (ref 0.57–1)
DSDNA AB SER-ACNC: 6 IU/ML (ref 0–9)
EGFR: 106 ML/MIN/1.73
ENA JO1 AB SER-ACNC: <0.2 AI (ref 0–0.9)
ENA RNP AB SER-ACNC: <0.2 AI (ref 0–0.9)
ENA SCL70 AB SER-ACNC: <0.2 AI (ref 0–0.9)
ENA SM AB SER-ACNC: <0.2 AI (ref 0–0.9)
ENA SM+RNP AB SER-ACNC: <0.2 AI (ref 0–0.9)
ENA SS-A AB SER-ACNC: <0.2 AI (ref 0–0.9)
ENA SS-B AB SER-ACNC: <0.2 AI (ref 0–0.9)
GLUCOSE SERPL-MCNC: 74 MG/DL (ref 65–99)
POTASSIUM SERPL-SCNC: 3.3 MMOL/L (ref 3.5–5.2)
RIBOSOMAL P AB SER-ACNC: <0.2 AI (ref 0–0.9)
SEE BELOW:, 164879: NORMAL
SODIUM SERPL-SCNC: 141 MMOL/L (ref 134–144)

## 2022-05-26 ENCOUNTER — HOSPITAL ENCOUNTER (OUTPATIENT)
Dept: NUCLEAR MEDICINE | Age: 37
Discharge: HOME | End: 2022-05-26
Attending: NURSE PRACTITIONER

## 2022-05-26 DIAGNOSIS — R11.2 NAUSEA WITH VOMITING: ICD-10-CM

## 2022-05-26 DIAGNOSIS — R10.11 RUQ PAIN: ICD-10-CM

## 2022-05-26 DIAGNOSIS — R14.0 BLOATING: ICD-10-CM

## 2022-05-26 DIAGNOSIS — K59.00 CONSTIPATION: ICD-10-CM

## 2022-05-28 DIAGNOSIS — E87.6 HYPOKALEMIA: ICD-10-CM

## 2022-05-28 RX ORDER — POTASSIUM CHLORIDE 750 MG/1
TABLET, EXTENDED RELEASE ORAL
Qty: 270 TABLET | Refills: 3 | Status: SHIPPED | OUTPATIENT
Start: 2022-05-28 | End: 2022-06-13 | Stop reason: DRUGHIGH

## 2022-05-28 NOTE — PROGRESS NOTES
Dear Ms. John Holloway,    I wanted to follow up on your recent test results: Your potassium is improving but not quite at goal. I will add another 10 meq tab to the afternoon so that you take 2 tabs in the morning and 1 tab in the evening (total of 30 meq/day). This will hopefully get your labs in normal range. Please schedule another lab appointment in 2 weeks to recheck this.

## 2022-05-31 RX ORDER — TIZANIDINE 4 MG/1
4 TABLET ORAL
Qty: 30 TABLET | Refills: 3 | Status: SHIPPED | OUTPATIENT
Start: 2022-05-31 | End: 2022-07-06

## 2022-06-02 ENCOUNTER — TELEPHONE (OUTPATIENT)
Dept: NEUROLOGY | Age: 37
End: 2022-06-02

## 2022-06-02 ENCOUNTER — PATIENT MESSAGE (OUTPATIENT)
Dept: NEUROLOGY | Age: 37
End: 2022-06-02

## 2022-06-03 RX ORDER — PRAZOSIN HYDROCHLORIDE 1 MG/1
1 CAPSULE ORAL
Qty: 30 CAPSULE | Refills: 5 | Status: SHIPPED | OUTPATIENT
Start: 2022-06-03 | End: 2022-08-17 | Stop reason: SDUPTHER

## 2022-06-03 RX ORDER — HYDROXYZINE 50 MG/1
50 TABLET, FILM COATED ORAL
Qty: 90 TABLET | Refills: 5 | Status: SHIPPED | OUTPATIENT
Start: 2022-06-03 | End: 2022-07-26 | Stop reason: SDUPTHER

## 2022-06-03 RX ORDER — OLANZAPINE 10 MG/1
10 TABLET, ORALLY DISINTEGRATING ORAL
Qty: 30 TABLET | Refills: 5 | Status: SHIPPED | OUTPATIENT
Start: 2022-06-03 | End: 2022-07-26 | Stop reason: SDUPTHER

## 2022-06-03 RX ORDER — TRAZODONE HYDROCHLORIDE 50 MG/1
TABLET ORAL
Qty: 30 TABLET | Refills: 5 | Status: SHIPPED | OUTPATIENT
Start: 2022-06-03 | End: 2022-07-26 | Stop reason: SDUPTHER

## 2022-06-03 RX ORDER — FLUOXETINE HYDROCHLORIDE 40 MG/1
40 CAPSULE ORAL DAILY
Qty: 90 CAPSULE | Refills: 5 | Status: SHIPPED | OUTPATIENT
Start: 2022-06-03 | End: 2022-07-26 | Stop reason: SDUPTHER

## 2022-06-06 ENCOUNTER — TELEPHONE (OUTPATIENT)
Dept: NEUROLOGY | Age: 37
End: 2022-06-06

## 2022-06-06 RX ORDER — ERENUMAB-AOOE 70 MG/ML
70 INJECTION SUBCUTANEOUS ONCE
Qty: 2 EACH | Refills: 0 | Status: SHIPPED | COMMUNITY
Start: 2022-06-06 | End: 2022-06-06

## 2022-06-06 RX ORDER — NAPROXEN 500 MG/1
TABLET ORAL
Qty: 30 TABLET | Refills: 0 | Status: SHIPPED | OUTPATIENT
Start: 2022-06-06 | End: 2022-06-24

## 2022-06-06 NOTE — TELEPHONE ENCOUNTER
From: Cheryl Kurtz  To: Edita Mendoza MD  Sent: 6/2/2022 8:44 AM EDT  Subject: Aimovig    Good morning I am still trying to get the medication aimovig. I called the insurance company this morning and they said no paperwork has been submitted for this medication.  Please call with an update thank you

## 2022-06-09 ENCOUNTER — PATIENT MESSAGE (OUTPATIENT)
Dept: NEUROLOGY | Age: 37
End: 2022-06-09

## 2022-06-09 ENCOUNTER — OFFICE VISIT (OUTPATIENT)
Dept: FAMILY MEDICINE CLINIC | Age: 37
End: 2022-06-09

## 2022-06-09 VITALS
RESPIRATION RATE: 16 BRPM | HEART RATE: 74 BPM | BODY MASS INDEX: 26.2 KG/M2 | DIASTOLIC BLOOD PRESSURE: 60 MMHG | SYSTOLIC BLOOD PRESSURE: 116 MMHG | TEMPERATURE: 97.4 F | WEIGHT: 142.4 LBS | OXYGEN SATURATION: 97 % | HEIGHT: 62 IN

## 2022-06-09 DIAGNOSIS — M79.7 FIBROMYALGIA: ICD-10-CM

## 2022-06-09 DIAGNOSIS — E87.6 HYPOKALEMIA: Primary | ICD-10-CM

## 2022-06-09 DIAGNOSIS — M79.641 RIGHT HAND PAIN: ICD-10-CM

## 2022-06-09 PROCEDURE — 99214 OFFICE O/P EST MOD 30 MIN: CPT | Performed by: FAMILY MEDICINE

## 2022-06-09 NOTE — PATIENT INSTRUCTIONS
Ask your psychiatrist about Cymbalta, which is a common medication for fibromyalgia. Recommend over-the-counter vitamin D3 2000 units daily. Hand Arthritis: Exercises  Introduction  Here are some examples of exercises for you to try. The exercises may be suggested for a condition or for rehabilitation. Start each exercise slowly. Ease off the exercises if you start to have pain. You will be told when to start these exercises and which ones will work best for you. How to do the exercises  Tendon glides    1. In this exercise, the steps follow one another to a make a continuous movement. 2. With your affected hand, point your fingers and thumb straight up. Your wrist should be relaxed, following the line of your fingers and thumb. 3. Curl your fingers so that the top two joints in them are bent, and your fingers wrap down. Your fingertips should touch or be near the base of your fingers. Your fingers will look like a hook. 4. Make a fist by bending your knuckles. Your thumb can gently rest against your index (pointing) finger. 5. Unwind your fingers slightly so that your fingertips can touch the base of your palm. Your thumb can rest against your index finger. 6. Move back to your starting position, with your fingers and thumb pointing up. 7. Repeat the series of motions 8 to 12 times. 8. Switch hands and repeat steps 1 through 6, even if only one hand is sore. Intrinsic flexion    1. Rest your affected hand on a table and bend the large joints where your fingers connect to your hand. Keep your thumb and the other joints in your fingers straight. 2. Slowly straighten your fingers. Your wrist should be relaxed, following the line of your fingers and thumb. 3. Move back to your starting position, with your hand bent. 4. Repeat 8 to 12 times. 5. Switch hands and repeat steps 1 through 4, even if only one hand is sore. Finger extension    1. Place your affected hand flat on a table.   2. Lift and then lower one finger at a time off the table. 3. Repeat 8 to 12 times. 4. Switch hands and repeat steps 1 through 3, even if only one hand is sore. MP extension    1. Place your good hand on a table, palm up. Put your affected hand on top of your good hand with your fingers wrapped around the thumb of your good hand like you are making a fist.  2. Slowly uncurl the joints of your affected hand where your fingers connect to your hand so that only the top two joints of your fingers are bent. Your fingers will look like a hook. 3. Move back to your starting position, with your fingers wrapped around your good thumb. 4. Repeat 8 to 12 times. 5. Switch hands and repeat steps 1 through 4, even if only one hand is sore. PIP extension (with MP extension)    1. Place your good hand on a table, palm up. Put your affected hand on top of your good hand, palm up. 2. Use the thumb and fingers of your good hand to grasp below the middle joint of one finger of your affected hand. 3. Straighten the last two joints of that finger. 4. Repeat 8 to 12 times. 5. Repeat steps 1 through 4 with each finger. 6. Switch hands and repeat steps 1 through 5, even if only one hand is sore. DIP flexion    1. With your good hand, grasp one finger of your affected hand. Your thumb will be on the top side of your finger just below the joint that is closest to your fingernail. 2. Slowly bend your affected finger only at the joint closest to your fingernail. 3. Repeat 8 to 12 times. 4. Repeat steps 1 through 3 with each finger. 5. Switch hands and repeat steps 1 through 4, even if only one hand is sore. Follow-up care is a key part of your treatment and safety. Be sure to make and go to all appointments, and call your doctor if you are having problems. It's also a good idea to know your test results and keep a list of the medicines you take. Where can you learn more?   Go to http://www.gray.com/  Enter H993 in the search box to learn more about \"Hand Arthritis: Exercises. \"  Current as of: July 1, 2021               Content Version: 13.2  © 2006-2022 [employer]. Care instructions adapted under license by [employer] (which disclaims liability or warranty for this information). If you have questions about a medical condition or this instruction, always ask your healthcare professional. [place] [address] any warranty or liability for your use of this information.

## 2022-06-09 NOTE — PROGRESS NOTES
Chief Complaint   Patient presents with    Fibromyalgia    Hand Pain     right x2 weeks; hurts to bend fingers; pt cannot use right hand to open bottles; 5/10       1. \"Have you been to the ER, urgent care clinic since your last visit? Hospitalized since your last visit? \" No    2. \"Have you seen or consulted any other health care providers outside of the [address] [place] [address] since your last visit? \" Dr. Richie Schwartz; dx with IBS    3. For patients aged 39-70: Has the patient had a colonoscopy / FIT/ Cologuard? NA - based on age      If the patient is female:    4. For patients aged 41-77: Has the patient had a mammogram within the past 2 years? NA - based on age or sex      11. For patients aged 21-65: Has the patient had a pap smear? Yes - Care Gap present.  Most recent result on file    3 most recent PHQ Screens 5/23/2022   Little interest or pleasure in doing things Nearly every day   Feeling down, depressed, irritable, or hopeless Nearly every day   Total Score PHQ 2 6   Trouble falling or staying asleep, or sleeping too much Nearly every day   Feeling tired or having little energy Nearly every day   Poor appetite, weight loss, or overeating Nearly every day   Feeling bad about yourself - or that you are a failure or have let yourself or your family down Nearly every day   Trouble concentrating on things such as school, work, reading, or watching TV Nearly every day   Moving or speaking so slowly that other people could have noticed; or the opposite being so fidgety that others notice Nearly every day   Thoughts of being better off dead, or hurting yourself in some way Not at all   PHQ 9 Score 24   How difficult have these problems made it for you to do your work, take care of your home and get along with others Extremely difficult

## 2022-06-09 NOTE — PROGRESS NOTES
Patient Name: Harriet Gerard   MRN: [id number]    Kei Lewis is a 43 y.o. female who presents with the following:     Recent labs notable for low potassium. Patient states she has not taken chlorthalidone for the past 3 weeks. Denies any vomiting but has a little bit of diarrhea. Potassium pills make her nauseous and upset her stomach. Reports 2-week history of right hand pain. Pain is mostly located in all of her knuckles. Finds it difficult for her to close her fist,  items, and also has some swelling. Taking Aleve. Recently saw a rheumatology who says she has fibromyalgia. Deferred management to us. Unable to go to  due to lack of childcare. Her psychiatry appointment is in August; she will need a new provider at this appointment. Review of Systems   Constitutional: Negative for fever, malaise/fatigue and weight loss. Respiratory: Negative for cough, hemoptysis, shortness of breath and wheezing. Cardiovascular: Negative for chest pain, palpitations, leg swelling and PND. Gastrointestinal: Negative for abdominal pain, constipation, diarrhea, nausea and vomiting. Musculoskeletal: Positive for joint pain and myalgias. The patient's medications, allergies, past medical history, surgical history, family history and social history were reviewed and updated where appropriate. Current Outpatient Medications:     naproxen (NAPROSYN) 500 mg tablet, TAKE 1 TABLET BY MOUTH TWICE A DAY WITH MEALS, Disp: 30 Tablet, Rfl: 0    hydrOXYzine HCL (ATARAX) 50 mg tablet, Take 1 Tablet by mouth four (4) times daily as needed for Anxiety. , Disp: 90 Tablet, Rfl: 5    FLUoxetine (PROzac) 40 mg capsule, Take 1 Capsule by mouth daily. , Disp: 90 Capsule, Rfl: 5    traZODone (DESYREL) 50 mg tablet, TAKE 1 TABLET BY MOUTH AT BEDTIME AS NEEDED FOR SLEEP, Disp: 30 Tablet, Rfl: 5    prazosin (MINIPRESS) 1 mg capsule, Take 1 Capsule by mouth nightly.  Take 1 mg cap with Prazosin 2 mg for total dose of 3 mg nightly. Indications: posttraumatic stress syndrome, Disp: 30 Capsule, Rfl: 5    OLANZapine (ZyPREXA zydis) 10 mg disintegrating tablet, Take 1 Tablet by mouth nightly., Disp: 30 Tablet, Rfl: 5    tiZANidine (ZANAFLEX) 4 mg tablet, TAKE 1 TABLET BY MOUTH EVERY SIX (6) HOURS AS NEEDED FOR MUSCLE SPASM(S)., Disp: 30 Tablet, Rfl: 3    potassium chloride (KLOR-CON M10) 10 mEq tablet, Take 2 tab in the morning and 1 tab in the evening for total of 3 tabs per day., Disp: 270 Tablet, Rfl: 3    linaCLOtide (Linzess) 145 mcg cap capsule, , Disp: , Rfl:     ondansetron (ZOFRAN ODT) 4 mg disintegrating tablet, TAKE 1 TABLET BY MOUTH EVERY 8 HOURS AS NEEDED FOR NAUSEA AND VOMITING, Disp: 60 Tablet, Rfl: 0    cholecalciferol (VITAMIN D3) (50,000 UNITS /1250 MCG) capsule, Take 1 capsule once a week for the next 8 weeks then switch to OTC vitamin D3 2000 units daily, Disp: 8 Capsule, Rfl: 0    calcium carbonate (Antacid Ultra Strength) 400 mg calcium (1,000 mg) chew, TAKE 1/2 TABLET BY MOUTH DAILY, Disp: 90 Tablet, Rfl: 3    erenumab-aooe (Aimovig Autoinjector) 70 mg/mL injection, 1 mL by SubCUTAneous route every thirty (30) days. Indications: migraine prevention, Disp: 1 Each, Rfl: 1    chlorthalidone (HYGROTON) 25 mg tablet, Take 1 Tablet by mouth daily. , Disp: 90 Tablet, Rfl: 2    food supplemt, lactose-reduced (Ensure High Protein) liqd, Take 237 mL by mouth daily. , Disp: 90 Each, Rfl: 1    albuterol (PROVENTIL HFA, VENTOLIN HFA, PROAIR HFA) 90 mcg/actuation inhaler, Take 2 Puffs by inhalation every six (6) hours as needed for Wheezing., Disp: 1 Each, Rfl: 2    protein powd, 1-2 servings (15-18 g per serving) daily with meals. , Disp: 750 g, Rfl: 1    pantoprazole (PROTONIX) 40 mg tablet, Take 1 Tablet by mouth Daily (before breakfast).  Indications: gastroesophageal reflux disease, Disp: 90 Tablet, Rfl: 1    ergocalciferol (ERGOCALCIFEROL) 1,250 mcg (50,000 unit) capsule, Take 1 Capsule by mouth every seven (7) days. , Disp: 12 Capsule, Rfl: 0    polyethylene glycol (MIRALAX) 17 gram packet, Take 1 Packet by mouth daily as needed for Constipation. , Disp: 90 Each, Rfl: 2    OTHER, 1 Ensure Enlive food supplement drink PO TID, Disp: 270 Each, Rfl: 2    OTHER, 1 ensure pudding PO daily for lunch., Disp: 90 Each, Rfl: 3    riboflavin, vitamin B2, 400 mg tab, Take 400 mg by mouth daily. (Patient not taking: Reported on 5/23/2022), Disp: 90 Tablet, Rfl: 3    cyclobenzaprine (FLEXERIL) 10 mg tablet, TAKE 1 TABLET BY MOUTH THREE TIMES A DAY AS NEEDED FOR MUSCLE SPASMS (Patient not taking: Reported on 4/26/2022), Disp: 20 Tablet, Rfl: 0    prenatal vit-iron fumarate-fa (PRENATAL PLUS with IRON) 28 mg iron- 800 mcg tab, , Disp: , Rfl:     Allergies   Allergen Reactions    Labetalol Hives    Pcn [Penicillins] Hives    Ceclor [Cefaclor] Unknown (comments)    Septra [Sulfamethoprim Ds] Unknown (comments)         OBJECTIVE    Visit Vitals  /60 (BP 1 Location: Left upper arm, BP Patient Position: Sitting, BP Cuff Size: Adult)   Pulse 74   Temp 97.4 °F (36.3 °C) (Temporal)   Resp 16   Ht 5' 2\" (1.575 m)   Wt 142 lb 6.4 oz (64.6 kg)   SpO2 97%   BMI 26.05 kg/m²       Physical Exam  Vitals and nursing note reviewed. Constitutional:       General: She is not in acute distress. Appearance: Normal appearance. She is not toxic-appearing. HENT:      Head: Normocephalic and atraumatic. Eyes:      Pupils: Pupils are equal, round, and reactive to light. Pulmonary:      Effort: Pulmonary effort is normal.   Musculoskeletal:      Right hand: No swelling, deformity, lacerations, tenderness or bony tenderness. Decreased range of motion (decreased finger flexion). Normal strength. Skin:     General: Skin is warm and dry. Neurological:      Mental Status: She is alert. Mental status is at baseline.    Psychiatric:         Mood and Affect: Mood normal.         Behavior: Behavior normal. ASSESSMENT AND PLAN  Ana Mcpherson is a 43 y.o. female who presents today for:    1. Hypokalemia  Possible due to nutrition/diarrhea. Will switch to packets pending labs. - METABOLIC PANEL, BASIC; Future  - MAGNESIUM; Future  - PHOSPHORUS; Future  - METABOLIC PANEL, BASIC  - MAGNESIUM  - PHOSPHORUS    2. Right hand pain  Recommend Aleve and Tylenol with exercises. Will obtain xray. Could consider wrist brace and/or hand ortho referral if persistent. - XR HAND RT AP/LAT; Future    3. Fibromyalgia  Pt to discuss Cymbalta with her psychiatrist to help with chronic pain. Medications Discontinued During This Encounter   Medication Reason    cyclobenzaprine (FLEXERIL) 10 mg tablet LIST CLEANUP    prenatal vit-iron fumarate-fa (PRENATAL PLUS with IRON) 28 mg iron- 800 mcg tab LIST CLEANUP           Treatment risks/benefits/costs/interactions/alternatives discussed with patient. Advised patient to call back or return to office if symptoms worsen/change/persist. If patient cannot reach us or should anything more severe/urgent arise he/she should proceed directly to the nearest emergency department. Discussed expected course/resolution/complications of diagnosis in detail with patient. Patient expressed understanding with the diagnosis and plan. This dictation may have been completed with Dragon, the computer voice recognition software. Unanticipated grammatical, syntax, homophones, and other interpretive errors are sometimes inadvertently transcribed by the computer software. Please disregard any errors that have escaped final proofreading. Loren Calzada M.D.

## 2022-06-10 DIAGNOSIS — E87.6 HYPOKALEMIA: Primary | ICD-10-CM

## 2022-06-10 LAB
BUN SERPL-MCNC: 9 MG/DL (ref 6–20)
BUN/CREAT SERPL: 13 (ref 9–23)
CALCIUM SERPL-MCNC: 8.9 MG/DL (ref 8.7–10.2)
CHLORIDE SERPL-SCNC: 103 MMOL/L (ref 96–106)
CO2 SERPL-SCNC: 22 MMOL/L (ref 20–29)
CREAT SERPL-MCNC: 0.72 MG/DL (ref 0.57–1)
EGFR: 110 ML/MIN/1.73
GLUCOSE SERPL-MCNC: 75 MG/DL (ref 65–99)
MAGNESIUM SERPL-MCNC: 1.8 MG/DL (ref 1.6–2.3)
PHOSPHATE SERPL-MCNC: 4.7 MG/DL (ref 3–4.3)
POTASSIUM SERPL-SCNC: 3.5 MMOL/L (ref 3.5–5.2)
SODIUM SERPL-SCNC: 137 MMOL/L (ref 134–144)

## 2022-06-13 RX ORDER — POTASSIUM CHLORIDE 1.5 G/1.77G
20 POWDER, FOR SOLUTION ORAL 2 TIMES DAILY WITH MEALS
Qty: 60 PACKET | Refills: 3 | Status: SHIPPED | OUTPATIENT
Start: 2022-06-13

## 2022-06-13 NOTE — PROGRESS NOTES
Dear Ms. Osito Rene,    I wanted to follow up on your recent test results:    Potassium is now normal. However, in order to switch you to the packet so it's easier on your stomach and easier to swallow, I have to increase your very slightly. Your current dose is 20 meq in the morning and 10 meq in the evening but the packet only comes in 20 meq. So I will send a prescription of potassium packet 20 meq (= 1 packet) twice a day. Since this is a dose change, can we have you come to lab again in 2 weeks on this new dose? Let me know if the packets improve the side effects.

## 2022-06-17 NOTE — PROGRESS NOTES
Spoke with Dr Zeeshan Hinds earlier today and she provided support for a call or visit with patient. Called pt this afternoon. She was verbal and interaction. Discussed issues that led up to inpatient admission. Pt open as she discussed ongoing stressors that she is dealing with. Reviewed importance of self care during pregnancy. Discussed providing weekly follow up appointments once pt is discharged. Agreed to discuss with Dr Zeeshan Hinds tomorrow to provide support as needed. Speaking Coherently

## 2022-06-24 RX ORDER — NAPROXEN 500 MG/1
TABLET ORAL
Qty: 30 TABLET | Refills: 0 | Status: SHIPPED | OUTPATIENT
Start: 2022-06-24 | End: 2022-07-11

## 2022-06-27 DIAGNOSIS — K21.9 GASTROESOPHAGEAL REFLUX DISEASE WITHOUT ESOPHAGITIS: ICD-10-CM

## 2022-06-27 RX ORDER — PANTOPRAZOLE SODIUM 40 MG/1
40 TABLET, DELAYED RELEASE ORAL
Qty: 90 TABLET | Refills: 1 | Status: SHIPPED | OUTPATIENT
Start: 2022-06-27

## 2022-07-06 RX ORDER — TIZANIDINE 4 MG/1
4 TABLET ORAL
Qty: 30 TABLET | Refills: 3 | Status: SHIPPED | OUTPATIENT
Start: 2022-07-06 | End: 2022-08-12

## 2022-07-08 DIAGNOSIS — K59.00 CONSTIPATION, UNSPECIFIED CONSTIPATION TYPE: ICD-10-CM

## 2022-07-08 RX ORDER — POLYETHYLENE GLYCOL 3350 17 G/17G
17 POWDER, FOR SOLUTION ORAL
Qty: 90 EACH | Refills: 2 | Status: SHIPPED | OUTPATIENT
Start: 2022-07-08

## 2022-07-08 NOTE — TELEPHONE ENCOUNTER
Last visit 06/09/2022 MD Hay Wilson   Next appointment 10/27/2022 MD Hay Wilson   Previous refill encounter(s)   08/12/2021 Miralax packets #90 with 2 refills. For Pharmacy Admin Tracking Only     Intervention Detail: New Rx: 1, reason: Patient Preference   Time Spent (min): 5        Requested Prescriptions     Pending Prescriptions Disp Refills    polyethylene glycol (MIRALAX) 17 gram packet 90 Each 0     Sig: Take 1 Packet by mouth daily as needed for Constipation.

## 2022-07-11 RX ORDER — NAPROXEN 500 MG/1
TABLET ORAL
Qty: 30 TABLET | Refills: 0 | Status: SHIPPED | OUTPATIENT
Start: 2022-07-11 | End: 2022-08-01

## 2022-07-19 ENCOUNTER — OFFICE VISIT (OUTPATIENT)
Dept: FAMILY MEDICINE CLINIC | Age: 37
End: 2022-07-19
Payer: MEDICAID

## 2022-07-19 VITALS
HEIGHT: 62 IN | BODY MASS INDEX: 25.28 KG/M2 | TEMPERATURE: 97.5 F | HEART RATE: 94 BPM | SYSTOLIC BLOOD PRESSURE: 94 MMHG | OXYGEN SATURATION: 98 % | DIASTOLIC BLOOD PRESSURE: 61 MMHG | RESPIRATION RATE: 18 BRPM | WEIGHT: 137.4 LBS

## 2022-07-19 DIAGNOSIS — N30.01 ACUTE CYSTITIS WITH HEMATURIA: Primary | ICD-10-CM

## 2022-07-19 DIAGNOSIS — F33.1 MDD (MAJOR DEPRESSIVE DISORDER), RECURRENT EPISODE, MODERATE (HCC): Chronic | ICD-10-CM

## 2022-07-19 DIAGNOSIS — R35.0 URINARY FREQUENCY: ICD-10-CM

## 2022-07-19 LAB
BILIRUB UR QL STRIP: NEGATIVE
GLUCOSE UR-MCNC: NEGATIVE MG/DL
HCG URINE, QL. (POC): NEGATIVE
KETONES P FAST UR STRIP-MCNC: NEGATIVE MG/DL
PH UR STRIP: 7 [PH] (ref 4.6–8)
PROT UR QL STRIP: NORMAL
SP GR UR STRIP: 1.01 (ref 1–1.03)
UA UROBILINOGEN AMB POC: NORMAL (ref 0.2–1)
URINALYSIS CLARITY POC: NORMAL
URINALYSIS COLOR POC: NORMAL
URINE BLOOD POC: NORMAL
URINE LEUKOCYTES POC: NORMAL
URINE NITRITES POC: NEGATIVE
VALID INTERNAL CONTROL?: YES

## 2022-07-19 PROCEDURE — 81025 URINE PREGNANCY TEST: CPT | Performed by: STUDENT IN AN ORGANIZED HEALTH CARE EDUCATION/TRAINING PROGRAM

## 2022-07-19 PROCEDURE — 99214 OFFICE O/P EST MOD 30 MIN: CPT | Performed by: STUDENT IN AN ORGANIZED HEALTH CARE EDUCATION/TRAINING PROGRAM

## 2022-07-19 PROCEDURE — 81003 URINALYSIS AUTO W/O SCOPE: CPT | Performed by: STUDENT IN AN ORGANIZED HEALTH CARE EDUCATION/TRAINING PROGRAM

## 2022-07-19 RX ORDER — ONDANSETRON 4 MG/1
4 TABLET, FILM COATED ORAL DAILY
COMMUNITY
Start: 2022-07-05 | End: 2022-09-07

## 2022-07-19 RX ORDER — CIPROFLOXACIN 500 MG/1
500 TABLET ORAL 2 TIMES DAILY
Qty: 28 TABLET | Refills: 0 | Status: SHIPPED | OUTPATIENT
Start: 2022-07-19 | End: 2022-07-19 | Stop reason: ALTCHOICE

## 2022-07-19 RX ORDER — NITROFURANTOIN 25; 75 MG/1; MG/1
100 CAPSULE ORAL 2 TIMES DAILY
Qty: 10 CAPSULE | Refills: 0 | Status: SHIPPED | OUTPATIENT
Start: 2022-07-19 | End: 2022-07-24

## 2022-07-19 NOTE — PROGRESS NOTES
Dio Dooley  43 y.o. female  1985  [address] [facility] [id number]  [address]       Chief Complaint: urinary frequency, blood in urine   Source: self and the medical record     Subjective  Dio Dooley is an 43 y.o. female who presents for urinary frequency and pain starting 5 days ago. She has noticed blood in the urine. No history of kidney stone. Had pyelonephritis with her last pregnancy, failed outpatient ab and was admitted and put on IV abx with subsequent PICC infection. She denies history of recurrent or frequent UTI prior to pregnancy and childbearing, denies history of kidney stones. Depression  Followed by behavioral health and with weekly therapy appointments. Longstanding depression prior to pregnancy. Seeing a new provider as last psych provider has left the practice.  She is on prozac, atarax, zyprexa,minipress, trazodone    3 most recent PHQ Screens 7/19/2022 5/23/2022 5/23/2022   Little interest or pleasure in doing things Not at all Nearly every day Several days   Feeling down, depressed, irritable, or hopeless Nearly every day Nearly every day Several days   Total Score PHQ 2 3 6 2   Trouble falling or staying asleep, or sleeping too much Nearly every day Nearly every day -   Feeling tired or having little energy Nearly every day Nearly every day -   Poor appetite, weight loss, or overeating Nearly every day Nearly every day -   Feeling bad about yourself - or that you are a failure or have let yourself or your family down Nearly every day Nearly every day -   Trouble concentrating on things such as school, work, reading, or watching TV Nearly every day Nearly every day -   Moving or speaking so slowly that other people could have noticed; or the opposite being so fidgety that others notice Not at all Nearly every day -   Thoughts of being better off dead, or hurting yourself in some way Not at all Not at all -   PHQ 9 Score 18 24 -   How difficult have these problems made it for you to do your work, take care of your home and get along with others Somewhat difficult Extremely difficult -         Allergies - reviewed: Allergies   Allergen Reactions    Labetalol Hives    Pcn [Penicillins] Hives    Ceclor [Cefaclor] Unknown (comments)    Septra [Sulfamethoprim Ds] Unknown (comments)         Medications - reviewed:   Current Outpatient Medications   Medication Sig    ciprofloxacin HCl (CIPRO) 500 mg tablet Take 1 Tablet by mouth two (2) times a day for 14 days.  naproxen (NAPROSYN) 500 mg tablet TAKE 1 TABLET BY MOUTH TWICE A DAY WITH MEALS    polyethylene glycol (MIRALAX) 17 gram packet Take 1 Packet by mouth daily as needed for Constipation.  pantoprazole (PROTONIX) 40 mg tablet TAKE 1 TABLET BY MOUTH DAILY (BEFORE BREAKFAST). INDICATIONS: GASTROESOPHAGEAL REFLUX DISEASE    ondansetron (ZOFRAN ODT) 4 mg disintegrating tablet TAKE 1 TABLET BY MOUTH EVERY 8 HOURS AS NEEDED FOR NAUSEA AND VOMITING    potassium chloride (KLOR-CON) 20 mEq pack Take 1 Packet by mouth two (2) times daily (with meals).  hydrOXYzine HCL (ATARAX) 50 mg tablet Take 1 Tablet by mouth four (4) times daily as needed for Anxiety.  FLUoxetine (PROzac) 40 mg capsule Take 1 Capsule by mouth daily.  traZODone (DESYREL) 50 mg tablet TAKE 1 TABLET BY MOUTH AT BEDTIME AS NEEDED FOR SLEEP    prazosin (MINIPRESS) 1 mg capsule Take 1 Capsule by mouth nightly. Take 1 mg cap with Prazosin 2 mg for total dose of 3 mg nightly. Indications: posttraumatic stress syndrome    OLANZapine (ZyPREXA zydis) 10 mg disintegrating tablet Take 1 Tablet by mouth nightly.  linaCLOtide (Linzess) 145 mcg cap capsule     cholecalciferol (VITAMIN D3) (50,000 UNITS /1250 MCG) capsule Take 1 capsule once a week for the next 8 weeks then switch to OTC vitamin D3 2000 units daily    riboflavin, vitamin B2, 400 mg tab Take 400 mg by mouth daily.     calcium carbonate (Antacid Ultra Strength) 400 mg calcium (1,000 mg) chew TAKE 1/2 TABLET BY MOUTH DAILY    erenumab-aooe (Aimovig Autoinjector) 70 mg/mL injection 1 mL by SubCUTAneous route every thirty (30) days. Indications: migraine prevention    chlorthalidone (HYGROTON) 25 mg tablet Take 1 Tablet by mouth daily.  food supplemt, lactose-reduced (Ensure High Protein) liqd Take 237 mL by mouth daily.  protein powd 1-2 servings (15-18 g per serving) daily with meals.  ergocalciferol (ERGOCALCIFEROL) 1,250 mcg (50,000 unit) capsule Take 1 Capsule by mouth every seven (7) days.  OTHER 1 Ensure Enlive food supplement drink PO TID    OTHER 1 ensure pudding PO daily for lunch.  ondansetron hcl (ZOFRAN) 4 mg tablet Take 4 mg by mouth daily. (Patient not taking: Reported on 2022)    tiZANidine (ZANAFLEX) 4 mg tablet TAKE 1 TABLET BY MOUTH EVERY SIX (6) HOURS AS NEEDED FOR MUSCLE SPASM(S). (Patient not taking: Reported on 2022)    albuterol (PROVENTIL HFA, VENTOLIN HFA, PROAIR HFA) 90 mcg/actuation inhaler Take 2 Puffs by inhalation every six (6) hours as needed for Wheezing. (Patient not taking: Reported on 2022)     No current facility-administered medications for this visit. Past Medical History - reviewed:  Past Medical History:   Diagnosis Date    Acute pyelonephritis 3/3/2021    Anorexia     Anxiety     Asthma     on albuterol prn.  Triggers cold and allergies    Avoidant-restrictive food intake disorder (ARFID)     Back pain, chronic     sciatica    Chronic bilateral low back pain with right-sided sciatica 2020    ~2010    Fibromyalgia 2022    GERD (gastroesophageal reflux disease) 2020    UGI 10-, GI Dr. Criselda Ahumada Gestational hypertension     Past pregnancy    [facility]     Hyperemesis     severe hyperemesis    Hypertension     with G12 - none this pregnancy    IBS (irritable bowel syndrome) 2022    Iron deficiency anemia 10/08/2010    MDD (major depressive disorder), single episode with postpartum onset 2013    treated with meds - 13    Orthostatic hypotension 2020    Plantar fasciitis     Pregnancy     36 weeks    PTSD (post-traumatic stress disorder)          Past Surgical History - reviewed:   Past Surgical History:   Procedure Laterality Date    HX  SECTION  4/22/15    HX DILATION AND CURETTAGE      HX DILATION AND CURETTAGE  2020    HX GYN      miscarriage x 6    HX GYN      removal of left fallopian tube    HX OTHER SURGICAL      cerlcage x4, ectopic x1 1999 with removal of left fallopian tube    HX OTHER SURGICAL      cerclage w/ current pregnancy. Removed 18    [place]           Social History - reviewed:  Social History     Socioeconomic History    Marital status: SINGLE     Spouse name: Not on file    Number of children: 9    Years of education: Not on file    Highest education level: 12th grade   Occupational History    Not on file   Tobacco Use    Smoking status: Never Smoker    Smokeless tobacco: Never Used   Vaping Use    Vaping Use: Never used   Substance and Sexual Activity    Alcohol use: Not Currently    Drug use: No    Sexual activity: Yes     Partners: Male   Other Topics Concern     Service Not Asked    Blood Transfusions Not Asked    Caffeine Concern Not Asked    Occupational Exposure Not Asked    Hobby Hazards Not Asked    Sleep Concern Not Asked    Stress Concern Not Asked    Weight Concern Not Asked    Special Diet Not Asked    Back Care Not Asked    Exercise Not Asked    Bike Helmet Not Asked    [address] Not Asked    Self-Exams Not Asked   Social History Narrative    Cristóbal Cruz lives with boyfriend (fimagdaleno), Judith Fear,  reportedly with alcohol dependence. She was raised by her mother. She has no siblings. Not working. She is supported financially by the childrens' father and public support.   She has no hobbies outside of her children. She has a 12th grade education and no legal entanglements. 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 Social Determinants of Health     Financial Resource Strain:     Difficulty of Paying Living Expenses: Not on file   Food Insecurity:     Worried About Running Out of Food in the Last Year: Not on file    Twila of Food in the Last Year: Not on file   Transportation Needs:     Lack of Transportation (Medical): Not on file    Lack of Transportation (Non-Medical):  Not on file   Physical Activity:     Days of Exercise per Week: Not on file    Minutes of Exercise per Session: Not on file   Stress:     Feeling of Stress : Not on file   Social Connections:     Frequency of Communication with Friends and Family: Not on file    Frequency of Social Gatherings with Friends and Family: Not on file    Attends Buddhism Services: Not on file    Active Member of [address] [place] [address] or Organizations: Not on file    Attends Club or Organization Meetings: Not on file    Marital Status: Not on file   Intimate Partner Violence:     Fear of Current or Ex-Partner: Not on file    Emotionally Abused: Not on file    Physically Abused: Not on file    Sexually Abused: Not on file   Housing Stability:     Unable to Pay for Housing in the Last Year: Not on file    Number of [place] in the Last Year: Not on file    Unstable Housing in the Last Year: Not on file         Family History - reviewed:  Family History   Problem Relation Age of Onset   [facility] Arthritis-rheumatoid Mother     Asthma Mother     No Known Problems Father     No Known Problems Maternal Grandmother     No Known Problems Maternal Grandfather     No Known Problems Paternal Grandmother     No Known Problems Paternal Grandfather     Asthma Son     Alcohol abuse Neg Hx     OSTEOARTHRITIS Neg Hx     Bleeding Prob Neg Hx     Cancer Neg Hx     Diabetes Neg Hx     Elevated Lipids Neg Hx     Headache Neg Hx     Heart Disease Neg Hx     Hypertension Neg Hx     Lung Disease Neg Hx     Migraines Neg Hx     Psychiatric Disorder Neg Hx     Stroke Neg Hx     Mental Retardation Neg Hx     Anesth Problems Neg Hx          Immunizations - reviewed:   Immunization History   Administered Date(s) Administered    (RETIRED) Pneumococcal Vaccine (Unspecified Type) 09/14/2012    COVID-19, PFIZER PURPLE top, DILUTE for use, (age 15 y+), IM, 30mcg/0.3mL 07/22/2021, 08/12/2021    Influenza Vaccine 11/01/2018    Influenza Vaccine (Quad) PF (>6 Mo Flulaval, Fluarix, and >3 Yrs Afluria, Fluzone [address]61[address]) 09/30/2016, 09/16/2020, 11/01/2021    Influenza Vaccine PF 10/22/2013    Pneumococcal Conjugate (PCV-7) 09/14/2012    TDAP Vaccine 09/14/2012    Tdap 03/10/2015, 03/02/2018       Review of Systems   Constitutional: Positive for malaise/fatigue. Negative for chills, fever and weight loss. Genitourinary: Positive for dysuria, flank pain, frequency, hematuria and urgency. Psychiatric/Behavioral: Positive for depression. Negative for suicidal ideas. Physical Exam  Visit Vitals  BP 94/61 (BP 1 Location: Left upper arm, BP Patient Position: Sitting, BP Cuff Size: Adult)   Pulse 94   Temp 97.5 °F (36.4 °C) (Temporal)   Resp 18   Ht 5' 2\" (1.575 m)   Wt 137 lb 6.4 oz (62.3 kg)   SpO2 98%   BMI 25.13 kg/m²       Physical Exam  Vitals and nursing note reviewed. Constitutional:       General: She is not in acute distress. Appearance: Normal appearance. She is normal weight. She is not ill-appearing, toxic-appearing or diaphoretic. Abdominal:      Tenderness: There is no right CVA tenderness or left CVA tenderness. Neurological:      Mental Status: She is alert.    Psychiatric:         Mood and Affect: Mood normal.         Behavior: Behavior normal.       Labs -  Personally reviewed  Recent Results (from the past 12 hour(s))   AMB POC URINALYSIS DIP STICK AUTO W/O MICRO    Collection Time: 07/19/22  2:15 PM   Result Value Ref Range    Color (UA POC) Vern[place]     Clarity (UA POC) Cloudy     Glucose (UA POC) Negative Negative    Bilirubin (UA POC) Negative Negative    Ketones (UA POC) Negative Negative    Specific gravity (UA POC) 1.010 1.001 - 1.035    Blood (UA POC) 2+ Negative    pH (UA POC) 7.0 4.6 - 8.0    Protein (UA POC) 1+ Negative    Urobilinogen (UA POC) 0.2 mg/dL 0.2 - 1    Nitrites (UA POC) Negative Negative    Leukocyte esterase (UA POC) 3+ Negative   AMB POC URINE PREGNANCY TEST, VISUAL COLOR COMPARISON    Collection Time: 07/19/22  2:17 PM   Result Value Ref Range    VALID INTERNAL CONTROL POC Yes     HCG urine, Ql. (POC) Negative Negative         Assessment/Plan    ICD-10-CM ICD-9-CM    1. Acute cystitis with hematuria  N30.01 595.0 ciprofloxacin HCl (CIPRO) 500 mg tablet   2. Urinary frequency  R35.0 788.41 AMB POC URINALYSIS DIP STICK AUTO W/O MICRO      AMB POC URINE PREGNANCY TEST, VISUAL COLOR COMPARISON      CULTURE, URINE      CULTURE, URINE   3. MDD (major depressive disorder), recurrent episode, moderate ([facility] [address]  F33.1 296.32        America Navarro is an 43 y.o. female presenting with 5 days of dysuria with hematuria. Had multiple UTI with last pregnancy including one MDR UTI requiring admission for IV abx. UA today c/w UTI - long course of cipro given history. If she continue to have recurrent UTIs outside of pregnancy may benefit from urologic workup. 1. Acute cystitis with hematuria  - ciprofloxacin HCl (CIPRO) 500 mg tablet; Take 1 Tablet by mouth two (2) times a day for 14 days. Dispense: 28 Tablet; Refill: 0    2. Urinary frequency  - AMB POC URINALYSIS DIP STICK AUTO W/O MICRO  - AMB POC URINE PREGNANCY TEST, VISUAL COLOR COMPARISON  - CULTURE, URINE; Future    3.  MDD: baseline, followed by psych on multiple medications  - provided information for PSVA support groups in [place]    Patient informed to follow up: Follow-up and Dispositions    · Return if symptoms worsen or fail to improve, for routine care with Dr De Jesus Patient . I have discussed the diagnosis with the patient and the intended plan as seen in the above orders. Patient verbalized understanding of the plan and agrees with the plan. The patient has received an after-visit summary and questions were answered concerning future plans. I have discussed medication side effects and warnings with the patient as well. Informed patient to return to the office if new symptoms arise.       Nadege Tate MD  [facility]

## 2022-07-19 NOTE — PATIENT INSTRUCTIONS
Postpartum Support [place]   [email or web address] - check out support groups and provider list if you're interested

## 2022-07-19 NOTE — PROGRESS NOTES
Chief Complaint   Patient presents with    Abdominal Pain    Urinary Pain     Apox 3 days    Depression     1. \"Have you been to the ER, urgent care clinic since your last visit? Hospitalized since your last visit? \" No    2. \"Have you seen or consulted any other health care providers outside of the [address] [place] [address] since your last visit? \" No     3. For patients aged 39-70: Has the patient had a colonoscopy / FIT/ Cologuard? NA - based on age      If the patient is female:    4. For patients aged 41-77: Has the patient had a mammogram within the past 2 years? NA - based on age or sex      11. For patients aged 21-65: Has the patient had a pap smear?  No      3 most recent PHQ Screens 7/19/2022   Little interest or pleasure in doing things Not at all   Feeling down, depressed, irritable, or hopeless Nearly every day   Total Score PHQ 2 3   Trouble falling or staying asleep, or sleeping too much Nearly every day   Feeling tired or having little energy Nearly every day   Poor appetite, weight loss, or overeating Nearly every day   Feeling bad about yourself - or that you are a failure or have let yourself or your family down Nearly every day   Trouble concentrating on things such as school, work, reading, or watching TV Nearly every day   Moving or speaking so slowly that other people could have noticed; or the opposite being so fidgety that others notice Not at all   Thoughts of being better off dead, or hurting yourself in some way Not at all   PHQ 9 Score 18   How difficult have these problems made it for you to do your work, take care of your home and get along with others Somewhat difficult

## 2022-07-24 LAB
BACTERIA UR CULT: ABNORMAL
BACTERIA UR CULT: ABNORMAL

## 2022-07-25 DIAGNOSIS — B96.89 UTI DUE TO KLEBSIELLA SPECIES: Primary | ICD-10-CM

## 2022-07-25 DIAGNOSIS — N39.0 UTI DUE TO KLEBSIELLA SPECIES: Primary | ICD-10-CM

## 2022-07-25 RX ORDER — CIPROFLOXACIN 500 MG/1
500 TABLET ORAL 2 TIMES DAILY
Qty: 28 TABLET | Refills: 0 | Status: SHIPPED | OUTPATIENT
Start: 2022-07-25 | End: 2022-08-08

## 2022-07-26 ENCOUNTER — OFFICE VISIT (OUTPATIENT)
Dept: BEHAVIORAL/MENTAL HEALTH CLINIC | Age: 37
End: 2022-07-26
Payer: MEDICAID

## 2022-07-26 VITALS
HEART RATE: 91 BPM | TEMPERATURE: 98 F | DIASTOLIC BLOOD PRESSURE: 70 MMHG | OXYGEN SATURATION: 98 % | HEIGHT: 65 IN | SYSTOLIC BLOOD PRESSURE: 107 MMHG | WEIGHT: 138.6 LBS | RESPIRATION RATE: 17 BRPM | BODY MASS INDEX: 23.09 KG/M2

## 2022-07-26 DIAGNOSIS — F41.1 GAD (GENERALIZED ANXIETY DISORDER): ICD-10-CM

## 2022-07-26 DIAGNOSIS — F43.11 POST-TRAUMATIC STRESS DISORDER, ACUTE: ICD-10-CM

## 2022-07-26 DIAGNOSIS — F32.2 CURRENT SEVERE EPISODE OF MAJOR DEPRESSIVE DISORDER WITHOUT PSYCHOTIC FEATURES WITHOUT PRIOR EPISODE (HCC): Primary | ICD-10-CM

## 2022-07-26 PROCEDURE — 90792 PSYCH DIAG EVAL W/MED SRVCS: CPT | Performed by: NURSE PRACTITIONER

## 2022-07-26 RX ORDER — FLUOXETINE HYDROCHLORIDE 40 MG/1
40 CAPSULE ORAL DAILY
Qty: 90 CAPSULE | Refills: 2 | Status: SHIPPED | OUTPATIENT
Start: 2022-07-26

## 2022-07-26 RX ORDER — OLANZAPINE 10 MG/1
10 TABLET, ORALLY DISINTEGRATING ORAL
Qty: 30 TABLET | Refills: 2 | Status: SHIPPED | OUTPATIENT
Start: 2022-07-26

## 2022-07-26 RX ORDER — TRAZODONE HYDROCHLORIDE 50 MG/1
TABLET ORAL
Qty: 30 TABLET | Refills: 2 | Status: SHIPPED | OUTPATIENT
Start: 2022-07-26 | End: 2022-08-09

## 2022-07-26 RX ORDER — HYDROXYZINE 50 MG/1
50 TABLET, FILM COATED ORAL
Qty: 90 TABLET | Refills: 2 | Status: SHIPPED | OUTPATIENT
Start: 2022-07-26

## 2022-07-26 NOTE — PROGRESS NOTES
Chief Complaint   Patient presents with    New Patient     Former Luiz Carlin NP patient. Visit Vitals  /70 (BP 1 Location: Left upper arm, BP Patient Position: Sitting, BP Cuff Size: Adult)   Pulse 91   Temp 98 °F (36.7 °C) (Temporal)   Resp 17   Ht 5' 5\" (1.651 m)   Wt 62.9 kg (138 lb 9.6 oz)   SpO2 98%   BMI 23.06 kg/m²     Prior to Admission medications    Medication Sig Start Date End Date Taking? Authorizing Provider   ciprofloxacin HCl (CIPRO) 500 mg tablet Take 1 Tablet by mouth two (2) times a day for 14 days. 7/25/22 8/8/22 Yes Shayla Fierro MD   naproxen (NAPROSYN) 500 mg tablet TAKE 1 TABLET BY MOUTH TWICE A DAY WITH MEALS 7/11/22  Yes Aide Viramontes MD   polyethylene glycol (MIRALAX) 17 gram packet Take 1 Packet by mouth daily as needed for Constipation. 7/8/22  Yes Prudence Reyna MD   pantoprazole (PROTONIX) 40 mg tablet TAKE 1 TABLET BY MOUTH DAILY (BEFORE BREAKFAST). INDICATIONS: GASTROESOPHAGEAL REFLUX DISEASE 6/27/22  Yes Chloe Rojas MD   ondansetron (ZOFRAN ODT) 4 mg disintegrating tablet TAKE 1 TABLET BY MOUTH EVERY 8 HOURS AS NEEDED FOR NAUSEA AND VOMITING 6/24/22  Yes Chloe Rojas MD   potassium chloride (KLOR-CON) 20 mEq pack Take 1 Packet by mouth two (2) times daily (with meals). 6/13/22  Yes Chloe Rojas MD   hydrOXYzine HCL (ATARAX) 50 mg tablet Take 1 Tablet by mouth four (4) times daily as needed for Anxiety. 6/3/22  Yes Valencia Ortiz NP   FLUoxetine (PROzac) 40 mg capsule Take 1 Capsule by mouth daily. 6/3/22  Yes Valencia Ortiz NP   traZODone (DESYREL) 50 mg tablet TAKE 1 TABLET BY MOUTH AT BEDTIME AS NEEDED FOR SLEEP 6/3/22  Yes Valencia Ortiz NP   prazosin (MINIPRESS) 1 mg capsule Take 1 Capsule by mouth nightly. Take 1 mg cap with Prazosin 2 mg for total dose of 3 mg nightly.   Indications: posttraumatic stress syndrome 6/3/22  Yes Valencia Ortiz, NP   OLANZapine (ZyPREXA zydis) 10 mg disintegrating tablet Take 1 Tablet by mouth nightly. 6/3/22  Yes Adriana Ortiz NP   linaCLOtide [place]) 145 mcg cap capsule  5/16/22  Yes Provider, Historical   cholecalciferol (VITAMIN D3) (50,000 UNITS /1250 MCG) capsule Take 1 capsule once a week for the next 8 weeks then switch to OTC vitamin D3 2000 units daily 4/28/22  Yes Pam Carpenter MD   calcium carbonate (Antacid Ultra Strength) 400 mg calcium (1,000 mg) chew TAKE 1/2 TABLET BY MOUTH DAILY 4/26/22  Yes Pam Carpenter MD   erenumab-aooe (Aimovig Autoinjector) 70 mg/mL injection 1 mL by SubCUTAneous route every thirty (30) days. Indications: migraine prevention 5/29/22  Yes Lula Nixon MD   chlorthalidone (HYGROTON) 25 mg tablet Take 1 Tablet by mouth daily. 3/22/22  Yes Pam Carpenter MD   food supplemt, lactose-reduced (Ensure High Protein) liqd Take 237 mL by mouth daily. 3/22/22  Yes Pam Carpenter MD   albuterol (PROVENTIL HFA, VENTOLIN HFA, PROAIR HFA) 90 mcg/actuation inhaler Take 2 Puffs by inhalation every six (6) hours as needed for Wheezing. 3/22/22  Yes Pam Carpenter MD   protein powd 1-2 servings (15-18 g per serving) daily with meals. 3/22/22  Yes Pam Carpenter MD   OTHER 1 Ensure Enlive food supplement drink PO TID 11/4/20  Yes Joanie Bernheim, NP   ondansetron hcl (ZOFRAN) 4 mg tablet Take 4 mg by mouth daily. Patient not taking: No sig reported 7/5/22   Provider, Historical   tiZANidine (ZANAFLEX) 4 mg tablet TAKE 1 TABLET BY MOUTH EVERY SIX (6) HOURS AS NEEDED FOR MUSCLE SPASM(S). Patient not taking: No sig reported 7/6/22   Gerardo Jones MD   riboflavin, vitamin B2, 400 mg tab Take 400 mg by mouth daily. Patient not taking: Reported on 7/26/2022 4/26/22   Pam Carpenter MD   ergocalciferol (ERGOCALCIFEROL) 1,250 mcg (50,000 unit) capsule Take 1 Capsule by mouth every seven (7) days. Patient not taking: Reported on 7/26/2022 8/17/21   Joanie Bernheim, NP   OTHER 1 ensure pudding PO daily for lunch.   Patient not taking: Reported on 7/26/2022 10/20/20   Matt Lisa NP     3 most recent [facility] 36 Screens 7/26/2022   Little interest or pleasure in doing things Nearly every day   Feeling down, depressed, irritable, or hopeless Nearly every day   Total Score PHQ 2 6   Trouble falling or staying asleep, or sleeping too much Nearly every day   Feeling tired or having little energy Nearly every day   Poor appetite, weight loss, or overeating Nearly every day   Feeling bad about yourself - or that you are a failure or have let yourself or your family down Nearly every day   Trouble concentrating on things such as school, work, reading, or watching TV Nearly every day   Moving or speaking so slowly that other people could have noticed; or the opposite being so fidgety that others notice Nearly every day   Thoughts of being better off dead, or hurting yourself in some way Nearly every day   PHQ 9 Score 27   How difficult have these problems made it for you to do your work, take care of your home and get along with others Extremely difficult     1. Have you been to the ER, urgent care clinic since your last visit? Hospitalized since your last visit? No    2. Have you seen or consulted any other health care providers outside of the [address] [place] [address] since your last visit? Include any pap smears or colon screening.  No
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
## 2022-08-01 ENCOUNTER — OFFICE VISIT (OUTPATIENT)
Dept: NEUROLOGY | Age: 37
End: 2022-08-01
Payer: MEDICAID

## 2022-08-01 DIAGNOSIS — R20.2 NUMBNESS AND TINGLING OF UPPER AND LOWER EXTREMITIES OF BOTH SIDES: Primary | ICD-10-CM

## 2022-08-01 DIAGNOSIS — R20.0 NUMBNESS AND TINGLING OF UPPER AND LOWER EXTREMITIES OF BOTH SIDES: Primary | ICD-10-CM

## 2022-08-01 PROCEDURE — 95913 NRV CNDJ TEST 13/> STUDIES: CPT | Performed by: PSYCHIATRY & NEUROLOGY

## 2022-08-01 PROCEDURE — 95886 MUSC TEST DONE W/N TEST COMP: CPT | Performed by: PSYCHIATRY & NEUROLOGY

## 2022-08-01 RX ORDER — NAPROXEN 500 MG/1
TABLET ORAL
Qty: 30 TABLET | Refills: 0 | Status: SHIPPED | OUTPATIENT
Start: 2022-08-01 | End: 2022-08-16

## 2022-08-01 NOTE — PROCEDURES
EMG/ NCS Report  [facility]  [facility]  [address] [facility], [address] [place] [address] [address]   Ph: [phone]   FAX: [phone]/ [phone]    Test Date:  2022    Patient: Kumar Ochoa : 1985 Physician: Valeria Morel M.D. Sex: Female Height: ' \" Ref Phys: Valeria Morel M.D.   ID#: [id number] Weight:  lbs. Technician: Yuliya Farmer     Patient History:  CC: Numbness, tingling in all extremities. EMG & NCV Findings:    Evaluation of the right Fibular motor nerve showed normal distal onset latency (5.4 ms), reduced amplitude (0.4 mV), decreased conduction velocity (B Fib-Ankle, 35 m/s), and normal conduction velocity (Poplt-B Fib, 67 m/s). The right Fibular TA motor nerve showed normal distal onset latency (Fib Head, 2.8 ms), normal amplitude (5.3 mV), normal distal onset latency (Poplit, 4.2 ms), and normal conduction velocity (Poplit-Fib Head, 71 m/s). The right median motor nerve showed normal distal onset latency (3.8 ms), normal amplitude (14.0 mV), and normal conduction velocity (Elbow-Wrist, 50 m/s). The right tibial motor nerve showed normal distal onset latency (5.1 ms), normal amplitude (6.1 mV), and normal conduction velocity (Knee-Ankle, 41 m/s). The right ulnar motor nerve showed normal distal onset latency (3.1 ms), normal amplitude (12.1 mV), normal conduction velocity (B Elbow-Wrist, 54 m/s), and normal conduction velocity (A Elbow-B Elbow, 62 m/s). The right median sensory, the right radial sensory, the right Sup Fibular sensory, the right sural sensory, and the right ulnar sensory nerves showed normal distal peak latency (R3.6, R2.1, R2.3, R3.9, R3.4 ms) and normal amplitude (R44.4, R21.5, R15.6, R6.7, R37.3 µV).   The right median/ulnar (palm) comparison nerve showed normal distal onset latency (Median Palm, 1.5 ms), normal distal peak latency (Median Palm, 2.1 ms), normal amplitude (Median Palm, 34.7 µV), normal distal onset latency (Ulnar Palm, 1.4 ms), normal distal peak latency (Ulnar Palm, 2.1 ms), and normal amplitude (Ulnar Palm, 26.5 µV). All F Wave latencies were within normal limits. All examined muscles (as indicated in the following table) showed no evidence of electrical instability. Impression:    Normal right upper and lower extremity nerve conduction responses (right fibular response at EDB was low due to EDB muscle atrophy but right fibular motor response at right tibialis anterior was normal). No electrodiagnostic evidence of large fiber polyneuropathy, right median neuropathy, or right ulnar neuropathy.     No electrodiagnostic evidence of right cervical or lumbar motor radiculopathy    ___________________________  Meng Lopez M.D.      Nerve Conduction Studies  Anti Sensory Summary Table     Stim Site NR Peak (ms) Norm Peak (ms) P-T Amp (µV) Norm P-T Amp Site1 Site2 Dist (cm)   Right Median Anti Sensory (2nd Digit)  31.3 °C   Wrist    3.6 <4 44.4 >13 Wrist 2nd Digit 14.0   Elbow    3.6  37.9  Elbow Wrist 0.0   Right Radial Anti Sensory (Base 1st Digit)  31.3 °C   Wrist    2.1 <2.8 21.5 >11 Wrist Base 1st Digit 10.0   Site 2    2.1  21.4       Right Sup Fibular Anti Sensory (Lat ankle)  29.3 °C   Lower leg    2.3 <4.5 15.6 >5 Lower leg Lat ankle 10.0   Site 2    2.3  13.3       Site 3    2.3  10.1       Right Sural Anti Sensory (Lat Mall)  30.8 °C   Calf    3.9 <4.5 6.7 >4.0 Calf Lat Mall 14.0   Site 2    3.6  13.7       Right Ulnar Anti Sensory (5th Digit)  31.3 °C   Wrist    3.4 <4.0 37.3 >9 Wrist 5th Digit 14.0     Motor Summary Table     Stim Site NR Onset (ms) Norm Onset (ms) O-P Amp (mV) Norm O-P Amp Amp (Prev) (%) Site1 Site2 Dist (cm) Shaun (m/s) Norm Shaun (m/s)   Right Fibular Motor (Ext Dig Brev)  28.7 °C   Ankle    5.4 <6.5 0.4 >2.6 100.0 Ankle Ext Dig Brev 8.0     B Fib    14.6  0.4  100.0 B Fib Ankle 32.0 35 >38   Poplt    16.1  0.4  100.0 Poplt B Fib 10.0 67 >42   Right Fibular TA Motor (Tib Ant)  28.5 °C   Fib Head    2.8 <4.0 5.3 >4.0 100.0 Fib Head Tib Ant 10.0     Poplit    4.2 <5.7 5.3  100.0 Poplit Fib Head 10.0 71 >40   Right Median Motor (Abd Poll Brev)  30.9 °C   Wrist    3.8 <4.5 14.0 >4.1 100.0 Wrist Abd Poll Brev 8.0     Elbow    7.6  13.1  93.6 Elbow Wrist 19.0 50 >49   Right Tibial Motor (Abd Tello Brev)  28.5 °C   Ankle    5.1 <6.1 6.1 >5.3 100.0 Ankle Abd Tello Brev 8.0     Knee    13.8  4.5  73.8 Knee Ankle 36.0 41 >39   Right Ulnar Motor (Abd Dig Minimi)  31 °C   Wrist    3.1 <3.1 12.1 >7.0 100.0 Wrist Abd Dig Minimi 8.0     B Elbow    6.8  11.9  98.3 B Elbow Wrist 20.0 54 >50   A Elbow    8.4  11.7  98.3 A Elbow B Elbow 10.0 62 >50     Comparison Summary Table     Stim Site NR Peak (ms) P-T Amp (µV) Site1 Site2 Dist (cm) Delta-0 (ms)   Right Median/Ulnar Palm Comparison (Wrist)  31.3 °C   Median Palm    2.1 35.5 Median Palm Ulnar Palm 8.0 0.1   Ulnar Palm    2.1 45.3         F Wave Studies     NR F-Lat (ms) Lat Norm (ms) L-R F-Lat (ms) L-R Lat Norm   Right Tibial (Mrkrs) (Abd Hallucis)  28.5 °C      52.75 <56  <5.7   Right Ulnar (Mrkrs) (Abd Dig Min)  31.8 °C      25.91 <32  <2.5     H Reflex Studies     NR H-Lat (ms) L-R H-Lat (ms) L-R Lat Norm   Right Tibial (Gastroc)  28.4 °C      33.27  <2.0     EMG     Side Muscle Nerve Root Ins Act Fibs Psw Recrt Duration Amp Poly Comment   Right 1stDorInt Ulnar C8-T1 Nml Nml Nml Nml Nml Nml Nml    Right ExtIndicis Radial (Post Int) C7-8 Nml Nml Nml Nml Nml Nml Nml    Right Triceps Radial C6-7-8 Nml Nml Nml Nml Nml Nml Nml    Right Deltoid Axillary C5-6 Nml Nml Nml Nml Nml Nml Nml    Right Lower Cerv Parasp Rami C7,T1 Nml Nml Nml Nml Nml Nml Nml    Right Ext Dig Brev Dp Br Peron L5, S1 Nml Nml Nml Nml Nml Nml Nml    Right MedGastroc Tibial S1-2 Nml Nml Nml Nml Nml Nml Nml    Right AntTibialis Dp Br Peron L4-5 Nml Nml Nml Nml Nml Nml Nml    Right VastusMed Femoral L2-4 Nml Nml Nml Nml Nml Nml Nml    Right GluteusMax InfGluteal L5-S2 Nml Nml Nml Nml Nml Nml Nml    Right Lower Lumb Parasp Rami L5,S1 Nml Nml Nml Nml Nml Nml Nml        Waveforms:

## 2022-08-09 DIAGNOSIS — G47.00 INSOMNIA, UNSPECIFIED TYPE: Primary | ICD-10-CM

## 2022-08-09 RX ORDER — TRAZODONE HYDROCHLORIDE 50 MG/1
100 TABLET ORAL
Qty: 60 TABLET | Refills: 2 | Status: SHIPPED | OUTPATIENT
Start: 2022-08-09 | End: 2022-08-31 | Stop reason: SDUPTHER

## 2022-08-12 RX ORDER — TIZANIDINE 4 MG/1
4 TABLET ORAL
Qty: 30 TABLET | Refills: 3 | Status: SHIPPED | OUTPATIENT
Start: 2022-08-12 | End: 2022-10-21

## 2022-08-15 ENCOUNTER — OFFICE VISIT (OUTPATIENT)
Dept: NEUROLOGY | Age: 37
End: 2022-08-15
Payer: MEDICAID

## 2022-08-15 VITALS
RESPIRATION RATE: 14 BRPM | DIASTOLIC BLOOD PRESSURE: 70 MMHG | SYSTOLIC BLOOD PRESSURE: 124 MMHG | HEART RATE: 82 BPM | OXYGEN SATURATION: 98 %

## 2022-08-15 DIAGNOSIS — G43.709 CHRONIC MIGRAINE WITHOUT AURA WITHOUT STATUS MIGRAINOSUS, NOT INTRACTABLE: Primary | ICD-10-CM

## 2022-08-15 DIAGNOSIS — M79.7 FIBROMYALGIA: ICD-10-CM

## 2022-08-15 PROCEDURE — 99214 OFFICE O/P EST MOD 30 MIN: CPT | Performed by: PSYCHIATRY & NEUROLOGY

## 2022-08-15 RX ORDER — SUMATRIPTAN 100 MG/1
TABLET, FILM COATED ORAL
Qty: 9 TABLET | Refills: 2 | Status: SHIPPED | OUTPATIENT
Start: 2022-08-15 | End: 2022-10-21

## 2022-08-15 RX ORDER — ERENUMAB-AOOE 140 MG/ML
140 INJECTION, SOLUTION SUBCUTANEOUS
Qty: 1 EACH | Refills: 2 | Status: SHIPPED | OUTPATIENT
Start: 2022-08-15

## 2022-08-15 NOTE — PROGRESS NOTES
Randee Branham (1985) is a 43 y.o. female, established patient, here for evaluation of the following     Chief complaint(s):   Chief Complaint   Patient presents with    Results     EMG results        SUBJECTIVE/ OBJECTIVE:    HPI: 43 y.o. female      CK on 4/1/2022 was normal at 80 EMG of right upper and lower extremity was done on 8/1/2022. There is no electrodiagnostic evidence of large fiber polyneuropathy, right median neuropathy, or right ulnar neuropathy, or right cervical or lumbar motor radiculopathy. Reviewed interval notes, pt saw Dr Sisi Anderson. He also believes patient has fibromyalgia and not an autoimmune disorder. He advised pt to follow up with PCP for fibromyalgia management and he advised pt to engage in [employer] as that has some data in treating fibromyalgia symptoms. Chronic migraine: Pt requested to be started on a migraine preventive medication. After extensive discussion of the different med options (see last/ initial visit for details), pt opted to start on Aimovig 70 mg SQ injection once every month in April 2022. # of headache days a week: 4-5 ( was 7 days a week before Aimovig)  # of migraine days a week: 3 (was 5 days a week before aimovig)    Uses Naproxen BID with food  Does not have any prescription migraine pain reliever         ========================================    Brief Hx:     Initial Visit: 3/30/2022 (see for full details)        Allergies   Allergen Reactions    Labetalol Hives    Pcn [Penicillins] Hives    Ceclor [Cefaclor] Unknown (comments)    Septra [Sulfamethoprim Ds] Unknown (comments)         Current Outpatient Medications:     erenumab-aooe (Aimovig Autoinjector) 140 mg/mL injection, 1 mL by SubCUTAneous route every thirty (30) days. Indications: migraine prevention, Disp: 1 Each, Rfl: 2    SUMAtriptan (IMITREX) 100 mg tablet, Take HALF tablet at onset of migraine. Repeat HALF tablet in 2 hours if headache remains.   Limit use to 2 days per week., Disp: 9 Tablet, Rfl: 2    traZODone (DESYREL) 50 mg tablet, Take 2 Tablets by mouth nightly., Disp: 60 Tablet, Rfl: 2    naproxen (NAPROSYN) 500 mg tablet, TAKE 1 TABLET BY MOUTH TWICE A DAY WITH MEALS, Disp: 30 Tablet, Rfl: 0    Ventolin HFA 90 mcg/actuation inhaler, INHALE 2 PUFFS EVERY 6 HOURS AS NEEDED FOR WHEEZE, Disp: 1 Each, Rfl: 2    FLUoxetine (PROzac) 40 mg capsule, Take 1 Capsule by mouth in the morning., Disp: 90 Capsule, Rfl: 2    OLANZapine (ZyPREXA zydis) 10 mg disintegrating tablet, Take 1 Tablet by mouth nightly., Disp: 30 Tablet, Rfl: 2    hydrOXYzine HCL (ATARAX) 50 mg tablet, Take 1 Tablet by mouth four (4) times daily as needed for Anxiety. , Disp: 90 Tablet, Rfl: 2    linaCLOtide (LINZESS) 145 mcg cap capsule, , Disp: , Rfl:     cholecalciferol (VITAMIN D3) (50,000 UNITS /1250 MCG) capsule, Take 1 capsule once a week for the next 8 weeks then switch to OTC vitamin D3 2000 units daily, Disp: 8 Capsule, Rfl: 0    calcium carbonate (Antacid Ultra Strength) 400 mg calcium (1,000 mg) chew, TAKE 1/2 TABLET BY MOUTH DAILY, Disp: 90 Tablet, Rfl: 3    chlorthalidone (HYGROTON) 25 mg tablet, Take 1 Tablet by mouth daily. , Disp: 90 Tablet, Rfl: 2    food supplemt, lactose-reduced (Ensure High Protein) liqd, Take 237 mL by mouth daily. , Disp: 90 Each, Rfl: 1    tiZANidine (ZANAFLEX) 4 mg tablet, TAKE 1 TABLET BY MOUTH EVERY SIX (6) HOURS AS NEEDED FOR MUSCLE SPASM(S)., Disp: 30 Tablet, Rfl: 3    ondansetron hcl (ZOFRAN) 4 mg tablet, Take 4 mg by mouth daily. (Patient not taking: No sig reported), Disp: , Rfl:     polyethylene glycol (MIRALAX) 17 gram packet, Take 1 Packet by mouth daily as needed for Constipation. , Disp: 90 Each, Rfl: 2    pantoprazole (PROTONIX) 40 mg tablet, TAKE 1 TABLET BY MOUTH DAILY (BEFORE BREAKFAST).  INDICATIONS: GASTROESOPHAGEAL REFLUX DISEASE, Disp: 90 Tablet, Rfl: 1    ondansetron (ZOFRAN ODT) 4 mg disintegrating tablet, TAKE 1 TABLET BY MOUTH EVERY 8 HOURS AS NEEDED FOR NAUSEA AND VOMITING, Disp: 30 Tablet, Rfl: 1    potassium chloride (KLOR-CON) 20 mEq pack, Take 1 Packet by mouth two (2) times daily (with meals). , Disp: 60 Packet, Rfl: 3    prazosin (MINIPRESS) 1 mg capsule, Take 1 Capsule by mouth nightly. Take 1 mg cap with Prazosin 2 mg for total dose of 3 mg nightly. Indications: posttraumatic stress syndrome, Disp: 30 Capsule, Rfl: 5    riboflavin, vitamin B2, 400 mg tab, Take 400 mg by mouth daily. (Patient not taking: Reported on 2022), Disp: 90 Tablet, Rfl: 3    protein powd, 1-2 servings (15-18 g per serving) daily with meals. , Disp: 750 g, Rfl: 1    ergocalciferol (ERGOCALCIFEROL) 1,250 mcg (50,000 unit) capsule, Take 1 Capsule by mouth every seven (7) days. (Patient not taking: No sig reported), Disp: 12 Capsule, Rfl: 0    OTHER, 1 Ensure Enlive food supplement drink PO TID, Disp: 270 Each, Rfl: 2    OTHER, 1 ensure pudding PO daily for lunch. (Patient not taking: Reported on 2022), Disp: 90 Each, Rfl: 3    Past Medical History:   Diagnosis Date    Acute pyelonephritis 3/3/2021    Anorexia     Anxiety     Asthma     on albuterol prn.  Triggers cold and allergies    Avoidant-restrictive food intake disorder (ARFID)     Back pain, chronic     sciatica    Chronic bilateral low back pain with right-sided sciatica 2020    ~2010    Fibromyalgia 2022    GERD (gastroesophageal reflux disease) 2020    [facility] , GI Dr. Mark Sullivan    Gestational hypertension     Past pregnancy    HX OTHER MEDICAL      , 2006, , ,     Hyperemesis     severe hyperemesis    Hypertension     with G12 - none this pregnancy    IBS (irritable bowel syndrome) 2022    Iron deficiency anemia 10/08/2010    MDD (major depressive disorder), single episode with postpartum onset 2013    treated with meds - 13    Orthostatic hypotension 2020    Plantar fasciitis     Pregnancy     36 weeks    PTSD (post-traumatic stress disorder) has a past surgical history that includes pr  delivery only; hx gyn; hx gyn; hx  section (4/22/15); hx other surgical; hx other surgical; hx dilation and curettage; and hx dilation and curettage (2020). Physical Exam:    Vitals:    08/15/22 1344   BP: 124/70   Pulse: 82   Resp: 14   SpO2: 98%     No exam performed today  Entirety of visit was spent going over test results, discussing migraine management. Patient was awake alert resting in chair  Appeared fatigued    ========================================    ASSESSMENT/ PLAN:       ICD-10-CM ICD-9-CM    1. Chronic migraine without aura without status migrainosus, not intractable  G43.709 346.70 erenumab-aooe (Aimovig Autoinjector) 140 mg/mL injection      SUMAtriptan (IMITREX) 100 mg tablet      2. Fibromyalgia  M79.7 729.1           Increased Aimovig injector to 140 mg subcu once every month, to further reduce migraine frequency. For acute migraine pain reliever, prescribed sumatriptan 100 mg tablet, half tablet at onset of migraine, half tablet in 2 hours if headache is still there, limit use to 2 days a week. Common side effects were discussed. Fibromyalgia: per PCP or Rheumatology      Follow-up in 3 months to reassess headache status, response to medications        An electronic signature was used to authenticate this note.   -- Alondra Thakur MD

## 2022-08-16 RX ORDER — NAPROXEN 500 MG/1
TABLET ORAL
Qty: 30 TABLET | Refills: 0 | Status: SHIPPED | OUTPATIENT
Start: 2022-08-16 | End: 2022-09-01

## 2022-08-17 ENCOUNTER — TELEPHONE (OUTPATIENT)
Dept: FAMILY MEDICINE CLINIC | Age: 37
End: 2022-08-17

## 2022-08-17 DIAGNOSIS — N39.0 RECURRENT UTI: Primary | ICD-10-CM

## 2022-08-17 RX ORDER — PRAZOSIN HYDROCHLORIDE 2 MG/1
2 CAPSULE ORAL
Qty: 30 CAPSULE | Refills: 1 | Status: SHIPPED | OUTPATIENT
Start: 2022-08-17

## 2022-08-17 RX ORDER — PRAZOSIN HYDROCHLORIDE 1 MG/1
1 CAPSULE ORAL
Qty: 30 CAPSULE | Refills: 1 | Status: SHIPPED | OUTPATIENT
Start: 2022-08-17

## 2022-08-17 RX ORDER — PRAZOSIN HYDROCHLORIDE 1 MG/1
1 CAPSULE ORAL
Qty: 30 CAPSULE | Refills: 5 | Status: CANCELLED | OUTPATIENT
Start: 2022-08-17

## 2022-08-17 NOTE — TELEPHONE ENCOUNTER
Called, spoke to pt. Two pt identifiers confirmed. Writer informed patient of MD for referral with phone number [phone]. Patient state she will call and catrachita. An appointment.

## 2022-08-17 NOTE — TELEPHONE ENCOUNTER
Pt called and said 7/19/22 OV the provider suggested hat she sees a urologist. Pt following up on the Urology referral.     BCB# [phone]

## 2022-08-17 NOTE — TELEPHONE ENCOUNTER
Patient called requesting Prazosin 1mg and 2mg be sent to [facility] [place] [address] [facility]. Verbal order given from provider with read back. Will call [address] to dc 1mg dose.

## 2022-08-31 ENCOUNTER — TELEPHONE (OUTPATIENT)
Dept: BEHAVIORAL/MENTAL HEALTH CLINIC | Age: 37
End: 2022-08-31

## 2022-08-31 DIAGNOSIS — G47.00 INSOMNIA, UNSPECIFIED TYPE: ICD-10-CM
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
## 2022-09-01 RX ORDER — ONDANSETRON 4 MG/1
TABLET, ORALLY DISINTEGRATING ORAL
Qty: 30 TABLET | Refills: 1 | Status: SHIPPED | OUTPATIENT
Start: 2022-09-01

## 2022-09-01 RX ORDER — TRAZODONE HYDROCHLORIDE 50 MG/1
100 TABLET ORAL
Qty: 60 TABLET | Refills: 2 | Status: SHIPPED | OUTPATIENT
Start: 2022-09-01

## 2022-09-01 RX ORDER — NAPROXEN 500 MG/1
TABLET ORAL
Qty: 30 TABLET | Refills: 0 | Status: SHIPPED | OUTPATIENT
Start: 2022-09-01 | End: 2022-09-22

## 2022-09-07 ENCOUNTER — OFFICE VISIT (OUTPATIENT)
Dept: BEHAVIORAL/MENTAL HEALTH CLINIC | Age: 37
End: 2022-09-07
Payer: MEDICAID

## 2022-09-07 VITALS
DIASTOLIC BLOOD PRESSURE: 78 MMHG | BODY MASS INDEX: 23.13 KG/M2 | HEART RATE: 71 BPM | WEIGHT: 138.8 LBS | SYSTOLIC BLOOD PRESSURE: 118 MMHG | TEMPERATURE: 97.9 F | HEIGHT: 65 IN | RESPIRATION RATE: 16 BRPM | OXYGEN SATURATION: 98 %

## 2022-09-07 DIAGNOSIS — G47.00 INSOMNIA, UNSPECIFIED TYPE: ICD-10-CM

## 2022-09-07 DIAGNOSIS — F43.11 POST-TRAUMATIC STRESS DISORDER, ACUTE: ICD-10-CM

## 2022-09-07 DIAGNOSIS — F41.1 GAD (GENERALIZED ANXIETY DISORDER): ICD-10-CM

## 2022-09-07 DIAGNOSIS — F32.2 CURRENT SEVERE EPISODE OF MAJOR DEPRESSIVE DISORDER WITHOUT PSYCHOTIC FEATURES WITHOUT PRIOR EPISODE (HCC): Primary | ICD-10-CM

## 2022-09-07 PROCEDURE — 99214 OFFICE O/P EST MOD 30 MIN: CPT | Performed by: NURSE PRACTITIONER
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
## 2022-09-07 NOTE — PROGRESS NOTES
Chief Complaint   Patient presents with    Medication Management     Visit Vitals  /78 (BP 1 Location: Left upper arm, BP Patient Position: Sitting, BP Cuff Size: Adult)   Pulse 71   Temp 97.9 °F (36.6 °C) (Temporal)   Resp 16   Ht 5' 5\" (1.651 m)   Wt 63 kg (138 lb 12.8 oz)   SpO2 98%   BMI 23.10 kg/m²     3 most recent PHQ Screens 9/7/2022   Little interest or pleasure in doing things Nearly every day   Feeling down, depressed, irritable, or hopeless Nearly every day   Total Score PHQ 2 6   Trouble falling or staying asleep, or sleeping too much Nearly every day   Feeling tired or having little energy More than half the days   Poor appetite, weight loss, or overeating Nearly every day   Feeling bad about yourself - or that you are a failure or have let yourself or your family down Nearly every day   Trouble concentrating on things such as school, work, reading, or watching TV More than half the days   Moving or speaking so slowly that other people could have noticed; or the opposite being so fidgety that others notice More than half the days   Thoughts of being better off dead, or hurting yourself in some way More than half the days   PHQ 9 Score 23   How difficult have these problems made it for you to do your work, take care of your home and get along with others Extremely difficult     1. Have you been to the ER, urgent care clinic since your last visit? Hospitalized since your last visit? No    2. Have you seen or consulted any other health care providers outside of the [address] [place] [address] since your last visit? Include any pap smears or colon screening.  No

## 2022-09-07 NOTE — PATIENT INSTRUCTIONS
Words of wisdom    When something bad happens to you, you have 3 choices:  Let it DEFINE YOU,  Let it DESTROY YOU,   OR YOU CAN LET IT STRENGTHEN YOU  Life is like a camera, so:                                Just focus on the important  Capture the good times  Develop from the negatives  And if things don't turn out, take another shot     Learn from the mistakes of others, you can't live long enough to make them all yourself. Always put yourself in others' shoes, if it hurts you, then it probably hurts the other person to. The happiest of people don't necessarily have the best of everything, they just make the most of everything they have and that comes along their way. Life is 10% of what happens to you and 90 % of how you respond to it.      Pleases include the following foods in your diet for mood:  Drink GREEN TEA as often as you can but stop the caffeinated ones before 6 pm  Omega 3 Fatty Acids/Fish oil/ or flax seeds, citrus fruits, kamron, turmeric, chocolates  Sunflower seeds, Pumpkin seeds, Almonds,   Oatmeal, Eggs, grapes, yogurt, Probiotics (like Activia)  Vit C, E, D every few days,  Exercise at least 20 minutes/day (walk 10,000 steps)

## 2022-09-22 RX ORDER — NAPROXEN 500 MG/1
TABLET ORAL
Qty: 30 TABLET | Refills: 0 | Status: SHIPPED | OUTPATIENT
Start: 2022-09-22 | End: 2022-10-21

## 2022-10-07 ENCOUNTER — TELEPHONE (OUTPATIENT)
Dept: FAMILY MEDICINE CLINIC | Age: 37
End: 2022-10-07

## 2022-10-07 RX ORDER — ALBUTEROL SULFATE 90 UG/1
2 AEROSOL, METERED RESPIRATORY (INHALATION)
Qty: 1 EACH | Refills: 2 | Status: SHIPPED | OUTPATIENT
Start: 2022-10-07 | End: 2022-10-13 | Stop reason: SDUPTHER

## 2022-10-07 NOTE — TELEPHONE ENCOUNTER
----- Message from Heather Pena sent at 10/7/2022  3:20 PM EDT -----  Subject: Medication Problem    Medication: Ventolin HFA 90 mcg/actuation inhaler  Dosage: 90MCG   Ordering Provider: An Samuel    Question/Problem: Henok Agarwal with [address] called in and states the   patients insurance will not cover name brand would like to see if it can   be switched to Albuterol. Additional Information for Provider:     [facility] [address]    ---------------------------------------------------------------------------  --------------  [address]  [phone]; Do not leave any message, patient will call back for answer  ---------------------------------------------------------------------------  --------------    SCRIPT ANSWERS  Relationship to Patient: Third Party  Third Party Type: Pharmacy?    Representative Name: Henok Agarwal with [address]

## 2022-10-12 ENCOUNTER — TELEPHONE (OUTPATIENT)
Dept: FAMILY MEDICINE CLINIC | Age: 37
End: 2022-10-12

## 2022-10-12 NOTE — TELEPHONE ENCOUNTER
----- Message from Jose Carlos Burnham sent at 10/12/2022  9:49 AM EDT -----  Subject: Message to Provider    QUESTIONS  Information for Provider? Benja Vasquez from [address] is calling in regards   of prescription Ventolin inhaler, if generic Dixie Sieve can be substitution   due to money savings for the patient. Contact [facility] at [phone] for   further information.  ---------------------------------------------------------------------------  --------------  Oswaldo Muniz INFO  [phone]; OK to leave message on voicemail  ---------------------------------------------------------------------------  --------------  SCRIPT ANSWERS  Relationship to Patient? Third Party  Third Party Type? Pharmacy? Representative Name?  Benja Vasquez

## 2022-10-13 RX ORDER — ALBUTEROL SULFATE 90 UG/1
2 AEROSOL, METERED RESPIRATORY (INHALATION)
Qty: 1 EACH | Refills: 2 | Status: SHIPPED | OUTPATIENT
Start: 2022-10-13

## 2022-10-13 NOTE — TELEPHONE ENCOUNTER
Which [facility] are they requesting the Rx to go to?  I have a prescription for generic albuterol but it was last sent to [facility].

## 2022-10-21 ENCOUNTER — OFFICE VISIT (OUTPATIENT)
Dept: FAMILY MEDICINE CLINIC | Age: 37
End: 2022-10-21
Payer: MEDICAID

## 2022-10-21 VITALS
HEIGHT: 65 IN | RESPIRATION RATE: 16 BRPM | TEMPERATURE: 98.3 F | OXYGEN SATURATION: 97 % | BODY MASS INDEX: 24.86 KG/M2 | WEIGHT: 149.2 LBS | DIASTOLIC BLOOD PRESSURE: 60 MMHG | SYSTOLIC BLOOD PRESSURE: 102 MMHG | HEART RATE: 78 BPM

## 2022-10-21 DIAGNOSIS — Z34.90 PREGNANCY, UNSPECIFIED GESTATIONAL AGE: Primary | ICD-10-CM

## 2022-10-21 DIAGNOSIS — Z86.39 HISTORY OF MALNUTRITION: ICD-10-CM

## 2022-10-21 PROCEDURE — 99213 OFFICE O/P EST LOW 20 MIN: CPT | Performed by: FAMILY MEDICINE

## 2022-10-21 RX ORDER — B-COMPLEX WITH VITAMIN C
TABLET ORAL
COMMUNITY
Start: 2022-09-27

## 2022-10-21 RX ORDER — LACTOSE-REDUCED FOOD
1 LIQUID (ML) ORAL 3 TIMES DAILY
Qty: 90 EACH | Refills: 2
Start: 2022-10-21 | End: 2022-10-24 | Stop reason: SDUPTHER

## 2022-10-21 RX ORDER — PROGESTERONE 200 MG/1
200 CAPSULE ORAL DAILY
COMMUNITY
Start: 2022-10-06

## 2022-10-21 NOTE — PROGRESS NOTES
Patient Name: John Granger   MRN: [id number]    Bello Choe is a 42 y.o. female who presents with the following:     Currently about 8 weeks pregnant. Has hx of malnutrition and disordered eating. Needs prescription for Ensure Plus three times per day sent to [facility]. Has upcoming appt obgyn appt next week. Wt Readings from Last 3 Encounters:   10/21/22 149 lb 3.2 oz (67.7 kg)   09/07/22 138 lb 12.8 oz (63 kg)   07/26/22 138 lb 9.6 oz (62.9 kg)     Review of Systems   Constitutional:  Negative for fever, malaise/fatigue and weight loss. Respiratory:  Negative for cough, hemoptysis, shortness of breath and wheezing. Cardiovascular:  Negative for chest pain, palpitations, leg swelling and PND. Gastrointestinal:  Negative for abdominal pain, constipation, diarrhea, nausea and vomiting. The patient's medications, allergies, past medical history, surgical history, family history and social history were reviewed and updated where appropriate. Current Outpatient Medications:     Prenatal Vitamin 27 mg iron- 0.8 mg tab tablet, , Disp: , Rfl:     progesterone (PROMETRIUM) 200 mg capsule, Take 200 mg by mouth daily. , Disp: , Rfl:     albuterol (PROVENTIL HFA, VENTOLIN HFA, PROAIR HFA) 90 mcg/actuation inhaler, Take 2 Puffs by inhalation every six (6) hours as needed for Wheezing., Disp: 1 Each, Rfl: 2    traZODone (DESYREL) 50 mg tablet, Take 2 Tablets by mouth nightly., Disp: 60 Tablet, Rfl: 2    ondansetron (ZOFRAN ODT) 4 mg disintegrating tablet, TAKE 1 TABLET BY MOUTH EVERY 8 HOURS AS NEEDED FOR NAUSEA AND VOMITING, Disp: 30 Tablet, Rfl: 1    prazosin (MINIPRESS) 1 mg capsule, Take 1 Capsule by mouth nightly. Take 1 mg cap with Prazosin 2 mg for total dose of 3 mg nightly.   Indications: posttraumatic stress syndrome, Disp: 30 Capsule, Rfl: 1    prazosin (MINIPRESS) 2 mg capsule, Take 1 Capsule by mouth nightly., Disp: 30 Capsule, Rfl: 1    erenumab-aooe (Aimovig Autoinjector) 140 mg/mL injection, 1 mL by SubCUTAneous route every thirty (30) days. Indications: migraine prevention, Disp: 1 Each, Rfl: 2    FLUoxetine (PROzac) 40 mg capsule, Take 1 Capsule by mouth in the morning., Disp: 90 Capsule, Rfl: 2    OLANZapine (ZyPREXA zydis) 10 mg disintegrating tablet, Take 1 Tablet by mouth nightly., Disp: 30 Tablet, Rfl: 2    hydrOXYzine HCL (ATARAX) 50 mg tablet, Take 1 Tablet by mouth four (4) times daily as needed for Anxiety. , Disp: 90 Tablet, Rfl: 2    polyethylene glycol (MIRALAX) 17 gram packet, Take 1 Packet by mouth daily as needed for Constipation. , Disp: 90 Each, Rfl: 2    pantoprazole (PROTONIX) 40 mg tablet, TAKE 1 TABLET BY MOUTH DAILY (BEFORE BREAKFAST). INDICATIONS: GASTROESOPHAGEAL REFLUX DISEASE, Disp: 90 Tablet, Rfl: 1    potassium chloride (KLOR-CON) 20 mEq pack, Take 1 Packet by mouth two (2) times daily (with meals). , Disp: 61 Packet, Rfl: 3    linaCLOtide (LINZESS) 145 mcg cap capsule, , Disp: , Rfl:     cholecalciferol (VITAMIN D3) (50,000 UNITS /1250 MCG) capsule, Take 1 capsule once a week for the next 8 weeks then switch to OTC vitamin D3 2000 units daily, Disp: 8 Capsule, Rfl: 0    calcium carbonate (Antacid Ultra Strength) 400 mg calcium (1,000 mg) chew, TAKE 1/2 TABLET BY MOUTH DAILY, Disp: 90 Tablet, Rfl: 3    chlorthalidone (HYGROTON) 25 mg tablet, Take 1 Tablet by mouth daily. , Disp: 90 Tablet, Rfl: 2    food supplemt, lactose-reduced (Ensure High Protein) liqd, Take 237 mL by mouth daily. , Disp: 90 Each, Rfl: 1    protein powd, 1-2 servings (15-18 g per serving) daily with meals. , Disp: 750 g, Rfl: 1    OTHER, 1 Ensure Enlive food supplement drink PO TID, Disp: 270 Each, Rfl: 2    naproxen (NAPROSYN) 500 mg tablet, TAKE 1 TABLET BY MOUTH TWICE A DAY WITH MEALS (Patient not taking: Reported on 10/21/2022), Disp: 30 Tablet, Rfl: 0    SUMAtriptan (IMITREX) 100 mg tablet, Take HALF tablet at onset of migraine. Repeat HALF tablet in 2 hours if headache remains.   Limit use to 2 days per week. (Patient not taking: Reported on 10/21/2022), Disp: 9 Tablet, Rfl: 2    tiZANidine (ZANAFLEX) 4 mg tablet, TAKE 1 TABLET BY MOUTH EVERY SIX (6) HOURS AS NEEDED FOR MUSCLE SPASM(S). (Patient not taking: Reported on 10/21/2022), Disp: 30 Tablet, Rfl: 3    Allergies   Allergen Reactions    Labetalol Hives    Pcn [Penicillins] Hives    Ceclor [Cefaclor] Unknown (comments)    Septra [Sulfamethoprim Ds] Unknown (comments)       OBJECTIVE    Visit Vitals  /60 (BP 1 Location: Left upper arm, BP Patient Position: Sitting, BP Cuff Size: Adult)   Pulse 78   Temp 98.3 °F (36.8 °C) (Temporal)   Resp 16   Ht 5' 5\" (1.651 m)   Wt 149 lb 3.2 oz (67.7 kg)   LMP  (LMP Unknown)   SpO2 97%   BMI 24.83 kg/m²       Physical Exam  Vitals and nursing note reviewed. Constitutional:       General: She is not in acute distress. Appearance: Normal appearance. She is not toxic-appearing. HENT:      Head: Normocephalic and atraumatic. Eyes:      Pupils: Pupils are equal, round, and reactive to light. Pulmonary:      Effort: Pulmonary effort is normal.   Musculoskeletal:         General: Normal range of motion. Skin:     General: Skin is warm and dry. Neurological:      Mental Status: She is alert. Mental status is at baseline. Psychiatric:         Mood and Affect: Mood normal.         Behavior: Behavior normal.         ASSESSMENT AND PLAN  Patricia Arcos is a 42 y.o. female who presents today for:    1. Pregnancy, unspecified gestational age  Pt to follow up with obgyn. 2. History of malnutrition  Will send Rx to [facility]. - food supplemt, lactose-reduced (Ensure Plus) 0.05 gram- 1.5 kcal/mL liqd; Take 1 Bottle by mouth three (3) times daily.   Dispense: 90 Each; Refill: 2       Medications Discontinued During This Encounter   Medication Reason    naproxen (NAPROSYN) 500 mg tablet LIST CLEANUP    SUMAtriptan (IMITREX) 100 mg tablet LIST CLEANUP    tiZANidine (ZANAFLEX) 4 mg tablet LIST CLEANUP Treatment risks/benefits/costs/interactions/alternatives discussed with patient. Advised patient to call back or return to office if symptoms worsen/change/persist. If patient cannot reach us or should anything more severe/urgent arise he/she should proceed directly to the nearest emergency department. Discussed expected course/resolution/complications of diagnosis in detail with patient. Patient expressed understanding with the diagnosis and plan. This dictation may have been completed with Dragon, the computer voice recognition software. Unanticipated grammatical, syntax, homophones, and other interpretive errors are sometimes inadvertently transcribed by the computer software. Please disregard any errors that have escaped final proofreading. Loren Caballero M.D.

## 2022-10-21 NOTE — PROGRESS NOTES
Chief Complaint   Patient presents with    Medication Evaluation     Needs Ensure order sent to [facility]          1. \"Have you been to the ER, urgent care clinic since your last visit? Hospitalized since your last visit? \" No    2. \"Have you seen or consulted any other health care providers outside of the [address] [place] [address] since your last visit? \" No     3. For patients aged 39-70: Has the patient had a colonoscopy / FIT/ Cologuard? NA - based on age      If the patient is female:    4. For patients aged 41-77: Has the patient had a mammogram within the past 2 years? NA - based on age or sex      11. For patients aged 21-65: Has the patient had a pap smear? Yes - Care Gap present.  Rooming MA/LPN to request most recent results    3 most recent PHQ Screens 9/7/2022   Little interest or pleasure in doing things Nearly every day   Feeling down, depressed, irritable, or hopeless Nearly every day   Total Score PHQ 2 6   Trouble falling or staying asleep, or sleeping too much Nearly every day   Feeling tired or having little energy More than half the days   Poor appetite, weight loss, or overeating Nearly every day   Feeling bad about yourself - or that you are a failure or have let yourself or your family down Nearly every day   Trouble concentrating on things such as school, work, reading, or watching TV More than half the days   Moving or speaking so slowly that other people could have noticed; or the opposite being so fidgety that others notice More than half the days   Thoughts of being better off dead, or hurting yourself in some way More than half the days   PHQ 9 Score 23   How difficult have these problems made it for you to do your work, take care of your home and get along with others Extremely difficult

## 2022-10-24 RX ORDER — LACTOSE-REDUCED FOOD
1 LIQUID (ML) ORAL 3 TIMES DAILY
Qty: 90 EACH | Refills: 2 | Status: SHIPPED | OUTPATIENT
Start: 2022-10-24

## 2022-10-27 LAB
ANTIBODY SCREEN, EXTERNAL: NEGATIVE
HBSAG, EXTERNAL: NEGATIVE
HIV, EXTERNAL: NEGATIVE
RUBELLA TITER, EXTERNAL RESULT: NEGATIVE
RUBELLA, EXTERNAL: NORMAL
T. PALLIDUM, EXTERNAL: NONREACTIVE

## 2022-11-02 ENCOUNTER — TELEPHONE (OUTPATIENT)
Dept: FAMILY MEDICINE CLINIC | Age: 37
End: 2022-11-02

## 2022-11-02 NOTE — TELEPHONE ENCOUNTER
Called patient and confirmed two identifiers. Asked for clarification on request. Patient stated that [facility] needs a form filled out, but is unsure what form it is. Informed patient that I would call [facility] for clarification, but she may need to return to the office to fill the form out. Verbalized understanding. Called [facility] and confirmed that the [place] [employer] form is required for the state to approve the Ensure being covered for the patient. Informed her that the fax would be sent as soon as the provider approves it. Called patient to update her and inform her that the form was completed and as soon as Dr. Nora Iqbal signs the form, it will be faxed to the [facility] office. Verbalized understanding. Form is in provider's inbox.

## 2022-11-02 NOTE — TELEPHONE ENCOUNTER
Patient called stated need prescription for ensue patient stated its a 394 form that will let her get ensure plus through [facility].     Requesting a call back    Best call back [phone]

## 2022-11-16 NOTE — TELEPHONE ENCOUNTER
Called patient and confirmed two identifiers. Patient stated that [facility] needs an ICD code. Stated that [facility] sent the form back. Informed her that we never received a fax from [facility], but will fix form and resend. Patient verbalized understanding. Form faxed with confirmation.

## 2022-11-16 NOTE — TELEPHONE ENCOUNTER
Pt called and stated \"I haven't heard back from the office, nor has [address] have received the paperwork. I need a call back with an update. \"    Callback Phone Number: [phone]    -CK PSR JAILENE

## 2022-11-21 ENCOUNTER — DOCUMENTATION ONLY (OUTPATIENT)
Dept: PERINATAL CARE | Age: 37
End: 2022-11-21

## 2022-11-21 ENCOUNTER — HOSPITAL ENCOUNTER (OUTPATIENT)
Dept: PERINATAL CARE | Age: 37
Discharge: HOME | End: 2022-11-21
Attending: OBSTETRICS & GYNECOLOGY
Payer: MEDICAID

## 2022-11-21 PROCEDURE — 76813 OB US NUCHAL MEAS 1 GEST: CPT | Performed by: OBSTETRICS & GYNECOLOGY

## 2022-11-21 NOTE — PROGRESS NOTES
NOTIFICATION RETURN TO WORK / SCHOOL    11/21/2022     Ms. Cari Chappell  [address] [address]      To Whom It May Concern:    Cari Chappell is currently under the care of [address] [place] [address]. She will return to work/school on Friday, November 25th, 2022    If there are questions or concerns please have the patient contact our office.         Sincerely,      Ml Craig MD

## 2022-11-22 ENCOUNTER — ANESTHESIA EVENT (OUTPATIENT)
Dept: LABOR AND DELIVERY | Age: 37
End: 2022-11-22
Payer: MEDICAID

## 2022-11-23 ENCOUNTER — HOSPITAL ENCOUNTER (EMERGENCY)
Age: 37
Discharge: HOME | End: 2022-11-23
Attending: OBSTETRICS & GYNECOLOGY | Admitting: OBSTETRICS & GYNECOLOGY
Payer: MEDICAID

## 2022-11-23 ENCOUNTER — ANESTHESIA (OUTPATIENT)
Dept: LABOR AND DELIVERY | Age: 37
End: 2022-11-23
Payer: MEDICAID

## 2022-11-23 VITALS
HEART RATE: 56 BPM | RESPIRATION RATE: 18 BRPM | TEMPERATURE: 98.2 F | OXYGEN SATURATION: 100 % | DIASTOLIC BLOOD PRESSURE: 70 MMHG | SYSTOLIC BLOOD PRESSURE: 117 MMHG | BODY MASS INDEX: 26.58 KG/M2 | WEIGHT: 150 LBS | HEIGHT: 63 IN

## 2022-11-23 DIAGNOSIS — O34.31 CERVICAL INSUFFICIENCY DURING PREGNANCY IN FIRST TRIMESTER, ANTEPARTUM: Primary | ICD-10-CM

## 2022-11-23 LAB
ERYTHROCYTE [DISTWIDTH] IN BLOOD BY AUTOMATED COUNT: 14.8 % (ref 11.5–14.5)
HCT VFR BLD AUTO: 31.6 % (ref 35–47)
HGB BLD-MCNC: 10.2 G/DL (ref 11.5–16)
MCH RBC QN AUTO: 23.6 PG (ref 26–34)
MCHC RBC AUTO-ENTMCNC: 32.3 G/DL (ref 30–36.5)
MCV RBC AUTO: 73 FL (ref 80–99)
NRBC # BLD: 0 K/UL (ref 0–0.01)
NRBC BLD-RTO: 0 PER 100 WBC
PLATELET # BLD AUTO: 296 K/UL (ref 150–400)
PMV BLD AUTO: 10.5 FL (ref 8.9–12.9)
RBC # BLD AUTO: 4.33 M/UL (ref 3.8–5.2)
WBC # BLD AUTO: 7.7 K/UL (ref 3.6–11)

## 2022-11-23 PROCEDURE — 99204 OFFICE O/P NEW MOD 45 MIN: CPT | Performed by: OBSTETRICS & GYNECOLOGY

## 2022-11-23 PROCEDURE — 85027 COMPLETE CBC AUTOMATED: CPT

## 2022-11-23 PROCEDURE — 75410000003 HC RECOV DEL/VAG/CSECN EA 0.5 HR: Performed by: OBSTETRICS & GYNECOLOGY

## 2022-11-23 PROCEDURE — 74011250636 HC RX REV CODE- 250/636: Performed by: NURSE PRACTITIONER

## 2022-11-23 PROCEDURE — 36415 COLL VENOUS BLD VENIPUNCTURE: CPT

## 2022-11-23 PROCEDURE — 74011000250 HC RX REV CODE- 250: Performed by: STUDENT IN AN ORGANIZED HEALTH CARE EDUCATION/TRAINING PROGRAM

## 2022-11-23 PROCEDURE — 74011250637 HC RX REV CODE- 250/637: Performed by: OBSTETRICS & GYNECOLOGY

## 2022-11-23 PROCEDURE — 76010000389 HC LDRP PROCEDURE 0.5 TO 1 HR: Performed by: OBSTETRICS & GYNECOLOGY

## 2022-11-23 PROCEDURE — 76060000078 HC EPIDURAL ANESTHESIA: Performed by: OBSTETRICS & GYNECOLOGY

## 2022-11-23 RX ORDER — SODIUM CHLORIDE 0.9 % (FLUSH) 0.9 %
5-40 SYRINGE (ML) INJECTION EVERY 8 HOURS
Status: DISCONTINUED | OUTPATIENT
Start: 2022-11-23 | End: 2022-11-23 | Stop reason: HOSPADM

## 2022-11-23 RX ORDER — SODIUM CHLORIDE 0.9 % (FLUSH) 0.9 %
5-40 SYRINGE (ML) INJECTION AS NEEDED
Status: DISCONTINUED | OUTPATIENT
Start: 2022-11-23 | End: 2022-11-23 | Stop reason: HOSPADM

## 2022-11-23 RX ORDER — INDOMETHACIN 50 MG/1
50 CAPSULE ORAL EVERY 8 HOURS
Qty: 3 CAPSULE | Refills: 0 | Status: SHIPPED | OUTPATIENT
Start: 2022-11-23 | End: 2022-11-24

## 2022-11-23 RX ORDER — ONDANSETRON 2 MG/ML
4 INJECTION INTRAMUSCULAR; INTRAVENOUS AS NEEDED
Status: DISCONTINUED | OUTPATIENT
Start: 2022-11-23 | End: 2022-11-23 | Stop reason: HOSPADM

## 2022-11-23 RX ORDER — ONDANSETRON 2 MG/ML
INJECTION INTRAMUSCULAR; INTRAVENOUS AS NEEDED
Status: DISCONTINUED | OUTPATIENT
Start: 2022-11-23 | End: 2022-11-23 | Stop reason: HOSPADM

## 2022-11-23 RX ORDER — SODIUM CHLORIDE, SODIUM LACTATE, POTASSIUM CHLORIDE, CALCIUM CHLORIDE 600; 310; 30; 20 MG/100ML; MG/100ML; MG/100ML; MG/100ML
INJECTION, SOLUTION INTRAVENOUS
Status: DISCONTINUED | OUTPATIENT
Start: 2022-11-23 | End: 2022-11-23 | Stop reason: HOSPADM

## 2022-11-23 RX ORDER — INDOMETHACIN 25 MG/1
50 CAPSULE ORAL
Status: COMPLETED | OUTPATIENT
Start: 2022-11-23 | End: 2022-11-23

## 2022-11-23 RX ORDER — BUPIVACAINE HYDROCHLORIDE 7.5 MG/ML
INJECTION, SOLUTION EPIDURAL; RETROBULBAR
Status: COMPLETED | OUTPATIENT
Start: 2022-11-23 | End: 2022-11-23

## 2022-11-23 RX ADMIN — BUPIVACAINE HYDROCHLORIDE 7.5 MG: 7.5 INJECTION, SOLUTION EPIDURAL; RETROBULBAR at 12:10

## 2022-11-23 RX ADMIN — ONDANSETRON HYDROCHLORIDE 4 MG: 2 INJECTION, SOLUTION INTRAMUSCULAR; INTRAVENOUS at 12:04

## 2022-11-23 RX ADMIN — SODIUM CHLORIDE, POTASSIUM CHLORIDE, SODIUM LACTATE AND CALCIUM CHLORIDE: 600; 310; 30; 20 INJECTION, SOLUTION INTRAVENOUS at 12:02

## 2022-11-23 RX ADMIN — INDOMETHACIN 50 MG: 25 CAPSULE ORAL at 14:12

## 2022-11-23 RX ADMIN — SODIUM CHLORIDE 40 MCG/MIN: 9 INJECTION, SOLUTION INTRAVENOUS at 12:04

## 2022-11-23 NOTE — PROGRESS NOTES
[id number] IV removed    @4722 Patient up to bathroom to void    @6526 Patient discharged home in stable condition with discharge instructions. All questions answered.

## 2022-11-23 NOTE — PROGRESS NOTES
1010-pt arrived for scheduled cerclage    1150-Bedside shift change report given to NADIR Cee RN (oncoming nurse) by ANUP Reese RN (offgoing nurse). Report included the following information SBAR, Intake/Output, MAR, and Recent Results.

## 2022-11-23 NOTE — ANESTHESIA PREPROCEDURE EVALUATION
Relevant Problems   RESPIRATORY SYSTEM   (+) Asthma      NEUROLOGY   (+) Anorexia nervosa, restricting type   (+) MDD (major depressive disorder), recurrent episode, moderate (HCC)   (+) Post-traumatic stress disorder, acute      CARDIOVASCULAR   (+) Unspecified essential hypertension      GASTROINTESTINAL   (+) GERD (gastroesophageal reflux disease)      ENDOCRINE   (+) Severe obesity (HCC)      HEMATOLOGY   (+) Microcytic anemia       Anesthetic History     PONV          Review of Systems / Medical History  Patient summary reviewed, nursing notes reviewed and pertinent labs reviewed    Pulmonary            Asthma        Neuro/Psych         Psychiatric history    Comments: Back pain, chronic (M54.9, G89.29)  Anxiety (F41.9)    PTSD (post-traumatic stress disorder) (F43.10)    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)    Anorexia (R63.0)     Cardiovascular    Hypertension              Exercise tolerance: >4 METS  Comments: Orthostatic hypotension (I95.1)   GI/Hepatic/Renal     GERD           Endo/Other        Blood dyscrasia and anemia     Other Findings   Comments: b-thal minor                 Physical Exam    Airway  Mallampati: II  TM Distance: 4 - 6 cm  Neck ROM: normal range of motion   Mouth opening: Normal     Cardiovascular  Regular rate and rhythm,  S1 and S2 normal,  no murmur, click, rub, or gallop  Rhythm: regular  Rate: normal         Dental    Dentition: Poor dentition     Pulmonary  Breath sounds clear to auscultation               Abdominal  GI exam deferred       Other Findings            Anesthetic Plan    ASA: 3  Anesthesia type: spinal            Anesthetic plan and risks discussed with: Patient

## 2022-11-23 NOTE — DISCHARGE INSTRUCTIONS
Patient had a Ramirez cerclage placed. The procedure was uncomplicated. She can be discharged home when she is able to spontaneously void and ambulate. Post-procedure expectations: You may continue to have vaginal bleeding. The bleeding should become progressively lighter over the next few days. Call your obstetrician if the bleeding is heavier than a period. You may have uterine cramping. A prescription for indomethacin has been sent to your pharmacy which you will take every 8 hours for one day. Follow up with Maternal Fetal Medicine in 1 week     Cervical Cerclage to Prevent  Delivery: What to Expect at Home  Your Recovery  Cervical cerclage (say \"SER-vuh-kul ser-KLAZH\") is a procedure that helps keep your cervix from opening too soon. Your doctor has sewn your cervix shut to help prevent  labor. For the next few days, you may have:  Cramping. Spotting. Pain when you urinate. Your doctor may give you instructions on when you can do your normal activities again, such as driving and going back to work. Your doctor will remove the stitches around your cervix at 37 weeks, or if you go into  labor or show signs of infection. This care sheet gives you a general idea about how long it will take for you to recover. But each person recovers at a different pace. Follow the steps below to get better as quickly as possible. How can you care for yourself at home? Activity    Rest when you feel tired. Ask your doctor when it is okay for you to have sex. Medicines    Be safe with medicines. Read and follow all instructions on the label. If you are not taking a prescription pain medicine, ask your doctor if you can take an over-the-counter medicine. Your doctor will tell you if and when you can restart your medicines. He or she will also give you instructions about taking any new medicines. Follow-up care is a key part of your treatment and safety.  Be sure to make and go to all appointments, and call your doctor if you are having problems. It's also a good idea to know your test results and keep a list of the medicines you take. When should you call for help? Call 911 anytime you think you may need emergency care. For example, call if:    You have severe trouble breathing. You have sudden, severe pain in your belly. You have severe vaginal bleeding. Call your doctor now or seek immediate medical care if:    You have new pelvic pain, or the pain in your pelvis gets worse. You have a new discharge from your vagina. You have a new or higher fever. Watch closely for changes in your health, and be sure to contact your doctor if you have any problems. Where can you learn more? Go to http://www.gray.com/  Enter Y140 in the search box to learn more about \"Cervical Cerclage to Prevent  Delivery: What to Expect at Home. \"  Current as of: 2022               Content Version: 13.4   [employer]. Care instructions adapted under license by [employer] (which disclaims liability or warranty for this information). If you have questions about a medical condition or this instruction, always ask your healthcare professional. [address] any warranty or liability for your use of this information.

## 2022-11-23 NOTE — H&P
MATERNAL FETAL MEDICINE  INPATIENT CONSULTATION    REQUESTED BY: Cooper Qiu MD   DATE OF CONSULT: 22    REASON FOR CONSULTATION: hx cervical insufficiency    HPI  Stephany Conner is a 41 yo [id number] who had a D&C for an eAb @ 6-8wks at age 11, 5 \"or more\" sAbs 12-13 wks none of which required a D&C, an uncomplicated  in  followed by an  @ 34 weeks in , then 3 term SVDs followed by a term c/s, then 3 term VBACs one of which was c/b gHTN and the last c/b polyhydramnios and hyperemesis. She states that she had a cerclage and vaginal progesterone with all of her successful deliveries & is taking vaginal progesterone now. She reports irregular menses prior to this pregnancy. She reports rare use of her MDI for her asthma. She follows with psychiatry for MDD/anxiety/PTSD but not for her eating disorder; I encouraged her to alert her psychiatrist of her eating disorder. She states that her mood is \"OK\" on zyprexa, fluoxetine, and trazedone; she denies SI/HI and precautions were reviewed. She takes linzess and miralax for IBS. She states that she is able to tolerate p.o. on zofran & phenergan. Hyperemesis precautions were reviewed; we discussed that proper nutrition is crucial to optimal fetal organ development  including brain development. She reports hives with labetalol, PCN, ceclor, and bactrim/septra. She does not report other significant history. She had cffDNA drawn 22.     Patient Active Problem List   Diagnosis Code    Asthma J45.909    Microcytic anemia D50.9    Anxiety F41.9    Unspecified essential hypertension I10    MDD (major depressive disorder), recurrent episode, moderate (HCC) F33.1    Sleep disturbance G47.9    Severe obesity (HCC) E66.01    Post-traumatic stress disorder, acute F43.11    Calculus of gallbladder without cholecystitis K80.20    Chronic bilateral low back pain with right-sided sciatica M54.41, G89.29    GERD (gastroesophageal reflux disease) K21.9 Severe protein-calorie malnutrition ([address] E43    Malnutrition ([address] E46    Anorexia nervosa, restricting type F50.01    Fibromyalgia M79.7    Hypokalemia E87.6       Past Medical History:   Diagnosis Date    Acute pyelonephritis 3/3/2021    Anorexia     Anxiety     Asthma     on albuterol prn. Triggers cold and allergies    Avoidant-restrictive food intake disorder (ARFID)     Back pain, chronic     sciatica    Chronic bilateral low back pain with right-sided sciatica 2020    ~2010    Fibromyalgia 2022    GERD (gastroesophageal reflux disease) 2020    [facility] , GI Dr. Sandra Freeman    Gestational hypertension     Past pregnancy    HX OTHER MEDICAL      , , , ,     Hyperemesis     severe hyperemesis    Hypertension     with G12 - none this pregnancy    IBS (irritable bowel syndrome) 2022    Iron deficiency anemia 10/08/2010    MDD (major depressive disorder), single episode with postpartum onset 2013    treated with meds - 11    Orthostatic hypotension 2020    Plantar fasciitis     Pregnancy     36 weeks    PTSD (post-traumatic stress disorder)        Past Surgical History:   Procedure Laterality Date    HX  SECTION  4/22/15    HX DILATION AND CURETTAGE      HX DILATION AND CURETTAGE  2020    HX GYN      miscarriage x 6    HX GYN      removal of left fallopian tube    HX OTHER SURGICAL      cerlcage x4, ectopic x1 1999 with removal of left fallopian tube    HX OTHER SURGICAL      cerclage w/ current pregnancy.  Removed 18    [place]  DELIVERY ONLY         OB History    Para Term  AB Living   16 8 5 2 7 8   SAB IAB Ectopic Molar Multiple Live Births   5 1 1 0 0 6   Obstetric Comments   Gyn Dr. Fauzia Martin       Allergies   Allergen Reactions    Labetalol Hives    Pcn [Penicillins] Hives    Ceclor [Cefaclor] Unknown (comments)    Septra [Sulfamethoprim Ds] Unknown (comments)       No current facility-administered medications for this encounter. Social History     Tobacco Use    Smoking status: Never    Smokeless tobacco: Never   Vaping Use    Vaping Use: Never used   Substance Use Topics    Alcohol use: Not Currently    Drug use: No       Family History   Problem Relation Age of Onset    Arthritis-rheumatoid Mother     Asthma Mother     No Known Problems Father     No Known Problems Maternal Grandmother     No Known Problems Maternal Grandfather     No Known Problems Paternal Grandmother     No Known Problems Paternal Grandfather     Asthma Son     Alcohol abuse Neg Hx     OSTEOARTHRITIS Neg Hx     Bleeding Prob Neg Hx     Cancer Neg Hx     Diabetes Neg Hx     Elevated Lipids Neg Hx     Headache Neg Hx     Heart Disease Neg Hx     Hypertension Neg Hx     Lung Disease Neg Hx     Migraines Neg Hx     Psychiatric Disorder Neg Hx     Stroke Neg Hx     Mental Retardation Neg Hx     Anesth Problems Neg Hx        Visit Vitals  /66   Pulse 66   Temp 98.2 °F (36.8 °C)   Resp 16   Ht 5' 3\" (1.6 m)   Wt 68 kg (150 lb)   SpO2 99%   Breastfeeding No   BMI 26.57 kg/m²       Gen: Comfortable, NAD  Resp: Comfortable respirations  CV: Well perfused  Abd: Gravid, ND  Ext: Moving all extremities well  Neuro: Alert, interactive, appropriate    No results found for this or any previous visit (from the past 24 hour(s)). ULTRASOUND:  22: Single viable IUP with biometry consistent with her EDC. Fetal anatomy seen appears WNL including NT = 2.2  mm; the scan is globally limited due to early gestational age. A transvaginal approach was used to evaluate the  cervix due to inadequacy of an abdominal approach; it was found to be 2.8cm. Patient was counseled on the findings. We discussed risks of the cerclage including bleeding,  labor, and PPROM. Questions and concerns were addressed. ASSESSMENT:    42 y. [id number] at 13w1d hx cervical insufficiency.       PLAN:    Cerclage today    A total of 50 minutes was spent on this visit reviewing previous notes, counseling the patient and documenting  the findings in the note.     Rukhsana Romero M.D.

## 2022-11-23 NOTE — ANESTHESIA POSTPROCEDURE EVALUATION
Procedure(s):  CERCLAGE.    spinal    Anesthesia Post Evaluation      Multimodal analgesia: multimodal analgesia used between 6 hours prior to anesthesia start to PACU discharge  Patient location during evaluation: PACU  Level of consciousness: awake  Pain management: adequate  Airway patency: patent  Anesthetic complications: no  Cardiovascular status: acceptable  Respiratory status: acceptable  Hydration status: acceptable  Post anesthesia nausea and vomiting:  none  Final Post Anesthesia Temperature Assessment:  Normothermia (36.0-37.5 degrees C)      INITIAL Post-op Vital signs:   Vitals Value Taken Time   /76 11/23/22 1258   Temp     Pulse 55 11/23/22 1258   Resp 18 11/23/22 1258   SpO2 100 % 11/23/22 1258

## 2022-11-30 ENCOUNTER — HOSPITAL ENCOUNTER (OUTPATIENT)
Dept: PERINATAL CARE | Age: 37
Discharge: HOME | End: 2022-11-30
Attending: OBSTETRICS & GYNECOLOGY
Payer: MEDICAID

## 2022-11-30 ENCOUNTER — DOCUMENTATION ONLY (OUTPATIENT)
Dept: PERINATAL CARE | Age: 37
End: 2022-11-30

## 2022-11-30 PROCEDURE — 76815 OB US LIMITED FETUS(S): CPT | Performed by: OBSTETRICS & GYNECOLOGY

## 2022-12-07 ENCOUNTER — TELEPHONE (OUTPATIENT)
Dept: BEHAVIORAL/MENTAL HEALTH CLINIC | Age: 37
End: 2022-12-07

## 2022-12-07 NOTE — TELEPHONE ENCOUNTER
Patient called stating she is pregnant again. She is feeling anxious due to the pregnancy and issues she is having with her children's father. She is requesting an appointment with provider. Spoke with provider, who asked this nurse to give patient referrals to counselors. Will contact patient on 12/8/22 with references.

## 2022-12-08 NOTE — TELEPHONE ENCOUNTER
Spoke to patient regarding anxiety. Informed her that provider does not have an opening at this time. She is encouraging patient to find a therapist as recommended in previous visits. Patient also informed to go to ED if office is closed and symptoms become unmanageable. Patient given [address]. Will send other therapists via My Chart. Patient expressed an understanding and had no further questions.

## 2022-12-09 NOTE — PROGRESS NOTES
High Risk Obstetrics Progress Note    Name: Cindy Yadav MRN: [id number]  SSN: [id number]    YOB: 1985  Age: 38 y.o. Sex: female      Subjective:      LOS: 7 days    Estimated Date of Delivery: 21   Gestational Age Today: 12w7d     Patient admitted for intractable nausea and vomiting and eating disorder in pregnancy. States she does have cramping and light spotting since cerclage placement, continued nausea and vomiting and does not have chest pain and shortness of breath. Objective:     Vitals:  Blood pressure 116/70, pulse 64, temperature 98.4 °F (36.9 °C), resp. rate 16, height 5' 4\" (1.626 m), weight 71.4 kg (157 lb 6.4 oz), last menstrual period 2020, SpO2 99 %, not currently breastfeeding. Temp (24hrs), Av.2 °F (36.8 °C), Min:97.2 °F (36.2 °C), Max:98.5 °F (58.8 °C)    Systolic (52CCY), QOF:538 , Min:89 , WRJ:136      Diastolic (16USP), MARISA:97, Min:56, Max:74       Intake and Output:         Physical Exam:  Patient without distress. Heart: Regular rate and rhythm  Lung: clear to auscultation throughout lung fields, no wheezes, no rales, no rhonchi and normal respiratory effort  Abdomen: soft, nontender, gravid  Lower Extremities:  - Edema No       Membranes:  Intact        Labs:   Recent Results (from the past 36 hour(s))   METABOLIC PANEL, COMPREHENSIVE    Collection Time: 21  2:24 AM   Result Value Ref Range    Sodium 139 136 - 145 mmol/L    Potassium 3.7 3.5 - 5.1 mmol/L    Chloride 110 (H) 97 - 108 mmol/L    CO2 21 21 - 32 mmol/L    Anion gap 8 5 - 15 mmol/L    Glucose 60 (L) 65 - 100 mg/dL    BUN 5 (L) 6 - 20 MG/DL    Creatinine 0.37 (L) 0.55 - 1.02 MG/DL    BUN/Creatinine ratio 14 12 - 20      GFR est AA >60 >60 ml/min/1.73m2    GFR est non-AA >60 >60 ml/min/1.73m2    Calcium 7.8 (L) 8.5 - 10.1 MG/DL    Bilirubin, total 0.1 (L) 0.2 - 1.0 MG/DL    ALT (SGPT) 9 (L) 12 - 78 U/L    AST (SGOT) 8 (L) 15 - 37 U/L    Alk.  phosphatase 37 (L) 45 - 117 U/L Protein, total 5.1 (L) 6.4 - 8.2 g/dL    Albumin 2.1 (L) 3.5 - 5.0 g/dL    Globulin 3.0 2.0 - 4.0 g/dL    A-G Ratio 0.7 (L) 1.1 - 2.2     CBC W/O DIFF    Collection Time: 01/07/21  2:24 AM   Result Value Ref Range    WBC 6.0 3.6 - 11.0 K/uL    RBC 3.40 (L) 3.80 - 5.20 M/uL    HGB 8.2 (L) 11.5 - 16.0 g/dL    HCT 26.2 (L) 35.0 - 47.0 %    MCV 77.1 (L) 80.0 - 99.0 FL    MCH 24.1 (L) 26.0 - 34.0 PG    MCHC 31.3 30.0 - 36.5 g/dL    RDW 17.1 (H) 11.5 - 14.5 %    PLATELET 204 (L) 621 - 400 K/uL    MPV 11.6 8.9 - 12.9 FL    NRBC 0.0 0  WBC    ABSOLUTE NRBC 0.00 0.00 - 0.01 K/uL   PHOSPHORUS    Collection Time: 01/07/21  2:24 AM   Result Value Ref Range    Phosphorus 3.2 2.6 - 4.7 MG/DL       Assessment and Plan: Active Problems:    Nausea and vomiting in pregnancy (12/2/2020)      Eating disorder affecting pregnancy, antepartum (12/2/2020)      Nausea/vomiting in pregnancy (1/2/2021)       39 y/o [id number] at 14 5/7 weeks by [facility] 7/4/2021 readmitted for intractable nausea/vomiting in pregnancy in light of chronic eating disorder     1.  N/V/Dehydration/weight loss since discharge from hospital             -Iv antiemetics and phenergan suppositories prn             -ivf's             -Goodie bag daily             -s/p Nutrition consult (pt saw her nutritionist Eliceo Munoz on 12/29) for supplement orders and consider ENT to place  dobhoff vs TPN if needed-however, will not be covered by home health if she is taking anything orally. Pt 's open enrollment was 12/31 but she has put in a request to switch and will find out in about 14 days.  Pt to work on looking into other insurance options that may provide better coverage and I will speak with current insurance myself this week.             -Pt has PICC line in place             -pt s/p GI consult and normal EGD 1/7             -HPylori stool culture pending             -pt s/p sugery consult -still holding off on cholecystectomy for gallstones for now   2. Eating Disorder and Depression/Anxiety             -Thrombocytopenia resolved on 1/2 and stable 1/3             -recent h/o admission for suicidal ideation-stable currently             -Continue Prozac 60mg daily and Zyprexa 5mg daily             -Continue prn hydoxyzine and prazosin             -Pt followed closely by psychiatrist Allyson Andrews (last appt           19/64)             -had appt with psychologist by telehealth at [address])             -nursing to work on only small amounts of food at a time during the day     3. CHTN-normal to low bp's off meds since weight loss     4. Anemia-know beta thalassemia minor and iron deficiency             anemia-s/p iv iron infusions during last admission             -oral iron bid     5. Hip pain/pelvic girdle weakness             -consult PT             -pelvic support belt     6.  H/o incompetent cervix             -s/p cerclage placement 1/7/2021     7. DVT prophylaxis-MEGHANA hose and ambulation with assistance (pt is fall risk)     8. Daily FHTs.       9. Headache: offered tylenol NV prn     10. Brain MRI: chronic sinusitis, patient with minimal sx. Flonase.  s/p ENT consult-rec course of steroids and augmentin    Picato Counseling:  I discussed with the patient the risks of Picato including but not limited to erythema, scaling, itching, weeping, crusting, and pain.

## 2022-12-20 ENCOUNTER — PATIENT MESSAGE (OUTPATIENT)
Dept: NEUROLOGY | Age: 37
End: 2022-12-20

## 2023-01-26 NOTE — PROGRESS NOTES
Bedside and Verbal shift change report given to Morris Lopez RN  (oncoming nurse) by Stephanie Greene RN  (offgoing nurse).  Report included the following information SBAR, Kardex, Intake/Output and MAR.     0910- CBC & BMP tubed to lab show

## 2023-02-01 ENCOUNTER — HOSPITAL ENCOUNTER (OUTPATIENT)
Dept: PERINATAL CARE | Age: 38
Discharge: HOME | End: 2023-02-01
Attending: OBSTETRICS & GYNECOLOGY
Payer: MEDICAID

## 2023-02-09 NOTE — PROGRESS NOTES
Hospital Medicine History & Physical Note    Date of Service  2/8/2023    Primary Care Physician  Pcp Pt States None    Consultants  Neurology (Dr. Turner)    Code Status  Full Code    Chief Complaint  Chief Complaint   Patient presents with    Unilateral Weakness     Symptoms started 2 weeks ago.  L arm and L leg are less coordinated than they were before.  Pt did see primary care MD.  Ordered blood tests, thyroid levels were low.  MRI occurred today, showed that she had a stroke to her R side.  Brought MRI results.       History of Presenting Illness  Haley Wong is a 73 y.o. mandrin speaking female with history of hypothyroidism, hypertension who presented 2/8/2023 with evaluation for CVA.  History obtained from patient's daughter was at bedside in ER as patient is Mandarin speaking only.  Per daughter, patient was noted to have left-sided weakness, appears to be using her right arm more so than usual.  Daughter stated patient is unable to put close on herself due to weakness on left side.  Daughter stated this started approximately 2 weeks ago, patient has been followed by PCP who subsequently ordered MRI brain.  Patient had outpatient MRI brain suggested a right-sided CVA, however, official report unavailable.  As symptoms > 2 weeks, out of therapeutic window.  CTA neck notable for left vertebral artery occlusion.  Neurology has been consulted, formal evaluation followed.  Admitted to medicine service.    I discussed the plan of care with patient, family, bedside RN, and neurology.    Review of Systems  Review of Systems   Constitutional:  Negative for chills and fever.   HENT:  Negative for hearing loss and tinnitus.    Eyes:  Negative for blurred vision and double vision.   Respiratory:  Negative for cough and shortness of breath.    Cardiovascular:  Negative for chest pain and palpitations.   Gastrointestinal:  Negative for heartburn and nausea.   Genitourinary:  Negative for dysuria and urgency.  Transition of Care Plan   RUR- Patient has a sitter and remains on TPN Per MD this patient will need to stay inpatient to ensure safety of baby and patient.  DISPOSITION:  This  has followed up with another  who is familiar with case. Per Dr Sharron Monge patient will continue to stay inpatient while she is on TPN for the safety of her baby and herself. In the past patient has been open to [facility]   . Current plan:           Patient to remain inpatient at this           Time            Per MD when it is appropriate            Patient will be discharged and             Most likely will need transport             Home.   Musculoskeletal:  Negative for joint pain and myalgias.   Skin:  Negative for itching and rash.   Neurological:  Positive for focal weakness (left). Negative for dizziness and headaches.   Endo/Heme/Allergies:  Negative for environmental allergies. Does not bruise/bleed easily.   Psychiatric/Behavioral:  Negative for depression and substance abuse.    All other systems reviewed and are negative.    Past Medical History   has a past medical history of Hypertension.  Hyperthyrodism    Surgical History   has no past surgical history on file.   Denies PSH    Family History  family history is not on file.   Family history reviewed with patient. There is no family history that is pertinent to the chief complaint.   Denies FH of CVA    Social History   reports that she has never smoked. She has never used smokeless tobacco. She reports that she does not drink alcohol and does not use drugs.    Allergies  No Known Allergies    Medications  Prior to Admission Medications   Prescriptions Last Dose Informant Patient Reported? Taking?   lisinopril (PRINIVIL) 20 MG Tab   Yes Yes   Sig: Take 20 mg by mouth every day.   methimazole (TAPAZOLE) 5 MG Tab   Yes Yes   Sig: Take 5 mg by mouth 2 times a day.   metoprolol SR (TOPROL XL) 50 MG TABLET SR 24 HR   Yes Yes   Sig: Take 50 mg by mouth every day.      Facility-Administered Medications: None       Physical Exam  Temp:  [37.2 °C (99 °F)] 37.2 °C (99 °F)  Pulse:  [80] 80  Resp:  [16] 16  BP: (162)/(88) 162/88  SpO2:  [97 %] 97 %  Blood Pressure : (!) 162/88   Temperature: 37.2 °C (99 °F)   Pulse: 80   Respiration: 16   Pulse Oximetry: 97 %       Physical Exam  Vitals and nursing note reviewed.   Constitutional:       General: She is not in acute distress.  HENT:      Head: Normocephalic and atraumatic.      Nose: Nose normal.      Mouth/Throat:      Mouth: Mucous membranes are moist.   Eyes:      General: No scleral icterus.     Extraocular Movements: Extraocular movements intact.    Cardiovascular:      Rate and Rhythm: Normal rate and regular rhythm.      Pulses: Normal pulses.      Heart sounds:     No friction rub.   Pulmonary:      Effort: No respiratory distress.      Breath sounds: No wheezing or rales.   Abdominal:      General: There is no distension.      Tenderness: There is no abdominal tenderness. There is no guarding or rebound.   Musculoskeletal:         General: No tenderness. Normal range of motion.      Cervical back: Neck supple. No tenderness.   Skin:     General: Skin is warm and dry.      Capillary Refill: Capillary refill takes less than 2 seconds.   Neurological:      Mental Status: She is alert and oriented to person, place, and time.      Comments: Mild left sided weakness  No facial droop  No pronator drift  No dysarthria   Psychiatric:         Mood and Affect: Mood normal.       Laboratory:  Recent Labs     02/08/23 2110   WBC 5.5   RBC 3.33*   HEMOGLOBIN 11.3*   HEMATOCRIT 33.3*   .0*   MCH 33.9*   MCHC 33.9   RDW 53.6*   PLATELETCT 196   MPV 9.2     Recent Labs     02/08/23 2110   SODIUM 140   POTASSIUM 4.1   CHLORIDE 106   CO2 22   GLUCOSE 154*   BUN 22   CREATININE 1.12   CALCIUM 9.4     Recent Labs     02/08/23 2110   ALTSGPT 10   ASTSGOT 20   ALKPHOSPHAT 98   TBILIRUBIN 0.3   GLUCOSE 154*     Recent Labs     02/08/23 2110   INR 0.99     No results for input(s): NTPROBNP in the last 72 hours.      Recent Labs     02/08/23 2110   TROPONINT 11       Imaging:  CT-CTA NECK WITH & W/O-POST PROCESSING   Final Result         1.  Left vertebral artery occlusion which reconstitutes distally.      These findings were discussed with the patient's clinician, Ladi Dsouza, on 2/9/2023 12:25 AM.      Note: Interpretation somewhat delayed due to technical PACS issues.      OUTSIDE IMAGES-MR BRAIN   Final Result      CT-CTA HEAD WITH & W/O-POST PROCESS    (Results Pending)   EC-ECHOCARDIOGRAM COMPLETE W/O CONT    (Results Pending)       EKG:  I have personally  reviewed the images and compared with prior images.    Assessment/Plan:  Justification for Admission Status  I anticipate this patient will require at least 2 midnights of hospitalization, therefore appropriate for inpatient status.      * CVA (cerebral vascular accident) (HCC)  Assessment & Plan  Probable subacute right MCA CVA  Left-sided weakness for at least 2 weeks per daughter, improving    Outpatient MRI brain 2/8/2023: Right M2 CVA, no acute -- probable subacute --official report not included, personally discussed with on-call radiology    CTA neck: Left vertebral artery occlusion  CT head: Pending    Discussed with neurology, no acute intervention needed given out of therapeutic window  Aspirin/statin  PT/OT/ST  Follow-up echo  Neurology follow-up appreciated      Hypertension  Assessment & Plan  Lisinopril, metoprolol    Hyperglycemia  Assessment & Plan  Check HbA1c    Hypothyroidism  Assessment & Plan  Methimazole        VTE prophylaxis: SCDs/TEDs

## 2023-02-19 ENCOUNTER — HOSPITAL ENCOUNTER (EMERGENCY)
Age: 38
Discharge: HOME | End: 2023-02-19
Payer: MEDICAID

## 2023-02-19 ENCOUNTER — HOSPITAL ENCOUNTER (EMERGENCY)
Age: 38
Discharge: STILL ADMITTED OR ADMINISTRATIVE | End: 2023-02-19
Attending: EMERGENCY MEDICINE

## 2023-02-19 VITALS
HEART RATE: 69 BPM | OXYGEN SATURATION: 100 % | TEMPERATURE: 97.8 F | HEIGHT: 65 IN | RESPIRATION RATE: 18 BRPM | BODY MASS INDEX: 27.32 KG/M2 | WEIGHT: 164 LBS | SYSTOLIC BLOOD PRESSURE: 116 MMHG | DIASTOLIC BLOOD PRESSURE: 66 MMHG

## 2023-02-19 VITALS
HEART RATE: 60 BPM | DIASTOLIC BLOOD PRESSURE: 60 MMHG | TEMPERATURE: 98.1 F | HEIGHT: 65 IN | BODY MASS INDEX: 26.66 KG/M2 | WEIGHT: 160 LBS | RESPIRATION RATE: 16 BRPM | SYSTOLIC BLOOD PRESSURE: 116 MMHG

## 2023-02-19 LAB
ALBUMIN SERPL-MCNC: 2.5 G/DL (ref 3.5–5)
ALBUMIN/GLOB SERPL: 0.7 (ref 1.1–2.2)
ALP SERPL-CCNC: 62 U/L (ref 45–117)
ALT SERPL-CCNC: 11 U/L (ref 12–78)
ANION GAP SERPL CALC-SCNC: 4 MMOL/L (ref 5–15)
APPEARANCE UR: CLEAR
AST SERPL-CCNC: 16 U/L (ref 15–37)
BACTERIA URNS QL MICRO: NEGATIVE /HPF
BASOPHILS # BLD: 0 K/UL (ref 0–0.1)
BASOPHILS NFR BLD: 0 % (ref 0–1)
BILIRUB SERPL-MCNC: 0.3 MG/DL (ref 0.2–1)
BILIRUB UR QL: NEGATIVE
BUN SERPL-MCNC: 5 MG/DL (ref 6–20)
BUN/CREAT SERPL: 10 (ref 12–20)
CALCIUM SERPL-MCNC: 8 MG/DL (ref 8.5–10.1)
CHLORIDE SERPL-SCNC: 108 MMOL/L (ref 97–108)
CO2 SERPL-SCNC: 26 MMOL/L (ref 21–32)
COLOR UR: ABNORMAL
CREAT SERPL-MCNC: 0.51 MG/DL (ref 0.55–1.02)
DIFFERENTIAL METHOD BLD: ABNORMAL
EOSINOPHIL # BLD: 0.1 K/UL (ref 0–0.4)
EOSINOPHIL NFR BLD: 1 % (ref 0–7)
EPITH CASTS URNS QL MICRO: ABNORMAL /LPF
ERYTHROCYTE [DISTWIDTH] IN BLOOD BY AUTOMATED COUNT: 14.8 % (ref 11.5–14.5)
GLOBULIN SER CALC-MCNC: 3.6 G/DL (ref 2–4)
GLUCOSE SERPL-MCNC: 96 MG/DL (ref 65–100)
GLUCOSE UR STRIP.AUTO-MCNC: NEGATIVE MG/DL
HCT VFR BLD AUTO: 32 % (ref 35–47)
HGB BLD-MCNC: 10.4 G/DL (ref 11.5–16)
HGB UR QL STRIP: NEGATIVE
IMM GRANULOCYTES # BLD AUTO: 0 K/UL (ref 0–0.04)
IMM GRANULOCYTES NFR BLD AUTO: 0 % (ref 0–0.5)
KETONES UR QL STRIP.AUTO: ABNORMAL MG/DL
LEUKOCYTE ESTERASE UR QL STRIP.AUTO: NEGATIVE
LYMPHOCYTES # BLD: 2.1 K/UL (ref 0.8–3.5)
LYMPHOCYTES NFR BLD: 24 % (ref 12–49)
MCH RBC QN AUTO: 23 PG (ref 26–34)
MCHC RBC AUTO-ENTMCNC: 32.5 G/DL (ref 30–36.5)
MCV RBC AUTO: 70.6 FL (ref 80–99)
MONOCYTES # BLD: 0.7 K/UL (ref 0–1)
MONOCYTES NFR BLD: 8 % (ref 5–13)
NEUTS SEG # BLD: 5.5 K/UL (ref 1.8–8)
NEUTS SEG NFR BLD: 67 % (ref 32–75)
NITRITE UR QL STRIP.AUTO: NEGATIVE
NRBC # BLD: 0 K/UL (ref 0–0.01)
NRBC BLD-RTO: 0 PER 100 WBC
PH UR STRIP: 6.5 (ref 5–8)
PLATELET # BLD AUTO: 277 K/UL (ref 150–400)
PMV BLD AUTO: 10.9 FL (ref 8.9–12.9)
POTASSIUM SERPL-SCNC: 3.5 MMOL/L (ref 3.5–5.1)
PROT SERPL-MCNC: 6.1 G/DL (ref 6.4–8.2)
PROT UR STRIP-MCNC: NEGATIVE MG/DL
RBC # BLD AUTO: 4.53 M/UL (ref 3.8–5.2)
RBC #/AREA URNS HPF: ABNORMAL /HPF (ref 0–5)
SODIUM SERPL-SCNC: 138 MMOL/L (ref 136–145)
SP GR UR REFRACTOMETRY: 1.02 (ref 1–1.03)
UA: UC IF INDICATED,UAUC: ABNORMAL
UROBILINOGEN UR QL STRIP.AUTO: 1 EU/DL (ref 0.2–1)
WBC # BLD AUTO: 8.4 K/UL (ref 3.6–11)
WBC URNS QL MICRO: ABNORMAL /HPF (ref 0–4)

## 2023-02-19 PROCEDURE — 74011250636 HC RX REV CODE- 250/636: Performed by: OBSTETRICS & GYNECOLOGY

## 2023-02-19 PROCEDURE — 80053 COMPREHEN METABOLIC PANEL: CPT

## 2023-02-19 PROCEDURE — 75810000275 HC EMERGENCY DEPT VISIT NO LEVEL OF CARE

## 2023-02-19 PROCEDURE — 36415 COLL VENOUS BLD VENIPUNCTURE: CPT

## 2023-02-19 PROCEDURE — 99285 EMERGENCY DEPT VISIT HI MDM: CPT

## 2023-02-19 PROCEDURE — 81001 URINALYSIS AUTO W/SCOPE: CPT

## 2023-02-19 PROCEDURE — 85025 COMPLETE CBC W/AUTO DIFF WBC: CPT

## 2023-02-19 PROCEDURE — 96360 HYDRATION IV INFUSION INIT: CPT

## 2023-02-19 RX ORDER — ASPIRIN 81 MG/1
TABLET ORAL DAILY
COMMUNITY

## 2023-02-19 RX ADMIN — SODIUM CHLORIDE, POTASSIUM CHLORIDE, SODIUM LACTATE AND CALCIUM CHLORIDE 1000 ML: 600; 310; 30; 20 INJECTION, SOLUTION INTRAVENOUS at 18:15

## 2023-02-19 NOTE — ED TRIAGE NOTES
2/19/2023  5:46 PM Patient arrived to [facility] c/o lower back pain and dysuria since 02/16. Patient reports positive fetal movement and denies VB or LOF. Patient states she has N/V and felt febrile but did not take temperature. She last took tylenol around 1530 this afternoon. 1748 EFM applied. 1800  at bedside to assess patient. Verbal orders received to obtain labs, administer fluids and maintain continuous EFM. 1810 PIV secured, labs drawn and fluid bolus started. 1901 EFM removed, patient up to bathroom to provide UA.     1912 EFM resumed. UA labs collected and sent. 1939 Verbal orders received by Dr. Julianne Morrow to remove EFM. 1940 Verbal shift change report given to Alirio Canales RN (oncoming nurse) by Melany Fisher (offgoing nurse). Report included the following information SBAR, Intake/Output, and Recent Results.

## 2023-02-19 NOTE — ED PROVIDER NOTES
42 yp , pt of Dr. Sohail Albert, who presents with complaint of burning with urination and back pain since Thursday. It's gotten progressive worse so she came in. She has a history of pyelonephritis with a prior pregnancy and this feels similar. She reports feeling feverish at home but did not actually take her temperature. She has baseline nausea and vomiting which is no worse. She complains of intermittent lower abdominal discomfort that doesn't feel like ctxs. Cerclage this pregnancy placed by Dr. Sigifredo Concepcion in 2022. Complicated obstetric history -  D&C for an eAb @ 6-8wks at age 11, 5 \"or more\" sAbs 12-13 wks none of which required a D&C  uncomplicated  in    @ 34 weeks in   then 3 term SVDs followed by   a term c/s for breech  2 term VBACs one of which was c/b gHTN and the last c/b polyhydramnios and hyperemesis    AMA  Beta thal minor  CHTN resolved with weight loss  Eating disorder/depression. poor social situation - followed by GI and psych  Desires PP BTL    The history is provided by the patient. Urinary Pain   The current episode started more than 2 days ago. Patient reports a subjective fever - was not measured. The fever has been present for 1 - 2 days. There is A history of pyelonephritis. Urinary Burning          Chief Complaint   Patient presents with    Urinary Pain    Urinary Burning       Past Medical History:   Diagnosis Date    Acute pyelonephritis 3/3/2021    Anorexia     Anxiety     Asthma     on albuterol prn.  Triggers cold and allergies    Avoidant-restrictive food intake disorder (ARFID)     Back pain, chronic     sciatica    Chronic bilateral low back pain with right-sided sciatica 2020    ~    Fibromyalgia 2022    GERD (gastroesophageal reflux disease) 2020    UGI , GI Dr. Fair Like    Gestational hypertension     Past pregnancy    HX OTHER MEDICAL      , , , ,     Hyperemesis     severe hyperemesis    Hypertension with G12 - none this pregnancy    IBS (irritable bowel syndrome) 2022    Iron deficiency anemia 10/08/2010    MDD (major depressive disorder), single episode with postpartum onset 2013    treated with meds - 11    Orthostatic hypotension 2020    Plantar fasciitis     Pregnancy     36 weeks    PTSD (post-traumatic stress disorder)        Past Surgical History:   Procedure Laterality Date    HX  SECTION  4/22/15    HX DILATION AND CURETTAGE      HX DILATION AND CURETTAGE  2020    HX GYN      miscarriage x 6    HX GYN      removal of left fallopian tube    HX OTHER SURGICAL      cerlcage x4, ectopic x1 1999 with removal of left fallopian tube    HX OTHER SURGICAL      cerclage w/ current pregnancy.  Removed 18    [place]  DELIVERY ONLY           Family History:   Problem Relation Age of Onset    Arthritis-rheumatoid Mother     Asthma Mother     No Known Problems Father     No Known Problems Maternal Grandmother     No Known Problems Maternal Grandfather     No Known Problems Paternal Grandmother     No Known Problems Paternal Grandfather     Asthma Son     Alcohol abuse Neg Hx     OSTEOARTHRITIS Neg Hx     Bleeding Prob Neg Hx     Cancer Neg Hx     Diabetes Neg Hx     Elevated Lipids Neg Hx     Headache Neg Hx     Heart Disease Neg Hx     Hypertension Neg Hx     Lung Disease Neg Hx     Migraines Neg Hx     Psychiatric Disorder Neg Hx     Stroke Neg Hx     Mental Retardation Neg Hx     Anesth Problems Neg Hx        Social History     Socioeconomic History    Marital status: SINGLE     Spouse name: Not on file    Number of children: 7    Years of education: Not on file    Highest education level: 12th grade   Occupational History    Not on file   Tobacco Use    Smoking status: Never    Smokeless tobacco: Never   Vaping Use    Vaping Use: Never used   Substance and Sexual Activity    Alcohol use: Not Currently    Drug use: No    Sexual activity: Yes     Partners: Male   Other Topics Concern     Service Not Asked    Blood Transfusions Not Asked    Caffeine Concern Not Asked    Occupational Exposure Not Asked    [address] Hazards Not Asked    Sleep Concern Not Asked    Stress Concern Not Asked    Weight Concern Not Asked    Special Diet Not Asked    Back Care Not Asked    Exercise Not Asked    Bike Helmet Not Asked    Seat Belt Not Asked    Self-Exams Not Asked   Social History Narrative    Iveth Reeves lives with boyfriend (xenia), Nate Hardy,  reportedly with alcohol dependence. She was raised by her mother. She has no siblings. Not working. She is supported financially by the childrens' father and public support. She has no hobbies outside of her children. She has a 12th grade education and no legal entanglements. 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 Social Determinants of Health     Financial Resource Strain: Not on file   Food Insecurity: Not on file   Transportation Needs: Not on file   Physical Activity: Not on file   Stress: Not on file   Social Connections: Not on file   Intimate Partner Violence: Not on file   Housing Stability: Not on file         ALLERGIES: Labetalol, Pcn [penicillins], Ceclor [cefaclor], and Septra [sulfamethoprim ds]    Review of Systems   All other systems reviewed and are negative. Vitals:    02/19/23 1748   BP: 116/60   Pulse: 60   Resp: 16   Temp: 98.1 °F (36.7 °C)   Weight: 72.6 kg (160 lb)   Height: 5' 5\" (1.651 m)            Physical Exam  Vitals and nursing note reviewed. Exam conducted with a chaperone present. Constitutional:       General: She is not in acute distress. Appearance: Normal appearance. She is normal weight. She is not ill-appearing. Cardiovascular:      Rate and Rhythm: Normal rate and regular rhythm.    Pulmonary:      Effort: Pulmonary effort is normal.   Abdominal:      Palpations: Abdomen is soft. Tenderness: There is no abdominal tenderness. Comments: She reports discomfort with palpation over back but not true CVA tenderness   Neurological:      Mental Status: She is alert.       FHTs 140s, moderate variability, (+) accels, one ctx with a late appearing decel    MDM     Amount and/or Complexity of Data Reviewed  Clinical lab tests: ordered  Review and summarize past medical records: yes    Risk of Complications, Morbidity, and/or Mortality  Presenting problems: high  Diagnostic procedures: high  Management options: high

## 2023-02-20 NOTE — PROGRESS NOTES
OB Hospitalist    Introduced myself to Ms. Clemente Burrows. 43 y/o  @ 25 weeks 5 days had presented with dysuria and back pain. Pt. has been afebrile  Labs reviewed.  U/A +ketones,-bacteria  CMP WNL  Pain much better  No contractions  PO hydration encouraged  Plan to D/C home    Giovanny Carrera MD

## 2023-02-20 NOTE — PROGRESS NOTES
1940: Bedside shift change report given to SOPHY Kang RN (oncoming nurse) by Ramana Arriaga RN (offgoing nurse). Report included the following information SBAR, Intake/Output, MAR, and Recent Results.

## 2023-03-01 ENCOUNTER — HOSPITAL ENCOUNTER (OUTPATIENT)
Dept: PERINATAL CARE | Age: 38
Discharge: HOME | End: 2023-03-01
Attending: OBSTETRICS & GYNECOLOGY
Payer: MEDICAID

## 2023-03-01 PROCEDURE — 76816 OB US FOLLOW-UP PER FETUS: CPT | Performed by: OBSTETRICS & GYNECOLOGY

## 2023-03-23 DIAGNOSIS — K59.00 CONSTIPATION, UNSPECIFIED CONSTIPATION TYPE: ICD-10-CM

## 2023-03-24 RX ORDER — POLYETHYLENE GLYCOL 3350 17 G/17G
POWDER, FOR SOLUTION ORAL
Qty: 150 EACH | Refills: 0 | Status: SHIPPED | OUTPATIENT
Start: 2023-03-24

## 2023-03-29 ENCOUNTER — HOSPITAL ENCOUNTER (OUTPATIENT)
Dept: PERINATAL CARE | Age: 38
Discharge: HOME | End: 2023-03-29
Attending: OBSTETRICS & GYNECOLOGY
Payer: MEDICAID

## 2023-03-29 PROCEDURE — 76816 OB US FOLLOW-UP PER FETUS: CPT | Performed by: OBSTETRICS & GYNECOLOGY

## 2023-03-29 NOTE — PROCEDURES
Indication  ========    Cervical insufficiency s/p cerclage  Advanced maternal age  Depression    Method  ======    Transabdominal ultrasound examination. View: Good view    Pregnancy  =========    Mann pregnancy. Number of fetuses: 1    Dating  ======    LMP on: 8/26/2022  GA by LMP 27 w + 5 d  LUCA by LMP: 6/2/2023  Previous Ultrasound on: 10/27/2022  Type of prior assessment: GA  GA at prior assessment date 9 w + 2 d  GA by previous U/S 32 w + 1 d  LUCA by previous Ultrasound: 5/30/2023  Ultrasound examination on: 3/29/2023  GA by U/S based upon: AC, BPD, Femur, HC  GA by U/S 31 w + 0 d  LUCA by U/S: 5/31/2023  Assigned: based on ultrasound (GA), selected on 03/29/2023  Assigned GA 31 w + 1 d  Assigned LUCA: 5/30/2023    Fetal Biometry  ============    Standard  BPD 80.5 mm 32w 2d 75% Hadlock  .0 mm 33w 0d 90% Haresh  .7 mm 31w 6d 33% Hadlock  .7 mm 30w 6d 39% Hadlock  Femur 55.0 mm 29w 0d 2% Hadlock  EFW 1,586 g 30w 1d 21% Hadlock  EFW (lb) 3 lb  EFW (oz) 8 oz  EFW by: Hadlock (BPD-HC-AC-FL)  Extended   2.5 mm  Other Structures   bpm    General Evaluation  ==============    Cardiac activity present.  bpm. Fetal movements: visualized. Presentation: cephalic  Placenta: posterior  Umbilical cord: Cord vessels: 3 vessel cord  Amniotic fluid: Amount of AF: normal amount. MVP 3.5 cm. ANDREE 10.7 cm. Q1 3.3 cm, Q2 2.4 cm, Q3 1.5 cm, Q4 3.5 cm    Fetal Anatomy  ===========    Cranium: normal  Lateral ventricles: normal  Midline falx: normal  Cavum septi pellucidi: normal  Cerebellum: visualized  Profile: normal  4-chamber view: normal  RVOT view: normal  LVOT view: normal  3-vessel view: normal  3-vessel-trachea view: normal  Stomach: normal  Kidneys: normal  Bladder: normal  Fetal sex: female    Findings  =======    Follow up ultrasound (34890)    This is a mann pregnancy with biometry consistent with prior dating. Anatomy appears normal as noted above.  Normal fluid and fetal movement are noted. Growth is appropriate for gestational age at 21%. Plan of Care  ==========    Janelle Lynne is a 43 yo [id number] who is s/p cerclage 11/23/22 and on vaginal progesterone. She follows with psychiatry for MDD/anxiety/PTSD but not for her eating disorder; I encouraged her to alert her psychiatrist of her eating disorder. She states that her mood  is \"OK\" on zyprexa, fluoxetine, and trazedone; she denies SI/HI and precautions were reviewed. She takes linzess and miralax for IBS. She states that she is able to  tolerate p.o. on zofran & phenergan. Hyperemesis precautions were reviewed; we discussed that proper nutrition is crucial to optimal fetal organ development including brain  development. She is taking ldASA qd. cffDNA was low-risk & she declined amniocentesis. Janelle Lynne reports that her mood is good. Ramirez cerclage was placed with Ethibond suture. The knot was tied at 12:00. Recommend cerclage removal at 36-37 weeks gestation.     Follow-up  ========    Follow up in 4 weeks for fetal growth    Coding  ======    Code: O99.343  Description: Other mental disorders complicating pregnancy, childbirth  Code: O09.213  Description: Supervision of pregnancy with history of pre-term labor  Code: O34.33  Description: Maternal care for cervical incompetence  Code: 30717  Description: Ultrasound, pregnant uterus, real time with image documentation, follow up, transabdominal approach per fetus

## 2023-04-26 ENCOUNTER — HOSPITAL ENCOUNTER (OUTPATIENT)
Dept: PERINATAL CARE | Age: 38
Discharge: HOME | End: 2023-04-26
Attending: OBSTETRICS & GYNECOLOGY
Payer: MEDICAID

## 2023-04-26 PROCEDURE — 76816 OB US FOLLOW-UP PER FETUS: CPT | Performed by: OBSTETRICS & GYNECOLOGY

## 2023-05-13 ENCOUNTER — HOSPITAL ENCOUNTER (EMERGENCY)
Facility: HOSPITAL | Age: 38
Discharge: HOME | End: 2023-05-13
Payer: MEDICAID

## 2023-05-13 VITALS
TEMPERATURE: 97.6 F | DIASTOLIC BLOOD PRESSURE: 76 MMHG | OXYGEN SATURATION: 99 % | SYSTOLIC BLOOD PRESSURE: 120 MMHG | HEART RATE: 82 BPM | RESPIRATION RATE: 15 BRPM

## 2023-05-13 PROCEDURE — 99283 EMERGENCY DEPT VISIT LOW MDM: CPT

## 2023-05-13 NOTE — ED NOTES
left fallopian tube    GYN      miscarriage x 6    OTHER SURGICAL HISTORY      cerclage w/ current pregnancy. Removed 18    OTHER SURGICAL HISTORY      cerlcage x9, ectopic x1  with removal of left fallopian tube     Medications Prior to Admission:  Not in a hospital admission. Allergies:  Labetalol, Penicillins, Cefaclor, and Sulfa antibiotics    REVIEW OF SYSTEMS:    Pertinent ROS as per HPI otherwise negative    PHYSICAL EXAM:    General appearance:  appears comfortable, no distress  Lungs:  normal respiratory effort  Heart:  regular rate and rhythm  Abdomen:  soft, NT  Fetal heart rate:  Baseline Heart Rate 130, accelerations:  present  long term variability:  moderate  decelerations:  absent  Cervix: 2/50/-3, unchanged from office, unchanged after walking 2 hours      Contraction frequency:  q7 min minutes  Membranes:  Intact      ASSESSMENT AND PLAN:    The patient is a 42 y.o.  25 parity 8 at  37w4d with contractions. I offered her the option of going home vs walking for 2 hours. She elected to walk. Walked for 2 hours and cervix unchanged. Contractions 10-15 minutes apart. She's afraid of getting to the hospital too late and not getting epidural as that happened to her once before. Advised her to come when contractions are 7-8 minutes apart consistently. [id number] Lenora Borrero for discharge.

## 2023-05-13 NOTE — PROGRESS NOTES
1630: pt arrived from home with c/o contractions q10min x 1 hour. Had her cerclage removed last week. Denies any bleeding or leaking fluid, reports active FM.

## 2023-05-13 NOTE — DISCHARGE INSTRUCTIONS
Early Stage of Labor at Home: Care Instructions  Overview     If you came to the hospital while in early labor, your doctor may ask you to labor at home until your contractions are stronger. Many women stay at home during early labor. This is often the longest part of the birthing process. It may last up to 2 to 3 days. Contractions are mild to moderate and shorter (about 30 to 45 seconds). You can usually keep talking during them. Contractions may also be irregular, about 5 to 20 minutes apart. They may even stop for a while. It helps to stay as relaxed as you can during this time. You can spend some or all of your early labor at home or anywhere else you may be comfortable. If you live far from the hospital or birthing center, you may want to think about going somewhere nearby so you can get back to the hospital quickly. For some women, there may be benefits to staying home during early labor, such as avoiding medicines or procedures. As labor progresses, you'll shift from early labor to active labor. During this time, contractions get more intense. They occur more often, about every 2 to 3 minutes. They also last longer, about 50 to 70 seconds. You will feel them even when you change positions and walk or move around. It may be hard to tell if you are in active labor. If you aren't sure, call your doctor or midwife. As your labor progresses, check in with your doctor or midwife about when to come back to the hospital or birthing center. You may have special instructions if your water broke or you tested positive for group B strep. Follow-up care is a key part of your treatment and safety. Be sure to make and go to all appointments, and call your doctor if you are having problems. It's also a good idea to know your test results and keep a list of the medicines you take. How can you care for yourself at home? Get support.  Having a support person with you from early labor until after childbirth can have

## 2023-05-13 NOTE — ED TRIAGE NOTES
1905 after walking for two hours, pt's cervix remains unchanged. Discharged home at this time with instructions on when to return.

## 2023-05-16 LAB
GBS, EXTERNAL RESULT: NEGATIVE
HEP B, EXTERNAL RESULT: NEGATIVE
HIV, EXTERNAL RESULT: NEGATIVE

## 2023-05-23 ENCOUNTER — ANESTHESIA EVENT (OUTPATIENT)
Facility: HOSPITAL | Age: 38
DRG: 560 | End: 2023-05-23
Payer: MEDICAID

## 2023-05-23 ENCOUNTER — ANESTHESIA (OUTPATIENT)
Facility: HOSPITAL | Age: 38
DRG: 560 | End: 2023-05-23
Payer: MEDICAID

## 2023-05-23 ENCOUNTER — HOSPITAL ENCOUNTER (INPATIENT)
Facility: HOSPITAL | Age: 38
LOS: 2 days | Discharge: HOME | DRG: 560 | End: 2023-05-25
Attending: OBSTETRICS & GYNECOLOGY | Admitting: OBSTETRICS & GYNECOLOGY
Payer: MEDICAID

## 2023-05-23 DIAGNOSIS — R52 POSTPARTUM PAIN: Primary | ICD-10-CM

## 2023-05-23 PROBLEM — Z3A.39 39 WEEKS GESTATION OF PREGNANCY: Status: ACTIVE | Noted: 2023-05-23

## 2023-05-23 LAB
ABO + RH BLD: NORMAL
BLOOD GROUP ANTIBODIES SERPL: NORMAL
ERYTHROCYTE [DISTWIDTH] IN BLOOD BY AUTOMATED COUNT: 17.3 % (ref 11.5–14.5)
HCT VFR BLD AUTO: 32 % (ref 35–47)
HGB BLD-MCNC: 9.7 G/DL (ref 11.5–16)
MCH RBC QN AUTO: 20.7 PG (ref 26–34)
MCHC RBC AUTO-ENTMCNC: 30.3 G/DL (ref 30–36.5)
MCV RBC AUTO: 68.2 FL (ref 80–99)
NRBC # BLD: 0 K/UL (ref 0–0.01)
NRBC BLD-RTO: 0 PER 100 WBC
PLATELET # BLD AUTO: 223 K/UL (ref 150–400)
RBC # BLD AUTO: 4.69 M/UL (ref 3.8–5.2)
SPECIMEN EXP DATE BLD: NORMAL
WBC # BLD AUTO: 10.1 K/UL (ref 3.6–11)

## 2023-05-23 PROCEDURE — 51701 INSERT BLADDER CATHETER: CPT

## 2023-05-23 PROCEDURE — 6360000002 HC RX W HCPCS: Performed by: OBSTETRICS & GYNECOLOGY

## 2023-05-23 PROCEDURE — 6360000002 HC RX W HCPCS: Performed by: ANESTHESIOLOGY

## 2023-05-23 PROCEDURE — 2500000003 HC RX 250 WO HCPCS: Performed by: ANESTHESIOLOGY

## 2023-05-23 PROCEDURE — 7100000001 HC PACU RECOVERY - ADDTL 15 MIN

## 2023-05-23 PROCEDURE — 85027 COMPLETE CBC AUTOMATED: CPT

## 2023-05-23 PROCEDURE — 86901 BLOOD TYPING SEROLOGIC RH(D): CPT

## 2023-05-23 PROCEDURE — 00HU33Z INSERTION OF INFUSION DEVICE INTO SPINAL CANAL, PERCUTANEOUS APPROACH: ICD-10-PCS | Performed by: ANESTHESIOLOGY

## 2023-05-23 PROCEDURE — 3700000156 HC EPIDURAL ANESTHESIA

## 2023-05-23 PROCEDURE — 2580000003 HC RX 258: Performed by: ANESTHESIOLOGY

## 2023-05-23 PROCEDURE — 86900 BLOOD TYPING SEROLOGIC ABO: CPT

## 2023-05-23 PROCEDURE — 7100000000 HC PACU RECOVERY - FIRST 15 MIN

## 2023-05-23 PROCEDURE — 2580000003 HC RX 258: Performed by: OBSTETRICS & GYNECOLOGY

## 2023-05-23 PROCEDURE — 86850 RBC ANTIBODY SCREEN: CPT

## 2023-05-23 PROCEDURE — 6370000000 HC RX 637 (ALT 250 FOR IP): Performed by: OBSTETRICS & GYNECOLOGY

## 2023-05-23 PROCEDURE — 7220000101 HC DELIVERY VAGINAL/SINGLE

## 2023-05-23 PROCEDURE — 7210000100 HC LABOR FEE PER 1 HR

## 2023-05-23 PROCEDURE — 1120000000 HC RM PRIVATE OB

## 2023-05-23 PROCEDURE — 3700000025 EPIDURAL BLOCK: Performed by: ANESTHESIOLOGY

## 2023-05-23 PROCEDURE — 36415 COLL VENOUS BLD VENIPUNCTURE: CPT

## 2023-05-23 RX ORDER — MISOPROSTOL 200 UG/1
800 TABLET ORAL PRN
Status: DISCONTINUED | OUTPATIENT
Start: 2023-05-23 | End: 2023-05-25 | Stop reason: HOSPADM

## 2023-05-23 RX ORDER — SODIUM CHLORIDE, SODIUM LACTATE, POTASSIUM CHLORIDE, CALCIUM CHLORIDE 600; 310; 30; 20 MG/100ML; MG/100ML; MG/100ML; MG/100ML
INJECTION, SOLUTION INTRAVENOUS CONTINUOUS
Status: DISCONTINUED | OUTPATIENT
Start: 2023-05-23 | End: 2023-05-23 | Stop reason: ALTCHOICE

## 2023-05-23 RX ORDER — DOCUSATE SODIUM 100 MG/1
100 CAPSULE, LIQUID FILLED ORAL 2 TIMES DAILY
Status: DISCONTINUED | OUTPATIENT
Start: 2023-05-23 | End: 2023-05-23

## 2023-05-23 RX ORDER — FENTANYL CITRATE 50 UG/ML
INJECTION, SOLUTION INTRAMUSCULAR; INTRAVENOUS
Status: DISCONTINUED
Start: 2023-05-23 | End: 2023-05-23

## 2023-05-23 RX ORDER — OXYCODONE HYDROCHLORIDE 5 MG/1
5 TABLET ORAL EVERY 4 HOURS PRN
Status: DISCONTINUED | OUTPATIENT
Start: 2023-05-23 | End: 2023-05-25 | Stop reason: HOSPADM

## 2023-05-23 RX ORDER — METHYLERGONOVINE MALEATE 0.2 MG/ML
200 INJECTION INTRAVENOUS PRN
Status: DISCONTINUED | OUTPATIENT
Start: 2023-05-23 | End: 2023-05-23

## 2023-05-23 RX ORDER — DIPHENHYDRAMINE HCL 25 MG
25 CAPSULE ORAL EVERY 6 HOURS PRN
Status: DISCONTINUED | OUTPATIENT
Start: 2023-05-23 | End: 2023-05-25 | Stop reason: HOSPADM

## 2023-05-23 RX ORDER — OLANZAPINE 5 MG/1
10 TABLET, ORALLY DISINTEGRATING ORAL NIGHTLY
Status: DISCONTINUED | OUTPATIENT
Start: 2023-05-23 | End: 2023-05-25 | Stop reason: HOSPADM

## 2023-05-23 RX ORDER — ONDANSETRON 2 MG/ML
4 INJECTION INTRAMUSCULAR; INTRAVENOUS EVERY 6 HOURS PRN
Status: DISCONTINUED | OUTPATIENT
Start: 2023-05-23 | End: 2023-05-25 | Stop reason: HOSPADM

## 2023-05-23 RX ORDER — FLUOXETINE HYDROCHLORIDE 20 MG/1
40 CAPSULE ORAL DAILY
Status: DISCONTINUED | OUTPATIENT
Start: 2023-05-23 | End: 2023-05-25 | Stop reason: HOSPADM

## 2023-05-23 RX ORDER — EPHEDRINE SULFATE 50 MG/ML
INJECTION INTRAVENOUS
Status: DISCONTINUED
Start: 2023-05-23 | End: 2023-05-23

## 2023-05-23 RX ORDER — SODIUM CHLORIDE 9 MG/ML
INJECTION, SOLUTION INTRAVENOUS PRN
Status: DISCONTINUED | OUTPATIENT
Start: 2023-05-23 | End: 2023-05-25 | Stop reason: HOSPADM

## 2023-05-23 RX ORDER — NALOXONE HYDROCHLORIDE 0.4 MG/ML
INJECTION, SOLUTION INTRAMUSCULAR; INTRAVENOUS; SUBCUTANEOUS PRN
Status: DISCONTINUED | OUTPATIENT
Start: 2023-05-23 | End: 2023-05-23

## 2023-05-23 RX ORDER — IBUPROFEN 400 MG/1
800 TABLET ORAL EVERY 8 HOURS SCHEDULED
Status: DISCONTINUED | OUTPATIENT
Start: 2023-05-23 | End: 2023-05-25 | Stop reason: HOSPADM

## 2023-05-23 RX ORDER — BUPIVACAINE HYDROCHLORIDE 5 MG/ML
INJECTION, SOLUTION EPIDURAL; INTRACAUDAL
Status: COMPLETED
Start: 2023-05-23 | End: 2023-05-23

## 2023-05-23 RX ORDER — SODIUM CHLORIDE 0.9 % (FLUSH) 0.9 %
5-40 SYRINGE (ML) INJECTION PRN
Status: DISCONTINUED | OUTPATIENT
Start: 2023-05-23 | End: 2023-05-25 | Stop reason: HOSPADM

## 2023-05-23 RX ORDER — SEVOFLURANE 250 ML/250ML
1 LIQUID RESPIRATORY (INHALATION) CONTINUOUS PRN
Status: DISCONTINUED | OUTPATIENT
Start: 2023-05-23 | End: 2023-05-23

## 2023-05-23 RX ORDER — NALBUPHINE HCL 10 MG/ML
10 AMPUL (ML) INJECTION
Status: DISCONTINUED | OUTPATIENT
Start: 2023-05-23 | End: 2023-05-23

## 2023-05-23 RX ORDER — FENTANYL 0.2 MG/100ML-BUPIV 0.125%-NACL 0.9% EPIDURAL INJ 2/0.125 MCG/ML-%
1-15 SOLUTION INJECTION CONTINUOUS
Status: DISCONTINUED | OUTPATIENT
Start: 2023-05-23 | End: 2023-05-23

## 2023-05-23 RX ORDER — ONDANSETRON 2 MG/ML
4 INJECTION INTRAMUSCULAR; INTRAVENOUS EVERY 6 HOURS PRN
Status: DISCONTINUED | OUTPATIENT
Start: 2023-05-23 | End: 2023-05-23

## 2023-05-23 RX ORDER — TERBUTALINE SULFATE 1 MG/ML
0.25 INJECTION, SOLUTION SUBCUTANEOUS
Status: DISCONTINUED | OUTPATIENT
Start: 2023-05-23 | End: 2023-05-23

## 2023-05-23 RX ORDER — SODIUM CHLORIDE 0.9 % (FLUSH) 0.9 %
5-40 SYRINGE (ML) INJECTION EVERY 12 HOURS SCHEDULED
Status: DISCONTINUED | OUTPATIENT
Start: 2023-05-23 | End: 2023-05-23

## 2023-05-23 RX ORDER — BUPIVACAINE HYDROCHLORIDE 5 MG/ML
INJECTION, SOLUTION EPIDURAL; INTRACAUDAL PRN
Status: DISCONTINUED | OUTPATIENT
Start: 2023-05-23 | End: 2023-05-23 | Stop reason: SDUPTHER

## 2023-05-23 RX ORDER — ACETAMINOPHEN 500 MG
1000 TABLET ORAL EVERY 8 HOURS SCHEDULED
Status: DISCONTINUED | OUTPATIENT
Start: 2023-05-23 | End: 2023-05-25 | Stop reason: HOSPADM

## 2023-05-23 RX ORDER — ALBUTEROL SULFATE 90 UG/1
2 AEROSOL, METERED RESPIRATORY (INHALATION) EVERY 6 HOURS PRN
Status: DISCONTINUED | OUTPATIENT
Start: 2023-05-23 | End: 2023-05-25 | Stop reason: HOSPADM

## 2023-05-23 RX ORDER — SODIUM CHLORIDE 0.9 % (FLUSH) 0.9 %
5-40 SYRINGE (ML) INJECTION PRN
Status: DISCONTINUED | OUTPATIENT
Start: 2023-05-23 | End: 2023-05-23

## 2023-05-23 RX ORDER — SODIUM CHLORIDE, SODIUM LACTATE, POTASSIUM CHLORIDE, CALCIUM CHLORIDE 600; 310; 30; 20 MG/100ML; MG/100ML; MG/100ML; MG/100ML
INJECTION, SOLUTION INTRAVENOUS CONTINUOUS
Status: DISCONTINUED | OUTPATIENT
Start: 2023-05-23 | End: 2023-05-23

## 2023-05-23 RX ORDER — SODIUM CHLORIDE, SODIUM LACTATE, POTASSIUM CHLORIDE, AND CALCIUM CHLORIDE .6; .31; .03; .02 G/100ML; G/100ML; G/100ML; G/100ML
500 INJECTION, SOLUTION INTRAVENOUS PRN
Status: DISCONTINUED | OUTPATIENT
Start: 2023-05-23 | End: 2023-05-23

## 2023-05-23 RX ORDER — SODIUM CHLORIDE 9 MG/ML
25 INJECTION, SOLUTION INTRAVENOUS PRN
Status: DISCONTINUED | OUTPATIENT
Start: 2023-05-23 | End: 2023-05-23

## 2023-05-23 RX ORDER — CARBOPROST TROMETHAMINE 250 UG/ML
250 INJECTION, SOLUTION INTRAMUSCULAR PRN
Status: DISCONTINUED | OUTPATIENT
Start: 2023-05-23 | End: 2023-05-23

## 2023-05-23 RX ORDER — SODIUM CHLORIDE, SODIUM LACTATE, POTASSIUM CHLORIDE, AND CALCIUM CHLORIDE .6; .31; .03; .02 G/100ML; G/100ML; G/100ML; G/100ML
1000 INJECTION, SOLUTION INTRAVENOUS PRN
Status: DISCONTINUED | OUTPATIENT
Start: 2023-05-23 | End: 2023-05-23

## 2023-05-23 RX ORDER — SODIUM CHLORIDE 0.9 % (FLUSH) 0.9 %
5-40 SYRINGE (ML) INJECTION EVERY 12 HOURS SCHEDULED
Status: DISCONTINUED | OUTPATIENT
Start: 2023-05-23 | End: 2023-05-23 | Stop reason: ALTCHOICE

## 2023-05-23 RX ORDER — DIPHENHYDRAMINE HYDROCHLORIDE 50 MG/ML
12.5 INJECTION INTRAMUSCULAR; INTRAVENOUS EVERY 4 HOURS PRN
Status: DISCONTINUED | OUTPATIENT
Start: 2023-05-23 | End: 2023-05-23

## 2023-05-23 RX ORDER — MISOPROSTOL 200 UG/1
800 TABLET ORAL PRN
Status: DISCONTINUED | OUTPATIENT
Start: 2023-05-23 | End: 2023-05-23

## 2023-05-23 RX ORDER — DOCUSATE SODIUM 100 MG/1
100 CAPSULE, LIQUID FILLED ORAL 2 TIMES DAILY PRN
Status: DISCONTINUED | OUTPATIENT
Start: 2023-05-23 | End: 2023-05-25 | Stop reason: HOSPADM

## 2023-05-23 RX ORDER — FENTANYL CITRATE 50 UG/ML
INJECTION, SOLUTION INTRAMUSCULAR; INTRAVENOUS PRN
Status: DISCONTINUED | OUTPATIENT
Start: 2023-05-23 | End: 2023-05-23 | Stop reason: SDUPTHER

## 2023-05-23 RX ORDER — MODIFIED LANOLIN
OINTMENT (GRAM) TOPICAL PRN
Status: DISCONTINUED | OUTPATIENT
Start: 2023-05-23 | End: 2023-05-25 | Stop reason: HOSPADM

## 2023-05-23 RX ADMIN — FENTANYL CITRATE 10 ML/HR: 0.05 INJECTION, SOLUTION INTRAMUSCULAR; INTRAVENOUS at 10:25

## 2023-05-23 RX ADMIN — ACETAMINOPHEN 1000 MG: 500 TABLET ORAL at 23:31

## 2023-05-23 RX ADMIN — LIDOCAINE HYDROCHLORIDE 5 ML: 10; .005 INJECTION, SOLUTION EPIDURAL; INFILTRATION; INTRACAUDAL; PERINEURAL at 10:08

## 2023-05-23 RX ADMIN — IBUPROFEN 800 MG: 400 TABLET, FILM COATED ORAL at 15:31

## 2023-05-23 RX ADMIN — OXYTOCIN 2 MILLI-UNITS/MIN: 10 INJECTION, SOLUTION INTRAMUSCULAR; INTRAVENOUS at 07:38

## 2023-05-23 RX ADMIN — FENTANYL CITRATE 100 MCG: 50 INJECTION, SOLUTION INTRAMUSCULAR; INTRAVENOUS at 10:08

## 2023-05-23 RX ADMIN — OLANZAPINE 10 MG: 5 TABLET, ORALLY DISINTEGRATING ORAL at 20:38

## 2023-05-23 RX ADMIN — FLUOXETINE 40 MG: 20 CAPSULE ORAL at 15:30

## 2023-05-23 RX ADMIN — DIPHENHYDRAMINE HYDROCHLORIDE 25 MG: 25 CAPSULE ORAL at 16:09

## 2023-05-23 RX ADMIN — ACETAMINOPHEN 1000 MG: 500 TABLET ORAL at 15:31

## 2023-05-23 RX ADMIN — BUPIVACAINE HYDROCHLORIDE 2 ML: 5 INJECTION, SOLUTION EPIDURAL; INTRACAUDAL; PERINEURAL at 10:08

## 2023-05-23 RX ADMIN — ONDANSETRON 4 MG: 2 INJECTION INTRAMUSCULAR; INTRAVENOUS at 12:02

## 2023-05-23 RX ADMIN — IBUPROFEN 800 MG: 400 TABLET, FILM COATED ORAL at 23:31

## 2023-05-23 ASSESSMENT — PAIN SCALES - GENERAL: PAINLEVEL_OUTOF10: 4

## 2023-05-23 ASSESSMENT — PAIN DESCRIPTION - DESCRIPTORS: DESCRIPTORS: CRAMPING

## 2023-05-23 ASSESSMENT — PAIN DESCRIPTION - LOCATION: LOCATION: PERINEUM

## 2023-05-23 NOTE — LACTATION NOTE
This note was copied from a baby's chart. Infant born this morning to a  mom at 44 weeks gestation. Lactation assistance offered and mom states she is doing well. Encouraged mom to feed infant at least every 3 hours and to call for assistance as needed.

## 2023-05-23 NOTE — H&P
History & Physical    Name: Ramin Briggs MRN: [id number]  SSN: [id number]    YOB: 1985  Age: 42 y.o. Sex: female      Subjective: Induction     Estimated Date of Delivery: 23  OB History          25    Para        Term   5       2    AB   7    Living   8         SAB        IAB        Ectopic        Molar        Multiple        Live Births                    Ms. Ace Jama is admitted with pregnancy at 39w0d for induction of labor due to [place], favorable cervix at term, discomforts of pregnancy. Prenatal course was complicated by  AMA, Beta thal minor (fob status unknown), h/o CHTN -resolved with weight loss, h/o chronic eating disorder, grand multip, h/o cervical incompetence s/p cerclage placement/removal, h/o  x 1 for breech with 2 successful 's . Please see prenatal records for details. Past Medical History:   Diagnosis Date    Acute pyelonephritis 2021    Anorexia     Anxiety     Asthma     on albuterol prn.  Triggers cold and allergies    Avoidant-restrictive food intake disorder (ARFID)     Back pain, chronic     sciatica    Chronic bilateral low back pain with right-sided sciatica 2020    Fibromyalgia 2022    GERD (gastroesophageal reflux disease) 2020    UGI , GI Dr. Sebastian Buys    Gestational hypertension     Past pregnancy    Hyperemesis     severe hyperemesis    Hypertension     with G12 - none this pregnancy    IBS (irritable bowel syndrome) 2022    Iron deficiency anemia 10/08/2010    MDD (major depressive disorder), single episode with postpartum onset 2013    treated with meds - 11    Orthostatic hypotension 2020    Plantar fasciitis     Pregnancy     36 weeks    PTSD (post-traumatic stress disorder)      Past Surgical History:   Procedure Laterality Date     DELIVERY ONLY       SECTION  2015    DILATION AND CURETTAGE OF UTERUS  2020    DILATION AND CURETTAGE OF UTERUS      GYN

## 2023-05-23 NOTE — PROGRESS NOTES
1215 SBAR report received from LETA Paula RN    [phone] Transferred patient to room 339 and SBAR report given to Otis Palomares RN

## 2023-05-23 NOTE — PROGRESS NOTES
@ 2203 Bedside and Verbal shift change report given to JOCE Alberts RN by SHANNAN Andersen RN. Report included the following information Nurse Handoff Report, Intake/Output, MAR, Recent Results, and Med Rec Status.      @ 0769 patient turned on right side

## 2023-05-23 NOTE — ANESTHESIA PROCEDURE NOTES
Epidural Block    Patient location during procedure: OB  Start time: 5/23/2023 10:01 AM  End time: 5/23/2023 10:11 AM  Reason for block: labor epidural  Staffing  Performed: anesthesiologist   Anesthesiologist: Liana Lane MD  Epidural  Patient position: left lateral decubitus  Prep: DuraPrep  Patient monitoring: continuous pulse ox and frequent blood pressure checks  Approach: midline  Location: L3-4  Injection technique: MARIYA air  Provider prep: mask and sterile gloves  Needle  Needle type: Tuohy   Needle gauge: 17 G  Needle length: 3.5 in  Needle insertion depth: 5 cm  Catheter type: multi-orifice  Catheter size: 19 G  Catheter at skin depth: 12 cm  Test dose: negativeCatheter Secured: tegaderm and tape  Assessment  Sensory level: T10  Hemodynamics: stable  Attempts: 1  Outcomes: uncomplicated  Preanesthetic Checklist  Completed: patient identified, IV checked, risks and benefits discussed, surgical/procedural consents, equipment checked, pre-op evaluation, timeout performed, anesthesia consent given, oxygen available, monitors applied/VS acknowledged and fire risk safety assessment completed and verbalized

## 2023-05-23 NOTE — PROCEDURES
Patient: Filippo Stoll  MRN: [id number]  : 1985    Delivery Note    Pre-Delivery Diagnosis: IUP at 44 0/7 weeks, AMA, history of precipitous deliveries, h/o prior     Delivery:  /- Pt found to have [place] 668. Cervix c/c/+2. Pt pushed over 1 contraction and delivered LBFI over intact perineum. No nuchal cord. Bulb suction of mouth and nose performed and remainder of body delivered without difficulty. Delayed cord clamping performed and cord cut by fob. Spontaneous delivery of intact placenta. FF. Mom and baby bonding. Apgars 9,9. Weight pending. Infant Details:  Information for the patient's :  Caren Christensenct [[id number]]        Placenta: Spontaneous/Intact with 3-vessel cord (normal appearance/intact)-discarded    Episiotomy: N/A    Laceration(s): None    Episiotomy/Laceration(s) Repair: Not necessary.      EBL: 50cc    Anesthesia: epidural    Complications: None    Group Beta Strep: No components found for: MARCELA Solo DO     May 23, 2023 39 0/7

## 2023-05-23 NOTE — PROGRESS NOTES
9873~  Patient arrived for elective induction of labor. 80~  Consents reviewed and signed by patient.    12~  Dr Anthony Cote in to see patient, YAMILET, consents signed.     0940~  Patient's  arrived    18~  Dr Lindsey Combs in for epidural placement    1049~  Dr Joseluis Prince in room, SVE

## 2023-05-23 NOTE — ANESTHESIA PRE PROCEDURE
Department of Anesthesiology  Preprocedure Note       Name:  Leno Dimas   Age:  42 y.o.  :  1985                                          MRN:  [id number]         Date:  2023      Surgeon: * No surgeons listed *    Procedure: * No procedures listed *    Medications prior to admission:   Prior to Admission medications    Medication Sig Start Date End Date Taking?  Authorizing Provider   albuterol sulfate HFA (PROVENTIL;VENTOLIN;PROAIR) 108 (90 Base) MCG/ACT inhaler INHALE 2 PUFFS BY MOUTH EVERY 6 HOURS AS NEEDED FOR WHEEZING 23   Ar Automatic Reconciliation   aspirin 81 MG EC tablet Take by mouth daily    Ar Automatic Reconciliation   Calcium Carbonate Antacid 1000 MG CHEW TAKE 1/2 TABLET BY MOUTH DAILY 22   Ar Automatic Reconciliation   chlorthalidone (HYGROTON) 25 MG tablet Take 1 tablet by mouth once daily  Patient not taking: Reported on 2023   Ar Automatic Reconciliation   vitamin D (CHOLECALCIFEROL) 30275 UNIT CAPS Take 1 capsule once a week for the next 8 weeks then switch to OTC vitamin D3 2000 units daily  Patient not taking: Reported on 2023   Ar Automatic Reconciliation   Erenumab-aooe (AIMOVIG) 140 MG/ML SOAJ Inject 140 mg into the skin every 30 days  Patient not taking: Reported on 2023 8/15/22   Ar Automatic Reconciliation   FLUoxetine (PROZAC) 40 MG capsule Take 1 capsule by mouth daily 22   Ar Automatic Reconciliation   hydrOXYzine HCl (ATARAX) 50 MG tablet Take 50 mg by mouth 4 times daily as needed  Patient not taking: Reported on 2023   Ar Automatic Reconciliation   linaclotide (LINZESS) 145 MCG capsule ceived the following from [facility]. 32 - [facility]: Outside name: linaCLOtide (LINZESS) 145 mcg cap capsule 22   Ar Automatic Reconciliation   OLANZapine zydis (ZYPREXA) 10 MG disintegrating tablet Take 1 tablet by mouth 22   Ar Automatic Reconciliation   ondansetron (ZOFRAN-ODT) 4 MG disintegrating tablet

## 2023-05-23 NOTE — ANESTHESIA POSTPROCEDURE EVALUATION
Department of Anesthesiology  Postprocedure Note    Patient: Vance Meng  MRN: [id number]  YOB: 1985  Date of evaluation: 5/23/2023      Procedure Summary     Date: 05/23/23 Room / Location:     Anesthesia Start: 1001 Anesthesia Stop: 3004    Procedure: Labor Analgesia Diagnosis:     Scheduled Providers:  Responsible Provider: Darene Hashimoto., MD    Anesthesia Type: epidural ASA Status: 2          Anesthesia Type: No value filed.     Almaz Phase I:      Almaz Phase II:        Anesthesia Post Evaluation    Patient location during evaluation: PACU  Patient participation: complete - patient participated  Level of consciousness: awake  Airway patency: patent  Nausea & Vomiting: no nausea  Complications: no  Cardiovascular status: blood pressure returned to baseline and hemodynamically stable  Respiratory status: acceptable  Hydration status: stable

## 2023-05-24 PROCEDURE — 6370000000 HC RX 637 (ALT 250 FOR IP): Performed by: OBSTETRICS & GYNECOLOGY

## 2023-05-24 PROCEDURE — 1120000000 HC RM PRIVATE OB

## 2023-05-24 RX ORDER — PRAZOSIN HYDROCHLORIDE 1 MG/1
3 CAPSULE ORAL NIGHTLY
Status: DISCONTINUED | OUTPATIENT
Start: 2023-05-24 | End: 2023-05-25 | Stop reason: HOSPADM

## 2023-05-24 RX ADMIN — OLANZAPINE 10 MG: 5 TABLET, ORALLY DISINTEGRATING ORAL at 21:28

## 2023-05-24 RX ADMIN — PRAZOSIN HYDROCHLORIDE 3 MG: 1 CAPSULE ORAL at 21:28

## 2023-05-24 RX ADMIN — DOCUSATE SODIUM 100 MG: 100 CAPSULE, LIQUID FILLED ORAL at 08:38

## 2023-05-24 RX ADMIN — ACETAMINOPHEN 1000 MG: 500 TABLET ORAL at 16:00

## 2023-05-24 RX ADMIN — IBUPROFEN 800 MG: 400 TABLET, FILM COATED ORAL at 07:42

## 2023-05-24 RX ADMIN — ACETAMINOPHEN 1000 MG: 500 TABLET ORAL at 23:54

## 2023-05-24 RX ADMIN — FLUOXETINE 40 MG: 20 CAPSULE ORAL at 08:38

## 2023-05-24 RX ADMIN — IBUPROFEN 800 MG: 400 TABLET, FILM COATED ORAL at 16:00

## 2023-05-24 RX ADMIN — ACETAMINOPHEN 1000 MG: 500 TABLET ORAL at 07:42

## 2023-05-24 RX ADMIN — OXYCODONE 5 MG: 5 TABLET ORAL at 21:28

## 2023-05-24 RX ADMIN — IBUPROFEN 800 MG: 400 TABLET, FILM COATED ORAL at 23:54

## 2023-05-24 ASSESSMENT — PAIN DESCRIPTION - ORIENTATION: ORIENTATION: LOWER

## 2023-05-24 ASSESSMENT — PAIN SCALES - GENERAL
PAINLEVEL_OUTOF10: 3
PAINLEVEL_OUTOF10: 3

## 2023-05-24 ASSESSMENT — PAIN DESCRIPTION - LOCATION: LOCATION: ABDOMEN

## 2023-05-24 ASSESSMENT — PAIN DESCRIPTION - DESCRIPTORS: DESCRIPTORS: CRAMPING

## 2023-05-24 NOTE — DISCHARGE INSTRUCTIONS
Postpartum Support Groups   We know that all of us are dealing with a tremendous amount of uncertainty, confusion and disruption to our daily lives, which may result in increased anxiety, depression and fear. If you are feeling unsettled or worse, please know that we are here to help. During this time of increased caution and care for one another, [facility] [place] (Saeid Mullins) is offering virtual and in person support groups to ALL MOTHERS in [place] regardless of the age of your child/children as a way to help weather this emotional storm together. Social support is an important part of self-care during this time of physical distancing. Virtual postpartum support group meetings available at www. postpartumva.org  Warm Line: 302.932.2245    Breastfeeding Support Groups   1st and 3rd Wednesday of each month at [place] [address]  2nd and 4th Tuesday of each month at [facility] (in education center behind cafeteria)    [email or web address]    Discharge Instructions for Vaginal Delivery    Patient ID:  Nathaly Norman  [id number]  42 y.o.  1985    Take Home Medications   See medication list  Continue taking your prenatal vitamins if you are breastfeeding. Follow-up Appointment:  Follow-up with Dr Siva Dunne in 2 weeks. Follow-up care is a key part of your treatment and safety. Be sure to make and go to all appointments, and call your doctor if you are having problems. Its also a good idea to know your test results and keep a list of the medicines you take. Activity  Avoid anything in your vagina for 6 weeks (no intercourse, tampons, or douching). You may drive unless you are taking prescription pain medications. Climbing stairs and light lifting are ok after a vaginal delivery unless your doctor tells you not to. You may gradually work up to exercise over the next few weeks, but take it easy- you'll be tired!     Diet  You may eat a regular diet but

## 2023-05-24 NOTE — LACTATION NOTE
This note was copied from a baby's chart. Baby nursing well today,  deep latch obtained, mother is comfortable, baby feeding vigorously with rhythmic suck, swallow, breathe pattern, audible swallowing, and evident milk transfer, both breasts offered, baby is asleep following feeding. Assisted mom with positioning in the football position using pillows for support.

## 2023-05-24 NOTE — PROGRESS NOTES
Post-Partum Day Number 1 Progress Note    Patient doing well post-partum without significant complaints. Voiding without difficulty, normal lochia. Denies cp/sob/n/v.    Vitals:  Patient Vitals for the past 24 hrs:   BP Temp Temp src Pulse Resp SpO2   23 0402 118/69 97.9 °F (36.6 °C) Oral 60 16 99 %   23 2029 128/81 98.7 °F (37.1 °C) Oral 67 16 100 %   23 1800 99/64 97.9 °F (36.6 °C) Oral 61 16 96 %   23 1350 110/73 97.8 °F (36.6 °C) Oral 63 12 100 %   23 1327 114/69 -- -- 68 -- --   23 1312 109/69 -- -- 70 -- --   23 1257 107/64 -- -- 68 -- --   23 1243 (!) 115/57 -- -- 67 -- --   23 1227 (!) 107/58 -- -- 57 16 --   23 1157 121/83 -- -- 59 -- 99 %   23 1142 112/77 -- -- 68 -- --   23 1133 135/76 -- -- -- -- --   23 1115 116/76 -- -- 64 -- --   23 1032 (!) 106/56 -- -- 59 -- --   23 1017 (!) 115/58 -- -- 68 -- --   23 1015 -- -- -- 73 -- 100 %   23 1007 123/64 -- -- 70 -- --   23 1005 -- -- -- 68 -- 94 %   23 1004 139/68 -- -- 67 -- --   23 1000 -- -- -- -- -- 100 %   23 0836 104/67 -- -- 65 -- --   23 0739 132/81 -- -- -- -- --   23 0634 111/79 97.7 °F (36.5 °C) Oral 71 16 99 %     Temp (24hrs), Av °F (36.7 °C), Min:97.7 °F (36.5 °C), Max:98.7 °F (37.1 °C)      Vital signs stable, afebrile. Exam:  Patient without distress.    Heart regular rate and rhytthm   Lungs CTA b/l               Abdomen soft, fundus firm, at umbilicus, nontender               Lower extremities- nontender without edema; no cords    Labs:   Recent Results (from the past 24 hour(s))   CBC    Collection Time: 23  6:57 AM   Result Value Ref Range    WBC 10.1 3.6 - 11.0 K/uL    RBC 4.69 3.80 - 5.20 M/uL    Hemoglobin 9.7 (L) 11.5 - 16.0 g/dL    Hematocrit 32.0 (L) 35.0 - 47.0 %    MCV 68.2 (L) 80.0 - 99.0 FL    MCH 20.7 (L) 26.0 - 34.0 PG    MCHC 30.3 30.0 - 36.5 g/dL    RDW 17.3 (H) 11.5 - 14.5 %

## 2023-05-25 VITALS
TEMPERATURE: 97.8 F | RESPIRATION RATE: 16 BRPM | SYSTOLIC BLOOD PRESSURE: 113 MMHG | OXYGEN SATURATION: 97 % | DIASTOLIC BLOOD PRESSURE: 75 MMHG | HEART RATE: 72 BPM

## 2023-05-25 PROCEDURE — 6370000000 HC RX 637 (ALT 250 FOR IP): Performed by: OBSTETRICS & GYNECOLOGY

## 2023-05-25 RX ORDER — MEDROXYPROGESTERONE ACETATE 150 MG/ML
150 INJECTION, SUSPENSION INTRAMUSCULAR ONCE
Qty: 1 ML | Refills: 3 | Status: SHIPPED
Start: 2023-05-25 | End: 2023-05-25

## 2023-05-25 RX ORDER — OXYCODONE HYDROCHLORIDE 5 MG/1
5 TABLET ORAL EVERY 6 HOURS PRN
Qty: 5 TABLET | Refills: 0 | Status: SHIPPED | OUTPATIENT
Start: 2023-05-25 | End: 2023-05-28

## 2023-05-25 RX ORDER — IBUPROFEN 800 MG/1
800 TABLET ORAL EVERY 8 HOURS PRN
Qty: 30 TABLET | Refills: 0 | Status: SHIPPED | OUTPATIENT
Start: 2023-05-25

## 2023-05-25 RX ORDER — OXYCODONE HYDROCHLORIDE 5 MG/1
5 TABLET ORAL EVERY 6 HOURS PRN
Qty: 5 TABLET | Refills: 0 | Status: SHIPPED | OUTPATIENT
Start: 2023-05-25 | End: 2023-05-25 | Stop reason: SDUPTHER

## 2023-05-25 RX ORDER — IBUPROFEN 800 MG/1
800 TABLET ORAL EVERY 8 HOURS PRN
Qty: 30 TABLET | Refills: 0 | Status: SHIPPED | OUTPATIENT
Start: 2023-05-25 | End: 2023-05-25 | Stop reason: SDUPTHER

## 2023-05-25 RX ADMIN — FLUOXETINE 40 MG: 20 CAPSULE ORAL at 09:50

## 2023-05-25 RX ADMIN — OXYCODONE 5 MG: 5 TABLET ORAL at 05:37

## 2023-05-25 RX ADMIN — ACETAMINOPHEN 1000 MG: 500 TABLET ORAL at 09:50

## 2023-05-25 RX ADMIN — IBUPROFEN 800 MG: 400 TABLET, FILM COATED ORAL at 09:50

## 2023-05-25 RX ADMIN — DOCUSATE SODIUM 100 MG: 100 CAPSULE, LIQUID FILLED ORAL at 09:50

## 2023-05-25 NOTE — DISCHARGE SUMMARY
Patient ID:  Arin Holly  [id number]  89 y.o.  1985    Admit Date: 2023    Discharge Date: 2023     Admitting Physician: Neli Goodwin DO  Attending Physician: Neli Goodwin DO    Admission Diagnoses: 39 weeks gestation of pregnancy [Z3A.39]    Discharge Diagnoses: Same as above with / producing a viable infant female  Information for the patient's :  Debrajames An [[id number]]           Additional Diagnoses:  AMA, extensive mental health history, grand multip, h/o cervical incompetence . Maternal Labs: No components found for: Josselin Pickard 13, [address] [facility], . Star Reyes [address], [address] [facility]    Cord Blood Results:   Information for the patient's :  Debra An [[id number]]     Lab Results   Component Value Date/Time    ABORH A POSITIVE 2023 11:09 AM    BILI IF DIRECT LEONELA POSITIVE, BILIRUBIN TO FOLLOW 2023 11:09 AM            History of Present Illness:   OB History          18    Para   1    Term   6       2    AB   7    Living   9         SAB        IAB        Ectopic        Molar        Multiple   0    Live Births   1              Admitted for induction of labor. Hospital Course:   Patient was admitted as above and delivered via  (successful ) . Please see the chart for details. The postpartum course was unremarkable. She was deemed stable for discharge home on day 2. Follow-up:  She was instructed to follow-up with her obstetrician in 2 weeks.     Results of Postpartum Depression Screening:  EPDS             Signed:  Neli Goodwin DO  2023  7:52 AM

## 2023-05-25 NOTE — PROGRESS NOTES
Post-Partum Day Number 2 Progress Note    Patient doing well post-partum without significant complaints. Voiding without difficulty, normal lochia. Vitals:  Patient Vitals for the past 24 hrs:   BP Temp Temp src Pulse Resp SpO2   23 0535 109/65 97.9 °F (36.6 °C) Oral 61 16 99 %   23 2125 106/60 98.1 °F (36.7 °C) Oral 83 16 99 %   23 1655 119/71 97.8 °F (36.6 °C) Oral 78 16 99 %   23 0846 117/71 98.6 °F (37 °C) Oral 61 16 99 %     Temp (24hrs), Av.1 °F (36.7 °C), Min:97.8 °F (36.6 °C), Max:98.6 °F (37 °C)      Vital signs stable, afebrile. Exam:  Patient without distress. Heart regular rate and rhythm   Lungs CTA B/l               Abdomen soft, fundus firm at umbilicus, nontender               Lower extremities- nontender without edema; no cords    Labs: No results found for this or any previous visit (from the past 24 hour(s)). Assessment and Plan:  Patient appears to be having uncomplicated post-partum course. Discharge today-instructions reviewed. O POSITIVE Rubella immune. Girl.

## 2023-05-25 NOTE — PROGRESS NOTES
Called to notify MD of EPDS score. No answer at this time. Pt has scheduled f/u appt in 2 weeks. MD aware of pts extensive mental health hx.

## 2023-08-01 ENCOUNTER — PATIENT MESSAGE (OUTPATIENT)
Age: 38
End: 2023-08-01

## 2023-08-01 ENCOUNTER — OFFICE VISIT (OUTPATIENT)
Age: 38
End: 2023-08-01
Payer: MEDICAID

## 2023-08-01 VITALS
HEIGHT: 65 IN | TEMPERATURE: 97.2 F | RESPIRATION RATE: 16 BRPM | DIASTOLIC BLOOD PRESSURE: 66 MMHG | BODY MASS INDEX: 32.69 KG/M2 | OXYGEN SATURATION: 100 % | WEIGHT: 196.2 LBS | HEART RATE: 66 BPM | SYSTOLIC BLOOD PRESSURE: 110 MMHG

## 2023-08-01 DIAGNOSIS — J45.909 MILD ASTHMA WITHOUT COMPLICATION, UNSPECIFIED WHETHER PERSISTENT: ICD-10-CM

## 2023-08-01 DIAGNOSIS — F33.1 MDD (MAJOR DEPRESSIVE DISORDER), RECURRENT EPISODE, MODERATE (HCC): Primary | ICD-10-CM

## 2023-08-01 DIAGNOSIS — M79.10 MYALGIA: ICD-10-CM

## 2023-08-01 PROCEDURE — 99214 OFFICE O/P EST MOD 30 MIN: CPT | Performed by: FAMILY MEDICINE

## 2023-08-01 RX ORDER — FLUOXETINE 10 MG/1
10 CAPSULE ORAL DAILY
Qty: 30 CAPSULE | Refills: 2 | Status: SHIPPED | OUTPATIENT
Start: 2023-08-01

## 2023-08-01 RX ORDER — FLUOXETINE 10 MG/1
40 CAPSULE ORAL DAILY
Qty: 30 CAPSULE | Refills: 2 | Status: SHIPPED | OUTPATIENT
Start: 2023-08-01 | End: 2023-08-01

## 2023-08-01 RX ORDER — ALBUTEROL SULFATE 90 UG/1
AEROSOL, METERED RESPIRATORY (INHALATION)
Qty: 18 G | Refills: 2 | Status: SHIPPED | OUTPATIENT
Start: 2023-08-01

## 2023-08-01 RX ORDER — ALBUTEROL SULFATE 2.5 MG/3ML
2.5 SOLUTION RESPIRATORY (INHALATION) 4 TIMES DAILY PRN
Qty: 120 EACH | Refills: 2 | Status: SHIPPED | OUTPATIENT
Start: 2023-08-01

## 2023-08-01 RX ORDER — FLUTICASONE PROPIONATE 50 MCG
2 SPRAY, SUSPENSION (ML) NASAL DAILY
Qty: 16 G | Refills: 0 | Status: SHIPPED | OUTPATIENT
Start: 2023-08-01

## 2023-08-01 SDOH — ECONOMIC STABILITY: FOOD INSECURITY: WITHIN THE PAST 12 MONTHS, YOU WORRIED THAT YOUR FOOD WOULD RUN OUT BEFORE YOU GOT MONEY TO BUY MORE.: SOMETIMES TRUE

## 2023-08-01 SDOH — ECONOMIC STABILITY: INCOME INSECURITY: HOW HARD IS IT FOR YOU TO PAY FOR THE VERY BASICS LIKE FOOD, HOUSING, MEDICAL CARE, AND HEATING?: SOMEWHAT HARD

## 2023-08-01 SDOH — ECONOMIC STABILITY: FOOD INSECURITY: WITHIN THE PAST 12 MONTHS, THE FOOD YOU BOUGHT JUST DIDN'T LAST AND YOU DIDN'T HAVE MONEY TO GET MORE.: SOMETIMES TRUE

## 2023-08-01 SDOH — ECONOMIC STABILITY: HOUSING INSECURITY
IN THE LAST 12 MONTHS, WAS THERE A TIME WHEN YOU DID NOT HAVE A STEADY PLACE TO SLEEP OR SLEPT IN A SHELTER (INCLUDING NOW)?: NO

## 2023-08-01 ASSESSMENT — ENCOUNTER SYMPTOMS
DIARRHEA: 0
SHORTNESS OF BREATH: 1
VOMITING: 0
ABDOMINAL PAIN: 0
CONSTIPATION: 0
CHEST TIGHTNESS: 0
COUGH: 0
WHEEZING: 1
NAUSEA: 0

## 2023-08-01 ASSESSMENT — PATIENT HEALTH QUESTIONNAIRE - PHQ9
5. POOR APPETITE OR OVEREATING: 3
1. LITTLE INTEREST OR PLEASURE IN DOING THINGS: 3
7. TROUBLE CONCENTRATING ON THINGS, SUCH AS READING THE NEWSPAPER OR WATCHING TELEVISION: 3
SUM OF ALL RESPONSES TO PHQ QUESTIONS 1-9: 22
SUM OF ALL RESPONSES TO PHQ9 QUESTIONS 1 & 2: 6
SUM OF ALL RESPONSES TO PHQ QUESTIONS 1-9: 22
9. THOUGHTS THAT YOU WOULD BE BETTER OFF DEAD, OR OF HURTING YOURSELF: 0
10. IF YOU CHECKED OFF ANY PROBLEMS, HOW DIFFICULT HAVE THESE PROBLEMS MADE IT FOR YOU TO DO YOUR WORK, TAKE CARE OF THINGS AT HOME, OR GET ALONG WITH OTHER PEOPLE: 2
SUM OF ALL RESPONSES TO PHQ QUESTIONS 1-9: 22
SUM OF ALL RESPONSES TO PHQ QUESTIONS 1-9: 22
4. FEELING TIRED OR HAVING LITTLE ENERGY: 3
8. MOVING OR SPEAKING SO SLOWLY THAT OTHER PEOPLE COULD HAVE NOTICED. OR THE OPPOSITE, BEING SO FIGETY OR RESTLESS THAT YOU HAVE BEEN MOVING AROUND A LOT MORE THAN USUAL: 3
3. TROUBLE FALLING OR STAYING ASLEEP: 3
6. FEELING BAD ABOUT YOURSELF - OR THAT YOU ARE A FAILURE OR HAVE LET YOURSELF OR YOUR FAMILY DOWN: 1
2. FEELING DOWN, DEPRESSED OR HOPELESS: 3

## 2023-08-01 NOTE — TELEPHONE ENCOUNTER
From: Charlie Abdi  To: Dr. Piotr Ram: 8/1/2023 1:34 PM EDT  Subject: Nebulizer    I called to get the nebulizer from the list but no one took insurance. The pharmacy my insurance takes is [facility]. They said they need the prescription and notes faxed to them.  The fax # is [phone]  Thanks

## 2023-08-01 NOTE — PROGRESS NOTES
Chief Complaint   Patient presents with    Depression         1. \"Have you been to the ER, urgent care clinic since your last visit? Hospitalized since your last visit? \" Yes 5/2023 [facility]    2. \"Have you seen or consulted any other health care providers outside of the [address] [place] [address] since your last visit? \" Yes vpfw    3. For patients age 43-73: Has the patient had a colorectal cancer screening? N/A     If the patient is female:    4. For patients age 52-65: Has the patient had a mammogram? N/A    5. For patients age 21-65: Has the patient had a pap smear? Yes     PHQ-9  8/1/2023   Little interest or pleasure in doing things 3   Little interest or pleasure in doing things -   Feeling down, depressed, or hopeless 3   Trouble falling or staying asleep, or sleeping too much 3   Trouble falling or staying asleep, or sleeping too much -   Feeling tired or having little energy 3   Feeling tired or having little energy -   Poor appetite or overeating 3   Poor appetite, weight loss, or overeating -   Feeling bad about yourself - or that you are a failure or have let yourself or your family down 1   Feeling bad about yourself - or that you are a failure or have let yourself or your family down -   Trouble concentrating on things, such as reading the newspaper or watching television 3   Trouble concentrating on things such as school, work, reading, or watching TV -   Moving or speaking so slowly that other people could have noticed.  Or the opposite - being so fidgety or restless that you have been moving around a lot more than usual 3   Moving or speaking so slowly that other people could have noticed; or the opposite being so fidgety that others notice -   Thoughts that you would be better off dead, or of hurting yourself in some way 0   Thoughts of being better off dead, or hurting yourself in some way -   PHQ-2 Score 6   Total Score PHQ 2 -   PHQ-9 Total Score 22   PHQ 9 Score -   If you checked off any problems,
(ATARAX) 50 MG tablet LIST CLEANUP    OLANZapine zydis (ZYPREXA) 10 MG disintegrating tablet     prazosin (MINIPRESS) 1 MG capsule LIST CLEANUP    prazosin (MINIPRESS) 2 MG capsule LIST CLEANUP    traZODone (DESYREL) 50 MG tablet LIST CLEANUP    vitamin D (CHOLECALCIFEROL) [address]61 UNIT CAPS LIST CLEANUP    albuterol sulfate HFA (PROVENTIL;VENTOLIN;PROAIR) 108 (90 Base) MCG/ACT inhaler REORDER    FLUoxetine (PROZAC) 10 MG capsule LIST CLEANUP       No follow-ups on file. Treatment risks/benefits/costs/interactions/alternatives discussed with patient. Advised patient to call back or return to office if symptoms worsen/change/persist. If patient cannot reach us or should anything more severe/urgent arise he/she should proceed directly to the nearest emergency department. Discussed expected course/resolution/complications of diagnosis in detail with patient. Patient expressed understanding with the diagnosis and plan. This dictation may have been completed with Dragon, the computer voice recognition software. Unanticipated grammatical, syntax, homophones, and other interpretive errors are sometimes inadvertently transcribed by the computer software. Please disregard any errors that have escaped final proofreading. Tito Hernandez M.D.

## 2023-08-01 NOTE — PATIENT INSTRUCTIONS
[facility]  [address] Viviane Jackg, [address] [place]  [phone]    [facility] (multiple locations):  [address], [address]  [phone] [address] [facility], [address]  [phone]    [facility], [address] [facility]  [phone]    [address], [facility], [address]  [phone]    [facility]  [phone]    [facility]  [address] [facility] [address], [address] [facility] [address], [address] [place] [address] Road  [phone]     [facility]**  [address] [facility] [address], [address], [facility]  [address], [address]  [phone] Psychiatry   [facility] [address], [address], [facility], [address]  [phone]     [address], [facility] [address]  [address] [place] [facility] [address] [phone], Chasity Pascual  [phone]     [address] [place] [address] [address] [facility], [address] [facility], [address] [place] [address] Street  [phone]:    [facility]  [address] Adra Dash, [address] [place]   [phone]    [facility]  [address], [place]    [phone]    [facility]  [address] [place], [place] [address], [place]  [phone]    [facility]  [address], [facility], [address]   [phone]

## 2023-08-02 LAB
ALBUMIN SERPL-MCNC: 4.1 G/DL (ref 3.9–4.9)
ALBUMIN/GLOB SERPL: 1.4 {RATIO} (ref 1.2–2.2)
ALP SERPL-CCNC: 77 IU/L (ref 44–121)
ALT SERPL-CCNC: 9 IU/L (ref 0–32)
AST SERPL-CCNC: 14 IU/L (ref 0–40)
BASOPHILS # BLD AUTO: 0 X10E3/UL (ref 0–0.2)
BASOPHILS NFR BLD AUTO: 1 %
BILIRUB SERPL-MCNC: 0.3 MG/DL (ref 0–1.2)
BUN SERPL-MCNC: 5 MG/DL (ref 6–20)
BUN/CREAT SERPL: 7 (ref 9–23)
CALCIUM SERPL-MCNC: 8.8 MG/DL (ref 8.7–10.2)
CHLORIDE SERPL-SCNC: 103 MMOL/L (ref 96–106)
CK SERPL-CCNC: 148 U/L (ref 32–182)
CO2 SERPL-SCNC: 23 MMOL/L (ref 20–29)
CREAT SERPL-MCNC: 0.71 MG/DL (ref 0.57–1)
EGFRCR SERPLBLD CKD-EPI 2021: 111 ML/MIN/1.73
EOSINOPHIL # BLD AUTO: 0.2 X10E3/UL (ref 0–0.4)
EOSINOPHIL NFR BLD AUTO: 3 %
ERYTHROCYTE [DISTWIDTH] IN BLOOD BY AUTOMATED COUNT: 17 % (ref 11.7–15.4)
FERRITIN SERPL-MCNC: 13 NG/ML (ref 15–150)
FOLATE SERPL-MCNC: 5.4 NG/ML
GLOBULIN SER CALC-MCNC: 3 G/DL (ref 1.5–4.5)
GLUCOSE SERPL-MCNC: 73 MG/DL (ref 70–99)
HCT VFR BLD AUTO: 34.9 % (ref 34–46.6)
HGB BLD-MCNC: 10.5 G/DL (ref 11.1–15.9)
IMM GRANULOCYTES # BLD AUTO: 0 X10E3/UL (ref 0–0.1)
IMM GRANULOCYTES NFR BLD AUTO: 0 %
IRON SATN MFR SERPL: 12 % (ref 15–55)
IRON SERPL-MCNC: 43 UG/DL (ref 27–159)
LYMPHOCYTES # BLD AUTO: 1.9 X10E3/UL (ref 0.7–3.1)
LYMPHOCYTES NFR BLD AUTO: 28 %
MAGNESIUM SERPL-MCNC: 1.9 MG/DL (ref 1.6–2.3)
MCH RBC QN AUTO: 21.9 PG (ref 26.6–33)
MCHC RBC AUTO-ENTMCNC: 30.1 G/DL (ref 31.5–35.7)
MCV RBC AUTO: 73 FL (ref 79–97)
MONOCYTES # BLD AUTO: 0.6 X10E3/UL (ref 0.1–0.9)
MONOCYTES NFR BLD AUTO: 9 %
NEUTROPHILS # BLD AUTO: 4.1 X10E3/UL (ref 1.4–7)
NEUTROPHILS NFR BLD AUTO: 59 %
PHOSPHATE SERPL-MCNC: 3.6 MG/DL (ref 3–4.3)
PLATELET # BLD AUTO: 305 X10E3/UL (ref 150–450)
POTASSIUM SERPL-SCNC: 4.2 MMOL/L (ref 3.5–5.2)
PROT SERPL-MCNC: 7.1 G/DL (ref 6–8.5)
RBC # BLD AUTO: 4.8 X10E6/UL (ref 3.77–5.28)
SODIUM SERPL-SCNC: 139 MMOL/L (ref 134–144)
TIBC SERPL-MCNC: 347 UG/DL (ref 250–450)
TSH SERPL DL<=0.005 MIU/L-ACNC: 1.44 UIU/ML (ref 0.45–4.5)
UIBC SERPL-MCNC: 304 UG/DL (ref 131–425)
VIT B12 SERPL-MCNC: 279 PG/ML (ref 232–1245)
WBC # BLD AUTO: 6.8 X10E3/UL (ref 3.4–10.8)

## 2023-08-02 NOTE — TELEPHONE ENCOUNTER
Pam Peters from [facility] stopped by office, requested call regarding nebulizer order. [facility], she said order was received and being processed and should be getting out to pt tomorrow.

## 2023-09-05 NOTE — LETTER
4/26/2022 4:08 PM        Dear Dr. Pérez Kee,     Please fax us the most recent pap smear so that we may update the patient's records for continuity of care.      Our fax number: [phone]    Patient:   David   1985                Sincerely,      Isaiah Chamberlain MD Tayla Posadas was seen and treated in our emergency department on 9/5/2023. Diagnosis:     Av Sorenson  may return to school on return date. He may return on this date:     Av Sorenson may return to school but allow for activities and school work as tolerated until cleared by concussion clinic      If you have any questions or concerns, please don't hesitate to call.       Daniel Wetson MD    ______________________________           _______________          _______________  Hospital Representative                              Date                                Time

## 2023-09-19 ENCOUNTER — TELEPHONE (OUTPATIENT)
Age: 38
End: 2023-09-19

## 2023-09-19 NOTE — TELEPHONE ENCOUNTER
Cassidy Cast from [address] [place] [address] the ensure is on back order wants to know if it is okay send boost plus.     Requesting a call back    Best call back #[phone]

## 2023-09-25 ENCOUNTER — TELEPHONE (OUTPATIENT)
Age: 38
End: 2023-09-25

## 2023-09-25 NOTE — TELEPHONE ENCOUNTER
Patient called stated that [facility] stated that they do not have ensure in stock and want to know if provider has a substitute. Would like for nurse to call back.     Best call back #[phone]

## 2023-09-26 NOTE — TELEPHONE ENCOUNTER
Called Patient. Name and  confirmed. She stated that dr. Amy Frank needs to send prescription for boost to the  [facility].

## 2023-09-27 NOTE — TELEPHONE ENCOUNTER
Called [facility]. Patient name and  verified. They do have boost. Fax ([phone]) them prescription.

## 2023-09-28 RX ORDER — LACTOSE-REDUCED FOOD
1 LIQUID (ML) ORAL 3 TIMES DAILY
Qty: 90 EACH | Refills: 2 | Status: SHIPPED | OUTPATIENT
Start: 2023-09-28

## 2023-09-28 NOTE — TELEPHONE ENCOUNTER
Faxed prescription to [facility]. Confirmation was received.

## 2024-02-27 ENCOUNTER — OFFICE VISIT (OUTPATIENT)
Age: 39
End: 2024-02-27
Payer: MEDICAID

## 2024-02-27 VITALS
OXYGEN SATURATION: 99 % | BODY MASS INDEX: 31.92 KG/M2 | HEIGHT: 65 IN | SYSTOLIC BLOOD PRESSURE: 124 MMHG | TEMPERATURE: 98.2 F | DIASTOLIC BLOOD PRESSURE: 74 MMHG | RESPIRATION RATE: 14 BRPM | WEIGHT: 191.6 LBS | HEART RATE: 70 BPM

## 2024-02-27 DIAGNOSIS — Z13.1 SCREENING FOR DIABETES MELLITUS: ICD-10-CM

## 2024-02-27 DIAGNOSIS — R25.1 SHAKING: ICD-10-CM

## 2024-02-27 DIAGNOSIS — M25.522 LEFT ELBOW PAIN: Primary | ICD-10-CM

## 2024-02-27 DIAGNOSIS — D50.9 IRON DEFICIENCY ANEMIA, UNSPECIFIED IRON DEFICIENCY ANEMIA TYPE: ICD-10-CM

## 2024-02-27 PROCEDURE — 99214 OFFICE O/P EST MOD 30 MIN: CPT | Performed by: FAMILY MEDICINE

## 2024-02-27 ASSESSMENT — ENCOUNTER SYMPTOMS
CHEST TIGHTNESS: 0
DIARRHEA: 0
VOMITING: 0
NAUSEA: 0
CONSTIPATION: 0
COUGH: 0
ABDOMINAL PAIN: 0
SHORTNESS OF BREATH: 0
WHEEZING: 0

## 2024-02-27 ASSESSMENT — PATIENT HEALTH QUESTIONNAIRE - PHQ9
SUM OF ALL RESPONSES TO PHQ9 QUESTIONS 1 & 2: 0
9. THOUGHTS THAT YOU WOULD BE BETTER OFF DEAD, OR OF HURTING YOURSELF: 0
5. POOR APPETITE OR OVEREATING: 0
SUM OF ALL RESPONSES TO PHQ QUESTIONS 1-9: 0
6. FEELING BAD ABOUT YOURSELF - OR THAT YOU ARE A FAILURE OR HAVE LET YOURSELF OR YOUR FAMILY DOWN: 0
4. FEELING TIRED OR HAVING LITTLE ENERGY: 0
7. TROUBLE CONCENTRATING ON THINGS, SUCH AS READING THE NEWSPAPER OR WATCHING TELEVISION: 0
SUM OF ALL RESPONSES TO PHQ QUESTIONS 1-9: 0
SUM OF ALL RESPONSES TO PHQ QUESTIONS 1-9: 0
8. MOVING OR SPEAKING SO SLOWLY THAT OTHER PEOPLE COULD HAVE NOTICED. OR THE OPPOSITE, BEING SO FIGETY OR RESTLESS THAT YOU HAVE BEEN MOVING AROUND A LOT MORE THAN USUAL: 0
10. IF YOU CHECKED OFF ANY PROBLEMS, HOW DIFFICULT HAVE THESE PROBLEMS MADE IT FOR YOU TO DO YOUR WORK, TAKE CARE OF THINGS AT HOME, OR GET ALONG WITH OTHER PEOPLE: 0
3. TROUBLE FALLING OR STAYING ASLEEP: 0
2. FEELING DOWN, DEPRESSED OR HOPELESS: 0
SUM OF ALL RESPONSES TO PHQ QUESTIONS 1-9: 0
1. LITTLE INTEREST OR PLEASURE IN DOING THINGS: 0

## 2024-02-27 NOTE — PROGRESS NOTES
Chief Complaint   Patient presents with    Muscle Pain     Stared 2 weeks ago.6/10    Joint Pain    Shaking     \"Have you been to the ER, urgent care clinic since your last visit?  Hospitalized since your last visit?\"    NO    “Have you seen or consulted any other health care providers outside of [facility] since your last visit?”    NO       Financial Resource Strain: Medium Risk (8/1/2023)    Overall Financial Resource Strain (CARDIA)     Difficulty of Paying Living Expenses: Somewhat hard      Food Insecurity: Not on file (8/1/2023)   Recent Concern: Food Insecurity - Food Insecurity Present (8/1/2023)    Hunger Vital Sign     Worried About Running Out of Food in the Last Year: Sometimes true     Ran Out of Food in the Last Year: Sometimes true            2/27/2024    10:16 AM   PHQ-9    Little interest or pleasure in doing things 0   Feeling down, depressed, or hopeless 0   Trouble falling or staying asleep, or sleeping too much 0   Feeling tired or having little energy 0   Poor appetite or overeating 0   Feeling bad about yourself - or that you are a failure or have let yourself or your family down 0   Trouble concentrating on things, such as reading the newspaper or watching television 0   Moving or speaking so slowly that other people could have noticed. Or the opposite - being so fidgety or restless that you have been moving around a lot more than usual 0   Thoughts that you would be better off dead, or of hurting yourself in some way 0   PHQ-2 Score 0   PHQ-9 Total Score 0   If you checked off any problems, how difficult have these problems made it for you to do your work, take care of things at home, or get along with other people? 0       Health Maintenance Due   Topic Date Due    Hepatitis B vaccine (1 of 3 - 3-dose series) Never done    Varicella vaccine (1 of 2 - 2-dose childhood series) Never done    Pneumococcal 0-64 years Vaccine (1 - PCV) 08/28/1989    Flu vaccine (1) 08/01/2023    
g 2    fluticasone (FLONASE) 50 MCG/ACT nasal spray 2 sprays by Each Nostril route daily 16 g 0    Nebulizers (COMPRESSOR/NEBULIZER) MISC One compressor nebulizer machine with accessory kit 1 each 0    FLUoxetine (PROZAC) 10 MG capsule Take 1 capsule by mouth daily (Patient not taking: Reported on 2/27/2024) 30 capsule 2    ibuprofen (ADVIL;MOTRIN) 800 MG tablet Take 1 tablet by mouth every 8 hours as needed for Pain (Patient not taking: Reported on 2/27/2024) 30 tablet 0    medroxyPROGESTERone (DEPO-PROVERA) 150 MG/ML injection Inject 1 mL into the muscle once for 1 dose 1 mL 3     No current facility-administered medications on file prior to visit.       Allergies   Allergen Reactions    Labetalol Hives    Penicillins Hives    Cefaclor      Other reaction(s): Unknown (comments)    Sulfa Antibiotics      Other reaction(s): Unknown (comments)         OBJECTIVE    Visit Vitals  /74 (Site: Right Upper Arm, Position: Sitting, Cuff Size: Medium Adult)   Pulse 70   Temp 98.2 °F (36.8 °C) (Temporal)   Resp 14   Ht 1.651 m (5' 5\")   Wt 86.9 kg (191 lb 9.6 oz)   SpO2 99%   BMI 31.88 kg/m²       Physical Exam  Vitals and nursing note reviewed.   Constitutional:       Appearance: Normal appearance.   HENT:      Head: Normocephalic and atraumatic.   Eyes:      Extraocular Movements: Extraocular movements intact.      Conjunctiva/sclera: Conjunctivae normal.      Pupils: Pupils are equal, round, and reactive to light.   Cardiovascular:      Rate and Rhythm: Normal rate and regular rhythm.      Pulses: Normal pulses.      Heart sounds: Normal heart sounds. No murmur heard.     No friction rub. No gallop.   Pulmonary:      Effort: Pulmonary effort is normal. No respiratory distress.      Breath sounds: Normal breath sounds. No stridor. No wheezing, rhonchi or rales.   Musculoskeletal:      Left elbow: No swelling or deformity. Normal range of motion. Tenderness present in medial epicondyle and lateral epicondyle.

## 2024-02-28 LAB
ALBUMIN SERPL-MCNC: 3.6 G/DL (ref 3.5–5)
ALBUMIN/GLOB SERPL: 0.9 (ref 1.1–2.2)
ALP SERPL-CCNC: 67 U/L (ref 45–117)
ALT SERPL-CCNC: 13 U/L (ref 12–78)
ANION GAP SERPL CALC-SCNC: 7 MMOL/L (ref 5–15)
AST SERPL-CCNC: 12 U/L (ref 15–37)
BASOPHILS # BLD: 0.1 K/UL (ref 0–0.1)
BASOPHILS NFR BLD: 1 % (ref 0–1)
BILIRUB SERPL-MCNC: 0.4 MG/DL (ref 0.2–1)
BUN SERPL-MCNC: 9 MG/DL (ref 6–20)
BUN/CREAT SERPL: 12 (ref 12–20)
CALCIUM SERPL-MCNC: 8.6 MG/DL (ref 8.5–10.1)
CHLORIDE SERPL-SCNC: 110 MMOL/L (ref 97–108)
CO2 SERPL-SCNC: 25 MMOL/L (ref 21–32)
CREAT SERPL-MCNC: 0.78 MG/DL (ref 0.55–1.02)
DIFFERENTIAL METHOD BLD: ABNORMAL
EOSINOPHIL # BLD: 0.2 K/UL (ref 0–0.4)
EOSINOPHIL NFR BLD: 2 % (ref 0–7)
ERYTHROCYTE [DISTWIDTH] IN BLOOD BY AUTOMATED COUNT: 15.3 % (ref 11.5–14.5)
EST. AVERAGE GLUCOSE BLD GHB EST-MCNC: 111 MG/DL
FERRITIN SERPL-MCNC: 4 NG/ML (ref 8–252)
GLOBULIN SER CALC-MCNC: 3.9 G/DL (ref 2–4)
GLUCOSE SERPL-MCNC: 103 MG/DL (ref 65–100)
HBA1C MFR BLD: 5.5 % (ref 4–5.6)
HCT VFR BLD AUTO: 33.4 % (ref 35–47)
HGB BLD-MCNC: 10.6 G/DL (ref 11.5–16)
IMM GRANULOCYTES # BLD AUTO: 0 K/UL (ref 0–0.04)
IMM GRANULOCYTES NFR BLD AUTO: 0 % (ref 0–0.5)
IRON SATN MFR SERPL: 13 % (ref 20–50)
IRON SERPL-MCNC: 48 UG/DL (ref 35–150)
LYMPHOCYTES # BLD: 1.8 K/UL (ref 0.8–3.5)
LYMPHOCYTES NFR BLD: 24 % (ref 12–49)
MCH RBC QN AUTO: 21.9 PG (ref 26–34)
MCHC RBC AUTO-ENTMCNC: 31.7 G/DL (ref 30–36.5)
MCV RBC AUTO: 68.9 FL (ref 80–99)
MONOCYTES # BLD: 0.7 K/UL (ref 0–1)
MONOCYTES NFR BLD: 9 % (ref 5–13)
NEUTS SEG # BLD: 4.8 K/UL (ref 1.8–8)
NEUTS SEG NFR BLD: 64 % (ref 32–75)
NRBC # BLD: 0 K/UL (ref 0–0.01)
NRBC BLD-RTO: 0 PER 100 WBC
PLATELET # BLD AUTO: 270 K/UL (ref 150–400)
POTASSIUM SERPL-SCNC: 3.9 MMOL/L (ref 3.5–5.1)
PROT SERPL-MCNC: 7.5 G/DL (ref 6.4–8.2)
RBC # BLD AUTO: 4.85 M/UL (ref 3.8–5.2)
RBC MORPH BLD: ABNORMAL
SODIUM SERPL-SCNC: 142 MMOL/L (ref 136–145)
TIBC SERPL-MCNC: 356 UG/DL (ref 250–450)
TSH SERPL DL<=0.05 MIU/L-ACNC: 1.14 UIU/ML (ref 0.36–3.74)
WBC # BLD AUTO: 7.6 K/UL (ref 3.6–11)

## 2024-03-11 NOTE — PROGRESS NOTES
Pt would like to r/s the 3/20 appt. She had to cancel.   T.O.C:              Pt expected to d/c to home              Family to provide transport at d/c              Emergency Contact: None, pt refuses    CM spoke with Ivonne Lorenzana of [facility] at previous hospitalization, pt insurance with limited benefits. Insurance will only cover TPN or potentially [employer] if these are pt's sole source of nutrition. CM will attempt to follow up with insurance company for specifics.      Romero Chau RN

## 2024-03-31 NOTE — PROGRESS NOTES
High Risk Obstetrics Progress Note    Name: Russel Jolly MRN: [id number]  SSN: [id number]    YOB: 1985  Age: 38 y.o. Sex: female      Subjective:      LOS: 30 days    Estimated Date of Delivery: 21   Gestational Age Today: 34w7d     Patient admitted for pyelonephritis. States she does have normal fetal movement and intermittent nausea, but no emesis overnight. and does not have chest pain, shortness of breath, vaginal bleeding  and vaginal leaking of fluid . Reports occasional contractions. When asked if she had any thoughts or needs since our conversation last night and patient said \"same\". I advised that we would continue with current treatment until she told us otherwise. Objective:     Vitals:  Blood pressure 107/63, pulse 80, temperature 97.7 °F (36.5 °C), resp. rate 18, height 5' 4\" (1.626 m), weight 78.5 kg (173 lb), last menstrual period 2020, SpO2 99 %, not currently breastfeeding. Temp (24hrs), Av.1 °F (36.7 °C), Min:97.7 °F (36.5 °C), Max:98.3 °F (00.7 °C)    Systolic (11ZQU), OBE:737 , Min:106 , FK      Diastolic (31LNC), DQS:03, Min:63, Max:74       Intake and Output:         Physical Exam:  Patient without distress.   Heart: Regular rate and rhythm  Lung: clear to auscultation throughout lung fields, no wheezes, no rales, no rhonchi and normal respiratory effort  Abdomen: soft, nontender, gravid  Lower Extremities:  - Edema No       Membranes:  Intact    Fetal Heart Rate:  +FHTs        Labs:   Recent Results (from the past 36 hour(s))   PHOSPHORUS    Collection Time: 21  4:34 AM   Result Value Ref Range    Phosphorus 4.4 2.6 - 4.7 MG/DL   METABOLIC PANEL, BASIC    Collection Time: 21  4:34 AM   Result Value Ref Range    Sodium 136 136 - 145 mmol/L    Potassium 3.7 3.5 - 5.1 mmol/L    Chloride 108 97 - 108 mmol/L    CO2 23 21 - 32 mmol/L    Anion gap 5 5 - 15 mmol/L    Glucose 70 65 - 100 mg/dL    BUN 11 6 - 20 MG/DL    Creatinine 0.32 (L) 0.55 - 1.02 MG/DL    BUN/Creatinine ratio 34 (H) 12 - 20      GFR est AA >60 >60 ml/min/1.73m2    GFR est non-AA >60 >60 ml/min/1.73m2    Calcium 8.2 (L) 8.5 - 10.1 MG/DL   MAGNESIUM    Collection Time: 04/01/21  4:34 AM   Result Value Ref Range    Magnesium 1.8 1.6 - 2.4 mg/dL   PHOSPHORUS    Collection Time: 04/02/21  5:14 AM   Result Value Ref Range    Phosphorus 4.3 2.6 - 4.7 MG/DL   MAGNESIUM    Collection Time: 04/02/21  5:14 AM   Result Value Ref Range    Magnesium 1.8 1.6 - 2.4 mg/dL       Assessment and Plan:      Principal Problem:    Acute pyelonephritis (3/3/2021)       39 y/o [id number] at 26 5/7 weeks admitted for pyelonephritis  -Pyelo now resolved and AF (s/p Ceftriaxone for Klebsiella and Citrobacter urine cx); renal ultrasound normal on 3/5  -Blood culture with 1/4 yeast, s/p amphotericin; repeat blood cultures 3/6 negative   -s/p new PICC placement 3/19/21 (old PICC removed after +blood cxs)     Chronic eating disorder   -on TPN-continous  -Vitamin D deficiency-supplementing for repletion  -Zinc level low-will d/w nutrition  -Continue BG check q6h  -has outpatient nutritionistDinora  -iv zofran, phenergan suppositories, carafate, ivf's  -constipation- miralax and dulcolax supp prn and daily colace  -continue to encourage trying small amounts of food     Depression with h/o hospitalization for suicidal ideation  -followed by Jyothi Mejia (psychiatrist) and Carlin Ribera (psychologist), however pt non-compliant with counseling  -Continue Prozac, Zyprexa (both switched to liquid form to help improve compliance but pt unable to tolerate liquid prozac so switched back to pill form 3/17), and prn Prazosin, hydroxyzine  -s/p psych consult for pt non-compliance and trying to interrupt iv meds/tpn-no further recs  -appreciate Loren's input and telehealth meeting with patient.  -many social issues that patient is trying to work through.  Needs to try to go to court on Tuesday, 4/6/21 but know the concerns I have with discharge that were again discussed yesterday evening-see note. Pt may decide to leave AMA in order to go to her court date.  PICC line would be removed before discharge if this occurs.      Chronic anemia-known beta thal minor  -s/p iv iron with last admission  -Hb currently at baseline     Ambulate with assistance, Juan thakkar for DVT ppx (pt with intermittent dizziness and previous fall history)  S/p Physical therapy consult for deconditioning  -order for wheelchair with sitter to go outside if desired.    Rash

## 2024-04-02 ENCOUNTER — HOSPITAL ENCOUNTER (EMERGENCY)
Facility: HOSPITAL | Age: 39
Discharge: HOME | End: 2024-04-02
Attending: EMERGENCY MEDICINE
Payer: MEDICAID

## 2024-04-02 ENCOUNTER — APPOINTMENT (OUTPATIENT)
Facility: HOSPITAL | Age: 39
End: 2024-04-02
Payer: MEDICAID

## 2024-04-02 VITALS
BODY MASS INDEX: 31.15 KG/M2 | DIASTOLIC BLOOD PRESSURE: 81 MMHG | OXYGEN SATURATION: 99 % | HEART RATE: 64 BPM | SYSTOLIC BLOOD PRESSURE: 122 MMHG | TEMPERATURE: 97.9 F | WEIGHT: 187.17 LBS | RESPIRATION RATE: 16 BRPM

## 2024-04-02 DIAGNOSIS — M79.601 RIGHT ARM PAIN: Primary | ICD-10-CM

## 2024-04-02 PROCEDURE — 93971 EXTREMITY STUDY: CPT

## 2024-04-02 PROCEDURE — 73070 X-RAY EXAM OF ELBOW: CPT

## 2024-04-02 PROCEDURE — 73090 X-RAY EXAM OF FOREARM: CPT

## 2024-04-02 PROCEDURE — 73030 X-RAY EXAM OF SHOULDER: CPT

## 2024-04-02 PROCEDURE — 96372 THER/PROPH/DIAG INJ SC/IM: CPT

## 2024-04-02 PROCEDURE — 6360000002 HC RX W HCPCS: Performed by: NURSE PRACTITIONER

## 2024-04-02 PROCEDURE — 99284 EMERGENCY DEPT VISIT MOD MDM: CPT

## 2024-04-02 RX ORDER — KETOROLAC TROMETHAMINE 30 MG/ML
30 INJECTION, SOLUTION INTRAMUSCULAR; INTRAVENOUS
Status: COMPLETED | OUTPATIENT
Start: 2024-04-02 | End: 2024-04-02

## 2024-04-02 RX ADMIN — KETOROLAC TROMETHAMINE 30 MG: 30 INJECTION, SOLUTION INTRAMUSCULAR; INTRAVENOUS at 20:34

## 2024-04-02 ASSESSMENT — PAIN DESCRIPTION - ORIENTATION
ORIENTATION: RIGHT
ORIENTATION: RIGHT

## 2024-04-02 ASSESSMENT — PAIN SCALES - GENERAL
PAINLEVEL_OUTOF10: 7
PAINLEVEL_OUTOF10: 6

## 2024-04-02 ASSESSMENT — PAIN - FUNCTIONAL ASSESSMENT: PAIN_FUNCTIONAL_ASSESSMENT: PREVENTS OR INTERFERES SOME ACTIVE ACTIVITIES AND ADLS

## 2024-04-02 ASSESSMENT — PAIN DESCRIPTION - LOCATION
LOCATION: ARM
LOCATION: ARM

## 2024-04-02 ASSESSMENT — PAIN DESCRIPTION - FREQUENCY: FREQUENCY: CONTINUOUS

## 2024-04-02 ASSESSMENT — PAIN DESCRIPTION - DIRECTION: RADIATING_TOWARDS: SHOULDER

## 2024-04-02 ASSESSMENT — PAIN DESCRIPTION - DESCRIPTORS
DESCRIPTORS: ACHING
DESCRIPTORS: SHARP

## 2024-04-02 ASSESSMENT — PAIN DESCRIPTION - ONSET: ONSET: ON-GOING

## 2024-04-02 ASSESSMENT — PAIN DESCRIPTION - PAIN TYPE: TYPE: ACUTE PAIN

## 2024-04-02 NOTE — ED PROVIDER NOTES
[facility] EMERGENCY DEP  EMERGENCY DEPARTMENT ENCOUNTER      Pt Name: Jesi Awan  MRN: [id number]  Birthdate 1985  Date of evaluation: 4/2/2024  Provider: ZAHRA Davila NP    CHIEF COMPLAINT       Chief Complaint   Patient presents with    Arm Pain         HISTORY OF PRESENT ILLNESS   (Location/Symptom, Timing/Onset, Context/Setting, Quality, Duration, Modifying Factors, Severity)  Note limiting factors.   38-year-old female with past medical history of acute pyelonephritis, anorexia, anxiety, asthma, avoidant restrictive food intake disorder, chronic back pain, hyperemesis, hypertension, fibromyalgia, MDD, iron deficiency anemia, PTSD, plantar fasciitis, IBS presents ambulatory to the ER for evaluation of right arm pain for 3 weeks.  Patient states that the pain is exacerbated.  Range of motion and movement of her right arm, patient is right-hand dominant, states that she works for [employer].  Patient denies any numbness or any tingling to the right arm, denies any weakness, denies any fall or injury, denies any neck pain or neck stiffness.  Patient states that she has attempted Bengay and Aspercreme without relief of symptoms.  Patient states that she has noticed small bumps in her being on the lower forearm.                     The history is provided by the patient.         Review of External Medical Records:     Nursing Notes were reviewed.    REVIEW OF SYSTEMS    (2-9 systems for level 4, 10 or more for level 5)     Review of Systems    Except as noted above the remainder of the review of systems was reviewed and negative.       PAST MEDICAL HISTORY     Past Medical History:   Diagnosis Date    Acute pyelonephritis 03/03/2021    Anorexia     Anxiety     Asthma     on albuterol prn. Triggers cold and allergies    Avoidant-restrictive food intake disorder (ARFID)     Back pain, chronic 2010    sciatica    Chronic bilateral low back pain with right-sided sciatica 12/28/2020    Fibromyalgia

## 2024-04-02 NOTE — ED TRIAGE NOTES
Triage: Pt arrives ambulatory from home with CC of right arm pain for a few weeks. She reports the pain is exacerbated by ROM. She reports noticing some bumps on the right side today so she came to the ED.

## 2024-04-03 NOTE — DISCHARGE INSTRUCTIONS
Your x-rays today were reassuring for no acute fractures in the right arm, the duplex study shows no acute blood clot in your right arm.  I do not have a definitive reason for the bumps in your right forearm but I can reassure you that they are not infected.  You may try topical Tiger balm, Tylenol, compression sleeve and please call and schedule follow-up appointment with both your primary care provider and [facility] for continued discomfort.

## 2024-04-08 DIAGNOSIS — J45.909 MILD ASTHMA WITHOUT COMPLICATION, UNSPECIFIED WHETHER PERSISTENT: ICD-10-CM

## 2024-04-08 RX ORDER — ALBUTEROL SULFATE 90 UG/1
AEROSOL, METERED RESPIRATORY (INHALATION)
Qty: 18 EACH | Refills: 0 | Status: SHIPPED | OUTPATIENT
Start: 2024-04-08

## 2024-04-08 NOTE — TELEPHONE ENCOUNTER
Requested Prescriptions     Pending Prescriptions Disp Refills    albuterol sulfate HFA (PROVENTIL;VENTOLIN;PROAIR) 108 (90 Base) MCG/ACT inhaler [Pharmacy Med Name: ALBUTEROL HFA (VENTOLIN) INH] 18 each 0     Sig: TAKE 2 PUFFS BY MOUTH EVERY 6 HOURS AS NEEDED FOR WHEEZE

## 2024-08-07 ENCOUNTER — TELEPHONE (OUTPATIENT)
Age: 39
End: 2024-08-07

## 2024-08-07 NOTE — TELEPHONE ENCOUNTER
Left pt a voicemail to callback pt was asking if she could come in and have Lab's drawn? Per 's nurse they can do lab's however pt need's to make an In Office Appt to see  first.

## 2024-08-07 NOTE — TELEPHONE ENCOUNTER
Patient would like to know can you do blood work to see if she has an STD. She would like a call back to let her know.      Call back at [phone]

## 2024-11-11 ENCOUNTER — TELEPHONE (OUTPATIENT)
Age: 39
End: 2024-11-11

## 2024-11-11 NOTE — TELEPHONE ENCOUNTER
Patient need a new order sent to [facility] for her Ensure. She stated she tried to order and they told her they need a new order from her PCP.

## 2024-11-11 NOTE — TELEPHONE ENCOUNTER
Called Patient. Name and  confirmed.  Informed her that it was signed and faxed last week. She verbalized understanding.

## 2024-12-02 DIAGNOSIS — J45.909 MILD ASTHMA WITHOUT COMPLICATION, UNSPECIFIED WHETHER PERSISTENT: ICD-10-CM

## 2024-12-02 NOTE — TELEPHONE ENCOUNTER
PCP: Tito Younger MD    Last appt: 2/27/2024     No future appointments.    Requested Prescriptions     Pending Prescriptions Disp Refills    albuterol sulfate HFA (PROVENTIL;VENTOLIN;PROAIR) 108 (90 Base) MCG/ACT inhaler [Pharmacy Med Name: ALBUTEROL HFA (VENTOLIN) INH] 18 each 0     Sig: INHALE 2 PUFFS BY MOUTH EVERY 6 HOURS AS NEEDED FOR WHEEZE         Prior labs and Blood pressures:  BP Readings from Last 3 Encounters:   04/02/24 122/81   02/27/24 124/74   08/01/23 110/66     Lab Results   Component Value Date/Time     02/27/2024 10:46 AM    K 3.9 02/27/2024 10:46 AM     02/27/2024 10:46 AM    CO2 25 02/27/2024 10:46 AM    BUN 9 02/27/2024 10:46 AM    GFRAA 103 08/12/2021 12:00 AM     Lab Results   Component Value Date/Time    CHOL 215 04/26/2022 12:00 AM    HDL 81 04/26/2022 12:00 AM     04/26/2022 12:00 AM    VLDL 12 04/26/2022 12:00 AM     Lab Results   Component Value Date/Time    TSH 1.14 02/27/2024 10:46 AM

## 2024-12-03 RX ORDER — ALBUTEROL SULFATE 90 UG/1
INHALANT RESPIRATORY (INHALATION)
Qty: 18 EACH | Refills: 0 | Status: SHIPPED | OUTPATIENT
Start: 2024-12-03

## 2024-12-04 ENCOUNTER — HOSPITAL ENCOUNTER (EMERGENCY)
Facility: HOSPITAL | Age: 39
Discharge: HOME | End: 2024-12-04
Attending: STUDENT IN AN ORGANIZED HEALTH CARE EDUCATION/TRAINING PROGRAM
Payer: MEDICAID

## 2024-12-04 ENCOUNTER — APPOINTMENT (OUTPATIENT)
Facility: HOSPITAL | Age: 39
End: 2024-12-04
Payer: MEDICAID

## 2024-12-04 VITALS
RESPIRATION RATE: 18 BRPM | WEIGHT: 211.64 LBS | OXYGEN SATURATION: 100 % | HEART RATE: 69 BPM | SYSTOLIC BLOOD PRESSURE: 143 MMHG | BODY MASS INDEX: 35.22 KG/M2 | DIASTOLIC BLOOD PRESSURE: 71 MMHG | TEMPERATURE: 97.8 F

## 2024-12-04 DIAGNOSIS — A59.9 TRICHOMONIASIS: ICD-10-CM

## 2024-12-04 DIAGNOSIS — N30.00 ACUTE CYSTITIS WITHOUT HEMATURIA: ICD-10-CM

## 2024-12-04 DIAGNOSIS — R07.9 CHEST PAIN, UNSPECIFIED TYPE: Primary | ICD-10-CM

## 2024-12-04 LAB
ALBUMIN SERPL-MCNC: 3.5 G/DL (ref 3.5–5)
ALBUMIN/GLOB SERPL: 0.8 (ref 1.1–2.2)
ALP SERPL-CCNC: 69 U/L (ref 45–117)
ALT SERPL-CCNC: 16 U/L (ref 12–78)
ANION GAP SERPL CALC-SCNC: 6 MMOL/L (ref 2–12)
APPEARANCE UR: ABNORMAL
AST SERPL-CCNC: 15 U/L (ref 15–37)
BACTERIA URNS QL MICRO: NEGATIVE /HPF
BASOPHILS # BLD: 0.1 K/UL (ref 0–0.1)
BASOPHILS NFR BLD: 1 % (ref 0–1)
BILIRUB SERPL-MCNC: 0.4 MG/DL (ref 0.2–1)
BILIRUB UR QL: NEGATIVE
BUN SERPL-MCNC: 7 MG/DL (ref 6–20)
BUN/CREAT SERPL: 9 (ref 12–20)
CALCIUM SERPL-MCNC: 8.4 MG/DL (ref 8.5–10.1)
CHLORIDE SERPL-SCNC: 105 MMOL/L (ref 97–108)
CO2 SERPL-SCNC: 27 MMOL/L (ref 21–32)
COLOR UR: ABNORMAL
COMMENT:: NORMAL
CREAT SERPL-MCNC: 0.74 MG/DL (ref 0.55–1.02)
DIFFERENTIAL METHOD BLD: ABNORMAL
EKG ATRIAL RATE: 66 BPM
EKG DIAGNOSIS: NORMAL
EKG P AXIS: 49 DEGREES
EKG P-R INTERVAL: 162 MS
EKG Q-T INTERVAL: 368 MS
EKG QRS DURATION: 80 MS
EKG QTC CALCULATION (BAZETT): 385 MS
EKG R AXIS: 25 DEGREES
EKG T AXIS: 45 DEGREES
EKG VENTRICULAR RATE: 66 BPM
EOSINOPHIL # BLD: 0.2 K/UL (ref 0–0.4)
EOSINOPHIL NFR BLD: 2 % (ref 0–7)
EPITH CASTS URNS QL MICRO: ABNORMAL /LPF
ERYTHROCYTE [DISTWIDTH] IN BLOOD BY AUTOMATED COUNT: 18.3 % (ref 11.5–14.5)
GLOBULIN SER CALC-MCNC: 4.5 G/DL (ref 2–4)
GLUCOSE SERPL-MCNC: 67 MG/DL (ref 65–100)
GLUCOSE UR STRIP.AUTO-MCNC: NEGATIVE MG/DL
HCG UR QL: NEGATIVE
HCT VFR BLD AUTO: 31.8 % (ref 35–47)
HGB BLD-MCNC: 9.4 G/DL (ref 11.5–16)
HGB UR QL STRIP: NEGATIVE
IMM GRANULOCYTES # BLD AUTO: 0 K/UL (ref 0–0.04)
IMM GRANULOCYTES NFR BLD AUTO: 0 % (ref 0–0.5)
KETONES UR QL STRIP.AUTO: NEGATIVE MG/DL
LEUKOCYTE ESTERASE UR QL STRIP.AUTO: ABNORMAL
LYMPHOCYTES # BLD: 2 K/UL (ref 0.8–3.5)
LYMPHOCYTES NFR BLD: 22 % (ref 12–49)
MCH RBC QN AUTO: 19.8 PG (ref 26–34)
MCHC RBC AUTO-ENTMCNC: 29.6 G/DL (ref 30–36.5)
MCV RBC AUTO: 67.1 FL (ref 80–99)
MONOCYTES # BLD: 0.8 K/UL (ref 0–1)
MONOCYTES NFR BLD: 9 % (ref 5–13)
NEUTS SEG # BLD: 5.8 K/UL (ref 1.8–8)
NEUTS SEG NFR BLD: 66 % (ref 32–75)
NITRITE UR QL STRIP.AUTO: NEGATIVE
NRBC # BLD: 0 K/UL (ref 0–0.01)
NRBC BLD-RTO: 0 PER 100 WBC
PH UR STRIP: 8 (ref 5–8)
PLATELET # BLD AUTO: 344 K/UL (ref 150–400)
PMV BLD AUTO: 10.2 FL (ref 8.9–12.9)
POTASSIUM SERPL-SCNC: 3.7 MMOL/L (ref 3.5–5.1)
PROT SERPL-MCNC: 8 G/DL (ref 6.4–8.2)
PROT UR STRIP-MCNC: ABNORMAL MG/DL
RBC # BLD AUTO: 4.74 M/UL (ref 3.8–5.2)
RBC #/AREA URNS HPF: ABNORMAL /HPF (ref 0–5)
RBC MORPH BLD: ABNORMAL
SODIUM SERPL-SCNC: 138 MMOL/L (ref 136–145)
SP GR UR REFRACTOMETRY: 1.02 (ref 1–1.03)
SPECIMEN HOLD: NORMAL
SPECIMEN HOLD: NORMAL
TRICHOMONAS UR QL MICRO: PRESENT
TROPONIN I SERPL HS-MCNC: 5 NG/L (ref 0–51)
UROBILINOGEN UR QL STRIP.AUTO: 0.2 EU/DL (ref 0.2–1)
WBC # BLD AUTO: 8.9 K/UL (ref 3.6–11)
WBC MORPH BLD: ABNORMAL
WBC URNS QL MICRO: ABNORMAL /HPF (ref 0–4)

## 2024-12-04 PROCEDURE — 84484 ASSAY OF TROPONIN QUANT: CPT

## 2024-12-04 PROCEDURE — 85025 COMPLETE CBC W/AUTO DIFF WBC: CPT

## 2024-12-04 PROCEDURE — 99285 EMERGENCY DEPT VISIT HI MDM: CPT

## 2024-12-04 PROCEDURE — 96374 THER/PROPH/DIAG INJ IV PUSH: CPT

## 2024-12-04 PROCEDURE — 71046 X-RAY EXAM CHEST 2 VIEWS: CPT

## 2024-12-04 PROCEDURE — 6360000002 HC RX W HCPCS

## 2024-12-04 PROCEDURE — 93005 ELECTROCARDIOGRAM TRACING: CPT | Performed by: STUDENT IN AN ORGANIZED HEALTH CARE EDUCATION/TRAINING PROGRAM

## 2024-12-04 PROCEDURE — 80053 COMPREHEN METABOLIC PANEL: CPT

## 2024-12-04 PROCEDURE — 81025 URINE PREGNANCY TEST: CPT

## 2024-12-04 PROCEDURE — 36415 COLL VENOUS BLD VENIPUNCTURE: CPT

## 2024-12-04 PROCEDURE — 81001 URINALYSIS AUTO W/SCOPE: CPT

## 2024-12-04 RX ORDER — KETOROLAC TROMETHAMINE 30 MG/ML
15 INJECTION, SOLUTION INTRAMUSCULAR; INTRAVENOUS ONCE
Status: COMPLETED | OUTPATIENT
Start: 2024-12-04 | End: 2024-12-04

## 2024-12-04 RX ORDER — NITROFURANTOIN 25; 75 MG/1; MG/1
100 CAPSULE ORAL 2 TIMES DAILY
Qty: 10 CAPSULE | Refills: 0 | Status: SHIPPED | OUTPATIENT
Start: 2024-12-04 | End: 2024-12-04 | Stop reason: CLARIF

## 2024-12-04 RX ORDER — METRONIDAZOLE 500 MG/1
500 TABLET ORAL 2 TIMES DAILY
Qty: 14 TABLET | Refills: 0 | Status: SHIPPED | OUTPATIENT
Start: 2024-12-04 | End: 2024-12-11

## 2024-12-04 RX ADMIN — KETOROLAC TROMETHAMINE 15 MG: 30 INJECTION, SOLUTION INTRAMUSCULAR at 14:34

## 2024-12-04 ASSESSMENT — PAIN SCALES - GENERAL
PAINLEVEL_OUTOF10: 4
PAINLEVEL_OUTOF10: 5

## 2024-12-04 ASSESSMENT — HEART SCORE: ECG: NORMAL

## 2024-12-04 ASSESSMENT — PAIN DESCRIPTION - ORIENTATION
ORIENTATION: MID
ORIENTATION: LEFT

## 2024-12-04 ASSESSMENT — PAIN DESCRIPTION - LOCATION
LOCATION: CHEST
LOCATION: CHEST

## 2024-12-04 ASSESSMENT — PAIN DESCRIPTION - DESCRIPTORS
DESCRIPTORS: OTHER (COMMENT)
DESCRIPTORS: TIGHTNESS

## 2024-12-04 ASSESSMENT — PAIN - FUNCTIONAL ASSESSMENT
PAIN_FUNCTIONAL_ASSESSMENT: 0-10
PAIN_FUNCTIONAL_ASSESSMENT: ACTIVITIES ARE NOT PREVENTED
PAIN_FUNCTIONAL_ASSESSMENT: PREVENTS OR INTERFERES SOME ACTIVE ACTIVITIES AND ADLS

## 2024-12-04 NOTE — ED TRIAGE NOTES
Pt c/o chest pain , left arm and shoulder pain that started yesterday , +lightheadedness, everything went blurry , denies sob, denies leg swelling, also having bilateral pelvic pain, denies vaginal discharge or bleeding, denies n/v, denies urinary symptoms

## 2024-12-04 NOTE — DISCHARGE INSTRUCTIONS
We are sending 2 prescriptions to your pharmacy for both your urinary tract infection and trichomoniasis.  Please take as prescribed.  We would like for you to follow-up with your primary care as well as cardiology, contact information sent here, for further evaluation.  If symptoms worsen or new concerning symptoms arise, please report to nearest emergency department.

## 2024-12-04 NOTE — ED NOTES
39-year-old female presents with left arm pain, shoulder pain, chest pain, dizziness, lightheadedness, pelvic pain.  Started first with chest pain yesterday with an episode of lightheadedness and blurry vision while driving that spontaneously resolved then progressed to pelvic pain.  No OTC treatment.  Denies fevers, nausea, vomiting, diarrhea, urinary symptoms.      9:59 AM  I have evaluated the patient as the Provider in Rapid Medical Evaluation (RME). I have reviewed her vital signs and the triage nurse assessment. I have talked with the patient and any available family and advised that I am the provider in triage and have ordered the appropriate study to initiate their work up based on the clinical presentation during my assessment. I have advised that the patient will be accommodated in the Main ED as soon as possible. I have also requested to contact the triage nurse or myself immediately if the patient experiences any changes in their condition during this brief waiting period.  ZAHRA Portillo NP, Kelly E, APRN - NP  12/04/24 1026

## 2024-12-04 NOTE — ED PROVIDER NOTES
[facility] EMERGENCY DEP  EMERGENCY DEPARTMENT ENCOUNTER      Pt Name: Jesi Awan  MRN: [id number]  Birthdate 1985  Date of evaluation: 12/4/2024  Provider: Deepak Anaya PA-C    CHIEF COMPLAINT       Chief Complaint   Patient presents with    Chest Pain         HISTORY OF PRESENT ILLNESS   (Location/Symptom, Timing/Onset, Context/Setting, Quality, Duration, Modifying Factors, Severity)  Note limiting factors.   34-year-old female with history of GERD, sleep disturbance, anxiety, asthma, major depressive disorder, reports to ED with 2 episodes of left shoulder/left side of neck pain with radiation across left chest with episode of lightheadedness and blurry vision once yesterday and once today.  Also endorses bilateral pelvic/groin pain (in front of hips) beginning yesterday.  Denies shortness of breath, leg swelling, urinary complaints/vaginal discharge/bleeding, fever, or body aches.  Works at [employer] reportedly pulling double shifts with minimal sleep.              Review of External Medical Records:     Nursing Notes were reviewed.    REVIEW OF SYSTEMS    (2-9 systems for level 4, 10 or more for level 5)     Review of Systems    Except as noted above the remainder of the review of systems was reviewed and negative.       PAST MEDICAL HISTORY     Past Medical History:   Diagnosis Date    Acute pyelonephritis 03/03/2021    Anorexia     Anxiety     Asthma     on albuterol prn. Triggers cold and allergies    Avoidant-restrictive food intake disorder (ARFID)     Back pain, chronic 2010    sciatica    Chronic bilateral low back pain with right-sided sciatica 12/28/2020    Fibromyalgia 05/2022    GERD (gastroesophageal reflux disease) 12/28/2020    UGI , GI Dr. Tuttle    Gestational hypertension     Past pregnancy    Hyperemesis     severe hyperemesis    Hypertension     with G12 - none this pregnancy    IBS (irritable bowel syndrome) 05/2022    Iron deficiency anemia 10/08/2010    MDD (major

## 2024-12-18 ENCOUNTER — OFFICE VISIT (OUTPATIENT)
Age: 39
End: 2024-12-18
Payer: MEDICAID

## 2024-12-18 VITALS
HEART RATE: 68 BPM | HEIGHT: 65 IN | TEMPERATURE: 97.7 F | RESPIRATION RATE: 16 BRPM | SYSTOLIC BLOOD PRESSURE: 126 MMHG | WEIGHT: 210.4 LBS | BODY MASS INDEX: 35.06 KG/M2 | DIASTOLIC BLOOD PRESSURE: 74 MMHG | OXYGEN SATURATION: 98 %

## 2024-12-18 DIAGNOSIS — Z32.01 POSITIVE PREGNANCY TEST: ICD-10-CM

## 2024-12-18 DIAGNOSIS — Z86.59 HISTORY OF EATING DISORDER: Primary | ICD-10-CM

## 2024-12-18 PROBLEM — E87.6 HYPOKALEMIA: Status: RESOLVED | Noted: 2022-06-09 | Resolved: 2024-12-18

## 2024-12-18 PROBLEM — Z3A.39 39 WEEKS GESTATION OF PREGNANCY: Status: RESOLVED | Noted: 2023-05-23 | Resolved: 2024-12-18

## 2024-12-18 PROBLEM — E66.01 SEVERE OBESITY: Status: RESOLVED | Noted: 2020-02-18 | Resolved: 2024-12-18

## 2024-12-18 PROBLEM — Z86.39 HISTORY OF MALNUTRITION: Status: ACTIVE | Noted: 2021-06-11

## 2024-12-18 PROBLEM — G47.9 SLEEP DISTURBANCE: Status: RESOLVED | Noted: 2017-04-06 | Resolved: 2024-12-18

## 2024-12-18 PROBLEM — E43 SEVERE PROTEIN-CALORIE MALNUTRITION (HCC): Status: RESOLVED | Noted: 2021-01-08 | Resolved: 2024-12-18

## 2024-12-18 LAB — HCG SERPL-ACNC: 136 MIU/ML (ref 0–6)

## 2024-12-18 PROCEDURE — 99213 OFFICE O/P EST LOW 20 MIN: CPT | Performed by: FAMILY MEDICINE

## 2024-12-18 RX ORDER — PNV NO.95/FERROUS FUM/FOLIC AC 28MG-0.8MG
TABLET ORAL
Qty: 30 TABLET | Status: CANCELLED | OUTPATIENT
Start: 2024-12-18

## 2024-12-18 RX ORDER — PNV NO.95/FERROUS FUM/FOLIC AC 28MG-0.8MG
1 TABLET ORAL DAILY
Qty: 30 TABLET | Refills: 3 | Status: SHIPPED | OUTPATIENT
Start: 2024-12-18

## 2024-12-18 SDOH — ECONOMIC STABILITY: FOOD INSECURITY: WITHIN THE PAST 12 MONTHS, THE FOOD YOU BOUGHT JUST DIDN'T LAST AND YOU DIDN'T HAVE MONEY TO GET MORE.: NEVER TRUE

## 2024-12-18 SDOH — ECONOMIC STABILITY: INCOME INSECURITY: HOW HARD IS IT FOR YOU TO PAY FOR THE VERY BASICS LIKE FOOD, HOUSING, MEDICAL CARE, AND HEATING?: NOT VERY HARD

## 2024-12-18 SDOH — ECONOMIC STABILITY: FOOD INSECURITY: WITHIN THE PAST 12 MONTHS, YOU WORRIED THAT YOUR FOOD WOULD RUN OUT BEFORE YOU GOT MONEY TO BUY MORE.: NEVER TRUE

## 2024-12-19 NOTE — PROGRESS NOTES
Chief Complaint   Patient presents with    Follow-up     \"Have you been to the ER, urgent care clinic since your last visit?  Hospitalized since your last visit?\"    Yes. 12/4/24- [facility]- chest pain     “Have you seen or consulted any other health care providers outside of [facility] since your last visit?”    NO    Have you had a mammogram?”   NO    No breast cancer screening on file        Financial Resource Strain: Low Risk  (12/18/2024)    Overall Financial Resource Strain (CARDIA)     Difficulty of Paying Living Expenses: Not very hard      Food Insecurity: No Food Insecurity (12/18/2024)    Hunger Vital Sign     Worried About Running Out of Food in the Last Year: Never true     Ran Out of Food in the Last Year: Never true            2/27/2024    10:16 AM   PHQ-9    Little interest or pleasure in doing things 0   Feeling down, depressed, or hopeless 0   Trouble falling or staying asleep, or sleeping too much 0   Feeling tired or having little energy 0   Poor appetite or overeating 0   Feeling bad about yourself - or that you are a failure or have let yourself or your family down 0   Trouble concentrating on things, such as reading the newspaper or watching television 0   Moving or speaking so slowly that other people could have noticed. Or the opposite - being so fidgety or restless that you have been moving around a lot more than usual 0   Thoughts that you would be better off dead, or of hurting yourself in some way 0   PHQ-2 Score 0   PHQ-9 Total Score 0   If you checked off any problems, how difficult have these problems made it for you to do your work, take care of things at home, or get along with other people? 0       Health Maintenance Due   Topic Date Due    Pneumococcal 0-64 years Vaccine (1 of 2 - PCV) 08/28/1989    Varicella vaccine (1 of 2 - 13+ 2-dose series) Never done    Hepatitis B vaccine (1 of 3 - 19+ 3-dose series) Never done    Breast cancer screen  Never done    Flu vaccine (1) 
Done. Confirmation was received.   
present.      Mental Status: She is alert and oriented to person, place, and time. Mental status is at baseline.         Treatment risks, benefits, interactions, and alternatives discussed with patient. Advised patient to call back or return to office if symptoms worsen, change, or persist. If patient cannot reach us or should anything more urgent arise, patient should proceed directly to the nearest emergency department. Discussed expected course, resolution, and complications of diagnosis in detail with patient. Patient expressed understanding with the diagnosis and plan.     This dictation may have been completed with Dragon, the fitogram voice recognition software.  Unanticipated grammatical, syntax, homophones, and other interpretive errors are sometimes inadvertently transcribed by the computer software.  Please disregard any errors that have escaped final proofreading.      Tito Younger M.D.

## 2025-01-02 ENCOUNTER — APPOINTMENT (OUTPATIENT)
Facility: HOSPITAL | Age: 40
End: 2025-01-02
Payer: MEDICAID

## 2025-01-02 ENCOUNTER — HOSPITAL ENCOUNTER (EMERGENCY)
Facility: HOSPITAL | Age: 40
Discharge: HOME | End: 2025-01-03
Attending: STUDENT IN AN ORGANIZED HEALTH CARE EDUCATION/TRAINING PROGRAM
Payer: MEDICAID

## 2025-01-02 DIAGNOSIS — J18.9 COMMUNITY ACQUIRED PNEUMONIA, UNSPECIFIED LATERALITY: ICD-10-CM

## 2025-01-02 DIAGNOSIS — J10.1 INFLUENZA A: Primary | ICD-10-CM

## 2025-01-02 DIAGNOSIS — Z32.01 PREGNANCY TEST PERFORMED, PREGNANCY CONFIRMED: ICD-10-CM

## 2025-01-02 LAB
APPEARANCE UR: ABNORMAL
BACTERIA URNS QL MICRO: ABNORMAL /HPF
BILIRUB UR QL: NEGATIVE
COLOR UR: ABNORMAL
EPITH CASTS URNS QL MICRO: ABNORMAL /LPF
FLUAV RNA SPEC QL NAA+PROBE: DETECTED
FLUBV RNA SPEC QL NAA+PROBE: NOT DETECTED
GLUCOSE UR STRIP.AUTO-MCNC: NEGATIVE MG/DL
HCG UR QL: POSITIVE
HGB UR QL STRIP: NEGATIVE
HYALINE CASTS URNS QL MICRO: ABNORMAL /LPF (ref 0–5)
KETONES UR QL STRIP.AUTO: ABNORMAL MG/DL
LEUKOCYTE ESTERASE UR QL STRIP.AUTO: ABNORMAL
NITRITE UR QL STRIP.AUTO: NEGATIVE
PH UR STRIP: 6 (ref 5–8)
PROT UR STRIP-MCNC: ABNORMAL MG/DL
RBC #/AREA URNS HPF: ABNORMAL /HPF (ref 0–5)
SARS-COV-2 RNA RESP QL NAA+PROBE: NOT DETECTED
SOURCE: ABNORMAL
SP GR UR REFRACTOMETRY: 1.02 (ref 1–1.03)
SPECIMEN HOLD: NORMAL
UROBILINOGEN UR QL STRIP.AUTO: 1 EU/DL (ref 0.2–1)
WBC URNS QL MICRO: ABNORMAL /HPF (ref 0–4)

## 2025-01-02 PROCEDURE — 6370000000 HC RX 637 (ALT 250 FOR IP): Performed by: PHYSICIAN ASSISTANT

## 2025-01-02 PROCEDURE — 87636 SARSCOV2 & INF A&B AMP PRB: CPT

## 2025-01-02 PROCEDURE — 71046 X-RAY EXAM CHEST 2 VIEWS: CPT

## 2025-01-02 PROCEDURE — 81025 URINE PREGNANCY TEST: CPT

## 2025-01-02 PROCEDURE — 81001 URINALYSIS AUTO W/SCOPE: CPT

## 2025-01-02 PROCEDURE — 99284 EMERGENCY DEPT VISIT MOD MDM: CPT

## 2025-01-02 RX ORDER — ONDANSETRON 4 MG/1
4 TABLET, ORALLY DISINTEGRATING ORAL EVERY 8 HOURS PRN
Status: DISCONTINUED | OUTPATIENT
Start: 2025-01-02 | End: 2025-01-03 | Stop reason: HOSPADM

## 2025-01-02 RX ORDER — ACETAMINOPHEN 500 MG
1000 TABLET ORAL ONCE
Status: COMPLETED | OUTPATIENT
Start: 2025-01-02 | End: 2025-01-02

## 2025-01-02 RX ORDER — OSELTAMIVIR PHOSPHATE 75 MG/1
75 CAPSULE ORAL ONCE
Status: COMPLETED | OUTPATIENT
Start: 2025-01-02 | End: 2025-01-02

## 2025-01-02 RX ADMIN — OSELTAMIVIR PHOSPHATE 75 MG: 75 CAPSULE ORAL at 23:19

## 2025-01-02 RX ADMIN — ACETAMINOPHEN 1000 MG: 500 TABLET ORAL at 22:37

## 2025-01-02 RX ADMIN — ONDANSETRON 4 MG: 4 TABLET, ORALLY DISINTEGRATING ORAL at 22:37

## 2025-01-02 ASSESSMENT — LIFESTYLE VARIABLES
HOW OFTEN DO YOU HAVE A DRINK CONTAINING ALCOHOL: NEVER
HOW MANY STANDARD DRINKS CONTAINING ALCOHOL DO YOU HAVE ON A TYPICAL DAY: PATIENT DOES NOT DRINK

## 2025-01-02 ASSESSMENT — PAIN DESCRIPTION - FREQUENCY: FREQUENCY: CONTINUOUS

## 2025-01-02 ASSESSMENT — PAIN DESCRIPTION - DIRECTION: RADIATING_TOWARDS: GENERALIZED

## 2025-01-02 ASSESSMENT — PAIN DESCRIPTION - LOCATION: LOCATION: GENERALIZED

## 2025-01-02 ASSESSMENT — PAIN DESCRIPTION - ONSET: ONSET: ON-GOING

## 2025-01-02 ASSESSMENT — PAIN - FUNCTIONAL ASSESSMENT
PAIN_FUNCTIONAL_ASSESSMENT: ACTIVITIES ARE NOT PREVENTED
PAIN_FUNCTIONAL_ASSESSMENT: 0-10

## 2025-01-02 ASSESSMENT — PAIN DESCRIPTION - PAIN TYPE: TYPE: ACUTE PAIN

## 2025-01-02 ASSESSMENT — PAIN DESCRIPTION - DESCRIPTORS: DESCRIPTORS: ACHING;SORE

## 2025-01-02 ASSESSMENT — PAIN SCALES - GENERAL: PAINLEVEL_OUTOF10: 4

## 2025-01-03 VITALS
OXYGEN SATURATION: 99 % | TEMPERATURE: 98.6 F | BODY MASS INDEX: 35.26 KG/M2 | SYSTOLIC BLOOD PRESSURE: 101 MMHG | HEART RATE: 68 BPM | DIASTOLIC BLOOD PRESSURE: 61 MMHG | RESPIRATION RATE: 17 BRPM | WEIGHT: 211.64 LBS | HEIGHT: 65 IN

## 2025-01-03 LAB
APPEARANCE UR: CLEAR
BACTERIA URNS QL MICRO: NEGATIVE /HPF
BILIRUB UR QL: NEGATIVE
COLOR UR: ABNORMAL
EPITH CASTS URNS QL MICRO: ABNORMAL /LPF
GLUCOSE UR STRIP.AUTO-MCNC: NEGATIVE MG/DL
HGB UR QL STRIP: NEGATIVE
HYALINE CASTS URNS QL MICRO: ABNORMAL /LPF (ref 0–5)
KETONES UR QL STRIP.AUTO: ABNORMAL MG/DL
LEUKOCYTE ESTERASE UR QL STRIP.AUTO: ABNORMAL
NITRITE UR QL STRIP.AUTO: NEGATIVE
PH UR STRIP: 5.5 (ref 5–8)
PROT UR STRIP-MCNC: 30 MG/DL
RBC #/AREA URNS HPF: ABNORMAL /HPF (ref 0–5)
SP GR UR REFRACTOMETRY: 1.02 (ref 1–1.03)
UROBILINOGEN UR QL STRIP.AUTO: 1 EU/DL (ref 0.2–1)
WBC URNS QL MICRO: ABNORMAL /HPF (ref 0–4)

## 2025-01-03 PROCEDURE — 6370000000 HC RX 637 (ALT 250 FOR IP): Performed by: PHYSICIAN ASSISTANT

## 2025-01-03 RX ORDER — CEFDINIR 300 MG/1
300 CAPSULE ORAL 2 TIMES DAILY
Qty: 13 CAPSULE | Refills: 0 | Status: SHIPPED | OUTPATIENT
Start: 2025-01-03 | End: 2025-01-10

## 2025-01-03 RX ORDER — CEFDINIR 300 MG/1
300 CAPSULE ORAL
Status: COMPLETED | OUTPATIENT
Start: 2025-01-03 | End: 2025-01-03

## 2025-01-03 RX ORDER — AZITHROMYCIN 250 MG/1
250 TABLET, FILM COATED ORAL DAILY
Qty: 4 TABLET | Refills: 0 | Status: SHIPPED | OUTPATIENT
Start: 2025-01-03 | End: 2025-01-07

## 2025-01-03 RX ORDER — OSELTAMIVIR PHOSPHATE 75 MG/1
75 CAPSULE ORAL 2 TIMES DAILY
Qty: 9 CAPSULE | Refills: 0 | Status: SHIPPED | OUTPATIENT
Start: 2025-01-03 | End: 2025-01-08

## 2025-01-03 RX ORDER — AZITHROMYCIN 250 MG/1
500 TABLET, FILM COATED ORAL
Status: COMPLETED | OUTPATIENT
Start: 2025-01-03 | End: 2025-01-03

## 2025-01-03 RX ADMIN — CEFDINIR 300 MG: 300 CAPSULE ORAL at 01:35

## 2025-01-03 RX ADMIN — AZITHROMYCIN DIHYDRATE 500 MG: 250 TABLET ORAL at 01:35

## 2025-01-03 NOTE — DISCHARGE INSTRUCTIONS
Return to the ER for new or worsening symptoms such as swelling in the legs, coughing up blood, significant shortness of breath, severe persistent chest pain.  As we talked about, your test for influenza A was positive and your chest x-ray showed a possible developing pneumonia.  You have been prescribed Tamiflu, a medication that can help to reduce the severity of the flu, as well as antibiotics.  Tamiflu can cause nausea/vomiting/diarrhea-if those symptoms become significant, stop the Tamiflu.  Plenty of fluids.  Tylenol for fever.  Start your prescriptions tomorrow as you already had your first dose tonight in the ER.

## 2025-01-03 NOTE — ED TRIAGE NOTES
Pt walked into the ED with CC of body ache and fever. Pt has been having a fever since Tuesday night. Additional sx are N/v and nonproductive cough. Denies vaginal bleeding pain nor abdominal pain. Pt is 6 weeks pregnant.

## 2025-01-03 NOTE — ED PROVIDER NOTES
[facility] EMERGENCY DEP  EMERGENCY DEPARTMENT ENCOUNTER      Pt Name: Jesi Awan  MRN: [id number]  Birthdate 1985  Date of evaluation: 2025  Provider: PATTI Blackwell    CHIEF COMPLAINT       Chief Complaint   Patient presents with    Fever     bodych    Generalized Body Aches         HISTORY OF PRESENT ILLNESS   (Location/Symptom, Timing/Onset, Context/Setting, Quality, Duration, Modifying Factors, Severity)  Note limiting factors.   39 year old F presenting for fever x 3 days, body aches, headache, cough, congestion, muscle cramps.  No bleeding or pelvic pain.  , recent positive test at home, has not yet seen OB.  No urinary symptoms.  Tylenol this morning.  Patient reports some chest pain with cough.  No hemoptysis or leg swelling.    PMHx and pSx: UTD per patient  Social: non-smoker.  No alcohol.  No drugs.  [employer].    The history is provided by the patient and a friend.         Review of External Medical Records:     Nursing Notes were reviewed.    REVIEW OF SYSTEMS    (2-9 systems for level 4, 10 or more for level 5)     Review of Systems    Except as noted above the remainder of the review of systems was reviewed and negative.       PAST MEDICAL HISTORY     Past Medical History:   Diagnosis Date    Acute pyelonephritis 2021    Anorexia     Anxiety     Asthma     on albuterol prn. Triggers cold and allergies    Avoidant-restrictive food intake disorder (ARFID)     Back pain, chronic     sciatica    Chronic bilateral low back pain with right-sided sciatica 2020    Fibromyalgia 2022    GERD (gastroesophageal reflux disease) 2020    UGI 10-, GI Dr. Tuttle    Gestational hypertension     Past pregnancy    Hyperemesis     severe hyperemesis    Hypertension     with G12 - none this pregnancy    IBS (irritable bowel syndrome) 2022    Iron deficiency anemia 10/08/2010    MDD (major depressive disorder), single episode with postpartum onset 2013     treated with meds - 13    Orthostatic hypotension 2020    Plantar fasciitis     Pregnancy     36 weeks    PTSD (post-traumatic stress disorder)          SURGICAL HISTORY       Past Surgical History:   Procedure Laterality Date     DELIVERY ONLY       SECTION  2015    DILATION AND CURETTAGE OF UTERUS  2020    DILATION AND CURETTAGE OF UTERUS      GYN      removal of left fallopian tube    GYN      miscarriage x 6    OTHER SURGICAL HISTORY      cerclage w/ current pregnancy. Removed 18    OTHER SURGICAL HISTORY      cerlcage x9, ectopic x1  with removal of left fallopian tube         CURRENT MEDICATIONS       Previous Medications    ALBUTEROL (PROVENTIL) (2.5 MG/3ML) 0.083% NEBULIZER SOLUTION    Take 3 mLs by nebulization 4 times daily as needed for Wheezing    ALBUTEROL SULFATE HFA (PROVENTIL;VENTOLIN;PROAIR) 108 (90 BASE) MCG/ACT INHALER    INHALE 2 PUFFS BY MOUTH EVERY 6 HOURS AS NEEDED FOR WHEEZE    FLUTICASONE (FLONASE) 50 MCG/ACT NASAL SPRAY    2 sprays by Each Nostril route daily    NEBULIZERS (COMPRESSOR/NEBULIZER) MISC    One compressor nebulizer machine with accessory kit    NUTRITIONAL SUPPLEMENTS (BOOST) LIQD    Take 1 Bottle by mouth in the morning, at noon, and at bedtime    PRENATAL VIT-FE FUMARATE-FA (PRENATAL VITAMINS) 28-0.8 MG TABS    Take 1 tablet by mouth daily       ALLERGIES     Labetalol, Penicillins, Cefaclor, and Sulfa antibiotics    FAMILY HISTORY       Family History   Problem Relation Age of Onset    Alcohol Abuse Neg Hx     Osteoarthritis Neg Hx     Bleeding Prob Neg Hx     Cancer Neg Hx     Diabetes Neg Hx     Elevated Lipids Neg Hx     Headache Neg Hx     Heart Disease Neg Hx     Hypertension Neg Hx     Lung Disease Neg Hx     Migraines Neg Hx     Psychiatric Disorder Neg Hx     Stroke Neg Hx     Mental Retardation Neg Hx     Anesth Problems Neg Hx     Rheum Arthritis Mother     Asthma Mother     No Known Problems Father     No Known

## 2025-01-09 ENCOUNTER — TELEPHONE (OUTPATIENT)
Age: 40
End: 2025-01-09

## 2025-01-09 NOTE — TELEPHONE ENCOUNTER
Received a request from Yari asking for the pt's last office note,from when she had seen .Faxed the appropriate note's and scanned into Media.

## 2025-01-13 LAB
HEP B, EXTERNAL RESULT: NEGATIVE
HIV, EXTERNAL RESULT: NON REACTIVE
N. GONORRHOEAE, EXTERNAL RESULT: NEGATIVE
RPR TITER: NORMAL
RPR, EXTERNAL RESULT: NON REACTIVE
RPR: NON REACTIVE

## 2025-01-16 ENCOUNTER — TELEPHONE (OUTPATIENT)
Age: 40
End: 2025-01-16

## 2025-01-16 NOTE — TELEPHONE ENCOUNTER
Received an call to have pt seen with MFM. Scheduled pt for 02/11 @ 10:15 for her ultrasound, followed by 11:00 for GC. Called pt to confirm appt, no answer, left vm, sent trueEXhart message to have pt give the office a call.

## 2025-02-04 NOTE — TELEPHONE ENCOUNTER
Last Visit: PATY 4/15/20 NP Gabby Potts  Next Appointment: Not scheduled  Previous Refill Encounter(s): 1/15/20  1 + 2 refills    Requested Prescriptions     Pending Prescriptions Disp Refills    fluticasone propionate (FLONASE) 50 mcg/actuation nasal spray 1 Bottle 2     Si Sprays by Both Nostrils route daily. Pediatric GI follow up note  Mimi Jones  2021  [id number]    Patient ID: Mimi Jones is a 3 year old female.    Mimi is accompanied by Mother and Father.      History of Present Illness:     Mimi comes today with her parents for a follow up visit. Mimi is a 3 years old girl who has been followed for functional constipation and with holding behavior, since her last visit in August 2024 she was placed on Lactulose 12.5 ml twice a day, this was stopped after about a month and a half because was causing her diarrhea and some accidents as well, after stopping medication she was started on probiotics. She has been having daily bowel movements and stools have been on the soft side, no incontinence and no complains of abdominal pain, eating well, no vomiting., no gi bleeding      Review of Systems  Constitutional: no fever or WT loss  HEENT: no jaundice or conjunctivitis  Skin: no itching or rashes  Psychiatric: no confusion, disorientation or hallucination  Musculoskeletal: No joint pain, weakness or numbness  Endocrine: No excessive thirst, heat or cold intolerance.  Respiratory: No wheezing or coughing  Cardiovascular: no shortness of breath or heart murmur  Genitourinary: no problems on urination, pain, discharge or bleeding  Neurological: No numbness, tingling, paralysis or seizures  Hematological: No swollen, anemia or bleeding tendency          Mimi   Current Outpatient Medications   Medication Sig Dispense Refill    Probiotic Product (Culturelle Kid Probiotic+Fiber) Chew Tab       lactulose (CHRONULAC) 10 GM/15ML solution Take 10 mLs by mouth daily. 300 mL 0     No current facility-administered medications for this visit.       Mimi has No Known Allergies.    There were no vitals filed for this visit.     Physical Examination   Visit Vitals  Ht 3' 6.87\" (1.089 m)   Wt 18.8 kg (41 lb 5.4 oz)   BMI 15.81 kg/m²       Constitutional:Alert, active, no distress  HEENT:  no  jaundice, conjunctivitis, mouth exudates or ulcers  Neck: supple, no masses, no nodes  Chest: symmetric, good entry of air bilaterally with clear breath sounds  Heart: S1 S2, no murmurs, good capillary refill  Abd: non distended, BS present, soft, non tender, no masses, no hepatosplenomegaly  Neuro: alert, active, oriented  Extr: FROM, no swelling  Skin: no jaundice, no lesions, no rashes  Psych: appropriate mood and affect        Lab Results:    Had normal CBC, CMP, TSH, free T4 and negative celiac serology ordered last visit.    Assessment & Recommendations:    A/ Functional constipation and stool with holding behavior, doing well currently without use of stool softener with daily bowel movements in toilet.    P/ Will use Lactulose only as needed if no bowel movements after 2 days, 10 ml a day until she has soft stools again      Follow up as needed.     Anthony Rubalcava MD  2/4/2025

## 2025-02-11 ENCOUNTER — ROUTINE PRENATAL (OUTPATIENT)
Age: 40
End: 2025-02-11
Payer: MEDICAID

## 2025-02-11 VITALS — SYSTOLIC BLOOD PRESSURE: 112 MMHG | HEART RATE: 73 BPM | DIASTOLIC BLOOD PRESSURE: 74 MMHG

## 2025-02-11 DIAGNOSIS — O09.521 SUPERVISION OF ELDERLY MULTIGRAVIDA IN FIRST TRIMESTER: Primary | ICD-10-CM

## 2025-02-11 DIAGNOSIS — Z3A.12 12 WEEKS GESTATION OF PREGNANCY: ICD-10-CM

## 2025-02-11 DIAGNOSIS — D56.3 BETA THALASSEMIA MINOR: ICD-10-CM

## 2025-02-11 DIAGNOSIS — Z3A.12 12 WEEKS GESTATION OF PREGNANCY: Primary | ICD-10-CM

## 2025-02-11 PROCEDURE — 76813 OB US NUCHAL MEAS 1 GEST: CPT | Performed by: OBSTETRICS & GYNECOLOGY

## 2025-02-11 PROCEDURE — 99203 OFFICE O/P NEW LOW 30 MIN: CPT | Performed by: OBSTETRICS & GYNECOLOGY

## 2025-02-11 PROCEDURE — 96041 GENETIC COUNSELING SVC EA 30: CPT | Performed by: COUNSELOR

## 2025-02-11 NOTE — PROGRESS NOTES
patient denies any significant medication use or other potentially teratogenic exposures for the current pregnancy. The patient also denies smoking, drugs and alcohol since conception.    Family History:    The FOB is 50 years old. Some studies have suggested that the risk of sporadic dominant or X-linked single-gene mutations, including those for certain skeletal conditions varying in severity, is greater for fathers 45 years and above. There may also be an increased risk for certain congenital anomalies.  Additionally, there is some evidence to suggest that the risk for aneuploidy including gender chromosomes may be somewhat increased with advanced paternal age, as may be the risk for certain multifactorial conditions such as autism, schizophrenia or certain types of cancer. Although there is a significant increase in the risk of new dominant mutations, the overall risk of malformation is increased only slightly. We recommend high resolution ultrasound between 21 and 22 weeks gestation, even though the detection rates for associated conditions may be limited.     The patient reports that she and the FOB both have Black ancestry. The family history is otherwise unremarkable for intellectual disabilities, birth defects, multiple miscarriages, stillbirths, known genetic disorders, Caodaism ancestry, and consanguinity.      The patient verbalized her understanding of the information as it was presented to her today; she denies any further questions or concerns at this time.    Claire Maravilla MS, [facility]  Licensed, Certified Genetic Counselor    On this date, 02/11/25, a total of 60 minutes were spent in coordination of care and genetic evaluation for this patient, including reviewing records, creating the pedigree, risk assessment, counseling regarding relevant genetic disorders, explanation of appropriate genetic testing options, arranging genetic testing and documentation of care. Care was provided in person.

## 2025-02-11 NOTE — PROCEDURES
PATIENT: ALTAGRACIA REED   -  : 1985   -  DOS:2025   -  INTERPRETING PROVIDER:Tacho Saini,   Indication  ========    AMA, cervical incompetence    Method  ======    Transabdominal ultrasound examination. View: Good view    Pregnancy  =========    Benito pregnancy. Number of fetuses: 1    Dating  ======    LMP on: 2024  GA by LMP 12 w + 2 d  REYNA by LMP: 2025  Previous Ultrasound on: 2025  Type of prior assessment: GA  GA at prior assessment date 7 w + 6 d  GA by previous U/S 12 w + 0 d  REYNA by previous Ultrasound: 2025  Ultrasound examination on: 2025  GA by U/S based upon: CRL  GA by U/S 12 w + 2 d  REYNA by U/S: 2025  Assigned: based on the LMP, selected on 2025  Assigned GA 12 w + 2 d  Assigned REYNA: 2025    General Evaluation  ==============    Cardiac activity present  Placenta: posterior  Amniotic fluid: normal amount    Fetal Biometry  ============    Standard   bpm  18% Nicolaides  CRL 57.9 mm 12w 2d 38% Hadlock  NT 1.10 mm  Extended  Nasal bone: present    Fetal Anatomy  ===========    The following structures appear normal:  Stomach. Bladder.    The following structures were visualized:  Cranium: Midline falx  Choroid plexus. Face: Profile. Abdominal wall: Intact. Arms. Legs.    Maternal Structures  ===============    Uterus / Cervix  Cervix: Normal  Approach: Transabdominal  Cervical length 3.76 cm  Ovaries / Tubes / Adnexa  Rt ovary: Normal  Rt ovary D1 3.1 cm  Rt ovary D2 3.6 cm  Rt ovary D3 1.4 cm  Rt ovary Vol 8.5 cm³  Lt ovary: Normal  Lt ovary D1 2.7 cm  Lt ovary D2 2.5 cm  Lt ovary D3 1.5 cm  Lt ovary Vol 5.2 cm³    Findings  =======    Intrauterine Benito pregnancy at 12w 2d by clinical dates.  NT measures 1.1 mm.  Anatomy visualized as stated above.  Placental site is posterior.    The ultrasound findings as listed above and diagnostic limitations of ultrasound imaging, including inability to exclude all anomalies, have been

## 2025-02-11 NOTE — PROGRESS NOTES
Patient was seen 2/11/2025      Please look under media to view full consult and ultrasound report in ViewPoint.       Tacho Saini MD  Maternal Fetal Medicine

## 2025-02-13 ENCOUNTER — NURSE ONLY (OUTPATIENT)
Age: 40
End: 2025-02-13

## 2025-02-13 NOTE — PROGRESS NOTES
Spoke with patient and sent mychart message in regards to patient's scheduled cerclage appointment. Patient verbalized understanding

## 2025-02-28 ENCOUNTER — ANESTHESIA EVENT (OUTPATIENT)
Facility: HOSPITAL | Age: 40
End: 2025-02-28
Payer: MEDICAID

## 2025-03-03 ENCOUNTER — ANESTHESIA (OUTPATIENT)
Facility: HOSPITAL | Age: 40
End: 2025-03-03
Payer: MEDICAID

## 2025-03-03 ENCOUNTER — HOSPITAL ENCOUNTER (OUTPATIENT)
Facility: HOSPITAL | Age: 40
Discharge: HOME | End: 2025-03-03
Attending: STUDENT IN AN ORGANIZED HEALTH CARE EDUCATION/TRAINING PROGRAM | Admitting: STUDENT IN AN ORGANIZED HEALTH CARE EDUCATION/TRAINING PROGRAM
Payer: MEDICAID

## 2025-03-03 VITALS
WEIGHT: 219.14 LBS | OXYGEN SATURATION: 100 % | HEART RATE: 78 BPM | RESPIRATION RATE: 20 BRPM | SYSTOLIC BLOOD PRESSURE: 111 MMHG | BODY MASS INDEX: 36.47 KG/M2 | TEMPERATURE: 98 F | DIASTOLIC BLOOD PRESSURE: 77 MMHG

## 2025-03-03 PROBLEM — O34.32 CERVICAL INSUFFICIENCY DURING PREGNANCY IN SECOND TRIMESTER, ANTEPARTUM: Status: ACTIVE | Noted: 2025-03-03

## 2025-03-03 PROCEDURE — 3600000012 HC SURGERY LEVEL 2 ADDTL 15MIN: Performed by: STUDENT IN AN ORGANIZED HEALTH CARE EDUCATION/TRAINING PROGRAM

## 2025-03-03 PROCEDURE — 2580000003 HC RX 258: Performed by: STUDENT IN AN ORGANIZED HEALTH CARE EDUCATION/TRAINING PROGRAM

## 2025-03-03 PROCEDURE — 6370000000 HC RX 637 (ALT 250 FOR IP): Performed by: ANESTHESIOLOGY

## 2025-03-03 PROCEDURE — 7100000000 HC PACU RECOVERY - FIRST 15 MIN: Performed by: STUDENT IN AN ORGANIZED HEALTH CARE EDUCATION/TRAINING PROGRAM

## 2025-03-03 PROCEDURE — 7100000011 HC PHASE II RECOVERY - ADDTL 15 MIN: Performed by: STUDENT IN AN ORGANIZED HEALTH CARE EDUCATION/TRAINING PROGRAM

## 2025-03-03 PROCEDURE — 3600000002 HC SURGERY LEVEL 2 BASE: Performed by: STUDENT IN AN ORGANIZED HEALTH CARE EDUCATION/TRAINING PROGRAM

## 2025-03-03 PROCEDURE — 59320 REVISION OF CERVIX: CPT | Performed by: STUDENT IN AN ORGANIZED HEALTH CARE EDUCATION/TRAINING PROGRAM

## 2025-03-03 PROCEDURE — 6360000002 HC RX W HCPCS: Performed by: NURSE ANESTHETIST, CERTIFIED REGISTERED

## 2025-03-03 PROCEDURE — 3700000001 HC ADD 15 MINUTES (ANESTHESIA): Performed by: STUDENT IN AN ORGANIZED HEALTH CARE EDUCATION/TRAINING PROGRAM

## 2025-03-03 PROCEDURE — 6360000002 HC RX W HCPCS: Performed by: ANESTHESIOLOGY

## 2025-03-03 PROCEDURE — 7100000010 HC PHASE II RECOVERY - FIRST 15 MIN: Performed by: STUDENT IN AN ORGANIZED HEALTH CARE EDUCATION/TRAINING PROGRAM

## 2025-03-03 PROCEDURE — 2709999900 HC NON-CHARGEABLE SUPPLY: Performed by: STUDENT IN AN ORGANIZED HEALTH CARE EDUCATION/TRAINING PROGRAM

## 2025-03-03 PROCEDURE — 2500000003 HC RX 250 WO HCPCS: Performed by: NURSE ANESTHETIST, CERTIFIED REGISTERED

## 2025-03-03 PROCEDURE — 7100000001 HC PACU RECOVERY - ADDTL 15 MIN: Performed by: STUDENT IN AN ORGANIZED HEALTH CARE EDUCATION/TRAINING PROGRAM

## 2025-03-03 PROCEDURE — 3700000000 HC ANESTHESIA ATTENDED CARE: Performed by: STUDENT IN AN ORGANIZED HEALTH CARE EDUCATION/TRAINING PROGRAM

## 2025-03-03 RX ORDER — ONDANSETRON 2 MG/ML
4 INJECTION INTRAMUSCULAR; INTRAVENOUS
Status: DISCONTINUED | OUTPATIENT
Start: 2025-03-03 | End: 2025-03-03 | Stop reason: HOSPADM

## 2025-03-03 RX ORDER — BUPIVACAINE HYDROCHLORIDE 7.5 MG/ML
INJECTION, SOLUTION EPIDURAL; RETROBULBAR
Status: DISCONTINUED | OUTPATIENT
Start: 2025-03-03 | End: 2025-03-03 | Stop reason: SDUPTHER

## 2025-03-03 RX ORDER — SODIUM CHLORIDE, SODIUM LACTATE, POTASSIUM CHLORIDE, CALCIUM CHLORIDE 600; 310; 30; 20 MG/100ML; MG/100ML; MG/100ML; MG/100ML
INJECTION, SOLUTION INTRAVENOUS CONTINUOUS
Status: DISCONTINUED | OUTPATIENT
Start: 2025-03-03 | End: 2025-03-03 | Stop reason: HOSPADM

## 2025-03-03 RX ORDER — ACETAMINOPHEN 325 MG/1
650 TABLET ORAL ONCE
Status: COMPLETED | OUTPATIENT
Start: 2025-03-03 | End: 2025-03-03

## 2025-03-03 RX ORDER — SODIUM CHLORIDE 9 MG/ML
INJECTION, SOLUTION INTRAVENOUS CONTINUOUS
Status: DISCONTINUED | OUTPATIENT
Start: 2025-03-03 | End: 2025-03-03 | Stop reason: HOSPADM

## 2025-03-03 RX ORDER — BUPIVACAINE HYDROCHLORIDE 7.5 MG/ML
INJECTION, SOLUTION EPIDURAL; RETROBULBAR
Status: COMPLETED | OUTPATIENT
Start: 2025-03-03 | End: 2025-03-03

## 2025-03-03 RX ORDER — LIDOCAINE HYDROCHLORIDE 10 MG/ML
1 INJECTION, SOLUTION EPIDURAL; INFILTRATION; INTRACAUDAL; PERINEURAL
Status: DISCONTINUED | OUTPATIENT
Start: 2025-03-03 | End: 2025-03-03 | Stop reason: HOSPADM

## 2025-03-03 RX ORDER — SODIUM CHLORIDE 9 MG/ML
INJECTION, SOLUTION INTRAVENOUS PRN
Status: DISCONTINUED | OUTPATIENT
Start: 2025-03-03 | End: 2025-03-03 | Stop reason: HOSPADM

## 2025-03-03 RX ORDER — MIDAZOLAM HYDROCHLORIDE 2 MG/2ML
2 INJECTION, SOLUTION INTRAMUSCULAR; INTRAVENOUS
Status: DISCONTINUED | OUTPATIENT
Start: 2025-03-03 | End: 2025-03-03 | Stop reason: HOSPADM

## 2025-03-03 RX ORDER — SODIUM CHLORIDE 0.9 % (FLUSH) 0.9 %
5-40 SYRINGE (ML) INJECTION EVERY 12 HOURS SCHEDULED
Status: DISCONTINUED | OUTPATIENT
Start: 2025-03-03 | End: 2025-03-03 | Stop reason: HOSPADM

## 2025-03-03 RX ORDER — ONDANSETRON 2 MG/ML
INJECTION INTRAMUSCULAR; INTRAVENOUS
Status: DISCONTINUED | OUTPATIENT
Start: 2025-03-03 | End: 2025-03-03 | Stop reason: SDUPTHER

## 2025-03-03 RX ORDER — NALOXONE HYDROCHLORIDE 0.4 MG/ML
INJECTION, SOLUTION INTRAMUSCULAR; INTRAVENOUS; SUBCUTANEOUS PRN
Status: DISCONTINUED | OUTPATIENT
Start: 2025-03-03 | End: 2025-03-03 | Stop reason: HOSPADM

## 2025-03-03 RX ORDER — SODIUM CHLORIDE, SODIUM LACTATE, POTASSIUM CHLORIDE, AND CALCIUM CHLORIDE .6; .31; .03; .02 G/100ML; G/100ML; G/100ML; G/100ML
1000 INJECTION, SOLUTION INTRAVENOUS ONCE
Status: COMPLETED | OUTPATIENT
Start: 2025-03-03 | End: 2025-03-03

## 2025-03-03 RX ORDER — DIPHENHYDRAMINE HYDROCHLORIDE 50 MG/ML
12.5 INJECTION INTRAMUSCULAR; INTRAVENOUS
Status: DISCONTINUED | OUTPATIENT
Start: 2025-03-03 | End: 2025-03-03 | Stop reason: HOSPADM

## 2025-03-03 RX ORDER — SODIUM CHLORIDE 0.9 % (FLUSH) 0.9 %
10 SYRINGE (ML) INJECTION PRN
Status: DISCONTINUED | OUTPATIENT
Start: 2025-03-03 | End: 2025-03-03 | Stop reason: HOSPADM

## 2025-03-03 RX ORDER — FAMOTIDINE 10 MG/ML
INJECTION, SOLUTION INTRAVENOUS
Status: DISCONTINUED | OUTPATIENT
Start: 2025-03-03 | End: 2025-03-03 | Stop reason: SDUPTHER

## 2025-03-03 RX ADMIN — ONDANSETRON 4 MG: 2 INJECTION, SOLUTION INTRAMUSCULAR; INTRAVENOUS at 12:41

## 2025-03-03 RX ADMIN — ACETAMINOPHEN 650 MG: 325 TABLET ORAL at 16:25

## 2025-03-03 RX ADMIN — BUPIVACAINE HYDROCHLORIDE 1 ML: 7.5 INJECTION, SOLUTION EPIDURAL; RETROBULBAR at 12:47

## 2025-03-03 RX ADMIN — SODIUM CHLORIDE: 9 INJECTION, SOLUTION INTRAVENOUS at 12:41

## 2025-03-03 RX ADMIN — SODIUM CHLORIDE, POTASSIUM CHLORIDE, SODIUM LACTATE AND CALCIUM CHLORIDE 1000 ML: 600; 310; 30; 20 INJECTION, SOLUTION INTRAVENOUS at 11:45

## 2025-03-03 RX ADMIN — FAMOTIDINE 20 MG: 10 INJECTION, SOLUTION INTRAVENOUS at 12:41

## 2025-03-03 RX ADMIN — BUPIVACAINE HYDROCHLORIDE 7.5 MG: 7.5 INJECTION, SOLUTION EPIDURAL; RETROBULBAR at 13:09

## 2025-03-03 ASSESSMENT — PAIN - FUNCTIONAL ASSESSMENT: PAIN_FUNCTIONAL_ASSESSMENT: 0-10

## 2025-03-03 NOTE — OP NOTE
Operative Note     Patient: Jesi Awan  :   1985    Medical Record Number: [id number]     Pre-operative Diagnosis:   Cervical Insufficiency    Post-operative Diagnosis:   Same    Procedure:   Randle cerclage         Surgeon:   Yulia Fofana MD     EBL: see anesthesia record    Findings: cervix closed upon completion of procedure.     Complications: None     Description:  After appropriate consent was obtained, the patient was taken to the OR. Spinal anesthesia was placed per the anesthesia service. The patient was then placed in the dorsolithotomy position and prepped and draped in usual fashion.  The bladder was emptied with in and out catheterization.      Following this, retractors were placed in the vagina and the cervix was grasped with ring forceps at the 12 o'clock and 6 o'clock position. Working in a counter clockwise fashion, Randle cerclage was placed using 2-0 Prolene suture. The knot was tied at 12 o'clock. The suture was grasped approximately 1 cm away from initial knot and a second knot was tied for ease of removal.     The vagina was swabbed with a sponge stick. Instruments were then removed from the vagina. All sponge, needle and instrument counts were correct. The patient was then placed in a supine position and transferred to the recovery room in good condition.     Fetal heart tones were obtained in PACU.     Yulia Fofana MD FACOG   Maternal Fetal Medicine

## 2025-03-03 NOTE — PROGRESS NOTES
Patient Fall protocol  Yellow arm band applied to patient.  Yellow non-skid socks placed on Patient.    Bed in low position, all side rails up, call bell in reach.  Patient and family instructed on \"Call Don't Fall\" Protocol   -Use your call bell, wait for assistance, staff (not Family) will assist you to get up and move about.  Patient and Family verbalize understanding of Fall Precautions and the \" Call Don't Fall\" Protocol

## 2025-03-03 NOTE — FLOWSHEET NOTE
Attempted to stand and ambulate pt; pt able to stand but knees buckled when taking a step; pt assisted safely back to bed, ELIERS

## 2025-03-03 NOTE — ANESTHESIA POSTPROCEDURE EVALUATION
Department of Anesthesiology  Postprocedure Note    Patient: Jesi Awan  MRN: [id number]  YOB: 1985  Date of evaluation: 3/3/2025    Procedure Summary       Date: 03/03/25 Room / Location: [facility] MAIN OR  / [facility] MAIN OR    Anesthesia Start: 1251 Anesthesia Stop: 1340    Procedure: CERCLAGE PLACEMENT Diagnosis:       Incompetence of cervix      (Incompetence of cervix [N88.3])    Surgeons: Yulia Fofana MD Responsible Provider: Bessy Martinez MD    Anesthesia Type: spinal ASA Status: 2            Anesthesia Type: No value filed.    Almaz Phase I: Almaz Score: 9    Almaz Phase II: Almaz Score: 10    Anesthesia Post Evaluation    Patient location during evaluation: PACU  Patient participation: complete - patient participated  Level of consciousness: awake  Airway patency: patent  Nausea & Vomiting: no vomiting and no nausea  Cardiovascular status: hemodynamically stable  Respiratory status: acceptable  Hydration status: stable  Pain management: adequate    No notable events documented.

## 2025-03-03 NOTE — ANESTHESIA PROCEDURE NOTES
Spinal Block    Patient location during procedure: pre-op  End time: 3/3/2025 12:47 PM  Reason for block: post-op pain management, primary anesthetic and at surgeon's request  Staffing  Performed: resident/CRNA   Resident/CRNA: Dimas Dominguez APRN - CRNA  Performed by: Dimas Dominguez APRN - CRNA  Authorized by: Bessy Martinez MD    Spinal Block  Patient position: sitting  Prep: DuraPrep  Patient monitoring: cardiac monitor, continuous pulse ox, continuous capnometry, frequent blood pressure checks and oxygen  Approach: midline  Location: L3/L4  Provider prep: mask and sterile gloves  Needle  Needle type: Pencan   Needle gauge: 25 G  Needle length: 3.5 in  Assessment  Swirl obtained: Yes  CSF: clear  Attempts: 1  Hemodynamics: stable  Preanesthetic Checklist  Completed: patient identified, IV checked, site marked, risks and benefits discussed, surgical/procedural consents, pre-op evaluation, timeout performed, anesthesia consent given, oxygen available and monitors applied/VS acknowledged

## 2025-03-03 NOTE — DISCHARGE INSTRUCTIONS
Incorporated disclaims any warranty or liability for your use of this information.    The discharge information has been reviewed with the patient and DeShaune (Domestic Partner) .    Any questions and concerns from the patient and DeShaune (Domestic Partner) have been addressed.  The patient and DeShaune (Domestic Partner)  verbalized understanding.        CONTENTS FOUND IN YOUR DISCHARGE ENVELOPE:  [x]     Surgeon and Hospital Discharge Instructions  []     [facility] Care Provider Card  []     Medication & Side Effect Guide            (your newly prescribed medications have been marked/highlighted showing the most common side effects from   the classes of drugs on your prescriptions)  []     Medication Prescription(s) x n/a ( [] These have been sent electronically to your pharmacy by your surgeon,   - OR -       your surgeon has already provided these to you during a previous/pre-op office visit)  []     [facility] Education Information  []     Physical Therapy Prescription  []     Follow-up Appointment Cards  []     Surgery-related Pictures/Media  []     Pain block and/or block with On-Q Catheter from Anesthesia Service (information included in your instructions above)  []     Medical device information sheets/pamphlets from their    []     School/work excuse note.  []     /parent work excuse note.

## 2025-03-03 NOTE — H&P
MATERNAL FETAL MEDICINE      Jesi Awan is a 39 y.o. female  at 15w1d.    ALISON Dennison is a 39-year-old  that is here for prophylactic cerclage placement.  Her past history is significant for multiple losses between 16 and 18 weeks gestational age and she has had 9 cerclages in the past and desires a repeat cerclage this pregnancy.    Patient Active Problem List   Diagnosis    Anorexia nervosa, restricting type    Post-traumatic stress disorder, acute    GERD (gastroesophageal reflux disease)    Microcytic anemia    Anxiety    Asthma    Calculus of gallbladder without cholecystitis    History of malnutrition    Chronic bilateral low back pain with right-sided sciatica    MDD (major depressive disorder), recurrent episode, moderate (HCC)    Fibromyalgia    Cervical insufficiency during pregnancy in second trimester, antepartum       Past Medical History:   Diagnosis Date    Acute pyelonephritis 2021    Anorexia     Anxiety     Asthma     on albuterol prn. Triggers cold and allergies    Avoidant-restrictive food intake disorder (ARFID)     Back pain, chronic     sciatica    Chronic bilateral low back pain with right-sided sciatica 2020    Fibromyalgia 2022    GERD (gastroesophageal reflux disease) 2020    UGI 10-, GI Dr. Tuttle    Gestational hypertension     Past pregnancy    Hyperemesis     severe hyperemesis    Hypertension     with G12 - none this pregnancy    IBS (irritable bowel syndrome) 2022    Iron deficiency anemia 10/08/2010    MDD (major depressive disorder), single episode with postpartum onset 2013    treated with meds - 13    Orthostatic hypotension 2020    Plantar fasciitis     Pregnancy     36 weeks    PTSD (post-traumatic stress disorder)        Past Surgical History:   Procedure Laterality Date     DELIVERY ONLY       SECTION  2015    DILATION AND CURETTAGE OF UTERUS  2020    DILATION AND CURETTAGE OF

## 2025-03-03 NOTE — PROGRESS NOTES
1350- This RN accessing pt chart per MD Order for fetal heart tones. This RN did fetal heart tones on pt. Fetal heart rate 144 BPM

## 2025-03-03 NOTE — ANESTHESIA PRE PROCEDURE
Department of Anesthesiology  Preprocedure Note       Name:  Jesi Awan   Age:  39 y.o.  :  1985                                          MRN:  [id number]         Date:  3/3/2025      Surgeon: Surgeon(s):  Yulia Fofana MD    Procedure: Procedure(s):  CERCLAGE PLACEMENT    Medications prior to admission:   Prior to Admission medications    Medication Sig Start Date End Date Taking? Authorizing Provider   Prenatal Vit-Fe Fumarate-FA (PRENATAL VITAMINS) 28-0.8 MG TABS Take 1 tablet by mouth daily 24  Yes Tito Younger MD   albuterol sulfate HFA (PROVENTIL;VENTOLIN;PROAIR) 108 (90 Base) MCG/ACT inhaler INHALE 2 PUFFS BY MOUTH EVERY 6 HOURS AS NEEDED FOR WHEEZE 12/3/24  Yes Tito Younger MD   Nutritional Supplements (BOOST) LIQD Take 1 Bottle by mouth in the morning, at noon, and at bedtime 23  Yes Tito Younger MD   albuterol (PROVENTIL) (2.5 MG/3ML) 0.083% nebulizer solution Take 3 mLs by nebulization 4 times daily as needed for Wheezing 23  Yes Tito Younger MD   fluticasone (FLONASE) 50 MCG/ACT nasal spray 2 sprays by Each Nostril route daily 23  Yes Tito Younger MD   Nebulizers (COMPRESSOR/NEBULIZER) MISC One compressor nebulizer machine with accessory kit 23  Yes Tito Younger MD       Current medications:    Current Facility-Administered Medications   Medication Dose Route Frequency Provider Last Rate Last Admin   • lactated ringers infusion   IntraVENous Continuous Yulia Fofana MD       • lactated ringers bolus 1,000 mL  1,000 mL IntraVENous Once Yulia Fofana MD       • sodium chloride flush 0.9 % injection 5-40 mL  5-40 mL IntraVENous 2 times per day Yulia Fofana MD       • sodium chloride flush 0.9 % injection 10 mL  10 mL IntraVENous PRN Yulia Fofana MD       • 0.9 % sodium chloride infusion   IntraVENous PRN Yulia Fofana MD       • lidocaine PF 1 % injection 1 mL  1 mL IntraDERmal Once PRN Kingsley Salazar

## 2025-03-03 NOTE — ANESTHESIA PROCEDURE NOTES
Spinal Block    Patient location during procedure: OR  End time: 3/3/2025 1:09 PM  Reason for block: primary anesthetic and at surgeon's request  Staffing  Performed: resident/CRNA   Resident/CRNA: Dimas Dominguez APRN - CRNA  Performed by: Dimas Dominguez APRN - CRNA  Authorized by: Bessy Martinez MD    Spinal Block  Patient position: sitting  Prep: DuraPrep  Patient monitoring: cardiac monitor, continuous pulse ox, continuous capnometry, frequent blood pressure checks and oxygen  Approach: midline  Location: L2/L3  Provider prep: mask and sterile gloves  Needle  Needle type: Pencan   Needle gauge: 25 G  Needle length: 5 in  Assessment  Swirl obtained: Yes  CSF: clear  Attempts: 1  Hemodynamics: stable  Additional Notes  Repeated due to inadequate dermatome level  Preanesthetic Checklist  Completed: patient identified, IV checked, site marked, risks and benefits discussed, surgical/procedural consents, pre-op evaluation, timeout performed, anesthesia consent given, oxygen available and monitors applied/VS acknowledged

## 2025-03-06 ENCOUNTER — PATIENT MESSAGE (OUTPATIENT)
Age: 40
End: 2025-03-06

## 2025-03-06 DIAGNOSIS — J45.909 MILD ASTHMA WITHOUT COMPLICATION, UNSPECIFIED WHETHER PERSISTENT: ICD-10-CM

## 2025-03-06 RX ORDER — ALBUTEROL SULFATE 0.83 MG/ML
2.5 SOLUTION RESPIRATORY (INHALATION) 4 TIMES DAILY PRN
Qty: 120 EACH | Refills: 2 | Status: SHIPPED | OUTPATIENT
Start: 2025-03-06

## 2025-03-06 NOTE — TELEPHONE ENCOUNTER
PCP: Tito Younger MD    Last appt: 12/18/2024       Future Appointments   Date Time Provider Department Center   3/11/2025 10:15 AM ULTRASOUND 2 AMB [facility]B       Requested Prescriptions     Pending Prescriptions Disp Refills    albuterol (PROVENTIL) (2.5 MG/3ML) 0.083% nebulizer solution 120 each 2     Sig: Take 3 mLs by nebulization 4 times daily as needed for Wheezing       Prior labs and Blood pressures:  BP Readings from Last 3 Encounters:   03/03/25 111/77   02/11/25 112/74   01/03/25 101/61     Lab Results   Component Value Date/Time     12/04/2024 12:10 PM    K 3.7 12/04/2024 12:10 PM     12/04/2024 12:10 PM    CO2 27 12/04/2024 12:10 PM    BUN 7 12/04/2024 12:10 PM    GFRAA 103 08/12/2021 12:00 AM     No results found for: \"HBA1C\", \"KXP2MITY\"  Lab Results   Component Value Date/Time    CHOL 215 04/26/2022 12:00 AM    HDL 81 04/26/2022 12:00 AM     04/26/2022 12:00 AM    VLDL 12 04/26/2022 12:00 AM     No results found for: \"VITD3\"    Lab Results   Component Value Date/Time    TSH 1.14 02/27/2024 10:46 AM

## 2025-03-06 NOTE — TELEPHONE ENCOUNTER
Nabor Washington called from [facility] to know if the provider did a peer to peer review for the patient to get fomular.    Fax # [phone]  .   Best call back # [phone]

## 2025-03-10 DIAGNOSIS — Z34.90 PREGNANCY, UNSPECIFIED GESTATIONAL AGE: Primary | ICD-10-CM

## 2025-03-10 NOTE — TELEPHONE ENCOUNTER
Ruth smith. Patient name and  verified. They stated that her insurance denied ensure because lack of medical necessity.     Contact number ; [phone]  Reference; [id number]

## 2025-03-11 ENCOUNTER — ROUTINE PRENATAL (OUTPATIENT)
Age: 40
End: 2025-03-11
Payer: MEDICAID

## 2025-03-11 VITALS — DIASTOLIC BLOOD PRESSURE: 75 MMHG | HEART RATE: 70 BPM | SYSTOLIC BLOOD PRESSURE: 116 MMHG

## 2025-03-11 DIAGNOSIS — O26.90 PREGNANCY COMPLICATION, ANTEPARTUM: ICD-10-CM

## 2025-03-11 DIAGNOSIS — O09.522 MULTIGRAVIDA OF ADVANCED MATERNAL AGE IN SECOND TRIMESTER: Primary | ICD-10-CM

## 2025-03-11 DIAGNOSIS — O34.32 CERVICAL INSUFFICIENCY DURING PREGNANCY IN SECOND TRIMESTER, ANTEPARTUM: ICD-10-CM

## 2025-03-11 DIAGNOSIS — Z34.90 PREGNANCY, UNSPECIFIED GESTATIONAL AGE: ICD-10-CM

## 2025-03-11 DIAGNOSIS — O99.210 OBESITY IN PREGNANCY, ANTEPARTUM: ICD-10-CM

## 2025-03-11 PROCEDURE — 76815 OB US LIMITED FETUS(S): CPT | Performed by: STUDENT IN AN ORGANIZED HEALTH CARE EDUCATION/TRAINING PROGRAM

## 2025-03-11 PROCEDURE — 76817 TRANSVAGINAL US OBSTETRIC: CPT | Performed by: STUDENT IN AN ORGANIZED HEALTH CARE EDUCATION/TRAINING PROGRAM

## 2025-03-11 PROCEDURE — 99214 OFFICE O/P EST MOD 30 MIN: CPT | Performed by: STUDENT IN AN ORGANIZED HEALTH CARE EDUCATION/TRAINING PROGRAM

## 2025-03-11 NOTE — PROGRESS NOTES
Patient was seen 3/11/2025      Please look under media to view full consult and ultrasound report in ViewPoint.         Yulia Fofana MD   Maternal Fetal Medicine

## 2025-03-11 NOTE — PROCEDURES
PATIENT: ALTAGRACIA REED   -  : 1985   -  DOS:2025   -  INTERPRETING PROVIDER:Yulia Fofana,   Indication  ========    AMA, cervical incompetence    Method  ======    Transabdominal and transvaginal ultrasound examination. View: Good view    Pregnancy  =========    Benito pregnancy. Number of fetuses: 1    Dating  ======    LMP on: 2024  GA by LMP 16 w + 2 d  REYNA by LMP: 2025  Previous Ultrasound on: 2025  Type of prior assessment: GA  GA at prior assessment date 7 w + 6 d  GA by previous U/S 16 w + 0 d  REYNA by previous Ultrasound: 2025  Ultrasound examination on: 3/11/2025  GA by U/S based upon: AC, BPD, Femur, HC  GA by U/S 16 w + 3 d  REYNA by U/S: 2025  Assigned: based on the LMP, selected on 2025  Assigned GA 16 w + 2 d  Assigned REYNA: 2025    Fetal Biometry  ============    Standard  BPD 33.8 mm 16w 3d 56% Hadlock  OFD 43.2 mm 16w 6d 69% Shay  .4 mm 16w 2d 35% Hadlock  .5 mm 16w 6d 69% Hadlock  Femur 21.2 mm 16w 2d 47% Hadlock   g 16w 3d 58% Hadlock  EFW (lb) 0 lb  EFW (oz) 6 oz  EFW by: Hadlock (BPD-HC-AC-FL)  Head / Face / Neck  Nasal bone: present  Other Structures   bpm    General Evaluation  ==============    Cardiac activity present.  bpm. Fetal movements: visualized. Presentation: Transverse, head to maternal left  Placenta: Placental site: posterior, LOW LYING. Placental edge-to-cervical os distance 0.4 cm  Amniotic fluid: Amount of AF: normal. MVP 5.3 cm    Fetal Anatomy  ===========    Face  Nasal bone: present  Stomach: normal  Kidneys: normal  Bladder: normal    Maternal Structures  ===============    Uterus / Cervix  Approach: Transvaginal  Cervical length 3.52 cm  Funneling: Funneling absent  Cerclage: Cervical cerclage present  Other: assess the inferior placental edge    Findings  =======    Intrauterine Benito pregnancy at 16w 2d by clinical dates.  EFW is 160 g at 58%, abdominal circumference at

## 2025-04-15 ENCOUNTER — ROUTINE PRENATAL (OUTPATIENT)
Age: 40
End: 2025-04-15
Payer: MEDICAID

## 2025-04-15 VITALS
HEIGHT: 64 IN | RESPIRATION RATE: 16 BRPM | SYSTOLIC BLOOD PRESSURE: 115 MMHG | BODY MASS INDEX: 37.61 KG/M2 | HEART RATE: 99 BPM | DIASTOLIC BLOOD PRESSURE: 75 MMHG | OXYGEN SATURATION: 97 %

## 2025-04-15 DIAGNOSIS — Z34.90 PREGNANCY, UNSPECIFIED GESTATIONAL AGE: ICD-10-CM

## 2025-04-15 PROCEDURE — 99213 OFFICE O/P EST LOW 20 MIN: CPT | Performed by: OBSTETRICS & GYNECOLOGY

## 2025-04-15 PROCEDURE — 76817 TRANSVAGINAL US OBSTETRIC: CPT | Performed by: OBSTETRICS & GYNECOLOGY

## 2025-04-15 PROCEDURE — 76811 OB US DETAILED SNGL FETUS: CPT | Performed by: OBSTETRICS & GYNECOLOGY

## 2025-04-15 RX ORDER — ONDANSETRON 4 MG/1
TABLET, ORALLY DISINTEGRATING ORAL
COMMUNITY
Start: 2025-01-25

## 2025-04-15 NOTE — PROCEDURES
PATIENT: ALTAGRACIA REED   -  : 1985   -  DOS:04/15/2025   -  INTERPRETING PROVIDER:Tacho Saini,   Indication  ========    AMA, cervical incompetence, anatomy, Obesity in pregnancy    Method  ======    Transabdominal and transvaginal ultrasound examination. View: suboptimal due to maternal acoustic properties. suboptimal due to unfavorable fetal position    Dating  ======    LMP on: 2024  GA by LMP 21 w + 2 d  REYNA by LMP: 2025  Previous Ultrasound on: 2025  Type of prior assessment: GA  GA at prior assessment date 7 w + 6 d  GA by previous U/S 21 w + 0 d  REYNA by previous Ultrasound: 2025  Ultrasound examination on: 4/15/2025  GA by U/S based upon: AC, BPD, Femur, HC  GA by U/S 21 w + 3 d  REYNA by U/S: 2025  Assigned: based on the LMP, selected on 2025  Assigned GA 21 w + 2 d  Assigned REYNA: 2025    Fetal Growth Overview  =================    Exam date        GA              BPD (mm)          HC (mm)              AC (mm)               FL (mm)             HL (mm)            EFW (g)  2025        16w 2d         33.8    56%        124.4    35%         109.5    69%        21.2    47%                                  160    58%  04/15/2025        21w 2d        49.8     40%        186.8    28%        169.9     66%        36.6    53%        32.6     34%        445    66%    Fetal Biometry  ============    Standard  BPD 49.8 mm 21w 1d 40% Hadlock  OFD 64.6 mm 21w 6d 72% Shay  .8 mm 21w 0d 28% Hadlock  Cerebellum tr 20.2 mm 19w 3d 8% Hill  Nuchal fold 5.1 mm  .9 mm 22w 0d 66% Hadlock  Femur 36.6 mm 21w 4d 53% Hadlock  Humerus 32.6 mm 21w 0d 34% Shay   g 21w 4d 66% Hadlock  EFW (lb) 1 lb  EFW (oz) 0 oz  EFW by: Hadlock (BPD-HC-AC-FL)  Extended   5.0 mm  CM 3.5 mm  6% Nicolaides  Head / Face / Neck  Nasal bone: present  Other Structures   bpm    General Evaluation  ==============    Cardiac activity present.  bpm. Fetal movements:

## 2025-04-15 NOTE — PROGRESS NOTES
Patient was seen 4/15/2025      Please look under media to view full consult and ultrasound report in ViewPoint.         Tacho Saini MD  Maternal Fetal Medicine

## 2025-04-15 NOTE — PROGRESS NOTES
Chief Complaint   Patient presents with    Pregnancy Ultrasound           Vitals:    04/15/25 1143   BP: 115/75   Pulse: 99   Resp: 16   SpO2: 97%            1. Have you been to the ER, urgent care clinic since your last visit?  Hospitalized since your last visit?  No    2. Have you seen or consulted any other health care providers outside of the [facility] since your last visit?  Include any pap smears or colon screening. No

## 2025-05-14 ENCOUNTER — TELEPHONE (OUTPATIENT)
Age: 40
End: 2025-05-14

## 2025-05-14 NOTE — TELEPHONE ENCOUNTER
Lvm for patient to call office at [phone] about scheduled appointment tomorrow. Scheduled for 10:45 at [facility]. Wanted to offer [facility] at 3:15.

## 2025-05-15 ENCOUNTER — ROUTINE PRENATAL (OUTPATIENT)
Age: 40
End: 2025-05-15
Payer: MEDICAID

## 2025-05-15 VITALS — SYSTOLIC BLOOD PRESSURE: 105 MMHG | DIASTOLIC BLOOD PRESSURE: 67 MMHG | HEART RATE: 80 BPM

## 2025-05-15 DIAGNOSIS — Z34.90 PREGNANCY, UNSPECIFIED GESTATIONAL AGE: ICD-10-CM

## 2025-05-15 PROCEDURE — 76817 TRANSVAGINAL US OBSTETRIC: CPT | Performed by: STUDENT IN AN ORGANIZED HEALTH CARE EDUCATION/TRAINING PROGRAM

## 2025-05-15 PROCEDURE — 99214 OFFICE O/P EST MOD 30 MIN: CPT | Performed by: STUDENT IN AN ORGANIZED HEALTH CARE EDUCATION/TRAINING PROGRAM

## 2025-05-15 PROCEDURE — 76816 OB US FOLLOW-UP PER FETUS: CPT | Performed by: STUDENT IN AN ORGANIZED HEALTH CARE EDUCATION/TRAINING PROGRAM

## 2025-05-15 NOTE — PROCEDURES
PATIENT: ALTAGRACIA REED   -  : 1985   -  DOS:05/15/2025   -  INTERPRETING PROVIDER:Amara Walker,   Indication  ========    AMA, cervical incompetence, Obesity in pregnancy    Method  ======    Transabdominal and transvaginal ultrasound examination. View: Sufficient    Pregnancy  =========    Benito pregnancy. Number of fetuses: 1    Dating  ======    LMP on: 2024  GA by LMP 25 w + 4 d  REYNA by LMP: 2025  Previous Ultrasound on: 2025  Type of prior assessment: GA  GA at prior assessment date 7 w + 6 d  GA by previous U/S 25 w + 2 d  REYNA by previous Ultrasound: 2025  Ultrasound examination on: 5/15/2025  GA by U/S based upon: AC, BPD, Femur, HC  GA by U/S 25 w + 4 d  REYAN by U/S: 2025  Assigned: based on the LMP, selected on 2025  Assigned GA 25 w + 4 d  Assigned REYNA: 2025    Fetal Biometry  ============    Standard  BPD 61.8 mm 25w 1d 25% Hadlock  OFD 81.6 mm 26w 4d 78% Shay  .7 mm 25w 0d 12% Hadlock  .2 mm 25w 2d 32% Hadlock  Femur 50.4 mm 27w 0d 80% Hadlock   g 25w 4d 52% Hadlock  EFW (lb) 1 lb  EFW (oz) 15 oz  EFW by: Hadlock (BPD-HC-AC-FL)  Other Structures   bpm    General Evaluation  ==============    Cardiac activity present.  bpm. Fetal movements: visualized. Presentation: Cephalic  Placenta: Placental site: posterior. Placental edge-to-cervical os distance 5.9 cm  Umbilical cord: Cord vessels: 3 vessel cord. Insertion site: central  Amniotic fluid: Amount of AF: normal. MVP 4.6 cm. MITESH 15.7 cm. Q1 4.5 cm, Q2 4.6 cm, Q3 3.2 cm, Q4 3.4 cm    Fetal Anatomy  ===========    4-chamber view: SUBOPTIMAL  RVOT view: SUBOPTIMAL  LVOT view: SUBOPTIMAL  3-vessel view: normal  3-vessel-trachea view: normal  Heart / Thorax  Ductal arch view: SUBOPTIMAL  Stomach: normal  Kidneys: normal  Bladder: normal  Wants to know fetal sex: yes    Maternal Structures  ===============    Uterus / Cervix  Cervical length 3.25 cm  Cerclage: Cervical

## 2025-05-22 ENCOUNTER — OFFICE VISIT (OUTPATIENT)
Age: 40
End: 2025-05-22
Payer: MEDICAID

## 2025-05-22 VITALS
HEIGHT: 64 IN | BODY MASS INDEX: 37.9 KG/M2 | DIASTOLIC BLOOD PRESSURE: 64 MMHG | WEIGHT: 222 LBS | HEART RATE: 88 BPM | TEMPERATURE: 97.3 F | OXYGEN SATURATION: 98 % | SYSTOLIC BLOOD PRESSURE: 93 MMHG

## 2025-05-22 DIAGNOSIS — J30.2 SEASONAL ALLERGIES: ICD-10-CM

## 2025-05-22 DIAGNOSIS — M25.552 LEFT HIP PAIN: Primary | ICD-10-CM

## 2025-05-22 DIAGNOSIS — Z3A.26 PREGNANCY WITH 26 COMPLETED WEEKS GESTATION: ICD-10-CM

## 2025-05-22 DIAGNOSIS — J45.909 MILD ASTHMA WITHOUT COMPLICATION, UNSPECIFIED WHETHER PERSISTENT: ICD-10-CM

## 2025-05-22 PROCEDURE — 99214 OFFICE O/P EST MOD 30 MIN: CPT | Performed by: FAMILY MEDICINE

## 2025-05-22 RX ORDER — ALBUTEROL SULFATE 0.83 MG/ML
2.5 SOLUTION RESPIRATORY (INHALATION) 4 TIMES DAILY PRN
Qty: 120 EACH | Refills: 2 | Status: SHIPPED | OUTPATIENT
Start: 2025-05-22

## 2025-05-22 RX ORDER — FLUTICASONE PROPIONATE 50 MCG
2 SPRAY, SUSPENSION (ML) NASAL DAILY
Qty: 16 G | Refills: 2 | Status: SHIPPED | OUTPATIENT
Start: 2025-05-22

## 2025-05-22 RX ORDER — LIDOCAINE 50 MG/G
1 PATCH TOPICAL DAILY
Qty: 30 PATCH | Refills: 2 | Status: SHIPPED | OUTPATIENT
Start: 2025-05-22 | End: 2025-06-21

## 2025-05-22 RX ORDER — ALBUTEROL SULFATE 90 UG/1
INHALANT RESPIRATORY (INHALATION)
Qty: 18 EACH | Refills: 2 | Status: SHIPPED | OUTPATIENT
Start: 2025-05-22

## 2025-05-22 SDOH — ECONOMIC STABILITY: FOOD INSECURITY: WITHIN THE PAST 12 MONTHS, YOU WORRIED THAT YOUR FOOD WOULD RUN OUT BEFORE YOU GOT MONEY TO BUY MORE.: NEVER TRUE

## 2025-05-22 SDOH — ECONOMIC STABILITY: FOOD INSECURITY: WITHIN THE PAST 12 MONTHS, THE FOOD YOU BOUGHT JUST DIDN'T LAST AND YOU DIDN'T HAVE MONEY TO GET MORE.: NEVER TRUE

## 2025-05-22 ASSESSMENT — PATIENT HEALTH QUESTIONNAIRE - PHQ9: DEPRESSION UNABLE TO ASSESS: URGENT/EMERGENT SITUATION

## 2025-05-22 NOTE — PROGRESS NOTES
Chief Complaint   Patient presents with    Hip Pain     C/o Left hip pain that radiates down to left knee x a few months. Not relieved by otc pain reliever, worse when she lays down.    Medication Refill     Requests refill of inhaler and neb solution         1. \"Have you been to the ER or a urgent care clinic since your last visit?  Hospitalized since your last visit?\"   no     2. \"Have you seen or consulted any other health care providers outside of the [facility] since your last visit?\"   no            Click Here for Release of Records Request       Health Maintenance Due   Topic Date Due    Varicella vaccine (1 of 2 - 13+ 2-dose series) Never done    Hepatitis B vaccine (1 of 3 - 19+ 3-dose series) Never done    Pneumococcal 0-49 years Vaccine (1 of 2 - PCV) 08/28/2002    Breast cancer screen  Never done    COVID-19 Vaccine (3 - 2024-25 season) 09/01/2024    Depression Monitoring  02/27/2025    Tdap Vaccine during Pregnancy  05/25/2025 5/22/2025    10:35 AM   PHQ-9    Depression Unable to Assess Urgent/emergent situation           Financial Resource Strain: Low Risk  (12/18/2024)    Overall Financial Resource Strain (CARDIA)     Difficulty of Paying Living Expenses: Not very hard      Food Insecurity: No Food Insecurity (5/22/2025)    Hunger Vital Sign     Worried About Running Out of Food in the Last Year: Never true     Ran Out of Food in the Last Year: Never true

## 2025-05-22 NOTE — ASSESSMENT & PLAN NOTE
Chronic, at goal (stable), continue current treatment plan    Orders:    albuterol sulfate HFA (PROVENTIL;VENTOLIN;PROAIR) 108 (90 Base) MCG/ACT inhaler; INHALE 2 PUFFS BY MOUTH EVERY 6 HOURS AS NEEDED FOR WHEEZE    albuterol (PROVENTIL) (2.5 MG/3ML) 0.083% nebulizer solution; Take 3 mLs by nebulization 4 times daily as needed for Wheezing

## 2025-05-22 NOTE — PROGRESS NOTES
Patient Name: Jesi Awan   MRN: [id number]    ASSESSMENT AND PLAN  Jesi Awan is a 39 y.o. female who presents today for:    Assessment & Plan  Left hip pain   New, not at goal (unstable),  recommend exercises, encouraged Tylenol as needed, heat, can try lidocaine patches.  Recommend physical therapy of which she is not wanting to start now.  Can refer to orthopedics for additional treatment/evaluation.    Orders:    [facility] - Juan Hebert MD, Orthopedic Surgery (hip), Gustavo (Deb Hernandez)    lidocaine (LIDODERM) 5 %; Place 1 patch onto the skin daily 12 hours on, 12 hours off.    Mild asthma without complication, unspecified whether persistent   Chronic, at goal (stable), continue current treatment plan    Orders:    albuterol sulfate HFA (PROVENTIL;VENTOLIN;PROAIR) 108 (90 Base) MCG/ACT inhaler; INHALE 2 PUFFS BY MOUTH EVERY 6 HOURS AS NEEDED FOR WHEEZE    albuterol (PROVENTIL) (2.5 MG/3ML) 0.083% nebulizer solution; Take 3 mLs by nebulization 4 times daily as needed for Wheezing    Seasonal allergies   Chronic, not at goal (unstable), recommend daily Flonase.    Orders:    fluticasone (FLONASE) 50 MCG/ACT nasal spray; 2 sprays by Each Nostril route daily    Pregnancy with 26 completed weeks gestation   Monitored by specialist- no acute findings meriting change in the plan           Orders Placed This Encounter   Medications    albuterol sulfate HFA (PROVENTIL;VENTOLIN;PROAIR) 108 (90 Base) MCG/ACT inhaler     Sig: INHALE 2 PUFFS BY MOUTH EVERY 6 HOURS AS NEEDED FOR WHEEZE     Dispense:  18 each     Refill:  2    albuterol (PROVENTIL) (2.5 MG/3ML) 0.083% nebulizer solution     Sig: Take 3 mLs by nebulization 4 times daily as needed for Wheezing     Dispense:  120 each     Refill:  2    lidocaine (LIDODERM) 5 %     Sig: Place 1 patch onto the skin daily 12 hours on, 12 hours off.     Dispense:  30 patch     Refill:  2    fluticasone (FLONASE) 50 MCG/ACT nasal spray     Si sprays by Each Nostril

## 2025-05-29 ENCOUNTER — HOSPITAL ENCOUNTER (EMERGENCY)
Facility: HOSPITAL | Age: 40
Discharge: HOME | End: 2025-05-29
Attending: EMERGENCY MEDICINE
Payer: MEDICAID

## 2025-05-29 VITALS
RESPIRATION RATE: 16 BRPM | WEIGHT: 222.66 LBS | TEMPERATURE: 98.1 F | SYSTOLIC BLOOD PRESSURE: 115 MMHG | OXYGEN SATURATION: 100 % | DIASTOLIC BLOOD PRESSURE: 72 MMHG | BODY MASS INDEX: 38.22 KG/M2 | HEART RATE: 79 BPM

## 2025-05-29 DIAGNOSIS — M54.40 ACUTE LEFT-SIDED LOW BACK PAIN WITH SCIATICA, SCIATICA LATERALITY UNSPECIFIED: ICD-10-CM

## 2025-05-29 DIAGNOSIS — M54.32 SCIATICA OF LEFT SIDE: Primary | ICD-10-CM

## 2025-05-29 PROCEDURE — 6370000000 HC RX 637 (ALT 250 FOR IP): Performed by: NURSE PRACTITIONER

## 2025-05-29 PROCEDURE — 99283 EMERGENCY DEPT VISIT LOW MDM: CPT

## 2025-05-29 RX ORDER — HYDROCODONE BITARTRATE AND ACETAMINOPHEN 5; 325 MG/1; MG/1
1 TABLET ORAL
Refills: 0 | Status: COMPLETED | OUTPATIENT
Start: 2025-05-29 | End: 2025-05-29

## 2025-05-29 RX ORDER — METHYLPREDNISOLONE 4 MG/1
TABLET ORAL
Qty: 1 KIT | Refills: 0 | Status: SHIPPED | OUTPATIENT
Start: 2025-05-29

## 2025-05-29 RX ORDER — HYDROCODONE BITARTRATE AND ACETAMINOPHEN 5; 325 MG/1; MG/1
1 TABLET ORAL EVERY 6 HOURS PRN
Qty: 10 TABLET | Refills: 0 | Status: SHIPPED | OUTPATIENT
Start: 2025-05-29 | End: 2025-06-01

## 2025-05-29 RX ADMIN — HYDROCODONE BITARTRATE AND ACETAMINOPHEN 1 TABLET: 5; 325 TABLET ORAL at 08:59

## 2025-05-29 ASSESSMENT — ENCOUNTER SYMPTOMS
EYE PAIN: 0
ABDOMINAL PAIN: 0
SINUS PRESSURE: 0
COUGH: 0
NAUSEA: 0
SINUS PAIN: 0
COLOR CHANGE: 0
ABDOMINAL DISTENTION: 0
EYE REDNESS: 0
SHORTNESS OF BREATH: 0
VOMITING: 0

## 2025-05-29 ASSESSMENT — PAIN SCALES - GENERAL
PAINLEVEL_OUTOF10: 8
PAINLEVEL_OUTOF10: 8

## 2025-05-29 ASSESSMENT — PAIN - FUNCTIONAL ASSESSMENT
PAIN_FUNCTIONAL_ASSESSMENT: PREVENTS OR INTERFERES SOME ACTIVE ACTIVITIES AND ADLS
PAIN_FUNCTIONAL_ASSESSMENT: 0-10
PAIN_FUNCTIONAL_ASSESSMENT: PREVENTS OR INTERFERES SOME ACTIVE ACTIVITIES AND ADLS

## 2025-05-29 ASSESSMENT — PAIN DESCRIPTION - ORIENTATION: ORIENTATION: LEFT

## 2025-05-29 ASSESSMENT — PAIN DESCRIPTION - DESCRIPTORS: DESCRIPTORS: OTHER (COMMENT)

## 2025-05-29 ASSESSMENT — PAIN DESCRIPTION - LOCATION
LOCATION: HIP
LOCATION: HIP

## 2025-05-29 NOTE — ED PROVIDER NOTES
[facility] EMERGENCY DEPARTMENT  EMERGENCY DEPARTMENT ENCOUNTER      Pt Name: Jesi Awan  MRN: [id number]  Birthdate 1985  Date of evaluation: 2025  Provider: ZAHRA Astudillo NP      HISTORY OF PRESENT ILLNESS      Chief complaint: low back pain radiating into the left hip.    Past Medical History:  2021: Acute pyelonephritis  No date: Anorexia  No date: Anxiety  No date: Asthma      Comment:  on albuterol prn. Triggers cold and allergies  No date: Avoidant-restrictive food intake disorder (ARFID)  2010: Back pain, chronic      Comment:  sciatica  2020: Chronic bilateral low back pain with right-sided sciatica  2022: Fibromyalgia  2020: GERD (gastroesophageal reflux disease)      Comment:  UGI , GI Dr. Tuttle  No date: Gestational hypertension      Comment:  Past pregnancy  No date: Hyperemesis      Comment:  severe hyperemesis  No date: Hypertension      Comment:  with G12 - none this pregnancy  2022: IBS (irritable bowel syndrome)  10/08/2010: Iron deficiency anemia  2013: MDD (major depressive disorder), single episode with   postpartum onset      Comment:  treated with meds - 13: Orthostatic hypotension  No date: Plantar fasciitis  No date: Pregnancy      Comment:  36 weeks  No date: PTSD (post-traumatic stress disorder)  Past Surgical History:  3/3/2025: CERVICAL CERCLAGE; N/A      Comment:  CERCLAGE PLACEMENT performed by Yulia Fofana MD at               [facility] MAIN OR  No date:  DELIVERY ONLY  2015:  SECTION  2020: DILATION AND CURETTAGE OF UTERUS  No date: DILATION AND CURETTAGE OF UTERUS  No date: GYN      Comment:  removal of left fallopian tube  No date: GYN      Comment:  miscarriage x 6  No date: OTHER SURGICAL HISTORY      Comment:  cerclage w/ current pregnancy. Removed 18  No date: OTHER SURGICAL HISTORY      Comment:  cerlcage x9, ectopic x1  with removal of left                     Thought Content: Thought content normal.         Judgment: Judgment normal.           EMERGENCY DEPARTMENT COURSE and DIFFERENTIAL DIAGNOSIS/MDM:   Vitals:  There were no vitals filed for this visit.      Medical Decision Making  DD: Sciatica, round ligament pain, musculoskeletal strain and others.    HPI: 39-year-old female, currently 27 weeks pregnant, presents with left hip and leg pain ongoing for a couple of months. She is ambulatory with a slight limp. Denies history of injury, urinary symptoms, constipation, abdominal pain, vaginal discharge or bleeding, or fever. No chest pain, no shortness of breath, no dizziness, nausea or vomiting.     After physical assessment no indication for imaging or laboratory data at this time.  I did give her 1 Norco with relief of her symptoms.  Discharged to home and follow-up with PCP.  Follow-up with OB/GYN.  Return to the emergency room with worsening symptoms.  Patient is in agreement with plan of care and is stable for discharge.    Any available vitals, labs, images, nursing notes, medications, allergies, PMH, PSH and/or previous records in the chart were reviewed. All of these were considered in the medical decision making process. I individually reviewed any labs and any images obtained. This was discussed with my attending and he/she is in agreement with plan of care.       Problems Addressed:  Acute left-sided low back pain with sciatica, sciatica laterality unspecified: acute illness or injury  Sciatica of left side: acute illness or injury    Risk  Prescription drug management.        REASSESSMENT        CONSULTS:  None    PROCEDURES:     Procedures    Labs Reviewed - No data to display    No orders to display     0905:  Results and findings have been communicated and explained thoroughly to patient and family members that are present.  Patient has been educated on strict return precautions as well as instructions for conservative care and follow-up. Patient

## 2025-05-29 NOTE — ED NOTES
Patient discharged by NP. Results and discharge instructions reviewed with the patient by NP.  Patient verbalizes understanding.  Patient discharged home, stable, ambulatory, in no acute distress.

## 2025-05-29 NOTE — ED TRIAGE NOTES
Pt c/o left hip and leg pain, pt is 27 weeks pregnant, pt ambulatory with slight limp, denies urinary symptoms, denies constipation, symptoms for a couple of months, denies injury , denies fever, denies abd pain , vaginal discharge or bleeding

## 2025-06-19 ENCOUNTER — ROUTINE PRENATAL (OUTPATIENT)
Age: 40
End: 2025-06-19
Payer: MEDICAID

## 2025-06-19 VITALS — DIASTOLIC BLOOD PRESSURE: 84 MMHG | SYSTOLIC BLOOD PRESSURE: 119 MMHG | HEART RATE: 84 BPM

## 2025-06-19 DIAGNOSIS — Z34.90 PREGNANCY, UNSPECIFIED GESTATIONAL AGE: ICD-10-CM

## 2025-06-19 PROCEDURE — 99214 OFFICE O/P EST MOD 30 MIN: CPT | Performed by: STUDENT IN AN ORGANIZED HEALTH CARE EDUCATION/TRAINING PROGRAM

## 2025-06-19 PROCEDURE — 76816 OB US FOLLOW-UP PER FETUS: CPT | Performed by: STUDENT IN AN ORGANIZED HEALTH CARE EDUCATION/TRAINING PROGRAM

## 2025-06-19 NOTE — PROGRESS NOTES
Patient was seen 6/19/2025      Please look under media to view full consult and ultrasound report in ViewPoint.         Yulia Fofana MD   Maternal Fetal Medicine

## 2025-06-19 NOTE — PROCEDURES
PATIENT: ALTAGRACIA REED   -  : 1985   -  DOS:2025   -  INTERPRETING PROVIDER:Yulia Fofana,   Indication  ========    AMA, cervical incompetence, Obesity in pregnancy    Method  ======    Transabdominal ultrasound examination. View: Good view    Pregnancy  =========    Benito pregnancy. Number of fetuses: 1    Dating  ======    LMP on: 2024  GA by LMP 30 w + 4 d  REYNA by LMP: 2025  Previous Ultrasound on: 2025  Type of prior assessment: GA  GA at prior assessment date 7 w + 6 d  GA by previous U/S 30 w + 2 d  REYNA by previous Ultrasound: 2025  Ultrasound examination on: 2025  GA by U/S based upon: AC, BPD, Femur, HC  GA by U/S 30 w + 0 d  REYNA by U/S: 2025  Assigned: based on the LMP, selected on 2025  Assigned GA 30 w + 4 d  Assigned REYNA: 2025    Fetal Biometry  ============    Standard  BPD 73.1 mm 29w 2d 9% Hadlock  OFD 98.2 mm 31w 5d 79% Shay  .3 mm 29w 5d 4% Hadlock  .9 mm 30w 4d 47% Hadlock  Femur 57.4 mm 30w 1d 23% Hadlock  EFW 1,538 g 29w 6d 28% Hadlock  EFW (lb) 3 lb  EFW (oz) 6 oz  EFW by: Hadlock (BPD-HC-AC-FL)  Other Structures   bpm    General Evaluation  ==============    Cardiac activity present.  bpm. Fetal movements: visualized. Presentation: Cephalic  Placenta: Placental site: posterior  Umbilical cord: Cord vessels: 3 vessel cord. Insertion site: central  Amniotic fluid: Amount of AF: normal. MVP 3.9 cm. MITESH 12.2 cm. Q1 3.3 cm, Q2 3.9 cm, Q3 3.0 cm, Q4 2.1 cm    Fetal Anatomy  ===========    4-chamber view: normal  RVOT view: normal  LVOT view: normal  Heart / Thorax  Ductal arch view: normal  Stomach: normal  Kidneys: normal  Bladder: normal  Wants to know fetal sex: yes    Maternal Structures  ===============    Uterus / Cervix  Cerclage: Cervical cerclage present    Findings  =======    Intrauterine Benito pregnancy at 30w 4d by clinical dates.  EFW is 1538 g at 28%, abdominal circumference at

## 2025-07-03 ENCOUNTER — ROUTINE PRENATAL (OUTPATIENT)
Age: 40
End: 2025-07-03
Payer: MEDICAID

## 2025-07-03 VITALS — DIASTOLIC BLOOD PRESSURE: 75 MMHG | HEART RATE: 87 BPM | SYSTOLIC BLOOD PRESSURE: 112 MMHG

## 2025-07-03 DIAGNOSIS — Z34.90 PREGNANCY, UNSPECIFIED GESTATIONAL AGE: ICD-10-CM

## 2025-07-03 PROCEDURE — 99213 OFFICE O/P EST LOW 20 MIN: CPT | Performed by: OBSTETRICS & GYNECOLOGY

## 2025-07-03 PROCEDURE — 76819 FETAL BIOPHYS PROFIL W/O NST: CPT | Performed by: OBSTETRICS & GYNECOLOGY

## 2025-07-03 NOTE — PROGRESS NOTES
Patient does not report signs or symptoms of pre-eclampsia or labor.  She reports counting fetal movements daily and has noted adequate fetal movement counts at home. Advised patient to continue monitoring fetal movements at home and to report any concerns for pre-eclampsia or labor to her OB. Patient verbalized understanding. All patient questions were addressed.  Patient does report some lightheadedness and dizziness at times, like when getting out of shower and bending over. Has not experienced this before. Provider notified.

## 2025-07-03 NOTE — PROCEDURES
PATIENT: ALTAGRACIA REED   -  : 1985   -  DOS:2025   -  INTERPRETING PROVIDER:Tacho Saini,   Indication  ========    AMA, cervical incompetence, Obesity in pregnancy    Maternal Assessment  =================    Height (ft) 5 ft  Height (in) 5 in    Method  ======    Transabdominal ultrasound examination. View: Sufficient    Pregnancy  =========    Benito pregnancy. Number of fetuses: 1    Dating  ======    LMP on: 2024  GA by LMP 32 w + 4 d  REYNA by LMP: 2025  Previous Ultrasound on: 2025  Type of prior assessment: GA  GA at prior assessment date 7 w + 6 d  GA by previous U/S 32 w + 2 d  REYNA by previous Ultrasound: 2025  Assigned: based on the LMP, selected on 2025  Assigned GA 32 w + 4 d  Assigned REYNA: 2025    General Evaluation  ==============    Cardiac activity present.  bpm. Fetal movements: visualized. Presentation: Cephalic  Placenta: Placental site: posterior  Umbilical cord: Cord vessels: 3 vessel cord    Fetal Anatomy  ===========    Stomach: normal  Kidneys: normal  Bladder: normal  Wants to know fetal sex: yes    Amniotic Fluid Assessment  =====================    Amount of AF: normal  MVP 3.8 cm. MITESH 10.9 cm. Q1 2.4 cm, Q2 3.8 cm, Q3 2.9 cm, Q4 1.9 cm    Biophysical Profile  ==============    2: Fetal breathing movements  2: Gross body movements  2: Fetal tone  2: Amniotic fluid volume  8/8 Biophysical profile score    Findings  =======    Intrauterine Benito pregnancy at 32w 4d by clinical dates.  Amniotic fluid: normal.  Placenta is posterior.  Cephalic presentation.  Biophysical profile score is 8/8.      The ultrasound findings as listed above and diagnostic limitations of ultrasound imaging, including inability to exclude all anomalies, have been reviewed with the patient. All  questions and concerns addressed.    Consultation  ==========    NAYLA is 39 yrs of age,  who presents at 32w 4d. EDC 2205. The patient related

## 2025-07-03 NOTE — PROGRESS NOTES
Patient was seen 7/3/2025      Please look under media to view full consult and ultrasound report in ViewPoint.         Tacho Saini MD  Maternal Fetal Medicine

## 2025-07-08 ENCOUNTER — HOSPITAL ENCOUNTER (EMERGENCY)
Facility: HOSPITAL | Age: 40
Discharge: HOME | End: 2025-07-08
Attending: STUDENT IN AN ORGANIZED HEALTH CARE EDUCATION/TRAINING PROGRAM
Payer: MEDICAID

## 2025-07-08 VITALS
SYSTOLIC BLOOD PRESSURE: 106 MMHG | TEMPERATURE: 97.7 F | HEIGHT: 63 IN | OXYGEN SATURATION: 98 % | BODY MASS INDEX: 39.73 KG/M2 | HEART RATE: 79 BPM | WEIGHT: 224.21 LBS | DIASTOLIC BLOOD PRESSURE: 61 MMHG | RESPIRATION RATE: 16 BRPM

## 2025-07-08 DIAGNOSIS — E87.6 HYPOKALEMIA: ICD-10-CM

## 2025-07-08 DIAGNOSIS — D64.9 ANEMIA, UNSPECIFIED TYPE: Primary | ICD-10-CM

## 2025-07-08 DIAGNOSIS — Z3A.33 33 WEEKS GESTATION OF PREGNANCY: ICD-10-CM

## 2025-07-08 LAB
ALBUMIN SERPL-MCNC: 2.4 G/DL (ref 3.5–5)
ALBUMIN/GLOB SERPL: 0.5 (ref 1.1–2.2)
ALP SERPL-CCNC: 124 U/L (ref 45–117)
ALT SERPL-CCNC: 20 U/L (ref 12–78)
ANION GAP SERPL CALC-SCNC: 9 MMOL/L (ref 2–12)
AST SERPL-CCNC: 25 U/L (ref 15–37)
BASOPHILS # BLD: 0.03 K/UL (ref 0–0.1)
BASOPHILS NFR BLD: 0.2 % (ref 0–1)
BILIRUB SERPL-MCNC: 0.4 MG/DL (ref 0.2–1)
BUN SERPL-MCNC: 3 MG/DL (ref 6–20)
BUN/CREAT SERPL: 5 (ref 12–20)
CALCIUM SERPL-MCNC: 8.7 MG/DL (ref 8.5–10.1)
CHLORIDE SERPL-SCNC: 105 MMOL/L (ref 97–108)
CO2 SERPL-SCNC: 20 MMOL/L (ref 21–32)
COMMENT:: NORMAL
CREAT SERPL-MCNC: 0.66 MG/DL (ref 0.55–1.02)
DIFFERENTIAL METHOD BLD: ABNORMAL
EOSINOPHIL # BLD: 0.11 K/UL (ref 0–0.4)
EOSINOPHIL NFR BLD: 0.9 % (ref 0–7)
ERYTHROCYTE [DISTWIDTH] IN BLOOD BY AUTOMATED COUNT: 17.9 % (ref 11.5–14.5)
GLOBULIN SER CALC-MCNC: 5 G/DL (ref 2–4)
GLUCOSE SERPL-MCNC: 100 MG/DL (ref 65–100)
HCT VFR BLD AUTO: 25.5 % (ref 35–47)
HGB BLD-MCNC: 7.9 G/DL (ref 11.5–16)
IMM GRANULOCYTES # BLD AUTO: 0.05 K/UL (ref 0–0.04)
IMM GRANULOCYTES NFR BLD AUTO: 0.4 % (ref 0–0.5)
LIPASE SERPL-CCNC: 19 U/L (ref 13–75)
LYMPHOCYTES # BLD: 1.66 K/UL (ref 0.8–3.5)
LYMPHOCYTES NFR BLD: 13.3 % (ref 12–49)
MAGNESIUM SERPL-MCNC: 1.7 MG/DL (ref 1.6–2.4)
MCH RBC QN AUTO: 19.8 PG (ref 26–34)
MCHC RBC AUTO-ENTMCNC: 31 G/DL (ref 30–36.5)
MCV RBC AUTO: 63.8 FL (ref 80–99)
MONOCYTES # BLD: 0.91 K/UL (ref 0–1)
MONOCYTES NFR BLD: 7.3 % (ref 5–13)
NEUTS SEG # BLD: 9.74 K/UL (ref 1.8–8)
NEUTS SEG NFR BLD: 77.9 % (ref 32–75)
NRBC # BLD: 0 K/UL (ref 0–0.01)
NRBC BLD-RTO: 0 PER 100 WBC
PLATELET # BLD AUTO: 283 K/UL (ref 150–400)
POTASSIUM SERPL-SCNC: 3.2 MMOL/L (ref 3.5–5.1)
PROT SERPL-MCNC: 7.4 G/DL (ref 6.4–8.2)
RBC # BLD AUTO: 4 M/UL (ref 3.8–5.2)
RBC MORPH BLD: ABNORMAL
RBC MORPH BLD: ABNORMAL
SODIUM SERPL-SCNC: 134 MMOL/L (ref 136–145)
SPECIMEN HOLD: NORMAL
WBC # BLD AUTO: 12.5 K/UL (ref 3.6–11)

## 2025-07-08 PROCEDURE — 83735 ASSAY OF MAGNESIUM: CPT

## 2025-07-08 PROCEDURE — 80053 COMPREHEN METABOLIC PANEL: CPT

## 2025-07-08 PROCEDURE — 99284 EMERGENCY DEPT VISIT MOD MDM: CPT

## 2025-07-08 PROCEDURE — 6360000002 HC RX W HCPCS

## 2025-07-08 PROCEDURE — 96374 THER/PROPH/DIAG INJ IV PUSH: CPT

## 2025-07-08 PROCEDURE — 83690 ASSAY OF LIPASE: CPT

## 2025-07-08 PROCEDURE — 85025 COMPLETE CBC W/AUTO DIFF WBC: CPT

## 2025-07-08 PROCEDURE — 93005 ELECTROCARDIOGRAM TRACING: CPT

## 2025-07-08 PROCEDURE — 2580000003 HC RX 258

## 2025-07-08 PROCEDURE — 96361 HYDRATE IV INFUSION ADD-ON: CPT

## 2025-07-08 PROCEDURE — 6370000000 HC RX 637 (ALT 250 FOR IP)

## 2025-07-08 RX ORDER — POTASSIUM CHLORIDE 750 MG/1
40 TABLET, EXTENDED RELEASE ORAL ONCE
Status: COMPLETED | OUTPATIENT
Start: 2025-07-08 | End: 2025-07-08

## 2025-07-08 RX ORDER — 0.9 % SODIUM CHLORIDE 0.9 %
1000 INTRAVENOUS SOLUTION INTRAVENOUS ONCE
Status: COMPLETED | OUTPATIENT
Start: 2025-07-08 | End: 2025-07-08

## 2025-07-08 RX ORDER — ONDANSETRON 2 MG/ML
4 INJECTION INTRAMUSCULAR; INTRAVENOUS ONCE
Status: COMPLETED | OUTPATIENT
Start: 2025-07-08 | End: 2025-07-08

## 2025-07-08 RX ORDER — ACETAMINOPHEN 500 MG
1000 TABLET ORAL
Status: COMPLETED | OUTPATIENT
Start: 2025-07-08 | End: 2025-07-08

## 2025-07-08 RX ADMIN — ACETAMINOPHEN 1000 MG: 500 TABLET ORAL at 21:12

## 2025-07-08 RX ADMIN — POTASSIUM CHLORIDE 40 MEQ: 750 TABLET, FILM COATED, EXTENDED RELEASE ORAL at 22:19

## 2025-07-08 RX ADMIN — ONDANSETRON 4 MG: 2 INJECTION, SOLUTION INTRAMUSCULAR; INTRAVENOUS at 21:14

## 2025-07-08 RX ADMIN — SODIUM CHLORIDE 1000 ML: 0.9 INJECTION, SOLUTION INTRAVENOUS at 21:16

## 2025-07-08 ASSESSMENT — PAIN - FUNCTIONAL ASSESSMENT
PAIN_FUNCTIONAL_ASSESSMENT: NONE - DENIES PAIN
PAIN_FUNCTIONAL_ASSESSMENT: ACTIVITIES ARE NOT PREVENTED

## 2025-07-08 ASSESSMENT — PAIN DESCRIPTION - LOCATION: LOCATION: HEAD

## 2025-07-08 ASSESSMENT — PAIN DESCRIPTION - ORIENTATION: ORIENTATION: MID

## 2025-07-08 ASSESSMENT — PAIN DESCRIPTION - DESCRIPTORS: DESCRIPTORS: ACHING

## 2025-07-08 ASSESSMENT — LIFESTYLE VARIABLES
HOW MANY STANDARD DRINKS CONTAINING ALCOHOL DO YOU HAVE ON A TYPICAL DAY: PATIENT DOES NOT DRINK
HOW OFTEN DO YOU HAVE A DRINK CONTAINING ALCOHOL: NEVER

## 2025-07-08 ASSESSMENT — PAIN SCALES - GENERAL: PAINLEVEL_OUTOF10: 4

## 2025-07-08 NOTE — ED TRIAGE NOTES
Pt walked into the Ed with CC  of dizziness, nausea, shakiness, HA, and fatigue that has been ongoing for the past week. Pt is 33 wks pregnant and has no current  complications. Pt also has been experiencing some irregular abdominal pain. Pt has been pregnant 10 time with 9 live births.     PMH: anemia, asthma, migraines

## 2025-07-08 NOTE — ED PROVIDER NOTES
Physician who either signs or Co-signs this chart in the absence of a cardiologist.        RADIOLOGY:   Non-plain film images such as CT, Ultrasound and MRI are read by the radiologist. Plain radiographic images are visualized and preliminarily interpreted by the emergency physician with the below findings:        Interpretation per the Radiologist below, if available at the time of this note:    No orders to display        LABS:  Labs Reviewed   CBC WITH AUTO DIFFERENTIAL - Abnormal; Notable for the following components:       Result Value    WBC 12.5 (*)     Hemoglobin 7.9 (*)     Hematocrit 25.5 (*)     MCV 63.8 (*)     MCH 19.8 (*)     RDW 17.9 (*)     Neutrophils % 77.9 (*)     Neutrophils Absolute 9.74 (*)     Immature Granulocytes Absolute 0.05 (*)     All other components within normal limits   COMPREHENSIVE METABOLIC PANEL - Abnormal; Notable for the following components:    Sodium 134 (*)     Potassium 3.2 (*)     CO2 20 (*)     BUN 3 (*)     BUN/Creatinine Ratio 5 (*)     Alk Phosphatase 124 (*)     Albumin 2.4 (*)     Globulin 5.0 (*)     Albumin/Globulin Ratio 0.5 (*)     All other components within normal limits   MAGNESIUM   LIPASE   EXTRA TUBES HOLD       All other labs were within normal range or not returned as of this dictation.    EMERGENCY DEPARTMENT COURSE and DIFFERENTIAL DIAGNOSIS/MDM:   Vitals:    Vitals:    07/08/25 2003 07/08/25 2325   BP: 130/77 106/61   Pulse: (!) 108 79   Resp: 17 16   Temp: 98 °F (36.7 °C) 97.7 °F (36.5 °C)   TempSrc: Oral Oral   SpO2: 99% 98%   Weight: 101.7 kg (224 lb 3.3 oz)    Height: 1.6 m (5' 3\")            Medical Decision Making  This is a 39-year-old female who is currently 33 weeks gestation who presents emergency department with intermittent lightheadedness and headaches.  Upon arrival she is well-appearing.  Vital signs show mild tachycardia.  She is afebrile.  No neurologic deficits on exam.    DDx: Dehydration, electrolyte abnormality, anemia,

## 2025-07-09 LAB
EKG ATRIAL RATE: 101 BPM
EKG DIAGNOSIS: NORMAL
EKG P AXIS: 47 DEGREES
EKG P-R INTERVAL: 148 MS
EKG Q-T INTERVAL: 336 MS
EKG QRS DURATION: 80 MS
EKG QTC CALCULATION (BAZETT): 435 MS
EKG R AXIS: -1 DEGREES
EKG T AXIS: 50 DEGREES
EKG VENTRICULAR RATE: 101 BPM

## 2025-07-09 PROCEDURE — 93010 ELECTROCARDIOGRAM REPORT: CPT | Performed by: INTERNAL MEDICINE

## 2025-07-09 NOTE — DISCHARGE INSTRUCTIONS
You were seen today in the emergency department for headaches, lightheadedness, nausea and shakiness.  You were found to have low potassium and anemia.  These are likely the causes of your symptoms.  The potassium was repleted in the emergency department.  Follow-up very closely with your OB/GYN.  You also need to follow-up with hematology either the hematologist or OB has referred you to our the one I have provided below.  Call the number to schedule an appointment.  Return for any new or worsening symptoms.

## 2025-07-09 NOTE — ED NOTES
Pt given discharge instructions, patient education,  and follow up information. Pt verbalizes understanding. All questions answered. Pt discharged home in private vehicle, ambulatory. Pt A&OX4, respirations unlabored, RA with pain controlled.

## 2025-07-14 ENCOUNTER — HOSPITAL ENCOUNTER (EMERGENCY)
Facility: HOSPITAL | Age: 40
Discharge: HOME | End: 2025-07-14
Payer: MEDICAID

## 2025-07-14 VITALS
HEART RATE: 83 BPM | SYSTOLIC BLOOD PRESSURE: 120 MMHG | DIASTOLIC BLOOD PRESSURE: 57 MMHG | RESPIRATION RATE: 16 BRPM | TEMPERATURE: 99.1 F

## 2025-07-14 LAB
ALBUMIN SERPL-MCNC: 2.5 G/DL (ref 3.5–5)
ALBUMIN/GLOB SERPL: 0.5 (ref 1.1–2.2)
ALP SERPL-CCNC: 129 U/L (ref 45–117)
ALT SERPL-CCNC: 16 U/L (ref 12–78)
ANION GAP SERPL CALC-SCNC: 6 MMOL/L (ref 2–12)
AST SERPL-CCNC: 19 U/L (ref 15–37)
BILIRUB SERPL-MCNC: 0.4 MG/DL (ref 0.2–1)
BUN SERPL-MCNC: 3 MG/DL (ref 6–20)
BUN/CREAT SERPL: 6 (ref 12–20)
CALCIUM SERPL-MCNC: 9.2 MG/DL (ref 8.5–10.1)
CHLORIDE SERPL-SCNC: 105 MMOL/L (ref 97–108)
CO2 SERPL-SCNC: 23 MMOL/L (ref 21–32)
CREAT SERPL-MCNC: 0.54 MG/DL (ref 0.55–1.02)
ERYTHROCYTE [DISTWIDTH] IN BLOOD BY AUTOMATED COUNT: 18.1 % (ref 11.5–14.5)
GLOBULIN SER CALC-MCNC: 4.7 G/DL (ref 2–4)
GLUCOSE SERPL-MCNC: 80 MG/DL (ref 65–100)
HCT VFR BLD AUTO: 28.7 % (ref 35–47)
HGB BLD-MCNC: 8.4 G/DL (ref 11.5–16)
MCH RBC QN AUTO: 19.6 PG (ref 26–34)
MCHC RBC AUTO-ENTMCNC: 29.3 G/DL (ref 30–36.5)
MCV RBC AUTO: 66.9 FL (ref 80–99)
NRBC # BLD: 0 K/UL (ref 0–0.01)
NRBC BLD-RTO: 0 PER 100 WBC
PLATELET # BLD AUTO: 270 K/UL (ref 150–400)
POTASSIUM SERPL-SCNC: 3.9 MMOL/L (ref 3.5–5.1)
PROT SERPL-MCNC: 7.2 G/DL (ref 6.4–8.2)
RBC # BLD AUTO: 4.29 M/UL (ref 3.8–5.2)
SODIUM SERPL-SCNC: 134 MMOL/L (ref 136–145)
WBC # BLD AUTO: 11.4 K/UL (ref 3.6–11)

## 2025-07-14 PROCEDURE — 99283 EMERGENCY DEPT VISIT LOW MDM: CPT

## 2025-07-14 PROCEDURE — 99285 EMERGENCY DEPT VISIT HI MDM: CPT

## 2025-07-14 PROCEDURE — 96365 THER/PROPH/DIAG IV INF INIT: CPT

## 2025-07-14 PROCEDURE — 2580000003 HC RX 258: Performed by: OBSTETRICS & GYNECOLOGY

## 2025-07-14 PROCEDURE — 80053 COMPREHEN METABOLIC PANEL: CPT

## 2025-07-14 PROCEDURE — 6360000002 HC RX W HCPCS: Performed by: OBSTETRICS & GYNECOLOGY

## 2025-07-14 PROCEDURE — 96374 THER/PROPH/DIAG INJ IV PUSH: CPT

## 2025-07-14 PROCEDURE — 85027 COMPLETE CBC AUTOMATED: CPT

## 2025-07-14 PROCEDURE — 96361 HYDRATE IV INFUSION ADD-ON: CPT

## 2025-07-14 RX ORDER — SODIUM CHLORIDE, SODIUM LACTATE, POTASSIUM CHLORIDE, AND CALCIUM CHLORIDE .6; .31; .03; .02 G/100ML; G/100ML; G/100ML; G/100ML
1000 INJECTION, SOLUTION INTRAVENOUS ONCE
Status: COMPLETED | OUTPATIENT
Start: 2025-07-14 | End: 2025-07-14

## 2025-07-14 RX ORDER — PROCHLORPERAZINE EDISYLATE 5 MG/ML
10 INJECTION INTRAMUSCULAR; INTRAVENOUS ONCE
Status: COMPLETED | OUTPATIENT
Start: 2025-07-14 | End: 2025-07-14

## 2025-07-14 RX ADMIN — SODIUM CHLORIDE, SODIUM LACTATE, POTASSIUM CHLORIDE, AND CALCIUM CHLORIDE 1000 ML: .6; .31; .03; .02 INJECTION, SOLUTION INTRAVENOUS at 20:33

## 2025-07-14 RX ADMIN — PROCHLORPERAZINE EDISYLATE 10 MG: 5 INJECTION INTRAMUSCULAR; INTRAVENOUS at 20:32

## 2025-07-14 NOTE — ED TRIAGE NOTES
1958 Dr. Vizcarra at bedside, orders for labs, IV fluids, and compazine    2020 OB SBAR Report given to ELYSIA Kaye RN

## 2025-07-14 NOTE — ED NOTES
41 yo  @ 34w1d who comes in with concern for dizziness, nausea, blurred vision, headache similar to when she was seen on  in the main ED. Also occasional contractions. These are chronic issues during this pregnancy and have also happened in other pregnancies. It was the blurred vision that really concerned her today.She can't recall any meds or treatments in the past that have helped with her symptoms. She is able to eat and keep things down.    I know her from her previous pregnancies  Grand multip  H/o cervical incompetence, cerclage placed 3/4/25  [facility], resolved 5/15 MFM scan  AMA  Normal NIPT  BMI 34  H/o c/s for breech and then 3 successful VBACs  GBS in urine  Beta thal minor anemia - has upcoming appt with hematology  CHTN - no meds  H/o eating disorder, hyperemesis with prior pregnancy  Persistent trich - ILENE neg 4/15    The history is provided by the patient.        Chief Complaint   Patient presents with    Nausea    Headache       Past Medical History:   Diagnosis Date    Acute pyelonephritis 2021    Anorexia     Anxiety     Asthma     on albuterol prn. Triggers cold and allergies    Avoidant-restrictive food intake disorder (ARFID)     Back pain, chronic     sciatica    Chronic bilateral low back pain with right-sided sciatica 2020    Fibromyalgia 2022    GERD (gastroesophageal reflux disease) 2020    UGI , GI Dr. Tuttle    Gestational hypertension     Past pregnancy    Hyperemesis     severe hyperemesis    Hypertension     with G12 - none this pregnancy    IBS (irritable bowel syndrome) 2022    Iron deficiency anemia 10/08/2010    MDD (major depressive disorder), single episode with postpartum onset 2013    treated with meds - 13    Orthostatic hypotension 2020    Plantar fasciitis     Pregnancy     36 weeks    PTSD (post-traumatic stress disorder)        Past Surgical History:   Procedure Laterality Date    CERVICAL CERCLAGE N/A

## 2025-07-15 NOTE — PROGRESS NOTES
[facility] [address]  [address] [facility] [facility] [address]  Phone: [phone]             July 14, 2025    Patient: Jesi Awan   YOB: 1985   Date of Visit: 7/14/2025       To Whom It May Concern:    Jesi Awan was seen and treated in our emergency department on 7/14/2025. She may return to Work on 7/16/25.      Sincerely,       Karie Vizcarra MD         Signature:__________________________________

## 2025-07-15 NOTE — DISCHARGE INSTRUCTIONS
Weeks 34 to 36 of Your Pregnancy: Care Instructions  Your belly has grown quite large. It's almost time to give birth! Your baby's lungs are almost ready to breathe air. The skull bones are firm enough to protect your baby's head. But they're soft enough to move down through the birth canal.    You might be wondering what to expect during labor. Because each birth is different, there's no way to know exactly what childbirth will be like for you. Talk to your doctor or midwife about any concerns you have.   You'll probably have a test for group B streptococcus (GBS). GBS is bacteria that can live in the vagina and rectum. GBS can make your baby sick after birth. If you test positive, you'll get antibiotics during labor.     Choose what type of pain relief you would like during labor.  You can choose from a few types, including medicine and non-medicine options. You may want to use several types of pain relief.     Know how medicines can help with pain during labor.  Some medicines lower anxiety and help with some of the pain. Others make your lower body numb so that you will feel less pain.     Tell your doctor about your pain medicine choice.  Do this before you start labor or very early in your labor. You may be able to change your mind during labor.     Learn about the stages of labor.    The first stage includes the early (latent) and active phases of labor. Contractions start in early labor. During active labor, contractions get stronger, last longer, and happen more often. And the cervix opens more rapidly.  The second stage starts when you're ready to push. During this stage, your baby is born.  During the third stage, you'll usually have a few more contractions to push out the placenta.   Follow-up care is a key part of your treatment and safety. Be sure to make and go to all appointments, and call your doctor if you are having problems. It's also a good idea to know your test results and keep a list of  the medicines you take.  Where can you learn more?  Go to https://www.[employer].net/patientEd and enter B912 to learn more about \"Weeks 34 to 36 of Your Pregnancy: Care Instructions.\"  Current as of: April 30, 2024  Content Version: 14.5  © 8214-3806 "[employer]".   Care instructions adapted under license by [employer]. If you have questions about a medical condition or this instruction, always ask your healthcare professional. THE [employer], disclaims any warranty or liability for your use of this information.

## 2025-07-15 NOTE — ED TRIAGE NOTES
2020: SBAR received from BRANDO Enamorado RN  2032: Compazine given per MAR  2033: IVFB per MAR  2037: Labs sent  2104: Up to restroo  2120: SBAR given to SHANIA Lemus RN.

## 2025-07-17 ENCOUNTER — ROUTINE PRENATAL (OUTPATIENT)
Age: 40
End: 2025-07-17
Payer: MEDICAID

## 2025-07-17 ENCOUNTER — ROUTINE PRENATAL (OUTPATIENT)
Age: 40
End: 2025-07-17

## 2025-07-17 VITALS — HEART RATE: 100 BPM | DIASTOLIC BLOOD PRESSURE: 70 MMHG | SYSTOLIC BLOOD PRESSURE: 113 MMHG

## 2025-07-17 DIAGNOSIS — O09.523 AMA (ADVANCED MATERNAL AGE) MULTIGRAVIDA 35+, THIRD TRIMESTER: ICD-10-CM

## 2025-07-17 DIAGNOSIS — O99.013 ANEMIA AFFECTING PREGNANCY IN THIRD TRIMESTER: ICD-10-CM

## 2025-07-17 DIAGNOSIS — O09.523 AMA (ADVANCED MATERNAL AGE) MULTIGRAVIDA 35+, THIRD TRIMESTER: Primary | ICD-10-CM

## 2025-07-17 DIAGNOSIS — D56.3 BETA THALASSEMIA MINOR: ICD-10-CM

## 2025-07-17 DIAGNOSIS — J45.909 ASTHMA AFFECTING PREGNANCY IN THIRD TRIMESTER: Primary | ICD-10-CM

## 2025-07-17 DIAGNOSIS — Z34.90 PREGNANCY, UNSPECIFIED GESTATIONAL AGE: ICD-10-CM

## 2025-07-17 DIAGNOSIS — O34.33 CERVICAL INSUFFICIENCY DURING PREGNANCY IN THIRD TRIMESTER, ANTEPARTUM: ICD-10-CM

## 2025-07-17 DIAGNOSIS — O99.513 ASTHMA AFFECTING PREGNANCY IN THIRD TRIMESTER: Primary | ICD-10-CM

## 2025-07-17 PROCEDURE — 76819 FETAL BIOPHYS PROFIL W/O NST: CPT | Performed by: STUDENT IN AN ORGANIZED HEALTH CARE EDUCATION/TRAINING PROGRAM

## 2025-07-17 PROCEDURE — 76816 OB US FOLLOW-UP PER FETUS: CPT | Performed by: STUDENT IN AN ORGANIZED HEALTH CARE EDUCATION/TRAINING PROGRAM

## 2025-07-17 NOTE — PROGRESS NOTES
Assessment & Plan   ASSESSMENT/PLAN:  1. Asthma affecting pregnancy in third trimester  2. Beta thalassemia minor  3. AMA (advanced maternal age) multigravida 35+, third trimester  4. Cervical insufficiency during pregnancy in third trimester, antepartum  5. Anemia affecting pregnancy in third trimester      NAYLA is 39 yrs of age,  who presents at 34w 4d.      Patient presents today for a growth/ANT. EFW is 2323 g at 29%, abdominal circumference at 65%. Today’s biometry is consistent with her dates. We discussed normal fetal activity for gestation and need to assess fetal movements throughout the day and report concerns of decreased fetal movement to your practice immediately. The AFV is normal for the current gestational age. The biophysical profile is 8/8. The ultrasound findings were discussed with the patient.      Patient’s interval obstetrical history is reported as benign and uneventful. Patient denies any obstetrical complaints. Patient reports good fetal movements. Patient denies regular/frequent contractions, vaginal bleeding or leakage of fluid.      Asthma:   - Current regimen: proventil prn, nebulizer prn, last seen in 2025  - Followed by: Dr. Younger  - Heber, no complaints. Precautions reviewed.      beta thal minor:   - Hemoglobin electrophoresis c/w beta-thalessemia minor (FOB conf neg);      Maternal age of 38 year at time of delivery:  - s/p GC appointment on   - NIPT: Normal   - Further GC offered: saw GC , decl amnio  - Recommend growth scan q4 weeks starting at 32 weeks   - Recommend weekly  testing starting at 32 weeks   - Delivery between 39.0-39.6   - normal fetal echo for previously incomplete cardiac views      History of cervical insufficiency/cerclage placement  - cerclage in place on 3/3  - Cerclage removal at 36-37w     Anemia  - 7/8 H/H 7.9/25.5 > H/H 8.4/24.7  - Fe infusion scheduled . Pt reports increased fatigue and intermittent dizziness. RN

## 2025-07-17 NOTE — PROGRESS NOTES
Patient was seen 7/17/2025      Please look under media to view full consult and ultrasound report in ViewPoint.         Yulia Fofana MD   Maternal Fetal Medicine

## 2025-07-17 NOTE — PROGRESS NOTES
Patient does not report signs or symptoms of pre-eclampsia or labor.  She reports counting fetal movements daily and has noted adequate fetal movement counts at home. Advised patient to continue monitoring fetal movements at home and to report any concerns for pre-eclampsia or labor to her OB. Patient verbalized understanding. All patient questions were addressed.  Patient does report having been to ED/NOEL for a couple times the last two weeks due to feeling light headed, dizzy, nauseous, extreme fatigue. She was diagnosed with anemia and dehydration. She has an FE infusion 7/22/25. Has tried to stay hydrated with fluids including electrolytes. RN stressed small frequent iron rich foods, patient shares she tries, but feels too nauseous and tired to eat most of the time. Provider made aware.

## 2025-07-17 NOTE — PROCEDURES
PATIENT: ALTAGRACIA REED   -  : 1985   -  DOS:2025   -  INTERPRETING PROVIDER:Yulia Fofana,   Indication  ========    AMA, cervical incompetence, Obesity in pregnancy    Method  ======    Transabdominal ultrasound examination. View: Good view    Pregnancy  =========    Benito pregnancy. Number of fetuses: 1    Dating  ======    LMP on: 2024  GA by LMP 34 w + 4 d  REYNA by LMP: 2025  Previous Ultrasound on: 2025  Type of prior assessment: GA  GA at prior assessment date 7 w + 6 d  GA by previous U/S 34 w + 2 d  REYNA by previous Ultrasound: 2025  Ultrasound examination on: 2025  GA by U/S based upon: AC, BPD, Femur, HC  GA by U/S 33 w + 3 d  REYNA by U/S: 2025  Assigned: based on the LMP, selected on 2025  Assigned GA 34 w + 4 d  Assigned REYNA: 2025    Fetal Biometry  ============    Standard  BPD 81.0 mm 32w 4d 6% Hadlock  .1 mm 33w 4d 32% Shay  .7 mm 32w 5d <1% Hadlock  .6 mm 34w 6d 65% Hadlock  Femur 64.5 mm 33w 2d 13% Hadlock  EFW 2,323 g 33w 5d 29% Hadlock  EFW (lb) 5 lb  EFW (oz) 2 oz  EFW by: Hadlock (BPD-HC-AC-FL)  Other Structures   bpm    General Evaluation  ==============    Cardiac activity present.  bpm. Fetal movements: visualized. Presentation: Cephalic  Placenta: Placental site: posterior  Umbilical cord: Cord vessels: 3 vessel cord  Amniotic fluid: Amount of AF: normal. MVP 4.2 cm. MITESH 12.9 cm. Q1 3.4 cm, Q2 3.5 cm, Q3 1.9 cm, Q4 4.2 cm    Fetal Anatomy  ===========    Stomach: normal  Kidneys: normal  Bladder: normal  Wants to know fetal sex: yes    Biophysical Profile  ==============    2: Fetal breathing movements  2: Gross body movements  2: Fetal tone  2: Amniotic fluid volume   Biophysical profile score    Findings  =======    Intrauterine Benito pregnancy at 34w 4d by clinical dates.  EFW is 2323 g at 29%, abdominal circumference at 65%.  Anatomy visualized as stated above.  Amniotic fluid:

## 2025-07-25 ENCOUNTER — ROUTINE PRENATAL (OUTPATIENT)
Age: 40
End: 2025-07-25
Payer: MEDICAID

## 2025-07-25 VITALS
BODY MASS INDEX: 39.72 KG/M2 | HEART RATE: 86 BPM | RESPIRATION RATE: 20 BRPM | HEIGHT: 63 IN | SYSTOLIC BLOOD PRESSURE: 108 MMHG | DIASTOLIC BLOOD PRESSURE: 72 MMHG | OXYGEN SATURATION: 98 %

## 2025-07-25 DIAGNOSIS — Z34.90 PREGNANCY, UNSPECIFIED GESTATIONAL AGE: ICD-10-CM

## 2025-07-25 PROCEDURE — 76819 FETAL BIOPHYS PROFIL W/O NST: CPT | Performed by: STUDENT IN AN ORGANIZED HEALTH CARE EDUCATION/TRAINING PROGRAM

## 2025-07-25 PROCEDURE — 99214 OFFICE O/P EST MOD 30 MIN: CPT | Performed by: STUDENT IN AN ORGANIZED HEALTH CARE EDUCATION/TRAINING PROGRAM

## 2025-07-25 NOTE — PROCEDURES
PATIENT: ALTAGRACIA REED   -  : 1985   -  DOS:2025   -  INTERPRETING PROVIDER:Amara Walker,   Indication  ========    AMA, cervical incompetence, Obesity in pregnancy    Maternal Assessment  =================    Height (ft) 5 ft  Height (in) 5 in    Method  ======    Transabdominal ultrasound examination. View: Sufficient    Pregnancy  =========    Benito pregnancy. Number of fetuses: 1    Dating  ======    LMP on: 2024  GA by LMP 35 w + 5 d  REYNA by LMP: 2025  Previous Ultrasound on: 2025  Type of prior assessment: GA  GA at prior assessment date 7 w + 6 d  GA by previous U/S 35 w + 3 d  REYNA by previous Ultrasound: 2025  Assigned: based on the LMP, selected on 2025  Assigned GA 35 w + 5 d  Assigned REYNA: 2025    General Evaluation  ==============    Cardiac activity present.  bpm. Fetal movements: visualized. Presentation: Cephalic  Placenta: Placental site: posterior  Umbilical cord: Cord vessels: 3 vessel cord    Fetal Anatomy  ===========    Stomach: normal  Kidneys: normal  Bladder: normal  Wants to know fetal sex: yes    Amniotic Fluid Assessment  =====================    Amount of AF: normal  MVP 3.9 cm. MITESH 9.2 cm. Q1 1.0 cm, Q2 3.4 cm, Q3 3.9 cm, Q4 0.9 cm    Biophysical Profile  ==============    2: Fetal breathing movements  2: Gross body movements  2: Fetal tone  2: Amniotic fluid volume  8/8 Biophysical profile score    Findings  =======    Intrauterine Benito pregnancy at 35w 5d by clinical dates.  Amniotic fluid: normal.  Placenta is posterior.  Cephalic presentation.  Biophysical profile score is 8/8.      The ultrasound findings as listed above and diagnostic limitations of ultrasound imaging, including inability to exclude all anomalies, have been reviewed with the patient. All  questions and concerns addressed.    Consultation  ==========    NAYLA is 39 yrs of age,  who presents at 35w 5d.    Patient presents today for a BPP.

## 2025-07-25 NOTE — PROGRESS NOTES
Please see ultrasound report under Imaging tab or Media tab.  Amara Walker MD  [facility]

## 2025-07-25 NOTE — PROGRESS NOTES
Chief Complaint   Patient presents with    Pregnancy Ultrasound           Vitals:    07/25/25 0853   BP: 108/72   Pulse: 86   Resp: 20   SpO2: 98%            1. Have you been to the ER, urgent care clinic since your last visit?  Hospitalized since your last visit?  Yes [facility] for headaches    2. Have you seen or consulted any other health care providers outside of the [facility] since your last visit?  Include any pap smears or colon screening. No

## 2025-07-27 LAB — GBS, EXTERNAL RESULT: POSITIVE

## 2025-08-08 ENCOUNTER — ROUTINE PRENATAL (OUTPATIENT)
Age: 40
End: 2025-08-08
Payer: MEDICAID

## 2025-08-08 VITALS — SYSTOLIC BLOOD PRESSURE: 111 MMHG | HEART RATE: 82 BPM | DIASTOLIC BLOOD PRESSURE: 74 MMHG

## 2025-08-08 DIAGNOSIS — Z34.90 PREGNANCY, UNSPECIFIED GESTATIONAL AGE: ICD-10-CM

## 2025-08-08 PROCEDURE — 76819 FETAL BIOPHYS PROFIL W/O NST: CPT | Performed by: STUDENT IN AN ORGANIZED HEALTH CARE EDUCATION/TRAINING PROGRAM

## 2025-08-12 ENCOUNTER — ANESTHESIA EVENT (OUTPATIENT)
Facility: HOSPITAL | Age: 40
End: 2025-08-12
Payer: MEDICAID

## 2025-08-12 ENCOUNTER — HOSPITAL ENCOUNTER (INPATIENT)
Facility: HOSPITAL | Age: 40
LOS: 3 days | Discharge: HOME | DRG: 560 | End: 2025-08-15
Attending: OBSTETRICS & GYNECOLOGY | Admitting: OBSTETRICS & GYNECOLOGY
Payer: MEDICAID

## 2025-08-12 ENCOUNTER — ANESTHESIA (OUTPATIENT)
Facility: HOSPITAL | Age: 40
End: 2025-08-12
Payer: MEDICAID

## 2025-08-12 PROBLEM — Z3A.38 38 WEEKS GESTATION OF PREGNANCY: Status: ACTIVE | Noted: 2025-08-12

## 2025-08-12 LAB
ABO + RH BLD: NORMAL
AMNISURE, POC: POSITIVE
BLOOD GROUP ANTIBODIES SERPL: NORMAL
ERYTHROCYTE [DISTWIDTH] IN BLOOD BY AUTOMATED COUNT: 25.4 % (ref 11.5–14.5)
HCT VFR BLD AUTO: 32.5 % (ref 35–47)
HGB BLD-MCNC: 10 G/DL (ref 11.5–16)
HISTORY CHECK: NORMAL
Lab: NORMAL
MCH RBC QN AUTO: 21.2 PG (ref 26–34)
MCHC RBC AUTO-ENTMCNC: 30.8 G/DL (ref 30–36.5)
MCV RBC AUTO: 69 FL (ref 80–99)
NEGATIVE QC PASS/FAIL: NORMAL
NRBC # BLD: 0 K/UL (ref 0–0.01)
NRBC BLD-RTO: 0 PER 100 WBC
PLATELET # BLD AUTO: 297 K/UL (ref 150–400)
POSITIVE QC PASS/FAIL: NORMAL
RBC # BLD AUTO: 4.71 M/UL (ref 3.8–5.2)
SPECIMEN EXP DATE BLD: NORMAL
WBC # BLD AUTO: 9.9 K/UL (ref 3.6–11)

## 2025-08-12 PROCEDURE — 2580000003 HC RX 258: Performed by: OBSTETRICS & GYNECOLOGY

## 2025-08-12 PROCEDURE — 86901 BLOOD TYPING SEROLOGIC RH(D): CPT

## 2025-08-12 PROCEDURE — 86780 TREPONEMA PALLIDUM: CPT

## 2025-08-12 PROCEDURE — 86900 BLOOD TYPING SEROLOGIC ABO: CPT

## 2025-08-12 PROCEDURE — 6360000002 HC RX W HCPCS: Performed by: STUDENT IN AN ORGANIZED HEALTH CARE EDUCATION/TRAINING PROGRAM

## 2025-08-12 PROCEDURE — 86850 RBC ANTIBODY SCREEN: CPT

## 2025-08-12 PROCEDURE — 6360000002 HC RX W HCPCS: Performed by: OBSTETRICS & GYNECOLOGY

## 2025-08-12 PROCEDURE — 99285 EMERGENCY DEPT VISIT HI MDM: CPT

## 2025-08-12 PROCEDURE — 1100000000 HC RM PRIVATE

## 2025-08-12 PROCEDURE — 7210000100 HC LABOR FEE PER 1 HR

## 2025-08-12 PROCEDURE — 85027 COMPLETE CBC AUTOMATED: CPT

## 2025-08-12 PROCEDURE — 3700000025 EPIDURAL BLOCK: Performed by: STUDENT IN AN ORGANIZED HEALTH CARE EDUCATION/TRAINING PROGRAM

## 2025-08-12 PROCEDURE — 4500000002 HC ER NO CHARGE

## 2025-08-12 RX ORDER — SODIUM CHLORIDE 9 MG/ML
INJECTION, SOLUTION INTRAVENOUS PRN
Status: DISCONTINUED | OUTPATIENT
Start: 2025-08-12 | End: 2025-08-12

## 2025-08-12 RX ORDER — EPHEDRINE SULFATE 50 MG/ML
10 INJECTION INTRAVENOUS
Status: DISCONTINUED | OUTPATIENT
Start: 2025-08-12 | End: 2025-08-13

## 2025-08-12 RX ORDER — FENTANYL CITRATE 50 UG/ML
INJECTION, SOLUTION INTRAMUSCULAR; INTRAVENOUS
Status: COMPLETED
Start: 2025-08-12 | End: 2025-08-12

## 2025-08-12 RX ORDER — METHYLERGONOVINE MALEATE 0.2 MG/ML
200 INJECTION INTRAVENOUS PRN
Status: DISCONTINUED | OUTPATIENT
Start: 2025-08-12 | End: 2025-08-13

## 2025-08-12 RX ORDER — SODIUM CHLORIDE, SODIUM LACTATE, POTASSIUM CHLORIDE, AND CALCIUM CHLORIDE .6; .31; .03; .02 G/100ML; G/100ML; G/100ML; G/100ML
500 INJECTION, SOLUTION INTRAVENOUS PRN
Status: DISCONTINUED | OUTPATIENT
Start: 2025-08-12 | End: 2025-08-13

## 2025-08-12 RX ORDER — FENTANYL/BUPIVACAINE/NS/PF 2-1250MCG
1-15 PLASTIC BAG, INJECTION (ML) INJECTION CONTINUOUS
Refills: 0 | Status: DISCONTINUED | OUTPATIENT
Start: 2025-08-13 | End: 2025-08-13

## 2025-08-12 RX ORDER — NALBUPHINE HYDROCHLORIDE 10 MG/ML
10 INJECTION INTRAMUSCULAR; INTRAVENOUS; SUBCUTANEOUS
Status: DISCONTINUED | OUTPATIENT
Start: 2025-08-12 | End: 2025-08-13

## 2025-08-12 RX ORDER — SODIUM CHLORIDE 9 MG/ML
INJECTION, SOLUTION INTRAVENOUS PRN
Status: DISCONTINUED | OUTPATIENT
Start: 2025-08-12 | End: 2025-08-13

## 2025-08-12 RX ORDER — SODIUM CHLORIDE 0.9 % (FLUSH) 0.9 %
5-40 SYRINGE (ML) INJECTION PRN
Status: DISCONTINUED | OUTPATIENT
Start: 2025-08-12 | End: 2025-08-12

## 2025-08-12 RX ORDER — TERBUTALINE SULFATE 1 MG/ML
0.25 INJECTION SUBCUTANEOUS
Status: DISCONTINUED | OUTPATIENT
Start: 2025-08-12 | End: 2025-08-13

## 2025-08-12 RX ORDER — SODIUM CHLORIDE, SODIUM LACTATE, POTASSIUM CHLORIDE, CALCIUM CHLORIDE 600; 310; 30; 20 MG/100ML; MG/100ML; MG/100ML; MG/100ML
INJECTION, SOLUTION INTRAVENOUS CONTINUOUS
Status: DISCONTINUED | OUTPATIENT
Start: 2025-08-12 | End: 2025-08-13

## 2025-08-12 RX ORDER — NALBUPHINE HYDROCHLORIDE 10 MG/ML
5 INJECTION INTRAMUSCULAR; INTRAVENOUS; SUBCUTANEOUS EVERY 4 HOURS PRN
Status: DISCONTINUED | OUTPATIENT
Start: 2025-08-12 | End: 2025-08-13

## 2025-08-12 RX ORDER — SODIUM CHLORIDE, SODIUM LACTATE, POTASSIUM CHLORIDE, AND CALCIUM CHLORIDE .6; .31; .03; .02 G/100ML; G/100ML; G/100ML; G/100ML
1000 INJECTION, SOLUTION INTRAVENOUS PRN
Status: DISCONTINUED | OUTPATIENT
Start: 2025-08-12 | End: 2025-08-13

## 2025-08-12 RX ORDER — BUPIVACAINE HYDROCHLORIDE 2.5 MG/ML
INJECTION, SOLUTION EPIDURAL; INFILTRATION; INTRACAUDAL; PERINEURAL
Status: COMPLETED
Start: 2025-08-12 | End: 2025-08-12

## 2025-08-12 RX ORDER — FENTANYL CITRATE 50 UG/ML
INJECTION, SOLUTION INTRAMUSCULAR; INTRAVENOUS
Status: DISCONTINUED | OUTPATIENT
Start: 2025-08-12 | End: 2025-08-13 | Stop reason: SDUPTHER

## 2025-08-12 RX ORDER — LIDOCAINE HYDROCHLORIDE AND EPINEPHRINE 20; 5 MG/ML; UG/ML
INJECTION, SOLUTION EPIDURAL; INFILTRATION; INTRACAUDAL; PERINEURAL
Status: COMPLETED
Start: 2025-08-12 | End: 2025-08-12

## 2025-08-12 RX ORDER — ACETAMINOPHEN 325 MG/1
650 TABLET ORAL EVERY 4 HOURS PRN
Status: DISCONTINUED | OUTPATIENT
Start: 2025-08-12 | End: 2025-08-13

## 2025-08-12 RX ORDER — LOPERAMIDE HYDROCHLORIDE 2 MG/1
2 CAPSULE ORAL PRN
Status: DISCONTINUED | OUTPATIENT
Start: 2025-08-12 | End: 2025-08-13

## 2025-08-12 RX ORDER — LIDOCAINE HYDROCHLORIDE AND EPINEPHRINE 20; 5 MG/ML; UG/ML
INJECTION, SOLUTION EPIDURAL; INFILTRATION; INTRACAUDAL; PERINEURAL
Status: DISCONTINUED | OUTPATIENT
Start: 2025-08-12 | End: 2025-08-13 | Stop reason: SDUPTHER

## 2025-08-12 RX ORDER — SODIUM CHLORIDE 0.9 % (FLUSH) 0.9 %
5-40 SYRINGE (ML) INJECTION EVERY 12 HOURS SCHEDULED
Status: DISCONTINUED | OUTPATIENT
Start: 2025-08-12 | End: 2025-08-12

## 2025-08-12 RX ORDER — BUPIVACAINE HYDROCHLORIDE 2.5 MG/ML
INJECTION, SOLUTION EPIDURAL; INFILTRATION; INTRACAUDAL; PERINEURAL
Status: DISCONTINUED | OUTPATIENT
Start: 2025-08-12 | End: 2025-08-13 | Stop reason: SDUPTHER

## 2025-08-12 RX ORDER — CARBOPROST TROMETHAMINE 250 UG/ML
250 INJECTION, SOLUTION INTRAMUSCULAR PRN
Status: DISCONTINUED | OUTPATIENT
Start: 2025-08-12 | End: 2025-08-13

## 2025-08-12 RX ADMIN — BUPIVACAINE HYDROCHLORIDE 3 ML: 2.5 INJECTION, SOLUTION EPIDURAL; INFILTRATION; INTRACAUDAL; PERINEURAL at 23:57

## 2025-08-12 RX ADMIN — VANCOMYCIN HYDROCHLORIDE 2000 MG: 10 INJECTION, POWDER, LYOPHILIZED, FOR SOLUTION INTRAVENOUS at 20:40

## 2025-08-12 RX ADMIN — NALBUPHINE HYDROCHLORIDE 10 MG: 10 INJECTION, SOLUTION INTRAMUSCULAR; INTRAVENOUS; SUBCUTANEOUS at 21:54

## 2025-08-12 RX ADMIN — LIDOCAINE HYDROCHLORIDE,EPINEPHRINE BITARTRATE 3 ML: 20; .005 INJECTION, SOLUTION EPIDURAL; INFILTRATION; INTRACAUDAL; PERINEURAL at 23:54

## 2025-08-12 RX ADMIN — FENTANYL CITRATE 100 MCG: 50 INJECTION INTRAMUSCULAR; INTRAVENOUS at 23:56

## 2025-08-12 ASSESSMENT — PAIN - FUNCTIONAL ASSESSMENT: PAIN_FUNCTIONAL_ASSESSMENT: LABOR ALGORITHM/PAIN INTENSITY

## 2025-08-13 PROCEDURE — 7100000000 HC PACU RECOVERY - FIRST 15 MIN

## 2025-08-13 PROCEDURE — 7210000100 HC LABOR FEE PER 1 HR

## 2025-08-13 PROCEDURE — 1120000000 HC RM PRIVATE OB

## 2025-08-13 PROCEDURE — 51701 INSERT BLADDER CATHETER: CPT

## 2025-08-13 PROCEDURE — 6370000000 HC RX 637 (ALT 250 FOR IP): Performed by: OBSTETRICS & GYNECOLOGY

## 2025-08-13 PROCEDURE — 2500000003 HC RX 250 WO HCPCS: Performed by: STUDENT IN AN ORGANIZED HEALTH CARE EDUCATION/TRAINING PROGRAM

## 2025-08-13 PROCEDURE — 7100000001 HC PACU RECOVERY - ADDTL 15 MIN

## 2025-08-13 PROCEDURE — 6360000002 HC RX W HCPCS: Performed by: OBSTETRICS & GYNECOLOGY

## 2025-08-13 PROCEDURE — 00HU33Z INSERTION OF INFUSION DEVICE INTO SPINAL CANAL, PERCUTANEOUS APPROACH: ICD-10-PCS | Performed by: STUDENT IN AN ORGANIZED HEALTH CARE EDUCATION/TRAINING PROGRAM

## 2025-08-13 PROCEDURE — 7220000101 HC DELIVERY VAGINAL/SINGLE

## 2025-08-13 PROCEDURE — 4A1HXCZ MONITORING OF PRODUCTS OF CONCEPTION, CARDIAC RATE, EXTERNAL APPROACH: ICD-10-PCS | Performed by: OBSTETRICS & GYNECOLOGY

## 2025-08-13 RX ORDER — MISOPROSTOL 200 UG/1
TABLET ORAL
Status: DISCONTINUED
Start: 2025-08-13 | End: 2025-08-13

## 2025-08-13 RX ORDER — TRANEXAMIC ACID 100 MG/ML
INJECTION, SOLUTION INTRAVENOUS
Status: DISCONTINUED
Start: 2025-08-13 | End: 2025-08-13

## 2025-08-13 RX ORDER — METHYLERGONOVINE MALEATE 0.2 MG/ML
INJECTION INTRAVENOUS
Status: DISCONTINUED
Start: 2025-08-13 | End: 2025-08-13

## 2025-08-13 RX ORDER — SODIUM CHLORIDE, SODIUM LACTATE, POTASSIUM CHLORIDE, CALCIUM CHLORIDE 600; 310; 30; 20 MG/100ML; MG/100ML; MG/100ML; MG/100ML
INJECTION, SOLUTION INTRAVENOUS CONTINUOUS
Status: DISCONTINUED | OUTPATIENT
Start: 2025-08-13 | End: 2025-08-15 | Stop reason: HOSPADM

## 2025-08-13 RX ORDER — CALCIUM CARBONATE 500 MG/1
1000 TABLET, CHEWABLE ORAL AS NEEDED
Status: DISCONTINUED | OUTPATIENT
Start: 2025-08-13 | End: 2025-08-13

## 2025-08-13 RX ORDER — SODIUM CHLORIDE 0.9 % (FLUSH) 0.9 %
5-40 SYRINGE (ML) INJECTION EVERY 12 HOURS SCHEDULED
Status: DISCONTINUED | OUTPATIENT
Start: 2025-08-13 | End: 2025-08-15 | Stop reason: HOSPADM

## 2025-08-13 RX ORDER — HYDROXYZINE HYDROCHLORIDE 25 MG/1
25 TABLET, FILM COATED ORAL EVERY 6 HOURS PRN
Status: DISCONTINUED | OUTPATIENT
Start: 2025-08-13 | End: 2025-08-15 | Stop reason: HOSPADM

## 2025-08-13 RX ORDER — ONDANSETRON 2 MG/ML
4 INJECTION INTRAMUSCULAR; INTRAVENOUS EVERY 6 HOURS PRN
Status: DISCONTINUED | OUTPATIENT
Start: 2025-08-13 | End: 2025-08-13

## 2025-08-13 RX ORDER — OXYTOCIN 10 [USP'U]/ML
INJECTION, SOLUTION INTRAMUSCULAR; INTRAVENOUS
Status: DISCONTINUED
Start: 2025-08-13 | End: 2025-08-13

## 2025-08-13 RX ORDER — SODIUM CHLORIDE 9 MG/ML
INJECTION, SOLUTION INTRAVENOUS PRN
Status: DISCONTINUED | OUTPATIENT
Start: 2025-08-13 | End: 2025-08-15 | Stop reason: HOSPADM

## 2025-08-13 RX ORDER — ONDANSETRON 2 MG/ML
4 INJECTION INTRAMUSCULAR; INTRAVENOUS EVERY 6 HOURS PRN
Status: DISCONTINUED | OUTPATIENT
Start: 2025-08-13 | End: 2025-08-15 | Stop reason: HOSPADM

## 2025-08-13 RX ORDER — SODIUM CHLORIDE 0.9 % (FLUSH) 0.9 %
5-40 SYRINGE (ML) INJECTION PRN
Status: DISCONTINUED | OUTPATIENT
Start: 2025-08-13 | End: 2025-08-15 | Stop reason: HOSPADM

## 2025-08-13 RX ORDER — IBUPROFEN 400 MG/1
800 TABLET, FILM COATED ORAL EVERY 8 HOURS
Status: DISCONTINUED | OUTPATIENT
Start: 2025-08-13 | End: 2025-08-15 | Stop reason: HOSPADM

## 2025-08-13 RX ORDER — CARBOPROST TROMETHAMINE 250 UG/ML
INJECTION, SOLUTION INTRAMUSCULAR
Status: DISCONTINUED
Start: 2025-08-13 | End: 2025-08-13

## 2025-08-13 RX ORDER — ACETAMINOPHEN 500 MG
1000 TABLET ORAL EVERY 8 HOURS
Status: DISCONTINUED | OUTPATIENT
Start: 2025-08-13 | End: 2025-08-15 | Stop reason: HOSPADM

## 2025-08-13 RX ORDER — DOCUSATE SODIUM 100 MG/1
100 CAPSULE, LIQUID FILLED ORAL 2 TIMES DAILY PRN
Status: DISCONTINUED | OUTPATIENT
Start: 2025-08-13 | End: 2025-08-15 | Stop reason: HOSPADM

## 2025-08-13 RX ORDER — IBUPROFEN 400 MG/1
800 TABLET, FILM COATED ORAL EVERY 8 HOURS PRN
Status: DISCONTINUED | OUTPATIENT
Start: 2025-08-13 | End: 2025-08-13 | Stop reason: SDUPTHER

## 2025-08-13 RX ORDER — MODIFIED LANOLIN
OINTMENT (GRAM) TOPICAL PRN
Status: DISCONTINUED | OUTPATIENT
Start: 2025-08-13 | End: 2025-08-15 | Stop reason: HOSPADM

## 2025-08-13 RX ORDER — MISOPROSTOL 200 UG/1
400 TABLET ORAL PRN
Status: DISCONTINUED | OUTPATIENT
Start: 2025-08-13 | End: 2025-08-13

## 2025-08-13 RX ORDER — OXYCODONE HYDROCHLORIDE 5 MG/1
5 TABLET ORAL EVERY 4 HOURS PRN
Status: DISCONTINUED | OUTPATIENT
Start: 2025-08-13 | End: 2025-08-15 | Stop reason: HOSPADM

## 2025-08-13 RX ORDER — DIPHENHYDRAMINE HCL 25 MG
25 CAPSULE ORAL ONCE
Status: COMPLETED | OUTPATIENT
Start: 2025-08-13 | End: 2025-08-13

## 2025-08-13 RX ORDER — CETIRIZINE HYDROCHLORIDE 10 MG/1
10 TABLET ORAL DAILY
Status: DISCONTINUED | OUTPATIENT
Start: 2025-08-13 | End: 2025-08-15 | Stop reason: HOSPADM

## 2025-08-13 RX ORDER — ONDANSETRON 4 MG/1
4 TABLET, ORALLY DISINTEGRATING ORAL EVERY 6 HOURS PRN
Status: DISCONTINUED | OUTPATIENT
Start: 2025-08-13 | End: 2025-08-15 | Stop reason: HOSPADM

## 2025-08-13 RX ORDER — LOPERAMIDE HYDROCHLORIDE 2 MG/1
2 CAPSULE ORAL PRN
Status: DISCONTINUED | OUTPATIENT
Start: 2025-08-13 | End: 2025-08-15 | Stop reason: HOSPADM

## 2025-08-13 RX ORDER — ONDANSETRON 4 MG/1
4 TABLET, ORALLY DISINTEGRATING ORAL EVERY 6 HOURS PRN
Status: DISCONTINUED | OUTPATIENT
Start: 2025-08-13 | End: 2025-08-13

## 2025-08-13 RX ORDER — MISOPROSTOL 200 UG/1
800 TABLET ORAL PRN
Status: DISCONTINUED | OUTPATIENT
Start: 2025-08-13 | End: 2025-08-15 | Stop reason: HOSPADM

## 2025-08-13 RX ADMIN — Medication 10 ML/HR: at 00:01

## 2025-08-13 RX ADMIN — ONDANSETRON 4 MG: 2 INJECTION, SOLUTION INTRAMUSCULAR; INTRAVENOUS at 02:44

## 2025-08-13 RX ADMIN — ACETAMINOPHEN 1000 MG: 500 TABLET ORAL at 10:30

## 2025-08-13 RX ADMIN — IBUPROFEN 800 MG: 400 TABLET ORAL at 10:30

## 2025-08-13 RX ADMIN — DIPHENHYDRAMINE HYDROCHLORIDE 25 MG: 25 CAPSULE ORAL at 08:10

## 2025-08-13 RX ADMIN — IBUPROFEN 800 MG: 400 TABLET ORAL at 19:24

## 2025-08-13 RX ADMIN — HYDROXYZINE HYDROCHLORIDE 25 MG: 25 TABLET ORAL at 10:30

## 2025-08-13 RX ADMIN — DOCUSATE SODIUM 100 MG: 100 CAPSULE, LIQUID FILLED ORAL at 20:34

## 2025-08-13 RX ADMIN — ACETAMINOPHEN 1000 MG: 500 TABLET ORAL at 19:24

## 2025-08-13 RX ADMIN — CALCIUM CARBONATE (ANTACID) CHEW TAB 500 MG 1000 MG: 500 CHEW TAB at 05:48

## 2025-08-13 RX ADMIN — CETIRIZINE HYDROCHLORIDE 10 MG: 10 TABLET, FILM COATED ORAL at 15:49

## 2025-08-13 ASSESSMENT — PAIN - FUNCTIONAL ASSESSMENT
PAIN_FUNCTIONAL_ASSESSMENT: 0-10
PAIN_FUNCTIONAL_ASSESSMENT: 0-10
PAIN_FUNCTIONAL_ASSESSMENT: ACTIVITIES ARE NOT PREVENTED
PAIN_FUNCTIONAL_ASSESSMENT: ACTIVITIES ARE NOT PREVENTED

## 2025-08-13 ASSESSMENT — PAIN DESCRIPTION - LOCATION
LOCATION: ABDOMEN
LOCATION: ABDOMEN

## 2025-08-13 ASSESSMENT — PAIN DESCRIPTION - ORIENTATION
ORIENTATION: LOWER
ORIENTATION: LOWER

## 2025-08-13 ASSESSMENT — PAIN SCALES - GENERAL
PAINLEVEL_OUTOF10: 4
PAINLEVEL_OUTOF10: 4

## 2025-08-13 ASSESSMENT — PAIN DESCRIPTION - DESCRIPTORS
DESCRIPTORS: SORE
DESCRIPTORS: CRAMPING

## 2025-08-14 LAB
INTERPRETATION: NORMAL
T PALLIDUM AB SER QL IA: NON REACTIVE

## 2025-08-14 PROCEDURE — 6370000000 HC RX 637 (ALT 250 FOR IP): Performed by: OBSTETRICS & GYNECOLOGY

## 2025-08-14 PROCEDURE — 1120000000 HC RM PRIVATE OB

## 2025-08-14 RX ADMIN — IBUPROFEN 800 MG: 400 TABLET ORAL at 12:21

## 2025-08-14 RX ADMIN — DOCUSATE SODIUM 100 MG: 100 CAPSULE, LIQUID FILLED ORAL at 09:18

## 2025-08-14 RX ADMIN — ACETAMINOPHEN 1000 MG: 500 TABLET ORAL at 20:54

## 2025-08-14 RX ADMIN — Medication: at 20:50

## 2025-08-14 RX ADMIN — ACETAMINOPHEN 1000 MG: 500 TABLET ORAL at 12:21

## 2025-08-14 RX ADMIN — IBUPROFEN 800 MG: 400 TABLET ORAL at 20:53

## 2025-08-14 RX ADMIN — ACETAMINOPHEN 1000 MG: 500 TABLET ORAL at 04:45

## 2025-08-14 RX ADMIN — CETIRIZINE HYDROCHLORIDE 10 MG: 10 TABLET, FILM COATED ORAL at 09:18

## 2025-08-14 RX ADMIN — DOCUSATE SODIUM 100 MG: 100 CAPSULE, LIQUID FILLED ORAL at 20:55

## 2025-08-14 RX ADMIN — IBUPROFEN 800 MG: 400 TABLET ORAL at 04:45

## 2025-08-14 ASSESSMENT — PAIN DESCRIPTION - LOCATION
LOCATION: ABDOMEN

## 2025-08-14 ASSESSMENT — PAIN SCALES - GENERAL
PAINLEVEL_OUTOF10: 5
PAINLEVEL_OUTOF10: 0
PAINLEVEL_OUTOF10: 4
PAINLEVEL_OUTOF10: 5

## 2025-08-14 ASSESSMENT — PAIN - FUNCTIONAL ASSESSMENT
PAIN_FUNCTIONAL_ASSESSMENT: ACTIVITIES ARE NOT PREVENTED
PAIN_FUNCTIONAL_ASSESSMENT: 0-10

## 2025-08-14 ASSESSMENT — PAIN DESCRIPTION - DESCRIPTORS
DESCRIPTORS: CRAMPING

## 2025-08-14 ASSESSMENT — PAIN DESCRIPTION - ORIENTATION
ORIENTATION: MID;LOWER
ORIENTATION: LOWER
ORIENTATION: MID;LOWER

## 2025-08-15 VITALS
HEIGHT: 63 IN | TEMPERATURE: 97.9 F | OXYGEN SATURATION: 97 % | HEART RATE: 82 BPM | SYSTOLIC BLOOD PRESSURE: 120 MMHG | WEIGHT: 225 LBS | BODY MASS INDEX: 39.87 KG/M2 | RESPIRATION RATE: 16 BRPM | DIASTOLIC BLOOD PRESSURE: 69 MMHG

## 2025-08-15 PROBLEM — Z3A.38 38 WEEKS GESTATION OF PREGNANCY: Status: RESOLVED | Noted: 2025-08-12 | Resolved: 2025-08-15

## 2025-08-15 PROCEDURE — 6370000000 HC RX 637 (ALT 250 FOR IP): Performed by: OBSTETRICS & GYNECOLOGY

## 2025-08-15 RX ADMIN — DOCUSATE SODIUM 100 MG: 100 CAPSULE, LIQUID FILLED ORAL at 10:30

## 2025-08-15 RX ADMIN — IBUPROFEN 800 MG: 400 TABLET ORAL at 05:21

## 2025-08-15 RX ADMIN — ACETAMINOPHEN 1000 MG: 500 TABLET ORAL at 05:20

## 2025-08-15 RX ADMIN — CETIRIZINE HYDROCHLORIDE 10 MG: 10 TABLET, FILM COATED ORAL at 10:30

## 2025-08-15 ASSESSMENT — PAIN DESCRIPTION - DESCRIPTORS: DESCRIPTORS: CRAMPING

## 2025-08-15 ASSESSMENT — PAIN SCALES - GENERAL: PAINLEVEL_OUTOF10: 3

## 2025-08-15 ASSESSMENT — PAIN DESCRIPTION - ORIENTATION: ORIENTATION: LOWER

## 2025-08-15 ASSESSMENT — PAIN - FUNCTIONAL ASSESSMENT
PAIN_FUNCTIONAL_ASSESSMENT: 0-10
PAIN_FUNCTIONAL_ASSESSMENT: ACTIVITIES ARE NOT PREVENTED

## 2025-08-15 ASSESSMENT — PAIN DESCRIPTION - LOCATION: LOCATION: ABDOMEN

## (undated) DEVICE — PERI/GYN PACK: Brand: CONVERTORS

## (undated) DEVICE — SOLUTION IV 1000ML 0.9% SOD CHL

## (undated) DEVICE — NEEDLE SPNL 22GA L3.5IN BLK HUB S STL REG WALL FIT STYL W/

## (undated) DEVICE — HANDLE LT SNAP ON ULT DURABLE LENS FOR TRUMPF ALC DISPOSABLE

## (undated) DEVICE — PACK,LITHOTOMY,PK I: Brand: MEDLINE

## (undated) DEVICE — (D)LUB SURG MEDC STRL 3GR FOIL -- NLM SUB 292521

## (undated) DEVICE — TOWEL SURG W17XL27IN STD BLU COT NONFENESTRATED PREWASHED

## (undated) DEVICE — SUTURE ETHBND EXCEL SZ 5 L30IN NONABSORBABLE GRN L40MM V-37 MB66G

## (undated) DEVICE — PAD,SANITARY,11 IN,MAXI,N-STRL,IND WRAP: Brand: MEDLINE

## (undated) DEVICE — STERILE POLYISOPRENE POWDER-FREE SURGICAL GLOVES: Brand: PROTEXIS

## (undated) DEVICE — SOLUTION IRRIG 1000ML H2O PIC PLAS SHATTERPROOF CONTAINER

## (undated) DEVICE — TRAY PREP DRY W/ PREM GLV 2 APPL 6 SPNG 2 UNDPD 1 OVERWRAP

## (undated) DEVICE — TUBING HYDR IRR --

## (undated) DEVICE — TRAY SPNL 24GA ANES

## (undated) DEVICE — Z INACTIVE USE 2527070 DRAPE SURG W40XL44IN UNDERBUTTOCK SMS POLYPR W/ PCH BK DISP

## (undated) DEVICE — ROYALSILK SURGICAL GOWN, L: Brand: CONVERTORS

## (undated) DEVICE — CATHETER,URETHRAL,REDRUBBER,STRL,16FR: Brand: MEDLINE

## (undated) DEVICE — INFECTION CONTROL KIT SYS

## (undated) DEVICE — SPONGE GZ W4XL4IN COT RADPQ HIGHLY ABSRB

## (undated) DEVICE — KIT TURNOVER RM CLN OP [STD404A] [MICROTEK MEDICAL INC]

## (undated) DEVICE — GLOVE SURG SZ 65 L12IN FNGR THK79MIL GRN LTX FREE

## (undated) DEVICE — 12 ML SYRINGE,LUER-LOCK TIP: Brand: MONOJECT

## (undated) DEVICE — STRAP,POSITIONING,KNEE/BODY,FOAM,4X60": Brand: MEDLINE

## (undated) DEVICE — CATH URETH INTMIT ROB 16FR FUN -- CONVERT TO ITEM 179520

## (undated) DEVICE — ASTOUND STANDARD SURGICAL GOWN, XL: Brand: CONVERTORS

## (undated) DEVICE — PERI GYN-SFMC: Brand: MEDLINE INDUSTRIES, INC.

## (undated) DEVICE — TUBING, SUCTION, 1/4" X 10', STRAIGHT: Brand: MEDLINE

## (undated) DEVICE — SUTURE PROL SZ 1 L60IN NONABSORBABLE BLU L65MM TP-1 3/8 CIR 8824G

## (undated) DEVICE — STERILE POLYISOPRENE POWDER-FREE SURGICAL GLOVES WITH EMOLLIENT COATING: Brand: PROTEXIS

## (undated) DEVICE — CURETTE VAC DIA8MM PLAS CRV OPN TIP RIG DISP VACURET D/E

## (undated) DEVICE — GOWN,SIRUS,FABRNF,XL,20/CS: Brand: MEDLINE

## (undated) DEVICE — HANDLE SUCT TBNG L6FR DIA3/8IN SWVL W/ M ADPT FOR BERK PMP

## (undated) DEVICE — ROYAL SILK SURGICAL GOWN, XXL: Brand: CONVERTORS

## (undated) DEVICE — MARKER,SKIN,WI/RULER AND LABELS: Brand: MEDLINE

## (undated) DEVICE — STRAP RESTRN W3.5XL18IN STD ALL PURP DISP W/ SLNG RNG

## (undated) DEVICE — GLOVE SURG SZ 6 THK91MIL LTX FREE SYN POLYISOPRENE ANTI

## (undated) DEVICE — SUTURE PROL SZ 2-0 L30IN NONABSORBABLE BLU L26MM CT-1 1/2 8423H

## (undated) DEVICE — SOLUTION IRRIG 1000ML 09% SOD CHL USP PIC PLAS CONTAINER

## (undated) DEVICE — SPINAL TRAY NDL 24 GAX4 IN SPROTTE

## (undated) DEVICE — X-RAY DETECTABLE SPONGES,16 PLY: Brand: VISTEC

## (undated) DEVICE — SYR 10ML LUER LOK 1/5ML GRAD --

## (undated) DEVICE — PREMIUM WET SKIN PREP TRAY: Brand: MEDLINE INDUSTRIES, INC.